# Patient Record
Sex: FEMALE | Race: WHITE | NOT HISPANIC OR LATINO | Employment: OTHER | URBAN - METROPOLITAN AREA
[De-identification: names, ages, dates, MRNs, and addresses within clinical notes are randomized per-mention and may not be internally consistent; named-entity substitution may affect disease eponyms.]

---

## 2017-02-01 ENCOUNTER — HOSPITAL ENCOUNTER (OUTPATIENT)
Dept: RADIOLOGY | Facility: MEDICAL CENTER | Age: 72
End: 2017-02-01
Attending: OBSTETRICS & GYNECOLOGY
Payer: COMMERCIAL

## 2017-02-01 DIAGNOSIS — Z13.9 SCREENING: ICD-10-CM

## 2017-02-01 PROCEDURE — G0202 SCR MAMMO BI INCL CAD: HCPCS

## 2017-02-02 ENCOUNTER — TELEPHONE (OUTPATIENT)
Dept: RHEUMATOLOGY | Facility: PHYSICIAN GROUP | Age: 72
End: 2017-02-02

## 2017-02-03 NOTE — TELEPHONE ENCOUNTER
Called patient queried her compliance.  She is taking her alendronate q Wednesday.  Will send message back [employer].    Discussed with patient plan - recheck [employer] in two years.

## 2017-02-16 DIAGNOSIS — I10 ESSENTIAL HYPERTENSION: ICD-10-CM

## 2017-02-20 RX ORDER — LISINOPRIL 20 MG/1
TABLET ORAL
Qty: 90 TAB | Refills: 3 | Status: SHIPPED | OUTPATIENT
Start: 2017-02-20 | End: 2018-02-14 | Stop reason: SDUPTHER

## 2017-04-19 ENCOUNTER — TELEPHONE (OUTPATIENT)
Dept: RHEUMATOLOGY | Facility: PHYSICIAN GROUP | Age: 72
End: 2017-04-19

## 2017-04-19 DIAGNOSIS — R79.89 ELEVATED SERUM CREATININE: ICD-10-CM

## 2017-04-19 NOTE — TELEPHONE ENCOUNTER
Please call patient.  Labs from December 2016 at [facility] showed kidney function was compromised.  Please have her recheck her labs if this has not been done.  Will refill after labs are done.  If she has had labs done at [facility] since December for her kidneys we can review these instead.

## 2017-04-20 NOTE — TELEPHONE ENCOUNTER
Called pt. And relayed your message, pt. Stated that she would like me to mail out her labs. She has not had any labs done since December. Thank you!

## 2017-04-20 NOTE — TELEPHONE ENCOUNTER
Pt. Called back and stated that she was a bit confused, wondering what labs you were looking at that showed compromised kidney function. She said that she received a letter at the beginning of November that stated her labs were all fine. She said that she didn't think the labs in December showed her kidney functions being compromised either. Thank you! Please advise.

## 2017-04-21 ENCOUNTER — OFFICE VISIT (OUTPATIENT)
Dept: CARDIOLOGY | Facility: MEDICAL CENTER | Age: 72
End: 2017-04-21
Payer: COMMERCIAL

## 2017-04-21 VITALS
SYSTOLIC BLOOD PRESSURE: 132 MMHG | WEIGHT: 151 LBS | OXYGEN SATURATION: 99 % | BODY MASS INDEX: 24.27 KG/M2 | HEART RATE: 69 BPM | HEIGHT: 66 IN | DIASTOLIC BLOOD PRESSURE: 64 MMHG

## 2017-04-21 DIAGNOSIS — I34.1 MVP (MITRAL VALVE PROLAPSE): Chronic | ICD-10-CM

## 2017-04-21 DIAGNOSIS — I34.0 NON-RHEUMATIC MITRAL REGURGITATION: ICD-10-CM

## 2017-04-21 PROCEDURE — 99214 OFFICE O/P EST MOD 30 MIN: CPT | Performed by: INTERNAL MEDICINE

## 2017-04-21 ASSESSMENT — ENCOUNTER SYMPTOMS
WEIGHT LOSS: 0
MYALGIAS: 0
WHEEZING: 0
DIZZINESS: 0
BRUISES/BLEEDS EASILY: 0
NERVOUS/ANXIOUS: 0
NAUSEA: 0
SPUTUM PRODUCTION: 0
COUGH: 0
PALPITATIONS: 0
LOSS OF CONSCIOUSNESS: 0
DEPRESSION: 0
HEARTBURN: 0

## 2017-04-21 NOTE — Clinical Note
[facility]   [address]  [place] [address]  Phone: [phone]  Fax: [phone]              Shaista Bocanegra  1945    Encounter Date: 4/21/2017    Pascale Daniels M.D.          PROGRESS NOTE:  Subjective:   Shaista Bocanegra is a 71 y.o. female who presents today in follow-up in regards to her hypertension and her myxomatous mitral valve disease    Her blood pressures at home are not high, she brings in her log.  In the emergency room a few months ago, she is dehydrated.    Still loves cars, traveling to [place], just got back from [place]  No limitations, extremely active  Compliant with medications        Past Medical History   Diagnosis Date   • Breast cancer (CMS-HCC)    • Benign essential HTN 3/19/2012   • Chest tightness or pressure 3/19/2012   • Hypercholesterolemia 3/19/2012   • MVP (mitral valve prolapse) 3/19/2012   • Renal insufficiency 3/19/2012   • High risk medication use 3/19/2012   • Cancer (CMS-HCC)      Past Surgical History   Procedure Laterality Date   • Cataract phaco with iol  1/26/2009     Performed by SHANNON GANDARA [facility]   • Cataract phaco with iol  3/16/2009     Performed by SHANNON GANDARA at [facility]   • Breast biopsy     • Pr radiation therapy plan simple     • Pr chemotherapy, unspecified procedure     • Lumpectomy       Family History   Problem Relation Age of Onset   • Cancer Mother    • Heart Failure Neg Hx    • Heart Disease Neg Hx      History   Smoking status   • Never Smoker    Smokeless tobacco   • Never Used     No Known Allergies  Outpatient Encounter Prescriptions as of 4/21/2017   Medication Sig Dispense Refill   • lisinopril (PRINIVIL) 20 MG Tab TAKE 1 TABLET DAILY 90 Tab 3   • alendronate (FOSAMAX) 35 MG tablet 1 every 7 days 4 Tab 2   • metoprolol SR (TOPROL XL) 50 MG TABLET SR 24 HR Take 1 Tab by mouth every day. 90 Tab 3   • cyanocobalamin (VITAMIN  "B-12) 100 MCG Tab Take 100 mcg by mouth every day.     • simvastatin (ZOCOR) 20 MG TABS Take 1 Tab by mouth every evening. (Patient taking differently: Take 20 mg by mouth. Qod or 1/2 tablet daily) 90 Tab 2   • triamterene/hctz (MAXZIDE-25/DYAZIDE) 37.5-25 MG CAPS Take 1 Cap by mouth every morning. 90 Cap 2   • potassium chloride CR (K-DUR) 10 MEQ tablet Take 1 Tab by mouth every day. 180 Tab 3   • Multiple Vitamin (MULTIVITAMINS PO) Take  by mouth every day.     • Calcium Carbonate-Vitamin D (CALCIUM + D PO) Take 1,200 mg by mouth every day.       No facility-administered encounter medications on file as of 4/21/2017.     Review of Systems   Constitutional: Negative for weight loss and malaise/fatigue.   HENT: Negative for congestion.    Respiratory: Negative for cough, sputum production and wheezing.    Cardiovascular: Negative for chest pain, palpitations and leg swelling.   Gastrointestinal: Negative for heartburn and nausea.   Musculoskeletal: Negative for myalgias and joint pain.   Neurological: Negative for dizziness and loss of consciousness.   Endo/Heme/Allergies: Does not bruise/bleed easily.   Psychiatric/Behavioral: Negative for depression. The patient is not nervous/anxious.    All other systems reviewed and are negative.       Objective:   /64 mmHg  Pulse 69  Ht 1.677 m (5' 6.02\")  Wt 68.493 kg (151 lb)  BMI 24.35 kg/m2  SpO2 99%    Physical Exam   Constitutional: She is oriented to person, place, and time. She appears well-developed. No distress.   Younger than stated age   HENT:   Mouth/Throat: Mucous membranes are normal.   Eyes: Conjunctivae and EOM are normal.   Neck: No JVD present. No thyroid mass and no thyromegaly present.   Cardiovascular: Normal rate, regular rhythm and intact distal pulses.    Murmur: 2 of 6 systolic blowing murmur.  Pulmonary/Chest: Effort normal and breath sounds normal.   Musculoskeletal: Normal range of motion. She exhibits no edema.   Neurological: She is " alert and oriented to person, place, and time. She has normal strength. She displays no tremor. No cranial nerve deficit. She exhibits normal muscle tone.   Skin: Skin is warm and dry. No rash noted. She is not diaphoretic.   Psychiatric: She has a normal mood and affect. Her behavior is normal.   Vitals reviewed.      Assessment:     1. MVP (mitral valve prolapse)     2. Non-rheumatic mitral regurgitation  COMP METABOLIC PANEL       Medical Decision Making:  Today's Assessment / Status / Plan:       Hypertension, excellent control. Log reviewed, average blood pressures in the 120s. On triple drug regimen, lab tests reviewed, emergency records reviewed. I do not think she has a reduced kidney function, she was concerned about this. We'll repeat now    Mitral valve disease, looked images, talked about anatomy. We talked about indications for surgery which I believe in her would be symptoms on maximal medications. Annual echoes, likely in the fall    She'll recheck her kidney function which I gave her a slip for, RTC 6-12 months      Bethany [place] M.D.  [address] [place] [id number]  VIA Facsimile: [phone]

## 2017-04-21 NOTE — MR AVS SNAPSHOT
"Shaista Bocanegra   2017 12:45 PM   Office Visit   MRN: [id number]    Department:  [facility]   Dept Phone:  [phone]    Description:  Female : 1945   Provider:  Pascale Daniels M.D.           Reason for Visit     Follow-Up           Allergies as of 2017     No Known Allergies      You were diagnosed with     MVP (mitral valve prolapse)   [781986]       Non-rheumatic mitral regurgitation   [238416]         Vital Signs     Blood Pressure Pulse Height Weight Body Mass Index Oxygen Saturation    132/64 mmHg 69 1.677 m (5' 6.02\") 68.493 kg (151 lb) 24.35 kg/m2 99%    Smoking Status                   Never Smoker            Basic Information     Date Of Birth Sex Race Ethnicity Preferred Language    1945 Female White Non- English      Your appointments     May 25, 2017  8:30 AM   Follow Up Visit with Aimee Bruno M.D.   [facility] (--)    [address], [address]  [place] [address] [phone]           You will be receiving a confirmation call a few days before your appointment from our automated call confirmation system.            Dec 05, 2017  8:45 AM   FOLLOW UP with Pascale Daniels M.D.   [facility] (--)    [address]  [place] [address] [phone]              Problem List              ICD-10-CM Priority Class Noted - Resolved    Benign essential HTN (Chronic) I10   3/19/2012 - Present    MVP (mitral valve prolapse) (Chronic) I34.1   3/19/2012 - Present    Mitral regurgitation I34.0   2014 - Present    Osteopenia M85.80   2015 - Present    hyperlipidemia E78.5   3/24/2015 - Present      Health Maintenance        Date Due Completion Dates    IMM DTaP/Tdap/Td Vaccine (1 - Tdap) 1962 ---    PAP SMEAR 1964 ---    COLONOSCOPY 1993 ---    IMM ZOSTER VACCINE 2003 ---    IMM PNEUMOCOCCAL 65+ (ADULT) LOW/MEDIUM RISK SERIES (1 of 2 - PCV13) 2008 ---    MAMMOGRAM " 2/1/2018 2/1/2017, 1/15/2016, 2/4/2015, 2/6/2014, 2/6/2013, 2/8/2012, 2/8/2011, 2/8/2010, 2/8/2010, 2/18/2009, 2/18/2009, 2/4/2008, 2/4/2008, 2/22/2007, 9/20/2006, 2/1/2006, 2/14/2005, 2/12/2004    BONE DENSITY 2/5/2021 2/5/2016, 3/5/2014, 12/15/2006, 10/13/2005, 9/1/2004            Current Immunizations     No immunizations on file.      Below and/or attached are the medications your provider expects you to take. Review all of your home medications and newly ordered medications with your provider and/or pharmacist. Follow medication instructions as directed by your provider and/or pharmacist. Please keep your medication list with you and share with your provider. Update the information when medications are discontinued, doses are changed, or new medications (including over-the-counter products) are added; and carry medication information at all times in the event of emergency situations     Allergies:  No Known Allergies          Medications  Valid as of: April 21, 2017 -  1:24 PM    Generic Name Brand Name Tablet Size Instructions for use    Alendronate Sodium (Tab) FOSAMAX 35 MG 1 every 7 days        Calcium Citrate-Vitamin D   Take 1,200 mg by mouth every day.        Cyanocobalamin (Tab) VITAMIN B-12 100 MCG Take 100 mcg by mouth every day.        Lisinopril (Tab) PRINIVIL 20 MG TAKE 1 TABLET DAILY        Metoprolol Succinate (TABLET SR 24 HR) TOPROL XL 50 MG Take 1 Tab by mouth every day.        Multiple Vitamin   Take  by mouth every day.        Potassium Chloride (Tab CR) KLOR-CON 10 MEQ Take 1 Tab by mouth every day.        Simvastatin (Tab) ZOCOR 20 MG Take 1 Tab by mouth every evening.        Triamterene-HCTZ (Cap) MAXZIDE-25/DYAZIDE 37.5-25 MG Take 1 Cap by mouth every morning.        .                 Medicines prescribed today were sent to:     [facility]  [place] [address] [address]    Phone: [phone] Fax: [phone]    Open 24 Hours?: No    [facility] - [place], [place] - [address] [place] [address]    [address] [place] [address]    Phone: [phone] Fax: [phone]    Open 24 Hours?: No    [id number] - [place], [place] - [address]    [address] [place] [address]    Phone: [phone] Fax: [phone]    Open 24 Hours?: No    [facility] - [place], [place] - [address] [place] [address]    Phone: [phone] Fax: [phone]    Open 24 Hours?: No      Medication refill instructions:       If your prescription bottle indicates you have medication refills left, it is not necessary to call your provider’s office. Please contact your pharmacy and they will refill your medication.    If your prescription bottle indicates you do not have any refills left, you may request refills at any time through one of the following ways: The online [employer] system (except Urgent Care), by calling your provider’s office, or by asking your pharmacy to contact your provider’s office with a refill request. Medication refills are processed only during regular business hours and may not be available until the next business day. Your provider may request additional information or to have a follow-up visit with you prior to refilling your medication.   *Please Note: Medication refills are assigned a new Rx number when refilled electronically. Your pharmacy may indicate that no refills were authorized even though a new prescription for the same medication is available at the pharmacy. Please request the medicine by name with the pharmacy before contacting your provider for a refill.        Your To Do List     Future Labs/Procedures Complete By Expires    COMP METABOLIC PANEL  As directed 4/21/2018         [employer] Access Code: Activation code not generated  Current [employer] Status: Active

## 2017-04-21 NOTE — PROGRESS NOTES
Subjective:   Shaista Bocanegra is a 71 y.o. female who presents today in follow-up in regards to her hypertension and her myxomatous mitral valve disease    Her blood pressures at home are not high, she brings in her log.  In the emergency room a few months ago, she is dehydrated.    Still loves cars, traveling to [place], just got back from [place]  No limitations, extremely active  Compliant with medications        Past Medical History   Diagnosis Date   • Breast cancer (CMS-HCC)    • Benign essential HTN 3/19/2012   • Chest tightness or pressure 3/19/2012   • Hypercholesterolemia 3/19/2012   • MVP (mitral valve prolapse) 3/19/2012   • Renal insufficiency 3/19/2012   • High risk medication use 3/19/2012   • Cancer (CMS-HCC)      Past Surgical History   Procedure Laterality Date   • Cataract phaco with iol  1/26/2009     Performed by SHANNON G[facility]   • Cataract phaco with iol  3/16/2009     Performed by SHANNON GANDARA [facility]   • Breast biopsy     • Pr radiation therapy plan simple     • Pr chemotherapy, unspecified procedure     • Lumpectomy       Family History   Problem Relation Age of Onset   • Cancer Mother    • Heart Failure Neg Hx    • Heart Disease Neg Hx      History   Smoking status   • Never Smoker    Smokeless tobacco   • Never Used     No Known Allergies  Outpatient Encounter Prescriptions as of 4/21/2017   Medication Sig Dispense Refill   • lisinopril (PRINIVIL) 20 MG Tab TAKE 1 TABLET DAILY 90 Tab 3   • alendronate (FOSAMAX) 35 MG tablet 1 every 7 days 4 Tab 2   • metoprolol SR (TOPROL XL) 50 MG TABLET SR 24 HR Take 1 Tab by mouth every day. 90 Tab 3   • cyanocobalamin (VITAMIN B-12) 100 MCG Tab Take 100 mcg by mouth every day.     • simvastatin (ZOCOR) 20 MG TABS Take 1 Tab by mouth every evening. (Patient taking differently: Take 20 mg by mouth. Qod or 1/2 tablet daily) 90 Tab 2   • triamterene/hctz (MAXZIDE-25/DYAZIDE) 37.5-25 MG CAPS  "Take 1 Cap by mouth every morning. 90 Cap 2   • potassium chloride CR (K-DUR) 10 MEQ tablet Take 1 Tab by mouth every day. 180 Tab 3   • Multiple Vitamin (MULTIVITAMINS PO) Take  by mouth every day.     • Calcium Carbonate-Vitamin D (CALCIUM + D PO) Take 1,200 mg by mouth every day.       No facility-administered encounter medications on file as of 4/21/2017.     Review of Systems   Constitutional: Negative for weight loss and malaise/fatigue.   HENT: Negative for congestion.    Respiratory: Negative for cough, sputum production and wheezing.    Cardiovascular: Negative for chest pain, palpitations and leg swelling.   Gastrointestinal: Negative for heartburn and nausea.   Musculoskeletal: Negative for myalgias and joint pain.   Neurological: Negative for dizziness and loss of consciousness.   Endo/Heme/Allergies: Does not bruise/bleed easily.   Psychiatric/Behavioral: Negative for depression. The patient is not nervous/anxious.    All other systems reviewed and are negative.       Objective:   /64 mmHg  Pulse 69  Ht 1.677 m (5' 6.02\")  Wt 68.493 kg (151 lb)  BMI 24.35 kg/m2  SpO2 99%    Physical Exam   Constitutional: She is oriented to person, place, and time. She appears well-developed. No distress.   Younger than stated age   HENT:   Mouth/Throat: Mucous membranes are normal.   Eyes: Conjunctivae and EOM are normal.   Neck: No JVD present. No thyroid mass and no thyromegaly present.   Cardiovascular: Normal rate, regular rhythm and intact distal pulses.    Murmur: 2 of 6 systolic blowing murmur.  Pulmonary/Chest: Effort normal and breath sounds normal.   Musculoskeletal: Normal range of motion. She exhibits no edema.   Neurological: She is alert and oriented to person, place, and time. She has normal strength. She displays no tremor. No cranial nerve deficit. She exhibits normal muscle tone.   Skin: Skin is warm and dry. No rash noted. She is not diaphoretic.   Psychiatric: She has a normal mood and " affect. Her behavior is normal.   Vitals reviewed.      Assessment:     1. MVP (mitral valve prolapse)     2. Non-rheumatic mitral regurgitation  COMP METABOLIC PANEL       Medical Decision Making:  Today's Assessment / Status / Plan:       Hypertension, excellent control. Log reviewed, average blood pressures in the 120s. On triple drug regimen, lab tests reviewed, emergency records reviewed. I do not think she has a reduced kidney function, she was concerned about this. We'll repeat now    Mitral valve disease, looked images, talked about anatomy. We talked about indications for surgery which I believe in her would be symptoms on maximal medications. Annual echoes, likely in the fall    She'll recheck her kidney function which I gave her a slip for, RTC 6-12 months

## 2017-04-25 NOTE — TELEPHONE ENCOUNTER
Please call patient     Her creatinine which is an indicatory of kidney function was normal.  However her filtration rate was low.  I would like to repeat this.

## 2017-04-25 NOTE — TELEPHONE ENCOUNTER
Called pt. And relayed your message. Pt. Stated that she probably will not be able to get the labs done because she is leaving for [place] on Thursday. Advised that i would give her a call back if there was anything else you needed. Thank you!

## 2017-05-25 ENCOUNTER — OFFICE VISIT (OUTPATIENT)
Dept: RHEUMATOLOGY | Facility: PHYSICIAN GROUP | Age: 72
End: 2017-05-25
Payer: COMMERCIAL

## 2017-05-25 VITALS
DIASTOLIC BLOOD PRESSURE: 82 MMHG | SYSTOLIC BLOOD PRESSURE: 148 MMHG | WEIGHT: 154 LBS | TEMPERATURE: 98.7 F | BODY MASS INDEX: 24.84 KG/M2 | HEART RATE: 70 BPM | OXYGEN SATURATION: 97 %

## 2017-05-25 DIAGNOSIS — Z79.899 ENCOUNTER FOR LONG-TERM (CURRENT) USE OF HIGH-RISK MEDICATION: ICD-10-CM

## 2017-05-25 DIAGNOSIS — M85.80 OSTEOPENIA, UNSPECIFIED LOCATION: ICD-10-CM

## 2017-05-25 PROCEDURE — 99214 OFFICE O/P EST MOD 30 MIN: CPT | Performed by: INTERNAL MEDICINE

## 2017-05-25 RX ORDER — VALACYCLOVIR HYDROCHLORIDE 1 G/1
1000 TABLET, FILM COATED ORAL 2 TIMES DAILY
COMMUNITY
End: 2017-08-18

## 2017-05-25 ASSESSMENT — ENCOUNTER SYMPTOMS
FEVER: 0
VOMITING: 0
MYALGIAS: 0
NAUSEA: 0
CHILLS: 0

## 2017-05-25 NOTE — Clinical Note
[facility]   [address], [address]  [place] [address]  Phone: [phone]  Fax: [phone]              Encounter Date: 5/25/2017    Dear Dr. Crane    It was a pleasure seeing your patient, Shaista Bocanegra, on 5/25/2017. The encounter diagnosis was Osteopenia, unspecified location.     Please find attached progress note which includes the history I obtained from Ms. Bocanegra, my physical examination findings, my impression and recommendations.      Once again, it was a pleasure participating in your patient's care.  Please feel free to contact me if you have any questions or if I can be of any further assistance to your patients.      Sincerely,    Aimee Bruno M.D.  Electronically Signed          PROGRESS NOTE:  Subjective:   Date of Consultation:5/25/2017  8:39 AM  Primary care physician:Bethany Crane M.D.        Reason for Consultation:  Ms. Bocanegra  is a pleasant 71 y.o. year old female who presents for follow-up of osteopenia    Osteopenia  She first presented to Dr. Ward with last bone density in March 2012 with T score of -2.3 at the left femoral neck of the hip with no previous fractures.  She was previously on anti-resorptive drug for a few years.  She reports that it was 5 years starting around 1998.  She was last seen in 2/29/2016 for follow-up.  At the time she was taking calcium and vitamin D.  She denies falls.  At the last visit she had started fosamax in August 2016 and doing well    Her last bone density in 1/21/2016 showed   The mean bone mineral density for the lumbar spine is 1.058 g/cm2, which corresponds to a T score of -1.0 and a Z score of 0.7.  The proximal left femur has a mean bone mineral density of 0.795 g/cm2, with a T score of -1.7 and a Z score of -0.1.  When compared with the most recent study dated 3/5/2014, there has been a 3.0% decrease in the bone mineral density of the lumbar spine and a 0.9% increase in the bone mineral density of the left  femur.    She started alendronate about August 2016 however the prescription was written to start 6/8/2016.  She is tolerating the medication well.    Since last visit she has corrected to an follows up with her dentist then noticed that she is in need of an implant. Her dentist has referred her to a specialist for second opinion.  She is concerned being on alendronate that she is at risk of osteonecrosis of the jaw she were to need invasive dental work.      Vitamin D 5/2015 from [facility] at 75.8  Not taking daily vitamin D    Abnormal LFT  She does drink 2-3 alcoholic drinks a day  She had labs done recently at [place]    Renal insufficiency  She states that as long as she hydrates before labs her labs are fine.  She had labs done recently at [place] demonstrated a creatinine of 1.6 which is reasonable.     History of breast cancer treated with radiation therapy    Past Medical History: she has a left wrist fracture as a result of fall while rollerskating.    Family History: hip fracture mom had in her late 80's    Past Medical History   Diagnosis Date   • Breast cancer (CMS-[facility])    • Benign essential HTN 3/19/2012   • Chest tightness or pressure 3/19/2012   • Hypercholesterolemia 3/19/2012   • MVP (mitral valve prolapse) 3/19/2012   • Renal insufficiency 3/19/2012   • High risk medication use 3/19/2012   • Cancer (CMS-HCC)      Past Surgical History   Procedure Laterality Date   • Cataract phaco with iol  1/26/2009     [facility]   • Cataract phaco with iol  3/16/2009     Performed by SHANNON GANDARA at [facility]   • Breast biopsy     • Pr radiation therapy plan simple     • Pr chemotherapy, unspecified procedure     • Lumpectomy       No Known Allergies  Outpatient Encounter Prescriptions as of 5/25/2017   Medication Sig Dispense Refill   • Biotin 5000 MCG Cap Take  by mouth.     • valacyclovir (VALTREX) 1 GM Tab Take 1,000 mg by mouth  2 times a day.     • alendronate (FOSAMAX) 35 MG tablet TAKE 1 TABLET EVERY 7 DAYS 4 Tab 11   • lisinopril (PRINIVIL) 20 MG Tab TAKE 1 TABLET DAILY 90 Tab 3   • metoprolol SR (TOPROL XL) 50 MG TABLET SR 24 HR Take 1 Tab by mouth every day. 90 Tab 3   • cyanocobalamin (VITAMIN B-12) 100 MCG Tab Take 100 mcg by mouth every day.     • simvastatin (ZOCOR) 20 MG TABS Take 1 Tab by mouth every evening. (Patient taking differently: Take 20 mg by mouth. Qod or 1/2 tablet daily) 90 Tab 2   • potassium chloride CR (K-DUR) 10 MEQ tablet Take 1 Tab by mouth every day. 180 Tab 3   • Multiple Vitamin (MULTIVITAMINS PO) Take  by mouth every day.     • Calcium Carbonate-Vitamin D (CALCIUM + D PO) Take 1,200 mg by mouth every day.     • triamterene/hctz (MAXZIDE-25/DYAZIDE) 37.5-25 MG CAPS Take 1 Cap by mouth every morning. 90 Cap 2     No facility-administered encounter medications on file as of 5/25/2017.     Social History     Social History   • Marital Status:      Spouse Name: N/A   • Number of Children: N/A   • Years of Education: N/A     Occupational History   • Not on file.     Social History Main Topics   • Smoking status: Never Smoker    • Smokeless tobacco: Never Used   • Alcohol Use: Yes      Comment: 3 glasses of wine   • Drug Use: No   • Sexual Activity: Not on file     Other Topics Concern   • Not on file     Social History Narrative      History   Smoking status   • Never Smoker    Smokeless tobacco   • Never Used     History   Alcohol Use   • Yes     Comment: 3 glasses of wine     History   Drug Use No      OB History   No data available      No LMP recorded.    G P A L     Family History   Problem Relation Age of Onset   • Cancer Mother    • Heart Failure Neg Hx    • Heart Disease Neg Hx        Review of Systems   Constitutional: Negative for fever and chills.   Gastrointestinal: Negative for nausea and vomiting.   Musculoskeletal: Negative for myalgias.        Objective:   /82 mmHg  Pulse 70   Temp(Src) 37.1 °C (98.7 °F)  Wt 69.854 kg (154 lb)  SpO2 97%  Breastfeeding? No    Physical Exam   Constitutional: She is oriented to person, place, and time. She appears well-developed and well-nourished. No distress.   HENT:   Head: Normocephalic and atraumatic.   Right Ear: External ear normal.   Left Ear: External ear normal.   Eyes: Conjunctivae are normal. Right eye exhibits no discharge. Left eye exhibits no discharge. No scleral icterus.   Cardiovascular: Normal rate, regular rhythm and normal heart sounds.    Pulmonary/Chest: No respiratory distress.   Abdominal: Soft.   No hepatomegaly   Neurological: She is alert and oriented to person, place, and time. No cranial nerve deficit.   Skin: Skin is warm. No rash noted. She is not diaphoretic. No erythema.   Limited skin exam   Psychiatric: She has a normal mood and affect. Her behavior is normal. Judgment and thought content normal.     Labs:    No results found for: QNTTBGOLD  No results found for: HEPBCORTOT, HEPBCORIGM, HEPBSAG  No results found for: HEPCAB  Lab Results   Component Value Date/Time    SODIUM 135 12/05/2016 11:00 AM    POTASSIUM 3.7 12/05/2016 11:00 AM    CHLORIDE 100 12/05/2016 11:00 AM    CO2 26 12/05/2016 11:00 AM    GLUCOSE 143* 12/05/2016 11:00 AM    BUN 19 12/05/2016 11:00 AM    CREATININE 1.17 12/05/2016 11:00 AM    BUN-CREATININE RATIO 30* 08/07/2013 08:47 AM      Lab Results   Component Value Date/Time    WBC 3.9* 12/05/2016 11:00 AM    RBC 4.09* 12/05/2016 11:00 AM    HEMOGLOBIN 13.8 12/05/2016 11:00 AM    HEMATOCRIT 40.5 12/05/2016 11:00 AM    MCV 99.0* 12/05/2016 11:00 AM    MCH 33.7* 12/05/2016 11:00 AM    MCHC 34.1 12/05/2016 11:00 AM    MPV 10.1 12/05/2016 11:00 AM    NEUTROPHILS-POLYS 72.90* 12/05/2016 11:00 AM    LYMPHOCYTES 16.80* 12/05/2016 11:00 AM    MONOCYTES 9.20 12/05/2016 11:00 AM    EOSINOPHILS 0.50 12/05/2016 11:00 AM    BASOPHILS 0.30 12/05/2016 11:00 AM      Lab Results   Component Value Date/Time    CALCIUM  8.9 12/05/2016 11:00 AM       Assessment:     1. Osteopenia, unspecified location  VITAMIN D,25 HYDROXY    COMP METABOLIC PANEL           Medical Decision Making:  Today's Assessment / Status / Plan:     Osteopenia  I would like to continue alendronate however as she may be need invasive dental surgery we will hold this until she sees her second opinion and resolve her surgical plans. I again discussed with her the risks of osteonecrosis of the jaw secondary to use of bisphosphonates.    Started alendronate 8/2016 and now we will hold 5/2017  I did review the risks and benefits of bisphosphonate therapy.  She is not a candidate for Forteo since she has a history of radiation therapy.  I also reviewed with her how to take the medications.    I will also check a vitamin D    Hold bisphosphonate for now.      Cracked tooth  willl see dentist  Reports that she will need a possible impalnt.      Renal insufficiency and abnormal LFT  I will check  Labs for LFT    1. Osteopenia, unspecified location  VITAMIN D,25 HYDROXY    COMP METABOLIC PANEL           Return in about 6 months (around 11/25/2017).    I have spent greater than 50% of this 30 minute visit in counseling/coordination of care with the patient regarding discussion of risks of invasive dental surgery given that she has been on bisphosphonates, plan to hold bisphosphonates for now and the rationale and also a discussion that I want to follow up with her within a month and to keep up with the plan for any surgical intervention regarding her cracked tooth.    She agreed and verbalized her agreement and understanding with the current plan. I answered all questions and concerns she has at this time and advised her to call at any time in there interim with questions or concerns in regards to her care.    Thank you for allowing me to participate in her care, I will continue to follow closely.

## 2017-05-25 NOTE — PROGRESS NOTES
Subjective:   Date of Consultation:5/25/2017  8:39 AM  Primary care physician:Bethany Crane M.D.        Reason for Consultation:  Ms. Bocanegra  is a pleasant 71 y.o. year old female who presents for follow-up of osteopenia    Osteopenia  She first presented to Dr. Ward with last bone density in March 2012 with T score of -2.3 at the left femoral neck of the hip with no previous fractures.  She was previously on anti-resorptive drug for a few years.  She reports that it was 5 years starting around 1998.  She was last seen in 2/29/2016 for follow-up.  At the time she was taking calcium and vitamin D.  She denies falls.  At the last visit she had started fosamax in August 2016 and doing well    Her last bone density in 1/21/2016 showed   The mean bone mineral density for the lumbar spine is 1.058 g/cm2, which corresponds to a T score of -1.0 and a Z score of 0.7.  The proximal left femur has a mean bone mineral density of 0.795 g/cm2, with a T score of -1.7 and a Z score of -0.1.  When compared with the most recent study dated 3/5/2014, there has been a 3.0% decrease in the bone mineral density of the lumbar spine and a 0.9% increase in the bone mineral density of the left femur.    She started alendronate about August 2016 however the prescription was written to start 6/8/2016.  She is tolerating the medication well.    Since last visit she has corrected to an follows up with her dentist then noticed that she is in need of an implant. Her dentist has referred her to a specialist for second opinion.  She is concerned being on alendronate that she is at risk of osteonecrosis of the jaw she were to need invasive dental work.      Vitamin D 5/2015 from [facility] at 75.8  Not taking daily vitamin D    Abnormal LFT  She does drink 2-3 alcoholic drinks a day  She had labs done recently at [place]    Renal insufficiency  She states that as long as she hydrates before labs her labs are fine.  She had labs done recently  at [place] demonstrated a creatinine of 1.6 which is reasonable.     History of breast cancer treated with radiation therapy    Past Medical History: she has a left wrist fracture as a result of fall while rollerskating.    Family History: hip fracture mom had in her late 80's    Past Medical History   Diagnosis Date   • Breast cancer (CMS-HCC)    • Benign essential HTN 3/19/2012   • Chest tightness or pressure 3/19/2012   • Hypercholesterolemia 3/19/2012   • MVP (mitral valve prolapse) 3/19/2012   • Renal insufficiency 3/19/2012   • High risk medication use 3/19/2012   • Cancer (CMS-HCC)      Past Surgical History   Procedure Laterality Date   • Cataract phaco with iol  1/26/2009     Performed by SHANNON GANDARA at [facility]   • Cataract phaco with iol  3/16/2009     Performed by SHANNON GANDARA at [facility]   • Breast biopsy     • Pr radiation therapy plan simple     • Pr chemotherapy, unspecified procedure     • Lumpectomy       No Known Allergies  Outpatient Encounter Prescriptions as of 5/25/2017   Medication Sig Dispense Refill   • Biotin 5000 MCG Cap Take  by mouth.     • valacyclovir (VALTREX) 1 GM Tab Take 1,000 mg by mouth 2 times a day.     • alendronate (FOSAMAX) 35 MG tablet TAKE 1 TABLET EVERY 7 DAYS 4 Tab 11   • lisinopril (PRINIVIL) 20 MG Tab TAKE 1 TABLET DAILY 90 Tab 3   • metoprolol SR (TOPROL XL) 50 MG TABLET SR 24 HR Take 1 Tab by mouth every day. 90 Tab 3   • cyanocobalamin (VITAMIN B-12) 100 MCG Tab Take 100 mcg by mouth every day.     • simvastatin (ZOCOR) 20 MG TABS Take 1 Tab by mouth every evening. (Patient taking differently: Take 20 mg by mouth. Qod or 1/2 tablet daily) 90 Tab 2   • potassium chloride CR (K-DUR) 10 MEQ tablet Take 1 Tab by mouth every day. 180 Tab 3   • Multiple Vitamin (MULTIVITAMINS PO) Take  by mouth every day.     • Calcium Carbonate-Vitamin D (CALCIUM + D PO) Take 1,200 mg by mouth every day.     • triamterene/hctz  (MAXZIDE-25/DYAZIDE) 37.5-25 MG CAPS Take 1 Cap by mouth every morning. 90 Cap 2     No facility-administered encounter medications on file as of 5/25/2017.     Social History     Social History   • Marital Status:      Spouse Name: N/A   • Number of Children: N/A   • Years of Education: N/A     Occupational History   • Not on file.     Social History Main Topics   • Smoking status: Never Smoker    • Smokeless tobacco: Never Used   • Alcohol Use: Yes      Comment: 3 glasses of wine   • Drug Use: No   • Sexual Activity: Not on file     Other Topics Concern   • Not on file     Social History Narrative      History   Smoking status   • Never Smoker    Smokeless tobacco   • Never Used     History   Alcohol Use   • Yes     Comment: 3 glasses of wine     History   Drug Use No      OB History   No data available      No LMP recorded.    G P A L     Family History   Problem Relation Age of Onset   • Cancer Mother    • Heart Failure Neg Hx    • Heart Disease Neg Hx        Review of Systems   Constitutional: Negative for fever and chills.   Gastrointestinal: Negative for nausea and vomiting.   Musculoskeletal: Negative for myalgias.        Objective:   /82 mmHg  Pulse 70  Temp(Src) 37.1 °C (98.7 °F)  Wt 69.854 kg (154 lb)  SpO2 97%  Breastfeeding? No    Physical Exam   Constitutional: She is oriented to person, place, and time. She appears well-developed and well-nourished. No distress.   HENT:   Head: Normocephalic and atraumatic.   Right Ear: External ear normal.   Left Ear: External ear normal.   Eyes: Conjunctivae are normal. Right eye exhibits no discharge. Left eye exhibits no discharge. No scleral icterus.   Cardiovascular: Normal rate, regular rhythm and normal heart sounds.    Pulmonary/Chest: No respiratory distress.   Abdominal: Soft.   No hepatomegaly   Neurological: She is alert and oriented to person, place, and time. No cranial nerve deficit.   Skin: Skin is warm. No rash noted. She is not  diaphoretic. No erythema.   Limited skin exam   Psychiatric: She has a normal mood and affect. Her behavior is normal. Judgment and thought content normal.     Labs:    No results found for: QNTTBGOLD  No results found for: HEPBCORTOT, HEPBCORIGM, HEPBSAG  No results found for: HEPCAB  Lab Results   Component Value Date/Time    SODIUM 135 12/05/2016 11:00 AM    POTASSIUM 3.7 12/05/2016 11:00 AM    CHLORIDE 100 12/05/2016 11:00 AM    CO2 26 12/05/2016 11:00 AM    GLUCOSE 143* 12/05/2016 11:00 AM    BUN 19 12/05/2016 11:00 AM    CREATININE 1.17 12/05/2016 11:00 AM    BUN-CREATININE RATIO 30* 08/07/2013 08:47 AM      Lab Results   Component Value Date/Time    WBC 3.9* 12/05/2016 11:00 AM    RBC 4.09* 12/05/2016 11:00 AM    HEMOGLOBIN 13.8 12/05/2016 11:00 AM    HEMATOCRIT 40.5 12/05/2016 11:00 AM    MCV 99.0* 12/05/2016 11:00 AM    MCH 33.7* 12/05/2016 11:00 AM    MCHC 34.1 12/05/2016 11:00 AM    MPV 10.1 12/05/2016 11:00 AM    NEUTROPHILS-POLYS 72.90* 12/05/2016 11:00 AM    LYMPHOCYTES 16.80* 12/05/2016 11:00 AM    MONOCYTES 9.20 12/05/2016 11:00 AM    EOSINOPHILS 0.50 12/05/2016 11:00 AM    BASOPHILS 0.30 12/05/2016 11:00 AM      Lab Results   Component Value Date/Time    CALCIUM 8.9 12/05/2016 11:00 AM       Assessment:     1. Osteopenia, unspecified location  VITAMIN D,25 HYDROXY    COMP METABOLIC PANEL   2. Encounter for long-term (current) use of high-risk medication             Medical Decision Making:  Today's Assessment / Status / Plan:     Osteopenia  I would like to continue alendronate however as she may be need invasive dental surgery we will hold this until she sees her second opinion and resolve her surgical plans. I again discussed with her the risks of osteonecrosis of the jaw secondary to use of bisphosphonates.    Started alendronate 8/2016 and now we will hold 5/2017  I did review the risks and benefits of bisphosphonate therapy.  She is not a candidate for Forteo since she has a history of  radiation therapy.  I also reviewed with her how to take the medications.    I will also check a vitamin D    Hold bisphosphonate for now.      Cracked tooth  willl see dentist  Reports that she will need a possible impalnt.      Renal insufficiency and abnormal LFT  I will check  Labs for LFT    1. Osteopenia, unspecified location  VITAMIN D,25 HYDROXY    COMP METABOLIC PANEL   2. Encounter for long-term (current) use of high-risk medication             Return in about 6 months (around 11/25/2017).    I have spent greater than 50% of this 30 minute visit in counseling/coordination of care with the patient regarding discussion of risks of invasive dental surgery given that she has been on bisphosphonates, plan to hold bisphosphonates for now and the rationale and also a discussion that I want to follow up with her within a month and to keep up with the plan for any surgical intervention regarding her cracked tooth.    She agreed and verbalized her agreement and understanding with the current plan. I answered all questions and concerns she has at this time and advised her to call at any time in there interim with questions or concerns in regards to her care.    Thank you for allowing me to participate in her care, I will continue to follow closely.

## 2017-05-25 NOTE — MR AVS SNAPSHOT
Shaista Dangduy   2017 8:30 AM   Office Visit   MRN: [id number]    Department:  Rheumatology Med [facility]   Dept Phone:  [phone]    Description:  Female : 1945   Provider:  Aimee Bruno M.D.           Reason for Visit     Follow-Up follow up      Allergies as of 2017     No Known Allergies      You were diagnosed with     Osteopenia, unspecified location   [[id number]]         Vital Signs     Blood Pressure Pulse Temperature Weight Oxygen Saturation Breastfeeding?    148/82 mmHg 70 37.1 °C (98.7 °F) 69.854 kg (154 lb) 97% No    Smoking Status                   Never Smoker            Basic Information     Date Of Birth Sex Race Ethnicity Preferred Language    1945 Female White Non- English      Your appointments     Nov 15, 2017  8:30 AM   Follow Up Visit with Aimee Bruno M.D.   [facility] (--)    [address], [address]  [place] [address] [phone]           You will be receiving a confirmation call a few days before your appointment from our automated call confirmation system.            Dec 05, 2017  8:45 AM   FOLLOW UP with Pascale Daniels M.D.   [facility] (--)    [address], [address]  [place] [address] [phone]              Problem List              ICD-10-CM Priority Class Noted - Resolved    Benign essential HTN (Chronic) I10   3/19/2012 - Present    MVP (mitral valve prolapse) (Chronic) I34.1   3/19/2012 - Present    Mitral regurgitation I34.0   2014 - Present    Osteopenia M85.80   2015 - Present    hyperlipidemia E78.5   3/24/2015 - Present      Health Maintenance        Date Due Completion Dates    IMM DTaP/Tdap/Td Vaccine (1 - Tdap) 1962 ---    PAP SMEAR 1964 ---    COLONOSCOPY 1993 ---    IMM ZOSTER VACCINE 2003 ---    IMM PNEUMOCOCCAL 65+ (ADULT) LOW/MEDIUM RISK SERIES (1 of 2 - PCV13) 2008 ---    MAMMOGRAM 2018, 1/15/2016, 2015, 2014,  2/6/2013, 2/8/2012, 2/8/2011, 2/8/2010, 2/8/2010, 2/18/2009, 2/18/2009, 2/4/2008, 2/4/2008, 2/22/2007, 9/20/2006, 2/1/2006, 2/14/2005, 2/12/2004    BONE DENSITY 2/5/2021 2/5/2016, 3/5/2014, 12/15/2006, 10/13/2005, 9/1/2004            Current Immunizations     No immunizations on file.      Below and/or attached are the medications your provider expects you to take. Review all of your home medications and newly ordered medications with your provider and/or pharmacist. Follow medication instructions as directed by your provider and/or pharmacist. Please keep your medication list with you and share with your provider. Update the information when medications are discontinued, doses are changed, or new medications (including over-the-counter products) are added; and carry medication information at all times in the event of emergency situations     Allergies:  No Known Allergies          Medications  Valid as of: May 25, 2017 -  9:34 AM    Generic Name Brand Name Tablet Size Instructions for use    Alendronate Sodium (Tab) FOSAMAX 35 MG TAKE 1 TABLET EVERY 7 DAYS        Biotin (Cap) Biotin 5000 MCG Take  by mouth.        Calcium Citrate-Vitamin D   Take 1,200 mg by mouth every day.        Cyanocobalamin (Tab) VITAMIN B-12 100 MCG Take 100 mcg by mouth every day.        Lisinopril (Tab) PRINIVIL 20 MG TAKE 1 TABLET DAILY        Metoprolol Succinate (TABLET SR 24 HR) TOPROL XL 50 MG Take 1 Tab by mouth every day.        Multiple Vitamin   Take  by mouth every day.        Potassium Chloride (Tab CR) KLOR-CON 10 MEQ Take 1 Tab by mouth every day.        Simvastatin (Tab) ZOCOR 20 MG Take 1 Tab by mouth every evening.        Triamterene-HCTZ (Cap) MAXZIDE-25/DYAZIDE 37.5-25 MG Take 1 Cap by mouth every morning.        ValACYclovir HCl (Tab) VALTREX 1 GM Take 1,000 mg by mouth 2 times a day.        .                 Medicines prescribed today were sent to:     [facility] [place] [facility] [address]  [address]    Phone: [phone] Fax: [phone]    Open 24 Hours?: No    [facility] - [place], [place] - [address] [place] [address] [address] [facility] [address]    Phone: [phone] Fax: [phone]    Open 24 Hours?: No    [id number] - [place], [place] - [address]    [address] [place] [address]    Phone: [phone] Fax: [phone]    Open 24 Hours?: No    [facility] - [place], [place] - [address] [place] [address]    Phone: [phone] Fax: [phone]    Open 24 Hours?: No      Medication refill instructions:       If your prescription bottle indicates you have medication refills left, it is not necessary to call your provider’s office. Please contact your pharmacy and they will refill your medication.    If your prescription bottle indicates you do not have any refills left, you may request refills at any time through one of the following ways: The online [facility] [employer] system (except Urgent Care), by calling your provider’s office, or by asking your pharmacy to contact your provider’s office with a refill request. Medication refills are processed only during regular business hours and may not be available until the next business day. Your provider may request additional information or to have a follow-up visit with you prior to refilling your medication.   *Please Note: Medication refills are assigned a new Rx number when refilled electronically. Your pharmacy may indicate that no refills were authorized even though a new prescription for the same medication is available at the pharmacy. Please request the medicine by name with the pharmacy before contacting your provider for a refill.        Your To Do List     Future Labs/Procedures Complete By Expires    COMP METABOLIC PANEL  As directed 5/25/2018    VITAMIN D,25 HYDROXY  As directed 5/25/2018         [facility] Access Code: Activation code not generated  Current  MyChart Status: Active

## 2017-08-18 ENCOUNTER — RESOLUTE PROFESSIONAL BILLING HOSPITAL PROF FEE (OUTPATIENT)
Dept: HOSPITALIST | Facility: MEDICAL CENTER | Age: 72
End: 2017-08-18
Payer: COMMERCIAL

## 2017-08-18 ENCOUNTER — APPOINTMENT (OUTPATIENT)
Dept: RADIOLOGY | Facility: MEDICAL CENTER | Age: 72
DRG: 024 | End: 2017-08-18
Payer: COMMERCIAL

## 2017-08-18 ENCOUNTER — APPOINTMENT (OUTPATIENT)
Dept: RADIOLOGY | Facility: MEDICAL CENTER | Age: 72
DRG: 024 | End: 2017-08-18
Attending: EMERGENCY MEDICINE
Payer: COMMERCIAL

## 2017-08-18 ENCOUNTER — HOSPITAL ENCOUNTER (INPATIENT)
Facility: MEDICAL CENTER | Age: 72
LOS: 3 days | DRG: 024 | End: 2017-08-21
Attending: EMERGENCY MEDICINE | Admitting: HOSPITALIST
Payer: COMMERCIAL

## 2017-08-18 ENCOUNTER — APPOINTMENT (OUTPATIENT)
Dept: RADIOLOGY | Facility: MEDICAL CENTER | Age: 72
DRG: 024 | End: 2017-08-18
Attending: RADIOLOGY
Payer: COMMERCIAL

## 2017-08-18 PROBLEM — I63.9 ACUTE CVA (CEREBROVASCULAR ACCIDENT) (HCC): Status: ACTIVE | Noted: 2017-08-18

## 2017-08-18 LAB
ALBUMIN SERPL BCP-MCNC: 4 G/DL (ref 3.2–4.9)
ALBUMIN/GLOB SERPL: 1.5 G/DL
ALP SERPL-CCNC: 44 U/L (ref 30–99)
ALT SERPL-CCNC: 20 U/L (ref 2–50)
ANION GAP SERPL CALC-SCNC: 10 MMOL/L (ref 0–11.9)
APTT PPP: 26.4 SEC (ref 24.7–36)
AST SERPL-CCNC: 27 U/L (ref 12–45)
BASOPHILS # BLD AUTO: 0.5 % (ref 0–1.8)
BASOPHILS # BLD: 0.03 K/UL (ref 0–0.12)
BILIRUB SERPL-MCNC: 0.8 MG/DL (ref 0.1–1.5)
BUN SERPL-MCNC: 15 MG/DL (ref 8–22)
CALCIUM SERPL-MCNC: 9.6 MG/DL (ref 8.5–10.5)
CHLORIDE SERPL-SCNC: 104 MMOL/L (ref 96–112)
CO2 SERPL-SCNC: 27 MMOL/L (ref 20–33)
CREAT SERPL-MCNC: 0.8 MG/DL (ref 0.5–1.4)
EOSINOPHIL # BLD AUTO: 0.02 K/UL (ref 0–0.51)
EOSINOPHIL NFR BLD: 0.3 % (ref 0–6.9)
ERYTHROCYTE [DISTWIDTH] IN BLOOD BY AUTOMATED COUNT: 50.6 FL (ref 35.9–50)
EST. AVERAGE GLUCOSE BLD GHB EST-MCNC: 105 MG/DL
GFR SERPL CREATININE-BSD FRML MDRD: >60 ML/MIN/1.73 M 2
GLOBULIN SER CALC-MCNC: 2.6 G/DL (ref 1.9–3.5)
GLUCOSE SERPL-MCNC: 109 MG/DL (ref 65–99)
HBA1C MFR BLD: 5.3 % (ref 0–5.6)
HCT VFR BLD AUTO: 40.8 % (ref 37–47)
HGB BLD-MCNC: 13.8 G/DL (ref 12–16)
IMM GRANULOCYTES # BLD AUTO: 0.02 K/UL (ref 0–0.11)
IMM GRANULOCYTES NFR BLD AUTO: 0.3 % (ref 0–0.9)
INR PPP: 0.99 (ref 0.87–1.13)
LYMPHOCYTES # BLD AUTO: 0.78 K/UL (ref 1–4.8)
LYMPHOCYTES NFR BLD: 12.7 % (ref 22–41)
MAGNESIUM SERPL-MCNC: 1.8 MG/DL (ref 1.5–2.5)
MCH RBC QN AUTO: 34.7 PG (ref 27–33)
MCHC RBC AUTO-ENTMCNC: 33.8 G/DL (ref 33.6–35)
MCV RBC AUTO: 102.5 FL (ref 81.4–97.8)
MONOCYTES # BLD AUTO: 0.24 K/UL (ref 0–0.85)
MONOCYTES NFR BLD AUTO: 3.9 % (ref 0–13.4)
NEUTROPHILS # BLD AUTO: 5.04 K/UL (ref 2–7.15)
NEUTROPHILS NFR BLD: 82.3 % (ref 44–72)
NRBC # BLD AUTO: 0 K/UL
NRBC BLD AUTO-RTO: 0 /100 WBC
PLATELET # BLD AUTO: 195 K/UL (ref 164–446)
PMV BLD AUTO: 9.7 FL (ref 9–12.9)
POTASSIUM SERPL-SCNC: 3.3 MMOL/L (ref 3.6–5.5)
PROT SERPL-MCNC: 6.6 G/DL (ref 6–8.2)
PROTHROMBIN TIME: 13.4 SEC (ref 12–14.6)
RBC # BLD AUTO: 3.98 M/UL (ref 4.2–5.4)
SODIUM SERPL-SCNC: 141 MMOL/L (ref 135–145)
TROPONIN I SERPL-MCNC: <0.01 NG/ML (ref 0–0.04)
TSH SERPL DL<=0.005 MIU/L-ACNC: 1.16 UIU/ML (ref 0.3–3.7)
WBC # BLD AUTO: 6.1 K/UL (ref 4.8–10.8)

## 2017-08-18 PROCEDURE — 83036 HEMOGLOBIN GLYCOSYLATED A1C: CPT

## 2017-08-18 PROCEDURE — 70450 CT HEAD/BRAIN W/O DYE: CPT

## 2017-08-18 PROCEDURE — 700111 HCHG RX REV CODE 636 W/ 250 OVERRIDE (IP)

## 2017-08-18 PROCEDURE — 700105 HCHG RX REV CODE 258: Performed by: SPECIALIST

## 2017-08-18 PROCEDURE — 61645 PERQ ART M-THROMBECT &/NFS: CPT

## 2017-08-18 PROCEDURE — 93005 ELECTROCARDIOGRAM TRACING: CPT | Performed by: EMERGENCY MEDICINE

## 2017-08-18 PROCEDURE — 37212 THROMBOLYTIC VENOUS THERAPY: CPT

## 2017-08-18 PROCEDURE — 70498 CT ANGIOGRAPHY NECK: CPT

## 2017-08-18 PROCEDURE — 85730 THROMBOPLASTIN TIME PARTIAL: CPT

## 2017-08-18 PROCEDURE — 83735 ASSAY OF MAGNESIUM: CPT

## 2017-08-18 PROCEDURE — 84484 ASSAY OF TROPONIN QUANT: CPT

## 2017-08-18 PROCEDURE — 770022 HCHG ROOM/CARE - ICU (200)

## 2017-08-18 PROCEDURE — 700105 HCHG RX REV CODE 258: Performed by: HOSPITALIST

## 2017-08-18 PROCEDURE — C1760 CLOSURE DEV, VASC: HCPCS

## 2017-08-18 PROCEDURE — 84443 ASSAY THYROID STIM HORMONE: CPT

## 2017-08-18 PROCEDURE — 71010 DX-CHEST-LIMITED (1 VIEW): CPT

## 2017-08-18 PROCEDURE — 70496 CT ANGIOGRAPHY HEAD: CPT

## 2017-08-18 PROCEDURE — 700101 HCHG RX REV CODE 250

## 2017-08-18 PROCEDURE — 3E03317 INTRODUCTION OF OTHER THROMBOLYTIC INTO PERIPHERAL VEIN, PERCUTANEOUS APPROACH: ICD-10-PCS | Performed by: EMERGENCY MEDICINE

## 2017-08-18 PROCEDURE — 85025 COMPLETE CBC W/AUTO DIFF WBC: CPT

## 2017-08-18 PROCEDURE — 700111 HCHG RX REV CODE 636 W/ 250 OVERRIDE (IP): Performed by: HOSPITALIST

## 2017-08-18 PROCEDURE — 700111 HCHG RX REV CODE 636 W/ 250 OVERRIDE (IP): Performed by: RADIOLOGY

## 2017-08-18 PROCEDURE — 93880 EXTRACRANIAL BILAT STUDY: CPT | Mod: 26 | Performed by: SURGERY

## 2017-08-18 PROCEDURE — B3161ZZ FLUOROSCOPY OF RIGHT INTERNAL CAROTID ARTERY USING LOW OSMOLAR CONTRAST: ICD-10-PCS | Performed by: RADIOLOGY

## 2017-08-18 PROCEDURE — 76937 US GUIDE VASCULAR ACCESS: CPT

## 2017-08-18 PROCEDURE — 99223 1ST HOSP IP/OBS HIGH 75: CPT | Performed by: HOSPITALIST

## 2017-08-18 PROCEDURE — 36224 PLACE CATH CAROTD ART: CPT

## 2017-08-18 PROCEDURE — 700111 HCHG RX REV CODE 636 W/ 250 OVERRIDE (IP): Performed by: SPECIALIST

## 2017-08-18 PROCEDURE — 85610 PROTHROMBIN TIME: CPT

## 2017-08-18 PROCEDURE — 93880 EXTRACRANIAL BILAT STUDY: CPT

## 2017-08-18 PROCEDURE — 80053 COMPREHEN METABOLIC PANEL: CPT

## 2017-08-18 PROCEDURE — 99291 CRITICAL CARE FIRST HOUR: CPT

## 2017-08-18 PROCEDURE — 03CG3ZZ EXTIRPATION OF MATTER FROM INTRACRANIAL ARTERY, PERCUTANEOUS APPROACH: ICD-10-PCS | Performed by: RADIOLOGY

## 2017-08-18 PROCEDURE — B3131ZZ FLUOROSCOPY OF RIGHT COMMON CAROTID ARTERY USING LOW OSMOLAR CONTRAST: ICD-10-PCS | Performed by: RADIOLOGY

## 2017-08-18 RX ORDER — ONDANSETRON 4 MG/1
4 TABLET, ORALLY DISINTEGRATING ORAL EVERY 4 HOURS PRN
Status: DISCONTINUED | OUTPATIENT
Start: 2017-08-18 | End: 2017-08-21 | Stop reason: HOSPADM

## 2017-08-18 RX ORDER — LABETALOL HYDROCHLORIDE 5 MG/ML
INJECTION, SOLUTION INTRAVENOUS
Status: COMPLETED
Start: 2017-08-18 | End: 2017-08-18

## 2017-08-18 RX ORDER — ASPIRIN 81 MG/1
324 TABLET, CHEWABLE ORAL DAILY
Status: DISCONTINUED | OUTPATIENT
Start: 2017-08-19 | End: 2017-08-20 | Stop reason: CLARIF

## 2017-08-18 RX ORDER — CALCIUM CARBONATE 500(1250)
1000 TABLET ORAL DAILY
COMMUNITY
End: 2021-06-09

## 2017-08-18 RX ORDER — SODIUM CHLORIDE 9 MG/ML
50 INJECTION, SOLUTION INTRAVENOUS ONCE
Status: COMPLETED | OUTPATIENT
Start: 2017-08-18 | End: 2017-08-18

## 2017-08-18 RX ORDER — VALACYCLOVIR HYDROCHLORIDE 500 MG/1
500 TABLET, FILM COATED ORAL DAILY
COMMUNITY
End: 2021-03-15 | Stop reason: SDUPTHER

## 2017-08-18 RX ORDER — METOPROLOL TARTRATE 1 MG/ML
INJECTION, SOLUTION INTRAVENOUS
Status: COMPLETED
Start: 2017-08-18 | End: 2017-08-18

## 2017-08-18 RX ORDER — POTASSIUM CHLORIDE 7.45 MG/ML
10 INJECTION INTRAVENOUS
Status: COMPLETED | OUTPATIENT
Start: 2017-08-18 | End: 2017-08-18

## 2017-08-18 RX ORDER — AMOXICILLIN 250 MG
2 CAPSULE ORAL 2 TIMES DAILY
Status: DISCONTINUED | OUTPATIENT
Start: 2017-08-18 | End: 2017-08-21 | Stop reason: HOSPADM

## 2017-08-18 RX ORDER — ZOLPIDEM TARTRATE 5 MG/1
5-10 TABLET ORAL NIGHTLY PRN
COMMUNITY
End: 2017-08-18

## 2017-08-18 RX ORDER — HYDRALAZINE HYDROCHLORIDE 20 MG/ML
10 INJECTION INTRAMUSCULAR; INTRAVENOUS
Status: DISCONTINUED | OUTPATIENT
Start: 2017-08-18 | End: 2017-08-21 | Stop reason: HOSPADM

## 2017-08-18 RX ORDER — POLYETHYLENE GLYCOL 3350 17 G/17G
1 POWDER, FOR SOLUTION ORAL
Status: DISCONTINUED | OUTPATIENT
Start: 2017-08-18 | End: 2017-08-21 | Stop reason: HOSPADM

## 2017-08-18 RX ORDER — ONDANSETRON 2 MG/ML
4 INJECTION INTRAMUSCULAR; INTRAVENOUS EVERY 4 HOURS PRN
Status: DISCONTINUED | OUTPATIENT
Start: 2017-08-18 | End: 2017-08-21 | Stop reason: HOSPADM

## 2017-08-18 RX ORDER — ACETAMINOPHEN 650 MG/1
650 SUPPOSITORY RECTAL EVERY 6 HOURS PRN
Status: DISCONTINUED | OUTPATIENT
Start: 2017-08-18 | End: 2017-08-21 | Stop reason: HOSPADM

## 2017-08-18 RX ORDER — ASPIRIN 325 MG
325 TABLET ORAL DAILY
Status: DISCONTINUED | OUTPATIENT
Start: 2017-08-19 | End: 2017-08-20 | Stop reason: CLARIF

## 2017-08-18 RX ORDER — MIDAZOLAM HYDROCHLORIDE 1 MG/ML
INJECTION INTRAMUSCULAR; INTRAVENOUS
Status: COMPLETED
Start: 2017-08-18 | End: 2017-08-18

## 2017-08-18 RX ORDER — ATORVASTATIN CALCIUM 20 MG/1
80 TABLET, FILM COATED ORAL EVERY EVENING
Status: DISCONTINUED | OUTPATIENT
Start: 2017-08-18 | End: 2017-08-18

## 2017-08-18 RX ORDER — ATORVASTATIN CALCIUM 80 MG/1
80 TABLET, FILM COATED ORAL EVERY EVENING
Status: DISCONTINUED | OUTPATIENT
Start: 2017-08-18 | End: 2017-08-21 | Stop reason: HOSPADM

## 2017-08-18 RX ORDER — SIMVASTATIN 20 MG
20 TABLET ORAL NIGHTLY
Status: ON HOLD | COMMUNITY
End: 2017-08-21

## 2017-08-18 RX ORDER — MAGNESIUM SULFATE HEPTAHYDRATE 40 MG/ML
2 INJECTION, SOLUTION INTRAVENOUS ONCE
Status: COMPLETED | OUTPATIENT
Start: 2017-08-18 | End: 2017-08-19

## 2017-08-18 RX ORDER — HALOPERIDOL 5 MG/ML
5 INJECTION INTRAMUSCULAR EVERY 4 HOURS PRN
Status: DISCONTINUED | OUTPATIENT
Start: 2017-08-18 | End: 2017-08-21 | Stop reason: HOSPADM

## 2017-08-18 RX ORDER — MIDAZOLAM HYDROCHLORIDE 1 MG/ML
.5-2 INJECTION INTRAMUSCULAR; INTRAVENOUS PRN
Status: ACTIVE | OUTPATIENT
Start: 2017-08-18 | End: 2017-08-18

## 2017-08-18 RX ORDER — SODIUM CHLORIDE 9 MG/ML
500 INJECTION, SOLUTION INTRAVENOUS
Status: ACTIVE | OUTPATIENT
Start: 2017-08-18 | End: 2017-08-18

## 2017-08-18 RX ORDER — ASPIRIN 300 MG/1
300 SUPPOSITORY RECTAL DAILY
Status: DISCONTINUED | OUTPATIENT
Start: 2017-08-19 | End: 2017-08-20 | Stop reason: CLARIF

## 2017-08-18 RX ORDER — LABETALOL HYDROCHLORIDE 5 MG/ML
10 INJECTION, SOLUTION INTRAVENOUS EVERY 4 HOURS PRN
Status: DISCONTINUED | OUTPATIENT
Start: 2017-08-18 | End: 2017-08-21 | Stop reason: HOSPADM

## 2017-08-18 RX ORDER — ONDANSETRON 2 MG/ML
4 INJECTION INTRAMUSCULAR; INTRAVENOUS PRN
Status: ACTIVE | OUTPATIENT
Start: 2017-08-18 | End: 2017-08-18

## 2017-08-18 RX ORDER — BISACODYL 10 MG
10 SUPPOSITORY, RECTAL RECTAL
Status: DISCONTINUED | OUTPATIENT
Start: 2017-08-18 | End: 2017-08-21 | Stop reason: HOSPADM

## 2017-08-18 RX ORDER — SODIUM CHLORIDE 9 MG/ML
INJECTION, SOLUTION INTRAVENOUS CONTINUOUS
Status: DISCONTINUED | OUTPATIENT
Start: 2017-08-18 | End: 2017-08-19

## 2017-08-18 RX ADMIN — FENTANYL CITRATE 25 MCG: 50 INJECTION, SOLUTION INTRAMUSCULAR; INTRAVENOUS at 12:11

## 2017-08-18 RX ADMIN — LABETALOL HYDROCHLORIDE 10 MG: 5 INJECTION, SOLUTION INTRAVENOUS at 12:12

## 2017-08-18 RX ADMIN — MIDAZOLAM HYDROCHLORIDE 1 MG: 1 INJECTION, SOLUTION INTRAMUSCULAR; INTRAVENOUS at 11:56

## 2017-08-18 RX ADMIN — MIDAZOLAM 1 MG: 1 INJECTION INTRAMUSCULAR; INTRAVENOUS at 11:56

## 2017-08-18 RX ADMIN — SODIUM CHLORIDE: 9 INJECTION, SOLUTION INTRAVENOUS at 13:21

## 2017-08-18 RX ADMIN — SODIUM CHLORIDE: 9 INJECTION, SOLUTION INTRAVENOUS at 13:23

## 2017-08-18 RX ADMIN — MIDAZOLAM 1 MG: 1 INJECTION INTRAMUSCULAR; INTRAVENOUS at 12:23

## 2017-08-18 RX ADMIN — ALTEPLASE 57 MG: KIT at 11:32

## 2017-08-18 RX ADMIN — MAGNESIUM SULFATE IN WATER 2 G: 40 INJECTION, SOLUTION INTRAVENOUS at 22:03

## 2017-08-18 RX ADMIN — MIDAZOLAM HYDROCHLORIDE 1 MG: 1 INJECTION, SOLUTION INTRAMUSCULAR; INTRAVENOUS at 12:11

## 2017-08-18 RX ADMIN — MIDAZOLAM HYDROCHLORIDE 1 MG: 1 INJECTION, SOLUTION INTRAMUSCULAR; INTRAVENOUS at 12:23

## 2017-08-18 RX ADMIN — POTASSIUM CHLORIDE 10 MEQ: 10 INJECTION, SOLUTION INTRAVENOUS at 16:22

## 2017-08-18 RX ADMIN — FENTANYL CITRATE 25 MCG: 50 INJECTION, SOLUTION INTRAMUSCULAR; INTRAVENOUS at 11:56

## 2017-08-18 RX ADMIN — POTASSIUM CHLORIDE 10 MEQ: 10 INJECTION, SOLUTION INTRAVENOUS at 14:23

## 2017-08-18 RX ADMIN — POTASSIUM CHLORIDE 10 MEQ: 10 INJECTION, SOLUTION INTRAVENOUS at 15:25

## 2017-08-18 ASSESSMENT — PAIN SCALES - GENERAL
PAINLEVEL_OUTOF10: 0

## 2017-08-18 ASSESSMENT — COPD QUESTIONNAIRES
HAVE YOU SMOKED AT LEAST 100 CIGARETTES IN YOUR ENTIRE LIFE: NO/DON'T KNOW
DO YOU EVER COUGH UP ANY MUCUS OR PHLEGM?: NO/ONLY WITH OCCASIONAL COLDS OR INFECTIONS

## 2017-08-18 ASSESSMENT — LIFESTYLE VARIABLES: EVER_SMOKED: NEVER

## 2017-08-18 NOTE — PROGRESS NOTES
IR Procedure RN's Note:    Carotid cerebral angiogram with thrombectomy done by Dr. Wilkins; RIGHT femoral artery access site; Trevo system used to extract the thrombosis from the RIGHT M1 occlusion; pt tolerated the procedure well; pt currently hemodynamically stable; RIGHT femoral closure device - [facility] [employer] [facility] Plus 8F REF#[id number] LOT#[id number]; report given to ICU RN; pt transferred to ICU S126.    Time of Events  Time/Date of last known well: 8/18/17 at 1030  ER arrival time: 1100  NIHSS: upon arrival in ER per ERP 10  CT time interpretation: 1120  tPA bolus time:1132  Time to IR suite: 1143  Time to Arterial Puncture: 1156  Revascularization time: 1218  Procedure Stop time: 1226    Neuro checks unable to perform during the procedure as patient was given sedation, and patient was sterile draped for the procedure. NIHSS score post procedure - see ICU RN assessment as sedation medication needed to wear off for assessment to happen.

## 2017-08-18 NOTE — IP AVS SNAPSHOT
" Home Care Instructions                                                                                                                  Name:Shaista Bocanegra  Medical Record Number:[id number]  CSN: [id number]    YOB: 1945   Age: 72 y.o.  Sex: female  HT:1.702 m (5' 7.01\") WT: 68.7 kg (151 lb 7.3 oz)          Admit Date: 8/18/2017     Discharge Date:   Today's Date: 8/21/2017  Attending Doctor:  Shaq Maya M.D.                  Allergies:  Review of patient's allergies indicates no known allergies.            Discharge Instructions       Discharge Instructions    Discharged to home by car with relative. Discharged via walking, hospital escort: Refused.  Special equipment needed: Not Applicable    Be sure to schedule a follow-up appointment with your primary care doctor or any specialists as instructed.     Discharge Plan:   Influenza Vaccine Indication: Indicated: Not available from distributor/    I understand that a diet low in cholesterol, fat, and sodium is recommended for good health. Unless I have been given specific instructions below for another diet, I accept this instruction as my diet prescription.   Other diet: see below    Special Instructions:   Discharge patient Home  Diet low fat, low sugar, heart healthy with 9-13 servings of fruits and vegetables  Activities as tolerated  Follow ups with PCP in 7-10 days, call for appointment  Meds per med rec sheet  No smoking, no alcohol, no caffeine  Wear seat belt in motorized vehicle  Take medications as perscribed  Keep appointments  If symptoms worsen call PCP, 911 or urgent care      Stroke/CVA/TIA/Hemorrhagic Ischemia Discharge Instructions  You have had a stroke. Your risk factors have been identified as follows:  Age - Over 55  It is important that you reduce your risk factors to avoid another stroke in the future. Here are some general guidelines to follow:  · Eat healthy - avoid food high in fat.  · Get regular " exercise.  · Maintain a healthy weight.  · Avoid smoking.  · Avoid alcohol and illegal drug use.  · Take your medications as directed.  For more information regarding risk factors, refer to pages 17-19 in your Stroke Patient Education Guide. Stroke Education Guide was given to patient.    Warning signs of a stroke include (which can also be found on page 3 of your Stroke Patient Education Guide):  · Sudden numbness of weakness of the face, arm or leg (especially on one side of the body).  · Sudden confusion, trouble speaking or understanding.  · Sudden trouble seeing in one or both eyes.  · Sudden trouble walking, dizziness, loss of balance or coordination.  · Sudden severe headache with no known cause.  It is very important to get treatment quickly when a stroke occurs. If you experience any of the above warning signs, call [phone] immediately.     Some patients who have had a stroke will be going home on a blood thinner medication called Warfarin (Coumadin).  This medication requires very close monitoring and follow up.  This follow up can be provided by either your Primary Care Physician or by [facility]'s Outpatient Anticoagulation Service.  The Outpatient Anticoagulation Service is located at the [facility] at [facility] ([facility]).  If you do not know when your follow up appointment is scheduled, call [phone] to verify your appointment time.      · Is patient discharged on Warfarin / Coumadin?   No     · Is patient Post Blood Transfusion?  No    Depression / Suicide Risk    As you are discharged from this [facility], it is important to learn how to keep safe from harming yourself.    Recognize the warning signs:  · Abrupt changes in personality, positive or negative- including increase in energy   · Giving away possessions  · Change in eating patterns- significant weight changes-  positive or negative  · Change in sleeping patterns- unable to  sleep or sleeping all the time   · Unwillingness or inability to communicate  · Depression  · Unusual sadness, discouragement and loneliness  · Talk of wanting to die  · Neglect of personal appearance   · Rebelliousness- reckless behavior  · Withdrawal from people/activities they love  · Confusion- inability to concentrate     If you or a loved one observes any of these behaviors or has concerns about self-harm, here's what you can do:  · Talk about it- your feelings and reasons for harming yourself  · Remove any means that you might use to hurt yourself (examples: pills, rope, extension cords, firearm)  · Get professional help from the community (Mental Health, Substance Abuse, psychological counseling)  · Do not be alone:Call your Safe Contact- someone whom you trust who will be there for you.  · Call your local CRISIS HOTLINE 169-6873 or 841-888-0991  · Call your local Children's Mobile Crisis Response Team [place] (909) 556-7992 or www.Veeip  · Call the toll free National Suicide Prevention Hotlines   · National Suicide Prevention Lifeline 986-515-PDUZ (7080)  · National Hope Line Network 800-SUICIDE (957-7714)        Your appointments     Aug 24, 2017  9:40 AM   Established Patient with Luanne Mejias M.D.   [facility]    [address]  [facility] [address] [phone]           You will be receiving a confirmation call a few days before your appointment from our automated call confirmation system.            Sep 25, 2017 11:00 AM   [facility] Patient with [facility]   [facility] (--)    [address], [address]  [facility] [place] [id number] [phone]           Please bring Photo ID, Insurance Cards, All Medication Bottles and copies of any legal documents (such as Living Will, Power of ) If speaking a language besides English please bring an adult . Please arrive 30 minutes prior for check in and registration. You will  be receiving a confirmation call a few days before your appointment from our automated call confirmation system.            Nov 15, 2017  8:30 AM   Follow Up Visit with Aimee Bruno M.D.   [facility] (--)    [address], [address]  [place] [address] [phone]           You will be receiving a confirmation call a few days before your appointment from our automated call confirmation system.            Dec 05, 2017  8:45 AM   FOLLOW UP with Pascale Daniels M.D.   [facility] (--)    [address], [address]  [place] [address] [phone]                 Discharge Medication Instructions:    Below are the medications your physician expects you to take upon discharge:    Review all your home medications and newly ordered medications with your doctor and/or pharmacist. Follow medication instructions as directed by your doctor and/or pharmacist.    Please keep your medication list with you and share with your physician.               Medication List      START taking these medications        Instructions    Morning Afternoon Evening Bedtime    aspirin 81 MG Chew chewable tablet   Last time this was given:  81 mg on 8/21/2017  8:46 AM   Commonly known as:  ASA        Take 1 Tab by mouth every day.   Dose:  81 mg                        atorvastatin 80 MG tablet   Last time this was given:  80 mg on 8/20/2017  8:25 PM   Commonly known as:  LIPITOR        Take 1 Tab by mouth every evening for 14 days.   Dose:  80 mg                          CONTINUE taking these medications        Instructions    Morning Afternoon Evening Bedtime    calcium carbonate 500 MG Tabs   Commonly known as:  OS-CRISTOFER 500        Take 1,000 mg by mouth every day.   Dose:  1000 mg                        ICAPS AREDS 2 PO        Take 1 Tab by mouth every day.   Dose:  1 Tab                        lisinopril 20 MG Tabs   Last time this was given:  20 mg on 8/21/2017  8:46 AM   Commonly  known as:  PRINIVIL        TAKE 1 TABLET DAILY                        metoprolol SR 50 MG Tb24   Last time this was given:  50 mg on 8/21/2017  8:46 AM   Commonly known as:  TOPROL XL        Take 1 Tab by mouth every day.   Dose:  50 mg                        MULTIVITAMINS PO        Take  by mouth every day.                        potassium chloride ER 10 MEQ tablet   Commonly known as:  KLOR-CON        Take 1 Tab by mouth every day.   Dose:  10 mEq                        triamterene/hctz 37.5-25 MG Caps   Last time this was given:  1 Cap on 8/21/2017  8:46 AM   Commonly known as:  MAXZIDE-25/DYAZIDE        Take 1 Cap by mouth every morning.   Dose:  1 Cap                        valacyclovir 500 MG Tabs   Commonly known as:  VALTREX        Take 500 mg by mouth every day.   Dose:  500 mg                          STOP taking these medications     simvastatin 20 MG Tabs   Commonly known as:  ZOCOR                    Where to Get Your Medications      Information about where to get these medications is not yet available     ! Ask your nurse or doctor about these medications    - aspirin 81 MG Chew chewable tablet  - atorvastatin 80 MG tablet            Instructions           Diet / Nutrition:    Follow any diet instructions given to you by your doctor or the dietician, including how much salt (sodium) you are allowed each day.    If you are overweight, talk to your doctor about a weight reduction plan.    Activity:    Remain physically active following your doctor's instructions about exercise and activity.    Rest often.     Any time you become even a little tired or short of breath, SIT DOWN and rest.    Worsening Symptoms:    Report any of the following signs and symptoms to the doctor's office immediately:    *Pain of jaw, arm, or neck  *Chest pain not relieved by medication                               *Dizziness or loss of consciousness  *Difficulty breathing even when at rest   *More tired than usual                                        *Bleeding drainage or swelling of surgical site  *Swelling of feet, ankles, legs or stomach                 *Fever (>100ºF)  *Pink or blood tinged sputum  *Weight gain (3lbs/day or 5lbs /week)           *Shock from internal defibrillator (if applicable)  *Palpitations or irregular heartbeats                *Cool and/or numb extremities    Stroke Awareness    Common Risk Factors for Stroke include:    Age  Atrial Fibrillation  Carotid Artery Stenosis  Diabetes Mellitus  Excessive alcohol consumption  High blood pressure  Overweight   Physical inactivity  Smoking    Warning signs and symptoms of a stroke include:    *Sudden numbness or weakness of the face, arm or leg (especially on one side of the body).  *Sudden confusion, trouble speaking or understanding.  *Sudden trouble seeing in one or both eyes.  *Sudden trouble walking, dizziness, loss of balance or coordination.Sudden severe headache with no known cause.    It is very important to get treatment quickly when a stroke occurs. If you experience any of the above warning signs, call 911 immediately.                   Disclaimer         Quit Smoking / Tobacco Use:    I understand the use of any tobacco products increases my chance of suffering from future heart disease or stroke and could cause other illnesses which may shorten my life. Quitting the use of tobacco products is the single most important thing I can do to improve my health. For further information on smoking / tobacco cessation call a Toll Free Quit Line at 1-678.523.7574 (*National Cancer [place]) or 1-849.182.1687 (American Lung Association) or you can access the web based program at www.lungusa.org.    [place] Tobacco Users Help Line:  (424) 444-7347       Toll Free: 1-940.329.3737  Quit Tobacco Program [facility] (708)919-9339    Crisis Hotline:    National Crisis Hotline:  9-624-RTIPMKY or 1-331.709.9480    [place] Crisis Hotline:     9-103-612-5888 or 962-143-3030    Discharge Survey:   Thank you for choosing [facility]. We hope we did everything we could to make your hospital stay a pleasant one. You may be receiving a phone survey and we would appreciate your time and participation in answering the questions. Your input is very valuable to us in our efforts to improve our service to our patients and their families.        My signature on this form indicates that:    1. I have reviewed and understand the above information.  2. My questions regarding this information have been answered to my satisfaction.  3. I have formulated a plan with my discharge nurse to obtain my prescribed medications for home.                  Disclaimer         __________________________________                     __________       ________                       Patient Signature                                                 Date                    Time

## 2017-08-18 NOTE — IP AVS SNAPSHOT
GPX Software Access Code: Activation code not generated  Current GPX Software Status: Active    Askablogrhart  A secure, online tool to manage your health information     [employer]’s GPX Software® is a secure, online tool that connects you to your personalized health information from the privacy of your home -- day or night - making it very easy for you to manage your healthcare. Once the activation process is completed, you can even access your medical information using the GPX Software jake, which is available for free in the Apple Jake store or Google Play store.     GPX Software provides the following levels of access (as shown below):   My Chart Features   [facility] Primary Care Doctor [facility]  Specialists [facility]  Urgent  Care Non-[facility]  Primary Care  Doctor   Email your healthcare team securely and privately 24/7 X X X X   Manage appointments: schedule your next appointment; view details of past/upcoming appointments X      Request prescription refills. X      View recent personal medical records, including lab and immunizations X X X X   View health record, including health history, allergies, medications X X X X   Read reports about your outpatient visits, procedures, consult and ER notes X X X X   See your discharge summary, which is a recap of your hospital and/or ER visit that includes your diagnosis, lab results, and care plan. X X       How to register for GPX Software:  1. Go to  [email or web address].  2. Click on the Sign Up Now box, which takes you to the New Member Sign Up page. You will need to provide the following information:  a. Enter your GPX Software Access Code exactly as it appears at the top of this page. (You will not need to use this code after you’ve completed the sign-up process. If you do not sign up before the expiration date, you must request a new code.)   b. Enter your date of birth.   c. Enter your home email address.   d. Click Submit, and follow the next screen’s instructions.  3. Create a GPX Software ID. This will  be your Brightleaf login ID and cannot be changed, so think of one that is secure and easy to remember.  4. Create a Brightleaf password. You can change your password at any time.  5. Enter your Password Reset Question and Answer. This can be used at a later time if you forget your password.   6. Enter your e-mail address. This allows you to receive e-mail notifications when new information is available in Brightleaf.  7. Click Sign Up. You can now view your health information.    For assistance activating your Brightleaf account, call [phone]

## 2017-08-18 NOTE — CONSULTS
"DATE OF SERVICE:  08/18/2017    CHIEF COMPLAINT:  \"Stroke.\"    HISTORY OF PRESENT ILLNESS:  I am asked by Dr. Roverto Deutsch to see this   patient, a 72-year-old woman brought in by paramedics.  At 10:30, she called   the neighbors.  She collapsed on the ground.  She was well prior to that.  She   was initially noted to have a dense left hemiplegia with neglect and a field   cut.  She presented to [facility] Emergency Room.  She has a history of   hypertension, but no prior history of stroke and no recent invasive   procedures.  She had a CT brain scan that revealed no acute change.  There was   a hyperintense right M1 segment consistent with thrombosis.  CTA of the neck   was within normal limits.  There was a thrombosed right M1 segment.  CTA of   the head revealed a right M1 occlusion.    I discussed the situation with the patient's sons and the patient.  She has an   NIH stroke scale of 9.  Risks and complications of intravenous TPA were   discussed with the patient, she is felt to be an alteplase candidate.  She has   had no prior history of stroke.    PAST MEDICAL HISTORY:  1.  History of breast cancer.  2.  Essential hypertension.  3.  Mitral valve prolapse.  4.  Hypercholesterolemia.  5.  Renal insufficiency.  6.  Cataract surgery.    MEDICATIONS:  Prior to admission:  1.  Valtrex.  2.  Fosamax.  3.  Prinivil.  4.  Metoprolol.  5.  Vitamin B12.  6.  Zocor.  7.  K-Dur.  8.  Maxzide.    ALLERGIES:  NKDA.    SOCIAL HISTORY:  She does not smoke.    FAMILY HISTORY:  Noncontributory.    REVIEW OF SYSTEMS:  As above.    PHYSICAL EXAMINATION:  VITAL SIGNS:  Blood pressure is 184/86.  GENERAL:  The patient is a cooperative woman.  She is alert.  She responds   appropriately to commands.  She initially had some left neglect and then that   improved.  HEENT:  Pupils are equal, round, and reactive.  EOMs are full, and she has a   left homonymous hemianopsia.  NEUROLOGIC:  Initially, she was plegic on the left.  At the time of " exam, she   could hold the left arm up, but it fell to the bed.  She had drift in the left   leg.  Right side was intact.  Sensory testing was symmetric per the patient.    Reflexes are increased on the left compared to the right with an equivocal   toe on the left, down on the right.  Cranial nerves are remarkable for left   field cut and upper motor neuron facial weakness.    IMPRESSION AND PLAN:  The patient is a 72-year-old woman whom I am asked to   see by Dr. Roverto Deutsch in [facility].  She developed at 10:30 a   right hemisphere deficit, she has an NIH stroke scale of 9.  She is felt to be   an alteplase candidate.  The risk and potential benefits were discussed with   the patient and her sons.  She elects to take alteplase.  In addition, she has   a right M1 occlusion.  She is felt to be a candidate for clot extraction.    She has been taken to the angiography suite.    Thank you for this consultation.       ____________________________________     G MD WISAM RIVAS / LYNDA    DD:  08/18/2017 12:03:10  DT:  08/18/2017 14:33:48    D#:  [id number]  Job#:  [id number]

## 2017-08-18 NOTE — OR SURGEON
Immediate Post- Operative Note        PostOp Diagnosis: RIGHT MCA occlusion      Procedure(s): RIGHT carotid arteriography with mechanical thrombectomy      Estimated Blood Loss: Less than 5 ml        Complications: None            8/18/2017     12:32 PM     Ihsan Wilkins

## 2017-08-18 NOTE — ED PROVIDER NOTES
ED Provider Note    CHIEF COMPLAINT  No chief complaint on file.      HPI  Shaista Bocanegra is a 72 y.o. female who presents for evaluation of abrupt onset left-sided weakness of the arms legs and face. The patient rarely was at home having a normal morning reported symptoms onset at 10:30 AM. She called a neighbor and they called paramedics. Paramedics immediately brought the patient here for evaluation. She denied any recent headache, no palpitations. She denies any recent major surgery she is not on any blood thinners. She arrived here 30 minutes after onset of symptoms. No reported headache    REVIEW OF SYSTEMS  See HPI for further details. No fevers chills chest pain shortness of breath All other systems are negative.     PAST MEDICAL HISTORY  Past Medical History   Diagnosis Date   • Breast cancer (CMS-HCC)    • Benign essential HTN 3/19/2012   • Chest tightness or pressure 3/19/2012   • Hypercholesterolemia 3/19/2012   • MVP (mitral valve prolapse) 3/19/2012   • Renal insufficiency 3/19/2012   • High risk medication use 3/19/2012   • Cancer (CMS-HCC)        FAMILY HISTORY  Noncontributory    SOCIAL HISTORY  Social History     Social History   • Marital Status:      Spouse Name: N/A   • Number of Children: N/A   • Years of Education: N/A     Social History Main Topics   • Smoking status: Never Smoker    • Smokeless tobacco: Never Used   • Alcohol Use: Yes      Comment: 3 glasses of wine   • Drug Use: No   • Sexual Activity: Not on file     Other Topics Concern   • Not on file     Social History Narrative     nonsmoker no IV drugs    SURGICAL HISTORY  Past Surgical History   Procedure Laterality Date   • Cataract phaco with iol  1/26/2009     Performed by SHANNON GANDARA at SURGERY SAME DAY [facility] ORS   • Cataract phaco with iol  3/16/2009     [facility]   • Breast biopsy     • Pr radiation therapy plan simple     • Pr chemotherapy, unspecified  procedure     • Lumpectomy         CURRENT MEDICATIONS    Current facility-administered medications:   •  [COMPLETED] alteplase (ACTIVASE) BOLUS injection 6 mg, 6 mg, Intravenous, Once, 6 mg at 08/18/17 1130 **FOLLOWED BY** alteplase (ACTIVASE) injection 57 mg, 57 mg, Intravenous, Once, 57 mg at 08/18/17 1132 **FOLLOWED BY** NS infusion 50 mL, 50 mL, Intravenous, Once, BELEM Moran M.D.  •  senna-docusate (PERICOLACE or SENOKOT S) 8.6-50 MG per tablet 2 Tab, 2 Tab, Oral, BID **AND** polyethylene glycol/lytes (MIRALAX) PACKET 1 Packet, 1 Packet, Oral, QDAY PRN **AND** magnesium hydroxide (MILK OF MAGNESIA) suspension 30 mL, 30 mL, Oral, QDAY PRN **AND** bisacodyl (DULCOLAX) suppository 10 mg, 10 mg, Rectal, QDAY PRN, ZULEIMA CraftO.  •  Respiratory Care per Protocol, , Nebulization, Continuous RT, Fer Junior D.O.  •  NS infusion, , Intravenous, Continuous, ZLUEIMA CraftO.  •  atorvastatin (LIPITOR) tablet 80 mg, 80 mg, Oral, Q EVENING, ZULEIMA CraftO.  •  [START ON 8/19/2017] aspirin (ASA) tablet 325 mg, 325 mg, Oral, DAILY **OR** [START ON 8/19/2017] aspirin (ASA) chewable tab 324 mg, 324 mg, Oral, DAILY **OR** [START ON 8/19/2017] aspirin (ASA) suppository 300 mg, 300 mg, Rectal, DAILY, ZULEIMA CraftO.  •  ondansetron (ZOFRAN) syringe/vial injection 4 mg, 4 mg, Intravenous, Q4HRS PRN, ZULEIMA CraftO.  •  ondansetron (ZOFRAN ODT) dispertab 4 mg, 4 mg, Oral, Q4HRS PRN, ZULEIMA CraftO.  •  haloperidol lactate (HALDOL) injection 5 mg, 5 mg, Intravenous, Q4HRS PRN, Fer Junior D.O.  •  potassium chloride in water (KCL) ivpb 10 mEq, 10 mEq, Intravenous, Q1HR (RT), Fer Junior D.O.  •  labetalol (NORMODYNE,TRANDATE) injection 10 mg, 10 mg, Intravenous, Q4HRS PRN **OR** hydrALAZINE (APRESOLINE) injection 10 mg, 10 mg, Intravenous, Q2HRS PRN, BELEM Moran M.D.  •  atorvastatin (LIPITOR) tablet 80 mg, 80 mg, Oral, Q EVENING, BELEM Moran M.D.  •  FENTANYL CITRATE 0.05 MG/ML INJ SOLN, , ,  ,   •  MIDAZOLAM HCL 2 MG/2ML INJ SOLN, , , ,     Current outpatient prescriptions:   •  Biotin 5000 MCG Cap, Take  by mouth., Disp: , Rfl:   •  valacyclovir (VALTREX) 1 GM Tab, Take 1,000 mg by mouth 2 times a day., Disp: , Rfl:   •  alendronate (FOSAMAX) 35 MG tablet, TAKE 1 TABLET EVERY 7 DAYS, Disp: 4 Tab, Rfl: 11  •  lisinopril (PRINIVIL) 20 MG Tab, TAKE 1 TABLET DAILY, Disp: 90 Tab, Rfl: 3  •  metoprolol SR (TOPROL XL) 50 MG TABLET SR 24 HR, Take 1 Tab by mouth every day., Disp: 90 Tab, Rfl: 3  •  cyanocobalamin (VITAMIN B-12) 100 MCG Tab, Take 100 mcg by mouth every day., Disp: , Rfl:   •  simvastatin (ZOCOR) 20 MG TABS, Take 1 Tab by mouth every evening. (Patient taking differently: Take 20 mg by mouth. Qod or 1/2 tablet daily), Disp: 90 Tab, Rfl: 2  •  triamterene/hctz (MAXZIDE-25/DYAZIDE) 37.5-25 MG CAPS, Take 1 Cap by mouth every morning., Disp: 90 Cap, Rfl: 2  •  potassium chloride CR (K-DUR) 10 MEQ tablet, Take 1 Tab by mouth every day., Disp: 180 Tab, Rfl: 3  •  Multiple Vitamin (MULTIVITAMINS PO), Take  by mouth every day., Disp: , Rfl:   •  Calcium Carbonate-Vitamin D (CALCIUM + D PO), Take 1,200 mg by mouth every day., Disp: , Rfl:       ALLERGIES  No Known Allergies    PHYSICAL EXAM  -- -- -- -- -- -- -- 2 Nasal Cannula -- BAS           08/18/17 1134 -- -- 60 61 18 --  168/80 mmHg 94 % -- -- -- -- -- -- KAB     08/18/17 1122 -- -- 72 70  23 -- 123/102 mmHg 96 % -- -- -- -- -- -- KAB     08/18/17 1121 -- -- 70 -- 16 123/102 mmHg -- -- 96 Sitting Right;Upper Arm -- -- None/Wide Awake KAB     08/18/17 1118 -- -- -- 70 17 --              VITAL SIGNS: There were no vitals taken for this visit.      Constitutional: Well developed, Well nourished, No acute distress, Non-toxic appearance.   HENT: Normocephalic, Atraumatic, Bilateral external ears normal, Oropharynx moist, No oral exudates, Nose normal.   Eyes: PERRLA, EOMI, Conjunctiva normal, No discharge.   Neck: Normal range of motion, No tenderness,  Supple, No stridor.   Cardiovascular: Normal heart rate, Normal rhythm, No murmurs, No rubs, No gallops.   Thorax & Lungs: Normal breath sounds, No respiratory distress, No wheezing, No chest tenderness.   Abdomen: Bowel sounds normal, Soft, No tenderness, No masses, No pulsatile masses.   Skin: Warm, Dry, No erythema, No rash.   Back: No tenderness, No CVA tenderness.   Extremities: Intact distal pulses, No edema, No tenderness, No cyanosis, No clubbing.   Musculoskeletal: Good range of motion in all major joints. No tenderness to palpation or major deformities noted.   Neurologic: Alert & oriented x 3, patient has left-sided hemiplegia involving the face arm and leg. There is no neglect on the left side. There is pronator drift on the left arm limb ataxia of the upper and lower Is on the left. She is alert and oriented speech is normal other than some dysarthria related to facial paralysis on the left side. NIH stroke scale score of 9   Psychiatric: Anxious    EKG  EKG Interpretation    Interpreted by me    Rhythm: normal sinus   Rate: normal  Axis: normal  Ectopy: none  Conduction: normal  ST Segments: no acute change  T Waves: no acute change  Q Waves: none    Clinical Impression: no acute changes and normal EKG    RADIOLOGY/PROCEDURES  CT-CTA NECK WITH & W/O-POST PROCESSING   Final Result      1.  CT angiogram of the neck within normal limits.   2.  Thrombosed right M1 segment.   Findings were discussed with Dr. Wilkins on 8/18/2017 11:32 AM.      CT-CTA HEAD WITH & W/O-POST PROCESS   Final Result      1.  There is a right M1 segment occlusion.   2.  There is collateral flow in the peripheral M3 branches seen on the right.   3.  There is mild decreased enhancement of the visualized right internal carotid artery however it is patent.   4.  Question of subtle hypodensity in the right insular cortex region. No abnormal brain parenchymal enhancement is seen.      5.  Findings were discussed with Dr. Wilkins and   Anil, and Dr. Moran and a Dr. Deutsch are aware of these findings as well at the time of this dictation.      CT-HEAD W/O   Final Result      1.  Hyperdense right M1 segment consistent with thrombosis.   2.  Subtle right frontal opercular and right insular hypodensity consistent with ischemic changes.   3.  No mass effect or acute hemorrhage.   Findings were discussed with Dr. Wilkins on 8/18/2017 11:25 AM.      DX-CHEST-LIMITED (1 VIEW)    (Results Pending)   IR-THROMBO MECHANICAL ARTERY,INIT    (Results Pending)   Cartoid Duplex ([facility] Only) - Do not order if CTA - Neck done in ER    (Results Pending)   Echocardiogram Comp W/O cont    (Results Pending)   MR-BRAIN-W/O    (Results Pending)   Echocardiogram Comp W/O cont    (Results Pending)   MR-BRAIN-W/O    (Results Pending)     Results for orders placed or performed during the hospital encounter of 08/18/17   PROTHROMBIN TIME   Result Value Ref Range    PT 13.4 12.0 - 14.6 sec    INR 0.99 0.87 - 1.13   APTT   Result Value Ref Range    APTT 26.4 24.7 - 36.0 sec   CBC WITH DIFFERENTIAL   Result Value Ref Range    WBC 6.1 4.8 - 10.8 K/uL    RBC 3.98 (L) 4.20 - 5.40 M/uL    Hemoglobin 13.8 12.0 - 16.0 g/dL    Hematocrit 40.8 37.0 - 47.0 %    .5 (H) 81.4 - 97.8 fL    MCH 34.7 (H) 27.0 - 33.0 pg    MCHC 33.8 33.6 - 35.0 g/dL    RDW 50.6 (H) 35.9 - 50.0 fL    Platelet Count 195 164 - 446 K/uL    MPV 9.7 9.0 - 12.9 fL    Neutrophils-Polys 82.30 (H) 44.00 - 72.00 %    Lymphocytes 12.70 (L) 22.00 - 41.00 %    Monocytes 3.90 0.00 - 13.40 %    Eosinophils 0.30 0.00 - 6.90 %    Basophils 0.50 0.00 - 1.80 %    Immature Granulocytes 0.30 0.00 - 0.90 %    Nucleated RBC 0.00 /100 WBC    Neutrophils (Absolute) 5.04 2.00 - 7.15 K/uL    Lymphs (Absolute) 0.78 (L) 1.00 - 4.80 K/uL    Monos (Absolute) 0.24 0.00 - 0.85 K/uL    Eos (Absolute) 0.02 0.00 - 0.51 K/uL    Baso (Absolute) 0.03 0.00 - 0.12 K/uL    Immature Granulocytes (abs) 0.02 0.00 - 0.11 K/uL     NRBC (Absolute) 0.00 K/uL   COMP METABOLIC PANEL   Result Value Ref Range    Sodium 141 135 - 145 mmol/L    Potassium 3.3 (L) 3.6 - 5.5 mmol/L    Chloride 104 96 - 112 mmol/L    Co2 27 20 - 33 mmol/L    Anion Gap 10.0 0.0 - 11.9    Glucose 109 (H) 65 - 99 mg/dL    Bun 15 8 - 22 mg/dL    Creatinine 0.80 0.50 - 1.40 mg/dL    Calcium 9.6 8.5 - 10.5 mg/dL    AST(SGOT) 27 12 - 45 U/L    ALT(SGPT) 20 2 - 50 U/L    Alkaline Phosphatase 44 30 - 99 U/L    Total Bilirubin 0.8 0.1 - 1.5 mg/dL    Albumin 4.0 3.2 - 4.9 g/dL    Total Protein 6.6 6.0 - 8.2 g/dL    Globulin 2.6 1.9 - 3.5 g/dL    A-G Ratio 1.5 g/dL   TROPONIN   Result Value Ref Range    Troponin I <0.01 0.00 - 0.04 ng/mL   ESTIMATED GFR   Result Value Ref Range    GFR If African American >60 >60 mL/min/1.73 m 2    GFR If Non African American >60 >60 mL/min/1.73 m 2   EKG (ER)   Result Value Ref Range    Report       [facility] Emergency Dept.    Test Date:  2017  Pt Name:    HORACE GUMZAN                  Department: ER  MRN:        [id number]                      Room:       [facility]  Gender:     F                            Technician: [id number]  :        1945                   Requested By:RON RAMÍREZ  Order #:    [id number]                    Reading MD:    Measurements  Intervals                                Axis  Rate:       63                           P:          82  TN:         216                          QRS:        87  QRSD:       94                           T:          15  QT:         520  QTc:        533    Interpretive Statements  SINUS RHYTHM  BORDERLINE AV CONDUCTION DELAY  BORDERLINE RIGHT AXIS DEVIATION  RSR' IN V1 OR V2, PROBABLY NORMAL VARIANT  PROLONGED QT INTERVAL  Compared to ECG 2016 10:55:33  Sinus bradycardia no longer present  T-wave abnormality no longer present          COURSE & MEDICAL DECISION MAKING  Pertinent Labs & Imaging studies reviewed. (See chart for details)  Patient was immediately  evaluated by both myself as well as neurology with Dr. Moran. She went down to CT scan immediately which demonstrates no evidence of hemorrhage and a right sided M1 occlusion. We determined that due to the fact that she is not any blood thinners no recent major surgeries no bleeding disorder she is essentially normotensive she does not have any contraindications and she was given a bolus and subsequent infusion of alteplase. She then went to interventional radiology to attempt mechanical thrombectomy. The patient is in guarded condition she'll be admitted to the ICU with internal medicine    CRITICAL CARE TIME:    The patient required approximately 35 minutes worth of critical care time. This excludes any procedures. This includes time spent directly at caring for the patient, making critical medical decisions, involving consultants and speaking with the family.    FINAL IMPRESSION  1. Acute right sided M1 occlusion with supravascular accident, receiving thrombolytics         Electronically signed by: Roverto Deutsch, 8/18/2017 11:07 AM

## 2017-08-18 NOTE — IP AVS SNAPSHOT
8/21/2017    Shaista Bocanegra  [address]  [place] [address]    Dear Shaista:    [facility] wants to ensure your discharge home is safe and you or your loved ones have had all of your questions answered regarding your care after you leave the hospital.    Below is a list of resources and contact information should you have any questions regarding your hospital stay, follow-up instructions, or active medical symptoms.    Questions or Concerns Regarding… Contact   Medical Questions Related to Your Discharge  (7 days a week, 8am-5pm) Contact a Nurse Care Coordinator   [phone]   Medical Questions Not Related to Your Discharge  (24 hours a day / 7 days a week)  Contact the Nurse Health Line   [phone]    Medications or Discharge Instructions Refer to your discharge packet   or contact your [facility] Primary Care Provider   [phone]   Follow-up Appointment(s) Schedule your appointment via FORMTEK   or contact Scheduling [phone]   Billing Review your statement via FORMTEK  or contact Billing [phone]   Medical Records Review your records via FORMTEK   or contact Medical Records [phone]     You may receive a telephone call within two days of discharge. This call is to make certain you understand your discharge instructions and have the opportunity to have any questions answered. You can also easily access your medical information, test results and upcoming appointments via the FORMTEK free online health management tool. You can learn more and sign up at [email or web address]. For assistance setting up your FORMTEK account, please call [phone].    Once again, we want to ensure your discharge home is safe and that you have a clear understanding of any next steps in your care. If you have any questions or concerns, please do not hesitate to contact us, we are here for you. Thank you for choosing [facility] for your healthcare needs.    Sincerely,    Your [facility] Healthcare Team

## 2017-08-18 NOTE — IP AVS SNAPSHOT
" <p align=\"LEFT\"><IMG SRC=\"//[email or web address]\" alt=\"Image\" WIDTH=\"50%\" HEIGHT=\"200\" BORDER=\"\"></p>                   Name:Shaista Bocanegra  Medical Record Number:[id number]  CSN: [id number]    YOB: 1945   Age: 72 y.o.  Sex: female  HT:1.702 m (5' 7.01\") WT: 68.7 kg (151 lb 7.3 oz)          Admit Date: 8/18/2017     Discharge Date:   Today's Date: 8/21/2017  Attending Doctor:  Shaq Maya M.D.                  Allergies:  Review of patient's allergies indicates no known allergies.          Your appointments     Aug 24, 2017  9:40 AM   Established Patient with Luanne Mejias M.D.   [facility] ([facility])    [address]  [place] [id number] [phone]           You will be receiving a confirmation call a few days before your appointment from our automated call confirmation system.            Sep 25, 2017 11:00 AM   New Patient with [facility]   [facility] (--)    [address], [address]  [place] [id number] [phone]           Please bring Photo ID, Insurance Cards, All Medication Bottles and copies of any legal documents (such as Living Will, Power of ) If speaking a language besides English please bring an adult . Please arrive 30 minutes prior for check in and registration. You will be receiving a confirmation call a few days before your appointment from our automated call confirmation system.            Nov 15, 2017  8:30 AM   Follow Up Visit with Aimee Bruno M.D.   [facility] (--)    [address] , [address]  [place] [id number] [phone]           You will be receiving a confirmation call a few days before your appointment from our automated call confirmation system.            Dec 05, 2017  8:45 AM   FOLLOW UP with Pascale Daniels M.D.   [facility] (--)    [address]  [place] [address]   [phone]                 Medication " List      Take these Medications        Instructions    aspirin 81 MG Chew chewable tablet   Commonly known as:  ASA    Take 1 Tab by mouth every day.   Dose:  81 mg       atorvastatin 80 MG tablet   Commonly known as:  LIPITOR    Take 1 Tab by mouth every evening for 14 days.   Dose:  80 mg       calcium carbonate 500 MG Tabs   Commonly known as:  OS-CRISTOFER 500    Take 1,000 mg by mouth every day.   Dose:  1000 mg       ICAPS AREDS 2 PO    Take 1 Tab by mouth every day.   Dose:  1 Tab       lisinopril 20 MG Tabs   Commonly known as:  PRINIVIL    TAKE 1 TABLET DAILY       metoprolol SR 50 MG Tb24   Commonly known as:  TOPROL XL    Take 1 Tab by mouth every day.   Dose:  50 mg       MULTIVITAMINS PO    Take  by mouth every day.       potassium chloride ER 10 MEQ tablet   Commonly known as:  KLOR-CON    Take 1 Tab by mouth every day.   Dose:  10 mEq       triamterene/hctz 37.5-25 MG Caps   Commonly known as:  MAXZIDE-25/DYAZIDE    Take 1 Cap by mouth every morning.   Dose:  1 Cap       valacyclovir 500 MG Tabs   Commonly known as:  VALTREX    Take 500 mg by mouth every day.   Dose:  500 mg

## 2017-08-19 ENCOUNTER — APPOINTMENT (OUTPATIENT)
Dept: RADIOLOGY | Facility: MEDICAL CENTER | Age: 72
DRG: 024 | End: 2017-08-19
Attending: SPECIALIST
Payer: COMMERCIAL

## 2017-08-19 LAB
ALBUMIN SERPL BCP-MCNC: 3.3 G/DL (ref 3.2–4.9)
ALBUMIN/GLOB SERPL: 1.5 G/DL
ALP SERPL-CCNC: 31 U/L (ref 30–99)
ALT SERPL-CCNC: 16 U/L (ref 2–50)
ANION GAP SERPL CALC-SCNC: 8 MMOL/L (ref 0–11.9)
AST SERPL-CCNC: 29 U/L (ref 12–45)
BASOPHILS # BLD AUTO: 0.3 % (ref 0–1.8)
BASOPHILS # BLD: 0.02 K/UL (ref 0–0.12)
BILIRUB SERPL-MCNC: 1 MG/DL (ref 0.1–1.5)
BUN SERPL-MCNC: 17 MG/DL (ref 8–22)
CALCIUM SERPL-MCNC: 9 MG/DL (ref 8.5–10.5)
CHLORIDE SERPL-SCNC: 107 MMOL/L (ref 96–112)
CHOLEST SERPL-MCNC: 135 MG/DL (ref 100–199)
CO2 SERPL-SCNC: 23 MMOL/L (ref 20–33)
CREAT SERPL-MCNC: 0.86 MG/DL (ref 0.5–1.4)
EOSINOPHIL # BLD AUTO: 0.02 K/UL (ref 0–0.51)
EOSINOPHIL NFR BLD: 0.3 % (ref 0–6.9)
ERYTHROCYTE [DISTWIDTH] IN BLOOD BY AUTOMATED COUNT: 51.7 FL (ref 35.9–50)
GFR SERPL CREATININE-BSD FRML MDRD: >60 ML/MIN/1.73 M 2
GLOBULIN SER CALC-MCNC: 2.2 G/DL (ref 1.9–3.5)
GLUCOSE SERPL-MCNC: 93 MG/DL (ref 65–99)
HCT VFR BLD AUTO: 35.3 % (ref 37–47)
HDLC SERPL-MCNC: 54 MG/DL
HGB BLD-MCNC: 12.1 G/DL (ref 12–16)
IMM GRANULOCYTES # BLD AUTO: 0.02 K/UL (ref 0–0.11)
IMM GRANULOCYTES NFR BLD AUTO: 0.3 % (ref 0–0.9)
LDLC SERPL CALC-MCNC: 65 MG/DL
LYMPHOCYTES # BLD AUTO: 1.44 K/UL (ref 1–4.8)
LYMPHOCYTES NFR BLD: 21.6 % (ref 22–41)
MCH RBC QN AUTO: 35.4 PG (ref 27–33)
MCHC RBC AUTO-ENTMCNC: 34.3 G/DL (ref 33.6–35)
MCV RBC AUTO: 103.2 FL (ref 81.4–97.8)
MONOCYTES # BLD AUTO: 0.58 K/UL (ref 0–0.85)
MONOCYTES NFR BLD AUTO: 8.7 % (ref 0–13.4)
NEUTROPHILS # BLD AUTO: 4.6 K/UL (ref 2–7.15)
NEUTROPHILS NFR BLD: 68.8 % (ref 44–72)
NRBC # BLD AUTO: 0 K/UL
NRBC BLD AUTO-RTO: 0 /100 WBC
PLATELET # BLD AUTO: 168 K/UL (ref 164–446)
PMV BLD AUTO: 10 FL (ref 9–12.9)
POTASSIUM SERPL-SCNC: 4 MMOL/L (ref 3.6–5.5)
PROT SERPL-MCNC: 5.5 G/DL (ref 6–8.2)
RBC # BLD AUTO: 3.42 M/UL (ref 4.2–5.4)
SODIUM SERPL-SCNC: 138 MMOL/L (ref 135–145)
TRIGL SERPL-MCNC: 80 MG/DL (ref 0–149)
WBC # BLD AUTO: 6.7 K/UL (ref 4.8–10.8)

## 2017-08-19 PROCEDURE — 99233 SBSQ HOSP IP/OBS HIGH 50: CPT | Performed by: HOSPITALIST

## 2017-08-19 PROCEDURE — A9270 NON-COVERED ITEM OR SERVICE: HCPCS | Performed by: SPECIALIST

## 2017-08-19 PROCEDURE — 80061 LIPID PANEL: CPT

## 2017-08-19 PROCEDURE — G8997 SWALLOW GOAL STATUS: HCPCS | Mod: CH

## 2017-08-19 PROCEDURE — 93306 TTE W/DOPPLER COMPLETE: CPT | Mod: 26 | Performed by: INTERNAL MEDICINE

## 2017-08-19 PROCEDURE — 770006 HCHG ROOM/CARE - MED/SURG/GYN SEMI*

## 2017-08-19 PROCEDURE — 80053 COMPREHEN METABOLIC PANEL: CPT

## 2017-08-19 PROCEDURE — 93306 TTE W/DOPPLER COMPLETE: CPT

## 2017-08-19 PROCEDURE — G8996 SWALLOW CURRENT STATUS: HCPCS | Mod: CI

## 2017-08-19 PROCEDURE — 700111 HCHG RX REV CODE 636 W/ 250 OVERRIDE (IP): Performed by: HOSPITALIST

## 2017-08-19 PROCEDURE — 85025 COMPLETE CBC W/AUTO DIFF WBC: CPT

## 2017-08-19 PROCEDURE — A9270 NON-COVERED ITEM OR SERVICE: HCPCS | Performed by: HOSPITALIST

## 2017-08-19 PROCEDURE — 70551 MRI BRAIN STEM W/O DYE: CPT

## 2017-08-19 PROCEDURE — 51798 US URINE CAPACITY MEASURE: CPT

## 2017-08-19 PROCEDURE — 700102 HCHG RX REV CODE 250 W/ 637 OVERRIDE(OP): Performed by: SPECIALIST

## 2017-08-19 PROCEDURE — 92610 EVALUATE SWALLOWING FUNCTION: CPT

## 2017-08-19 PROCEDURE — 700102 HCHG RX REV CODE 250 W/ 637 OVERRIDE(OP): Performed by: HOSPITALIST

## 2017-08-19 RX ORDER — LISINOPRIL 20 MG/1
20 TABLET ORAL
Status: DISCONTINUED | OUTPATIENT
Start: 2017-08-20 | End: 2017-08-21 | Stop reason: HOSPADM

## 2017-08-19 RX ORDER — METOPROLOL SUCCINATE 25 MG/1
50 TABLET, EXTENDED RELEASE ORAL
Status: DISCONTINUED | OUTPATIENT
Start: 2017-08-20 | End: 2017-08-21 | Stop reason: HOSPADM

## 2017-08-19 RX ORDER — TRIAMTERENE AND HYDROCHLOROTHIAZIDE 37.5; 25 MG/1; MG/1
1 CAPSULE ORAL
Status: DISCONTINUED | OUTPATIENT
Start: 2017-08-20 | End: 2017-08-21 | Stop reason: HOSPADM

## 2017-08-19 RX ORDER — LORAZEPAM 2 MG/ML
1 INJECTION INTRAMUSCULAR
Status: DISPENSED | OUTPATIENT
Start: 2017-08-19 | End: 2017-08-19

## 2017-08-19 RX ADMIN — STANDARDIZED SENNA CONCENTRATE AND DOCUSATE SODIUM 2 TABLET: 8.6; 5 TABLET, FILM COATED ORAL at 10:16

## 2017-08-19 RX ADMIN — ASPIRIN 325 MG: 325 TABLET, COATED ORAL at 12:05

## 2017-08-19 RX ADMIN — LORAZEPAM 1 MG: 2 INJECTION INTRAMUSCULAR; INTRAVENOUS at 15:55

## 2017-08-19 RX ADMIN — ATORVASTATIN CALCIUM 80 MG: 80 TABLET, FILM COATED ORAL at 20:46

## 2017-08-19 RX ADMIN — ACETAMINOPHEN 650 MG: 650 SUPPOSITORY RECTAL at 00:07

## 2017-08-19 RX ADMIN — ENOXAPARIN SODIUM 40 MG: 100 INJECTION SUBCUTANEOUS at 12:05

## 2017-08-19 ASSESSMENT — ENCOUNTER SYMPTOMS
BACK PAIN: 0
NAUSEA: 0
HEADACHES: 0
FEVER: 0
DIZZINESS: 0
VOMITING: 0
DIARRHEA: 0
SHORTNESS OF BREATH: 0
LOSS OF CONSCIOUSNESS: 0
ABDOMINAL PAIN: 0
COUGH: 0
CHILLS: 0

## 2017-08-19 ASSESSMENT — PAIN SCALES - GENERAL
PAINLEVEL_OUTOF10: 0
PAINLEVEL_OUTOF10: 3
PAINLEVEL_OUTOF10: 0
PAINLEVEL_OUTOF10: 0
PAINLEVEL_OUTOF10: 3
PAINLEVEL_OUTOF10: 0

## 2017-08-19 ASSESSMENT — LIFESTYLE VARIABLES
ALCOHOL_USE: YES
HAVE YOU EVER FELT YOU SHOULD CUT DOWN ON YOUR DRINKING: NO
TOTAL SCORE: 0
AVERAGE NUMBER OF DAYS PER WEEK YOU HAVE A DRINK CONTAINING ALCOHOL: 10
EVER HAD A DRINK FIRST THING IN THE MORNING TO STEADY YOUR NERVES TO GET RID OF A HANGOVER: NO
CONSUMPTION TOTAL: POSITIVE
TOTAL SCORE: 0
HOW MANY TIMES IN THE PAST YEAR HAVE YOU HAD 5 OR MORE DRINKS IN A DAY: 5
ON A TYPICAL DAY WHEN YOU DRINK ALCOHOL HOW MANY DRINKS DO YOU HAVE: 3
HAVE PEOPLE ANNOYED YOU BY CRITICIZING YOUR DRINKING: NO
TOTAL SCORE: 0
EVER FELT BAD OR GUILTY ABOUT YOUR DRINKING: NO

## 2017-08-19 ASSESSMENT — COGNITIVE AND FUNCTIONAL STATUS - GENERAL
STANDING UP FROM CHAIR USING ARMS: A LITTLE
MOVING FROM LYING ON BACK TO SITTING ON SIDE OF FLAT BED: A LITTLE
PERSONAL GROOMING: A LITTLE
TOILETING: A LITTLE
DRESSING REGULAR UPPER BODY CLOTHING: A LITTLE
MOVING TO AND FROM BED TO CHAIR: A LITTLE
WALKING IN HOSPITAL ROOM: A LITTLE
SUGGESTED CMS G CODE MODIFIER DAILY ACTIVITY: CK
DAILY ACTIVITIY SCORE: 19
TURNING FROM BACK TO SIDE WHILE IN FLAT BAD: A LITTLE
MOBILITY SCORE: 18
CLIMB 3 TO 5 STEPS WITH RAILING: A LITTLE
HELP NEEDED FOR BATHING: A LITTLE
DRESSING REGULAR LOWER BODY CLOTHING: A LITTLE
SUGGESTED CMS G CODE MODIFIER MOBILITY: CK

## 2017-08-19 ASSESSMENT — PATIENT HEALTH QUESTIONNAIRE - PHQ9
2. FEELING DOWN, DEPRESSED, IRRITABLE, OR HOPELESS: NOT AT ALL
1. LITTLE INTEREST OR PLEASURE IN DOING THINGS: NOT AT ALL
SUM OF ALL RESPONSES TO PHQ9 QUESTIONS 1 AND 2: 0
SUM OF ALL RESPONSES TO PHQ QUESTIONS 1-9: 0

## 2017-08-19 NOTE — PROGRESS NOTES
Late entry:    MD Junior bedside assessing patient and updating family members on current plan of care.

## 2017-08-19 NOTE — PROGRESS NOTES
NEUROLOGY INPATIENT PROGRESS NOTE      CHIEF COMPLAINT:  Chief Complaint   Patient presents with   • Possible Stroke       DOS  Pt was seen and examined on 08/19/2017    BRIEF HISTORY: Ms. Bocanegra i a 72 year old female with HTN, Hyperlipidemia, breast CA who presented on 8/19 with a right MCA syndrome and is s-p TPA and an M1 thrombectomy     INTERVAL EVENTS:  Her right sided weakness significantly improved overnight.   She is anxious about saida able to go on a trip to [place] this Monday.      She has been in sinus rhythm overnight.     ROS:  No headaches  No fevers of chills    MEDICATIONS    Current facility-administered medications:   •  [START ON 8/20/2017] lisinopril (PRINIVIL) tablet 20 mg, 20 mg, Oral, Q DAY, Carl Shaver D.O.  •  [START ON 8/20/2017] metoprolol SR (TOPROL XL) tablet 50 mg, 50 mg, Oral, Q DAY, ZULEIMA GreyO.  •  [START ON 8/20/2017] triamterene/hctz (MAXZIDE-25/DYAZIDE) 37.5-25 MG 1 Cap, 1 Cap, Oral, Q DAY, Carl Shaver D.O.  •  enoxaparin (LOVENOX) inj 40 mg, 40 mg, Subcutaneous, DAILY, Carl Shaver D.O.  •  senna-docusate (PERICOLACE or SENOKOT S) 8.6-50 MG per tablet 2 Tab, 2 Tab, Oral, BID, 2 Tab at 08/19/17 1016 **AND** polyethylene glycol/lytes (MIRALAX) PACKET 1 Packet, 1 Packet, Oral, QDAY PRN **AND** magnesium hydroxide (MILK OF MAGNESIA) suspension 30 mL, 30 mL, Oral, QDAY PRN **AND** bisacodyl (DULCOLAX) suppository 10 mg, 10 mg, Rectal, QDAY PRN, Fer Junior D.O.  •  Respiratory Care per Protocol, , Nebulization, Continuous RT, Fer Junior D.O.  •  aspirin (ASA) tablet 325 mg, 325 mg, Oral, DAILY **OR** aspirin (ASA) chewable tab 324 mg, 324 mg, Oral, DAILY **OR** aspirin (ASA) suppository 300 mg, 300 mg, Rectal, DAILY, Fer Junior D.O.  •  ondansetron (ZOFRAN) syringe/vial injection 4 mg, 4 mg, Intravenous, Q4HRS PRN, Fer Junior D.O.  •  ondansetron (ZOFRAN ODT) dispertab 4 mg, 4 mg, Oral, Q4HRS PRN, Fer HAN  POLLY Junior  •  haloperidol lactate (HALDOL) injection 5 mg, 5 mg, Intravenous, Q4HRS PRN, Fer Junior D.O.  •  labetalol (NORMODYNE,TRANDATE) injection 10 mg, 10 mg, Intravenous, Q4HRS PRN, 10 mg at 08/18/17 1212 **OR** hydrALAZINE (APRESOLINE) injection 10 mg, 10 mg, Intravenous, Q2HRS PRN, BELEM Moran M.D.  •  atorvastatin (LIPITOR) tablet 80 mg, 80 mg, Oral, Q EVENING, BELEM Moran M.D., Stopped at 08/18/17 2100  •  acetaminophen (TYLENOL) suppository 650 mg, 650 mg, Rectal, Q6HRS PRN, Mohan Lambert M.D., 650 mg at 08/19/17 0007      PHYSICAL EXAM  Filed Vitals:    08/19/17 0730 08/19/17 0800 08/19/17 0830 08/19/17 0900   BP:       Pulse: 65 56 67 67   Temp:  37.2 °C (98.9 °F)     Resp: 18 14 19 16   Height:       Weight:       SpO2: 94% 94% 95% 97%     PT is alert, lying in bed, NAD  Neuro: pt is alert, answers questions appropriately. There is no aphasia or dysarthria  CN: PERRLA, EOMI, she has a left facial droop, visual fields are full to confrontaton.    Motor: 5/5 throughout in all 4 extremities  Coordination: FNF is intact b/l    Sensation: intact to soft touch in all 4 extremities.  There is no extinction to double simultaneous stimuli.     LABS:    Lab Results   Component Value Date/Time    WBC 6.7 08/19/2017 02:50 AM    RBC 3.42* 08/19/2017 02:50 AM    HEMOGLOBIN 12.1 08/19/2017 02:50 AM    HEMATOCRIT 35.3* 08/19/2017 02:50 AM    .2* 08/19/2017 02:50 AM    MCH 35.4* 08/19/2017 02:50 AM    MCHC 34.3 08/19/2017 02:50 AM    MPV 10.0 08/19/2017 02:50 AM    NEUTROPHILS-POLYS 68.80 08/19/2017 02:50 AM    LYMPHOCYTES 21.60* 08/19/2017 02:50 AM    MONOCYTES 8.70 08/19/2017 02:50 AM    EOSINOPHILS 0.30 08/19/2017 02:50 AM    BASOPHILS 0.30 08/19/2017 02:50 AM        Lab Results   Component Value Date/Time    SODIUM 138 08/19/2017 02:50 AM    POTASSIUM 4.0 08/19/2017 02:50 AM    CHLORIDE 107 08/19/2017 02:50 AM    CO2 23 08/19/2017 02:50 AM    GLUCOSE 93 08/19/2017 02:50 AM    BUN 17 08/19/2017  02:50 AM    CREATININE 0.86 08/19/2017 02:50 AM    BUN-CREATININE RATIO 30* 08/07/2013 08:47 AM        Lab Results   Component Value Date/Time    CHOLESTEROL, 08/19/2017 02:50 AM    LDL 65 08/19/2017 02:50 AM    HDL 54 08/19/2017 02:50 AM    TRIGLYCERIDES 80 08/19/2017 02:50 AM         Lab Results   Component Value Date/Time    ALKALINE PHOSPHATASE 31 08/19/2017 02:50 AM    AST(SGOT) 29 08/19/2017 02:50 AM    ALT(SGPT) 16 08/19/2017 02:50 AM    TOTAL BILIRUBIN 1.0 08/19/2017 02:50 AM          IMAGING STUDIES:      IMPRESSION:Ms. Bocanegra i a 72 year old female with HTN, Hyperlipidemia, breast CA who presented on 8/19 with a right MCA syndrome and is s-p TPA and an M1 thrombectomy     Diagnosis  1) Acute ischemic stroke (etiology is cryptogenic at this point)   2) HTN  3) Hyperlipidemia    RECOMMENDATIONS:  1) Continue Q1 hour neuro checks until 24 hours after iv-TPA, then okay to transfer to floor with Q4 hour neuro checks  2) MRI brain without contrast to be done today to r/o CVA  3) Will order a bubble study to r/o PFO as she had been on a long flight 3 weeks ago.   4) Continue telemetry  5) Start ASA 81 mg daily 24 hours after TPA  6) Continue Atorvastatin 80 mg daily (LDL 67)  7) Start Heparin SQ for DVT prophylaxis 24 hours after iv-TPA  8) PT/OT/ST. Mariela Draper M.D    Neurology

## 2017-08-19 NOTE — ASSESSMENT & PLAN NOTE
SBP goal less than 140 mmHg  DBP goal less than 90 mmHg  PRN and antihypertensives to titrate by hospitalist towards goal  Most recent Blood Pressure : 139/71 mmHg

## 2017-08-19 NOTE — H&P
ADMISSION DATE:  08/18/2017.    PRIMARY CARE PHYSICIAN:  Bethany Crane MD    OUTPATIENT CARDIOLOGIST:  Pascale Daniels MD    CONSULTING PHYSICIAN:  KM Moran MD    CHIEF COMPLAINT:  Found down on the floor after calling family member with   slurred speech.    HISTORY OF PRESENT ILLNESS:  The patient is a 72-year-old female .    She is a high-functioning 72-year-old.  Reportedly, family had talked to her   earlier today at approximately 8 o'clock in the morning via phone and then,   probably close to 9 o'clock, they had also heard from her.  About an hour went   by, finally she had been able to call her brother who just lives in the same   building complex in which she does.  She came over and found her on the floor.    This was approximately at 10:15.  The 911 was immediately contacted.  At   that point in time, the patient was noted to have left-sided flaccidity and   also facial droop on the left and slurred speech.  On arrival here to [facility],   the patient was given alteplase and taken to interventional radiology where   Dr. Nicko Wilkins was able to have clot retrieval.  At the time of this   evaluation, I have seen the patient.  She is recovering from her TPA as well   as from her clot extraction.  She is awake.  She is able to move her left   side.  She has some weakness in her left side compared to the right.  She has   some good fine motor dexterity, although a little bit slow.  Her speech is   clear.  She still has left facial droop.  She is able to talk in short   sentences clearly.  She is able to move her lower extremity as well.    I did meet with the family members and explained current situations after I   had met with the patient.  They have been updated as well.  I had also talked   to Dr. Sury Moran later on, who had stated that when he saw her in the   emergency room, the patient had left-sided flaccidity still, paralysis.    ALLERGIES:  None.    CURRENT MEDICATIONS:  1.   Os-Tray 500 mg, she takes 2 tablets daily.  2.  Lisinopril 20 mg daily.  3.  Metoprolol SR 50 mg daily.  4.  Multivitamin daily.  5.  Potassium chloride 10 mEq daily.  6.  Zocor 20 mg daily.  7.  Triamterene/hydrochlorothiazide 37.5/25 mg daily.  8.  Valtrex 500 mg daily.    PAST MEDICAL HISTORY:  1.  Hypertension.  2.  Dyslipidemia.  3.  History of mitral valve prolapse.  4.  History of breast cancer.  5.  Essential hypertension.    PAST SURGICAL HISTORY:  She has had cataract surgery, breast biopsy,   lumpectomy.    FAMILY HISTORY:  Both parents are .  No known family history of   strokes.    PHYSICAL EXAMINATION:  VITAL SIGNS:  Temperature is 98, heart rate of 58, respirations 14, blood   pressure 131/63, oxygen 96% on room air.  GENERAL:  This is a well-nourished elderly female.  She is awake, although   somewhat lethargic, status post her interventional radiology procedure,   receiving Versed.  HEENT:  NCAT.  Extraocular motors are intact.  Pupils are equal, round, and   reactive to light.  She has moist mucosa.  She has noted left facial droop.  NECK:  Trachea is midline.  I did not appreciate any carotid bruits, no   stridor on auscultation of the neck.  No C-spine tenderness.  LUNGS:  Clear to auscultation bilaterally.  No wheezes, no crackles, no   accessory muscle use.  CHEST:  Rises symmetrically.  CARDIOVASCULAR:  Regular rate and rhythm.  No murmurs, gallops or rubs.    Normal S1, S2.  ABDOMEN:  Soft, nontender, nondistended.  Positive bowel sounds.  EXTREMITIES:  She has no lower extremity edema.  She has 2+ dorsalis pedal   pulses, 2+ radial artery pulses bilaterally.  No cyanosis or clubbing of the   digits.  She has good capillary refill.  No tremors noted.  The left upper   extremity, probably 4/5 muscle strength of the right, 5/5 left; again 4/5   compared to the right.  PSYCHIATRIC:  Normal affect.  NEUROLOGIC:  Noted left facial droop; otherwise, seems appropriate, left-sided    weakness.    LABORATORY DATA:  CBC showed a white blood cell count of 6.1, hemoglobin 13.8,   hematocrit 40.8, MCV of 102.5, MCH of 34.7, platelet count of 195.  Chemistry   panel:  Sodium 141, potassium 3.3, chloride is 104, CO2 of 27, BUN 15,   creatinine 0.8, glucose is 109, albumin is 4.  Troponin less than 0.01.    Hemoglobin A1c of 5.3.  INR is 0.99.  TSH is 1.16.    IMAGING STUDIES:  Chest x-ray on 08/18, she has mild bilateral interstitial   opacities.  Carotid duplex on 08/18, right carotid normal throughout; left   carotid normal throughout.  Bilateral subclavian and vertebral artery   waveforms are anterograde and waveforms are normal in character.  CTA of the   head on 08/18, shows hyperdense M1 segment consistent with thrombosis, subtle   right frontal opercular and right insular hypodensity consistent with ischemic   changes, no mass effect or acute hemorrhage.  On 08/18, CT angiogram of the   neck within normal limits, thrombosed right M1 segment.  CTA of the head shows   M1 segment occlusion, collateral flow in the peripheral M3 branch is seen on   the right.  Mild decreased enhancement visualized, right internal carotid   artery.  Question of subtle hypodensity in the right insular cortex region.    No abnormal brain parenchymal enhancement seen.    EKG showed a ventricular rate of 63.  Normal sinus rhythm.  No ST elevation or   depression.    ASSESSMENT AND PLAN:  1.  Acute cerebrovascular accident.  Dr. Sury Moran, neurologist, has already   been consulted.  Interventional radiology has already been contacted and   extracted M1 segment thrombus or embolism.  The patient will be closely   monitored in the ICU in the next 24 hours status post her alteplase.  The   patient will have aspirin, status post alteplase.  We will continue statin   therapy.  We will have a formal swallow evaluation, need PT, OT.  Neuro checks   q. 1 hour.  We will allow permissive hypertension for now.  Check fasting    lipids.  We will check MRI of the brain.  We will also check echocardiogram.  2.  Hypokalemia.  We will give supplemental potassium, check magnesium level,   replete potassium, check a.m. BMP.  3.  Macrocytosis, monitor.    Please note that 50 minutes involved in charting the patient's care.       ____________________________________     DO SANDI CONNORS / LYNDA    DD:  08/18/2017 18:31:46  DT:  08/18/2017 19:45:16    D#:  [id number]  Job#:  [id number]

## 2017-08-19 NOTE — PROGRESS NOTES
Paged MD Junior    1849 MD Junior returned page and updated on the increasing size of the right forearm hematoma and the left hip/flank hematoma. MD ordered to remark and time these growths. MD Junior also order that if the patient begins to experience a continual growth of her hematomas, an increase of her heart rate, and drop in her blood pressure to get a STAT Hgb and page with results.

## 2017-08-19 NOTE — PROGRESS NOTES
MD Reyes bedside assessing patient and updating her and family on current plan of care. MD ordered for RN to do bedside swallow eval. MD notified of patient passing her bedside swallow and placed a diet order.

## 2017-08-19 NOTE — ASSESSMENT & PLAN NOTE
M1 segment R s/p TPA   Statin/ASA  MRI results pending  Echo normal  Tele unimpressive, without ectopy  Resuming BP meds  She does seem a little forgetful... Is this underlying impairment? I have asked nursing to watch closely.

## 2017-08-19 NOTE — THERAPY
"  Speech Language Therapy Clinical Swallow Evaluation completed.  Functional Status: Bedside swallow evaluation completed this date. Patient alert and awake. She is Ox4, able to follow commands, answer questions appropriately, and verbalize wants/need at conversation level with 100% intelligibility. No evidence of dysarthria or L neglect observed today. She does present with slight L facial droop, but overall normal oral-pharyngeal swallow function. Patient consumed regular/thin liquid trials with good oral phase and no overt signs of aspiration. She was able to feed herself and used safe swallow strategies with verbal cues. Recommend regular/thin liquid diet.  Recommendations - Diet: Diet / Liquid Recommendation: Regular, Thin Liquid                          Strategies: Head of Bed at 90 Degrees                          Medication Administration: Medication Administration : Whole with Liquid Wash  Plan of Care: Will benefit from Speech Therapy 2 times per week  Post-Acute Therapy: Currently anticipate no further skilled therapy needs once patient is discharged from the inpatient setting.    See \"Rehab Therapy-Acute\" Patient Summary Report for complete documentation.   "

## 2017-08-19 NOTE — PROGRESS NOTES
[facility] Hospitalist Progress Note    Date of Service: 2017    Chief Complaint  72 y.o. female admitted 2017 with Hx HTN and dyslipidemia admitted after GLF, slurred speech and L hemiparesis.  Found to have R M1 MCA occlusion and s/p TPA/thrombectomy    Interval Problem Update  Pt feeling much better.  Family reports voice is back to normal.  She reports her strength on the L is much better.  No HA or other complaints.  No critical events overnight    Consultants/Specialty  Neurology    Disposition  Cont in ICU until >24hrs from TPA        Review of Systems   Constitutional: Negative for fever and chills.   Respiratory: Negative for cough and shortness of breath.    Cardiovascular: Negative for chest pain.   Gastrointestinal: Negative for nausea, vomiting, abdominal pain and diarrhea.   Musculoskeletal: Negative for back pain.   Skin: Negative for rash.   Neurological: Negative for dizziness, loss of consciousness and headaches.      Physical Exam  Laboratory/Imaging   Hemodynamics  Temp (24hrs), Av °C (98.6 °F), Min:36.4 °C (97.6 °F), Max:37.4 °C (99.4 °F)   Temperature: 37.2 °C (99 °F)  Pulse  Av.3  Min: 56  Max: 75 Heart Rate (Monitored): 64  Blood Pressure : 155/67 mmHg, NIBP: 119/59 mmHg      Respiratory      Respiration: (!) 11, Pulse Oximetry: 97 %, O2 Daily Delivery Respiratory : Room Air with O2 Available        RUL Breath Sounds: Clear, RML Breath Sounds: Clear, RLL Breath Sounds: Clear, YUNIOR Breath Sounds: Clear, LLL Breath Sounds: Clear    Fluids    Intake/Output Summary (Last 24 hours) at 17 0724  Last data filed at 17 0600   Gross per 24 hour   Intake    950 ml   Output    500 ml   Net    450 ml       Nutrition  Orders Placed This Encounter   Procedures   • Diet NPO     Standing Status: Standing      Number of Occurrences: 1      Standing Expiration Date:      Order Specific Question:  Restrict to:     Answer:  Strict [1]     Physical Exam   Constitutional: She is oriented  to person, place, and time. She appears well-developed and well-nourished. No distress.   HENT:   Head: Normocephalic and atraumatic.   Neck: No JVD present.   Cardiovascular: Normal rate and regular rhythm.    Murmur heard.  Pulmonary/Chest: Effort normal. No stridor. No respiratory distress. She has no wheezes. She has no rales.   Abdominal: Soft. There is no tenderness. There is no rebound and no guarding.   Musculoskeletal: She exhibits no edema.   Groin site looks good.  Distal NV exam intact   Neurological: She is oriented to person, place, and time.   Normal speech  No evidence of expressive or receptive aphasia  CNII-XII grossly intact  4+/5 L upper ext  5/5 R and B legs  Sensation intact   Skin: Skin is warm and dry. No rash noted. She is not diaphoretic.   Psychiatric: She has a normal mood and affect. Thought content normal.   Nursing note and vitals reviewed.      Recent Labs      08/18/17   1030  08/19/17   0250   WBC  6.1  6.7   RBC  3.98*  3.42*   HEMOGLOBIN  13.8  12.1   HEMATOCRIT  40.8  35.3*   MCV  102.5*  103.2*   MCH  34.7*  35.4*   MCHC  33.8  34.3   RDW  50.6*  51.7*   PLATELETCT  195  168   MPV  9.7  10.0     Recent Labs      08/18/17   1030  08/19/17   0250   SODIUM  141  138   POTASSIUM  3.3*  4.0   CHLORIDE  104  107   CO2  27  23   GLUCOSE  109*  93   BUN  15  17   CREATININE  0.80  0.86   CALCIUM  9.6  9.0     Recent Labs      08/18/17   1030   APTT  26.4   INR  0.99         Recent Labs      08/19/17   0250   TRIGLYCERIDE  80   HDL  54   LDL  65          Assessment/Plan     Benign essential HTN (present on admission)  Assessment & Plan  Allow permissive HTN for the moment  Pt at home on:  Triamteren/HCTZ 37.5/25  Metoprolol SR 50  Lisinopril 20    hyperlipidemia (present on admission)  Assessment & Plan  High dose statin    Acute CVA (cerebrovascular accident) (CMS-HCC) (present on admission)  Assessment & Plan  M1 segment R s/p TPA 24hrs 1130 today  High dose statin  ASA  Permissive  HTN  PT/OT evals  SLP if fails bedside swallow  MRI brain  Echo  Cont Tele  Neuro following      Radiology images reviewed, EKG reviewed, Labs reviewed and Medications reviewed          DVT prophylaxis - mechanical: SCDs  Ulcer prophylaxis: Not indicated

## 2017-08-19 NOTE — CARE PLAN
Problem: Acute Care of the Stroke Patient  Goal: Optimal Outcome for the Stroke patient  Post TPA protocol and assessments performed, NIHSS completed and documented, Q1h neuro assessments    Problem: Hemodynamic Status  Goal: Stable Vital Signs and Fluid Balance  Vital signs continuously monitored, assessments performed, I&Os monitored

## 2017-08-19 NOTE — CARE PLAN
Problem: Venous Thromboembolism (VTW)/Deep Vein Thrombosis (DVT) Prevention:  Goal: Patient will participate in Venous Thrombosis (VTE)/Deep Vein Thrombosis (DVT)Prevention Measures  Outcome: PROGRESSING AS EXPECTED  Mechanical interventions in place. Pharmacological interventions contraindicated.          Problem: Acute Care of the Stroke Patient  Goal: Optimal Outcome for the Stroke patient  Outcome: PROGRESSING AS EXPECTED  Following the Alteplase protocol with q1 neuro checks and vital signs until 11:30 AM. NIHSS Qshift. MRI planned for later today.

## 2017-08-19 NOTE — CARE PLAN
Problem: Safety  Goal: Will remain free from injury  Outcome: PROGRESSING AS EXPECTED  Safety and fall precautions in place, bed in lowest position, trend socks on patient.  Educated patient on call light system.  Personal items and call light within reach, bed alarm on.          Problem: Skin Integrity  Goal: Risk for impaired skin integrity will decrease  Outcome: PROGRESSING AS EXPECTED  Skin assessment completed. Hematomas noted on the RUE, LUE, LLE and L hip/flank which are outlined with a sharpie.

## 2017-08-20 PROCEDURE — 700102 HCHG RX REV CODE 250 W/ 637 OVERRIDE(OP): Performed by: HOSPITALIST

## 2017-08-20 PROCEDURE — G8979 MOBILITY GOAL STATUS: HCPCS | Mod: CI

## 2017-08-20 PROCEDURE — G8980 MOBILITY D/C STATUS: HCPCS | Mod: CI

## 2017-08-20 PROCEDURE — A9270 NON-COVERED ITEM OR SERVICE: HCPCS | Performed by: HOSPITALIST

## 2017-08-20 PROCEDURE — 770020 HCHG ROOM/CARE - TELE (206)

## 2017-08-20 PROCEDURE — 99233 SBSQ HOSP IP/OBS HIGH 50: CPT | Performed by: HOSPITALIST

## 2017-08-20 PROCEDURE — G8978 MOBILITY CURRENT STATUS: HCPCS | Mod: CI

## 2017-08-20 PROCEDURE — 700111 HCHG RX REV CODE 636 W/ 250 OVERRIDE (IP): Performed by: HOSPITALIST

## 2017-08-20 PROCEDURE — A9270 NON-COVERED ITEM OR SERVICE: HCPCS | Performed by: SPECIALIST

## 2017-08-20 PROCEDURE — 97161 PT EVAL LOW COMPLEX 20 MIN: CPT

## 2017-08-20 PROCEDURE — 700102 HCHG RX REV CODE 250 W/ 637 OVERRIDE(OP): Performed by: SPECIALIST

## 2017-08-20 RX ORDER — ACETAMINOPHEN 325 MG/1
650 TABLET ORAL EVERY 6 HOURS PRN
Status: DISCONTINUED | OUTPATIENT
Start: 2017-08-20 | End: 2017-08-21 | Stop reason: HOSPADM

## 2017-08-20 RX ORDER — ASPIRIN 81 MG/1
81 TABLET, CHEWABLE ORAL DAILY
Status: DISCONTINUED | OUTPATIENT
Start: 2017-08-20 | End: 2017-08-21 | Stop reason: HOSPADM

## 2017-08-20 RX ADMIN — STANDARDIZED SENNA CONCENTRATE AND DOCUSATE SODIUM 2 TABLET: 8.6; 5 TABLET, FILM COATED ORAL at 20:26

## 2017-08-20 RX ADMIN — METOPROLOL SUCCINATE 50 MG: 25 TABLET, EXTENDED RELEASE ORAL at 07:42

## 2017-08-20 RX ADMIN — ASPIRIN 325 MG: 325 TABLET, COATED ORAL at 07:42

## 2017-08-20 RX ADMIN — TRIAMTERENE AND HYDROCHLOROTHIAZIDE 1 CAPSULE: 25; 37.5 CAPSULE ORAL at 07:42

## 2017-08-20 RX ADMIN — ATORVASTATIN CALCIUM 80 MG: 80 TABLET, FILM COATED ORAL at 20:25

## 2017-08-20 RX ADMIN — LISINOPRIL 20 MG: 20 TABLET ORAL at 07:42

## 2017-08-20 RX ADMIN — STANDARDIZED SENNA CONCENTRATE AND DOCUSATE SODIUM 2 TABLET: 8.6; 5 TABLET, FILM COATED ORAL at 07:42

## 2017-08-20 RX ADMIN — ENOXAPARIN SODIUM 40 MG: 100 INJECTION SUBCUTANEOUS at 07:43

## 2017-08-20 ASSESSMENT — COGNITIVE AND FUNCTIONAL STATUS - GENERAL
TURNING FROM BACK TO SIDE WHILE IN FLAT BAD: A LITTLE
MOBILITY SCORE: 18
CLIMB 3 TO 5 STEPS WITH RAILING: A LITTLE
SUGGESTED CMS G CODE MODIFIER MOBILITY: CI
PERSONAL GROOMING: A LITTLE
HELP NEEDED FOR BATHING: A LITTLE
MOBILITY SCORE: 23
SUGGESTED CMS G CODE MODIFIER MOBILITY: CK
STANDING UP FROM CHAIR USING ARMS: A LITTLE
MOVING FROM LYING ON BACK TO SITTING ON SIDE OF FLAT BED: A LITTLE
DRESSING REGULAR UPPER BODY CLOTHING: A LITTLE
SUGGESTED CMS G CODE MODIFIER DAILY ACTIVITY: CK
WALKING IN HOSPITAL ROOM: A LITTLE
DAILY ACTIVITIY SCORE: 19
MOVING TO AND FROM BED TO CHAIR: A LITTLE
CLIMB 3 TO 5 STEPS WITH RAILING: A LITTLE
TOILETING: A LITTLE
DRESSING REGULAR LOWER BODY CLOTHING: A LITTLE

## 2017-08-20 ASSESSMENT — ENCOUNTER SYMPTOMS
DIZZINESS: 0
DIARRHEA: 0
HEADACHES: 0
VOMITING: 0
FOCAL WEAKNESS: 0
WEAKNESS: 0
CONSTIPATION: 0
ABDOMINAL PAIN: 0
SHORTNESS OF BREATH: 0
PALPITATIONS: 0
FEVER: 0
NAUSEA: 0
MYALGIAS: 0
CHILLS: 0
COUGH: 0

## 2017-08-20 ASSESSMENT — PAIN SCALES - GENERAL: PAINLEVEL_OUTOF10: 3

## 2017-08-20 ASSESSMENT — GAIT ASSESSMENTS
DISTANCE (FEET): 500
GAIT LEVEL OF ASSIST: SUPERVISED

## 2017-08-20 NOTE — PROGRESS NOTES
Pt arrived to floor 2320 from ICU. Pt AAOX4, CASEY. Left sided facial droop noted but tolerating regular diet and thin liquids w/o difficulty. Denies pain, n/t. Generalized bruising present to BUE, and LLE. Ambulates with HH assist. Educated on call light system. Son, Bobby, at bedside. Telemonitor in use.

## 2017-08-20 NOTE — PROGRESS NOTES
Pt transported down to MRI with ACLS RN and transport tech. Patient's VSS during the trip.      1624 patient back in S126 resting comfortably in bed. VSS.

## 2017-08-20 NOTE — CARE PLAN
Problem: Knowledge Deficit  Goal: Knowledge of disease process/condition, treatment plan, diagnostic tests, and medications will improve  Outcome: PROGRESSING AS EXPECTED  Pt and family members updated on POC and prescribed therapies and interventions in place. Encouraged to ask questions    Problem: Acute Care of the Stroke Patient  Goal: Optimal Outcome for the Stroke patient  Outcome: PROGRESSING AS EXPECTED  NIH stroke scale in place for each shift x5 shifts. Pt neurological status assessed.vascular access site assessed; soft and dry.

## 2017-08-20 NOTE — PROGRESS NOTES
Monitor summary: New at 2358 SR 66-71, PA .16, QRS .06, QT .40 with Bigeminy per strip from monitor room.

## 2017-08-20 NOTE — PROGRESS NOTES
2 RN skin check complete. Generalized bruising to extremities. Access site to groin CDI. No open wounds.

## 2017-08-20 NOTE — CARE PLAN
Problem: Communication  Goal: The ability to communicate needs accurately and effectively will improve  Outcome: PROGRESSING AS EXPECTED  Pt oriented to call light system     Problem: Skin Integrity  Goal: Risk for impaired skin integrity will decrease  Outcome: PROGRESSING AS EXPECTED  Pt turns self side to side

## 2017-08-20 NOTE — THERAPY
"73 y/o female adm for GLF , left facial droop and left side weakness, dx: Right MCA occlusion S/P TPA /thrombectomy. Pt presents with  mild left facil droop , intact LLE motor strength , sensation and coordination. Pt able to ambulate on level ground with no LOB, able to attend to sudden change in diretion, starts and stops, attetional demand, high level balance activities, No further acute PT services required at this time.     Physical Therapy Evaluation completed.   Bed Mobility:  Supine to Sit: Supervised  Transfers: Sit to Stand: Supervised  Gait: Level Of Assist: Supervised with No Equipment Needed       Plan of Care: Patient with no further skilled PT needs in the acute care setting at this time  Discharge Recommendations: Equipment: No Equipment Needed. Post-acute therapy Currently anticipate no further skilled therapy needs once patient is discharged from the inpatient setting.    See \"Rehab Therapy-Acute\" Patient Summary Report for complete documentation.     "

## 2017-08-20 NOTE — PROGRESS NOTES
Maddison Green Fall Risk Assessment:     Last Known Fall:    Mobility:    Medications:    Mental Status/LOC/Awareness:    Toileting Needs:    Volume/Electrolyte Status:    Communication/Sensory:    Behavior:    Maddison Green Fall Risk Total:    Fall Risk Level:      Universal Fall Precautions:       Fall Risk Level Interventions:          Patient Specific Interventions:     Medication: review medications with patient and family  Mental Status/LOC/Awareness: reorient patient and reinforce falls education  Toileting: monitor intake and output/use of appropriate interventions  Volume/Electrolyte Status: monitor abnormal lab values  Communication/Sensory: update plan of care on whiteboard  Behavioral: engage patient in daily activities  Mobility: ensure bed is locked and in lowest position

## 2017-08-20 NOTE — PROGRESS NOTES
"[facility] Hospitalist Progress Note    Date of Service: 2017    Chief Complaint  72 y.o. female admitted 2017 with Hx HTN and dyslipidemia admitted after GLF, slurred speech and L hemiparesis.  Found to have R M1 MCA occlusion and s/p TPA/thrombectomy    Interval Problem Update   - feels well today, mentions that she normally can turn over in bed without disturbing the covers but she hasn't been able to do that today. She also asks if she can fly to [place] today, but then a little later mentions she was supposed to fly to [place] tomorrow for sort of bankruptcy proceeding for a client but, \"I can send the  instead.\" This was after she said told me she was a  (she does work for a law firm). Otherwise feels well.     Consultants/Specialty  Neurology    Disposition  Home soon; her stroke symptoms are completely resolved        Review of Systems   Constitutional: Negative for fever and chills.   Respiratory: Negative for cough and shortness of breath.    Cardiovascular: Negative for chest pain and palpitations.   Gastrointestinal: Negative for nausea, vomiting, abdominal pain, diarrhea and constipation.   Genitourinary: Negative for dysuria.   Musculoskeletal: Negative for myalgias.   Skin: Negative for itching.   Neurological: Negative for dizziness, focal weakness, weakness and headaches.        \"A little weak\"   All other systems reviewed and are negative.     Physical Exam  Laboratory/Imaging   Hemodynamics  Temp (24hrs), Av °C (98.6 °F), Min:36.3 °C (97.4 °F), Max:37.4 °C (99.4 °F)   Temperature: 36.9 °C (98.4 °F)  Pulse  Av.9  Min: 56  Max: 76 Heart Rate (Monitored): 66  Blood Pressure : 139/71 mmHg, NIBP: (!) 207/84 mmHg      Respiratory      Respiration: 18, Pulse Oximetry: 95 %        RUL Breath Sounds: Clear, RML Breath Sounds: Clear, RLL Breath Sounds: Clear, YUNIOR Breath Sounds: Clear, LLL Breath Sounds: Clear    Fluids    Intake/Output Summary (Last 24 hours) at 17 " 0749  Last data filed at 08/19/17 2200   Gross per 24 hour   Intake   1560 ml   Output    900 ml   Net    660 ml       Nutrition  Orders Placed This Encounter   Procedures   • DIET ORDER     Standing Status: Standing      Number of Occurrences: 1      Standing Expiration Date:      Order Specific Question:  Diet:     Answer:  Cardiac [6]     Physical Exam   Constitutional: She is oriented to person, place, and time. She appears well-developed and well-nourished. No distress.   Thin, healthy-appearing elderly woman   HENT:   Head: Normocephalic and atraumatic.   Eyes: Conjunctivae are normal. Pupils are equal, round, and reactive to light. No scleral icterus.   Neck: Normal range of motion. Neck supple.   Cardiovascular: Normal rate, regular rhythm and intact distal pulses.    No murmur heard.  Pulmonary/Chest: Effort normal and breath sounds normal. No respiratory distress. She has no rales.   Abdominal: Soft. Bowel sounds are normal. She exhibits no distension.   Musculoskeletal: Normal range of motion. She exhibits no edema.   Neurological: She is alert and oriented to person, place, and time.   Skin: Skin is warm and dry.   Vitals reviewed.      Recent Labs      08/18/17   1030  08/19/17   0250   WBC  6.1  6.7   RBC  3.98*  3.42*   HEMOGLOBIN  13.8  12.1   HEMATOCRIT  40.8  35.3*   MCV  102.5*  103.2*   MCH  34.7*  35.4*   MCHC  33.8  34.3   RDW  50.6*  51.7*   PLATELETCT  195  168   MPV  9.7  10.0     Recent Labs      08/18/17   1030  08/19/17   0250   SODIUM  141  138   POTASSIUM  3.3*  4.0   CHLORIDE  104  107   CO2  27  23   GLUCOSE  109*  93   BUN  15  17   CREATININE  0.80  0.86   CALCIUM  9.6  9.0     Recent Labs      08/18/17   1030   APTT  26.4   INR  0.99         Recent Labs      08/19/17   0250   TRIGLYCERIDE  80   HDL  54   LDL  65          Assessment/Plan     * Acute CVA (cerebrovascular accident) (CMS-HCC) (present on admission)  Assessment & Plan  M1 segment R s/p TPA   Statin/ASA  MRI results  pending  Echo normal  Tele unimpressive, without ectopy  Resuming BP meds  She does seem a little forgetful... Is this underlying impairment? I have asked nursing to watch closely.    Benign essential HTN (present on admission)  Assessment & Plan  SBP goal less than 140 mmHg  DBP goal less than 90 mmHg  PRN and antihypertensives to titrate by hospitalist towards goal  Most recent Blood Pressure : 139/71 mmHg    hyperlipidemia (present on admission)  Assessment & Plan  Statin      Radiology images reviewed, EKG reviewed, Labs reviewed and Medications reviewed          DVT prophylaxis - mechanical: SCDs  Ulcer prophylaxis: Not indicated

## 2017-08-20 NOTE — PROGRESS NOTES
Pt transported by transporter in wheelchair. Report given to Vaibhav CARRERO who will be taking pt in [address] bed 2

## 2017-08-20 NOTE — PROGRESS NOTES
NEUROLOGY INPATIENT PROGRESS NOTE      CHIEF COMPLAINT:  Chief Complaint   Patient presents with   • Possible Stroke       DOS  Pt was seen and examined on 08/19/2017    BRIEF HISTORY: Ms. Daljit hudson a 72 year old female with HTN, Hyperlipidemia, breast CA who presented on 8/19 with a right MCA syndrome and is s-p TPA and an M1 thrombectomy     INTERVAL EVENTS:  She has been cleared by PT and is able to go to the bathroom  She is still anxious about going on her upcoming trip to [place]. However, her daughter was in the room and was able to reason with her and tell her she's not going.      She has been in sinus rhythm overnight.     ROS:  No headaches  No fevers of chills    MEDICATIONS    Current facility-administered medications:   •  acetaminophen (TYLENOL) tablet 650 mg, 650 mg, Oral, Q6HRS PRN, Maldonado Rojas M.D.  •  lisinopril (PRINIVIL) tablet 20 mg, 20 mg, Oral, Q DAY, Carl Shaver D.O., 20 mg at 08/20/17 0742  •  metoprolol SR (TOPROL XL) tablet 50 mg, 50 mg, Oral, Q DAY, Carl Shaver D.O., 50 mg at 08/20/17 0742  •  triamterene/hctz (MAXZIDE-25/DYAZIDE) 37.5-25 MG 1 Cap, 1 Cap, Oral, Q DAY, Carl Shaver D.O., 1 Cap at 08/20/17 0742  •  enoxaparin (LOVENOX) inj 40 mg, 40 mg, Subcutaneous, DAILY, Carl Shaver D.O., 40 mg at 08/20/17 0743  •  senna-docusate (PERICOLACE or SENOKOT S) 8.6-50 MG per tablet 2 Tab, 2 Tab, Oral, BID, 2 Tab at 08/20/17 0742 **AND** polyethylene glycol/lytes (MIRALAX) PACKET 1 Packet, 1 Packet, Oral, QDAY PRN **AND** magnesium hydroxide (MILK OF MAGNESIA) suspension 30 mL, 30 mL, Oral, QDAY PRN **AND** bisacodyl (DULCOLAX) suppository 10 mg, 10 mg, Rectal, QDAY PRN, Fer Junior D.O.  •  Respiratory Care per Protocol, , Nebulization, Continuous RT, Fer Junior D.O.  •  aspirin (ASA) tablet 325 mg, 325 mg, Oral, DAILY, 325 mg at 08/20/17 0742 **OR** aspirin (ASA) chewable tab 324 mg, 324 mg, Oral, DAILY **OR** aspirin  (ASA) suppository 300 mg, 300 mg, Rectal, DAILY, Fer Junior D.O.  •  ondansetron (ZOFRAN) syringe/vial injection 4 mg, 4 mg, Intravenous, Q4HRS PRN, Fer Junior D.O.  •  ondansetron (ZOFRAN ODT) dispertab 4 mg, 4 mg, Oral, Q4HRS PRN, Fer Junior D.O.  •  haloperidol lactate (HALDOL) injection 5 mg, 5 mg, Intravenous, Q4HRS PRN, Fer Junior D.O.  •  labetalol (NORMODYNE,TRANDATE) injection 10 mg, 10 mg, Intravenous, Q4HRS PRN, 10 mg at 08/18/17 1212 **OR** hydrALAZINE (APRESOLINE) injection 10 mg, 10 mg, Intravenous, Q2HRS PRN, BELEM Moran M.D.  •  atorvastatin (LIPITOR) tablet 80 mg, 80 mg, Oral, Q EVENING, BELEM Moran M.D., 80 mg at 08/19/17 2046  •  acetaminophen (TYLENOL) suppository 650 mg, 650 mg, Rectal, Q6HRS PRN, Mohan Lambert M.D., 650 mg at 08/19/17 0007      PHYSICAL EXAM  Filed Vitals:    08/19/17 2330 08/20/17 0500 08/20/17 0737 08/20/17 1200   BP: 155/81 141/72 139/71 119/59   Pulse: 69 76 71 70   Temp: 37.4 °C (99.4 °F) 36.7 °C (98 °F) 36.9 °C (98.4 °F) 37 °C (98.6 °F)   Resp: 18 18 18 18   Height:       Weight:       SpO2: 95% 95%  97%     PT is alert, lying in bed, NAD  Neuro: pt is alert, answers questions appropriately. There is no aphasia or dysarthria  CN: PERRLA, EOMI, she has a left facial droop, visual fields are full to confrontation with no extinction to double simultaneous stimuli  Motor: 5/5 throughout in all 4 extremities  Coordination: FNF is intact b/l    Sensation: intact to soft touch in all 4 extremities.  There is no extinction to double simultaneous stimuli.     LABS:    Lab Results   Component Value Date/Time    WBC 6.7 08/19/2017 02:50 AM    RBC 3.42* 08/19/2017 02:50 AM    HEMOGLOBIN 12.1 08/19/2017 02:50 AM    HEMATOCRIT 35.3* 08/19/2017 02:50 AM    .2* 08/19/2017 02:50 AM    MCH 35.4* 08/19/2017 02:50 AM    MCHC 34.3 08/19/2017 02:50 AM    MPV 10.0 08/19/2017 02:50 AM    NEUTROPHILS-POLYS 68.80 08/19/2017 02:50 AM    LYMPHOCYTES 21.60* 08/19/2017 02:50  AM    MONOCYTES 8.70 08/19/2017 02:50 AM    EOSINOPHILS 0.30 08/19/2017 02:50 AM    BASOPHILS 0.30 08/19/2017 02:50 AM        Lab Results   Component Value Date/Time    SODIUM 138 08/19/2017 02:50 AM    POTASSIUM 4.0 08/19/2017 02:50 AM    CHLORIDE 107 08/19/2017 02:50 AM    CO2 23 08/19/2017 02:50 AM    GLUCOSE 93 08/19/2017 02:50 AM    BUN 17 08/19/2017 02:50 AM    CREATININE 0.86 08/19/2017 02:50 AM    BUN-CREATININE RATIO 30* 08/07/2013 08:47 AM        Lab Results   Component Value Date/Time    CHOLESTEROL, 08/19/2017 02:50 AM    LDL 65 08/19/2017 02:50 AM    HDL 54 08/19/2017 02:50 AM    TRIGLYCERIDES 80 08/19/2017 02:50 AM         Lab Results   Component Value Date/Time    ALKALINE PHOSPHATASE 31 08/19/2017 02:50 AM    AST(SGOT) 29 08/19/2017 02:50 AM    ALT(SGPT) 16 08/19/2017 02:50 AM    TOTAL BILIRUBIN 1.0 08/19/2017 02:50 AM          IMAGING STUDIES:  MRI of the Brain was personally reviewed. There is a moderate size ischemic stroke in the right frontal cortex and basal ganglia without  Hemorrhagic transformation.        TTE:  Mildly dilated left atrium, no PFO shunt with IV saline, EF 60%.         IMPRESSION:Ms. Bocanegra i a 72 year old female with HTN, Hyperlipidemia, breast CA who presented on 8/19 with a right MCA syndrome and is s-p TPA and an M1 thrombectomy     Diagnosis  1) Acute ischemic stroke (etiology is cryptogenic at this point)   2) HTN  3) Hyperlipidemia    RECOMMENDATIONS:  4) Continue telemetry  5) Continue ASA 81 mg daily 24 hours after TPA  6) Continue Atorvastatin 80 mg daily (LDL 67)  7) Continue Enoxparin  or DVT prophylaxis 24 hours after iv-TPA  8) PT/OT/ST. Anticipate discharge home tomorrow pending an OT evaluation.  She should follow up in the stroke bridge clinic for further work-up (she will need long term cardiac monitoring with an event monitor, ziopath, or ILD).      I spoke a long time with Ms. Bocanegra and her daughter about  discussing stroke etiology, work-up and  prognosis.  Her daughter is planning on staying with her for the week to ensure on home safety.       Mariela Draper M.D    Neurology

## 2017-08-20 NOTE — DIETARY
Nutrition Services: Consult for Calorie Count  72 year old female with admit dx of acute CVA  Past Pertinent Med Hx: HTN, dyslipidemia, mitral valve prolapse, breast cancer    Pt states she is managing to eat okay and the food is fine. Pt denies wt loss, pt is disappointed. Pt states she wants to lose 10 lbs. Pt states her UBW is 150 lbs (68.2 kg). No wt loss noted. Pt requested yogurt for a snack. Per chart pt PO % 2 meals and 1 snack documented. Calorie count not indicated. RD will monitor for adequate PO intake.     Ht: 170.2 cm  Wt 8/19: 68.7 kg via bed scale  BMI: 23.72  Diet: Cardiac  Pertinent Labs 8/19: Reviewed  Pertinent Meds: lipitor, loveno, prinivil, toprol xl, pericolace, maxzide-25/dyazide  Fluids: No IV fluids noted in MAR  Skin: wound other arm;knee;hand  GI: Last BM PTA    PLAN/RECOMMEND -  1) Nutrition rep to see patient daily for meal and snack preferences.  2) Encourage PO  3) Weekly weights to monitor fluid and nutrition status  4) Obtain supplement order per RD as needed.    RD following

## 2017-08-21 VITALS
WEIGHT: 151.46 LBS | SYSTOLIC BLOOD PRESSURE: 108 MMHG | OXYGEN SATURATION: 94 % | RESPIRATION RATE: 16 BRPM | HEIGHT: 67 IN | HEART RATE: 68 BPM | DIASTOLIC BLOOD PRESSURE: 73 MMHG | TEMPERATURE: 98.3 F | BODY MASS INDEX: 23.77 KG/M2

## 2017-08-21 PROBLEM — I63.9 ACUTE CVA (CEREBROVASCULAR ACCIDENT) (HCC): Status: RESOLVED | Noted: 2017-08-18 | Resolved: 2017-08-21

## 2017-08-21 PROCEDURE — 700102 HCHG RX REV CODE 250 W/ 637 OVERRIDE(OP): Performed by: PSYCHIATRY & NEUROLOGY

## 2017-08-21 PROCEDURE — 700111 HCHG RX REV CODE 636 W/ 250 OVERRIDE (IP): Performed by: HOSPITALIST

## 2017-08-21 PROCEDURE — A9270 NON-COVERED ITEM OR SERVICE: HCPCS | Performed by: PSYCHIATRY & NEUROLOGY

## 2017-08-21 PROCEDURE — 700102 HCHG RX REV CODE 250 W/ 637 OVERRIDE(OP): Performed by: HOSPITALIST

## 2017-08-21 PROCEDURE — A9270 NON-COVERED ITEM OR SERVICE: HCPCS | Performed by: HOSPITALIST

## 2017-08-21 PROCEDURE — 99239 HOSP IP/OBS DSCHRG MGMT >30: CPT | Performed by: INTERNAL MEDICINE

## 2017-08-21 RX ORDER — ATORVASTATIN CALCIUM 80 MG/1
80 TABLET, FILM COATED ORAL EVERY EVENING
Qty: 14 TAB | Refills: 0 | Status: SHIPPED | OUTPATIENT
Start: 2017-08-21 | End: 2017-09-04

## 2017-08-21 RX ORDER — ASPIRIN 81 MG/1
81 TABLET, CHEWABLE ORAL DAILY
Qty: 14 TAB | Refills: 1 | Status: SHIPPED | OUTPATIENT
Start: 2017-08-21 | End: 2021-05-28

## 2017-08-21 RX ORDER — ASPIRIN 81 MG/1
81 TABLET, CHEWABLE ORAL DAILY
Qty: 100 TAB | Refills: 1 | Status: SHIPPED | OUTPATIENT
Start: 2017-08-21 | End: 2017-08-21

## 2017-08-21 RX ORDER — ATORVASTATIN CALCIUM 80 MG/1
80 TABLET, FILM COATED ORAL EVERY EVENING
Qty: 30 TAB | Refills: 1 | Status: SHIPPED | OUTPATIENT
Start: 2017-08-21 | End: 2017-08-21

## 2017-08-21 RX ADMIN — ASPIRIN 81 MG: 81 TABLET, CHEWABLE ORAL at 08:46

## 2017-08-21 RX ADMIN — TRIAMTERENE AND HYDROCHLOROTHIAZIDE 1 CAPSULE: 25; 37.5 CAPSULE ORAL at 08:46

## 2017-08-21 RX ADMIN — ENOXAPARIN SODIUM 40 MG: 100 INJECTION SUBCUTANEOUS at 08:45

## 2017-08-21 RX ADMIN — LISINOPRIL 20 MG: 20 TABLET ORAL at 08:46

## 2017-08-21 RX ADMIN — METOPROLOL SUCCINATE 50 MG: 25 TABLET, EXTENDED RELEASE ORAL at 08:46

## 2017-08-21 ASSESSMENT — PAIN SCALES - GENERAL: PAINLEVEL_OUTOF10: 0

## 2017-08-21 NOTE — PROGRESS NOTES
Monitor Summary: SR 67-86, PA .20, QRS .08, QT .44 with frequent PVC,rare bigem per strip from monitor room

## 2017-08-21 NOTE — CARE PLAN
Problem: Safety  Goal: Will remain free from injury  Outcome: PROGRESSING AS EXPECTED  Assisted pt to sit on side of bed prior to ambulating to bathroom. Pt denies lightheadedness or dizziness. Walked pt to bathroom and back to bed, call bell in reach. Pt encouraged to use call bell each time she needs to go to bathroom to prevent injuries and falls. Pt gives verbal understanding.

## 2017-08-21 NOTE — PROGRESS NOTES
[facility] Fall Risk Assessment:     Last Known Fall: Within the last year  Mobility: No limitations  Medications: No meds  Mental Status/LOC/Awareness: Awake, alert, and oriented to date, place, and person  Toileting Needs: No needs  Volume/Electrolyte Status: No problems  Communication/Sensory: No deficits  Behavior: Appropriate behavior  [place] Fall Risk Total: 4  Fall Risk Level: NO RISK    Universal Fall Precautions:  bed in low position and locked, call light/belongings in reach, wheelchairs and assistive devices out of sight, siderails up x 2, adequate lighting, educate on level of risk, use non-slip footwear, clutter free and spill free environment, educate to call for assistance    Fall Risk Level Interventions:          Patient Specific Interventions:     Medication: review medications with patient and family  Mental Status/LOC/Awareness: reinforce falls education, utilize bed/chair fall alarm and reinforce the use of call light  Toileting: instruct patient/family on the use of grab bars and instruct patient/family on the need to call for assistance when toileting  Volume/Electrolyte Status: monitor abnormal lab values  Communication/Sensory: ensure proper positioning when transferrng/ambulating  Behavioral: instruct/reinforce fall program rationale  Mobility: dangle prior to standing, utilize bed/chair fall alarm, ensure bed is locked and in lowest position and instruct patient to exit bed on their strongest side

## 2017-08-21 NOTE — DISCHARGE SUMMARY
CHIEF COMPLAINT ON ADMISSION  Chief Complaint   Patient presents with   • Possible Stroke       CODE STATUS  Prior    HPI & HOSPITAL COURSE  This is a 72 y.o. female here with with multiple medical problems and initially presented to the ER with possible strokelike symptoms. Stroke code was initiated. Patient was found on initial imaging have an acute CVA. Neurology was consulted. Imaging showed:  Right M1 occlusion segment along with subtle right frontal opercular and right insular hypodensity consistent with ischemic changes. Patient was given TPA in the ER. Given the acute occlusion of M1 segment on the CTA in the ER interventional radiology was consulted and the patient underwent successful right carotid arteriography with mechanical thrombectomy of the right MCA 1 occlusion. Patient was transferred to the neurological ICU and closely monitored with every one hour now neuro checks. Patient tolerated both of these procedures quite well and her neurological symptoms improved tremendously. She was eventually transferred to the neurological floor. Patient had continuous telemetric monitoring during the 3 days of hospitalization and there was no arrhythmias found such as atrial flutter or atrial fibrillation. Minimal ectopy was noted with occasional PACs never very interspersed. MRI of the brain showed moderate size right MCA territory acute ischemia involving the cortex and basal ganglia. Patient was started on aspirin 24 hours after TPA and full dose statin. His goal therapy and occupational therapy evaluated the patient and deemed her safe to go home. Multiple discussions were had by both me and neurology in regards to patient his prognosis and treatment going forward. She'll be set up with stroke Bridge clinic as well as a primary care appointment in the next week. Had a very long discussion with the patient patient's daughter about how she should take a break from work. Patient seemed a bit hesitant in doing  this. Echocardiogram showed no evidence of a shunt or any other valvular abnormalities. Carotid duplex was completely normal including subclavian vertebral arteries as well. I stressed to the patient the importance of continuing to stay active cholesterol control and good eating habits and stress control. Daughter is very much in agreement with this plan. I also explained to them that the etiology of her stroke at this time is cryptogenic but could be due to an underlying arrhythmia such as atrial flutter or fibrillation and that she might benefit from a long-term Holter monitoring or possible implantable loop recorder.    Of note following labs were determined:  Triglycerides 80, HDL 54, LDL 65, total cholesterol 135, glycohemoglobin 5.3    Therefore, she is discharged in fair and stable condition with close outpatient follow-up.    SPECIFIC OUTPATIENT FOLLOW-UP  -Follow with stroke clinic on September 20 1517 11 AM  -Follow with Dr. Daniels of cardiology at 10:45 AM  -Follow up with Dr. Mejias her primary care physician on August 24 4017 at 9:40 AM    DISCHARGE PROBLEM LIST  Principal Problem (Resolved):    Acute CVA (cerebrovascular accident) (CMS-[facility]) POA: Yes  Active Problems:    Benign essential HTN (Chronic) POA: Yes    hyperlipidemia POA: Yes      FOLLOW UP  Future Appointments  Date Time Provider Department Center   8/23/2017 10:45 AM Pascale Daniels M.D. [facility] None   8/24/2017 9:40 AM Luanne Mejias M.D. [facility] [place]   9/25/2017 11:00 AM [facility] CLIN[facility] None   11/15/2017 8:30 AM [facility] None     No follow-up provider specified.    MEDICATIONS ON DISCHARGE   Shaista Bocanegra   Home Medication Instructions ADALGISA:[id number]    Printed on:08/21/17 1646   Medication Information                      aspirin (ASA) 81 MG Chew Tab chewable tablet  Take 1 Tab by mouth every day.             atorvastatin (LIPITOR) 80 MG tablet  Take 1 Tab by mouth every evening for 14 days.             calcium  carbonate (OS-CRISTOFER 500) 500 MG Tab  Take 1,000 mg by mouth every day.             lisinopril (PRINIVIL) 20 MG Tab  TAKE 1 TABLET DAILY             metoprolol SR (TOPROL XL) 50 MG TABLET SR 24 HR  Take 1 Tab by mouth every day.             Multiple Vitamin (MULTIVITAMINS PO)  Take  by mouth every day.             Multiple Vitamins-Minerals (ICAPS AREDS 2 PO)  Take 1 Tab by mouth every day.             potassium chloride CR (K-DUR) 10 MEQ tablet  Take 1 Tab by mouth every day.             triamterene/hctz (MAXZIDE-25/DYAZIDE) 37.5-25 MG CAPS  Take 1 Cap by mouth every morning.             valacyclovir (VALTREX) 500 MG Tab  Take 500 mg by mouth every day.                 DIET  Low-fat low sugar heart healthy diet    ACTIVITY  As tolerated.  Weight bearing as tolerated      CONSULTATIONS  -Neurology  -Interventional radiology    PROCEDURES  MRI BRAIN-  1.  Moderate sized RIGHT MCA territory acute ischemia involving the cortex and basal ganglia  2.  Flow void is present in the RIGHT MCA compatible with treatment effect  3.  No hemorrhage  4.  Mild white matter changes  5.  Mild atrophy    ECHO-  Agitated saline study was performed, no evidence of right to left   shunt.  Normal left ventricular size, wall thickness, and systolic function.  Left ventricular ejection fraction is visually estimated to be 60%.  Mildly dilated left atrium.  Moderate mitral regurgitation due to an eccentric jet.  Mild tricuspid regurgitation.  Right ventricular systolic pressure is estimated to be 35 mmHg.    CAROTID DUPLEX-  nl carotids, subclavians and vertebrals    CTA Head/Neck-  1.  There is a right M1 segment occlusion.  2.  There is collateral flow in the peripheral M3 branches seen on the right.  3.  There is mild decreased enhancement of the visualized right internal carotid artery however it is patent.  4.  Question of subtle hypodensity in the right insular cortex region. No abnormal brain parenchymal enhancement is seen.    1.  CT  angiogram of the neck within normal limits.  2.  Thrombosed right M1 segment.    CT HEAD-  1.  Hyperdense right M1 segment consistent with thrombosis.  2.  Subtle right frontal opercular and right insular hypodensity consistent with ischemic changes.  3.  No mass effect or acute hemorrhage.      LABORATORY  Lab Results   Component Value Date/Time    SODIUM 138 08/19/2017 02:50 AM    POTASSIUM 4.0 08/19/2017 02:50 AM    CHLORIDE 107 08/19/2017 02:50 AM    CO2 23 08/19/2017 02:50 AM    GLUCOSE 93 08/19/2017 02:50 AM    BUN 17 08/19/2017 02:50 AM    CREATININE 0.86 08/19/2017 02:50 AM        Lab Results   Component Value Date/Time    WBC 6.7 08/19/2017 02:50 AM    HEMOGLOBIN 12.1 08/19/2017 02:50 AM    HEMATOCRIT 35.3* 08/19/2017 02:50 AM    PLATELET COUNT 168 08/19/2017 02:50 AM        Total time of the discharge process exceeds 45 minutes     This dictation was created using voice recognition software. The accuracy of the dictation is limited to the abilities of the software. I expect there may be some errors of grammar and possibly content.

## 2017-08-21 NOTE — DISCHARGE INSTRUCTIONS
Discharge Instructions    Discharged to home by car with relative. Discharged via walking, hospital escort: Refused.  Special equipment needed: Not Applicable    Be sure to schedule a follow-up appointment with your primary care doctor or any specialists as instructed.     Discharge Plan:   Influenza Vaccine Indication: Indicated: Not available from distributor/    I understand that a diet low in cholesterol, fat, and sodium is recommended for good health. Unless I have been given specific instructions below for another diet, I accept this instruction as my diet prescription.   Other diet: see below    Special Instructions:   Discharge patient Home  Diet low fat, low sugar, heart healthy with 9-13 servings of fruits and vegetables  Activities as tolerated  Follow ups with PCP in 7-10 days, call for appointment  Meds per med rec sheet  No smoking, no alcohol, no caffeine  Wear seat belt in motorized vehicle  Take medications as perscribed  Keep appointments  If symptoms worsen call PCP, 911 or urgent care      Stroke/CVA/TIA/Hemorrhagic Ischemia Discharge Instructions  You have had a stroke. Your risk factors have been identified as follows:  Age - Over 55  It is important that you reduce your risk factors to avoid another stroke in the future. Here are some general guidelines to follow:  · Eat healthy - avoid food high in fat.  · Get regular exercise.  · Maintain a healthy weight.  · Avoid smoking.  · Avoid alcohol and illegal drug use.  · Take your medications as directed.  For more information regarding risk factors, refer to pages 17-19 in your Stroke Patient Education Guide. Stroke Education Guide was given to patient.    Warning signs of a stroke include (which can also be found on page 3 of your Stroke Patient Education Guide):  · Sudden numbness of weakness of the face, arm or leg (especially on one side of the body).  · Sudden confusion, trouble speaking or understanding.  · Sudden trouble seeing  in one or both eyes.  · Sudden trouble walking, dizziness, loss of balance or coordination.  · Sudden severe headache with no known cause.  It is very important to get treatment quickly when a stroke occurs. If you experience any of the above warning signs, call [phone] immediately.     Some patients who have had a stroke will be going home on a blood thinner medication called Warfarin (Coumadin).  This medication requires very close monitoring and follow up.  This follow up can be provided by either your Primary Care Physician or by [facility]'s Outpatient Anticoagulation Service.  The Outpatient Anticoagulation Service is located at the [place] [facility] at [facility] ([facility]).  If you do not know when your follow up appointment is scheduled, call [phone] to verify your appointment time.      · Is patient discharged on Warfarin / Coumadin?   No     · Is patient Post Blood Transfusion?  No    Depression / Suicide Risk    As you are discharged from this [facility], it is important to learn how to keep safe from harming yourself.    Recognize the warning signs:  · Abrupt changes in personality, positive or negative- including increase in energy   · Giving away possessions  · Change in eating patterns- significant weight changes-  positive or negative  · Change in sleeping patterns- unable to sleep or sleeping all the time   · Unwillingness or inability to communicate  · Depression  · Unusual sadness, discouragement and loneliness  · Talk of wanting to die  · Neglect of personal appearance   · Rebelliousness- reckless behavior  · Withdrawal from people/activities they love  · Confusion- inability to concentrate     If you or a loved one observes any of these behaviors or has concerns about self-harm, here's what you can do:  · Talk about it- your feelings and reasons for harming yourself  · Remove any means that you might use to hurt yourself (examples:  pills, rope, extension cords, firearm)  · Get professional help from the community (Mental Health, Substance Abuse, psychological counseling)  · Do not be alone:Call your Safe Contact- someone whom you trust who will be there for you.  · Call your local CRISIS HOTLINE 836-2739 or 959-061-8052  · Call your local Children's Mobile Crisis Response Team [place] (444) 335-1233 or www.Metrasens  · Call the toll free National Suicide Prevention Hotlines   · National Suicide Prevention Lifeline 432-985-AUWY (1365)  · National Hope Line Network 800-SUICIDE (532-1949)

## 2017-08-21 NOTE — PROGRESS NOTES
Monitor summary: SR 64-76, IL 0.20, QRS 0.08, QT 0.40, with rare to frequent PVCs and rare bigeminy per strip from monitor room.

## 2017-08-21 NOTE — PROGRESS NOTES
Pt is A&Ox4. VSS. No changes in neuro status. No s/s of bleeding or infection. Ambulates without assist. Tolerating diet. Voiding. INT removed. Discharge instruction provided to patient and daughter. Pt stable and discharged home.

## 2017-08-21 NOTE — PROGRESS NOTES
[place] Fall Risk Assessment:     Last Known Fall: Within the last year  Mobility: No limitations  Medications: No meds  Mental Status/LOC/Awareness: Awake, alert, and oriented to date, place, and person  Toileting Needs: No needs  Volume/Electrolyte Status: No problems  Communication/Sensory: No deficits  Behavior: Appropriate behavior  [place] Fall Risk Total: 4  Fall Risk Level: NO RISK    Universal Fall Precautions:  bed in low position and locked, call light/belongings in reach, wheelchairs and assistive devices out of sight, siderails up x 2, adequate lighting, educate on level of risk, use non-slip footwear, clutter free and spill free environment, educate to call for assistance    Fall Risk Level Interventions:          Patient Specific Interventions:     Medication: review medications with patient and family  Mental Status/LOC/Awareness: reorient patient, reinforce falls education, check on patient hourly, utilize bed/chair fall alarm and reinforce the use of call light  Toileting: monitor intake and output/use of appropriate interventions, instruct patient/family on the need to call for assistance when toileting and do not leave patient unattended in bathroom/refer to toileting scripting  Volume/Electrolyte Status: teach patients to dangle before rising if hypotensive  Communication/Sensory: update plan of care on whiteboard, ensure proper positioning when transferrng/ambulating and ensure patient has glasses/contacts and hearing aids/dentures  Behavioral: encourage patient to voice feelings, engage patient in daily activities and instruct/reinforce fall program rationale  Mobility: provide comfort measures during transport, dangle prior to standing, utilize bed/chair fall alarm, ensure bed is locked and in lowest position and provide appropriate assistive device

## 2017-08-23 ENCOUNTER — OFFICE VISIT (OUTPATIENT)
Dept: CARDIOLOGY | Facility: MEDICAL CENTER | Age: 72
End: 2017-08-23
Payer: COMMERCIAL

## 2017-08-23 VITALS
HEIGHT: 67 IN | BODY MASS INDEX: 23.54 KG/M2 | OXYGEN SATURATION: 96 % | DIASTOLIC BLOOD PRESSURE: 68 MMHG | WEIGHT: 150 LBS | HEART RATE: 60 BPM | SYSTOLIC BLOOD PRESSURE: 118 MMHG

## 2017-08-23 DIAGNOSIS — I34.0 NON-RHEUMATIC MITRAL REGURGITATION: ICD-10-CM

## 2017-08-23 DIAGNOSIS — I34.1 MVP (MITRAL VALVE PROLAPSE): Chronic | ICD-10-CM

## 2017-08-23 DIAGNOSIS — I10 BENIGN ESSENTIAL HTN: Chronic | ICD-10-CM

## 2017-08-23 PROCEDURE — 99214 OFFICE O/P EST MOD 30 MIN: CPT | Performed by: INTERNAL MEDICINE

## 2017-08-23 RX ORDER — LOSARTAN POTASSIUM AND HYDROCHLOROTHIAZIDE 12.5; 5 MG/1; MG/1
1 TABLET ORAL DAILY
COMMUNITY
End: 2017-08-27

## 2017-08-23 ASSESSMENT — ENCOUNTER SYMPTOMS
PALPITATIONS: 0
DIZZINESS: 0
INSOMNIA: 0
COUGH: 0
DEPRESSION: 0
SEIZURES: 0
HEARTBURN: 0
NAUSEA: 0
LOSS OF CONSCIOUSNESS: 0
SHORTNESS OF BREATH: 0
BRUISES/BLEEDS EASILY: 1
MUSCULOSKELETAL NEGATIVE: 1
NERVOUS/ANXIOUS: 0

## 2017-08-23 NOTE — MR AVS SNAPSHOT
"        Shaista Bocanegra   2017 10:45 AM   Office Visit   MRN: [id number]    Department:  [facility]   Dept Phone:  [phone]    Description:  Female : 1945   Provider:  Pascale Daniels M.D.           Reason for Visit     Follow-Up           Allergies as of 2017     No Known Allergies      You were diagnosed with     Benign essential HTN   [171468]       MVP (mitral valve prolapse)   [382140]       Non-rheumatic mitral regurgitation   [009833]         Vital Signs     Blood Pressure Pulse Height Weight Body Mass Index Oxygen Saturation    118/68 mmHg 60 1.702 m (5' 7\") 68.04 kg (150 lb) 23.49 kg/m2 96%    Smoking Status                   Never Smoker            Basic Information     Date Of Birth Sex Race Ethnicity Preferred Language    1945 Female White Non- English      Your appointments     Aug 24, 2017  9:40 AM   Established Patient with Luanne Mejias M.D.   [facility]    [address] [place] [address] [address]  [facility] [id number] [phone]           You will be receiving a confirmation call a few days before your appointment from our automated call confirmation system.            Sep 25, 2017 11:00 AM   [facility] Patient with [facility]   [facility] (--)    [address], [address]  [facility] [id number] [phone]           Please bring Photo ID, Insurance Cards, All Medication Bottles and copies of any legal documents (such as Living Will, Power of ) If speaking a language besides English please bring an adult . Please arrive 30 minutes prior for check in and registration. You will be receiving a confirmation call a few days before your appointment from our automated call confirmation system.            Nov 15, 2017  8:30 AM   Follow Up Visit with Aimee Bruno M.D.   [facility] (--)    [address], [address]  [facility] [id number] [phone]           You will be receiving a confirmation call a " few days before your appointment from our automated call confirmation system.              Problem List              ICD-10-CM Priority Class Noted - Resolved    Benign essential HTN (Chronic) I10   3/19/2012 - Present    MVP (mitral valve prolapse) (Chronic) I34.1   3/19/2012 - Present    Mitral regurgitation I34.0   1/7/2014 - Present    Osteopenia M85.80   1/27/2015 - Present    hyperlipidemia E78.5   3/24/2015 - Present      Health Maintenance        Date Due Completion Dates    IMM DTaP/Tdap/Td Vaccine (1 - Tdap) 2/27/1962 ---    PAP SMEAR 2/27/1964 ---    COLONOSCOPY 2/27/1993 ---    IMM ZOSTER VACCINE 2/27/2003 ---    IMM PNEUMOCOCCAL 65+ (ADULT) LOW/MEDIUM RISK SERIES (1 of 2 - PCV13) 2/27/2008 ---    IMM INFLUENZA (1) 9/1/2017 ---    MAMMOGRAM 2/1/2018 2/1/2017, 1/15/2016, 2/4/2015, 2/6/2014, 2/6/2013, 2/8/2012, 2/8/2011, 2/8/2010, 2/8/2010, 2/18/2009, 2/18/2009, 2/4/2008, 2/4/2008, 2/22/2007, 9/20/2006, 2/1/2006, 2/14/2005, 2/12/2004    BONE DENSITY 2/5/2021 2/5/2016, 3/5/2014, 12/15/2006, 10/13/2005, 9/1/2004            Current Immunizations     No immunizations on file.      Below and/or attached are the medications your provider expects you to take. Review all of your home medications and newly ordered medications with your provider and/or pharmacist. Follow medication instructions as directed by your provider and/or pharmacist. Please keep your medication list with you and share with your provider. Update the information when medications are discontinued, doses are changed, or new medications (including over-the-counter products) are added; and carry medication information at all times in the event of emergency situations     Allergies:  No Known Allergies          Medications  Valid as of: August 23, 2017 - 11:19 AM    Generic Name Brand Name Tablet Size Instructions for use    Aspirin (Chew Tab) ASA 81 MG Take 1 Tab by mouth every day.        Atorvastatin Calcium (Tab) LIPITOR 80 MG Take 1 Tab by  mouth every evening for 14 days.        Calcium (Tab) OS-CRISTOFER 500 500 MG Take 1,000 mg by mouth every day.        Lisinopril (Tab) PRINIVIL 20 MG TAKE 1 TABLET DAILY        Losartan Potassium-HCTZ (Tab) HYZAAR 50-12.5 MG Take 1 Tab by mouth every day.        Metoprolol Succinate (TABLET SR 24 HR) TOPROL XL 50 MG Take 1 Tab by mouth every day.        Multiple Vitamin   Take  by mouth every day.        Multiple Vitamins-Minerals   Take 1 Tab by mouth every day.        Potassium Chloride (Tab CR) KLOR-CON 10 MEQ Take 1 Tab by mouth every day.        Triamterene-HCTZ (Cap) MAXZIDE-25/DYAZIDE 37.5-25 MG Take 1 Cap by mouth every morning.        ValACYclovir HCl (Tab) VALTREX 500 MG Take 500 mg by mouth every day.        .                 Medicines prescribed today were sent to:     [facility] - [place], [place] - [address] [place] [address]    Phone: [phone] Fax: [phone]    Open 24 Hours?: No    [id number] - [place], [place] - [address]    [address] [facility] [place] [address]    Phone: [phone] Fax: [phone]    Open 24 Hours?: No      Medication refill instructions:       If your prescription bottle indicates you have medication refills left, it is not necessary to call your provider’s office. Please contact your pharmacy and they will refill your medication.    If your prescription bottle indicates you do not have any refills left, you may request refills at any time through one of the following ways: The online [employer] system (except Urgent Care), by calling your provider’s office, or by asking your pharmacy to contact your provider’s office with a refill request. Medication refills are processed only during regular business hours and may not be available until the next business day. Your provider may request additional information or to have a follow-up visit with you prior to refilling your medication.   *Please Note: Medication refills are  assigned a new Rx number when refilled electronically. Your pharmacy may indicate that no refills were authorized even though a new prescription for the same medication is available at the pharmacy. Please request the medicine by name with the pharmacy before contacting your provider for a refill.           MyChart Access Code: Activation code not generated  Current [employer] Status: Active

## 2017-08-23 NOTE — PROGRESS NOTES
Subjective:   Shaista Bocanegra is a 72 y.o. female who presents today in follow-up less than a week after an acute stroke.    She is in with her daughter who is visiting from [facility]. Her daughter just finished law school   The patient has more than one page of questions for me  She has not yet seen the stroke clinic, she is upset that it is scheduled a month out  She has an appointment with a new primary care tomorrow    She wants to know when she can exercise  She wants to know if she can be alone  She wants to know why she has to take all of these medications she wants to know what is wrong with her heart and why she had a stroke          Past Medical History   Diagnosis Date   • Breast cancer (CMS-[facility])    • Benign essential HTN 3/19/2012   • Chest tightness or pressure 3/19/2012   • Hypercholesterolemia 3/19/2012   • MVP (mitral valve prolapse) 3/19/2012   • Renal insufficiency 3/19/2012   • High risk medication use 3/19/2012   • Cancer (CMS-[facility])      Past Surgical History   Procedure Laterality Date   • Cataract phaco with iol  1/26/2009     Performed by SHANNON GANDARA at [facility]   • Cataract phaco with iol  3/16/2009     Performed by SHANNON GANDARA at [facility]   • Breast biopsy     • Pr radiation therapy plan simple     • Pr chemotherapy, unspecified procedure     • Lumpectomy       Family History   Problem Relation Age of Onset   • Cancer Mother    • Heart Failure Neg Hx    • Heart Disease Neg Hx      History   Smoking status   • Never Smoker    Smokeless tobacco   • Never Used     No Known Allergies  Outpatient Encounter Prescriptions as of 8/23/2017   Medication Sig Dispense Refill   • losartan-hydrochlorothiazide (HYZAAR) 50-12.5 MG per tablet Take 1 Tab by mouth every day.     • aspirin (ASA) 81 MG Chew Tab chewable tablet Take 1 Tab by mouth every day. 14 Tab 1   • Multiple Vitamins-Minerals (ICAPS AREDS 2 PO) Take 1 Tab by mouth every day.     •  lisinopril (PRINIVIL) 20 MG Tab TAKE 1 TABLET DAILY 90 Tab 3   • metoprolol SR (TOPROL XL) 50 MG TABLET SR 24 HR Take 1 Tab by mouth every day. 90 Tab 3   • triamterene/hctz (MAXZIDE-25/DYAZIDE) 37.5-25 MG CAPS Take 1 Cap by mouth every morning. 90 Cap 2   • potassium chloride CR (K-DUR) 10 MEQ tablet Take 1 Tab by mouth every day. 180 Tab 3   • Multiple Vitamin (MULTIVITAMINS PO) Take  by mouth every day.     • atorvastatin (LIPITOR) 80 MG tablet Take 1 Tab by mouth every evening for 14 days. 14 Tab 0   • valacyclovir (VALTREX) 500 MG Tab Take 500 mg by mouth every day.     • calcium carbonate (OS-CRISTOFER 500) 500 MG Tab Take 1,000 mg by mouth every day.     • [DISCONTINUED] acetaminophen (TYLENOL) tablet 650 mg      • [DISCONTINUED] aspirin (ASA) chewable tab 81 mg      • [DISCONTINUED] lisinopril (PRINIVIL) tablet 20 mg      • [DISCONTINUED] metoprolol SR (TOPROL XL) tablet 50 mg      • [DISCONTINUED] triamterene/hctz (MAXZIDE-25/DYAZIDE) 37.5-25 MG 1 Cap      • [DISCONTINUED] enoxaparin (LOVENOX) inj 40 mg      • [DISCONTINUED] senna-docusate (PERICOLACE or SENOKOT S) 8.6-50 MG per tablet 2 Tab      • [DISCONTINUED] polyethylene glycol/lytes (MIRALAX) PACKET 1 Packet      • [DISCONTINUED] magnesium hydroxide (MILK OF MAGNESIA) suspension 30 mL      • [DISCONTINUED] bisacodyl (DULCOLAX) suppository 10 mg      • [DISCONTINUED] Respiratory Care per Protocol      • [DISCONTINUED] ondansetron (ZOFRAN) syringe/vial injection 4 mg      • [DISCONTINUED] ondansetron (ZOFRAN ODT) dispertab 4 mg      • [DISCONTINUED] haloperidol lactate (HALDOL) injection 5 mg      • [DISCONTINUED] labetalol (NORMODYNE,TRANDATE) injection 10 mg      • [DISCONTINUED] hydrALAZINE (APRESOLINE) injection 10 mg      • [DISCONTINUED] atorvastatin (LIPITOR) tablet 80 mg      • [DISCONTINUED] acetaminophen (TYLENOL) suppository 650 mg        No facility-administered encounter medications on file as of 8/23/2017.     Review of Systems   Respiratory:  "Negative for cough and shortness of breath.    Cardiovascular: Negative for chest pain, palpitations and leg swelling.   Gastrointestinal: Negative for heartburn and nausea.   Musculoskeletal: Negative.    Skin: Negative for rash.   Neurological: Negative for dizziness, seizures and loss of consciousness.   Endo/Heme/Allergies: Bruises/bleeds easily.   Psychiatric/Behavioral: Negative for depression. The patient is not nervous/anxious and does not have insomnia.         Objective:   /68 mmHg  Pulse 60  Ht 1.702 m (5' 7\")  Wt 68.04 kg (150 lb)  BMI 23.49 kg/m2  SpO2 96%    Physical Exam   Constitutional: She is oriented to person, place, and time. She appears well-developed. No distress.   Younger than stated age   HENT:   Mouth/Throat: Mucous membranes are normal.   Eyes: Conjunctivae and EOM are normal.   Neck: No JVD present. No thyroid mass and no thyromegaly present.   Cardiovascular: Normal rate, regular rhythm and intact distal pulses.    Murmur: 2 of 6 systolic blowing murmur.  Pulmonary/Chest: Effort normal and breath sounds normal. She has no wheezes.   Abdominal: Bowel sounds are normal. She exhibits no distension.   Musculoskeletal: Normal range of motion. She exhibits no edema.   Neurological: She is alert and oriented to person, place, and time. She has normal strength. She displays no tremor. No cranial nerve deficit. She exhibits normal muscle tone.   Skin: Skin is warm and dry. No rash noted. She is not diaphoretic.   Psychiatric: She has a normal mood and affect. Her behavior is normal.   Slightly flat affect, no significant droop Or weakness, steady gait   Vitals reviewed.      Assessment:     1. Benign essential HTN     2. MVP (mitral valve prolapse)     3. Non-rheumatic mitral regurgitation         Medical Decision Making:  Today's Assessment / Status / Plan:         I spent more than 35 minutes going over her hospital chart before I saw the patient. I had spoken with Dr. Maya " about this patient. Unfortunately, our office was the only office that it was easy for them to get into.    Acute stroke, this is not cardiogenic. She is on the monitor for more than 96 hours. She had no atrial fibrillation or flutter. I reviewed her echocardiogram which shows stable and mild to moderate degenerative mitral regurgitation. This is not the cause for stroke. She does not need a transesophageal echo. I agree she needs to follow with her primary and monitor her blood pressure and certainly take a statin and aspirin as they suggested in the hospital.    I spent more than 25 minutes discussing heart health, I again think this does not play into her acute stroke. We talked about what a cardiologist does.    I deferred many of these questions about exercise, autonomy and medication choices to her neurologist. I certainly emphasized that she should follow these directions that she received with Dr. Maya from the hospital. I told her office staff of be happy to print out her discharge materials again, her nurses certainly went over them with her at the hospital.    Stressors, we discussed this also at length. Her daughter agrees that she has been traveling a lot and ignoring some of the most important things such as her blood pressure and mental health.    I will see her back in 6-12 months or as needed in the interim if there are concerns

## 2017-08-24 ENCOUNTER — OFFICE VISIT (OUTPATIENT)
Dept: NEUROLOGY | Facility: MEDICAL CENTER | Age: 72
End: 2017-08-24
Payer: COMMERCIAL

## 2017-08-24 ENCOUNTER — OFFICE VISIT (OUTPATIENT)
Dept: MEDICAL GROUP | Facility: CLINIC | Age: 72
End: 2017-08-24
Payer: COMMERCIAL

## 2017-08-24 VITALS
HEIGHT: 67 IN | TEMPERATURE: 97.3 F | DIASTOLIC BLOOD PRESSURE: 64 MMHG | WEIGHT: 150.9 LBS | BODY MASS INDEX: 23.69 KG/M2 | OXYGEN SATURATION: 98 % | SYSTOLIC BLOOD PRESSURE: 96 MMHG | HEART RATE: 58 BPM

## 2017-08-24 VITALS
BODY MASS INDEX: 23.54 KG/M2 | SYSTOLIC BLOOD PRESSURE: 124 MMHG | TEMPERATURE: 97.9 F | RESPIRATION RATE: 14 BRPM | OXYGEN SATURATION: 97 % | HEIGHT: 67 IN | DIASTOLIC BLOOD PRESSURE: 66 MMHG | WEIGHT: 150 LBS | HEART RATE: 60 BPM

## 2017-08-24 DIAGNOSIS — R47.82 FLUENCY DISORDER ASSOCIATED WITH UNDERLYING DISEASE: ICD-10-CM

## 2017-08-24 DIAGNOSIS — I10 BENIGN ESSENTIAL HTN: Chronic | ICD-10-CM

## 2017-08-24 DIAGNOSIS — R00.2 PALPITATIONS: ICD-10-CM

## 2017-08-24 DIAGNOSIS — E78.5 OTHER AND UNSPECIFIED HYPERLIPIDEMIA: ICD-10-CM

## 2017-08-24 DIAGNOSIS — Z09 HOSPITAL DISCHARGE FOLLOW-UP: ICD-10-CM

## 2017-08-24 PROCEDURE — 99204 OFFICE O/P NEW MOD 45 MIN: CPT | Performed by: PHYSICIAN ASSISTANT

## 2017-08-24 PROCEDURE — 99204 OFFICE O/P NEW MOD 45 MIN: CPT | Performed by: FAMILY MEDICINE

## 2017-08-24 RX ORDER — LISINOPRIL 40 MG/1
TABLET ORAL
COMMUNITY
End: 2017-08-27

## 2017-08-24 RX ORDER — ESTRADIOL 0.1 MG/G
CREAM VAGINAL
COMMUNITY
Start: 2017-05-31 | End: 2017-08-27

## 2017-08-24 RX ORDER — TRIAMTERENE AND HYDROCHLOROTHIAZIDE 37.5; 25 MG/1; MG/1
CAPSULE ORAL
COMMUNITY
End: 2017-08-27

## 2017-08-24 RX ORDER — POTASSIUM CHLORIDE 750 MG/1
CAPSULE, EXTENDED RELEASE ORAL
COMMUNITY
End: 2017-08-27

## 2017-08-24 RX ORDER — 1.1% SODIUM FLUORIDE 11 MG/G
GEL DENTAL
Refills: 3 | COMMUNITY
Start: 2017-06-14 | End: 2017-08-27

## 2017-08-24 RX ORDER — HYDROCODONE BITARTRATE AND ACETAMINOPHEN 5; 300 MG/1; MG/1
TABLET ORAL
Refills: 0 | COMMUNITY
Start: 2017-06-01 | End: 2017-08-27

## 2017-08-24 RX ORDER — AMOXICILLIN 500 MG/1
CAPSULE ORAL
Refills: 0 | COMMUNITY
Start: 2017-06-01 | End: 2017-08-27

## 2017-08-24 RX ORDER — ALENDRONATE SODIUM 35 MG/1
TABLET ORAL
COMMUNITY
Start: 2017-07-31 | End: 2017-08-27

## 2017-08-24 ASSESSMENT — PATIENT HEALTH QUESTIONNAIRE - PHQ9: CLINICAL INTERPRETATION OF PHQ2 SCORE: 0

## 2017-08-24 NOTE — PATIENT INSTRUCTIONS
"DX:  Stroke       Personal Risk factors associated with recurrent stroke:    Some risk factors for stroke are \"non-modifiable\" meaning we cannot change them.  Examples of these types of risk factors are:    *   Age - once we turn 55, stroke becomes more common.  It is associated with aging and in fact every decade over 55 our stroke risk doubles.  * Gender - Men have more strokes than women, although this gender difference is only slight  * Ethnicity and/or family history:  if your parents, grandparents, siblings or children have had stroke or if you have ancestors of , , or  descent, you may have inherited some genes pre-disposing your toward stroke    To reduce your risk of recurrent stroke, we want to focus on risk factors we can modify.  Any of the checked items below are your personal risk factors and you should work with your PCP (primary care provider) to get them to the goal (goals are in bold).   These risk factors are:     _NO__Diabetes - goal HA1c is <7.0%: Current:     5.3    _X__Hypertension - goal is <140.   Current: 96/64.  STay hydrated.  Recommend taking bp once daily and taking log to PCP.  Consult with Carli Daniels or Royce if top number is above 140 or below 100 regularly.  Continue current Blood pressure medication - avoid salt    _X__Hyperlipidemia - goal LDL is <70.  Current: 65.  OK to go back to your previous cholesterol medication if Dr. Daniels agrees.  Finish all the atorvastatin you currently have.      _NO__Smoking:     ___Overweight:  Current BMI - 23    Body Mass Index (BMI) is your height compared to your weight.  Goal BMI to reduce risk of recurrent stroke is <25   BMI 25-27 diet/exercise to get to goal BMI.    BMI >27 patient should begin with goal of 10% weight loss  Work on this risk factor aggressively with your PCP             _X__Physical Inactivity: exercise plan at time of stroke - Counseled on initiating 30 minutes of moderate " exercise most days, but at least three times per week. Moderate exercise can be walking, swimming, cycling.  Work up to goal by setting realistic goal and then increasing it weekly or monthly.    _??__Atrial Fibrillation: no History of afib.  patient is high risk for paroxysmal afib based on stroke etiology or age.  Not sure if you have afib - but your age and type of stroke on MRI is suggestive of embolic source for your stroke.  Holter monitor referral made - you will be contacted by cardiology in the next 1-2 weeks with further instructions    _NO__Sleep Apnea: not diagnosed with sleep apnea.     ___Alcohol or Drug Abuse: Current user of alcohol.  Counseled to avoid binge drinking and men can have a maximum of 2 drinks daily and women 1 Drink maximum per day - preferably red wine;  Recreational Drug use:  denies.    _No__Estrogen or Testosterone Use: Denies      Plan:    Ischemic stroke - mechanism unknown.  Continue aspirin indefinitely.    "[employer] is an online resource for cognitive therapy.    Work with PCP on risk factors checked above to reduce risk of recurrent stroke.    Therapies:  Needs speech therapy for slow speech.      Counseled on when to call 911 and if desired additional information on stroke was provided

## 2017-08-24 NOTE — PROGRESS NOTES
"Subjective:     Shaista Bocanegra is a 72 y.o. female who presents for Hospital Follow-up.  Chart and discharge summary reviewed.   Date of discharge 8/21/17.      HPI: Recently hospitalized for acute stroke. MRI showed moderate sized right MCA territory acute ischemia involving the cortex and basal ganglia. TPA was given. Cardiac monitoring showed no arrhythmias. Carotid duplex studies were normal.  The patient was switched from simvastatin to atorvastatin and from triamterene HCTZ to losartan HCTZ    Since returning home, patient reports feeling good.     The patient has questions regarding hospitalization or discharge: Many questions were answered  The patient denies weakness; denies difficulty taking care of self at home. She is an  and she still works but she says \"barely.\"  Patient reports taking medications as instructed.      Allergies:   Review of patient's allergies indicates no known allergies.    Social History:  Social History   Substance Use Topics   • Smoking status: Never Smoker    • Smokeless tobacco: Never Used   • Alcohol Use: Yes      Comment: 3 glasses of wine        ROS:    Constitutional:  Denies fever, chills, night sweats, fatigue or malaise  HENT: Denies head congestion, ear pain or drainage, decreased hearing, sore throat  Eyes: Denies visual changes, eye drainage or redness, eye pain  Neck: Denies pain, swollen glands, decreased range of motion  Lungs: Denies shortness of breath, wheezing, cough  Cardiovascular: Denies chest pain, orthopnea, lower extremity edema, palpitations  Abdominal: Denies abdominal pain, change in bowel or bladder habits, nausea or vomiting  Musculoskeletal: Denies back pain, joint pains, decreased range of motion  Endocrine: Denies unexplained weight changes, heat or cold intolerance, hair loss, polyuria or polydipsia  Neurological: Denies dizziness, headache, confusion, focal weakness or numbness, memory loss  Psychiatric: Denies depression, anxiety, " "insomnia       Objective:     Blood pressure 124/66, pulse 60, temperature 36.6 °C (97.9 °F), resp. rate 14, height 1.702 m (5' 7.01\"), weight 68.04 kg (150 lb), SpO2 97 %, not currently breastfeeding.     Physical Exam:    GEN:  Alert, oriented, in no distress  HEENT:  PERRLA, EOMI  NECK;  Supple without adenopathy or thyromegaly.   LUNGS:  Clear to auscultation without rales, rhonci, or wheezes.  CV:  Heart RRR with 2/6 systolic murmur, no S3 or S4  EXT:  Without cyanosis, clubbing, or edema.  NEURO:  Cranial nerves II through XII intact.  Motor function and sensation intact.  SKIN: No rashes or suspicious lesions.  PSYCH:  Behavior is appropriate.      Assessment and Plan:     1. Hospital follow-up:   Hospitalization and results reviewed with patient. High risk conditions requiring teaching or care coordination were identified and addressed.The patient demonstrate understanding of admission and underlying conditions. The patient understands discharge instructions and when to seek medical attention. Medications reviewed including instructions regarding high risk medications, dosing and side effects.    The patient is able to safely adhere to ADL/IADL, treatment and medication regimen, self-manage of high-risk conditions? Yes   The patient requires physical therapy/home health/DME referral? No   The patient requires referral to care coordination/behavioral health/social work?  No   Patient requires referral for pharmacy consult? No   Advance directive/POLST on file?  No   Counseled on advance directive?  Yes      2. Acute cerebrovascular accident (CVA) within last 8 weeks (CMS-[facility])  -She has made a remarkable recovery with no detectable neurological deficits.    3. Benign essential HTN  -Continue current medication. She notes that Dr. Daniels, her cardiologist, wants her to stay on both lisinopril and losartan.    4. hyperlipidemia  -Continue atorvastatin 80 mg daily        Medication Reconciliation  Medication " list at end of encounter:   Current Outpatient Prescriptions   Medication Sig Dispense Refill   • aspirin (ASA) 81 MG Chew Tab chewable tablet Take 1 Tab by mouth every day. 14 Tab 1   • atorvastatin (LIPITOR) 80 MG tablet Take 1 Tab by mouth every evening for 14 days. 14 Tab 0   • lisinopril (PRINIVIL) 20 MG Tab TAKE 1 TABLET DAILY 90 Tab 3   • potassium chloride CR (K-DUR) 10 MEQ tablet Take 1 Tab by mouth every day. 180 Tab 3   • losartan-hydrochlorothiazide (HYZAAR) 50-12.5 MG per tablet Take 1 Tab by mouth every day.     • valacyclovir (VALTREX) 500 MG Tab Take 500 mg by mouth every day.     • Multiple Vitamins-Minerals (ICAPS AREDS 2 PO) Take 1 Tab by mouth every day.     • calcium carbonate (OS-CRISTOFER 500) 500 MG Tab Take 1,000 mg by mouth every day.     • metoprolol SR (TOPROL XL) 50 MG TABLET SR 24 HR Take 1 Tab by mouth every day. 90 Tab 3   • Multiple Vitamin (MULTIVITAMINS PO) Take  by mouth every day.       No current facility-administered medications for this visit.       Primary care follow-up:  Changes to medications during hospitalization or today? See med list    Recommended followup:  with Bethany Crane M.D. Patient has appointment on September 1, 2017  Future Appointments       Provider Department Center    9/25/2017 11:00 AM [facility]     11/15/2017 8:30 AM Aimee Bruno M.D. [facility]           Patient Instruction  Patient provided with educational material on discharge diagnosis and management of symptoms/red flags. Patient instructed to keep follow-up appointments and to bring written questions and and actual medications to each office visit. Patient instructed to call PCP/specialist with any problems/questions/concerns. Patient verbalizes understanding and has no further questions at this time.    Total of 45 minutes face-to-face time was spent with patient, with greater than 50% of the time spent in counseling and coordination  of care.

## 2017-08-24 NOTE — MR AVS SNAPSHOT
"        Shaista Daljit   2017 2:20 PM   Office Visit   MRN: [id number]    Department:  [facility]   Dept Phone:  [phone]    Description:  Female : 1945   Provider:  Melanie Guerrero PA-C           Reason for Visit     New Patient stroke 2017      Allergies as of 2017     No Known Allergies      You were diagnosed with     Acute cerebrovascular accident (CVA) within last 8 weeks (CMS-HCC)   [7973611]       Fluency disorder associated with underlying disease   [2044769]       Palpitations   [785.1.ICD-9-CM]         Vital Signs     Blood Pressure Pulse Temperature Height Weight Body Mass Index    96/64 mmHg 58 36.3 °C (97.3 °F) 1.702 m (5' 7.01\") 68.448 kg (150 lb 14.4 oz) 23.63 kg/m2    Oxygen Saturation Smoking Status                98% Never Smoker           Basic Information     Date Of Birth Sex Race Ethnicity Preferred Language    1945 Female White Non- English      Your appointments     Nov 15, 2017  8:30 AM   Follow Up Visit with Aimee Bruno M.D.   [facility] (--)    [address] [place] [address], [address]  [facility] [address] [phone]           You will be receiving a confirmation call a few days before your appointment from our automated call confirmation system.              Problem List              ICD-10-CM Priority Class Noted - Resolved    Benign essential HTN (Chronic) I10   3/19/2012 - Present    MVP (mitral valve prolapse) (Chronic) I34.1   3/19/2012 - Present    Mitral regurgitation I34.0   2014 - Present    Osteopenia M85.80   2015 - Present    hyperlipidemia E78.5   3/24/2015 - Present    Acute cerebrovascular accident (CVA) within last 8 weeks (CMS-HCC) I63.9   2017 - Present    Fluency disorder associated with underlying disease R47.82   2017 - Present      Health Maintenance        Date Due Completion Dates    IMM DTaP/Tdap/Td Vaccine (1 - Tdap) 1962 ---    PAP SMEAR 1964 ---    COLONOSCOPY 1993 ---    IMM " ZOSTER VACCINE 2/27/2003 ---    IMM PNEUMOCOCCAL 65+ (ADULT) LOW/MEDIUM RISK SERIES (1 of 2 - PCV13) 2/27/2008 ---    IMM INFLUENZA (1) 9/1/2017 ---    MAMMOGRAM 2/1/2018 2/1/2017, 1/15/2016, 2/4/2015, 2/6/2014, 2/6/2013, 2/8/2012, 2/8/2011, 2/8/2010, 2/8/2010, 2/18/2009, 2/18/2009, 2/4/2008, 2/4/2008, 2/22/2007, 9/20/2006, 2/1/2006, 2/14/2005, 2/12/2004    BONE DENSITY 2/5/2021 2/5/2016, 3/5/2014, 12/15/2006, 10/13/2005, 9/1/2004            Current Immunizations     No immunizations on file.      Below and/or attached are the medications your provider expects you to take. Review all of your home medications and newly ordered medications with your provider and/or pharmacist. Follow medication instructions as directed by your provider and/or pharmacist. Please keep your medication list with you and share with your provider. Update the information when medications are discontinued, doses are changed, or new medications (including over-the-counter products) are added; and carry medication information at all times in the event of emergency situations     Allergies:  No Known Allergies          Medications  Valid as of: August 24, 2017 -  3:22 PM    Generic Name Brand Name Tablet Size Instructions for use    Alendronate Sodium (Tab) FOSAMAX 35 MG         Amoxicillin (Cap) AMOXIL 500 MG         Aspirin (Chew Tab) ASA 81 MG Take 1 Tab by mouth every day.        Atorvastatin Calcium (Tab) LIPITOR 80 MG Take 1 Tab by mouth every evening for 14 days.        Calcium (Tab) OS-CRISTOFER 500 500 MG Take 1,000 mg by mouth every day.        Calcium Citrate-Vitamin D   Take  by mouth.        Estradiol (Cream) ESTRACE VAGINAL 0.1 MG/GM         Hydrocodone-Acetaminophen (Tab) Hydrocodone-Acetaminophen 5-300 MG         Lisinopril (Tab) PRINIVIL 20 MG TAKE 1 TABLET DAILY        Lisinopril (Tab) PRINIVIL, ZESTRIL 40 MG Take  by mouth.        Losartan Potassium-HCTZ (Tab) HYZAAR 50-12.5 MG Take 1 Tab by mouth every day.        Metoprolol  Succinate (TABLET SR 24 HR) TOPROL XL 50 MG Take 1 Tab by mouth every day.        Metoprolol Succinate   Take  by mouth.        Multiple Vitamin   Take  by mouth every day.        Multiple Vitamin   Take  by mouth.        Multiple Vitamins-Minerals   Take 1 Tab by mouth every day.        Potassium Chloride (Tab CR) KLOR-CON 10 MEQ Take 1 Tab by mouth every day.        Potassium Chloride (Cap CR) MICRO-K 10 MEQ Take  by mouth.        Sodium Fluoride (Gel) SF 1.1 %         Triamterene-HCTZ (Cap) MAXZIDE-25/DYAZIDE 37.5-25 MG Take  by mouth.        ValACYclovir HCl (Tab) VALTREX 500 MG Take 500 mg by mouth every day.        Venlafaxine HCl   Take  by mouth.        .                 Medicines prescribed today were sent to:     [facility] - [address] [address]    Phone: [phone] Fax: [phone]    Open 24 Hours?: No    [id number] - [place], [place] - [address] [facility]    [address] [place] [address] [place] [address]    Phone: [phone] Fax: [phone]    Open 24 Hours?: No      Medication refill instructions:       If your prescription bottle indicates you have medication refills left, it is not necessary to call your provider’s office. Please contact your pharmacy and they will refill your medication.    If your prescription bottle indicates you do not have any refills left, you may request refills at any time through one of the following ways: The online [employer] system (except Urgent Care), by calling your provider’s office, or by asking your pharmacy to contact your provider’s office with a refill request. Medication refills are processed only during regular business hours and may not be available until the next business day. Your provider may request additional information or to have a follow-up visit with you prior to refilling your medication.   *Please Note: Medication refills are assigned a new Rx number when refilled  "electronically. Your pharmacy may indicate that no refills were authorized even though a new prescription for the same medication is available at the pharmacy. Please request the medicine by name with the pharmacy before contacting your provider for a refill.        Your To Do List     Future Labs/Procedures Complete By Expires    HOLTER MONITOR / EVENT RECORDER  As directed 8/24/2018      Referral     A referral request has been sent to our patient care coordination department. Please allow 3-5 business days for us to process this request and contact you either by phone or mail. If you do not hear from us by the 5th business day, please call us at [phone].        Instructions    DX:  Stroke       Personal Risk factors associated with recurrent stroke:    Some risk factors for stroke are \"non-modifiable\" meaning we cannot change them.  Examples of these types of risk factors are:    *   Age - once we turn 55, stroke becomes more common.  It is associated with aging and in fact every decade over 55 our stroke risk doubles.  * Gender - Men have more strokes than women, although this gender difference is only slight  * Ethnicity and/or family history:  if your parents, grandparents, siblings or children have had stroke or if you have ancestors of , , or  descent, you may have inherited some genes pre-disposing your toward stroke    To reduce your risk of recurrent stroke, we want to focus on risk factors we can modify.  Any of the checked items below are your personal risk factors and you should work with your PCP (primary care provider) to get them to the goal (goals are in bold).   These risk factors are:     _NO__Diabetes - goal HA1c is <7.0%: Current:     5.3    _X__Hypertension - goal is <140.   Current: 96/64.  STay hydrated.  Recommend taking bp once daily and taking log to PCP.  Consult with Carli Daniels or Royce if top number is above 140 or below 100 " regularly.  Continue current Blood pressure medication - avoid salt    _X__Hyperlipidemia - goal LDL is <70.  Current: 65.  OK to go back to your previous cholesterol medication if Dr. Daniels agrees.  Finish all the atorvastatin you currently have.      _NO__Smoking:     ___Overweight:  Current BMI - 23    Body Mass Index (BMI) is your height compared to your weight.  Goal BMI to reduce risk of recurrent stroke is <25   BMI 25-27 diet/exercise to get to goal BMI.    BMI >27 patient should begin with goal of 10% weight loss  Work on this risk factor aggressively with your PCP             _X__Physical Inactivity: exercise plan at time of stroke - Counseled on initiating 30 minutes of moderate exercise most days, but at least three times per week. Moderate exercise can be walking, swimming, cycling.  Work up to goal by setting realistic goal and then increasing it weekly or monthly.    _??__Atrial Fibrillation: no History of afib.  patient is high risk for paroxysmal afib based on stroke etiology or age.  Not sure if you have afib - but your age and type of stroke on MRI is suggestive of embolic source for your stroke.  Holter monitor referral made - you will be contacted by cardiology in the next 1-2 weeks with further instructions    _NO__Sleep Apnea: not diagnosed with sleep apnea.     ___Alcohol or Drug Abuse: Current user of alcohol.  Counseled to avoid binge drinking and men can have a maximum of 2 drinks daily and women 1 Drink maximum per day - preferably red wine;  Recreational Drug use:  denies.    _No__Estrogen or Testosterone Use: Denies      Plan:    Ischemic stroke - mechanism unknown.  Continue aspirin indefinitely.    [employer] is an online resource for cognitive therapy.    Work with PCP on risk factors checked above to reduce risk of recurrent stroke.    Therapies:  Needs speech therapy for slow speech.      Counseled on when to call 911 and if desired additional information on stroke was  provided              Share Your Brain Access Code: Activation code not generated  Current Share Your Brain Status: Active

## 2017-08-24 NOTE — PROGRESS NOTES
Subjective:      Shaista Bocanegra is a 72 y.o. female who presents with New Patient    Patient had a stroke  and was discharged from the hospital on 8/21/2017.      Hospital course:  This is a 72 y.o. female here with with multiple medical problems and initially presented to the ER with possible strokelike symptoms. Stroke code was initiated. Patient was found on initial imaging have an acute CVA. Neurology was consulted. Imaging showed: Right M1 occlusion segment along with subtle right frontal opercular and right insular hypodensity consistent with ischemic changes. Patient was given TPA in the ER. Given the acute occlusion of M1 segment on the CTA in the ER interventional radiology was consulted and the patient underwent successful right carotid arteriography with mechanical thrombectomy of the right MCA 1 occlusion. Patient was transferred to the neurological ICU and closely monitored with every one hour now neuro checks. Patient tolerated both of these procedures quite well and her neurological symptoms improved tremendously. She was eventually transferred to the neurological floor. Patient had continuous telemetric monitoring during the 3 days of hospitalization and there was no arrhythmias found such as atrial flutter or atrial fibrillation. Minimal ectopy was noted with occasional PACs never very interspersed. MRI of the brain showed moderate size right MCA territory acute ischemia involving the cortex and basal ganglia. Patient was started on aspirin 24 hours after TPA and full dose statin. His goal therapy and occupational therapy evaluated the patient and deemed her safe to go home. Multiple discussions were had by both me and neurology in regards to patient his prognosis and treatment going forward. She'll be [facility] as well as a primary care appointment in the next week. Had a very long discussion with the patient patient's daughter about how she should take a break from work.  Patient seemed a bit hesitant in doing this. Echocardiogram showed no evidence of a shunt or any other valvular abnormalities. Carotid duplex was completely normal including subclavian vertebral arteries as well. I stressed to the patient the importance of continuing to stay active cholesterol control and good eating habits and stress control. Daughter is very much in agreement with this plan. I also explained to them that the etiology of her stroke at this time is cryptogenic but could be due to an underlying arrhythmia such as atrial flutter or fibrillation and that she might benefit from a long-term Holter monitoring or possible implantable loop recorder.    Symptoms have resolved except fatigue, soft-spoken, slow speech and slow movements, some forgetfulness    Patient was seen in the hospital by neurology.  Dr. Moran    Stroke Prevention Medications taking currently: aspirin  (Prior to this event patient was taking following anti-thrombotic: none)    (PCP) Primary Doctor:  Royce    [facility] reviewed    Medications and Allergies reviewed    Social: lives in [place] alone   Works  -     Test Results Reviewed:    MRI:  1.  Moderate sized RIGHT MCA territory acute ischemia involving the cortex and basal ganglia  2.  Flow void is present in the RIGHT MCA compatible with treatment effect  3.  No hemorrhage  4.  Mild white matter changes  5.  Mild atrophy    CTA Head:  1.  There is a right M1 segment occlusion.  2.  There is collateral flow in the peripheral M3 branches seen on the right.  3.  There is mild decreased enhancement of the visualized right internal carotid artery however it is patent.  4.  Question of subtle hypodensity in the right insular cortex region. No abnormal brain parenchymal enhancement is seen.    CTA Neck:  1.  CT angiogram of the neck within normal limits.  2.  Thrombosed right M1 segment    Echo:  CONCLUSIONS  Agitated saline study was performed, no evidence of right to left  "  shunt.  Normal left ventricular size, wall thickness, and systolic function.  Left ventricular ejection fraction is visually estimated to be 60%.  Mildly dilated left atrium.  Moderate mitral regurgitation due to an eccentric jet.  Mild tricuspid regurgitation.  Right ventricular systolic pressure is estimated to be 35 mmHg    TSH: WNL      DX:  Stroke       Personal Risk factors associated with recurrent stroke:    Some risk factors for stroke are \"non-modifiable\" meaning we cannot change them.  Examples of these types of risk factors are:    *   Age - once we turn 55, stroke becomes more common.  It is associated with aging and in fact every decade over 55 our stroke risk doubles.  * Gender - Men have more strokes than women, although this gender difference is only slight  * Ethnicity and/or family history:  if your parents, grandparents, siblings or children have had stroke or if you have ancestors of , , or  descent, you may have inherited some genes pre-disposing your toward stroke    To reduce your risk of recurrent stroke, we want to focus on risk factors we can modify.  Any of the checked items below are your personal risk factors and you should work with your PCP (primary care provider) to get them to the goal (goals are in bold).   These risk factors are:     _NO__Diabetes - goal HA1c is <7.0%: Current:     5.3    _X__Hypertension - goal is <140.   Current: 96/64.  STay hydrated.  Recommend taking bp once daily and taking log to PCP.  Consult with Carli Daniels or Royce if top number is above 140 or below 100 regularly.  Continue current Blood pressure medication - avoid salt    _X__Hyperlipidemia - goal LDL is <70.  Current: 65.  OK to go back to your previous cholesterol medication if Dr. Daniels agrees.  Finish all the atorvastatin you currently have.      _NO__Smoking:     ___Overweight:  Current BMI - 23    Body Mass Index (BMI) is your height compared to " "your weight.  Goal BMI to reduce risk of recurrent stroke is <25   BMI 25-27 diet/exercise to get to goal BMI.    BMI >27 patient should begin with goal of 10% weight loss  Work on this risk factor aggressively with your PCP             _X__Physical Inactivity: exercise plan at time of stroke - Counseled on initiating 30 minutes of moderate exercise most days, but at least three times per week. Moderate exercise can be walking, swimming, cycling.  Work up to goal by setting realistic goal and then increasing it weekly or monthly.    _??__Atrial Fibrillation: no History of afib.  patient is high risk for paroxysmal afib based on stroke etiology or age.  Not sure if you have afib - but your age and type of stroke on MRI is suggestive of embolic source for your stroke.  Holter monitor referral made - you will be contacted by cardiology in the next 1-2 weeks with further instructions    _NO__Sleep Apnea: not diagnosed with sleep apnea.     ___Alcohol or Drug Abuse: Current user of alcohol.  Counseled to avoid binge drinking and men can have a maximum of 2 drinks daily and women 1 Drink maximum per day - preferably red wine;  Recreational Drug use:  denies.    _No__Estrogen or Testosterone Use: Denies                          HPI    ROS       Objective:     Ht 1.702 m (5' 7.01\")  Wt 68.448 kg (150 lb 14.4 oz)  BMI 23.63 kg/m2     Physical Exam   Constitutional: She is oriented to person, place, and time. She appears well-developed and well-nourished.   Eyes: EOM are normal.   Pulmonary/Chest: Effort normal.   Neurological: She is alert and oriented to person, place, and time. A cranial nerve deficit is present. Coordination normal.   Hypophonic  Slow speech     Skin: Skin is warm and dry.   Psychiatric: She has a normal mood and affect. Her behavior is normal.   Admits to being in denial  Seems annoyed by family member's concern   Vitals reviewed.                  Assessment/Plan:     1. Acute cerebrovascular accident " (CVA) within last 8 weeks (CMS-HCC)      Ischemic stroke - mechanism unknown.  Continue aspirin indefinitely.    [employer] is an online resource for cognitive therapy.    Work with PCP on risk factors checked above to reduce risk of recurrent stroke.    Therapies:  Needs speech therapy for slow speech.      Counseled on when to call 911 and if desired additional information on stroke was provided    Total time with this visit:   45  Minutes face-to-face with patient. More than 50% of this visit was spent educating patient on their illness and/or coordinating care, as detailed above

## 2017-08-24 NOTE — MR AVS SNAPSHOT
"        Shaista Daljit   2017 9:40 AM   Office Visit   MRN: [id number]    Department:  [facility]   Dept Phone:  [phone]    Description:  Female : 1945   Provider:  Luanne Mejias M.D.           Reason for Visit     Hospital Follow-up Stroke; feeling better      Allergies as of 2017     No Known Allergies      You were diagnosed with     Hospital discharge follow-up   [272981]       Acute cerebrovascular accident (CVA) within last 8 weeks (CMS-[facility])   [8942114]       Benign essential HTN   [117701]       Other and unspecified hyperlipidemia   [272.4.ICD-9-CM]         Vital Signs     Blood Pressure Pulse Temperature Respirations Height Weight    124/66 mmHg 60 36.6 °C (97.9 °F) 14 1.702 m (5' 7.01\") 68.04 kg (150 lb)    Body Mass Index Oxygen Saturation Breastfeeding? Smoking Status          23.49 kg/m2 97% No Never Smoker         Basic Information     Date Of Birth Sex Race Ethnicity Preferred Language    1945 Female White Non- English      Your appointments     Sep 25, 2017 11:00 AM   [facility] with [facility]   [facility] (--)    [address], [address]  [facility] [address] [phone]           Please bring Photo ID, Insurance Cards, All Medication Bottles and copies of any legal documents (such as Living Will, Power of ) If speaking a language besides English please bring an adult . Please arrive 30 minutes prior for check in and registration. You will be receiving a confirmation call a few days before your appointment from our automated call confirmation system.            Nov 15, 2017  8:30 AM   Follow Up Visit with Aimee Bruno M.D.   [facility] (--)    [address], [address]  [facility] [address] [phone]           You will be receiving a confirmation call a few days before your appointment from our automated call confirmation system.              Problem List              ICD-10-CM Priority " Class Noted - Resolved    Benign essential HTN (Chronic) I10   3/19/2012 - Present    MVP (mitral valve prolapse) (Chronic) I34.1   3/19/2012 - Present    Mitral regurgitation I34.0   1/7/2014 - Present    Osteopenia M85.80   1/27/2015 - Present    hyperlipidemia E78.5   3/24/2015 - Present    Acute cerebrovascular accident (CVA) within last 8 weeks (CMS-[facility]) I63.9   8/24/2017 - Present      Health Maintenance        Date Due Completion Dates    IMM DTaP/Tdap/Td Vaccine (1 - Tdap) 2/27/1962 ---    PAP SMEAR 2/27/1964 ---    COLONOSCOPY 2/27/1993 ---    IMM ZOSTER VACCINE 2/27/2003 ---    IMM PNEUMOCOCCAL 65+ (ADULT) LOW/MEDIUM RISK SERIES (1 of 2 - PCV13) 2/27/2008 ---    IMM INFLUENZA (1) 9/1/2017 ---    MAMMOGRAM 2/1/2018 2/1/2017, 1/15/2016, 2/4/2015, 2/6/2014, 2/6/2013, 2/8/2012, 2/8/2011, 2/8/2010, 2/8/2010, 2/18/2009, 2/18/2009, 2/4/2008, 2/4/2008, 2/22/2007, 9/20/2006, 2/1/2006, 2/14/2005, 2/12/2004    BONE DENSITY 2/5/2021 2/5/2016, 3/5/2014, 12/15/2006, 10/13/2005, 9/1/2004            Current Immunizations     No immunizations on file.      Below and/or attached are the medications your provider expects you to take. Review all of your home medications and newly ordered medications with your provider and/or pharmacist. Follow medication instructions as directed by your provider and/or pharmacist. Please keep your medication list with you and share with your provider. Update the information when medications are discontinued, doses are changed, or new medications (including over-the-counter products) are added; and carry medication information at all times in the event of emergency situations     Allergies:  No Known Allergies          Medications  Valid as of: August 24, 2017 - 10:39 AM    Generic Name Brand Name Tablet Size Instructions for use    Aspirin (Chew Tab) ASA 81 MG Take 1 Tab by mouth every day.        Atorvastatin Calcium (Tab) LIPITOR 80 MG Take 1 Tab by mouth every evening for 14 days.         Calcium (Tab) OS-CRISTOFER 500 500 MG Take 1,000 mg by mouth every day.        Lisinopril (Tab) PRINIVIL 20 MG TAKE 1 TABLET DAILY        Losartan Potassium-HCTZ (Tab) HYZAAR 50-12.5 MG Take 1 Tab by mouth every day.        Metoprolol Succinate (TABLET SR 24 HR) TOPROL XL 50 MG Take 1 Tab by mouth every day.        Multiple Vitamin   Take  by mouth every day.        Multiple Vitamins-Minerals   Take 1 Tab by mouth every day.        Potassium Chloride (Tab CR) KLOR-CON 10 MEQ Take 1 Tab by mouth every day.        ValACYclovir HCl (Tab) VALTREX 500 MG Take 500 mg by mouth every day.        .                 Medicines prescribed today were sent to:     [facility] - [address] [address]    Phone: [phone] Fax: [phone]    Open 24 Hours?: No    [id number] - [place], [place] - [address]    [address] [place] [address]    Phone: [phone] Fax: [phone]    Open 24 Hours?: No      Medication refill instructions:       If your prescription bottle indicates you have medication refills left, it is not necessary to call your provider’s office. Please contact your pharmacy and they will refill your medication.    If your prescription bottle indicates you do not have any refills left, you may request refills at any time through one of the following ways: The online TelePharm system (except Urgent Care), by calling your provider’s office, or by asking your pharmacy to contact your provider’s office with a refill request. Medication refills are processed only during regular business hours and may not be available until the next business day. Your provider may request additional information or to have a follow-up visit with you prior to refilling your medication.   *Please Note: Medication refills are assigned a new Rx number when refilled electronically. Your pharmacy may indicate that no refills were authorized even though a new  prescription for the same medication is available at the pharmacy. Please request the medicine by name with the pharmacy before contacting your provider for a refill.        Instructions    If you need further assistance, or have any questions; concerns or lingering symptoms before seeing your Primary Care Provider or specialist.     Do not hesitate to contact us.     Please contact us at the Post-Hospital Follow Up Program at [phone].   Our offices hours are Monday-Friday 8 am-5 pm.            [employer] Access Code: Activation code not generated  Current [employer] Status: Active

## 2017-08-24 NOTE — PATIENT INSTRUCTIONS
If you need further assistance, or have any questions; concerns or lingering symptoms before seeing your Primary Care Provider or specialist.     Do not hesitate to contact us.     Please contact us at the Post-Hospital Follow Up Program at [phone].   Our offices hours are Monday-Friday 8 am-5 pm.

## 2017-08-27 ENCOUNTER — RESOLUTE PROFESSIONAL BILLING HOSPITAL PROF FEE (OUTPATIENT)
Dept: HOSPITALIST | Facility: MEDICAL CENTER | Age: 72
End: 2017-08-27
Payer: COMMERCIAL

## 2017-08-27 ENCOUNTER — APPOINTMENT (OUTPATIENT)
Dept: RADIOLOGY | Facility: MEDICAL CENTER | Age: 72
End: 2017-08-27
Attending: EMERGENCY MEDICINE
Payer: COMMERCIAL

## 2017-08-27 ENCOUNTER — HOSPITAL ENCOUNTER (OUTPATIENT)
Facility: MEDICAL CENTER | Age: 72
End: 2017-08-28
Attending: EMERGENCY MEDICINE | Admitting: HOSPITALIST
Payer: COMMERCIAL

## 2017-08-27 DIAGNOSIS — I69.391 DYSPHAGIA FOLLOWING CEREBROVASCULAR ACCIDENT: ICD-10-CM

## 2017-08-27 PROBLEM — J96.01 ACUTE RESPIRATORY FAILURE WITH HYPOXIA (HCC): Status: ACTIVE | Noted: 2017-08-27

## 2017-08-27 PROBLEM — I34.0 MITRAL VALVE REGURGITATION: Status: ACTIVE | Noted: 2017-08-27

## 2017-08-27 PROBLEM — R79.89 ELEVATED BRAIN NATRIURETIC PEPTIDE (BNP) LEVEL: Status: ACTIVE | Noted: 2017-08-27

## 2017-08-27 LAB
ALBUMIN SERPL BCP-MCNC: 4.6 G/DL (ref 3.2–4.9)
ALBUMIN/GLOB SERPL: 1.7 G/DL
ALP SERPL-CCNC: 40 U/L (ref 30–99)
ALT SERPL-CCNC: 20 U/L (ref 2–50)
ANION GAP SERPL CALC-SCNC: 7 MMOL/L (ref 0–11.9)
AST SERPL-CCNC: 29 U/L (ref 12–45)
BASOPHILS # BLD AUTO: 0.4 % (ref 0–1.8)
BASOPHILS # BLD: 0.02 K/UL (ref 0–0.12)
BILIRUB SERPL-MCNC: 0.8 MG/DL (ref 0.1–1.5)
BNP SERPL-MCNC: 325 PG/ML (ref 0–100)
BUN SERPL-MCNC: 24 MG/DL (ref 8–22)
CALCIUM SERPL-MCNC: 10.1 MG/DL (ref 8.5–10.5)
CHLORIDE SERPL-SCNC: 104 MMOL/L (ref 96–112)
CO2 SERPL-SCNC: 28 MMOL/L (ref 20–33)
CREAT SERPL-MCNC: 0.95 MG/DL (ref 0.5–1.4)
EOSINOPHIL # BLD AUTO: 0.08 K/UL (ref 0–0.51)
EOSINOPHIL NFR BLD: 1.5 % (ref 0–6.9)
ERYTHROCYTE [DISTWIDTH] IN BLOOD BY AUTOMATED COUNT: 49.5 FL (ref 35.9–50)
GFR SERPL CREATININE-BSD FRML MDRD: 57 ML/MIN/1.73 M 2
GLOBULIN SER CALC-MCNC: 2.7 G/DL (ref 1.9–3.5)
GLUCOSE SERPL-MCNC: 94 MG/DL (ref 65–99)
HCT VFR BLD AUTO: 38.9 % (ref 37–47)
HGB BLD-MCNC: 13 G/DL (ref 12–16)
IMM GRANULOCYTES # BLD AUTO: 0.01 K/UL (ref 0–0.11)
IMM GRANULOCYTES NFR BLD AUTO: 0.2 % (ref 0–0.9)
LYMPHOCYTES # BLD AUTO: 1.33 K/UL (ref 1–4.8)
LYMPHOCYTES NFR BLD: 25.2 % (ref 22–41)
MCH RBC QN AUTO: 34.9 PG (ref 27–33)
MCHC RBC AUTO-ENTMCNC: 33.4 G/DL (ref 33.6–35)
MCV RBC AUTO: 104.6 FL (ref 81.4–97.8)
MONOCYTES # BLD AUTO: 0.4 K/UL (ref 0–0.85)
MONOCYTES NFR BLD AUTO: 7.6 % (ref 0–13.4)
NEUTROPHILS # BLD AUTO: 3.43 K/UL (ref 2–7.15)
NEUTROPHILS NFR BLD: 65.1 % (ref 44–72)
NRBC # BLD AUTO: 0 K/UL
NRBC BLD AUTO-RTO: 0 /100 WBC
PLATELET # BLD AUTO: 279 K/UL (ref 164–446)
PMV BLD AUTO: 9.5 FL (ref 9–12.9)
POTASSIUM SERPL-SCNC: 3.6 MMOL/L (ref 3.6–5.5)
PROT SERPL-MCNC: 7.3 G/DL (ref 6–8.2)
RBC # BLD AUTO: 3.72 M/UL (ref 4.2–5.4)
SODIUM SERPL-SCNC: 139 MMOL/L (ref 135–145)
TROPONIN I SERPL-MCNC: <0.01 NG/ML (ref 0–0.04)
WBC # BLD AUTO: 5.3 K/UL (ref 4.8–10.8)

## 2017-08-27 PROCEDURE — A9270 NON-COVERED ITEM OR SERVICE: HCPCS | Performed by: NURSE PRACTITIONER

## 2017-08-27 PROCEDURE — A9270 NON-COVERED ITEM OR SERVICE: HCPCS | Performed by: HOSPITALIST

## 2017-08-27 PROCEDURE — 99220 PR INITIAL OBSERVATION CARE,LEVL III: CPT | Performed by: HOSPITALIST

## 2017-08-27 PROCEDURE — 99285 EMERGENCY DEPT VISIT HI MDM: CPT

## 2017-08-27 PROCEDURE — G0378 HOSPITAL OBSERVATION PER HR: HCPCS

## 2017-08-27 PROCEDURE — 700111 HCHG RX REV CODE 636 W/ 250 OVERRIDE (IP): Performed by: HOSPITALIST

## 2017-08-27 PROCEDURE — 80053 COMPREHEN METABOLIC PANEL: CPT

## 2017-08-27 PROCEDURE — 83880 ASSAY OF NATRIURETIC PEPTIDE: CPT

## 2017-08-27 PROCEDURE — 94760 N-INVAS EAR/PLS OXIMETRY 1: CPT

## 2017-08-27 PROCEDURE — 304561 HCHG STAT O2

## 2017-08-27 PROCEDURE — 71010 DX-CHEST-LIMITED (1 VIEW): CPT

## 2017-08-27 PROCEDURE — 304562 HCHG STAT O2 MASK/CANNULA

## 2017-08-27 PROCEDURE — 85025 COMPLETE CBC W/AUTO DIFF WBC: CPT

## 2017-08-27 PROCEDURE — 84484 ASSAY OF TROPONIN QUANT: CPT

## 2017-08-27 PROCEDURE — 96374 THER/PROPH/DIAG INJ IV PUSH: CPT

## 2017-08-27 PROCEDURE — 700102 HCHG RX REV CODE 250 W/ 637 OVERRIDE(OP): Performed by: HOSPITALIST

## 2017-08-27 PROCEDURE — 700102 HCHG RX REV CODE 250 W/ 637 OVERRIDE(OP): Performed by: NURSE PRACTITIONER

## 2017-08-27 RX ORDER — VALACYCLOVIR HYDROCHLORIDE 500 MG/1
500 TABLET, FILM COATED ORAL DAILY
Status: DISCONTINUED | OUTPATIENT
Start: 2017-08-27 | End: 2017-08-28 | Stop reason: HOSPADM

## 2017-08-27 RX ORDER — HYDROCHLOROTHIAZIDE 25 MG/1
12.5 TABLET ORAL
Status: DISCONTINUED | OUTPATIENT
Start: 2017-08-28 | End: 2017-08-28 | Stop reason: HOSPADM

## 2017-08-27 RX ORDER — METOPROLOL SUCCINATE 50 MG/1
50 TABLET, EXTENDED RELEASE ORAL DAILY
Status: DISCONTINUED | OUTPATIENT
Start: 2017-08-28 | End: 2017-08-28 | Stop reason: HOSPADM

## 2017-08-27 RX ORDER — POTASSIUM CHLORIDE 750 MG/1
10 TABLET, EXTENDED RELEASE ORAL DAILY
COMMUNITY
End: 2019-10-01

## 2017-08-27 RX ORDER — BISACODYL 10 MG
10 SUPPOSITORY, RECTAL RECTAL
Status: DISCONTINUED | OUTPATIENT
Start: 2017-08-27 | End: 2017-08-28 | Stop reason: HOSPADM

## 2017-08-27 RX ORDER — LOSARTAN POTASSIUM 50 MG/1
50 TABLET ORAL DAILY
Status: DISCONTINUED | OUTPATIENT
Start: 2017-08-28 | End: 2017-08-28

## 2017-08-27 RX ORDER — ONDANSETRON 2 MG/ML
4 INJECTION INTRAMUSCULAR; INTRAVENOUS EVERY 4 HOURS PRN
Status: DISCONTINUED | OUTPATIENT
Start: 2017-08-27 | End: 2017-08-28 | Stop reason: HOSPADM

## 2017-08-27 RX ORDER — ATORVASTATIN CALCIUM 80 MG/1
80 TABLET, FILM COATED ORAL EVERY EVENING
Status: DISCONTINUED | OUTPATIENT
Start: 2017-08-27 | End: 2017-08-28 | Stop reason: HOSPADM

## 2017-08-27 RX ORDER — LOSARTAN POTASSIUM 50 MG/1
50 TABLET ORAL DAILY
Status: ON HOLD | COMMUNITY
End: 2017-08-28

## 2017-08-27 RX ORDER — ASPIRIN 81 MG/1
81 TABLET, CHEWABLE ORAL DAILY
Status: DISCONTINUED | OUTPATIENT
Start: 2017-08-28 | End: 2017-08-28 | Stop reason: HOSPADM

## 2017-08-27 RX ORDER — FUROSEMIDE 10 MG/ML
20 INJECTION INTRAMUSCULAR; INTRAVENOUS
Status: DISCONTINUED | OUTPATIENT
Start: 2017-08-27 | End: 2017-08-28

## 2017-08-27 RX ORDER — ONDANSETRON 4 MG/1
4 TABLET, ORALLY DISINTEGRATING ORAL EVERY 4 HOURS PRN
Status: DISCONTINUED | OUTPATIENT
Start: 2017-08-27 | End: 2017-08-28 | Stop reason: HOSPADM

## 2017-08-27 RX ORDER — LISINOPRIL 20 MG/1
20 TABLET ORAL
Status: DISCONTINUED | OUTPATIENT
Start: 2017-08-28 | End: 2017-08-28

## 2017-08-27 RX ORDER — HEPARIN SODIUM 5000 [USP'U]/ML
5000 INJECTION, SOLUTION INTRAVENOUS; SUBCUTANEOUS EVERY 8 HOURS
Status: DISCONTINUED | OUTPATIENT
Start: 2017-08-27 | End: 2017-08-28 | Stop reason: HOSPADM

## 2017-08-27 RX ORDER — POTASSIUM CHLORIDE 20 MEQ/1
40 TABLET, EXTENDED RELEASE ORAL DAILY
Status: DISCONTINUED | OUTPATIENT
Start: 2017-08-27 | End: 2017-08-28 | Stop reason: HOSPADM

## 2017-08-27 RX ORDER — POTASSIUM CHLORIDE 1.5 G/1.58G
40 POWDER, FOR SOLUTION ORAL DAILY
Status: DISCONTINUED | OUTPATIENT
Start: 2017-08-27 | End: 2017-08-27

## 2017-08-27 RX ORDER — POLYETHYLENE GLYCOL 3350 17 G/17G
1 POWDER, FOR SOLUTION ORAL
Status: DISCONTINUED | OUTPATIENT
Start: 2017-08-27 | End: 2017-08-28 | Stop reason: HOSPADM

## 2017-08-27 RX ORDER — DIPHENHYDRAMINE HCL 25 MG
25 TABLET ORAL NIGHTLY PRN
Status: DISCONTINUED | OUTPATIENT
Start: 2017-08-27 | End: 2017-08-28 | Stop reason: HOSPADM

## 2017-08-27 RX ORDER — AMOXICILLIN 250 MG
2 CAPSULE ORAL 2 TIMES DAILY
Status: DISCONTINUED | OUTPATIENT
Start: 2017-08-27 | End: 2017-08-28 | Stop reason: HOSPADM

## 2017-08-27 RX ORDER — FUROSEMIDE 10 MG/ML
20 INJECTION INTRAMUSCULAR; INTRAVENOUS ONCE
Status: DISCONTINUED | OUTPATIENT
Start: 2017-08-27 | End: 2017-08-28

## 2017-08-27 RX ADMIN — POTASSIUM CHLORIDE 40 MEQ: 1500 TABLET, EXTENDED RELEASE ORAL at 21:54

## 2017-08-27 RX ADMIN — VALACYCLOVIR 500 MG: 500 TABLET, FILM COATED ORAL at 21:55

## 2017-08-27 RX ADMIN — STANDARDIZED SENNA CONCENTRATE AND DOCUSATE SODIUM 2 TABLET: 8.6; 5 TABLET, FILM COATED ORAL at 21:54

## 2017-08-27 RX ADMIN — HEPARIN SODIUM 5000 UNITS: 5000 INJECTION, SOLUTION INTRAVENOUS; SUBCUTANEOUS at 21:54

## 2017-08-27 RX ADMIN — ATORVASTATIN CALCIUM 80 MG: 80 TABLET, FILM COATED ORAL at 21:54

## 2017-08-27 RX ADMIN — FUROSEMIDE 20 MG: 10 INJECTION, SOLUTION INTRAMUSCULAR; INTRAVENOUS at 21:54

## 2017-08-27 RX ADMIN — DIPHENHYDRAMINE HCL 25 MG: 25 TABLET ORAL at 23:27

## 2017-08-27 ASSESSMENT — COGNITIVE AND FUNCTIONAL STATUS - GENERAL
DAILY ACTIVITIY SCORE: 24
SUGGESTED CMS G CODE MODIFIER DAILY ACTIVITY: CH
SUGGESTED CMS G CODE MODIFIER MOBILITY: CH
MOBILITY SCORE: 24

## 2017-08-27 ASSESSMENT — PATIENT HEALTH QUESTIONNAIRE - PHQ9
SUM OF ALL RESPONSES TO PHQ QUESTIONS 1-9: 0
2. FEELING DOWN, DEPRESSED, IRRITABLE, OR HOPELESS: NOT AT ALL
1. LITTLE INTEREST OR PLEASURE IN DOING THINGS: NOT AT ALL
SUM OF ALL RESPONSES TO PHQ9 QUESTIONS 1 AND 2: 0

## 2017-08-27 ASSESSMENT — LIFESTYLE VARIABLES
TOTAL SCORE: 0
HOW MANY TIMES IN THE PAST YEAR HAVE YOU HAD 5 OR MORE DRINKS IN A DAY: 0
TOTAL SCORE: 0
DO YOU DRINK ALCOHOL: NO
EVER FELT BAD OR GUILTY ABOUT YOUR DRINKING: NO
EVER HAD A DRINK FIRST THING IN THE MORNING TO STEADY YOUR NERVES TO GET RID OF A HANGOVER: NO
HAVE YOU EVER FELT YOU SHOULD CUT DOWN ON YOUR DRINKING: NO
HAVE PEOPLE ANNOYED YOU BY CRITICIZING YOUR DRINKING: NO
ON A TYPICAL DAY WHEN YOU DRINK ALCOHOL HOW MANY DRINKS DO YOU HAVE: 1
TOTAL SCORE: 0
CONSUMPTION TOTAL: NEGATIVE
AVERAGE NUMBER OF DAYS PER WEEK YOU HAVE A DRINK CONTAINING ALCOHOL: 7
ALCOHOL_USE: YES
EVER_SMOKED: NEVER

## 2017-08-27 ASSESSMENT — PAIN SCALES - GENERAL
PAINLEVEL_OUTOF10: 0

## 2017-08-27 NOTE — ED NOTES
"Patient vital signs rechecked, patient stated \"my breathing is still labored and is the same\". They continue to wait in the senior lounge, they have been up dated on poc. No needs or questions at this time.  "

## 2017-08-27 NOTE — ED NOTES
Pt to triage .  Chief Complaint   Patient presents with   • Shortness of Breath   • Blood Pressure Problem     low bp    • Sent by MD     told to come by MD for further follow up    • Other     hst of recent clot removal from brain, pt discharged monday

## 2017-08-28 ENCOUNTER — TELEPHONE (OUTPATIENT)
Dept: CARDIOLOGY | Facility: MEDICAL CENTER | Age: 72
End: 2017-08-28

## 2017-08-28 ENCOUNTER — HOME HEALTH ADMISSION (OUTPATIENT)
Dept: HOME HEALTH SERVICES | Facility: HOME HEALTHCARE | Age: 72
End: 2017-08-28
Payer: MEDICARE

## 2017-08-28 VITALS
BODY MASS INDEX: 23.67 KG/M2 | OXYGEN SATURATION: 95 % | WEIGHT: 150.79 LBS | RESPIRATION RATE: 16 BRPM | HEART RATE: 58 BPM | DIASTOLIC BLOOD PRESSURE: 72 MMHG | HEIGHT: 67 IN | TEMPERATURE: 97.8 F | SYSTOLIC BLOOD PRESSURE: 120 MMHG

## 2017-08-28 PROBLEM — I69.391 DYSPHAGIA FOLLOWING CEREBROVASCULAR ACCIDENT: Status: ACTIVE | Noted: 2017-08-28

## 2017-08-28 LAB
ANION GAP SERPL CALC-SCNC: 8 MMOL/L (ref 0–11.9)
BNP SERPL-MCNC: 368 PG/ML (ref 0–100)
BUN SERPL-MCNC: 23 MG/DL (ref 8–22)
CALCIUM SERPL-MCNC: 9.3 MG/DL (ref 8.5–10.5)
CHLORIDE SERPL-SCNC: 104 MMOL/L (ref 96–112)
CO2 SERPL-SCNC: 28 MMOL/L (ref 20–33)
CREAT SERPL-MCNC: 0.88 MG/DL (ref 0.5–1.4)
GFR SERPL CREATININE-BSD FRML MDRD: >60 ML/MIN/1.73 M 2
GLUCOSE SERPL-MCNC: 108 MG/DL (ref 65–99)
POTASSIUM SERPL-SCNC: 3 MMOL/L (ref 3.6–5.5)
SODIUM SERPL-SCNC: 140 MMOL/L (ref 135–145)

## 2017-08-28 PROCEDURE — 83880 ASSAY OF NATRIURETIC PEPTIDE: CPT

## 2017-08-28 PROCEDURE — 700111 HCHG RX REV CODE 636 W/ 250 OVERRIDE (IP): Performed by: HOSPITALIST

## 2017-08-28 PROCEDURE — 87040 BLOOD CULTURE FOR BACTERIA: CPT | Mod: 91

## 2017-08-28 PROCEDURE — G0378 HOSPITAL OBSERVATION PER HR: HCPCS

## 2017-08-28 PROCEDURE — 92610 EVALUATE SWALLOWING FUNCTION: CPT

## 2017-08-28 PROCEDURE — 700102 HCHG RX REV CODE 250 W/ 637 OVERRIDE(OP): Performed by: HOSPITALIST

## 2017-08-28 PROCEDURE — A9270 NON-COVERED ITEM OR SERVICE: HCPCS | Performed by: HOSPITALIST

## 2017-08-28 PROCEDURE — 99217 PR OBSERVATION CARE DISCHARGE: CPT | Performed by: HOSPITALIST

## 2017-08-28 PROCEDURE — G8996 SWALLOW CURRENT STATUS: HCPCS | Mod: CI

## 2017-08-28 PROCEDURE — G8997 SWALLOW GOAL STATUS: HCPCS | Mod: CH

## 2017-08-28 PROCEDURE — 96376 TX/PRO/DX INJ SAME DRUG ADON: CPT

## 2017-08-28 PROCEDURE — 36415 COLL VENOUS BLD VENIPUNCTURE: CPT

## 2017-08-28 PROCEDURE — 80048 BASIC METABOLIC PNL TOTAL CA: CPT

## 2017-08-28 RX ORDER — LISINOPRIL 20 MG/1
20 TABLET ORAL
Status: DISCONTINUED | OUTPATIENT
Start: 2017-08-28 | End: 2017-08-28 | Stop reason: HOSPADM

## 2017-08-28 RX ADMIN — ASPIRIN 81 MG: 81 TABLET, CHEWABLE ORAL at 09:05

## 2017-08-28 RX ADMIN — FUROSEMIDE 20 MG: 10 INJECTION, SOLUTION INTRAMUSCULAR; INTRAVENOUS at 09:05

## 2017-08-28 RX ADMIN — STANDARDIZED SENNA CONCENTRATE AND DOCUSATE SODIUM 2 TABLET: 8.6; 5 TABLET, FILM COATED ORAL at 09:05

## 2017-08-28 RX ADMIN — LISINOPRIL 20 MG: 20 TABLET ORAL at 09:04

## 2017-08-28 RX ADMIN — HEPARIN SODIUM 5000 UNITS: 5000 INJECTION, SOLUTION INTRAVENOUS; SUBCUTANEOUS at 05:55

## 2017-08-28 RX ADMIN — POTASSIUM CHLORIDE 40 MEQ: 1500 TABLET, EXTENDED RELEASE ORAL at 09:04

## 2017-08-28 ASSESSMENT — COGNITIVE AND FUNCTIONAL STATUS - GENERAL
SUGGESTED CMS G CODE MODIFIER DAILY ACTIVITY: CH
MOBILITY SCORE: 24
SUGGESTED CMS G CODE MODIFIER MOBILITY: CH
DAILY ACTIVITIY SCORE: 24

## 2017-08-28 ASSESSMENT — PAIN SCALES - GENERAL: PAINLEVEL_OUTOF10: 0

## 2017-08-28 NOTE — THERAPY
Speech Language Therapy Clinical Swallow Evaluation completed.  Functional Status: Patient currently on regular diet, but per patient, has had recent difficulty with thin liquids since being discharged after her stroke. Patient reports that on Saturday, she was at the movies and she noticed significant amount of coughing when drinking water and still feels like that when drinking it today. Patient drank [place] juice without difficulty. Patient denies dysphagia on any other consistencies. Patient consumed PO trials of single ice chips, NTL via tsp, cup sip, and straw, purees, pudding, soft solids, mixed consistencies, crackers, and thin liquids via cup sip. Patient presented with frequent coughing on PO trials of mixed consistencies and thin liquids via cup sip. No other overt s/sx of aspiration were noted on any other consistencies trialed. Laryngeal elevation palpated as weak. At this time, recommend patient downgrade to Dys2/NTL diet with use of swallow precautions. Per MD, patient may be discharging home soon. Patient was educated extensively on restriction of no thin liquids, whether in a mixed consistency form or as a thin liquid drink. Patient was shown thickener, how to thicken liquids to nectar consistency, where to purchase thickener, diet information, given packet of swallowing exercises targeting laryngeal elevation, pharyngeal constriction, and BoT retraction, frequency of exercises, this SLP's contact information, and recommendation for  SLP services. MD in room at end of evaluation and aware. Patient in agreement, verbalized understanding, and all questions answered. SLP to follow if patient does not d/c. Thank you for the consult.     Recommendations - Diet: Dysphagia II, Nectar Thick Liquid                          Strategies: Monitor during meals and Head of Bed at 90 Degrees                          Medication Administration: Whole with Liquid Wash (NTL wash )  Plan of Care: Will benefit from  "Speech Therapy 3 times per week  Post-Acute Therapy: Discharge to home with outpatient or home health SLP services.    See \"Rehab Therapy-Acute\" Patient Summary Report for complete documentation.   "

## 2017-08-28 NOTE — DISCHARGE PLANNING
Nurse informed SW that pt is ready to d/c now. SW spoke with [facility] regarding HH choice form. Pt selected [facility] and [facility] faxed choice form at 14:11 and left note. Per [facility], she did not receive choice form. SW faxed choice form to [facility].

## 2017-08-28 NOTE — TELEPHONE ENCOUNTER
----- Message from Susan Mathur sent at 8/28/2017  9:23 AM PDT -----  Regarding: pt admitted to [facility] 8/27  Contact: [phone]  Fly    Per our answering service report received this morning:  Pt called on 8/27 left message she wants to be sure LA knows she was admitted to [facility] on 8/27, if questions, pt #[phone].

## 2017-08-28 NOTE — ED NOTES
Called report to Mehrdad RN. Pt is aware of POC. Pt belongings are on bed. Pt is ready for transport.

## 2017-08-28 NOTE — H&P
DATE OF ADMISSION:  08/27/2017    PATIENT'S CARDIOLOGIST:  Dr. Daniels.    CHIEF COMPLAINT:  Shortness of breath.    HISTORY OF PRESENT ILLNESS:  Patient is a 72-year-old female with history of   mitral valve prolapse and regurgitation and hypertension.  She also has   extensive other medical conditions.  She comes into the hospital today   complaining of shortness of breath and palpitations.  Patient was recently   admitted and discharged from the hospital after she presented with stroke-like   symptoms.  She was found to have a right M1 occlusion and underwent TPA and   thrombectomy by radiology and she was discharged home approximately 1 week   ago.  Since that time period, she states she has been doing well up until last   night when she had an episode of some shortness of breath.  She also felt   that her breathing was noisier than what it usually is and therefore she   decided to come to the emergency department for evaluation at the urgency of   her PCP.  She denies any chest pain and denies any lower extremity swelling.    She has no other complaints at this time.    REVIEW OF SYSTEMS:  As per HPI.  All other systems have been reviewed and are   negative.    PAST MEDICAL HISTORY:  1.  Hypertension.  2.  Recent stroke, status post TPA and thrombectomy.  3.  History of breast cancer.  4.  Chronic renal failure.  5.  Mitral valve prolapse with moderate regurgitation.  6.  Hyperlipidemia.  7.  Hypertension.    FAMILY HISTORY:  Significant for cancer in her mother.    SOCIAL HISTORY:  She denies tobacco or drug use.  She drinks 3 glasses of wine   weekly.    PAST SURGICAL HISTORY:  1.  Cataract surgery.  2.  Breast biopsy and lumpectomy.    HOME MEDICATIONS:  1.  Aspirin 81 mg daily.  2.  Lipitor 80 mg daily.  3.  Os-Tray 500.  4.  Vitamin D plus calcium.  5.  Lisinopril 20 mg daily.  6.  Cozaar 50 mg daily.  7.  Toprol-XL 50 mg daily.  8.  Multivitamin daily.  9.  Potassium.  10.  Valtrex.  11.   Multivitamin.    ALLERGIES:  No known drug allergies.    PHYSICAL EXAMINATION:  VITAL SIGNS:  Blood pressure is 136/101, pulse of 60, respirations 16,   temperature 36.8, oxygen saturations 97% on room air.  GENERAL:  Patient is a pleasant, resting comfortably, not in any acute   distress.  HEENT:  Moist mucous membranes.  No oropharyngeal exudates.  Eyes, EOMI,   PERRLA.  NECK:  No lymphadenopathy.  No JVD.  CARDIOVASCULAR:  Regular rate and rhythm.  There is no murmurs.  LUNGS:  Clear to auscultation bilaterally.  Decreased air entry at the bases.  ABDOMEN:  Positive bowel sounds, soft, nontender, nondistended.  EXTREMITIES:  No clubbing or cyanosis.  NEUROLOGIC:  She is awake, alert, oriented to person, place, time, and   situation.    LABORATORY DATA:  WBC is 5.3, hemoglobin 13, hematocrit 38.9, .6,   platelets 279.  Sodium 139, potassium 3.6, BUN 24, creatinine 0.95.  Troponin   less than 0.01.  .  INR is 0.99.    ASSESSMENT AND PLAN:  Patient is a 72-year-old female who presents to the   hospital with shortness of breath.  1.  Shortness of breath and elevated BNP.  She had an echocardiogram done   approximately 1 week ago and it showed moderate mitral regurgitation.  At this   point, she does not appear to be too clinically fluid overloaded; therefore,   we were not going to order another echocardiogram, but I will gently diurese   her with 20 mg of IV Lasix daily, monitor her electrolytes and try to wean her   off oxygen as tolerated.  Should her symptoms do not improve by tomorrow,   then we will consider consulting cardiology.  We will do strict in's and out's   and daily weights and cardiac monitoring.  2.  Acute respiratory failure with hypoxia.  We will wean her off of oxygen as   tolerated and try to diurese.  Chest x-ray shows cardiomegaly with mild   interstitial edema.  She does have decreased air entry at the bases.  3.  Recent stroke.  4.  Hyperlipidemia.  Continue Lipitor 80 mg  daily.  5.  Deep venous thrombosis prophylaxis, heparin 5000 units t.i.d.  6.  Hypertension.       ____________________________________     MD OTONIEL Gonzalez / LYNDA    DD:  08/28/2017 00:54:52  DT:  08/28/2017 01:39:17    D#:  [id number]  Job#:  [id number]

## 2017-08-28 NOTE — DISCHARGE PLANNING
Transitional Care Navigator:    Met with the patient regarding choice for home health.  Pt agreed to [facility].  Choice form completed and faxed; JUNIOR ross.

## 2017-08-28 NOTE — TELEPHONE ENCOUNTER
Sent Dr. Daniels a message regarding patient's hospitalization per patient's request.    ISAIAS CARRERO

## 2017-08-28 NOTE — PROGRESS NOTES
Pt arrived on floor from ED. POC discussed with patient and son at bedside. Pt verbalizes understanding. VSS. Bed locked and low. Treaded socks on. Call light and belongings are within reach.

## 2017-08-28 NOTE — DISCHARGE INSTRUCTIONS
Discharge Instructions    Discharged to home by car with relative. Discharged via wheelchair, hospital escort: Refused.  Special equipment needed: None    Be sure to schedule a follow-up appointment with your primary care doctor or any specialists as instructed.     Discharge Plan:   Influenza Vaccine Indication: Not indicated: Previously immunized this influenza season and > 8 years of age    I understand that a diet low in cholesterol, fat, and sodium is recommended for good health. Unless I have been given specific instructions below for another diet, I accept this instruction as my diet prescription.   Other diet:  Dysphagia, Mechanical soft    Special Instructions: None    · Is patient discharged on Warfarin / Coumadin?   No     · Is patient Post Blood Transfusion?  No    Depression / Suicide Risk    As you are discharged from this [facility] facility, it is important to learn how to keep safe from harming yourself.    Recognize the warning signs:  · Abrupt changes in personality, positive or negative- including increase in energy   · Giving away possessions  · Change in eating patterns- significant weight changes-  positive or negative  · Change in sleeping patterns- unable to sleep or sleeping all the time   · Unwillingness or inability to communicate  · Depression  · Unusual sadness, discouragement and loneliness  · Talk of wanting to die  · Neglect of personal appearance   · Rebelliousness- reckless behavior  · Withdrawal from people/activities they love  · Confusion- inability to concentrate     If you or a loved one observes any of these behaviors or has concerns about self-harm, here's what you can do:  · Talk about it- your feelings and reasons for harming yourself  · Remove any means that you might use to hurt yourself (examples: pills, rope, extension cords, firearm)  · Get professional help from the community (Mental Health, Substance Abuse, psychological counseling)  · Do not be alone:Call your  Safe Contact- someone whom you trust who will be there for you.  · Call your local CRISIS HOTLINE 547-1980 or 978-956-8246  · Call your local Children's Mobile Crisis Response Team [place] (385) 185-0985 or www.[employer]  · Call the toll free National Suicide Prevention Hotlines   · National Suicide Prevention Lifeline 666-336-URSC (0371)  · National [place] Line Network 800-SUICIDE (702-9856)

## 2017-08-28 NOTE — DISCHARGE PLANNING
"Pt's nurse spoke with SW. Per nurse, pt aware we are still awaiting [facility], but pt stated she is leaving \"no matter what\". JUNIOR printed list of Home Health options for pt in case [facility] declines. JUNIOR gave list to pt's nurse to hand to pt.   "

## 2017-08-28 NOTE — ED PROVIDER NOTES
ED Provider Note    CHIEF COMPLAINT  Chief Complaint   Patient presents with   • Shortness of Breath   • Blood Pressure Problem     low bp    • Sent by MD     told to come by MD for further follow up    • Other     hst of recent clot removal from brain, pt discharged monday        HPI  Shaista Bocanegra is a 72 y.o. female with a history of hypertension, mitral valve prolapse, hypercholesterolemia, chronic renal insufficiency, breast cancer, CVA who presents with complaints of shortness of breath since last night. The patient was recently admitted to the hospital after suffering an acute CVA. She was found to have a right M1 occlusion of her MCA, and underwent TPA in the emergency department. She subsequently underwent thrombectomy by interventional radiology. The patient was discharged on Monday 6 days ago, and was doing well until last night. She says she woke up feeling very short of breath and with noisy respirations. She denied any chest pain, back pain, or abdominal pain. She called her PCP Dr. Crane and was told to come the emergency Department. She notes at this time her breathing feels much better. She has not no severe leg pain or swelling. She has had no fever, chills, cough, or congestion. She denies any nausea, vomiting, or diarrhea.    REVIEW OF SYSTEMS  See HPI for further details. All other systems are negative.     PAST MEDICAL HISTORY  Past Medical History:   Diagnosis Date   • Benign essential HTN 3/19/2012   • Chest tightness or pressure 3/19/2012   • Hypercholesterolemia 3/19/2012   • MVP (mitral valve prolapse) 3/19/2012   • Renal insufficiency 3/19/2012   • High risk medication use 3/19/2012   • Breast cancer (CMS-HCC)    • Cancer (CMS-HCC)    • Stroke (CMS-[facility])        FAMILY HISTORY  Family History   Problem Relation Age of Onset   • Cancer Mother    • Heart Failure Neg Hx    • Heart Disease Neg Hx        SOCIAL HISTORY  Social History     Social History   • Marital status:      Spouse  "name: N/A   • Number of children: N/A   • Years of education: N/A     Social History Main Topics   • Smoking status: Never Smoker   • Smokeless tobacco: Never Used   • Alcohol use Yes      Comment: 3 glasses of wine   • Drug use: No   • Sexual activity: Not on file     Other Topics Concern   • Not on file     Social History Narrative   • No narrative on file       SURGICAL HISTORY  Past Surgical History:   Procedure Laterality Date   • CATARACT PHACO WITH IOL  3/16/2009    Performed by SHANNON GANDARA at [facility]   • CATARACT PHACO WITH IOL  1/26/2009    Performed by SHANNON GANDARA at [facility]   • BREAST BIOPSY     • LUMPECTOMY     • PB RADIATION THERAPY PLAN SIMPLE     • ND CHEMOTHERAPY, UNSPECIFIED PROCEDURE         CURRENT MEDICATIONS  Home Medications     Reviewed by Mehrdad Gonzalez R.N. (Registered Nurse) on 08/27/17 at 2056  Med List Status: Complete   Medication Last Dose Status   aspirin (ASA) 81 MG Chew Tab chewable tablet 8/26/2017 Active   atorvastatin (LIPITOR) 80 MG tablet 8/26/2017 Active   calcium carbonate (OS-CRISTFOER 500) 500 MG Tab 8/26/2017 Active   Calcium Citrate-Vitamin D (CALCIUM + D PO) 8/26/2017 Active   lisinopril (PRINIVIL) 20 MG Tab 8/26/2017 Active   losartan (COZAAR) 50 MG Tab 8/26/2017 Active   metoprolol SR (TOPROL XL) 50 MG TABLET SR 24 HR 8/26/2017 Active   Multiple Vitamin (MULTIVITAMINS PO) 8/26/2017 Active   Multiple Vitamins-Minerals (ICAPS AREDS 2 PO) 8/26/2017 Active   potassium chloride SA (K-DUR) 10 MEQ Tab CR 8/26/2017 Active   valacyclovir (VALTREX) 500 MG Tab 8/26/2017 Active                ALLERGIES  No Known Allergies    PHYSICAL EXAM  VITAL SIGNS: /101   Pulse 60   Temp 36.8 °C (98.2 °F)   Resp 16   Ht 1.702 m (5' 7\")   Wt 68.4 kg (150 lb 12.7 oz)   SpO2 97%   BMI 23.62 kg/m²   Constitutional: Awake, alert, in no acute distress, Non-toxic appearance.   HENT: Atraumatic. Bilateral external ears normal, mucous " membranes moist, throat nonerythematous without exudates, nose is normal.  Eyes: PERRL, EOMI, conjunctiva moist, noninjected.  Neck: Nontender, Normal range of motion, No nuchal rigidity, No stridor.   Lymphatic: No lymphadenopathy noted.   Cardiovascular: Regular rate and rhythm, no murmurs, rubs, gallops.  Thorax & Lungs:  Good breath sounds bilaterally, no wheezes, rales, or retractions.  No chest tenderness.  Abdomen: Bowel sounds normal, Soft, nontender, nondistended, no rebound, guarding, masses.  Back: No CVA or spinal tenderness.  Extremities: Intact distal pulses, No edema, No tenderness.   Skin: Warm, Dry, No rashes.   Musculoskeletal: No joint swelling or tenderness.  Neurologic: Alert & oriented x 3, sensory and motor function normal. No focal deficits.   Psychiatric: Affect normal, Judgment normal, Mood normal.     EKG  EKG could not be found      RADIOLOGY/PROCEDURES  DX-CHEST-LIMITED (1 VIEW)   Final Result      Cardiomegaly with mild interstitial edema.            COURSE & MEDICAL DECISION MAKING  Pertinent Labs & Imaging studies reviewed. (See chart for details)  The patient presents with the above complaints. She has had no chest pain. She appears be in a normal sinus rhythm. Chest x-ray shows cardiomegaly with mild interstitial edema.  CBC shows white count of 5300, normal differential, chemistry BUN of 24, troponin less than 0.01, BNP elevated at 325. Review of her old chart shows that she had echocardiogram during her last admission which showed ejection fraction of 60%.  The patient's findings appear to be consistent with some possible mild heart failure. She was given Lasix 20 mg IV. I discussed the case with  of cardiology who felt the patient just required diuretics. He asked to have the hospitalist consult if needed.  Case was scheduled with Dr. Edgar.    FINAL IMPRESSION  1. CHF  2. Dyspnea  3.         Electronically signed by: Carter San, 8/27/2017 11:39 PM

## 2017-08-28 NOTE — ED NOTES
Received report from Destini Silva. Pt care responsibilities assumed. Pt resting in bed, Monitors in place, VSS, will continue to monitor.

## 2017-08-28 NOTE — PROGRESS NOTES
Pt sitting in bed, waiting for the physicians to round.  She is adamant about getting a repeat chest x-ray because she is on Lasix.  She has no chest pain, SOB, or other complaints.  She is weaned from Oxygen and saturations are 95% and above.

## 2017-08-28 NOTE — RESPIRATORY CARE
COPD EDUCATION by COPD CLINICAL EDUCATOR  8/28/2017 at 6:49 AM by Deepa Simon     Patient reviewed by COPD education team. Patient does not qualify for COPD program at this time (no HX/DX)

## 2017-08-28 NOTE — PROGRESS NOTES
Assumed care of patient.  Bedside report completed with night shift RN.  White board updated.  Pt sleeping.  Call light in reach

## 2017-08-28 NOTE — PROGRESS NOTES
2 RN skin check completed with Shabbir CARRERO. Bruising noted on right arm, abdomen, back, and legs. Old sheath site noted on right femoral groin, bruising noted around it. Heels pink and blanching, ears pink and blanching. All other skin intact.

## 2017-08-28 NOTE — CARE PLAN
Problem: Safety  Goal: Will remain free from injury  Outcome: PROGRESSING AS EXPECTED  Pt is A&Ox4. Pt calls appropriately. Treaded socks are on.

## 2017-08-28 NOTE — DISCHARGE PLANNING
Received choice form from [facility] for patient HH services, referral has been sent to [facility]  per patient request.

## 2017-08-29 ENCOUNTER — HOME CARE VISIT (OUTPATIENT)
Dept: HOME HEALTH SERVICES | Facility: HOME HEALTHCARE | Age: 72
End: 2017-08-29
Payer: MEDICARE

## 2017-08-29 ENCOUNTER — HOME CARE VISIT (OUTPATIENT)
Dept: HOME HEALTH SERVICES | Facility: HOME HEALTHCARE | Age: 72
End: 2017-08-29

## 2017-08-29 ENCOUNTER — PATIENT OUTREACH (OUTPATIENT)
Dept: HEALTH INFORMATION MANAGEMENT | Facility: OTHER | Age: 72
End: 2017-08-29

## 2017-08-29 PROCEDURE — 665001 SOC-HOME HEALTH

## 2017-08-29 PROCEDURE — 665998 HH PPS REVENUE CREDIT

## 2017-08-29 PROCEDURE — G0162 HHC RN E&M PLAN SVS, 15 MIN: HCPCS

## 2017-08-29 PROCEDURE — 665999 HH PPS REVENUE DEBIT

## 2017-08-29 NOTE — PROGRESS NOTES
Pt refused heparin subQ injection  She has  currnt discharge orders, is walking around unit and is expected to leave soon.

## 2017-08-29 NOTE — PROGRESS NOTES
Maddison Green Fall Risk Assessment:     Last Known Fall: No falls  Mobility: No limitations  Medications: No meds  Mental Status/LOC/Awareness: Awake, alert, and oriented to date, place, and person  Toileting Needs: No needs  Volume/Electrolyte Status: No problems  Communication/Sensory: No deficits  Behavior: Appropriate behavior  Maddison Green Fall Risk Total: 3  Fall Risk Level: NO RISK    Universal Fall Precautions:  call light/belongings in reach    Fall Risk Level Interventions:     TRIAL (TELE 8, NEURO, MED SHELLI 5) High Fall Risk Interventions  Place yellow fall risk ID band on patient: verified  Provide patient/family education based on risk assessment: verified  Educate patient/family to call staff for assistance when getting out of bed: verified  Place fall precaution signage outside patient door: verified  Place patient in room close to nursing station: verified  Utilize bed/chair fall alarm: verified  Notify charge of high risk for huddle: verified    Patient Specific Interventions:     Medication: review medications with patient and family  Mental Status/LOC/Awareness: reorient patient  Toileting: {Fall Risk Toileting Interventions:912020}  Volume/Electrolyte Status: {Fall Risk Volume/Electrolyte Status Interventions:801894}  Communication/Sensory: {Fall Risk Communication/Sensory Interventions:029987}  Behavioral: {Fall Risk Behavioral Interventions:471777}  Mobility: {Fall Risk Mobility Interventions:686264}

## 2017-08-29 NOTE — PROGRESS NOTES
Speech therapy came and evaluated patient (see their notes)  Diet was changed to dysphagia diet.  Pt fully  Understood that she needed to only drink thickened liquids.  Physician wrote DC orders for home health with Speech therapy.  sent referral to [facility] and was waiting for an approval.  The patient would not rafael to wait for approval from the   Home Health.  She was adamant about leaving and I explained that we could not ensure a safe discharge unless we had the home health services in place.  She acknowledged this and told me that she would take the responsibility for that and follow up to make sure she did have SPEECH therapy home health and that she would follow the Dysphagia diet.  I provided her with a list of other HOME HEALTH agencies if the [facility] was not approved.  All of these instructions were given in front of her son, SHANNON, and he verbalized understanding as well.  IV was removed.  Discharge instructions given and the patient walked out on her own without an escort.

## 2017-08-29 NOTE — DISCHARGE SUMMARY
DATE OF ADMISSION:  08/27/2017    DATE OF DISCHARGE:  08/28/2017    DISCHARGE DISPOSITION:  To home.     DISCHARGE FOLLOWUP:  Closely with the patient's primary cardiologist, Dr. Daniels as well as her primary care physician Praveen Moreno as well as   [facility].    DISCHARGE CONDITION:  Medically stable and improved.    DISCHARGE DIAGNOSES:  1.  Evaluation and treatment for dyspnea with the patient likely suffers from   dysphagia, which was confirmed by speech therapy, and the patient is changed   to a nectar-thick dysphagia diet.  The patient likely has some aspiration   events.  2.  No evidence of congestive heart failure as discussed initially on her   admission.  The patient has a good preserved ejection fraction, is clinically   not in failure.  3.  Transient hypotension, unclear if this was an error on her blood pressure   taking at home versus other.  The patient is currently stabilized and has had   normal blood pressures here in the hospital.  I have told her if she would   have to continue low blood pressures at home, she could hold her lisinopril   and increase her fluid intake.  4.  Recent cerebrovascular accident with patient underwent thrombectomy and   had excellent results, but does have residual dysphagia.  5.  Dyslipidemia.  6.  History of hypertension.  7.  Significant history of alcohol intake.  8.  Mitral valve prolapse with moderate regurgitation.  9.  History of renal insufficiency.  10.  History of breast cancer.  11.  History of dyslipidemia.  12.  History of cataract surgery.    DISCHARGE MEDICATIONS:  1.  Aspirin 81 mg daily with the recommendations from Dr. Mariela Draper,   neurology to stay on 81 mg.  2.  Lipitor 80 mg daily.  3.  Calcium carbonate 1000 mg daily.  4.  Calcium citrate 1 tablet daily.  5.  Lisinopril 20 mg daily.  6.  Toprol-XL 50 mg p.o. daily.  7.  Multivitamin p.o. daily.  8.  Potassium chloride 10 mg p.o. daily.    For presenting symptoms, HPI, and  physical findings, refer to the dictated H   and P.    HOSPITAL COURSE:  The patient was admitted after recent discharge after stroke   where the patient had an M1 occlusion, underwent TPA and thrombectomy with   radiology.  The patient was at home and noticed some increase in breathing   difficulty.  She was referred to as heavy breathing not necessarily being   short of breath.  The patient was brought to the emergency room with these   complaints.  She was evaluated.  She had a normal chest x-ray, she clinically   does not have any difficulties with breathing at this time.  She is on a   regular diet, currently complains that she has difficulty coughing after   liquids and food.  The patient therefore was reevaluated by speech therapy and   is found with some dysphagia of thin liquids.  Therefore, to be in a modified   diet with nectar thick liquid dysphagia diet.  The patient at this time has   had no other difficulties.  She again is currently normotensive and has a   normal oxygen saturation on room air, and at this time is stable for discharge   and close outpatient followup with [facility] as well as her primary care   physician and with her cardiologist as needed.  The patient understands   instructions and will be discharged home with the above-mentioned medication   regimen.    Ongoing Time spent on discharge is 55 minutes.       ____________________________________     MD MARGARITO FAJARDO / LYNDA    DD:  08/28/2017 15:16:25  DT:  08/28/2017 22:26:52    D#:  [id number]  Job#:  [id number]    cc: ALEXUS PACHECO MD, CYNTHIA BRENNER MD, [facility]

## 2017-08-30 ENCOUNTER — HOME CARE VISIT (OUTPATIENT)
Dept: HOME HEALTH SERVICES | Facility: HOME HEALTHCARE | Age: 72
End: 2017-08-30
Payer: MEDICARE

## 2017-08-30 VITALS
BODY MASS INDEX: 23.54 KG/M2 | OXYGEN SATURATION: 90 % | TEMPERATURE: 98.8 F | HEIGHT: 67 IN | DIASTOLIC BLOOD PRESSURE: 66 MMHG | WEIGHT: 150 LBS | SYSTOLIC BLOOD PRESSURE: 118 MMHG | HEART RATE: 60 BPM

## 2017-08-30 LAB — EKG IMPRESSION: NORMAL

## 2017-08-30 PROCEDURE — 665998 HH PPS REVENUE CREDIT

## 2017-08-30 PROCEDURE — G0153 HHCP-SVS OF S/L PATH,EA 15MN: HCPCS

## 2017-08-30 PROCEDURE — 665999 HH PPS REVENUE DEBIT

## 2017-08-30 SDOH — ECONOMIC STABILITY: HOUSING INSECURITY: UNSAFE APPLIANCES: 0

## 2017-08-30 SDOH — ECONOMIC STABILITY: HOUSING INSECURITY: UNSAFE COOKING RANGE AREA: 0

## 2017-08-30 ASSESSMENT — ACTIVITIES OF DAILY LIVING (ADL)
OASIS_M1830: 00
HOME_HEALTH_OASIS: 01
HOME_HEALTH_OASIS: 00

## 2017-08-30 ASSESSMENT — PATIENT HEALTH QUESTIONNAIRE - PHQ9
2. FEELING DOWN, DEPRESSED, IRRITABLE, OR HOPELESS: 00
1. LITTLE INTEREST OR PLEASURE IN DOING THINGS: 00

## 2017-08-31 VITALS
TEMPERATURE: 211.3 F | RESPIRATION RATE: 16 BRPM | SYSTOLIC BLOOD PRESSURE: 120 MMHG | HEART RATE: 57 BPM | DIASTOLIC BLOOD PRESSURE: 82 MMHG

## 2017-08-31 PROCEDURE — 665999 HH PPS REVENUE DEBIT

## 2017-08-31 PROCEDURE — 665998 HH PPS REVENUE CREDIT

## 2017-09-01 PROCEDURE — 665999 HH PPS REVENUE DEBIT

## 2017-09-01 PROCEDURE — 665998 HH PPS REVENUE CREDIT

## 2017-09-01 NOTE — TELEPHONE ENCOUNTER
SULEIMAN Leigh R.N.   Phone Number: [phone]             x --   I just saw here     Ok to make f/u with an APN in 2-3w   Not me    Previous Messages      ----- Message -----   From: Joann Verdin R.N.   Sent: 8/28/2017   9:46 AM   To: Pascale Daniels M.D.   Subject: FW: pt admitted to [facility] 8/27                   Hi Shaista Rankin wanted you to know that she has been hospitalized at [facility] (for shortness of breath/elevated WHL=284).     ISAIAS RN        Sent a message to scheduling regarding hospital follow up appointment.    ISAIAS CARRERO

## 2017-09-02 LAB
BACTERIA BLD CULT: NORMAL
BACTERIA BLD CULT: NORMAL
SIGNIFICANT IND 70042: NORMAL
SIGNIFICANT IND 70042: NORMAL
SITE SITE: NORMAL
SITE SITE: NORMAL
SOURCE SOURCE: NORMAL
SOURCE SOURCE: NORMAL

## 2017-09-02 PROCEDURE — 665998 HH PPS REVENUE CREDIT

## 2017-09-02 PROCEDURE — 665999 HH PPS REVENUE DEBIT

## 2017-09-03 PROCEDURE — 665998 HH PPS REVENUE CREDIT

## 2017-09-03 PROCEDURE — 665999 HH PPS REVENUE DEBIT

## 2017-09-04 PROCEDURE — 665998 HH PPS REVENUE CREDIT

## 2017-09-04 PROCEDURE — 665999 HH PPS REVENUE DEBIT

## 2017-09-05 ENCOUNTER — HOME CARE VISIT (OUTPATIENT)
Dept: HOME HEALTH SERVICES | Facility: HOME HEALTHCARE | Age: 72
End: 2017-09-05
Payer: MEDICARE

## 2017-09-05 PROCEDURE — G0153 HHCP-SVS OF S/L PATH,EA 15MN: HCPCS

## 2017-09-05 PROCEDURE — 665998 HH PPS REVENUE CREDIT

## 2017-09-05 PROCEDURE — 665999 HH PPS REVENUE DEBIT

## 2017-09-06 VITALS
DIASTOLIC BLOOD PRESSURE: 78 MMHG | TEMPERATURE: 98.5 F | RESPIRATION RATE: 16 BRPM | SYSTOLIC BLOOD PRESSURE: 122 MMHG | HEART RATE: 62 BPM

## 2017-09-06 PROCEDURE — 665999 HH PPS REVENUE DEBIT

## 2017-09-06 PROCEDURE — 665998 HH PPS REVENUE CREDIT

## 2017-09-07 ENCOUNTER — HOME CARE VISIT (OUTPATIENT)
Dept: HOME HEALTH SERVICES | Facility: HOME HEALTHCARE | Age: 72
End: 2017-09-07
Payer: MEDICARE

## 2017-09-07 VITALS
DIASTOLIC BLOOD PRESSURE: 79 MMHG | TEMPERATURE: 98 F | HEART RATE: 58 BPM | SYSTOLIC BLOOD PRESSURE: 126 MMHG | OXYGEN SATURATION: 98 % | RESPIRATION RATE: 16 BRPM

## 2017-09-07 VITALS
HEART RATE: 58 BPM | RESPIRATION RATE: 16 BRPM | TEMPERATURE: 98 F | DIASTOLIC BLOOD PRESSURE: 79 MMHG | SYSTOLIC BLOOD PRESSURE: 126 MMHG

## 2017-09-07 PROCEDURE — 665999 HH PPS REVENUE DEBIT

## 2017-09-07 PROCEDURE — 665998 HH PPS REVENUE CREDIT

## 2017-09-07 PROCEDURE — 665997 HH PPS REVENUE ADJ

## 2017-09-07 PROCEDURE — G0153 HHCP-SVS OF S/L PATH,EA 15MN: HCPCS

## 2017-09-07 PROCEDURE — G0151 HHCP-SERV OF PT,EA 15 MIN: HCPCS

## 2017-09-07 ASSESSMENT — GAIT ASSESSMENTS
RIGHT STEP PASS: 1
PATH: 1
BALANCE AND GAIT SCORE: 26
LEFT STEP PASS: 1
LEFT STEP CLEAR: 1
TIME: 1
INITIATION OF GAIT IMMEDIATELY AFTER GO: 1
RIGHT STEP CLEAR: 1
TRUNK: 2
STEP CONTINUITY: 1
SURVEY COMPLETE: TRUE
STEP SYMMETRY: 0
GAIT SCORE: 10

## 2017-09-07 ASSESSMENT — ACTIVITIES OF DAILY LIVING (ADL)
EATING_ASSISTANCE: 0
TRANSPORTATION_ASSISTANCE: 6
DRESSING_LB_ASSISTANCE: 0
SHOPPING_ASSISTANCE: 6
MEAL_PREP_ASSISTANCE: 6
HOUSEKEEPING_ASSISTANCE: 6
ORAL_CARE_ASSISTANCE: 0
DRESSING_UB_ASSISTANCE: 0
BATHING_ASSISTANCE: 0
GROOMING_ASSISTANCE: 0
TELEPHONE_ASSISTANCE: 0
TOILETING_ASSISTANCE: 0
LAUNDRY_ASSISTANCE: 6

## 2017-09-07 ASSESSMENT — BALANCE ASSESSMENTS
NUDGED: 2
BALANCE SCORE: 16
SITTING DOWN: 2
TURNING 360 DEGREES STEPS: 1
EYES CLOSED AT MAXIMUM POSITION NUDGED: 1
TURNING 360 DEGREES STEADINESS: 1
ARISES: 2
IMMEDIATE STANDING BALANCE FIRST 5 SECONDS: 2
ATTEMPTS TO ARISE: 2
SITTING BALANCE: 1
STANDING BALANCE: 2
SURVEY COMPLETE: TRUE

## 2017-09-08 PROCEDURE — 665999 HH PPS REVENUE DEBIT

## 2017-09-08 PROCEDURE — 665998 HH PPS REVENUE CREDIT

## 2017-09-08 ASSESSMENT — ACTIVITIES OF DAILY LIVING (ADL)
OASIS_M1830: 00
HOME_HEALTH_OASIS: 00

## 2017-09-09 PROCEDURE — 665998 HH PPS REVENUE CREDIT

## 2017-09-09 PROCEDURE — 665999 HH PPS REVENUE DEBIT

## 2017-09-10 PROCEDURE — 665998 HH PPS REVENUE CREDIT

## 2017-09-10 PROCEDURE — 665999 HH PPS REVENUE DEBIT

## 2017-09-11 PROCEDURE — 665999 HH PPS REVENUE DEBIT

## 2017-09-11 PROCEDURE — 665998 HH PPS REVENUE CREDIT

## 2017-09-12 PROCEDURE — 665998 HH PPS REVENUE CREDIT

## 2017-09-12 PROCEDURE — 665999 HH PPS REVENUE DEBIT

## 2017-09-13 ENCOUNTER — HOSPITAL ENCOUNTER (OUTPATIENT)
Dept: SPEECH THERAPY | Facility: REHABILITATION | Age: 72
End: 2017-09-13
Attending: PHYSICIAN ASSISTANT
Payer: COMMERCIAL

## 2017-09-13 PROCEDURE — G8996 SWALLOW CURRENT STATUS: HCPCS | Mod: CJ

## 2017-09-13 PROCEDURE — 92610 EVALUATE SWALLOWING FUNCTION: CPT

## 2017-09-13 PROCEDURE — 665998 HH PPS REVENUE CREDIT

## 2017-09-13 PROCEDURE — G8997 SWALLOW GOAL STATUS: HCPCS | Mod: CI

## 2017-09-13 PROCEDURE — 665999 HH PPS REVENUE DEBIT

## 2017-09-14 PROCEDURE — 665998 HH PPS REVENUE CREDIT

## 2017-09-14 PROCEDURE — 665999 HH PPS REVENUE DEBIT

## 2017-09-15 ENCOUNTER — HOSPITAL ENCOUNTER (OUTPATIENT)
Dept: SPEECH THERAPY | Facility: REHABILITATION | Age: 72
End: 2017-09-15
Attending: PHYSICIAN ASSISTANT
Payer: COMMERCIAL

## 2017-09-15 PROCEDURE — 665998 HH PPS REVENUE CREDIT

## 2017-09-15 PROCEDURE — 92526 ORAL FUNCTION THERAPY: CPT

## 2017-09-15 PROCEDURE — 665999 HH PPS REVENUE DEBIT

## 2017-09-16 PROCEDURE — 665998 HH PPS REVENUE CREDIT

## 2017-09-16 PROCEDURE — 665999 HH PPS REVENUE DEBIT

## 2017-09-17 PROCEDURE — 665999 HH PPS REVENUE DEBIT

## 2017-09-17 PROCEDURE — 665998 HH PPS REVENUE CREDIT

## 2017-09-18 ENCOUNTER — HOSPITAL ENCOUNTER (OUTPATIENT)
Dept: SPEECH THERAPY | Facility: REHABILITATION | Age: 72
End: 2017-09-18
Attending: PHYSICIAN ASSISTANT
Payer: COMMERCIAL

## 2017-09-18 PROCEDURE — 92526 ORAL FUNCTION THERAPY: CPT

## 2017-09-18 PROCEDURE — 92507 TX SP LANG VOICE COMM INDIV: CPT

## 2017-09-20 ENCOUNTER — HOSPITAL ENCOUNTER (OUTPATIENT)
Dept: RADIOLOGY | Facility: MEDICAL CENTER | Age: 72
End: 2017-09-20
Attending: PHYSICIAN ASSISTANT
Payer: COMMERCIAL

## 2017-09-20 ENCOUNTER — OFFICE VISIT (OUTPATIENT)
Dept: CARDIOLOGY | Facility: MEDICAL CENTER | Age: 72
End: 2017-09-20
Payer: COMMERCIAL

## 2017-09-20 VITALS
RESPIRATION RATE: 12 BRPM | WEIGHT: 149 LBS | DIASTOLIC BLOOD PRESSURE: 80 MMHG | BODY MASS INDEX: 23.39 KG/M2 | HEIGHT: 67 IN | OXYGEN SATURATION: 97 % | HEART RATE: 66 BPM | SYSTOLIC BLOOD PRESSURE: 130 MMHG

## 2017-09-20 DIAGNOSIS — I63.9 CEREBROVASCULAR ACCIDENT (CVA), UNSPECIFIED MECHANISM (HCC): ICD-10-CM

## 2017-09-20 DIAGNOSIS — R13.12 DYSPHAGIA, OROPHARYNGEAL PHASE: ICD-10-CM

## 2017-09-20 DIAGNOSIS — E78.5 OTHER AND UNSPECIFIED HYPERLIPIDEMIA: ICD-10-CM

## 2017-09-20 DIAGNOSIS — I34.0 NON-RHEUMATIC MITRAL REGURGITATION: ICD-10-CM

## 2017-09-20 DIAGNOSIS — I10 BENIGN ESSENTIAL HTN: Chronic | ICD-10-CM

## 2017-09-20 PROBLEM — R79.89 ELEVATED BRAIN NATRIURETIC PEPTIDE (BNP) LEVEL: Status: RESOLVED | Noted: 2017-08-27 | Resolved: 2017-09-20

## 2017-09-20 PROBLEM — J96.01 ACUTE RESPIRATORY FAILURE WITH HYPOXIA (HCC): Status: RESOLVED | Noted: 2017-08-27 | Resolved: 2017-09-20

## 2017-09-20 PROCEDURE — 92611 MOTION FLUOROSCOPY/SWALLOW: CPT

## 2017-09-20 PROCEDURE — G8997 SWALLOW GOAL STATUS: HCPCS | Mod: CH

## 2017-09-20 PROCEDURE — 74230 X-RAY XM SWLNG FUNCJ C+: CPT

## 2017-09-20 PROCEDURE — G8996 SWALLOW CURRENT STATUS: HCPCS | Mod: CH

## 2017-09-20 PROCEDURE — G8998 SWALLOW D/C STATUS: HCPCS | Mod: CH

## 2017-09-20 PROCEDURE — 99214 OFFICE O/P EST MOD 30 MIN: CPT | Performed by: NURSE PRACTITIONER

## 2017-09-20 RX ORDER — ALENDRONATE SODIUM 35 MG/1
TABLET ORAL
COMMUNITY
Start: 2017-07-31 | End: 2017-09-20

## 2017-09-20 RX ORDER — POTASSIUM CHLORIDE 750 MG/1
TABLET, FILM COATED, EXTENDED RELEASE ORAL
COMMUNITY
Start: 2017-08-15 | End: 2017-09-20

## 2017-09-20 RX ORDER — ATORVASTATIN CALCIUM 80 MG/1
80 TABLET, FILM COATED ORAL NIGHTLY
COMMUNITY
End: 2017-10-05 | Stop reason: SDUPTHER

## 2017-09-20 ASSESSMENT — ENCOUNTER SYMPTOMS
PALPITATIONS: 0
COUGH: 0
ABDOMINAL PAIN: 0
CLAUDICATION: 0
MYALGIAS: 0
DIZZINESS: 0
FEVER: 0
ORTHOPNEA: 0
PND: 0
SHORTNESS OF BREATH: 0

## 2017-09-20 NOTE — PROGRESS NOTES
Subjective:   Shaista Bocanegra is a 72 y.o. female who presents today for hospital follow up post admission for ESPINO and elevated BNP.    She is a patient of Dr. Pascale Daniels in our office. Hx of HTN, HLD, MV prolapse with moderate MR, breast CA, and now acute CVA.    She remains mildly fatigued but no other complaints. She really liked the stroke clinic's follow up apt last week. She was said to have some concern for aspiration with eating due to her CXR results. She is now going to get a formal swallow eval after this apt.    She has had no episodes of chest pain, palpitations, dizziness/lightheadedness, shortness of breath, orthopnea, or peripheral edema.    Past Medical History:   Diagnosis Date   • Benign essential HTN 3/19/2012   • Chest tightness or pressure 3/19/2012   • Hypercholesterolemia 3/19/2012   • MVP (mitral valve prolapse) 3/19/2012   • Renal insufficiency 3/19/2012   • High risk medication use 3/19/2012   • Breast cancer (CMS-HCC)    • Cancer (CMS-HCC)    • Stroke (CMS-HCC)      Past Surgical History:   Procedure Laterality Date   • CATARACT PHACO WITH IOL  3/16/2009    Performed by SHANNON GANDARA at [facility]   • CATARACT PHACO WITH IOL  1/26/2009    Performed by SHANNON GANDARA at [facility]   • BREAST BIOPSY     • LUMPECTOMY     • PB RADIATION THERAPY PLAN SIMPLE     • ND CHEMOTHERAPY, UNSPECIFIED PROCEDURE       Family History   Problem Relation Age of Onset   • Cancer Mother    • Heart Failure Neg Hx    • Heart Disease Neg Hx      History   Smoking Status   • Never Smoker   Smokeless Tobacco   • Never Used     No Known Allergies  Outpatient Encounter Prescriptions as of 9/20/2017   Medication Sig Dispense Refill   • atorvastatin (LIPITOR) 80 MG tablet Take 80 mg by mouth every evening.     • Cholecalciferol (VITAMIN D3 PO) Take 2,000 Units by mouth every day.     • Cyanocobalamin (VITAMIN B-12 PO) Take 25,000 mcg by mouth every day.     • potassium  "chloride SA (K-DUR) 10 MEQ Tab CR Take 10 mEq by mouth every day.     • aspirin (ASA) 81 MG Chew Tab chewable tablet Take 1 Tab by mouth every day. 14 Tab 1   • valacyclovir (VALTREX) 500 MG Tab Take 500 mg by mouth every day.     • Multiple Vitamins-Minerals (ICAPS AREDS 2 PO) Take 1 Tab by mouth every day.     • calcium carbonate (OS-CRISTOFER 500) 500 MG Tab Take 1,000 mg by mouth every day.     • lisinopril (PRINIVIL) 20 MG Tab TAKE 1 TABLET DAILY 90 Tab 3   • metoprolol SR (TOPROL XL) 50 MG TABLET SR 24 HR Take 1 Tab by mouth every day. 90 Tab 3   • Multiple Vitamin (MULTIVITAMINS PO) Take 1 Tab by mouth every day.     • [DISCONTINUED] alendronate (FOSAMAX) 35 MG tablet      • [DISCONTINUED] potassium chloride ER (KLOR-CON) 10 MEQ tablet        No facility-administered encounter medications on file as of 9/20/2017.      Review of Systems   Constitutional: Positive for malaise/fatigue. Negative for fever.   Respiratory: Negative for cough and shortness of breath.    Cardiovascular: Negative for chest pain, palpitations, orthopnea, claudication, leg swelling and PND.   Gastrointestinal: Negative for abdominal pain.   Musculoskeletal: Negative for myalgias.   Neurological: Negative for dizziness.   All other systems reviewed and are negative.       Objective:   /80   Pulse 66   Resp 12   Ht 1.702 m (5' 7\")   Wt 67.6 kg (149 lb)   SpO2 97%   BMI 23.34 kg/m²     Physical Exam   Constitutional: She is oriented to person, place, and time. She appears well-developed and well-nourished. No distress.   HENT:   Head: Normocephalic and atraumatic.   Eyes: EOM are normal.   Neck: Normal range of motion. No JVD present.   Cardiovascular: Normal rate, regular rhythm, normal heart sounds and intact distal pulses.    No murmur heard.  Pulmonary/Chest: Effort normal and breath sounds normal. No respiratory distress.   Abdominal: Soft. Bowel sounds are normal.   Musculoskeletal: Normal range of motion. She exhibits no " edema.   Neurological: She is alert and oriented to person, place, and time.   Skin: Skin is warm and dry.   Psychiatric: She has a normal mood and affect.   Nursing note and vitals reviewed.    Lab Results   Component Value Date/Time    CHOLSTRLTOT 135 08/19/2017 02:50 AM    LDL 65 08/19/2017 02:50 AM    HDL 54 08/19/2017 02:50 AM    TRIGLYCERIDE 80 08/19/2017 02:50 AM       Lab Results   Component Value Date/Time    SODIUM 140 08/28/2017 12:01 AM    POTASSIUM 3.0 (L) 08/28/2017 12:01 AM    CHLORIDE 104 08/28/2017 12:01 AM    CO2 28 08/28/2017 12:01 AM    GLUCOSE 108 (H) 08/28/2017 12:01 AM    BUN 23 (H) 08/28/2017 12:01 AM    CREATININE 0.88 08/28/2017 12:01 AM    BUNCREATRAT 30 (H) 08/07/2013 08:47 AM     Lab Results   Component Value Date/Time    ALKPHOSPHAT 40 08/27/2017 02:36 PM    ASTSGOT 29 08/27/2017 02:36 PM    ALTSGPT 20 08/27/2017 02:36 PM    TBILIRUBIN 0.8 08/27/2017 02:36 PM      Lab Results   Component Value Date/Time    BNPBTYPENAT 368 (H) 08/28/2017 12:01 AM      2017 CTA NECK   1.  CT angiogram of the neck within normal limits.  2.  Thrombosed right M1 segment.    2017 CAROTID DUPLEX CONCLUSIONS   nl carotids, subclavians and vertebral's    2017 MRI BRAIN  1.  Moderate sized RIGHT MCA territory acute ischemia involving the cortex and basal ganglia  2.  Flow void is present in the RIGHT MCA compatible with treatment effect  3.  No hemorrhage  4.  Mild white matter changes  5.  Mild atrophy    2017 ECHO CONCLUSIONS  Agitated saline study was performed, no evidence of right to left   shunt.  Normal left ventricular size, wall thickness, and systolic function.  Left ventricular ejection fraction is visually estimated to be 60%.  Mildly dilated left atrium.  Moderate mitral regurgitation due to an eccentric jet.  Mild tricuspid regurgitation.  Right ventricular systolic pressure is estimated to be 35 mmHg.     Assessment:     1. Acute cerebrovascular accident (CVA) within last 8 weeks (CMS-[facility])     2.  Benign essential HTN     3. hyperlipidemia     4. Non-rheumatic mitral regurgitation       Medical Decision Making:  Today's Assessment / Status / Plan:     1. Acute CVA, followed by stroke clinic. Recent dysphagia with getting repeat swallow study soon. Continue ASA, lipitor, and continue good BP control. Check Holter for afib occurrence, no sense of palpitations.    2. HTN, good control. Some hypotensive readings at home, follow and call for concern. Continue lisinopril and metoprolol. Move metoprolol to evening with fatigue.    3. HLD, good control. Follow with yearly CMP and lipid panel. LDL goal <70 with CVA.    4. Moderate MR, follow with yearly echo and clinically.    FU in clinic in 3 months with LA; call or MyChart for any concerns-call with Holter results    Patient verbalizes understanding and agrees with the plan of care.     Collaborating MD: Dakotah TINOCO

## 2017-09-20 NOTE — LETTER
[facility]   [address] [place] [address]  JESSICA [place] [address]  Phone: [phone]  Fax: [phone]              Shaista Bocanegra  1945    Encounter Date: 9/20/2017    MOLLY Zaman          PROGRESS NOTE:  Subjective:   Shaista Bocanegra is a 72 y.o. female who presents today for hospital follow up post admission for ESPINO and elevated BNP.    She is a patient of Dr. Pascale Daniels in our office. Hx of HTN, HLD, MV prolapse with moderate MR, breast CA, and now acute CVA.    She remains mildly fatigued but no other complaints. She really liked the stroke clinic's follow up apt last week. She was said to have some concern for aspiration with eating due to her CXR results. She is now going to get a formal swallow eval after this apt.    She has had no episodes of chest pain, palpitations, dizziness/lightheadedness, shortness of breath, orthopnea, or peripheral edema.    Past Medical History:   Diagnosis Date   • Benign essential HTN 3/19/2012   • Chest tightness or pressure 3/19/2012   • Hypercholesterolemia 3/19/2012   • MVP (mitral valve prolapse) 3/19/2012   • Renal insufficiency 3/19/2012   • High risk medication use 3/19/2012   • Breast cancer (CMS-HCC)    • Cancer (CMS-HCC)    • Stroke (CMS-HCC)      Past Surgical History:   Procedure Laterality Date   • CATARACT PHACO WITH IOL  3/16/2009    [facility]   • CATARACT PHACO WITH IOL  1/26/2009    Performed by SHANNON GANDARA at [facility]   • BREAST BIOPSY     • LUMPECTOMY     • PB RADIATION THERAPY PLAN SIMPLE     • [place] CHEMOTHERAPY, UNSPECIFIED PROCEDURE       Family History   Problem Relation Age of Onset   • Cancer Mother    • Heart Failure Neg Hx    • Heart Disease Neg Hx      History   Smoking Status   • Never Smoker   Smokeless Tobacco   • Never Used     No Known Allergies  Outpatient Encounter Prescriptions as of 9/20/2017    "  Medication Sig Dispense Refill   • atorvastatin (LIPITOR) 80 MG tablet Take 80 mg by mouth every evening.     • Cholecalciferol (VITAMIN D3 PO) Take 2,000 Units by mouth every day.     • Cyanocobalamin (VITAMIN B-12 PO) Take 25,000 mcg by mouth every day.     • potassium chloride SA (K-DUR) 10 MEQ Tab CR Take 10 mEq by mouth every day.     • aspirin (ASA) 81 MG Chew Tab chewable tablet Take 1 Tab by mouth every day. 14 Tab 1   • valacyclovir (VALTREX) 500 MG Tab Take 500 mg by mouth every day.     • Multiple Vitamins-Minerals (ICAPS AREDS 2 PO) Take 1 Tab by mouth every day.     • calcium carbonate (OS-CRISTOFER 500) 500 MG Tab Take 1,000 mg by mouth every day.     • lisinopril (PRINIVIL) 20 MG Tab TAKE 1 TABLET DAILY 90 Tab 3   • metoprolol SR (TOPROL XL) 50 MG TABLET SR 24 HR Take 1 Tab by mouth every day. 90 Tab 3   • Multiple Vitamin (MULTIVITAMINS PO) Take 1 Tab by mouth every day.     • [DISCONTINUED] alendronate (FOSAMAX) 35 MG tablet      • [DISCONTINUED] potassium chloride ER (KLOR-CON) 10 MEQ tablet        No facility-administered encounter medications on file as of 9/20/2017.      Review of Systems   Constitutional: Positive for malaise/fatigue. Negative for fever.   Respiratory: Negative for cough and shortness of breath.    Cardiovascular: Negative for chest pain, palpitations, orthopnea, claudication, leg swelling and PND.   Gastrointestinal: Negative for abdominal pain.   Musculoskeletal: Negative for myalgias.   Neurological: Negative for dizziness.   All other systems reviewed and are negative.       Objective:   /80   Pulse 66   Resp 12   Ht 1.702 m (5' 7\")   Wt 67.6 kg (149 lb)   SpO2 97%   BMI 23.34 kg/m²      Physical Exam   Constitutional: She is oriented to person, place, and time. She appears well-developed and well-nourished. No distress.   HENT:   Head: Normocephalic and atraumatic.   Eyes: EOM are normal.   Neck: Normal range of motion. No JVD present.   Cardiovascular: Normal " rate, regular rhythm, normal heart sounds and intact distal pulses.    No murmur heard.  Pulmonary/Chest: Effort normal and breath sounds normal. No respiratory distress.   Abdominal: Soft. Bowel sounds are normal.   Musculoskeletal: Normal range of motion. She exhibits no edema.   Neurological: She is alert and oriented to person, place, and time.   Skin: Skin is warm and dry.   Psychiatric: She has a normal mood and affect.   Nursing note and vitals reviewed.    Lab Results   Component Value Date/Time    CHOLSTRLTOT 135 08/19/2017 02:50 AM    LDL 65 08/19/2017 02:50 AM    HDL 54 08/19/2017 02:50 AM    TRIGLYCERIDE 80 08/19/2017 02:50 AM       Lab Results   Component Value Date/Time    SODIUM 140 08/28/2017 12:01 AM    POTASSIUM 3.0 (L) 08/28/2017 12:01 AM    CHLORIDE 104 08/28/2017 12:01 AM    CO2 28 08/28/2017 12:01 AM    GLUCOSE 108 (H) 08/28/2017 12:01 AM    BUN 23 (H) 08/28/2017 12:01 AM    CREATININE 0.88 08/28/2017 12:01 AM    BUNCREATRAT 30 (H) 08/07/2013 08:47 AM     Lab Results   Component Value Date/Time    ALKPHOSPHAT 40 08/27/2017 02:36 PM    ASTSGOT 29 08/27/2017 02:36 PM    ALTSGPT 20 08/27/2017 02:36 PM    TBILIRUBIN 0.8 08/27/2017 02:36 PM      Lab Results   Component Value Date/Time    BNPBTYPENAT 368 (H) 08/28/2017 12:01 AM      2017 CTA NECK   1.  CT angiogram of the neck within normal limits.  2.  Thrombosed right M1 segment.    2017 CAROTID DUPLEX CONCLUSIONS   nl carotids, subclavians and vertebral's    2017 MRI BRAIN  1.  Moderate sized RIGHT MCA territory acute ischemia involving the cortex and basal ganglia  2.  Flow void is present in the RIGHT MCA compatible with treatment effect  3.  No hemorrhage  4.  Mild white matter changes  5.  Mild atrophy    2017 ECHO CONCLUSIONS  Agitated saline study was performed, no evidence of right to left   shunt.  Normal left ventricular size, wall thickness, and systolic function.  Left ventricular ejection fraction is visually estimated to be  60%.  Mildly dilated left atrium.  Moderate mitral regurgitation due to an eccentric jet.  Mild tricuspid regurgitation.  Right ventricular systolic pressure is estimated to be 35 mmHg.     Assessment:     1. Acute cerebrovascular accident (CVA) within last 8 weeks (CMS-[facility])     2. Benign essential HTN     3. hyperlipidemia     4. Non-rheumatic mitral regurgitation       Medical Decision Making:  Today's Assessment / Status / Plan:     1. Acute CVA, followed by stroke clinic. Recent dysphagia with getting repeat swallow study soon. Continue ASA, lipitor, and continue good BP control. Check Holter for afib occurrence, no sense of palpitations.    2. HTN, good control. Some hypotensive readings at home, follow and call for concern. Continue lisinopril and metoprolol. Move metoprolol to evening with fatigue.    3. HLD, good control. Follow with yearly CMP and lipid panel. LDL goal <70 with CVA.    4. Moderate MR, follow with yearly echo and clinically.    FU in clinic in 3 months with LA; call or MyChart for any concerns-call with Holter results    Patient verbalizes understanding and agrees with the plan of care.     Collaborating MD: Dakotah TINOCO        No Recipients

## 2017-09-25 ENCOUNTER — APPOINTMENT (OUTPATIENT)
Dept: SPEECH THERAPY | Facility: REHABILITATION | Age: 72
End: 2017-09-25
Attending: PHYSICIAN ASSISTANT
Payer: COMMERCIAL

## 2017-09-26 ENCOUNTER — NON-PROVIDER VISIT (OUTPATIENT)
Dept: CARDIOLOGY | Facility: MEDICAL CENTER | Age: 72
End: 2017-09-26
Attending: PHYSICIAN ASSISTANT
Payer: COMMERCIAL

## 2017-09-26 DIAGNOSIS — I47.10 SVT (SUPRAVENTRICULAR TACHYCARDIA): ICD-10-CM

## 2017-09-26 DIAGNOSIS — R00.2 PALPITATIONS: ICD-10-CM

## 2017-09-27 ENCOUNTER — HOSPITAL ENCOUNTER (OUTPATIENT)
Dept: SPEECH THERAPY | Facility: REHABILITATION | Age: 72
End: 2017-09-27
Attending: PHYSICIAN ASSISTANT
Payer: COMMERCIAL

## 2017-09-27 ENCOUNTER — DOCUMENTATION (OUTPATIENT)
Dept: CARDIOLOGY | Facility: MEDICAL CENTER | Age: 72
End: 2017-09-27

## 2017-09-27 PROCEDURE — 92507 TX SP LANG VOICE COMM INDIV: CPT

## 2017-10-03 ENCOUNTER — TELEPHONE (OUTPATIENT)
Dept: CARDIOLOGY | Facility: MEDICAL CENTER | Age: 72
End: 2017-10-03

## 2017-10-03 NOTE — TELEPHONE ENCOUNTER
Left patient a voicemail with instructions to call the office for instructions from Dr. Daniels.    ISAIAS CARRERO

## 2017-10-03 NOTE — TELEPHONE ENCOUNTER
Called patient. She states that last night her blood pressure was 168/101 before taking Lisinopril. This morning her blood pressure was 158/95 at about 0730, and 178/97 at about 0830 before taking Metoprolol. Patient states that she hasn't taken her Metoprolol yet because she wanted to ask before taking it. Patient normally takes Metoprolol 50 mg every morning and Lisinopril 20 mg every evening. Patient states that she has been feeling more fatigued for the past couple of days, but has no other symptoms.    Advised patient to take her Metoprolol now.    Patient would like to know if she needs any testing or if her medication needs to be adjusted.    Forwarded to Dr. Pascale Daniels, please advise. Thank you.    ISAIAS CARRERO

## 2017-10-03 NOTE — TELEPHONE ENCOUNTER
Have her log her BPs & HRs  If consistently BP>150 - she will double lisinopril to 20 bid and f/u with her PCP & CVA clinic    For some reason she is intent upon seeing us - has had a recent CVA then rehospitalized for the same    No f/u warranted with us - I believe she has  F/u with the CVA Clinic which she will cetainly need to keep

## 2017-10-03 NOTE — TELEPHONE ENCOUNTER
----- Message from Susan Mathur sent at 10/3/2017  8:26 AM PDT -----  Regarding: b/p 178/97,   Contact: [phone]  LA/flash    Pt calling to report B/P 178/97, pulse 54 at this time.    At 7:30am B/P 158/95. Pt feels tired.  Please call pt at [phone]

## 2017-10-03 NOTE — TELEPHONE ENCOUNTER
wrong info on earlier call   Received: Today     Susan ALEKSANDR Verdin R.N.   Phone Number: [phone]             LA/flash     Pt calling to let you know she gave you the WRONG information earlier when you returned her first call.   Please call Shaista again .      ==================================================================    Called patient, she has actually been taking her Lisinopril and Metoprolol in the evening. However, she is going to start taking the medication in the morning again, she had better control of her blood pressure when she was taking her medication in the morning.    Advised patient that she can call the office in one week with her blood pressure readings.    ISAIAS CARRERO

## 2017-10-05 ENCOUNTER — APPOINTMENT (OUTPATIENT)
Dept: SPEECH THERAPY | Facility: REHABILITATION | Age: 72
End: 2017-10-05
Attending: PHYSICIAN ASSISTANT
Payer: COMMERCIAL

## 2017-10-05 DIAGNOSIS — E78.49 OTHER HYPERLIPIDEMIA: ICD-10-CM

## 2017-10-05 RX ORDER — ATORVASTATIN CALCIUM 80 MG/1
80 TABLET, FILM COATED ORAL EVERY EVENING
Qty: 90 TAB | Refills: 3 | Status: SHIPPED | OUTPATIENT
Start: 2017-10-05 | End: 2018-10-02 | Stop reason: SDUPTHER

## 2017-10-09 ENCOUNTER — HOSPITAL ENCOUNTER (OUTPATIENT)
Dept: SPEECH THERAPY | Facility: REHABILITATION | Age: 72
End: 2017-10-09
Attending: PHYSICIAN ASSISTANT
Payer: COMMERCIAL

## 2017-10-09 PROCEDURE — 92507 TX SP LANG VOICE COMM INDIV: CPT

## 2017-10-11 ENCOUNTER — OFFICE VISIT (OUTPATIENT)
Dept: NEUROLOGY | Facility: MEDICAL CENTER | Age: 72
End: 2017-10-11
Payer: COMMERCIAL

## 2017-10-11 VITALS
WEIGHT: 146.2 LBS | HEART RATE: 72 BPM | TEMPERATURE: 99.3 F | BODY MASS INDEX: 22.95 KG/M2 | OXYGEN SATURATION: 94 % | HEIGHT: 67 IN | DIASTOLIC BLOOD PRESSURE: 62 MMHG | SYSTOLIC BLOOD PRESSURE: 108 MMHG

## 2017-10-11 DIAGNOSIS — F51.02 ADJUSTMENT INSOMNIA: ICD-10-CM

## 2017-10-11 DIAGNOSIS — Z86.73 HX OF COMPLETED STROKE: ICD-10-CM

## 2017-10-11 PROCEDURE — 99214 OFFICE O/P EST MOD 30 MIN: CPT | Performed by: PHYSICIAN ASSISTANT

## 2017-10-11 RX ORDER — DOXEPIN 3 MG/1
1-2 TABLET, FILM COATED ORAL NIGHTLY PRN
Qty: 30 TAB | Refills: 3 | Status: SHIPPED | OUTPATIENT
Start: 2017-10-11 | End: 2018-08-15

## 2017-10-11 NOTE — PROGRESS NOTES
Subjective:      Shaista Bocanegra is a 72 y.o. female who presents with Follow-Up (weakness in left hand xs few weeks )    Established patient who was here in August due to having had a stroke earlier that month.    Review of risk factors at that time included:  HTN,  Hyperlipidemia  Lack of physical activity  ?? Atrial fibrillation - referral for holter monitor was made    Interval History:    Patient has a lengthy list of concerns and questions.    1) holter monitor just removed yesterday and so no report is yet available;  2) left arm pain/soreness. Friends have advised patient she isn't using that hand as much and it doesn't move the same when she walks.  This is likely causing pain, tenderness, altered sensation.  I have referred to [facility] to see if Nydia can be of assistance with massage, exercises and dry needling.  Pt refused medication which can help with this nerve pain.  3) Fatigue - really tired during the day.  Recommend stopping atorvastatin for 2 weeks and monitoring.  If no change, resume statin.  If she notices less fatigue then I will switch her to crestor;  4) continue aspirin daily which she states she is doing  5) driving.  Can she resume driving.  I told her that in the state of [place] you do not lose your license for stroke.  She should drive the first time or two with family and if they have concerns we will refer for formal driving evaluation.  6) insomnia - difficulty sleeping/falling asleep and overall increased anxiety.  We discussed PTSD 2/2 stroke.  I advised against using ambien or even benadryl which is what she is currently using.  I sent rx for silenor to pharmacy for pt to try instead.  I advised that if anxiety continues I will refer to psychiatry.  She does not wish to take any more medication at this time and feels it is bothersome but not intolerable at this time.  7) wildly fluctuating BP on low 150-160 and then 108.  I asked her to monitor her salt intake as it might be playing a  "role (meals eaten out) on days with high BP and help provide an explanation.  She is to follow up with this with her cardiologist or PCP.    Impression:  Pt seems somewhat fearful of whether she will return to her normal pre-stroke state.  I again counseled that healing from stroke requires patience and she will not be able to evaluated \"new normal\" until at least 6 months.  In meantime continue therapies and also referred again to [facility] for cognitive, speech, and occ therapies on line.                HPI    ROS       Objective:     Ht 1.702 m (5' 7.01\")   Wt 66.3 kg (146 lb 3.2 oz)   BMI 22.89 kg/m²      Physical Exam            Assessment/Plan:     History of stroke:   See above.    Total time with this visit:  30   Minutes face-to-face with patient. More than 50% of this visit was spent educating patient on their illness and/or coordinating care, as detailed above      "

## 2017-10-12 ENCOUNTER — APPOINTMENT (OUTPATIENT)
Dept: SPEECH THERAPY | Facility: REHABILITATION | Age: 72
End: 2017-10-12
Attending: PHYSICIAN ASSISTANT
Payer: COMMERCIAL

## 2017-10-17 ENCOUNTER — SPEECH THERAPY (OUTPATIENT)
Dept: SPEECH THERAPY | Facility: REHABILITATION | Age: 72
End: 2017-10-17
Attending: PHYSICIAN ASSISTANT
Payer: COMMERCIAL

## 2017-10-17 DIAGNOSIS — R49.9 VOICE DISORDER: ICD-10-CM

## 2017-10-17 DIAGNOSIS — R41.841 COGNITIVE COMMUNICATION DEFICIT: ICD-10-CM

## 2017-10-17 PROCEDURE — G8997 SWALLOW GOAL STATUS: HCPCS | Mod: CI

## 2017-10-17 PROCEDURE — G8998 SWALLOW D/C STATUS: HCPCS | Mod: CH

## 2017-10-17 PROCEDURE — 92507 TX SP LANG VOICE COMM INDIV: CPT

## 2017-10-17 NOTE — OP THERAPY DAILY TREATMENT
Outpatient Speech Therapy  DAILY TREATMENT     [facility] Speech [address]  [address]  [place] [address]  Phone:  [phone]  Fax:  [phone]    Date: 10/17/2017    Patient: Shaista Bocanegra  YOB: 1945  MRN: [id number]     Time Calculation  Start time: 1525  Stop time: 1600 Time Calculation (min): 35 minutes     Chief Complaint: Poor Memory and Other (low volume)    Visit #: 6    Subjective Evaluation: Pt on time and in good spirits.  Noted friends are asking her to speak up.  Has been doing home program for voice and starting computer brain games.     Objective:   Treatments/Interventions Performed:  Cognitive-Linguistic training, Voice training and Compensatory strategy training  Treatment Intervention tool(s) used:  MOCA v. 7.2  Objective Details:  Administered MOCA v. 7.2 due to recent c/o poor memory/attn.  Pt scored 27/30 which is WFL.  Areas of slight deficit are memory (able to recall 3/5 words after 5 mins) and visual-perceptual skills.  Also focused on vocal adduction exercises.  Pt's voice is hoarse/breathy/low volume.  Difficulty demonstrating valsalva.  Doing well with diaphragmatic breathing.  Able to demo clearer voice quality with push/pull for adduction when given single syllable vowel initial words.       Speech Therapy Assessment:     Cognitive Linguistic Assessment:     Patient attention divided: Minimal    Patient immediate memory: Supervision Required    Patient recent and short term memory: Minimal    Patient prospective and time delay memory: Minimal      Speech Mechanism Assessment:     Patient voice description: Hoarse (breathy, reduced volume)      Speech Therapy Plan :   Planned Therapy Interventions:  Voice training, Cognitive-Linguistic training and Compensatory Strategy Training,   Plan Details:  Cont to see 1-2 x /week.  See progress note for new goals.

## 2017-10-17 NOTE — OP THERAPY PROGRESS SUMMARY
Outpatient Speech Therapy  PROGRESS NOTE      [facility] Speech Therapy [place] [address]  [address]  [place] [address]  Phone:  [phone]  Fax:  [phone]    Date of Visit: 10/17/2017    Patient: Shaista Bocanegra  YOB: 1945  MRN: [id number]     Referring Provider: Melanie Guerrero P.A.-C.  [address] [place] [address]  [place] [address]   Referring Diagnosis Cerebral infarction, unspecified [I63.9];Fluency disorder in conditions classified elsewhere [R47.82]      Visit #: 6    Time Calculation             Speech Therapy Occurrence Codes    Date of Onset of Impairment:  8/18/17   Date speech therapy care plan established or reviewed:  10/17/17   Date speech therapy treatment started:  9/13/17             Chief Complaint: Poor Memory and Other (low volume)    Visit Diagnoses     ICD-10-CM   1. Voice disorder R49.9   2. Cognitive communication deficit R41.841       Subjective:   Reason for Therapy:     Reason For Evaluation:  Voice and Cognition    Surgery Description:  Pt has met swallowing goals and is now on a regular diet.  Her concern is voice quality/volume and decreased short-term memory.      Objective:   Treatments/Interventions Performed:  Voice training, Cognitive-Linguistic training, Patient/Caregiver education and Compensatory strategy training      Speech Therapy Assessment:     Cognitive Linguistic Assessment:     Portions of the Non-standardized cognitive screen and Other are used.    Patient attention divided: Minimal    Patient recent and short term memory: Minimal    Patient prospective and time delay memory: Minimal        Speech Therapy Plan :   Patient progression on Short Term Goals:  Pt has met 2/3 short-term goals.  Dysphagia has resolved. Now focusing on voice volume/clarity, short-term memory and complex attention.     STG   1.  Pt will demo use of vocal adduction techniques to increase voice clarity and volume at word and phrase level to 85% normal.  2.  Pt will recall  and mentally manipulate 4-5 unit info with 90% accuracy  3. Patient will demo divided attention by responding to multiple details within tasks at the same time with 90% accuracy.      Long Term Goals:  1. Pt will rate vocal loudness/quality at 8-9/10 on 1-10 scale during conversational speech with use of strategies.   2.  Pt will successfully use memory strategies to schedule/recall weekly activities with 90% accuracy.        Functional Limitation G-Codes and Severity Modifiers  Current:     Goal:     Discharge:         Referring provider co-signature:  I have reviewed this plan of care and my co-signature certifies the need for services.  Certification Dates:   From 10/17/17    To 11/28/17    Physician Signature: ________________________________ Date: ______________

## 2017-10-18 ENCOUNTER — TELEPHONE (OUTPATIENT)
Dept: CARDIOLOGY | Facility: MEDICAL CENTER | Age: 72
End: 2017-10-18

## 2017-10-18 PROCEDURE — 0298T PR EXT ECG > 48HR TO 21 DAY REVIEW AND INTERPRETATN: CPT | Performed by: INTERNAL MEDICINE

## 2017-10-18 PROCEDURE — 0296T PR EXT ECG > 48HR TO 21 DAY RCRD W/CONECT INTL RCRD: CPT | Performed by: INTERNAL MEDICINE

## 2017-10-18 NOTE — TELEPHONE ENCOUNTER
"Dr. Daniels reviews Zio patch monitor 9/26/17 - 10/10/17. Per Dr. Daniels: No noted sustained AF. Brief (<15 beats) rare SVT. Stay on aspirin 81 mg. Repeat Zio in 3 - 6 mos. Pt was notified.     Wants Dr. Daniels to know BP \"higher than it has been\". Concerned. SBP used to run 120's, now in high 130's w/ occas. 140's, HR 70's.   10/10 note from BRENTON Toscano documents fluctuating -160, then 108. Asked to restrict sodium. Pt. states no longer that high, but 138-140 most of time. Does limit sodium.   Next FV 12/5 w/ Dr. Daniels. To Dr. Daniels to advise   "

## 2017-10-23 ENCOUNTER — TELEPHONE (OUTPATIENT)
Dept: NEUROLOGY | Facility: MEDICAL CENTER | Age: 72
End: 2017-10-23

## 2017-10-23 ENCOUNTER — RX ONLY (OUTPATIENT)
Age: 72
Setting detail: RX ONLY
End: 2017-10-23

## 2017-10-23 RX ORDER — MINOCYCLINE HYDROCHLORIDE 100 MG/1
100 CAPSULE ORAL BID
Qty: 60 | Refills: 1 | Status: ERX | COMMUNITY
Start: 2017-10-23

## 2017-10-24 ENCOUNTER — SPEECH THERAPY (OUTPATIENT)
Dept: SPEECH THERAPY | Facility: REHABILITATION | Age: 72
End: 2017-10-24
Attending: PHYSICIAN ASSISTANT
Payer: COMMERCIAL

## 2017-10-24 DIAGNOSIS — R49.9 VOICE DISORDER: ICD-10-CM

## 2017-10-24 DIAGNOSIS — R41.840 COGNITIVE ATTENTION DEFICIT: ICD-10-CM

## 2017-10-24 PROCEDURE — 92507 TX SP LANG VOICE COMM INDIV: CPT

## 2017-10-24 PROCEDURE — G9172 VOICE GOAL STATUS: HCPCS | Mod: CI

## 2017-10-24 PROCEDURE — G9171 VOICE CURRENT STATUS: HCPCS | Mod: CJ

## 2017-10-24 NOTE — OP THERAPY DAILY TREATMENT
"Outpatient Speech Therapy  DAILY TREATMENT     [facility] Speech Therapy [address]  [address]  [place] [address]  Phone:  [phone]  Fax:  [phone]    Date: 10/24/2017    Patient: Shaista Bocanegra  YOB: 1945  MRN: [id number]     Time Calculation  Start time: 1100  Stop time: 1130 Time Calculation (min): 30 minutes     Chief Complaint: Poor Memory and Hoarse (Low volume/breathy)    Visit #: 7    Subjective EvaluationPt on time and in good spirits.  \"I'm doing better\".     Objective:   Treatments/Interventions Performed:  Voice training, Cognitive-Linguistic training, Home program and Compensatory strategy training  Objective Details:  Less focus on voice today however pt has been working hard on vocal adduction exercises.  80% for single syllable to vowel initial words.  Increased volume noted upon greeting and when pt was visiting in lobby.  Introduced pt to [employer] computer brain games for memory and attention tasks.  Min/mod cues for success with divided attn tasks- cues for use of strategies.  90% for alternating att.        Speech Therapy Assessment:     Cognitive Linguistic Assessment:     Patient attention selective: Supervision Required    Patient attention divided: Minimal    Patient recent and short term memory: Minimal    Patient prospective and time delay memory: Minimal (uses memory strategies well)      Speech Mechanism Assessment:     Patient voice description: Hoarse (breathy/reduced volume)    Patient uses adequate breath support: Yes (variable)  Speech mechanism comments: Improved volume noted, slight decreased hoarseness during conversational speech.  Much improved clarity during structured tasks.       Speech Therapy Plan :   Prognosis: Excellent  Planned Therapy Interventions:  Development of Cognitive Skills and Voice training,   Frequency:  1x week              "

## 2017-10-31 ENCOUNTER — OCCUPATIONAL THERAPY (OUTPATIENT)
Dept: OCCUPATIONAL THERAPY | Facility: REHABILITATION | Age: 72
End: 2017-10-31
Attending: PHYSICIAN ASSISTANT
Payer: COMMERCIAL

## 2017-10-31 DIAGNOSIS — I63.9 IMPENDING CEREBROVASCULAR ACCIDENT (HCC): ICD-10-CM

## 2017-10-31 PROCEDURE — G8984 CARRY CURRENT STATUS: HCPCS | Mod: CJ

## 2017-10-31 PROCEDURE — G8985 CARRY GOAL STATUS: HCPCS | Mod: CI

## 2017-10-31 PROCEDURE — 97166 OT EVAL MOD COMPLEX 45 MIN: CPT

## 2017-10-31 PROCEDURE — 97530 THERAPEUTIC ACTIVITIES: CPT

## 2017-10-31 SDOH — ECONOMIC STABILITY: GENERAL: QUALITY OF LIFE: EXCELLENT

## 2017-10-31 ASSESSMENT — BALANCE ASSESSMENTS
BALANCE - SITTING DYNAMIC: GOOD
BALANCE - STANDING STATIC: FAIR +
BALANCE - STANDING DYNAMIC: FAIR +
BALANCE - SITTING STATIC: GOOD

## 2017-10-31 ASSESSMENT — ENCOUNTER SYMPTOMS
PAIN SCALE AT LOWEST: 0
PAIN SCALE: 0
PAIN SCALE AT HIGHEST: 0

## 2017-10-31 ASSESSMENT — ACTIVITIES OF DAILY LIVING (ADL): POOR_BALANCE: 1

## 2017-10-31 NOTE — OP THERAPY EVALUATION
Outpatient Occupational Therapy  INITIAL NEUROLOGICAL EVALUATION    [facility] Occupational Therapy [address].  [address]  [place] [address]  Phone:  [phone]  Fax:  [phone]    Date of Evaluation: 10/31/2017    Patient: Shaista Bocanegra  YOB: 1945  MRN: [id number]     Referring Provider: Melanie Guerrero P.A.-C.  [address] [place] [address]   Referring Diagnosis Cerebral infarction, unspecified [I63.9]     Time Calculation  Start time: 0800  Stop time: 0900 Time Calculation (min): 60 minutes     Occupational Therapy Occurrence Codes    Date of onset of impairment:  17   Date of occupational therapy care plan established or reviewed:  10/31/17   Date of occupational therapy treatment started:  10/31/17             Chief Complaint: Hand Weakness and Other (dysesthesia)    Visit Diagnoses     ICD-10-CM   1. Impending cerebrovascular accident (CMS-[facility]) I63.9       Subjective:   History of Present Illness:     Date of onset:  2017  Quality of life:  Excellent  Prior level of function:  Independent  Pain:     Current pain ratin    At best pain ratin    At worst pain ratin  Social Support:     Lives in:  Condominium    Lives with:  Alone  Hand dominance:  Right  Treatments:     Current treatment:  Physical therapy (Speech Therapy)  Activities of Daily Living:     Patient reported ADL status: Independent ADLs, Independent IADLs except hired help for heavy housework.  Independent driving.  Was working FT as a  prior to CVA  Patient Goals:     Patient goals for therapy:  Increased strength and return to sport/leisure activities    Other patient goals:  Normalize sensation in hands      Past Medical History:   Diagnosis Date   • Benign essential HTN 3/19/2012   • Breast cancer (CMS-HCC)    • Cancer (CMS-[facility])    • Chest tightness or pressure 3/19/2012   • High risk medication use 3/19/2012   • Hypercholesterolemia 3/19/2012   • MVP (mitral valve  prolapse) 3/19/2012   • Renal insufficiency 3/19/2012   • Stroke (CMS-HCC)      Past Surgical History:   Procedure Laterality Date   • CATARACT PHACO WITH IOL  3/16/2009    Performed by SHANNON GANDARA at [facility]   • CATARACT PHACO WITH IOL  1/26/2009    Performed by SHANNON GANDARA at [facility]   • BREAST BIOPSY     • LUMPECTOMY     • PB RADIATION THERAPY PLAN SIMPLE     • CA CHEMOTHERAPY, UNSPECIFIED PROCEDURE       Social History   Substance Use Topics   • Smoking status: Never Smoker   • Smokeless tobacco: Never Used   • Alcohol use Yes      Comment: 3 glasses of wine     Family and Occupational History     Social History   • Marital status:      Spouse name: N/A   • Number of children: N/A   • Years of education: N/A       Objective:     Active Range of Motion:   Upper extremity (left):     Shoulder flexion: Within functional limits    Shoulder extension: Within functional limits    Shoulder abduction: Within functional limits    Shoulder adduction: Within functional limits    Shoulder external rotation: Within functional limits    Shoulder internal rotation: Within functional limits    Elbow flexion: Within functional limits    Elbow extension: Within functional limits    Forearm pronation: Within functional limits    Forearm supination: Within functional limits    Wrist flexion: Within functional limits    Wrist extension: Within functional limits    Fingers flexion: Within functional limits    Fingers extension: Within functional limits    Fingers abduction: Within functional limits    Fingers adduction: Within functional limits  Upper extremity (right):     Shoulder flexion: Within functional limits    Shoulder extension: Within functional limits    Shoulder abduction: Within functional limits    Shoulder adduction: Within functional limits    Shoulder external rotation: Within functional limits    Shoulder internal rotation: Within functional limits     Elbow flexion: Within functional limits    Elbow extension: Within functional limits    Forearm pronation: Within functional limits    Forearm supination: Within functional limits    Wrist flexion: Within functional limits    Wrist extension: Within functional limits    Fingers flexion: Within functional limits    Fingers extension: Within functional limits    Fingers abduction: Within functional limits    Fingers adduction: Within functional limits    Strength:     Left upper extremity strength within functional limits.      Right upper extremity strength within functional limits.      , Prehension, Pinch:  /Prehension (left):      strength: Within functional limits (43 lbs)    Opposition: Within functional limits  /Prehension (right):      strength: Within functional limits (48 lbs)    Opposition: Within functional limits  Pinch (left):     Pinch (tip to tip): Within functional limits (10 lbs)    Pinch (3 point): Within functional limits (11 lbs)    Pinch (lateral): Within functional limits (14lbs)  Pinch (right):    Pinch (tip to tip): Within functional limits (13 lbs)    Pinch (3 point): Within functional limits (15 lbs)    Pinch (lateral): Within functional limits (15 lbs)    Strength Comments:  Strength WNL except left hand interossei and lumbricals 3+/5    Tone, Sensation and Coordination:   Tone:     Left upper extremity muscle tone: Normal    Right upper extremity muscle tone: Normal    Sensation   Upper extremity (left):     Light touch: Impaired    Sharp/dull: Impaired     Proprioception: Intact   Upper extremity (right):     Light touch: Intact    Sharp/dull: Intact     Proprioception: Intact     Sensation comments:   [place]: diminished light touch left hand   impaired sharp/dull circumferentially left hand  Pt reports dysesthesia      Coordination   Upper extremity (left):     Fine motor: Impaired (impaired dexterity, in-hand manipulation)    Gross motor: Within functional  limits    Rapid alternating movements: Impaired (dec speed)    Finger to finger: Impaired (dec speed with rapid opposition)    Finger touch to nose: Impaired (dec speed, mild dysmetria)  Upper extremity (right):     Fine motor: Within functional limits    Gross motor: Within functional limits    Rapid alternating movements: Within functional limits    Finger to finger: Within functional limits    Finger touch to nose: Within functional limits    Cognition:     Cognition Comments:  Cognition WNL      Vision/Perception:     Vision assistive device(s): reading glasses and glasses    Vision/Perception Comments:   Visual-perceptual skills WFL    Postural Control (Balance)     Sitting (static): Good    Sitting (dynamic): Good    Standing (static): Fair +    Standing (dynamic): Fair +    Activities of Daily Living:     ADL Comments:  Independent ADLs/IADLS, reports difficulty when typing with left hand      Occupational Therapy Exercises and Treatments  Therapeutic Treatments and Modalities:    1. Functional training, 15, Grooved pegboard, in-hand manipulation testing      Assessment, Response and Plan:   Impairments: abnormal coordination, difficulty performing job, fine motor function, impaired balance, impaired physical strength and lacks appropriate home exercise program    Other Impairments:  Abnormal sensation left hand  Assessment details:  Pt is a 72 y.o. Female s/p right CVA on 8/18/17 who presents to outpatient OT functioning below baseline secondary to decreased left intrinsic hand strength, decreased FMC, GMC, and dysesthesia which are causing functional limitations in ability to perform activities which require dexterity including typing for work and recreational activities.  Pt also demonstrates deficits in standing balance affecting community mobility.  Barriers to therapy:  None  Prognosis: good    Goals:   Short Term Goals:   See LTGs    Long Term Goals:   Pt will perform all work-related activities  including typing without difficulty   Pt will increase left intrinsic hand strength to 4-/5 for recreational activities  Pt will demonstrate normal left hand sensorimotor function for daily routine  Pt will be independent HEP for LH strengthening and sensory re-education  Pt will score <= 120 seconds on Grooved Pegboard test with non-dominant hand  Pt will increase her standing balance to Good for community mobility.    Plan:   Therapy options:  Occupational therapy treatment to continue  Planned therapy interventions:  ADL community/work management, coordination, neuromuscular re-education, patient education, therapeutic exercise, sensory integrative techniques, modalities, home exercise program and therapeutic activities  Frequency:  1x week  Duration in visits:  6  Duration in weeks:  6  Discussed with:  Patient      Functional Limitation G-Codes and Severity Modifiers  Current: Carry: Current (): CJ   Goal: Carry: Goal  (): CI       Referring provider co-signature:  I have reviewed this plan of care and my co-signature certifies the need for services.  Certification Dates:   From 10/31/17     To 12/20/17    Physician Signature: ________________________________ Date: ______________

## 2017-11-09 ENCOUNTER — SPEECH THERAPY (OUTPATIENT)
Dept: SPEECH THERAPY | Facility: REHABILITATION | Age: 72
End: 2017-11-09
Attending: PHYSICIAN ASSISTANT
Payer: COMMERCIAL

## 2017-11-09 DIAGNOSIS — R49.9 VOICE DISORDER: ICD-10-CM

## 2017-11-09 DIAGNOSIS — R41.841 COGNITIVE COMMUNICATION DEFICIT: ICD-10-CM

## 2017-11-09 PROCEDURE — 92507 TX SP LANG VOICE COMM INDIV: CPT

## 2017-11-09 NOTE — OP THERAPY DAILY TREATMENT
"Outpatient Speech Therapy  DAILY TREATMENT     [facility] Speech [address].  [address]  [place] [address]  Phone:  [phone]  Fax:  [phone]    Date: 11/09/2017    Patient: Shaista Bocanegra  YOB: 1945  MRN: [id number]     Time Calculation    1500  1550  50 mins     Chief Complaint: Poor Memory and Other (decreased volume/flat voice)    Visit #: 8    Subjective Evaluation  Pt on time.  Very fatigued.  Eyes starting to close during session.  Notes not sleeping at night which is affecting daily function.  Has reported to MD.     Objective:   Treatments/Interventions Performed:  Respiratory Coordination / Support training, Speech/Language treatment, Compensatory strategy training and Cognitive-Linguistic training  Other Treatment Interventions:  Cog/ling- 20 mins voice- 30 mins  Objective Details:  Focused on increased voice prosody.  Pt continues to demo low volume however flat voice/affect is even more prominent.  Completed pausing/word stress structured activities with min A for success.  Pt able to demo 80% \"normal\" sounding prosody when reading children's rhymes.  Mod cues for use of strategies at sentence level reading.  Pt has been playing computer brain games for memory and attn.  Changed some games on [employer] therapy account in regards to reading retention and working memory.  Working memory tasks completed with 70-80% accuracy.       Speech Therapy Assessment:     Cognitive Linguistic Assessment:     Patient immediate memory: Minimal    Patient recent and short term memory: Minimal (min/mod)      Speech Mechanism Assessment:     Patient voice description: Reduced Intensity (flat)    Patient uses adequate breath support: Yes (variable)    Patient breath rate: Normal (to slow)  Speech mechanism comments: Mod flat affect/voice.  Stimulable at sentence level reading to increase use of stress/pausing/inflection.       Speech Therapy Plan :   Prognosis: Good  Planned Therapy " Interventions:  Voice training, Cognitive-Linguistic training and Speech/Language training,   Frequency:  3x month

## 2017-11-10 ASSESSMENT — ENCOUNTER SYMPTOMS
NAUSEA: 0
VOMITING: 0
FEVER: 0
MYALGIAS: 0
CHILLS: 0

## 2017-11-11 NOTE — PROGRESS NOTES
Subjective:   Date of Consultation:11/15/2017  8:30 AM  Primary care physician:Bethany Crane M.D.        Reason for Consultation:  Ms. Bocanegra  is a pleasant 71 y.o. year old female who presents for follow-up of osteopenia    Osteopenia  She first presented to Dr. Ward with last bone density in March 2012 with T score of -2.3 at the left femoral neck of the hip with no previous fractures.  She was previously on anti-resorptive drug for a few years.  She reports that it was 5 years starting around 1998.  She was last seen in 2/29/2016 for follow-up.  At the time she was taking calcium and vitamin D.  She denies falls.  At the last visit she had started fosamax in August 2016 and doing well    Her last bone density in 1/21/2016 showed   The mean bone mineral density for the lumbar spine is 1.058 g/cm2, which corresponds to a T score of -1.0 and a Z score of 0.7.  The proximal left femur has a mean bone mineral density of 0.795 g/cm2, with a T score of -1.7 and a Z score of -0.1.  When compared with the most recent study dated 3/5/2014, there has been a 3.0% decrease in the bone mineral density of the lumbar spine and a 0.9% increase in the bone mineral density of the left femur.    She started alendronate about August 2016 however the prescription was written to start 6/8/2016 and stopped in 2017.  She is tolerating the medication well.    At her last visit she has corrected to an follows up with her dentist then noticed that she is in need of an implant. Her dentist has referred her to a specialist for second opinion.  She is concerned being on alendronate that she is at risk of osteonecrosis of the jaw she were to need invasive dental work.    Since her last visit she suffered a stroke.  She is waiting for 6 months after the stroke (feb or march 2018)  - [facility]  To have her surgery.  She continues to exercise with weight lifting.      Vitamin D 5/2015 from [facility] at 75.8  Not taking daily vitamin  D    Abnormal LFT  She does drink 2-3 alcoholic drinks a day  She had labs done recently at [place]    Renal insufficiency  She states that as long as she hydrates before labs her labs are fine.  She had labs done recently at [facility] demonstrated a creatinine of 1.6 which is reasonable.     History of breast cancer treated with radiation therapy    Past Medical History: she has a left wrist fracture as a result of fall while rollerskating.    Family History: hip fracture mom had in her late 80's    Past Medical History:   Diagnosis Date   • Benign essential HTN 3/19/2012   • Breast cancer (CMS-HCC)    • Cancer (CMS-HCC)    • Chest tightness or pressure 3/19/2012   • High risk medication use 3/19/2012   • Hypercholesterolemia 3/19/2012   • MVP (mitral valve prolapse) 3/19/2012   • Renal insufficiency 3/19/2012   • Stroke (CMS-HCC)      Past Surgical History:   Procedure Laterality Date   • CATARACT PHACO WITH IOL  3/16/2009    Performed by SHANNON GANDARA [facility]   • CATARACT PHACO WITH IOL  1/26/2009    Performed by SHANNON GANDARA [facility]   • BREAST BIOPSY     • LUMPECTOMY     • PB RADIATION THERAPY PLAN SIMPLE     • ND CHEMOTHERAPY, UNSPECIFIED PROCEDURE       No Known Allergies  Outpatient Encounter Prescriptions as of 11/15/2017   Medication Sig Dispense Refill   • atorvastatin (LIPITOR) 80 MG tablet Take 1 Tab by mouth every evening. 90 Tab 3   • Cholecalciferol (VITAMIN D3 PO) Take 2,000 Units by mouth every day.     • Cyanocobalamin (VITAMIN B-12 PO) Take 25,000 mcg by mouth every day.     • potassium chloride SA (K-DUR) 10 MEQ Tab CR Take 10 mEq by mouth every day.     • aspirin (ASA) 81 MG Chew Tab chewable tablet Take 1 Tab by mouth every day. 14 Tab 1   • valacyclovir (VALTREX) 500 MG Tab Take 500 mg by mouth every day.     • Multiple Vitamins-Minerals (ICAPS AREDS 2 PO) Take 1 Tab by mouth every day.     • calcium carbonate (OS-CRISTOFER 500) 500 MG Tab  Take 1,000 mg by mouth every day.     • lisinopril (PRINIVIL) 20 MG Tab TAKE 1 TABLET DAILY 90 Tab 3   • metoprolol SR (TOPROL XL) 50 MG TABLET SR 24 HR Take 1 Tab by mouth every day. 90 Tab 3   • Multiple Vitamin (MULTIVITAMINS PO) Take 1 Tab by mouth every day.     • Doxepin HCl (SILENOR) 3 MG Tab Take 1-2 Tabs by mouth at bedtime as needed. 30 Tab 3     No facility-administered encounter medications on file as of 11/15/2017.      Social History     Social History   • Marital status:      Spouse name: N/A   • Number of children: N/A   • Years of education: N/A     Occupational History   • Not on file.     Social History Main Topics   • Smoking status: Never Smoker   • Smokeless tobacco: Never Used   • Alcohol use Yes      Comment: 3 glasses of wine   • Drug use: No   • Sexual activity: Not on file     Other Topics Concern   • Not on file     Social History Narrative   • No narrative on file      History   Smoking Status   • Never Smoker   Smokeless Tobacco   • Never Used     History   Alcohol Use   • Yes     Comment: 3 glasses of wine     History   Drug Use No      OB History   No data available      No LMP recorded. Patient has had a hysterectomy.    G P A [facility]     Family History   Problem Relation Age of Onset   • Cancer Mother    • Heart Failure Neg Hx    • Heart Disease Neg Hx        Review of Systems   Constitutional: Negative for chills and fever.   Gastrointestinal: Negative for nausea and vomiting.   Musculoskeletal: Negative for myalgias.        Objective:   /80   Pulse 62   Temp 36.3 °C (97.3 °F)   Resp 12   Wt 68.9 kg (152 lb)   SpO2 97%   BMI 23.80 kg/m²     Physical Exam   Constitutional: She is oriented to person, place, and time. She appears well-developed and well-nourished. No distress.   HENT:   Head: Normocephalic and atraumatic.   Right Ear: External ear normal.   Left Ear: External ear normal.   Eyes: Conjunctivae are normal. Right eye exhibits no discharge. Left eye exhibits  no discharge. No scleral icterus.   Pulmonary/Chest: No respiratory distress.   Abdominal: Soft.   No hepatomegaly   Musculoskeletal:   No synovitis in the upper extremities   Neurological: She is alert and oriented to person, place, and time.   Mild left facial droop   Skin: Skin is warm. No rash noted. She is not diaphoretic. No erythema.   Limited skin exam   Psychiatric: She has a normal mood and affect. Her behavior is normal. Judgment and thought content normal.     Labs:    No results found for: QNTTBGOLD  No results found for: HEPBCORTOT, HEPBCORIGM, HEPBSAG  No results found for: HEPCAB  Lab Results   Component Value Date/Time    SODIUM 140 08/28/2017 12:01 AM    POTASSIUM 3.0 (L) 08/28/2017 12:01 AM    CHLORIDE 104 08/28/2017 12:01 AM    CO2 28 08/28/2017 12:01 AM    GLUCOSE 108 (H) 08/28/2017 12:01 AM    BUN 23 (H) 08/28/2017 12:01 AM    CREATININE 0.88 08/28/2017 12:01 AM    BUNCREATRAT 30 (H) 08/07/2013 08:47 AM      Lab Results   Component Value Date/Time    WBC 5.3 08/27/2017 02:36 PM    RBC 3.72 (L) 08/27/2017 02:36 PM    HEMOGLOBIN 13.0 08/27/2017 02:36 PM    HEMATOCRIT 38.9 08/27/2017 02:36 PM    .6 (H) 08/27/2017 02:36 PM    MCH 34.9 (H) 08/27/2017 02:36 PM    MCHC 33.4 (L) 08/27/2017 02:36 PM    MPV 9.5 08/27/2017 02:36 PM    NEUTSPOLYS 65.10 08/27/2017 02:36 PM    LYMPHOCYTES 25.20 08/27/2017 02:36 PM    MONOCYTES 7.60 08/27/2017 02:36 PM    EOSINOPHILS 1.50 08/27/2017 02:36 PM    BASOPHILS 0.40 08/27/2017 02:36 PM      Lab Results   Component Value Date/Time    CALCIUM 9.3 08/28/2017 12:01 AM    ASTSGOT 29 08/27/2017 02:36 PM    ALTSGPT 20 08/27/2017 02:36 PM    ALKPHOSPHAT 40 08/27/2017 02:36 PM    TBILIRUBIN 0.8 08/27/2017 02:36 PM    ALBUMIN 4.6 08/27/2017 02:36 PM    TOTPROTEIN 7.3 08/27/2017 02:36 PM       Assessment:     1. Osteopenia of left lower leg  DS-BONE DENSITY STUDY (DEXA)           Medical Decision Making:  Today's Assessment / Status / Plan:     Osteopenia  I would like  to continue alendronate however as she may be need invasive dental surgery we will hold this until she has had a chance to complete her surgery.  We will also recheck her DEXA after her surgery.  Started alendronate 8/2016 and now we will hold 5/2017      Hold bisphosphonate for now.      Cracked tooth  willl see dentist  Reports that she will need a possible impalnt.  Plan for surgery 2/2018    Renal insufficiency and abnormal LFT  I will check  Labs for LFT    1. Osteopenia of left lower leg  DS-BONE DENSITY STUDY (DEXA)           Return in about 7 months (around 6/1/2018).    I have spent greater than 50% of this 30 minute visit in counseling/coordination of care with the patient regarding discussion of risks of invasive dental surgery given that she has been on bisphosphonates, plan to hold bisphosphonates for now and the rationale and also a discussion that I want to follow up with her within a month and to keep up with the plan for any surgical intervention regarding her cracked tooth.    She agreed and verbalized her agreement and understanding with the current plan. I answered all questions and concerns she has at this time and advised her to call at any time in there interim with questions or concerns in regards to her care.    Thank you for allowing me to participate in her care, I will continue to follow closely.

## 2017-11-15 ENCOUNTER — OFFICE VISIT (OUTPATIENT)
Dept: RHEUMATOLOGY | Facility: PHYSICIAN GROUP | Age: 72
End: 2017-11-15
Payer: COMMERCIAL

## 2017-11-15 VITALS
OXYGEN SATURATION: 97 % | HEART RATE: 62 BPM | TEMPERATURE: 97.3 F | WEIGHT: 152 LBS | RESPIRATION RATE: 12 BRPM | DIASTOLIC BLOOD PRESSURE: 80 MMHG | BODY MASS INDEX: 23.8 KG/M2 | SYSTOLIC BLOOD PRESSURE: 138 MMHG

## 2017-11-15 DIAGNOSIS — M85.862 OSTEOPENIA OF LEFT LOWER LEG: ICD-10-CM

## 2017-11-15 PROCEDURE — 99213 OFFICE O/P EST LOW 20 MIN: CPT | Performed by: INTERNAL MEDICINE

## 2017-11-15 NOTE — LETTER
[facility]   [address], [address]  [place] [address]  Phone: [phone]  Fax: [phone]              Encounter Date: 11/15/2017    Dear Dr. Sims ref. provider found,    It was a pleasure seeing your patient, Shaista Bocanegra, on 11/15/2017. The encounter diagnosis was Osteopenia of left lower leg.     Please find attached progress note which includes the history I obtained from Ms. Bocanegra, my physical examination findings, my impression and recommendations.      Once again, it was a pleasure participating in your patient's care.  Please feel free to contact me if you have any questions or if I can be of any further assistance to your patients.      Sincerely,    Aimee Bruno M.D.  Electronically Signed          PROGRESS NOTE:  Subjective:   Date of Consultation:11/15/2017  8:30 AM  Primary care physician:Bethany Crane M.D.        Reason for Consultation:  Ms. Bocanegra  is a pleasant 71 y.o. year old female who presents for follow-up of osteopenia    Osteopenia  She first presented to Dr. Ward with last bone density in March 2012 with T score of -2.3 at the left femoral neck of the hip with no previous fractures.  She was previously on anti-resorptive drug for a few years.  She reports that it was 5 years starting around 1998.  She was last seen in 2/29/2016 for follow-up.  At the time she was taking calcium and vitamin D.  She denies falls.  At the last visit she had started fosamax in August 2016 and doing well    Her last bone density in 1/21/2016 showed   The mean bone mineral density for the lumbar spine is 1.058 g/cm2, which corresponds to a T score of -1.0 and a Z score of 0.7.  The proximal left femur has a mean bone mineral density of 0.795 g/cm2, with a T score of -1.7 and a Z score of -0.1.  When compared with the most recent study dated 3/5/2014, there has been a 3.0% decrease in the bone mineral density of the lumbar spine and a 0.9% increase in the bone mineral  density of the left femur.    She started alendronate about August 2016 however the prescription was written to start 6/8/2016 and stopped in 2017.  She is tolerating the medication well.    At her last visit she has corrected to an follows up with her dentist then noticed that she is in need of an implant. Her dentist has referred her to a specialist for second opinion.  She is concerned being on alendronate that she is at risk of osteonecrosis of the jaw she were to need invasive dental work.    Since her last visit she suffered a stroke.  She is waiting for 6 months after the stroke (feb or march 2018)  - Fer Stokes  To have her surgery.  She continues to exercise with weight lifting.      Vitamin D 5/2015 from [facility] at 75.8  Not taking daily vitamin D    Abnormal LFT  She does drink 2-3 alcoholic drinks a day  She had labs done recently at [facility]    Renal insufficiency  She states that as long as she hydrates before labs her labs are fine.  She had labs done recently at [facility] demonstrated a creatinine of 1.6 which is reasonable.     History of breast cancer treated with radiation therapy    Past Medical History: she has a left wrist fracture as a result of fall while rollerskating.    Family History: hip fracture mom had in her late 80's    Past Medical History:   Diagnosis Date   • Benign essential HTN 3/19/2012   • Breast cancer (CMS-HCC)    • Cancer (CMS-HCC)    • Chest tightness or pressure 3/19/2012   • High risk medication use 3/19/2012   • Hypercholesterolemia 3/19/2012   • MVP (mitral valve prolapse) 3/19/2012   • Renal insufficiency 3/19/2012   • Stroke (CMS-HCC)      Past Surgical History:   Procedure Laterality Date   • CATARACT PHACO WITH IOL  3/16/2009    Performed by FER GANDARA at SURGERY SAME DAY [facility] ORS   • CATARACT PHACO WITH IOL  1/26/2009    Performed by FER GANDARA at [facility]   • BREAST BIOPSY     • LUMPECTOMY     • PB RADIATION THERAPY PLAN  SIMPLE     • WA CHEMOTHERAPY, UNSPECIFIED PROCEDURE       No Known Allergies  Outpatient Encounter Prescriptions as of 11/15/2017   Medication Sig Dispense Refill   • atorvastatin (LIPITOR) 80 MG tablet Take 1 Tab by mouth every evening. 90 Tab 3   • Cholecalciferol (VITAMIN D3 PO) Take 2,000 Units by mouth every day.     • Cyanocobalamin (VITAMIN B-12 PO) Take 25,000 mcg by mouth every day.     • potassium chloride SA (K-DUR) 10 MEQ Tab CR Take 10 mEq by mouth every day.     • aspirin (ASA) 81 MG Chew Tab chewable tablet Take 1 Tab by mouth every day. 14 Tab 1   • valacyclovir (VALTREX) 500 MG Tab Take 500 mg by mouth every day.     • Multiple Vitamins-Minerals (ICAPS AREDS 2 PO) Take 1 Tab by mouth every day.     • calcium carbonate (OS-CRISTOFER 500) 500 MG Tab Take 1,000 mg by mouth every day.     • lisinopril (PRINIVIL) 20 MG Tab TAKE 1 TABLET DAILY 90 Tab 3   • metoprolol SR (TOPROL XL) 50 MG TABLET SR 24 HR Take 1 Tab by mouth every day. 90 Tab 3   • Multiple Vitamin (MULTIVITAMINS PO) Take 1 Tab by mouth every day.     • Doxepin HCl (SILENOR) 3 MG Tab Take 1-2 Tabs by mouth at bedtime as needed. 30 Tab 3     No facility-administered encounter medications on file as of 11/15/2017.      Social History     Social History   • Marital status:      Spouse name: N/A   • Number of children: N/A   • Years of education: N/A     Occupational History   • Not on file.     Social History Main Topics   • Smoking status: Never Smoker   • Smokeless tobacco: Never Used   • Alcohol use Yes      Comment: 3 glasses of wine   • Drug use: No   • Sexual activity: Not on file     Other Topics Concern   • Not on file     Social History Narrative   • No narrative on file      History   Smoking Status   • Never Smoker   Smokeless Tobacco   • Never Used     History   Alcohol Use   • Yes     Comment: 3 glasses of wine     History   Drug Use No      OB History   No data available      No LMP recorded. Patient has had a  hysterectomy.    [facility]     Family History   Problem Relation Age of Onset   • Cancer Mother    • Heart Failure Neg Hx    • Heart Disease Neg Hx        Review of Systems   Constitutional: Negative for chills and fever.   Gastrointestinal: Negative for nausea and vomiting.   Musculoskeletal: Negative for myalgias.        Objective:   /80   Pulse 62   Temp 36.3 °C (97.3 °F)   Resp 12   Wt 68.9 kg (152 lb)   SpO2 97%   BMI 23.80 kg/m²      Physical Exam   Constitutional: She is oriented to person, place, and time. She appears well-developed and well-nourished. No distress.   HENT:   Head: Normocephalic and atraumatic.   Right Ear: External ear normal.   Left Ear: External ear normal.   Eyes: Conjunctivae are normal. Right eye exhibits no discharge. Left eye exhibits no discharge. No scleral icterus.   Pulmonary/Chest: No respiratory distress.   Abdominal: Soft.   No hepatomegaly   Musculoskeletal:   No synovitis in the upper extremities   Neurological: She is alert and oriented to person, place, and time.   Mild left facial droop   Skin: Skin is warm. No rash noted. She is not diaphoretic. No erythema.   Limited skin exam   Psychiatric: She has a normal mood and affect. Her behavior is normal. Judgment and thought content normal.     Labs:    No results found for: QNTTBGOLD  No results found for: HEPBCORTOT, HEPBCORIGM, HEPBSAG  No results found for: HEPCAB  Lab Results   Component Value Date/Time    SODIUM 140 08/28/2017 12:01 AM    POTASSIUM 3.0 (L) 08/28/2017 12:01 AM    CHLORIDE 104 08/28/2017 12:01 AM    CO2 28 08/28/2017 12:01 AM    GLUCOSE 108 (H) 08/28/2017 12:01 AM    BUN 23 (H) 08/28/2017 12:01 AM    CREATININE 0.88 08/28/2017 12:01 AM    BUNCREATRAT 30 (H) 08/07/2013 08:47 AM      Lab Results   Component Value Date/Time    WBC 5.3 08/27/2017 02:36 PM    RBC 3.72 (L) 08/27/2017 02:36 PM    HEMOGLOBIN 13.0 08/27/2017 02:36 PM    HEMATOCRIT 38.9 08/27/2017 02:36 PM    .6 (H) 08/27/2017 02:36  PM    MCH 34.9 (H) 08/27/2017 02:36 PM    MCHC 33.4 (L) 08/27/2017 02:36 PM    MPV 9.5 08/27/2017 02:36 PM    NEUTSPOLYS 65.10 08/27/2017 02:36 PM    LYMPHOCYTES 25.20 08/27/2017 02:36 PM    MONOCYTES 7.60 08/27/2017 02:36 PM    EOSINOPHILS 1.50 08/27/2017 02:36 PM    BASOPHILS 0.40 08/27/2017 02:36 PM      Lab Results   Component Value Date/Time    CALCIUM 9.3 08/28/2017 12:01 AM    ASTSGOT 29 08/27/2017 02:36 PM    ALTSGPT 20 08/27/2017 02:36 PM    ALKPHOSPHAT 40 08/27/2017 02:36 PM    TBILIRUBIN 0.8 08/27/2017 02:36 PM    ALBUMIN 4.6 08/27/2017 02:36 PM    TOTPROTEIN 7.3 08/27/2017 02:36 PM       Assessment:     1. Osteopenia of left lower leg  DS-BONE DENSITY STUDY (DEXA)           Medical Decision Making:  Today's Assessment / Status / Plan:     Osteopenia  I would like to continue alendronate however as she may be need invasive dental surgery we will hold this until she has had a chance to complete her surgery.  We will also recheck her DEXA after her surgery.  Started alendronate 8/2016 and now we will hold 5/2017      Hold bisphosphonate for now.      Cracked tooth  willl see dentist  Reports that she will need a possible impalnt.  Plan for surgery 2/2018    Renal insufficiency and abnormal LFT  I will check  Labs for LFT    1. Osteopenia of left lower leg  DS-BONE DENSITY STUDY (DEXA)           Return in about 7 months (around 6/1/2018).    I have spent greater than 50% of this 30 minute visit in counseling/coordination of care with the patient regarding discussion of risks of invasive dental surgery given that she has been on bisphosphonates, plan to hold bisphosphonates for now and the rationale and also a discussion that I want to follow up with her within a month and to keep up with the plan for any surgical intervention regarding her cracked tooth.    She agreed and verbalized her agreement and understanding with the current plan. I answered all questions and concerns she has at this time and advised  her to call at any time in there interim with questions or concerns in regards to her care.    Thank you for allowing me to participate in her care, I will continue to follow closely.

## 2017-11-16 ENCOUNTER — APPOINTMENT (OUTPATIENT)
Dept: SPEECH THERAPY | Facility: REHABILITATION | Age: 72
End: 2017-11-16
Attending: PHYSICIAN ASSISTANT
Payer: COMMERCIAL

## 2017-11-21 ENCOUNTER — APPOINTMENT (OUTPATIENT)
Dept: SPEECH THERAPY | Facility: REHABILITATION | Age: 72
End: 2017-11-21
Attending: PHYSICIAN ASSISTANT
Payer: COMMERCIAL

## 2017-11-27 DIAGNOSIS — I10 BENIGN ESSENTIAL HTN: ICD-10-CM

## 2017-11-27 RX ORDER — METOPROLOL SUCCINATE 50 MG/1
50 TABLET, EXTENDED RELEASE ORAL DAILY
Qty: 90 TAB | Refills: 3 | Status: SHIPPED | OUTPATIENT
Start: 2017-11-27 | End: 2018-12-11 | Stop reason: SDUPTHER

## 2017-11-29 ENCOUNTER — OCCUPATIONAL THERAPY (OUTPATIENT)
Dept: OCCUPATIONAL THERAPY | Facility: REHABILITATION | Age: 72
End: 2017-11-29
Attending: PHYSICIAN ASSISTANT
Payer: COMMERCIAL

## 2017-11-29 DIAGNOSIS — I63.9 IMPENDING CEREBROVASCULAR ACCIDENT (HCC): ICD-10-CM

## 2017-11-29 PROCEDURE — 97530 THERAPEUTIC ACTIVITIES: CPT

## 2017-11-29 PROCEDURE — 97112 NEUROMUSCULAR REEDUCATION: CPT

## 2017-11-29 ASSESSMENT — ACTIVITIES OF DAILY LIVING (ADL): POOR_BALANCE: 1

## 2017-11-29 NOTE — OP THERAPY DAILY TREATMENT
"Outpatient Occupational Therapy   NEUROLOGICAL DAILY TREATMENT     [facility] Occupational Therapy [address]  [address]  [place] [address]  Phone:  [phone]  Fax:  [phone]    Date: 11/29/2017    Patient: Shaista Bocanegra  YOB: 1945  MRN: [id number]     Time Calculation  Start time: 0800  Stop time: 0830 Time Calculation (min): 30 minutes     Chief Complaint: Hand Weakness and Hand Numbness    Visit #: 2    Subjective \"I'm doing the mirror box therapy and it's helping\"    Objective:     Strength:     , Prehension, Pinch:  /Prehension (left):      strength: Impaired (38lbs)       Therapeutic Treatments and Modalities:    1. Neuromuscular Re-education (CPT 61212), 15 minutes, see below    2. Therapeutic Activities (CPT 65399), 15 minutes, see below    Therapeutic Treatments and Modalities Summary: Left hand sensory re-education via large vibrator circumferentially, composite fist x 20 reps.  Theraputty exercises:  , pinch, roll log, in-hand manipulation, picking out coins, gross extension flattening out putty, GMC punching holes with plastic tube.  Issued written HEP with good understanding.      Assessment, Response and Plan:   Impairments: fine motor function, hypersensitivity, impaired balance, impaired physical strength and lacks appropriate home exercise program    Assessment details:  Pt making good progress today with left hand strength and sensorimotor function for daily routine.  Written HEP issued with good understanding.  Prognosis: good      Plan:   Therapy options:  Occupational therapy treatment to continue  Planned therapy interventions:  Neuromuscular Re-education (CPT 56900), Therapeutic Exercise (CPT 01631), Therapeutic Activities (CPT 48474), Sensory Integration Techniques (CPT 88902) and Contrast Bath (CPT 94322)  Frequency:  1x week  Duration in weeks:  4  Duration in visits:  4        "

## 2017-12-01 LAB
ALBUMIN SERPL-MCNC: 4.6 G/DL (ref 3.5–4.8)
ALBUMIN/GLOB SERPL: 1.8 {RATIO} (ref 1.2–2.2)
ALP SERPL-CCNC: 87 IU/L (ref 39–117)
ALT SERPL-CCNC: 40 IU/L (ref 0–32)
AST SERPL-CCNC: 39 IU/L (ref 0–40)
BILIRUB SERPL-MCNC: 0.8 MG/DL (ref 0–1.2)
BUN SERPL-MCNC: 21 MG/DL (ref 8–27)
BUN/CREAT SERPL: 23 (ref 12–28)
CALCIUM SERPL-MCNC: 9.3 MG/DL (ref 8.7–10.3)
CHLORIDE SERPL-SCNC: 104 MMOL/L (ref 96–106)
CO2 SERPL-SCNC: 24 MMOL/L (ref 18–29)
CREAT SERPL-MCNC: 0.9 MG/DL (ref 0.57–1)
GFR SERPLBLD CREATININE-BSD FMLA CKD-EPI: 63 ML/MIN/1.73
GFR SERPLBLD CREATININE-BSD FMLA CKD-EPI: 73 ML/MIN/1.73
GLOBULIN SER CALC-MCNC: 2.6 G/DL (ref 1.5–4.5)
GLUCOSE SERPL-MCNC: 89 MG/DL (ref 65–99)
POTASSIUM SERPL-SCNC: 3.6 MMOL/L (ref 3.5–5.2)
PROT SERPL-MCNC: 7.2 G/DL (ref 6–8.5)
SODIUM SERPL-SCNC: 144 MMOL/L (ref 134–144)

## 2017-12-04 ENCOUNTER — OCCUPATIONAL THERAPY (OUTPATIENT)
Dept: OCCUPATIONAL THERAPY | Facility: REHABILITATION | Age: 72
End: 2017-12-04
Attending: PHYSICIAN ASSISTANT
Payer: COMMERCIAL

## 2017-12-04 DIAGNOSIS — I63.9 IMPENDING CEREBROVASCULAR ACCIDENT (HCC): ICD-10-CM

## 2017-12-04 PROCEDURE — 97112 NEUROMUSCULAR REEDUCATION: CPT

## 2017-12-04 PROCEDURE — 97530 THERAPEUTIC ACTIVITIES: CPT

## 2017-12-04 ASSESSMENT — ACTIVITIES OF DAILY LIVING (ADL): POOR_BALANCE: 1

## 2017-12-04 NOTE — OP THERAPY DAILY TREATMENT
"Outpatient Occupational Therapy   NEUROLOGICAL DAILY TREATMENT     [facility] Occupational Therapy [address].  [address]  [place] [address]  Phone:  [phone]  Fax:  [phone]    Date: 12/04/2017    Patient: Shaista Bocanegra  YOB: 1945  MRN: [id number]     Time Calculation  Start time: 1000  Stop time: 1030 Time Calculation (min): 30 minutes     Chief Complaint: Hand Weakness and Hand Numbness    Visit #: 3    Subjective \"I've been sleeping better\"    Objective    Therapeutic Treatments and Modalities:    1. Neuromuscular Re-education (CPT 30060), 15 minutes, see below    2. Therapeutic Activities (CPT 59089), 15 minutes, see below    Therapeutic Treatments and Modalities Summary: Left hand sensory re-education via bucked of dry macaroni.  Composite fist x 20 reps.  \"Ok/shoot\" ex  X 10 reps.  Gross digit extension strengthening with band 10 reps 5 second hold.  Tongue depressor turning/flipping bilateral directions x 30.  Translation coins palm to tip 6-8 coins.  Tall pegboard incorporating [facility] elbow flex/ext full board.   HEP updated with good understanding.      Assessment, Response and Plan:   Impairments: fine motor function, hypersensitivity, impaired balance, impaired physical strength and lacks appropriate home exercise program    Assessment details:  Pt making good progress today with left hand function in response to sensory re-education and fine/gross motor control activities.   HEP updated with good understanding.  Prognosis: good      Plan:   Therapy options:  Occupational therapy treatment to continue  Planned therapy interventions:  Neuromuscular Re-education (CPT 14721), Therapeutic Exercise (CPT 28321), Therapeutic Activities (CPT 89285), Sensory Integration Techniques (CPT 21804) and Contrast Bath (CPT 35638)  Frequency:  1x week  Duration in weeks:  3  Duration in visits:  3  Discussed with:  Patient        "

## 2017-12-05 ENCOUNTER — OFFICE VISIT (OUTPATIENT)
Dept: CARDIOLOGY | Facility: MEDICAL CENTER | Age: 72
End: 2017-12-05
Payer: COMMERCIAL

## 2017-12-05 ENCOUNTER — SPEECH THERAPY (OUTPATIENT)
Dept: SPEECH THERAPY | Facility: REHABILITATION | Age: 72
End: 2017-12-05
Attending: PHYSICIAN ASSISTANT
Payer: COMMERCIAL

## 2017-12-05 VITALS
SYSTOLIC BLOOD PRESSURE: 140 MMHG | BODY MASS INDEX: 23.32 KG/M2 | OXYGEN SATURATION: 92 % | DIASTOLIC BLOOD PRESSURE: 98 MMHG | HEART RATE: 62 BPM | HEIGHT: 67 IN | WEIGHT: 148.6 LBS

## 2017-12-05 DIAGNOSIS — I10 BENIGN ESSENTIAL HTN: Chronic | ICD-10-CM

## 2017-12-05 DIAGNOSIS — R41.841 COGNITIVE COMMUNICATION DEFICIT: ICD-10-CM

## 2017-12-05 DIAGNOSIS — I34.0 NON-RHEUMATIC MITRAL REGURGITATION: ICD-10-CM

## 2017-12-05 DIAGNOSIS — R49.9 VOICE DISORDER: ICD-10-CM

## 2017-12-05 PROCEDURE — 97532 HCHG COGNITIVE SKILL DEV. 15 MIN: CPT

## 2017-12-05 PROCEDURE — 92507 TX SP LANG VOICE COMM INDIV: CPT

## 2017-12-05 PROCEDURE — 99214 OFFICE O/P EST MOD 30 MIN: CPT | Performed by: INTERNAL MEDICINE

## 2017-12-05 RX ORDER — METOPROLOL SUCCINATE 25 MG/1
25 TABLET, EXTENDED RELEASE ORAL DAILY
Qty: 90 TAB | Refills: 4 | Status: SHIPPED | OUTPATIENT
Start: 2017-12-05 | End: 2018-12-11 | Stop reason: SDUPTHER

## 2017-12-05 ASSESSMENT — ENCOUNTER SYMPTOMS
COUGH: 0
NERVOUS/ANXIOUS: 1
DIZZINESS: 0
DEPRESSION: 0
PND: 0
INSOMNIA: 0
SHORTNESS OF BREATH: 0
MUSCULOSKELETAL NEGATIVE: 1
PALPITATIONS: 0
WHEEZING: 0
MEMORY LOSS: 0

## 2017-12-05 NOTE — LETTER
[facility]   [address]  [place] [address]  Phone: [phone]  Fax: [phone]              Shaista Bocanegra  1945    Encounter Date: 12/5/2017    Pascale Daniels M.D.          PROGRESS NOTE:  Subjective:   Shaista Bocanegra is a 72 y.o. female who presents today In follow-up in regards to her hypertension and mitral regurgitation    More organized today  Blood pressure log shows that most of them are in the 140s  Feeling well, not sleeping and also asks me about beet juice    Past Medical History:   Diagnosis Date   • Benign essential HTN 3/19/2012   • Breast cancer (CMS-HCC)    • Cancer (CMS-HCC)    • Chest tightness or pressure 3/19/2012   • High risk medication use 3/19/2012   • Hypercholesterolemia 3/19/2012   • MVP (mitral valve prolapse) 3/19/2012   • Renal insufficiency 3/19/2012   • Stroke (CMS-HCC)      Past Surgical History:   Procedure Laterality Date   • CATARACT PHACO WITH IOL  3/16/2009    Performed by SHANNON GANDARA at SURGERY SAME DAY [facility] ORS   • CATARACT PHACO WITH IOL  1/26/2009    Performed by SHANNON GANDARA at [facility]GERY [facility] DAY [facility]   • BREAST BIOPSY     • LUMPECTOMY     • PB RADIATION THERAPY PLAN SIMPLE     • [place] CHEMOTHERAPY, UNSPECIFIED PROCEDURE       Family History   Problem Relation Age of Onset   • Cancer Mother    • Heart Failure Neg Hx    • Heart Disease Neg Hx      History   Smoking Status   • Never Smoker   Smokeless Tobacco   • Never Used     No Known Allergies  Outpatient Encounter Prescriptions as of 12/5/2017   Medication Sig Dispense Refill   • metoprolol SR (TOPROL XL) 50 MG TABLET SR 24 HR Take 1 Tab by mouth every day. 90 Tab 3   • atorvastatin (LIPITOR) 80 MG tablet Take 1 Tab by mouth every evening. 90 Tab 3   • Cholecalciferol (VITAMIN D3 PO) Take 2,000 Units by mouth every day.     • Cyanocobalamin (VITAMIN B-12 PO) Take 25,000 mcg by mouth every day.     • potassium chloride SA (K-DUR) 10 MEQ  "Tab CR Take 10 mEq by mouth every day.     • aspirin (ASA) 81 MG Chew Tab chewable tablet Take 1 Tab by mouth every day. 14 Tab 1   • valacyclovir (VALTREX) 500 MG Tab Take 500 mg by mouth every day.     • Multiple Vitamins-Minerals (ICAPS AREDS 2 PO) Take 1 Tab by mouth every day.     • calcium carbonate (OS-CRISTOFER 500) 500 MG Tab Take 1,000 mg by mouth every day.     • lisinopril (PRINIVIL) 20 MG Tab TAKE 1 TABLET DAILY 90 Tab 3   • Multiple Vitamin (MULTIVITAMINS PO) Take 1 Tab by mouth every day.     • Doxepin HCl (SILENOR) 3 MG Tab Take 1-2 Tabs by mouth at bedtime as needed. 30 Tab 3     No facility-administered encounter medications on file as of 12/5/2017.      Review of Systems   Constitutional: Positive for malaise/fatigue.   Respiratory: Negative for cough, shortness of breath and wheezing.    Cardiovascular: Negative for chest pain, palpitations and PND.   Musculoskeletal: Negative.    Neurological: Negative for dizziness.   Psychiatric/Behavioral: Negative for depression and memory loss. The patient is nervous/anxious. The patient does not have insomnia.    All other systems reviewed and are negative.       Objective:   /98   Pulse 62   Ht 1.702 m (5' 7\")   Wt 67.4 kg (148 lb 9.6 oz)   SpO2 92%   BMI 23.27 kg/m²      Physical Exam   Constitutional: She is oriented to person, place, and time. She appears well-developed and well-nourished. No distress.   Younger than stated age   HENT:   Head: Normocephalic and atraumatic.   Mouth/Throat: Mucous membranes are normal.   Eyes: EOM are normal. Pupils are equal, round, and reactive to light. No scleral icterus.   Neck: No JVD present. No thyroid mass and no thyromegaly present.   Cardiovascular: Normal rate, regular rhythm and intact distal pulses.    Murmur: 2 of 6 systolic blowing murmur.  Pulmonary/Chest: Effort normal and breath sounds normal. She exhibits no tenderness.   Abdominal: Bowel sounds are normal. She exhibits no distension.   "   Musculoskeletal: Normal range of motion. She exhibits no edema.   Neurological: She is alert and oriented to person, place, and time. She has normal strength. She displays no tremor. No cranial nerve deficit. She exhibits normal muscle tone.   Skin: Skin is warm and dry. No rash noted. She is not diaphoretic.   Psychiatric: She has a normal mood and affect. Her behavior is normal.   Vitals reviewed.      Assessment:     1. Non-rheumatic mitral regurgitation  Echocardiogram Comp w/o Cont   2. Benign essential HTN         Medical Decision Making:  Today's Assessment / Status / Plan:       Hypertension  Stroke this summer  Much more compliant, I will add metoprolol 25 making a total of 75 a day. We will call her in a month  No more follow-up with neuro  Aspirin    Mitral regurgitation  Moderate with high blood pressure  Again low blood pressures daily, we discussed this again  She again wants to talk about surgery but I don't think this questions on the table    Reorientation  She will follow with her primary    I will see her back in 6 months with her annual echo      Bethany Crane M.D.  [address] [place] [address] [place] [address]  VIA Facsimile: [phone]

## 2017-12-06 NOTE — OP THERAPY DAILY TREATMENT
"Outpatient Speech Therapy  DAILY TREATMENT     [facility] Speech Therapy [address].  [address]  [place] [address]  Phone:  [phone]  Fax:  [phone]    Date: 12/05/2017    Patient: Shaista Bocanegra  YOB: 1945  MRN: [id number]     Time Calculation  Start time: 1405  Stop time: 1500 Time Calculation (min): 55 minutes     Chief Complaint: Other (low volume/flat voice)    Visit #: 9    Subjective Evaluation  Pt a running couple mins late.  In good spirits.  \"I need to yell some more\" (in reference to working on voicing).      Objective:   Treatments/Interventions Performed:  Speech/Language treatment, Cognitive-Linguistic training, Compensatory strategy training and Home program  Objective Details:  Pt continues to sound low volume/breathing/flat during conversational speech when not given cues.  After reviewing vocal adduction/breath support strategies pt did very well with increased volume/decreasing breathy speech at sentence level reading and structured conversation.  Able to improve prosody of speech/voice as well.  Pt rating voice 6/10 on 0-10 scale (overall moderate).  She notes it does take a lot of energy to increase volume and give \"life\" to her voice with use of stress and intonation.  Pt able to demo use well but has a very difficult time carrying over.  Attn/memory also addressed.  Pt notes one scheduling error recenlty.  She is using a dayplanner and double checking all entries.  Pt is able to recall and mentally manipulate 4 unit info with 100% accuracy.  5 unit is much more difficult.  Able to recall with 80% and required repetitions 75% of the time to mentally manipulate information.  Pt completed high level visual divided attn task requiring attending to 3 targets in a page length reading then answering comprehension questions.  Pt able to ID targets with 33% accuracy the first reading, answered comprehension questions with good accuracy.  Increased to 43% the second " reading, again 100% for comprehension questions.       Speech Therapy Assessment:     Cognitive Linguistic Assessment:     Patient attention divided: Minimal    Patient immediate memory: Minimal    Patient recent and short term memory: Minimal    Patient ability to organize thoughts: Supervision Required    Patient initiation and self monitoring ability: Moderate      Speech Mechanism Assessment:     Patient voice description: Reduced Intensity (breathy)      Speech Therapy Plan :   Planned Therapy Interventions:  Home Program, Compensatory Strategy Training, Cognitive-Linguistic training and Speech/Language training,   Frequency:  1x week  Duration (in visits):  2    Pt planning on returning back to work in January.  She feels she will be able to handle the work load at that time.

## 2017-12-12 ENCOUNTER — OCCUPATIONAL THERAPY (OUTPATIENT)
Dept: OCCUPATIONAL THERAPY | Facility: REHABILITATION | Age: 72
End: 2017-12-12
Attending: PHYSICIAN ASSISTANT
Payer: COMMERCIAL

## 2017-12-12 DIAGNOSIS — I63.9 IMPENDING CEREBROVASCULAR ACCIDENT (HCC): ICD-10-CM

## 2017-12-12 PROCEDURE — 97530 THERAPEUTIC ACTIVITIES: CPT

## 2017-12-12 PROCEDURE — 97112 NEUROMUSCULAR REEDUCATION: CPT

## 2017-12-12 ASSESSMENT — ACTIVITIES OF DAILY LIVING (ADL): POOR_BALANCE: 1

## 2017-12-13 NOTE — OP THERAPY DAILY TREATMENT
"  Outpatient Occupational Therapy   NEUROLOGICAL DAILY TREATMENT     [facility] Occupational Therapy [address].  [address]  [place] [address]  Phone:  [phone]  Fax:  [phone]    Date: 12/12/2017    Patient: Shaista Bocanegra  YOB: 1945  MRN: [id number]     Time Calculation  Start time: 0430  Stop time: 0500 Time Calculation (min): 30 minutes     Chief Complaint: Hand Weakness and Hand Numbness    Visit #: 4    Subjective \"My left hand sensation is a little better\"    Objective       Therapeutic Treatments and Modalities:    1. Neuromuscular Re-education (CPT 99661), 15 minutes, see below    2. Therapeutic Activities (CPT 56978), 15 minutes, see below    Therapeutic Treatments and Modalities Summary: Left hand sensory re-education via bucket of electrical wire covering x 5 minutes.  Composite fist x 20 reps.   Towel rubs x 3 minutes.  Small plastic tube rolling up/down each digit with thumb x 10.  Sequential isolated digit tapping on table, counting exercise, isolated digit ulnar/radial deviation exercise.  HEP updated with good understanding.      Assessment, Response and Plan:   Impairments: fine motor function, hypersensitivity, impaired balance, impaired physical strength and lacks appropriate home exercise program    Assessment details:  Pt making steady gains today with left hand sensorimotor function in response to graded sensory re-education and fine motor control activities.   HEP updated with good understanding.  Prognosis: good      Plan:   Therapy options:  Occupational therapy treatment to continue  Planned therapy interventions:  Neuromuscular Re-education (CPT 88331), Therapeutic Exercise (CPT 39846), Therapeutic Activities (CPT 71990), Sensory Integration Techniques (CPT 68681) and Contrast Bath (CPT 29182)  Frequency:  1x week  Duration in weeks:  2  Duration in visits:  2  Discussed with:  Patient        "

## 2017-12-19 ENCOUNTER — OCCUPATIONAL THERAPY (OUTPATIENT)
Dept: OCCUPATIONAL THERAPY | Facility: REHABILITATION | Age: 72
End: 2017-12-19
Attending: PHYSICIAN ASSISTANT
Payer: COMMERCIAL

## 2017-12-19 DIAGNOSIS — I63.9 IMPENDING CEREBROVASCULAR ACCIDENT (HCC): ICD-10-CM

## 2017-12-19 PROCEDURE — 97530 THERAPEUTIC ACTIVITIES: CPT

## 2017-12-19 ASSESSMENT — BALANCE ASSESSMENTS
BALANCE - STANDING STATIC: GOOD
BALANCE - STANDING DYNAMIC: GOOD
BALANCE - SITTING DYNAMIC: GOOD
BALANCE - SITTING STATIC: GOOD

## 2017-12-19 NOTE — OP THERAPY DISCHARGE SUMMARY
"  Outpatient Occupational Therapy  NEUROLOGICAL DISCHARGE SUMMARY NOTE    [facility] Occupational Therapy [address]  [address]  [place] [address]  Phone:  [phone]  Fax:  [phone]    Date of Visit: 12/19/2017    Patient: Shaista Bocanegra  YOB: 1945  MRN: [id number]     Referring Provider: Melanie Guerrero P.A.-C.  [address] [place] [id number]   Referring Diagnosis Cerebral infarction, unspecified [I63.9]     Visit #: 5    Time Calculation  Start time: 1100  Stop time: 1130 Time Calculation (min): 30 minutes     Occupational Therapy Occurrence Codes    Date of onset of impairment:  8/18/17   Date of occupational therapy care plan established or reviewed:  10/31/17   Date of occupational therapy treatment started:  10/31/17          Chief Complaint: Hand Numbness and Hand Weakness    Visit Diagnoses     ICD-10-CM   1. Impending cerebrovascular accident (CMS-HCC) I63.9       Subjective \"I forgot about last Friday's appointment, I was out of town\", \"My typing is better than what it was before we started\"    Objective  See daily note from 12/19/17 for details    Exercises/Treatments See daily note from 12/19/17.    Assessment/Response/Plan  Pt has made good progress towards LTGs with steady gains in her left hand sensorimotor function.  Pt is safe to d/c to HEP.  No further OT services recommended at this time.    Functional Limitation G-Codes and Severity Modifiers  OT Functional Assessment Tool Used: Grooved pegboard  OT Functional Assessment Score: 325 seconds  Goal:     Discharge:       "

## 2017-12-19 NOTE — OP THERAPY DAILY TREATMENT
"  Outpatient Occupational Therapy   NEUROLOGICAL DAILY TREATMENT     [facility] Occupational [address].  [address]  [place] [address]  Phone:  [phone]  Fax:  [phone]    Date: 12/19/2017    Patient: Shaista Bocanegra  YOB: 1945  MRN: [id number]     Time Calculation  Start time: 1100  Stop time: 1130 Time Calculation (min): 30 minutes     Chief Complaint: Hand Numbness and Hand Weakness    Visit #: 5    Subjective \"I haven't been able to do my exercises because I haven't had a lot of time\"    Objective:     Strength:     Left upper extremity strength within functional limits.      , Prehension, Pinch:  /Prehension (left):      strength: Impaired (35lbs)  /Prehension (right):      strength: Within functional limits (48lbs)    Strength Comments:  Left hand intrinsic muscle strength 4-/5    Tone, Sensation and Coordination:     Sensation   Upper extremity (left):     Light touch: Intact    Sharp/dull: Intact     Sensation comments:   [place]: normal LH.  Pt reports mild hypoesthesia.    Coordination   Upper extremity (left):     Fine motor: Within functional limits (mild dec speed and accuracy)    Gross motor: Within functional limits (mild dec speed and accuracy)    Finger to finger: Within functional limits    Finger touch to nose: Within functional limits      Coordination comments:   Grooved pegboard: 327 seconds    Postural Control (Balance)     Sitting (static): Good    Sitting (dynamic): Good    Standing (static): Good    Standing (dynamic): Good    OT Functional Assessment Tool Used: Grooved pegboard  OT Functional Assessment Score: 325 seconds    Therapeutic Treatments and Modalities:    1. Therapeutic Activities (CPT 48229), 30 minutes    Therapeutic Treatments and Modalities Summary: Grooved pegboard with LH, reviewed all ther ex for left hand strengthening and dexterity with good understanding.  Re-tested all areas above.      Assessment, " Response and Plan:   Assessment details:  Pt has actively participated in skilled OT program and has made good progress towards the LTGs which were established in her initial plan of care.  Pt is independent ADLs, IADLs, and work activities with mild difficulty during typing.  She has made steady gains with her left hand sensorimotor function in response to graded sensory re-education, strengthening, and fine motor control activities.  Mild deficits persist.  At this point, pt is safe to d/c to HEP.  No further OT services recommended at this time.    Plan:   Therapy options:  Discharged due to accomplished goals and no further treatment needed  Discussed with:  Patient

## 2017-12-28 ENCOUNTER — TELEPHONE (OUTPATIENT)
Dept: NEUROLOGY | Facility: MEDICAL CENTER | Age: 72
End: 2017-12-28

## 2017-12-28 NOTE — TELEPHONE ENCOUNTER
Pt calling, states she is still having a hard time sleeping and the meds prescribed to her are not helping. Informed pt that per Melanie's advise back in oct we had recommended pt get a referral from her PCP to see a sleep specialist. Pt verbalized understanding.

## 2018-01-04 ENCOUNTER — TELEPHONE (OUTPATIENT)
Dept: CARDIOLOGY | Facility: MEDICAL CENTER | Age: 73
End: 2018-01-04

## 2018-01-04 NOTE — TELEPHONE ENCOUNTER
----- Message -----  From: Pascale Daniels M.D.  Sent: 1/4/2018  To: Joann Verdin R.N.  Subject: future task                                      Please call patient, see my note from December 4    =======================================================================    Upon chart review OV notes from Dr Daniels 12/5/17:    Hypertension  Stroke this summer  Much more compliant, I will add metoprolol 25 making a total of 75 a day. We will call her in a month  No more follow-up with neuro  Aspirin    Attempted to call pt, no answer, left vm to call back

## 2018-01-04 NOTE — TELEPHONE ENCOUNTER
JIMBO Hernandez/Mariana     Patient is returning your call from today and can be reached at [phone] or [phone].      ==============================================================    S/w pt, pt reports that she has not received the new meds that Dr Daniels ordered (additional Toprol 25mg daily), informed pt that upon chart review Dr Daniels send it to [facility] 12/5/17, instructed pt to please call [facility] to follow up, pt verbalizes understanding, pt reports BP has been the same since she has not been taking the extra dose recommended, reinforced education regarding compliance of medications, pt understands    FYI to Dr Daniels

## 2018-01-05 NOTE — TELEPHONE ENCOUNTER
Pt called back, pt reports she did received the New Rx for additional Toprol 25mg but she thought it was just a regular refill but she did start taking it last night and she will keep a log of her BP and will report it back to us in two weeks.

## 2018-01-05 NOTE — TELEPHONE ENCOUNTER
Susan Mullins R.N.   Phone Number: [phone]             LA/bronson     Pt returning your call from yesterday, please try pt today at       ===============================================================================    Attempted to call pt, no answer, left vm to call back

## 2018-01-08 ENCOUNTER — APPOINTMENT (OUTPATIENT)
Dept: SPEECH THERAPY | Facility: REHABILITATION | Age: 73
End: 2018-01-08
Attending: PHYSICIAN ASSISTANT
Payer: COMMERCIAL

## 2018-01-19 ENCOUNTER — APPOINTMENT (OUTPATIENT)
Dept: SPEECH THERAPY | Facility: REHABILITATION | Age: 73
End: 2018-01-19
Attending: PHYSICIAN ASSISTANT
Payer: COMMERCIAL

## 2018-01-29 ENCOUNTER — APPOINTMENT (RX ONLY)
Dept: URBAN - METROPOLITAN AREA CLINIC 31 | Facility: CLINIC | Age: 73
Setting detail: DERMATOLOGY
End: 2018-01-29

## 2018-01-29 DIAGNOSIS — L82.1 OTHER SEBORRHEIC KERATOSIS: ICD-10-CM

## 2018-01-29 DIAGNOSIS — L57.0 ACTINIC KERATOSIS: ICD-10-CM

## 2018-01-29 DIAGNOSIS — L30.9 DERMATITIS, UNSPECIFIED: ICD-10-CM

## 2018-01-29 DIAGNOSIS — D22 MELANOCYTIC NEVI: ICD-10-CM

## 2018-01-29 DIAGNOSIS — L81.4 OTHER MELANIN HYPERPIGMENTATION: ICD-10-CM

## 2018-01-29 DIAGNOSIS — L90.5 SCAR CONDITIONS AND FIBROSIS OF SKIN: ICD-10-CM

## 2018-01-29 DIAGNOSIS — L82.0 INFLAMED SEBORRHEIC KERATOSIS: ICD-10-CM

## 2018-01-29 DIAGNOSIS — L21.8 OTHER SEBORRHEIC DERMATITIS: ICD-10-CM

## 2018-01-29 PROBLEM — D22.72 MELANOCYTIC NEVI OF LEFT LOWER LIMB, INCLUDING HIP: Status: ACTIVE | Noted: 2018-01-29

## 2018-01-29 PROBLEM — D22.5 MELANOCYTIC NEVI OF TRUNK: Status: ACTIVE | Noted: 2018-01-29

## 2018-01-29 PROCEDURE — 17110 DESTRUCTION B9 LES UP TO 14: CPT

## 2018-01-29 PROCEDURE — 17000 DESTRUCT PREMALG LESION: CPT | Mod: 59

## 2018-01-29 PROCEDURE — 99213 OFFICE O/P EST LOW 20 MIN: CPT | Mod: 25

## 2018-01-29 PROCEDURE — ? PATIENT SPECIFIC COUNSELING

## 2018-01-29 PROCEDURE — ? LIQUID NITROGEN

## 2018-01-29 PROCEDURE — ? OBSERVATION AND MEASURE

## 2018-01-29 PROCEDURE — 17003 DESTRUCT PREMALG LES 2-14: CPT

## 2018-01-29 PROCEDURE — ? COUNSELING

## 2018-01-29 PROCEDURE — ? DIAGNOSIS COMMENT

## 2018-01-29 ASSESSMENT — LOCATION DETAILED DESCRIPTION DERM
LOCATION DETAILED: LEFT POSTERIOR SHOULDER
LOCATION DETAILED: MID POSTERIOR NECK
LOCATION DETAILED: LEFT BUTTOCK
LOCATION DETAILED: LEFT ANTERIOR EARLOBE
LOCATION DETAILED: RIGHT BUTTOCK
LOCATION DETAILED: RIGHT SUPERIOR UPPER BACK
LOCATION DETAILED: RIGHT LATERAL SUPERIOR CHEST
LOCATION DETAILED: RIGHT RADIAL DORSAL HAND
LOCATION DETAILED: LEFT DORSAL 5TH TOE
LOCATION DETAILED: LEFT SUPERIOR PARIETAL SCALP
LOCATION DETAILED: LEFT CENTRAL FRONTAL SCALP
LOCATION DETAILED: LEFT MEDIAL FRONTAL SCALP
LOCATION DETAILED: LEFT SUPERIOR MEDIAL FOREHEAD
LOCATION DETAILED: RIGHT SUPERIOR MEDIAL UPPER BACK
LOCATION DETAILED: LEFT SUPERIOR UPPER BACK
LOCATION DETAILED: RIGHT INFERIOR LATERAL NECK
LOCATION DETAILED: RIGHT POSTERIOR SHOULDER

## 2018-01-29 ASSESSMENT — LOCATION SIMPLE DESCRIPTION DERM
LOCATION SIMPLE: RIGHT SHOULDER
LOCATION SIMPLE: POSTERIOR NECK
LOCATION SIMPLE: LEFT UPPER BACK
LOCATION SIMPLE: CHEST
LOCATION SIMPLE: LEFT EAR
LOCATION SIMPLE: LEFT FOREHEAD
LOCATION SIMPLE: LEFT SCALP
LOCATION SIMPLE: LEFT 5TH TOE
LOCATION SIMPLE: RIGHT UPPER BACK
LOCATION SIMPLE: LEFT SHOULDER
LOCATION SIMPLE: LEFT BUTTOCK
LOCATION SIMPLE: RIGHT BUTTOCK
LOCATION SIMPLE: SCALP
LOCATION SIMPLE: RIGHT ANTERIOR NECK
LOCATION SIMPLE: RIGHT HAND

## 2018-01-29 ASSESSMENT — LOCATION ZONE DERM
LOCATION ZONE: SCALP
LOCATION ZONE: HAND
LOCATION ZONE: TOE
LOCATION ZONE: FACE
LOCATION ZONE: NECK
LOCATION ZONE: EAR
LOCATION ZONE: ARM
LOCATION ZONE: TRUNK

## 2018-01-29 NOTE — PROCEDURE: LIQUID NITROGEN
Detail Level: Detailed
Render Post-Care Instructions In Note?: no
Include Z78.9 (Other Specified Conditions Influencing Health Status) As An Associated Diagnosis?: Yes
Medical Necessity Information: It is in your best interest to select a reason for this procedure from the list below. All of these items fulfill various CMS LCD requirements except the new and changing color options.
Post-Care Instructions: I reviewed with the patient in detail post-care instructions. Patient is to wear sunprotection, and avoid picking at any of the treated lesions. Pt may apply Vaseline to crusted or scabbing areas.
Medical Necessity Clause: This procedure was medically necessary because the lesions that were treated were:
Consent: The patient's consent was obtained including but not limited to risks of crusting, scabbing, blistering, scarring, darker or lighter pigmentary change, recurrence, incomplete removal and infection.
Duration Of Freeze Thaw-Cycle (Seconds): 0

## 2018-01-29 NOTE — PROCEDURE: PATIENT SPECIFIC COUNSELING
May use Hydrocortisone 2.5% cream twice a day, Rx previously given to patient.
Detail Level: Zone
No evidence of neoplasm noted by hairdresser today.  Suggested patient send a photo if hairdresser can locate it again.

## 2018-02-05 ENCOUNTER — APPOINTMENT (RX ONLY)
Dept: URBAN - METROPOLITAN AREA CLINIC 4 | Facility: CLINIC | Age: 73
Setting detail: DERMATOLOGY
End: 2018-02-05

## 2018-02-05 DIAGNOSIS — D22 MELANOCYTIC NEVI: ICD-10-CM

## 2018-02-05 PROBLEM — D48.5 NEOPLASM OF UNCERTAIN BEHAVIOR OF SKIN: Status: ACTIVE | Noted: 2018-02-05

## 2018-02-05 PROCEDURE — ? BIOPSY BY PUNCH METHOD

## 2018-02-05 PROCEDURE — 11100: CPT | Mod: 79

## 2018-02-05 ASSESSMENT — LOCATION ZONE DERM: LOCATION ZONE: SCALP

## 2018-02-05 ASSESSMENT — LOCATION SIMPLE DESCRIPTION DERM: LOCATION SIMPLE: SCALP

## 2018-02-05 ASSESSMENT — LOCATION DETAILED DESCRIPTION DERM: LOCATION DETAILED: LEFT SUPERIOR PARIETAL SCALP

## 2018-02-05 NOTE — PROCEDURE: BIOPSY BY PUNCH METHOD
Notification Instructions: Patient will be notified of biopsy results. However, patient instructed to call the office if not contacted within 2 weeks.
Bill For Surgical Tray: no
Punch Size In Mm: 4
Suture Removal: 14 days
X Size Of Lesion In Cm (Optional): 0
Biopsy Type: H and E
Hemostasis: None
Dressing: bandage
Wound Care: Petrolatum
Billing Type: Third-Party Bill
Anesthesia Volume In Cc: 1
Anesthesia Type: 1% lidocaine with epinephrine
Render Post-Care Instructions In Note?: yes
Lab: [id number]
Detail Level: Detailed
Home Suture Removal Text: Patient was provided a home suture removal kit and will remove their sutures at home.  If they have any questions or difficulties they will call the office.
Post-Care Instructions: I reviewed with the patient in detail post-care instructions. Patient is to keep the biopsy site dry overnight, and then apply bacitracin twice daily until healed. Patient may apply hydrogen peroxide soaks to remove any crusting.
Lab Facility: 
Consent: Written consent was obtained and risks were reviewed including but not limited to scarring, infection, bleeding, scabbing, incomplete removal, nerve damage and allergy to anesthesia.
Epidermal Sutures: 4-0 Ethilon

## 2018-02-05 NOTE — HPI: SKIN LESION
Is This A New Presentation, Or A Follow-Up?: Skin Lesion
How Severe Is Your Skin Lesion?: moderate
Has Your Skin Lesion Been Treated?: not been treated
Additional History: Patient’s  noticed this lesion about 3 months ago.

## 2018-02-06 ENCOUNTER — HOSPITAL ENCOUNTER (OUTPATIENT)
Dept: RADIOLOGY | Facility: MEDICAL CENTER | Age: 73
End: 2018-02-06
Attending: OBSTETRICS & GYNECOLOGY
Payer: COMMERCIAL

## 2018-02-06 DIAGNOSIS — Z12.39 BREAST SCREENING: ICD-10-CM

## 2018-02-06 PROCEDURE — 77067 SCR MAMMO BI INCL CAD: CPT

## 2018-02-14 DIAGNOSIS — I10 ESSENTIAL HYPERTENSION: ICD-10-CM

## 2018-02-15 ENCOUNTER — TELEPHONE (OUTPATIENT)
Dept: SPEECH THERAPY | Facility: REHABILITATION | Age: 73
End: 2018-02-15

## 2018-02-15 RX ORDER — LISINOPRIL 20 MG/1
TABLET ORAL
Qty: 90 TAB | Refills: 3 | Status: SHIPPED | OUTPATIENT
Start: 2018-02-15 | End: 2018-10-04

## 2018-02-15 NOTE — OP THERAPY DISCHARGE SUMMARY
Outpatient Speech Language Pathology  DISCHARGE SUMMARY NOTE      [facility] Speech Language Pathology [address].  [address]  [place] [address]  Phone:  [phone]  Fax:  [phone]        Patient Name:  Shaista Bocanegra  :  1945  MR#:  [id number]    HICN:       Visits:  9             Diagnosis/ICD-10: CVA I63.9    Referring Provider: Melanie Guerrero PA-C    SOC Date: 17 Onset Date: 17      Your patient is being discharged from Physical therapy with the following comments:  Pt. has failed to schedule or reschedule follow up visits      Comments:  Pt has been seen for dysphagia and voice therapy.  Dysphagia resolved well and pt is on a regular diet w/o difficulty.  Having more difficult time with voice in areas of lowe volume, flat and decreased breath support.  Pt has been able to increase voice prosody during structured tasks.  Pt is practicing increased volume with drills however is not carrying over to conversation.  Also started focusing on attn and memory.  Pt was doing some traveling and did not return to finish therapy.         Limitations/Remaining:  Dysphonia- Per ENT vocal cords moving well.     Recommendations:  Cont with home program.  Resume tx if/when indicated.       Tresa Villarreal MA, CCC-SLP

## 2018-03-05 ENCOUNTER — PATIENT MESSAGE (OUTPATIENT)
Dept: CARDIOLOGY | Facility: MEDICAL CENTER | Age: 73
End: 2018-03-05

## 2018-03-06 NOTE — TELEPHONE ENCOUNTER
JIMBO Buck/Mariana       Patient said Radha left her a [employer] message yesterday, asking her to call the office. She can be reached at [phone].      ===========================================    Called pt, discussed if she is still being seen by Neurologist, pt reports she is being seen by Melanie Guerrero, advise pt that it would probably be best to contact them in regards to this matter as they will be able to answer her questions better as this is their specialty area, pt appreciative and verbalizes understanding

## 2018-03-06 NOTE — TELEPHONE ENCOUNTER
"Pt called to discuss her questions. No answer, left  for call back.  Radha BRANDON RN    CC to ALISE dobson  ----- Message -----  From: Shaista Bocanegra  Sent: 3/5/2018   4:01 PM  To: Jt Silva  Subject: Non-Urgent Medical Question                      Dear Dr. Daniels,  Recently I viewed the marketing department's video about my recovery on the [employer] website.  As part of it, the doctor referred to the \"new\" CT Profusion test which shows which part of the brain lost  neurons during the stroke.  I wonder if that procedure was performed on me and if I can see the results. If not, is there any   test that I can see that sheds light on where the brain was damaged?  And are there follow up tests to see current condition/repair?  Also, no one has told me what caused the stroke so I can work to prevent another.  Thoughts?  I can come in and see you to discuss  if there's anything to talk about.  Thanks.  Hope you are well.    Artemio"

## 2018-03-20 ENCOUNTER — OFFICE VISIT (OUTPATIENT)
Dept: URGENT CARE | Facility: CLINIC | Age: 73
End: 2018-03-20
Payer: COMMERCIAL

## 2018-03-20 VITALS
TEMPERATURE: 98.6 F | WEIGHT: 140 LBS | OXYGEN SATURATION: 97 % | SYSTOLIC BLOOD PRESSURE: 144 MMHG | BODY MASS INDEX: 21.97 KG/M2 | RESPIRATION RATE: 14 BRPM | HEIGHT: 67 IN | DIASTOLIC BLOOD PRESSURE: 84 MMHG | HEART RATE: 60 BPM

## 2018-03-20 DIAGNOSIS — T14.8XXA WOUND INFECTION: ICD-10-CM

## 2018-03-20 DIAGNOSIS — L08.9 WOUND INFECTION: ICD-10-CM

## 2018-03-20 PROCEDURE — 90715 TDAP VACCINE 7 YRS/> IM: CPT | Performed by: FAMILY MEDICINE

## 2018-03-20 PROCEDURE — 99214 OFFICE O/P EST MOD 30 MIN: CPT | Mod: 25 | Performed by: FAMILY MEDICINE

## 2018-03-20 PROCEDURE — 90471 IMMUNIZATION ADMIN: CPT | Performed by: FAMILY MEDICINE

## 2018-03-20 RX ORDER — SULFAMETHOXAZOLE AND TRIMETHOPRIM 800; 160 MG/1; MG/1
1 TABLET ORAL 2 TIMES DAILY
Qty: 20 TAB | Refills: 0 | Status: SHIPPED | OUTPATIENT
Start: 2018-03-20 | End: 2018-03-20 | Stop reason: SDUPTHER

## 2018-03-20 RX ORDER — SULFAMETHOXAZOLE AND TRIMETHOPRIM 800; 160 MG/1; MG/1
1 TABLET ORAL 2 TIMES DAILY
Qty: 20 TAB | Refills: 0 | Status: SHIPPED | OUTPATIENT
Start: 2018-03-20 | End: 2018-03-30

## 2018-03-29 ASSESSMENT — ENCOUNTER SYMPTOMS
NUMBNESS: 0
FEVER: 0
WEAKNESS: 0
CHILLS: 0

## 2018-03-30 NOTE — PROGRESS NOTES
"Subjective:   Shaista Bocanegra is a 73 y.o. female who presents for Finger Injury ( l thumb swollen , painful x 1 week ago . poked by scissors . paper scripts please .)        Hand Injury   This is a new problem. The current episode started in the past 7 days. The problem occurs constantly. The problem has been rapidly worsening. Pertinent negatives include no chills, fever, numbness or weakness.     Review of Systems   Constitutional: Negative for chills and fever.   Neurological: Negative for weakness and numbness.     No Known Allergies   Objective:   /84   Pulse 60   Temp 37 °C (98.6 °F)   Resp 14   Ht 1.702 m (5' 7\")   Wt 63.5 kg (140 lb)   SpO2 97%   BMI 21.93 kg/m²   Physical Exam   Constitutional: She is oriented to person, place, and time. She appears well-developed and well-nourished. No distress.   HENT:   Head: Normocephalic and atraumatic.   Eyes: Conjunctivae and EOM are normal. Pupils are equal, round, and reactive to light.   Cardiovascular: Normal rate and regular rhythm.    No murmur heard.  Pulmonary/Chest: Effort normal and breath sounds normal. No respiratory distress.   Abdominal: Soft. She exhibits no distension. There is no tenderness.   Neurological: She is alert and oriented to person, place, and time. She has normal reflexes. No sensory deficit.   Skin: Skin is warm and dry.        Psychiatric: She has a normal mood and affect.         Assessment/Plan:   Assessment    1. Wound infection  - sulfamethoxazole-trimethoprim (BACTRIM DS) 800-160 MG tablet; Take 1 Tab by mouth 2 times a day for 10 days.  Dispense: 20 Tab; Refill: 0  - Tdap =>8yo IM  Differential diagnosis, natural history, supportive care, and indications for immediate follow-up discussed.        "

## 2018-05-11 ENCOUNTER — HOSPITAL ENCOUNTER (OUTPATIENT)
Dept: RADIOLOGY | Facility: MEDICAL CENTER | Age: 73
End: 2018-05-11
Attending: INTERNAL MEDICINE
Payer: COMMERCIAL

## 2018-05-11 DIAGNOSIS — M85.862 OSTEOPENIA OF LEFT LOWER LEG: ICD-10-CM

## 2018-05-11 PROCEDURE — 77080 DXA BONE DENSITY AXIAL: CPT

## 2018-06-06 ENCOUNTER — OFFICE VISIT (OUTPATIENT)
Dept: RHEUMATOLOGY | Facility: PHYSICIAN GROUP | Age: 73
End: 2018-06-06
Payer: COMMERCIAL

## 2018-06-06 VITALS
OXYGEN SATURATION: 92 % | SYSTOLIC BLOOD PRESSURE: 122 MMHG | RESPIRATION RATE: 16 BRPM | WEIGHT: 138.4 LBS | HEART RATE: 65 BPM | DIASTOLIC BLOOD PRESSURE: 80 MMHG | TEMPERATURE: 98.1 F | BODY MASS INDEX: 21.68 KG/M2

## 2018-06-06 DIAGNOSIS — M85.862 OSTEOPENIA OF LEFT LOWER LEG: ICD-10-CM

## 2018-06-06 DIAGNOSIS — R74.01 ALT (SGPT) LEVEL RAISED: ICD-10-CM

## 2018-06-06 PROCEDURE — 99213 OFFICE O/P EST LOW 20 MIN: CPT | Performed by: INTERNAL MEDICINE

## 2018-06-06 RX ORDER — TRIAMTERENE AND HYDROCHLOROTHIAZIDE 37.5; 25 MG/1; MG/1
1 CAPSULE ORAL EVERY MORNING
COMMUNITY
End: 2018-10-04

## 2018-06-06 ASSESSMENT — ENCOUNTER SYMPTOMS
FEVER: 0
MYALGIAS: 0
INSOMNIA: 1
VOMITING: 0
NAUSEA: 0
CHILLS: 0

## 2018-06-06 NOTE — LETTER
[facility]   [address], [address]  [place] [address]  Phone: [phone]  Fax: [phone]              Encounter Date: 6/6/2018    Dear Dr. Sims ref. provider found,    It was a pleasure seeing your patient, Shaista Bocanegra, on 6/6/2018. Diagnoses of Osteopenia of left lower leg and ALT (SGPT) level raised were pertinent to this visit.     Please find attached progress note which includes the history I obtained from Ms. Bocanegra, my physical examination findings, my impression and recommendations.      Once again, it was a pleasure participating in your patient's care.  Please feel free to contact me if you have any questions or if I can be of any further assistance to your patients.      Sincerely,    Aimee Bruno M.D.  Electronically Signed          PROGRESS NOTE:  Subjective:   Date of Consultation:6/6/2018  9:08 AM  Primary care physician:Bethany Crane M.D.        Reason for Consultation:  Ms. Bocanegra  is a pleasant 71 y.o. year old female who presents for follow-up of osteopenia    Osteopenia  She first presented to Dr. Ward with last bone density in March 2012 with T score of -2.3 at the left femoral neck of the hip with no previous fractures.  She was previously on anti-resorptive drug for a few years.  She reports that it was 5 years starting around 1998.  She was last seen in 2/29/2016 for follow-up.  At the time she was taking calcium and vitamin D.  She denies falls.  At the last visit she had started fosamax in August 2016 and doing well    Her bone density in 1/21/2016 showed   The mean bone mineral density for the lumbar spine is 1.058 g/cm2, which corresponds to a T score of -1.0 and a Z score of 0.7.  The proximal left femur has a mean bone mineral density of 0.795 g/cm2, with a T score of -1.7 and a Z score of -0.1.When compared with the most recent study dated 3/5/2014, there has been a 3.0% decrease in the bone mineral density of the lumbar spine and a 0.9%  increase in the bone mineral density of the left femur.    She started alendronate about August 2016 however the prescription was written to start 6/8/2016 and stopped in 2017.  However she cracked her tooth had to stop alendronate therapy.  She has been off since.    Vitamin D 5/2015 from [facility] at 75.8  Now she is taking daily vitamin D    She remains off therapy     She did repeat her DEXA in 5/2018  The mean bone mineral density for the lumbar spine is 0.975 g/cm2, which corresponds to a T score of -1.7 and a Z score of 0.0.  The proximal left femur has a mean bone mineral density of 0.749 g/cm2, with a T score of -2.1 and a Z score of -0.3.  When compared with the most recent study dated 2/5/2016, there has been an 8.0% decrease in the bone mineral density of the lumbar spine and a 5.8% decrease in the bone mineral density of the left femur.  According to the World Health Organization classification, bone mineral density of this patient is osteopenia with increased risk of fracture. Percentage decrease in bone mineral density is statistically significant.  10-year Probability of Fracture:  Major Osteoporotic     20.1%  Hip     5.6%  Population      [place] ()  Based on left femur neck BMD    FRAX score again suggests she would benefit from treatment of osteoporosis.  We have to wait until her bone graft is healed.    She takes calcium and vitamin D.  SHe is working on weight bearing exercises    broken tooth  Had a bone graft  Will get flipper next week  Implant in 4 months  Thus far healing well.  No complications  No signs of osteonecrosis of jaw    Abnormal LFT  She does drink 2-3 alcoholic drinks a day  She had labs done recently at [facility]  Not repeated since November    Renal insufficiency  She states that as long as she hydrates before labs her labs are fine.  She had labs done recently at [facility] demonstrated a creatinine of 1.6 which is reasonable.   We have not repeated labs since  November    History of breast cancer treated with radiation therapy    History of R MCA stroke 8/2017  Physical therapy helped  Working on her exercises      Insomnia  She has a CPAP  Able to fall asleep   No able to stay asleep      Past Medical History: she has a left wrist fracture as a result of fall while rollerskating.    Family History: hip fracture mom had in her late 80's    Past Medical History:   Diagnosis Date   • Benign essential HTN 3/19/2012   • Breast cancer (HCC)    • Cancer (HCC)    • Chest tightness or pressure 3/19/2012   • High risk medication use 3/19/2012   • Hypercholesterolemia 3/19/2012   • MVP (mitral valve prolapse) 3/19/2012   • Renal insufficiency 3/19/2012   • Stroke (HCC)      Past Surgical History:   Procedure Laterality Date   • CATARACT PHACO WITH IOL  3/16/2009    Performed by SHANNON GANDARA at [facility]   • CATARACT PHACO WITH IOL  1/26/2009    Performed by SHANNON GANDARA at [facility]   • BREAST BIOPSY     • LUMPECTOMY     • PB RADIATION THERAPY PLAN SIMPLE     • CT CHEMOTHERAPY, UNSPECIFIED PROCEDURE       No Known Allergies  Outpatient Encounter Prescriptions as of 6/6/2018   Medication Sig Dispense Refill   • triamterene/hctz (MAXZIDE-25/DYAZIDE) 37.5-25 MG Cap Take 1 Cap by mouth every morning.     • lisinopril (PRINIVIL) 20 MG Tab TAKE 1 TABLET DAILY 90 Tab 3   • metoprolol SR (TOPROL XL) 25 MG TABLET SR 24 HR Take 1 Tab by mouth every day. 90 Tab 4   • metoprolol SR (TOPROL XL) 50 MG TABLET SR 24 HR Take 1 Tab by mouth every day. 90 Tab 3   • atorvastatin (LIPITOR) 80 MG tablet Take 1 Tab by mouth every evening. 90 Tab 3   • Cholecalciferol (VITAMIN D3 PO) Take 2,000 Units by mouth every day.     • Cyanocobalamin (VITAMIN B-12 PO) Take 25,000 mcg by mouth every day.     • potassium chloride SA (K-DUR) 10 MEQ Tab CR Take 10 mEq by mouth every day.     • aspirin (ASA) 81 MG Chew Tab chewable tablet Take 1 Tab by mouth every day. 14  Tab 1   • valacyclovir (VALTREX) 500 MG Tab Take 500 mg by mouth every day.     • Multiple Vitamins-Minerals (ICAPS AREDS 2 PO) Take 1 Tab by mouth every day.     • calcium carbonate (OS-CRISTOFER 500) 500 MG Tab Take 1,000 mg by mouth every day.     • Multiple Vitamin (MULTIVITAMINS PO) Take 1 Tab by mouth every day.     • Doxepin HCl (SILENOR) 3 MG Tab Take 1-2 Tabs by mouth at bedtime as needed. 30 Tab 3     No facility-administered encounter medications on file as of 6/6/2018.      Social History     Social History   • Marital status:      Spouse name: N/A   • Number of children: N/A   • Years of education: N/A     Occupational History   • Not on file.     Social History Main Topics   • Smoking status: Never Smoker   • Smokeless tobacco: Never Used   • Alcohol use Yes      Comment: 3 glasses of wine   • Drug use: No   • Sexual activity: Not on file     Other Topics Concern   • Not on file     Social History Narrative   • No narrative on file      History   Smoking Status   • Never Smoker   Smokeless Tobacco   • Never Used     History   Alcohol Use   • Yes     Comment: 3 glasses of wine     History   Drug Use No      OB History   No data available      No LMP recorded. Patient has had a hysterectomy.    [facility]     Family History   Problem Relation Age of Onset   • Cancer Mother    • Heart Failure Neg Hx    • Heart Disease Neg Hx        Review of Systems   Constitutional: Negative for chills and fever.   Gastrointestinal: Negative for nausea and vomiting.   Musculoskeletal: Negative for joint pain and myalgias.   Psychiatric/Behavioral: The patient has insomnia.         Objective:   /80   Pulse 65   Temp 36.7 °C (98.1 °F)   Resp 16   Wt 62.8 kg (138 lb 6.4 oz)   SpO2 92%   BMI 21.68 kg/m²      Physical Exam   Constitutional: She is oriented to person, place, and time. She appears well-developed and well-nourished. No distress.   HENT:   Head: Normocephalic and atraumatic.   Right Ear: External ear  normal.   Left Ear: External ear normal.   Missing tooth on the lower left jaw.  Gum tissue appears normal   Eyes: Conjunctivae are normal. Right eye exhibits no discharge. Left eye exhibits no discharge. No scleral icterus.   Pulmonary/Chest: No stridor. No respiratory distress.   Musculoskeletal:   No synovitis in the upper extremities   Neurological: She is alert and oriented to person, place, and time.   Can't appreciate the previously noted Mild left facial droop   Skin: Skin is warm. No rash noted. She is not diaphoretic. No erythema.   Limited skin exam   Psychiatric: She has a normal mood and affect. Her behavior is normal. Judgment and thought content normal.     Labs:    No results found for: QNTTBGOLD  No results found for: HEPBCORTOT, HEPBCORIGM, HEPBSAG  No results found for: HEPCAB  Lab Results   Component Value Date/Time    SODIUM 144 11/30/2017 12:56 PM    SODIUM 140 08/28/2017 12:01 AM    POTASSIUM 3.6 11/30/2017 12:56 PM    POTASSIUM 3.0 (L) 08/28/2017 12:01 AM    CHLORIDE 104 11/30/2017 12:56 PM    CHLORIDE 104 08/28/2017 12:01 AM    CO2 24 11/30/2017 12:56 PM    CO2 28 08/28/2017 12:01 AM    GLUCOSE 89 11/30/2017 12:56 PM    GLUCOSE 108 (H) 08/28/2017 12:01 AM    BUN 21 11/30/2017 12:56 PM    BUN 23 (H) 08/28/2017 12:01 AM    CREATININE 0.90 11/30/2017 12:56 PM    CREATININE 0.88 08/28/2017 12:01 AM    BUNCREATRAT 23 11/30/2017 12:56 PM      Lab Results   Component Value Date/Time    WBC 5.3 08/27/2017 02:36 PM    RBC 3.72 (L) 08/27/2017 02:36 PM    HEMOGLOBIN 13.0 08/27/2017 02:36 PM    HEMATOCRIT 38.9 08/27/2017 02:36 PM    .6 (H) 08/27/2017 02:36 PM    MCH 34.9 (H) 08/27/2017 02:36 PM    MCHC 33.4 (L) 08/27/2017 02:36 PM    MPV 9.5 08/27/2017 02:36 PM    NEUTSPOLYS 65.10 08/27/2017 02:36 PM    LYMPHOCYTES 25.20 08/27/2017 02:36 PM    MONOCYTES 7.60 08/27/2017 02:36 PM    EOSINOPHILS 1.50 08/27/2017 02:36 PM    BASOPHILS 0.40 08/27/2017 02:36 PM      Lab Results   Component Value  Date/Time    CALCIUM 9.3 11/30/2017 12:56 PM    CALCIUM 9.3 08/28/2017 12:01 AM    ASTSGOT 39 11/30/2017 12:56 PM    ASTSGOT 29 08/27/2017 02:36 PM    ALTSGPT 40 (H) 11/30/2017 12:56 PM    ALTSGPT 20 08/27/2017 02:36 PM    ALKPHOSPHAT 87 11/30/2017 12:56 PM    ALKPHOSPHAT 40 08/27/2017 02:36 PM    TBILIRUBIN 0.8 11/30/2017 12:56 PM    TBILIRUBIN 0.8 08/27/2017 02:36 PM    ALBUMIN 4.6 11/30/2017 12:56 PM    ALBUMIN 4.6 08/27/2017 02:36 PM    TOTPROTEIN 7.2 11/30/2017 12:56 PM    TOTPROTEIN 7.3 08/27/2017 02:36 PM       Assessment:     1. Osteopenia of left lower leg  VITAMIN D,25 HYDROXY    COMP METABOLIC PANEL   2. ALT (SGPT) level raised             Medical Decision Making:  Today's Assessment / Status / Plan:     Osteopenia  I would like to continue alendronate however as she may be need invasive dental surgery we will hold this until she has had a chance to complete her surgery.  DEXA shows we need treatment.  We have to wait 4 months for th bone graft to heal.  Then we will consider alendronate or prolia.  Provided information for Prolia.  Would prefer to use the drug with the lowest risk of osteonecrosis of the jaw.    Started alendronate 8/2016 and now we will hold 5/2017    Hold bisphosphonate for now.      Cracked tooth  willl see dentist  Reports that she will need a possible impalnt.  Plan for surgery 2/2018    Renal insufficiency and abnormal LFT  I will check  Labs for LFT    1. Osteopenia of left lower leg  VITAMIN D,25 HYDROXY    COMP METABOLIC PANEL   2. ALT (SGPT) level raised             Return in about 5 months (around 11/6/2018).    I have spent greater than 50% of this 30 minute visit in counseling/coordination of care with the patient regarding discussion of risks of invasive dental surgery given that she has been on bisphosphonates, plan to hold bisphosphonates for now and the rationale and also a discussion that I want to follow up with her within a month and to keep up with the plan for any  surgical intervention regarding her cracked tooth.    She agreed and verbalized her agreement and understanding with the current plan. I answered all questions and concerns she has at this time and advised her to call at any time in there interim with questions or concerns in regards to her care.    Thank you for allowing me to participate in her care, I will continue to follow closely.

## 2018-06-14 ENCOUNTER — HOSPITAL ENCOUNTER (OUTPATIENT)
Dept: CARDIOLOGY | Facility: MEDICAL CENTER | Age: 73
End: 2018-06-14
Attending: INTERNAL MEDICINE
Payer: COMMERCIAL

## 2018-06-14 DIAGNOSIS — I34.0 NON-RHEUMATIC MITRAL REGURGITATION: ICD-10-CM

## 2018-06-14 PROCEDURE — 93306 TTE W/DOPPLER COMPLETE: CPT

## 2018-06-14 PROCEDURE — 93306 TTE W/DOPPLER COMPLETE: CPT | Mod: 26 | Performed by: INTERNAL MEDICINE

## 2018-06-15 LAB
LV EJECT FRACT  99904: 60
LV EJECT FRACT MOD 2C 99903: 60.46
LV EJECT FRACT MOD 4C 99902: 55.61
LV EJECT FRACT MOD BP 99901: 58.38

## 2018-06-18 ENCOUNTER — TELEPHONE (OUTPATIENT)
Dept: CARDIOLOGY | Facility: MEDICAL CENTER | Age: 73
End: 2018-06-18

## 2018-06-18 NOTE — TELEPHONE ENCOUNTER
Notes recorded by Pascale Daniels M.D. on 6/18/2018 at 9:00 AM PDT  Heart pressures still normal and BP great  No significant changes in her MR (still mod-severe), will follow at visit this summer  =======================================    Spoke w/pt and advised of echo results as per Dr. Daniels. Pt states she is asymptomatic. She will discuss further at August visit.

## 2018-06-18 NOTE — TELEPHONE ENCOUNTER
----- Message from Ayse Preciado sent at 6/18/2018 11:50 AM PDT -----  Regarding: Patient wants to get advice about taking trip to [place]  [place]    Patient wants to get advice about taking a trip to [place] next week and can be reached at [phone] or [phone]. She's has concerns after seeing her Echo results.

## 2018-06-19 ENCOUNTER — TELEPHONE (OUTPATIENT)
Dept: CARDIOLOGY | Facility: MEDICAL CENTER | Age: 73
End: 2018-06-19

## 2018-06-19 NOTE — TELEPHONE ENCOUNTER
----- Message -----  From: Pascale Daniels M.D.  Sent: 6/18/2018   9:00 AM  To: Joann Barone R.N.    Heart pressures still normal and BP great  No significant changes in her MR (still mod-severe), will follow at visit this summer    =======================================================    Called pt and notified, pt verbalizes understanding

## 2018-06-26 ENCOUNTER — TELEPHONE (OUTPATIENT)
Dept: RHEUMATOLOGY | Facility: PHYSICIAN GROUP | Age: 73
End: 2018-06-26

## 2018-06-26 NOTE — TELEPHONE ENCOUNTER
Please call patient  Thank you for dropping off her labs from [facility] from 11/30/2017    Her ALT was slightly elevated at 40.  She should have (if not already) follow-up with her PCP if this lab elevation persists.    Please also inform her that the labs she provided me does not cover the vitamin D level that I ordred and I would like an updated CMP panel.    ------------------------------------    Labs 11/30/2017    Calcium was 9.3   Glucose was 89 Albumin was 4.6  AST = 39   ALT = 40   (0-32)

## 2018-06-26 NOTE — TELEPHONE ENCOUNTER
Called Pt and her voicemail says she will be out of the country until July 9th.     Called [facility] they took a message their system is down for now they will fax after system reboots

## 2018-08-15 ENCOUNTER — TELEPHONE (OUTPATIENT)
Dept: CARDIOLOGY | Facility: MEDICAL CENTER | Age: 73
End: 2018-08-15

## 2018-08-15 ENCOUNTER — OFFICE VISIT (OUTPATIENT)
Dept: CARDIOLOGY | Facility: MEDICAL CENTER | Age: 73
End: 2018-08-15
Payer: COMMERCIAL

## 2018-08-15 VITALS
DIASTOLIC BLOOD PRESSURE: 72 MMHG | BODY MASS INDEX: 22.44 KG/M2 | OXYGEN SATURATION: 94 % | WEIGHT: 143 LBS | HEART RATE: 70 BPM | HEIGHT: 67 IN | SYSTOLIC BLOOD PRESSURE: 150 MMHG

## 2018-08-15 DIAGNOSIS — I34.0 NON-RHEUMATIC MITRAL REGURGITATION: ICD-10-CM

## 2018-08-15 DIAGNOSIS — I10 HYPERTENSION, UNSPECIFIED TYPE: ICD-10-CM

## 2018-08-15 DIAGNOSIS — I10 BENIGN ESSENTIAL HTN: Chronic | ICD-10-CM

## 2018-08-15 DIAGNOSIS — I63.529 CEREBROVASCULAR ACCIDENT (CVA) DUE TO OCCLUSION OF ANTERIOR CEREBRAL ARTERY, UNSPECIFIED BLOOD VESSEL LATERALITY (HCC): ICD-10-CM

## 2018-08-15 PROBLEM — I63.9 CEREBROVASCULAR ACCIDENT (CVA) DUE TO VASCULAR OCCLUSION (HCC): Status: ACTIVE | Noted: 2018-08-15

## 2018-08-15 PROBLEM — Z86.73 HX OF COMPLETED STROKE: Status: RESOLVED | Noted: 2017-10-11 | Resolved: 2018-08-15

## 2018-08-15 PROCEDURE — 99214 OFFICE O/P EST MOD 30 MIN: CPT | Performed by: INTERNAL MEDICINE

## 2018-08-15 RX ORDER — GLUCOSAMINE/D3/BOSWELLIA SERRA 1500MG-400
1 TABLET ORAL DAILY
COMMUNITY
End: 2020-05-07

## 2018-08-15 ASSESSMENT — ENCOUNTER SYMPTOMS
MUSCULOSKELETAL NEGATIVE: 1
DIZZINESS: 0
PALPITATIONS: 0
INSOMNIA: 0
SHORTNESS OF BREATH: 0
COUGH: 0
BRUISES/BLEEDS EASILY: 0
LOSS OF CONSCIOUSNESS: 0
GASTROINTESTINAL NEGATIVE: 1
SPEECH CHANGE: 1
WEIGHT LOSS: 0
NERVOUS/ANXIOUS: 0

## 2018-08-15 NOTE — LETTER
[facility]   [address], [address]  [place] [address]  Phone: [phone]  Fax: [phone]              Shaista Bocanegra  1945    Encounter Date: 8/15/2018    Pascale Daniels M.D.          PROGRESS NOTE:  Chief Complaint   Patient presents with   • Hyperlipidemia     follow up       Subjective:   Shaista Bocanegra is a 73 y.o. female who presents today in follow-up in regards to her significant mitral valve disease myxomatous and regurgitant in the setting of long-standing hypertension    Has a long list of things again  By herself today    Went to [place] with her friends, blood pressure log shows most readings are in the 130s.  Compliant with her medications    Past Medical History:   Diagnosis Date   • Benign essential HTN 3/19/2012   • Breast cancer (HCC)    • Cancer (HCC)    • Chest tightness or pressure 3/19/2012   • High risk medication use 3/19/2012   • Hypercholesterolemia 3/19/2012   • MVP (mitral valve prolapse) 3/19/2012   • Renal insufficiency 3/19/2012   • Stroke (HCC)      Past Surgical History:   Procedure Laterality Date   • CATARACT PHACO WITH IOL  3/16/2009    [facility]   • CATARACT PHACO WITH IOL  1/26/2009    [facility]   • BREAST BIOPSY     • LUMPECTOMY     • PB RADIATION THERAPY PLAN SIMPLE     • DE CHEMOTHERAPY, UNSPECIFIED PROCEDURE       Family History   Problem Relation Age of Onset   • Cancer Mother    • Heart Failure Neg Hx    • Heart Disease Neg Hx      Social History     Social History   • Marital status:      Spouse name: N/A   • Number of children: N/A   • Years of education: N/A     Occupational History   • Not on file.     Social History Main Topics   • Smoking status: Never Smoker   • Smokeless tobacco: Never Used   • Alcohol use Yes      Comment: 3 glasses of wine   • Drug use: No   • Sexual activity: Not on file          Other Topics Concern   • Not on file     Social History Narrative   • No narrative on file     No Known Allergies  Outpatient Encounter Prescriptions as of 8/15/2018   Medication Sig Dispense Refill   • Biotin 02518 MCG Tab Take  by mouth.     • triamterene/hctz (MAXZIDE-25/DYAZIDE) 37.5-25 MG Cap Take 1 Cap by mouth every morning.     • lisinopril (PRINIVIL) 20 MG Tab TAKE 1 TABLET DAILY 90 Tab 3   • metoprolol SR (TOPROL XL) 25 MG TABLET SR 24 HR Take 1 Tab by mouth every day. 90 Tab 4   • metoprolol SR (TOPROL XL) 50 MG TABLET SR 24 HR Take 1 Tab by mouth every day. 90 Tab 3   • atorvastatin (LIPITOR) 80 MG tablet Take 1 Tab by mouth every evening. 90 Tab 3   • Cholecalciferol (VITAMIN D3 PO) Take 2,000 Units by mouth every day.     • Cyanocobalamin (VITAMIN B-12 PO) Take 25,000 mcg by mouth every day.     • potassium chloride SA (K-DUR) 10 MEQ Tab CR Take 10 mEq by mouth every day.     • aspirin (ASA) 81 MG Chew Tab chewable tablet Take 1 Tab by mouth every day. 14 Tab 1   • valacyclovir (VALTREX) 500 MG Tab Take 500 mg by mouth every day.     • Multiple Vitamins-Minerals (ICAPS AREDS 2 PO) Take 1 Tab by mouth every day.     • calcium carbonate (OS-CRISTOFER 500) 500 MG Tab Take 1,000 mg by mouth every day.     • Multiple Vitamin (MULTIVITAMINS PO) Take 1 Tab by mouth every day.     • [DISCONTINUED] Doxepin HCl (SILENOR) 3 MG Tab Take 1-2 Tabs by mouth at bedtime as needed. 30 Tab 3     No facility-administered encounter medications on file as of 8/15/2018.      Review of Systems   Constitutional: Negative for malaise/fatigue and weight loss.   HENT: Negative for hearing loss and tinnitus.    Respiratory: Negative for cough and shortness of breath.    Cardiovascular: Negative for chest pain, palpitations and leg swelling.   Gastrointestinal: Negative.    Musculoskeletal: Negative.    Neurological: Positive for speech change (after cva). Negative for dizziness and loss of consciousness.   Endo/Heme/Allergies: Does  "not bruise/bleed easily.   Psychiatric/Behavioral: The patient is not nervous/anxious and does not have insomnia.    All other systems reviewed and are negative.       Objective:   /72   Pulse 70   Ht 1.702 m (5' 7\")   Wt 64.9 kg (143 lb)   SpO2 94%   BMI 22.40 kg/m²      Physical Exam   Constitutional: She is oriented to person, place, and time. She appears well-developed and well-nourished.   HENT:   Head: Normocephalic and atraumatic.   Eyes: Pupils are equal, round, and reactive to light. EOM are normal.   Neck: Neck supple. No JVD present.   Cardiovascular: Normal rate and regular rhythm.  Exam reveals no gallop.    Murmur heard.  3 out of 6 harsh blowing systolic murmur across the precordium   Pulmonary/Chest: Breath sounds normal. No respiratory distress. She exhibits no tenderness.   Abdominal: Bowel sounds are normal. She exhibits no distension.   Lymphadenopathy:     She has no cervical adenopathy.   Neurological: She is alert and oriented to person, place, and time. She exhibits normal muscle tone.   Very slight word finding difficulty, no focal movement disorder   Skin: Skin is warm and dry. No rash noted.   Psychiatric: She has a normal mood and affect. Her behavior is normal.       Assessment:     1. Non-rheumatic mitral regurgitation     2. Benign essential HTN     3. Cerebrovascular accident (CVA) due to occlusion of anterior cerebral artery, unspecified blood vessel laterality (HCC)         Medical Decision Making:  Today's Assessment / Status / Plan:       Mitral valve disease  Reviewed her echo from June.  Still moderate to severe.  Likely severe.  We compared to the last 4 echoes all of which are still the same.  Normal heart function, no history of atrial fibrillation even with her recent stroke  Hypertension has always compounded this, we discussed this again for more than 35 minutes  Repeat echo annually    Hypertension  She is always said it is better at home.  With her recent " stroke I have told her it always needs to be less than 120.  I increased her metoprolol.  I would strongly suggest adding an afterload reducer such as an ACE inhibitor or an ARB.  She will think about it  Labs reviewed from last year, we ordered by primary care  I would like to refer her to the hypertension clinic.  We discussed this    Need for medical care  She had wished to see me every 2 months.  This is not acceptable and we discussed this again  She was frustrated she could not get into see neurology, I deferred again to her primary care doctor  In regards to her mitral valve disease which is the only medical issue we are managing with her, I would prefer to see her once annually.  We settled on seeing her twice a year.  We establish limitations and boundaries and we discussed this again    RTC 6 months, no testing      Bethany Crane M.D.  [address] [place] [id number]  VIA Facsimile: [phone]

## 2018-08-15 NOTE — TELEPHONE ENCOUNTER
----- Message -----   From: Pascale Daniels M.D.   Sent: 8/15/2018   9:15 AM   To: Mariana Mullins R.N.     Please refer to hypertension clinic, thank you   ==============================================================    Referral made to hypertension clinic. Pt notified and referral mailed to pt.

## 2018-08-15 NOTE — PROGRESS NOTES
Chief Complaint   Patient presents with   • Hyperlipidemia     follow up       Subjective:   Shaista Bocanegra is a 73 y.o. female who presents today in follow-up in regards to her significant mitral valve disease myxomatous and regurgitant in the setting of long-standing hypertension    Has a long list of things again  By herself today    Went to [place] with her friends, blood pressure log shows most readings are in the 130s.  Compliant with her medications    Past Medical History:   Diagnosis Date   • Benign essential HTN 3/19/2012   • Breast cancer (HCC)    • Cancer (HCC)    • Chest tightness or pressure 3/19/2012   • High risk medication use 3/19/2012   • Hypercholesterolemia 3/19/2012   • MVP (mitral valve prolapse) 3/19/2012   • Renal insufficiency 3/19/2012   • Stroke (HCC)      Past Surgical History:   Procedure Laterality Date   • CATARACT PHACO WITH IOL  3/16/2009    Performed by SHANNON GANDARA at [facility]   • CATARACT PHACO WITH IOL  1/26/2009    Performed by SHANNON GANDARA at [facility]   • BREAST BIOPSY     • LUMPECTOMY     • PB RADIATION THERAPY PLAN SIMPLE     • KS CHEMOTHERAPY, UNSPECIFIED PROCEDURE       Family History   Problem Relation Age of Onset   • Cancer Mother    • Heart Failure Neg Hx    • Heart Disease Neg Hx      Social History     Social History   • Marital status:      Spouse name: N/A   • Number of children: N/A   • Years of education: N/A     Occupational History   • Not on file.     Social History Main Topics   • Smoking status: Never Smoker   • Smokeless tobacco: Never Used   • Alcohol use Yes      Comment: 3 glasses of wine   • Drug use: No   • Sexual activity: Not on file     Other Topics Concern   • Not on file     Social History Narrative   • No narrative on file     No Known Allergies  Outpatient Encounter Prescriptions as of 8/15/2018   Medication Sig Dispense Refill   • Biotin 22828 MCG Tab Take  by mouth.     • triamterene/hctz  "(MAXZIDE-25/DYAZIDE) 37.5-25 MG Cap Take 1 Cap by mouth every morning.     • lisinopril (PRINIVIL) 20 MG Tab TAKE 1 TABLET DAILY 90 Tab 3   • metoprolol SR (TOPROL XL) 25 MG TABLET SR 24 HR Take 1 Tab by mouth every day. 90 Tab 4   • metoprolol SR (TOPROL XL) 50 MG TABLET SR 24 HR Take 1 Tab by mouth every day. 90 Tab 3   • atorvastatin (LIPITOR) 80 MG tablet Take 1 Tab by mouth every evening. 90 Tab 3   • Cholecalciferol (VITAMIN D3 PO) Take 2,000 Units by mouth every day.     • Cyanocobalamin (VITAMIN B-12 PO) Take 25,000 mcg by mouth every day.     • potassium chloride SA (K-DUR) 10 MEQ Tab CR Take 10 mEq by mouth every day.     • aspirin (ASA) 81 MG Chew Tab chewable tablet Take 1 Tab by mouth every day. 14 Tab 1   • valacyclovir (VALTREX) 500 MG Tab Take 500 mg by mouth every day.     • Multiple Vitamins-Minerals (ICAPS AREDS 2 PO) Take 1 Tab by mouth every day.     • calcium carbonate (OS-CRISTOFER 500) 500 MG Tab Take 1,000 mg by mouth every day.     • Multiple Vitamin (MULTIVITAMINS PO) Take 1 Tab by mouth every day.     • [DISCONTINUED] Doxepin HCl (SILENOR) 3 MG Tab Take 1-2 Tabs by mouth at bedtime as needed. 30 Tab 3     No facility-administered encounter medications on file as of 8/15/2018.      Review of Systems   Constitutional: Negative for malaise/fatigue and weight loss.   HENT: Negative for hearing loss and tinnitus.    Respiratory: Negative for cough and shortness of breath.    Cardiovascular: Negative for chest pain, palpitations and leg swelling.   Gastrointestinal: Negative.    Musculoskeletal: Negative.    Neurological: Positive for speech change (after cva). Negative for dizziness and loss of consciousness.   Endo/Heme/Allergies: Does not bruise/bleed easily.   Psychiatric/Behavioral: The patient is not nervous/anxious and does not have insomnia.    All other systems reviewed and are negative.       Objective:   /72   Pulse 70   Ht 1.702 m (5' 7\")   Wt 64.9 kg (143 lb)   SpO2 94%   BMI " 22.40 kg/m²     Physical Exam   Constitutional: She is oriented to person, place, and time. She appears well-developed and well-nourished.   HENT:   Head: Normocephalic and atraumatic.   Eyes: Pupils are equal, round, and reactive to light. EOM are normal.   Neck: Neck supple. No JVD present.   Cardiovascular: Normal rate and regular rhythm.  Exam reveals no gallop.    Murmur heard.  3 out of 6 harsh blowing systolic murmur across the precordium   Pulmonary/Chest: Breath sounds normal. No respiratory distress. She exhibits no tenderness.   Abdominal: Bowel sounds are normal. She exhibits no distension.   Lymphadenopathy:     She has no cervical adenopathy.   Neurological: She is alert and oriented to person, place, and time. She exhibits normal muscle tone.   Very slight word finding difficulty, no focal movement disorder   Skin: Skin is warm and dry. No rash noted.   Psychiatric: She has a normal mood and affect. Her behavior is normal.       Assessment:     1. Non-rheumatic mitral regurgitation     2. Benign essential HTN     3. Cerebrovascular accident (CVA) due to occlusion of anterior cerebral artery, unspecified blood vessel laterality (HCC)         Medical Decision Making:  Today's Assessment / Status / Plan:       Mitral valve disease  Reviewed her echo from June.  Still moderate to severe.  Likely severe.  We compared to the last 4 echoes all of which are still the same.  Normal heart function, no history of atrial fibrillation even with her recent stroke  Hypertension has always compounded this, we discussed this again for more than 35 minutes  Repeat echo annually    Hypertension  She is always said it is better at home.  With her recent stroke I have told her it always needs to be less than 120.  I increased her metoprolol.  I would strongly suggest adding an afterload reducer such as an ACE inhibitor or an ARB.  She will think about it  Labs reviewed from last year, we ordered by primary care  I would  like to refer her to the hypertension clinic.  We discussed this    Need for medical care  She had wished to see me every 2 months.  This is not acceptable and we discussed this again  She was frustrated she could not get into see neurology, I deferred again to her primary care doctor  In regards to her mitral valve disease which is the only medical issue we are managing with her, I would prefer to see her once annually.  We settled on seeing her twice a year.  We establish limitations and boundaries and we discussed this again    RTC 6 months, no testing

## 2018-08-15 NOTE — TELEPHONE ENCOUNTER
----- Message from Zia Boyle R.N. sent at 8/15/2018  9:21 AM PDT -----      ----- Message -----  From: Pascale Daniels M.D.  Sent: 8/15/2018   9:15 AM  To: Mariana Mullins R.N.    Please refer to hypertension clinic, thank you

## 2018-08-20 ENCOUNTER — APPOINTMENT (RX ONLY)
Dept: URBAN - METROPOLITAN AREA CLINIC 4 | Facility: CLINIC | Age: 73
Setting detail: DERMATOLOGY
End: 2018-08-20

## 2018-08-20 DIAGNOSIS — L90.5 SCAR CONDITIONS AND FIBROSIS OF SKIN: ICD-10-CM

## 2018-08-20 DIAGNOSIS — L82.1 OTHER SEBORRHEIC KERATOSIS: ICD-10-CM

## 2018-08-20 DIAGNOSIS — D22 MELANOCYTIC NEVI: ICD-10-CM | Status: STABLE

## 2018-08-20 DIAGNOSIS — L81.4 OTHER MELANIN HYPERPIGMENTATION: ICD-10-CM

## 2018-08-20 DIAGNOSIS — L57.0 ACTINIC KERATOSIS: ICD-10-CM

## 2018-08-20 PROBLEM — D22.5 MELANOCYTIC NEVI OF TRUNK: Status: ACTIVE | Noted: 2018-08-20

## 2018-08-20 PROBLEM — D22.72 MELANOCYTIC NEVI OF LEFT LOWER LIMB, INCLUDING HIP: Status: ACTIVE | Noted: 2018-08-20

## 2018-08-20 PROBLEM — D48.5 NEOPLASM OF UNCERTAIN BEHAVIOR OF SKIN: Status: ACTIVE | Noted: 2018-08-20

## 2018-08-20 PROCEDURE — ? OBSERVATION AND MEASURE

## 2018-08-20 PROCEDURE — 17000 DESTRUCT PREMALG LESION: CPT

## 2018-08-20 PROCEDURE — 99213 OFFICE O/P EST LOW 20 MIN: CPT | Mod: 25

## 2018-08-20 PROCEDURE — ? LIQUID NITROGEN

## 2018-08-20 PROCEDURE — ? DIAGNOSIS COMMENT

## 2018-08-20 PROCEDURE — 17003 DESTRUCT PREMALG LES 2-14: CPT

## 2018-08-20 PROCEDURE — ? COUNSELING

## 2018-08-20 PROCEDURE — 11100: CPT | Mod: 59

## 2018-08-20 PROCEDURE — ? BIOPSY BY SHAVE METHOD

## 2018-08-20 ASSESSMENT — LOCATION ZONE DERM
LOCATION ZONE: TRUNK
LOCATION ZONE: TOE
LOCATION ZONE: ARM
LOCATION ZONE: SCALP
LOCATION ZONE: FACE
LOCATION ZONE: HAND
LOCATION ZONE: NECK

## 2018-08-20 ASSESSMENT — LOCATION SIMPLE DESCRIPTION DERM
LOCATION SIMPLE: POSTERIOR NECK
LOCATION SIMPLE: RIGHT SHOULDER
LOCATION SIMPLE: RIGHT FOREHEAD
LOCATION SIMPLE: LEFT BUTTOCK
LOCATION SIMPLE: RIGHT HAND
LOCATION SIMPLE: LEFT SHOULDER
LOCATION SIMPLE: LEFT SCALP
LOCATION SIMPLE: LEFT FOREHEAD
LOCATION SIMPLE: LEFT 5TH TOE
LOCATION SIMPLE: RIGHT BUTTOCK
LOCATION SIMPLE: LEFT UPPER BACK

## 2018-08-20 ASSESSMENT — LOCATION DETAILED DESCRIPTION DERM
LOCATION DETAILED: LEFT MEDIAL FRONTAL SCALP
LOCATION DETAILED: MID POSTERIOR NECK
LOCATION DETAILED: LEFT BUTTOCK
LOCATION DETAILED: LEFT DORSAL 5TH TOE
LOCATION DETAILED: LEFT SUPERIOR MEDIAL FOREHEAD
LOCATION DETAILED: RIGHT POSTERIOR SHOULDER
LOCATION DETAILED: LEFT POSTERIOR SHOULDER
LOCATION DETAILED: RIGHT RADIAL DORSAL HAND
LOCATION DETAILED: RIGHT SUPERIOR MEDIAL FOREHEAD
LOCATION DETAILED: LEFT SUPERIOR UPPER BACK
LOCATION DETAILED: RIGHT BUTTOCK

## 2018-08-20 NOTE — PROCEDURE: BIOPSY BY SHAVE METHOD
Silver Nitrate Text: The wound bed was treated with silver nitrate after the biopsy was performed.
Biopsy Type: H and E
Biopsy Method: Personna blade
Dressing: bandage
Bill For Surgical Tray: no
Wound Care: Petrolatum
Cryotherapy Text: The wound bed was treated with cryotherapy after the biopsy was performed.
Additional Anesthesia Volume In Cc (Will Not Render If 0): 0
Electrodesiccation And Curettage Text: The wound bed was treated with electrodesiccation and curettage after the biopsy was performed.
Consent: Written consent was obtained and risks were reviewed including but not limited to scarring, infection, bleeding, scabbing, incomplete removal, nerve damage and allergy to anesthesia.
Post-Care Instructions: I reviewed with the patient in detail post-care instructions. Patient is to keep the biopsy site dry overnight, and then apply bacitracin twice daily until healed. Patient may apply hydrogen peroxide soaks to remove any crusting.
Destruction After The Procedure: Yes
Lab: [id number]
Anesthesia Volume In Cc: 1.5
Depth Of Biopsy: dermis
Detail Level: Detailed
Lab Facility: 
Notification Instructions: Patient will be notified of biopsy results. However, patient instructed to call the office if not contacted within 2 weeks.
Anesthesia Type: 1% lidocaine with 1:100,000 epinephrine and a 1:10 solution of 8.4% sodium bicarbonate
Size Of Lesion In Cm: 1
Curettage Text: The wound bed was treated with curettage after the biopsy was performed.
Hemostasis: Aluminum Chloride
Type Of Destruction Used: Cryotherapy
Billing Type: Third-Party Bill
Electrodesiccation Text: The wound bed was treated with electrodesiccation after the biopsy was performed.

## 2018-09-09 ENCOUNTER — OFFICE VISIT (OUTPATIENT)
Dept: URGENT CARE | Facility: CLINIC | Age: 73
End: 2018-09-09
Payer: COMMERCIAL

## 2018-09-09 ENCOUNTER — HOSPITAL ENCOUNTER (OUTPATIENT)
Facility: MEDICAL CENTER | Age: 73
End: 2018-09-09
Attending: NURSE PRACTITIONER
Payer: COMMERCIAL

## 2018-09-09 VITALS
SYSTOLIC BLOOD PRESSURE: 134 MMHG | WEIGHT: 143 LBS | HEART RATE: 55 BPM | TEMPERATURE: 97.6 F | DIASTOLIC BLOOD PRESSURE: 82 MMHG | OXYGEN SATURATION: 95 % | RESPIRATION RATE: 12 BRPM | BODY MASS INDEX: 22.44 KG/M2 | HEIGHT: 67 IN

## 2018-09-09 DIAGNOSIS — S90.829A BLISTER OF FOOT WITH INFECTION, UNSPECIFIED LATERALITY, INITIAL ENCOUNTER: ICD-10-CM

## 2018-09-09 DIAGNOSIS — L08.9 BLISTER OF FOOT WITH INFECTION, UNSPECIFIED LATERALITY, INITIAL ENCOUNTER: ICD-10-CM

## 2018-09-09 LAB
GLUCOSE BLD-MCNC: 98 MG/DL (ref 70–100)
GRAM STN SPEC: NORMAL
SIGNIFICANT IND 70042: NORMAL
SITE SITE: NORMAL
SOURCE SOURCE: NORMAL

## 2018-09-09 PROCEDURE — 87070 CULTURE OTHR SPECIMN AEROBIC: CPT

## 2018-09-09 PROCEDURE — 82962 GLUCOSE BLOOD TEST: CPT | Performed by: NURSE PRACTITIONER

## 2018-09-09 PROCEDURE — 99214 OFFICE O/P EST MOD 30 MIN: CPT | Performed by: NURSE PRACTITIONER

## 2018-09-09 PROCEDURE — 87205 SMEAR GRAM STAIN: CPT

## 2018-09-09 RX ORDER — CEPHALEXIN 500 MG/1
500 CAPSULE ORAL 4 TIMES DAILY
Qty: 28 CAP | Refills: 0 | Status: SHIPPED | OUTPATIENT
Start: 2018-09-09 | End: 2018-09-16

## 2018-09-09 ASSESSMENT — ENCOUNTER SYMPTOMS
RESPIRATORY NEGATIVE: 1
GASTROINTESTINAL NEGATIVE: 1
SENSORY CHANGE: 0
CONSTITUTIONAL NEGATIVE: 1
PSYCHIATRIC NEGATIVE: 1
WEAKNESS: 0
CARDIOVASCULAR NEGATIVE: 1
DIAPHORESIS: 0
WEIGHT LOSS: 0
FEVER: 0
CHILLS: 0

## 2018-09-09 NOTE — PROGRESS NOTES
Subjective:      Shaista Bocanegra is a 73 y.o. female who presents with Blister (swollen both feet x4 days )        HPI    The patient presents to urgent care today with concerns of blisters to bilateral feet. She reports noting blisters to both her left and right feet 4 days ago. She now admits to associated drainage, swelling, and surrounding erythema to the blisters. She denies any known injury or trauma to develop blisters. She does admit to recent travel on an airplane with associated swelling to her feet and frequent ambulation. She denies any recent gardening or water exposure. She denies associated fever, chills, joint pain, or malaise. She has tried draining the blisters with scissors and applying Neosporin for symptom relief. She is concerned, as her mother was diabetic, that she may have diabetes which may have led to the development of these blisters.    Past Medical History:   Diagnosis Date   • Benign essential HTN 3/19/2012   • Breast cancer (HCC)    • Cancer (HCC)    • Chest tightness or pressure 3/19/2012   • High risk medication use 3/19/2012   • Hypercholesterolemia 3/19/2012   • MVP (mitral valve prolapse) 3/19/2012   • Renal insufficiency 3/19/2012   • Stroke (HCC)      Past Surgical History:   Procedure Laterality Date   • CATARACT PHACO WITH IOL  3/16/2009    Performed by SHANNON GANDARA at [facility]   • CATARACT PHACO WITH IOL  1/26/2009    Performed by SHANNON GANDARA at [facility]   • BREAST BIOPSY     • LUMPECTOMY     • PB RADIATION THERAPY PLAN SIMPLE     • SD CHEMOTHERAPY, UNSPECIFIED PROCEDURE       Current Outpatient Prescriptions on File Prior to Visit   Medication Sig Dispense Refill   • Biotin 78553 MCG Tab Take  by mouth.     • triamterene/hctz (MAXZIDE-25/DYAZIDE) 37.5-25 MG Cap Take 1 Cap by mouth every morning.     • lisinopril (PRINIVIL) 20 MG Tab TAKE 1 TABLET DAILY 90 Tab 3   • metoprolol SR (TOPROL XL) 25 MG TABLET SR 24 HR Take 1 Tab by  "mouth every day. 90 Tab 4   • metoprolol SR (TOPROL XL) 50 MG TABLET SR 24 HR Take 1 Tab by mouth every day. 90 Tab 3   • atorvastatin (LIPITOR) 80 MG tablet Take 1 Tab by mouth every evening. 90 Tab 3   • Cholecalciferol (VITAMIN D3 PO) Take 2,000 Units by mouth every day.     • Cyanocobalamin (VITAMIN B-12 PO) Take 25,000 mcg by mouth every day.     • potassium chloride SA (K-DUR) 10 MEQ Tab CR Take 10 mEq by mouth every day.     • aspirin (ASA) 81 MG Chew Tab chewable tablet Take 1 Tab by mouth every day. 14 Tab 1   • valacyclovir (VALTREX) 500 MG Tab Take 500 mg by mouth every day.     • Multiple Vitamins-Minerals (ICAPS AREDS 2 PO) Take 1 Tab by mouth every day.     • calcium carbonate (OS-CRISTOFER 500) 500 MG Tab Take 1,000 mg by mouth every day.     • Multiple Vitamin (MULTIVITAMINS PO) Take 1 Tab by mouth every day.       No current facility-administered medications on file prior to visit.      Patient has no known allergies.    Review of Systems   Constitutional: Negative.  Negative for chills, diaphoresis, fever, malaise/fatigue and weight loss.   HENT: Negative.    Respiratory: Negative.    Cardiovascular: Negative.    Gastrointestinal: Negative.    Genitourinary: Negative.    Musculoskeletal:        Bilateral feet pain   Skin: Negative for itching and rash.        Blisters with associated swelling and erythema to bilateral feet   Neurological: Negative for sensory change and weakness.   Psychiatric/Behavioral: Negative.           Objective:     /82   Pulse (!) 55   Temp 36.4 °C (97.6 °F)   Resp 12   Ht 1.702 m (5' 7\")   Wt 64.9 kg (143 lb)   SpO2 95%   BMI 22.40 kg/m²      Physical Exam   Constitutional: She is oriented to person, place, and time. Vital signs are normal. She appears well-developed and well-nourished. She is active. She does not have a sickly appearance. She does not appear ill. No distress.   HENT:   Head: Normocephalic and atraumatic.   Right Ear: External ear normal.   Left " Ear: External ear normal.   Nose: Nose normal.   Mouth/Throat: Oropharynx is clear and moist.   Eyes: Pupils are equal, round, and reactive to light. Conjunctivae are normal. Right eye exhibits no discharge. Left eye exhibits no discharge. No scleral icterus.   Neck: Normal range of motion. Neck supple. No JVD present.   Cardiovascular: Normal rate, regular rhythm, normal heart sounds and intact distal pulses.    Pulmonary/Chest: Effort normal and breath sounds normal. No stridor. No respiratory distress. She has no wheezes.   Musculoskeletal: Normal range of motion. She exhibits no edema, tenderness or deformity.        Feet:    N/V intact. Cap refill brisk. Feet have FROM.    Lymphadenopathy:     She has no cervical adenopathy.   Neurological: She is alert and oriented to person, place, and time. No cranial nerve deficit or sensory deficit. She exhibits normal muscle tone. Coordination normal.   Skin: Skin is warm. Capillary refill takes less than 2 seconds. Lesion noted. No abrasion, no bruising, no ecchymosis, no laceration and no rash noted. She is not diaphoretic. There is erythema. No pallor.   Psychiatric: She has a normal mood and affect. Her behavior is normal. Judgment and thought content normal.   Vitals reviewed.              Assessment/Plan:     1. Blister of foot with infection, unspecified laterality, initial encounter  POCT Glucose    CULTURE WOUND W/ GRAM STAIN    cephALEXin (KEFLEX) 500 MG Cap         The patient is a very pleasant 73-year-old female who presents to the clinic today with with complaints of blisters, pain, swelling, and erythema to bilateral feet. The blisters are located in areas which are consistent with friction from shoes. I have discussed I suspect this may be the culprit. I have encouraged the patient to refrain from popping the blisters, wound care is discussed. The meantime, a wound culture is obtained from the oozing blister on right heel region. Keflex as directed for  minimal surrounding erythema of the blisters, take as directed. Probiotic use strongly encouraged. POC glucose is 98 in office today, and encouraged the patient to follow up with her PCP with regards to concerns of diabetes but I discussed that this is within the normal limits. The patient has been instructed to return to clinic in 2-3 days for reevaluation or sooner with any worsening or new concerns, she is in agreement. Supportive care, differential diagnoses, and indications for immediate follow-up discussed with patient.   Pathogenesis of diagnosis discussed including typical length and natural progression.   Instructed to return to clinic or nearest emergency department for any change in condition, further concerns, or worsening of symptoms.  Patient states understanding of the plan of care and discharge instructions.  Instructed to make an appointment, for follow up, with her primary care provider.        ELVIA Ricardo.

## 2018-09-11 LAB
BACTERIA WND AEROBE CULT: NORMAL
GRAM STN SPEC: NORMAL
SIGNIFICANT IND 70042: NORMAL
SITE SITE: NORMAL
SOURCE SOURCE: NORMAL

## 2018-09-13 ENCOUNTER — TELEPHONE (OUTPATIENT)
Dept: NEUROLOGY | Facility: MEDICAL CENTER | Age: 73
End: 2018-09-13

## 2018-09-13 NOTE — TELEPHONE ENCOUNTER
Pt called and let me know that her PCP suggested her to make a [facility] appt with Melanie because she had a weird episode in the past 2 weeks. She experienced severe dizziness and a doctor had to lay her down for 30 mins and pt also stated she is having memory issues. I will call and advise.

## 2018-09-16 ENCOUNTER — TELEPHONE (OUTPATIENT)
Dept: URGENT CARE | Facility: PHYSICIAN GROUP | Age: 73
End: 2018-09-16

## 2018-09-16 NOTE — TELEPHONE ENCOUNTER
The patient was called for re-evaluation, wound culture negative, she is feeling much improvement and has been seen since visit by her PCP, encouraged to call back to the clinic or return to clinic with any questions or concerns.

## 2018-09-18 ENCOUNTER — TELEPHONE (OUTPATIENT)
Dept: NEUROLOGY | Facility: MEDICAL CENTER | Age: 73
End: 2018-09-18

## 2018-09-18 NOTE — TELEPHONE ENCOUNTER
Called and advised pt on Melanie's reply to my last telephone encounter, patient answered and had no complaints. She did want to be seen in the clinic for her memory loss and I suggested she make an appt with any of our neurologists that treat memory loss, I transferred pt to  to schedule.

## 2018-10-02 DIAGNOSIS — E78.49 OTHER HYPERLIPIDEMIA: ICD-10-CM

## 2018-10-02 RX ORDER — ATORVASTATIN CALCIUM 80 MG/1
80 TABLET, FILM COATED ORAL EVERY EVENING
Qty: 90 TAB | Refills: 2 | Status: SHIPPED | OUTPATIENT
Start: 2018-10-02 | End: 2019-05-03

## 2018-10-04 ENCOUNTER — OFFICE VISIT (OUTPATIENT)
Dept: VASCULAR LAB | Facility: MEDICAL CENTER | Age: 73
End: 2018-10-04
Attending: INTERNAL MEDICINE
Payer: COMMERCIAL

## 2018-10-04 VITALS
SYSTOLIC BLOOD PRESSURE: 141 MMHG | WEIGHT: 144.2 LBS | HEIGHT: 67 IN | HEART RATE: 57 BPM | BODY MASS INDEX: 22.63 KG/M2 | DIASTOLIC BLOOD PRESSURE: 69 MMHG

## 2018-10-04 DIAGNOSIS — I63.529 CEREBROVASCULAR ACCIDENT (CVA) DUE TO OCCLUSION OF ANTERIOR CEREBRAL ARTERY, UNSPECIFIED BLOOD VESSEL LATERALITY (HCC): ICD-10-CM

## 2018-10-04 DIAGNOSIS — I10 BENIGN ESSENTIAL HTN: Chronic | ICD-10-CM

## 2018-10-04 DIAGNOSIS — I34.0 NON-RHEUMATIC MITRAL REGURGITATION: ICD-10-CM

## 2018-10-04 DIAGNOSIS — E78.5 DYSLIPIDEMIA: ICD-10-CM

## 2018-10-04 PROCEDURE — 99212 OFFICE O/P EST SF 10 MIN: CPT | Performed by: FAMILY MEDICINE

## 2018-10-04 PROCEDURE — 99204 OFFICE O/P NEW MOD 45 MIN: CPT | Performed by: FAMILY MEDICINE

## 2018-10-04 RX ORDER — LISINOPRIL 40 MG/1
40 TABLET ORAL DAILY
Qty: 90 TAB | Refills: 3 | Status: SHIPPED | OUTPATIENT
Start: 2018-10-04 | End: 2018-11-09

## 2018-10-04 RX ORDER — HYDROCHLOROTHIAZIDE 25 MG/1
25 TABLET ORAL DAILY
Qty: 90 TAB | Refills: 3 | Status: SHIPPED | OUTPATIENT
Start: 2018-10-04 | End: 2019-05-03

## 2018-10-04 ASSESSMENT — ENCOUNTER SYMPTOMS
SPEECH CHANGE: 1
SHORTNESS OF BREATH: 0
MYALGIAS: 0
VOMITING: 0
DIARRHEA: 0
SORE THROAT: 0
TREMORS: 0
CHILLS: 0
COUGH: 0
FOCAL WEAKNESS: 0
FEVER: 0
SEIZURES: 0
DEPRESSION: 0
HEADACHES: 0
BLURRED VISION: 0
BRUISES/BLEEDS EASILY: 0
NAUSEA: 0
WHEEZING: 0
INSOMNIA: 0
DIZZINESS: 0
PALPITATIONS: 0
ORTHOPNEA: 0
WEAKNESS: 0
NERVOUS/ANXIOUS: 0
ABDOMINAL PAIN: 0
HEMOPTYSIS: 0
DOUBLE VISION: 0
BLOOD IN STOOL: 0

## 2018-10-04 NOTE — PATIENT INSTRUCTIONS
1) change lisinopril to 40mg   2) continue metoprolol   3) stop triamterene/hctz, start hctz 25mg   4) check labs in about 3 weeks after starting medications   5) follow- up in 6 weeks   6) I will call you about doing repeat cat scan

## 2018-10-04 NOTE — PROGRESS NOTES
INITIAL VASCULAR MED VISIT  Subjective:   Shaista Bocanegra is a 73 y.o. female who presents today 10/4/2018 for   Chief Complaint   Patient presents with   • New Patient     Essential Hypertension   Initially referred by Dr. Daniels (cardiology) for eval and mgmt of HTN in context of MVR and hx of CVA.     HPI:    Current HTN concerns: Denies   HTN sx:  No current blurred or changed vision, chest pain, shortness of breath, headache, nausea, dizziness/vertigo.  Had 1 episode of dizziness at [place] while in hot sun with dehydration.    Reports some face recognition and proper nouns with memory loss.    Home BP los/80s  Adherence to current HTN meds: compliant all of the time tolerating all   Antiplatelet/anticoagulation: Yes, Details: ASA 81mg   Hyperlipidemia: Yes, Details: continue atorvastatin 80mg daily     Pertinent HTN hx:   Age at HTN dx:  At least 20 years   (if female, any hx of pregnancy-related HTN): none reported  Past HTN medications:  As noted takes triam/hctz with travel only   HTN crises:  CVA 2017, o/w negative   ASCVD risk factors:     DM: No   CKD:  No  Lifestyle factors:     High salt: No   EtOH: No  Interfering substances:     NSAIDs: No    Decongestants. No    ASCVD calculator (contraindicated if ASCVD+, [id number])   10yr-risk calc: n/a     Clinical evidence of ASCVD:     1) hx of MI/ACS:  No   2) coronary or other revascularization procedure: No   3) TIA/ischemic CVA: Yes, Details: hx of ischemic CVA 2017, R MCA   4) PAD (including MICAH <0.9): No   5) documented (CAD, Renal athero, AA due to athero, carotid plaque >50% stenosis: No    Major RFs:     1) Age (Men>44, women>54):  yes   2) Fhx early CAD (<55 men, <65 women):  no   3) cigarette smoking:  No   4) high BP >139/89 or on tx: yes   5) Low HDL-C (<40 men, <50 women):  no      Past Medical History:   Diagnosis Date   • Benign essential HTN 3/19/2012   • Breast cancer (HCC)    • Cancer (HCC)    • Chest tightness or pressure  3/19/2012   • High risk medication use 3/19/2012   • Hypercholesterolemia 3/19/2012   • MVP (mitral valve prolapse) 3/19/2012   • Renal insufficiency 3/19/2012   • Stroke (HCC)      Past Surgical History:   Procedure Laterality Date   • CATARACT PHACO WITH IOL  3/16/2009    Performed by SHANNON GANDARA at [facility]   • CATARACT PHACO WITH IOL  1/26/2009    Performed by SHANNON GANDARA at [facility]   • BREAST BIOPSY     • LUMPECTOMY     • PB RADIATION THERAPY PLAN SIMPLE     • HI CHEMOTHERAPY, UNSPECIFIED PROCEDURE       Patient Active Problem List   Diagnosis   • Benign essential HTN   • Mitral regurgitation   • Osteopenia   • Dyslipidemia   • Fluency disorder associated with underlying disease   • Dysphagia following cerebrovascular accident   • Cerebrovascular accident (CVA) due to vascular occlusion (HCC)       Family History   Problem Relation Age of Onset   • Cancer Mother    • Heart Failure Neg Hx    • Heart Disease Neg Hx      History   Smoking Status   • Never Smoker   Smokeless Tobacco   • Never Used     Social History   Substance Use Topics   • Smoking status: Never Smoker   • Smokeless tobacco: Never Used   • Alcohol use Yes      Comment: 3 glasses of wine     Outpatient Encounter Prescriptions as of 10/4/2018   Medication Sig Dispense Refill   • lisinopril (PRINIVIL, ZESTRIL) 40 MG tablet Take 1 Tab by mouth every day for 360 days. 90 Tab 3   • hydroCHLOROthiazide (HYDRODIURIL) 25 MG Tab Take 1 Tab by mouth every day for 360 days. 90 Tab 3   • atorvastatin (LIPITOR) 80 MG tablet Take 1 Tab by mouth every evening. 90 Tab 2   • Biotin 09292 MCG Tab Take  by mouth.     • metoprolol SR (TOPROL XL) 25 MG TABLET SR 24 HR Take 1 Tab by mouth every day. 90 Tab 4   • metoprolol SR (TOPROL XL) 50 MG TABLET SR 24 HR Take 1 Tab by mouth every day. 90 Tab 3   • Cholecalciferol (VITAMIN D3 PO) Take 2,000 Units by mouth every day.     • Cyanocobalamin (VITAMIN B-12 PO) Take  25,000 mcg by mouth every day.     • potassium chloride SA (K-DUR) 10 MEQ Tab CR Take 10 mEq by mouth every day.     • aspirin (ASA) 81 MG Chew Tab chewable tablet Take 1 Tab by mouth every day. 14 Tab 1   • valacyclovir (VALTREX) 500 MG Tab Take 500 mg by mouth every day.     • Multiple Vitamins-Minerals (ICAPS AREDS 2 PO) Take 1 Tab by mouth every day.     • calcium carbonate (OS-CRISTOFER 500) 500 MG Tab Take 1,000 mg by mouth every day.     • Multiple Vitamin (MULTIVITAMINS PO) Take 1 Tab by mouth every day.     • [DISCONTINUED] triamterene/hctz (MAXZIDE-25/DYAZIDE) 37.5-25 MG Cap Take 1 Cap by mouth every morning.     • [DISCONTINUED] lisinopril (PRINIVIL) 20 MG Tab TAKE 1 TABLET DAILY 90 Tab 3     No facility-administered encounter medications on file as of 10/4/2018.      No Known Allergies  DIET AND EXERCISE:  Weight Change: none   Diet: low fat, low sodium  Exercise: moderate regular exercise program     Review of Systems   Constitutional: Negative for chills, fever and malaise/fatigue.   HENT: Negative for nosebleeds, sore throat and tinnitus.    Eyes: Negative for blurred vision and double vision.   Respiratory: Negative for cough, hemoptysis, shortness of breath and wheezing.    Cardiovascular: Negative for chest pain, palpitations, orthopnea and leg swelling.   Gastrointestinal: Negative for abdominal pain, blood in stool, diarrhea, melena, nausea and vomiting.   Genitourinary: Negative for hematuria.   Musculoskeletal: Negative for joint pain and myalgias.   Skin: Negative for itching and rash.   Neurological: Positive for speech change (word finding issues ). Negative for dizziness, tremors, focal weakness, seizures, weakness and headaches.   Endo/Heme/Allergies: Does not bruise/bleed easily.   Psychiatric/Behavioral: Negative for depression. The patient is not nervous/anxious and does not have insomnia.       Objective:     Vitals:    10/04/18 0934 10/04/18 0944   BP: 142/70 141/69   BP Location: Left arm  "Left arm   Patient Position: Sitting Sitting   BP Cuff Size: Small adult Small adult   Pulse:  (!) 57   Weight: 65.7 kg (144 lb 12.8 oz) 65.4 kg (144 lb 3.2 oz)   Height: 1.702 m (5' 7\") 1.702 m (5' 7\")      BP Readings from Last 4 Encounters:   10/04/18 141/69   09/09/18 134/82   08/15/18 150/72   06/06/18 122/80      Body mass index is 22.58 kg/m².   BMI Readings from Last 4 Encounters:   10/04/18 22.58 kg/m²   09/09/18 22.40 kg/m²   08/15/18 22.40 kg/m²   06/06/18 21.68 kg/m²      Physical Exam   Constitutional: She is oriented to person, place, and time. She appears well-developed and well-nourished. No distress.   HENT:   Head: Normocephalic and atraumatic.   Neck: Normal range of motion. Neck supple. No JVD present. No thyromegaly present.   Cardiovascular: Normal rate, regular rhythm and intact distal pulses.  Exam reveals no gallop and no friction rub.    Murmur heard.   Systolic murmur is present with a grade of 3/6   Pulses:       Carotid pulses are 2+ on the right side, and 2+ on the left side.       Radial pulses are 2+ on the right side, and 2+ on the left side.        Dorsalis pedis pulses are 2+ on the right side, and 2+ on the left side.        Posterior tibial pulses are 2+ on the right side, and 2+ on the left side.   Edema     RLE: none     LLE: none   Spider telangectasia:       RLE:  None      LLE: none   Varicosities:         RLE: none      LLE: none   Corona phlebectatica:      RLE:  None       LLE:  None   Cording:         RLE:  None     LLE: None      Pulmonary/Chest: Effort normal and breath sounds normal.   Abdominal: Soft. Bowel sounds are normal. She exhibits no distension and no mass. There is no tenderness. There is no rebound and no guarding.   Musculoskeletal: Normal range of motion. She exhibits no edema or tenderness.   Neurological: She is alert and oriented to person, place, and time. No cranial nerve deficit. She exhibits normal muscle tone. Coordination normal.   Skin: Skin is " warm and dry. She is not diaphoretic.   Psychiatric: She has a normal mood and affect.     Lab Results   Component Value Date    CHOLSTRLTOT 135 08/19/2017    LDL 65 08/19/2017    HDL 54 08/19/2017    TRIGLYCERIDE 80 08/19/2017      Lab Results   Component Value Date    PROTHROMBTM 13.4 08/18/2017    INR 0.99 08/18/2017       Lab Results   Component Value Date    HBA1C 5.3 08/18/2017      Lab Results   Component Value Date    SODIUM 144 11/30/2017    SODIUM 140 08/28/2017    POTASSIUM 3.6 11/30/2017    POTASSIUM 3.0 (L) 08/28/2017    CHLORIDE 104 11/30/2017    CHLORIDE 104 08/28/2017    CO2 24 11/30/2017    CO2 28 08/28/2017    GLUCOSE 89 11/30/2017    GLUCOSE 108 (H) 08/28/2017    BUN 21 11/30/2017    BUN 23 (H) 08/28/2017    CREATININE 0.90 11/30/2017    CREATININE 0.88 08/28/2017    BUNCREATRAT 23 11/30/2017    IFAFRICA 73 11/30/2017    IFAFRICA >60 08/28/2017    IFNOTAFR 63 11/30/2017    IFNOTAFR >60 08/28/2017        Lab Results   Component Value Date    WBC 5.3 08/27/2017    RBC 3.72 (L) 08/27/2017    HEMOGLOBIN 13.0 08/27/2017    HEMATOCRIT 38.9 08/27/2017    .6 (H) 08/27/2017    MCH 34.9 (H) 08/27/2017    MCHC 33.4 (L) 08/27/2017    MPV 9.5 08/27/2017      VASCULAR IMAGING:     CTA head 8/2017  1.  There is a right M1 segment occlusion.  2.  There is collateral flow in the peripheral M3 branches seen on the right.  3.  There is mild decreased enhancement of the visualized right internal carotid artery however it is patent.  4.  Question of subtle hypodensity in the right insular cortex region. No abnormal brain parenchymal enhancement is seen.    Carotid duplex 8/18/17   nl carotids, subclavians and vertebrals    CTA neck 8/18/17  1.  CT angiogram of the neck within normal limits.  2.  Thrombosed right M1 segment.    IR thrombectomy 8/18/17  1. Subtotal occlusion of the RIGHT M1 segment.  2. Successful RIGHT middle cerebral artery mechanical thrombectomy using Trevo stent.  3. Post thrombectomy  arteriogram demonstrates patent lenticulostriate, RIGHT M1 and peripheral segments.    MRI brain 8/20/17  1.  Moderate sized RIGHT MCA territory acute ischemia involving the cortex and basal ganglia  2.  Flow void is present in the RIGHT MCA compatible with treatment effect  3.  No hemorrhage  4.  Mild white matter changes  5.  Mild atrophy    Echo 8/19/17  Agitated saline study was performed, no evidence of right to left   shunt.  Normal left ventricular size, wall thickness, and systolic function.  Left ventricular ejection fraction is visually estimated to be 60%.  Mildly dilated left atrium.  Moderate mitral regurgitation due to an eccentric jet.  Mild tricuspid regurgitation.  Right ventricular systolic pressure is estimated to be 35 mmHg.    Echo 6/15/18  Normal left ventricular systolic function.  Left ventricular ejection fraction is visually estimated to be 60%.  Myxomatous changes of the mitral valve.  Prolapse of the anterior and posterior mitral leaflets.  Severe, eccentric mitral regurgitation.  Right heart pressures are normal.  Compared to the report of the study done 8/19/2017 severe mitral   regurgitation is now present, previously reported as moderate but a MR   ERO was not recorded.     Medical Decision Making:  Today's Assessment / Status / Plan:     1. Benign essential HTN  lisinopril (PRINIVIL, ZESTRIL) 40 MG tablet    hydroCHLOROthiazide (HYDRODIURIL) 25 MG Tab    COMP METABOLIC PANEL    MICROALBUMIN CREAT RATIO URINE RANDOM   2. Non-rheumatic mitral regurgitation     3. Dyslipidemia  COMP METABOLIC PANEL    MICROALBUMIN CREAT RATIO URINE RANDOM    LIPID PANEL   4. Cerebrovascular accident (CVA) due to occlusion of anterior cerebral artery, unspecified blood vessel laterality (HCC)       Patient Type: Secondary Prevention    Etiology of Established CVD if Present:   1) ischemic CVA, Moderate sized RIGHT MCA territory acute ischemia involving the cortex and basal ganglia    Lipid Management:  Qualifies for Statin Therapy Based on 2013 ACC/AHA Guidelines: yes  Calculated 10-Year Risk of ASCVD: N/A  Currently on Statin: Yes  NLA Risk Category:   Very high:  Evidence of ASCVD or T1/T2D with 2 or more RFs or evidence of end-org damage (CKD, ACR>29, retinopathy)  Tx threshold:  non-HDL-C >99, LDL-C >69  Tx goal: non-HDL-C <100,  LDL-C <70  (optional: apoB <80)  At goal:  Yes, per 8/2017 labs   Tx plan:   - continue high-intensity atorvastatin 80mg   - update labs     Blood Pressure Management:Goal: ACC/AHA (2017) goal <130/80  Home BP at goal:  no  Office BP at goal:  No  Recommend aggressive afterload and target home <125/75 consistently.   No indications of primary zev on prior labs   Echo shows no LVH and normal LVEF  Plan:   - continue home BP monitoring, reviewed correct technique:  Yes   - order 24h ABPM:  Consider   - stop triam/hctz 37.5/25 daily and start HCTZ 25mg, monitor lytes   - continue metprolol 75mg ER daily   - increase lisinopril to 40mg daily  - consider addition of amlodipine 5mg as next step   - update labs in 3 weeks     Glycemic Status: Normal  - continue healthy diet     Anti-Platelet/Anti-Coagulant Tx: yes  - continue ASA 81mg daily     Smoking: continued complete avoidance of all tobacco products      Physical Activity: advised to engage in walking >150min/week    Weight Management and Nutrition: Dietary plan was discussed with patient at this visit including plate method, DASH diet     Other:   1) hx of CVA  - continue antiplat and BP mgmt  - defer mgmt to neurology for ongoing care     2) s/p thrombectomy of R MCA  - recommend interval CTA head, will see if completed at [place], if not will orde through [facility]     2) hx of severe MVR  - defer mgmt to cardiology for ongoing care     Instructed to follow-up with PCP for remainder of adult medical needs: yes  We will partner with other providers in the management of established vascular disease and cardiometabolic risk  factors.    Studies to Be Obtained: CTA head if not completed at [facility] (requested)   Labs to Be Obtained:  CMP, lipid, ACR    Follow up in: 6 weeks with me and BP log     Travon Jaramillo M.D.

## 2018-10-18 NOTE — PROCEDURE: OBSERVATION
Size Of Lesion: 2 mm
Detail Level: Detailed
Morphology Per Location (Optional): dark brown macule
Alert and interactive, no focal deficits

## 2018-11-09 ENCOUNTER — OFFICE VISIT (OUTPATIENT)
Dept: VASCULAR LAB | Facility: MEDICAL CENTER | Age: 73
End: 2018-11-09
Attending: FAMILY MEDICINE
Payer: COMMERCIAL

## 2018-11-09 VITALS
BODY MASS INDEX: 22.46 KG/M2 | HEART RATE: 63 BPM | HEIGHT: 67 IN | DIASTOLIC BLOOD PRESSURE: 60 MMHG | SYSTOLIC BLOOD PRESSURE: 123 MMHG | WEIGHT: 143.1 LBS

## 2018-11-09 DIAGNOSIS — I10 BENIGN ESSENTIAL HTN: Chronic | ICD-10-CM

## 2018-11-09 PROCEDURE — 99214 OFFICE O/P EST MOD 30 MIN: CPT | Performed by: FAMILY MEDICINE

## 2018-11-09 PROCEDURE — 99212 OFFICE O/P EST SF 10 MIN: CPT | Performed by: FAMILY MEDICINE

## 2018-11-09 RX ORDER — AMLODIPINE AND OLMESARTAN MEDOXOMIL 10; 40 MG/1; MG/1
TABLET ORAL
Qty: 90 TAB | Refills: 3 | Status: SHIPPED | OUTPATIENT
Start: 2018-11-09 | End: 2018-12-21

## 2018-11-09 ASSESSMENT — ENCOUNTER SYMPTOMS
INSOMNIA: 0
SHORTNESS OF BREATH: 0
COUGH: 0
DEPRESSION: 0
ORTHOPNEA: 0
BLURRED VISION: 0
BRUISES/BLEEDS EASILY: 0
VOMITING: 0
MYALGIAS: 0
DIZZINESS: 0
TREMORS: 0
PALPITATIONS: 0
HEADACHES: 0
ABDOMINAL PAIN: 0
FEVER: 0
WEAKNESS: 0
CHILLS: 0
BLOOD IN STOOL: 0
DOUBLE VISION: 0
NAUSEA: 0
SPEECH CHANGE: 1
SORE THROAT: 0
FOCAL WEAKNESS: 0
DIARRHEA: 0
HEMOPTYSIS: 0
WHEEZING: 0
SEIZURES: 0
NERVOUS/ANXIOUS: 0

## 2018-11-09 NOTE — PATIENT INSTRUCTIONS
1) stop lisinopril  2) start amlodipine/olmesartan 10/40mg daily   3) check labs today, we will call results to you probably Monday or Tuesday   4) weight loss 1-2 lb every 2 weeks     - consume diet that emphasizes intake of vegetables, fruits, and whole grains,  - use low fat diary products, poultry, fish, legumes, nontropical vegetable oils, nuts  - limit intake of seets, sugar-sweetned beverages, and red meats   - reduce saturated and trans fats to <6% of your daily calories   - consume no more than 2,400mg of sodium daily (look at food labels)  - healthy diet plans include    1) DASH diet (dashdiet.org), the USDA food pattern,     2) plate method (https://www.[place] Notesplate.gov/)   3) AHA diet  (http://www.heart.org/en/healthy-living/healthy-eating/eat-smart/nutrition-basics/aha-diet-and-lifestyle-recommendations)    4) Mediterranean diet (http://www.heart.org/en/healthy-living/healthy-eating/eat-smart/nutrition-basics/mediterranean-diet)     - engage in moderate to vigorous physical activity such as brisk walking, swimming, cycling, >150 minutes per week at 30-40 minutes per session, 3 to 5 times weekly

## 2018-11-09 NOTE — PROGRESS NOTES
FOLLOW-UP VASCULAR MED VISIT  Subjective:   Shaista Bocanegra is a 73 y.o. female who presents today 11/9/18 for   Chief Complaint   Patient presents with   • Follow-Up     Benign Essential Hypertension   Initially referred by Dr. Daniels (cardiology) for eval and mgmt of HTN in context of MVR and hx of CVA.     HPI:    Current HTN concerns: Reports ongoing word-finding issues and last name recall, pending EEG with neurology    HTN sx:  No current blurred or changed vision, chest pain, shortness of breath, headache, nausea, dizziness/vertigo.   Reports some face recognition and proper nouns with memory loss.    Home BP log: mid 130s/cgo75-70h  Adherence to current HTN meds: compliant all of the time tolerating all     Antiplatelet/anticoagulation: Yes, Details: ASA 81mg      Hyperlipidemia: Yes, Details: continue atorvastatin 80mg daily , no myalgias forgot to get labs done     Pertinent HTN hx:   Age at HTN dx:  At least 20 years   (if female, any hx of pregnancy-related HTN): none reported  Past HTN medications:  As noted takes triam/hctz with travel only   HTN crises:  CVA 8/2017, o/w negative   ASCVD risk factors:     DM: No   CKD:  No  Lifestyle factors:     High salt: No   EtOH: No  Interfering substances:     NSAIDs: No    Decongestants. No    ASCVD calculator (contraindicated if ASCVD+, [id number])   10yr-risk calc: n/a     Clinical evidence of ASCVD:     1) hx of MI/ACS:  No   2) coronary or other revascularization procedure: No   3) TIA/ischemic CVA: Yes, Details: hx of ischemic CVA 8/2017, R MCA   4) PAD (including MICAH <0.9): No   5) documented (CAD, Renal athero, AA due to athero, carotid plaque >50% stenosis: No    Major RFs:     1) Age (Men>44, women>54):  yes   2) Fhx early CAD (<55 men, <65 women):  no   3) cigarette smoking:  No   4) high BP >139/89 or on tx: yes   5) Low HDL-C (<40 men, <50 women):  no    Social History   Substance Use Topics   • Smoking status: Never Smoker   • Smokeless tobacco: Never  Used   • Alcohol use Yes      Comment: 3 glasses of wine     Outpatient Encounter Prescriptions as of 11/9/2018   Medication Sig Dispense Refill   • Amlodipine-Olmesartan 10-40 MG Tab Take 1 tablet by mouth every day to lower blood pressure 90 Tab 3   • hydroCHLOROthiazide (HYDRODIURIL) 25 MG Tab Take 1 Tab by mouth every day for 360 days. 90 Tab 3   • atorvastatin (LIPITOR) 80 MG tablet Take 1 Tab by mouth every evening. 90 Tab 2   • Biotin 43756 MCG Tab Take  by mouth.     • metoprolol SR (TOPROL XL) 25 MG TABLET SR 24 HR Take 1 Tab by mouth every day. 90 Tab 4   • metoprolol SR (TOPROL XL) 50 MG TABLET SR 24 HR Take 1 Tab by mouth every day. 90 Tab 3   • Cholecalciferol (VITAMIN D3 PO) Take 2,000 Units by mouth every day.     • Cyanocobalamin (VITAMIN B-12 PO) Take 25,000 mcg by mouth every day.     • potassium chloride SA (K-DUR) 10 MEQ Tab CR Take 10 mEq by mouth every day.     • aspirin (ASA) 81 MG Chew Tab chewable tablet Take 1 Tab by mouth every day. 14 Tab 1   • valacyclovir (VALTREX) 500 MG Tab Take 500 mg by mouth every day.     • Multiple Vitamins-Minerals (ICAPS AREDS 2 PO) Take 1 Tab by mouth every day.     • calcium carbonate (OS-CRISTOFER 500) 500 MG Tab Take 1,000 mg by mouth every day.     • Multiple Vitamin (MULTIVITAMINS PO) Take 1 Tab by mouth every day.     • [DISCONTINUED] lisinopril (PRINIVIL, ZESTRIL) 40 MG tablet Take 1 Tab by mouth every day for 360 days. 90 Tab 3     No facility-administered encounter medications on file as of 11/9/2018.      No Known Allergies  DIET AND EXERCISE:  Weight Change: none   Diet: low fat, low sodium, reports some indiscretions over last 2 months   Exercise: moderate regular exercise program     Review of Systems   Constitutional: Negative for chills, fever and malaise/fatigue.   HENT: Negative for nosebleeds, sore throat and tinnitus.    Eyes: Negative for blurred vision and double vision.   Respiratory: Negative for cough, hemoptysis, shortness of breath and  "wheezing.    Cardiovascular: Negative for chest pain, palpitations, orthopnea and leg swelling.   Gastrointestinal: Negative for abdominal pain, blood in stool, diarrhea, melena, nausea and vomiting.   Genitourinary: Negative for hematuria.   Musculoskeletal: Negative for joint pain and myalgias.   Skin: Negative for itching and rash.   Neurological: Positive for speech change (word finding issues ). Negative for dizziness, tremors, focal weakness, seizures, weakness and headaches.   Endo/Heme/Allergies: Does not bruise/bleed easily.   Psychiatric/Behavioral: Negative for depression. The patient is not nervous/anxious and does not have insomnia.       Objective:     Vitals:    11/09/18 0910   BP: 123/60   BP Location: Left arm   Patient Position: Sitting   BP Cuff Size: Small adult   Pulse: 63   Weight: 64.9 kg (143 lb 1.6 oz)   Height: 1.702 m (5' 7\")      BP Readings from Last 4 Encounters:   11/09/18 123/60   10/04/18 141/69   09/09/18 134/82   08/15/18 150/72      Body mass index is 22.41 kg/m².   BMI Readings from Last 4 Encounters:   11/09/18 22.41 kg/m²   10/04/18 22.58 kg/m²   09/09/18 22.40 kg/m²   08/15/18 22.40 kg/m²      Physical Exam   Constitutional: She is oriented to person, place, and time. She appears well-developed and well-nourished. No distress.   HENT:   Head: Normocephalic and atraumatic.   Neck: Normal range of motion. Neck supple. No JVD present. No thyromegaly present.   Cardiovascular: Normal rate, regular rhythm and intact distal pulses.  Exam reveals no gallop and no friction rub.    Murmur heard.   Systolic murmur is present with a grade of 3/6   Pulses:       Carotid pulses are 2+ on the right side, and 2+ on the left side.       Radial pulses are 2+ on the right side, and 2+ on the left side.        Dorsalis pedis pulses are 2+ on the right side, and 2+ on the left side.        Posterior tibial pulses are 2+ on the right side, and 2+ on the left side.   Edema     RLE: none     LLE: " none   Spider telangectasia:       RLE:  None      LLE: none   Varicosities:         RLE: none      LLE: none   Corona phlebectatica:      RLE:  None       LLE:  None   Cording:         RLE:  None     LLE: None      Pulmonary/Chest: Effort normal and breath sounds normal.   Abdominal: Soft. Bowel sounds are normal. She exhibits no distension and no mass. There is no tenderness. There is no rebound and no guarding.   Musculoskeletal: Normal range of motion. She exhibits no edema or tenderness.   Neurological: She is alert and oriented to person, place, and time. No cranial nerve deficit. She exhibits normal muscle tone. Coordination normal.   Skin: Skin is warm and dry. She is not diaphoretic.   Psychiatric: She has a normal mood and affect.     Lab Results   Component Value Date    CHOLSTRLTOT 135 08/19/2017    LDL 65 08/19/2017    HDL 54 08/19/2017    TRIGLYCERIDE 80 08/19/2017      Lab Results   Component Value Date    PROTHROMBTM 13.4 08/18/2017    INR 0.99 08/18/2017       Lab Results   Component Value Date    HBA1C 5.3 08/18/2017      Lab Results   Component Value Date    SODIUM 144 11/30/2017    SODIUM 140 08/28/2017    POTASSIUM 3.6 11/30/2017    POTASSIUM 3.0 (L) 08/28/2017    CHLORIDE 104 11/30/2017    CHLORIDE 104 08/28/2017    CO2 24 11/30/2017    CO2 28 08/28/2017    GLUCOSE 89 11/30/2017    GLUCOSE 108 (H) 08/28/2017    BUN 21 11/30/2017    BUN 23 (H) 08/28/2017    CREATININE 0.90 11/30/2017    CREATININE 0.88 08/28/2017    BUNCREATRAT 23 11/30/2017    IFAFRICA 73 11/30/2017    IFAFRICA >60 08/28/2017    IFNOTAFR 63 11/30/2017    IFNOTAFR >60 08/28/2017        Lab Results   Component Value Date    WBC 5.3 08/27/2017    RBC 3.72 (L) 08/27/2017    HEMOGLOBIN 13.0 08/27/2017    HEMATOCRIT 38.9 08/27/2017    .6 (H) 08/27/2017    MCH 34.9 (H) 08/27/2017    MCHC 33.4 (L) 08/27/2017    MPV 9.5 08/27/2017      VASCULAR IMAGING:     CTA head 8/2017  1.  There is a right M1 segment occlusion.  2.  There is  collateral flow in the peripheral M3 branches seen on the right.  3.  There is mild decreased enhancement of the visualized right internal carotid artery however it is patent.  4.  Question of subtle hypodensity in the right insular cortex region. No abnormal brain parenchymal enhancement is seen.    Carotid duplex 8/18/17   nl carotids, subclavians and vertebrals    CTA neck 8/18/17  1.  CT angiogram of the neck within normal limits.  2.  Thrombosed right M1 segment.    IR thrombectomy 8/18/17  1. Subtotal occlusion of the RIGHT M1 segment.  2. Successful RIGHT middle cerebral artery mechanical thrombectomy using Trevo stent.  3. Post thrombectomy arteriogram demonstrates patent lenticulostriate, RIGHT M1 and peripheral segments.    MRI brain 8/20/17  1.  Moderate sized RIGHT MCA territory acute ischemia involving the cortex and basal ganglia  2.  Flow void is present in the RIGHT MCA compatible with treatment effect  3.  No hemorrhage  4.  Mild white matter changes  5.  Mild atrophy    Echo 8/19/17  Agitated saline study was performed, no evidence of right to left   shunt.  Normal left ventricular size, wall thickness, and systolic function.  Left ventricular ejection fraction is visually estimated to be 60%.  Mildly dilated left atrium.  Moderate mitral regurgitation due to an eccentric jet.  Mild tricuspid regurgitation.  Right ventricular systolic pressure is estimated to be 35 mmHg.    Echo 6/15/18  Normal left ventricular systolic function.  Left ventricular ejection fraction is visually estimated to be 60%.  Myxomatous changes of the mitral valve.  Prolapse of the anterior and posterior mitral leaflets.  Severe, eccentric mitral regurgitation.  Right heart pressures are normal.  Compared to the report of the study done 8/19/2017 severe mitral   regurgitation is now present, previously reported as moderate but a MR   ERO was not recorded.     Medical Decision Making:  Today's Assessment / Status / Plan:      1. Benign essential HTN       Patient Type: Secondary Prevention    Etiology of Established CVD if Present:   1) ischemic CVA, Moderate sized RIGHT MCA territory acute ischemia involving the cortex and basal ganglia    Lipid Management: Qualifies for Statin Therapy Based on 2013 ACC/AHA Guidelines: yes  Calculated 10-Year Risk of ASCVD: N/A  Currently on Statin: Yes  NLA Risk Category:   Very high:  Evidence of ASCVD or T1/T2D with 2 or more RFs or evidence of end-org damage (CKD, ACR>29, retinopathy)  Tx threshold:  non-HDL-C >99, LDL-C >69  Tx goal: non-HDL-C <100,  LDL-C <70  (optional: apoB <80)  At goal:  Yes, per 8/2017 labs   Tx plan:   - continue high-intensity atorvastatin 80mg   - getting labs today or tomorrow, APRN to call results, consider switch to crestor 40mg if not at goal   - consider addition of zetia 10mg if needed     Blood Pressure Management:Goal: ACC/AHA (2017) goal <130/80  Home BP at goal:  no  Office BP at goal:  Yes  ? Masked HTN component   Recommend aggressive afterload and target home <125/75 consistently.   No indications of primary zev on prior labs   Echo shows no LVH and normal LVEF  Plan:   - continue home BP monitoring, reviewed correct technique:  Yes   - order 24h ABPM:  Consider   - stop triam/hctz 37.5/25 daily and start HCTZ 25mg, monitor lytes   - continue metprolol 75mg ER daily   - stop lisinopril to 40mg daily, start amlodipine/olmesartan 10/40mg   - consider addition of low dose spironolactone as next step  - monitor lytes/GFR    Glycemic Status: Normal  - continue healthy diet     Anti-Platelet/Anti-Coagulant Tx: yes  - continue ASA 81mg daily, defer adjustments to neurology      Smoking: continued complete avoidance of all tobacco products      Physical Activity: advised to engage in walking >150min/week    Weight Management and Nutrition: Dietary plan was discussed with patient at this visit including plate method, DASH diet     Other:   1) hx of CVA  - continue  antiplat and BP mgmt  - defer mgmt to neurology for ongoing care   - ED precautions for acute CVA symptoms reviewed     2) s/p thrombectomy of R MCA  - defer repeat imaging to neurosurg or neurology     3) hx of severe MVR  - defer mgmt to cardiology for ongoing care     Instructed to follow-up with PCP for remainder of adult medical needs: yes  We will partner with other providers in the management of established vascular disease and cardiometabolic risk factors.    Studies to Be Obtained:  None   Labs to Be Obtained:  CMP, lipid, ACR now     Follow up in: 6 weeks with me and BP log     Travon Jaramillo M.D.

## 2018-11-10 LAB
ALBUMIN SERPL-MCNC: 4.1 G/DL (ref 3.5–4.8)
ALBUMIN/CREAT UR: 9 MG/G CREAT (ref 0–30)
ALBUMIN/GLOB SERPL: 1.6 {RATIO} (ref 1.2–2.2)
ALP SERPL-CCNC: 73 IU/L (ref 39–117)
ALT SERPL-CCNC: 31 IU/L (ref 0–32)
AST SERPL-CCNC: 32 IU/L (ref 0–40)
BILIRUB SERPL-MCNC: 0.3 MG/DL (ref 0–1.2)
BUN SERPL-MCNC: 30 MG/DL (ref 8–27)
BUN/CREAT SERPL: 28 (ref 12–28)
CALCIUM SERPL-MCNC: 9.4 MG/DL (ref 8.7–10.3)
CHLORIDE SERPL-SCNC: 102 MMOL/L (ref 96–106)
CHOLEST SERPL-MCNC: 152 MG/DL (ref 100–199)
CO2 SERPL-SCNC: 25 MMOL/L (ref 20–29)
CREAT SERPL-MCNC: 1.07 MG/DL (ref 0.57–1)
CREAT UR-MCNC: 89.3 MG/DL
GLOBULIN SER CALC-MCNC: 2.5 G/DL (ref 1.5–4.5)
GLUCOSE SERPL-MCNC: 103 MG/DL (ref 65–99)
HDLC SERPL-MCNC: 69 MG/DL
IF AFRICAN AMERICAN  100797: 59 ML/MIN/1.73
IF NON AFRICAN AMER 100791: 51 ML/MIN/1.73
LABORATORY COMMENT REPORT: NORMAL
LDLC SERPL CALC-MCNC: 63 MG/DL (ref 0–99)
MICROALBUMIN UR-MCNC: 8 UG/ML
POTASSIUM SERPL-SCNC: 3.5 MMOL/L (ref 3.5–5.2)
PROT SERPL-MCNC: 6.6 G/DL (ref 6–8.5)
SODIUM SERPL-SCNC: 144 MMOL/L (ref 134–144)
TRIGL SERPL-MCNC: 98 MG/DL (ref 0–149)
VLDLC SERPL CALC-MCNC: 20 MG/DL (ref 5–40)

## 2018-12-11 DIAGNOSIS — I10 BENIGN ESSENTIAL HTN: ICD-10-CM

## 2018-12-11 RX ORDER — METOPROLOL SUCCINATE 25 MG/1
25 TABLET, EXTENDED RELEASE ORAL DAILY
Qty: 90 TAB | Refills: 3 | Status: SHIPPED | OUTPATIENT
Start: 2018-12-11 | End: 2018-12-21

## 2018-12-11 RX ORDER — METOPROLOL SUCCINATE 50 MG/1
50 TABLET, EXTENDED RELEASE ORAL DAILY
Qty: 90 TAB | Refills: 3 | Status: SHIPPED | OUTPATIENT
Start: 2018-12-11 | End: 2018-12-21

## 2018-12-17 ENCOUNTER — TELEPHONE (OUTPATIENT)
Dept: VASCULAR LAB | Facility: MEDICAL CENTER | Age: 73
End: 2018-12-17

## 2018-12-18 ENCOUNTER — TELEPHONE (OUTPATIENT)
Dept: VASCULAR LAB | Facility: MEDICAL CENTER | Age: 73
End: 2018-12-18

## 2018-12-19 ENCOUNTER — TELEPHONE (OUTPATIENT)
Dept: VASCULAR LAB | Facility: MEDICAL CENTER | Age: 73
End: 2018-12-19

## 2018-12-19 NOTE — TELEPHONE ENCOUNTER
12/17/2018    Shaista called complaining of symptomatic hypotension. 90s/60s. She has already taken all of her antihypertensives today. Instructed to HOLD all morning medications, check BP in AM and call me.    Jono Stokes, KeniaD

## 2018-12-19 NOTE — TELEPHONE ENCOUNTER
12/18/2018    Shaista reported a BP in the 90s/60s again Pulse 65. Instructed to HOLD metoprolol today and take the rest of her antihypertensives as prescribed. Repeat BP prior to any morning meds tomorrow to be called to me.    Jono Stokes, PharmD

## 2018-12-19 NOTE — TELEPHONE ENCOUNTER
12/19/2018    Patient called to report her BP. 110s/70s, repeat 90s/60s. Pulse was in the 70s for both readings. Instructed to again HOLD metoprolol today. Has appointment with Dr. Jaramillo tomorrow.    Jono Stokes, PharmD

## 2018-12-20 ENCOUNTER — APPOINTMENT (OUTPATIENT)
Dept: VASCULAR LAB | Facility: MEDICAL CENTER | Age: 73
End: 2018-12-20
Payer: COMMERCIAL

## 2018-12-21 ENCOUNTER — OFFICE VISIT (OUTPATIENT)
Dept: VASCULAR LAB | Facility: MEDICAL CENTER | Age: 73
End: 2018-12-21
Attending: FAMILY MEDICINE
Payer: COMMERCIAL

## 2018-12-21 VITALS
HEIGHT: 67 IN | WEIGHT: 147.1 LBS | HEART RATE: 82 BPM | SYSTOLIC BLOOD PRESSURE: 101 MMHG | BODY MASS INDEX: 23.09 KG/M2 | DIASTOLIC BLOOD PRESSURE: 47 MMHG

## 2018-12-21 DIAGNOSIS — E78.5 DYSLIPIDEMIA: ICD-10-CM

## 2018-12-21 DIAGNOSIS — I10 BENIGN ESSENTIAL HTN: Chronic | ICD-10-CM

## 2018-12-21 DIAGNOSIS — I95.89 HYPOTENSION, IATROGENIC: ICD-10-CM

## 2018-12-21 DIAGNOSIS — Z86.73 HISTORY OF CVA (CEREBROVASCULAR ACCIDENT): ICD-10-CM

## 2018-12-21 PROCEDURE — 99214 OFFICE O/P EST MOD 30 MIN: CPT | Performed by: FAMILY MEDICINE

## 2018-12-21 PROCEDURE — 99212 OFFICE O/P EST SF 10 MIN: CPT | Performed by: FAMILY MEDICINE

## 2018-12-21 RX ORDER — AMLODIPINE AND OLMESARTAN MEDOXOMIL 5; 40 MG/1; MG/1
1 TABLET ORAL DAILY
Qty: 90 TAB | Refills: 3 | Status: SHIPPED | OUTPATIENT
Start: 2018-12-21 | End: 2019-05-03

## 2018-12-21 RX ORDER — OXYBUTYNIN CHLORIDE 5 MG/1
5 TABLET ORAL 3 TIMES DAILY
COMMUNITY
End: 2019-05-03

## 2018-12-21 ASSESSMENT — ENCOUNTER SYMPTOMS
DIZZINESS: 0
BRUISES/BLEEDS EASILY: 0
SEIZURES: 0
SPEECH CHANGE: 1
SORE THROAT: 0
FOCAL WEAKNESS: 0
PALPITATIONS: 0
HEADACHES: 0
HEMOPTYSIS: 0
TREMORS: 0
FEVER: 0
VOMITING: 0
DEPRESSION: 0
INSOMNIA: 0
ABDOMINAL PAIN: 0
CHILLS: 0
DIARRHEA: 0
WEAKNESS: 0
WHEEZING: 0
NERVOUS/ANXIOUS: 0
ORTHOPNEA: 0
BLOOD IN STOOL: 0
DOUBLE VISION: 0
MYALGIAS: 0
NAUSEA: 0
SHORTNESS OF BREATH: 0
BLURRED VISION: 0
COUGH: 0

## 2018-12-21 NOTE — PROGRESS NOTES
FOLLOW-UP VASCULAR MED VISIT  Subjective:   Shaista Bocanegra is a 73 y.o. female who presents today 12/21/18 for   Chief Complaint   Patient presents with   • Follow-Up     Benign Essential Hypertension   Initially referred by Dr. Daniels (cardiology) for eval and mgmt of HTN in context of MVR and hx of CVA.     HPI:    Current HTN concerns:  Called multiple days with symptomatic low BP 90/60s with HR in 60s.  Had been informed to hold metoprolol until visit with me.  Started Monday night.   HTN sx:  Reports mild No current blurred or changed vision, chest pain, shortness of breath, headache, nausea, dizziness/vertigo.   Reports some face recognition and proper nouns with memory loss.    Home BP log: mid 130s/[id number]  Adherence to current HTN meds: compliant all of the time tolerating all     Antiplatelet/anticoagulation: Yes, Details: ASA 81mg      Hyperlipidemia: Yes, Details: continue atorvastatin 80mg daily , no myalgias     Pertinent HTN hx:   Age at HTN dx:  At least 20 years   (if female, any hx of pregnancy-related HTN): none reported  Past HTN medications:  As noted takes triam/hctz with travel only   HTN crises:  CVA 8/2017, o/w negative   ASCVD risk factors:     DM: No   CKD:  No  Lifestyle factors:     High salt: No   EtOH: No  Interfering substances:     NSAIDs: No    Decongestants. No    ASCVD calculator (contraindicated if ASCVD+, [id number])   10yr-risk calc: n/a     Clinical evidence of ASCVD:     1) hx of MI/ACS:  No   2) coronary or other revascularization procedure: No   3) TIA/ischemic CVA: Yes, Details: hx of ischemic CVA 8/2017, R MCA   4) PAD (including MICAH <0.9): No   5) documented (CAD, Renal athero, AA due to athero, carotid plaque >50% stenosis: No    Major RFs:     1) Age (Men>44, women>54):  yes   2) Fhx early CAD (<55 men, <65 women):  no   3) cigarette smoking:  No   4) high BP >139/89 or on tx: yes   5) Low HDL-C (<40 men, <50 women):  no    Social History   Substance Use Topics   •  Smoking status: Never Smoker   • Smokeless tobacco: Never Used   • Alcohol use Yes      Comment: 3 glasses of wine     Outpatient Encounter Prescriptions as of 12/21/2018   Medication Sig Dispense Refill   • oxybutynin (DITROPAN) 5 MG Tab Take 5 mg by mouth 3 times a day.     • Amlodipine-Olmesartan 5-40 MG Tab Take 1 Tab by mouth every day for 360 days. 90 Tab 3   • hydroCHLOROthiazide (HYDRODIURIL) 25 MG Tab Take 1 Tab by mouth every day for 360 days. 90 Tab 3   • atorvastatin (LIPITOR) 80 MG tablet Take 1 Tab by mouth every evening. 90 Tab 2   • Biotin 72724 MCG Tab Take  by mouth.     • Cholecalciferol (VITAMIN D3 PO) Take 2,000 Units by mouth every day.     • Cyanocobalamin (VITAMIN B-12 PO) Take 25,000 mcg by mouth every day.     • potassium chloride SA (K-DUR) 10 MEQ Tab CR Take 10 mEq by mouth every day.     • aspirin (ASA) 81 MG Chew Tab chewable tablet Take 1 Tab by mouth every day. 14 Tab 1   • valacyclovir (VALTREX) 500 MG Tab Take 500 mg by mouth every day.     • Multiple Vitamins-Minerals (ICAPS AREDS 2 PO) Take 1 Tab by mouth every day.     • calcium carbonate (OS-CRISTOFER 500) 500 MG Tab Take 1,000 mg by mouth every day.     • Multiple Vitamin (MULTIVITAMINS PO) Take 1 Tab by mouth every day.     • [DISCONTINUED] metoprolol SR (TOPROL XL) 50 MG TABLET SR 24 HR Take 1 Tab by mouth every day. (Patient not taking: Reported on 12/21/2018) 90 Tab 3   • [DISCONTINUED] metoprolol SR (TOPROL XL) 25 MG TABLET SR 24 HR Take 1 Tab by mouth every day. (Patient not taking: Reported on 12/21/2018) 90 Tab 3   • [DISCONTINUED] Amlodipine-Olmesartan 10-40 MG Tab Take 1 tablet by mouth every day to lower blood pressure 90 Tab 3     No facility-administered encounter medications on file as of 12/21/2018.      No Known Allergies  DIET AND EXERCISE:  Weight Change: none   Diet: low fat, low sodium, reports some indiscretions over last 2 months   Exercise: moderate regular exercise program     Review of Systems  "  Constitutional: Negative for chills, fever and malaise/fatigue.   HENT: Negative for nosebleeds, sore throat and tinnitus.    Eyes: Negative for blurred vision and double vision.   Respiratory: Negative for cough, hemoptysis, shortness of breath and wheezing.    Cardiovascular: Negative for chest pain, palpitations, orthopnea and leg swelling.   Gastrointestinal: Negative for abdominal pain, blood in stool, diarrhea, melena, nausea and vomiting.   Genitourinary: Negative for hematuria.   Musculoskeletal: Negative for joint pain and myalgias.   Skin: Negative for itching and rash.   Neurological: Positive for speech change (word finding issues ). Negative for dizziness, tremors, focal weakness, seizures, weakness and headaches.   Endo/Heme/Allergies: Does not bruise/bleed easily.   Psychiatric/Behavioral: Negative for depression. The patient is not nervous/anxious and does not have insomnia.       Objective:     Vitals:    12/21/18 0807   BP: 101/47   BP Location: Left arm   Patient Position: Sitting   BP Cuff Size: Small adult   Pulse: 82   Weight: 66.7 kg (147 lb 1.6 oz)   Height: 1.702 m (5' 7\")      BP Readings from Last 4 Encounters:   12/21/18 101/47   11/09/18 123/60   10/04/18 141/69   09/09/18 134/82      Body mass index is 23.04 kg/m².   BMI Readings from Last 4 Encounters:   12/21/18 23.04 kg/m²   11/09/18 22.41 kg/m²   10/04/18 22.58 kg/m²   09/09/18 22.40 kg/m²      Physical Exam   Constitutional: She is oriented to person, place, and time. She appears well-developed and well-nourished. No distress.   HENT:   Head: Normocephalic and atraumatic.   Neck: Normal range of motion. Neck supple. No JVD present. No thyromegaly present.   Cardiovascular: Normal rate, regular rhythm and intact distal pulses.  Exam reveals no gallop and no friction rub.    Murmur heard.   Systolic murmur is present with a grade of 3/6   Pulses:       Carotid pulses are 2+ on the right side, and 2+ on the left side.       Radial " pulses are 2+ on the right side, and 2+ on the left side.        Dorsalis pedis pulses are 2+ on the right side, and 2+ on the left side.        Posterior tibial pulses are 2+ on the right side, and 2+ on the left side.   Edema     RLE: none     LLE: none   Spider telangectasia:       RLE:  None      LLE: none   Varicosities:         RLE: none      LLE: none   Corona phlebectatica:      RLE:  None       LLE:  None   Cording:         RLE:  None     LLE: None      Pulmonary/Chest: Effort normal and breath sounds normal.   Abdominal: Soft. Bowel sounds are normal. She exhibits no distension and no mass. There is no tenderness. There is no rebound and no guarding.   Musculoskeletal: Normal range of motion. She exhibits no edema or tenderness.   Neurological: She is alert and oriented to person, place, and time. No cranial nerve deficit. She exhibits normal muscle tone. Coordination normal.   Skin: Skin is warm and dry. She is not diaphoretic.   Psychiatric: She has a normal mood and affect.     Lab Results   Component Value Date    CHOLSTRLTOT 152 11/09/2018    CHOLSTRLTOT 135 08/19/2017    LDL 63 11/09/2018    LDL 65 08/19/2017    HDL 69 11/09/2018    HDL 54 08/19/2017    TRIGLYCERIDE 98 11/09/2018    TRIGLYCERIDE 80 08/19/2017      Lab Results   Component Value Date    PROTHROMBTM 13.4 08/18/2017    INR 0.99 08/18/2017       Lab Results   Component Value Date    HBA1C 5.3 08/18/2017      Lab Results   Component Value Date    SODIUM 144 11/09/2018    SODIUM 140 08/28/2017    POTASSIUM 3.5 11/09/2018    POTASSIUM 3.0 (L) 08/28/2017    CHLORIDE 102 11/09/2018    CHLORIDE 104 08/28/2017    CO2 25 11/09/2018    CO2 28 08/28/2017    GLUCOSE 103 (H) 11/09/2018    GLUCOSE 108 (H) 08/28/2017    BUN 30 (H) 11/09/2018    BUN 23 (H) 08/28/2017    CREATININE 1.07 (H) 11/09/2018    CREATININE 0.88 08/28/2017    BUNCREATRAT 28 11/09/2018    IFAFRICA 59 (L) 11/09/2018    IFAFRICA >60 08/28/2017    IFNOTAFR 51 (L) 11/09/2018     IFNOTAFR >60 08/28/2017        Lab Results   Component Value Date    WBC 5.3 08/27/2017    RBC 3.72 (L) 08/27/2017    HEMOGLOBIN 13.0 08/27/2017    HEMATOCRIT 38.9 08/27/2017    .6 (H) 08/27/2017    MCH 34.9 (H) 08/27/2017    MCHC 33.4 (L) 08/27/2017    MPV 9.5 08/27/2017      VASCULAR IMAGING:     CTA head 8/2017  1.  There is a right M1 segment occlusion.  2.  There is collateral flow in the peripheral M3 branches seen on the right.  3.  There is mild decreased enhancement of the visualized right internal carotid artery however it is patent.  4.  Question of subtle hypodensity in the right insular cortex region. No abnormal brain parenchymal enhancement is seen.    Carotid duplex 8/18/17   nl carotids, subclavians and vertebrals    CTA neck 8/18/17  1.  CT angiogram of the neck within normal limits.  2.  Thrombosed right M1 segment.    IR thrombectomy 8/18/17  1. Subtotal occlusion of the RIGHT M1 segment.  2. Successful RIGHT middle cerebral artery mechanical thrombectomy using Trevo stent.  3. Post thrombectomy arteriogram demonstrates patent lenticulostriate, RIGHT M1 and peripheral segments.    MRI brain 8/20/17  1.  Moderate sized RIGHT MCA territory acute ischemia involving the cortex and basal ganglia  2.  Flow void is present in the RIGHT MCA compatible with treatment effect  3.  No hemorrhage  4.  Mild white matter changes  5.  Mild atrophy    Echo 8/19/17  Agitated saline study was performed, no evidence of right to left   shunt.  Normal left ventricular size, wall thickness, and systolic function.  Left ventricular ejection fraction is visually estimated to be 60%.  Mildly dilated left atrium.  Moderate mitral regurgitation due to an eccentric jet.  Mild tricuspid regurgitation.  Right ventricular systolic pressure is estimated to be 35 mmHg.    Echo 6/15/18  Normal left ventricular systolic function.  Left ventricular ejection fraction is visually estimated to be 60%.  Myxomatous changes of the  mitral valve.  Prolapse of the anterior and posterior mitral leaflets.  Severe, eccentric mitral regurgitation.  Right heart pressures are normal.  Compared to the report of the study done 8/19/2017 severe mitral   regurgitation is now present, previously reported as moderate but a MR   ERO was not recorded.     Medical Decision Making:  Today's Assessment / Status / Plan:     1. Benign essential HTN     2. Dyslipidemia     3. History of CVA (cerebrovascular accident)     4. Hypotension, iatrogenic       Patient Type: Secondary Prevention    Etiology of Established CVD if Present:   1) ischemic CVA, Moderate sized RIGHT MCA territory acute ischemia involving the cortex and basal ganglia    Lipid Management: Qualifies for Statin Therapy Based on 2013 ACC/AHA Guidelines: yes  Calculated 10-Year Risk of ASCVD: N/A  Currently on Statin: Yes  NLA Risk Category:   Very high:  Evidence of ASCVD   Tx threshold:  non-HDL-C >99, LDL-C >69  Tx goal: non-HDL-C <100,  LDL-C <70  (optional: apoB <80)  At goal:  Yes  Tx plan:   - continue high-intensity atorvastatin 80mg   - consider addition of zetia 10mg if needed     Blood Pressure Management:Goal: ACC/AHA (2017) goal <130/80  Home BP at goal:  Yes, but low BP with symptoms   Office BP at goal:  Yes  ? Masked HTN component   Recommend aggressive afterload and target home <125/75 consistently.   No indications of primary zev on prior labs   Echo shows no LVH and normal LVEF  Stopped triam/hctz, lisinopril in the past.   Plan:   - continue home BP monitoring, reviewed correct technique:  Yes   - contact our office if lows <90/55 consistently for med adjustments   - order 24h ABPM:  Consider   - stop triam/hctz 37.5/25 daily and start HCTZ 25mg  - stop metoprolol   - reduce amlodipine/olmesartan to 5/40mg   - monitor lytes/GFR    Glycemic Status: Normal  - continue healthy diet     Anti-Platelet/Anti-Coagulant Tx: yes  - continue ASA 81mg daily, defer adjustments to neurology       Smoking: continued complete avoidance of all tobacco products      Physical Activity: advised to engage in walking >150min/week    Weight Management and Nutrition: Dietary plan was discussed with patient at this visit including plate method, DASH diet     Other:   1) hx of CVA  - continue antiplat and BP mgmt  - defer mgmt to neurology for ongoing care   - ED precautions for acute CVA symptoms reviewed   - requesting PT referral, will defer to PCP to complete     2) s/p thrombectomy of R MCA  - defer repeat imaging to neurosurg or neurology     3) hx of severe MVR  - defer mgmt to cardiology for ongoing care     Instructed to follow-up with PCP for remainder of adult medical needs: yes  We will partner with other providers in the management of established vascular disease and cardiometabolic risk factors.    Studies to Be Obtained:  None   Labs to Be Obtained:  none    Follow up in: 6 weeks with me and BP log     Travon Jaramillo M.D.

## 2018-12-21 NOTE — PATIENT INSTRUCTIONS
1) stop metoprolol for now   2) reduce amlodipine/olmesartan to 5/40mg   3) continue all other medications   4) no problem with traveling   5) have Dr. Crane send referral for PT

## 2018-12-27 ENCOUNTER — TELEPHONE (OUTPATIENT)
Dept: VASCULAR LAB | Facility: MEDICAL CENTER | Age: 73
End: 2018-12-27

## 2018-12-28 NOTE — TELEPHONE ENCOUNTER
S/w patient today regarding current HTN regimen.  She had concerns with bilateral LE edema.  After speaking with Dr. Jaramillo, we will have patient continue with decreased amlodipine dose for the next week, then contact clinic to discuss status of edema.  She is agreeable to this plan.  Kade Shen, PharmD

## 2019-01-04 ENCOUNTER — TELEPHONE (OUTPATIENT)
Dept: VASCULAR LAB | Facility: MEDICAL CENTER | Age: 74
End: 2019-01-04

## 2019-01-04 NOTE — TELEPHONE ENCOUNTER
LM with pt to check on BP after medication adjustment.  Vasc line left for pt to call back.    IGNACIO Sales  [facility]

## 2019-01-04 NOTE — TELEPHONE ENCOUNTER
Pt returned call:  BP's have been mostly 120's systolic, no sys readings <90.  LE edema has vastly improved.  Instructed to continue current regimen and f/u as planned.      IGNACIO Sales  [facility]

## 2019-02-04 ENCOUNTER — APPOINTMENT (RX ONLY)
Dept: URBAN - METROPOLITAN AREA CLINIC 4 | Facility: CLINIC | Age: 74
Setting detail: DERMATOLOGY
End: 2019-02-04

## 2019-02-04 DIAGNOSIS — L70.8 OTHER ACNE: ICD-10-CM

## 2019-02-04 DIAGNOSIS — D22 MELANOCYTIC NEVI: ICD-10-CM | Status: STABLE

## 2019-02-04 DIAGNOSIS — L90.5 SCAR CONDITIONS AND FIBROSIS OF SKIN: ICD-10-CM

## 2019-02-04 DIAGNOSIS — L21.8 OTHER SEBORRHEIC DERMATITIS: ICD-10-CM

## 2019-02-04 DIAGNOSIS — L81.4 OTHER MELANIN HYPERPIGMENTATION: ICD-10-CM

## 2019-02-04 DIAGNOSIS — L82.1 OTHER SEBORRHEIC KERATOSIS: ICD-10-CM

## 2019-02-04 DIAGNOSIS — L57.0 ACTINIC KERATOSIS: ICD-10-CM

## 2019-02-04 PROBLEM — D22.72 MELANOCYTIC NEVI OF LEFT LOWER LIMB, INCLUDING HIP: Status: ACTIVE | Noted: 2019-02-04

## 2019-02-04 PROBLEM — D22.5 MELANOCYTIC NEVI OF TRUNK: Status: ACTIVE | Noted: 2019-02-04

## 2019-02-04 PROCEDURE — 99214 OFFICE O/P EST MOD 30 MIN: CPT | Mod: 25

## 2019-02-04 PROCEDURE — ? PRESCRIPTION

## 2019-02-04 PROCEDURE — ? DIAGNOSIS COMMENT

## 2019-02-04 PROCEDURE — ? LIQUID NITROGEN

## 2019-02-04 PROCEDURE — 17000 DESTRUCT PREMALG LESION: CPT

## 2019-02-04 PROCEDURE — ? COUNSELING

## 2019-02-04 PROCEDURE — ? OBSERVATION AND MEASURE

## 2019-02-04 RX ORDER — TRIAMCINOLONE ACETONIDE 1 MG/G
CREAM TOPICAL
Qty: 1 | Refills: 1 | Status: ERX

## 2019-02-04 ASSESSMENT — LOCATION SIMPLE DESCRIPTION DERM
LOCATION SIMPLE: CHEST
LOCATION SIMPLE: LEFT UPPER BACK
LOCATION SIMPLE: RIGHT BUTTOCK
LOCATION SIMPLE: LEFT BUTTOCK
LOCATION SIMPLE: LEFT EAR
LOCATION SIMPLE: LEFT SHOULDER
LOCATION SIMPLE: RIGHT SHOULDER
LOCATION SIMPLE: RIGHT HAND
LOCATION SIMPLE: POSTERIOR NECK
LOCATION SIMPLE: LEFT SCALP
LOCATION SIMPLE: LEFT FOREHEAD
LOCATION SIMPLE: LEFT 5TH TOE

## 2019-02-04 ASSESSMENT — LOCATION ZONE DERM
LOCATION ZONE: TOE
LOCATION ZONE: NECK
LOCATION ZONE: EAR
LOCATION ZONE: TRUNK
LOCATION ZONE: SCALP
LOCATION ZONE: ARM
LOCATION ZONE: HAND
LOCATION ZONE: FACE

## 2019-02-04 ASSESSMENT — LOCATION DETAILED DESCRIPTION DERM
LOCATION DETAILED: LEFT SUPERIOR MEDIAL FOREHEAD
LOCATION DETAILED: LEFT MEDIAL FRONTAL SCALP
LOCATION DETAILED: LEFT SUPERIOR HELIX
LOCATION DETAILED: RIGHT RADIAL DORSAL HAND
LOCATION DETAILED: LEFT BUTTOCK
LOCATION DETAILED: LEFT SUPERIOR UPPER BACK
LOCATION DETAILED: LEFT DORSAL 5TH TOE
LOCATION DETAILED: LEFT MEDIAL SUPERIOR CHEST
LOCATION DETAILED: RIGHT POSTERIOR SHOULDER
LOCATION DETAILED: LEFT POSTERIOR SHOULDER
LOCATION DETAILED: MID POSTERIOR NECK
LOCATION DETAILED: RIGHT BUTTOCK

## 2019-02-04 NOTE — PROCEDURE: COUNSELING
Detail Level: Detailed
Detail Level: Generalized
Detail Level: Simple
Patient Specific Counseling (Will Not Stick From Patient To Patient): Use TAC cream once daily for a week then, use 2-3 x a week as needed.

## 2019-02-12 ENCOUNTER — HOSPITAL ENCOUNTER (OUTPATIENT)
Dept: RADIOLOGY | Facility: MEDICAL CENTER | Age: 74
End: 2019-02-12
Attending: OBSTETRICS & GYNECOLOGY
Payer: COMMERCIAL

## 2019-02-12 DIAGNOSIS — Z12.39 SCREENING BREAST EXAMINATION: ICD-10-CM

## 2019-02-12 PROCEDURE — 77063 BREAST TOMOSYNTHESIS BI: CPT

## 2019-02-13 ENCOUNTER — OFFICE VISIT (OUTPATIENT)
Dept: CARDIOLOGY | Facility: MEDICAL CENTER | Age: 74
End: 2019-02-13
Payer: COMMERCIAL

## 2019-02-13 VITALS
DIASTOLIC BLOOD PRESSURE: 60 MMHG | HEART RATE: 86 BPM | HEIGHT: 67 IN | SYSTOLIC BLOOD PRESSURE: 120 MMHG | WEIGHT: 143 LBS | BODY MASS INDEX: 22.44 KG/M2 | OXYGEN SATURATION: 100 %

## 2019-02-13 DIAGNOSIS — Z86.73 HISTORY OF CVA (CEREBROVASCULAR ACCIDENT): ICD-10-CM

## 2019-02-13 DIAGNOSIS — I10 BENIGN ESSENTIAL HTN: Chronic | ICD-10-CM

## 2019-02-13 DIAGNOSIS — I63.529 CEREBROVASCULAR ACCIDENT (CVA) DUE TO OCCLUSION OF ANTERIOR CEREBRAL ARTERY, UNSPECIFIED BLOOD VESSEL LATERALITY (HCC): ICD-10-CM

## 2019-02-13 DIAGNOSIS — E78.5 DYSLIPIDEMIA: ICD-10-CM

## 2019-02-13 DIAGNOSIS — I10 HTN (HYPERTENSION), MALIGNANT: ICD-10-CM

## 2019-02-13 DIAGNOSIS — I34.0 NON-RHEUMATIC MITRAL REGURGITATION: ICD-10-CM

## 2019-02-13 PROCEDURE — 99214 OFFICE O/P EST MOD 30 MIN: CPT | Performed by: INTERNAL MEDICINE

## 2019-02-13 ASSESSMENT — ENCOUNTER SYMPTOMS
FEVER: 0
WHEEZING: 0
SINUS PAIN: 0
EYE DISCHARGE: 0
MYALGIAS: 0
VOMITING: 0
NERVOUS/ANXIOUS: 0
COUGH: 0
ABDOMINAL PAIN: 0
DEPRESSION: 0
HEARTBURN: 0
CHILLS: 0
BRUISES/BLEEDS EASILY: 0
SPEECH CHANGE: 0
NAUSEA: 0
EYE PAIN: 0
BLURRED VISION: 0
PALPITATIONS: 0
LOSS OF CONSCIOUSNESS: 0
HEMOPTYSIS: 0

## 2019-02-13 NOTE — LETTER
[facility]   [address] [place] [address] [address]  [place] [id number]  Phone: [phone]  Fax: [phone]              Shaista Bocanegra  1945    Encounter Date: 2/13/2019    Pascale Daniels M.D.          PROGRESS NOTE:  Chief Complaint   Patient presents with   • HTN (Controlled)     follow up       Subjective:   Shaista Bocanegra is a 73 y.o. female who presents today in follow-up in regards to her significant myxomatous mitral regurgitation in the setting of severe hypertension resulting in a stroke 2017    Compliant on her medications  Really appreciates the pharmacy risk reduction clinic, blood pressures have been great running in the 100-110 range  Better off metoprolol  Seeing a homeo-pathic doctor, neurologist at [place]    Past Medical History:   Diagnosis Date   • Benign essential HTN 3/19/2012   • Breast cancer (HCC)    • Cancer (HCC)    • Chest tightness or pressure 3/19/2012   • High risk medication use 3/19/2012   • Hypercholesterolemia 3/19/2012   • MVP (mitral valve prolapse) 3/19/2012   • Renal insufficiency 3/19/2012   • Stroke (HCC)      Past Surgical History:   Procedure Laterality Date   • CATARACT PHACO WITH IOL  3/16/2009    Performed by SHANNON GANDARA at SURGERY SAME DAY [facility] ORS   • CATARACT PHACO WITH IOL  1/26/2009    [facility] SHANN[facility]ARA [facility]   • BREAST BIOPSY     • LUMPECTOMY     • PB RADIATION THERAPY PLAN SIMPLE     • DE CHEMOTHERAPY, UNSPECIFIED PROCEDURE       Family History   Problem Relation Age of Onset   • Cancer Mother    • Heart Failure Neg Hx    • Heart Disease Neg Hx      Social History     Social History   • Marital status:      Spouse name: N/A   • Number of children: N/A   • Years of education: N/A     Occupational History   • Not on file.     Social History Main Topics   • Smoking status: Never Smoker   • Smokeless tobacco: Never Used   • Alcohol use Yes      Comment: 3 glasses of  wine   • Drug use: No   • Sexual activity: Not on file     Other Topics Concern   • Not on file     Social History Narrative   • No narrative on file     Allergies   Allergen Reactions   • Metoprolol      Outpatient Encounter Prescriptions as of 2/13/2019   Medication Sig Dispense Refill   • oxybutynin (DITROPAN) 5 MG Tab Take 5 mg by mouth 3 times a day.     • Amlodipine-Olmesartan 5-40 MG Tab Take 1 Tab by mouth every day for 360 days. 90 Tab 3   • hydroCHLOROthiazide (HYDRODIURIL) 25 MG Tab Take 1 Tab by mouth every day for 360 days. 90 Tab 3   • atorvastatin (LIPITOR) 80 MG tablet Take 1 Tab by mouth every evening. 90 Tab 2   • Biotin 21109 MCG Tab Take  by mouth.     • Cholecalciferol (VITAMIN D3 PO) Take 2,000 Units by mouth every day.     • Cyanocobalamin (VITAMIN B-12 PO) Take 25,000 mcg by mouth every day.     • potassium chloride SA (K-DUR) 10 MEQ Tab CR Take 10 mEq by mouth every day.     • aspirin (ASA) 81 MG Chew Tab chewable tablet Take 1 Tab by mouth every day. 14 Tab 1   • valacyclovir (VALTREX) 500 MG Tab Take 500 mg by mouth every day.     • Multiple Vitamins-Minerals (ICAPS AREDS 2 PO) Take 1 Tab by mouth every day.     • calcium carbonate (OS-CRISTOFER 500) 500 MG Tab Take 1,000 mg by mouth every day.     • Multiple Vitamin (MULTIVITAMINS PO) Take 1 Tab by mouth every day.       No facility-administered encounter medications on file as of 2/13/2019.      Review of Systems   Constitutional: Negative for chills and fever.   HENT: Positive for congestion. Negative for sinus pain.    Eyes: Negative for blurred vision, pain and discharge.   Respiratory: Negative for cough, hemoptysis and wheezing.    Cardiovascular: Negative for chest pain and palpitations.   Gastrointestinal: Negative for abdominal pain, heartburn, nausea and vomiting.   Musculoskeletal: Negative for joint pain and myalgias.   Skin: Negative for itching and rash.   Neurological: Negative for speech change and loss of consciousness.    "  Endo/Heme/Allergies: Positive for environmental allergies. Does not bruise/bleed easily.   Psychiatric/Behavioral: Negative for depression. The patient is not nervous/anxious.    All other systems reviewed and are negative.       Objective:   /60 (BP Location: Left arm, Patient Position: Sitting)   Pulse 86   Ht 1.702 m (5' 7\")   Wt 64.9 kg (143 lb)   SpO2 100%   BMI 22.40 kg/m²      Physical Exam   Constitutional: She is oriented to person, place, and time. She appears well-developed and well-nourished.   Patient seen and examined again today changes noted   HENT:   Head: Normocephalic and atraumatic.   Eyes: Pupils are equal, round, and reactive to light. EOM are normal. No scleral icterus.   Neck: Neck supple. No JVD present. No thyromegaly present.   Cardiovascular: Normal rate, regular rhythm and intact distal pulses.  Exam reveals no gallop.    Murmur heard.  3 out of 6 harsh blowing systolic murmur across the precordium   Pulmonary/Chest: Effort normal and breath sounds normal.   Abdominal: Bowel sounds are normal. She exhibits no distension.   Musculoskeletal: She exhibits no edema or tenderness.   Wearing compression stockings   Neurological: She is alert and oriented to person, place, and time. She exhibits normal muscle tone.   Very slight word finding difficulty, no focal movement disorder   Skin: Skin is warm and dry. No rash noted.   Psychiatric: She has a normal mood and affect. Her behavior is normal.       Assessment:     1. Non-rheumatic mitral regurgitation  EC-ECHOCARDIOGRAM COMPLETE W/O CONT   2. History of CVA (cerebrovascular accident)  EC-ECHOCARDIOGRAM COMPLETE W/O CONT   3. Dyslipidemia  EC-ECHOCARDIOGRAM COMPLETE W/O CONT   4. Cerebrovascular accident (CVA) due to occlusion of anterior cerebral artery, unspecified blood vessel laterality (HCC)     5. Benign essential HTN     6. HTN (hypertension), malignant         Medical Decision Making:  Today's Assessment / Status / Plan: "     Mitral regurgitation  Looked at images when her blood pressure was above goal it was severe  Repeat this summer  Exam unchanged  Talked about intervention without symptoms at her age, with her stroke in 2017 I been very reluctant.  She voices understanding and would like referral to my partner.  Talked about watching for dyspnea on exertion atrial fibrillation etc.    Hypertension much improved  Stroke  Discussed pathophysiology and prognosis particular in regards to heart disease    Stroke  As above  Follows with neurology  Statin tolerated high-dose    RTC 6 months sooner with concerns      Bethany Crane M.D.  [address] [place] [id number]  VIA Facsimile: [phone]

## 2019-02-13 NOTE — PROGRESS NOTES
Chief Complaint   Patient presents with   • HTN (Controlled)     follow up       Subjective:   Shaista Bocanegra is a 73 y.o. female who presents today in follow-up in regards to her significant myxomatous mitral regurgitation in the setting of severe hypertension resulting in a stroke 2017    Compliant on her medications  Really appreciates the pharmacy risk reduction clinic, blood pressures have been great running in the 100-110 range  Better off metoprolol  Seeing a homeo-pathic doctor, neurologist at [facility]    Past Medical History:   Diagnosis Date   • Benign essential HTN 3/19/2012   • Breast cancer (HCC)    • Cancer (HCC)    • Chest tightness or pressure 3/19/2012   • High risk medication use 3/19/2012   • Hypercholesterolemia 3/19/2012   • MVP (mitral valve prolapse) 3/19/2012   • Renal insufficiency 3/19/2012   • Stroke (HCC)      Past Surgical History:   Procedure Laterality Date   • CATARACT PHACO WITH IOL  3/16/2009    Performed by SHANNON GANDARA at [facility]   • CATARACT PHACO WITH IOL  1/26/2009    Performed by SHANNON GANDARA at [facility]   • BREAST BIOPSY     • LUMPECTOMY     • PB RADIATION THERAPY PLAN SIMPLE     • WA CHEMOTHERAPY, UNSPECIFIED PROCEDURE       Family History   Problem Relation Age of Onset   • Cancer Mother    • Heart Failure Neg Hx    • Heart Disease Neg Hx      Social History     Social History   • Marital status:      Spouse name: N/A   • Number of children: N/A   • Years of education: N/A     Occupational History   • Not on file.     Social History Main Topics   • Smoking status: Never Smoker   • Smokeless tobacco: Never Used   • Alcohol use Yes      Comment: 3 glasses of wine   • Drug use: No   • Sexual activity: Not on file     Other Topics Concern   • Not on file     Social History Narrative   • No narrative on file     Allergies   Allergen Reactions   • Metoprolol      Outpatient Encounter Prescriptions as of 2/13/2019    Medication Sig Dispense Refill   • oxybutynin (DITROPAN) 5 MG Tab Take 5 mg by mouth 3 times a day.     • Amlodipine-Olmesartan 5-40 MG Tab Take 1 Tab by mouth every day for 360 days. 90 Tab 3   • hydroCHLOROthiazide (HYDRODIURIL) 25 MG Tab Take 1 Tab by mouth every day for 360 days. 90 Tab 3   • atorvastatin (LIPITOR) 80 MG tablet Take 1 Tab by mouth every evening. 90 Tab 2   • Biotin 59233 MCG Tab Take  by mouth.     • Cholecalciferol (VITAMIN D3 PO) Take 2,000 Units by mouth every day.     • Cyanocobalamin (VITAMIN B-12 PO) Take 25,000 mcg by mouth every day.     • potassium chloride SA (K-DUR) 10 MEQ Tab CR Take 10 mEq by mouth every day.     • aspirin (ASA) 81 MG Chew Tab chewable tablet Take 1 Tab by mouth every day. 14 Tab 1   • valacyclovir (VALTREX) 500 MG Tab Take 500 mg by mouth every day.     • Multiple Vitamins-Minerals (ICAPS AREDS 2 PO) Take 1 Tab by mouth every day.     • calcium carbonate (OS-CRISTOFER 500) 500 MG Tab Take 1,000 mg by mouth every day.     • Multiple Vitamin (MULTIVITAMINS PO) Take 1 Tab by mouth every day.       No facility-administered encounter medications on file as of 2/13/2019.      Review of Systems   Constitutional: Negative for chills and fever.   HENT: Positive for congestion. Negative for sinus pain.    Eyes: Negative for blurred vision, pain and discharge.   Respiratory: Negative for cough, hemoptysis and wheezing.    Cardiovascular: Negative for chest pain and palpitations.   Gastrointestinal: Negative for abdominal pain, heartburn, nausea and vomiting.   Musculoskeletal: Negative for joint pain and myalgias.   Skin: Negative for itching and rash.   Neurological: Negative for speech change and loss of consciousness.   Endo/Heme/Allergies: Positive for environmental allergies. Does not bruise/bleed easily.   Psychiatric/Behavioral: Negative for depression. The patient is not nervous/anxious.    All other systems reviewed and are negative.       Objective:   /60 (BP  "Location: Left arm, Patient Position: Sitting)   Pulse 86   Ht 1.702 m (5' 7\")   Wt 64.9 kg (143 lb)   SpO2 100%   BMI 22.40 kg/m²     Physical Exam   Constitutional: She is oriented to person, place, and time. She appears well-developed and well-nourished.   Patient seen and examined again today changes noted   HENT:   Head: Normocephalic and atraumatic.   Eyes: Pupils are equal, round, and reactive to light. EOM are normal. No scleral icterus.   Neck: Neck supple. No JVD present. No thyromegaly present.   Cardiovascular: Normal rate, regular rhythm and intact distal pulses.  Exam reveals no gallop.    Murmur heard.  3 out of 6 harsh blowing systolic murmur across the precordium   Pulmonary/Chest: Effort normal and breath sounds normal.   Abdominal: Bowel sounds are normal. She exhibits no distension.   Musculoskeletal: She exhibits no edema or tenderness.   Wearing compression stockings   Neurological: She is alert and oriented to person, place, and time. She exhibits normal muscle tone.   Very slight word finding difficulty, no focal movement disorder   Skin: Skin is warm and dry. No rash noted.   Psychiatric: She has a normal mood and affect. Her behavior is normal.       Assessment:     1. Non-rheumatic mitral regurgitation  EC-ECHOCARDIOGRAM COMPLETE W/O CONT   2. History of CVA (cerebrovascular accident)  EC-ECHOCARDIOGRAM COMPLETE W/O CONT   3. Dyslipidemia  EC-ECHOCARDIOGRAM COMPLETE W/O CONT   4. Cerebrovascular accident (CVA) due to occlusion of anterior cerebral artery, unspecified blood vessel laterality (HCC)     5. Benign essential HTN     6. HTN (hypertension), malignant         Medical Decision Making:  Today's Assessment / Status / Plan:     Mitral regurgitation  Looked at images when her blood pressure was above goal it was severe  Repeat this summer  Exam unchanged  Talked about intervention without symptoms at her age, with her stroke in 2017 I been very reluctant.  She voices understanding " and would like referral to my partner.  Talked about watching for dyspnea on exertion atrial fibrillation etc.    Hypertension much improved  Stroke  Discussed pathophysiology and prognosis particular in regards to heart disease    Stroke  As above  Follows with neurology  Statin tolerated high-dose    RTC 6 months sooner with concerns

## 2019-02-19 ENCOUNTER — TELEPHONE (OUTPATIENT)
Dept: CARDIOLOGY | Facility: MEDICAL CENTER | Age: 74
End: 2019-02-19

## 2019-02-20 NOTE — TELEPHONE ENCOUNTER
Per JI scheduled appt ASAP. Called and spoke w/pt and scheduled appt on 2/26/19 at 1620. Pt agreeable.

## 2019-02-21 ENCOUNTER — OFFICE VISIT (OUTPATIENT)
Dept: RHEUMATOLOGY | Facility: MEDICAL CENTER | Age: 74
End: 2019-02-21
Payer: COMMERCIAL

## 2019-02-21 VITALS
SYSTOLIC BLOOD PRESSURE: 120 MMHG | WEIGHT: 151 LBS | DIASTOLIC BLOOD PRESSURE: 62 MMHG | OXYGEN SATURATION: 99 % | HEART RATE: 80 BPM | TEMPERATURE: 99 F | BODY MASS INDEX: 23.7 KG/M2 | HEIGHT: 67 IN

## 2019-02-21 DIAGNOSIS — M85.89 OSTEOPENIA OF MULTIPLE SITES: Primary | ICD-10-CM

## 2019-02-21 PROCEDURE — 99213 OFFICE O/P EST LOW 20 MIN: CPT | Performed by: INTERNAL MEDICINE

## 2019-02-21 RX ORDER — SOLIFENACIN SUCCINATE 5 MG/1
TABLET, FILM COATED ORAL
COMMUNITY
Start: 2019-02-18 | End: 2019-05-03

## 2019-02-21 NOTE — PROGRESS NOTES
RHEUMATOLOGY CLINIC FOLLOW UP NOTE        Chief complaint:  Follow up osteopenia        HPI:  72 y/o F with osteopenia presents today for follow up,  she is a new patient to me, previously followed by Dr. Bruno, last seen 6/2018.    Patient is doing well. There was discussion of starting alendronate at the last visit however she needed dental work, she is still awaiting a crown.    No fractures. Continues to take calcium and vitamin D.  Also exercises regularly and states prior to the last bone density she had not been.          Rheum Background:  She first presented to Dr. Ward with last bone density in March 2012 with T score of -2.3 at the left femoral neck of the hip with no previous fractures.  She was previously on anti-resorptive drug for a few years.  She reports that it was 5 years starting around 1998.  She was last seen in 2/29/2016 for follow-up.  At the time she was taking calcium and vitamin D.  She denies falls.  At the last visit she had started fosamax in August 2016 and doing well     Her bone density in 1/21/2016 showed   The mean bone mineral density for the lumbar spine is 1.058 g/cm2, which corresponds to a T score of -1.0 and a Z score of 0.7.  The proximal left femur has a mean bone mineral density of 0.795 g/cm2, with a T score of -1.7 and a Z score of -0.1.When compared with the most recent study dated 3/5/2014, there has been a 3.0% decrease in the bone mineral density of the lumbar spine and a 0.9% increase in the bone mineral density of the left femur.     She started alendronate about August 2016 however the prescription was written to start 6/8/2016 and stopped in 2017.  However she cracked her tooth had to stop alendronate therapy.      Vitamin D 5/2015 from [facility] at 75.8  Now she is taking daily vitamin D        She did repeat her DEXA in 5/2018  The mean bone mineral density for the lumbar spine is 0.975 g/cm2, which corresponds to a T score of -1.7 and a Z score of 0.0.  The  proximal left femur has a mean bone mineral density of 0.749 g/cm2, with a T score of -2.1 and a Z score of -0.3.  When compared with the most recent study dated 2/5/2016, there has been an 8.0% decrease in the bone mineral density of the lumbar spine and a 5.8% decrease in the bone mineral density of the left femur.  According to the World Health Organization classification, bone mineral density of this patient is osteopenia with increased risk of fracture. Percentage decrease in bone mineral density is statistically significant.  10-year Probability of Fracture:  Major Osteoporotic     20.1%  Hip     5.6%  Population      [place] ()  Based on left femur neck BMD      History of breast cancer treated with radiation therapy     History of R MCA stroke 8/2017  Physical therapy helped  Working on her exercises         Past Medical History: she has a left wrist fracture as a result of fall while rollerskating.     Family History: hip fracture mom had in her late 80's:    Social hx: never smoker, 3 glasses of wine    ROS:    GEN: denies fevers  Skin: no rashes currently  MS: no back pain, no joint swelling            OUTPATIENT MEDS:    Prior to Admission medications    Medication Sig Start Date End Date Taking? Authorizing Provider   oxybutynin (DITROPAN) 5 MG Tab Take 5 mg by mouth 3 times a day.    Physician Outpatient   Amlodipine-Olmesartan 5-40 MG Tab Take 1 Tab by mouth every day for 360 days. 12/21/18 12/16/19  Travon Jaramillo M.D.   hydroCHLOROthiazide (HYDRODIURIL) 25 MG Tab Take 1 Tab by mouth every day for 360 days. 10/4/18 9/29/19  Travon Jaramillo M.D.   atorvastatin (LIPITOR) 80 MG tablet Take 1 Tab by mouth every evening. 10/2/18   Pascale Daniels M.D.   Biotin 25235 MCG Tab Take  by mouth.    Physician Outpatient   Cholecalciferol (VITAMIN D3 PO) Take 2,000 Units by mouth every day.    Physician Outpatient   Cyanocobalamin (VITAMIN B-12 PO) Take 25,000 mcg by mouth every day.    Physician  "Outpatient   potassium chloride SA (K-DUR) 10 MEQ Tab CR Take 10 mEq by mouth every day.    Physician Outpatient   aspirin (ASA) 81 MG Chew Tab chewable tablet Take 1 Tab by mouth every day. 8/21/17   Shaq Maya M.D.   valacyclovir (VALTREX) 500 MG Tab Take 500 mg by mouth every day.    Physician Outpatient   Multiple Vitamins-Minerals (ICAPS AREDS 2 PO) Take 1 Tab by mouth every day.    Physician Outpatient   calcium carbonate (OS-CRISTOFER 500) 500 MG Tab Take 1,000 mg by mouth every day.    Physician Outpatient   Multiple Vitamin (MULTIVITAMINS PO) Take 1 Tab by mouth every day.    Physician Outpatient           Past Medical History:   Diagnosis Date   • Benign essential HTN 3/19/2012   • Breast cancer (HCC)    • Cancer (HCC)    • Chest tightness or pressure 3/19/2012   • High risk medication use 3/19/2012   • Hypercholesterolemia 3/19/2012   • MVP (mitral valve prolapse) 3/19/2012   • Renal insufficiency 3/19/2012   • Stroke (HCC)             reports that she has never smoked. She has never used smokeless tobacco. She reports that she drinks alcohol. She reports that she does not use drugs.             Physical Exam:     /62 (BP Location: Left arm, Patient Position: Sitting, BP Cuff Size: Adult)   Pulse 80   Temp 37.2 °C (99 °F) (Temporal)   Ht 1.702 m (5' 7\")   Wt 68.5 kg (151 lb)   SpO2 99%   BMI 23.65 kg/m²         GEN: well-appearing, NAD  Head: no focal alopecia, no hair thinning  ENT: no conjunctival icterus or injection  Pulm: normal chest expansion, speaking in full sentences  SKIN: no rashes, skin changes, nail changes, no periungual erythema.               Labs:    Results for orders placed or performed in visit on 10/12/18   COMP METABOLIC PANEL   Result Value Ref Range    Glucose 103 (H) 65 - 99 mg/dL    Bun 30 (H) 8 - 27 mg/dL    Creatinine 1.07 (H) 0.57 - 1.00 mg/dL    GFR If Non  51 (L) >59 mL/min/1.73    GFR If  59 (L) >59 mL/min/1.73    Bun-Creatinine " Ratio 28 12 - 28    Sodium 144 134 - 144 mmol/L    Potassium 3.5 3.5 - 5.2 mmol/L    Chloride 102 96 - 106 mmol/L    Co2 25 20 - 29 mmol/L    Calcium 9.4 8.7 - 10.3 mg/dL    Total Protein 6.6 6.0 - 8.5 g/dL    Albumin 4.1 3.5 - 4.8 g/dL    Globulin 2.5 1.5 - 4.5 g/dL    A-G Ratio 1.6 1.2 - 2.2    Total Bilirubin 0.3 0.0 - 1.2 mg/dL    Alkaline Phosphatase 73 39 - 117 IU/L    AST(SGOT) 32 0 - 40 IU/L    ALT(SGPT) 31 0 - 32 IU/L   LIPID PANEL   Result Value Ref Range    Cholesterol,Tot 152 100 - 199 mg/dL    Triglycerides 98 0 - 149 mg/dL    HDL 69 >39 mg/dL    VLDL Cholesterol Calc 20 5 - 40 mg/dL    LDL 63 0 - 99 mg/dL    Comment: CANCELED    MICROALB/CREAT RATIO RAND. UR   Result Value Ref Range    Creatinine, Random Urine 89.3 Not Estab. mg/dL    Microalbumin, Urine Random 8.0 Not Estab. ug/mL    Albumin / Creatinine Ratio 9.0 0.0 - 30.0 mg/g creat         Impression/Plan:    1.  Osteopenia  DEXA 5/2018 with osteopenia and high frax with significant decrease in BMD compared to prior.  Patient does state this was in the setting of no exercising.    Started alendronate 8/2016 and held 5/2017 (took for 5 years in remote past), now holding due to needed dental work.    I discussed with the patient at length her diagnosis and treatment recommendations.  I would still recommend bisphosphonate first line.  However we do still need dental clearance as she still needs work done and I would like a note from her dentist when she is cleared.    Plan:  In the mean time she will continue calcium and vitamin D, vit D 66 on most recent labs, decrease to 1000 units daily.  Dietary calcium handout given to the patient and recommend obtain 1200mg of calcium total daily with diet and supplement.    Ideally we would like to repeat the DEXA before starting therapy, as it has been almost 1 year and we are still awaiting dental clearance, it may be reasonable to repeat the DEXA prior to initiating to obtain a new baseline, DEXA order  given to patient and she will have done after 5/12/18.    BMP, Vit D, PTH prior to next visit        2.  Renal insufficiency   GFR 51 11/2018      Patient will be in contact with me for any questions or concerns, will otherwise follow up with me as scheduled in 4 months.    I spent 15 minutes face to face with this patient (excluding procedure time), of which >50% was spent on discussion of symptoms, counseling and coordination of care.        Lynette Laurent MD

## 2019-02-21 NOTE — PATIENT INSTRUCTIONS
Foods and drinks with calcium  Food Calcium, milligrams   Milk (skim, 2 percent, or whole, 8 oz [240 mL]) 300   Yogurt (6 oz [168 g]) 250   Orange juice (with calcium, 8 oz [240 mL]) 300   Tofu with calcium (1/2 cup [113 g]) 435   Cheese (1 oz [28 g]) 195 to 335 (hard cheese = higher calcium)   Cottage cheese (1/2 cup [113 g]) 130   Ice cream or frozen yogurt (1/2 cup [113 g]) 100   Soy milk (8 oz [240 mL]) 300   Beans (1/2 cup cooked [113 g]) 60 to 80   Dark, leafy green vegetables (1/2 cup cooked [113 g]) 50 to 135   Almonds (24 whole) 70   Orange (1 medium) 60

## 2019-02-26 ENCOUNTER — OFFICE VISIT (OUTPATIENT)
Dept: CARDIOLOGY | Facility: MEDICAL CENTER | Age: 74
End: 2019-02-26
Payer: COMMERCIAL

## 2019-02-26 VITALS
BODY MASS INDEX: 23.42 KG/M2 | DIASTOLIC BLOOD PRESSURE: 82 MMHG | SYSTOLIC BLOOD PRESSURE: 112 MMHG | OXYGEN SATURATION: 95 % | WEIGHT: 149.2 LBS | HEIGHT: 67 IN | HEART RATE: 66 BPM

## 2019-02-26 DIAGNOSIS — I34.1 MVP (MITRAL VALVE PROLAPSE): Chronic | ICD-10-CM

## 2019-02-26 DIAGNOSIS — I34.0 SEVERE MITRAL REGURGITATION: ICD-10-CM

## 2019-02-26 DIAGNOSIS — G47.33 OSA (OBSTRUCTIVE SLEEP APNEA): ICD-10-CM

## 2019-02-26 DIAGNOSIS — I63.59 CEREBROVASCULAR ACCIDENT (CVA) DUE TO OCCLUSION OF OTHER CEREBRAL ARTERY (HCC): ICD-10-CM

## 2019-02-26 PROCEDURE — 99214 OFFICE O/P EST MOD 30 MIN: CPT | Performed by: INTERNAL MEDICINE

## 2019-02-26 RX ORDER — AMOXICILLIN AND CLAVULANATE POTASSIUM 875; 125 MG/1; MG/1
TABLET, FILM COATED ORAL
Refills: 0 | COMMUNITY
Start: 2018-12-10 | End: 2019-02-26

## 2019-02-26 RX ORDER — ESTRADIOL 0.1 MG/G
CREAM VAGINAL
COMMUNITY
Start: 2018-12-12 | End: 2019-02-26

## 2019-02-26 RX ORDER — DEXAMETHASONE 4 MG/1
TABLET ORAL
Refills: 0 | COMMUNITY
Start: 2018-12-10 | End: 2019-02-26

## 2019-02-26 RX ORDER — HYDROCODONE BITARTRATE AND ACETAMINOPHEN 7.5; 325 MG/1; MG/1
1 TABLET ORAL
Refills: 0 | COMMUNITY
Start: 2018-12-14 | End: 2019-02-26

## 2019-02-26 RX ORDER — TRIAMCINOLONE ACETONIDE 1 MG/G
CREAM TOPICAL
Refills: 1 | COMMUNITY
Start: 2019-02-04 | End: 2019-02-26

## 2019-02-26 RX ORDER — POTASSIUM CHLORIDE 750 MG/1
CAPSULE, EXTENDED RELEASE ORAL
COMMUNITY
Start: 2018-12-05 | End: 2019-02-26

## 2019-02-26 RX ORDER — CHLORHEXIDINE GLUCONATE ORAL RINSE 1.2 MG/ML
SOLUTION DENTAL
Refills: 3 | COMMUNITY
Start: 2018-12-10 | End: 2019-02-26

## 2019-02-26 RX ORDER — POLYETHYLENE GLYCOL-3350, SODIUM CHLORIDE, POTASSIUM CHLORIDE AND SODIUM BICARBONATE 420; 11.2; 5.72; 1.48 G/438.4G; G/438.4G; G/438.4G; G/438.4G
POWDER, FOR SOLUTION ORAL
Refills: 0 | COMMUNITY
Start: 2019-01-16 | End: 2019-02-26

## 2019-02-26 RX ORDER — OXYBUTYNIN CHLORIDE 5 MG/1
TABLET, EXTENDED RELEASE ORAL
COMMUNITY
Start: 2019-01-18 | End: 2019-02-26

## 2019-02-26 ASSESSMENT — ENCOUNTER SYMPTOMS
HEADACHES: 0
COUGH: 0
DOUBLE VISION: 0
EYES NEGATIVE: 1
WEIGHT LOSS: 0
MUSCULOSKELETAL NEGATIVE: 1
NEUROLOGICAL NEGATIVE: 1
RESPIRATORY NEGATIVE: 1
DIZZINESS: 0
CHILLS: 0
SHORTNESS OF BREATH: 0
ABDOMINAL PAIN: 0
NERVOUS/ANXIOUS: 0
FOCAL WEAKNESS: 0
GASTROINTESTINAL NEGATIVE: 1
CARDIOVASCULAR NEGATIVE: 1
VOMITING: 0
BRUISES/BLEEDS EASILY: 0
PSYCHIATRIC NEGATIVE: 1
NAUSEA: 0
PALPITATIONS: 0
MYALGIAS: 0
DEPRESSION: 0
WEAKNESS: 0
CLAUDICATION: 0
CONSTITUTIONAL NEGATIVE: 1
BLURRED VISION: 0
FEVER: 0

## 2019-02-26 NOTE — LETTER
[facility]   [address] [place] [address] [address]  [address] [address]  Phone: [phone]  Fax: [phone]              Shaista Bocanegra  1945    Encounter Date: 2/26/2019    Darin Meehan M.D.          PROGRESS NOTE:  Chief Complaint   Patient presents with   • Hypertension     PP of [place]       Subjective:   Shaista Bocanegra is a 73 y.o. female who presents today     Past Medical History:   Diagnosis Date   • Benign essential HTN 3/19/2012   • Breast cancer (HCC)    • Cancer (HCC)    • Chest tightness or pressure 3/19/2012   • High risk medication use 3/19/2012   • Hypercholesterolemia 3/19/2012   • MVP (mitral valve prolapse) 3/19/2012   • Renal insufficiency 3/19/2012   • Stroke (HCC)      Past Surgical History:   Procedure Laterality Date   • CATARACT PHACO WITH IOL  3/16/2009    Performed by SHANNON GANDARA at [facility]   • CATARACT PHACO WITH IOL  1/26/2009    Performed by SHANNON GANDARA at [facility]   • BREAST BIOPSY     • LUMPECTOMY     • PB RADIATION THERAPY PLAN SIMPLE     • DC CHEMOTHERAPY, UNSPECIFIED PROCEDURE       Family History   Problem Relation Age of Onset   • Cancer Mother    • Heart Failure Neg Hx    • Heart Disease Neg Hx      Social History     Social History   • Marital status:      Spouse name: N/A   • Number of children: N/A   • Years of education: N/A     Occupational History   • Not on file.     Social History Main Topics   • Smoking status: Never Smoker   • Smokeless tobacco: Never Used   • Alcohol use Yes      Comment: 3 glasses of wine   • Drug use: No   • Sexual activity: Not on file     Other Topics Concern   • Not on file     Social History Narrative   • No narrative on file     Allergies   Allergen Reactions   • Metoprolol      Outpatient Encounter Prescriptions as of 2/26/2019   Medication Sig Dispense Refill   • VESICARE 5 MG tablet      • oxybutynin (DITROPAN) 5 MG Tab Take 5 mg by mouth 3 times a  day.     • Amlodipine-Olmesartan 5-40 MG Tab Take 1 Tab by mouth every day for 360 days. 90 Tab 3   • hydroCHLOROthiazide (HYDRODIURIL) 25 MG Tab Take 1 Tab by mouth every day for 360 days. 90 Tab 3   • atorvastatin (LIPITOR) 80 MG tablet Take 1 Tab by mouth every evening. 90 Tab 2   • Biotin 12171 MCG Tab Take  by mouth.     • Cholecalciferol (VITAMIN D3 PO) Take 2,000 Units by mouth every day.     • Cyanocobalamin (VITAMIN B-12 PO) Take 25,000 mcg by mouth every day.     • potassium chloride SA (K-DUR) 10 MEQ Tab CR Take 10 mEq by mouth every day.     • aspirin (ASA) 81 MG Chew Tab chewable tablet Take 1 Tab by mouth every day. 14 Tab 1   • valacyclovir (VALTREX) 500 MG Tab Take 500 mg by mouth every day.     • Multiple Vitamins-Minerals (ICAPS AREDS 2 PO) Take 1 Tab by mouth every day.     • calcium carbonate (OS-CRISTOFER 500) 500 MG Tab Take 1,000 mg by mouth every day.     • Multiple Vitamin (MULTIVITAMINS PO) Take 1 Tab by mouth every day.     • [DISCONTINUED] amoxicillin-clavulanate (AUGMENTIN) 875-125 MG Tab TAKE 1 TABLET BY MOUTH TWICE A DAY UNTIL GONE  0   • [DISCONTINUED] chlorhexidine (PERIDEX) 0.12 % Solution SWISH AND SPIT 1/2 CAPFUL BY MOUTH THREE TIMES DAILY  3   • [DISCONTINUED] dexamethasone (DECADRON) 4 MG Tab TAKE 2 TABLETS BY MOUTH BEFORE 8 AM DAILY UNTIL GONE  0   • [DISCONTINUED] estradiol (ESTRACE) 0.1 MG/GM vaginal cream      • [DISCONTINUED] HYDROcodone-acetaminophen (NORCO) 7.5-325 MG per tablet Take 1 Tab by mouth.  0   • [DISCONTINUED] oxybutynin SR (DITROPAN-XL) 5 MG TABLET SR 24 HR      • [DISCONTINUED] GAVILYTE-N WITH FLAVOR PACK 420 g solution  GRAM AS DIRECTED IN INSTRUCTIONS. 8 OZ OF PREP EVERY 15 MIN.  0   • [DISCONTINUED] potassium chloride (MICRO-K) 10 MEQ capsule      • [DISCONTINUED] triamcinolone acetonide (KENALOG) 0.1 % Cream APPLY TO RASH ONCE A DAY FOR 7 DAYS TO THE EARS THEN USE ONCE 2-3 X A WEEK AS NEEDED.  1   • [DISCONTINUED] triazolam (HALCION) 0.25 MG Tab TAKE 2  "TABLETS BY MOUTH TO BE ADMINISTERED IN OFFICE 1 HOUR PRIOR TO PROCEDURE  0     No facility-administered encounter medications on file as of 2/26/2019.      Review of Systems   Constitutional: Negative.  Negative for chills, fever, malaise/fatigue and weight loss.   HENT: Negative.  Negative for hearing loss.    Eyes: Negative.  Negative for blurred vision and double vision.   Respiratory: Negative.  Negative for cough and shortness of breath.    Cardiovascular: Negative.  Negative for chest pain, palpitations, claudication and leg swelling.   Gastrointestinal: Negative.  Negative for abdominal pain, nausea and vomiting.   Genitourinary: Negative.  Negative for dysuria and urgency.   Musculoskeletal: Negative.  Negative for joint pain and myalgias.   Skin: Negative.  Negative for itching and rash.   Neurological: Negative.  Negative for dizziness, focal weakness, weakness and headaches.   Endo/Heme/Allergies: Negative.  Does not bruise/bleed easily.   Psychiatric/Behavioral: Negative.  Negative for depression. The patient is not nervous/anxious.         Objective:   /82 (BP Location: Left arm, Patient Position: Sitting, BP Cuff Size: Adult)   Pulse 66   Ht 1.702 m (5' 7\")   Wt 67.7 kg (149 lb 3.2 oz)   SpO2 95%   BMI 23.37 kg/m²      Physical Exam   Constitutional: She is oriented to person, place, and time. She appears well-developed and well-nourished.   HENT:   Head: Normocephalic and atraumatic.   Eyes: Pupils are equal, round, and reactive to light. Conjunctivae are normal.   Neck: Normal range of motion. Neck supple.   Cardiovascular: Normal rate and regular rhythm.    Pulmonary/Chest: Effort normal and breath sounds normal.   Abdominal: Soft. Bowel sounds are normal.   Musculoskeletal: Normal range of motion.   Neurological: She is alert and oriented to person, place, and time.   Skin: Skin is warm and dry.   Psychiatric: She has a normal mood and affect.     CARDIAC STUDIES/PROCEDURES:    CAROTID " ULTRASOUND (08/18/17)  Normal carotids, subclavians and vertebrals  (study result reviewed)    CTA OF HEAD (08/18/17)  1.  There is a right M1 segment occlusion.  2.  There is collateral flow in the peripheral M3 branches seen on the right.  3.  There is mild decreased enhancement of the visualized right internal carotid artery however it is patent.  4.  Question of subtle hypodensity in the right insular cortex region. No abnormal brain parenchymal enhancement is seen.  (study result reviewed)    CTA OF NECK (08/18/17)  1.  CT angiogram of the neck within normal limits.  2.  Thrombosed right M1 segment.  (study result reviewed)    ECHOCARDIOGRAM CONCLUSIONS (06/14/18)  Normal left ventricular systolic function.  Left ventricular ejection fraction is visually estimated to be 60%.  Myxomatous changes of the mitral valve.  Prolapse of the anterior and posterior mitral leaflets.  Severe, eccentric mitral regurgitation.  Right heart pressures are normal.  Compared to the report of the study done 8/19/2017 severe mitral   regurgitation is now present, previously reported as moderate but a MR   ERO was not recorded.   (study result reviewed)    EKG performed on (08/18/17) was reviewed: EKG shows sinus rhythm with RSR' of lead V1 and V2.    Laboratory results of (11/09/18) were reviewed. Bun of 30 mg/dl, creatinine levels of 1.07 mg/dl noted.    Assessment:     1. MVP (mitral valve prolapse)     2. Severe mitral regurgitation     3. Cerebrovascular accident (CVA) due to occlusion of other cerebral artery (HCC)         Medical Decision Making:  Today's Assessment / Status / Plan:            No Recipients

## 2019-02-27 NOTE — PROGRESS NOTES
Chief Complaint   Patient presents with   • Hypertension     PP of [place]       Subjective:   Shaista Bocanegra is a 73 y.o. female who presents today for interventional consult/MitraClip evaluation requested by Pascale Rankin for severe symptomatic mitral regurgitation.     Thank you for allowing me to evaluate Mrs. Bocanegra, who as you know is a 73 year old female with mitral valve prolapse with severe mitral regurgitation. She is clinically doing well. She denies fatigue, shortness of breath, dyspnea on exertion, chest pain, dizziness or syncope.    Past Medical History:   Diagnosis Date   • Benign essential HTN 3/19/2012   • Breast cancer (HCC)    • Cancer (HCC)    • Chest tightness or pressure 3/19/2012   • High risk medication use 3/19/2012   • Hypercholesterolemia 3/19/2012   • MVP (mitral valve prolapse) 3/19/2012   • Renal insufficiency 3/19/2012   • Stroke (HCC)      Past Surgical History:   Procedure Laterality Date   • CATARACT PHACO WITH IOL  3/16/2009    Performed by SHANNON GANDARA at [facility]   • CATARACT PHACO WITH IOL  1/26/2009    Performed by SHANNON GANDARA at [facility]   • BREAST BIOPSY     • LUMPECTOMY     • PB RADIATION THERAPY PLAN SIMPLE     • MI CHEMOTHERAPY, UNSPECIFIED PROCEDURE       Family History   Problem Relation Age of Onset   • Cancer Mother    • Heart Failure Neg Hx    • Heart Disease Neg Hx      Social History     Social History   • Marital status:      Spouse name: N/A   • Number of children: N/A   • Years of education: N/A     Occupational History   • Not on file.     Social History Main Topics   • Smoking status: Never Smoker   • Smokeless tobacco: Never Used   • Alcohol use Yes      Comment: 3 glasses of wine   • Drug use: No   • Sexual activity: Not on file     Other Topics Concern   • Not on file     Social History Narrative   • No narrative on file     Allergies   Allergen Reactions   • Metoprolol      Medications  reviewed.    Outpatient Encounter Prescriptions as of 2/26/2019   Medication Sig Dispense Refill   • VESICARE 5 MG tablet      • oxybutynin (DITROPAN) 5 MG Tab Take 5 mg by mouth 3 times a day.     • Amlodipine-Olmesartan 5-40 MG Tab Take 1 Tab by mouth every day for 360 days. 90 Tab 3   • hydroCHLOROthiazide (HYDRODIURIL) 25 MG Tab Take 1 Tab by mouth every day for 360 days. 90 Tab 3   • atorvastatin (LIPITOR) 80 MG tablet Take 1 Tab by mouth every evening. 90 Tab 2   • Biotin 24090 MCG Tab Take  by mouth.     • Cholecalciferol (VITAMIN D3 PO) Take 2,000 Units by mouth every day.     • Cyanocobalamin (VITAMIN B-12 PO) Take 25,000 mcg by mouth every day.     • potassium chloride SA (K-DUR) 10 MEQ Tab CR Take 10 mEq by mouth every day.     • aspirin (ASA) 81 MG Chew Tab chewable tablet Take 1 Tab by mouth every day. 14 Tab 1   • valacyclovir (VALTREX) 500 MG Tab Take 500 mg by mouth every day.     • Multiple Vitamins-Minerals (ICAPS AREDS 2 PO) Take 1 Tab by mouth every day.     • calcium carbonate (OS-CRISTOFER 500) 500 MG Tab Take 1,000 mg by mouth every day.     • Multiple Vitamin (MULTIVITAMINS PO) Take 1 Tab by mouth every day.     • [DISCONTINUED] amoxicillin-clavulanate (AUGMENTIN) 875-125 MG Tab TAKE 1 TABLET BY MOUTH TWICE A DAY UNTIL GONE  0   • [DISCONTINUED] chlorhexidine (PERIDEX) 0.12 % Solution SWISH AND SPIT 1/2 CAPFUL BY MOUTH THREE TIMES DAILY  3   • [DISCONTINUED] dexamethasone (DECADRON) 4 MG Tab TAKE 2 TABLETS BY MOUTH BEFORE 8 AM DAILY UNTIL GONE  0   • [DISCONTINUED] estradiol (ESTRACE) 0.1 MG/GM vaginal cream      • [DISCONTINUED] HYDROcodone-acetaminophen (NORCO) 7.5-325 MG per tablet Take 1 Tab by mouth.  0   • [DISCONTINUED] oxybutynin SR (DITROPAN-XL) 5 MG TABLET SR 24 HR      • [DISCONTINUED] GAVILYTE-N WITH FLAVOR PACK 420 g solution  GRAM AS DIRECTED IN INSTRUCTIONS. 8 OZ OF PREP EVERY 15 MIN.  0   • [DISCONTINUED] potassium chloride (MICRO-K) 10 MEQ capsule      • [DISCONTINUED]  "triamcinolone acetonide (KENALOG) 0.1 % Cream APPLY TO RASH ONCE A DAY FOR 7 DAYS TO THE EARS THEN USE ONCE 2-3 X A WEEK AS NEEDED.  1   • [DISCONTINUED] triazolam (HALCION) 0.25 MG Tab TAKE 2 TABLETS BY MOUTH TO BE ADMINISTERED IN OFFICE 1 HOUR PRIOR TO PROCEDURE  0     No facility-administered encounter medications on file as of 2/26/2019.      Review of Systems   Constitutional: Negative.  Negative for chills, fever, malaise/fatigue and weight loss.   HENT: Negative.  Negative for hearing loss.    Eyes: Negative.  Negative for blurred vision and double vision.   Respiratory: Negative.  Negative for cough and shortness of breath.    Cardiovascular: Negative.  Negative for chest pain, palpitations, claudication and leg swelling.   Gastrointestinal: Negative.  Negative for abdominal pain, nausea and vomiting.   Genitourinary: Negative.  Negative for dysuria and urgency.   Musculoskeletal: Negative.  Negative for joint pain and myalgias.   Skin: Negative.  Negative for itching and rash.   Neurological: Negative.  Negative for dizziness, focal weakness, weakness and headaches.   Endo/Heme/Allergies: Negative.  Does not bruise/bleed easily.   Psychiatric/Behavioral: Negative.  Negative for depression. The patient is not nervous/anxious.         Objective:   /82 (BP Location: Left arm, Patient Position: Sitting, BP Cuff Size: Adult)   Pulse 66   Ht 1.702 m (5' 7\")   Wt 67.7 kg (149 lb 3.2 oz)   SpO2 95%   BMI 23.37 kg/m²     Physical Exam   Constitutional: She is oriented to person, place, and time. She appears well-developed and well-nourished.   HENT:   Head: Normocephalic and atraumatic.   Eyes: Pupils are equal, round, and reactive to light. Conjunctivae are normal.   Neck: Normal range of motion. Neck supple.   Cardiovascular: Normal rate and regular rhythm.    Pulmonary/Chest: Effort normal and breath sounds normal.   Abdominal: Soft. Bowel sounds are normal.   Musculoskeletal: Normal range of motion. "   Neurological: She is alert and oriented to person, place, and time.   Skin: Skin is warm and dry.   Psychiatric: She has a normal mood and affect.     CARDIAC STUDIES/PROCEDURES:    CAROTID ULTRASOUND (08/18/17)  Normal carotids, subclavians and vertebrals  (study result reviewed)    CTA OF HEAD (08/18/17)  1.  There is a right M1 segment occlusion.  2.  There is collateral flow in the peripheral M3 branches seen on the right.  3.  There is mild decreased enhancement of the visualized right internal carotid artery however it is patent.  4.  Question of subtle hypodensity in the right insular cortex region. No abnormal brain parenchymal enhancement is seen.  (study result reviewed)    CTA OF NECK (08/18/17)  1.  CT angiogram of the neck within normal limits.  2.  Thrombosed right M1 segment.  (study result reviewed)    ECHOCARDIOGRAM CONCLUSIONS (06/14/18)  Normal left ventricular systolic function.  Left ventricular ejection fraction is visually estimated to be 60%.  Myxomatous changes of the mitral valve.  Prolapse of the anterior and posterior mitral leaflets.  Severe, eccentric mitral regurgitation.  Right heart pressures are normal.  Compared to the report of the study done 8/19/2017 severe mitral   regurgitation is now present, previously reported as moderate but a MR   ERO was not recorded.   (study result reviewed)    EKG performed on (08/18/17) was reviewed: EKG shows sinus rhythm with RSR' of lead V1 and V2.    Laboratory results of (11/09/18) were reviewed. Bun of 30 mg/dl, creatinine levels of 1.07 mg/dl noted.    Assessment:     1. MVP (mitral valve prolapse)     2. Severe mitral regurgitation     3. Cerebrovascular accident (CVA) due to occlusion of other cerebral artery (HCC)       Medical Decision Making:  Today's Assessment / Status / Plan:     1. Mitral valve prolapse with severe mitral regurgitation: She is clinically doing well and remains asymptomatic. She is not ready to proceed with mitral  valve repair or MitraClip. We will follow her clinically.    2. History of stroke with right carotid arteriography with mechanical thrombectomy (08/18/17): She remains clinically stable without recurrence of stroke symptoms.  3. Additional information: She is family of Mr. Mejias Casey.  More than 45 minutes of time was spent to review all above information, discuss the option of MitraClip including, alternative options, risk and benefits.    We will follow up the patient in 6 months.    Thank you for this consult.     CC Bethany Ambrose

## 2019-03-22 ENCOUNTER — OFFICE VISIT (OUTPATIENT)
Dept: VASCULAR LAB | Facility: MEDICAL CENTER | Age: 74
End: 2019-03-22
Attending: FAMILY MEDICINE
Payer: COMMERCIAL

## 2019-03-22 VITALS
BODY MASS INDEX: 23.54 KG/M2 | HEIGHT: 67 IN | SYSTOLIC BLOOD PRESSURE: 125 MMHG | DIASTOLIC BLOOD PRESSURE: 55 MMHG | HEART RATE: 72 BPM | WEIGHT: 150 LBS

## 2019-03-22 DIAGNOSIS — R03.0 ELEVATED BLOOD PRESSURE READING WITHOUT DIAGNOSIS OF HYPERTENSION: ICD-10-CM

## 2019-03-22 DIAGNOSIS — E78.5 DYSLIPIDEMIA: ICD-10-CM

## 2019-03-22 DIAGNOSIS — I10 HTN (HYPERTENSION), MALIGNANT: ICD-10-CM

## 2019-03-22 DIAGNOSIS — I34.0 NON-RHEUMATIC MITRAL REGURGITATION: ICD-10-CM

## 2019-03-22 DIAGNOSIS — Z86.73 HISTORY OF CVA (CEREBROVASCULAR ACCIDENT): ICD-10-CM

## 2019-03-22 PROCEDURE — 99214 OFFICE O/P EST MOD 30 MIN: CPT | Performed by: FAMILY MEDICINE

## 2019-03-22 PROCEDURE — 99212 OFFICE O/P EST SF 10 MIN: CPT | Performed by: FAMILY MEDICINE

## 2019-03-22 ASSESSMENT — ENCOUNTER SYMPTOMS
CHILLS: 0
HEMOPTYSIS: 0
DIZZINESS: 0
VOMITING: 0
SEIZURES: 0
DEPRESSION: 0
MYALGIAS: 0
DIARRHEA: 0
DOUBLE VISION: 0
PALPITATIONS: 0
HEADACHES: 0
WHEEZING: 0
BLOOD IN STOOL: 0
TREMORS: 0
BLURRED VISION: 0
SHORTNESS OF BREATH: 0
BRUISES/BLEEDS EASILY: 0
ORTHOPNEA: 0
INSOMNIA: 0
COUGH: 0
NERVOUS/ANXIOUS: 0
FEVER: 0
WEAKNESS: 0
SPEECH CHANGE: 1
ABDOMINAL PAIN: 0
SORE THROAT: 0
NAUSEA: 0
FOCAL WEAKNESS: 0

## 2019-03-22 NOTE — PROGRESS NOTES
FOLLOW-UP VASCULAR MED VISIT  Subjective:   Shaista Bocanegra is a 74 y.o. female who presents today 3/22/19 for   Chief Complaint   Patient presents with   • Follow-Up     Hypertension   Initially referred by Dr. Daniels (cardiology) for eval and mgmt of HTN in context of MVR and hx of CVA.     HPI:    Current HTN concerns:  Overall feeling good.  Going to PT regularly and having improved mood.  Recent visit with Dr. Meehan (cardiology) - no major issues noted.   ADRs:  Had mild swelling, improved with reduce amlodipine.   HTN sx:  Reports mild No current blurred or changed vision, chest pain, shortness of breath, headache, nausea, dizziness/vertigo.   Reports some face recognition and proper nouns with memory loss.    Home BP log: mid 120s/80s  Adherence to current HTN meds: compliant all of the time tolerating all     Antiplatelet/anticoagulation: Yes, Details: ASA 81mg      Hyperlipidemia:  Stable, no current concerns  Current treatment: High intensity statin   atorva 80mg   Myalgias? No  Other adverse drug reactions? No  Lipid profile:   LDL (mg/dL)   Date Value   11/09/2018 63   08/19/2017 65   08/07/2013 85     HDL (mg/dL)   Date Value   11/09/2018 69   08/19/2017 54   08/07/2013 57     Pertinent HTN hx:   Age at HTN dx:  At least 20 years   (if female, any hx of pregnancy-related HTN): none reported  Past HTN medications:  As noted takes triam/hctz with travel only   HTN crises:  CVA 8/2017, o/w negative   ASCVD risk factors:     DM: No   CKD:  No  Lifestyle factors:     High salt: No   EtOH: No  Interfering substances:     NSAIDs: No    Decongestants. No    ASCVD calculator (contraindicated if ASCVD+, [id number])   10yr-risk calc: n/a     Clinical evidence of ASCVD:     1) hx of MI/ACS:  No   2) coronary or other revascularization procedure: No   3) TIA/ischemic CVA: Yes, Details: hx of ischemic CVA 8/2017, R MCA   4) PAD (including MICAH <0.9): No   5) documented (CAD, Renal athero, AA due to athero, carotid  plaque >50% stenosis: No    Major RFs:     1) Age (Men>44, women>54):  yes   2) Fhx early CAD (<55 men, <65 women):  no   3) cigarette smoking:  No   4) high BP >139/89 or on tx: yes   5) Low HDL-C (<40 men, <50 women):  no    Social History   Substance Use Topics   • Smoking status: Never Smoker   • Smokeless tobacco: Never Used   • Alcohol use Yes      Comment: 3 glasses of wine     Outpatient Encounter Prescriptions as of 3/22/2019   Medication Sig Dispense Refill   • oxybutynin (DITROPAN) 5 MG Tab Take 5 mg by mouth 3 times a day.     • Amlodipine-Olmesartan 5-40 MG Tab Take 1 Tab by mouth every day for 360 days. 90 Tab 3   • hydroCHLOROthiazide (HYDRODIURIL) 25 MG Tab Take 1 Tab by mouth every day for 360 days. 90 Tab 3   • atorvastatin (LIPITOR) 80 MG tablet Take 1 Tab by mouth every evening. 90 Tab 2   • Biotin 08796 MCG Tab Take  by mouth.     • Cholecalciferol (VITAMIN D3 PO) Take 2,000 Units by mouth every day.     • Cyanocobalamin (VITAMIN B-12 PO) Take 25,000 mcg by mouth every day.     • potassium chloride SA (K-DUR) 10 MEQ Tab CR Take 10 mEq by mouth every day.     • aspirin (ASA) 81 MG Chew Tab chewable tablet Take 1 Tab by mouth every day. 14 Tab 1   • valacyclovir (VALTREX) 500 MG Tab Take 500 mg by mouth every day.     • Multiple Vitamins-Minerals (ICAPS AREDS 2 PO) Take 1 Tab by mouth every day.     • calcium carbonate (OS-CRISTOFER 500) 500 MG Tab Take 1,000 mg by mouth every day.     • Multiple Vitamin (MULTIVITAMINS PO) Take 1 Tab by mouth every day.     • VESICARE 5 MG tablet        No facility-administered encounter medications on file as of 3/22/2019.      Allergies   Allergen Reactions   • Metoprolol      DIET AND EXERCISE:  Weight Change: none   BMI Readings from Last 4 Encounters:   03/22/19 23.49 kg/m²   02/26/19 23.37 kg/m²   02/21/19 23.65 kg/m²   02/13/19 22.40 kg/m²      Diet: low fat, low sodium, reports some indiscretions over last 2 months   Exercise: moderate regular exercise  "program     Review of Systems   Constitutional: Negative for chills, fever and malaise/fatigue.   HENT: Negative for nosebleeds, sore throat and tinnitus.    Eyes: Negative for blurred vision and double vision.   Respiratory: Negative for cough, hemoptysis, shortness of breath and wheezing.    Cardiovascular: Negative for chest pain, palpitations, orthopnea and leg swelling.   Gastrointestinal: Negative for abdominal pain, blood in stool, diarrhea, melena, nausea and vomiting.   Genitourinary: Negative for hematuria.   Musculoskeletal: Negative for joint pain and myalgias.   Skin: Negative for itching and rash.   Neurological: Positive for speech change (word finding issues ). Negative for dizziness, tremors, focal weakness, seizures, weakness and headaches.   Endo/Heme/Allergies: Does not bruise/bleed easily.   Psychiatric/Behavioral: Negative for depression. The patient is not nervous/anxious and does not have insomnia.       Objective:     Vitals:    03/22/19 0905   BP: 125/55   BP Location: Left arm   Patient Position: Sitting   BP Cuff Size: Adult   Pulse: 72   Weight: 68 kg (150 lb)   Height: 1.702 m (5' 7\")      BP Readings from Last 4 Encounters:   03/22/19 125/55   02/26/19 112/82   02/21/19 120/62   02/13/19 120/60      Body mass index is 23.49 kg/m².   BMI Readings from Last 4 Encounters:   03/22/19 23.49 kg/m²   02/26/19 23.37 kg/m²   02/21/19 23.65 kg/m²   02/13/19 22.40 kg/m²      Physical Exam   Constitutional: She is oriented to person, place, and time. She appears well-developed and well-nourished. No distress.   HENT:   Head: Normocephalic and atraumatic.   Neck: Normal range of motion. Neck supple. No JVD present. No thyromegaly present.   Cardiovascular: Normal rate, regular rhythm and intact distal pulses.  Exam reveals no gallop and no friction rub.    Murmur heard.   Systolic murmur is present with a grade of 3/6   Pulses:       Carotid pulses are 2+ on the right side, and 2+ on the left " side.       Radial pulses are 2+ on the right side, and 2+ on the left side.        Dorsalis pedis pulses are 2+ on the right side, and 2+ on the left side.        Posterior tibial pulses are 2+ on the right side, and 2+ on the left side.   Edema     RLE: none     LLE: none   Spider telangectasia:       RLE:  None      LLE: none   Varicosities:         RLE: none      LLE: none   Corona phlebectatica:      RLE:  None       LLE:  None   Cording:         RLE:  None     LLE: None      Pulmonary/Chest: Effort normal and breath sounds normal.   Abdominal: Soft. Bowel sounds are normal. She exhibits no distension and no mass. There is no tenderness. There is no rebound and no guarding.   Musculoskeletal: Normal range of motion. She exhibits no edema or tenderness.   Neurological: She is alert and oriented to person, place, and time. No cranial nerve deficit. She exhibits normal muscle tone. Coordination normal.   Skin: Skin is warm and dry. She is not diaphoretic.   Psychiatric: She has a normal mood and affect.     Lab Results   Component Value Date    CHOLSTRLTOT 152 11/09/2018    CHOLSTRLTOT 135 08/19/2017    LDL 63 11/09/2018    LDL 65 08/19/2017    HDL 69 11/09/2018    HDL 54 08/19/2017    TRIGLYCERIDE 98 11/09/2018    TRIGLYCERIDE 80 08/19/2017      Lab Results   Component Value Date    PROTHROMBTM 13.4 08/18/2017    INR 0.99 08/18/2017       Lab Results   Component Value Date    HBA1C 5.3 08/18/2017      Lab Results   Component Value Date    SODIUM 144 11/09/2018    SODIUM 140 08/28/2017    POTASSIUM 3.5 11/09/2018    POTASSIUM 3.0 (L) 08/28/2017    CHLORIDE 102 11/09/2018    CHLORIDE 104 08/28/2017    CO2 25 11/09/2018    CO2 28 08/28/2017    GLUCOSE 103 (H) 11/09/2018    GLUCOSE 108 (H) 08/28/2017    BUN 30 (H) 11/09/2018    BUN 23 (H) 08/28/2017    CREATININE 1.07 (H) 11/09/2018    CREATININE 0.88 08/28/2017    BUNCREATRAT 28 11/09/2018    IFAFRICA 59 (L) 11/09/2018    IFAFRICA >60 08/28/2017    IFNOTAFR 51 (L)  11/09/2018    IFNOTAFR >60 08/28/2017        Lab Results   Component Value Date    WBC 5.3 08/27/2017    RBC 3.72 (L) 08/27/2017    HEMOGLOBIN 13.0 08/27/2017    HEMATOCRIT 38.9 08/27/2017    .6 (H) 08/27/2017    MCH 34.9 (H) 08/27/2017    MCHC 33.4 (L) 08/27/2017    MPV 9.5 08/27/2017      VASCULAR IMAGING:     CTA head 8/2017  1.  There is a right M1 segment occlusion.  2.  There is collateral flow in the peripheral M3 branches seen on the right.  3.  There is mild decreased enhancement of the visualized right internal carotid artery however it is patent.  4.  Question of subtle hypodensity in the right insular cortex region. No abnormal brain parenchymal enhancement is seen.    Carotid duplex 8/18/17   nl carotids, subclavians and vertebrals    CTA neck 8/18/17  1.  CT angiogram of the neck within normal limits.  2.  Thrombosed right M1 segment.    IR thrombectomy 8/18/17  1. Subtotal occlusion of the RIGHT M1 segment.  2. Successful RIGHT middle cerebral artery mechanical thrombectomy using Trevo stent.  3. Post thrombectomy arteriogram demonstrates patent lenticulostriate, RIGHT M1 and peripheral segments.    MRI brain 8/20/17  1.  Moderate sized RIGHT MCA territory acute ischemia involving the cortex and basal ganglia  2.  Flow void is present in the RIGHT MCA compatible with treatment effect  3.  No hemorrhage  4.  Mild white matter changes  5.  Mild atrophy    Echo 8/19/17  Agitated saline study was performed, no evidence of right to left   shunt.  Normal left ventricular size, wall thickness, and systolic function.  Left ventricular ejection fraction is visually estimated to be 60%.  Mildly dilated left atrium.  Moderate mitral regurgitation due to an eccentric jet.  Mild tricuspid regurgitation.  Right ventricular systolic pressure is estimated to be 35 mmHg.    Echo 6/15/18  Normal left ventricular systolic function.  Left ventricular ejection fraction is visually estimated to be 60%.  Myxomatous  changes of the mitral valve.  Prolapse of the anterior and posterior mitral leaflets.  Severe, eccentric mitral regurgitation.  Right heart pressures are normal.  Compared to the report of the study done 8/19/2017 severe mitral   regurgitation is now present, previously reported as moderate but a MR   ERO was not recorded.     Medical Decision Making:  Today's Assessment / Status / Plan:     1. HTN (hypertension), malignant     2. Dyslipidemia     3. History of CVA (cerebrovascular accident)     4. Non-rheumatic mitral regurgitation     5. Elevated blood pressure reading without diagnosis of hypertension  REFERRAL TO 24-HOUR BLOOD PRESSURE MONITORING     Patient Type: Secondary Prevention    Etiology of Established CVD if Present:   1) ischemic CVA, Moderate sized RIGHT MCA territory acute ischemia involving the cortex and basal ganglia    Lipid Management: Qualifies for Statin Therapy Based on 2013 ACC/AHA Guidelines: yes  Calculated 10-Year Risk of ASCVD: N/A  Currently on Statin: Yes  NLA Risk Category:   Very high:  Evidence of ASCVD   Tx threshold:  non-HDL-C >99, LDL-C >69  Tx goal: non-HDL-C <100,  LDL-C <70  (optional: apoB <80)  At goal:  Yes  Tx plan:   - continue high-intensity atorvastatin 80mg   - consider addition of zetia 10mg if needed     Blood Pressure Management:Goal: ACC/AHA (2017) goal <130/80  Home BP at goal:  Yes, but low BP with symptoms   Office BP at goal:  Yes  ? Masked HTN component   Recommend aggressive afterload and target home <125/75 consistently.   No indications of primary zev on prior labs   Echo shows no LVH and normal LVEF  Stopped triam/hctz, lisinopril in the past.   Stopped metoprolol due to lack of indication.   Plan:   - continue home BP monitoring, reviewed correct technique:  Yes   - contact our office if lows <90/55 consistently for med adjustments   - order 24h ABPM:  Ordered today   - continue HCTZ 25mg  - reduce amlodipine/olmesartan to 5/40mg due to low BP and  edema   - monitor lytes/GFR    Glycemic Status: Normal  - continue healthy diet     Anti-Platelet/Anti-Coagulant Tx: yes  - continue ASA 81mg daily, defer adjustments to neurology      Smoking: continued complete avoidance of all tobacco products      Physical Activity: advised to engage in walking >150min/week    Weight Management and Nutrition: Dietary plan was discussed with patient at this visit including plate method, DASH diet     Other:   1) hx of CVA  - continue antiplat and BP mgmt  - defer mgmt to neurology for ongoing care   - ED precautions for acute CVA symptoms reviewed   - continue PT and f/u on EEG results     2) s/p thrombectomy of R MCA  - defer repeat imaging to neurosurg or neurology     3) hx of severe MVR  - defer mgmt to cardiology for ongoing care - Dr. Meehan    Instructed to follow-up with PCP for remainder of adult medical needs: yes  We will partner with other providers in the management of established vascular disease and cardiometabolic risk factors.    Studies to Be Obtained:  None   Labs to Be Obtained:  CMP, lipid prior to next visit (ordered by PCP)    Follow up in: 6mo with me     Travon Jaramillo M.D.

## 2019-03-22 NOTE — PATIENT INSTRUCTIONS
1) I have ordered a 24 hour BP monitor - my medical assistnat will contact you, call [phone] if you do not get a call     2) continue all meds     3) see you in 6 months

## 2019-04-02 ENCOUNTER — APPOINTMENT (OUTPATIENT)
Dept: VASCULAR LAB | Facility: MEDICAL CENTER | Age: 74
End: 2019-04-02
Attending: INTERNAL MEDICINE
Payer: COMMERCIAL

## 2019-04-09 NOTE — ED NOTES
MD Moran at bedside.   
Pt arrived via gurney with Saba (RN) from CT. Per Saba pt was working out this AM, states she dropped her keys and when she went down to pick them up she was unable to. Symptoms started at approx 1030 AM. Pt has L sided facial asymmetry with slurred speech. Pt also has L sided arm and L leg weakness. Pt is able to move L arm but has L arm drift when placing arm in air. Pt also appears to be ignoring L side. Pt is a/o x4.  
Pt to IR with Saba, on monitoring equipment. Alteplase infusing.   
The Medication Reconciliation process has been PARTIALLY completed by interviewing the patient's family and calling [facility].    Allergies have been reviewed  Antibiotic use in 30 days - none per pharmacy    Home Pharmacy:  [facility]        
yaz

## 2019-04-24 ENCOUNTER — TELEPHONE (OUTPATIENT)
Dept: VASCULAR LAB | Facility: MEDICAL CENTER | Age: 74
End: 2019-04-24

## 2019-04-24 NOTE — TELEPHONE ENCOUNTER
Patient called in stating there her bp has been running a little low between 84/64 - 106/75, pulse has been running in the high 80's low 90's. Spoke with Radha PIERRE, patient to cut hctz in half, if sbp is still <100, stop hctz all together. Patient states understanding and is agreement with the plan.

## 2019-05-03 ENCOUNTER — APPOINTMENT (OUTPATIENT)
Dept: RADIOLOGY | Facility: MEDICAL CENTER | Age: 74
End: 2019-05-03
Attending: HOSPITALIST
Payer: COMMERCIAL

## 2019-05-03 ENCOUNTER — HOSPITAL ENCOUNTER (OUTPATIENT)
Facility: MEDICAL CENTER | Age: 74
End: 2019-05-04
Attending: EMERGENCY MEDICINE | Admitting: HOSPITALIST
Payer: COMMERCIAL

## 2019-05-03 ENCOUNTER — APPOINTMENT (OUTPATIENT)
Dept: CARDIOLOGY | Facility: MEDICAL CENTER | Age: 74
End: 2019-05-03
Attending: HOSPITALIST
Payer: COMMERCIAL

## 2019-05-03 DIAGNOSIS — I95.1 ORTHOSTATIC SYNCOPE: ICD-10-CM

## 2019-05-03 PROBLEM — R55 SYNCOPE: Status: ACTIVE | Noted: 2019-05-03

## 2019-05-03 PROBLEM — G47.30 SLEEP APNEA: Status: ACTIVE | Noted: 2019-02-26

## 2019-05-03 PROBLEM — E87.6 HYPOKALEMIA: Status: ACTIVE | Noted: 2019-05-03

## 2019-05-03 LAB
ALBUMIN SERPL BCP-MCNC: 3.5 G/DL (ref 3.2–4.9)
ALBUMIN/GLOB SERPL: 1.7 G/DL
ALP SERPL-CCNC: 40 U/L (ref 30–99)
ALT SERPL-CCNC: 13 U/L (ref 2–50)
ANION GAP SERPL CALC-SCNC: 10 MMOL/L (ref 0–11.9)
APPEARANCE UR: CLEAR
AST SERPL-CCNC: 20 U/L (ref 12–45)
BACTERIA #/AREA URNS HPF: NEGATIVE /HPF
BASOPHILS # BLD AUTO: 0.5 % (ref 0–1.8)
BASOPHILS # BLD: 0.03 K/UL (ref 0–0.12)
BILIRUB SERPL-MCNC: 0.3 MG/DL (ref 0.1–1.5)
BILIRUB UR QL STRIP.AUTO: NEGATIVE
BUN SERPL-MCNC: 24 MG/DL (ref 8–22)
CALCIUM SERPL-MCNC: 8.7 MG/DL (ref 8.5–10.5)
CHLORIDE SERPL-SCNC: 106 MMOL/L (ref 96–112)
CO2 SERPL-SCNC: 23 MMOL/L (ref 20–33)
COLOR UR: YELLOW
CREAT SERPL-MCNC: 0.85 MG/DL (ref 0.5–1.4)
EKG IMPRESSION: NORMAL
EOSINOPHIL # BLD AUTO: 0.07 K/UL (ref 0–0.51)
EOSINOPHIL NFR BLD: 1.3 % (ref 0–6.9)
EPI CELLS #/AREA URNS HPF: NEGATIVE /HPF
ERYTHROCYTE [DISTWIDTH] IN BLOOD BY AUTOMATED COUNT: 47.8 FL (ref 35.9–50)
GLOBULIN SER CALC-MCNC: 2.1 G/DL (ref 1.9–3.5)
GLUCOSE SERPL-MCNC: 132 MG/DL (ref 65–99)
GLUCOSE UR STRIP.AUTO-MCNC: NEGATIVE MG/DL
HCT VFR BLD AUTO: 34.3 % (ref 37–47)
HGB BLD-MCNC: 11.2 G/DL (ref 12–16)
HYALINE CASTS #/AREA URNS LPF: NORMAL /LPF
IMM GRANULOCYTES # BLD AUTO: 0.02 K/UL (ref 0–0.11)
IMM GRANULOCYTES NFR BLD AUTO: 0.4 % (ref 0–0.9)
KETONES UR STRIP.AUTO-MCNC: NEGATIVE MG/DL
LACTATE BLD-SCNC: 1.6 MMOL/L (ref 0.5–2)
LEUKOCYTE ESTERASE UR QL STRIP.AUTO: ABNORMAL
LYMPHOCYTES # BLD AUTO: 0.7 K/UL (ref 1–4.8)
LYMPHOCYTES NFR BLD: 12.8 % (ref 22–41)
MAGNESIUM SERPL-MCNC: 1.7 MG/DL (ref 1.5–2.5)
MCH RBC QN AUTO: 33.2 PG (ref 27–33)
MCHC RBC AUTO-ENTMCNC: 32.7 G/DL (ref 33.6–35)
MCV RBC AUTO: 101.8 FL (ref 81.4–97.8)
MICRO URNS: ABNORMAL
MONOCYTES # BLD AUTO: 0.32 K/UL (ref 0–0.85)
MONOCYTES NFR BLD AUTO: 5.9 % (ref 0–13.4)
NEUTROPHILS # BLD AUTO: 4.33 K/UL (ref 2–7.15)
NEUTROPHILS NFR BLD: 79.1 % (ref 44–72)
NITRITE UR QL STRIP.AUTO: NEGATIVE
NRBC # BLD AUTO: 0 K/UL
NRBC BLD-RTO: 0 /100 WBC
PH UR STRIP.AUTO: 8 [PH]
PLATELET # BLD AUTO: 201 K/UL (ref 164–446)
PMV BLD AUTO: 9.2 FL (ref 9–12.9)
POTASSIUM SERPL-SCNC: 3.4 MMOL/L (ref 3.6–5.5)
PROT SERPL-MCNC: 5.6 G/DL (ref 6–8.2)
PROT UR QL STRIP: NEGATIVE MG/DL
RBC # BLD AUTO: 3.37 M/UL (ref 4.2–5.4)
RBC # URNS HPF: NORMAL /HPF
RBC UR QL AUTO: NEGATIVE
SODIUM SERPL-SCNC: 139 MMOL/L (ref 135–145)
SP GR UR STRIP.AUTO: 1.01
TROPONIN I SERPL-MCNC: <0.01 NG/ML (ref 0–0.04)
TROPONIN I SERPL-MCNC: <0.01 NG/ML (ref 0–0.04)
UROBILINOGEN UR STRIP.AUTO-MCNC: 0.2 MG/DL
WBC # BLD AUTO: 5.5 K/UL (ref 4.8–10.8)
WBC #/AREA URNS HPF: NORMAL /HPF

## 2019-05-03 PROCEDURE — 70450 CT HEAD/BRAIN W/O DYE: CPT

## 2019-05-03 PROCEDURE — 93306 TTE W/DOPPLER COMPLETE: CPT

## 2019-05-03 PROCEDURE — 93005 ELECTROCARDIOGRAM TRACING: CPT

## 2019-05-03 PROCEDURE — A9270 NON-COVERED ITEM OR SERVICE: HCPCS | Performed by: HOSPITALIST

## 2019-05-03 PROCEDURE — 83605 ASSAY OF LACTIC ACID: CPT

## 2019-05-03 PROCEDURE — G0378 HOSPITAL OBSERVATION PER HR: HCPCS

## 2019-05-03 PROCEDURE — 81001 URINALYSIS AUTO W/SCOPE: CPT

## 2019-05-03 PROCEDURE — 85025 COMPLETE CBC W/AUTO DIFF WBC: CPT

## 2019-05-03 PROCEDURE — 93005 ELECTROCARDIOGRAM TRACING: CPT | Performed by: EMERGENCY MEDICINE

## 2019-05-03 PROCEDURE — 700102 HCHG RX REV CODE 250 W/ 637 OVERRIDE(OP): Performed by: HOSPITALIST

## 2019-05-03 PROCEDURE — 83735 ASSAY OF MAGNESIUM: CPT

## 2019-05-03 PROCEDURE — 99285 EMERGENCY DEPT VISIT HI MDM: CPT

## 2019-05-03 PROCEDURE — 84484 ASSAY OF TROPONIN QUANT: CPT

## 2019-05-03 PROCEDURE — 700105 HCHG RX REV CODE 258: Performed by: HOSPITALIST

## 2019-05-03 PROCEDURE — 99220 PR INITIAL OBSERVATION CARE,LEVL III: CPT | Performed by: HOSPITALIST

## 2019-05-03 PROCEDURE — 80053 COMPREHEN METABOLIC PANEL: CPT

## 2019-05-03 RX ORDER — ONDANSETRON 4 MG/1
4 TABLET, ORALLY DISINTEGRATING ORAL EVERY 4 HOURS PRN
Status: DISCONTINUED | OUTPATIENT
Start: 2019-05-03 | End: 2019-05-04 | Stop reason: HOSPADM

## 2019-05-03 RX ORDER — AMLODIPINE AND OLMESARTAN MEDOXOMIL 5; 40 MG/1; MG/1
1 TABLET ORAL DAILY
Status: ON HOLD | COMMUNITY
End: 2019-05-04

## 2019-05-03 RX ORDER — SODIUM CHLORIDE 9 MG/ML
INJECTION, SOLUTION INTRAVENOUS CONTINUOUS
Status: DISCONTINUED | OUTPATIENT
Start: 2019-05-03 | End: 2019-05-04 | Stop reason: HOSPADM

## 2019-05-03 RX ORDER — VALACYCLOVIR HYDROCHLORIDE 500 MG/1
500 TABLET, FILM COATED ORAL DAILY
Status: DISCONTINUED | OUTPATIENT
Start: 2019-05-04 | End: 2019-05-04 | Stop reason: HOSPADM

## 2019-05-03 RX ORDER — ATORVASTATIN CALCIUM 80 MG/1
80 TABLET, FILM COATED ORAL EVERY EVENING
Status: DISCONTINUED | OUTPATIENT
Start: 2019-05-03 | End: 2019-05-04 | Stop reason: HOSPADM

## 2019-05-03 RX ORDER — HYDROCHLOROTHIAZIDE 12.5 MG/1
12.5 TABLET ORAL DAILY
Status: DISCONTINUED | OUTPATIENT
Start: 2019-05-03 | End: 2019-05-03

## 2019-05-03 RX ORDER — ACETAMINOPHEN 325 MG/1
650 TABLET ORAL EVERY 6 HOURS PRN
Status: DISCONTINUED | OUTPATIENT
Start: 2019-05-03 | End: 2019-05-04 | Stop reason: HOSPADM

## 2019-05-03 RX ORDER — AMOXICILLIN 250 MG
2 CAPSULE ORAL 2 TIMES DAILY
Status: DISCONTINUED | OUTPATIENT
Start: 2019-05-04 | End: 2019-05-04 | Stop reason: HOSPADM

## 2019-05-03 RX ORDER — POTASSIUM CHLORIDE 20 MEQ/1
20 TABLET, EXTENDED RELEASE ORAL 2 TIMES DAILY
Status: DISCONTINUED | OUTPATIENT
Start: 2019-05-03 | End: 2019-05-04 | Stop reason: HOSPADM

## 2019-05-03 RX ORDER — HYDROCHLOROTHIAZIDE 25 MG/1
12.5-25 TABLET ORAL DAILY
Status: ON HOLD | COMMUNITY
End: 2019-05-04

## 2019-05-03 RX ORDER — POTASSIUM CHLORIDE 20 MEQ/1
10 TABLET, EXTENDED RELEASE ORAL DAILY
Status: DISCONTINUED | OUTPATIENT
Start: 2019-05-03 | End: 2019-05-03

## 2019-05-03 RX ORDER — AMLODIPINE AND OLMESARTAN MEDOXOMIL 5; 40 MG/1; MG/1
1 TABLET ORAL DAILY
Status: DISCONTINUED | OUTPATIENT
Start: 2019-05-03 | End: 2019-05-03

## 2019-05-03 RX ORDER — ASPIRIN 81 MG/1
81 TABLET, CHEWABLE ORAL DAILY
Status: DISCONTINUED | OUTPATIENT
Start: 2019-05-04 | End: 2019-05-04 | Stop reason: HOSPADM

## 2019-05-03 RX ORDER — OXYBUTYNIN CHLORIDE 5 MG/1
5 TABLET ORAL 3 TIMES DAILY
Status: DISCONTINUED | OUTPATIENT
Start: 2019-05-03 | End: 2019-05-03

## 2019-05-03 RX ORDER — POTASSIUM CHLORIDE 750 MG/1
CAPSULE, EXTENDED RELEASE ORAL
COMMUNITY
Start: 2019-03-04 | End: 2019-05-03

## 2019-05-03 RX ORDER — OXYBUTYNIN CHLORIDE 5 MG/1
5 TABLET, EXTENDED RELEASE ORAL DAILY
Status: DISCONTINUED | OUTPATIENT
Start: 2019-05-03 | End: 2019-05-04 | Stop reason: HOSPADM

## 2019-05-03 RX ORDER — POLYETHYLENE GLYCOL 3350 17 G/17G
1 POWDER, FOR SOLUTION ORAL
Status: DISCONTINUED | OUTPATIENT
Start: 2019-05-03 | End: 2019-05-04 | Stop reason: HOSPADM

## 2019-05-03 RX ORDER — ONDANSETRON 2 MG/ML
4 INJECTION INTRAMUSCULAR; INTRAVENOUS EVERY 4 HOURS PRN
Status: DISCONTINUED | OUTPATIENT
Start: 2019-05-03 | End: 2019-05-04 | Stop reason: HOSPADM

## 2019-05-03 RX ORDER — TELMISARTAN AND AMLODIPINE 5; 40 MG/1; MG/1
1 TABLET ORAL DAILY
Status: ON HOLD | COMMUNITY
End: 2019-05-03

## 2019-05-03 RX ORDER — BISACODYL 10 MG
10 SUPPOSITORY, RECTAL RECTAL
Status: DISCONTINUED | OUTPATIENT
Start: 2019-05-03 | End: 2019-05-04 | Stop reason: HOSPADM

## 2019-05-03 RX ORDER — ATORVASTATIN CALCIUM 80 MG/1
80 TABLET, FILM COATED ORAL NIGHTLY
COMMUNITY
End: 2019-06-19 | Stop reason: SDUPTHER

## 2019-05-03 RX ADMIN — SODIUM CHLORIDE: 9 INJECTION, SOLUTION INTRAVENOUS at 19:27

## 2019-05-03 RX ADMIN — POTASSIUM CHLORIDE 20 MEQ: 1500 TABLET, EXTENDED RELEASE ORAL at 19:26

## 2019-05-03 RX ADMIN — ATORVASTATIN CALCIUM 80 MG: 80 TABLET, FILM COATED ORAL at 19:26

## 2019-05-03 RX ADMIN — OXYBUTYNIN CHLORIDE 5 MG: 5 TABLET, EXTENDED RELEASE ORAL at 20:48

## 2019-05-03 ASSESSMENT — ENCOUNTER SYMPTOMS
FEVER: 0
CARDIOVASCULAR NEGATIVE: 1
DIAPHORESIS: 0
NAUSEA: 0
HEARTBURN: 0
BRUISES/BLEEDS EASILY: 0
NERVOUS/ANXIOUS: 0
SEIZURES: 0
MUSCULOSKELETAL NEGATIVE: 1
DOUBLE VISION: 0
HEMOPTYSIS: 0
EYES NEGATIVE: 1
DIARRHEA: 0
ABDOMINAL PAIN: 0
CONSTITUTIONAL NEGATIVE: 1
RESPIRATORY NEGATIVE: 1
PALPITATIONS: 0
DIZZINESS: 1
BLURRED VISION: 0
DIZZINESS: 0
HEADACHES: 0
PSYCHIATRIC NEGATIVE: 1
COUGH: 0
CHILLS: 0
CONSTIPATION: 0
WHEEZING: 0
GASTROINTESTINAL NEGATIVE: 1
FOCAL WEAKNESS: 0
VOMITING: 0
BLOOD IN STOOL: 0
LOSS OF CONSCIOUSNESS: 1

## 2019-05-03 ASSESSMENT — PATIENT HEALTH QUESTIONNAIRE - PHQ9
2. FEELING DOWN, DEPRESSED, IRRITABLE, OR HOPELESS: NOT AT ALL
1. LITTLE INTEREST OR PLEASURE IN DOING THINGS: NOT AT ALL
SUM OF ALL RESPONSES TO PHQ9 QUESTIONS 1 AND 2: 0

## 2019-05-03 ASSESSMENT — LIFESTYLE VARIABLES
DO YOU DRINK ALCOHOL: NO
ALCOHOL_USE: NO
EVER_SMOKED: NEVER

## 2019-05-03 ASSESSMENT — PAIN SCALES - WONG BAKER: WONGBAKER_NUMERICALRESPONSE: DOESN'T HURT AT ALL

## 2019-05-03 NOTE — ED PROVIDER NOTES
"ED Provider Note    Scribed for Oumar Reyes M.D. by Rosalie Tinoco. 5/3/2019, 2:09 PM.    Primary care provider: Bethany Crane M.D.  Means of arrival: Ambulance  History obtained from: [facility]  History limited by: None    CHIEF COMPLAINT  Chief Complaint   Patient presents with   • Syncope       HPI  Shaista Bocanegra is a 74 y.o. female who presents to the Emergency Department for evaluation of a syncopal episode onset this afternoon. The patient was working at her computer at the time of onset. The patient gave blood this morning and \"worked out more than normal\". The patient notes she was told her blood pressure was too low to give blood but she \"ran around until she could get it high enough to give blood\". The patient has a history of hypertension but has been taking 1/2 dose of her prescribed hypertension medication because her blood pressure has been running low recently.  The patient affirms additional symptoms of leg cramping and  Mild lightheadedness. She notes she has had problems with low potassium in the past. The patient was given 600 mL of normal saline by EMS en route to the hospital. The patient denies any headache, chest pain, nausea, vomiting, vision changes, diarrhea, blood in her stool, dysuria, fever, chills, nasal congestion, or abdominal pain.    REVIEW OF SYSTEMS  Review of Systems   Constitutional: Negative for fever.   HENT: Negative for congestion.    Eyes: Negative for blurred vision.   Cardiovascular: Negative for chest pain.   Gastrointestinal: Negative for abdominal pain, blood in stool, nausea and vomiting.   Genitourinary: Negative for dysuria.   Skin: Negative for rash.   Neurological: Positive for dizziness.        Cramping in lower extremities. Syncope     All other systems negative.     PAST MEDICAL HISTORY   has a past medical history of Benign essential HTN (3/19/2012); Breast cancer (HCC); Cancer (HCC); Chest tightness or pressure (3/19/2012); High risk medication use " (3/19/2012); Hypercholesterolemia (3/19/2012); MVP (mitral valve prolapse) (3/19/2012); Renal insufficiency (3/19/2012); Stroke (HCC); and Valvular heart disease.    SURGICAL HISTORY   has a past surgical history that includes cataract phaco with iol (1/26/2009); cataract phaco with iol (3/16/2009); breast biopsy; radiation therapy plan simple; chemotherapy, unspecified procedure; and lumpectomy.    SOCIAL HISTORY  Social History   Substance Use Topics   • Smoking status: Never Smoker   • Smokeless tobacco: Never Used   • Alcohol use Yes      Comment: 3 glasses of wine      History   Drug Use No       FAMILY HISTORY  Family History   Problem Relation Age of Onset   • Cancer Mother    • Heart Failure Neg Hx    • Heart Disease Neg Hx        CURRENT MEDICATIONS    Current Outpatient Prescriptions:   •  VESICARE 5 MG tablet, , Disp: , Rfl:   •  oxybutynin (DITROPAN) 5 MG Tab, Take 5 mg by mouth 3 times a day., Disp: , Rfl:   •  Amlodipine-Olmesartan 5-40 MG Tab, Take 1 Tab by mouth every day for 360 days., Disp: 90 Tab, Rfl: 3  •  hydroCHLOROthiazide (HYDRODIURIL) 25 MG Tab, Take 1 Tab by mouth every day for 360 days., Disp: 90 Tab, Rfl: 3  •  atorvastatin (LIPITOR) 80 MG tablet, Take 1 Tab by mouth every evening., Disp: 90 Tab, Rfl: 2  •  Biotin 00783 MCG Tab, Take  by mouth., Disp: , Rfl:   •  Cholecalciferol (VITAMIN D3 PO), Take 2,000 Units by mouth every day., Disp: , Rfl:   •  Cyanocobalamin (VITAMIN B-12 PO), Take 25,000 mcg by mouth every day., Disp: , Rfl:   •  potassium chloride SA (K-DUR) 10 MEQ Tab CR, Take 10 mEq by mouth every day., Disp: , Rfl:   •  aspirin (ASA) 81 MG Chew Tab chewable tablet, Take 1 Tab by mouth every day., Disp: 14 Tab, Rfl: 1  •  valacyclovir (VALTREX) 500 MG Tab, Take 500 mg by mouth every day., Disp: , Rfl:   •  Multiple Vitamins-Minerals (ICAPS AREDS 2 PO), Take 1 Tab by mouth every day., Disp: , Rfl:   •  calcium carbonate (OS-CRISTOFER 500) 500 MG Tab, Take 1,000 mg by mouth every  "day., Disp: , Rfl:   •  Multiple Vitamin (MULTIVITAMINS PO), Take 1 Tab by mouth every day., Disp: , Rfl:     ALLERGIES  No Active Allergies    PHYSICAL EXAM  VITAL SIGNS: BP (!) 85/47   Pulse 79   Resp 16   Ht 1.702 m (5' 7\")   Wt 65.3 kg (144 lb)   SpO2 98%   BMI 22.55 kg/m²     Constitutional:   No acute distress  HENT:  Moist mucous membranes, non traumatic   Eyes:  No conjunctivitis or icterus  Neck:  trachea is midline, no palpable thyroid  Lymphatic:  No cervical lymphadenopathy  Cardiovascular: Regular rate and rhythm, no murmurs  Thorax & Lungs:  Normal breath sounds, no rhonchi, no wheezes  Abdomen:  Soft, Non-tender  Skin:.  no rash  Back: Non-tender, no CVA tenderness  Extremities:   no edema  Vascular:  symmetric radial pulse  Neurologic:  Normal gross motor    LABS  Labs Reviewed   COMP METABOLIC PANEL - Abnormal; Notable for the following:        Result Value    Potassium 3.4 (*)     Glucose 132 (*)     Bun 24 (*)     Total Protein 5.6 (*)     All other components within normal limits   CBC WITH DIFFERENTIAL - Abnormal; Notable for the following:     RBC 3.37 (*)     Hemoglobin 11.2 (*)     Hematocrit 34.3 (*)     .8 (*)     MCH 33.2 (*)     MCHC 32.7 (*)     Neutrophils-Polys 79.10 (*)     Lymphocytes 12.80 (*)     Lymphs (Absolute) 0.70 (*)     All other components within normal limits   TROPONIN   LACTIC ACID   ESTIMATED GFR   URINALYSIS,CULTURE IF INDICATED   MAGNESIUM   TROPONIN   TROPONIN     All labs reviewed by me.    EKG Interpretation  Interpreted by me  Normal Sinus Rhythm. Rate 61  Normal corrective QTc intervals  Normal QRS  Normal Axis  Interpretation: Normal EKG    COURSE & MEDICAL DECISION MAKING  Pertinent Labs & Imaging studies reviewed. (See chart for details)    2:09 PM - Patient seen and examined at bedside.  Ordered Urinalysis culture, lactic acid, CMP, Troponin, CBC, and EKG to evaluate her symptoms. The differential diagnoses include but are not limited to: " hypotensive so rule out sepsis, heart failure, hemorrhage.     3:20 PM - I reevaluated the patient at bedside. Patient reports her lightheadedness has improved. The patient informs me she is on multiple blood pressure medications but is unable to recall the names of the medications.     3:28 PM - Paged Dr Renny Jaramillo (patient's primary car physician) for consult.    3:42 PM - I spoke with Dr Jaramillo who informs me the patient is on amlodipine amnisartin and hydrochlorothiazide. He requests the patient stop taking amlodipine and hydrochlorothiazide. He agrees to see the patient in his office on Monday at 3:00 PM to discuss adjusting her medications.     3:55 PM - I spoke with the patient and updated her on her evaluation results as outlined above. I discussed with the patient that her syncopal episode is likely due to a combination of her giving blood and and being on too much blood pressure medication. I instructed the patient to follow up with Dr Jaramillo on Monday at 3:00 PM as she needs to have her blood pressure medication adjusted. I informed the patient of my plan to admit today given the patients age, hypotension, and syncope. Patient verbalizes she does not feel safe to go home at this time. Patient verbalizes understanding and agreement with my plan for admission and follow up.     4:28 PM - I spoke with Dr Zhu ([facility] hospitalist) who accepts the patient for admission.       DISPOSITION:  Patient will be admitted to Dr Zhu in stable condition.     FOLLOW UP:  Please follow up with primary care physician Dr Renny Jaramillo on 5/6/2019 at 3:00 PM and bring all your prescribed medications with you to the appointment. Stop taking your prescribed metoprolol, amlodipine, and hydrochlorothiazide.    OUTPATIENT MEDICATIONS:  New Prescriptions    No medications on file         FINAL IMPRESSION  1. Orthostatic syncope          Rosalie HALEY (Scribe), am scribing for, and in the presence of, Oumar PIERCE  SULEIMAN Reyes.    Electronically signed by: Rosalie Tinoco (Scribe), 5/3/2019    IOumar M.D. personally performed the services described in this documentation, as scribed by Rosalie Tinoco in my presence, and it is both accurate and complete.    The note accurately reflects work and decisions made by me.  Oumra Reyes  5/3/2019  5:02 PM

## 2019-05-03 NOTE — ED TRIAGE NOTES
Pt biba for syncopal episode, per pt just decreased HCTZ due to blood pressure issues, worked out this am, gave a unit or so of blood at the blood center, per pt they originally told her that she could not give blood due to low bloodpressure, pt did a few things and came back, gave the blood then went to work, was picking up tiles from the floor, then sat down in the seat, pt had a syncopal episode, ems states pt initial b.p 66/52 , pt received 600ns, pt b/p now 104/65.  Pt is alert andoriented

## 2019-05-04 ENCOUNTER — PATIENT OUTREACH (OUTPATIENT)
Dept: HEALTH INFORMATION MANAGEMENT | Facility: OTHER | Age: 74
End: 2019-05-04

## 2019-05-04 VITALS
HEIGHT: 68 IN | HEART RATE: 71 BPM | TEMPERATURE: 99 F | SYSTOLIC BLOOD PRESSURE: 108 MMHG | OXYGEN SATURATION: 96 % | RESPIRATION RATE: 16 BRPM | WEIGHT: 148.81 LBS | DIASTOLIC BLOOD PRESSURE: 59 MMHG | BODY MASS INDEX: 22.55 KG/M2

## 2019-05-04 PROBLEM — R55 SYNCOPE: Status: RESOLVED | Noted: 2019-05-03 | Resolved: 2019-05-04

## 2019-05-04 PROBLEM — I63.9 CEREBROVASCULAR ACCIDENT (CVA) DUE TO VASCULAR OCCLUSION (HCC): Status: RESOLVED | Noted: 2018-08-15 | Resolved: 2019-05-04

## 2019-05-04 PROBLEM — I10 HTN (HYPERTENSION), MALIGNANT: Status: RESOLVED | Noted: 2019-02-13 | Resolved: 2019-05-04

## 2019-05-04 PROBLEM — E87.6 HYPOKALEMIA: Status: RESOLVED | Noted: 2019-05-03 | Resolved: 2019-05-04

## 2019-05-04 LAB
CORTIS SERPL-MCNC: 16.9 UG/DL (ref 0–23)
EKG IMPRESSION: NORMAL
LV EJECT FRACT  99904: 55
LV EJECT FRACT MOD 2C 99903: 66.06
LV EJECT FRACT MOD 4C 99902: 50.09
LV EJECT FRACT MOD BP 99901: 60.29
TROPONIN I SERPL-MCNC: <0.01 NG/ML (ref 0–0.04)

## 2019-05-04 PROCEDURE — 93010 ELECTROCARDIOGRAM REPORT: CPT | Performed by: INTERNAL MEDICINE

## 2019-05-04 PROCEDURE — 82533 TOTAL CORTISOL: CPT

## 2019-05-04 PROCEDURE — 84484 ASSAY OF TROPONIN QUANT: CPT

## 2019-05-04 PROCEDURE — A9270 NON-COVERED ITEM OR SERVICE: HCPCS | Performed by: HOSPITALIST

## 2019-05-04 PROCEDURE — 93306 TTE W/DOPPLER COMPLETE: CPT | Mod: 26 | Performed by: INTERNAL MEDICINE

## 2019-05-04 PROCEDURE — 93005 ELECTROCARDIOGRAM TRACING: CPT | Performed by: HOSPITALIST

## 2019-05-04 PROCEDURE — 700105 HCHG RX REV CODE 258: Performed by: HOSPITALIST

## 2019-05-04 PROCEDURE — 700102 HCHG RX REV CODE 250 W/ 637 OVERRIDE(OP): Performed by: HOSPITALIST

## 2019-05-04 PROCEDURE — G0378 HOSPITAL OBSERVATION PER HR: HCPCS

## 2019-05-04 PROCEDURE — 99217 PR OBSERVATION CARE DISCHARGE: CPT | Performed by: INTERNAL MEDICINE

## 2019-05-04 RX ADMIN — SODIUM CHLORIDE: 9 INJECTION, SOLUTION INTRAVENOUS at 05:13

## 2019-05-04 RX ADMIN — ASPIRIN 81 MG 81 MG: 81 TABLET ORAL at 05:09

## 2019-05-04 RX ADMIN — VALACYCLOVIR HYDROCHLORIDE 500 MG: 500 TABLET, FILM COATED ORAL at 05:09

## 2019-05-04 RX ADMIN — POTASSIUM CHLORIDE 20 MEQ: 1500 TABLET, EXTENDED RELEASE ORAL at 05:09

## 2019-05-04 ASSESSMENT — PATIENT HEALTH QUESTIONNAIRE - PHQ9
SUM OF ALL RESPONSES TO PHQ9 QUESTIONS 1 AND 2: 0
2. FEELING DOWN, DEPRESSED, IRRITABLE, OR HOPELESS: NOT AT ALL
1. LITTLE INTEREST OR PLEASURE IN DOING THINGS: NOT AT ALL

## 2019-05-04 ASSESSMENT — PAIN SCALES - WONG BAKER
WONGBAKER_NUMERICALRESPONSE: DOESN'T HURT AT ALL
WONGBAKER_NUMERICALRESPONSE: DOESN'T HURT AT ALL

## 2019-05-04 NOTE — ED NOTES
Med rec complete per pt at bedside with list (returned)  Allergies updated   No oral ABX within last 30 days   Pt states she was taking 1/2 her HCTZ pill for the last 5 days but originally on 25mg daily

## 2019-05-04 NOTE — ASSESSMENT & PLAN NOTE
Patient today had a very active day and donated blood.  Before the patient donated blood she was already dehydrated and hypotensive.  After donating her blood she became even more hypotensive and passed out twice.  The patient at this point will be admitted to the CDU she will evaluate with an cardiogram, a CT of the head and we will check a.m. cortisol levels.  In the meantime she will be hydrated and if she stays stable with her blood pressures she will be road tested and she can probably discharge home tomorrow morning.

## 2019-05-04 NOTE — PROGRESS NOTES
Reading a book, no distress, sr with 1st degree av block with pvc. Call light within reach. To continue to monitor.

## 2019-05-04 NOTE — PROGRESS NOTES
PT ARRIVED TO THE  UNIT ORIENTED TO ROOM DISCUSSED PLAN OF CARE FAMILY AT BEDSIDE BED IN LOW POSITION  CALL LIGHT WITHIN REACH NURSING HISTORY AND ASSESSMENT DONE CONDITION STABLE

## 2019-05-04 NOTE — DISCHARGE INSTRUCTIONS
Discharge Instructions    Discharged to home by car with friend. Discharged via walking, hospital escort: Yes.  Special equipment needed: Not Applicable    Be sure to schedule a follow-up appointment with your primary care doctor or any specialists as instructed.     Discharge Plan:   Diet Plan: Discussed  Activity Level: Discussed  Confirmed Follow up Appointment: Patient to Call and Schedule Appointment  Confirmed Symptoms Management: Discussed  Medication Reconciliation Updated: Yes  Influenza Vaccine Indication: Not indicated: Previously immunized this influenza season and > 8 years of age    I understand that a diet low in cholesterol, fat, and sodium is recommended for good health. Unless I have been given specific instructions below for another diet, I accept this instruction as my diet prescription.   Other diet: low fat    Special Instructions: None    · Is patient discharged on Warfarin / Coumadin?   No     Depression / Suicide Risk    As you are discharged from this [facility] facility, it is important to learn how to keep safe from harming yourself.    Recognize the warning signs:  · Abrupt changes in personality, positive or negative- including increase in energy   · Giving away possessions  · Change in eating patterns- significant weight changes-  positive or negative  · Change in sleeping patterns- unable to sleep or sleeping all the time   · Unwillingness or inability to communicate  · Depression  · Unusual sadness, discouragement and loneliness  · Talk of wanting to die  · Neglect of personal appearance   · Rebelliousness- reckless behavior  · Withdrawal from people/activities they love  · Confusion- inability to concentrate     If you or a loved one observes any of these behaviors or has concerns about self-harm, here's what you can do:  · Talk about it- your feelings and reasons for harming yourself  · Remove any means that you might use to hurt yourself (examples: pills, rope, extension cords,  firearm)  · Get professional help from the community (Mental Health, Substance Abuse, psychological counseling)  · Do not be alone:Call your Safe Contact- someone whom you trust who will be there for you.  · Call your local CRISIS HOTLINE 951-9509 or 196-578-2580  · Call your local Children's Mobile Crisis Response Team [place] (904) 595-8311 or www.[employer]  · Call the toll free National Suicide Prevention Hotlines   · National Suicide Prevention Lifeline 776-546-ZTHJ (2591)  · National Hope Line Network 800-SUICIDE (919-7583)

## 2019-05-04 NOTE — ASSESSMENT & PLAN NOTE
Continue with blood pressure management optimization for now the patient's blood pressure will be monitored and we have taken her off of her blood pressure management.

## 2019-05-04 NOTE — CARE PLAN
Problem: Knowledge Deficit  Goal: Patient/Significant other demonstrates understanding of disease process, treatment plan, medications and discharge instructions    Intervention: Learning assessment and teaching  oriented to room discussed plan of care family at bedside

## 2019-05-04 NOTE — H&P
Hospital Medicine History & Physical Note    Date of Service  5/3/2019    Primary Care Physician  Bethany Crane M.D.    Consultants  None    Code Status  Full code    Chief Complaint  Syncope    History of Presenting Illness  74 y.o. female who presented 5/3/2019 with syncope x2 today.  Patient states that about 6 this morning she went to the gym and worked out.  Then after that she went to give blood and she donated blood from about 8 30-9 30.  After that she went to the nursery and from the nursery then she picked up some flowers and then she went to her office.  Around noon the patient then passed out.  Paramedics got there she was hypotensive she went to the bathroom and passed out again.  Paramedics then brought her into the hospital she received 2 L of fluid in the ER and she is going to continue on fluid resuscitation we will check an echocardiogram as well as a.m. cortisol level and a CT of the head.  Patient at this point will be stabilized and most likely will be able to go home tomorrow.    Review of Systems  Review of Systems   Constitutional: Negative.  Negative for chills, diaphoresis and fever.   HENT: Negative.    Eyes: Negative.  Negative for double vision.   Respiratory: Negative.  Negative for cough, hemoptysis and wheezing.    Cardiovascular: Negative.  Negative for chest pain, palpitations and leg swelling.   Gastrointestinal: Negative.  Negative for abdominal pain, blood in stool, constipation, diarrhea, heartburn, nausea and vomiting.   Genitourinary: Negative.  Negative for frequency, hematuria and urgency.   Musculoskeletal: Negative.  Negative for joint pain.   Skin: Negative.  Negative for itching and rash.   Neurological: Positive for loss of consciousness. Negative for dizziness, focal weakness, seizures and headaches.   Endo/Heme/Allergies: Negative.  Does not bruise/bleed easily.   Psychiatric/Behavioral: Negative.  Negative for suicidal ideas. The patient is not  nervous/anxious.    All other systems reviewed and are negative.      Past Medical History   has a past medical history of Benign essential HTN (3/19/2012); Breast cancer (HCC); Cancer (HCC); Chest tightness or pressure (3/19/2012); High risk medication use (3/19/2012); Hypercholesterolemia (3/19/2012); MVP (mitral valve prolapse) (3/19/2012); Renal insufficiency (3/19/2012); Stroke (HCC); and Valvular heart disease.    Surgical History   has a past surgical history that includes cataract phaco with iol (1/26/2009); cataract phaco with iol (3/16/2009); breast biopsy; pr radiation therapy plan simple; pr chemotherapy, unspecified procedure; and lumpectomy.     Family History  family history includes Cancer in her mother.     Social History   reports that she has never smoked. She has never used smokeless tobacco. She reports that she drinks alcohol. She reports that she does not use drugs.    Allergies  No Active Allergies    Medications  Prior to Admission Medications   Prescriptions Last Dose Informant Patient Reported? Taking?   Biotin 25859 MCG Tab 5/3/2019 at AM Patient Yes No   Sig: Take 1 Tab by mouth every day.   Cholecalciferol (VITAMIN D3 PO) 5/3/2019 at AM Patient Yes No   Sig: Take 2,000 Units by mouth every day.   Cyanocobalamin (VITAMIN B-12 PO) 5/3/2019 at AM Patient Yes No   Sig: Take 25,000 mcg by mouth every day.   Mirabegron ER (MYRBETRIQ) 25 MG TABLET SR 24 HR 5/3/2019 at AM Patient Yes Yes   Sig: Take 1 Tab by mouth every day.   Multiple Vitamin (MULTIVITAMINS PO) 5/3/2019 at AM Patient Yes No   Sig: Take 1 Tab by mouth every day.   Multiple Vitamins-Minerals (ICAPS AREDS 2 PO) 5/3/2019 at AM Patient Yes No   Sig: Take 2 Tabs by mouth every day.   Telmisartan-Amlodipine 40-5 MG Tab 5/3/2019 at AM Patient Yes Yes   Sig: Take 1 Tab by mouth every day.   aspirin (ASA) 81 MG Chew Tab chewable tablet 5/3/2019 at AM Patient No No   Sig: Take 1 Tab by mouth every day.   atorvastatin (LIPITOR) 80 MG  tablet 5/2/2019 at PM Patient Yes Yes   Sig: Take 80 mg by mouth every evening.   calcium carbonate (OS-CRISTOFER 500) 500 MG Tab 5/3/2019 at AM Patient Yes No   Sig: Take 1,000 mg by mouth every day.   hydroCHLOROthiazide (HYDRODIURIL) 25 MG Tab 5/3/2019 at AM Patient Yes Yes   Sig: Take 12.5-25 mg by mouth every day.   potassium chloride SA (K-DUR) 10 MEQ Tab CR 5/3/2019 at AM Patient Yes No   Sig: Take 10 mEq by mouth every day.   valacyclovir (VALTREX) 500 MG Tab PRN at PRN Patient Yes No   Sig: Take 500 mg by mouth as needed.      Facility-Administered Medications: None       Physical Exam  Temp:  [36.6 °C (97.8 °F)] 36.6 °C (97.8 °F)  Pulse:  [57-79] 72  Resp:  [12-18] 18  BP: ()/(44-80) 133/62  SpO2:  [94 %-100 %] 97 %    Physical Exam   Constitutional: She is oriented to person, place, and time. She appears well-developed and well-nourished.   HENT:   Head: Normocephalic and atraumatic.   Right Ear: External ear normal.   Left Ear: External ear normal.   Nose: Nose normal.   Mouth/Throat: Oropharynx is clear and moist.   Eyes: Pupils are equal, round, and reactive to light. Conjunctivae and EOM are normal.   Neck: Normal range of motion. Neck supple. No JVD present. No thyromegaly present.   Cardiovascular: Normal rate and regular rhythm.    Murmur heard.  Pulmonary/Chest: She has no wheezes. She has no rales. She exhibits no tenderness.   Abdominal: She exhibits no distension and no mass. There is no tenderness. There is no rebound and no guarding.   Musculoskeletal: Normal range of motion. She exhibits no edema or tenderness.   Lymphadenopathy:     She has no cervical adenopathy.   Neurological: She is alert and oriented to person, place, and time. She has normal reflexes. No cranial nerve deficit.   Skin: Skin is warm and dry. No rash noted. No erythema.   Psychiatric: She has a normal mood and affect.   Nursing note and vitals reviewed.      Laboratory:  Recent Labs      05/03/19   1506   WBC  5.5   RBC   3.37*   HEMOGLOBIN  11.2*   HEMATOCRIT  34.3*   MCV  101.8*   MCH  33.2*   MCHC  32.7*   RDW  47.8   PLATELETCT  201   MPV  9.2     Recent Labs      05/03/19   1414   SODIUM  139   POTASSIUM  3.4*   CHLORIDE  106   CO2  23   GLUCOSE  132*   BUN  24*   CREATININE  0.85   CALCIUM  8.7     Recent Labs      05/03/19   1414   ALTSGPT  13   ASTSGOT  20   ALKPHOSPHAT  40   TBILIRUBIN  0.3   GLUCOSE  132*                 Recent Labs      05/03/19   1414   TROPONINI  <0.01       Urinalysis:    No results found     Imaging:  EC-ECHOCARDIOGRAM COMPLETE W/ CONT    (Results Pending)   CT-HEAD W/O    (Results Pending)         Assessment/Plan:  I anticipate this patient is appropriate for observation status at this time.    * Syncope   Assessment & Plan    Patient today had a very active day and donated blood.  Before the patient donated blood she was already dehydrated and hypotensive.  After donating her blood she became even more hypotensive and passed out twice.  The patient at this point will be admitted to the CDU she will evaluate with an cardiogram, a CT of the head and we will check a.m. cortisol levels.  In the meantime she will be hydrated and if she stays stable with her blood pressures she will be road tested and she can probably discharge home tomorrow morning.     HTN (hypertension), malignant- (present on admission)   Assessment & Plan    Continue with blood pressure management optimization for now the patient's blood pressure will be monitored and we have taken her off of her blood pressure management.     Cerebrovascular accident (CVA) due to vascular occlusion (HCC)- (present on admission)   Assessment & Plan    Has a history of stroke continue with Lipitor and aspirin.  Monitor neurological status.     Severe mitral regurgitation- (present on admission)   Assessment & Plan    Mitral regurgitation at this point appears mild but we will get an echocardiogram to evaluate the intensity of the mitral regurgitation.   She has seen Dr. Carey of cardiology who was not recommended mitral valve clipping so far.     Hypokalemia   Assessment & Plan    Potassium supplementation will be ordered.     Sleep apnea- (present on admission)   Assessment & Plan    Continue home BiPAP settings     Dyslipidemia- (present on admission)   Assessment & Plan    Continue with low-fat low-cholesterol diet monitor his fasting lipid panel         VTE prophylaxis: None

## 2019-05-06 ENCOUNTER — OFFICE VISIT (OUTPATIENT)
Dept: VASCULAR LAB | Facility: MEDICAL CENTER | Age: 74
End: 2019-05-06
Attending: FAMILY MEDICINE
Payer: COMMERCIAL

## 2019-05-06 VITALS
BODY MASS INDEX: 23.07 KG/M2 | HEIGHT: 68 IN | WEIGHT: 152.2 LBS | DIASTOLIC BLOOD PRESSURE: 58 MMHG | SYSTOLIC BLOOD PRESSURE: 117 MMHG | HEART RATE: 66 BPM

## 2019-05-06 DIAGNOSIS — E78.5 DYSLIPIDEMIA: ICD-10-CM

## 2019-05-06 DIAGNOSIS — E87.6 HYPOKALEMIA: ICD-10-CM

## 2019-05-06 DIAGNOSIS — R55 SYNCOPE, UNSPECIFIED SYNCOPE TYPE: ICD-10-CM

## 2019-05-06 DIAGNOSIS — I34.0 SEVERE MITRAL REGURGITATION: ICD-10-CM

## 2019-05-06 PROCEDURE — 99214 OFFICE O/P EST MOD 30 MIN: CPT | Performed by: FAMILY MEDICINE

## 2019-05-06 PROCEDURE — 99212 OFFICE O/P EST SF 10 MIN: CPT | Performed by: FAMILY MEDICINE

## 2019-05-06 ASSESSMENT — ENCOUNTER SYMPTOMS
BLOOD IN STOOL: 0
HEADACHES: 0
ORTHOPNEA: 0
FEVER: 0
TREMORS: 0
NERVOUS/ANXIOUS: 0
SORE THROAT: 0
COUGH: 0
PALPITATIONS: 0
SEIZURES: 0
FOCAL WEAKNESS: 0
DOUBLE VISION: 0
DIARRHEA: 0
DEPRESSION: 0
VOMITING: 0
HEMOPTYSIS: 0
BRUISES/BLEEDS EASILY: 0
MYALGIAS: 0
DIZZINESS: 0
SPEECH CHANGE: 1
INSOMNIA: 0
ABDOMINAL PAIN: 0
WHEEZING: 0
CHILLS: 0
WEAKNESS: 0
SHORTNESS OF BREATH: 0
BLURRED VISION: 0
NAUSEA: 0

## 2019-05-06 NOTE — PROGRESS NOTES
FOLLOW-UP VASCULAR MED VISIT  Subjective:   Shaista Bocanegra is a 74 y.o. female who presents today 3/22/19 for   Chief Complaint   Patient presents with   • Follow-Up     Syncope   Initially referred by Dr. Daniels (cardiology) for eval and mgmt of HTN in context of MVR and hx of CVA.     HPI:    Current HTN concerns:  Seen at [facility] ED on 5/3/19 for syncopal episode.  Pt reports has been having low BPs since 4/24/19 and had been taking reduced HCTZ and then advised to stop via phone message by APRN.  Reports barely met BP parameters to give blood.  Reports at work her assistant noted her slump over with eyes open.  Felt partial LOC 10 sec.  Had called 911, DEANGELO brought in to ED.    ADRs:  Had mild swelling, improved with reduce amlodipine.  Syncopal episode occurred after working out hard and then giving blood and then another work-out.  Had little food intake preceding.  Normal CT head and echo.  Labs stable with mild low K 3.4 and hgb 11.2.    HTN sx:  Reports mild No current blurred or changed vision, chest pain, shortness of breath, headache, nausea, dizziness/vertigo.   Reports some face recognition and proper nouns with memory loss.    Home BP log: mid 120s/80s  Adherence to current HTN meds: compliant all of the time tolerating all     Antiplatelet/anticoagulation: Yes, Details: ASA 81mg      Hyperlipidemia:  Stable, no current concerns  Current treatment: High intensity statin   atorva 80mg   Myalgias? No  Other adverse drug reactions? No  Lipid profile:   LDL (mg/dL)   Date Value   11/09/2018 63   08/19/2017 65   08/07/2013 85     HDL (mg/dL)   Date Value   11/09/2018 69   08/19/2017 54   08/07/2013 57     Pertinent HTN hx:   Age at HTN dx:  At least 20 years   (if female, any hx of pregnancy-related HTN): none reported  Past HTN medications:  As noted takes triam/hctz with travel only   HTN crises:  CVA 8/2017, o/w negative   ASCVD risk factors:     DM: No   CKD:  No  Lifestyle factors:     High salt:  No   EtOH: No  Interfering substances:     NSAIDs: No    Decongestants. No    ASCVD calculator (contraindicated if ASCVD+, [id number])   10yr-risk calc: n/a     Clinical evidence of ASCVD:     1) hx of MI/ACS:  No   2) coronary or other revascularization procedure: No   3) TIA/ischemic CVA: Yes, Details: hx of ischemic CVA 8/2017, R MCA   4) PAD (including MICAH <0.9): No   5) documented (CAD, Renal athero, AA due to athero, carotid plaque >50% stenosis: No    Major RFs:     1) Age (Men>44, women>54):  yes   2) Fhx early CAD (<55 men, <65 women):  no   3) cigarette smoking:  No   4) high BP >139/89 or on tx: yes   5) Low HDL-C (<40 men, <50 women):  no    Social History   Substance Use Topics   • Smoking status: Never Smoker   • Smokeless tobacco: Never Used   • Alcohol use Yes      Comment: 3 glasses of wine     Outpatient Encounter Prescriptions as of 5/6/2019   Medication Sig Dispense Refill   • CALCIUM-VITAMIN D PO Take  by mouth.     • atorvastatin (LIPITOR) 80 MG tablet Take 80 mg by mouth every evening.     • Mirabegron ER (MYRBETRIQ) 25 MG TABLET SR 24 HR Take 1 Tab by mouth every day.     • Biotin 08280 MCG Tab Take 1 Tab by mouth every day.     • potassium chloride SA (K-DUR) 10 MEQ Tab CR Take 10 mEq by mouth every day.     • aspirin (ASA) 81 MG Chew Tab chewable tablet Take 1 Tab by mouth every day. 14 Tab 1   • valacyclovir (VALTREX) 500 MG Tab Take 500 mg by mouth every day.     • Multiple Vitamin (MULTIVITAMINS PO) Take 1 Tab by mouth every day.     • Cholecalciferol (VITAMIN D3 PO) Take 2,000 Units by mouth every day.     • Multiple Vitamins-Minerals (ICAPS AREDS 2 PO) Take 2 Tabs by mouth every day.     • calcium carbonate (OS-CRISTOFER 500) 500 MG Tab Take 1,000 mg by mouth every day.       No facility-administered encounter medications on file as of 5/6/2019.      No Known Allergies  DIET AND EXERCISE:  Weight Change: none   BMI Readings from Last 4 Encounters:   05/06/19 23.14 kg/m²   05/03/19 22.63 kg/m²  "  03/22/19 23.49 kg/m²   02/26/19 23.37 kg/m²      Diet: low fat, low sodium, reports some indiscretions over last 2 months   Exercise: moderate regular exercise program     Review of Systems   Constitutional: Negative for chills, fever and malaise/fatigue.   HENT: Negative for nosebleeds, sore throat and tinnitus.    Eyes: Negative for blurred vision and double vision.   Respiratory: Negative for cough, hemoptysis, shortness of breath and wheezing.    Cardiovascular: Negative for chest pain, palpitations, orthopnea and leg swelling.   Gastrointestinal: Negative for abdominal pain, blood in stool, diarrhea, melena, nausea and vomiting.   Genitourinary: Negative for hematuria.   Musculoskeletal: Negative for joint pain and myalgias.   Skin: Negative for itching and rash.   Neurological: Positive for speech change (word finding issues ). Negative for dizziness, tremors, focal weakness, seizures, weakness and headaches.   Endo/Heme/Allergies: Does not bruise/bleed easily.   Psychiatric/Behavioral: Negative for depression. The patient is not nervous/anxious and does not have insomnia.       Objective:     Vitals:    05/06/19 1535 05/06/19 1540 05/06/19 1542   BP: 114/57 (!) 164/117 117/58   BP Location: Left arm Left arm Left arm   Patient Position: Sitting Standing Sitting   BP Cuff Size: Adult Adult Adult   Pulse: 68 (!) 127 66   Weight: 69 kg (152 lb 3.2 oz)     Height: 1.727 m (5' 8\")        BP Readings from Last 4 Encounters:   05/06/19 117/58   05/04/19 108/59   03/22/19 125/55   02/26/19 112/82      Body mass index is 23.14 kg/m².   BMI Readings from Last 4 Encounters:   05/06/19 23.14 kg/m²   05/03/19 22.63 kg/m²   03/22/19 23.49 kg/m²   02/26/19 23.37 kg/m²      Physical Exam   Constitutional: She is oriented to person, place, and time. She appears well-developed and well-nourished. No distress.   HENT:   Head: Normocephalic and atraumatic.   Neck: Normal range of motion. Neck supple. No JVD present. No " thyromegaly present.   Cardiovascular: Normal rate, regular rhythm and intact distal pulses.  Exam reveals no gallop and no friction rub.    Murmur heard.   Systolic murmur is present with a grade of 3/6   Pulses:       Carotid pulses are 2+ on the right side, and 2+ on the left side.       Radial pulses are 2+ on the right side, and 2+ on the left side.        Dorsalis pedis pulses are 2+ on the right side, and 2+ on the left side.        Posterior tibial pulses are 2+ on the right side, and 2+ on the left side.   Edema     RLE: none     LLE: none   Spider telangectasia:       RLE:  None      LLE: none   Varicosities:         RLE: none      LLE: none   Corona phlebectatica:      RLE:  None       LLE:  None   Cording:         RLE:  None     LLE: None      Pulmonary/Chest: Effort normal and breath sounds normal.   Abdominal: Soft. Bowel sounds are normal. She exhibits no distension and no mass. There is no tenderness. There is no rebound and no guarding.   Musculoskeletal: Normal range of motion. She exhibits no edema or tenderness.   Neurological: She is alert and oriented to person, place, and time. No cranial nerve deficit. She exhibits normal muscle tone. Coordination normal.   Skin: Skin is warm and dry. She is not diaphoretic.   Psychiatric: She has a normal mood and affect.     Lab Results   Component Value Date    CHOLSTRLTOT 152 11/09/2018    CHOLSTRLTOT 135 08/19/2017    LDL 63 11/09/2018    LDL 65 08/19/2017    HDL 69 11/09/2018    HDL 54 08/19/2017    TRIGLYCERIDE 98 11/09/2018    TRIGLYCERIDE 80 08/19/2017      Lab Results   Component Value Date    PROTHROMBTM 13.4 08/18/2017    INR 0.99 08/18/2017       Lab Results   Component Value Date    HBA1C 5.3 08/18/2017      Lab Results   Component Value Date    SODIUM 139 05/03/2019    POTASSIUM 3.4 (L) 05/03/2019    CHLORIDE 106 05/03/2019    CO2 23 05/03/2019    GLUCOSE 132 (H) 05/03/2019    BUN 24 (H) 05/03/2019    CREATININE 0.85 05/03/2019    BUNCREATRAT  28 11/09/2018    IFAFRICA >60 05/03/2019    IFNOTAFR >60 05/03/2019        Lab Results   Component Value Date    WBC 5.5 05/03/2019    RBC 3.37 (L) 05/03/2019    HEMOGLOBIN 11.2 (L) 05/03/2019    HEMATOCRIT 34.3 (L) 05/03/2019    .8 (H) 05/03/2019    MCH 33.2 (H) 05/03/2019    MCHC 32.7 (L) 05/03/2019    MPV 9.2 05/03/2019      VASCULAR IMAGING:     CTA head 8/2017  1.  There is a right M1 segment occlusion.  2.  There is collateral flow in the peripheral M3 branches seen on the right.  3.  There is mild decreased enhancement of the visualized right internal carotid artery however it is patent.  4.  Question of subtle hypodensity in the right insular cortex region. No abnormal brain parenchymal enhancement is seen.    Carotid duplex 8/18/17   nl carotids, subclavians and vertebrals    CTA neck 8/18/17  1.  CT angiogram of the neck within normal limits.  2.  Thrombosed right M1 segment.    IR thrombectomy 8/18/17  1. Subtotal occlusion of the RIGHT M1 segment.  2. Successful RIGHT middle cerebral artery mechanical thrombectomy using Trevo stent.  3. Post thrombectomy arteriogram demonstrates patent lenticulostriate, RIGHT M1 and peripheral segments.    MRI brain 8/20/17  1.  Moderate sized RIGHT MCA territory acute ischemia involving the cortex and basal ganglia  2.  Flow void is present in the RIGHT MCA compatible with treatment effect  3.  No hemorrhage  4.  Mild white matter changes  5.  Mild atrophy    Echo 8/19/17  Agitated saline study was performed, no evidence of right to left   shunt.  Normal left ventricular size, wall thickness, and systolic function.  Left ventricular ejection fraction is visually estimated to be 60%.  Mildly dilated left atrium.  Moderate mitral regurgitation due to an eccentric jet.  Mild tricuspid regurgitation.  Right ventricular systolic pressure is estimated to be 35 mmHg.    Echo 6/15/18  Normal left ventricular systolic function.  Left ventricular ejection fraction is  visually estimated to be 60%.  Myxomatous changes of the mitral valve.  Prolapse of the anterior and posterior mitral leaflets.  Severe, eccentric mitral regurgitation.  Right heart pressures are normal.  Compared to the report of the study done 8/19/2017 severe mitral   regurgitation is now present, previously reported as moderate but a MR   ERO was not recorded.     Echo 5/3/19  Prior echocardiogram 6/14/2018.  Normal left ventricular systolic function.   Mild to moderate eccentric mitral regurgitation.  Compared to the images of the study done - there has been slight   improvement in mitral regurgitation.    Medical Decision Making:  Today's Assessment / Status / Plan:     1. Syncope, unspecified syncope type     2. Dyslipidemia     3. Severe mitral regurgitation       Patient Type: Secondary Prevention    Etiology of Established CVD if Present:   1) ischemic CVA, Moderate sized RIGHT MCA territory acute ischemia involving the cortex and basal ganglia    Lipid Management: Qualifies for Statin Therapy Based on 2013 ACC/AHA Guidelines: yes  Calculated 10-Year Risk of ASCVD: N/A  Currently on Statin: Yes  NLA Risk Category:   Very high:  Evidence of ASCVD   Tx threshold:  non-HDL-C >99, LDL-C >69  Tx goal: non-HDL-C <100,  LDL-C <70  (optional: apoB <80)  At goal:  Yes  Tx plan:   - continue high-intensity atorvastatin 80mg   - consider addition of zetia 10mg if needed     Blood Pressure Management:Goal: ACC/AHA (2017) goal <130/80  Home BP at goal:  Yes, prior hypotension on BP meds   Office BP at goal:  Yes, orthostatic changes   ? Masked HTN component in the past.   No indications of primary zev on prior labs   Echo shows no LVH and normal LVEF but severe MVR  ACR; normal   Stopped triam/hctz, lisinopril in the past.   Stopped metoprolol due to lack of indication.   Stopped HCTZ due to low K and hypotension  Stopped amlodipine/olmesartan due to low BP and mild swelling   Plan:   - continue home BP monitoring,  reviewed correct technique:  Yes   - hold hctz and amlodipine/olmesartan for now  - contact our office if lows <90/55 consistently for med adjustments   - order 24h ABPM:  In 3-4 weeks to verify home BPs   - monitor lytes/GFR    Glycemic Status: Normal    Anti-Platelet/Anti-Coagulant Tx: yes  - continue ASA 81mg daily, defer adjustments to neurology      Smoking: continued complete avoidance of all tobacco products      Physical Activity: advised to engage in walking >150min/week    Weight Management and Nutrition: Dietary plan was discussed with patient at this visit including plate method, DASH diet     Other:   1) hx of CVA  - continue antiplat and BP mgmt  - defer mgmt to neurology for ongoing care   - ED precautions for acute CVA symptoms reviewed   - continue PT and f/u on EEG results     2) s/p thrombectomy of R MCA  - defer repeat imaging to neurosurg or neurology     3) hx of severe MVR  - defer mgmt to cardiology for ongoing care - Dr. Meehan    4) syncopal episode.  Appears to be multifactorial due to vasovagal from dehydration and low kcal intake with high energy output in addition to hypotension  - continue to monitor   - push fluids  - holding all BP meds for now and monitoring   - f/u with PCP or our office is any additional episodes occur     5) hypoK+.  Mild only = 3.4. Presumed related to thiazide.    - stopped thiazide  - continued on low-dose KCl 10mEq  - update labs in 3 weeks to verify normalized     Instructed to follow-up with PCP for remainder of adult medical needs: yes  We will partner with other providers in the management of established vascular disease and cardiometabolic risk factors.    Studies to Be Obtained:  None   Labs to Be Obtained:  Mag AMISH    Follow up in: 6mo with me     Travon Jaramillo M.D.

## 2019-05-15 ENCOUNTER — TELEPHONE (OUTPATIENT)
Dept: VASCULAR LAB | Facility: MEDICAL CENTER | Age: 74
End: 2019-05-15

## 2019-05-15 NOTE — TELEPHONE ENCOUNTER
Spoke with pt over phone.  States she has had bilateral LE mild edema after a couple short 1-2 hr flights recently.  Wearing prescription strength compression hose.  Recommended she avoid salt around the time she flies.  Ambulate if possible in plane.  Did not recommend diuretic due to pt's hx of hypotension with use.  Pt states understanding.    Radha PIERRE  [facility]

## 2019-05-20 ENCOUNTER — APPOINTMENT (RX ONLY)
Dept: URBAN - METROPOLITAN AREA CLINIC 4 | Facility: CLINIC | Age: 74
Setting detail: DERMATOLOGY
End: 2019-05-20

## 2019-05-20 PROBLEM — D48.5 NEOPLASM OF UNCERTAIN BEHAVIOR OF SKIN: Status: ACTIVE | Noted: 2019-05-20

## 2019-05-20 PROCEDURE — 11102 TANGNTL BX SKIN SINGLE LES: CPT

## 2019-05-20 PROCEDURE — ? BIOPSY BY SHAVE METHOD

## 2019-05-20 NOTE — PROCEDURE: BIOPSY BY SHAVE METHOD
Lab: [id number]
Bill For Surgical Tray: no
Biopsy Method: Personna blade
Wound Care: Petrolatum
Notification Instructions: Patient will be notified of biopsy results. However, patient instructed to call the office if not contacted within 2 weeks.
Additional Anesthesia Volume In Cc (Will Not Render If 0): 0
Detail Level: Detailed
Anticipated Plan (Based On Presumed Biopsy Results): The wound bed was treated with cryotherapy after the biopsy was performed.
Destruction After The Procedure: Yes
Silver Nitrate Text: The wound bed was treated with silver nitrate after the biopsy was performed.
Post-Care Instructions: I reviewed with the patient in detail post-care instructions. Patient is to keep the biopsy site dry overnight, and then apply bacitracin/petroleum twice daily until healed.
Curettage Text: The wound bed was treated with curettage after the biopsy was performed.
Electrodesiccation And Curettage Text: The wound bed was treated with electrodesiccation and curettage after the biopsy was performed.
Hemostasis: Aluminum Chloride
Billing Type: Third-Party Bill
Biopsy Type: H and E
Dressing: bandage
Electrodesiccation Text: The wound bed was treated with electrodesiccation after the biopsy was performed.
Type Of Destruction Used: Cryotherapy
Anesthesia Volume In Cc: 1
Depth Of Biopsy: dermis
Anesthesia Type: 1% lidocaine with 1:100,000 epinephrine and a 1:10 solution of 8.4% sodium bicarbonate
Size Of Lesion In Cm: 0.7
Lab Facility: 
Consent: Written consent was obtained and risks were reviewed including but not limited to scarring, infection, bleeding, scabbing, incomplete removal, nerve damage and allergy to anesthesia.

## 2019-05-24 ENCOUNTER — TELEPHONE (OUTPATIENT)
Dept: VASCULAR LAB | Facility: MEDICAL CENTER | Age: 74
End: 2019-05-24

## 2019-05-24 NOTE — TELEPHONE ENCOUNTER
Patient called in today to report BP readings that have been fluctuating over the past week. Best it has been was at 117/99, then gets up into the [id number] over high 80s-90s. Would like to know if she needs to change something with her current medication.    Mauricio Lino, Med. Ass't  [facility]

## 2019-05-24 NOTE — TELEPHONE ENCOUNTER
Spoke with pt regarding recent increase in BP ~130-140s systolic.  Will have her start back on low dose HCTZ and call back next wk with BP readings.  She denies any current symptoms.      IGNACIO Sales  [facility]

## 2019-05-25 LAB
25(OH)D3+25(OH)D2 SERPL-MCNC: 77.1 NG/ML (ref 30–100)
BUN SERPL-MCNC: 18 MG/DL (ref 8–27)
BUN/CREAT SERPL: 19 (ref 12–28)
CALCIUM SERPL-MCNC: 9.8 MG/DL (ref 8.7–10.3)
CHLORIDE SERPL-SCNC: 107 MMOL/L (ref 96–106)
CO2 SERPL-SCNC: 23 MMOL/L (ref 20–29)
CREAT SERPL-MCNC: 0.95 MG/DL (ref 0.57–1)
GLUCOSE SERPL-MCNC: 89 MG/DL (ref 65–99)
POTASSIUM SERPL-SCNC: 4.1 MMOL/L (ref 3.5–5.2)
PTH-INTACT SERPL-MCNC: 24 PG/ML (ref 15–65)
SODIUM SERPL-SCNC: 144 MMOL/L (ref 134–144)

## 2019-05-31 ENCOUNTER — HOSPITAL ENCOUNTER (OUTPATIENT)
Dept: RADIOLOGY | Facility: MEDICAL CENTER | Age: 74
End: 2019-05-31
Attending: INTERNAL MEDICINE
Payer: COMMERCIAL

## 2019-05-31 DIAGNOSIS — M85.89 OSTEOPENIA OF MULTIPLE SITES: ICD-10-CM

## 2019-05-31 PROCEDURE — 77080 DXA BONE DENSITY AXIAL: CPT

## 2019-06-19 DIAGNOSIS — E78.5 DYSLIPIDEMIA: Primary | ICD-10-CM

## 2019-06-19 RX ORDER — ATORVASTATIN CALCIUM 80 MG/1
80 TABLET, FILM COATED ORAL EVERY EVENING
Qty: 90 TAB | Refills: 3 | Status: SHIPPED | OUTPATIENT
Start: 2019-06-19 | End: 2020-04-16 | Stop reason: SDUPTHER

## 2019-07-26 ENCOUNTER — TELEPHONE (OUTPATIENT)
Dept: CARDIOLOGY | Facility: MEDICAL CENTER | Age: 74
End: 2019-07-26

## 2019-07-26 NOTE — TELEPHONE ENCOUNTER
Received a call from Luciana from imaging scheduling, she reports pt was just in the hospital and had an Echo done, noted pt was admitted last May and Echo was done during hospitalization, Luciana would like to know if the Echo ordered by Dr Daniels last February could be cancelled, advise Luciana that Echo from May should be good enough, she verbalizes understanding

## 2019-07-29 NOTE — TELEPHONE ENCOUNTER
Georgina Lujan, Med Ass't  Mariana Mullins, RViriN.   Phone Number: [phone]             Okay I will cancel,pt is still on the schedule. Thanks for Your time        Noted

## 2019-07-29 NOTE — TELEPHONE ENCOUNTER
Georgina Lujan, Med Ass't  Mariana Mullins R.N.   Phone Number: [phone]             I am following up on this phone conversation from 7-  My co- worker noted that the echocardiogram scheduled on 8-1-2019 can be cancelled due to patient having one done. I have confirmed with patient and she had echo done 5-2019 in the ER. Is it okay to cancel the one scheduled on 8-1-19  Or will the  Need another one before her appt on 8-14-19 ?   Thanks        Reply message sent to Georgina per above conversation, ok to cancel Echo

## 2019-08-01 ENCOUNTER — APPOINTMENT (OUTPATIENT)
Dept: CARDIOLOGY | Facility: MEDICAL CENTER | Age: 74
End: 2019-08-01
Attending: INTERNAL MEDICINE
Payer: COMMERCIAL

## 2019-08-12 ENCOUNTER — APPOINTMENT (RX ONLY)
Dept: URBAN - METROPOLITAN AREA CLINIC 4 | Facility: CLINIC | Age: 74
Setting detail: DERMATOLOGY
End: 2019-08-12

## 2019-08-12 DIAGNOSIS — L81.4 OTHER MELANIN HYPERPIGMENTATION: ICD-10-CM

## 2019-08-12 DIAGNOSIS — D22 MELANOCYTIC NEVI: ICD-10-CM | Status: STABLE

## 2019-08-12 DIAGNOSIS — L57.0 ACTINIC KERATOSIS: ICD-10-CM

## 2019-08-12 DIAGNOSIS — L82.1 OTHER SEBORRHEIC KERATOSIS: ICD-10-CM

## 2019-08-12 DIAGNOSIS — L90.5 SCAR CONDITIONS AND FIBROSIS OF SKIN: ICD-10-CM

## 2019-08-12 PROBLEM — D22.72 MELANOCYTIC NEVI OF LEFT LOWER LIMB, INCLUDING HIP: Status: ACTIVE | Noted: 2019-08-12

## 2019-08-12 PROCEDURE — 99213 OFFICE O/P EST LOW 20 MIN: CPT | Mod: 25

## 2019-08-12 PROCEDURE — ? OBSERVATION AND MEASURE

## 2019-08-12 PROCEDURE — 17003 DESTRUCT PREMALG LES 2-14: CPT

## 2019-08-12 PROCEDURE — 17000 DESTRUCT PREMALG LESION: CPT

## 2019-08-12 PROCEDURE — ? COUNSELING

## 2019-08-12 PROCEDURE — ? DIAGNOSIS COMMENT

## 2019-08-12 PROCEDURE — ? LIQUID NITROGEN

## 2019-08-12 ASSESSMENT — LOCATION DETAILED DESCRIPTION DERM
LOCATION DETAILED: RIGHT MEDIAL FRONTAL SCALP
LOCATION DETAILED: LEFT SUPERIOR MEDIAL FOREHEAD
LOCATION DETAILED: MID POSTERIOR NECK
LOCATION DETAILED: LEFT POSTERIOR SHOULDER
LOCATION DETAILED: RIGHT BUTTOCK
LOCATION DETAILED: LEFT DORSAL 5TH TOE
LOCATION DETAILED: LEFT CENTRAL FRONTAL SCALP
LOCATION DETAILED: RIGHT RADIAL DORSAL HAND
LOCATION DETAILED: RIGHT POSTERIOR SHOULDER
LOCATION DETAILED: LEFT LATERAL MALAR CHEEK
LOCATION DETAILED: LEFT MEDIAL FRONTAL SCALP
LOCATION DETAILED: RIGHT SUPERIOR FOREHEAD
LOCATION DETAILED: SUPERIOR THORACIC SPINE
LOCATION DETAILED: LEFT SUPERIOR UPPER BACK

## 2019-08-12 ASSESSMENT — LOCATION SIMPLE DESCRIPTION DERM
LOCATION SIMPLE: LEFT SCALP
LOCATION SIMPLE: UPPER BACK
LOCATION SIMPLE: RIGHT HAND
LOCATION SIMPLE: LEFT 5TH TOE
LOCATION SIMPLE: LEFT FOREHEAD
LOCATION SIMPLE: RIGHT BUTTOCK
LOCATION SIMPLE: RIGHT SCALP
LOCATION SIMPLE: LEFT UPPER BACK
LOCATION SIMPLE: POSTERIOR NECK
LOCATION SIMPLE: RIGHT FOREHEAD
LOCATION SIMPLE: LEFT CHEEK
LOCATION SIMPLE: RIGHT SHOULDER
LOCATION SIMPLE: LEFT SHOULDER

## 2019-08-12 ASSESSMENT — LOCATION ZONE DERM
LOCATION ZONE: FACE
LOCATION ZONE: TOE
LOCATION ZONE: SCALP
LOCATION ZONE: ARM
LOCATION ZONE: TRUNK
LOCATION ZONE: NECK
LOCATION ZONE: HAND

## 2019-08-12 NOTE — PROCEDURE: DIAGNOSIS COMMENT
Comment: History of skin cancers
Detail Level: Simple
Comment: See photo 8/12/19
Detail Level: Detailed

## 2019-08-14 ENCOUNTER — OFFICE VISIT (OUTPATIENT)
Dept: CARDIOLOGY | Facility: MEDICAL CENTER | Age: 74
End: 2019-08-14
Payer: COMMERCIAL

## 2019-08-14 VITALS
HEART RATE: 68 BPM | BODY MASS INDEX: 23.54 KG/M2 | DIASTOLIC BLOOD PRESSURE: 78 MMHG | OXYGEN SATURATION: 96 % | WEIGHT: 150 LBS | SYSTOLIC BLOOD PRESSURE: 144 MMHG | HEIGHT: 67 IN

## 2019-08-14 DIAGNOSIS — I34.0 SEVERE MITRAL REGURGITATION: ICD-10-CM

## 2019-08-14 DIAGNOSIS — I34.1 MVP (MITRAL VALVE PROLAPSE): Chronic | ICD-10-CM

## 2019-08-14 DIAGNOSIS — E78.5 DYSLIPIDEMIA: ICD-10-CM

## 2019-08-14 PROCEDURE — 99214 OFFICE O/P EST MOD 30 MIN: CPT | Performed by: INTERNAL MEDICINE

## 2019-08-14 RX ORDER — HYDROCHLOROTHIAZIDE 12.5 MG/1
12.5 TABLET ORAL DAILY
COMMUNITY
End: 2019-12-12

## 2019-08-14 ASSESSMENT — ENCOUNTER SYMPTOMS
PND: 0
FEVER: 0
DEPRESSION: 0
CHILLS: 0
MYALGIAS: 0
BRUISES/BLEEDS EASILY: 0
EYE DISCHARGE: 0
BLURRED VISION: 0
NERVOUS/ANXIOUS: 0
DIZZINESS: 0
ABDOMINAL PAIN: 0
WHEEZING: 0
NAUSEA: 0
SPEECH CHANGE: 0
VOMITING: 0
PALPITATIONS: 0
EYE PAIN: 0
SHORTNESS OF BREATH: 0
COUGH: 0
LOSS OF CONSCIOUSNESS: 0
HEMOPTYSIS: 0

## 2019-08-14 NOTE — PROGRESS NOTES
Chief Complaint   Patient presents with   • HTN (Controlled)     follow up       Subjective:   Shaista Bocanegra is a 74 y.o. female who presents today in follow-up in regards to her severe mitral regurgitation due to mitral valve prolapse as well as stroke due to ischemic disease of the brain and refractory uncontrolled hypertension    In by herself today endorses no complaints says she has been active and healthy her blood pressure log shows all readings in the 120s  Compliant on her medications    Past Medical History:   Diagnosis Date   • Benign essential HTN 3/19/2012   • Breast cancer (HCC)    • Cancer (HCC)    • Chest tightness or pressure 3/19/2012   • High risk medication use 3/19/2012   • Hypercholesterolemia 3/19/2012   • MVP (mitral valve prolapse) 3/19/2012   • Renal insufficiency 3/19/2012   • Stroke (HCC)    • Valvular heart disease      Past Surgical History:   Procedure Laterality Date   • CATARACT PHACO WITH IOL  3/16/2009    Performed by SHANNON GANDARA at [facility]   • CATARACT PHACO WITH IOL  1/26/2009    Performed by SHANNON GANDARA at [facility]   • BREAST BIOPSY     • LUMPECTOMY     • PB RADIATION THERAPY PLAN SIMPLE     • [place] CHEMOTHERAPY, UNSPECIFIED PROCEDURE       Family History   Problem Relation Age of Onset   • Cancer Mother    • Heart Failure Neg Hx    • Heart Disease Neg Hx      Social History     Socioeconomic History   • Marital status:      Spouse name: Not on file   • Number of children: Not on file   • Years of education: Not on file   • Highest education level: Not on file   Occupational History   • Not on file   Social Needs   • Financial resource strain: Not on file   • Food insecurity:     Worry: Not on file     Inability: Not on file   • Transportation needs:     Medical: Not on file     Non-medical: Not on file   Tobacco Use   • Smoking status: Never Smoker   • Smokeless tobacco: Never Used   Substance and Sexual Activity   •  Alcohol use: Yes     Comment: 3 glasses of wine   • Drug use: No   • Sexual activity: Not on file   Lifestyle   • Physical activity:     Days per week: Not on file     Minutes per session: Not on file   • Stress: Not on file   Relationships   • Social connections:     Talks on phone: Not on file     Gets together: Not on file     Attends Nondenominational service: Not on file     Active member of club or organization: Not on file     Attends meetings of clubs or organizations: Not on file     Relationship status: Not on file   • Intimate partner violence:     Fear of current or ex partner: Not on file     Emotionally abused: Not on file     Physically abused: Not on file     Forced sexual activity: Not on file   Other Topics Concern   • Not on file   Social History Narrative   • Not on file     No Known Allergies  Outpatient Encounter Medications as of 8/14/2019   Medication Sig Dispense Refill   • hydroCHLOROthiazide (HYDRODIURIL) 12.5 MG tablet Take 12.5 mg by mouth every day.     • TESTOSTERONE TD Apply  to skin as directed.     • atorvastatin (LIPITOR) 80 MG tablet Take 1 Tab by mouth every evening. 90 Tab 3   • Biotin 38092 MCG Tab Take 1 Tab by mouth every day.     • potassium chloride SA (K-DUR) 10 MEQ Tab CR Take 10 mEq by mouth every day.     • aspirin (ASA) 81 MG Chew Tab chewable tablet Take 1 Tab by mouth every day. 14 Tab 1   • valacyclovir (VALTREX) 500 MG Tab Take 500 mg by mouth every day.     • Multiple Vitamins-Minerals (ICAPS AREDS 2 PO) Take 2 Tabs by mouth every day.     • calcium carbonate (OS-CRISTOFER 500) 500 MG Tab Take 1,000 mg by mouth every day.     • Multiple Vitamin (MULTIVITAMINS PO) Take 1 Tab by mouth every day.     • [DISCONTINUED] CALCIUM-VITAMIN D PO Take  by mouth.     • [DISCONTINUED] Mirabegron ER (MYRBETRIQ) 25 MG TABLET SR 24 HR Take 1 Tab by mouth every day.     • [DISCONTINUED] Cholecalciferol (VITAMIN D3 PO) Take 2,000 Units by mouth every day.       No facility-administered encounter  "medications on file as of 8/14/2019.      Review of Systems   Constitutional: Negative for chills and fever.   HENT: Negative for congestion and hearing loss.    Eyes: Negative for blurred vision, pain and discharge.   Respiratory: Negative for cough, hemoptysis, shortness of breath and wheezing.    Cardiovascular: Negative for chest pain, palpitations, leg swelling and PND.   Gastrointestinal: Negative for abdominal pain, nausea and vomiting.   Genitourinary: Negative for dysuria.   Musculoskeletal: Negative for joint pain and myalgias.   Skin: Negative for itching and rash.   Neurological: Negative for dizziness, speech change and loss of consciousness.   Endo/Heme/Allergies: Does not bruise/bleed easily.   Psychiatric/Behavioral: Negative for depression. The patient is not nervous/anxious.    All other systems reviewed and are negative.       Objective:   /78 (BP Location: Left arm, Patient Position: Sitting)   Pulse 68   Ht 1.689 m (5' 6.5\")   Wt 68 kg (150 lb)   SpO2 96%   BMI 23.85 kg/m²     Physical Exam   Constitutional: She is oriented to person, place, and time. She appears well-developed and well-nourished. No distress.   ... As always patient seen and examined again today changes noted:   HENT:   Head: Normocephalic and atraumatic.   Eyes: Pupils are equal, round, and reactive to light. EOM are normal.   Neck: Neck supple. No JVD present. No thyromegaly present.   Cardiovascular: Normal rate, regular rhythm and intact distal pulses. Exam reveals no gallop.   Murmur heard.  3 out of 6 harsh blowing systolic murmur across the precordium   Pulmonary/Chest: Effort normal and breath sounds normal. She exhibits no tenderness.   Abdominal: Bowel sounds are normal. She exhibits no distension.   Musculoskeletal: Normal range of motion. She exhibits no edema.   Lymphadenopathy:     She has no cervical adenopathy.   Neurological: She is alert and oriented to person, place, and time. No cranial nerve " deficit. She exhibits normal muscle tone.   Skin: Skin is warm and dry. No rash noted.   Psychiatric: She has a normal mood and affect. Her behavior is normal.       Assessment:     1. Severe mitral regurgitation     2. Dyslipidemia     3. MVP (mitral valve prolapse)         Medical Decision Making:  Today's Assessment / Status / Plan:     Mitral regurgitation  Severe, she was seen by our structural heart team late last year, and completely asymptomatic and functional  Reviewed images of her echo from this past May, no changes strong heart function  Follow-up with heart team as well as myself symptom based discussed at length as usual    Stroke  She wonders if her stroke was caused by her mitral regurgitation.  Again I emphasized the fact that she has uncontrolled hypertension, better on her log encouraged to keep up measures reviewed labs and talked about medications    Hyperlipidemia  As above another cause for her stroke, continue high-dose Lipitor follow with PCP    RTC 6 months we will order echo at that point

## 2019-08-27 ENCOUNTER — OFFICE VISIT (OUTPATIENT)
Dept: CARDIOLOGY | Facility: MEDICAL CENTER | Age: 74
End: 2019-08-27
Payer: COMMERCIAL

## 2019-08-27 VITALS
WEIGHT: 144.5 LBS | BODY MASS INDEX: 22.68 KG/M2 | HEIGHT: 67 IN | OXYGEN SATURATION: 93 % | SYSTOLIC BLOOD PRESSURE: 122 MMHG | HEART RATE: 76 BPM | DIASTOLIC BLOOD PRESSURE: 80 MMHG

## 2019-08-27 DIAGNOSIS — I69.391 DYSPHAGIA FOLLOWING CEREBROVASCULAR ACCIDENT: ICD-10-CM

## 2019-08-27 DIAGNOSIS — I34.0 SEVERE MITRAL REGURGITATION: ICD-10-CM

## 2019-08-27 DIAGNOSIS — I34.1 MVP (MITRAL VALVE PROLAPSE): Chronic | ICD-10-CM

## 2019-08-27 PROCEDURE — 99213 OFFICE O/P EST LOW 20 MIN: CPT | Performed by: INTERNAL MEDICINE

## 2019-08-27 ASSESSMENT — ENCOUNTER SYMPTOMS
MUSCULOSKELETAL NEGATIVE: 1
FOCAL WEAKNESS: 0
PALPITATIONS: 0
GASTROINTESTINAL NEGATIVE: 1
VOMITING: 0
HEADACHES: 0
WEAKNESS: 0
DEPRESSION: 0
MYALGIAS: 0
NAUSEA: 0
FEVER: 0
CLAUDICATION: 0
NERVOUS/ANXIOUS: 0
PSYCHIATRIC NEGATIVE: 1
COUGH: 0
BRUISES/BLEEDS EASILY: 0
SHORTNESS OF BREATH: 0
NEUROLOGICAL NEGATIVE: 1
CARDIOVASCULAR NEGATIVE: 1
EYES NEGATIVE: 1
RESPIRATORY NEGATIVE: 1
DIZZINESS: 0
WEIGHT LOSS: 0
CHILLS: 0
BLURRED VISION: 0
ABDOMINAL PAIN: 0
DOUBLE VISION: 0

## 2019-09-24 ENCOUNTER — HOSPITAL ENCOUNTER (EMERGENCY)
Facility: MEDICAL CENTER | Age: 74
End: 2019-09-24
Attending: EMERGENCY MEDICINE
Payer: COMMERCIAL

## 2019-09-24 ENCOUNTER — APPOINTMENT (OUTPATIENT)
Dept: RADIOLOGY | Facility: MEDICAL CENTER | Age: 74
End: 2019-09-24
Attending: EMERGENCY MEDICINE
Payer: COMMERCIAL

## 2019-09-24 VITALS
SYSTOLIC BLOOD PRESSURE: 138 MMHG | HEIGHT: 66 IN | OXYGEN SATURATION: 95 % | DIASTOLIC BLOOD PRESSURE: 84 MMHG | WEIGHT: 146.61 LBS | RESPIRATION RATE: 14 BRPM | BODY MASS INDEX: 23.56 KG/M2 | HEART RATE: 99 BPM | TEMPERATURE: 97 F

## 2019-09-24 DIAGNOSIS — S02.2XXA CLOSED FRACTURE OF NASAL BONE, INITIAL ENCOUNTER: ICD-10-CM

## 2019-09-24 DIAGNOSIS — S09.90XA CLOSED HEAD INJURY, INITIAL ENCOUNTER: ICD-10-CM

## 2019-09-24 DIAGNOSIS — S00.83XA CONTUSION OF FACE, INITIAL ENCOUNTER: ICD-10-CM

## 2019-09-24 DIAGNOSIS — S00.81XA ABRASION OF FOREHEAD, INITIAL ENCOUNTER: ICD-10-CM

## 2019-09-24 PROCEDURE — 70450 CT HEAD/BRAIN W/O DYE: CPT

## 2019-09-24 PROCEDURE — 99284 EMERGENCY DEPT VISIT MOD MDM: CPT

## 2019-09-24 PROCEDURE — 70486 CT MAXILLOFACIAL W/O DYE: CPT

## 2019-09-25 NOTE — ED NOTES
"Pt eating granola bar when this RN walked into room. Explained to Pt NPO until testing completed, Pt states \"the  Should have told me when he was in here, I have not eaten since lunch.\"   "

## 2019-09-25 NOTE — ED PROVIDER NOTES
ED Provider Note    CHIEF COMPLAINT  Chief Complaint   Patient presents with   • T-5000     pt walked into a glass wall today at 1300 hitting right face   • Facial Injury        HPI    Primary care provider: Bethany Crane M.D.   History obtained from: Patient  History limited by: None     Shaista Bocanegra is a 74 y.o. female who presents to the ED with neighbor complaining of facial injury around 1:00 this afternoon when she accidentally ran into a glass wall and hit the left side of her face.  She denies loss of consciousness.  She developed swelling and bruising around her left eye and texted a photo of her injuries to her doctor and was told to come to the ED for imaging.  Patient is reporting diffuse headache and left-sided facial pain.  She denies pain anywhere else or any other injuries except for mild left shoulder pain.  She denies nausea or vomiting.  No weakness or sensory change.  She is not on any blood thinners.  Patient is unsure when she last had a tetanus shot.    REVIEW OF SYSTEMS  Please see HPI for pertinent positives/negatives.  All other systems reviewed and are negative.     PAST MEDICAL HISTORY  Past Medical History:   Diagnosis Date   • Benign essential HTN 3/19/2012   • Chest tightness or pressure 3/19/2012   • Hypercholesterolemia 3/19/2012   • MVP (mitral valve prolapse) 3/19/2012   • Renal insufficiency 3/19/2012   • High risk medication use 3/19/2012   • Breast cancer (HCC)    • Cancer (HCC)    • Stroke (HCC)    • Valvular heart disease         SURGICAL HISTORY  Past Surgical History:   Procedure Laterality Date   • CATARACT PHACO WITH IOL  3/16/2009    Performed by SHANNON GANDARA at [facility]   • CATARACT PHACO WITH IOL  1/26/2009    Performed by SHANNON GANDARA at [facility]   • BREAST BIOPSY     • LUMPECTOMY     • PB RADIATION THERAPY PLAN SIMPLE     • [place] CHEMOTHERAPY, UNSPECIFIED PROCEDURE          SOCIAL HISTORY  Social History  "    Tobacco Use   • Smoking status: Never Smoker   • Smokeless tobacco: Never Used   Substance and Sexual Activity   • Alcohol use: Yes     Comment: 3 glasses of wine   • Drug use: No   • Sexual activity: Not on file        FAMILY HISTORY  Family History   Problem Relation Age of Onset   • Cancer Mother    • Heart Failure Neg Hx    • Heart Disease Neg Hx         CURRENT MEDICATIONS  Home Medications    **Home medications have not yet been reviewed for this encounter**          ALLERGIES  No Known Allergies     PHYSICAL EXAM  VITAL SIGNS: /84   Pulse 99   Temp 36.1 °C (97 °F)   Resp 14   Ht 1.676 m (5' 6\")   Wt 66.5 kg (146 lb 9.7 oz)   SpO2 95%   BMI 23.66 kg/m²  @TROY[[id number]::@     Pulse ox interpretation: 96% I interpret this pulse ox as normal       Constitutional: Well developed, well nourished, alert in no apparent distress, nontoxic appearance   HENT: Left periorbital ecchymosis and mild swelling with tenderness to palpation, normocephalic, bilateral external ears normal, no hemotympanum bilaterally, oropharynx moist and clear, airway patent, nose non TTP with no hematoma/bleeding/drainage, midface stable, no malocclusion, no mastoid swelling/bruising   Eyes: PERRL, conjunctiva without erythema, no discharge, no icterus   Neck: Soft and supple, trachea midline, no stridor, no swelling/bruising, no midline C-spine tenderness, no stepoffs, no LAD, no JVD, good ROM without restrictions or discomfort  Cardiovascular: Regular rate and rhythm, no murmurs/rubs/gallops, strong distal pulses and good perfusion   Thorax & Lungs: No respiratory distress, CTAB with equal BS bilaterally  Abdomen: Soft, nontender, nondistended, no G/R, normal BS  Back: Nontender to palpation  Extremities: No cyanosis, no edema, no gross deformity, good ROM at all joints, mild left shoulder tenderness to palpation without swelling/bruising/crepitus, intact distal pulses with brisk cap refill   Skin: Warm, dry, no pallor/cyanosis, " no rash noted   Lymphatic: No lymphadenopathy noted   Neuro: Alert and oriented to person, place, and time.  GCS 15.  CN II-XII grossly intact.  Normal speech.  Equal strength bilateral UE/LE.  Sensation intact to touch.  No cerebellar signs.  Normal gait.    Psychiatric: Cooperative      DIAGNOSTIC STUDIES / PROCEDURES        LABS  All labs reviewed by me.     Results for orders placed or performed during the hospital encounter of 05/03/19   EC-ECHOCARDIOGRAM COMPLETE W/O CONT   Result Value Ref Range    Eject.Frac. MOD BP 60.29     Eject.Frac. MOD 4C 50.09     Eject.Frac. MOD 2C 66.06     Left Ventrical Ejection Fraction 55    CMP   Result Value Ref Range    Sodium 139 135 - 145 mmol/L    Potassium 3.4 (L) 3.6 - 5.5 mmol/L    Chloride 106 96 - 112 mmol/L    Co2 23 20 - 33 mmol/L    Anion Gap 10.0 0.0 - 11.9    Glucose 132 (H) 65 - 99 mg/dL    Bun 24 (H) 8 - 22 mg/dL    Creatinine 0.85 0.50 - 1.40 mg/dL    Calcium 8.7 8.5 - 10.5 mg/dL    AST(SGOT) 20 12 - 45 U/L    ALT(SGPT) 13 2 - 50 U/L    Alkaline Phosphatase 40 30 - 99 U/L    Total Bilirubin 0.3 0.1 - 1.5 mg/dL    Albumin 3.5 3.2 - 4.9 g/dL    Total Protein 5.6 (L) 6.0 - 8.2 g/dL    Globulin 2.1 1.9 - 3.5 g/dL    A-G Ratio 1.7 g/dL   TROPONIN   Result Value Ref Range    Troponin I <0.01 0.00 - 0.04 ng/mL   URINALYSIS CULTURE, IF INDICATED   Result Value Ref Range    Color Yellow     Character Clear     Specific Gravity 1.013 <1.035    Ph 8.0 5.0 - 8.0    Glucose Negative Negative mg/dL    Ketones Negative Negative mg/dL    Protein Negative Negative mg/dL    Bilirubin Negative Negative    Urobilinogen, Urine 0.2 Negative    Nitrite Negative Negative    Leukocyte Esterase Trace (A) Negative    Occult Blood Negative Negative    Micro Urine Req Microscopic    LACTIC ACID   Result Value Ref Range    Lactic Acid 1.6 0.5 - 2.0 mmol/L   CBC WITH DIFFERENTIAL   Result Value Ref Range    WBC 5.5 4.8 - 10.8 K/uL    RBC 3.37 (L) 4.20 - 5.40 M/uL    Hemoglobin 11.2 (L) 12.0  - 16.0 g/dL    Hematocrit 34.3 (L) 37.0 - 47.0 %    .8 (H) 81.4 - 97.8 fL    MCH 33.2 (H) 27.0 - 33.0 pg    MCHC 32.7 (L) 33.6 - 35.0 g/dL    RDW 47.8 35.9 - 50.0 fL    Platelet Count 201 164 - 446 K/uL    MPV 9.2 9.0 - 12.9 fL    Neutrophils-Polys 79.10 (H) 44.00 - 72.00 %    Lymphocytes 12.80 (L) 22.00 - 41.00 %    Monocytes 5.90 0.00 - 13.40 %    Eosinophils 1.30 0.00 - 6.90 %    Basophils 0.50 0.00 - 1.80 %    Immature Granulocytes 0.40 0.00 - 0.90 %    Nucleated RBC 0.00 /100 WBC    Neutrophils (Absolute) 4.33 2.00 - 7.15 K/uL    Lymphs (Absolute) 0.70 (L) 1.00 - 4.80 K/uL    Monos (Absolute) 0.32 0.00 - 0.85 K/uL    Eos (Absolute) 0.07 0.00 - 0.51 K/uL    Baso (Absolute) 0.03 0.00 - 0.12 K/uL    Immature Granulocytes (abs) 0.02 0.00 - 0.11 K/uL    NRBC (Absolute) 0.00 K/uL   ESTIMATED GFR   Result Value Ref Range    GFR If African American >60 >60 mL/min/1.73 m 2    GFR If Non African American >60 >60 mL/min/1.73 m 2   Troponin   Result Value Ref Range    Troponin I <0.01 0.00 - 0.04 ng/mL   MAGNESIUM   Result Value Ref Range    Magnesium 1.7 1.5 - 2.5 mg/dL   URINE MICROSCOPIC (W/UA)   Result Value Ref Range    WBC 0-2 /hpf    RBC 0-2 /hpf    Bacteria Negative None /hpf    Epithelial Cells Negative /hpf    Hyaline Cast 0-2 /lpf   CORTISOL   Result Value Ref Range    Cortisol 16.9 0.0 - 23.0 ug/dL   TROPONIN   Result Value Ref Range    Troponin I <0.01 0.00 - 0.04 ng/mL   EKG (NOW)   Result Value Ref Range    Report       [facility]    Test Date:  2019  Pt Name:    HORACE GUZMAN                  Department: ER  MRN:        [id number]                      Room:       [facility]  Gender:     Female                       Technician: [id number]  :        1945                   Requested By:ER TRIAGE PROTOCOL  Order #:    [id number]                    Reading MD:    Measurements  Intervals                                Axis  Rate:       61                           P:           85  PA:         256                          QRS:        83  QRSD:       92                           T:          24  QT:         488  QTc:        492    Interpretive Statements  SINUS RHYTHM  FIRST DEGREE AV BLOCK  BORDERLINE RIGHT AXIS DEVIATION  BORDERLINE PROLONGED QT INTERVAL  BASELINE WANDER IN LEAD(S) II,III,aVF  Compared to ECG 2017 11:32:42  First degree AV block now present     EKG   Result Value Ref Range    Report       [facility]    Test Date:  2019  Pt Name:    HORACE GUZMAN                  Department: CPU  MRN:        [id number]                      Room:       T213  Gender:     Female                       Technician: LA  :        1945                   Requested By:PHI MCNEAL  Order #:    [id number]                    Reading MD: John Carey MD    Measurements  Intervals                                Axis  Rate:       66                           P:          84  PA:         228                          QRS:        74  QRSD:       86                           T:          49  QT:         480  QTc:        503    Interpretive Statements  SINUS RHYTHM  FIRST DEGREE AV BLOCK  BORDERLINE PROLONGED QT INTERVAL  Compared to ECG 2019 13:35:44  No significant changes    Electronically Signed On 2019 7:20:30 PDT by John Carey MD          RADIOLOGY  The radiologist's interpretation of all radiological studies have been reviewed by me.     CT-MAXILLOFACIAL W/O PLUS RECONS   Final Result         Acute displaced fractures of the bilateral nasal bones.      Soft tissue swelling and hematomas about the left eyelid and left infraorbital region.      CT-HEAD W/O   Final Result         1. No acute intracranial abnormality. No evidence of acute intracranial hemorrhage.      2. 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     Review of past medical  records shows the patient was last admitted to this hospital May 3, 2019 for syncope and discharged on May 4, 2019.  She has had follow up with the heart [facility].      Differential diagnoses considered include but are not limited to: Contusion, concussion/post-concussion syndrome, Fx, intracranial hemorrhage, abrasion/laceration       History and physical exam as above.  CT head and maxillofacial with above findings.  I discussed the findings with the patient and her neighbor.  This is an alert and pleasant patient in no acute distress and nontoxic in appearance without any focal neurological findings.  She also reports that she had a previous broken nose and she does not feel that she has a broken nose currently.  She will be given outpatient referral to the facial fracture on-call.  Patient also was unsure when she had her last tetanus shot but record review shows that she is up-to-date.  She was advised on using ice to help minimize swelling and bruising.  Discussed with her avoidance of further head trauma and concussion precautions.  Return to ED precautions were given.  Patient also noted to have slightly elevated blood pressure likely situational but will need outpatient follow-up for further management.  Patient verbalized understanding and agreed with plan of care with no further questions or concerns.      The patient is referred to a primary physician for blood pressure management, diabetic screening, and for all other preventative health concerns.       FINAL IMPRESSION  1. Closed fracture of nasal bone, initial encounter Acute   2. Contusion of face, initial encounter    3. Abrasion of forehead, initial encounter Acute   4. Closed head injury, initial encounter Acute          DISPOSITION  Patient will be discharged home in stable condition.       FOLLOW UP  Bethany Crane M.D.  [address] [id number] [phone]    Call in 1 day      Jose Alejandro Manzanares M.D.  [address]  [address]  [place] [address]  [phone]    Call in 1 day      [facility], Emergency Dept  [address]  [place] [address] [phone]    If symptoms worsen         OUTPATIENT MEDICATIONS  Discharge Medication List as of 9/24/2019  8:13 PM             Electronically signed by: Neil Cook, 9/24/2019 7:25 PM      Portions of this record were made with voice recognition software.  Despite my review, spelling/grammar/context errors may still remain.  Interpretation of this chart should be taken in this context.

## 2019-09-25 NOTE — ED TRIAGE NOTES
Chief Complaint   Patient presents with   • T-5000     pt walked into a glass wall today at 1300 hitting right face   • Facial Injury     Denies LOC, midline neck/back pain, fall.  Pt has bruising around left eye.  Not on thinners.  Triage process explained to patient.  Pt back to waiting room.  Pt instructed to inform RN if any changes or questions arise.

## 2019-09-26 ENCOUNTER — OFFICE VISIT (OUTPATIENT)
Dept: VASCULAR LAB | Facility: MEDICAL CENTER | Age: 74
End: 2019-09-26
Attending: INTERNAL MEDICINE
Payer: COMMERCIAL

## 2019-09-26 VITALS
BODY MASS INDEX: 22.13 KG/M2 | SYSTOLIC BLOOD PRESSURE: 129 MMHG | HEART RATE: 72 BPM | HEIGHT: 68 IN | WEIGHT: 146 LBS | DIASTOLIC BLOOD PRESSURE: 75 MMHG

## 2019-09-26 DIAGNOSIS — G47.30 SLEEP APNEA, UNSPECIFIED TYPE: ICD-10-CM

## 2019-09-26 DIAGNOSIS — I10 ESSENTIAL HYPERTENSION: ICD-10-CM

## 2019-09-26 DIAGNOSIS — E78.5 DYSLIPIDEMIA: ICD-10-CM

## 2019-09-26 DIAGNOSIS — I34.0 SEVERE MITRAL REGURGITATION: ICD-10-CM

## 2019-09-26 DIAGNOSIS — Z86.73 HISTORY OF CVA (CEREBROVASCULAR ACCIDENT): ICD-10-CM

## 2019-09-26 DIAGNOSIS — I34.1 MVP (MITRAL VALVE PROLAPSE): Chronic | ICD-10-CM

## 2019-09-26 DIAGNOSIS — E87.6 HYPOKALEMIA: ICD-10-CM

## 2019-09-26 PROCEDURE — 99212 OFFICE O/P EST SF 10 MIN: CPT | Performed by: FAMILY MEDICINE

## 2019-09-26 PROCEDURE — 99214 OFFICE O/P EST MOD 30 MIN: CPT | Performed by: FAMILY MEDICINE

## 2019-09-26 RX ORDER — SPIRONOLACTONE 25 MG/1
25 TABLET ORAL DAILY
Qty: 90 TAB | Refills: 3 | Status: SHIPPED | OUTPATIENT
Start: 2019-09-26 | End: 2020-08-17 | Stop reason: SDUPTHER

## 2019-09-26 ASSESSMENT — ENCOUNTER SYMPTOMS
CHILLS: 0
FOCAL WEAKNESS: 0
DEPRESSION: 0
FEVER: 0
SEIZURES: 0
ORTHOPNEA: 0
HEADACHES: 0
SORE THROAT: 0
MYALGIAS: 0
DIARRHEA: 0
ABDOMINAL PAIN: 0
COUGH: 0
HEMOPTYSIS: 0
DIZZINESS: 0
TREMORS: 0
BRUISES/BLEEDS EASILY: 0
BLURRED VISION: 0
NERVOUS/ANXIOUS: 0
WEAKNESS: 0
BLOOD IN STOOL: 0
VOMITING: 0
DOUBLE VISION: 0
NAUSEA: 0
PALPITATIONS: 0
INSOMNIA: 0
SHORTNESS OF BREATH: 0
SPEECH CHANGE: 1
WHEEZING: 0

## 2019-09-26 NOTE — PROGRESS NOTES
FOLLOW-UP VASCULAR MED VISIT  Subjective:   Shaista Bocanegra is a 74 y.o. female who presents today 3/22/19 for   Chief Complaint   Patient presents with   • Follow-Up     Hypotension   Initially referred by Dr. Daniels (cardiology) for eval and mgmt of HTN in context of MVR and hx of CVA.     HPI:    HTN:  Current HTN concerns:    Recent facial injury to left face after walking into glass door.  Seen at ED on 9/24/19 - normal CT head w/o fx.  Walking fine.  Denied syncope or LOC.  No dizz.  No HA or other focal neuro s/s preceding or post-ceding.   ADRs:  none   HTN sx:  No current blurred or changed vision, chest pain, shortness of breath, headache, nausea, dizziness/vertigo.   Reports some face recognition and proper nouns with memory loss.    Home BP log: upper 120s-low 130s/70-80s  Adherence to current HTN meds: compliant all of the time     Antiplatelet/anticoagulation:   Yes, Details: ASA 81mg      Hyperlipidemia:    Stable, no current concerns  Current treatment: High intensity statin   atorva 80mg   Myalgias? No  Other adverse drug reactions? No  Lipid profile: as noted below     Hx of CVA:  Stable, no new focal neuro s/s.      Pertinent HTN hx:   Age at HTN dx:  At least 20 years   (if female, any hx of pregnancy-related HTN): none reported  Past HTN medications:  As noted takes triam/hctz with travel only   HTN crises:  CVA 8/2017, o/w negative   ASCVD risk factors:     DM: No   CKD:  No  Lifestyle factors:     High salt: No   EtOH: No  Interfering substances:     NSAIDs: No    Decongestants. No    ASCVD calculator (contraindicated if ASCVD+, [id number])   10yr-risk calc: n/a     Clinical evidence of ASCVD:     1) hx of MI/ACS:  No   2) coronary or other revascularization procedure: No   3) TIA/ischemic CVA: Yes, Details: hx of ischemic CVA 8/2017, R MCA   4) PAD (including MICAH <0.9): No   5) documented (CAD, Renal athero, AA due to athero, carotid plaque >50% stenosis: No    Major RFs:     1) Age (Men>44,  women>54):  yes   2) Fhx early CAD (<55 men, <65 women):  no   3) cigarette smoking:  No   4) high BP >139/89 or on tx: yes   5) Low HDL-C (<40 men, <50 women):  no    Social History     Tobacco Use   • Smoking status: Never Smoker   • Smokeless tobacco: Never Used   Substance Use Topics   • Alcohol use: Yes     Comment: 3 glasses of wine   • Drug use: No     Outpatient Encounter Medications as of 9/26/2019   Medication Sig Dispense Refill   • spironolactone (ALDACTONE) 25 MG Tab Take 1 Tab by mouth every day for 360 days. 90 Tab 3   • hydroCHLOROthiazide (HYDRODIURIL) 12.5 MG tablet Take 12.5 mg by mouth every day.     • TESTOSTERONE TD Apply  to skin as directed.     • atorvastatin (LIPITOR) 80 MG tablet Take 1 Tab by mouth every evening. 90 Tab 3   • Biotin 78899 MCG Tab Take 1 Tab by mouth every day.     • potassium chloride SA (K-DUR) 10 MEQ Tab CR Take 10 mEq by mouth every day.     • aspirin (ASA) 81 MG Chew Tab chewable tablet Take 1 Tab by mouth every day. 14 Tab 1   • valacyclovir (VALTREX) 500 MG Tab Take 500 mg by mouth every day.     • Multiple Vitamins-Minerals (ICAPS AREDS 2 PO) Take 2 Tabs by mouth every day.     • calcium carbonate (OS-CRISTOFER 500) 500 MG Tab Take 1,000 mg by mouth every day.     • Multiple Vitamin (MULTIVITAMINS PO) Take 1 Tab by mouth every day.       No facility-administered encounter medications on file as of 9/26/2019.      No Known Allergies  DIET AND EXERCISE:  Weight Change: none   BMI Readings from Last 4 Encounters:   09/26/19 22.20 kg/m²   09/24/19 23.66 kg/m²   08/27/19 22.63 kg/m²   08/14/19 23.85 kg/m²      Diet: low fat, low sodium, reports some indiscretions over last 2 months   Exercise: moderate regular exercise program     Review of Systems   Constitutional: Negative for chills, fever and malaise/fatigue.   HENT: Negative for nosebleeds, sore throat and tinnitus.    Eyes: Negative for blurred vision and double vision.   Respiratory: Negative for cough, hemoptysis,  "shortness of breath and wheezing.    Cardiovascular: Negative for chest pain, palpitations, orthopnea and leg swelling.   Gastrointestinal: Negative for abdominal pain, blood in stool, diarrhea, melena, nausea and vomiting.   Genitourinary: Negative for hematuria.   Musculoskeletal: Negative for joint pain and myalgias.   Skin: Negative for itching and rash.   Neurological: Positive for speech change (word finding issues ). Negative for dizziness, tremors, focal weakness, seizures, weakness and headaches.   Endo/Heme/Allergies: Does not bruise/bleed easily.   Psychiatric/Behavioral: Negative for depression. The patient is not nervous/anxious and does not have insomnia.       Objective:     Vitals:    09/26/19 0903 09/26/19 0913   BP: 134/74 129/75   BP Location: Left arm Left arm   Patient Position: Sitting Sitting   BP Cuff Size: Adult Adult   Pulse: 71 72   Weight: 66.2 kg (146 lb)    Height: 1.727 m (5' 8\")       BP Readings from Last 4 Encounters:   09/26/19 129/75   09/24/19 138/84   08/27/19 122/80   08/14/19 144/78      Body mass index is 22.2 kg/m².   BMI Readings from Last 4 Encounters:   09/26/19 22.20 kg/m²   09/24/19 23.66 kg/m²   08/27/19 22.63 kg/m²   08/14/19 23.85 kg/m²      Physical Exam   Constitutional: She is oriented to person, place, and time. She appears well-developed and well-nourished. No distress.   HENT:   Head: Normocephalic and atraumatic.   Neck: Normal range of motion. Neck supple. No JVD present. No thyromegaly present.   Cardiovascular: Normal rate, regular rhythm and intact distal pulses. Exam reveals no gallop and no friction rub.   Murmur heard.   Systolic murmur is present with a grade of 3/6.  Pulses:       Carotid pulses are 2+ on the right side, and 2+ on the left side.       Radial pulses are 2+ on the right side, and 2+ on the left side.        Dorsalis pedis pulses are 2+ on the right side, and 2+ on the left side.        Posterior tibial pulses are 2+ on the right side, " and 2+ on the left side.   Edema     RLE: none     LLE: none   Spider telangectasia:       RLE:  None      LLE: none   Varicosities:         RLE: none      LLE: none   Corona phlebectatica:      RLE:  None       LLE:  None   Cording:         RLE:  None     LLE: None      Pulmonary/Chest: Effort normal and breath sounds normal.   Abdominal: Soft. Bowel sounds are normal. She exhibits no distension and no mass. There is no tenderness. There is no rebound and no guarding.   Musculoskeletal: Normal range of motion. She exhibits no edema or tenderness.   Neurological: She is alert and oriented to person, place, and time. No cranial nerve deficit. She exhibits normal muscle tone. Coordination normal.   Skin: Skin is warm and dry. She is not diaphoretic.        Psychiatric: She has a normal mood and affect.     Lab Results   Component Value Date    CHOLSTRLTOT 152 11/09/2018    LDL 63 11/09/2018    HDL 69 11/09/2018    TRIGLYCERIDE 98 11/09/2018               Lab Results   Component Value Date    SODIUM 144 05/24/2019    SODIUM 139 05/03/2019    POTASSIUM 4.1 05/24/2019    POTASSIUM 3.4 (L) 05/03/2019    CHLORIDE 107 (H) 05/24/2019    CHLORIDE 106 05/03/2019    CO2 23 05/24/2019    CO2 23 05/03/2019    GLUCOSE 89 05/24/2019    GLUCOSE 132 (H) 05/03/2019    BUN 18 05/24/2019    BUN 24 (H) 05/03/2019    CREATININE 0.95 05/24/2019    CREATININE 0.85 05/03/2019    BUNCREATRAT 19 05/24/2019    IFAFRICA 67 05/24/2019    IFAFRICA >60 05/03/2019    IFNOTAFR 58 (L) 05/24/2019    IFNOTAFR >60 05/03/2019        Lab Results   Component Value Date    WBC 5.5 05/03/2019    RBC 3.37 (L) 05/03/2019    HEMOGLOBIN 11.2 (L) 05/03/2019    HEMATOCRIT 34.3 (L) 05/03/2019    .8 (H) 05/03/2019    MCH 33.2 (H) 05/03/2019    MCHC 32.7 (L) 05/03/2019    MPV 9.2 05/03/2019      [facility] (9/9/19):  LDL 60, , HDL 64, cmp wnl with K=4.1, egfr =51, cbc wnl     VASCULAR IMAGING:     CTA head 8/2017  1.  There is a right M1 segment  occlusion.  2.  There is collateral flow in the peripheral M3 branches seen on the right.  3.  There is mild decreased enhancement of the visualized right internal carotid artery however it is patent.  4.  Question of subtle hypodensity in the right insular cortex region. No abnormal brain parenchymal enhancement is seen.    Carotid duplex 8/18/17   nl carotids, subclavians and vertebrals    CTA neck 8/18/17  1.  CT angiogram of the neck within normal limits.  2.  Thrombosed right M1 segment.    IR thrombectomy 8/18/17  1. Subtotal occlusion of the RIGHT M1 segment.  2. Successful RIGHT middle cerebral artery mechanical thrombectomy using Trevo stent.  3. Post thrombectomy arteriogram demonstrates patent lenticulostriate, RIGHT M1 and peripheral segments.    MRI brain 8/20/17  1.  Moderate sized RIGHT MCA territory acute ischemia involving the cortex and basal ganglia  2.  Flow void is present in the RIGHT MCA compatible with treatment effect  3.  No hemorrhage  4.  Mild white matter changes  5.  Mild atrophy    Echo 8/19/17  Agitated saline study was performed, no evidence of right to left   shunt.  Normal left ventricular size, wall thickness, and systolic function.  Left ventricular ejection fraction is visually estimated to be 60%.  Mildly dilated left atrium.  Moderate mitral regurgitation due to an eccentric jet.  Mild tricuspid regurgitation.  Right ventricular systolic pressure is estimated to be 35 mmHg.    Echo 6/15/18  Normal left ventricular systolic function.  Left ventricular ejection fraction is visually estimated to be 60%.  Myxomatous changes of the mitral valve.  Prolapse of the anterior and posterior mitral leaflets.  Severe, eccentric mitral regurgitation.  Right heart pressures are normal.  Compared to the report of the study done 8/19/2017 severe mitral   regurgitation is now present, previously reported as moderate but a MR   ERO was not recorded.     Echo 5/3/19  Prior echocardiogram  6/14/2018.  Normal left ventricular systolic function.   Mild to moderate eccentric mitral regurgitation.  Compared to the images of the study done - there has been slight   improvement in mitral regurgitation.    Medical Decision Making:  Today's Assessment / Status / Plan:     1. Essential hypertension  Basic Metabolic Panel   2. Dyslipidemia     3. Sleep apnea, unspecified type     4. Severe mitral regurgitation     5. Hypokalemia  Basic Metabolic Panel   6. History of CVA (cerebrovascular accident)     7. MVP (mitral valve prolapse)       Patient Type: Secondary Prevention    Etiology of Established CVD if Present:   1) ischemic CVA, Moderate sized RIGHT MCA territory acute ischemia involving the cortex and basal ganglia    Lipid Management: Qualifies for Statin Therapy Based on 2013 ACC/AHA Guidelines: yes  Calculated 10-Year Risk of ASCVD: N/A  Currently on Statin: Yes  NLA Risk Category:   Very high:  Evidence of ASCVD   Tx threshold:  non-HDL-C >99, LDL-C >69  Tx goal: non-HDL-C <100,  LDL-C <70  (optional: apoB <80)  At goal:  Yes  Tx plan:   - continue high-intensity atorvastatin 80mg   - consider addition of zetia 10mg if needed   - update labs Q6-12mo     Blood Pressure Management:Goal: ACC/AHA (2017) goal <130/80  Home BP at goal:  Yes, prior hypotension on BP meds   Office BP at goal:  Yes, orthostatic changes   ? Masked HTN component in the past.   No indications of primary zev on prior labs   Echo shows no LVH and normal LVEF but severe MVR  ACR; normal   Stopped triam/hctz, lisinopril in the past.   Stopped metoprolol due to lack of indication.   Stopped HCTZ due to low K and hypotension  Stopped amlodipine/olmesartan due to low BP and mild swelling   Plan:   - continue home BP monitoring, reviewed correct technique:  Yes   - stop hctz  - start spironolactone 25mg daily, update lytes 1mo  - contact our office if lows <90/55 consistently for med adjustments   - monitor lytes/GFR    Glycemic Status:  Normal    Anti-Platelet/Anti-Coagulant Tx: yes  - continue ASA 81mg daily, defer adjustments to neurology      Smoking: continued complete avoidance of all tobacco products      Physical Activity: advised to engage in walking >150min/week    Weight Management and Nutrition: Dietary plan was discussed with patient at this visit including plate method, DASH diet     Other:   1) hx of CVA  - continue antiplat and BP mgmt  - defer mgmt to neurology for ongoing care   - ED precautions for acute CVA symptoms reviewed   - continue PT and f/u on EEG results     2) s/p thrombectomy of R MCA  - defer repeat imaging to neurosurg or neurology     3) hx of severe MVR  - defer mgmt to cardiology for ongoing care - Dr. Meehan/martha  - pending echo 2/2020    4) syncopal episode.  5/2019. Resolved, non-recurrent. Appears to be multifactorial due to vasovagal from dehydration and low kcal intake with high energy output in addition to hypotension  - continue to monitor   - push fluids  - holding all BP meds for now and monitoring   - f/u with PCP or our office is any additional episodes occur     5) hypoK+.  Resolved, prior low = 3.4. Presumed related to thiazide.    - continued on low-dose KCl 10mEq  - stopped hctz, start spirono 25mg daily   - monitor closely, if high normal or elevated, then stop Kcl     6) facial/knee ecchymoses - stable, monitor   - use heating pad for knee, f/u with PCP if worsening pain or decreased ROM    Instructed to follow-up with PCP for remainder of adult medical needs: yes  We will partner with other providers in the management of established vascular disease and cardiometabolic risk factors.    Studies to Be Obtained:  Echo 2/2020 per cardio  Labs to Be Obtained:  BMP 1mo    Follow up in:  3mo with me     Travon Jaramillo M.D.

## 2019-09-26 NOTE — PATIENT INSTRUCTIONS
1) stop hctz, start spironolactone 25mg daily   2) check labs in 1 month  3) goal BP <130/80     Physical activity:  - engage in moderate to vigorous physical activity such as brisk walking, swimming, cycling, >150 minutes per week at 30-40 minutes per session, 3 to 5 times weekly or as directed at today's visit     General healthly nutrition advice:  - the USDA food pattern (https://www.cnpp.usda.gov/USDAFoodPatterns)  - plate method (https://www."[employer]myplate.gov/)  - consume diet that emphasizes intake of vegetables, fruits, and whole grains,  - use low fat diary products, poultry, fish, legumes, nontropical vegetable oils, nuts  - limit intake of sweets, sugar-sweetned beverages, and red meats   - reduce saturated and trans fats to <6% of your daily calories   - consume no more than 2,400mg of sodium daily (look at food labels) or if you have high BP then reduce to no more than 1,500mg of sodium daily     BP lowering diet:   - DASH diet (https://www.heart.org/en/health-topics/high-blood-pressure/changes-you-can-make-to-manage-high-blood-pressure/managing-blood-pressure-with-a-heart-healthy-diet)    Cholesterol-reducing diets:   - AHA diet  (http://www.heart.org/en/healthy-living/healthy-eating/eat-smart/nutrition-basics/aha-diet-and-lifestyle-recommendations)   - Mediterranean diet (http://www.heart.org/en/healthy-living/healthy-eating/eat-smart/nutrition-basics/mediterranean-diet)   - lowering triglycerides: (http://my.americanheart.org/idc/groups/ahamah-public/@wc/@sop/@[facility]/documents/downloadable/[facility]_425988.pdf)

## 2019-10-01 ENCOUNTER — TELEPHONE (OUTPATIENT)
Dept: VASCULAR LAB | Facility: MEDICAL CENTER | Age: 74
End: 2019-10-01

## 2019-10-01 NOTE — TELEPHONE ENCOUNTER
Spoke with pt over phone regarding if she should continue Potassium supplements.  Instructed her to stop the KCl as she has started spironolactone and she was on a very low KCl dose.    BMP already scheduled for 1 month.  Keep follow up plan as scheduled.    Radha PIERRE  [facility]

## 2019-10-28 LAB
BUN SERPL-MCNC: 20 MG/DL (ref 8–27)
BUN/CREAT SERPL: 18 (ref 12–28)
CALCIUM SERPL-MCNC: 9.6 MG/DL (ref 8.7–10.3)
CHLORIDE SERPL-SCNC: 105 MMOL/L (ref 96–106)
CO2 SERPL-SCNC: 22 MMOL/L (ref 20–29)
CREAT SERPL-MCNC: 1.13 MG/DL (ref 0.57–1)
GLUCOSE SERPL-MCNC: 92 MG/DL (ref 65–99)
POTASSIUM SERPL-SCNC: 3.9 MMOL/L (ref 3.5–5.2)
SODIUM SERPL-SCNC: 143 MMOL/L (ref 134–144)

## 2019-11-06 ENCOUNTER — APPOINTMENT (OUTPATIENT)
Dept: RHEUMATOLOGY | Facility: MEDICAL CENTER | Age: 74
End: 2019-11-06
Payer: MEDICARE

## 2019-12-04 ENCOUNTER — TELEPHONE (OUTPATIENT)
Dept: RHEUMATOLOGY | Facility: MEDICAL CENTER | Age: 74
End: 2019-12-04

## 2019-12-04 NOTE — TELEPHONE ENCOUNTER
Please inform the patient, she just had a bone density in May 2019, insurance will not cover it nor is it indicated this soon, usually repeat 1-2 years after the last one depending on when treatment is started ( we were waiting for dental clearance to start alendronate).

## 2019-12-04 NOTE — TELEPHONE ENCOUNTER
Patient called and LM, she is requesting a new bone density order. She would like to have it done before her next appt in February.     Routed to Dr. Laurent for review

## 2019-12-06 NOTE — TELEPHONE ENCOUNTER
"I spoke with patient, she reports that she has called her insurance and they will cover a bone density once per year. She has been taking an OTC \"Algaecal\" with calcium for the past 6 months and she is requesting a bone density study to \"see if its working.\" She says that if it isn't working, she will stop and talk to Dr. Laurent about other options. She has not gotten dental clearance yet, as she wants to see if the Algaecal is working.    Routed to Dr. Laurent for review    I will call patient back to advise.  "

## 2019-12-09 NOTE — TELEPHONE ENCOUNTER
In that case her next bone density would be due/covered June 1, 2020.  I will not be at the practice at that time so either her new rheumatologist or her PCP can order it at that time. Her appt could be rescheduled to a later date to follow up after her DEXA is done.

## 2019-12-12 ENCOUNTER — OFFICE VISIT (OUTPATIENT)
Dept: VASCULAR LAB | Facility: MEDICAL CENTER | Age: 74
End: 2019-12-12
Attending: FAMILY MEDICINE
Payer: COMMERCIAL

## 2019-12-12 VITALS
DIASTOLIC BLOOD PRESSURE: 71 MMHG | BODY MASS INDEX: 21.17 KG/M2 | SYSTOLIC BLOOD PRESSURE: 135 MMHG | WEIGHT: 139.7 LBS | HEART RATE: 73 BPM | HEIGHT: 68 IN

## 2019-12-12 DIAGNOSIS — I34.0 NON-RHEUMATIC MITRAL REGURGITATION: ICD-10-CM

## 2019-12-12 DIAGNOSIS — Z86.73 HISTORY OF CVA (CEREBROVASCULAR ACCIDENT): ICD-10-CM

## 2019-12-12 DIAGNOSIS — I34.0 SEVERE MITRAL REGURGITATION: ICD-10-CM

## 2019-12-12 DIAGNOSIS — E78.5 DYSLIPIDEMIA: ICD-10-CM

## 2019-12-12 DIAGNOSIS — I10 ESSENTIAL HYPERTENSION: ICD-10-CM

## 2019-12-12 DIAGNOSIS — E87.6 HYPOKALEMIA: ICD-10-CM

## 2019-12-12 DIAGNOSIS — G47.30 SLEEP APNEA, UNSPECIFIED TYPE: ICD-10-CM

## 2019-12-12 PROCEDURE — 99214 OFFICE O/P EST MOD 30 MIN: CPT | Performed by: FAMILY MEDICINE

## 2019-12-12 PROCEDURE — 99212 OFFICE O/P EST SF 10 MIN: CPT

## 2019-12-12 RX ORDER — MIRABEGRON 25 MG/1
TABLET, FILM COATED, EXTENDED RELEASE ORAL DAILY
COMMUNITY
Start: 2019-11-20 | End: 2021-05-31

## 2019-12-12 ASSESSMENT — ENCOUNTER SYMPTOMS
CHILLS: 0
NERVOUS/ANXIOUS: 0
HEADACHES: 0
DOUBLE VISION: 0
ORTHOPNEA: 0
MYALGIAS: 0
SEIZURES: 0
HEMOPTYSIS: 0
SHORTNESS OF BREATH: 0
COUGH: 0
BLOOD IN STOOL: 0
WHEEZING: 0
BLURRED VISION: 0
DIARRHEA: 0
VOMITING: 0
ABDOMINAL PAIN: 0
INSOMNIA: 0
WEAKNESS: 0
SORE THROAT: 0
PALPITATIONS: 0
SPEECH CHANGE: 1
DEPRESSION: 0
NAUSEA: 0
DIZZINESS: 0
FEVER: 0
TREMORS: 0
FOCAL WEAKNESS: 0
BRUISES/BLEEDS EASILY: 0

## 2019-12-12 NOTE — PROGRESS NOTES
FOLLOW-UP VASCULAR MED VISIT  Subjective:   Shaista Bocanegra is a 74 y.o. female who presents today 12/1219 for   Chief Complaint   Patient presents with   • Follow-Up     HTN, hx of CVA   Initially referred by Dr. Daniels (cardiology) for eval and mgmt of HTN in context of MVR and hx of CVA.     HPI:    HTN:  Current HTN concerns:   No concerns except for occasional elevated BPs.  Tolerating spironolactone, off Kcl    ADRs:  none   HTN sx:  No current blurred or changed vision, chest pain, shortness of breath, headache, nausea, dizziness/vertigo.   Reports some face recognition and proper nouns with memory loss.    Home BP log: avg 120s/70s, intermittent 140-150s/90s  Adherence to current HTN meds: compliant all of the time     Antiplatelet/anticoagulation:   Yes, Details: ASA 81mg      Hyperlipidemia:    Stable, no current concerns  Current treatment: High intensity statin   atorva 80mg   Myalgias? No  Other adverse drug reactions? No  Lipid profile: as noted below     Hx of CVA:  Stable, no new focal neuro s/s.  Seen by neurology recently.     LALY:  Using shayla advancement device in lieu of CPAP - feeling well. Pending with Dr. Weems.     Pertinent HTN hx:   Age at HTN dx:  At least 20 years   (if female, any hx of pregnancy-related HTN): none reported  Past HTN medications:  As noted takes triam/hctz with travel only   HTN crises:  CVA 8/2017, o/w negative   ASCVD risk factors:     DM: No   CKD:  No  Lifestyle factors:     High salt: No   EtOH: No  Interfering substances:     NSAIDs: No    Decongestants. No    ASCVD calculator (contraindicated if ASCVD+, [id number])   10yr-risk calc: n/a     Clinical evidence of ASCVD:     1) hx of MI/ACS:  No   2) coronary or other revascularization procedure: No   3) TIA/ischemic CVA: Yes, Details: hx of ischemic CVA 8/2017, R MCA   4) PAD (including MICAH <0.9): No   5) documented (CAD, Renal athero, AA due to athero, carotid plaque >50% stenosis: No    Major RFs:     1) Age (Men>44,  women>54):  yes   2) Fhx early CAD (<55 men, <65 women):  no   3) cigarette smoking:  No   4) high BP >139/89 or on tx: yes   5) Low HDL-C (<40 men, <50 women):  no    Social History     Tobacco Use   • Smoking status: Never Smoker   • Smokeless tobacco: Never Used   Substance Use Topics   • Alcohol use: Yes     Comment: 3 glasses of wine   • Drug use: No     Outpatient Encounter Medications as of 12/12/2019   Medication Sig Dispense Refill   • spironolactone (ALDACTONE) 25 MG Tab Take 1 Tab by mouth every day for 360 days. 90 Tab 3   • TESTOSTERONE TD Apply  to skin as directed.     • atorvastatin (LIPITOR) 80 MG tablet Take 1 Tab by mouth every evening. 90 Tab 3   • Biotin 28275 MCG Tab Take 1 Tab by mouth every day.     • aspirin (ASA) 81 MG Chew Tab chewable tablet Take 1 Tab by mouth every day. 14 Tab 1   • valacyclovir (VALTREX) 500 MG Tab Take 500 mg by mouth every day.     • Multiple Vitamins-Minerals (ICAPS AREDS 2 PO) Take 2 Tabs by mouth every day.     • calcium carbonate (OS-CRISTOFER 500) 500 MG Tab Take 1,000 mg by mouth every day.     • Multiple Vitamin (MULTIVITAMINS PO) Take 1 Tab by mouth every day.     • MYRBETRIQ 25 MG TABLET SR 24 HR      • [DISCONTINUED] hydroCHLOROthiazide (HYDRODIURIL) 12.5 MG tablet Take 12.5 mg by mouth every day.       No facility-administered encounter medications on file as of 12/12/2019.      No Known Allergies  DIET AND EXERCISE:  Weight Change: none   BMI Readings from Last 4 Encounters:   12/12/19 21.42 kg/m²   09/26/19 22.20 kg/m²   09/24/19 23.66 kg/m²   08/27/19 22.63 kg/m²      Diet: low fat, low sodium, reports some indiscretions over last 2 months   Exercise: moderate regular exercise program     Review of Systems   Constitutional: Negative for chills, fever and malaise/fatigue.   HENT: Negative for nosebleeds, sore throat and tinnitus.    Eyes: Negative for blurred vision and double vision.   Respiratory: Negative for cough, hemoptysis, shortness of breath and  "wheezing.    Cardiovascular: Negative for chest pain, palpitations, orthopnea and leg swelling.   Gastrointestinal: Negative for abdominal pain, blood in stool, diarrhea, melena, nausea and vomiting.   Genitourinary: Negative for hematuria.   Musculoskeletal: Negative for joint pain and myalgias.   Skin: Negative for itching and rash.   Neurological: Positive for speech change (word finding issues ). Negative for dizziness, tremors, focal weakness, seizures, weakness and headaches.   Endo/Heme/Allergies: Does not bruise/bleed easily.   Psychiatric/Behavioral: Negative for depression. The patient is not nervous/anxious and does not have insomnia.       Objective:     Vitals:    12/12/19 0843 12/12/19 0848   BP: 138/77 135/71   BP Location: Left arm Left arm   Patient Position: Sitting Sitting   BP Cuff Size: Small adult Small adult   Pulse: 78 73   Weight: 63.4 kg (139 lb 11.2 oz)    Height: 1.72 m (5' 7.72\")       BP Readings from Last 4 Encounters:   12/12/19 135/71   09/26/19 129/75   09/24/19 138/84   08/27/19 122/80      Body mass index is 21.42 kg/m².   BMI Readings from Last 4 Encounters:   12/12/19 21.42 kg/m²   09/26/19 22.20 kg/m²   09/24/19 23.66 kg/m²   08/27/19 22.63 kg/m²      Physical Exam   Constitutional: She is oriented to person, place, and time. She appears well-developed and well-nourished. No distress.   HENT:   Head: Normocephalic and atraumatic.   Neck: Normal range of motion. Neck supple.   Cardiovascular: Normal rate, regular rhythm, normal heart sounds and intact distal pulses. Exam reveals no gallop and no friction rub.   No murmur heard.  Pulses:       Carotid pulses are 2+ on the right side and 2+ on the left side.       Radial pulses are 2+ on the right side and 2+ on the left side.        Dorsalis pedis pulses are 2+ on the right side and 2+ on the left side.        Posterior tibial pulses are 2+ on the right side and 2+ on the left side.   Edema     RLE: none     LLE: none   Spider " telangectasia:       RLE:  None      LLE: none   Varicosities:         RLE: none      LLE: none   Corona phlebectatica:      RLE:  None       LLE:  None   Cording:         RLE:  None     LLE: None      Pulmonary/Chest: Effort normal and breath sounds normal.   Musculoskeletal: Normal range of motion.         General: No tenderness or edema.   Neurological: She is alert and oriented to person, place, and time.   Skin: Skin is warm and dry. She is not diaphoretic.   Psychiatric: She has a normal mood and affect.     Lab Results   Component Value Date    CHOLSTRLTOT 152 11/09/2018    LDL 63 11/09/2018    HDL 69 11/09/2018    TRIGLYCERIDE 98 11/09/2018               Lab Results   Component Value Date    SODIUM 143 10/28/2019    SODIUM 139 05/03/2019    POTASSIUM 3.9 10/28/2019    POTASSIUM 3.4 (L) 05/03/2019    CHLORIDE 105 10/28/2019    CHLORIDE 106 05/03/2019    CO2 22 10/28/2019    CO2 23 05/03/2019    GLUCOSE 92 10/28/2019    GLUCOSE 132 (H) 05/03/2019    BUN 20 10/28/2019    BUN 24 (H) 05/03/2019    CREATININE 1.13 (H) 10/28/2019    CREATININE 0.85 05/03/2019    BUNCREATRAT 18 10/28/2019    IFAFRICA 55 (L) 10/28/2019    IFAFRICA >60 05/03/2019    IFNOTAFR 47 (L) 10/28/2019    IFNOTAFR >60 05/03/2019        Lab Results   Component Value Date    WBC 5.5 05/03/2019    RBC 3.37 (L) 05/03/2019    HEMOGLOBIN 11.2 (L) 05/03/2019    HEMATOCRIT 34.3 (L) 05/03/2019    .8 (H) 05/03/2019    MCH 33.2 (H) 05/03/2019    MCHC 32.7 (L) 05/03/2019    MPV 9.2 05/03/2019      [facility] (9/9/19):  LDL 60, , HDL 64, cmp wnl with K=4.1, egfr =51, cbc wnl     VASCULAR IMAGING:     CTA head 8/2017  1.  There is a right M1 segment occlusion.  2.  There is collateral flow in the peripheral M3 branches seen on the right.  3.  There is mild decreased enhancement of the visualized right internal carotid artery however it is patent.  4.  Question of subtle hypodensity in the right insular cortex region. No abnormal brain parenchymal  enhancement is seen.    Carotid duplex 8/18/17   nl carotids, subclavians and vertebrals    CTA neck 8/18/17  1.  CT angiogram of the neck within normal limits.  2.  Thrombosed right M1 segment.    IR thrombectomy 8/18/17  1. Subtotal occlusion of the RIGHT M1 segment.  2. Successful RIGHT middle cerebral artery mechanical thrombectomy using Trevo stent.  3. Post thrombectomy arteriogram demonstrates patent lenticulostriate, RIGHT M1 and peripheral segments.    MRI brain 8/20/17  1.  Moderate sized RIGHT MCA territory acute ischemia involving the cortex and basal ganglia  2.  Flow void is present in the RIGHT MCA compatible with treatment effect  3.  No hemorrhage  4.  Mild white matter changes  5.  Mild atrophy    Echo 8/19/17  Agitated saline study was performed, no evidence of right to left   shunt.  Normal left ventricular size, wall thickness, and systolic function.  Left ventricular ejection fraction is visually estimated to be 60%.  Mildly dilated left atrium.  Moderate mitral regurgitation due to an eccentric jet.  Mild tricuspid regurgitation.  Right ventricular systolic pressure is estimated to be 35 mmHg.    Echo 6/15/18  Normal left ventricular systolic function.  Left ventricular ejection fraction is visually estimated to be 60%.  Myxomatous changes of the mitral valve.  Prolapse of the anterior and posterior mitral leaflets.  Severe, eccentric mitral regurgitation.  Right heart pressures are normal.  Compared to the report of the study done 8/19/2017 severe mitral   regurgitation is now present, previously reported as moderate but a MR   ERO was not recorded.     Echo 5/3/19  Prior echocardiogram 6/14/2018.  Normal left ventricular systolic function.   Mild to moderate eccentric mitral regurgitation.  Compared to the images of the study done - there has been slight   improvement in mitral regurgitation.    Medical Decision Making:  Today's Assessment / Status / Plan:     1. Essential hypertension   Basic Metabolic Panel    Comp Metabolic Panel    Lipid Profile   2. Dyslipidemia  Comp Metabolic Panel    Lipid Profile   3. Sleep apnea, unspecified type     4. Severe mitral regurgitation     5. Hypokalemia  Basic Metabolic Panel   6. History of CVA (cerebrovascular accident)     7. Non-rheumatic mitral regurgitation       Patient Type: Secondary Prevention    Etiology of Established CVD if Present:   1) ischemic CVA, Moderate sized RIGHT MCA territory acute ischemia involving the cortex and basal ganglia    Lipid Management: Qualifies for Statin Therapy Based on 2013 ACC/AHA Guidelines: yes  Calculated 10-Year Risk of ASCVD: N/A  Currently on Statin: Yes  NLA Risk Category:   Very high:  Evidence of ASCVD   Tx threshold:  non-HDL-C >99, LDL-C >69  Tx goal: non-HDL-C <100,  LDL-C <70  (optional: apoB <80)  At goal:  Yes  Tx plan:   - continue high-intensity atorvastatin 80mg   - consider addition of zetia 10mg if needed   - update labs Q6-12mo     Blood Pressure Management:Goal: ACC/AHA (2017) goal <130/80  Home BP at goal:  Yes, occ high   Office BP at goal:  No, mild high SBP  ? Masked HTN component in the past.   No indications of primary zev on prior labs   Echo shows no LVH and normal LVEF but severe MVR  ACR; normal   Stopped triam/hctz, lisinopril in the past.   Stopped metoprolol due to lack of indication.   Stopped HCTZ due to low K and hypotension  Stopped amlodipine/olmesartan due to low BP and mild swelling   Plan:   - continue home BP monitoring, reviewed correct technique:  Yes   - contact our office if lows <90/55 consistently for med adjustments   - monitor lytes/GFR  Meds:  - continue spironolactone 25mg daily, update lytes, increase to 50mg if >140/>90 consistently    Glycemic Status: Normal    Anti-Platelet/Anti-Coagulant Tx: yes  - continue ASA 81mg daily, defer adjustments to neurology      Smoking: continued complete avoidance of all tobacco products     Physical Activity: continue healthy  activity to improve CV fitness, see care instructions for additional details     Weight Management and Nutrition: Dietary plan was discussed with patient at this visit including DASH, low sodium and/or as outlined in care instructions     Other:   1) hx of CVA  - continue antiplat and BP mgmt  - defer mgmt to neurology for ongoing care   - ED precautions for acute CVA symptoms reviewed     2) s/p thrombectomy of R MCA  - defer repeat imaging to neurosurg or neurology     3) hx of severe MVR  - defer mgmt to cardiology for ongoing care - Dr. Meehan/martha  - pending echo 2/2020    4) syncopal episode.  5/2019. Resolved, non-recurrent. Appears to be multifactorial due to vasovagal from dehydration and low kcal intake with high energy output in addition to hypotension  - continue to monitor   - push fluids  - holding all BP meds for now and monitoring   - f/u with PCP or our office is any additional episodes occur     5) hypoK+.  Resolved, prior low = 3.4. Presumed related to thiazide, now stable.    - off Kcl, continued spironolactone   - monitor lytes consistently     6) LALY - using mandibular advancement, off CPAP  - continue mandibular advancement  - defer mgmt to sleep MD - pending appt     Instructed to follow-up with PCP for remainder of adult medical needs: yes  We will partner with other providers in the management of established vascular disease and cardiometabolic risk factors.    Studies to Be Obtained:  Echo 2/2020 per cardio  Labs to Be Obtained: BMP now, then CMP, lipid in 3 mo    Follow up in:  5-6mo with me    Travon Jaramillo M.D.

## 2019-12-12 NOTE — PATIENT INSTRUCTIONS
1) goal <130/<80 on average   2) if you are consistently > 140 or >90 for 3-5 days in a row, then increase spironolactone to 2 tablets daily (50mg) - and call our office   3) check labs now and then 3 months     Blood pressure and BP-lowering diet:  If you are being treated for blood pressure today: please be advised that stabilizing BP may require multiple med changes and close monitoring over the next several months and initially there may be additional imaging and labs and the possibility of adverse drug reactions. Variability of your blood pressure and heart rate may occur until we have things stabilized.  Please feel free to contact our office as needed for questions or concerns.      SODIUM RESTRICTIONS: - consume no more than 2,300mg of sodium daily (look at food labels) ,or if you have high BP then reduce to no more than 1,500mg of sodium daily   For more information visit: https://www.[employer]/contents/low-sodium-diet-the-basics/print?svstfOir=4371&source=see_link     DASH diet   (https://www.heart.org/en/health-topics/high-blood-pressure/changes-you-can-make-to-manage-high-blood-pressure/managing-blood-pressure-with-a-heart-healthy-diet)    - if you notice BP > 140/>90 for more than 3 days, then contact our office for further instructions    Cholesterol-reducing diets:   - AHA diet  (http://www.heart.org/en/healthy-living/healthy-eating/eat-smart/nutrition-basics/aha-diet-and-lifestyle-recommendations)   - Mediterranean diet (http://www.heart.org/en/healthy-living/healthy-eating/eat-smart/nutrition-basics/mediterranean-diet)   - lowering triglycerides: (http://my.americanheart.org/idc/groups/ahamah-public/@wcm/@sop/@[facility]/documents/downloadable/m_425988.pdf)     Physical activity: Unless directed otherwise at today's visit or you are physically incapable due to your current health or medical conditions, it is advised per the American Heart Association to engage in moderate to vigorous physical activity  such as brisk walking, swimming, cycling, >150 minutes per week at 30-40 minutes per session, 3 to 5 times weekly.      General healthly nutrition advice:  - the USDA food pattern (https://www.cnpp.usda.gov/USDAFoodPatterns)  - plate method (https://www.choosemyplate.gov/)  - consume diet that emphasizes intake of vegetables, fruits, and whole grains,  - use low fat diary products, poultry, fish, legumes, nontropical vegetable oils, nuts  - limit intake of sweets, sugar-sweetned beverages, and red meats   - reduce saturated and trans fats to <6% of your daily calories

## 2019-12-18 ENCOUNTER — TELEPHONE (OUTPATIENT)
Dept: VASCULAR LAB | Facility: MEDICAL CENTER | Age: 74
End: 2019-12-18

## 2019-12-18 LAB
BUN SERPL-MCNC: 20 MG/DL (ref 8–27)
BUN/CREAT SERPL: 25 (ref 12–28)
CALCIUM SERPL-MCNC: 9.9 MG/DL (ref 8.7–10.3)
CHLORIDE SERPL-SCNC: 100 MMOL/L (ref 96–106)
CO2 SERPL-SCNC: 24 MMOL/L (ref 20–29)
CREAT SERPL-MCNC: 0.79 MG/DL (ref 0.57–1)
GLUCOSE SERPL-MCNC: 93 MG/DL (ref 65–99)
POTASSIUM SERPL-SCNC: 4 MMOL/L (ref 3.5–5.2)
SODIUM SERPL-SCNC: 142 MMOL/L (ref 134–144)

## 2019-12-18 NOTE — TELEPHONE ENCOUNTER
"Patient called back and left message. She says that she will have \"local screening\" bone density done before she comes in to see Dr. Laurent for follow up. She does not need an order for this test.  "

## 2019-12-18 NOTE — TELEPHONE ENCOUNTER
Spoke with pt over the phone.  Let her know her lab work has improved and to continue her current BP medications.  Follow up as planned.    Radha PIERRE  [facility]

## 2019-12-18 NOTE — TELEPHONE ENCOUNTER
I spoke with patient and asked her to clarify when her insurance would cover her next bone density study, within a 12 month period or within the 12 month calendar year. I explained that we would like to avoid insurance denying payment. Patient stated that she would contact her insurance company and call me back with the information.

## 2020-01-27 ENCOUNTER — TELEPHONE (OUTPATIENT)
Dept: RHEUMATOLOGY | Facility: MEDICAL CENTER | Age: 75
End: 2020-01-27

## 2020-01-27 NOTE — TELEPHONE ENCOUNTER
Spoke with patient regarding appointment scheduled on 2/20/20 with Dr Laurent. Appointment cancelled since provider will no longer be in our office. I explained to patient that at this point I can only place her on a recall list for when ever I am able to bring her in with Dr Laurent's replacement. Patient wanted a Dexa scan ordered. Dexa scan on file for 5/31/2019 patient isn't due until 5/31/2020. I advised patient of this and also advised to follow up with PCP

## 2020-01-30 NOTE — PROGRESS NOTES
PHYSICIAN NEXT STEPS:  Call the Patient    CHIEF COMPLAINT:  Chief Complaint/Protocol Used: Eye - Swelling  Onset: Swelling to both eyes      ASSESSMENT:  ? Onset: Swelling to both eyes  ? Normal True  ? Normal, no trouble breathing True  ? Onset: 1/17/20  ? Location: Both eyes  ? Severity: Moderate  ? Itching: Yes. Mild  ? Cause: Due to taking Hydroxyzine HCL   ? Other Symptoms: Constipation, burning when urination  ? Pregnancy: n/a  -------------------------------------------------------    DISPOSITION:  Disposition Recommendation: Home Care  Questions that led to disposition:  ? Eyelid swelling from suspected mild irritant  Patient Directed To: Unspecified  Patient Intended Action: Unspecified    CALL NOTES:  01/30/2020 at 8:07 AM by Monica Babb  ? Declined 24 hour disposition. Requesting to send a message to the doctor.    DISPOSITION OVERRIDE/PROVIDER CONSULT:  Disposition Override: See Physician within 24 Hours  Override Source: Nurse clinical judgment  Consulted with PCP: No  Consulted with On-Call Physician: No    CALLER CONTACT INFO:  Name: Connor Galindo (Self)  Phone 1: [phone] (Mobile)  Phone 2: [phone] (Home Phone)  Phone 3: [phone] (Work Phone)      ENCOUNTER STARTED:  01/30/20 07:55:09 AM  ENCOUNTER ASSIGNED TO/CLOSED BY:  Monica Babb @ 01/30/20 08:08:28 AM      -------------------------------------------------------    CARE ADVICE given per Eye - Swelling guideline.  LOCAL COLD: Apply a cool, wet washcloth to the area for 20 minutes.; CALL BACK IF:    * Swelling persists over 3 days    * Swelling becomes red or painful to the touch    * You become worse.      UNDERSTANDS CARE ADVICE: Yes    AGREES WITH CARE ADVICE: Yes    WILL FOLLOW CARE ADVICE: Yes    -------------------------------------------------------   Paged MD Junior    16:37 MD Junior returned page and updated on the growth of the hematoma on the right forearm. Orders to keeley the new growth with a time and to add a nonstick pressure dressing. ABD pad and coband used for the pressure dressing.

## 2020-02-03 ENCOUNTER — HOSPITAL ENCOUNTER (OUTPATIENT)
Dept: CARDIOLOGY | Facility: MEDICAL CENTER | Age: 75
End: 2020-02-03
Attending: INTERNAL MEDICINE
Payer: COMMERCIAL

## 2020-02-03 ENCOUNTER — TELEPHONE (OUTPATIENT)
Dept: CARDIOLOGY | Facility: MEDICAL CENTER | Age: 75
End: 2020-02-03

## 2020-02-03 DIAGNOSIS — I34.1 MVP (MITRAL VALVE PROLAPSE): Chronic | ICD-10-CM

## 2020-02-03 DIAGNOSIS — I34.0 SEVERE MITRAL REGURGITATION: ICD-10-CM

## 2020-02-03 LAB
LV EJECT FRACT  99904: 65
LV EJECT FRACT MOD 2C 99903: 76.61
LV EJECT FRACT MOD 4C 99902: 71.48
LV EJECT FRACT MOD BP 99901: 72.01

## 2020-02-03 PROCEDURE — 93306 TTE W/DOPPLER COMPLETE: CPT

## 2020-02-03 PROCEDURE — 93306 TTE W/DOPPLER COMPLETE: CPT | Mod: 26 | Performed by: INTERNAL MEDICINE

## 2020-02-03 NOTE — TELEPHONE ENCOUNTER
Darin Meehan M.D.  [facility]             Please call patient with echocardiogram results showing unchanged mitral valve prolapse and severe mitral regurgitation.     Thanks.  LILI      S/w pt she was worried that her previous echo stated moderate mitral regurgitation and now LILI is saying severe. Discussed with pt that MD is aware but that as of right now he would not like to make any changes to POC at this time and to follow up with him at her regular time

## 2020-02-10 ENCOUNTER — APPOINTMENT (RX ONLY)
Dept: URBAN - METROPOLITAN AREA CLINIC 4 | Facility: CLINIC | Age: 75
Setting detail: DERMATOLOGY
End: 2020-02-10

## 2020-02-10 DIAGNOSIS — L57.0 ACTINIC KERATOSIS: ICD-10-CM

## 2020-02-10 DIAGNOSIS — D22 MELANOCYTIC NEVI: ICD-10-CM

## 2020-02-10 DIAGNOSIS — L90.5 SCAR CONDITIONS AND FIBROSIS OF SKIN: ICD-10-CM

## 2020-02-10 DIAGNOSIS — L82.1 OTHER SEBORRHEIC KERATOSIS: ICD-10-CM

## 2020-02-10 DIAGNOSIS — L81.4 OTHER MELANIN HYPERPIGMENTATION: ICD-10-CM

## 2020-02-10 PROBLEM — D22.72 MELANOCYTIC NEVI OF LEFT LOWER LIMB, INCLUDING HIP: Status: ACTIVE | Noted: 2020-02-10

## 2020-02-10 PROBLEM — D22.5 MELANOCYTIC NEVI OF TRUNK: Status: ACTIVE | Noted: 2020-02-10

## 2020-02-10 PROCEDURE — ? DIAGNOSIS COMMENT

## 2020-02-10 PROCEDURE — 99213 OFFICE O/P EST LOW 20 MIN: CPT

## 2020-02-10 PROCEDURE — ? COUNSELING

## 2020-02-10 PROCEDURE — ? OBSERVATION AND MEASURE

## 2020-02-10 ASSESSMENT — LOCATION DETAILED DESCRIPTION DERM
LOCATION DETAILED: LEFT CENTRAL FRONTAL SCALP
LOCATION DETAILED: MID POSTERIOR NECK
LOCATION DETAILED: LEFT BUTTOCK
LOCATION DETAILED: RIGHT VENTRAL PROXIMAL FOREARM
LOCATION DETAILED: RIGHT SUPERIOR MEDIAL FOREHEAD
LOCATION DETAILED: RIGHT INFERIOR VERMILION LIP
LOCATION DETAILED: LEFT DORSAL 5TH TOE
LOCATION DETAILED: LEFT SUPERIOR UPPER BACK
LOCATION DETAILED: RIGHT POSTERIOR SHOULDER
LOCATION DETAILED: RIGHT BUTTOCK
LOCATION DETAILED: LEFT POSTERIOR SHOULDER
LOCATION DETAILED: RIGHT RADIAL DORSAL HAND
LOCATION DETAILED: LEFT SUPERIOR MEDIAL FOREHEAD

## 2020-02-10 ASSESSMENT — LOCATION ZONE DERM
LOCATION ZONE: FACE
LOCATION ZONE: SCALP
LOCATION ZONE: NECK
LOCATION ZONE: LIP
LOCATION ZONE: ARM
LOCATION ZONE: TRUNK
LOCATION ZONE: TOE
LOCATION ZONE: HAND

## 2020-02-10 ASSESSMENT — LOCATION SIMPLE DESCRIPTION DERM
LOCATION SIMPLE: LEFT UPPER BACK
LOCATION SIMPLE: LEFT SHOULDER
LOCATION SIMPLE: RIGHT FOREARM
LOCATION SIMPLE: RIGHT SHOULDER
LOCATION SIMPLE: LEFT SCALP
LOCATION SIMPLE: RIGHT BUTTOCK
LOCATION SIMPLE: POSTERIOR NECK
LOCATION SIMPLE: RIGHT HAND
LOCATION SIMPLE: LEFT FOREHEAD
LOCATION SIMPLE: LEFT 5TH TOE
LOCATION SIMPLE: RIGHT FOREHEAD
LOCATION SIMPLE: RIGHT LIP
LOCATION SIMPLE: LEFT BUTTOCK

## 2020-02-10 NOTE — PROCEDURE: DIAGNOSIS COMMENT
Comment: See photo 8/12/19
Detail Level: Detailed
Comment: History of skin cancers
Detail Level: Simple

## 2020-02-10 NOTE — PROCEDURE: MIPS QUALITY
Quality 130: Documentation Of Current Medications In The Medical Record: Current Medications Documented
Quality 226: Preventive Care And Screening: Tobacco Use: Screening And Cessation Intervention: Patient screened for tobacco use and is an ex/non-smoker
Detail Level: Detailed
Quality 111:Pneumonia Vaccination Status For Older Adults: Pneumococcal Vaccination Previously Received
Quality 431: Preventive Care And Screening: Unhealthy Alcohol Use - Screening: Patient screened for unhealthy alcohol use using a single question and scores less than 2 times per year
Quality 110: Preventive Care And Screening: Influenza Immunization: Influenza Immunization Administered during Influenza season

## 2020-02-21 ENCOUNTER — HOSPITAL ENCOUNTER (OUTPATIENT)
Dept: RADIOLOGY | Facility: MEDICAL CENTER | Age: 75
End: 2020-02-21
Attending: OBSTETRICS & GYNECOLOGY
Payer: COMMERCIAL

## 2020-02-21 DIAGNOSIS — Z12.31 VISIT FOR SCREENING MAMMOGRAM: ICD-10-CM

## 2020-02-21 PROCEDURE — 77067 SCR MAMMO BI INCL CAD: CPT

## 2020-03-02 ENCOUNTER — APPOINTMENT (RX ONLY)
Dept: URBAN - METROPOLITAN AREA CLINIC 4 | Facility: CLINIC | Age: 75
Setting detail: DERMATOLOGY
End: 2020-03-02

## 2020-03-02 DIAGNOSIS — L57.0 ACTINIC KERATOSIS: ICD-10-CM

## 2020-03-02 PROCEDURE — ? LIQUID NITROGEN

## 2020-03-02 PROCEDURE — 17003 DESTRUCT PREMALG LES 2-14: CPT

## 2020-03-02 PROCEDURE — ? COUNSELING

## 2020-03-02 PROCEDURE — 17000 DESTRUCT PREMALG LESION: CPT

## 2020-03-02 ASSESSMENT — LOCATION DETAILED DESCRIPTION DERM
LOCATION DETAILED: RIGHT SUPERIOR MEDIAL FOREHEAD
LOCATION DETAILED: LEFT MEDIAL FRONTAL SCALP
LOCATION DETAILED: RIGHT INFERIOR VERMILION LIP

## 2020-03-02 ASSESSMENT — LOCATION ZONE DERM
LOCATION ZONE: LIP
LOCATION ZONE: SCALP
LOCATION ZONE: FACE

## 2020-03-02 ASSESSMENT — LOCATION SIMPLE DESCRIPTION DERM
LOCATION SIMPLE: RIGHT FOREHEAD
LOCATION SIMPLE: RIGHT LIP
LOCATION SIMPLE: LEFT SCALP

## 2020-03-02 NOTE — HPI: SKIN LESIONS
Is This A New Presentation, Or A Follow-Up?: Skin Lesions
How Severe Is Your Skin Lesion?: mild
Have Your Skin Lesions Been Treated?: not been treated
Additional History: The patient is here for liquid nitrogen treatment as she did not want Ln2 done at her last visit due to a trip.

## 2020-03-24 LAB
ALBUMIN SERPL-MCNC: 4.5 G/DL (ref 3.7–4.7)
ALBUMIN/GLOB SERPL: 1.8 {RATIO} (ref 1.2–2.2)
ALP SERPL-CCNC: 70 IU/L (ref 39–117)
ALT SERPL-CCNC: 23 IU/L (ref 0–32)
AST SERPL-CCNC: 30 IU/L (ref 0–40)
BILIRUB SERPL-MCNC: 0.6 MG/DL (ref 0–1.2)
BUN SERPL-MCNC: 19 MG/DL (ref 8–27)
BUN/CREAT SERPL: 19 (ref 12–28)
CALCIUM SERPL-MCNC: 9.7 MG/DL (ref 8.7–10.3)
CHLORIDE SERPL-SCNC: 103 MMOL/L (ref 96–106)
CHOLEST SERPL-MCNC: 144 MG/DL (ref 100–199)
CO2 SERPL-SCNC: 24 MMOL/L (ref 20–29)
CREAT SERPL-MCNC: 1.01 MG/DL (ref 0.57–1)
GLOBULIN SER CALC-MCNC: 2.5 G/DL (ref 1.5–4.5)
GLUCOSE SERPL-MCNC: 92 MG/DL (ref 65–99)
HDLC SERPL-MCNC: 73 MG/DL
LABORATORY COMMENT REPORT: NORMAL
LDLC SERPL CALC-MCNC: 58 MG/DL (ref 0–99)
POTASSIUM SERPL-SCNC: 4.3 MMOL/L (ref 3.5–5.2)
PROT SERPL-MCNC: 7 G/DL (ref 6–8.5)
SODIUM SERPL-SCNC: 143 MMOL/L (ref 134–144)
TRIGL SERPL-MCNC: 67 MG/DL (ref 0–149)
VLDLC SERPL CALC-MCNC: 13 MG/DL (ref 5–40)

## 2020-03-27 ENCOUNTER — SLEEP CENTER VISIT (OUTPATIENT)
Dept: SLEEP MEDICINE | Facility: MEDICAL CENTER | Age: 75
End: 2020-03-27
Payer: COMMERCIAL

## 2020-03-27 VITALS
DIASTOLIC BLOOD PRESSURE: 68 MMHG | RESPIRATION RATE: 16 BRPM | WEIGHT: 140.9 LBS | OXYGEN SATURATION: 98 % | HEIGHT: 67 IN | BODY MASS INDEX: 22.11 KG/M2 | HEART RATE: 62 BPM | SYSTOLIC BLOOD PRESSURE: 122 MMHG

## 2020-03-27 DIAGNOSIS — G47.33 OSA (OBSTRUCTIVE SLEEP APNEA): ICD-10-CM

## 2020-03-27 PROCEDURE — 99203 OFFICE O/P NEW LOW 30 MIN: CPT | Performed by: INTERNAL MEDICINE

## 2020-03-27 RX ORDER — POTASSIUM CHLORIDE 750 MG/1
10 CAPSULE, EXTENDED RELEASE ORAL 2 TIMES DAILY
COMMUNITY
End: 2020-08-25 | Stop reason: SDUPTHER

## 2020-03-27 RX ORDER — ACETAMINOPHEN 160 MG
2000 TABLET,DISINTEGRATING ORAL
COMMUNITY
End: 2022-01-26

## 2020-03-27 SDOH — HEALTH STABILITY: MENTAL HEALTH: HOW OFTEN DO YOU HAVE A DRINK CONTAINING ALCOHOL?: 2-3 TIMES A WEEK

## 2020-03-27 SDOH — HEALTH STABILITY: MENTAL HEALTH: HOW MANY STANDARD DRINKS CONTAINING ALCOHOL DO YOU HAVE ON A TYPICAL DAY?: 1 OR 2

## 2020-03-27 ASSESSMENT — FIBROSIS 4 INDEX: FIB4 SCORE: 2.4

## 2020-03-27 NOTE — PROGRESS NOTES
Chief Complaint   Patient presents with   • New Patient     sleep consultation, uses dental device       HPI: This patient is a 75 y.o. female who is referred by Dr. Crane, PCP, for sleep apnea management.  She is a former patient of Dr. Vaughn at [facility] she was diagnosed with mild LALY per polysomnography, AHI 11/h and prescribed CPAP, which she disliked and has switched to an oral appliance.  She is continuing to have her oral mandibular advancement device advanced by Dr. Chan DDS.  In terms of sleep habits, she gets to bed by 9:30 PM, falling asleep within 20 minutes.  She has on average 1 nighttime awakening to use the bathroom.  She was on a recent cruise with a girlfriend and discovered she still snored.  She denies restless leg symptoms.  She gets up by 4:30 AM, feeling relatively rested.  Calhoun sleepiness score is 7.  Denies tobacco, alcohol, caffeine or recreational drug use.  Her BMI is 22.      Past Medical History:   Diagnosis Date   • Atrial fibrillation (HCC)    • Benign essential HTN 3/19/2012   • Breast cancer (HCC)    • Cancer (HCC)    • Cardiac arrhythmia    • Chest tightness or pressure 3/19/2012   • Chickenpox    • Coronary heart disease    • [place] measles    • High risk medication use 3/19/2012   • Hypercholesterolemia 3/19/2012   • Mumps    • MVP (mitral valve prolapse) 3/19/2012   • Osteoporosis    • Renal insufficiency 3/19/2012   • Stroke (HCC)    • Tonsillitis    • Valvular heart disease    • Venereal disease        Social History     Socioeconomic History   • Marital status:      Spouse name: Not on file   • Number of children: Not on file   • Years of education: Not on file   • Highest education level: Not on file   Occupational History   • Not on file   Social Needs   • Financial resource strain: Not on file   • Food insecurity     Worry: Not on file     Inability: Not on file   • Transportation needs     Medical: Not on file     Non-medical: Not on  file   Tobacco Use   • Smoking status: Never Smoker   • Smokeless tobacco: Never Used   Substance and Sexual Activity   • Alcohol use: Yes     Frequency: 2-3 times a week     Drinks per session: 1 or 2     Comment: 3 glasses of wine   • Drug use: No   • Sexual activity: Not on file   Lifestyle   • Physical activity     Days per week: Not on file     Minutes per session: Not on file   • Stress: Not on file   Relationships   • Social connections     Talks on phone: Not on file     Gets together: Not on file     Attends [employer] service: Not on file     Active member of club or organization: Not on file     Attends meetings of clubs or organizations: Not on file     Relationship status: Not on file   • Intimate partner violence     Fear of current or ex partner: Not on file     Emotionally abused: Not on file     Physically abused: Not on file     Forced sexual activity: Not on file   Other Topics Concern   • Not on file   Social History Narrative   • Not on file       Family History   Problem Relation Age of Onset   • Cancer Mother    • Heart Failure Neg Hx    • Heart Disease Neg Hx        Current Outpatient Medications on File Prior to Visit   Medication Sig Dispense Refill   • Cholecalciferol (VITAMIN D3) 2000 UNIT Cap Take  by mouth.     • potassium chloride (MICRO-K) 10 MEQ capsule Take 10 mEq by mouth 2 times a day.     • MYRBETRIQ 25 MG TABLET SR 24 HR      • spironolactone (ALDACTONE) 25 MG Tab Take 1 Tab by mouth every day for 360 days. 90 Tab 3   • TESTOSTERONE TD Apply  to skin as directed.     • atorvastatin (LIPITOR) 80 MG tablet Take 1 Tab by mouth every evening. 90 Tab 3   • Biotin 81086 MCG Tab Take 1 Tab by mouth every day.     • aspirin (ASA) 81 MG Chew Tab chewable tablet Take 1 Tab by mouth every day. 14 Tab 1   • valacyclovir (VALTREX) 500 MG Tab Take 500 mg by mouth every day.     • Multiple Vitamins-Minerals (ICAPS AREDS 2 PO) Take 2 Tabs by mouth every day.     • calcium carbonate (OS-CRISTOFER 500)  "500 MG Tab Take 1,000 mg by mouth every day.     • Multiple Vitamin (MULTIVITAMINS PO) Take 1 Tab by mouth every day.       No current facility-administered medications on file prior to visit.        Allergies: Patient has no known allergies.    ROS:   Constitutional: Denies fevers, chills, night sweats, +fatigue, denies weight loss  Eyes: Denies vision loss, pain, drainage, double vision  Ears, Nose, Throat: Denies earache, +difficulty hearing, denies tinnitus, nasal congestion, hoarseness  Cardiovascular: Denies chest pain, tightness, palpitations, orthopnea or edema  Respiratory: Denies shortness of breath, cough, wheezing, hemoptysis  Sleep: Denies daytime sleepiness, snoring, apneas, insomnia, morning headaches  GI: Denies heartburn, dysphagia, nausea, abdominal pain, diarrhea,+constipation  : +frequent urination, denies hematuria, discharge or painful urination  Musculoskeletal: Denies back pain, painful joints, sore muscles  Neurological: Denies weakness or headaches  Skin: No rashes    /68 (BP Location: Left arm, Patient Position: Sitting, BP Cuff Size: Adult)   Pulse 62   Resp 16   Ht 1.702 m (5' 7\")   Wt 63.9 kg (140 lb 14.4 oz)   SpO2 98%     Physical Exam:  Appearance: Well-nourished, well-developed, in no acute distress  HEENT: Normocephalic, atraumatic, white sclera, pupils equal  Neck: No masses  Respiratory: no intercostal retractions or accessory muscle use  Lungs auscultation: Clear to auscultation bilaterally  Cardiovascular: Regular rate rhythm. No murmurs, rubs or gallops.  No LE edema  Abdomen: soft, nondistended  Gait: Normal  Digits: No clubbing, cyanosis  Motor: No focal deficits  Orientation: Oriented to time, person and place    Diagnosis:  1. LALY (obstructive sleep apnea)         Plan:  The patient has mild LALY per polysomnography, currently using an oral appliance, which continues to be advanced.    Sleep study records requested from [facility].  Once she is at " maximal advancement we will repeat home polysomnography to reassess efficacy of appliance therapy.  Return in about 6 months (around 9/27/2020).

## 2020-04-16 DIAGNOSIS — E78.5 DYSLIPIDEMIA: ICD-10-CM

## 2020-04-17 RX ORDER — ATORVASTATIN CALCIUM 80 MG/1
80 TABLET, FILM COATED ORAL EVERY EVENING
Qty: 90 TAB | Refills: 1 | Status: SHIPPED | OUTPATIENT
Start: 2020-04-17 | End: 2020-12-17

## 2020-05-07 ENCOUNTER — HOSPITAL ENCOUNTER (INPATIENT)
Facility: MEDICAL CENTER | Age: 75
LOS: 3 days | DRG: 287 | End: 2020-05-10
Attending: EMERGENCY MEDICINE | Admitting: HOSPITALIST
Payer: COMMERCIAL

## 2020-05-07 ENCOUNTER — APPOINTMENT (OUTPATIENT)
Dept: RADIOLOGY | Facility: MEDICAL CENTER | Age: 75
DRG: 287 | End: 2020-05-07
Attending: EMERGENCY MEDICINE
Payer: COMMERCIAL

## 2020-05-07 DIAGNOSIS — R42 DIZZINESS: ICD-10-CM

## 2020-05-07 DIAGNOSIS — E86.0 DEHYDRATION: ICD-10-CM

## 2020-05-07 PROBLEM — I50.9 HEART FAILURE DUE TO VALVULAR DISEASE (HCC): Status: ACTIVE | Noted: 2020-05-07

## 2020-05-07 PROBLEM — R55 NEAR SYNCOPE: Status: ACTIVE | Noted: 2020-05-07

## 2020-05-07 PROBLEM — I38 HEART FAILURE DUE TO VALVULAR DISEASE (HCC): Status: ACTIVE | Noted: 2020-05-07

## 2020-05-07 LAB
ALBUMIN SERPL BCP-MCNC: 3.6 G/DL (ref 3.2–4.9)
ALBUMIN/GLOB SERPL: 1.6 G/DL
ALP SERPL-CCNC: 42 U/L (ref 30–99)
ALT SERPL-CCNC: 16 U/L (ref 2–50)
ANION GAP SERPL CALC-SCNC: 10 MMOL/L (ref 7–16)
AST SERPL-CCNC: 18 U/L (ref 12–45)
BASOPHILS # BLD AUTO: 0.3 % (ref 0–1.8)
BASOPHILS # BLD: 0.01 K/UL (ref 0–0.12)
BILIRUB SERPL-MCNC: 0.3 MG/DL (ref 0.1–1.5)
BUN SERPL-MCNC: 21 MG/DL (ref 8–22)
CALCIUM SERPL-MCNC: 8.1 MG/DL (ref 8.5–10.5)
CHLORIDE SERPL-SCNC: 102 MMOL/L (ref 96–112)
CO2 SERPL-SCNC: 23 MMOL/L (ref 20–33)
CREAT SERPL-MCNC: 0.83 MG/DL (ref 0.5–1.4)
EKG IMPRESSION: NORMAL
EOSINOPHIL # BLD AUTO: 0.03 K/UL (ref 0–0.51)
EOSINOPHIL NFR BLD: 0.8 % (ref 0–6.9)
ERYTHROCYTE [DISTWIDTH] IN BLOOD BY AUTOMATED COUNT: 52 FL (ref 35.9–50)
GLOBULIN SER CALC-MCNC: 2.2 G/DL (ref 1.9–3.5)
GLUCOSE SERPL-MCNC: 121 MG/DL (ref 65–99)
HCT VFR BLD AUTO: 35.5 % (ref 37–47)
HGB BLD-MCNC: 11.8 G/DL (ref 12–16)
IMM GRANULOCYTES # BLD AUTO: 0.01 K/UL (ref 0–0.11)
IMM GRANULOCYTES NFR BLD AUTO: 0.3 % (ref 0–0.9)
LYMPHOCYTES # BLD AUTO: 1.1 K/UL (ref 1–4.8)
LYMPHOCYTES NFR BLD: 30.3 % (ref 22–41)
MCH RBC QN AUTO: 34.5 PG (ref 27–33)
MCHC RBC AUTO-ENTMCNC: 33.2 G/DL (ref 33.6–35)
MCV RBC AUTO: 103.8 FL (ref 81.4–97.8)
MONOCYTES # BLD AUTO: 0.32 K/UL (ref 0–0.85)
MONOCYTES NFR BLD AUTO: 8.8 % (ref 0–13.4)
NEUTROPHILS # BLD AUTO: 2.16 K/UL (ref 2–7.15)
NEUTROPHILS NFR BLD: 59.5 % (ref 44–72)
NRBC # BLD AUTO: 0 K/UL
NRBC BLD-RTO: 0 /100 WBC
NT-PROBNP SERPL IA-MCNC: 736 PG/ML (ref 0–125)
PLATELET # BLD AUTO: 160 K/UL (ref 164–446)
PMV BLD AUTO: 9.8 FL (ref 9–12.9)
POTASSIUM SERPL-SCNC: 4.1 MMOL/L (ref 3.6–5.5)
PROT SERPL-MCNC: 5.8 G/DL (ref 6–8.2)
RBC # BLD AUTO: 3.42 M/UL (ref 4.2–5.4)
SODIUM SERPL-SCNC: 135 MMOL/L (ref 135–145)
TROPONIN T SERPL-MCNC: 6 NG/L (ref 6–19)
WBC # BLD AUTO: 3.6 K/UL (ref 4.8–10.8)

## 2020-05-07 PROCEDURE — 84484 ASSAY OF TROPONIN QUANT: CPT

## 2020-05-07 PROCEDURE — 770020 HCHG ROOM/CARE - TELE (206)

## 2020-05-07 PROCEDURE — A9270 NON-COVERED ITEM OR SERVICE: HCPCS | Performed by: HOSPITALIST

## 2020-05-07 PROCEDURE — 700105 HCHG RX REV CODE 258: Performed by: EMERGENCY MEDICINE

## 2020-05-07 PROCEDURE — 700102 HCHG RX REV CODE 250 W/ 637 OVERRIDE(OP): Performed by: HOSPITALIST

## 2020-05-07 PROCEDURE — 85025 COMPLETE CBC W/AUTO DIFF WBC: CPT

## 2020-05-07 PROCEDURE — 80053 COMPREHEN METABOLIC PANEL: CPT

## 2020-05-07 PROCEDURE — 99285 EMERGENCY DEPT VISIT HI MDM: CPT

## 2020-05-07 PROCEDURE — 83880 ASSAY OF NATRIURETIC PEPTIDE: CPT

## 2020-05-07 PROCEDURE — 99223 1ST HOSP IP/OBS HIGH 75: CPT | Performed by: HOSPITALIST

## 2020-05-07 PROCEDURE — 70450 CT HEAD/BRAIN W/O DYE: CPT

## 2020-05-07 PROCEDURE — 93005 ELECTROCARDIOGRAM TRACING: CPT | Performed by: EMERGENCY MEDICINE

## 2020-05-07 RX ORDER — ACETAMINOPHEN 325 MG/1
650 TABLET ORAL EVERY 6 HOURS PRN
Status: DISCONTINUED | OUTPATIENT
Start: 2020-05-07 | End: 2020-05-10 | Stop reason: HOSPADM

## 2020-05-07 RX ORDER — ONDANSETRON 4 MG/1
4 TABLET, ORALLY DISINTEGRATING ORAL EVERY 4 HOURS PRN
Status: DISCONTINUED | OUTPATIENT
Start: 2020-05-07 | End: 2020-05-10 | Stop reason: HOSPADM

## 2020-05-07 RX ORDER — ATORVASTATIN CALCIUM 80 MG/1
80 TABLET, FILM COATED ORAL EVERY EVENING
Status: DISCONTINUED | OUTPATIENT
Start: 2020-05-07 | End: 2020-05-10 | Stop reason: HOSPADM

## 2020-05-07 RX ORDER — POLYETHYLENE GLYCOL 3350 17 G/17G
1 POWDER, FOR SOLUTION ORAL
Status: DISCONTINUED | OUTPATIENT
Start: 2020-05-07 | End: 2020-05-10 | Stop reason: HOSPADM

## 2020-05-07 RX ORDER — SPIRONOLACTONE 25 MG/1
25 TABLET ORAL DAILY
Status: DISCONTINUED | OUTPATIENT
Start: 2020-05-07 | End: 2020-05-10 | Stop reason: HOSPADM

## 2020-05-07 RX ORDER — ASPIRIN 81 MG/1
81 TABLET, CHEWABLE ORAL DAILY
Status: DISCONTINUED | OUTPATIENT
Start: 2020-05-07 | End: 2020-05-10 | Stop reason: HOSPADM

## 2020-05-07 RX ORDER — AMOXICILLIN 250 MG
2 CAPSULE ORAL 2 TIMES DAILY
Status: DISCONTINUED | OUTPATIENT
Start: 2020-05-07 | End: 2020-05-10 | Stop reason: HOSPADM

## 2020-05-07 RX ORDER — ONDANSETRON 2 MG/ML
4 INJECTION INTRAMUSCULAR; INTRAVENOUS EVERY 4 HOURS PRN
Status: DISCONTINUED | OUTPATIENT
Start: 2020-05-07 | End: 2020-05-10 | Stop reason: HOSPADM

## 2020-05-07 RX ORDER — SODIUM CHLORIDE 9 MG/ML
1000 INJECTION, SOLUTION INTRAVENOUS ONCE
Status: COMPLETED | OUTPATIENT
Start: 2020-05-07 | End: 2020-05-07

## 2020-05-07 RX ORDER — BISACODYL 10 MG
10 SUPPOSITORY, RECTAL RECTAL
Status: DISCONTINUED | OUTPATIENT
Start: 2020-05-07 | End: 2020-05-10 | Stop reason: HOSPADM

## 2020-05-07 RX ADMIN — SODIUM CHLORIDE 1000 ML: 9 INJECTION, SOLUTION INTRAVENOUS at 19:30

## 2020-05-07 RX ADMIN — SENNOSIDES AND DOCUSATE SODIUM 2 TABLET: 8.6; 5 TABLET ORAL at 21:34

## 2020-05-07 RX ADMIN — ATORVASTATIN CALCIUM 80 MG: 80 TABLET, FILM COATED ORAL at 21:31

## 2020-05-07 RX ADMIN — SPIRONOLACTONE 25 MG: 25 TABLET ORAL at 21:33

## 2020-05-07 RX ADMIN — ASPIRIN 81 MG 81 MG: 81 TABLET ORAL at 21:32

## 2020-05-07 ASSESSMENT — LIFESTYLE VARIABLES
TOTAL SCORE: 0
EVER_SMOKED: NEVER
DO YOU DRINK ALCOHOL: NO
DOES PATIENT WANT TO STOP DRINKING: NO
TOTAL SCORE: 0
EVER FELT BAD OR GUILTY ABOUT YOUR DRINKING: NO
CONSUMPTION TOTAL: INCOMPLETE
ALCOHOL_USE: YES
HAVE YOU EVER FELT YOU SHOULD CUT DOWN ON YOUR DRINKING: NO
HAVE PEOPLE ANNOYED YOU BY CRITICIZING YOUR DRINKING: NO
TOTAL SCORE: 0
EVER HAD A DRINK FIRST THING IN THE MORNING TO STEADY YOUR NERVES TO GET RID OF A HANGOVER: NO
TOTAL SCORE: 0
EVER HAD A DRINK FIRST THING IN THE MORNING TO STEADY YOUR NERVES TO GET RID OF A HANGOVER: NO
TOTAL SCORE: 0
CONSUMPTION TOTAL: INCOMPLETE
HAVE YOU EVER FELT YOU SHOULD CUT DOWN ON YOUR DRINKING: NO
HAVE PEOPLE ANNOYED YOU BY CRITICIZING YOUR DRINKING: NO
EVER FELT BAD OR GUILTY ABOUT YOUR DRINKING: NO
TOTAL SCORE: 0
DOES PATIENT WANT TO STOP DRINKING: NO

## 2020-05-07 ASSESSMENT — FIBROSIS 4 INDEX
FIB4 SCORE: 2.4
FIB4 SCORE: 2.17

## 2020-05-07 ASSESSMENT — COGNITIVE AND FUNCTIONAL STATUS - GENERAL
SUGGESTED CMS G CODE MODIFIER MOBILITY: CH
SUGGESTED CMS G CODE MODIFIER DAILY ACTIVITY: CH
DAILY ACTIVITIY SCORE: 24
MOBILITY SCORE: 24

## 2020-05-07 ASSESSMENT — PATIENT HEALTH QUESTIONNAIRE - PHQ9
SUM OF ALL RESPONSES TO PHQ9 QUESTIONS 1 AND 2: 0
1. LITTLE INTEREST OR PLEASURE IN DOING THINGS: NOT AT ALL
2. FEELING DOWN, DEPRESSED, IRRITABLE, OR HOPELESS: NOT AT ALL

## 2020-05-08 ENCOUNTER — PATIENT OUTREACH (OUTPATIENT)
Dept: HEALTH INFORMATION MANAGEMENT | Facility: OTHER | Age: 75
End: 2020-05-08

## 2020-05-08 ENCOUNTER — APPOINTMENT (OUTPATIENT)
Dept: CARDIOLOGY | Facility: MEDICAL CENTER | Age: 75
DRG: 287 | End: 2020-05-08
Attending: NURSE PRACTITIONER
Payer: COMMERCIAL

## 2020-05-08 ENCOUNTER — APPOINTMENT (OUTPATIENT)
Dept: RADIOLOGY | Facility: MEDICAL CENTER | Age: 75
DRG: 287 | End: 2020-05-08
Attending: HOSPITALIST
Payer: COMMERCIAL

## 2020-05-08 ENCOUNTER — PATIENT MESSAGE (OUTPATIENT)
Dept: HEALTH INFORMATION MANAGEMENT | Facility: OTHER | Age: 75
End: 2020-05-08

## 2020-05-08 LAB
ANION GAP SERPL CALC-SCNC: 9 MMOL/L (ref 7–16)
BASOPHILS # BLD AUTO: 0.4 % (ref 0–1.8)
BASOPHILS # BLD: 0.02 K/UL (ref 0–0.12)
BUN SERPL-MCNC: 19 MG/DL (ref 8–22)
CALCIUM SERPL-MCNC: 8.3 MG/DL (ref 8.5–10.5)
CHLORIDE SERPL-SCNC: 103 MMOL/L (ref 96–112)
CO2 SERPL-SCNC: 22 MMOL/L (ref 20–33)
COVID ORDER STATUS COVID19: NORMAL
CREAT SERPL-MCNC: 0.8 MG/DL (ref 0.5–1.4)
EOSINOPHIL # BLD AUTO: 0.03 K/UL (ref 0–0.51)
EOSINOPHIL NFR BLD: 0.6 % (ref 0–6.9)
ERYTHROCYTE [DISTWIDTH] IN BLOOD BY AUTOMATED COUNT: 53.3 FL (ref 35.9–50)
GLUCOSE SERPL-MCNC: 124 MG/DL (ref 65–99)
HCT VFR BLD AUTO: 36.4 % (ref 37–47)
HGB BLD-MCNC: 11.9 G/DL (ref 12–16)
IMM GRANULOCYTES # BLD AUTO: 0.01 K/UL (ref 0–0.11)
IMM GRANULOCYTES NFR BLD AUTO: 0.2 % (ref 0–0.9)
INR PPP: 1.01 (ref 0.87–1.13)
LYMPHOCYTES # BLD AUTO: 1.02 K/UL (ref 1–4.8)
LYMPHOCYTES NFR BLD: 20.8 % (ref 22–41)
MCH RBC QN AUTO: 34 PG (ref 27–33)
MCHC RBC AUTO-ENTMCNC: 32.7 G/DL (ref 33.6–35)
MCV RBC AUTO: 104 FL (ref 81.4–97.8)
MONOCYTES # BLD AUTO: 0.4 K/UL (ref 0–0.85)
MONOCYTES NFR BLD AUTO: 8.1 % (ref 0–13.4)
NEUTROPHILS # BLD AUTO: 3.43 K/UL (ref 2–7.15)
NEUTROPHILS NFR BLD: 69.9 % (ref 44–72)
NRBC # BLD AUTO: 0 K/UL
NRBC BLD-RTO: 0 /100 WBC
PLATELET # BLD AUTO: 170 K/UL (ref 164–446)
PMV BLD AUTO: 9.9 FL (ref 9–12.9)
POTASSIUM SERPL-SCNC: 4.2 MMOL/L (ref 3.6–5.5)
PROTHROMBIN TIME: 13.5 SEC (ref 12–14.6)
RBC # BLD AUTO: 3.5 M/UL (ref 4.2–5.4)
SARS-COV-2 RNA RESP QL NAA+PROBE: NOTDETECTED
SODIUM SERPL-SCNC: 134 MMOL/L (ref 135–145)
SPECIMEN SOURCE: NORMAL
WBC # BLD AUTO: 4.9 K/UL (ref 4.8–10.8)

## 2020-05-08 PROCEDURE — 99233 SBSQ HOSP IP/OBS HIGH 50: CPT | Mod: 25 | Performed by: INTERNAL MEDICINE

## 2020-05-08 PROCEDURE — 94760 N-INVAS EAR/PLS OXIMETRY 1: CPT

## 2020-05-08 PROCEDURE — 700117 HCHG RX CONTRAST REV CODE 255: Performed by: INTERNAL MEDICINE

## 2020-05-08 PROCEDURE — 99153 MOD SED SAME PHYS/QHP EA: CPT

## 2020-05-08 PROCEDURE — 93460 R&L HRT ART/VENTRICLE ANGIO: CPT | Mod: 26 | Performed by: INTERNAL MEDICINE

## 2020-05-08 PROCEDURE — 99233 SBSQ HOSP IP/OBS HIGH 50: CPT | Performed by: INTERNAL MEDICINE

## 2020-05-08 PROCEDURE — 36415 COLL VENOUS BLD VENIPUNCTURE: CPT

## 2020-05-08 PROCEDURE — 700101 HCHG RX REV CODE 250

## 2020-05-08 PROCEDURE — 80048 BASIC METABOLIC PNL TOTAL CA: CPT

## 2020-05-08 PROCEDURE — 99152 MOD SED SAME PHYS/QHP 5/>YRS: CPT | Performed by: INTERNAL MEDICINE

## 2020-05-08 PROCEDURE — B2111ZZ FLUOROSCOPY OF MULTIPLE CORONARY ARTERIES USING LOW OSMOLAR CONTRAST: ICD-10-PCS | Performed by: INTERNAL MEDICINE

## 2020-05-08 PROCEDURE — 71045 X-RAY EXAM CHEST 1 VIEW: CPT

## 2020-05-08 PROCEDURE — 85025 COMPLETE CBC W/AUTO DIFF WBC: CPT

## 2020-05-08 PROCEDURE — U0004 COV-19 TEST NON-CDC HGH THRU: HCPCS

## 2020-05-08 PROCEDURE — A9270 NON-COVERED ITEM OR SERVICE: HCPCS | Performed by: HOSPITALIST

## 2020-05-08 PROCEDURE — G2023 SPECIMEN COLLECT COVID-19: HCPCS | Performed by: NURSE PRACTITIONER

## 2020-05-08 PROCEDURE — 85610 PROTHROMBIN TIME: CPT

## 2020-05-08 PROCEDURE — 700111 HCHG RX REV CODE 636 W/ 250 OVERRIDE (IP)

## 2020-05-08 PROCEDURE — B2151ZZ FLUOROSCOPY OF LEFT HEART USING LOW OSMOLAR CONTRAST: ICD-10-PCS | Performed by: INTERNAL MEDICINE

## 2020-05-08 PROCEDURE — 770020 HCHG ROOM/CARE - TELE (206)

## 2020-05-08 PROCEDURE — 700102 HCHG RX REV CODE 250 W/ 637 OVERRIDE(OP): Performed by: HOSPITALIST

## 2020-05-08 PROCEDURE — 4A023N8 MEASUREMENT OF CARDIAC SAMPLING AND PRESSURE, BILATERAL, PERCUTANEOUS APPROACH: ICD-10-PCS | Performed by: INTERNAL MEDICINE

## 2020-05-08 PROCEDURE — 700111 HCHG RX REV CODE 636 W/ 250 OVERRIDE (IP): Performed by: INTERNAL MEDICINE

## 2020-05-08 RX ORDER — VERAPAMIL HYDROCHLORIDE 2.5 MG/ML
INJECTION, SOLUTION INTRAVENOUS
Status: COMPLETED
Start: 2020-05-08 | End: 2020-05-08

## 2020-05-08 RX ORDER — MIDAZOLAM HYDROCHLORIDE 1 MG/ML
INJECTION INTRAMUSCULAR; INTRAVENOUS
Status: COMPLETED
Start: 2020-05-08 | End: 2020-05-08

## 2020-05-08 RX ORDER — HEPARIN SODIUM 200 [USP'U]/100ML
INJECTION, SOLUTION INTRAVENOUS
Status: COMPLETED
Start: 2020-05-08 | End: 2020-05-08

## 2020-05-08 RX ORDER — LIDOCAINE HYDROCHLORIDE 20 MG/ML
INJECTION, SOLUTION INFILTRATION; PERINEURAL
Status: COMPLETED
Start: 2020-05-08 | End: 2020-05-08

## 2020-05-08 RX ORDER — CARBOXYMETHYLCELLULOSE SODIUM 5 MG/ML
1 SOLUTION/ DROPS OPHTHALMIC
Status: DISCONTINUED | OUTPATIENT
Start: 2020-05-08 | End: 2020-05-10 | Stop reason: HOSPADM

## 2020-05-08 RX ORDER — HEPARIN SODIUM,PORCINE 1000/ML
VIAL (ML) INJECTION
Status: COMPLETED
Start: 2020-05-08 | End: 2020-05-08

## 2020-05-08 RX ADMIN — HEPARIN SODIUM: 1000 INJECTION, SOLUTION INTRAVENOUS; SUBCUTANEOUS at 12:39

## 2020-05-08 RX ADMIN — SPIRONOLACTONE 25 MG: 25 TABLET ORAL at 17:17

## 2020-05-08 RX ADMIN — ENOXAPARIN SODIUM 40 MG: 100 INJECTION SUBCUTANEOUS at 14:25

## 2020-05-08 RX ADMIN — ASPIRIN 81 MG 81 MG: 81 TABLET ORAL at 17:17

## 2020-05-08 RX ADMIN — IOHEXOL 47 ML: 350 INJECTION, SOLUTION INTRAVENOUS at 13:23

## 2020-05-08 RX ADMIN — ATORVASTATIN CALCIUM 80 MG: 80 TABLET, FILM COATED ORAL at 17:17

## 2020-05-08 RX ADMIN — VERAPAMIL HYDROCHLORIDE 2.5 MG: 2.5 INJECTION, SOLUTION INTRAVENOUS at 12:39

## 2020-05-08 RX ADMIN — HEPARIN SODIUM 2000 UNITS: 200 INJECTION, SOLUTION INTRAVENOUS at 12:40

## 2020-05-08 RX ADMIN — FENTANYL CITRATE 25 MCG: 0.05 INJECTION, SOLUTION INTRAMUSCULAR; INTRAVENOUS at 13:24

## 2020-05-08 RX ADMIN — NITROGLYCERIN 10 ML: 20 INJECTION INTRAVENOUS at 12:40

## 2020-05-08 RX ADMIN — LIDOCAINE HYDROCHLORIDE: 20 INJECTION, SOLUTION INFILTRATION; PERINEURAL at 12:39

## 2020-05-08 RX ADMIN — MIDAZOLAM HYDROCHLORIDE 1.5 MG: 1 INJECTION, SOLUTION INTRAMUSCULAR; INTRAVENOUS at 12:30

## 2020-05-08 ASSESSMENT — ENCOUNTER SYMPTOMS
DEPRESSION: 0
WEAKNESS: 0
GASTROINTESTINAL NEGATIVE: 1
CHILLS: 0
ABDOMINAL PAIN: 0
EYES NEGATIVE: 1
WHEEZING: 0
VOMITING: 0
ORTHOPNEA: 0
MUSCULOSKELETAL NEGATIVE: 1
DIZZINESS: 1
HEMOPTYSIS: 0
BACK PAIN: 0
SHORTNESS OF BREATH: 0
FLANK PAIN: 0
FEVER: 0
LOSS OF CONSCIOUSNESS: 1
PALPITATIONS: 0
PND: 0
BLURRED VISION: 1
MYALGIAS: 0
NAUSEA: 0
WEIGHT LOSS: 0
HEADACHES: 0
BRUISES/BLEEDS EASILY: 0
RESPIRATORY NEGATIVE: 1
CARDIOVASCULAR NEGATIVE: 1
NERVOUS/ANXIOUS: 0
CLAUDICATION: 0
CONSTITUTIONAL NEGATIVE: 1
SPUTUM PRODUCTION: 0
COUGH: 0
SHORTNESS OF BREATH: 1
PSYCHIATRIC NEGATIVE: 1

## 2020-05-08 ASSESSMENT — LIFESTYLE VARIABLES
HAVE PEOPLE ANNOYED YOU BY CRITICIZING YOUR DRINKING: NO
TOTAL SCORE: 0
EVER FELT BAD OR GUILTY ABOUT YOUR DRINKING: NO
HAVE YOU EVER FELT YOU SHOULD CUT DOWN ON YOUR DRINKING: NO
TOTAL SCORE: 0
HOW MANY TIMES IN THE PAST YEAR HAVE YOU HAD 5 OR MORE DRINKS IN A DAY: 0
TOTAL SCORE: 0
ON A TYPICAL DAY WHEN YOU DRINK ALCOHOL HOW MANY DRINKS DO YOU HAVE: 1
CONSUMPTION TOTAL: NEGATIVE
EVER HAD A DRINK FIRST THING IN THE MORNING TO STEADY YOUR NERVES TO GET RID OF A HANGOVER: NO
ALCOHOL_USE: YES
DOES PATIENT WANT TO STOP DRINKING: NO
AVERAGE NUMBER OF DAYS PER WEEK YOU HAVE A DRINK CONTAINING ALCOHOL: 7

## 2020-05-08 ASSESSMENT — FIBROSIS 4 INDEX: FIB4 SCORE: 2.04

## 2020-05-08 NOTE — PROGRESS NOTES
Hospital Medicine Daily Progress Note    Date of Service  5/8/2020    Chief Complaint  75 y.o. female admitted 5/7/2020 with near syncope and fatigue in the setting of progressive MR/MVP.    Hospital Course    see below      Interval Problem Update  MR/MVP-consulted cards, going for angiogram today and then possible mitraclip on Monday. Denies any new cardiac symptoms today. TELE showed NSR with HR in the 60's and rare ectopy.    Dizziness-mild today, does describe some mild blurring of the eyes.     Consultants/Specialty  Cards    Code Status  fcfc    Disposition  TELE    Review of Systems  Review of Systems   Constitutional: Negative for chills and fever.   Eyes: Positive for blurred vision.   Respiratory: Negative for cough and shortness of breath.    Cardiovascular: Negative for chest pain and palpitations.   Gastrointestinal: Negative for abdominal pain, nausea and vomiting.   Neurological: Positive for dizziness (mild).   All other systems reviewed and are negative.       Physical Exam  Temp:  [35.9 °C (96.6 °F)-36.9 °C (98.5 °F)] 35.9 °C (96.6 °F)  Pulse:  [55-75] 58  Resp:  [12-18] 16  BP: (112-162)/(55-77) 136/69  SpO2:  [90 %-97 %] 94 %    Physical Exam  Vitals signs and nursing note reviewed.   Constitutional:       General: She is not in acute distress.     Appearance: She is well-developed.   HENT:      Head: Normocephalic and atraumatic.      Mouth/Throat:      Pharynx: No oropharyngeal exudate.   Eyes:      General: No scleral icterus.     Pupils: Pupils are equal, round, and reactive to light.   Neck:      Musculoskeletal: Normal range of motion and neck supple.      Thyroid: No thyromegaly.   Cardiovascular:      Rate and Rhythm: Normal rate and regular rhythm.      Heart sounds: Murmur present.   Pulmonary:      Effort: Pulmonary effort is normal. No respiratory distress.      Breath sounds: Normal breath sounds. No wheezing.   Abdominal:      General: Bowel sounds are normal. There is no  distension.      Palpations: Abdomen is soft.      Tenderness: There is no abdominal tenderness.   Musculoskeletal: Normal range of motion.         General: No tenderness.      Right lower leg: No edema.      Left lower leg: No edema.      Comments: Warm peripherally B/L   Skin:     General: Skin is warm and dry.      Findings: No rash.   Neurological:      Mental Status: She is alert and oriented to person, place, and time.      Cranial Nerves: No cranial nerve deficit.         Fluids    Intake/Output Summary (Last 24 hours) at 5/8/2020 1301  Last data filed at 5/7/2020 2037  Gross per 24 hour   Intake 1000 ml   Output --   Net 1000 ml       Laboratory  Recent Labs     05/07/20  1831 05/08/20  0211   WBC 3.6* 4.9   RBC 3.42* 3.50*   HEMOGLOBIN 11.8* 11.9*   HEMATOCRIT 35.5* 36.4*   .8* 104.0*   MCH 34.5* 34.0*   MCHC 33.2* 32.7*   RDW 52.0* 53.3*   PLATELETCT 160* 170   MPV 9.8 9.9     Recent Labs     05/07/20  1831 05/08/20  0211   SODIUM 135 134*   POTASSIUM 4.1 4.2   CHLORIDE 102 103   CO2 23 22   GLUCOSE 121* 124*   BUN 21 19   CREATININE 0.83 0.80   CALCIUM 8.1* 8.3*     Recent Labs     05/08/20  0933   INR 1.01               Imaging  DX-CHEST-PORTABLE (1 VIEW)   Final Result      Minimal bilateral interstitial prominence, chronic versus minimal edema. No focal consolidation or pleural effusions.      CT-HEAD W/O   Final Result      1.  No evidence of acute intracranial process.      2.  Chronic ischemic change.      3.  Atrophy.      CL-LEFT HEART CATHETERIZATION WITH POSSIBLE INTERVENTION    (Results Pending)   CL-LEFT HEART CATHETERIZATION WITH POSSIBLE INTERVENTION    (Results Pending)        Assessment/Plan  * Heart failure due to valvular disease (HCC)- (present on admission)  Assessment & Plan  Patient has signs of heart failure with dizziness, near syncope, rales, JVD.  BNP mildly elevated.  Continue spironolactone and atorvastatin.  -no need for diuretics at this time, volume status appears on  the dry side    Severe mitral regurgitation- (present on admission)  Assessment & Plan  Concerning in setting of heart failure and near syncope.  Cardiology following  -angiogram today  -possible mitral clip on Monday  -monitor on tele  Continue spironolactone and atorvastatin.    Near syncope- (present on admission)  Assessment & Plan  Concerning in setting of severe mitral regurgitation.  Monitor on telemetry.-no issues overnight  Cardiology consult.  Patient just had an echocardiogram Feb 2020 will not repeat at this time.    Dyslipidemia- (present on admission)  Assessment & Plan  Continue atorvastatin.       VTE prophylaxis: lovenox

## 2020-05-08 NOTE — CONSULTS
Cardiology Initial Consultation    Date of Service  5/8/2020    Referring Physician  Shaq Maya M.D.    Reason for Consultation  Dizziness.    History of Presenting Illness  Shaista Bocanegra is a 75 y.o. female with a past medical history of mitral valve prolapse with severe  mitral regurgitation who presented 5/7/2020 with severe dizziness. She denies fatigue, shortness of breath, dyspnea on exertion, chest pain, dizziness or syncope as well as fever, chill, shortness of breath or productive cough.    Review of Systems  Review of Systems   Constitutional: Negative.  Negative for chills and fever.   HENT: Negative.  Negative for hearing loss.    Eyes: Negative.    Respiratory: Negative.  Negative for cough and shortness of breath.    Cardiovascular: Negative.  Negative for chest pain, palpitations and leg swelling.   Gastrointestinal: Negative.  Negative for abdominal pain, nausea and vomiting.   Genitourinary: Negative.  Negative for dysuria and urgency.   Musculoskeletal: Negative.  Negative for myalgias.   Skin: Negative.  Negative for rash.   Neurological: Positive for dizziness. Negative for weakness and headaches.   Hematological: Does not bruise/bleed easily.   Psychiatric/Behavioral: Negative.  The patient is not nervous/anxious.        Past Medical History   has a past medical history of Atrial fibrillation (HCC), Benign essential HTN (3/19/2012), Breast cancer (HCC), Cancer (HCC), Cardiac arrhythmia, Chest tightness or pressure (3/19/2012), Chickenpox, Coronary heart disease, La[place] measles, High risk medication use (3/19/2012), Hypercholesterolemia (3/19/2012), Mumps, MVP (mitral valve prolapse) (3/19/2012), Osteoporosis, Renal insufficiency (3/19/2012), Stroke (HCC), Tonsillitis, Valvular heart disease, and Venereal disease.    Surgical History   has a past surgical history that includes cataract phaco with iol (1/26/2009); cataract phaco with iol (3/16/2009); breast biopsy; pr radiation therapy  plan simple; pr chemotherapy, unspecified procedure; lumpectomy; pr remv 2nd cataract,corn-scler sectn; hysterectomy laparoscopy; sinuscope; tonsillectomy; and primary c section.    Family History  family history includes Cancer in her mother.    Social History   reports that she has never smoked. She has never used smokeless tobacco. She reports current alcohol use. She reports that she does not use drugs.    Medications  Prior to Admission Medications   Prescriptions Last Dose Informant Patient Reported? Taking?   Cholecalciferol (VITAMIN D3) 2000 UNIT Cap 5/6/2020 at PM Patient Yes No   Sig: Take 2,000 Units by mouth every day.   MYRBETRIQ 25 MG TABLET SR 24 HR 5/6/2020 at PM Patient Yes No   Multiple Vitamin (MULTIVITAMINS PO) 5/6/2020 at PM Patient Yes No   Sig: Take 1 Tab by mouth every day.   TESTOSTERONE TD UNK at PM Patient Yes No   Sig: Apply  to skin as directed.   aspirin (ASA) 81 MG Chew Tab chewable tablet 5/6/2020 at PM Patient No No   Sig: Take 1 Tab by mouth every day.   atorvastatin (LIPITOR) 80 MG tablet 5/6/2020 at PM Patient No No   Sig: Take 1 Tab by mouth every evening.   calcium carbonate (OS-CRISTOFER 500) 500 MG Tab 5/6/2020 at PM Patient Yes No   Sig: Take 1,000 mg by mouth every day.   potassium chloride (MICRO-K) 10 MEQ capsule 5/6/2020 at PM Patient Yes No   Sig: Take 10 mEq by mouth 2 times a day.   spironolactone (ALDACTONE) 25 MG Tab 5/6/2020 at PM Patient No No   Sig: Take 1 Tab by mouth every day for 360 days.   valacyclovir (VALTREX) 500 MG Tab 5/6/2020 at PM Patient Yes No   Sig: Take 500 mg by mouth every day.      Facility-Administered Medications: None   (Medications reviewed.)    Allergies  No Known Allergies    Vital signs in last 24 hours  Temp:  [35.9 °C (96.6 °F)-36.9 °C (98.5 °F)] 35.9 °C (96.6 °F)  Pulse:  [55-75] 55  Resp:  [12-18] 16  BP: (112-162)/(55-77) 136/69  SpO2:  [90 %-97 %] 92 %    Physical Exam  Physical Exam    Lab Review  Lab Results   Component Value Date/Time     WBC 4.9 05/08/2020 02:11 AM    RBC 3.50 (L) 05/08/2020 02:11 AM    HEMOGLOBIN 11.9 (L) 05/08/2020 02:11 AM    HEMATOCRIT 36.4 (L) 05/08/2020 02:11 AM    .0 (H) 05/08/2020 02:11 AM    MCH 34.0 (H) 05/08/2020 02:11 AM    MCHC 32.7 (L) 05/08/2020 02:11 AM    MPV 9.9 05/08/2020 02:11 AM      Lab Results   Component Value Date/Time    SODIUM 134 (L) 05/08/2020 02:11 AM    POTASSIUM 4.2 05/08/2020 02:11 AM    CHLORIDE 103 05/08/2020 02:11 AM    CO2 22 05/08/2020 02:11 AM    GLUCOSE 124 (H) 05/08/2020 02:11 AM    BUN 19 05/08/2020 02:11 AM    CREATININE 0.80 05/08/2020 02:11 AM    BUNCREATRAT 19 03/24/2020 04:17 AM      Lab Results   Component Value Date/Time    ASTSGOT 18 05/07/2020 06:31 PM    ALTSGPT 16 05/07/2020 06:31 PM     Lab Results   Component Value Date/Time    CHOLSTRLTOT 144 03/24/2020 04:17 AM    CHOLSTRLTOT 135 08/19/2017 02:50 AM    LDL 58 03/24/2020 04:17 AM    LDL 65 08/19/2017 02:50 AM    HDL 73 03/24/2020 04:17 AM    HDL 54 08/19/2017 02:50 AM    TRIGLYCERIDE 67 03/24/2020 04:17 AM    TRIGLYCERIDE 80 08/19/2017 02:50 AM    TROPONINT 6 05/07/2020 06:31 PM       Recent Labs     05/07/20  1831   NTPROBNP 736*   (Above labs reviewed.)     Cardiac Imaging and Procedures Review  CARDIAC STUDIES/PROCEDURES:     CAROTID ULTRASOUND (08/18/17)  Normal carotids, subclavians and vertebrals.     CT OF HEAD (05/03/19)  NO ACUTE ABNORMALITIES ARE NOTED ON CT SCAN OF THE HEAD.  Right-sided encephalomalacia is noted.    CTA OF HEAD (08/18/17)  1.  There is a right M1 segment occlusion.  2.  There is collateral flow in the peripheral M3 branches seen on the right.  3.  There is mild decreased enhancement of the visualized right internal carotid artery however it is patent.  4.  Question of subtle hypodensity in the right insular cortex region. No abnormal brain parenchymal enhancement is seen.     CTA OF NECK (08/18/17)  1.  CT angiogram of the neck within normal limits.  2.  Thrombosed right M1  segment.    ECHOCARDIOGRAM CONCLUSIONS (02/03/20)  Prior echo done on 05/03/2019. Compared to the images of the prior   study done -  there has been no significant change.   Normal left ventricular systolic function.  Left ventricular ejection fraction is visually estimated to be 65%.  Prolapse of the mitral leaflets was present.  Severe mitral regurgitation.  Mild tricuspid regurgitation.  Estimated right ventricular systolic pressure is 35 mmHg.  (study result reviewed)     ECHOCARDIOGRAM CONCLUSIONS (05/03/19)  Prior echocardiogram 6/14/2018.  Normal left ventricular systolic function.   Mild to moderate eccentric mitral regurgitation.  Compared to the images of the study done - there has been slight improvement in mitral regurgitation.     ECHOCARDIOGRAM CONCLUSIONS (06/14/18)  Normal left ventricular systolic function.  Left ventricular ejection fraction is visually estimated to be 60%.  Myxomatous changes of the mitral valve.  Prolapse of the anterior and posterior mitral leaflets.  Severe, eccentric mitral regurgitation.  Right heart pressures are normal.  Compared to the report of the study done 8/19/2017 severe mitral regurgitation is now present, previously reported as moderate but a MR   ERO was not recorded.      EKG performed on (05/08/20) was reviewed: EKG personally interpreted shows sinus rhythm with premature ventricular contractions.    Assessment/Plan    1. Mitral valve prolapse with severe mitral regurgitation: Her mitral regurgitation has reached severe status on her recent echocardiogram and she is symptomatic. We will schedule for cardiac catheterization today and  transesophageal echocardiogram for 05/11/20 after she is ruled out of COVID and refer her to Dr. Schaefer. She understands the risks and benefits and agrees with plan.  2. History of cerebrovascular accident with right carotid arteriography with mechanical thrombectomy (08/18/17): She remains clinically stable without recurrence of  stroke symptoms.  3. Additional information: She is family of Mr. Mejias Casey and prior patient of Pascale Rankin    Thank you for allowing me to participate in the care of this patient.    I will continue to follow this patient    Please contact me with any questions.    Darin Meehan M.D.   Cardiologist, [facility]  [phone]

## 2020-05-08 NOTE — CARE PLAN
Assumed day shift care  A+O x 4  Denies pain  Mild lightheadedness/dizziness with ambulation to bathroom  Vss, monitored on telemetry  Encouraged po fluid intake  C/o blurriness in both eyes - new this admission  Pulse ox >93% on room air  No needs at this time, [facility]    GOAL: orthostatic blood pressures, cardiology consult    Fall precautions/hourly rounding maintained, call light within reach and functioning, all items within reach.  Patient encouraged to call for assistance, poc reviewed with patient, ?'s/concerns answered.  Bed alarm active.    Problem: Safety  Goal: Will remain free from injury  Outcome: PROGRESSING AS EXPECTED     Problem: Respiratory:  Goal: Respiratory status will improve  Outcome: PROGRESSING AS EXPECTED

## 2020-05-08 NOTE — HEART FAILURE PROGRAM
"Cardiovascular Nurse Navigator () Advanced Heart Failure Program HF New Diagnosis Consult Note:     Patient presented to ED via EMS on the evening of Thursday, 5/7/20. Per triage note, she had experienced 2-3 days of near syncope and dizziness while ambulating. She reported intermittent fasting for \"health reasons\".    Patient is a never smoker, no diabetes history of current diagnosis, ECG shows sinus rhythm with a QRSD of 89. Patient has known MR and has been under surveillance with the [facility], Dr. Meehan.    Dr. Cristy Perez's H&P notes that patient is observably SOB at rest, and that she JVD and rales. These coupled with her subjective symptoms of syncope and dizziness has caused her to diagnose heart failure due to valvular disease.    I am treating this as a new diagnosis: patient has had known valvular issues but it would appear that this is the first time she has become symptomatic.    Patient has a history of DLD, HTN, and CVA. She had uncontrolled HTN for some time per her general cardiologist's notes.     · HFpEF (65%) valvular  · NYHA: IV  · Stage: not being addressed by providers  · Etiology: valvular  · Diuresis: none  · Diabetic or newly diagnosed DM?: no  · Atrial fibrillation?: no  · Current smoker? no  · PHQ-2 score: 0    [facility] Plan Notes: none    Therapy Notes: none    Demographics:    · Residence: [place]  · Insurance: [place] and Medicare    GD Secondary Prevention Interventions:      Daily Weights: ordered by me    I's and O's: ordered by me    HFrEF Specific GDMT: n/a    HFrEF Specific Device Therapy Screening Tool not indicated for new diagnosis nor for HFpEF    Source: [employer]- [facility] Prevention Program Screening Tool  2013 ACC/ AHA Heart Failure Guidelines  Rev date: 12/2014    Advanced Care Planning: no AD on file. Full code status at the time of this note's filing.    The ACC recommends engaging palliative care as part of optimization of HF treatment to solicit goals of " "care and focus on quality of life throughout the clinical course of HF.    Once all diagnostics are in, please consider an order for palliative to discuss Advanced Care Planning.      Speaking with patients frankly about their end-of-life wishes is one of the most important things a palliative care team can do. This is especially important in the context of heart failure, since it’s such an unpredictable disease.    Follow up appointment:   • If discharged from acute care to home (exception hospice discharge), pt must have an appointment scheduled within 7 days of discharge (Cardiology, PCP, or [facility] Clinic).    • If discharged to Transitional Care Facility (LTAC, SNF, IRH), appointment should be made about a month out for after TCF.     Bedside Nursing Education:  Please provide HF booklet and repeated, ongoing education while administering medications, weighing patient, discussing management of symptoms, diet and need to follow up and act on changes. Please target education to the precipitant of the exacerbation.    Bedside Nursing Discharge:  When completing the after visit summary (discharge instructions) please select \"Cardiac Diagnosis, and Heart Failure\" in the special instructions section to populate the heart failure specific discharge instructions.     Referrals/Orders Placed:    Hospital Schedulers for HF f/u?  yes  Social Work   no  Registered Dietician  yes  [facility] CP Program for patients with Medicaid, [facility], or [facility] coverage?  no  Outpatient Care Coordination for patients with Medicaid?  no    Many thanks, Vanessa, Cardiovascular Nurse Navigator, RN, [facility] [phone], & [employer] M-F (excluding holidays).          "

## 2020-05-08 NOTE — PROGRESS NOTES
2 RN Skin Check    2 RN skin check complete.   Devices in place: Nasal Cannula.  Skin assessed under devices: yes.  Confirmed pressure ulcers found on: None.  New potential pressure ulcers noted on None. Wound consult placed No.  The following interventions in place: gray foam pads on nasal cannula    Right foot, medial side of second toe has callous.  Heels dry and cracked.  Skin friable.  Red, blanchable behind ears.

## 2020-05-08 NOTE — PROGRESS NOTES
Received patient from ED with bedside report. A&Ox 4. Placed on telemetry monitor and monitor tech notified. Pt in Sinus Rhythm with HR in the 60s. [place]ulation. Pt reported feeling slightly unsteady with a headache. Oriented patient to room and explained pt personalized plan of care. Educated patient on safety and use of call light. Bed in low position and bed exit alarm set on. Call light in reach.

## 2020-05-08 NOTE — ED NOTES
Assumed care of patient. Pt assessed, AAO x 4 . Patient's concerns addressed.  Fall precautions in place.  Pt repositioned and comfortable.  Call light within reach. Will continue to monitor.    Patient to CT via gurney.

## 2020-05-08 NOTE — RESPIRATORY CARE
Oxygen Rounds      Patient found on  Room Air    RN took patient off O2 overnight with no incidents. Patient has O2 available if needed.    O2 L/m:  0  Oxygen device:  Room Air  Spo2: 94%        Respiratory device skin site inspection completed.

## 2020-05-08 NOTE — PROGRESS NOTES
12 hour chart check    Monitor Summary    .24/.08/.44  Rhythm: Sinus Rhythm  Rate: 61 - 67  Ectopy: Rare to Occasional PVCs

## 2020-05-08 NOTE — ED TRIAGE NOTES
"Patient here from home via EMS for 2-3 days of near syncope and dizziness while ambulating. Admits to intermittent fasting for \"heatlhy reasons.\"  in route. Denies cough, denies fever, denies SOB, Denies SI or HI. NKDA. Doesn't smoke, drink or rec drugs  "

## 2020-05-08 NOTE — ED NOTES
Patient returned from CT, placed on monitor, very mild dizziness while laying flat. No needs at this time, blood pressure is elevated - ERP aware; otherwise no needs at this time.

## 2020-05-08 NOTE — ASSESSMENT & PLAN NOTE
Concerning in setting of severe mitral regurgitation.  Monitor on telemetry.-no issues overnight again  -ambulating, likely more vasovagal and dehydration at home in the setting of intermittent fasting (intentional) and poor oral hydration  Cardiology consult.  Patient just had an echocardiogram Feb 2020 will not repeat at this time.

## 2020-05-08 NOTE — ED PROVIDER NOTES
ED Provider Note    CHIEF COMPLAINT  Chief Complaint   Patient presents with   • Dizziness       HPI  Sahista Bocanegra is a 75 y.o. female who presents with a chief complaint of dizziness.  She describes a feeling lightheaded.  Is not worse when she is moving or moving her head.  Is not better when she is laying down.  There is no associated numbness or weakness in any arm or leg.  Duration has been for 2 episodes yesterday lasted about an hour today last about 45 minutes prior to arrival.  There was no associated weakness or numbness in any extremity no expressive speech issues.    Patient is here.  I was able to examine her during history and physical with her daughter on the phone.  Patient has a history of stroke in the past as per family she has history of hypertension she states that she has been intermittently fasting the last few days.  She states that because she is been fasting she is also not been drinking water because she felt dizzy she drank a lot of water yesterday for about an hour and she states that that did not significantly improve and she did get better today she been drinking effectively not better.    She had history of stroke in the past also as per chart patient history of syncope in which she would try to donate blood her blood pressure was too low so she ran around all day to get her blood pressure up gave blood and then had a syncopal episode.    REVIEW OF SYSTEMS  General: No fever or chills.  Eyes: No eye discharge. No eye pain.  Ear nose throat: No sore throat or  trouble swallowing.  Pulmonary: No shortness of breath or cough.  Cardiovascular: No chest pain or chest pressure.  GI: No abdominal pain nausea or vomiting.  : No dysuria or hematuria  Dermatologic: No rashes. No abrasions.  Neurologic: See above  Psychiatric: Anxiety.    All other systems are negative      PAST MEDICAL HISTORY  Past Medical History:   Diagnosis Date   • Benign essential HTN 3/19/2012   • Chest tightness or  pressure 3/19/2012   • Hypercholesterolemia 3/19/2012   • MVP (mitral valve prolapse) 3/19/2012   • Renal insufficiency 3/19/2012   • High risk medication use 3/19/2012   • Atrial fibrillation (HCC)    • Breast cancer (HCC)    • Cancer (HCC)    • Cardiac arrhythmia    • Chickenpox    • Coronary heart disease    • [place] measles    • Mumps    • Osteoporosis    • Stroke (HCC)    • Tonsillitis    • Valvular heart disease    • Venereal disease        FAMILY HISTORY  Family History   Problem Relation Age of Onset   • Cancer Mother    • Heart Failure Neg Hx    • Heart Disease Neg Hx        SOCIAL HISTORY  Social History     Socioeconomic History   • Marital status:      Spouse name: Not on file   • Number of children: Not on file   • Years of education: Not on file   • Highest education level: Not on file   Occupational History   • Not on file   Social Needs   • Financial resource strain: Not on file   • Food insecurity     Worry: Not on file     Inability: Not on file   • Transportation needs     Medical: Not on file     Non-medical: Not on file   Tobacco Use   • Smoking status: Never Smoker   • Smokeless tobacco: Never Used   Substance and Sexual Activity   • Alcohol use: Yes     Frequency: 2-3 times a week     Drinks per session: 1 or 2     Comment: 3 glasses of wine   • Drug use: No   • Sexual activity: Not on file   Lifestyle   • Physical activity     Days per week: Not on file     Minutes per session: Not on file   • Stress: Not on file   Relationships   • Social connections     Talks on phone: Not on file     Gets together: Not on file     Attends Hinduism service: Not on file     Active member of club or organization: Not on file     Attends meetings of clubs or organizations: Not on file     Relationship status: Not on file   • Intimate partner violence     Fear of current or ex partner: Not on file     Emotionally abused: Not on file     Physically abused: Not on file     Forced sexual activity: Not on  "file   Other Topics Concern   • Not on file   Social History Narrative   • Not on file       SURGICAL HISTORY  Past Surgical History:   Procedure Laterality Date   • CATARACT PHACO WITH IOL  3/16/2009    Performed by SHANNON GANDARA at [facility]   • CATARACT PHACO WITH IOL  1/26/2009    Performed by SHANNON GANDARA at [facility]   • BREAST BIOPSY     • HYSTERECTOMY LAPAROSCOPY     • LUMPECTOMY     • PB RADIATION THERAPY PLAN SIMPLE     • PB REMV 2ND CATARACT,CORN-SCLER SECTN     • TX CHEMOTHERAPY, UNSPECIFIED PROCEDURE     • PRIMARY C SECTION     • SINUSCOPE     • TONSILLECTOMY         CURRENT MEDICATIONS  No current facility-administered medications on file prior to encounter.      Current Outpatient Medications on File Prior to Encounter   Medication Sig Dispense Refill   • atorvastatin (LIPITOR) 80 MG tablet Take 1 Tab by mouth every evening. 90 Tab 1   • Cholecalciferol (VITAMIN D3) 2000 UNIT Cap Take  by mouth.     • potassium chloride (MICRO-K) 10 MEQ capsule Take 10 mEq by mouth 2 times a day.     • MYRBETRIQ 25 MG TABLET SR 24 HR      • spironolactone (ALDACTONE) 25 MG Tab Take 1 Tab by mouth every day for 360 days. 90 Tab 3   • TESTOSTERONE TD Apply  to skin as directed.     • Biotin 52472 MCG Tab Take 1 Tab by mouth every day.     • aspirin (ASA) 81 MG Chew Tab chewable tablet Take 1 Tab by mouth every day. 14 Tab 1   • valacyclovir (VALTREX) 500 MG Tab Take 500 mg by mouth every day.     • Multiple Vitamins-Minerals (ICAPS AREDS 2 PO) Take 2 Tabs by mouth every day.     • calcium carbonate (OS-CRISTOFER 500) 500 MG Tab Take 1,000 mg by mouth every day.     • Multiple Vitamin (MULTIVITAMINS PO) Take 1 Tab by mouth every day.         ALLERGIES  No Known Allergies    PHYSICAL EXAM  VITAL SIGNS: /61   Pulse (!) 56   Temp 36.8 °C (98.2 °F) (Temporal)   Resp 12   Ht 1.676 m (5' 6\")   Wt 64 kg (141 lb)   SpO2 95%   BMI 22.76 kg/m²       Constitutional: Well " developed, Well nourished, No acute distress, Non-toxic appearance.   HENT: Normocephalic atraumatic.  Pupils equal round react light no mastoid tenderness no masses noted oropharynx is dry  Eyes: Pupils equal round anicteric sclera possible right nystagmus noted on [place]-Hallpike to the right  Neck: Normal range of motion, No tenderness, Supple, No stridor.   Cardiovascular: Normal heart rate, Normal rhythm, No murmurs, No rubs, No gallops.  Upon standing the patient's heart rate went from 55-70 she was a symptomatic  Thorax & Lungs: Normal breath sounds, No respiratory distress, No wheezing, No chest tenderness.   Abdomen: Bowel sounds normal, Soft, No tenderness, No masses, No pulsatile masses.   Skin: Warm, Dry, No erythema, No rash.   Back: No tenderness, No CVA tenderness.   Extremities: Intact distal pulses, No edema, No tenderness, No cyanosis, No clubbing.   Neurologic: Cranial nerves II to XII gross intact good finger-nose negative pronator drift.  Patient had a symptomatic [place]-Hallpike test on both the right and the left but no nystagmus was noted except for that on the right and it did not extinguish very rapidly  Psychiatric: Affect normal, Judgment normal, Mood normal.         RADIOLOGY/PROCEDURES  Results for orders placed or performed during the hospital encounter of 05/07/20   CBC WITH DIFFERENTIAL   Result Value Ref Range    WBC 3.6 (L) 4.8 - 10.8 K/uL    RBC 3.42 (L) 4.20 - 5.40 M/uL    Hemoglobin 11.8 (L) 12.0 - 16.0 g/dL    Hematocrit 35.5 (L) 37.0 - 47.0 %    .8 (H) 81.4 - 97.8 fL    MCH 34.5 (H) 27.0 - 33.0 pg    MCHC 33.2 (L) 33.6 - 35.0 g/dL    RDW 52.0 (H) 35.9 - 50.0 fL    Platelet Count 160 (L) 164 - 446 K/uL    MPV 9.8 9.0 - 12.9 fL    Neutrophils-Polys 59.50 44.00 - 72.00 %    Lymphocytes 30.30 22.00 - 41.00 %    Monocytes 8.80 0.00 - 13.40 %    Eosinophils 0.80 0.00 - 6.90 %    Basophils 0.30 0.00 - 1.80 %    Immature Granulocytes 0.30 0.00 - 0.90 %    Nucleated RBC 0.00 /100 WBC     Neutrophils (Absolute) 2.16 2.00 - 7.15 K/uL    Lymphs (Absolute) 1.10 1.00 - 4.80 K/uL    Monos (Absolute) 0.32 0.00 - 0.85 K/uL    Eos (Absolute) 0.03 0.00 - 0.51 K/uL    Baso (Absolute) 0.01 0.00 - 0.12 K/uL    Immature Granulocytes (abs) 0.01 0.00 - 0.11 K/uL    NRBC (Absolute) 0.00 K/uL   Comp Metabolic Panel   Result Value Ref Range    Sodium 135 135 - 145 mmol/L    Potassium 4.1 3.6 - 5.5 mmol/L    Chloride 102 96 - 112 mmol/L    Co2 23 20 - 33 mmol/L    Anion Gap 10.0 7.0 - 16.0    Glucose 121 (H) 65 - 99 mg/dL    Bun 21 8 - 22 mg/dL    Creatinine 0.83 0.50 - 1.40 mg/dL    Calcium 8.1 (L) 8.5 - 10.5 mg/dL    AST(SGOT) 18 12 - 45 U/L    ALT(SGPT) 16 2 - 50 U/L    Alkaline Phosphatase 42 30 - 99 U/L    Total Bilirubin 0.3 0.1 - 1.5 mg/dL    Albumin 3.6 3.2 - 4.9 g/dL    Total Protein 5.8 (L) 6.0 - 8.2 g/dL    Globulin 2.2 1.9 - 3.5 g/dL    A-G Ratio 1.6 g/dL   TROPONIN   Result Value Ref Range    Troponin T 6 6 - 19 ng/L   ESTIMATED GFR   Result Value Ref Range    GFR If African American >60 >60 mL/min/1.73 m 2    GFR If Non African American >60 >60 mL/min/1.73 m 2   EKG (NOW)   Result Value Ref Range    Report       [facility] Emergency Dept.    Test Date:  2020  Pt Name:    HORACE GUZMAN                  Department: ER  MRN:        [id number]                      Room:       RD 11  Gender:     Female                       Technician: [id number]  :        [id number]                   Requested By:ER TRIAGE PROTOCOL  Order #:    [id number]                    Reading MD: Royal BECERRA MD    Measurements  Intervals                                Axis  Rate:       63                           P:          79  LA:         236                          QRS:        76  QRSD:       82                           T:          2  QT:         474  QTc:        486    Interpretive Statements  Normal sinus rhythm.  Normal LA.  Normal QRS.  Normal axis.  PVC /APC noted.  No  ST segment elevations  or depressions no change from EKG except for the PVCs  dated 5/4/2019 abnormal no acute ischemic findings  Electronically Signed On 5-7-2020 18:32:38 PDT by Royal BECERRA MD        CT-HEAD W/O   Final Result      1.  No evidence of acute intracranial process.      2.  Chronic ischemic change.      3.  Atrophy.            COURSE & MEDICAL DECISION MAKING  Pertinent Labs & Imaging studies reviewed. (See chart for details)  Very pleasant elderly female who is been fasting and not drinking water with orthostatic hypotension by pulse symptoms.  She looks clinically dry oropharynx is dry.  The patient may be simply dehydrated but with the dizziness and slight neurological findings patient had the hints test done which was positive and no finding of cerebellar finding noted.    She is 77 days old a CT scan of the head will be done just to make sure there is no mass or tumor at this point this patient most likely is diet lightheaded and dizzy because of symptomatic to be IV fluids and I think oral hydration will help that she tried oral hydration in the past.  This point I spoken all this with the daughter if she gets IV fluids feels much better able to ambulate and significant improvement skin is negative will discharge if not we will consider admission also look for electrode abnormality signs of infection or anemia.    Continues to feel dizzy when checked on at 8 PM at this point is elderly female with nonspecific complaints may be simply dehydration she received about 750 mL's of normal saline she is not significantly improved we will admit her for observation I made a call to the hospitalist and informed the patient.  She is aware    FINAL IMPRESSION  1.  Dizziness  2.  Dehydration  3.      Electronically signed by: Royal Becerra M.D., 5/7/2020 6:58 PM

## 2020-05-08 NOTE — PROCEDURES
Cardiac Catheterization report    5/8/2020  1:30 PM    Referring MD:     Primary Care Provider: Bethany Crane M.D.    Indication for procedure: Severe valvular heart disease    Procedure:  · Coronary arteriograms  · Left heart catheterization, Left ventriculogram and Right heart catheterization     Impression:  Angiographically normal appearing coronary arteries.  Mildly elevated LVEDP, filling pressures.  2+ mitral regurgitation.  Normal LV function.      EBL: <10 CC     Specimens: None    Procedure:  The risks and benefits of cardiac catheterization and possible intervention were explained to the patient including death, heart attack, stroke, and emergency surgery.  The patient verbalized understanding and wished to proceed.  The patient was brought to the cardiac catheterization laboratory in the fasting state and prepped and draped in the usual sterile fashion.  The right wrist was locally anesthetized with lidocaine and the right radial artery was cannulated with 5/6-[place] equipment and standard radial cocktail was given. Right brachial vein access was obtained with 5/6 slender sheath. Right heart catheterization was performed using balloon tipped 6 F swan catheter in usual fashion, results mentioned below. Coronary angiography was performed using JR 4 and JL 3.5  diagnostic catheters in the usual fashion, results mentioned below.  Pig tail catheter was used to cross the aortic valve to perform  left heart catheterization and left ventriculogram.      Right heart cath pressures in mmHg:  Right atrium :13  Right ventricle 33/5, EDP 15  Pulmonary artery 42/21, 29  pulmonary capillary wedge 23  Cardiac output 4.1 L/min by thermodilution method    1.  Left main coronary artery:  Normal.  2.  Left anterior descending artery:  Normal.   3.  Left circumflex coronary artery:  Normal.   4.  Right coronary artery:  Normal.  This is a right dominant system.  5.  Left ventricular end diastolic pressure:   25 mmHg.  No signficant gradient across the aortic valve.  6.  Left ventriculogram:  Ejection fraction of 65%, 2+ mitral regurgitation, normal sized thoracic aorta.    Once all the views were obtained, all wires and catheters were removed from the patient without difficulty.  A Vasc-Band was placed over the right radial artery and the radial artery sheath was removed without difficulty.  Brachial vein sheath was removed and manual pressure was obtained.    Complications:  There was no evidence of hematoma or other complications.  The patient was transferred to the recovery area in stable condition.         Recommendations:  Guideline directed medical therapy   Cardiovascular Risk factor modification    Sedation time:  I supervised moderate sedation over a trained independent nursing staff, Sedation start time 12:38, end time 13:23      HUMZA Hair  [facility]

## 2020-05-08 NOTE — ED NOTES
Pt rounded on, resting in bed, on the monitor, respirations even and unlabored, repositioning self as needed, updated on POC.

## 2020-05-08 NOTE — ASSESSMENT & PLAN NOTE
Concerning in setting of heart failure and near syncope.  Cardiology following  -angiogram showed only moderate MR, does not require surgical intervention at this time  -ambulating in the hallways  -possible mitral clip has now been deferred  -monitor on tele  Continue spironolactone and atorvastatin.

## 2020-05-08 NOTE — H&P
"Hospital Medicine History & Physical Note    Date of Service  5/7/2020    Primary Care Physician  Bethany Crane M.D.    Code Status  Full Code    Chief Complaint  Chief Complaint   Patient presents with   • Dizziness       History of Presenting Illness  75 y.o. female who presented 5/7/2020 with dizziness and near syncope. Patient states she has been \"intermittently fasting\" for her health and has not been eating or drinking as much. Today she became very dizzy. When questioned she is short of breath as well. Denies orthopnea. Patient has severe mitral regurgitation and has been under evaluation by the structural heart disease clinic and Dr. Meehan.    I discussed the presenting symptoms, physical examination, lab and radiological study results with the emergency department physician.      Review of Systems  Review of Systems   Constitutional: Negative.  Negative for chills, fever, malaise/fatigue and weight loss.   HENT: Negative.    Respiratory: Positive for shortness of breath. Negative for cough, hemoptysis, sputum production and wheezing.    Cardiovascular: Negative.  Negative for chest pain, palpitations, orthopnea, claudication, leg swelling and PND.   Gastrointestinal: Negative.  Negative for abdominal pain, nausea and vomiting.   Genitourinary: Negative.  Negative for dysuria and flank pain.   Musculoskeletal: Negative.  Negative for back pain and myalgias.   Neurological: Positive for dizziness and loss of consciousness. Negative for weakness.   Endo/Heme/Allergies: Negative.  Does not bruise/bleed easily.   Psychiatric/Behavioral: Negative.  Negative for depression. The patient is not nervous/anxious.    All other systems reviewed and are negative.      Past Medical History   has a past medical history of Atrial fibrillation (HCC), Benign essential HTN (3/19/2012), Breast cancer (HCC), Cancer (HCC), Cardiac arrhythmia, Chest tightness or pressure (3/19/2012), Chickenpox, Coronary heart disease, " [place] measles, High risk medication use (3/19/2012), Hypercholesterolemia (3/19/2012), Mumps, MVP (mitral valve prolapse) (3/19/2012), Osteoporosis, Renal insufficiency (3/19/2012), Stroke (HCC), Tonsillitis, Valvular heart disease, and Venereal disease.    Surgical History   has a past surgical history that includes cataract phaco with iol (1/26/2009); cataract phaco with iol (3/16/2009); breast biopsy; pr radiation therapy plan simple; pr chemotherapy, unspecified procedure; lumpectomy; pr remv 2nd cataract,corn-scler sectn; hysterectomy laparoscopy; sinuscope; tonsillectomy; and primary c section.     Family History  family history includes Cancer in her mother.     Social History   reports that she has never smoked. She has never used smokeless tobacco. She reports current alcohol use. She reports that she does not use drugs.    Allergies  No Known Allergies    Medications  Prior to Admission Medications   Prescriptions Last Dose Informant Patient Reported? Taking?   Cholecalciferol (VITAMIN D3) 2000 UNIT Cap 5/6/2020 at PM Patient Yes No   Sig: Take 2,000 Units by mouth every day.   MYRBETRIQ 25 MG TABLET SR 24 HR 5/6/2020 at PM Patient Yes No   Multiple Vitamin (MULTIVITAMINS PO) 5/6/2020 at PM Patient Yes No   Sig: Take 1 Tab by mouth every day.   TESTOSTERONE TD UNK at PM Patient Yes No   Sig: Apply  to skin as directed.   aspirin (ASA) 81 MG Chew Tab chewable tablet 5/6/2020 at PM Patient No No   Sig: Take 1 Tab by mouth every day.   atorvastatin (LIPITOR) 80 MG tablet 5/6/2020 at PM Patient No No   Sig: Take 1 Tab by mouth every evening.   calcium carbonate (OS-CRISTOFER 500) 500 MG Tab 5/6/2020 at PM Patient Yes No   Sig: Take 1,000 mg by mouth every day.   potassium chloride (MICRO-K) 10 MEQ capsule 5/6/2020 at PM Patient Yes No   Sig: Take 10 mEq by mouth 2 times a day.   spironolactone (ALDACTONE) 25 MG Tab 5/6/2020 at PM Patient No No   Sig: Take 1 Tab by mouth every day for 360 days.   valacyclovir  (VALTREX) 500 MG Tab 5/6/2020 at PM Patient Yes No   Sig: Take 500 mg by mouth every day.      Facility-Administered Medications: None       Physical Exam  Temp:  [36.8 °C (98.2 °F)] 36.8 °C (98.2 °F)  Pulse:  [56-66] 66  Resp:  [12-18] 14  BP: (131-162)/(61-77) 162/77  SpO2:  [94 %-95 %] 94 %    Physical Exam  Vitals signs and nursing note reviewed.   Constitutional:       General: She is not in acute distress.     Appearance: She is well-developed. She is not ill-appearing or diaphoretic.   HENT:      Right Ear: External ear normal.      Left Ear: External ear normal.      Nose: Nose normal.      Mouth/Throat:      Pharynx: No oropharyngeal exudate.   Eyes:      General: No scleral icterus.        Right eye: No discharge.         Left eye: No discharge.      Conjunctiva/sclera: Conjunctivae normal.   Neck:      Vascular: JVD present.      Trachea: No tracheal deviation.   Cardiovascular:      Rate and Rhythm: Normal rate and regular rhythm.      Chest Wall: PMI is not displaced. No thrill.      Heart sounds: Murmur (LLSB radiating to the apex) present. No friction rub. No gallop.    Pulmonary:      Effort: Pulmonary effort is normal. No respiratory distress.      Breath sounds: No stridor. Rales (both bases) present. No wheezing.   Chest:      Chest wall: No tenderness.   Abdominal:      General: Bowel sounds are normal. There is no distension.      Palpations: Abdomen is soft.      Tenderness: There is no abdominal tenderness.   Musculoskeletal:         General: No tenderness.      Right lower leg: No edema.      Left lower leg: No edema.   Skin:     General: Skin is warm and dry.      Coloration: Skin is not pale.   Neurological:      Mental Status: She is alert and oriented to person, place, and time.      Cranial Nerves: No cranial nerve deficit.      Motor: No abnormal muscle tone.   Psychiatric:         Mood and Affect: Mood normal.         Behavior: Behavior normal.         Laboratory:  Recent Labs      05/07/20  1831   WBC 3.6*   RBC 3.42*   HEMOGLOBIN 11.8*   HEMATOCRIT 35.5*   .8*   MCH 34.5*   MCHC 33.2*   RDW 52.0*   PLATELETCT 160*   MPV 9.8     Recent Labs     05/07/20  1831   SODIUM 135   POTASSIUM 4.1   CHLORIDE 102   CO2 23   GLUCOSE 121*   BUN 21   CREATININE 0.83   CALCIUM 8.1*     Recent Labs     05/07/20  1831   ALTSGPT 16   ASTSGOT 18   ALKPHOSPHAT 42   TBILIRUBIN 0.3   GLUCOSE 121*         No results for input(s): NTPROBNP in the last 72 hours.      Recent Labs     05/07/20  1831   TROPONINT 6       Imaging:  CT-HEAD W/O   Final Result      1.  No evidence of acute intracranial process.      2.  Chronic ischemic change.      3.  Atrophy.            Assessment/Plan:      * Heart failure due to valvular disease (HCC)- (present on admission)  Assessment & Plan  Patient has signs of heart failure with dizziness, near syncope, rales, JVD.  BNP mildly elevated.  Continue spironolactone and atorvastatin.  Check chest xray  Cardiology consult in am, patient has been followed by Dr. Meehan, structural heart disease clinic.    Severe mitral regurgitation- (present on admission)  Assessment & Plan  Concerning in setting of heart failure and near syncope.  Cardiology consult in am  Continue spironolactone and atorvastatin.    Near syncope- (present on admission)  Assessment & Plan  Concerning in setting of severe mitral regurgitation.  Monitor on telemetry.  Cardiology consult.  Patient just had an echocardiogram Feb 2020 will not repeat at this time.    Dyslipidemia- (present on admission)  Assessment & Plan  Continue atorvastatin.

## 2020-05-08 NOTE — ASSESSMENT & PLAN NOTE
Patient has signs of heart failure with dizziness, near syncope, rales, JVD.  BNP mildly elevated.  Continue spironolactone and atorvastatin.  -no need for diuretics at this time, volume status appears on the dry side

## 2020-05-09 ENCOUNTER — APPOINTMENT (OUTPATIENT)
Dept: CARDIOLOGY | Facility: MEDICAL CENTER | Age: 75
DRG: 287 | End: 2020-05-09
Attending: NURSE PRACTITIONER
Payer: COMMERCIAL

## 2020-05-09 LAB
ANION GAP SERPL CALC-SCNC: 9 MMOL/L (ref 7–16)
BASOPHILS # BLD AUTO: 0.2 % (ref 0–1.8)
BASOPHILS # BLD: 0.01 K/UL (ref 0–0.12)
BUN SERPL-MCNC: 14 MG/DL (ref 8–22)
CALCIUM SERPL-MCNC: 8.9 MG/DL (ref 8.5–10.5)
CHLORIDE SERPL-SCNC: 109 MMOL/L (ref 96–112)
CO2 SERPL-SCNC: 22 MMOL/L (ref 20–33)
CREAT SERPL-MCNC: 0.78 MG/DL (ref 0.5–1.4)
EOSINOPHIL # BLD AUTO: 0.07 K/UL (ref 0–0.51)
EOSINOPHIL NFR BLD: 1.6 % (ref 0–6.9)
ERYTHROCYTE [DISTWIDTH] IN BLOOD BY AUTOMATED COUNT: 53.7 FL (ref 35.9–50)
GLUCOSE SERPL-MCNC: 94 MG/DL (ref 65–99)
HCT VFR BLD AUTO: 37.5 % (ref 37–47)
HGB BLD-MCNC: 12.5 G/DL (ref 12–16)
IMM GRANULOCYTES # BLD AUTO: 0 K/UL (ref 0–0.11)
IMM GRANULOCYTES NFR BLD AUTO: 0 % (ref 0–0.9)
LYMPHOCYTES # BLD AUTO: 1.34 K/UL (ref 1–4.8)
LYMPHOCYTES NFR BLD: 31 % (ref 22–41)
MCH RBC QN AUTO: 34.6 PG (ref 27–33)
MCHC RBC AUTO-ENTMCNC: 33.3 G/DL (ref 33.6–35)
MCV RBC AUTO: 103.9 FL (ref 81.4–97.8)
MONOCYTES # BLD AUTO: 0.31 K/UL (ref 0–0.85)
MONOCYTES NFR BLD AUTO: 7.2 % (ref 0–13.4)
NEUTROPHILS # BLD AUTO: 2.59 K/UL (ref 2–7.15)
NEUTROPHILS NFR BLD: 60 % (ref 44–72)
NRBC # BLD AUTO: 0 K/UL
NRBC BLD-RTO: 0 /100 WBC
PLATELET # BLD AUTO: 161 K/UL (ref 164–446)
PMV BLD AUTO: 9.6 FL (ref 9–12.9)
POTASSIUM SERPL-SCNC: 3.8 MMOL/L (ref 3.6–5.5)
RBC # BLD AUTO: 3.61 M/UL (ref 4.2–5.4)
SODIUM SERPL-SCNC: 140 MMOL/L (ref 135–145)
WBC # BLD AUTO: 4.3 K/UL (ref 4.8–10.8)

## 2020-05-09 PROCEDURE — 36415 COLL VENOUS BLD VENIPUNCTURE: CPT

## 2020-05-09 PROCEDURE — A9270 NON-COVERED ITEM OR SERVICE: HCPCS | Performed by: HOSPITALIST

## 2020-05-09 PROCEDURE — 700111 HCHG RX REV CODE 636 W/ 250 OVERRIDE (IP): Performed by: INTERNAL MEDICINE

## 2020-05-09 PROCEDURE — 85025 COMPLETE CBC W/AUTO DIFF WBC: CPT

## 2020-05-09 PROCEDURE — 80048 BASIC METABOLIC PNL TOTAL CA: CPT

## 2020-05-09 PROCEDURE — 99232 SBSQ HOSP IP/OBS MODERATE 35: CPT | Performed by: INTERNAL MEDICINE

## 2020-05-09 PROCEDURE — 93005 ELECTROCARDIOGRAM TRACING: CPT | Performed by: INTERNAL MEDICINE

## 2020-05-09 PROCEDURE — 700102 HCHG RX REV CODE 250 W/ 637 OVERRIDE(OP): Performed by: HOSPITALIST

## 2020-05-09 PROCEDURE — 97161 PT EVAL LOW COMPLEX 20 MIN: CPT

## 2020-05-09 PROCEDURE — 770020 HCHG ROOM/CARE - TELE (206)

## 2020-05-09 RX ADMIN — SENNOSIDES AND DOCUSATE SODIUM 2 TABLET: 8.6; 5 TABLET ORAL at 18:00

## 2020-05-09 RX ADMIN — SPIRONOLACTONE 25 MG: 25 TABLET ORAL at 18:18

## 2020-05-09 RX ADMIN — ATORVASTATIN CALCIUM 80 MG: 80 TABLET, FILM COATED ORAL at 18:17

## 2020-05-09 RX ADMIN — ASPIRIN 81 MG 81 MG: 81 TABLET ORAL at 18:17

## 2020-05-09 RX ADMIN — ENOXAPARIN SODIUM 40 MG: 100 INJECTION SUBCUTANEOUS at 05:06

## 2020-05-09 ASSESSMENT — ENCOUNTER SYMPTOMS
NAUSEA: 0
SHORTNESS OF BREATH: 0
PALPITATIONS: 0
BLURRED VISION: 0
VOMITING: 0
DIZZINESS: 1
ABDOMINAL PAIN: 0
FEVER: 0
CHILLS: 0
COUGH: 0

## 2020-05-09 ASSESSMENT — COGNITIVE AND FUNCTIONAL STATUS - GENERAL
SUGGESTED CMS G CODE MODIFIER MOBILITY: CH
MOBILITY SCORE: 24

## 2020-05-09 ASSESSMENT — GAIT ASSESSMENTS
GAIT LEVEL OF ASSIST: SUPERVISED
DISTANCE (FEET): 400

## 2020-05-09 ASSESSMENT — FIBROSIS 4 INDEX: FIB4 SCORE: 2.15

## 2020-05-09 NOTE — CARE PLAN
Problem: Communication  Goal: The ability to communicate needs accurately and effectively will improve  5/8/2020 2131 by Rona Booker R.N.  Outcome: PROGRESSING AS EXPECTED  5/8/2020 2130 by Rona Booker R.N.  Outcome: PROGRESSING AS EXPECTED   Pt whiteboard updated on plan of care  Pt encouraged to call for assistance  Pt encouraged to voice any concerns or questions regarding care plan  Pt updated on plan of care as it develops and changes      Problem: Safety  Goal: Will remain free from injury  5/8/2020 2131 by Rona Booker R.N.  Outcome: PROGRESSING AS EXPECTED  5/8/2020 2130 by Rona Booker R.N.  Outcome: PROGRESSING AS EXPECTED   Treaded socks in use  Call light within reach and patient calls appropriately  Medication education provided prior to administration  Medications administered as ordered  Bed alarm on and bed locked in lowest position

## 2020-05-09 NOTE — PROGRESS NOTES
Returned from cath lab, TR band in place, +radial pulse/+csm, no bleeding noted, no s&s of active bleeding, placed on frequent vital signs. Denies pain

## 2020-05-09 NOTE — CARE PLAN
Problem: Communication  Goal: The ability to communicate needs accurately and effectively will improve  Outcome: PROGRESSING AS EXPECTED   Pt whiteboard updated on plan of care  Pt encouraged to call for assistance  Pt encouraged to voice any concerns or questions regarding care plan  Pt updated on plan of care as it develops and changes      Problem: Safety  Goal: Will remain free from injury  Outcome: PROGRESSING AS EXPECTED   Treaded socks in use  Call light within reach and patient calls appropriately  Medication education provided prior to administration  Medications administered as ordered  Bed alarm on and bed locked in lowest position

## 2020-05-09 NOTE — CARE PLAN
Fall precautions in place. Bed in lowest position. Non-skid socks in place. Personal possessions within reach. Mobility sign on door. Call light within reach. Pt educated regarding fall prevention and states understanding.      Pt educated regarding plan of care and medications. All questions answered.

## 2020-05-09 NOTE — PROGRESS NOTES
Received report from night shift RN. Assumed care of pt. Pt reports no complaints at this time. Updated pt on plan of care. Pt resting comfortably in bed. Educated on use of call light. Hourly rounding and continuous monitoring in place.

## 2020-05-09 NOTE — PROGRESS NOTES
Received report from day shift RNJacqueline. Assumed care of pt. Pt reports no complaints at this time. Updated pt on plan of care. Pt resting comfortably in bed. VSS. Educated on use of call light. Hourly rounding and continuous monitoring in place.

## 2020-05-09 NOTE — THERAPY
"Physical Therapy Evaluation completed.   Bed Mobility:  Supine to Sit: Supervised  Transfers: Sit to Stand: Supervised  Gait: Level Of Assist: Supervised with No Equipment Needed       Plan of Care: Patient with no further skilled PT needs in the acute care setting at this time  Discharge Recommendations: Equipment: No Equipment Needed. Post-acute therapy Discharge to home with outpatient  for additional skilled therapy services.    See \"Rehab Therapy-Acute\" Patient Summary Report for complete documentation.     75 year old female who presents with dizziness and syncope, JVD present (vascular).  Left Cath performed 5/8 with EF at 65%,  PMHx includes A-fib, HTN, breast cancer, CAD, MVP, Osteoporosis, renal insufficiency, CVA, Vascular heart disease and Venereal disease with significant surgical history.  Patient demonstrates good functional mobility, and receptive to significant cardiovascular education.  Patient demonstrates understanding of talk test, however concerned that she will not be allowed to continue to get passive physical therapy treatment for spasticity post stroke as she believes it is helping and would prefer to continue that treatment rather than receive cardiovascular PT.  If patient open, recommend outpatient physical therapy if patient receptive.  Otherwise, discharge home.     Ty Berven PT  "

## 2020-05-10 VITALS
HEART RATE: 59 BPM | SYSTOLIC BLOOD PRESSURE: 158 MMHG | OXYGEN SATURATION: 95 % | HEIGHT: 66 IN | RESPIRATION RATE: 17 BRPM | BODY MASS INDEX: 24.02 KG/M2 | TEMPERATURE: 96.7 F | WEIGHT: 149.47 LBS | DIASTOLIC BLOOD PRESSURE: 85 MMHG

## 2020-05-10 PROBLEM — I50.9 HEART FAILURE DUE TO VALVULAR DISEASE (HCC): Status: RESOLVED | Noted: 2020-05-07 | Resolved: 2020-05-10

## 2020-05-10 PROBLEM — R55 NEAR SYNCOPE: Status: RESOLVED | Noted: 2020-05-07 | Resolved: 2020-05-10

## 2020-05-10 PROBLEM — I38 HEART FAILURE DUE TO VALVULAR DISEASE (HCC): Status: RESOLVED | Noted: 2020-05-07 | Resolved: 2020-05-10

## 2020-05-10 LAB — EKG IMPRESSION: NORMAL

## 2020-05-10 PROCEDURE — 700111 HCHG RX REV CODE 636 W/ 250 OVERRIDE (IP): Performed by: INTERNAL MEDICINE

## 2020-05-10 PROCEDURE — 93010 ELECTROCARDIOGRAM REPORT: CPT | Performed by: INTERNAL MEDICINE

## 2020-05-10 PROCEDURE — 99239 HOSP IP/OBS DSCHRG MGMT >30: CPT | Performed by: INTERNAL MEDICINE

## 2020-05-10 RX ADMIN — ENOXAPARIN SODIUM 40 MG: 100 INJECTION SUBCUTANEOUS at 05:34

## 2020-05-10 NOTE — PROGRESS NOTES
12 hr CC    Monitor Summary:    SB-SR 58-69 (Down to 47 NS)  1st degree AV Block, O PVC   0.24/.08/.42

## 2020-05-10 NOTE — PROGRESS NOTES
Assumed care of patient, bedside report received from ALISE Rea. Updated on POC, call light within reach and fall precautions in place. Bed locked and in lowest position. Patient instructed to call for assistance before getting out of bed. All questions answered, no other needs at this time.

## 2020-05-10 NOTE — PROGRESS NOTES
[facility] Medicine Daily Progress Note    Date of Service  5/9/2020    Chief Complaint  75 y.o. female admitted 5/7/2020 with near syncope and fatigue in the setting of progressive MR/MVP.    Hospital Course    see below      Interval Problem Update  MR/MVP-intermittent mild sinus bradycardia overnight but no other telemetric issues. CATH with only moderate MR, not severe enough to require surgical intrevention.     Dizziness-mild today, but overall better. Ambulating the hallways. SHe is quite nervous about going home without finding the exact etiology.     Consultants/Specialty  Cards    Code Status  fcfc    Disposition  TELE    Review of Systems  Review of Systems   Constitutional: Negative for chills and fever.   Eyes: Negative for blurred vision.   Respiratory: Negative for cough and shortness of breath.    Cardiovascular: Negative for chest pain and palpitations.   Gastrointestinal: Negative for abdominal pain, nausea and vomiting.   Neurological: Positive for dizziness (nearly gone, intermittent).   All other systems reviewed and are negative.       Physical Exam  Temp:  [36.4 °C (97.6 °F)-37.1 °C (98.7 °F)] 37 °C (98.6 °F)  Pulse:  [53-66] 60  Resp:  [16-17] 16  BP: (146-158)/(80-87) 158/82  SpO2:  [92 %-97 %] 93 %    Physical Exam  Vitals signs and nursing note reviewed.   Constitutional:       General: She is not in acute distress.     Appearance: She is well-developed.   HENT:      Head: Normocephalic and atraumatic.      Mouth/Throat:      Pharynx: No oropharyngeal exudate.   Eyes:      General: No scleral icterus.     Pupils: Pupils are equal, round, and reactive to light.   Neck:      Musculoskeletal: Normal range of motion and neck supple.      Thyroid: No thyromegaly.   Cardiovascular:      Rate and Rhythm: Normal rate and regular rhythm.      Heart sounds: Murmur present.   Pulmonary:      Effort: Pulmonary effort is normal. No respiratory distress.      Breath sounds: Normal breath sounds. No  wheezing.   Abdominal:      General: Bowel sounds are normal. There is no distension.      Palpations: Abdomen is soft.      Tenderness: There is no abdominal tenderness.   Musculoskeletal: Normal range of motion.         General: No tenderness.      Right lower leg: No edema.      Left lower leg: No edema.      Comments: Warm peripherally B/L   Skin:     General: Skin is warm and dry.      Findings: No rash.   Neurological:      Mental Status: She is alert and oriented to person, place, and time.      Cranial Nerves: No cranial nerve deficit.   Psychiatric:      Comments: Mild nervousness exhibited         Fluids  No intake or output data in the 24 hours ending 05/09/20 2131    Laboratory  Recent Labs     05/07/20  1831 05/08/20  0211 05/09/20  0158   WBC 3.6* 4.9 4.3*   RBC 3.42* 3.50* 3.61*   HEMOGLOBIN 11.8* 11.9* 12.5   HEMATOCRIT 35.5* 36.4* 37.5   .8* 104.0* 103.9*   MCH 34.5* 34.0* 34.6*   MCHC 33.2* 32.7* 33.3*   RDW 52.0* 53.3* 53.7*   PLATELETCT 160* 170 161*   MPV 9.8 9.9 9.6     Recent Labs     05/07/20  1831 05/08/20  0211 05/09/20  0158   SODIUM 135 134* 140   POTASSIUM 4.1 4.2 3.8   CHLORIDE 102 103 109   CO2 23 22 22   GLUCOSE 121* 124* 94   BUN 21 19 14   CREATININE 0.83 0.80 0.78   CALCIUM 8.1* 8.3* 8.9     Recent Labs     05/08/20  0933   INR 1.01               Imaging  DX-CHEST-PORTABLE (1 VIEW)   Final Result      Minimal bilateral interstitial prominence, chronic versus minimal edema. No focal consolidation or pleural effusions.      CT-HEAD W/O   Final Result      1.  No evidence of acute intracranial process.      2.  Chronic ischemic change.      3.  Atrophy.      CL-LEFT HEART CATHETERIZATION WITH POSSIBLE INTERVENTION    (Results Pending)        Assessment/Plan  * Heart failure due to valvular disease (HCC)- (present on admission)  Assessment & Plan  Patient has signs of heart failure with dizziness, near syncope, rales, JVD.  BNP mildly elevated.  Continue spironolactone and  atorvastatin.  -no need for diuretics at this time, volume status appears on the dry side    Severe mitral regurgitation- (present on admission)  Assessment & Plan  Concerning in setting of heart failure and near syncope.  Cardiology following  -angiogram showed only moderate MR, does not require surgical intervention at this time  -ambulating in the hallways  -possible mitral clip has now been deferred  -monitor on tele  Continue spironolactone and atorvastatin.    Near syncope- (present on admission)  Assessment & Plan  Concerning in setting of severe mitral regurgitation.  Monitor on telemetry.-no issues overnight again  -ambulating, likely more vasovagal and dehydration at home in the setting of intermittent fasting (intentional) and poor oral hydration  Cardiology consult.  Patient just had an echocardiogram Feb 2020 will not repeat at this time.    Dyslipidemia- (present on admission)  Assessment & Plan  Continue atorvastatin.       VTE prophylaxis: lovenox

## 2020-05-10 NOTE — DISCHARGE SUMMARY
Discharge Summary    CHIEF COMPLAINT ON ADMISSION  Chief Complaint   Patient presents with   • Dizziness       Reason for Admission  EMS     Admission Date  5/7/2020    CODE STATUS  Full Code    HPI & HOSPITAL COURSE  This is a 75 y.o. female here with above medical issues. Patient was found to have dizziness and pre-syncopal symptoms in the setting of known structural cardiovascular issues and moderate to severe MR. She was admitted to the telemetry floor and placed on gentle IV fluid hydration. Cardiology was consulted. She underwent angiogram:       1.  Left main coronary artery:  Normal.  2.  Left anterior descending artery:  Normal.   3.  Left circumflex coronary artery:  Normal.   4.  Right coronary artery:  Normal.  This is a right dominant system.  5.  Left ventricular end diastolic pressure:  25 mmHg.  No signficant gradient across the aortic valve.  6.  Left ventriculogram:  Ejection fraction of 65%, 2+ mitral regurgitation, normal sized thoracic aorta.    Impression:  Angiographically normal appearing coronary arteries.  Mildly elevated LVEDP, filling pressures.  2+ mitral regurgitation.  Normal LV function.     Her degree of MR was not as severe as initially thought therefore she does not require a mitralclip at this time. Her dizziness is likely dehydration related and she informed to stay well hydrated at home and keep a daily blood pressure log. No other changes to her medications were made at this time.        see below     Therefore, she is discharged in fair and stable condition to home with close outpatient follow-up.    The patient met 2-midnight criteria for an inpatient stay at the time of discharge.    Discharge Date  5/10/2020    FOLLOW UP ITEMS POST DISCHARGE  F.U with Cards in 1 month  F/U with her PCP in 1-2 weeks    DISCHARGE DIAGNOSES  Principal Problem (Resolved):    Heart failure due to valvular disease (HCC) POA: Yes  Active Problems:    Severe mitral regurgitation POA: Yes     Dyslipidemia POA: Yes  Resolved Problems:    Near syncope POA: Yes      FOLLOW UP  Future Appointments   Date Time Provider Department Center   5/13/2020  3:15 PM MOLLY Zaman [facility] None   5/21/2020  9:00 AM Travon Jaramillo M.D. [facility] None   5/27/2020  8:45 AM John Carey M.D. [facility] None   8/25/2020  1:20 PM Ayde Denise M.D. [facility] None   10/21/2020 12:40 PM Varsha Weems M.D. [facility] None     Bethany Crane M.D.  [address] [facility] [address]  [phone]    Schedule an appointment as soon as possible for a visit        MEDICATIONS ON DISCHARGE     Medication List      CONTINUE taking these medications      Instructions   aspirin 81 MG Chew chewable tablet  Commonly known as:  ASA   Take 1 Tab by mouth every day.  Dose:  81 mg     atorvastatin 80 MG tablet  Commonly known as:  Lipitor   Take 1 Tab by mouth every evening.  Dose:  80 mg     calcium carbonate 500 MG Tabs  Commonly known as:  OS-CRISTOFER 500   Take 1,000 mg by mouth every day.  Dose:  1,000 mg     MULTIVITAMINS PO   Take 1 Tab by mouth every day.  Dose:  1 Tab     Myrbetriq 25 MG Tb24  Generic drug:  Mirabegron ER      potassium chloride 10 MEQ capsule  Commonly known as:  MICRO-K   Take 10 mEq by mouth 2 times a day.  Dose:  10 mEq     spironolactone 25 MG Tabs  Commonly known as:  ALDACTONE   Take 1 Tab by mouth every day for 360 days.  Dose:  25 mg     TESTOSTERONE TD   Apply  to skin as directed.     valACYclovir 500 MG Tabs  Commonly known as:  VALTREX   Take 500 mg by mouth every day.  Dose:  500 mg     Vitamin D3 2000 UNIT Caps   Take 2,000 Units by mouth every day.  Dose:  2,000 Units            Allergies  No Known Allergies    DIET  Orders Placed This Encounter   Procedures   • Diet Order Cardiac     Standing Status:   Standing     Number of Occurrences:   1     Order Specific Question:   Diet:     Answer:   Cardiac [6]       ACTIVITY  As tolerated.  Weight bearing as  tolerated    CONSULTATIONS  Cards    PROCEDURES  As noted above    DX-CHEST-PORTABLE (1 VIEW)   Final Result      Minimal bilateral interstitial prominence, chronic versus minimal edema. No focal consolidation or pleural effusions.      CT-HEAD W/O   Final Result      1.  No evidence of acute intracranial process.      2.  Chronic ischemic change.      3.  Atrophy.      CL-LEFT HEART CATHETERIZATION WITH POSSIBLE INTERVENTION    (Results Pending)         LABORATORY  Lab Results   Component Value Date    SODIUM 140 05/09/2020    POTASSIUM 3.8 05/09/2020    CHLORIDE 109 05/09/2020    CO2 22 05/09/2020    GLUCOSE 94 05/09/2020    BUN 14 05/09/2020    CREATININE 0.78 05/09/2020        Lab Results   Component Value Date    WBC 4.3 (L) 05/09/2020    HEMOGLOBIN 12.5 05/09/2020    HEMATOCRIT 37.5 05/09/2020    PLATELETCT 161 (L) 05/09/2020        Total time of the discharge process exceeds 34 minutes.

## 2020-05-10 NOTE — DISCHARGE INSTRUCTIONS
Discharge Instructions    Discharged to home by car with relative. Discharged via wheelchair, hospital escort: Yes.  Special equipment needed: Not Applicable    Be sure to schedule a follow-up appointment with your primary care doctor or any specialists as instructed.     Discharge Plan:   Diet Plan: Discussed  Activity Level: Discussed  Confirmed Follow up Appointment: Appointment Scheduled  Confirmed Symptoms Management: Discussed  Medication Reconciliation Updated: Yes    I understand that a diet low in cholesterol, fat, and sodium is recommended for good health. Unless I have been given specific instructions below for another diet, I accept this instruction as my diet prescription.   Other diet: Cardiac    Special Instructions:   HF Patient Discharge Instructions  · Monitor your weight daily, and maintain a weight chart, to track your weight changes.   · Activity as tolerated, unless your Doctor has ordered otherwise. Other activity order: n/a.  · Follow a low fat, low cholesterol, low salt diet unless instructed otherwise by your Doctor. Read the labels on the back of food products and track your intake of fat, cholesterol and salt.   · Fluid Restriction No. If a Fluid Restriction has been ordered by your Doctor, measure fluids with a measuring cup to ensure that you are not exceeding the restriction.   · No smoking.  · Oxygen No. If your Doctor has ordered that you wear Oxygen at home, it is important to wear it as ordered.  · Did you receive an explanation from staff on the importance of taking each of your medications and why it is necessary to keep taking them unless your doctor says to stop? Yes  · Were all of your questions answered about how to manage your heart failure and what to do if you have increased signs and symptoms after you go home? Yes  · Do you feel like your heart failure care team involved you in the care treatment plan and allowed you to make decisions regarding your care while in the  hospital and addressed any discharge needs you might have? Yes    See the educational handout provided at discharge for more information on monitoring your daily weight, activity and diet. This also explains more about Heart Failure, symptoms of a flare-up and some of the tests that you have undergone.     Warning Signs of a Flare-Up include:  · Swelling in the ankles or lower legs.  · Shortness of breath, while at rest, or while doing normal activities.   · Shortness of breath at night when in bed, or coughing in bed.   · Requiring more pillows to sleep at night, or needing to sit up at night to sleep.  · Feeling weak, dizzy or fatigued.     When to call your Doctor:  · Call [facility] seven days a week from 8:00 a.m. to 8:00 p.m. for medical questions [phone].  · Call your Primary Care Physician or Cardiologist if:   1. You experience any pain radiating to your jaw or neck.  2. You have any difficulty breathing.  3. You experience weight gain of 3 lbs in a day or 5 lbs in a week.   4. You feel any palpitations or irregular heartbeats.  5. You become dizzy or lose consciousness.   If you have had an angiogram or had a pacemaker or AICD placed, and experience:  1. Bleeding, drainage or swelling at the surgical / puncture site.  2. Fever greater than 100.0 F  3. Shock from internal defibrillator.  4. Cool and / or numb extremities.      · Is patient discharged on Warfarin / Coumadin?   No     Depression / Suicide Risk    As you are discharged from this [facility], it is important to learn how to keep safe from harming yourself.    Recognize the warning signs:  · Abrupt changes in personality, positive or negative- including increase in energy   · Giving away possessions  · Change in eating patterns- significant weight changes-  positive or negative  · Change in sleeping patterns- unable to sleep or sleeping all the time   · Unwillingness or inability to  communicate  · Depression  · Unusual sadness, discouragement and loneliness  · Talk of wanting to die  · Neglect of personal appearance   · Rebelliousness- reckless behavior  · Withdrawal from people/activities they love  · Confusion- inability to concentrate     If you or a loved one observes any of these behaviors or has concerns about self-harm, here's what you can do:  · Talk about it- your feelings and reasons for harming yourself  · Remove any means that you might use to hurt yourself (examples: pills, rope, extension cords, firearm)  · Get professional help from the community (Mental Health, Substance Abuse, psychological counseling)  · Do not be alone:Call your Safe Contact- someone whom you trust who will be there for you.  · Call your local CRISIS HOTLINE 847-1188 or 272-104-8277  · Call your local Children's Mobile Crisis Response Team [place] (408) 704-8426 or www.Medius  · Call the toll free National Suicide Prevention Hotlines   · National Suicide Prevention Lifeline 117-814-XFMI (3387)  · National Hope Line Network 800-SUICIDE (872-5366)                          Angiogram, Care After  These instructions give you information about caring for yourself after your procedure. Your doctor may also give you more specific instructions. Call your doctor if you have any problems or questions after your procedure.   HOME CARE  · Take medicines only as told by your doctor.  · Follow your doctor's instructions about:  ¨ Care of the area where the tube was inserted.  ¨ Bandage (dressing) changes and removal.  · You may shower 24-48 hours after the procedure or as told by your doctor.  · Do not take baths, swim, or use a hot tub until your doctor approves.  · Every day, check the area where the tube was inserted. Watch for:  ¨ Redness, swelling, or pain.  ¨ Fluid, blood, or pus.  · Do not apply powder or lotion to the site.  · Do not lift anything that is heavier than 10 lb (4.5 kg) for 5 days or as  told by your doctor.  · Ask your doctor when you can:  ¨ Return to work or school.  ¨ Do physical activities or play sports.  ¨ Have sex.  · Do not drive or operate heavy machinery for 24 hours or as told by your doctor.  · Have someone with you for the first 24 hours after the procedure.  · Keep all follow-up visits as told by your doctor. This is important.  GET HELP IF:  · You have a fever.    · You have chills.    · You have more bleeding from the area where the tube was inserted. Hold pressure on the area.  · You have redness, swelling, or pain in the area where the tube was inserted.  · You have fluid or pus coming from the area.  GET HELP RIGHT AWAY IF:   · You have a lot of pain in the area where the tube was inserted.  · The area where the tube was inserted is bleeding, and the bleeding does not stop after 30 minutes of holding steady pressure on the area.  · The area near or just beyond the insertion site becomes pale, cool, tingly, or numb.     This information is not intended to replace advice given to you by your health care provider. Make sure you discuss any questions you have with your health care provider.     Document Released: 03/16/2010 Document Revised: 01/08/2016 Document Reviewed: 07/06/2015  [employer] Interactive Patient Education ©2016 [employer]

## 2020-05-10 NOTE — PROGRESS NOTES
Received report from day shift RNArelis. Assumed care of pt. Pt reports c/o frustration about discharge planning. Otherwise no complaints at this time. Updated pt on plan of care thusfar. Pt resting comfortably in bed. VSS. Educated on use of call light. Hourly rounding and continuous monitoring in place.

## 2020-05-20 NOTE — DOCUMENTATION QUERY
Formerly [facility], [facility]                                                                       Query Response Note      PATIENT:               HORACE GUZMAN  ACCT #:                  [id number]  MRN:                     [id number]  :                      1945  ADMIT DATE:       2020 6:23 PM  DISCH DATE:        5/10/2020 12:38 PM  RESPONDING  PROVIDER #:        [id number]           QUERY TEXT:    Congestive Heart Failure is documented in the Medical Record. Please document the type and acuity (includes probable or suspected)    NOTE:  If an appropriate response is not listed below, please respond with a new note.      The patient's Clinical Indicators include:  Heart Failure due to Valvular disease (Mitral) mentioned all through medical record.  Please specify type of heart failure.  Options provided:   -- Right heart Failure due to left heart failure   -- Biventricular heart failure   -- Left heart failure   -- Right heart failure   -- Acute Systolic heart failure   -- Chronic Systolic heart failure   -- Acute on Chronic Systolic heart failure   -- Acute Diastolic heart failure   -- Chronic Diastolic heart failure   -- Acute on Chronic Diastolic heart failure   -- Acute Systolic and Diastolic heart failure   -- Chronic Systolic and Diastolic heart failure   -- Acute on Chronic Systolic and diastolic heart failure   -- Unable to determine      Query created by: Sherry Lanier on 2020 5:15 AM    RESPONSE TEXT:    Left heart failure          Electronically signed by:  LEANN STAPLETON MD 2020 10:38 AM

## 2020-05-21 ENCOUNTER — OFFICE VISIT (OUTPATIENT)
Dept: VASCULAR LAB | Facility: MEDICAL CENTER | Age: 75
End: 2020-05-21
Attending: FAMILY MEDICINE
Payer: COMMERCIAL

## 2020-05-21 ENCOUNTER — OFFICE VISIT (OUTPATIENT)
Dept: CARDIOLOGY | Facility: MEDICAL CENTER | Age: 75
End: 2020-05-21
Payer: COMMERCIAL

## 2020-05-21 VITALS
HEART RATE: 66 BPM | BODY MASS INDEX: 21.19 KG/M2 | RESPIRATION RATE: 19 BRPM | OXYGEN SATURATION: 99 % | DIASTOLIC BLOOD PRESSURE: 68 MMHG | HEIGHT: 67 IN | WEIGHT: 135 LBS | TEMPERATURE: 97.7 F | SYSTOLIC BLOOD PRESSURE: 106 MMHG

## 2020-05-21 DIAGNOSIS — G47.30 SLEEP APNEA, UNSPECIFIED TYPE: ICD-10-CM

## 2020-05-21 DIAGNOSIS — Z86.73 HISTORY OF CVA (CEREBROVASCULAR ACCIDENT): ICD-10-CM

## 2020-05-21 DIAGNOSIS — I10 ESSENTIAL HYPERTENSION: ICD-10-CM

## 2020-05-21 DIAGNOSIS — I34.0 SEVERE MITRAL REGURGITATION: ICD-10-CM

## 2020-05-21 DIAGNOSIS — Z79.02 LONG TERM (CURRENT) USE OF ANTITHROMBOTICS/ANTIPLATELETS: ICD-10-CM

## 2020-05-21 DIAGNOSIS — E78.5 DYSLIPIDEMIA: ICD-10-CM

## 2020-05-21 PROCEDURE — 99214 OFFICE O/P EST MOD 30 MIN: CPT | Performed by: NURSE PRACTITIONER

## 2020-05-21 PROCEDURE — 99214 OFFICE O/P EST MOD 30 MIN: CPT | Mod: 95,CR | Performed by: FAMILY MEDICINE

## 2020-05-21 ASSESSMENT — ENCOUNTER SYMPTOMS
BLURRED VISION: 0
FEVER: 0
DEPRESSION: 0
CHILLS: 0
SHORTNESS OF BREATH: 0
NERVOUS/ANXIOUS: 0
ABDOMINAL PAIN: 0
CLAUDICATION: 0
SHORTNESS OF BREATH: 0
MYALGIAS: 0
DOUBLE VISION: 0
DIARRHEA: 0
PALPITATIONS: 0
DIZZINESS: 0
BLOOD IN STOOL: 0
TINGLING: 1
ORTHOPNEA: 0
BRUISES/BLEEDS EASILY: 0
NAUSEA: 0
ORTHOPNEA: 0
FOCAL WEAKNESS: 0
PND: 0
COUGH: 0
COUGH: 0
HEMOPTYSIS: 0
SPEECH CHANGE: 1
DIZZINESS: 0
MYALGIAS: 0
PALPITATIONS: 0
WEAKNESS: 0
INSOMNIA: 0
ABDOMINAL PAIN: 0
FEVER: 0
SORE THROAT: 0
VOMITING: 0
TREMORS: 0
HEADACHES: 0
SEIZURES: 0
WHEEZING: 0

## 2020-05-21 ASSESSMENT — FIBROSIS 4 INDEX: FIB4 SCORE: 2.15

## 2020-05-21 NOTE — PROGRESS NOTES
Subjective:   Shaista Bocanegra is a 72 y.o. female who presents today for hospital follow up for lightheadedness presumed to be dehydration.    She is a prior patient of Dr. Pascale Daniels in our office and wants to switch to Dr. Denise.     Hx of HTN, HLD, moderate-severe MR, breast CA (remission), and prior CVA with some tingling sensation as deficit.    She continues to stay hydrated well, saw her PCP today. She has no symptoms to report. She feels good. Doing pilates and staying active daily.    Past Medical History:   Diagnosis Date   • Atrial fibrillation (HCC)    • Benign essential HTN 3/19/2012   • Breast cancer (HCC)    • Cancer (HCC)    • Cardiac arrhythmia    • Chest tightness or pressure 3/19/2012   • Chickenpox    • Coronary heart disease    • [place] measles    • High risk medication use 3/19/2012   • Hypercholesterolemia 3/19/2012   • Mumps    • MVP (mitral valve prolapse) 3/19/2012   • Osteoporosis    • Renal insufficiency 3/19/2012   • Stroke (HCC)    • Tonsillitis    • Valvular heart disease    • Venereal disease      Past Surgical History:   Procedure Laterality Date   • CATARACT PHACO WITH IOL  3/16/2009    Performed by SHANNON GANDARA at [facility]   • CATARACT PHACO WITH IOL  1/26/2009    Performed by SHANNON GANDARA at [facility]   • BREAST BIOPSY     • HYSTERECTOMY LAPAROSCOPY     • LUMPECTOMY     • PB RADIATION THERAPY PLAN SIMPLE     • PB REMV 2ND CATARACT,CORN-SCLER SECTN     • NJ CHEMOTHERAPY, UNSPECIFIED PROCEDURE     • PRIMARY C SECTION     • SINUSCOPE     • TONSILLECTOMY       Family History   Problem Relation Age of Onset   • Cancer Mother    • Heart Failure Neg Hx    • Heart Disease Neg Hx      Social History     Tobacco Use   Smoking Status Never Smoker   Smokeless Tobacco Never Used     No Known Allergies  Outpatient Encounter Medications as of 5/21/2020   Medication Sig Dispense Refill   • atorvastatin (LIPITOR) 80 MG tablet Take 1 Tab by  "mouth every evening. 90 Tab 1   • Cholecalciferol (VITAMIN D3) 2000 UNIT Cap Take 2,000 Units by mouth every day.     • potassium chloride (MICRO-K) 10 MEQ capsule Take 10 mEq by mouth 2 times a day.     • MYRBETRIQ 25 MG TABLET SR 24 HR      • spironolactone (ALDACTONE) 25 MG Tab Take 1 Tab by mouth every day for 360 days. 90 Tab 3   • TESTOSTERONE TD Apply  to skin as directed.     • aspirin (ASA) 81 MG Chew Tab chewable tablet Take 1 Tab by mouth every day. 14 Tab 1   • valacyclovir (VALTREX) 500 MG Tab Take 500 mg by mouth every day.     • calcium carbonate (OS-CRISTOFER 500) 500 MG Tab Take 1,000 mg by mouth every day.     • Multiple Vitamin (MULTIVITAMINS PO) Take 1 Tab by mouth every day.       No facility-administered encounter medications on file as of 5/21/2020.      Review of Systems   Constitutional: Negative for fever and malaise/fatigue.   Respiratory: Negative for cough and shortness of breath.    Cardiovascular: Negative for chest pain, palpitations, orthopnea, claudication, leg swelling and PND.   Gastrointestinal: Negative for abdominal pain.   Musculoskeletal: Negative for myalgias.   Neurological: Positive for tingling. Negative for dizziness.   All other systems reviewed and are negative.       Objective:   /68 (BP Location: Left arm, Patient Position: Sitting, BP Cuff Size: Adult)   Pulse 66   Temp 36.5 °C (97.7 °F) (Oral)   Resp 19   Ht 1.689 m (5' 6.5\")   Wt 61.2 kg (135 lb)   SpO2 99%   BMI 21.46 kg/m²     Physical Exam   Constitutional: She is oriented to person, place, and time. She appears well-developed and well-nourished. No distress.   HENT:   Head: Normocephalic and atraumatic.   Eyes: EOM are normal.   Neck: Normal range of motion. No JVD present.   Cardiovascular: Normal rate, regular rhythm and intact distal pulses.   Murmur heard.   Systolic murmur is present with a grade of 2/6.  Pulmonary/Chest: Effort normal and breath sounds normal. No respiratory distress.   Abdominal: " Soft. Bowel sounds are normal.   Musculoskeletal: Normal range of motion.         General: No edema.   Neurological: She is alert and oriented to person, place, and time.   Skin: Skin is warm and dry.   Psychiatric: She has a normal mood and affect.   Nursing note and vitals reviewed.    Lab Results   Component Value Date/Time    CHOLSTRLTOT 144 03/24/2020 04:17 AM    CHOLSTRLTOT 135 08/19/2017 02:50 AM    LDL 58 03/24/2020 04:17 AM    LDL 65 08/19/2017 02:50 AM    HDL 73 03/24/2020 04:17 AM    HDL 54 08/19/2017 02:50 AM    TRIGLYCERIDE 67 03/24/2020 04:17 AM    TRIGLYCERIDE 80 08/19/2017 02:50 AM       Lab Results   Component Value Date/Time    SODIUM 140 05/09/2020 01:58 AM    POTASSIUM 3.8 05/09/2020 01:58 AM    CHLORIDE 109 05/09/2020 01:58 AM    CO2 22 05/09/2020 01:58 AM    GLUCOSE 94 05/09/2020 01:58 AM    BUN 14 05/09/2020 01:58 AM    CREATININE 0.78 05/09/2020 01:58 AM    BUNCREATRAT 19 03/24/2020 04:17 AM     Lab Results   Component Value Date/Time    ALKPHOSPHAT 42 05/07/2020 06:31 PM    ASTSGOT 18 05/07/2020 06:31 PM    ALTSGPT 16 05/07/2020 06:31 PM    TBILIRUBIN 0.3 05/07/2020 06:31 PM      Lab Results   Component Value Date/Time    BNPBTYPENAT 368 (H) 08/28/2017 12:01 AM      2017 CTA NECK   1.  CT angiogram of the neck within normal limits.  2.  Thrombosed right M1 segment.    2017 CAROTID DUPLEX CONCLUSIONS   nl carotids, subclavians and vertebral's    2017 MRI BRAIN  1.  Moderate sized RIGHT MCA territory acute ischemia involving the cortex and basal ganglia  2.  Flow void is present in the RIGHT MCA compatible with treatment effect  3.  No hemorrhage  4.  Mild white matter changes  5.  Mild atrophy    2017 ECHO CONCLUSIONS  Agitated saline study was performed, no evidence of right to left   shunt.  Normal left ventricular size, wall thickness, and systolic function.  Left ventricular ejection fraction is visually estimated to be 60%.  Mildly dilated left atrium.  Moderate mitral regurgitation due  to an eccentric jet.  Mild tricuspid regurgitation.  Right ventricular systolic pressure is estimated to be 35 mmHg.     Assessment:     1. Severe mitral regurgitation  CBC WITHOUT DIFFERENTIAL   2. Sleep apnea, unspecified type  CBC WITHOUT DIFFERENTIAL   3. Long term (current) use of antithrombotics/antiplatelets     4. History of CVA (cerebrovascular accident)     5. Dyslipidemia  CBC WITHOUT DIFFERENTIAL     Medical Decision Making:  Today's Assessment / Status / Plan:     1 Prior CVA  -mild deficits  -cont asa, statin    2. HTN  -good control, cont aldactone for now  -check bmp  -managed by PCP    3. HLD  -good control on statin  -LDL goal <70 with CVA  -check yearly with PCP    4. Moderate-severe MR  -asymptomatic  -cath showed moderate 2+ MR  -follow with yearly echo and clinically  -consider valve consult in future if warranted    Check CBC with labs with some abnormalities noted in hospital. PCP to follow.    FU in clinic in 3-6 months with LS to establish care, consider repeat echo in 6 months with valve consult if warranted in future    Patient verbalizes understanding and agrees with the plan of care.     Collaborating MD: Dakotah TINOCO    Physical Exam   Constitutional: She is oriented to person, place, and time. She appears well-developed and well-nourished. No distress.   HENT:   Head: Normocephalic and atraumatic.   Eyes: EOM are normal.   Neck: Normal range of motion. No JVD present.   Cardiovascular: Normal rate, regular rhythm and intact distal pulses.   Murmur heard.   Systolic murmur is present with a grade of 2/6.  Pulmonary/Chest: Effort normal and breath sounds normal. No respiratory distress.   Abdominal: Soft. Bowel sounds are normal.   Musculoskeletal: Normal range of motion.         General: No edema.   Neurological: She is alert and oriented to person, place, and time.   Skin: Skin is warm and dry.   Psychiatric: She has a normal mood and affect.   Nursing note and vitals  reviewed.

## 2020-05-21 NOTE — PROGRESS NOTES
FOLLOW-UP VASCULAR MED VISIT  Subjective:   Shaista Bocanegra is a 75 y.o. female who presents today 5/21/20 for   Chief Complaint   Patient presents with   • Follow-Up     HTN   Initially referred by Dr. Daniels (cardiology) for eval and mgmt of HTN in context of MVR and hx of CVA.     HPI:  Visit completed via Facetime due to restrictions of COVID-19 pandemic.  All issues as below were discussed and addressed by no physical exam was performed unless allowed by visual confirmation on Facetime.  If it was felt that patient should be evaluated in the clinic, there were directed there.  Patient verbally consented to Facetime tele-video visit.     HTN:  Current HTN concerns:   Admitted for 5d to [facility] after syncopal episode and dizz.  Noted to have mild low Na and Ca, reported to have low hydration status.  Had LH cath showing normal coronaries and MVR severe but not critical for mitraclip surgery.   Currently feeling well, increased her hydration.  No recurrent events.  Has seen PCP in interim and pending with cardiology today.  Wondering if return to triam/hctz would be appropriate.  Concerned about low temps 95.0 and BP variability.  Taking Kcl.  No other med changes   ADRs:  none   HTN sx:  No current blurred or changed vision, chest pain, shortness of breath, headache, nausea, dizziness/vertigo.   Reports some face recognition and proper nouns with memory loss.    Home BP log:  Normally good at <130/80, some high 150/70s, 149/105,    Adherence to current HTN meds: compliant all of the time     Antiplatelet/anticoagulation:   Yes, Details: ASA 81mg      Hyperlipidemia:    Stable, no current concerns  Current treatment: High intensity statin   atorva 80mg   Myalgias? No  Other adverse drug reactions? No  Lipid profile: as noted below     Hx of CVA:  Stable, no new focal neuro s/s.    No med changes     LALY:  Using shayla advancement device in lieu of CPAP - feeling well. Pending with Dr. Weems.     Pertinent HTN hx:   Age  at HTN dx:  At least 20 years   (if female, any hx of pregnancy-related HTN): none reported  Past HTN medications:  As noted takes triam/hctz with travel only   HTN crises:  CVA 8/2017, o/w negative   Lifestyle factors:   High salt: No    EtOH: No  Interfering substances:  None      Clinical evidence of ASCVD:     1) hx of MI/ACS:  No   2) coronary or other revascularization procedure: No   3) TIA/ischemic CVA: Yes, Details: hx of ischemic CVA 8/2017, R MCA   4) PAD (including MICAH <0.9): No   5) documented (CAD, Renal athero, AA due to athero, carotid plaque >50% stenosis: No    Major RFs:     1) Age (Men>44, women>54):  yes   2) Fhx early CAD (<55 men, <65 women):  no   3) cigarette smoking:  No   4) high BP >139/89 or on tx: yes   5) Low HDL-C (<40 men, <50 women):  no    Social History     Tobacco Use   • Smoking status: Never Smoker   • Smokeless tobacco: Never Used   Substance Use Topics   • Alcohol use: Yes     Frequency: 2-3 times a week     Drinks per session: 1 or 2     Comment: 3 glasses of wine   • Drug use: No     Outpatient Encounter Medications as of 5/21/2020   Medication Sig Dispense Refill   • atorvastatin (LIPITOR) 80 MG tablet Take 1 Tab by mouth every evening. 90 Tab 1   • Cholecalciferol (VITAMIN D3) 2000 UNIT Cap Take 2,000 Units by mouth every day.     • potassium chloride (MICRO-K) 10 MEQ capsule Take 10 mEq by mouth 2 times a day.     • MYRBETRIQ 25 MG TABLET SR 24 HR      • spironolactone (ALDACTONE) 25 MG Tab Take 1 Tab by mouth every day for 360 days. 90 Tab 3   • TESTOSTERONE TD Apply  to skin as directed.     • aspirin (ASA) 81 MG Chew Tab chewable tablet Take 1 Tab by mouth every day. 14 Tab 1   • valacyclovir (VALTREX) 500 MG Tab Take 500 mg by mouth every day.     • calcium carbonate (OS-CRISTOFER 500) 500 MG Tab Take 1,000 mg by mouth every day.     • Multiple Vitamin (MULTIVITAMINS PO) Take 1 Tab by mouth every day.       No facility-administered encounter medications on file as of  5/21/2020.      No Known Allergies  DIET AND EXERCISE:  Weight Change: none   BMI Readings from Last 4 Encounters:   05/09/20 24.13 kg/m²   03/27/20 22.07 kg/m²   12/12/19 21.42 kg/m²   09/26/19 22.20 kg/m²      Diet: low fat, low sodium, reports some indiscretions over last 2 months   Exercise: moderate regular exercise program     Review of Systems   Constitutional: Negative for chills, fever and malaise/fatigue.   HENT: Negative for nosebleeds, sore throat and tinnitus.    Eyes: Negative for blurred vision and double vision.   Respiratory: Negative for cough, hemoptysis, shortness of breath and wheezing.    Cardiovascular: Negative for chest pain, palpitations, orthopnea and leg swelling.   Gastrointestinal: Negative for abdominal pain, blood in stool, diarrhea, melena, nausea and vomiting.   Genitourinary: Negative for hematuria.   Musculoskeletal: Negative for joint pain and myalgias.   Skin: Negative for itching and rash.   Neurological: Positive for speech change (word finding issues ). Negative for dizziness, tremors, focal weakness, seizures, weakness and headaches.   Endo/Heme/Allergies: Does not bruise/bleed easily.   Psychiatric/Behavioral: Negative for depression. The patient is not nervous/anxious and does not have insomnia.       Objective:     There were no vitals filed for this visit.   BP Readings from Last 4 Encounters:   05/10/20 158/85   03/27/20 122/68   12/12/19 135/71   09/26/19 129/75      There is no height or weight on file to calculate BMI.   BMI Readings from Last 4 Encounters:   05/09/20 24.13 kg/m²   03/27/20 22.07 kg/m²   12/12/19 21.42 kg/m²   09/26/19 22.20 kg/m²      Physical Exam   Constitutional: She is oriented to person, place, and time. She appears well-developed and well-nourished. No distress.   HENT:   Head: Normocephalic.   Eyes: Conjunctivae and EOM are normal.   Pulmonary/Chest: Effort normal. No respiratory distress.   Neurological: She is alert and oriented to person,  place, and time. No cranial nerve deficit.   Skin: No rash noted. She is not diaphoretic. No pallor.   Psychiatric: She has a normal mood and affect. Her behavior is normal.     Lab Results   Component Value Date    CHOLSTRLTOT 144 03/24/2020    LDL 58 03/24/2020    HDL 73 03/24/2020    TRIGLYCERIDE 67 03/24/2020      Lab Results   Component Value Date    PROTHROMBTM 13.5 05/08/2020    INR 1.01 05/08/2020           Lab Results   Component Value Date    SODIUM 140 05/09/2020    POTASSIUM 3.8 05/09/2020    CHLORIDE 109 05/09/2020    CO2 22 05/09/2020    GLUCOSE 94 05/09/2020    BUN 14 05/09/2020    CREATININE 0.78 05/09/2020    BUNCREATRAT 19 03/24/2020    IFAFRICA >60 05/09/2020    IFNOTAFR >60 05/09/2020        Lab Results   Component Value Date    WBC 4.3 (L) 05/09/2020    RBC 3.61 (L) 05/09/2020    HEMOGLOBIN 12.5 05/09/2020    HEMATOCRIT 37.5 05/09/2020    .9 (H) 05/09/2020    MCH 34.6 (H) 05/09/2020    MCHC 33.3 (L) 05/09/2020    MPV 9.6 05/09/2020      [facility] (9/9/19):  LDL 60, , HDL 64, cmp wnl with K=4.1, egfr =51, cbc wnl      Ref. Range 5/7/2020 18:31   NT-proBNP Latest Ref Range: 0 - 125 pg/mL 736 (H)     VASCULAR IMAGING:     CTA head 8/2017  1.  There is a right M1 segment occlusion.  2.  There is collateral flow in the peripheral M3 branches seen on the right.  3.  There is mild decreased enhancement of the visualized right internal carotid artery however it is patent.  4.  Question of subtle hypodensity in the right insular cortex region. No abnormal brain parenchymal enhancement is seen.    Carotid duplex 8/18/17   nl carotids, subclavians and vertebrals    CTA neck 8/18/17  1.  CT angiogram of the neck within normal limits.  2.  Thrombosed right M1 segment.    IR thrombectomy 8/18/17  1. Subtotal occlusion of the RIGHT M1 segment.  2. Successful RIGHT middle cerebral artery mechanical thrombectomy using Trevo stent.  3. Post thrombectomy arteriogram demonstrates patent lenticulostriate,  RIGHT M1 and peripheral segments.    MRI brain 8/20/17  1.  Moderate sized RIGHT MCA territory acute ischemia involving the cortex and basal ganglia  2.  Flow void is present in the RIGHT MCA compatible with treatment effect  3.  No hemorrhage  4.  Mild white matter changes  5.  Mild atrophy    Echo 8/19/17  Agitated saline study was performed, no evidence of right to left   shunt.  Normal left ventricular size, wall thickness, and systolic function.  Left ventricular ejection fraction is visually estimated to be 60%.  Mildly dilated left atrium.  Moderate mitral regurgitation due to an eccentric jet.  Mild tricuspid regurgitation.  Right ventricular systolic pressure is estimated to be 35 mmHg.    Echo 6/15/18  Normal left ventricular systolic function.  Left ventricular ejection fraction is visually estimated to be 60%.  Myxomatous changes of the mitral valve.  Prolapse of the anterior and posterior mitral leaflets.  Severe, eccentric mitral regurgitation.  Right heart pressures are normal.  Compared to the report of the study done 8/19/2017 severe mitral   regurgitation is now present, previously reported as moderate but a MR   ERO was not recorded.     Echo 5/3/19  Prior echocardiogram 6/14/2018.  Normal left ventricular systolic function.   Mild to moderate eccentric mitral regurgitation.  Compared to the images of the study done - there has been slight   improvement in mitral regurgitation.    Echo 2/2020  Prior echo done on 05/03/2019. Compared to the images of the prior   study done -  there has been no significant change.   Normal left ventricular systolic function.  Left ventricular ejection fraction is visually estimated to be 65%.  Prolapse of the mitral leaflets was present.  Severe mitral regurgitation.  Mild tricuspid regurgitation.  Estimated right ventricular systolic pressure is 35 mmHg.     cardiac cath 5/8/20  1.  Left main coronary artery:  Normal.  2.  Left anterior descending  artery:  Normal.   3.  Left circumflex coronary artery:  Normal.   4.  Right coronary artery:  Normal.  This is a right dominant system.  5.  Left ventricular end diastolic pressure:  25 mmHg.  No signficant gradient across the aortic valve.  6.  Left ventriculogram:  Ejection fraction of 65%, 2+ mitral regurgitation, normal sized thoracic aorta.   Impression:  Angiographically normal appearing coronary arteries.  Mildly elevated LVEDP, filling pressures.  2+ mitral regurgitation.  Normal LV function.    Medical Decision Making:  Today's Assessment / Status / Plan:     1. Essential hypertension     2. Dyslipidemia     3. Sleep apnea, unspecified type     4. Severe mitral regurgitation     5. History of CVA (cerebrovascular accident)     6. Long term (current) use of antithrombotics/antiplatelets       Patient Type: Secondary Prevention    Etiology of Established CVD if Present:   1) ischemic CVA, Moderate sized RIGHT MCA territory acute ischemia involving the cortex and basal ganglia, 8/2017    Lipid Management: Qualifies for Statin Therapy Based on 2013 ACC/AHA Guidelines: yes  Calculated 10-Year Risk of ASCVD: N/A  Currently on Statin: Yes   NLA Risk Category: Very high:  Evidence of ASCVD   Tx goal: non-HDL-C <100,  LDL-C <70  (optional: apoB <80)  At goal:  Yes  Tx plan:   - continue high-intensity atorvastatin 80mg   - consider addition of zetia 10mg if needed   - update labs Q6-12mo     Blood Pressure Management:Goal: ACC/AHA (2017) goal <130/80, due to age and BP variability and hx of syncope, may need to use reasonable goal of <140/80.  Home BP at goal:  Yes, occ high   Office BP at goal:  ??  ? Masked HTN component in the past.   No indications of primary zev on prior labs    Echo shows no LVH and normal LVEF but severe MVR   ACR; normal   Stopped triam/hctz, lisinopril in the past.   Stopped metoprolol due to lack of indication.   Stopped HCTZ due to low K and hypotension  Stopped amlodipine/olmesartan  due to low BP and mild swelling    Due to BP variability and hx of 2 episodes of syncope, will need to monitor lytes and BP over time.  May suggest some autonomic dysfunction.   Minute to minute variability is generally sympathetically-mediated and may improve with use of peripheral alpha vs mixed alpha/beta BB.  I am hesitant to start these currently due to severe MR, so will have cardiology weigh in to help with decision-making.  Currently Na stable, but at lower end of K+ despite spirono and Kcl replacement.   Would consider increased spirono to 50mg as next step with close lyte monitoring.   To f/u with cardiology today   Plan:   - continue home BP monitoring, reviewed correct technique:  Yes   - contact our office if lows <90/55 consistently for med adjustments   - monitor lytes/GFR  Meds:  - continue spironolactone 25mg daily, may increase to 50mg if >140/>90 consistently  - f/u with cardiology today, unclear if mixed action BB or peripheral alpha could be helpful at this time due to potential for worsening dizz     Glycemic Status: Normal    Anti-Platelet/Anti-Coagulant Tx: yes  - continue ASA 81mg daily     Smoking: continued complete avoidance of all tobacco products     Physical Activity: continue healthy activity to improve CV fitness, see care instructions for additional details     Weight Management and Nutrition: Dietary plan was discussed with patient at this visit including DASH, low sodium and/or as outlined in care instructions     Other:   1) hx of CVA, stable, no new deficits.  s/p thrombectomy of R MCA  - continue antiplat and BP mgmt  - defer mgmt to neurology for ongoing care   - ED precautions for acute CVA symptoms reviewed     3) hx of severe MVR  Echo stable, cath 5/2020 completed, no surgery recommended at this time   - defer mgmt to cardiology for ongoing care, pending with multiple appts this month   - echo surveillance as per cardiology     4) syncopal episode.  5/2019. Recurrent event  5/2020 - normal LH cath.    ? Associated to dehydration due to mild low Na and Ca, resolved with improved hydration.   - continue to monitor   - push fluids  - holding all BP meds for now and monitoring   - f/u with PCP or our office is any additional episodes occur     5) hypoK+.  Resolved.   prior low = 3.4. Presumed related to thiazide, now stable.    - continue kcl, continued spironolactone   - monitor lytes consistently     6) LALY - using mandibular advancement, off CPAP  - continue mandibular advancement  - defer mgmt to sleep MD - pending appt     7) elevated BNP.  Reports no hypervolemic symptoms, unclear if false elevation or true hypervolemia  - defer eval to cardiology today for assessment of volume status and med mgmt     Instructed to follow-up with PCP for remainder of adult medical needs: yes  We will partner with other providers in the management of established vascular disease and cardiometabolic risk factors.    Studies to Be Obtained:  None   Labs to Be Obtained: BMP in 3 weeks, any labs per cardiology    Follow up in: 3mo, sooner jamaica Jaramillo M.D.

## 2020-06-12 LAB
BASOPHILS # BLD AUTO: 0 X10E3/UL (ref 0–0.2)
BASOPHILS NFR BLD AUTO: 1 %
BUN SERPL-MCNC: 12 MG/DL (ref 8–27)
BUN/CREAT SERPL: 12 (ref 12–28)
CALCIUM SERPL-MCNC: 9.7 MG/DL (ref 8.7–10.3)
CHLORIDE SERPL-SCNC: 102 MMOL/L (ref 96–106)
CO2 SERPL-SCNC: 23 MMOL/L (ref 20–29)
CREAT SERPL-MCNC: 1.01 MG/DL (ref 0.57–1)
EOSINOPHIL # BLD AUTO: 0.1 X10E3/UL (ref 0–0.4)
EOSINOPHIL NFR BLD AUTO: 3 %
ERYTHROCYTE [DISTWIDTH] IN BLOOD BY AUTOMATED COUNT: 13.6 % (ref 11.7–15.4)
GLUCOSE SERPL-MCNC: 92 MG/DL (ref 65–99)
HCT VFR BLD AUTO: 43.2 % (ref 34–46.6)
HGB BLD-MCNC: 14.7 G/DL (ref 11.1–15.9)
IMM GRANULOCYTES # BLD AUTO: 0 X10E3/UL (ref 0–0.1)
IMM GRANULOCYTES NFR BLD AUTO: 0 %
IMMATURE CELLS  115398: ABNORMAL
LYMPHOCYTES # BLD AUTO: 1.1 X10E3/UL (ref 0.7–3.1)
LYMPHOCYTES NFR BLD AUTO: 28 %
MCH RBC QN AUTO: 34.4 PG (ref 26.6–33)
MCHC RBC AUTO-ENTMCNC: 34 G/DL (ref 31.5–35.7)
MCV RBC AUTO: 101 FL (ref 79–97)
MONOCYTES # BLD AUTO: 0.3 X10E3/UL (ref 0.1–0.9)
MONOCYTES NFR BLD AUTO: 7 %
MORPHOLOGY BLD-IMP: ABNORMAL
NEUTROPHILS # BLD AUTO: 2.4 X10E3/UL (ref 1.4–7)
NEUTROPHILS NFR BLD AUTO: 61 %
NRBC BLD AUTO-RTO: ABNORMAL %
PLATELET # BLD AUTO: 197 X10E3/UL (ref 150–450)
POTASSIUM SERPL-SCNC: 4.2 MMOL/L (ref 3.5–5.2)
RBC # BLD AUTO: 4.27 X10E6/UL (ref 3.77–5.28)
SODIUM SERPL-SCNC: 140 MMOL/L (ref 134–144)
WBC # BLD AUTO: 3.8 X10E3/UL (ref 3.4–10.8)

## 2020-07-02 ENCOUNTER — HOSPITAL ENCOUNTER (OUTPATIENT)
Facility: MEDICAL CENTER | Age: 75
End: 2020-07-02
Attending: ORTHOPAEDIC SURGERY | Admitting: ORTHOPAEDIC SURGERY
Payer: COMMERCIAL

## 2020-07-08 ENCOUNTER — OFFICE VISIT (OUTPATIENT)
Dept: ADMISSIONS | Facility: MEDICAL CENTER | Age: 75
End: 2020-07-08
Attending: ORTHOPAEDIC SURGERY
Payer: COMMERCIAL

## 2020-07-08 VITALS — HEIGHT: 66 IN | WEIGHT: 137.13 LBS | BODY MASS INDEX: 22.04 KG/M2

## 2020-07-08 DIAGNOSIS — Z01.812 PRE-OPERATIVE LABORATORY EXAMINATION: ICD-10-CM

## 2020-07-08 DIAGNOSIS — Z01.812 PRE-PROCEDURAL LABORATORY EXAMINATION: ICD-10-CM

## 2020-07-08 LAB
ANION GAP SERPL CALC-SCNC: 13 MMOL/L (ref 7–16)
BUN SERPL-MCNC: 15 MG/DL (ref 8–22)
CALCIUM SERPL-MCNC: 9.3 MG/DL (ref 8.5–10.5)
CHLORIDE SERPL-SCNC: 97 MMOL/L (ref 96–112)
CO2 SERPL-SCNC: 25 MMOL/L (ref 20–33)
COVID ORDER STATUS COVID19: NORMAL
CREAT SERPL-MCNC: 0.85 MG/DL (ref 0.5–1.4)
GLUCOSE SERPL-MCNC: 99 MG/DL (ref 65–99)
POTASSIUM SERPL-SCNC: 4.5 MMOL/L (ref 3.6–5.5)
SODIUM SERPL-SCNC: 135 MMOL/L (ref 135–145)

## 2020-07-08 PROCEDURE — 36415 COLL VENOUS BLD VENIPUNCTURE: CPT

## 2020-07-08 PROCEDURE — U0003 INFECTIOUS AGENT DETECTION BY NUCLEIC ACID (DNA OR RNA); SEVERE ACUTE RESPIRATORY SYNDROME CORONAVIRUS 2 (SARS-COV-2) (CORONAVIRUS DISEASE [COVID-19]), AMPLIFIED PROBE TECHNIQUE, MAKING USE OF HIGH THROUGHPUT TECHNOLOGIES AS DESCRIBED BY CMS-2020-01-R: HCPCS

## 2020-07-08 PROCEDURE — 80048 BASIC METABOLIC PNL TOTAL CA: CPT

## 2020-07-08 ASSESSMENT — FIBROSIS 4 INDEX: FIB4 SCORE: 1.76

## 2020-07-09 LAB
SARS-COV-2 RNA RESP QL NAA+PROBE: NOTDETECTED
SPECIMEN SOURCE: NORMAL

## 2020-08-17 DIAGNOSIS — I10 ESSENTIAL HYPERTENSION: ICD-10-CM

## 2020-08-17 RX ORDER — SPIRONOLACTONE 25 MG/1
25 TABLET ORAL DAILY
Qty: 90 TAB | Refills: 3 | Status: SHIPPED | OUTPATIENT
Start: 2020-08-17 | End: 2020-08-25

## 2020-08-24 NOTE — PROGRESS NOTES
NIHSS    1a. Level of Consciousness  0 Alert. Keenly Responsive  1 Not Alert but arousable by minor stimulation  2 Not Alert. Requires repeated stimulation  3 Responds only w reflex motor/autonomic effects or totally unresponsive  Score:0  1b. Level of Consciousness: ask month and age  0 Answers both questions correctly  1 Answers one question correctly  2 Answers neither question correctly  Score:0  1c. Level of Consciousness: ask to open and close eyes/make a fist with non-paretic hand  0 Performs both tasks correctly  1 Performs one task correctly  2 Performs neither task correctly  Score:0  2. Best gaze: Horizontal eye movement   0 Normal   1 Partial gaze palsy. Forced deviation or total palsy not present   2 Forced deviation. Not overcome by oculo-cephalic maneuver   Score:1  3. Visual Fields   0 No loss   1 Partial hemianopia   2 Complete hemianopia   3 Bilateral hemianopia. Blindness   Score:2  4. Facial Palsy   0 Normal symmetrical movements   1 Minor paralysis   2 Partial paralysis   3 Complete paralysis   Score:2  5. Motor Arm Right and Left   0 No drift   1 Drift but does not hit bed   2 Some effort against gravity. Hits bed   3 No effort against gravity   4 No movement   UN Amputation/joint fusion/explain   Score R:0   Score L:2  6. Motor Leg: Right and Left   0 No drift   1 Drift but does not hit bed   2 Some effort against gravity. Hits bed   3 No effort against gravity   4 No movement   UN Amputation/joint fusion/explain   Score R :0   Score L :1  7. Limb Ataxia. Finger to nose/ shin-heel   0 Absent   1  Present in one limb   2 Present in two limbs   UN Amputation/joint fusion explain   Score:1  8. Sensory. Pinprick   0 Normal. No sensory loss   1 Mild to moderate sensory loss   2 Severe or total sensory loss   Score:0  9. Best language. Show the kitchen/cookie picture   0 No aphasia   1 Mild to moderate aphasia   2 Severe aphasia   3 Mute. Global aphasia   Score:0  10. Dysarthria.  Show the reading  Reminder call placed for appointment tomorrow. Patient confirmed appointment. list   0 Normal   1 Mild to moderate dysarthria   2 Severe dysarthria   3 Intubated or other physical barrier   Score:0  11. Extinction and Inattention   0 No abnormality   1 Extinction to simultaneous stimulation   2 Profound Jermaine-inattention or extinction   Score:0    TOTAL: 9    Presumed Mechanism by TOAST:  x  ___Large Artery    ___Small Vessel (Lacunar)    ___Cardioembolic    ___ Other (Sickle Cell Disease, Vasculitis, Hypercoagulable State, etc)    ___Unknown

## 2020-08-25 ENCOUNTER — TELEMEDICINE (OUTPATIENT)
Dept: CARDIOLOGY | Facility: MEDICAL CENTER | Age: 75
End: 2020-08-25
Payer: COMMERCIAL

## 2020-08-25 VITALS
HEIGHT: 67 IN | OXYGEN SATURATION: 94 % | SYSTOLIC BLOOD PRESSURE: 125 MMHG | DIASTOLIC BLOOD PRESSURE: 55 MMHG | BODY MASS INDEX: 19.93 KG/M2 | WEIGHT: 127 LBS | TEMPERATURE: 97.6 F | HEART RATE: 80 BPM

## 2020-08-25 DIAGNOSIS — E78.5 DYSLIPIDEMIA: ICD-10-CM

## 2020-08-25 DIAGNOSIS — Z79.02 LONG TERM (CURRENT) USE OF ANTITHROMBOTICS/ANTIPLATELETS: ICD-10-CM

## 2020-08-25 DIAGNOSIS — I10 BENIGN ESSENTIAL HTN: ICD-10-CM

## 2020-08-25 DIAGNOSIS — G47.33 OSA (OBSTRUCTIVE SLEEP APNEA): ICD-10-CM

## 2020-08-25 DIAGNOSIS — Z86.73 HISTORY OF CVA (CEREBROVASCULAR ACCIDENT): ICD-10-CM

## 2020-08-25 DIAGNOSIS — I34.0 SEVERE MITRAL REGURGITATION: ICD-10-CM

## 2020-08-25 DIAGNOSIS — I34.1 MVP (MITRAL VALVE PROLAPSE): ICD-10-CM

## 2020-08-25 PROCEDURE — 99214 OFFICE O/P EST MOD 30 MIN: CPT | Mod: 95,CR | Performed by: INTERNAL MEDICINE

## 2020-08-25 RX ORDER — POTASSIUM CHLORIDE 750 MG/1
20 CAPSULE, EXTENDED RELEASE ORAL 2 TIMES DAILY
Qty: 180 CAP | Refills: 3 | Status: ON HOLD | OUTPATIENT
Start: 2020-08-25 | End: 2021-06-02

## 2020-08-25 RX ORDER — HYDROCHLOROTHIAZIDE 25 MG/1
25 TABLET ORAL DAILY
Qty: 100 TAB | Refills: 3 | Status: SHIPPED | OUTPATIENT
Start: 2020-08-25 | End: 2020-09-11 | Stop reason: SDUPTHER

## 2020-08-25 ASSESSMENT — FIBROSIS 4 INDEX: FIB4 SCORE: 1.76

## 2020-08-25 NOTE — PATIENT INSTRUCTIONS
-REVEAL LINQ loop recorder to monitor for atrial fibrillation as a cause of stroke given mitral valve disease and given the location of stroke. This would  as we would change your aspirin to full blood thinner.    -Echocardiogram around November to monitor the mitral valve    -Stop spironolactone, start HCTZ 25 mg    -Increase potassium to 20 meq daily, I will send a new prescription

## 2020-08-25 NOTE — LETTER
[facility]   [address], [address]  [place] [address]  Phone: [phone]  Fax: [phone]              Shaista Bocanegra  1945    Encounter Date: 8/25/2020    Ayde Denise M.D.          PROGRESS NOTE:  Subjective:   Chief Complaint:   Chief Complaint   Patient presents with   • Mitral Valve Prolapse     This office visit is taking place via video conference in the setting of the COVID-19 pandemic.    Shaista Bocanegra is a 75 y.o. female who returns for moderate to severe mitral regurgitation, hypertension, hyperlipidemia, prior CVA, HTN.    Admitted to the hospital 5/8/2020 with dehydration on diuretics.  Has left and right heart catheterization that admission move mitral clip for severe mitral regurgitation.    Surgery on foot, HR up a bit, 92-96. Has been in pain.    Has HTN, /85, usually 125.  On ernie, was on HCTZ.  Takes potassium.    Has LALY, was on CPAP, now using device only.    Prior CVA, some light tingling sensation remains.    Patient received mechanical thrombectomy 8/18/2017, felt to be due to right carotid disease.    Has hyperlipidemia, LDL 58.    She is not limited by chest pain, pressure or tightness with activity.   No significant dyspnea on exertion, orthopnea or lower extremity swelling.   No significant palpitations, lightheadedness, or presyncope/syncope.   No symptoms of leg claudication.   No new stroke/TIA like symptoms.  Does note brain fog.  Remote syncope in the setting of low K.    No family history of premature coronary artery disease.  No prior smoking history.  No history of diabetes.  No history of autoimmune disease such as lupus or rheumatoid arthritis.  No chronic kidney disease.  No ETOH overuse.   No caffeine overuse.  No recreation substance use.    Has lost some weight.    Lives in [place].  She is a .    DATA REVIEWED by me:  ECG 5-  Sinus, 65, PVC, first-degree AV delay    Zio 2017 2  weeks.  Sinus, brief atrial run.    Echo 2/3/2020  Prior echo done on 05/03/2019. Compared to the images of the prior   study done -  there has been no significant change.   Normal left ventricular systolic function.  Left ventricular ejection fraction is visually estimated to be 65%.  Prolapse of the mitral leaflets was present.  Severe mitral regurgitation.  Mild tricuspid regurgitation.  Estimated right ventricular systolic pressure is 35 mmHg.    Echo 2017  Agitated saline study was performed, no evidence of right to left   shunt.  Normal left ventricular size, wall thickness, and systolic function.  Left ventricular ejection fraction is visually estimated to be 60%.  Mildly dilated left atrium.  Moderate mitral regurgitation due to an eccentric jet.  Mild tricuspid regurgitation.    Left and right heart catheterization 5/8/2020  Right ventricle 33/5, EDP 15  Pulmonary artery 42/21, 29  pulmonary capillary wedge 23  Cardiac output 4.1 L/min by thermodilution method     1.  Left main coronary artery:  Normal.  2.  Left anterior descending artery:  Normal.   3.  Left circumflex coronary artery:  Normal.   4.  Right coronary artery:  Normal.  This is a right dominant system.  5.  Left ventricular end diastolic pressure:  25 mmHg.  No signficant gradient across the aortic valve.  6.  Left ventriculogram:  Ejection fraction of 65%, 2+ mitral regurgitation, normal sized thoracic aorta.       2017 CTA NECK   1.  CT angiogram of the neck within normal limits.  2.  Thrombosed right M1 segment.     2017 CAROTID DUPLEX CONCLUSIONS   nl carotids, subclavians and vertebral's     2017 MRI BRAIN  1.  Moderate sized RIGHT MCA territory acute ischemia involving the cortex and basal ganglia  2.  Flow void is present in the RIGHT MCA compatible with treatment effect  3.  No hemorrhage  4.  Mild white matter changes  5.  Mild atrophy    Most recent labs:       Lab Results   Component Value Date/Time    HEMOGLOBIN 14.7 06/12/2020  05:12 AM    HEMOGLOBIN 12.5 05/09/2020 01:58 AM    HEMATOCRIT 43.2 06/12/2020 05:12 AM    HEMATOCRIT 37.5 05/09/2020 01:58 AM     (H) 06/12/2020 05:12 AM    .9 (H) 05/09/2020 01:58 AM    INR 1.01 05/08/2020 09:33 AM      Lab Results   Component Value Date/Time    SODIUM 135 07/08/2020 01:52 PM    POTASSIUM 4.5 07/08/2020 01:52 PM    CHLORIDE 97 07/08/2020 01:52 PM    CO2 25 07/08/2020 01:52 PM    GLUCOSE 99 07/08/2020 01:52 PM    BUN 15 07/08/2020 01:52 PM    CREATININE 0.85 07/08/2020 01:52 PM      Lab Results   Component Value Date/Time    ASTSGOT 18 05/07/2020 06:31 PM    ALTSGPT 16 05/07/2020 06:31 PM    ALBUMIN 3.6 05/07/2020 06:31 PM      Lab Results   Component Value Date/Time    CHOLSTRLTOT 144 03/24/2020 04:17 AM    CHOLSTRLTOT 135 08/19/2017 02:50 AM    LDL 58 03/24/2020 04:17 AM    LDL 65 08/19/2017 02:50 AM    HDL 73 03/24/2020 04:17 AM    HDL 54 08/19/2017 02:50 AM    TRIGLYCERIDE 67 03/24/2020 04:17 AM    TRIGLYCERIDE 80 08/19/2017 02:50 AM           Past Medical History:   Diagnosis Date   • Arthritis    • Atrial fibrillation (HCC)    • Benign essential HTN 3/19/2012   • Breast cancer (HCC)    • Cancer (HCC)    • Cardiac arrhythmia    • Chest tightness or pressure 3/19/2012   • Chickenpox    • Coronary heart disease    • [place] measles    • Gynecological disorder    • High risk medication use 3/19/2012   • Hypercholesterolemia 3/19/2012   • Mumps    • MVP (mitral valve prolapse) 3/19/2012   • Osteoporosis    • Renal insufficiency 3/19/2012   • Stroke (HCC) 2017   • Tonsillitis    • Valvular heart disease    • Venereal disease      Past Surgical History:   Procedure Laterality Date   • CATARACT PHACO WITH IOL  3/16/2009    Performed by SHANNON GANDARA at SURGERY SAME DAY [facility] ORS   • CATARACT PHACO WITH IOL  1/26/2009    Performed by SHANNON GANDARA at SURGERY SAME DAY [facility] ORS   • BREAST BIOPSY     • HYSTERECTOMY LAPAROSCOPY     • LUMPECTOMY     • PB RADIATION THERAPY PLAN  SIMPLE     • PB REMV 2ND CATARACT,CORN-SCLER SECTN     • TN CHEMOTHERAPY, UNSPECIFIED PROCEDURE     • PRIMARY C SECTION     • SINUSCOPE     • TONSILLECTOMY       Family History   Problem Relation Age of Onset   • Cancer Mother    • Heart Failure Neg Hx    • Heart Disease Neg Hx      Social History     Socioeconomic History   • Marital status:      Spouse name: Not on file   • Number of children: Not on file   • Years of education: Not on file   • Highest education level: Not on file   Occupational History   • Not on file   Social Needs   • Financial resource strain: Not on file   • Food insecurity     Worry: Not on file     Inability: Not on file   • Transportation needs     Medical: Not on file     Non-medical: Not on file   Tobacco Use   • Smoking status: Never Smoker   • Smokeless tobacco: Never Used   Substance and Sexual Activity   • Alcohol use: Yes     Frequency: 2-3 times a week     Drinks per session: 1 or 2     Comment: 3 glasses of wine   • Drug use: No   • Sexual activity: Not on file   Lifestyle   • Physical activity     Days per week: Not on file     Minutes per session: Not on file   • Stress: Not on file   Relationships   • Social connections     Talks on phone: Not on file     Gets together: Not on file     Attends Druze service: Not on file     Active member of club or organization: Not on file     Attends meetings of clubs or organizations: Not on file     Relationship status: Not on file   • Intimate partner violence     Fear of current or ex partner: Not on file     Emotionally abused: Not on file     Physically abused: Not on file     Forced sexual activity: Not on file   Other Topics Concern   • Not on file   Social History Narrative   • Not on file     No Known Allergies    Current Outpatient Medications   Medication Sig Dispense Refill   • hydroCHLOROthiazide (HYDRODIURIL) 25 MG Tab Take 1 Tab by mouth every day. 100 Tab 3   • potassium chloride (MICRO-K) 10 MEQ capsule Take 2  "Caps by mouth 2 times a day. 180 Cap 3   • atorvastatin (LIPITOR) 80 MG tablet Take 1 Tab by mouth every evening. 90 Tab 1   • Cholecalciferol (VITAMIN D3) 2000 UNIT Cap Take 2,000 Units by mouth every day.     • MYRBETRIQ 25 MG TABLET SR 24 HR      • TESTOSTERONE TD Apply  to skin as directed as needed.     • aspirin (ASA) 81 MG Chew Tab chewable tablet Take 1 Tab by mouth every day. 14 Tab 1   • valacyclovir (VALTREX) 500 MG Tab Take 500 mg by mouth every day.     • calcium carbonate (OS-CRISTOFER 500) 500 MG Tab Take 1,000 mg by mouth every day.     • Multiple Vitamin (MULTIVITAMINS PO) Take 1 Tab by mouth every day.       No current facility-administered medications for this visit.        ROS  All others systems reviewed and negative.     Objective:     /55   Pulse 80   Temp 36.4 °C (97.6 °F)   Ht 1.689 m (5' 6.5\")   Wt 57.6 kg (127 lb)   SpO2 94%  Body mass index is 20.19 kg/m².    This encounter was conducted via Zoom.  Verbal consent was obtained. Patient's identity was verified.    Vitals obtained by patient:    Physical Exam:  General: No acute distress. Well nourished.   HEENT: EOM grossly intact, no scleral icterus, no pharyngeal erythema.   Neck:  No JVD noted at 90 degrees, trachea midline  CVS: Pulse as reported by patient, no visible LE edema.  Resp: Unlabored respiratory effort, no cough or audible wheeze  MSK/Ext: No clubbing or cyanosis visible appreciated.  Skin: No rashes in visible areas.  Neurological: CN III-XII grossly intact. No focal deficits.   Psych: A&O x 3, appropriate affect, good judgement, well groomed      Assessment:     1. Severe mitral regurgitation  EC-ECHOCARDIOGRAM COMPLETE W/O CONT   2. History of CVA (cerebrovascular accident)     3. Dyslipidemia     4. MVP (mitral valve prolapse)     5. Long term (current) use of antithrombotics/antiplatelets     6. LALY (obstructive sleep apnea)     7. Benign essential HTN         Medical Decision Making:  Today's Assessment / Status / " Plan:     -Consideration should be given for mitral clip surgery.  Has already met Dr. Darin Meehan and had L/RHC.  -BP ok  -HR up a bit during pain but during pain  -CVA  -Doing ok overall  -Hypokalemia, tried ernie but she likes HCTZ better and takes K anyway.   -Echo Nov  -No need to see Vascular clinic for HTN also, I can manage it.  -RTC 3 months      Written instructions given today:    -REVEAL LINQ loop recorder to monitor for atrial fibrillation as a cause of stroke given mitral valve disease and given the location of stroke. This would  as we would change your aspirin to full blood thinner.    -Echocardiogram around November to monitor the mitral valve    -Stop spironolactone, start HCTZ 25 mg    -Increase potassium to 20 meq daily, I will send a new prescription    Return in about 3 months (around 11/25/2020).    It is my pleasure to participate in the care of Ms. Bocanegra.  Please do not hesitate to contact me with questions or concerns.    Ayde Denise MD, [facility]  Cardiologist [facility]    Please note that this dictation was created using voice recognition software. I have made every reasonable attempt to correct obvious errors, but it is possible there are errors of grammar and possibly content that I did not discover before finalizing the note.      Bethany Crane M.D.  [address] [place] [id number]  Via Fax: [phone]

## 2020-08-25 NOTE — PROGRESS NOTES
"Subjective:   Chief Complaint:   Chief Complaint   Patient presents with   • Mitral Valve Prolapse     This office visit is taking place via video conference in the setting of the COVID-19 pandemic.    \"Artemio\" Shaista Bocanegra is a 75 y.o. female who returns for moderate to severe mitral regurgitation, hypertension, hyperlipidemia, prior CVA, HTN.    Admitted to the hospital 5/8/2020 with dehydration on diuretics.  Has left and right heart catheterization that admission move mitral clip for severe mitral regurgitation.    Surgery on foot, HR up a bit, 92-96. Has been in pain.    Has HTN, /85, usually 125.  On ernie, was on HCTZ.  Takes potassium.    Has LALY, was on CPAP, now using device only.    Prior CVA, some light tingling sensation remains.    Patient received mechanical thrombectomy 8/18/2017, felt to be due to right carotid disease.    Has hyperlipidemia, LDL 58.    She is not limited by chest pain, pressure or tightness with activity.   No significant dyspnea on exertion, orthopnea or lower extremity swelling.   No significant palpitations, lightheadedness, or presyncope/syncope.   No symptoms of leg claudication.   No new stroke/TIA like symptoms.  Does note brain fog.  Remote syncope in the setting of low K.    No family history of premature coronary artery disease.  No prior smoking history.  No history of diabetes.  No history of autoimmune disease such as lupus or rheumatoid arthritis.  No chronic kidney disease.  No ETOH overuse.   No caffeine overuse.  No recreation substance use.    Has lost some weight.    Lives in [place].  She is a .    DATA REVIEWED by me:  ECG 5-  Sinus, 65, PVC, first-degree AV delay    Zio 2017 2 weeks.  Sinus, brief atrial run.    Echo 2/3/2020  Prior echo done on 05/03/2019. Compared to the images of the prior   study done -  there has been no significant change.   Normal left ventricular systolic function.  Left ventricular ejection fraction is visually " estimated to be 65%.  Prolapse of the mitral leaflets was present.  Severe mitral regurgitation.  Mild tricuspid regurgitation.  Estimated right ventricular systolic pressure is 35 mmHg.    Echo 2017  Agitated saline study was performed, no evidence of right to left   shunt.  Normal left ventricular size, wall thickness, and systolic function.  Left ventricular ejection fraction is visually estimated to be 60%.  Mildly dilated left atrium.  Moderate mitral regurgitation due to an eccentric jet.  Mild tricuspid regurgitation.    Left and right heart catheterization 5/8/2020  Right ventricle 33/5, EDP 15  Pulmonary artery 42/21, 29  pulmonary capillary wedge 23  Cardiac output 4.1 L/min by thermodilution method     1.  Left main coronary artery:  Normal.  2.  Left anterior descending artery:  Normal.   3.  Left circumflex coronary artery:  Normal.   4.  Right coronary artery:  Normal.  This is a right dominant system.  5.  Left ventricular end diastolic pressure:  25 mmHg.  No signficant gradient across the aortic valve.  6.  Left ventriculogram:  Ejection fraction of 65%, 2+ mitral regurgitation, normal sized thoracic aorta.       2017 CTA NECK   1.  CT angiogram of the neck within normal limits.  2.  Thrombosed right M1 segment.     2017 CAROTID DUPLEX CONCLUSIONS   nl carotids, subclavians and vertebral's     2017 MRI BRAIN  1.  Moderate sized RIGHT MCA territory acute ischemia involving the cortex and basal ganglia  2.  Flow void is present in the RIGHT MCA compatible with treatment effect  3.  No hemorrhage  4.  Mild white matter changes  5.  Mild atrophy    Most recent labs:       Lab Results   Component Value Date/Time    HEMOGLOBIN 14.7 06/12/2020 05:12 AM    HEMOGLOBIN 12.5 05/09/2020 01:58 AM    HEMATOCRIT 43.2 06/12/2020 05:12 AM    HEMATOCRIT 37.5 05/09/2020 01:58 AM     (H) 06/12/2020 05:12 AM    .9 (H) 05/09/2020 01:58 AM    INR 1.01 05/08/2020 09:33 AM      Lab Results   Component Value  Date/Time    SODIUM 135 07/08/2020 01:52 PM    POTASSIUM 4.5 07/08/2020 01:52 PM    CHLORIDE 97 07/08/2020 01:52 PM    CO2 25 07/08/2020 01:52 PM    GLUCOSE 99 07/08/2020 01:52 PM    BUN 15 07/08/2020 01:52 PM    CREATININE 0.85 07/08/2020 01:52 PM      Lab Results   Component Value Date/Time    ASTSGOT 18 05/07/2020 06:31 PM    ALTSGPT 16 05/07/2020 06:31 PM    ALBUMIN 3.6 05/07/2020 06:31 PM      Lab Results   Component Value Date/Time    CHOLSTRLTOT 144 03/24/2020 04:17 AM    CHOLSTRLTOT 135 08/19/2017 02:50 AM    LDL 58 03/24/2020 04:17 AM    LDL 65 08/19/2017 02:50 AM    HDL 73 03/24/2020 04:17 AM    HDL 54 08/19/2017 02:50 AM    TRIGLYCERIDE 67 03/24/2020 04:17 AM    TRIGLYCERIDE 80 08/19/2017 02:50 AM           Past Medical History:   Diagnosis Date   • Arthritis    • Atrial fibrillation (HCC)    • Benign essential HTN 3/19/2012   • Breast cancer (HCC)    • Cancer (HCC)    • Cardiac arrhythmia    • Chest tightness or pressure 3/19/2012   • Chickenpox    • Coronary heart disease    • Yi measles    • Gynecological disorder    • High risk medication use 3/19/2012   • Hypercholesterolemia 3/19/2012   • Mumps    • MVP (mitral valve prolapse) 3/19/2012   • Osteoporosis    • Renal insufficiency 3/19/2012   • Stroke (HCC) 2017   • Tonsillitis    • Valvular heart disease    • Venereal disease      Past Surgical History:   Procedure Laterality Date   • CATARACT PHACO WITH IOL  3/16/2009    Performed by SHANNON GANDARA at SURGERY SAME DAY [facility] ORS   • CATARACT PHACO WITH IOL  1/26/2009    Performed by SHANNON GANDARA at SURGERY SAME DAY [facility] ORS   • BREAST BIOPSY     • HYSTERECTOMY LAPAROSCOPY     • LUMPECTOMY     • PB RADIATION THERAPY PLAN SIMPLE     • PB REMV 2ND CATARACT,CORN-SCLER SECTN     • DE CHEMOTHERAPY, UNSPECIFIED PROCEDURE     • PRIMARY C SECTION     • SINUSCOPE     • TONSILLECTOMY       Family History   Problem Relation Age of Onset   • Cancer Mother    • Heart Failure Neg Hx    • Heart  Disease Neg Hx      Social History     Socioeconomic History   • Marital status:      Spouse name: Not on file   • Number of children: Not on file   • Years of education: Not on file   • Highest education level: Not on file   Occupational History   • Not on file   Social Needs   • Financial resource strain: Not on file   • Food insecurity     Worry: Not on file     Inability: Not on file   • Transportation needs     Medical: Not on file     Non-medical: Not on file   Tobacco Use   • Smoking status: Never Smoker   • Smokeless tobacco: Never Used   Substance and Sexual Activity   • Alcohol use: Yes     Frequency: 2-3 times a week     Drinks per session: 1 or 2     Comment: 3 glasses of wine   • Drug use: No   • Sexual activity: Not on file   Lifestyle   • Physical activity     Days per week: Not on file     Minutes per session: Not on file   • Stress: Not on file   Relationships   • Social connections     Talks on phone: Not on file     Gets together: Not on file     Attends Sabianism service: Not on file     Active member of club or organization: Not on file     Attends meetings of clubs or organizations: Not on file     Relationship status: Not on file   • Intimate partner violence     Fear of current or ex partner: Not on file     Emotionally abused: Not on file     Physically abused: Not on file     Forced sexual activity: Not on file   Other Topics Concern   • Not on file   Social History Narrative   • Not on file     No Known Allergies    Current Outpatient Medications   Medication Sig Dispense Refill   • hydroCHLOROthiazide (HYDRODIURIL) 25 MG Tab Take 1 Tab by mouth every day. 100 Tab 3   • potassium chloride (MICRO-K) 10 MEQ capsule Take 2 Caps by mouth 2 times a day. 180 Cap 3   • atorvastatin (LIPITOR) 80 MG tablet Take 1 Tab by mouth every evening. 90 Tab 1   • Cholecalciferol (VITAMIN D3) 2000 UNIT Cap Take 2,000 Units by mouth every day.     • MYRBETRIQ 25 MG TABLET SR 24 HR      • TESTOSTERONE  "TD Apply  to skin as directed as needed.     • aspirin (ASA) 81 MG Chew Tab chewable tablet Take 1 Tab by mouth every day. 14 Tab 1   • valacyclovir (VALTREX) 500 MG Tab Take 500 mg by mouth every day.     • calcium carbonate (OS-CRISTOFER 500) 500 MG Tab Take 1,000 mg by mouth every day.     • Multiple Vitamin (MULTIVITAMINS PO) Take 1 Tab by mouth every day.       No current facility-administered medications for this visit.        ROS  All others systems reviewed and negative.     Objective:     /55   Pulse 80   Temp 36.4 °C (97.6 °F)   Ht 1.689 m (5' 6.5\")   Wt 57.6 kg (127 lb)   SpO2 94%  Body mass index is 20.19 kg/m².    This encounter was conducted via Zoom.  Verbal consent was obtained. Patient's identity was verified.    Vitals obtained by patient:    Physical Exam:  General: No acute distress. Well nourished.   HEENT: EOM grossly intact, no scleral icterus, no pharyngeal erythema.   Neck:  No JVD noted at 90 degrees, trachea midline  CVS: Pulse as reported by patient, no visible LE edema.  Resp: Unlabored respiratory effort, no cough or audible wheeze  MSK/Ext: No clubbing or cyanosis visible appreciated.  Skin: No rashes in visible areas.  Neurological: CN III-XII grossly intact. No focal deficits.   Psych: A&O x 3, appropriate affect, good judgement, well groomed      Assessment:     1. Severe mitral regurgitation  EC-ECHOCARDIOGRAM COMPLETE W/O CONT   2. History of CVA (cerebrovascular accident)     3. Dyslipidemia     4. MVP (mitral valve prolapse)     5. Long term (current) use of antithrombotics/antiplatelets     6. LALY (obstructive sleep apnea)     7. Benign essential HTN         Medical Decision Making:  Today's Assessment / Status / Plan:     -Consideration should be given for mitral clip surgery.  Has already met Dr. Darin Meehan and had L/RHC.  -BP ok  -HR up a bit during pain but during pain  -CVA  -Doing ok overall  -Hypokalemia, tried ernie but she likes HCTZ better and takes K anyway. "   -Echo Nov  -No need to see Vascular clinic for HTN also, I can manage it.  -RTC 3 months      Written instructions given today:    -REVEAL LINQ loop recorder to monitor for atrial fibrillation as a cause of stroke given mitral valve disease and given the location of stroke. This would  as we would change your aspirin to full blood thinner.    -Echocardiogram around November to monitor the mitral valve    -Stop spironolactone, start HCTZ 25 mg    -Increase potassium to 20 meq daily, I will send a new prescription    Return in about 3 months (around 11/25/2020).    It is my pleasure to participate in the care of Ms. Bocanegra.  Please do not hesitate to contact me with questions or concerns.    Ayde Denise MD, [facility]  Cardiologist [facility]    Please note that this dictation was created using voice recognition software. I have made every reasonable attempt to correct obvious errors, but it is possible there are errors of grammar and possibly content that I did not discover before finalizing the note.

## 2020-09-11 RX ORDER — HYDROCHLOROTHIAZIDE 25 MG/1
25 TABLET ORAL DAILY
Qty: 100 TAB | Refills: 3 | Status: SHIPPED | OUTPATIENT
Start: 2020-09-11 | End: 2021-01-08

## 2020-09-11 NOTE — TELEPHONE ENCOUNTER
FW: med refill  Received: Yesterday  Previous Messages    ----- Message -----   From: Osmin Moise Ass't   Sent: 9/10/2020   1:49 PM PDT   To: Jt Silva   Subject: med refill                                       Hello,       Patient will like a 90 day supply for medication hydroCHLOROthiazide (HYDRODIURIL) 25 MG Tab.         Thank you!         -----------------------------------------------------    Refill for 100 tab and 3 refills of hctz already sent 8/25 to [facility].     Contacted pt and notified her of this. She contacted [facility] pharmacy while on the phone. Apparently [facility] did not receive this prescription. Refill was sent again.  Call pharmacy, receipt confirmed

## 2020-09-14 ENCOUNTER — APPOINTMENT (RX ONLY)
Dept: URBAN - METROPOLITAN AREA CLINIC 4 | Facility: CLINIC | Age: 75
Setting detail: DERMATOLOGY
End: 2020-09-14

## 2020-09-14 DIAGNOSIS — L81.4 OTHER MELANIN HYPERPIGMENTATION: ICD-10-CM

## 2020-09-14 DIAGNOSIS — D22 MELANOCYTIC NEVI: ICD-10-CM | Status: STABLE

## 2020-09-14 DIAGNOSIS — L90.5 SCAR CONDITIONS AND FIBROSIS OF SKIN: ICD-10-CM

## 2020-09-14 DIAGNOSIS — L82.1 OTHER SEBORRHEIC KERATOSIS: ICD-10-CM

## 2020-09-14 PROBLEM — D22.72 MELANOCYTIC NEVI OF LEFT LOWER LIMB, INCLUDING HIP: Status: ACTIVE | Noted: 2020-09-14

## 2020-09-14 PROBLEM — D22.5 MELANOCYTIC NEVI OF TRUNK: Status: ACTIVE | Noted: 2020-09-14

## 2020-09-14 PROCEDURE — ? DIAGNOSIS COMMENT

## 2020-09-14 PROCEDURE — ? COUNSELING

## 2020-09-14 PROCEDURE — 99213 OFFICE O/P EST LOW 20 MIN: CPT

## 2020-09-14 PROCEDURE — ? OBSERVATION AND MEASURE

## 2020-09-14 ASSESSMENT — LOCATION SIMPLE DESCRIPTION DERM
LOCATION SIMPLE: RIGHT SHOULDER
LOCATION SIMPLE: LEFT BUTTOCK
LOCATION SIMPLE: LEFT UPPER BACK
LOCATION SIMPLE: LEFT SHOULDER
LOCATION SIMPLE: LEFT FOREHEAD
LOCATION SIMPLE: RIGHT HAND
LOCATION SIMPLE: RIGHT BUTTOCK
LOCATION SIMPLE: LEFT 5TH TOE
LOCATION SIMPLE: POSTERIOR NECK

## 2020-09-14 ASSESSMENT — LOCATION ZONE DERM
LOCATION ZONE: FACE
LOCATION ZONE: NECK
LOCATION ZONE: ARM
LOCATION ZONE: HAND
LOCATION ZONE: TOE
LOCATION ZONE: TRUNK

## 2020-09-14 ASSESSMENT — LOCATION DETAILED DESCRIPTION DERM
LOCATION DETAILED: LEFT DORSAL 5TH TOE
LOCATION DETAILED: LEFT POSTERIOR SHOULDER
LOCATION DETAILED: RIGHT RADIAL DORSAL HAND
LOCATION DETAILED: MID POSTERIOR NECK
LOCATION DETAILED: LEFT SUPERIOR MEDIAL FOREHEAD
LOCATION DETAILED: LEFT BUTTOCK
LOCATION DETAILED: RIGHT BUTTOCK
LOCATION DETAILED: RIGHT POSTERIOR SHOULDER
LOCATION DETAILED: LEFT SUPERIOR UPPER BACK

## 2020-09-24 RX ORDER — TRIAMCINOLONE ACETONIDE 1 MG/G
CREAM TOPICAL
Qty: 1 | Refills: 1 | Status: ERX | COMMUNITY
Start: 2020-09-24

## 2020-09-24 RX ADMIN — TRIAMCINOLONE ACETONIDE THIN LAYER: 1 CREAM TOPICAL at 00:00

## 2020-10-21 ENCOUNTER — SLEEP CENTER VISIT (OUTPATIENT)
Dept: SLEEP MEDICINE | Facility: MEDICAL CENTER | Age: 75
End: 2020-10-21
Payer: COMMERCIAL

## 2020-10-21 VITALS
RESPIRATION RATE: 16 BRPM | HEIGHT: 67 IN | HEART RATE: 78 BPM | BODY MASS INDEX: 19.97 KG/M2 | DIASTOLIC BLOOD PRESSURE: 80 MMHG | SYSTOLIC BLOOD PRESSURE: 112 MMHG | OXYGEN SATURATION: 99 % | WEIGHT: 127.2 LBS

## 2020-10-21 DIAGNOSIS — G47.00 INSOMNIA, UNSPECIFIED TYPE: ICD-10-CM

## 2020-10-21 DIAGNOSIS — G47.33 OSA (OBSTRUCTIVE SLEEP APNEA): ICD-10-CM

## 2020-10-21 PROCEDURE — 99213 OFFICE O/P EST LOW 20 MIN: CPT | Performed by: INTERNAL MEDICINE

## 2020-10-21 ASSESSMENT — FIBROSIS 4 INDEX: FIB4 SCORE: 1.76

## 2020-10-21 NOTE — PROGRESS NOTES
Chief Complaint   Patient presents with   • Apnea     Last Seen 03/27/20 , OAT       HPI: Tushar is a 75 y.o. female who returns for obstructive sleep apnea.  She has mild LALY with AHI: 11/h, and was prescribed CPAP therapy which she finds cumbersome to use.  She switched to a mandibular advancement device however has suffered from jaw pain precluding its use.  She struggles with sleep maintenance insomnia.  She is frustrated that she cannot attain 5 hours of consistent sleep.  She maintains good sleep hygiene.  She monitors her oximetry by sleep apps which were reviewed and show adequate oxygen saturations.     Past Medical History:   Diagnosis Date   • Arthritis    • Atrial fibrillation (HCC)    • Benign essential HTN 3/19/2012   • Breast cancer (HCC)    • Cancer (HCC)    • Cardiac arrhythmia    • Chest tightness or pressure 3/19/2012   • Chickenpox    • Coronary heart disease    • Citizen of [place] measles    • Gynecological disorder    • High risk medication use 3/19/2012   • Hypercholesterolemia 3/19/2012   • Mumps    • MVP (mitral valve prolapse) 3/19/2012   • Osteoporosis    • Renal insufficiency 3/19/2012   • Stroke (HCC) 2017   • Tonsillitis    • Valvular heart disease    • Venereal disease        Social History     Socioeconomic History   • Marital status:      Spouse name: Not on file   • Number of children: Not on file   • Years of education: Not on file   • Highest education level: Not on file   Occupational History   • Not on file   Social Needs   • Financial resource strain: Not on file   • Food insecurity     Worry: Not on file     Inability: Not on file   • Transportation needs     Medical: Not on file     Non-medical: Not on file   Tobacco Use   • Smoking status: Never Smoker   • Smokeless tobacco: Never Used   Substance and Sexual Activity   • Alcohol use: Yes     Frequency: 2-3 times a week     Drinks per session: 1 or 2     Comment: 3 glasses of wine   • Drug use: No   • Sexual activity: Not on file    Lifestyle   • Physical activity     Days per week: Not on file     Minutes per session: Not on file   • Stress: Not on file   Relationships   • Social connections     Talks on phone: Not on file     Gets together: Not on file     Attends Protestant service: Not on file     Active member of club or organization: Not on file     Attends meetings of clubs or organizations: Not on file     Relationship status: Not on file   • Intimate partner violence     Fear of current or ex partner: Not on file     Emotionally abused: Not on file     Physically abused: Not on file     Forced sexual activity: Not on file   Other Topics Concern   • Not on file   Social History Narrative   • Not on file       Family History   Problem Relation Age of Onset   • Cancer Mother    • Heart Failure Neg Hx    • Heart Disease Neg Hx        Current Outpatient Medications on File Prior to Visit   Medication Sig Dispense Refill   • hydroCHLOROthiazide (HYDRODIURIL) 25 MG Tab Take 1 Tab by mouth every day. 100 Tab 3   • potassium chloride (MICRO-K) 10 MEQ capsule Take 2 Caps by mouth 2 times a day. 180 Cap 3   • atorvastatin (LIPITOR) 80 MG tablet Take 1 Tab by mouth every evening. 90 Tab 1   • Cholecalciferol (VITAMIN D3) 2000 UNIT Cap Take 2,000 Units by mouth every day.     • MYRBETRIQ 25 MG TABLET SR 24 HR      • TESTOSTERONE TD Apply  to skin as directed as needed.     • aspirin (ASA) 81 MG Chew Tab chewable tablet Take 1 Tab by mouth every day. 14 Tab 1   • valacyclovir (VALTREX) 500 MG Tab Take 500 mg by mouth every day.     • calcium carbonate (OS-CRISTOFER 500) 500 MG Tab Take 1,000 mg by mouth every day.     • Multiple Vitamin (MULTIVITAMINS PO) Take 1 Tab by mouth every day.       No current facility-administered medications on file prior to visit.        Allergies: Patient has no known allergies.    ROS:   Constitutional: Denies fevers, chills, night sweats, fatigue or weight loss  Eyes: Denies vision loss, pain, drainage, double  "vision  Ears, Nose, Throat: Denies earache, difficulty hearing, tinnitus, nasal congestion, hoarseness  Cardiovascular: Denies chest pain, tightness, palpitations, orthopnea or edema  Respiratory: Denies shortness of breath, cough, wheezing, hemoptysis  Sleep: Denies daytime sleepiness, snoring, apneas, +insomnia, denies morning headaches  GI: Denies heartburn, dysphagia, nausea, abdominal pain, diarrhea or constipation  : Denies frequent urination, hematuria, discharge or painful urination  Musculoskeletal: Denies back pain, painful joints, sore muscles  Neurological: Denies weakness or headaches  Skin: No rashes    /80 (BP Location: Left arm, Patient Position: Sitting, BP Cuff Size: Adult)   Pulse 78   Resp 16   Ht 1.689 m (5' 6.5\")   Wt 57.7 kg (127 lb 3.2 oz)   SpO2 99%     Physical Exam:  Appearance: Well-nourished, well-developed, in no acute distress  HEENT: Normocephalic, atraumatic, white sclera  Neck: No masses  Respiratory: no intercostal retractions or accessory muscle use  Lungs auscultation: non audible wheezing  Cardiovascular: No LE edema  Abdomen: Nondistended  Gait: Normal  Digits: No clubbing, cyanosis  Motor: No focal deficits  Orientation: Oriented to time, person and place    Diagnosis:  1. LALY (obstructive sleep apnea)     2. Insomnia, unspecified type  REFERRAL TO PSYCHOLOGY       Plan:  The patient has mild LALY, AHI: 11/h and sleep maintenance insomnia.  Her oxygen saturations are adequate.  She dislikes CPAP therapy and is intolerant of a mandibular advancement device.  We discussed Inspire therapy as an alternative treatment which is not yet available locally, however should be available within the next 6 months.  In the interim would encourage CPAP use.  I feel she would also benefit from cognitive behavioral therapy to address her chronic insomnia.  Referral made to Dr. Aimee Pierre for CBT.  No follow-ups on file.      "

## 2020-11-12 ENCOUNTER — HOSPITAL ENCOUNTER (OUTPATIENT)
Dept: CARDIOLOGY | Facility: MEDICAL CENTER | Age: 75
End: 2020-11-12
Attending: INTERNAL MEDICINE
Payer: COMMERCIAL

## 2020-11-12 DIAGNOSIS — I34.0 SEVERE MITRAL REGURGITATION: ICD-10-CM

## 2020-11-12 LAB
LV EJECT FRACT  99904: 60
LV EJECT FRACT MOD 2C 99903: 62.71
LV EJECT FRACT MOD 4C 99902: 55.6
LV EJECT FRACT MOD BP 99901: 59.94

## 2020-11-12 PROCEDURE — 93306 TTE W/DOPPLER COMPLETE: CPT

## 2020-11-12 PROCEDURE — 93306 TTE W/DOPPLER COMPLETE: CPT | Mod: 26 | Performed by: INTERNAL MEDICINE

## 2020-12-02 ENCOUNTER — APPOINTMENT (RX ONLY)
Dept: URBAN - METROPOLITAN AREA CLINIC 4 | Facility: CLINIC | Age: 75
Setting detail: DERMATOLOGY
End: 2020-12-02

## 2020-12-02 DIAGNOSIS — L30.9 DERMATITIS, UNSPECIFIED: ICD-10-CM

## 2020-12-02 DIAGNOSIS — L82.0 INFLAMED SEBORRHEIC KERATOSIS: ICD-10-CM

## 2020-12-02 PROCEDURE — 99212 OFFICE O/P EST SF 10 MIN: CPT | Mod: 25

## 2020-12-02 PROCEDURE — ? LIQUID NITROGEN

## 2020-12-02 PROCEDURE — ? COUNSELING

## 2020-12-02 PROCEDURE — 17110 DESTRUCTION B9 LES UP TO 14: CPT

## 2020-12-02 ASSESSMENT — LOCATION SIMPLE DESCRIPTION DERM
LOCATION SIMPLE: RIGHT SCALP
LOCATION SIMPLE: ABDOMEN
LOCATION SIMPLE: LEFT BREAST
LOCATION SIMPLE: RIGHT BREAST

## 2020-12-02 ASSESSMENT — LOCATION DETAILED DESCRIPTION DERM
LOCATION DETAILED: RIGHT RIB CAGE
LOCATION DETAILED: RIGHT INFRAMAMMARY CREASE (INNER QUADRANT)
LOCATION DETAILED: LEFT INFRAMAMMARY CREASE (OUTER QUADRANT)
LOCATION DETAILED: LEFT MEDIAL BREAST 7-8:00 REGION
LOCATION DETAILED: RIGHT MEDIAL FRONTAL SCALP
LOCATION DETAILED: LEFT INFRAMAMMARY CREASE (INNER QUADRANT)
LOCATION DETAILED: SUBXIPHOID

## 2020-12-02 ASSESSMENT — LOCATION ZONE DERM
LOCATION ZONE: TRUNK
LOCATION ZONE: SCALP

## 2020-12-02 NOTE — PROCEDURE: COUNSELING
Patient Specific Counseling (Will Not Stick From Patient To Patient): Pt may use TAC as needed.  Ill-defined patch that is faint today.  F/u if flares
Detail Level: Detailed

## 2020-12-02 NOTE — PROCEDURE: LIQUID NITROGEN
Medical Necessity Information: It is in your best interest to select a reason for this procedure from the list below. All of these items fulfill various CMS LCD requirements except the new and changing color options.
Render Post-Care Instructions In Note?: no
Medical Necessity Clause: This procedure was medically necessary because the lesions that were treated were:
Post-Care Instructions: I reviewed with the patient in detail post-care instructions. Patient is to wear sunprotection, and avoid picking at any of the treated lesions. Pt may apply Vaseline to crusted or scabbing areas.
Include Z78.9 (Other Specified Conditions Influencing Health Status) As An Associated Diagnosis?: Yes
Consent: The patient's consent was obtained including but not limited to risks of crusting, scabbing, blistering, scarring, darker or lighter pigmentary change, recurrence, incomplete removal and infection.
Detail Level: Detailed

## 2020-12-09 ENCOUNTER — HOSPITAL ENCOUNTER (OUTPATIENT)
Facility: MEDICAL CENTER | Age: 75
End: 2020-12-09
Attending: UROLOGY
Payer: COMMERCIAL

## 2020-12-09 PROCEDURE — 87086 URINE CULTURE/COLONY COUNT: CPT

## 2020-12-12 LAB
BACTERIA UR CULT: NORMAL
SIGNIFICANT IND 70042: NORMAL
SITE SITE: NORMAL
SOURCE SOURCE: NORMAL

## 2020-12-14 ENCOUNTER — TELEPHONE (OUTPATIENT)
Dept: CARDIOLOGY | Facility: MEDICAL CENTER | Age: 75
End: 2020-12-14

## 2020-12-15 NOTE — TELEPHONE ENCOUNTER
Left voice message regarding need for TM[facility] follow up in [facility]. Provided direct contact information and encouraged return call to arrange for follow up visit. Awaiting call.

## 2020-12-16 DIAGNOSIS — E78.5 DYSLIPIDEMIA: ICD-10-CM

## 2020-12-17 RX ORDER — ATORVASTATIN CALCIUM 80 MG/1
TABLET, FILM COATED ORAL
Qty: 60 TAB | Refills: 0 | Status: SHIPPED | OUTPATIENT
Start: 2020-12-17 | End: 2021-02-10

## 2020-12-28 ENCOUNTER — NURSE TRIAGE (OUTPATIENT)
Dept: HEALTH INFORMATION MANAGEMENT | Facility: OTHER | Age: 75
End: 2020-12-28

## 2020-12-28 NOTE — TELEPHONE ENCOUNTER
"Pt reports increased BP x 1 month. Pt \"religiously\" takes BP medications. No other symptoms reported. Has FV in June 2021 with Ayde Denise, but would like to be seen in office sooner.     12/26: / 91 with medications  12/28: /90    Info about covid vaccine also provided. Pt would like to receive when available.     Reason for Disposition  • Information only question and nurse able to answer  • Systolic BP >= 130 OR Diastolic >= 80, and is taking BP medications    Additional Information  • Negative: Nursing judgment  • Negative: Nursing judgment  • Negative: Nursing judgment  • Negative: Nursing judgment  • Negative: Sounds like a life-threatening emergency to the triager  • Negative: Pregnant > 20 weeks or postpartum (< 6 weeks after delivery) and new hand or face swelling  • Negative: Pregnant > 20 weeks and BP > 140/90  • Negative: Systolic BP >= 160 OR Diastolic >= 100, and any cardiac or neurologic symptoms (e.g., chest pain, difficulty breathing, unsteady gait, blurred vision)  • Negative: Patient sounds very sick or weak to the triager  • Negative: Systolic BP >= 180 OR Diastolic >= 110  • Negative: Patient wants to be seen  • Negative: BP Systolic BP >= 140 OR Diastolic >= 90 and postpartum (from 0 to 6 weeks after delivery)  • Negative: Systolic BP >= 180 OR Diastolic >= 110, and missed most recent dose of blood pressure medication  • Negative: Ran out of BP medications  • Negative: Taking BP medications and feels is having side effects (e.g., impotence, cough, dizziness)  • Negative: Systolic BP >= 160 OR Diastolic >= 100  • Negative: Systolic BP >= 130 OR Diastolic >= 80, and pregnant    Answer Assessment - Initial Assessment Questions  1. BLOOD PRESSURE: \"What is the blood pressure?\" \"Did you take at least two measurements 5 minutes apart?\"      148/90 on 12/28  2. ONSET: \"When did you take your blood pressure?\"      Took this morning  3. HOW: \"How did you obtain the blood pressure?\" (e.g., " "visiting nurse, automatic home BP monitor)      Home BP monitor  4. HISTORY: \"Do you have a history of high blood pressure?\"      YES  5. MEDICATIONS: \"Are you taking any medications for blood pressure?\" \"Have you missed any doses recently?\"      Yes, have not missed medications  6. OTHER SYMPTOMS: \"Do you have any symptoms?\" (e.g., headache, chest pain, blurred vision, difficulty breathing, weakness)      DENIES  7. PREGNANCY: \"Is there any chance you are pregnant?\" \"When was your last menstrual period?\"      N/A    Protocols used: INFORMATION ONLY CALL - NO TRIAGE-A-[place], HIGH BLOOD PRESSURE-A-OH      "

## 2020-12-29 ENCOUNTER — TELEPHONE (OUTPATIENT)
Dept: CARDIOLOGY | Facility: MEDICAL CENTER | Age: 75
End: 2020-12-29

## 2020-12-29 ENCOUNTER — NURSE TRIAGE (OUTPATIENT)
Dept: HEALTH INFORMATION MANAGEMENT | Facility: OTHER | Age: 75
End: 2020-12-29

## 2020-12-29 RX ORDER — AMLODIPINE BESYLATE 2.5 MG/1
2.5 TABLET ORAL DAILY
Qty: 30 TAB | Refills: 2 | Status: SHIPPED | OUTPATIENT
Start: 2020-12-29 | End: 2021-01-20

## 2020-12-29 NOTE — TELEPHONE ENCOUNTER
If she is willing, offer her amlodipine 2.5 mg once daily.  Tell her to watch for mild lower extremity edema which is cosmetic only and mild constipation.  It is typically well-tolerated medication which does not require any blood work.  If she is agreeable, please send a prescription.

## 2020-12-29 NOTE — TELEPHONE ENCOUNTER
"Patient called to schedule appointment for increase BP, per patient \"an RN called me and said Dr. 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    Patient is asking for RX for amlodipine 2.5mg per LS? No documentation to support.   To LS  "

## 2020-12-29 NOTE — TELEPHONE ENCOUNTER
CAT    PH: [phone]   PT NM: Shaista Bocanegra   : 1945   RE: Calling for new script on  Amlodipine 2.5 mg to be called into  [facility] at [phone]     --------------------------------------  Message History  Account: [id number]  Taken:  Tue 29-Dec-2020  1:10p   Serial#: [id number]

## 2020-12-29 NOTE — TELEPHONE ENCOUNTER
Triage RN called back pt to discuss amlodipine. Pt agreeable to new medication, would like it called into [facility] pharmacy.   This RN unable to place prescription. Call placed to Ayde Denise's office to confirm pts new prescription.Unable to get through to office.    Reason for Disposition  • Caller requesting a NON-URGENT new prescription or refill and triager unable to refill per department policy    Additional Information  • Negative: Drug overdose and triager unable to answer question  • Negative: Caller requesting information unrelated to medicine  • Negative: Caller requesting a prescription for Strep throat and has a positive culture result  • Negative: Rash while taking a medication or within 3 days of stopping it  • Negative: Immunization reaction suspected  • Negative: Asthma and having symptoms of asthma (cough, wheezing, etc.)  • Negative: Breastfeeding questions about mother's medicines and diet  • Negative: MORE THAN A DOUBLE DOSE of a prescription or over-the-counter (OTC) drug  • Negative: DOUBLE DOSE (an extra dose or lesser amount) of over-the-counter (OTC) drug and any symptoms (e.g., dizziness, nausea, pain, sleepiness)  • Negative: DOUBLE DOSE (an extra dose or lesser amount) of prescription drug and any symptoms (e.g., dizziness, nausea, pain, sleepiness)  • Negative: Took another person's prescription drug  • Negative: DOUBLE DOSE (an extra dose or lesser amount) of prescription drug and NO symptoms (Exception: a double dose of antibiotics)  • Negative: Diabetes drug error or overdose (e.g., took wrong type of insulin or took extra dose)  • Negative: Caller has medication question about med not prescribed by PCP and triager unable to answer question (e.g., compatibility with other med, storage)  • Negative: Request for URGENT new prescription or refill of 'essential' medication (i.e., likelihood of harm to patient if not taken) and triager unable to fill per department policy  • Negative:  Prescription not at pharmacy and was prescribed today by PCP  • Negative: Pharmacy calling with prescription questions and triager unable to answer question  • Negative: Caller has URGENT medication question about med that PCP prescribed and triager unable to answer question    Protocols used: MEDICATION QUESTION CALL-A-[place]

## 2020-12-30 NOTE — TELEPHONE ENCOUNTER
Ayde Denise M.D.  You 28 minutes ago (4:22 PM)     Vanessa Recio sent me a message and I wanted the patient to start amlodipine 2.5 mg.  And her know why she was not able to write the prescription.  Please send the prescription for amlodipine 2.5 mg once daily.     My reply is attached to Vanessa's note from yesterday.  I find epic very challenging to navigate.     Thank you,   LS      Rx sent, patient aware

## 2020-12-30 NOTE — TELEPHONE ENCOUNTER
LS    NM: Shaista Bocanegra   PH: [phone]   PT NM: Shaista Bocanegra   : 1945   REG DR: Dr Denise   RE: Has some Amlodipine,  will  last her until next week.   DISP HIST: 2020 04:32P PALAK, pt  dsp  2020 04:32P DLR checked msg    --------------------------------------  Message History  Account: [id number]  Taken:  Tue 29-Dec-2020  4:32p PALAK  Serial#: [id number]    Thank you,  Amie JONES

## 2021-01-04 NOTE — PROGRESS NOTES
"Subjective:   Chief Complaint:   Chief Complaint   Patient presents with   • Hypertension       \"Artemio\" Shaista Bocanegra is a 75 y.o. female who returns for moderate to severe mitral regurgitation, hypertension, hyperlipidemia, prior CVA, HTN.    Admitted to the hospital 5/8/2020 with dehydration on diuretics.  Has left and right heart catheterization that admission move mitral clip for severe mitral regurgitation.    Surgery on foot, HR up a bit, 92-96. Has been in pain.    Has HTN, /85, usually 125.  Started on amlodipine.  BP still elevated at times, 130-150, mostly 120s.    Has LALY, was on CPAP, now using device only.  Using meditation and leep specialist to help with habits.    Prior CVA, some light tingling sensation remains.    Patient received mechanical thrombectomy 8/18/2017, felt to be due to right carotid disease.    Has hyperlipidemia, LDL 58.  No CAD on [facility] 2020.    Has a ring that monitors HR at night, we looked at data, no spikes.    Using a rower now.  She is not limited by chest pain, pressure or tightness with activity.   No significant dyspnea on exertion but only if she moves slowly.  No orthopnea or lower extremity swelling.   No significant palpitations, lightheadedness, or presyncope/syncope.   No symptoms of leg claudication.   No new stroke/TIA like symptoms.  Does note brain fog.  Remote syncope in the setting of low K.    No family history of premature coronary artery disease.  No prior smoking history.  No history of diabetes.  No history of autoimmune disease such as lupus or rheumatoid arthritis.  No chronic kidney disease.  No ETOH overuse.   No caffeine overuse.  No recreation substance use.    Has lost some weight.    Lives in [place].  She is a .    DATA REVIEWED by me:  ECG 5-  Sinus, 65, PVC, first-degree AV delay    Zio 2017 2 weeks.  Sinus, brief atrial run.    Echo 11-  Left ventricular ejection fraction is visually estimated to be 60%.  Severely dilated " left atrium.  Myxomatous changes of the mitral valve.  Bileaflet prolapse of the mitral valve is present.  Severe mitral regurgitation with pulmonary vein systolic flow reversal,   RV 73 mL, ERO 0.4  cm2.  Estimated right ventricular systolic pressure  is 30 mmHg.  Compared to the images of the prior study done 2/3/2020, no significant    change.    Echo 2/3/2020  Prior echo done on 05/03/2019. Compared to the images of the prior   study done -  there has been no significant change.   Normal left ventricular systolic function.  Left ventricular ejection fraction is visually estimated to be 65%.  Prolapse of the mitral leaflets was present.  Severe mitral regurgitation.  Mild tricuspid regurgitation.  Estimated right ventricular systolic pressure is 35 mmHg.    Echo 2017  Agitated saline study was performed, no evidence of right to left   shunt.  Normal left ventricular size, wall thickness, and systolic function.  Left ventricular ejection fraction is visually estimated to be 60%.  Mildly dilated left atrium.  Moderate mitral regurgitation due to an eccentric jet.  Mild tricuspid regurgitation.    Left and right heart catheterization 5/8/2020  Right ventricle 33/5, EDP 15  Pulmonary artery 42/21, 29  pulmonary capillary wedge 23  Cardiac output 4.1 L/min by thermodilution method     1.  Left main coronary artery:  Normal.  2.  Left anterior descending artery:  Normal.   3.  Left circumflex coronary artery:  Normal.   4.  Right coronary artery:  Normal.  This is a right dominant system.  5.  Left ventricular end diastolic pressure:  25 mmHg.  No signficant gradient across the aortic valve.  6.  Left ventriculogram:  Ejection fraction of 65%, 2+ mitral regurgitation, normal sized thoracic aorta.     2017 CTA NECK   1.  CT angiogram of the neck within normal limits.  2.  Thrombosed right M1 segment.     2017 CAROTID DUPLEX CONCLUSIONS   nl carotids, subclavians and vertebral's     2017 MRI BRAIN  1.  Moderate sized  RIGHT MCA territory acute ischemia involving the cortex and basal ganglia  2.  Flow void is present in the RIGHT MCA compatible with treatment effect  3.  No hemorrhage  4.  Mild white matter changes  5.  Mild atrophy    Most recent labs:       Lab Results   Component Value Date/Time    HEMOGLOBIN 14.7 06/12/2020 05:12 AM    HEMOGLOBIN 12.5 05/09/2020 01:58 AM    HEMATOCRIT 43.2 06/12/2020 05:12 AM    HEMATOCRIT 37.5 05/09/2020 01:58 AM     (H) 06/12/2020 05:12 AM    .9 (H) 05/09/2020 01:58 AM    INR 1.01 05/08/2020 09:33 AM      Lab Results   Component Value Date/Time    SODIUM 135 07/08/2020 01:52 PM    POTASSIUM 4.5 07/08/2020 01:52 PM    CHLORIDE 97 07/08/2020 01:52 PM    CO2 25 07/08/2020 01:52 PM    GLUCOSE 99 07/08/2020 01:52 PM    BUN 15 07/08/2020 01:52 PM    CREATININE 0.85 07/08/2020 01:52 PM      Lab Results   Component Value Date/Time    ASTSGOT 18 05/07/2020 06:31 PM    ALTSGPT 16 05/07/2020 06:31 PM    ALBUMIN 3.6 05/07/2020 06:31 PM      Lab Results   Component Value Date/Time    CHOLSTRLTOT 144 03/24/2020 04:17 AM    CHOLSTRLTOT 135 08/19/2017 02:50 AM    LDL 58 03/24/2020 04:17 AM    LDL 65 08/19/2017 02:50 AM    HDL 73 03/24/2020 04:17 AM    HDL 54 08/19/2017 02:50 AM    TRIGLYCERIDE 67 03/24/2020 04:17 AM    TRIGLYCERIDE 80 08/19/2017 02:50 AM           Past Medical History:   Diagnosis Date   • Arthritis    • Atrial fibrillation (HCC)    • Benign essential HTN 3/19/2012   • Breast cancer (HCC)    • Cancer (HCC)    • Cardiac arrhythmia    • Chest tightness or pressure 3/19/2012   • Chickenpox    • Coronary heart disease    • Citizen of [place] measles    • Gynecological disorder    • High risk medication use 3/19/2012   • Hypercholesterolemia 3/19/2012   • Mumps    • MVP (mitral valve prolapse) 3/19/2012   • Osteoporosis    • Renal insufficiency 3/19/2012   • Stroke (HCC) 2017   • Tonsillitis    • Valvular heart disease    • Venereal disease      Past Surgical History:   Procedure Laterality  Date   • CATARACT PHACO WITH IOL  3/16/2009    Performed by SHANNON GANDARA at [facility]   • CATARACT PHACO WITH IOL  1/26/2009    Performed by SHANNON GANDARA at [facility]   • BREAST BIOPSY     • HYSTERECTOMY LAPAROSCOPY     • LUMPECTOMY     • PB RADIATION THERAPY PLAN SIMPLE     • PB REMV 2ND CATARACT,CORN-SCLER SECTN     • NC CHEMOTHERAPY, UNSPECIFIED PROCEDURE     • PRIMARY C SECTION     • SINUSCOPE     • TONSILLECTOMY       Family History   Problem Relation Age of Onset   • Cancer Mother    • Heart Failure Neg Hx    • Heart Disease Neg Hx      Social History     Socioeconomic History   • Marital status:      Spouse name: Not on file   • Number of children: Not on file   • Years of education: Not on file   • Highest education level: Not on file   Occupational History   • Not on file   Social Needs   • Financial resource strain: Not on file   • Food insecurity     Worry: Not on file     Inability: Not on file   • Transportation needs     Medical: Not on file     Non-medical: Not on file   Tobacco Use   • Smoking status: Never Smoker   • Smokeless tobacco: Never Used   Substance and Sexual Activity   • Alcohol use: Yes     Frequency: 2-3 times a week     Drinks per session: 1 or 2     Comment: 3 glasses of wine   • Drug use: No   • Sexual activity: Not on file   Lifestyle   • Physical activity     Days per week: Not on file     Minutes per session: Not on file   • Stress: Not on file   Relationships   • Social connections     Talks on phone: Not on file     Gets together: Not on file     Attends Confucianist service: Not on file     Active member of club or organization: Not on file     Attends meetings of clubs or organizations: Not on file     Relationship status: Not on file   • Intimate partner violence     Fear of current or ex partner: Not on file     Emotionally abused: Not on file     Physically abused: Not on file     Forced sexual activity: Not on file  "  Other Topics Concern   • Not on file   Social History Narrative   • Not on file     No Known Allergies    Current Outpatient Medications   Medication Sig Dispense Refill   • Cyanocobalamin (B-12 PO) Take 25,000 mcg by mouth every day.     • Multiple Vitamins-Minerals (ICAPS AREDS 2 PO) Take  by mouth.     • Biotin 30827 MCG Tab Take  by mouth every day.     • Nutritional Supplements (DHEA PO) Take  by mouth.     • spironolactone (ALDACTONE) 25 MG Tab Take 25 mg by mouth every day.     • amLODIPine (NORVASC) 2.5 MG Tab Take 1 Tab by mouth every day. 30 Tab 2   • atorvastatin (LIPITOR) 80 MG tablet TAKE 1 TABLET BY MOUTH EVERY DAY IN THE EVENING 60 Tab 0   • potassium chloride (MICRO-K) 10 MEQ capsule Take 2 Caps by mouth 2 times a day. (Patient taking differently: Take 10 mEq by mouth every day.) 180 Cap 3   • Cholecalciferol (VITAMIN D3) 2000 UNIT Cap Take 2,000 Units by mouth every day.     • MYRBETRIQ 25 MG TABLET SR 24 HR every day.     • TESTOSTERONE TD Apply  to skin as directed as needed.     • aspirin (ASA) 81 MG Chew Tab chewable tablet Take 1 Tab by mouth every day. 14 Tab 1   • valacyclovir (VALTREX) 500 MG Tab Take 500 mg by mouth every day.     • calcium carbonate (OS-CRISTOFER 500) 500 MG Tab Take 1,000 mg by mouth every day.     • Multiple Vitamin (MULTIVITAMINS PO) Take 1 Tab by mouth every day.       No current facility-administered medications for this visit.        ROS    All others systems reviewed and negative.     Objective:     /84 (BP Location: Right arm, Patient Position: Sitting, BP Cuff Size: Adult)   Pulse 66   Resp 12   Ht 1.689 m (5' 6.5\")   Wt 59.9 kg (132 lb)   SpO2 97%  Body mass index is 20.99 kg/m².    General: No acute distress. Well nourished.  HEENT: EOM grossly intact, no scleral icterus, no pharyngeal erythema.   Neck:  No JVD at 90, no bruits, trachea midline  CVS: RRR, come ectopy. Normal S1, S2. 3/6 holosyt murmur at the apex No LE edema.  2+ radial pulses, 2+ PT " pulses  Resp: CTAB. No wheezing or crackles/rhonchi. Normal respiratory effort.  Abdomen: Soft, NT, no ana rosa hepatomegaly.  MSK/Ext: No clubbing or cyanosis.  Skin: Warm and dry, no rashes.  Neurological: CN III-XII grossly intact. No focal deficits.   Psych: A&O x 3, appropriate affect, good judgement\      Assessment:     1. Severe mitral regurgitation  REFERRAL TO CARDIOTHORACIC SURGERY    EC-ABHINAV W/O CONT   2. History of CVA (cerebrovascular accident)     3. MVP (mitral valve prolapse)  EC-ABHINAV W/O CONT   4. LALY (obstructive sleep apnea)     5. HTN (hypertension), malignant     6. Dysphagia following cerebrovascular accident     7. Dyslipidemia         Medical Decision Making:  Today's Assessment / Status / Plan:     -I really think we need a plan for her severe MR, EF supposed to be 65%, has dropped to 60% which is not terrible but not what it should be  -I think she has really slowed down her activity due to this, walks slowly, trying a rower now.  -BP may be too high, she might need to increase amlodipine, see below  -HR up a bit during pain but during pain  -CVA 3 years ago, at risk for afib with severe MR, we discussed loop again, it is reasonable.  -Needs ABHINAV, see below  -Refer to Dr. Schaefer and back to the Valve team  -Cont ASA  -No CAD on LHC  -Using a ring to monitor HR at night, no spikes  -RTC 3 months with me      Written instructions given today:    -You will need a ABHINAV, transesophogeal echocardiogram, this is the only way to take the appropriate valve pictures to determine that you are in fact a candidate the MitraClip procedure. We have an anesthesiologist put you to sleep and wake you up so we can pass the echo probe down your esophagus and keep you calm enough to take good pictures.    -We will arrange these pictures at [facility], you need a ride home because of sedation. [facility] will call you.    -I am making the referral to Dr. Kaden Schaefer, he is the cardiothoracic surgeon you will need to see  to approve the Clip.    -REVEAL LINQ loop recorder to monitor for atrial fibrillation as a cause of stroke given mitral valve disease and given the location of stroke. This would  as we would change your aspirin to full blood thinner.    -Your BP is supposed to be closer to under 130/80, you can try increasing your amlodipine from 2.5 mg to 5 mg, can take 2 of the 2.5 mg at the same time. If you decide to stay on the higher dose, let us know, we will send a new prescription.     -For stroke symptoms, think FAST=Face, Arm, Speech, Time, 911 emergency.      Return in about 3 months (around 4/8/2021).    It is my pleasure to participate in the care of Ms. Bocanegra.  Please do not hesitate to contact me with questions or concerns.    Ayde Denise MD, [facility]  Cardiologist [facility]    Please note that this dictation was created using voice recognition software. I have made every reasonable attempt to correct obvious errors, but it is possible there are errors of grammar and possibly content that I did not discover before finalizing the note.

## 2021-01-08 ENCOUNTER — TELEPHONE (OUTPATIENT)
Dept: CARDIOLOGY | Facility: MEDICAL CENTER | Age: 76
End: 2021-01-08

## 2021-01-08 ENCOUNTER — OFFICE VISIT (OUTPATIENT)
Dept: CARDIOLOGY | Facility: MEDICAL CENTER | Age: 76
End: 2021-01-08
Payer: COMMERCIAL

## 2021-01-08 VITALS
SYSTOLIC BLOOD PRESSURE: 126 MMHG | HEART RATE: 66 BPM | DIASTOLIC BLOOD PRESSURE: 84 MMHG | HEIGHT: 67 IN | RESPIRATION RATE: 12 BRPM | OXYGEN SATURATION: 97 % | BODY MASS INDEX: 20.72 KG/M2 | WEIGHT: 132 LBS

## 2021-01-08 DIAGNOSIS — I10 HTN (HYPERTENSION), MALIGNANT: ICD-10-CM

## 2021-01-08 DIAGNOSIS — I34.1 MVP (MITRAL VALVE PROLAPSE): ICD-10-CM

## 2021-01-08 DIAGNOSIS — I34.0 SEVERE MITRAL REGURGITATION: ICD-10-CM

## 2021-01-08 DIAGNOSIS — G47.33 OSA (OBSTRUCTIVE SLEEP APNEA): ICD-10-CM

## 2021-01-08 DIAGNOSIS — Z86.73 HISTORY OF CVA (CEREBROVASCULAR ACCIDENT): ICD-10-CM

## 2021-01-08 DIAGNOSIS — I69.391 DYSPHAGIA FOLLOWING CEREBROVASCULAR ACCIDENT: ICD-10-CM

## 2021-01-08 DIAGNOSIS — E78.5 DYSLIPIDEMIA: ICD-10-CM

## 2021-01-08 PROCEDURE — 99214 OFFICE O/P EST MOD 30 MIN: CPT | Performed by: INTERNAL MEDICINE

## 2021-01-08 RX ORDER — SPIRONOLACTONE 25 MG/1
25 TABLET ORAL DAILY
COMMUNITY
End: 2021-02-26

## 2021-01-08 RX ORDER — GLUCOSAMINE/D3/BOSWELLIA SERRA 1500MG-400
TABLET ORAL DAILY
COMMUNITY
End: 2021-06-28

## 2021-01-08 ASSESSMENT — FIBROSIS 4 INDEX: FIB4 SCORE: 1.76

## 2021-01-08 NOTE — PATIENT INSTRUCTIONS
-You will need a ABHINAV, transesophogeal echocardiogram, this is the only way to take the appropriate valve pictures to determine that you are in fact a candidate the MitraClip procedure. We have an anesthesiologist put you to sleep and wake you up so we can pass the echo probe down your esophagus and keep you calm enough to take good pictures.    -We will arrange these pictures at [facility], you need a ride home because of sedation. [facility] will call you.    -I am making the referral to Dr. Kaden Schaefer, he is the cardiothoracic surgeon you will need to see to approve the Clip.    -REVEAL LINQ loop recorder to monitor for atrial fibrillation as a cause of stroke given mitral valve disease and given the location of stroke. This would  as we would change your aspirin to full blood thinner.    -Your BP is supposed to be closer to under 130/80, you can try increasing your amlodipine from 2.5 mg to 5 mg, can take 2 of the 2.5 mg at the same time. If you decide to stay on the higher dose, let us know, we will send a new prescription.     -For stroke symptoms, think FAST=Face, Arm, Speech, Time, 911 emergency.

## 2021-01-08 NOTE — TELEPHONE ENCOUNTER
Patient is scheduled on 1-26-21 for a ABHINAV w/anesthesia with Dr. Denise. No meds to stop and patient to check in at 9:00 for an 11:00 procedure. H&P was done on 1-8-21 by Dr. Denise. Pre admit to call patient.

## 2021-01-08 NOTE — LETTER
"     [facility]   [address], [address]  [place] [address]  Phone: [phone]  Fax: [phone]              Shaista Bocanegra  1945    Encounter Date: 1/8/2021    Ayde Denise M.D.          PROGRESS NOTE:  Subjective:   Chief Complaint:   Chief Complaint   Patient presents with   • Hypertension       \"Artemio\" Shaista Bocanegra is a 75 y.o. female who returns for moderate to severe mitral regurgitation, hypertension, hyperlipidemia, prior CVA, HTN.    Admitted to the hospital 5/8/2020 with dehydration on diuretics.  Has left and right heart catheterization that admission move mitral clip for severe mitral regurgitation.    Surgery on foot, HR up a bit, 92-96. Has been in pain.    Has HTN, /85, usually 125.  Started on amlodipine.  BP still elevated at times, 130-150, mostly 120s.    Has LALY, was on CPAP, now using device only.  Using meditation and leep specialist to help with habits.    Prior CVA, some light tingling sensation remains.    Patient received mechanical thrombectomy 8/18/2017, felt to be due to right carotid disease.    Has hyperlipidemia, LDL 58.  No CAD on [facility] 2020.    Has a ring that monitors HR at night, we looked at data, no spikes.    Using a rower now.  She is not limited by chest pain, pressure or tightness with activity.   No significant dyspnea on exertion but only if she moves slowly.  No orthopnea or lower extremity swelling.   No significant palpitations, lightheadedness, or presyncope/syncope.   No symptoms of leg claudication.   No new stroke/TIA like symptoms.  Does note brain fog.  Remote syncope in the setting of low K.    No family history of premature coronary artery disease.  No prior smoking history.  No history of diabetes.  No history of autoimmune disease such as lupus or rheumatoid arthritis.  No chronic kidney disease.  No ETOH overuse.   No caffeine overuse.  No recreation substance use.    Has lost some " weight.    Lives in [place].  She is a .    DATA REVIEWED by me:  ECG 5-  Sinus, 65, PVC, first-degree AV delay    Zio 2017 2 weeks.  Sinus, brief atrial run.    Echo 11-  Left ventricular ejection fraction is visually estimated to be 60%.  Severely dilated left atrium.  Myxomatous changes of the mitral valve.  Bileaflet prolapse of the mitral valve is present.  Severe mitral regurgitation with pulmonary vein systolic flow reversal,   RV 73 mL, ERO 0.4  cm2.  Estimated right ventricular systolic pressure  is 30 mmHg.  Compared to the images of the prior study done 2/3/2020, no significant    change.    Echo 2/3/2020  Prior echo done on 05/03/2019. Compared to the images of the prior   study done -  there has been no significant change.   Normal left ventricular systolic function.  Left ventricular ejection fraction is visually estimated to be 65%.  Prolapse of the mitral leaflets was present.  Severe mitral regurgitation.  Mild tricuspid regurgitation.  Estimated right ventricular systolic pressure is 35 mmHg.    Echo 2017  Agitated saline study was performed, no evidence of right to left   shunt.  Normal left ventricular size, wall thickness, and systolic function.  Left ventricular ejection fraction is visually estimated to be 60%.  Mildly dilated left atrium.  Moderate mitral regurgitation due to an eccentric jet.  Mild tricuspid regurgitation.    Left and right heart catheterization 5/8/2020  Right ventricle 33/5, EDP 15  Pulmonary artery 42/21, 29  pulmonary capillary wedge 23  Cardiac output 4.1 L/min by thermodilution method     1.  Left main coronary artery:  Normal.  2.  Left anterior descending artery:  Normal.   3.  Left circumflex coronary artery:  Normal.   4.  Right coronary artery:  Normal.  This is a right dominant system.  5.  Left ventricular end diastolic pressure:  25 mmHg.  No signficant gradient across the aortic valve.  6.  Left ventriculogram:  Ejection fraction of 65%, 2+  mitral regurgitation, normal sized thoracic aorta.     2017 CTA NECK   1.  CT angiogram of the neck within normal limits.  2.  Thrombosed right M1 segment.     2017 CAROTID DUPLEX CONCLUSIONS   nl carotids, subclavians and vertebral's     2017 MRI BRAIN  1.  Moderate sized RIGHT MCA territory acute ischemia involving the cortex and basal ganglia  2.  Flow void is present in the RIGHT MCA compatible with treatment effect  3.  No hemorrhage  4.  Mild white matter changes  5.  Mild atrophy    Most recent labs:       Lab Results   Component Value Date/Time    HEMOGLOBIN 14.7 06/12/2020 05:12 AM    HEMOGLOBIN 12.5 05/09/2020 01:58 AM    HEMATOCRIT 43.2 06/12/2020 05:12 AM    HEMATOCRIT 37.5 05/09/2020 01:58 AM     (H) 06/12/2020 05:12 AM    .9 (H) 05/09/2020 01:58 AM    INR 1.01 05/08/2020 09:33 AM      Lab Results   Component Value Date/Time    SODIUM 135 07/08/2020 01:52 PM    POTASSIUM 4.5 07/08/2020 01:52 PM    CHLORIDE 97 07/08/2020 01:52 PM    CO2 25 07/08/2020 01:52 PM    GLUCOSE 99 07/08/2020 01:52 PM    BUN 15 07/08/2020 01:52 PM    CREATININE 0.85 07/08/2020 01:52 PM      Lab Results   Component Value Date/Time    ASTSGOT 18 05/07/2020 06:31 PM    ALTSGPT 16 05/07/2020 06:31 PM    ALBUMIN 3.6 05/07/2020 06:31 PM      Lab Results   Component Value Date/Time    CHOLSTRLTOT 144 03/24/2020 04:17 AM    CHOLSTRLTOT 135 08/19/2017 02:50 AM    LDL 58 03/24/2020 04:17 AM    LDL 65 08/19/2017 02:50 AM    HDL 73 03/24/2020 04:17 AM    HDL 54 08/19/2017 02:50 AM    TRIGLYCERIDE 67 03/24/2020 04:17 AM    TRIGLYCERIDE 80 08/19/2017 02:50 AM           Past Medical History:   Diagnosis Date   • Arthritis    • Atrial fibrillation (HCC)    • Benign essential HTN 3/19/2012   • Breast cancer (HCC)    • Cancer (HCC)    • Cardiac arrhythmia    • Chest tightness or pressure 3/19/2012   • Chickenpox    • Coronary heart disease    • Upper sorbian measles    • Gynecological disorder    • High risk medication use 3/19/2012   •  Hypercholesterolemia 3/19/2012   • Mumps    • MVP (mitral valve prolapse) 3/19/2012   • Osteoporosis    • Renal insufficiency 3/19/2012   • Stroke (HCC) 2017   • Tonsillitis    • Valvular heart disease    • Venereal disease      Past Surgical History:   Procedure Laterality Date   • CATARACT PHACO WITH IOL  3/16/2009    Performed by SHANNON GANDARA at [facility]   • CATARACT PHACO WITH IOL  1/26/2009    Performed by SHANNON GANDARA at [facility]   • BREAST BIOPSY     • HYSTERECTOMY LAPAROSCOPY     • LUMPECTOMY     • PB RADIATION THERAPY PLAN SIMPLE     • PB REMV 2ND CATARACT,CORN-SCLER SECTN     • MS CHEMOTHERAPY, UNSPECIFIED PROCEDURE     • PRIMARY C SECTION     • SINUSCOPE     • TONSILLECTOMY       Family History   Problem Relation Age of Onset   • Cancer Mother    • Heart Failure Neg Hx    • Heart Disease Neg Hx      Social History     Socioeconomic History   • Marital status:      Spouse name: Not on file   • Number of children: Not on file   • Years of education: Not on file   • Highest education level: Not on file   Occupational History   • Not on file   Social Needs   • Financial resource strain: Not on file   • Food insecurity     Worry: Not on file     Inability: Not on file   • Transportation needs     Medical: Not on file     Non-medical: Not on file   Tobacco Use   • Smoking status: Never Smoker   • Smokeless tobacco: Never Used   Substance and Sexual Activity   • Alcohol use: Yes     Frequency: 2-3 times a week     Drinks per session: 1 or 2     Comment: 3 glasses of wine   • Drug use: No   • Sexual activity: Not on file   Lifestyle   • Physical activity     Days per week: Not on file     Minutes per session: Not on file   • Stress: Not on file   Relationships   • Social connections     Talks on phone: Not on file     Gets together: Not on file     Attends Restorationism service: Not on file     Active member of club or organization: Not on file     Attends  "meetings of clubs or organizations: Not on file     Relationship status: Not on file   • Intimate partner violence     Fear of current or ex partner: Not on file     Emotionally abused: Not on file     Physically abused: Not on file     Forced sexual activity: Not on file   Other Topics Concern   • Not on file   Social History Narrative   • Not on file     No Known Allergies    Current Outpatient Medications   Medication Sig Dispense Refill   • Cyanocobalamin (B-12 PO) Take 25,000 mcg by mouth every day.     • Multiple Vitamins-Minerals (ICAPS AREDS 2 PO) Take  by mouth.     • Biotin 57256 MCG Tab Take  by mouth every day.     • Nutritional Supplements (DHEA PO) Take  by mouth.     • spironolactone (ALDACTONE) 25 MG Tab Take 25 mg by mouth every day.     • amLODIPine (NORVASC) 2.5 MG Tab Take 1 Tab by mouth every day. 30 Tab 2   • atorvastatin (LIPITOR) 80 MG tablet TAKE 1 TABLET BY MOUTH EVERY DAY IN THE EVENING 60 Tab 0   • potassium chloride (MICRO-K) 10 MEQ capsule Take 2 Caps by mouth 2 times a day. (Patient taking differently: Take 10 mEq by mouth every day.) 180 Cap 3   • Cholecalciferol (VITAMIN D3) 2000 UNIT Cap Take 2,000 Units by mouth every day.     • MYRBETRIQ 25 MG TABLET SR 24 HR every day.     • TESTOSTERONE TD Apply  to skin as directed as needed.     • aspirin (ASA) 81 MG Chew Tab chewable tablet Take 1 Tab by mouth every day. 14 Tab 1   • valacyclovir (VALTREX) 500 MG Tab Take 500 mg by mouth every day.     • calcium carbonate (OS-CRISTOFER 500) 500 MG Tab Take 1,000 mg by mouth every day.     • Multiple Vitamin (MULTIVITAMINS PO) Take 1 Tab by mouth every day.       No current facility-administered medications for this visit.        ROS    All others systems reviewed and negative.     Objective:     /84 (BP Location: Right arm, Patient Position: Sitting, BP Cuff Size: Adult)   Pulse 66   Resp 12   Ht 1.689 m (5' 6.5\")   Wt 59.9 kg (132 lb)   SpO2 97%  Body mass index is 20.99 " kg/m².    General: No acute distress. Well nourished.  HEENT: EOM grossly intact, no scleral icterus, no pharyngeal erythema.   Neck:  No JVD at 90, no bruits, trachea midline  CVS: RRR, come ectopy. Normal S1, S2. 3/6 holosyt murmur at the apex No LE edema.  2+ radial pulses, 2+ PT pulses  Resp: CTAB. No wheezing or crackles/rhonchi. Normal respiratory effort.  Abdomen: Soft, NT, no ana rosa hepatomegaly.  MSK/Ext: No clubbing or cyanosis.  Skin: Warm and dry, no rashes.  Neurological: CN III-XII grossly intact. No focal deficits.   Psych: A&O x 3, appropriate affect, good judgement\      Assessment:     1. Severe mitral regurgitation  REFERRAL TO CARDIOTHORACIC SURGERY    EC-ABHINAV W/O CONT   2. History of CVA (cerebrovascular accident)     3. MVP (mitral valve prolapse)  EC-ABHINAV W/O CONT   4. LALY (obstructive sleep apnea)     5. HTN (hypertension), malignant     6. Dysphagia following cerebrovascular accident     7. Dyslipidemia         Medical Decision Making:  Today's Assessment / Status / Plan:     -I really think we need a plan for her severe MR, EF supposed to be 65%, has dropped to 60% which is not terrible but not what it should be  -I think she has really slowed down her activity due to this, walks slowly, trying a rower now.  -BP may be too high, she might need to increase amlodipine, see below  -HR up a bit during pain but during pain  -CVA 3 years ago, at risk for afib with severe MR, we discussed loop again, it is reasonable.  -Needs ABHINAV, see below  -Refer to Dr. Schaefer and back to the Valve team  -Cont ASA  -No CAD on LHC  -Using a ring to monitor HR at night, no spikes  -RTC 3 months with me      Written instructions given today:    -You will need a ABHINAV, transesophogeal echocardiogram, this is the only way to take the appropriate valve pictures to determine that you are in fact a candidate the MitraClip procedure. We have an anesthesiologist put you to sleep and wake you up so we can pass the echo probe  down your esophagus and keep you calm enough to take good pictures.    -We will arrange these pictures at [facility], you need a ride home because of sedation. [place] will call you.    -I am making the referral to Dr. Kaden Schaefer, he is the cardiothoracic surgeon you will need to see to approve the Clip.    -REVEAL LINQ loop recorder to monitor for atrial fibrillation as a cause of stroke given mitral valve disease and given the location of stroke. This would  as we would change your aspirin to full blood thinner.    -Your BP is supposed to be closer to under 130/80, you can try increasing your amlodipine from 2.5 mg to 5 mg, can take 2 of the 2.5 mg at the same time. If you decide to stay on the higher dose, let us know, we will send a new prescription.     -For stroke symptoms, think FAST=Face, Arm, Speech, Time, 911 emergency.      Return in about 3 months (around 4/8/2021).    It is my pleasure to participate in the care of Ms. Bocanegra.  Please do not hesitate to contact me with questions or concerns.    Ayde Denise MD, [facility]  Cardiologist [facility]    Please note that this dictation was created using voice recognition software. I have made every reasonable attempt to correct obvious errors, but it is possible there are errors of grammar and possibly content that I did not discover before finalizing the note.        Bethany Crane M.D.  [address]  [place] [address]  Via Fax: [phone]     Darin Meehan M.D.  [address]  [address]  [place] [address]  Via In Basket     Kaden Schaefer M.D.  [address]  [address]  [place] [address]  Via In Basket

## 2021-01-08 NOTE — TELEPHONE ENCOUNTER
----- Message from Ayde Denise M.D. sent at 1/8/2021 10:48 AM PST -----  Regarding: ABHINAV  Please schedule ABHINAV for MitraClip with me, week of Jan 25th, but not Wednesday per pt. Thank you!  LS

## 2021-01-12 DIAGNOSIS — Z23 NEED FOR VACCINATION: ICD-10-CM

## 2021-01-13 NOTE — PROGRESS NOTES
REFERRING PHYSICIAN: Ayde Denise MD.    CONSULTING PHYSICIAN: Kaden Schaefer MD, FACS.    CHIEF COMPLAINT: Fatigue.    HISTORY OF PRESENT ILLNESS: The patient is a 75 y.o. 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  Today, she states she has had increasing fatigue over the last few months.  She also complains of muscle aches but has just received her first Covid vaccine.  She remains active with [employer] 2 times a week and uses her rowing machine. She denies shortness of breath, chest pain, lower extremity edema, dizziness, syncope, orthopnea, or PND.      PAST MEDICAL HISTORY:   Active Ambulatory Problems     Diagnosis Date Noted   • Osteopenia 01/27/2015   • Dyslipidemia 03/24/2015   • Fluency disorder associated with underlying disease 08/24/2017   • Severe mitral regurgitation 08/27/2017   • Dysphagia following cerebrovascular accident 08/28/2017   • History of CVA (cerebrovascular accident) 10/11/2017   • Sleep apnea 02/26/2019   • Long term (current) use of antithrombotics/antiplatelets 05/21/2020     Resolved Ambulatory Problems     Diagnosis Date Noted   • Benign essential HTN 03/19/2012   • MVP (mitral valve prolapse) 03/19/2012   • Mitral regurgitation 01/07/2014   • Acute CVA (cerebrovascular accident) (HCC) 08/18/2017   • Acute cerebrovascular accident (CVA) within last 8 weeks 08/24/2017   • Elevated brain natriuretic peptide (BNP) level 08/27/2017   • Acute respiratory failure with hypoxia (HCC) 08/27/2017   • Cerebrovascular accident (CVA) due to vascular occlusion (HCC) 08/15/2018   • HTN (hypertension), malignant 02/13/2019   • Syncope 05/03/2019   • Hypokalemia 05/03/2019   • Near syncope 05/07/2020   • Heart failure due to valvular disease (HCC) 05/07/2020      Past Medical History:   Diagnosis Date   • Arthritis    • Atrial fibrillation (HCC)    • Breast cancer (HCC)    • Cancer (HCC)    • Cardiac arrhythmia    • Chest tightness or pressure 3/19/2012   • Chickenpox    • Coronary heart disease    • [place] measles    • Gynecological disorder    • High risk medication use 3/19/2012   • Hypercholesterolemia 3/19/2012   • Mumps    • Osteoporosis    • Renal insufficiency 3/19/2012   • Stroke (HCC) 2017   • Tonsillitis    • Valvular heart disease    • Venereal disease        PAST SURGICAL HISTORY:   Past Surgical History:   Procedure Laterality Date   • CATARACT PHACO WITH IOL  3/16/2009    Performed by SHANNON GANDARA at [facility]   • CATARACT PHACO WITH IOL  1/26/2009    Performed by SHANNON GANDARA at [facility]   • BREAST BIOPSY     • HYSTERECTOMY LAPAROSCOPY     • LUMPECTOMY     • PB RADIATION THERAPY PLAN SIMPLE     • PB REMV 2ND CATARACT,CORN-SCLER SECTN     • AR CHEMOTHERAPY, UNSPECIFIED PROCEDURE     • PRIMARY C SECTION     • SINUSCOPE     • TONSILLECTOMY          ALLERGIES: No Known Allergies     CURRENT MEDICATIONS:   Current Outpatient Medications:   •  Cyanocobalamin (B-12 PO), Take 25,000 mcg by mouth every day., Disp: , Rfl:   •  Multiple Vitamins-Minerals (ICAPS AREDS 2 PO), Take  by mouth., Disp: , Rfl:   •  Biotin 54169 MCG Tab, Take  by mouth every day., Disp: , Rfl:   •  Nutritional Supplements (DHEA PO), Take  by mouth., Disp: , Rfl:   •  spironolactone (ALDACTONE) 25 MG Tab, Take 25 mg by mouth every day., Disp: , Rfl:   •  amLODIPine (NORVASC) 2.5 MG Tab, Take 1 Tab by mouth every day., Disp: 30 Tab, Rfl: 2  •  atorvastatin (LIPITOR) 80 MG tablet, TAKE 1 TABLET BY MOUTH EVERY DAY IN THE EVENING, Disp: 60 Tab, Rfl: 0  •  potassium chloride (MICRO-K) 10 MEQ capsule, Take 2 Caps by mouth 2 times a day. (Patient taking differently: Take 10 mEq by mouth every day.), Disp: 180 Cap, Rfl: 3  •  Cholecalciferol (VITAMIN  D3) 2000 UNIT Cap, Take 2,000 Units by mouth every day., Disp: , Rfl:   •  MYRBETRIQ 25 MG TABLET SR 24 HR, every day., Disp: , Rfl:   •  TESTOSTERONE TD, Apply  to skin as directed as needed., Disp: , Rfl:   •  aspirin (ASA) 81 MG Chew Tab chewable tablet, Take 1 Tab by mouth every day., Disp: 14 Tab, Rfl: 1  •  valacyclovir (VALTREX) 500 MG Tab, Take 500 mg by mouth every day., Disp: , Rfl:   •  calcium carbonate (OS-CRISTOFER 500) 500 MG Tab, Take 1,000 mg by mouth every day., Disp: , Rfl:   •  Multiple Vitamin (MULTIVITAMINS PO), Take 1 Tab by mouth every day., Disp: , Rfl:     FAMILY HISTORY:   Family History   Problem Relation Age of Onset   • Cancer Mother    • Heart Failure Neg Hx    • Heart Disease Neg Hx         SOCIAL HISTORY:   Social History     Socioeconomic History   • Marital status:      Spouse name: Not on file   • Number of children: Not on file   • Years of education: Not on file   • Highest education level: Not on file   Occupational History   • Not on file   Social Needs   • Financial resource strain: Not on file   • Food insecurity     Worry: Not on file     Inability: Not on file   • Transportation needs     Medical: Not on file     Non-medical: Not on file   Tobacco Use   • Smoking status: Never Smoker   • Smokeless tobacco: Never Used   Substance and Sexual Activity   • Alcohol use: Yes     Frequency: 2-3 times a week     Drinks per session: 1 or 2     Comment: 3 glasses of wine   • Drug use: No   • Sexual activity: Not on file   Lifestyle   • Physical activity     Days per week: Not on file     Minutes per session: Not on file   • Stress: Not on file   Relationships   • Social connections     Talks on phone: Not on file     Gets together: Not on file     Attends Spiritism service: Not on file     Active member of club or organization: Not on file     Attends meetings of clubs or organizations: Not on file     Relationship status: Not on file   • Intimate partner violence     Fear of  "current or ex partner: Not on file     Emotionally abused: Not on file     Physically abused: Not on file     Forced sexual activity: Not on file   Other Topics Concern   • Not on file   Social History Narrative   • Not on file       REVIEW OF SYSTEMS:  Review of Systems   Constitutional: Positive for malaise/fatigue.   HENT: Negative.    Eyes: Positive for blurred vision.   Respiratory: Negative.    Cardiovascular: Negative.    Gastrointestinal: Negative.    Genitourinary: Negative.    Musculoskeletal: Negative.    Skin: Negative.    Neurological: Negative.    Endo/Heme/Allergies: Negative.    Psychiatric/Behavioral: Positive for memory loss.        After concussion 9/21       PHYSICAL EXAM:  /90 (BP Location: Left arm, Patient Position: Sitting, BP Cuff Size: Adult)   Pulse 80   Temp 36.7 °C (98.1 °F) (Temporal)   Ht 1.702 m (5' 7\")   Wt 63.5 kg (140 lb)   SpO2 98%   BMI 21.93 kg/m².    Physical Exam   Constitutional: She is oriented to person, place, and time and well-developed, well-nourished, and in no distress. No distress.   HENT:   Head: Normocephalic and atraumatic.   Eyes: Pupils are equal, round, and reactive to light. EOM are normal.   Neck: Normal range of motion. Neck supple.   Cardiovascular: Normal rate and regular rhythm. Exam reveals no gallop.   Murmur heard.   Systolic murmur is present with a grade of 3/6.  Pulmonary/Chest: Effort normal and breath sounds normal. No respiratory distress. She has no wheezes. She has no rales.   Abdominal: Soft. Bowel sounds are normal. She exhibits no distension. There is no abdominal tenderness.   Musculoskeletal: Normal range of motion.         General: No edema.   Neurological: She is alert and oriented to person, place, and time.   Skin: Skin is warm and dry.   Psychiatric: Mood, memory, affect and judgment normal.         LABS REVIEWED:  Lab Results   Component Value Date/Time    SODIUM 135 07/08/2020 01:52 PM    POTASSIUM 4.5 07/08/2020 01:52 PM "    CHLORIDE 97 07/08/2020 01:52 PM    CO2 25 07/08/2020 01:52 PM    GLUCOSE 99 07/08/2020 01:52 PM    BUN 15 07/08/2020 01:52 PM    CREATININE 0.85 07/08/2020 01:52 PM    BUNCREATRAT 12 06/12/2020 05:12 AM      Lab Results   Component Value Date/Time    PROTHROMBTM 13.5 05/08/2020 09:33 AM    INR 1.01 05/08/2020 09:33 AM      Lab Results   Component Value Date/Time    WBC 3.8 06/12/2020 05:12 AM    WBC 4.3 (L) 05/09/2020 01:58 AM    RBC 4.27 06/12/2020 05:12 AM    RBC 3.61 (L) 05/09/2020 01:58 AM    HEMOGLOBIN 14.7 06/12/2020 05:12 AM    HEMOGLOBIN 12.5 05/09/2020 01:58 AM    HEMATOCRIT 43.2 06/12/2020 05:12 AM    HEMATOCRIT 37.5 05/09/2020 01:58 AM     (H) 06/12/2020 05:12 AM    .9 (H) 05/09/2020 01:58 AM    MCH 34.4 (H) 06/12/2020 05:12 AM    MCH 34.6 (H) 05/09/2020 01:58 AM    MCHC 34.0 06/12/2020 05:12 AM    MCHC 33.3 (L) 05/09/2020 01:58 AM    MPV 9.6 05/09/2020 01:58 AM    NEUTSPOLYS 61 06/12/2020 05:12 AM    NEUTSPOLYS 60.00 05/09/2020 01:58 AM    LYMPHOCYTES 28 06/12/2020 05:12 AM    LYMPHOCYTES 31.00 05/09/2020 01:58 AM    MONOCYTES 7 06/12/2020 05:12 AM    MONOCYTES 7.20 05/09/2020 01:58 AM    EOSINOPHILS 3 06/12/2020 05:12 AM    EOSINOPHILS 1.60 05/09/2020 01:58 AM    BASOPHILS 1 06/12/2020 05:12 AM    BASOPHILS 0.20 05/09/2020 01:58 AM        IMAGING REVIEWED AND INTERPRETED:    ECHOCARDIOGRAM [facility] 11/27/2020:   Left ventricular ejection fraction is visually estimated to be 60%.  Severely dilated left atrium.  Myxomatous changes of the mitral valve.  Bileaflet prolapse of the mitral valve is present.  Severe mitral regurgitation with pulmonary vein systolic flow reversal  Estimated right ventricular systolic pressure  is 30 mmHg.    TRANSESOPHAGEAL ECHOCARDIOGRAM [facility] 1/26/2020:  LV EF 55%.  Biatrial enlargement.  There is severe eccentric mitral regurgitation with multiple jets,   predominantly from flail leaflet of P2.  Echolucent space near P1 of unclear etiology, unusual for cleft  leaflet.  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    ANGIOGRAM [facility] 5/8/2020:   Right ventricle 33/5, EDP 15  Pulmonary artery 42/21, 29  pulmonary capillary wedge 23  Cardiac output 4.1 L/min by thermodilution method  1.  Left main coronary artery:  Normal.  2.  Left anterior descending artery:  Normal.   3.  Left circumflex coronary artery:  Normal.   4.  Right coronary artery:  Normal.  This is a right dominant system.  5.  Left ventricular end diastolic pressure:  25 mmHg.  No signficant gradient across the aortic valve.  6.  Left ventriculogram:  Ejection fraction of 65%, 2+ mitral regurgitation, normal sized thoracic aorta.    MRI BRAIN 2017:  2017 MRI BRAIN  1.  Moderate sized RIGHT MCA territory acute ischemia involving the cortex and basal ganglia  2.  Flow void is present in the RIGHT MCA compatible with treatment effect  3.  No hemorrhage  4.  Mild white matter changes  5.  Mild atrophy    IMPRESSION:  Severe symptomatic mitral regurgitation, posterior mitral valve leaflet prolapse, history of CVA, history of obstructive sleep apnea.      PLAN:  I recommend that she undergo mitral valve repair or replacement and intraoperative transesophageal echocardiography.    The procedure, its risks, benefits, potential complications and alternative treatments were discussed with the patient in detail.  The patient is interested in having a robotic mitral valve repair.  She stated that she will be seeking an opinion from surgeons at the [facility].  She stated that if she decides to have a standard operation she will let us know.  The STS mortality risk score is 2.2% and the morbidity and mortality risk score is 12.8% mitral valve repair. The STS mortality risk score is 4.6% and the morbidity and mortality risk score is 19% mitral valve replacement. The scores were discussed with patient.    Findings and recommendations have been discussed with the patient’s cardiologist, Ayde Denise MD.  Thank you for  this very challenging consultation and participation in the patient’s care.  I will keep you apprised of all future developments.      Sincerely,      Kaden Schaefer MD, FACS.

## 2021-01-20 ENCOUNTER — TELEPHONE (OUTPATIENT)
Dept: CARDIOLOGY | Facility: MEDICAL CENTER | Age: 76
End: 2021-01-20

## 2021-01-20 RX ORDER — AMLODIPINE BESYLATE 5 MG/1
5 TABLET ORAL DAILY
Qty: 90 TAB | Refills: 3 | COMMUNITY
Start: 2021-01-20 | End: 2021-04-22

## 2021-01-22 ENCOUNTER — PRE-ADMISSION TESTING (OUTPATIENT)
Dept: ADMISSIONS | Facility: MEDICAL CENTER | Age: 76
End: 2021-01-22
Attending: INTERNAL MEDICINE
Payer: COMMERCIAL

## 2021-01-22 DIAGNOSIS — Z01.812 PRE-OPERATIVE LABORATORY EXAMINATION: ICD-10-CM

## 2021-01-22 LAB
COVID ORDER STATUS COVID19: NORMAL
SARS-COV-2 RNA RESP QL NAA+PROBE: NOTDETECTED
SPECIMEN SOURCE: NORMAL

## 2021-01-22 PROCEDURE — C9803 HOPD COVID-19 SPEC COLLECT: HCPCS

## 2021-01-22 PROCEDURE — U0005 INFEC AGEN DETEC AMPLI PROBE: HCPCS

## 2021-01-22 PROCEDURE — U0003 INFECTIOUS AGENT DETECTION BY NUCLEIC ACID (DNA OR RNA); SEVERE ACUTE RESPIRATORY SYNDROME CORONAVIRUS 2 (SARS-COV-2) (CORONAVIRUS DISEASE [COVID-19]), AMPLIFIED PROBE TECHNIQUE, MAKING USE OF HIGH THROUGHPUT TECHNOLOGIES AS DESCRIBED BY CMS-2020-01-R: HCPCS

## 2021-01-22 ASSESSMENT — FIBROSIS 4 INDEX: FIB4 SCORE: 1.76

## 2021-01-22 NOTE — OR NURSING
Patient lives alone but stated could get someone to help if needed.  Patient having COVID Vaccine 1/23/21 instructed her to call Dr Denise for any reaction or symptoms

## 2021-01-25 NOTE — OR NURSING
COVID-19 Pre-Surgery Screenin. Do you have an undiagnosed respiratory illness or symptoms such as coughing, sneezing or sore throat? Any loss of sense of taste or smell? NO     • Onset of Sx: NO    • Acute vs. chronic respiratory illness: NO     2. Do you have an unexplained fever greater than 100.4 degrees Fahrenheit or 38 degrees Celsius? NO  ?  3. Have you had direct exposure to a patient who tested positive for Covid-19? NO    4. Patient informed of current visitation and mask policies by this RN.

## 2021-01-26 ENCOUNTER — APPOINTMENT (OUTPATIENT)
Dept: CARDIOLOGY | Facility: MEDICAL CENTER | Age: 76
End: 2021-01-26
Attending: INTERNAL MEDICINE
Payer: COMMERCIAL

## 2021-01-26 ENCOUNTER — HOSPITAL ENCOUNTER (OUTPATIENT)
Facility: MEDICAL CENTER | Age: 76
End: 2021-01-26
Attending: INTERNAL MEDICINE | Admitting: INTERNAL MEDICINE
Payer: COMMERCIAL

## 2021-01-26 ENCOUNTER — ANESTHESIA (OUTPATIENT)
Dept: CARDIOLOGY | Facility: MEDICAL CENTER | Age: 76
End: 2021-01-26
Payer: COMMERCIAL

## 2021-01-26 ENCOUNTER — ANESTHESIA EVENT (OUTPATIENT)
Dept: CARDIOLOGY | Facility: MEDICAL CENTER | Age: 76
End: 2021-01-26
Payer: COMMERCIAL

## 2021-01-26 VITALS
RESPIRATION RATE: 18 BRPM | TEMPERATURE: 97.5 F | WEIGHT: 134.48 LBS | SYSTOLIC BLOOD PRESSURE: 151 MMHG | HEIGHT: 67 IN | BODY MASS INDEX: 21.11 KG/M2 | HEART RATE: 75 BPM | OXYGEN SATURATION: 97 % | DIASTOLIC BLOOD PRESSURE: 78 MMHG

## 2021-01-26 DIAGNOSIS — I34.0 SEVERE MITRAL REGURGITATION: ICD-10-CM

## 2021-01-26 DIAGNOSIS — I34.1 MVP (MITRAL VALVE PROLAPSE): ICD-10-CM

## 2021-01-26 LAB — LV EJECT FRACT  99904: 55

## 2021-01-26 PROCEDURE — 700101 HCHG RX REV CODE 250: Performed by: ANESTHESIOLOGY

## 2021-01-26 PROCEDURE — 160002 HCHG RECOVERY MINUTES (STAT)

## 2021-01-26 PROCEDURE — 93325 DOPPLER ECHO COLOR FLOW MAPG: CPT | Mod: 26 | Performed by: INTERNAL MEDICINE

## 2021-01-26 PROCEDURE — 700101 HCHG RX REV CODE 250: Performed by: INTERNAL MEDICINE

## 2021-01-26 PROCEDURE — 93312 ECHO TRANSESOPHAGEAL: CPT | Mod: 26 | Performed by: INTERNAL MEDICINE

## 2021-01-26 PROCEDURE — A9270 NON-COVERED ITEM OR SERVICE: HCPCS | Performed by: INTERNAL MEDICINE

## 2021-01-26 PROCEDURE — 93325 DOPPLER ECHO COLOR FLOW MAPG: CPT

## 2021-01-26 PROCEDURE — 700105 HCHG RX REV CODE 258: Performed by: INTERNAL MEDICINE

## 2021-01-26 PROCEDURE — 700111 HCHG RX REV CODE 636 W/ 250 OVERRIDE (IP): Performed by: ANESTHESIOLOGY

## 2021-01-26 RX ORDER — SODIUM CHLORIDE, SODIUM LACTATE, POTASSIUM CHLORIDE, CALCIUM CHLORIDE 600; 310; 30; 20 MG/100ML; MG/100ML; MG/100ML; MG/100ML
INJECTION, SOLUTION INTRAVENOUS CONTINUOUS
Status: DISCONTINUED | OUTPATIENT
Start: 2021-01-26 | End: 2021-01-26

## 2021-01-26 RX ORDER — SODIUM CHLORIDE, SODIUM LACTATE, POTASSIUM CHLORIDE, CALCIUM CHLORIDE 600; 310; 30; 20 MG/100ML; MG/100ML; MG/100ML; MG/100ML
INJECTION, SOLUTION INTRAVENOUS CONTINUOUS
Status: DISCONTINUED | OUTPATIENT
Start: 2021-01-26 | End: 2021-01-26 | Stop reason: HOSPADM

## 2021-01-26 RX ORDER — ONDANSETRON 2 MG/ML
4 INJECTION INTRAMUSCULAR; INTRAVENOUS
Status: DISCONTINUED | OUTPATIENT
Start: 2021-01-26 | End: 2021-01-26 | Stop reason: HOSPADM

## 2021-01-26 RX ORDER — DIPHENHYDRAMINE HYDROCHLORIDE 50 MG/ML
12.5 INJECTION INTRAMUSCULAR; INTRAVENOUS
Status: DISCONTINUED | OUTPATIENT
Start: 2021-01-26 | End: 2021-01-26 | Stop reason: HOSPADM

## 2021-01-26 RX ORDER — LIDOCAINE HYDROCHLORIDE 20 MG/ML
INJECTION, SOLUTION EPIDURAL; INFILTRATION; INTRACAUDAL; PERINEURAL PRN
Status: DISCONTINUED | OUTPATIENT
Start: 2021-01-26 | End: 2021-01-26 | Stop reason: SURG

## 2021-01-26 RX ADMIN — LIDOCAINE HYDROCHLORIDE 20 MG: 20 INJECTION, SOLUTION EPIDURAL; INFILTRATION; INTRACAUDAL at 11:44

## 2021-01-26 RX ADMIN — PROPOFOL 30 MG: 10 INJECTION, EMULSION INTRAVENOUS at 11:47

## 2021-01-26 RX ADMIN — PROPOFOL 30 MG: 10 INJECTION, EMULSION INTRAVENOUS at 11:53

## 2021-01-26 RX ADMIN — POVIDONE IODINE 15 ML: 100 SOLUTION TOPICAL at 09:49

## 2021-01-26 RX ADMIN — PROPOFOL 30 MG: 10 INJECTION, EMULSION INTRAVENOUS at 11:59

## 2021-01-26 RX ADMIN — PROPOFOL 30 MG: 10 INJECTION, EMULSION INTRAVENOUS at 12:05

## 2021-01-26 RX ADMIN — PROPOFOL 30 MG: 10 INJECTION, EMULSION INTRAVENOUS at 12:09

## 2021-01-26 RX ADMIN — PROPOFOL 30 MG: 10 INJECTION, EMULSION INTRAVENOUS at 11:44

## 2021-01-26 RX ADMIN — LIDOCAINE HYDROCHLORIDE 20 MG: 20 INJECTION, SOLUTION EPIDURAL; INFILTRATION; INTRACAUDAL at 11:59

## 2021-01-26 RX ADMIN — SODIUM CHLORIDE, POTASSIUM CHLORIDE, SODIUM LACTATE AND CALCIUM CHLORIDE: 600; 310; 30; 20 INJECTION, SOLUTION INTRAVENOUS at 09:49

## 2021-01-26 ASSESSMENT — PAIN SCALES - GENERAL: PAIN_LEVEL: 0

## 2021-01-26 ASSESSMENT — FIBROSIS 4 INDEX: FIB4 SCORE: 1.76

## 2021-01-26 NOTE — ANESTHESIA PREPROCEDURE EVALUATION
74yo female with mitral regurgitation for ABHINAV    Relevant Problems   ANESTHESIA   (+) Sleep apnea      NEURO   (+) History of CVA (cerebrovascular accident)      CARDIAC   (+) Severe mitral regurgitation       Physical Exam    Airway   Mallampati: II  TM distance: >3 FB  Neck ROM: full       Cardiovascular - normal exam  Rhythm: regular  Rate: normal  (-) murmur     Dental - normal exam           Pulmonary - normal exam  Breath sounds clear to auscultation     Abdominal    Neurological - normal exam                 Anesthesia Plan    ASA 3   ASA physical status 3 criteria: CVA or TIA - history (> 3 months)    Plan - MAC             Induction: intravenous    Postoperative Plan: Postoperative administration of opioids is intended.    Pertinent diagnostic labs and testing reviewed    Informed Consent:    Anesthetic plan and risks discussed with patient.    Use of blood products discussed with: patient whom consented to blood products.

## 2021-01-26 NOTE — DISCHARGE INSTRUCTIONS
ACTIVITY: Rest and take it easy for the first 24 hours.  A responsible adult is recommended to remain with you during that time.  It is normal to feel sleepy.  We encourage you to not do anything that requires balance, judgment or coordination.    MILD FLU-LIKE SYMPTOMS ARE NORMAL. YOU MAY EXPERIENCE GENERALIZED MUSCLE ACHES, THROAT IRRITATION, HEADACHE AND/OR SOME NAUSEA.    FOR 24 HOURS DO NOT:  Drive, operate machinery or run household appliances.  Drink beer or alcoholic beverages.   Make important decisions or sign legal documents.    SPECIAL INSTRUCTIONS: N/A    DIET: To avoid nausea, slowly advance diet as tolerated, avoiding spicy or greasy foods for the first day.  Add more substantial food to your diet according to your physician's instructions.  Babies can be fed formula or breast milk as soon as they are hungry.  INCREASE FLUIDS AND FIBER TO AVOID CONSTIPATION.    SURGICAL DRESSING/BATHING: N/A    FOLLOW-UP APPOINTMENT:  A follow-up appointment should be arranged with your Cardiologist; call to schedule.    You should CALL YOUR PHYSICIAN if you develop:  Fever greater than 101 degrees F.  Pain not relieved by medication, or persistent nausea or vomiting.  Excessive bleeding (blood soaking through dressing) or unexpected drainage from the wound.  Extreme redness or swelling around the incision site, drainage of pus or foul smelling drainage.  Inability to urinate or empty your bladder within 8 hours.  Problems with breathing or chest pain.    You should call 911 if you develop problems with breathing or chest pain.  If you are unable to contact your doctor or surgical center, you should go to the nearest emergency room or urgent care center.      If any questions arise, call your doctor.  If your doctor is not available, please feel free to call the Surgical Center at [phone]. The Contact Center is open Monday through Friday 7AM to 5PM and may speak to a nurse at [phone], or toll free at  [phone].     A registered nurse may call you a few days after your surgery to see how you are doing after your procedure.    MEDICATIONS: Resume taking daily medication.  Take prescribed pain medication with food.  If no medication is prescribed, you may take non-aspirin pain medication if needed.  PAIN MEDICATION CAN BE VERY CONSTIPATING.  Take a stool softener or laxative such as senokot, pericolace, or milk of magnesia if needed.    If your physician has prescribed pain medication that includes Acetaminophen (Tylenol), do not take additional Acetaminophen (Tylenol) while taking the prescribed medication.    Transesophageal Echocardiogram  Transesophageal echocardiogram (ABHINAV) is a test that uses sound waves to take pictures of your heart. ABHINAV is done by passing a flexible tube down the esophagus. The esophagus is the tube that carries food from the throat to the stomach. The pictures give detailed images of your heart. This can help your doctor see if there are problems with your heart.  What happens before the procedure?  Staying hydrated  Follow instructions from your doctor about hydration, which may include:  · Up to 3 hours before the procedure - you may continue to drink clear liquids, such as:  ? Water.  ? Clear fruit juice.  ? Black coffee.  ? Plain tea.    Eating and drinking  Follow instructions from your doctor about eating and drinking, which may include:  · 8 hours before the procedure - stop eating heavy meals or foods such as meat, fried foods, or fatty foods.  · 6 hours before the procedure - stop eating light meals or foods, such as toast or cereal.  · 6 hours before the procedure - stop drinking milk or drinks that contain milk.  · 3 hours before the procedure - stop drinking clear liquids.  General instructions  · You will need to take out any dentures or retainers.  · Plan to have someone take you home from the hospital or clinic.  · If you will be going home right after the procedure,  plan to have someone with you for 24 hours.  · Ask your doctor about:  ? Changing or stopping your normal medicines. This is important if you take diabetes medicines or blood thinners.  ? Taking over-the-counter medicines, vitamins, herbs, and supplements.  ? Taking medicines such as aspirin and ibuprofen. These medicines can thin your blood. Do not take these medicines unless your doctor tells you to take them.  What happens during the procedure?  · To lower your risk of infection, your doctors will wash or clean their hands.  · An IV will be put into one of your veins.  · You will be given a medicine to help you relax (sedative).  · A medicine may be sprayed or gargled. This numbs the back of your throat.  · Your blood pressure, heart rate, and breathing will be watched.  · You may be asked to lay on your left side.  · A bite block will be placed in your mouth. This keeps you from biting the tube.  · The tip of the ABHINAV probe will be placed into the back of your mouth.  · You will be asked to swallow.  · Your doctor will take pictures of your heart.  · The probe and bite block will be taken out.  The procedure may vary among doctors and hospitals.  What happens after the procedure?    · Your blood pressure, heart rate, breathing rate, and blood oxygen level will be watched until the medicines you were given have worn off.  · When you first wake up, your throat may feel sore and numb. This will get better over time. You will not be allowed to eat or drink until the numbness has gone away.  · Do not drive for 24 hours if you were given a medicine to help you relax.  Summary  · ABHINAV is a test that uses sound waves to take pictures of your heart.  · You will be given a medicine to help you relax.  · Do not drive for 24 hours if you were given a medicine to help you relax.  This information is not intended to replace advice given to you by your health care provider. Make sure you discuss any questions you have with your  health care provider.  Document Released: 10/15/2010 Document Revised: 09/06/2019 Document Reviewed: 03/21/2018  Elsevier Patient Education © 2020 [employer]    Depression / Suicide Risk    As you are discharged from this [facility] facility, it is important to learn how to keep safe from harming yourself.    Recognize the warning signs:  · Abrupt changes in personality, positive or negative- including increase in energy   · Giving away possessions  · Change in eating patterns- significant weight changes-  positive or negative  · Change in sleeping patterns- unable to sleep or sleeping all the time   · Unwillingness or inability to communicate  · Depression  · Unusual sadness, discouragement and loneliness  · Talk of wanting to die  · Neglect of personal appearance   · Rebelliousness- reckless behavior  · Withdrawal from people/activities they love  · Confusion- inability to concentrate     If you or a loved one observes any of these behaviors or has concerns about self-harm, here's what you can do:  · Talk about it- your feelings and reasons for harming yourself  · Remove any means that you might use to hurt yourself (examples: pills, rope, extension cords, firearm)  · Get professional help from the community (Mental Health, Substance Abuse, psychological counseling)  · Do not be alone:Call your Safe Contact- someone whom you trust who will be there for you.  · Call your local CRISIS HOTLINE 526-5521 or 597-452-5492  · Call your local Children's Mobile Crisis Response Team [place] (989) 998-1082 or www.[employer]  · Call the toll free National Suicide Prevention Hotlines   · National Suicide Prevention Lifeline 487-011-HCCE (7260)  · National Hope Line Network 800-SUICIDE (534-2413)

## 2021-01-26 NOTE — OR NURSING
Patient received from cath lab at 1233, bedside report received from anesthesia/OR RN, 2 patient identifiers confirmed . Patient connected to monitors, assessed for general head to toe and pain/nausea. Ordered reviewed and checked. Friend Jai updated via telephone on patient status per patient request.  At this time patient meets criteria for D/C. VSS, awake and appropriate, pain minimal or at a tolerable level per patient. Tolerating PO without issues.    Discharge instructions reviewed with patient and Friend at . Both verbalize understanding and Jai signed chart copy. All questions and concerns addressed prior to departure. PIV removed without complication. Patient escorted to car by [facility] staff.

## 2021-01-26 NOTE — ANESTHESIA POSTPROCEDURE EVALUATION
Patient: Shaista Bocanegra    Procedure Summary     Date: 01/26/21 Room / Location: [facility] IMAGING - ECHOCARDIOLOGY [facility]    Anesthesia Start: 1133 Anesthesia Stop: 1234    Procedure: EC-ABHINAV W/O CONT Diagnosis:       Severe mitral regurgitation      MVP (mitral valve prolapse)      Nonrheumatic mitral (valve) insufficiency    Scheduled Providers: Ayde Denise M.D.; Cristina Reyes M.D. Responsible Provider: Cristina Reyes M.D.    Anesthesia Type: MAC ASA Status: 3          Final Anesthesia Type: MAC  Last vitals  BP   Blood Pressure : 129/69    Temp   36.4 °C (97.5 °F)    Pulse   Pulse: 68   Resp   16    SpO2   99 %      Anesthesia Post Evaluation    Patient location during evaluation: PACU  Patient participation: complete - patient participated  Level of consciousness: awake and alert  Pain score: 0    Airway patency: patent  Anesthetic complications: no  Cardiovascular status: hemodynamically stable  Respiratory status: acceptable  Hydration status: euvolemic    PONV: none           Nurse Pain Score: 0 (NPRS)

## 2021-01-26 NOTE — ANESTHESIA TIME REPORT
Anesthesia Start and Stop Event Times     Date Time Event    1/26/2021 1125 Ready for Procedure     1133 Anesthesia Start     1234 Anesthesia Stop        Responsible Staff  01/26/21    Name Role Begin End    Cristina Reyes M.D. Anesth 1133 1234        Preop Diagnosis (Free Text):  Pre-op Diagnosis             Preop Diagnosis (Codes):    Post op Diagnosis  Mitral valve insufficiency      Premium Reason  Non-Premium    Comments:

## 2021-01-27 ENCOUNTER — OFFICE VISIT (OUTPATIENT)
Dept: CARDIOTHORACIC SURGERY | Facility: MEDICAL CENTER | Age: 76
End: 2021-01-27
Payer: COMMERCIAL

## 2021-01-27 VITALS
HEIGHT: 67 IN | OXYGEN SATURATION: 98 % | SYSTOLIC BLOOD PRESSURE: 142 MMHG | DIASTOLIC BLOOD PRESSURE: 90 MMHG | TEMPERATURE: 98.1 F | BODY MASS INDEX: 21.97 KG/M2 | WEIGHT: 140 LBS | HEART RATE: 80 BPM

## 2021-01-27 DIAGNOSIS — I34.0 SEVERE MITRAL REGURGITATION: ICD-10-CM

## 2021-01-27 PROCEDURE — 99205 OFFICE O/P NEW HI 60 MIN: CPT | Performed by: THORACIC SURGERY (CARDIOTHORACIC VASCULAR SURGERY)

## 2021-01-27 ASSESSMENT — FIBROSIS 4 INDEX: FIB4 SCORE: 1.76

## 2021-01-27 ASSESSMENT — ENCOUNTER SYMPTOMS
RESPIRATORY NEGATIVE: 1
GASTROINTESTINAL NEGATIVE: 1
BLURRED VISION: 1
NEUROLOGICAL NEGATIVE: 1
CARDIOVASCULAR NEGATIVE: 1
MUSCULOSKELETAL NEGATIVE: 1
MEMORY LOSS: 1

## 2021-02-09 DIAGNOSIS — E78.5 DYSLIPIDEMIA: ICD-10-CM

## 2021-02-10 ENCOUNTER — HOSPITAL ENCOUNTER (OUTPATIENT)
Dept: RADIOLOGY | Facility: MEDICAL CENTER | Age: 76
End: 2021-02-10
Attending: INTERNAL MEDICINE
Payer: COMMERCIAL

## 2021-02-10 DIAGNOSIS — Z12.31 VISIT FOR SCREENING MAMMOGRAM: ICD-10-CM

## 2021-02-10 PROCEDURE — 77063 BREAST TOMOSYNTHESIS BI: CPT

## 2021-02-10 RX ORDER — ATORVASTATIN CALCIUM 80 MG/1
TABLET, FILM COATED ORAL
Qty: 60 TABLET | Refills: 0 | Status: SHIPPED | OUTPATIENT
Start: 2021-02-10 | End: 2021-04-14

## 2021-02-26 ENCOUNTER — OFFICE VISIT (OUTPATIENT)
Dept: CARDIOLOGY | Facility: MEDICAL CENTER | Age: 76
End: 2021-02-26
Payer: COMMERCIAL

## 2021-02-26 VITALS
HEART RATE: 78 BPM | RESPIRATION RATE: 16 BRPM | SYSTOLIC BLOOD PRESSURE: 148 MMHG | WEIGHT: 134.7 LBS | OXYGEN SATURATION: 94 % | DIASTOLIC BLOOD PRESSURE: 80 MMHG | HEIGHT: 67 IN | BODY MASS INDEX: 21.14 KG/M2

## 2021-02-26 DIAGNOSIS — I34.0 SEVERE MITRAL REGURGITATION: ICD-10-CM

## 2021-02-26 DIAGNOSIS — E78.5 DYSLIPIDEMIA: ICD-10-CM

## 2021-02-26 DIAGNOSIS — I34.1 MVP (MITRAL VALVE PROLAPSE): ICD-10-CM

## 2021-02-26 DIAGNOSIS — Z86.73 HISTORY OF CVA (CEREBROVASCULAR ACCIDENT): ICD-10-CM

## 2021-02-26 PROCEDURE — 99214 OFFICE O/P EST MOD 30 MIN: CPT | Performed by: INTERNAL MEDICINE

## 2021-02-26 ASSESSMENT — ENCOUNTER SYMPTOMS
BLURRED VISION: 0
PSYCHIATRIC NEGATIVE: 1
FOCAL WEAKNESS: 0
NAUSEA: 0
NERVOUS/ANXIOUS: 0
MYALGIAS: 0
NEUROLOGICAL NEGATIVE: 1
CONSTITUTIONAL NEGATIVE: 1
FEVER: 0
CLAUDICATION: 0
DOUBLE VISION: 0
WEAKNESS: 0
CARDIOVASCULAR NEGATIVE: 1
ABDOMINAL PAIN: 0
VOMITING: 0
GASTROINTESTINAL NEGATIVE: 1
PALPITATIONS: 0
WEIGHT LOSS: 0
HEADACHES: 0
EYES NEGATIVE: 1
RESPIRATORY NEGATIVE: 1
CHILLS: 0
COUGH: 0
DIZZINESS: 0
SHORTNESS OF BREATH: 0
BRUISES/BLEEDS EASILY: 0
MUSCULOSKELETAL NEGATIVE: 1
DEPRESSION: 0

## 2021-02-26 ASSESSMENT — FIBROSIS 4 INDEX: FIB4 SCORE: 1.76

## 2021-02-26 NOTE — PROGRESS NOTES
Chief Complaint   Patient presents with   • Mitral Valve Prolapse       Subjective:   Shaista Bocanegra is a 75 y.o. female who presents today for follow up of mitral regurgitation.    Since the patient's last visit on 05/08/20, she has been doing well clinically. She denies chest pain, shortness of breath, palpitations, nausea/vomiting or diaphoresis. She consulted with Dr. Schaefer and was recommended mitral valve repair or replacement. She is very reluctant and is looking into robotics repair.     Past Medical History:   Diagnosis Date   • Arthritis    • Atrial fibrillation (HCC)    • Benign essential HTN 3/19/2012   • Breast cancer (HCC)    • Cancer (HCC) 1998    breast    • Cardiac arrhythmia    • Chest tightness or pressure 3/19/2012   • Chickenpox    • Coronary heart disease    • Stateless measles    • Gynecological disorder    • High cholesterol    • High risk medication use 3/19/2012   • Hypercholesterolemia 3/19/2012   • Mumps    • MVP (mitral valve prolapse) 3/19/2012   • Osteoporosis    • Sleep apnea     no CPAP   • Snoring    • Stroke (HCC) 2017    residual minor weakness on left side   • Tonsillitis    • Urinary bladder disorder     OAB   • Urinary incontinence    • Valvular heart disease    • Venereal disease      Past Surgical History:   Procedure Laterality Date   • CATARACT PHACO WITH IOL  3/16/2009    Performed by SHANNON GANDARA at SURGERY SAME DAY [facility] ORS   • CATARACT PHACO WITH IOL  1/26/2009    Performed by SHANNON GANDARA at SURGERY SAME DAY [facility] ORS   • BREAST BIOPSY     • HYSTERECTOMY LAPAROSCOPY     • LUMPECTOMY     • PB RADIATION THERAPY PLAN SIMPLE     • PB REMV 2ND CATARACT,CORN-SCLER SECTN     • TX CHEMOTHERAPY, UNSPECIFIED PROCEDURE     • PRIMARY C SECTION     • SINUSCOPE     • TONSILLECTOMY       Family History   Problem Relation Age of Onset   • Cancer Mother    • Heart Failure Neg Hx    • Heart Disease Neg Hx      Social History     Socioeconomic History   • Marital status:       Spouse name: Not on file   • Number of children: 2   • Years of education: Not on file   • Highest education level: Not on file   Occupational History   • Not on file   Tobacco Use   • Smoking status: Never Smoker   • Smokeless tobacco: Never Used   Substance and Sexual Activity   • Alcohol use: Yes     Comment: 1 per day   • Drug use: No   • Sexual activity: Not on file   Other Topics Concern   • Not on file   Social History Narrative   • Not on file     Social Determinants of Health     Financial Resource Strain:    • Difficulty of Paying Living Expenses:    Food Insecurity:    • Worried About Running Out of Food in the Last Year:    • Ran Out of Food in the Last Year:    Transportation Needs:    • Lack of Transportation (Medical):    • Lack of Transportation (Non-Medical):    Physical Activity:    • Days of Exercise per Week:    • Minutes of Exercise per Session:    Stress:    • Feeling of Stress :    Social Connections:    • Frequency of Communication with Friends and Family:    • Frequency of Social Gatherings with Friends and Family:    • Attends [employer] Services:    • Active Member of Clubs or Organizations:    • Attends Club or Organization Meetings:    • Marital Status:    Intimate Partner Violence:    • Fear of Current or Ex-Partner:    • Emotionally Abused:    • Physically Abused:    • Sexually Abused:      No Known Allergies     (Medications reviewed.)  Outpatient Encounter Medications as of 2/26/2021   Medication Sig Dispense Refill   • atorvastatin (LIPITOR) 80 MG tablet TAKE 1 TABLET BY MOUTH EVERY DAY IN THE EVENING 60 tablet 0   • amLODIPine (NORVASC) 5 MG Tab Take 1 Tab by mouth every day. 90 Tab 3   • Cyanocobalamin (B-12 PO) Take 25,000 mcg by mouth every day.     • Multiple Vitamins-Minerals (ICAPS AREDS 2 PO) Take  by mouth.     • Biotin 11586 MCG Tab Take  by mouth every day.     • potassium chloride (MICRO-K) 10 MEQ capsule Take 2 Caps by mouth 2 times a day. (Patient taking  "differently: Take 10 mEq by mouth every day.) 180 Cap 3   • Cholecalciferol (VITAMIN D3) 2000 UNIT Cap Take 2,000 Units by mouth every day.     • MYRBETRIQ 25 MG TABLET SR 24 HR every day.     • TESTOSTERONE TD Apply  to skin as directed as needed.     • aspirin (ASA) 81 MG Chew Tab chewable tablet Take 1 Tab by mouth every day. 14 Tab 1   • valacyclovir (VALTREX) 500 MG Tab Take 500 mg by mouth every day.     • calcium carbonate (OS-CRISTOFER 500) 500 MG Tab Take 1,000 mg by mouth every day.     • Multiple Vitamin (MULTIVITAMINS PO) Take 1 Tab by mouth every day.     • [DISCONTINUED] spironolactone (ALDACTONE) 25 MG Tab Take 25 mg by mouth every day.       No facility-administered encounter medications on file as of 2/26/2021.     Review of Systems   Constitutional: Negative.  Negative for chills, fever, malaise/fatigue and weight loss.   HENT: Negative.  Negative for hearing loss.    Eyes: Negative.  Negative for blurred vision and double vision.   Respiratory: Negative.  Negative for cough and shortness of breath.    Cardiovascular: Negative.  Negative for chest pain, palpitations, claudication and leg swelling.   Gastrointestinal: Negative.  Negative for abdominal pain, nausea and vomiting.   Genitourinary: Negative.  Negative for dysuria and urgency.   Musculoskeletal: Negative.  Negative for joint pain and myalgias.   Skin: Negative.  Negative for itching and rash.   Neurological: Negative.  Negative for dizziness, focal weakness, weakness and headaches.   Endo/Heme/Allergies: Negative.  Does not bruise/bleed easily.   Psychiatric/Behavioral: Negative.  Negative for depression. The patient is not nervous/anxious.         Objective:   /80 (BP Location: Left arm, Patient Position: Sitting, BP Cuff Size: Adult)   Pulse 78   Resp 16   Ht 1.702 m (5' 7\")   Wt 61.1 kg (134 lb 11.2 oz)   SpO2 94%   BMI 21.10 kg/m²     Physical Exam   Constitutional: She is oriented to person, place, and time. She appears " well-developed and well-nourished.   HENT:   Head: Normocephalic and atraumatic.   Eyes: EOM are normal.   Neck: No JVD present.   Cardiovascular: Normal rate, regular rhythm and normal heart sounds.   Pulmonary/Chest: Effort normal and breath sounds normal.   Abdominal: Soft. Bowel sounds are normal.   No hepatosplenomegaly.   Musculoskeletal:         General: Normal range of motion.   Lymphadenopathy:     She has no cervical adenopathy.   Neurological: She is alert and oriented to person, place, and time.   Skin: Skin is warm and dry.   Psychiatric: She has a normal mood and affect.     CARDIAC STUDIES/PROCEDURES:    CARDIAC CATHETERIZATION CONCLUSIONS by Juventino Farah (05/08/20)  Angiographically normal appearing coronary arteries.  Mildly elevated LVEDP, filling pressures.  2+ mitral regurgitation.  Normal LV function.  (study result reviewed)     CAROTID ULTRASOUND (08/18/17)  Normal carotids, subclavians and vertebrals.     CT OF HEAD (05/03/19)  NO ACUTE ABNORMALITIES ARE NOTED ON CT SCAN OF THE HEAD.  Right-sided encephalomalacia is noted.     CTA OF HEAD (08/18/17)  1.  There is a right M1 segment occlusion.  2.  There is collateral flow in the peripheral M3 branches seen on the right.  3.  There is mild decreased enhancement of the visualized right internal carotid artery however it is patent.  4.  Question of subtle hypodensity in the right insular cortex region. No abnormal brain parenchymal enhancement is seen.     CTA OF NECK (08/18/17)  1.  CT angiogram of the neck within normal limits.  2.  Thrombosed right M1 segment.     ECHOCARDIOGRAM CONCLUSIONS (02/03/20)  Prior echo done on 05/03/2019. Compared to the images of the prior   study done -  there has been no significant change.   Normal left ventricular systolic function.  Left ventricular ejection fraction is visually estimated to be 65%.  Prolapse of the mitral leaflets was present.  Severe mitral regurgitation.  Mild tricuspid  regurgitation.  Estimated right ventricular systolic pressure is 35 mmHg.    ECHOCARDIOGRAM CONCLUSIONS (05/03/19)  Prior echocardiogram 6/14/2018.  Normal left ventricular systolic function.   Mild to moderate eccentric mitral regurgitation.  Compared to the images of the study done - there has been slight improvement in mitral regurgitation.     ECHOCARDIOGRAM CONCLUSIONS (06/14/18)  Normal left ventricular systolic function.  Left ventricular ejection fraction is visually estimated to be 60%.  Myxomatous changes of the mitral valve.  Prolapse of the anterior and posterior mitral leaflets.  Severe, eccentric mitral regurgitation.  Right heart pressures are normal.  Compared to the report of the study done 8/19/2017 severe mitral regurgitation is now present, previously reported as moderate but a MR   ERO was not recorded.      EKG performed on (05/09/20) was reviewed: EKG personally interpreted shows sinus rhythm with premature ventricular contractions.  EKG performed on (05/08/20) EKG shows sinus rhythm with premature ventricular contractions.    Laboratory results of (03/24/20) were reviewed. Cholesterol profile of 144/67/73/58 mg/dL noted.    TRANSESOPHAGEAL ECHOCARDIOGRAM CONCLUSIONS by Ayde Lopes (01/26/21)  LV EF 55%.  Biatrial enlargement.  There is severe eccentric mitral regurgitation with multiple jets, predominantly from flail leaflet of P2.  Echolucent space near P1 of unclear etiology, unusual for cleft leaflet.  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  (study result reviewed)    Assessment:     1. Severe mitral regurgitation     2. Dyslipidemia     3. History of CVA (cerebrovascular accident)       Medical Decision Making:  Today's Assessment / Status / Plan:     1. Mitral regurgitation: Her mitral regurgitation has reached severe status on her recent echocardiogram and she is symptomatic, NYHA class II. She is considering mitral valve repair/mitral valve replacement with Dr. Schaefer  Kaden. She will make a decision on open verses pursuit of robotics.   2. Hyperlipidemia: She is doing well on statin therapy without myalgia symptoms.  3. History of cerebrovascular accident with right carotid arteriography with mechanical thrombectomy (08/18/17): She remains clinically stable without recurrence of stroke symptoms.  4. Additional information: She is family of Casey Hampton and prior patient of Pascale Rankin.    We will follow up in two months.    CC Bethany Ambrose

## 2021-03-03 ENCOUNTER — APPOINTMENT (RX ONLY)
Dept: URBAN - METROPOLITAN AREA CLINIC 4 | Facility: CLINIC | Age: 76
Setting detail: DERMATOLOGY
End: 2021-03-03

## 2021-03-03 PROCEDURE — 17003 DESTRUCT PREMALG LES 2-14: CPT | Mod: 59

## 2021-03-03 PROCEDURE — ? OBSERVATION AND MEASURE

## 2021-03-03 PROCEDURE — ? LIQUID NITROGEN

## 2021-03-03 PROCEDURE — 99213 OFFICE O/P EST LOW 20 MIN: CPT | Mod: 25

## 2021-03-03 PROCEDURE — 17000 DESTRUCT PREMALG LESION: CPT | Mod: 59

## 2021-03-03 PROCEDURE — ? COUNSELING

## 2021-03-03 PROCEDURE — 17110 DESTRUCTION B9 LES UP TO 14: CPT

## 2021-03-03 PROCEDURE — ? DIAGNOSIS COMMENT

## 2021-03-04 DIAGNOSIS — L82.0 INFLAMED SEBORRHEIC KERATOSIS: ICD-10-CM

## 2021-03-04 DIAGNOSIS — L82.1 OTHER SEBORRHEIC KERATOSIS: ICD-10-CM

## 2021-03-04 DIAGNOSIS — L57.0 ACTINIC KERATOSIS: ICD-10-CM

## 2021-03-04 DIAGNOSIS — L81.4 OTHER MELANIN HYPERPIGMENTATION: ICD-10-CM

## 2021-03-04 DIAGNOSIS — D22 MELANOCYTIC NEVI: ICD-10-CM | Status: STABLE

## 2021-03-04 DIAGNOSIS — L90.5 SCAR CONDITIONS AND FIBROSIS OF SKIN: ICD-10-CM

## 2021-03-04 PROBLEM — D22.5 MELANOCYTIC NEVI OF TRUNK: Status: ACTIVE | Noted: 2021-03-04

## 2021-03-04 PROBLEM — D22.72 MELANOCYTIC NEVI OF LEFT LOWER LIMB, INCLUDING HIP: Status: ACTIVE | Noted: 2021-03-04

## 2021-03-04 ASSESSMENT — LOCATION DETAILED DESCRIPTION DERM
LOCATION DETAILED: RIGHT BUTTOCK
LOCATION DETAILED: MID POSTERIOR NECK
LOCATION DETAILED: RIGHT POSTERIOR SHOULDER
LOCATION DETAILED: LEFT INFRAMAMMARY CREASE (INNER QUADRANT)
LOCATION DETAILED: RIGHT SUPERIOR MEDIAL UPPER BACK
LOCATION DETAILED: LEFT DORSAL 5TH TOE
LOCATION DETAILED: RIGHT INFERIOR VERMILION LIP
LOCATION DETAILED: LEFT BUTTOCK
LOCATION DETAILED: RIGHT DISTAL POSTERIOR UPPER ARM
LOCATION DETAILED: SUPERIOR THORACIC SPINE
LOCATION DETAILED: LEFT SUPERIOR UPPER BACK
LOCATION DETAILED: RIGHT RADIAL DORSAL HAND
LOCATION DETAILED: LEFT SUPERIOR MEDIAL FOREHEAD
LOCATION DETAILED: RIGHT SUPERIOR FOREHEAD
LOCATION DETAILED: LEFT RIB CAGE
LOCATION DETAILED: LEFT POSTERIOR SHOULDER
LOCATION DETAILED: UPPER STERNUM

## 2021-03-04 ASSESSMENT — LOCATION ZONE DERM
LOCATION ZONE: HAND
LOCATION ZONE: ARM
LOCATION ZONE: LIP
LOCATION ZONE: TRUNK
LOCATION ZONE: TOE
LOCATION ZONE: FACE
LOCATION ZONE: NECK

## 2021-03-04 ASSESSMENT — LOCATION SIMPLE DESCRIPTION DERM
LOCATION SIMPLE: CHEST
LOCATION SIMPLE: RIGHT BUTTOCK
LOCATION SIMPLE: LEFT SHOULDER
LOCATION SIMPLE: LEFT FOREHEAD
LOCATION SIMPLE: RIGHT LIP
LOCATION SIMPLE: RIGHT HAND
LOCATION SIMPLE: ABDOMEN
LOCATION SIMPLE: UPPER BACK
LOCATION SIMPLE: RIGHT POSTERIOR UPPER ARM
LOCATION SIMPLE: LEFT 5TH TOE
LOCATION SIMPLE: RIGHT FOREHEAD
LOCATION SIMPLE: LEFT BREAST
LOCATION SIMPLE: POSTERIOR NECK
LOCATION SIMPLE: RIGHT SHOULDER
LOCATION SIMPLE: LEFT BUTTOCK
LOCATION SIMPLE: LEFT UPPER BACK
LOCATION SIMPLE: RIGHT UPPER BACK

## 2021-03-04 NOTE — PROCEDURE: LIQUID NITROGEN
Render Note In Bullet Format When Appropriate: No
Detail Level: Detailed
Post-Care Instructions: I reviewed with the patient in detail post-care instructions. Patient is to wear sunprotection, and avoid picking at any of the treated lesions. Pt may apply Vaseline to crusted or scabbing areas.
Consent: The patient's consent was obtained including but not limited to risks of crusting, scabbing, blistering, scarring, darker or lighter pigmentary change, recurrence, incomplete removal and infection.
Duration Of Freeze Thaw-Cycle (Seconds): 0
Medical Necessity Clause: This procedure was medically necessary because the lesions that were treated were:
Include Z78.9 (Other Specified Conditions Influencing Health Status) As An Associated Diagnosis?: Yes
Medical Necessity Information: It is in your best interest to select a reason for this procedure from the list below. All of these items fulfill various CMS LCD requirements except the new and changing color options.

## 2021-03-05 ENCOUNTER — HOSPITAL ENCOUNTER (OUTPATIENT)
Facility: MEDICAL CENTER | Age: 76
End: 2021-03-05
Attending: OBSTETRICS & GYNECOLOGY
Payer: COMMERCIAL

## 2021-03-05 ENCOUNTER — GYNECOLOGY VISIT (OUTPATIENT)
Dept: OBGYN | Facility: CLINIC | Age: 76
End: 2021-03-05
Payer: COMMERCIAL

## 2021-03-05 VITALS — SYSTOLIC BLOOD PRESSURE: 144 MMHG | WEIGHT: 131 LBS | DIASTOLIC BLOOD PRESSURE: 78 MMHG | BODY MASS INDEX: 20.52 KG/M2

## 2021-03-05 DIAGNOSIS — Z90.711 HISTORY OF PARTIAL HYSTERECTOMY: ICD-10-CM

## 2021-03-05 DIAGNOSIS — Z79.890 LONG-TERM CURRENT USE OF TESTOSTERONE REPLACEMENT THERAPY: ICD-10-CM

## 2021-03-05 DIAGNOSIS — N32.81 OVERACTIVE BLADDER: ICD-10-CM

## 2021-03-05 DIAGNOSIS — Z90.79: ICD-10-CM

## 2021-03-05 DIAGNOSIS — Z01.411 ENCNTR FOR GYN EXAM (GENERAL) (ROUTINE) W ABNORMAL FINDINGS: ICD-10-CM

## 2021-03-05 DIAGNOSIS — Z12.4 CERVICAL CANCER SCREENING: ICD-10-CM

## 2021-03-05 DIAGNOSIS — Z90.722: ICD-10-CM

## 2021-03-05 PROCEDURE — 88175 CYTOPATH C/V AUTO FLUID REDO: CPT

## 2021-03-05 PROCEDURE — 99387 INIT PM E/M NEW PAT 65+ YRS: CPT | Performed by: OBSTETRICS & GYNECOLOGY

## 2021-03-05 PROCEDURE — 87624 HPV HI-RISK TYP POOLED RSLT: CPT

## 2021-03-05 ASSESSMENT — FIBROSIS 4 INDEX: FIB4 SCORE: 1.78

## 2021-03-06 DIAGNOSIS — Z90.711 HISTORY OF PARTIAL HYSTERECTOMY: ICD-10-CM

## 2021-03-06 DIAGNOSIS — Z12.4 CERVICAL CANCER SCREENING: ICD-10-CM

## 2021-03-06 NOTE — PROGRESS NOTES
Subjective:      Shaista Bocanegra is a 76 y.o. female who presents for Gynecologic Exam (New patient)            HPI patient is a 76-year-old  3 para 2-0-1-2 who presents today as a new patient for gynecologic exam.  Patient is requesting Pap smear and also would like to discuss refill of testosterone medication.    Patient states she had history of supracervical hysterectomy with BSO and that she is still sexually active and would like to have Pap smear done.  Patient is aware of screening recommendations for Pap smear but still desires one today.  She reports no pelvic pain or abnormal discharge.    Patient reports overactive bladder and states she tried medication which did not help.  She states she had Botox recently and is scheduled for another Botox injection in a month and if this does not help, she will have the implantable device for bladder control and she follows up with urology for treatment.    Patient states she does self breast exam and gets routine mammograms.  She reports history of lumpectomy for breast cancer 15+ years ago and had lumpectomy along with chemotherapy and radiation and all her follow-ups have been normal.      Patient states she is using transdermal testosterone therapy for the past 2 years and moved to the area and would like refill of this medication.  I discussed that we typically do not prescribe testosterone but I am willing to give her a one-time refill since she has been on this medication.  I discussed potential risk including cardiac risk from testosterone therapy but patient states she does not have any cardiac history although several cardiac issues are listed in her problem list which patient does not agree with.    She reports no menopausal symptoms and states she was using testosterone for decreased libido and would like to continue testosterone therapy.    ROS all organ systems are reviewed and were negative except for complaints in HPI       Objective:     BP  144/78 (BP Location: Right arm, Patient Position: Sitting)   Wt 59.4 kg (131 lb)   BMI 20.52 kg/m²      Physical Exam  Vitals and nursing note reviewed. Exam conducted with a chaperone present.   Constitutional:       General: She is not in acute distress.     Appearance: Normal appearance. She is not toxic-appearing.   HENT:      Head: Normocephalic and atraumatic.      Nose: Nose normal. No congestion or rhinorrhea.   Eyes:      Conjunctiva/sclera: Conjunctivae normal.   Cardiovascular:      Rate and Rhythm: Normal rate and regular rhythm.      Pulses: Normal pulses.      Heart sounds: Normal heart sounds. No murmur. No gallop.    Pulmonary:      Effort: Pulmonary effort is normal. No respiratory distress.      Breath sounds: Normal breath sounds. No wheezing.   Chest:      Breasts:         Right: No swelling, bleeding, inverted nipple, mass, nipple discharge, skin change or tenderness.         Left: No swelling, bleeding, inverted nipple, mass, nipple discharge, skin change or tenderness.   Abdominal:      General: Abdomen is flat. Bowel sounds are normal. There is no distension.      Palpations: Abdomen is soft.      Tenderness: There is no abdominal tenderness. There is no guarding.      Hernia: No hernia is present.   Genitourinary:     General: Normal vulva.      Labia:         Right: No rash, tenderness, lesion or injury.         Left: No rash, tenderness, lesion or injury.       Urethra: No prolapse.      Vagina: No vaginal discharge, tenderness or lesions.      Cervix: No cervical motion tenderness, friability or erythema.      Adnexa:         Right: No tenderness or fullness.          Left: No tenderness or fullness.        Comments: Atrophic appearing external genitalia  On speculum exam, vaginal mucosa appears atrophic with no bleeding lesions or discharge.  Small atrophic appearing cervix noted.  Pap smear obtained.  No masses or tenderness on bimanual exam.  Musculoskeletal:         General: Normal  range of motion.      Cervical back: Normal range of motion and neck supple. No rigidity.      Right lower leg: No edema.      Left lower leg: No edema.   Lymphadenopathy:      Cervical: No cervical adenopathy.      Upper Body:      Right upper body: No supraclavicular or axillary adenopathy.      Left upper body: No supraclavicular or axillary adenopathy.      Lower Body: No right inguinal adenopathy.   Skin:     General: Skin is warm and dry.      Findings: No lesion.   Neurological:      General: No focal deficit present.      Mental Status: She is alert and oriented to person, place, and time.      Gait: Gait normal.   Psychiatric:         Mood and Affect: Mood normal.         Behavior: Behavior normal.         Thought Content: Thought content normal.         Judgment: Judgment normal.                 Assessment/Plan:        1. Encntr for gyn exam (general) (routine) w abnormal findings  76-year-old here for annual gynecologic exam.  Exam is within normal limits for age    2. History of partial hysterectomy-patient states she does not remember the reason for hysterectomy but it was due to a benign condition  Cervix is still present and patient requested Pap smear since she is still sexually active.  Pap smear obtained.  Screening recommendations reviewed  - THINPREP PAP WITH HPV; Future    3. Hx of bilateral salpingo-oophorectomy  Reports BSO for benign condition    4. Overactive bladder  Patient will continue follow-up with urology    5. Cervical cancer screening  Pap smear obtained today  - THINPREP PAP WITH HPV; Future    6. Long-term current use of testosterone replacement therapy  I discussed testosterone therapy and potential risks and discussed that I can give her 1 refill and patient agrees.  Patient agrees for testosterone therapy and agrees with usage.  Patient states she is a  and understands our discussion today.  I discussed that she will need to see a testosterone prescriber for continuation  of therapy.  Patient states she spoke to [facility] in [facility] who inform her that they do compounding medication.  After patient left today, I called [facility] and was told that they do not do any compounding medication therapy.  I called and spoke to patient and she asked me to send prescription to [facility].  Prescription will be sent next week after we call the [facility] pharmacy to verify dosages.    7.  Precautions and plan of care were reviewed

## 2021-03-09 ENCOUNTER — TELEPHONE (OUTPATIENT)
Dept: OBGYN | Facility: CLINIC | Age: 76
End: 2021-03-09

## 2021-03-09 DIAGNOSIS — Z79.890 LONG-TERM CURRENT USE OF TESTOSTERONE REPLACEMENT THERAPY: ICD-10-CM

## 2021-03-09 LAB
CYTOLOGY REG CYTOL: ABNORMAL
HPV HR 12 DNA CVX QL NAA+PROBE: POSITIVE
HPV16 DNA SPEC QL NAA+PROBE: NEGATIVE
HPV18 DNA SPEC QL NAA+PROBE: NEGATIVE
SPECIMEN SOURCE: ABNORMAL

## 2021-03-09 RX ORDER — TESTOSTERONE MICRONIZED 100 %
POWDER (GRAM) MISCELLANEOUS
Qty: 40 G | Refills: 0 | Status: SHIPPED | OUTPATIENT
Start: 2021-03-09 | End: 2021-05-31

## 2021-03-09 NOTE — TELEPHONE ENCOUNTER
Called pt to notify her that her prescription was sent to [facility] pharmacy phone #  Provided to pt, and notified pt that for further refills she will need to find a provider in the community that prescribes Testosterone, per consult with Dr. Rick Reynoso. Pt verbalized understanding.

## 2021-03-09 NOTE — PROGRESS NOTES
[facility] for 3-month supply with no refills.  Patient will need to see testosterone provider in community for future prescriptions.

## 2021-03-12 NOTE — PROGRESS NOTES
I called and spoke to patient regarding her Pap smear results which was normal except for positive high risk HPV other subtypes.  Patient states she may have had HPV about 3 to 5 years ago.  Based on current recommendation, she will need Pap smear in 1 year and this was discussed and patient agrees.

## 2021-03-15 RX ORDER — AMLODIPINE BESYLATE 10 MG/1
10 TABLET ORAL DAILY
Status: ON HOLD | COMMUNITY
Start: 2021-03-04 | End: 2021-06-02

## 2021-03-15 RX ORDER — VALACYCLOVIR HYDROCHLORIDE 500 MG/1
500 TABLET, FILM COATED ORAL DAILY
Qty: 30 TABLET | Refills: 3 | Status: SHIPPED | OUTPATIENT
Start: 2021-03-15 | End: 2021-07-02

## 2021-03-15 RX ORDER — VALACYCLOVIR HYDROCHLORIDE 500 MG/1
500 TABLET, FILM COATED ORAL DAILY
Qty: 30 TABLET | Refills: 2 | Status: CANCELLED | OUTPATIENT
Start: 2021-03-15

## 2021-03-15 RX ORDER — HYDROCHLOROTHIAZIDE 12.5 MG/1
12.5 TABLET ORAL DAILY
Status: ON HOLD | COMMUNITY
Start: 2021-03-04 | End: 2021-06-02

## 2021-03-15 NOTE — PROGRESS NOTES
Patient requested refill of Valtrex which was prescribed by her previous physician.  Refill sent to pharmacy

## 2021-03-17 ENCOUNTER — TELEPHONE (OUTPATIENT)
Dept: CARDIOLOGY | Facility: MEDICAL CENTER | Age: 76
End: 2021-03-17

## 2021-03-17 NOTE — TELEPHONE ENCOUNTER
LILI    Pt called stating the referral LILI sent to [facility] was not received and is asking for it to be sent again.   Fax# [phone]    Thank you

## 2021-03-17 NOTE — TELEPHONE ENCOUNTER
"It appears that referral was sent to \"[facility]\" which is probably not the correct location as the patient needs a valve procedure. S/w Nataly CARRERO as she placed the referral order to [facility]. Nataly will reach out to the referral department.    To Nataly CARRERO   "

## 2021-03-18 ENCOUNTER — TELEPHONE (OUTPATIENT)
Dept: CARDIOLOGY | Facility: MEDICAL CENTER | Age: 76
End: 2021-03-18

## 2021-03-18 NOTE — TELEPHONE ENCOUNTER
Spoke with [facility] Specialist regarding need for referral to [facility] for [facility]bitic [facility]TS consult. Referral specialist assures this RN he will contact patient and assure referral to [facility] is facilitated. This RN states appreciation for assistance with referral.

## 2021-03-19 ENCOUNTER — APPOINTMENT (OUTPATIENT)
Dept: RADIOLOGY | Facility: MEDICAL CENTER | Age: 76
End: 2021-03-19
Attending: EMERGENCY MEDICINE
Payer: COMMERCIAL

## 2021-03-19 ENCOUNTER — HOSPITAL ENCOUNTER (EMERGENCY)
Facility: MEDICAL CENTER | Age: 76
End: 2021-03-19
Attending: EMERGENCY MEDICINE
Payer: COMMERCIAL

## 2021-03-19 ENCOUNTER — TELEPHONE (OUTPATIENT)
Dept: CARDIOLOGY | Facility: MEDICAL CENTER | Age: 76
End: 2021-03-19

## 2021-03-19 VITALS
WEIGHT: 131 LBS | OXYGEN SATURATION: 95 % | HEART RATE: 74 BPM | TEMPERATURE: 97.8 F | HEIGHT: 66 IN | BODY MASS INDEX: 21.05 KG/M2 | DIASTOLIC BLOOD PRESSURE: 68 MMHG | RESPIRATION RATE: 17 BRPM | SYSTOLIC BLOOD PRESSURE: 125 MMHG

## 2021-03-19 DIAGNOSIS — R55 SYNCOPE, UNSPECIFIED SYNCOPE TYPE: ICD-10-CM

## 2021-03-19 LAB
ALBUMIN SERPL BCP-MCNC: 4.3 G/DL (ref 3.2–4.9)
ALBUMIN/GLOB SERPL: 1.8 G/DL
ALP SERPL-CCNC: 69 U/L (ref 30–99)
ALT SERPL-CCNC: 28 U/L (ref 2–50)
ANION GAP SERPL CALC-SCNC: 10 MMOL/L (ref 7–16)
AST SERPL-CCNC: 24 U/L (ref 12–45)
BASOPHILS # BLD AUTO: 0.5 % (ref 0–1.8)
BASOPHILS # BLD: 0.02 K/UL (ref 0–0.12)
BILIRUB SERPL-MCNC: 0.3 MG/DL (ref 0.1–1.5)
BUN SERPL-MCNC: 21 MG/DL (ref 8–22)
CALCIUM SERPL-MCNC: 9.5 MG/DL (ref 8.5–10.5)
CHLORIDE SERPL-SCNC: 105 MMOL/L (ref 96–112)
CO2 SERPL-SCNC: 27 MMOL/L (ref 20–33)
CREAT SERPL-MCNC: 0.75 MG/DL (ref 0.5–1.4)
EKG IMPRESSION: NORMAL
EOSINOPHIL # BLD AUTO: 0.06 K/UL (ref 0–0.51)
EOSINOPHIL NFR BLD: 1.6 % (ref 0–6.9)
ERYTHROCYTE [DISTWIDTH] IN BLOOD BY AUTOMATED COUNT: 49.1 FL (ref 35.9–50)
GLOBULIN SER CALC-MCNC: 2.4 G/DL (ref 1.9–3.5)
GLUCOSE SERPL-MCNC: 89 MG/DL (ref 65–99)
HCT VFR BLD AUTO: 37.3 % (ref 37–47)
HGB BLD-MCNC: 12.6 G/DL (ref 12–16)
IMM GRANULOCYTES # BLD AUTO: 0.01 K/UL (ref 0–0.11)
IMM GRANULOCYTES NFR BLD AUTO: 0.3 % (ref 0–0.9)
LYMPHOCYTES # BLD AUTO: 1.16 K/UL (ref 1–4.8)
LYMPHOCYTES NFR BLD: 31.4 % (ref 22–41)
MAGNESIUM SERPL-MCNC: 2 MG/DL (ref 1.5–2.5)
MCH RBC QN AUTO: 34.2 PG (ref 27–33)
MCHC RBC AUTO-ENTMCNC: 33.8 G/DL (ref 33.6–35)
MCV RBC AUTO: 101.4 FL (ref 81.4–97.8)
MONOCYTES # BLD AUTO: 0.42 K/UL (ref 0–0.85)
MONOCYTES NFR BLD AUTO: 11.4 % (ref 0–13.4)
NEUTROPHILS # BLD AUTO: 2.02 K/UL (ref 2–7.15)
NEUTROPHILS NFR BLD: 54.8 % (ref 44–72)
NRBC # BLD AUTO: 0 K/UL
NRBC BLD-RTO: 0 /100 WBC
PLATELET # BLD AUTO: 164 K/UL (ref 164–446)
PMV BLD AUTO: 9.4 FL (ref 9–12.9)
POTASSIUM SERPL-SCNC: 4 MMOL/L (ref 3.6–5.5)
PROT SERPL-MCNC: 6.7 G/DL (ref 6–8.2)
RBC # BLD AUTO: 3.68 M/UL (ref 4.2–5.4)
SODIUM SERPL-SCNC: 142 MMOL/L (ref 135–145)
TROPONIN T SERPL-MCNC: 8 NG/L (ref 6–19)
WBC # BLD AUTO: 3.7 K/UL (ref 4.8–10.8)

## 2021-03-19 PROCEDURE — 84484 ASSAY OF TROPONIN QUANT: CPT

## 2021-03-19 PROCEDURE — 83735 ASSAY OF MAGNESIUM: CPT

## 2021-03-19 PROCEDURE — 99283 EMERGENCY DEPT VISIT LOW MDM: CPT

## 2021-03-19 PROCEDURE — 71045 X-RAY EXAM CHEST 1 VIEW: CPT

## 2021-03-19 PROCEDURE — 93005 ELECTROCARDIOGRAM TRACING: CPT | Performed by: EMERGENCY MEDICINE

## 2021-03-19 PROCEDURE — 80053 COMPREHEN METABOLIC PANEL: CPT

## 2021-03-19 PROCEDURE — 85025 COMPLETE CBC W/AUTO DIFF WBC: CPT

## 2021-03-19 PROCEDURE — 93005 ELECTROCARDIOGRAM TRACING: CPT

## 2021-03-19 ASSESSMENT — FIBROSIS 4 INDEX: FIB4 SCORE: 1.78

## 2021-03-19 ASSESSMENT — LIFESTYLE VARIABLES: DO YOU DRINK ALCOHOL: NO

## 2021-03-19 NOTE — ED PROVIDER NOTES
ED Provider Note    CHIEF COMPLAINT  Chief Complaint   Patient presents with   • Syncope   • Dizziness        [facility]    Primary care provider: Bethany Crane M.D.   History obtained from: Patient  History limited by: None     Shaista Bocanegra is a 76 y.o. female who presents to the ED by EMS for possible syncopal episode shortly prior to arrival.  Patient states that she was getting a haircut by her friend when she got up to use the restroom and apparently passed out and sat back down on a chair according to her friend.  Patient states that her friend stated that she was out only momentarily and probably not completely out.  Patient reports feeling lightheaded just prior to her syncopal episode.  She denies any pain/palpitation/shortness of breath or difficulty breathing/nausea/vomiting.  She denies recent illness/fever/congestion/cough.  She denies any visual change/weakness or sensory change.  She reports that she has had 2 prior similar episodes due to dehydration and low potassium.  She states that she has been diligent about good potassium intake.  She believes that she is dehydrated because she only drank 2 bottles of water today in the office when she usually drinks at least 6 bottles.  She also has had some loose stools recently after being given some medications from her acupuncturist.  She does suffer from overactive bladder with chronic urinary dysfunction.  She has not noticed any rash.    REVIEW OF SYSTEMS  Please see HPI for pertinent positives/negatives.  All other systems reviewed and are negative.     PAST MEDICAL HISTORY  Past Medical History:   Diagnosis Date   • Stroke (HCC) 2017    residual minor weakness on left side   • Benign essential HTN 3/19/2012   • Chest tightness or pressure 3/19/2012   • Hypercholesterolemia 3/19/2012   • MVP (mitral valve prolapse) 3/19/2012   • High risk medication use 3/19/2012   • Cancer (HCC) 1998    breast    • Arthritis    • Atrial fibrillation (HCC)    •  "Breast cancer (HCC)    • Cardiac arrhythmia    • Chickenpox    • Coronary heart disease    • Belgian measles    • Gynecological disorder    • High cholesterol    • Mumps    • Osteoporosis    • Sleep apnea     no CPAP   • Snoring    • Substance abuse (HCC)    • Tonsillitis    • Urinary bladder disorder     OAB   • Urinary incontinence    • Valvular heart disease    • Venereal disease         SURGICAL HISTORY  Past Surgical History:   Procedure Laterality Date   • CATARACT PHACO WITH IOL  3/16/2009    Performed by SHANNON GANDARA at [facility]   • CATARACT PHACO WITH IOL  1/26/2009    Performed by SHANNON GANDARA at [facility]   • BREAST BIOPSY     • HYSTERECTOMY LAPAROSCOPY     • LUMPECTOMY     • PB RADIATION THERAPY PLAN SIMPLE     • PB REMV 2ND CATARACT,CORN-SCLER SECTN     • HI CHEMOTHERAPY, UNSPECIFIED PROCEDURE     • PRIMARY C SECTION     • SINUSCOPE     • TONSILLECTOMY          SOCIAL HISTORY  Social History     Tobacco Use   • Smoking status: Never Smoker   • Smokeless tobacco: Never Used   Substance and Sexual Activity   • Alcohol use: Yes     Comment: 1 per day   • Drug use: No   • Sexual activity: Yes     Partners: Male     Birth control/protection: Female Sterilization        FAMILY HISTORY  Family History   Problem Relation Age of Onset   • Cancer Mother         breast   • No Known Problems Brother    • Heart Failure Neg Hx    • Heart Disease Neg Hx         CURRENT MEDICATIONS  Home Medications    **Home medications have not yet been reviewed for this encounter**          ALLERGIES  No Known Allergies     PHYSICAL EXAM  VITAL SIGNS: /68   Pulse 74   Temp 36.6 °C (97.8 °F) (Temporal)   Resp 17   Ht 1.676 m (5' 6\")   Wt 59.4 kg (131 lb)   SpO2 95%   BMI 21.14 kg/m²  @TROY[[id number]::@     Pulse ox interpretation: 98% I interpret this pulse ox as normal     Cardiac monitor interpretation: Sinus rhythm with heart rate in the 60s as interpreted by me.  The " patient presented with syncope and cardiac monitor was ordered to monitor for dysrhythmia.    Constitutional: Well developed, well nourished, alert in no apparent distress, nontoxic appearance    HENT: No external signs of trauma, normocephalic, oropharynx moist and clear, nose normal    Eyes: PERRL, EOMI, vision and visual fields are grossly intact bilaterally, conjunctiva without erythema, no discharge, no icterus    Neck: Soft and supple, trachea midline, no stridor, no tenderness, no LAD, no JVD, good ROM    Cardiovascular: Regular rate and rhythm with occasional ectopic beats, no murmurs/rubs/gallops, strong distal pulses and good perfusion    Thorax & Lungs: No respiratory distress, CTAB   Abdomen: Soft, nontender, nondistended, no guarding, no rebound, normal BS    Back: No CVAT    Extremities: No cyanosis, no edema, no gross deformity, good ROM, no tenderness, intact distal pulses with brisk cap refill    Skin: Warm, dry, no pallor/cyanosis, no rash noted    Lymphatic: No lymphadenopathy noted    Neuro: Alert and oriented to person, place, and time.  GCS 15.  CN II-XII grossly intact.  Normal speech.  Equal strength bilateral UE/LE.  Sensation intact to touch.  No cerebellar signs.  Psychiatric: Cooperative, normal mood and affect, normal judgement, appropriate for clinical situation        DIAGNOSTIC STUDIES / PROCEDURES    EKG  12 Lead EKG obtained at 1614 and interpreted by me:   Rate: 63   Rhythm: Sinus rhythm   Ectopy: PVC  Intervals: First-degree block, QTc 488  Axis: Normal   QRS: Normal   ST segments: Normal  T Waves: Normal    Clinical Impression: Sinus rhythm with PVC, first-degree block and borderline QT prolongation without acute ischemic changes or other dysrhythmia  Compared to May 9, 2020 with similar appearance      LABS  All labs reviewed by me.     Results for orders placed or performed during the hospital encounter of 03/19/21   CBC WITH DIFFERENTIAL   Result Value Ref Range    WBC 3.7 (L)  4.8 - 10.8 K/uL    RBC 3.68 (L) 4.20 - 5.40 M/uL    Hemoglobin 12.6 12.0 - 16.0 g/dL    Hematocrit 37.3 37.0 - 47.0 %    .4 (H) 81.4 - 97.8 fL    MCH 34.2 (H) 27.0 - 33.0 pg    MCHC 33.8 33.6 - 35.0 g/dL    RDW 49.1 35.9 - 50.0 fL    Platelet Count 164 164 - 446 K/uL    MPV 9.4 9.0 - 12.9 fL    Neutrophils-Polys 54.80 44.00 - 72.00 %    Lymphocytes 31.40 22.00 - 41.00 %    Monocytes 11.40 0.00 - 13.40 %    Eosinophils 1.60 0.00 - 6.90 %    Basophils 0.50 0.00 - 1.80 %    Immature Granulocytes 0.30 0.00 - 0.90 %    Nucleated RBC 0.00 /100 WBC    Neutrophils (Absolute) 2.02 2.00 - 7.15 K/uL    Lymphs (Absolute) 1.16 1.00 - 4.80 K/uL    Monos (Absolute) 0.42 0.00 - 0.85 K/uL    Eos (Absolute) 0.06 0.00 - 0.51 K/uL    Baso (Absolute) 0.02 0.00 - 0.12 K/uL    Immature Granulocytes (abs) 0.01 0.00 - 0.11 K/uL    NRBC (Absolute) 0.00 K/uL   COMP METABOLIC PANEL   Result Value Ref Range    Sodium 142 135 - 145 mmol/L    Potassium 4.0 3.6 - 5.5 mmol/L    Chloride 105 96 - 112 mmol/L    Co2 27 20 - 33 mmol/L    Anion Gap 10.0 7.0 - 16.0    Glucose 89 65 - 99 mg/dL    Bun 21 8 - 22 mg/dL    Creatinine 0.75 0.50 - 1.40 mg/dL    Calcium 9.5 8.5 - 10.5 mg/dL    AST(SGOT) 24 12 - 45 U/L    ALT(SGPT) 28 2 - 50 U/L    Alkaline Phosphatase 69 30 - 99 U/L    Total Bilirubin 0.3 0.1 - 1.5 mg/dL    Albumin 4.3 3.2 - 4.9 g/dL    Total Protein 6.7 6.0 - 8.2 g/dL    Globulin 2.4 1.9 - 3.5 g/dL    A-G Ratio 1.8 g/dL   TROPONIN   Result Value Ref Range    Troponin T 8 6 - 19 ng/L   MAGNESIUM   Result Value Ref Range    Magnesium 2.0 1.5 - 2.5 mg/dL   ESTIMATED GFR   Result Value Ref Range    GFR If African American >60 >60 mL/min/1.73 m 2    GFR If Non African American >60 >60 mL/min/1.73 m 2   EKG   Result Value Ref Range    Report       [facility] Emergency Dept.    Test Date:  2021-03-19  Pt Name:    HORACE GUZMAN                  Department: ER  MRN:        [id number]                      Room:        10  Gender:      Female                       Technician: [id number]  :        [id number]                   Requested By:ER TRIAGE PROTOCOL  Order #:    [id number]                    Reading MD:    Measurements  Intervals                                Axis  Rate:       63                           P:          26  MI:         248                          QRS:        63  QRSD:       88                           T:          -42  QT:         476  QTc:        488    Interpretive Statements  SINUS RHYTHM  VENTRICULAR PREMATURE COMPLEX  FIRST DEGREE AV BLOCK  NONSPECIFIC T ABNORMALITIES, INFERIOR LEADS  BORDERLINE PROLONGED QT INTERVAL  Compared to ECG 2020 16:29:10  T-wave abnormality now present  Sinus arrhythmia no longer present          RADIOLOGY  The radiologist's interpretation of all radiological studies have been reviewed by me.     DX-CHEST-PORTABLE (1 VIEW)   Final Result      Ill-defined 7 mm focal opacity in the right upper lobe for which further evaluation with CT chest is recommended.      Cardiomegaly.                COURSE & MEDICAL DECISION MAKING  Nursing notes, VS, PMSFHx reviewed in chart.     Review of past medical records shows the patient has been seen at the heart [place].  She was last admitted to this hospital on 2021 for ABHINAV.  Angiogram on May 8, 2020 with the following findings:    1.  Left main coronary artery:  Normal.  2.  Left anterior descending artery:  Normal.   3.  Left circumflex coronary artery:  Normal.   4.  Right coronary artery:  Normal.  This is a right dominant system.  5.  Left ventricular end diastolic pressure:  25 mmHg.  No signficant gradient across the aortic valve.  6.  Left ventriculogram:  Ejection fraction of 65%, 2+ mitral regurgitation, normal sized thoracic aorta.      Differential diagnoses considered include but are not limited to: Syncope, near syncope, hypoglycemia, Sz, vasovagal episode, dysrhythmia, dehydration, electrolyte abnormality       History and  physical exam as above.  EKG without significant change compared to prior.  Labs are fairly unremarkable except for mild neutropenia.  Chest x-ray with findings as above.  Patient was monitored in the ED and remained clinically stable.  No worrisome dysrhythmia noted.  Exam is benign.  No focal neurological findings or concerning features to suggest acute CVA.  I discussed the findings with the patient.  Patient reports feeling much better.  This is a very pleasant and alert well-appearing patient in no acute distress and nontoxic in appearance.  She is declining admission.  She reports 2 prior similar episodes with work-up in in the hospital.  Record review shows that she did have angiogram last year without significant abnormality as above.  I discussed with her importance of outpatient follow-up for further evaluation as well as the findings on her chest x-ray and need for outpatient CT scan.  She was encouraged to return to the ED if she has any new/worsening symptoms or concerns.  Patient verbalized understanding and agreed with plan of care with no further questions or concerns.      The patient is referred to a primary physician for blood pressure management, diabetic screening, and for all other preventative health concerns.       FINAL IMPRESSION  1. Syncope, unspecified syncope type Acute          DISPOSITION  Patient will be discharged home in stable condition.       FOLLOW UP  Bethany Crane M.D.  [address] [place] [id number] [phone]    Call in 3 days      [facility], Emergency Dept  [address] [place] [id number] [phone]    If symptoms worsen         OUTPATIENT MEDICATIONS  Discharge Medication List as of 3/19/2021  5:48 PM             Electronically signed by: Neil Cook D.O., 3/19/2021 4:40 PM      Portions of this record were made with voice recognition software.  Despite my review, spelling/grammar/context errors may still remain.  Interpretation  of this chart should be taken in this context.

## 2021-03-19 NOTE — TELEPHONE ENCOUNTER
Received call from [facility]ist with update that patient is scheduled for robotics CTS consult via Telehealth 3/22/21.     This RN verbalized appreciation from our heart team in coordinating the consult.

## 2021-03-19 NOTE — ED TRIAGE NOTES
Pt  had been sitting for a few hours and got up to go to the bathroom and had a syncope episode. States fell into a chair. Denies chest pain. States has happened before because she was dehydrated or because her K+ was low.

## 2021-03-20 NOTE — ED NOTES
Pt ambulates independently to to wheelchair. Pt pushed out to waiting room to wait for ride. Reviewed AVS with pt and follow up information

## 2021-03-24 ENCOUNTER — HOSPITAL ENCOUNTER (OUTPATIENT)
Dept: LAB | Facility: MEDICAL CENTER | Age: 76
End: 2021-03-24
Attending: INTERNAL MEDICINE
Payer: COMMERCIAL

## 2021-03-24 LAB
ALBUMIN SERPL BCP-MCNC: 4.1 G/DL (ref 3.2–4.9)
ALBUMIN/GLOB SERPL: 1.6 G/DL
ALP SERPL-CCNC: 68 U/L (ref 30–99)
ALT SERPL-CCNC: 27 U/L (ref 2–50)
ANION GAP SERPL CALC-SCNC: 8 MMOL/L (ref 7–16)
AST SERPL-CCNC: 22 U/L (ref 12–45)
BASOPHILS # BLD AUTO: 0.6 % (ref 0–1.8)
BASOPHILS # BLD: 0.02 K/UL (ref 0–0.12)
BILIRUB SERPL-MCNC: 0.4 MG/DL (ref 0.1–1.5)
BUN SERPL-MCNC: 14 MG/DL (ref 8–22)
CALCIUM SERPL-MCNC: 9.4 MG/DL (ref 8.5–10.5)
CHLORIDE SERPL-SCNC: 104 MMOL/L (ref 96–112)
CHOLEST SERPL-MCNC: 120 MG/DL (ref 100–199)
CO2 SERPL-SCNC: 29 MMOL/L (ref 20–33)
CREAT SERPL-MCNC: 0.76 MG/DL (ref 0.5–1.4)
EOSINOPHIL # BLD AUTO: 0.08 K/UL (ref 0–0.51)
EOSINOPHIL NFR BLD: 2.4 % (ref 0–6.9)
ERYTHROCYTE [DISTWIDTH] IN BLOOD BY AUTOMATED COUNT: 49.2 FL (ref 35.9–50)
GLOBULIN SER CALC-MCNC: 2.5 G/DL (ref 1.9–3.5)
GLUCOSE SERPL-MCNC: 90 MG/DL (ref 65–99)
HCT VFR BLD AUTO: 40.9 % (ref 37–47)
HDLC SERPL-MCNC: 55 MG/DL
HGB BLD-MCNC: 13.6 G/DL (ref 12–16)
IMM GRANULOCYTES # BLD AUTO: 0 K/UL (ref 0–0.11)
IMM GRANULOCYTES NFR BLD AUTO: 0 % (ref 0–0.9)
LDLC SERPL CALC-MCNC: 56 MG/DL
LYMPHOCYTES # BLD AUTO: 1.18 K/UL (ref 1–4.8)
LYMPHOCYTES NFR BLD: 34.7 % (ref 22–41)
MCH RBC QN AUTO: 34.1 PG (ref 27–33)
MCHC RBC AUTO-ENTMCNC: 33.3 G/DL (ref 33.6–35)
MCV RBC AUTO: 102.5 FL (ref 81.4–97.8)
MONOCYTES # BLD AUTO: 0.32 K/UL (ref 0–0.85)
MONOCYTES NFR BLD AUTO: 9.4 % (ref 0–13.4)
NEUTROPHILS # BLD AUTO: 1.8 K/UL (ref 2–7.15)
NEUTROPHILS NFR BLD: 52.9 % (ref 44–72)
NRBC # BLD AUTO: 0 K/UL
NRBC BLD-RTO: 0 /100 WBC
PLATELET # BLD AUTO: 191 K/UL (ref 164–446)
PMV BLD AUTO: 9.9 FL (ref 9–12.9)
POTASSIUM SERPL-SCNC: 4 MMOL/L (ref 3.6–5.5)
PROT SERPL-MCNC: 6.6 G/DL (ref 6–8.2)
RBC # BLD AUTO: 3.99 M/UL (ref 4.2–5.4)
SODIUM SERPL-SCNC: 141 MMOL/L (ref 135–145)
TRIGL SERPL-MCNC: 47 MG/DL (ref 0–149)
WBC # BLD AUTO: 3.4 K/UL (ref 4.8–10.8)

## 2021-03-24 PROCEDURE — 36415 COLL VENOUS BLD VENIPUNCTURE: CPT

## 2021-03-24 PROCEDURE — 85025 COMPLETE CBC W/AUTO DIFF WBC: CPT

## 2021-03-24 PROCEDURE — 80061 LIPID PANEL: CPT

## 2021-03-24 PROCEDURE — 80053 COMPREHEN METABOLIC PANEL: CPT

## 2021-03-25 ENCOUNTER — HOSPITAL ENCOUNTER (OUTPATIENT)
Dept: LAB | Facility: MEDICAL CENTER | Age: 76
End: 2021-03-25
Attending: INTERNAL MEDICINE
Payer: COMMERCIAL

## 2021-03-25 LAB
ALBUMIN SERPL BCP-MCNC: 4.4 G/DL (ref 3.2–4.9)
ALBUMIN/GLOB SERPL: 1.6 G/DL
ALP SERPL-CCNC: 71 U/L (ref 30–99)
ALT SERPL-CCNC: 25 U/L (ref 2–50)
ANION GAP SERPL CALC-SCNC: 9 MMOL/L (ref 7–16)
AST SERPL-CCNC: 24 U/L (ref 12–45)
BILIRUB SERPL-MCNC: 0.4 MG/DL (ref 0.1–1.5)
BUN SERPL-MCNC: 17 MG/DL (ref 8–22)
CALCIUM SERPL-MCNC: 9.7 MG/DL (ref 8.5–10.5)
CHLORIDE SERPL-SCNC: 103 MMOL/L (ref 96–112)
CO2 SERPL-SCNC: 29 MMOL/L (ref 20–33)
CREAT SERPL-MCNC: 0.76 MG/DL (ref 0.5–1.4)
GLOBULIN SER CALC-MCNC: 2.7 G/DL (ref 1.9–3.5)
GLUCOSE SERPL-MCNC: 101 MG/DL (ref 65–99)
POTASSIUM SERPL-SCNC: 4 MMOL/L (ref 3.6–5.5)
PROT SERPL-MCNC: 7.1 G/DL (ref 6–8.2)
SODIUM SERPL-SCNC: 141 MMOL/L (ref 135–145)

## 2021-03-25 PROCEDURE — 36415 COLL VENOUS BLD VENIPUNCTURE: CPT

## 2021-03-25 PROCEDURE — 80053 COMPREHEN METABOLIC PANEL: CPT

## 2021-04-07 ENCOUNTER — TELEPHONE (OUTPATIENT)
Dept: OBGYN | Facility: CLINIC | Age: 76
End: 2021-04-07

## 2021-04-07 NOTE — TELEPHONE ENCOUNTER
04/07/21 @ 0941 PT called stating that her pharmacy said that they will send a fax to complete a PA for her Prescription refill and pt just wanted to relay the message over to use. I explained that I will keep an eye out for the faxes. PT understands and agrees.

## 2021-04-09 ENCOUNTER — APPOINTMENT (OUTPATIENT)
Dept: SLEEP MEDICINE | Facility: MEDICAL CENTER | Age: 76
End: 2021-04-09
Payer: MEDICARE

## 2021-04-22 ENCOUNTER — OFFICE VISIT (OUTPATIENT)
Dept: CARDIOLOGY | Facility: MEDICAL CENTER | Age: 76
End: 2021-04-22
Payer: COMMERCIAL

## 2021-04-22 VITALS
RESPIRATION RATE: 12 BRPM | WEIGHT: 131 LBS | HEIGHT: 67 IN | HEART RATE: 70 BPM | OXYGEN SATURATION: 99 % | BODY MASS INDEX: 20.56 KG/M2 | DIASTOLIC BLOOD PRESSURE: 70 MMHG | SYSTOLIC BLOOD PRESSURE: 112 MMHG

## 2021-04-22 DIAGNOSIS — I34.1 MVP (MITRAL VALVE PROLAPSE): ICD-10-CM

## 2021-04-22 DIAGNOSIS — Z86.73 HISTORY OF CVA (CEREBROVASCULAR ACCIDENT): ICD-10-CM

## 2021-04-22 DIAGNOSIS — Z79.02 LONG TERM (CURRENT) USE OF ANTITHROMBOTICS/ANTIPLATELETS: ICD-10-CM

## 2021-04-22 DIAGNOSIS — I10 BENIGN ESSENTIAL HTN: ICD-10-CM

## 2021-04-22 DIAGNOSIS — E78.5 DYSLIPIDEMIA: ICD-10-CM

## 2021-04-22 DIAGNOSIS — I10 HTN (HYPERTENSION), MALIGNANT: ICD-10-CM

## 2021-04-22 DIAGNOSIS — G47.33 OSA (OBSTRUCTIVE SLEEP APNEA): ICD-10-CM

## 2021-04-22 DIAGNOSIS — I34.0 SEVERE MITRAL REGURGITATION: ICD-10-CM

## 2021-04-22 DIAGNOSIS — I69.391 DYSPHAGIA FOLLOWING CEREBROVASCULAR ACCIDENT: ICD-10-CM

## 2021-04-22 PROCEDURE — 99214 OFFICE O/P EST MOD 30 MIN: CPT | Performed by: INTERNAL MEDICINE

## 2021-04-22 RX ORDER — SODIUM FLUORIDE 5 MG/G
GEL, DENTIFRICE DENTAL DAILY
COMMUNITY
End: 2021-06-09

## 2021-04-22 RX ORDER — ESTRADIOL 0.1 MG/G
CREAM VAGINAL
COMMUNITY
End: 2021-04-22

## 2021-04-22 ASSESSMENT — FIBROSIS 4 INDEX: FIB4 SCORE: 1.96

## 2021-04-22 NOTE — PROGRESS NOTES
"Subjective:   Chief Complaint:   Chief Complaint   Patient presents with   • Dyslipidemia   • Dysphagia     F/V Dx: Dysphagia following cerebrovascular accident       \"Artemio\" Shaista Bocanegra is a 76 y.o. female who returns for moderate to severe mitral regurgitation, hypertension, hyperlipidemia, prior CVA, HTN.    Admitted to the hospital 5/8/2020 with dehydration on diuretics.  Has left and right heart catheterization that admission move mitral clip for severe mitral regurgitation.  She is considering repair vs replacement.  Has met our MItraClip Team, our surgeon and also providers at [place], offered  REPAIR MR trial with MitraClip and RESTORE Trial, JESUS.  No discussion of robotic, probably at [place].    Surgery on foot, HR up a bit, 92-96. Has been in pain, getting better with accupunture.     Has HTN, /85, usually 125.  Started on amlodipine.  BP better, under 130, sometimes under 100, but no sxs.    Has LALY, was on CPAP, now using device only.  Using meditation and leep specialist to help with habits.    Prior CVA, some light tingling sensation remains.    Patient received mechanical thrombectomy 8/18/2017, felt to be due to right carotid disease.    Has hyperlipidemia, LDL 58.  No CAD on [facility] 2020.    Has a ring that monitors HR at night, we looked at data, no spikes.    Does pilates and has .  She is not limited by chest pain, pressure or tightness with activity.   No significant dyspnea on exertion but only if she moves slowly.  No orthopnea or lower extremity swelling.   No significant palpitations, lightheadedness, or presyncope/syncope.   No symptoms of leg claudication.   No new stroke/TIA like symptoms.  Does note brain fog.  Remote syncope in the setting of low K.    No family history of premature coronary artery disease.  No prior smoking history.  No history of diabetes.  No history of autoimmune disease such as lupus or rheumatoid arthritis.  No chronic kidney disease.  No " ETOH overuse.   No caffeine overuse.  No recreation substance use.    Has lost some weight.    Lives in [place].  She is a .    DATA REVIEWED by me:  ECG 5-  Sinus, 65, PVC, first-degree AV delay    Zio 2017 2 weeks.  Sinus, brief atrial run.    ABHINAV 1-26-21  LV EF  55%.  Biatrial enlargement.  There is severe eccentric mitral regurgitation with multiple jets,   predominantly from flail leaflet of P2.  Echolucent space near P1 of unclear etiology, unusual for cleft   leaflet.  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    Echo 11-  Left ventricular ejection fraction is visually estimated to be 60%.  Severely dilated left atrium.  Myxomatous changes of the mitral valve.  Bileaflet prolapse of the mitral valve is present.  Severe mitral regurgitation with pulmonary vein systolic flow reversal,   RV 73 mL, ERO 0.4  cm2.  Estimated right ventricular systolic pressure  is 30 mmHg.  Compared to the images of the prior study done 2/3/2020, no significant    change.    Echo 2/3/2020  Prior echo done on 05/03/2019. Compared to the images of the prior   study done -  there has been no significant change.   Normal left ventricular systolic function.  Left ventricular ejection fraction is visually estimated to be 65%.  Prolapse of the mitral leaflets was present.  Severe mitral regurgitation.  Mild tricuspid regurgitation.  Estimated right ventricular systolic pressure is 35 mmHg.    Echo 2017  Agitated saline study was performed, no evidence of right to left   shunt.  Normal left ventricular size, wall thickness, and systolic function.  Left ventricular ejection fraction is visually estimated to be 60%.  Mildly dilated left atrium.  Moderate mitral regurgitation due to an eccentric jet.  Mild tricuspid regurgitation.    Left and right heart catheterization 5/8/2020  Right ventricle 33/5, EDP 15  Pulmonary artery 42/21, 29  pulmonary capillary wedge 23  Cardiac output 4.1 L/min by thermodilution method     1.   Left main coronary artery:  Normal.  2.  Left anterior descending artery:  Normal.   3.  Left circumflex coronary artery:  Normal.   4.  Right coronary artery:  Normal.  This is a right dominant system.  5.  Left ventricular end diastolic pressure:  25 mmHg.  No signficant gradient across the aortic valve.  6.  Left ventriculogram:  Ejection fraction of 65%, 2+ mitral regurgitation, normal sized thoracic aorta.     2017 CTA NECK   1.  CT angiogram of the neck within normal limits.  2.  Thrombosed right M1 segment.     2017 CAROTID DUPLEX CONCLUSIONS   nl carotids, subclavians and vertebral's     2017 MRI BRAIN  1.  Moderate sized RIGHT MCA territory acute ischemia involving the cortex and basal ganglia  2.  Flow void is present in the RIGHT MCA compatible with treatment effect  3.  No hemorrhage  4.  Mild white matter changes  5.  Mild atrophy    Most recent labs:       Lab Results   Component Value Date/Time    HEMOGLOBIN 13.6 03/24/2021 07:05 AM    HEMATOCRIT 40.9 03/24/2021 07:05 AM    .5 (H) 03/24/2021 07:05 AM    INR 1.01 05/08/2020 09:33 AM      Lab Results   Component Value Date/Time    SODIUM 141 03/25/2021 01:50 PM    POTASSIUM 4.0 03/25/2021 01:50 PM    CHLORIDE 103 03/25/2021 01:50 PM    CO2 29 03/25/2021 01:50 PM    GLUCOSE 101 (H) 03/25/2021 01:50 PM    BUN 17 03/25/2021 01:50 PM    CREATININE 0.76 03/25/2021 01:50 PM      Lab Results   Component Value Date/Time    ASTSGOT 24 03/25/2021 01:50 PM    ALTSGPT 25 03/25/2021 01:50 PM    ALBUMIN 4.4 03/25/2021 01:50 PM      Lab Results   Component Value Date/Time    CHOLSTRLTOT 120 03/24/2021 07:05 AM    LDL 56 03/24/2021 07:05 AM    HDL 55 03/24/2021 07:05 AM    TRIGLYCERIDE 47 03/24/2021 07:05 AM           Past Medical History:   Diagnosis Date   • Arthritis    • Atrial fibrillation (HCC)    • Benign essential HTN 3/19/2012   • Breast cancer (HCC)    • Cancer (HCC) 1998    breast    • Cardiac arrhythmia    • Chest tightness or pressure 3/19/2012   •  Chickenpox    • Coronary heart disease    • Portuguese measles    • Gynecological disorder    • High cholesterol    • High risk medication use 3/19/2012   • Hypercholesterolemia 3/19/2012   • Mumps    • MVP (mitral valve prolapse) 3/19/2012   • Osteoporosis    • Sleep apnea     no CPAP   • Snoring    • Stroke (HCC) 2017    residual minor weakness on left side   • Substance abuse (HCC)    • Tonsillitis    • Urinary bladder disorder     OAB   • Urinary incontinence    • Valvular heart disease    • Venereal disease      Past Surgical History:   Procedure Laterality Date   • CATARACT PHACO WITH IOL  3/16/2009    Performed by SHANNON GANDARA at [facility]   • CATARACT PHACO WITH IOL  1/26/2009    Performed by SHANNON GANDARA at [facility]   • BREAST BIOPSY     • HYSTERECTOMY LAPAROSCOPY     • LUMPECTOMY     • PB RADIATION THERAPY PLAN SIMPLE     • PB REMV 2ND CATARACT,CORN-SCLER SECTN     • DC CHEMOTHERAPY, UNSPECIFIED PROCEDURE     • PRIMARY C SECTION     • SINUSCOPE     • TONSILLECTOMY       Family History   Problem Relation Age of Onset   • Cancer Mother         breast   • No Known Problems Brother    • Heart Failure Neg Hx    • Heart Disease Neg Hx      Social History     Socioeconomic History   • Marital status:      Spouse name: Not on file   • Number of children: 2   • Years of education: Not on file   • Highest education level: Not on file   Occupational History   • Not on file   Tobacco Use   • Smoking status: Never Smoker   • Smokeless tobacco: Never Used   Substance and Sexual Activity   • Alcohol use: Not Currently     Comment: not since March 1st 2021   • Drug use: No   • Sexual activity: Yes     Partners: Male     Birth control/protection: Female Sterilization   Other Topics Concern   • Not on file   Social History Narrative   • Not on file     Social Determinants of Health     Financial Resource Strain:    • Difficulty of Paying Living Expenses:    Food  Insecurity:    • Worried About Running Out of Food in the Last Year:    • Ran Out of Food in the Last Year:    Transportation Needs:    • Lack of Transportation (Medical):    • Lack of Transportation (Non-Medical):    Physical Activity:    • Days of Exercise per Week:    • Minutes of Exercise per Session:    Stress:    • Feeling of Stress :    Social Connections:    • Frequency of Communication with Friends and Family:    • Frequency of Social Gatherings with Friends and Family:    • Attends Anabaptist Services:    • Active Member of Clubs or Organizations:    • Attends Club or Organization Meetings:    • Marital Status:    Intimate Partner Violence:    • Fear of Current or Ex-Partner:    • Emotionally Abused:    • Physically Abused:    • Sexually Abused:      No Known Allergies    Current Outpatient Medications   Medication Sig Dispense Refill   • SODIUM FLUORIDE, DENTAL GEL, (PREVIDENT) 1.1 % Gel Apply  to teeth every day. Prevident     • atorvastatin (LIPITOR) 80 MG tablet TAKE 1 TABLET BY MOUTH EVERY DAY IN THE EVENING 90 tablet 3   • amLODIPine (NORVASC) 10 MG Tab 10 mg every day.     • hydroCHLOROthiazide (HYDRODIURIL) 12.5 MG tablet 12.5 mg.     • valACYclovir (VALTREX) 500 MG Tab Take 1 tablet by mouth every day. Indications: Herpes Simplex Affecting the Lip 30 tablet 3   • Testosterone Powder Compounded testosterone 1 mg/gram.Direction: Apply 1 g topical 3 times per week.Quantity 40 g/91-day supply.No refills 40 g 0   • Cyanocobalamin (B-12 PO) Take 25,000 mcg by mouth every day.     • Multiple Vitamins-Minerals (ICAPS AREDS 2 PO) Take  by mouth.     • Biotin 77228 MCG Tab Take  by mouth every day.     • potassium chloride (MICRO-K) 10 MEQ capsule Take 2 Caps by mouth 2 times a day. (Patient taking differently: Take 10 mEq by mouth every day.) 180 Cap 3   • Cholecalciferol (VITAMIN D3) 2000 UNIT Cap Take 2,000 Units by mouth every day.     • MYRBETRIQ 25 MG TABLET SR 24 HR every day.     • TESTOSTERONE TD  "Apply  to skin as directed as needed.     • aspirin (ASA) 81 MG Chew Tab chewable tablet Take 1 Tab by mouth every day. 14 Tab 1   • calcium carbonate (OS-CRISTOFER 500) 500 MG Tab Take 1,000 mg by mouth every day.     • Multiple Vitamin (MULTIVITAMINS PO) Take 1 Tab by mouth every day.       No current facility-administered medications for this visit.       ROS    All others systems reviewed and negative.     Objective:     /70 (BP Location: Left arm, Patient Position: Sitting, BP Cuff Size: Adult)   Pulse 70   Resp 12   Ht 1.702 m (5' 7\")   Wt 59.4 kg (131 lb)   SpO2 99%  Body mass index is 20.52 kg/m².    General: No acute distress. Well nourished.  HEENT: EOM grossly intact, no scleral icterus, no pharyngeal erythema.   Neck:  No JVD at 90, no bruits, trachea midline  CVS: RRR, come ectopy. Normal S1, S2. 3/6 holosyt murmur at the apex No LE edema.  2+ radial pulses, 2+ PT pulses  Resp: CTAB. No wheezing or crackles/rhonchi. Normal respiratory effort.  Abdomen: Soft, NT, no ana rosa hepatomegaly.  MSK/Ext: No clubbing or cyanosis.  Skin: Warm and dry, no rashes.  Neurological: CN III-XII grossly intact. No focal deficits.   Psych: A&O x 3, appropriate affect, good judgement    Physical exam performed today and unchanged, except what is noted, compared to 1-8-21      Assessment:     1. Severe mitral regurgitation     2. MVP (mitral valve prolapse)     3. History of CVA (cerebrovascular accident)     4. LALY (obstructive sleep apnea)     5. Benign essential HTN     6. Long term (current) use of antithrombotics/antiplatelets     7. Dysphagia following cerebrovascular accident     8. HTN (hypertension), malignant     9. Dyslipidemia         Medical Decision Making:  Today's Assessment / Status / Plan:     -I really think we need a plan for her severe MR, EF supposed to be 65%, has dropped to 60% which is not terrible but not what it should be and therefore we are moving forward with plans to fix the MR.  -Has met " our MItraClip Team, our surgeon and also providers at [place], offered  REPAIR MR trial with MitraClip and RESTORE Trial, JESUS.  -BP better  -HR up a bit during pain but during pain  -CVA 3 years ago, at risk for afib with severe MR, we discussed loop again, it is reasonable.  -Cont ASA  -No CAD on [facility]  -Using a ring to monitor HR at night, no spikes  -RTC 3 months with me      Written instructions given today:      -For either trial, find out whether or not you could still have an open repair replacement later if you are involved in the clinical trial and do not get the best outcome which is to say you do not have successful control of the regurgitation or have resultant problems related to the transcatheter repair.     -Dr. Meehan may be able to coordinate some of your care with the REPAIR MR trial but I do not know for certain.    -[facility] in [place] may be the closest place that does robotic but [facility] also does robotic mitral valve repair.    -[facility]: [facility] [place] Resource & Information Nurse at [phone] or toll-free at [phone].    -REVEAL LINQ loop recorder to monitor for atrial fibrillation as a cause of stroke given mitral valve disease and given the location of stroke. This would  as we would change your aspirin to full blood thinner.           Return in about 3 months (around 7/22/2021).    It is my pleasure to participate in the care of Ms. Bocanegra.  Please do not hesitate to contact me with questions or concerns.    Ayde Denise MD, [facility]  Cardiologist [facility]    Please note that this dictation was created using voice recognition software. I have made every reasonable attempt to correct obvious errors, but it is possible there are errors of grammar and possibly content that I did not discover before finalizing the note.

## 2021-04-22 NOTE — LETTER
"     [facility]   [address], [address]  [place] [address]  Phone: [phone]  Fax: [phone]              Shaista Bocanegra  1945    Encounter Date: 4/22/2021    Ayde Denise M.D.          PROGRESS NOTE:  Subjective:   Chief Complaint:   Chief Complaint   Patient presents with   • Dyslipidemia   • Dysphagia     F/V Dx: Dysphagia following cerebrovascular accident       \"Artemio\" Shaista Bocanegra is a 76 y.o. female who returns for moderate to severe mitral regurgitation, hypertension, hyperlipidemia, prior CVA, HTN.    Admitted to the hospital 5/8/2020 with dehydration on diuretics.  Has left and right heart catheterization that admission move mitral clip for severe mitral regurgitation.  She is considering repair vs replacement.  Has met our MItraClip Team, our surgeon and also providers at [place], offered  REPAIR MR trial with MitraClip and RESTORE Trial, JESUS.  No discussion of robotic, probably at [place].    Surgery on foot, HR up a bit, 92-96. Has been in pain, getting better with accupunture.     Has HTN, /85, usually 125.  Started on amlodipine.  BP better, under 130, sometimes under 100, but no sxs.    Has LALY, was on CPAP, now using device only.  Using meditation and leep specialist to help with habits.    Prior CVA, some light tingling sensation remains.    Patient received mechanical thrombectomy 8/18/2017, felt to be due to right carotid disease.    Has hyperlipidemia, LDL 58.  No CAD on [facility] 2020.    Has a ring that monitors HR at night, we looked at data, no spikes.    Does pilates and has .  She is not limited by chest pain, pressure or tightness with activity.   No significant dyspnea on exertion but only if she moves slowly.  No orthopnea or lower extremity swelling.   No significant palpitations, lightheadedness, or presyncope/syncope.   No symptoms of leg claudication.   No new stroke/TIA like symptoms.  Does note " brain fog.  Remote syncope in the setting of low K.    No family history of premature coronary artery disease.  No prior smoking history.  No history of diabetes.  No history of autoimmune disease such as lupus or rheumatoid arthritis.  No chronic kidney disease.  No ETOH overuse.   No caffeine overuse.  No recreation substance use.    Has lost some weight.    Lives in [place].  She is a .    DATA REVIEWED by me:  ECG 5-  Sinus, 65, PVC, first-degree AV delay    Zio 2017 2 weeks.  Sinus, brief atrial run.    ABHINAV 1-26-21  LV EF  55%.  Biatrial enlargement.  There is severe eccentric mitral regurgitation with multiple jets,   predominantly from flail leaflet of P2.  Echolucent space near P1 of unclear etiology, unusual for cleft   leaflet.  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    Echo 11-  Left ventricular ejection fraction is visually estimated to be 60%.  Severely dilated left atrium.  Myxomatous changes of the mitral valve.  Bileaflet prolapse of the mitral valve is present.  Severe mitral regurgitation with pulmonary vein systolic flow reversal,   RV 73 mL, ERO 0.4  cm2.  Estimated right ventricular systolic pressure  is 30 mmHg.  Compared to the images of the prior study done 2/3/2020, no significant    change.    Echo 2/3/2020  Prior echo done on 05/03/2019. Compared to the images of the prior   study done -  there has been no significant change.   Normal left ventricular systolic function.  Left ventricular ejection fraction is visually estimated to be 65%.  Prolapse of the mitral leaflets was present.  Severe mitral regurgitation.  Mild tricuspid regurgitation.  Estimated right ventricular systolic pressure is 35 mmHg.    Echo 2017  Agitated saline study was performed, no evidence of right to left   shunt.  Normal left ventricular size, wall thickness, and systolic function.  Left ventricular ejection fraction is visually estimated to be 60%.  Mildly dilated left atrium.  Moderate  mitral regurgitation due to an eccentric jet.  Mild tricuspid regurgitation.    Left and right heart catheterization 5/8/2020  Right ventricle 33/5, EDP 15  Pulmonary artery 42/21, 29  pulmonary capillary wedge 23  Cardiac output 4.1 L/min by thermodilution method     1.  Left main coronary artery:  Normal.  2.  Left anterior descending artery:  Normal.   3.  Left circumflex coronary artery:  Normal.   4.  Right coronary artery:  Normal.  This is a right dominant system.  5.  Left ventricular end diastolic pressure:  25 mmHg.  No signficant gradient across the aortic valve.  6.  Left ventriculogram:  Ejection fraction of 65%, 2+ mitral regurgitation, normal sized thoracic aorta.     2017 CTA NECK   1.  CT angiogram of the neck within normal limits.  2.  Thrombosed right M1 segment.     2017 CAROTID DUPLEX CONCLUSIONS   nl carotids, subclavians and vertebral's     2017 MRI BRAIN  1.  Moderate sized RIGHT MCA territory acute ischemia involving the cortex and basal ganglia  2.  Flow void is present in the RIGHT MCA compatible with treatment effect  3.  No hemorrhage  4.  Mild white matter changes  5.  Mild atrophy    Most recent labs:       Lab Results   Component Value Date/Time    HEMOGLOBIN 13.6 03/24/2021 07:05 AM    HEMATOCRIT 40.9 03/24/2021 07:05 AM    .5 (H) 03/24/2021 07:05 AM    INR 1.01 05/08/2020 09:33 AM      Lab Results   Component Value Date/Time    SODIUM 141 03/25/2021 01:50 PM    POTASSIUM 4.0 03/25/2021 01:50 PM    CHLORIDE 103 03/25/2021 01:50 PM    CO2 29 03/25/2021 01:50 PM    GLUCOSE 101 (H) 03/25/2021 01:50 PM    BUN 17 03/25/2021 01:50 PM    CREATININE 0.76 03/25/2021 01:50 PM      Lab Results   Component Value Date/Time    ASTSGOT 24 03/25/2021 01:50 PM    ALTSGPT 25 03/25/2021 01:50 PM    ALBUMIN 4.4 03/25/2021 01:50 PM      Lab Results   Component Value Date/Time    CHOLSTRLTOT 120 03/24/2021 07:05 AM    LDL 56 03/24/2021 07:05 AM    HDL 55 03/24/2021 07:05 AM    TRIGLYCERIDE 47  03/24/2021 07:05 AM           Past Medical History:   Diagnosis Date   • Arthritis    • Atrial fibrillation (HCC)    • Benign essential HTN 3/19/2012   • Breast cancer (HCC)    • Cancer (HCC) 1998    breast    • Cardiac arrhythmia    • Chest tightness or pressure 3/19/2012   • Chickenpox    • Coronary heart disease    • Nepali measles    • Gynecological disorder    • High cholesterol    • High risk medication use 3/19/2012   • Hypercholesterolemia 3/19/2012   • Mumps    • MVP (mitral valve prolapse) 3/19/2012   • Osteoporosis    • Sleep apnea     no CPAP   • Snoring    • Stroke (HCC) 2017    residual minor weakness on left side   • Substance abuse (HCC)    • Tonsillitis    • Urinary bladder disorder     OAB   • Urinary incontinence    • Valvular heart disease    • Venereal disease      Past Surgical History:   Procedure Laterality Date   • CATARACT PHACO WITH IOL  3/16/2009    Performed by SHANNON GANDARA at [facility]   • CATARACT PHACO WITH IOL  1/26/2009    Performed by SHANNON GANDARA at [facility]   • BREAST BIOPSY     • HYSTERECTOMY LAPAROSCOPY     • LUMPECTOMY     • PB RADIATION THERAPY PLAN SIMPLE     • PB REMV 2ND CATARACT,CORN-SCLER SECTN     • [place] CHEMOTHERAPY, UNSPECIFIED PROCEDURE     • PRIMARY C SECTION     • SINUSCOPE     • TONSILLECTOMY       Family History   Problem Relation Age of Onset   • Cancer Mother         breast   • No Known Problems Brother    • Heart Failure Neg Hx    • Heart Disease Neg Hx      Social History     Socioeconomic History   • Marital status:      Spouse name: Not on file   • Number of children: 2   • Years of education: Not on file   • Highest education level: Not on file   Occupational History   • Not on file   Tobacco Use   • Smoking status: Never Smoker   • Smokeless tobacco: Never Used   Substance and Sexual Activity   • Alcohol use: Not Currently     Comment: not since March 1st 2021   • Drug use: No   • Sexual activity: Yes      Partners: Male     Birth control/protection: Female Sterilization   Other Topics Concern   • Not on file   Social History Narrative   • Not on file     Social Determinants of Health     Financial Resource Strain:    • Difficulty of Paying Living Expenses:    Food Insecurity:    • Worried About Running Out of Food in the Last Year:    • Ran Out of Food in the Last Year:    Transportation Needs:    • Lack of Transportation (Medical):    • Lack of Transportation (Non-Medical):    Physical Activity:    • Days of Exercise per Week:    • Minutes of Exercise per Session:    Stress:    • Feeling of Stress :    Social Connections:    • Frequency of Communication with Friends and Family:    • Frequency of Social Gatherings with Friends and Family:    • Attends Baptism Services:    • Active Member of Clubs or Organizations:    • Attends Club or Organization Meetings:    • Marital Status:    Intimate Partner Violence:    • Fear of Current or Ex-Partner:    • Emotionally Abused:    • Physically Abused:    • Sexually Abused:      No Known Allergies    Current Outpatient Medications   Medication Sig Dispense Refill   • SODIUM FLUORIDE, DENTAL GEL, (PREVIDENT) 1.1 % Gel Apply  to teeth every day. Prevident     • atorvastatin (LIPITOR) 80 MG tablet TAKE 1 TABLET BY MOUTH EVERY DAY IN THE EVENING 90 tablet 3   • amLODIPine (NORVASC) 10 MG Tab 10 mg every day.     • hydroCHLOROthiazide (HYDRODIURIL) 12.5 MG tablet 12.5 mg.     • valACYclovir (VALTREX) 500 MG Tab Take 1 tablet by mouth every day. Indications: Herpes Simplex Affecting the Lip 30 tablet 3   • Testosterone Powder Compounded testosterone 1 mg/gram.Direction: Apply 1 g topical 3 times per week.Quantity 40 g/91-day supply.No refills 40 g 0   • Cyanocobalamin (B-12 PO) Take 25,000 mcg by mouth every day.     • Multiple Vitamins-Minerals (ICAPS AREDS 2 PO) Take  by mouth.     • Biotin 80333 MCG Tab Take  by mouth every day.     • potassium chloride (MICRO-K) 10 MEQ  "capsule Take 2 Caps by mouth 2 times a day. (Patient taking differently: Take 10 mEq by mouth every day.) 180 Cap 3   • Cholecalciferol (VITAMIN D3) 2000 UNIT Cap Take 2,000 Units by mouth every day.     • MYRBETRIQ 25 MG TABLET SR 24 HR every day.     • TESTOSTERONE TD Apply  to skin as directed as needed.     • aspirin (ASA) 81 MG Chew Tab chewable tablet Take 1 Tab by mouth every day. 14 Tab 1   • calcium carbonate (OS-CRISTOFER 500) 500 MG Tab Take 1,000 mg by mouth every day.     • Multiple Vitamin (MULTIVITAMINS PO) Take 1 Tab by mouth every day.       No current facility-administered medications for this visit.       ROS    All others systems reviewed and negative.     Objective:     /70 (BP Location: Left arm, Patient Position: Sitting, BP Cuff Size: Adult)   Pulse 70   Resp 12   Ht 1.702 m (5' 7\")   Wt 59.4 kg (131 lb)   SpO2 99%  Body mass index is 20.52 kg/m².    General: No acute distress. Well nourished.  HEENT: EOM grossly intact, no scleral icterus, no pharyngeal erythema.   Neck:  No JVD at 90, no bruits, trachea midline  CVS: RRR, come ectopy. Normal S1, S2. 3/6 holosyt murmur at the apex No LE edema.  2+ radial pulses, 2+ PT pulses  Resp: CTAB. No wheezing or crackles/rhonchi. Normal respiratory effort.  Abdomen: Soft, NT, no ana rosa hepatomegaly.  MSK/Ext: No clubbing or cyanosis.  Skin: Warm and dry, no rashes.  Neurological: CN III-XII grossly intact. No focal deficits.   Psych: A&O x 3, appropriate affect, good judgement    Physical exam performed today and unchanged, except what is noted, compared to 1-8-21      Assessment:     1. Severe mitral regurgitation     2. MVP (mitral valve prolapse)     3. History of CVA (cerebrovascular accident)     4. LALY (obstructive sleep apnea)     5. Benign essential HTN     6. Long term (current) use of antithrombotics/antiplatelets     7. Dysphagia following cerebrovascular accident     8. HTN (hypertension), malignant     9. Dyslipidemia         Medical " Decision Making:  Today's Assessment / Status / Plan:     -I really think we need a plan for her severe MR, EF supposed to be 65%, has dropped to 60% which is not terrible but not what it should be and therefore we are moving forward with plans to fix the MR.  -Has met our MItraClip Team, our surgeon and also providers at [place], offered  REPAIR MR trial with MitraClip and RESTORE Trial, JESUS.  -BP better  -HR up a bit during pain but during pain  -CVA 3 years ago, at risk for afib with severe MR, we discussed loop again, it is reasonable.  -Cont ASA  -No CAD on LHC  -Using a ring to monitor HR at night, no spikes  -RTC 3 months with me      Written instructions given today:      -For either trial, find out whether or not you could still have an open repair replacement later if you are involved in the clinical trial and do not get the best outcome which is to say you do not have successful control of the regurgitation or have resultant problems related to the transcatheter repair.     -Dr. Meehan may be able to coordinate some of your care with the REPAIR MR trial but I do not know for certain.    -[facility] in [place] may be the closest place that does robotic but [facility] also does robotic mitral valve repair.    -[facility]: [facility] Heart, Vascular & Thoracic [place] Resource & Information Nurse at [phone] or toll-free at [phone].    -REVEAL LINQ loop recorder to monitor for atrial fibrillation as a cause of stroke given mitral valve disease and given the location of stroke. This would  as we would change your aspirin to full blood thinner.           Return in about 3 months (around 7/22/2021).    It is my pleasure to participate in the care of Ms. Bocanegra.  Please do not hesitate to contact me with questions or concerns.    Ayde Denise MD, [facility]  Cardiologist [facility]    Please note that this dictation was created  using voice recognition software. I have made every reasonable attempt to correct obvious errors, but it is possible there are errors of grammar and possibly content that I did not discover before finalizing the note.        Bethany Crane M.D.  [address]  [place] [address]  Via Fax: [phone]     Darin Meehan M.D.  [address]  [address]  [place] [address]  Via In Basket

## 2021-04-22 NOTE — PATIENT INSTRUCTIONS
-For either trial, find out whether or not you could still have an open repair replacement later if you are involved in the clinical trial and do not get the best outcome which is to say you do not have successful control of the regurgitation or have resultant problems related to the transcatheter repair.     -Dr. Meehan may be able to coordinate some of your care with the REPAIR MR trial but I do not know for certain.    -[facility] in [place] may be the closest place that does robotic but [facility] also does robotic mitral valve repair.    -[facility]: [facility] Heart, Vascular & Thoracic [place] Resource & Information Nurse at [phone] or toll-free at [phone].    -REVEAL LINQ loop recorder to monitor for atrial fibrillation as a cause of stroke given mitral valve disease and given the location of stroke. This would  as we would change your aspirin to full blood thinner.

## 2021-04-23 ENCOUNTER — TELEPHONE (OUTPATIENT)
Dept: OBGYN | Facility: CLINIC | Age: 76
End: 2021-04-23

## 2021-04-23 NOTE — TELEPHONE ENCOUNTER
Pt called Asking for an update on her RX. I called pt to inform her that Dr Sparks looked through her chart and since she does have heart issues, she is not a good candidate for the testosterone she was requesting. Pt understands and agrees. Pt asked what she could do about vaginal dryness. I consulted with Dr Sparks and he explained she should try phfresh, and that this is over the counter. I explained if this doesn't help then she should make an appt with us to f/u. Pt understands and agrees.

## 2021-04-28 ENCOUNTER — OFFICE VISIT (OUTPATIENT)
Dept: CARDIOLOGY | Facility: MEDICAL CENTER | Age: 76
End: 2021-04-28
Payer: COMMERCIAL

## 2021-04-28 VITALS
HEIGHT: 67 IN | WEIGHT: 131 LBS | RESPIRATION RATE: 14 BRPM | SYSTOLIC BLOOD PRESSURE: 90 MMHG | OXYGEN SATURATION: 92 % | DIASTOLIC BLOOD PRESSURE: 78 MMHG | BODY MASS INDEX: 20.56 KG/M2 | HEART RATE: 64 BPM

## 2021-04-28 DIAGNOSIS — I34.0 SEVERE MITRAL REGURGITATION: ICD-10-CM

## 2021-04-28 DIAGNOSIS — E78.5 DYSLIPIDEMIA: ICD-10-CM

## 2021-04-28 PROCEDURE — 99214 OFFICE O/P EST MOD 30 MIN: CPT | Performed by: INTERNAL MEDICINE

## 2021-04-28 RX ORDER — ESTRADIOL 0.1 MG/G
CREAM VAGINAL DAILY
COMMUNITY
End: 2021-06-09

## 2021-04-28 ASSESSMENT — ENCOUNTER SYMPTOMS
DOUBLE VISION: 0
MYALGIAS: 0
WEIGHT LOSS: 0
GASTROINTESTINAL NEGATIVE: 1
VOMITING: 0
CLAUDICATION: 0
PALPITATIONS: 0
FOCAL WEAKNESS: 0
CHILLS: 0
NEUROLOGICAL NEGATIVE: 1
SHORTNESS OF BREATH: 0
PSYCHIATRIC NEGATIVE: 1
DIZZINESS: 0
BLURRED VISION: 0
CONSTITUTIONAL NEGATIVE: 1
EYES NEGATIVE: 1
BRUISES/BLEEDS EASILY: 0
HEADACHES: 0
DEPRESSION: 0
COUGH: 0
WEAKNESS: 0
CARDIOVASCULAR NEGATIVE: 1
RESPIRATORY NEGATIVE: 1
MUSCULOSKELETAL NEGATIVE: 1
FEVER: 0
ABDOMINAL PAIN: 0
NAUSEA: 0
NERVOUS/ANXIOUS: 0

## 2021-04-28 ASSESSMENT — FIBROSIS 4 INDEX: FIB4 SCORE: 1.96

## 2021-04-28 NOTE — PROGRESS NOTES
Chief Complaint   Patient presents with   • Dyslipidemia     F/V    • Mitral Valve Prolapse     F/V       Subjective:   Shaista Bocanegra is a 76 y.o. female who presents today for follow up of mitral regurgitation.    Since the patient's last visit on 02/06/21, she has been doing well clinically. She denies fatigue, shortness of breath, dyspnea on exertion, chest pain, dizziness or syncope. She was evaluated at [facility] and they are considering open verses minimally invasive mitral valve repair verses Sotomayor Harpoon bating heart mitral valve repair.    Past Medical History:   Diagnosis Date   • Arthritis    • Atrial fibrillation (HCC)    • Benign essential HTN 3/19/2012   • Breast cancer (HCC)    • Cancer (HCC) 1998    breast    • Cardiac arrhythmia    • Chest tightness or pressure 3/19/2012   • Chickenpox    • Coronary heart disease    • [place] measles    • Gynecological disorder    • High cholesterol    • High risk medication use 3/19/2012   • Hypercholesterolemia 3/19/2012   • Mumps    • MVP (mitral valve prolapse) 3/19/2012   • Osteoporosis    • Sleep apnea     no CPAP   • Snoring    • Stroke (HCC) 2017    residual minor weakness on left side   • Substance abuse (HCC)    • Tonsillitis    • Urinary bladder disorder     OAB   • Urinary incontinence    • Valvular heart disease    • Venereal disease      Past Surgical History:   Procedure Laterality Date   • CATARACT PHACO WITH IOL  3/16/2009    Performed by SHANNON GANDARA at SURGERY SAME DAY [facility] ORS   • CATARACT PHACO WITH IOL  1/26/2009    Performed by SHANNON GANDARA at SURGERY SAME DAY [facility] ORS   • BREAST BIOPSY     • HYSTERECTOMY LAPAROSCOPY     • LUMPECTOMY     • PB RADIATION THERAPY PLAN SIMPLE     • PB REMV 2ND CATARACT,CORN-SCLER SECTN     • UT CHEMOTHERAPY, UNSPECIFIED PROCEDURE     • PRIMARY C SECTION     • SINUSCOPE     • TONSILLECTOMY       Family History   Problem Relation Age of Onset   • Cancer Mother         breast   • No  Known Problems Brother    • Heart Failure Neg Hx    • Heart Disease Neg Hx      Social History     Socioeconomic History   • Marital status:      Spouse name: Not on file   • Number of children: 2   • Years of education: Not on file   • Highest education level: Not on file   Occupational History   • Not on file   Tobacco Use   • Smoking status: Never Smoker   • Smokeless tobacco: Never Used   Substance and Sexual Activity   • Alcohol use: Not Currently     Comment: not since March 1st 2021   • Drug use: No   • Sexual activity: Yes     Partners: Male     Birth control/protection: Female Sterilization   Other Topics Concern   • Not on file   Social History Narrative   • Not on file     Social Determinants of Health     Financial Resource Strain:    • Difficulty of Paying Living Expenses:    Food Insecurity:    • Worried About Running Out of Food in the Last Year:    • Ran Out of Food in the Last Year:    Transportation Needs:    • Lack of Transportation (Medical):    • Lack of Transportation (Non-Medical):    Physical Activity:    • Days of Exercise per Week:    • Minutes of Exercise per Session:    Stress:    • Feeling of Stress :    Social Connections:    • Frequency of Communication with Friends and Family:    • Frequency of Social Gatherings with Friends and Family:    • Attends Denominational Services:    • Active Member of Clubs or Organizations:    • Attends Club or Organization Meetings:    • Marital Status:    Intimate Partner Violence:    • Fear of Current or Ex-Partner:    • Emotionally Abused:    • Physically Abused:    • Sexually Abused:      No Known Allergies     (Medications reviewed.)  Outpatient Encounter Medications as of 4/28/2021   Medication Sig Dispense Refill   • estradiol (ESTRACE VAGINAL) 0.1 MG/GM vaginal cream Insert  into the vagina every day.     • SODIUM FLUORIDE, DENTAL GEL, (PREVIDENT) 1.1 % Gel Apply  to teeth every day. Prevident     • atorvastatin (LIPITOR) 80 MG tablet TAKE 1  TABLET BY MOUTH EVERY DAY IN THE EVENING 90 tablet 3   • amLODIPine (NORVASC) 10 MG Tab 10 mg every day.     • hydroCHLOROthiazide (HYDRODIURIL) 12.5 MG tablet 12.5 mg.     • valACYclovir (VALTREX) 500 MG Tab Take 1 tablet by mouth every day. Indications: Herpes Simplex Affecting the Lip 30 tablet 3   • Testosterone Powder Compounded testosterone 1 mg/gram.Direction: Apply 1 g topical 3 times per week.Quantity 40 g/91-day supply.No refills 40 g 0   • Cyanocobalamin (B-12 PO) Take 25,000 mcg by mouth every day.     • Multiple Vitamins-Minerals (ICAPS AREDS 2 PO) Take  by mouth.     • Biotin 28242 MCG Tab Take  by mouth every day.     • potassium chloride (MICRO-K) 10 MEQ capsule Take 2 Caps by mouth 2 times a day. (Patient taking differently: Take 10 mEq by mouth every day.) 180 Cap 3   • Cholecalciferol (VITAMIN D3) 2000 UNIT Cap Take 2,000 Units by mouth every day.     • MYRBETRIQ 25 MG TABLET SR 24 HR every day.     • TESTOSTERONE TD Apply  to skin as directed as needed.     • aspirin (ASA) 81 MG Chew Tab chewable tablet Take 1 Tab by mouth every day. 14 Tab 1   • calcium carbonate (OS-TRAY 500) 500 MG Tab Take 1,000 mg by mouth every day. Indications: supplement-Allge Tray     • Multiple Vitamin (MULTIVITAMINS PO) Take 1 Tab by mouth every day.       No facility-administered encounter medications on file as of 4/28/2021.     Review of Systems   Constitutional: Negative.  Negative for chills, fever, malaise/fatigue and weight loss.   HENT: Negative.  Negative for hearing loss.    Eyes: Negative.  Negative for blurred vision and double vision.   Respiratory: Negative.  Negative for cough and shortness of breath.    Cardiovascular: Negative.  Negative for chest pain, palpitations, claudication and leg swelling.   Gastrointestinal: Negative.  Negative for abdominal pain, nausea and vomiting.   Genitourinary: Negative.  Negative for dysuria and urgency.   Musculoskeletal: Negative.  Negative for joint pain and myalgias.  "  Skin: Negative.  Negative for itching and rash.   Neurological: Negative.  Negative for dizziness, focal weakness, weakness and headaches.   Endo/Heme/Allergies: Negative.  Does not bruise/bleed easily.   Psychiatric/Behavioral: Negative.  Negative for depression. The patient is not nervous/anxious.         Objective:   BP (!) 90/78 (BP Location: Left arm, Patient Position: Sitting, BP Cuff Size: Adult)   Pulse 64   Resp 14   Ht 1.702 m (5' 7\")   Wt 59.4 kg (131 lb)   SpO2 92%   BMI 20.52 kg/m²     Physical Exam   Constitutional: She is oriented to person, place, and time. She appears well-developed and well-nourished.   HENT:   Head: Normocephalic and atraumatic.   Eyes: EOM are normal.   Neck: No JVD present.   Cardiovascular: Normal rate and regular rhythm.   Murmur heard.  Pulmonary/Chest: Effort normal and breath sounds normal.   Abdominal: Soft. Bowel sounds are normal.   No hepatosplenomegaly.   Musculoskeletal:         General: Normal range of motion.   Lymphadenopathy:     She has no cervical adenopathy.   Neurological: She is alert and oriented to person, place, and time.   Skin: Skin is warm and dry.   Psychiatric: She has a normal mood and affect.     CARDIAC STUDIES/PROCEDURES:     CARDIAC CATHETERIZATION CONCLUSIONS by Juventino Farah (05/08/20)  Angiographically normal appearing coronary arteries.  Mildly elevated LVEDP, filling pressures.  2+ mitral regurgitation.  Normal LV function.    CAROTID ULTRASOUND (08/18/17)  Normal carotids, subclavians and vertebrals.     CT OF HEAD (05/03/19)  NO ACUTE ABNORMALITIES ARE NOTED ON CT SCAN OF THE HEAD.  Right-sided encephalomalacia is noted.     CTA OF HEAD (08/18/17)  1.  There is a right M1 segment occlusion.  2.  There is collateral flow in the peripheral M3 branches seen on the right.  3.  There is mild decreased enhancement of the visualized right internal carotid artery however it is patent.  4.  Question of subtle hypodensity in the " right insular cortex region. No abnormal brain parenchymal enhancement is seen.     CTA OF NECK (08/18/17)  1.  CT angiogram of the neck within normal limits.  2.  Thrombosed right M1 segment.     ECHOCARDIOGRAM CONCLUSIONS (02/03/20)  Prior echo done on 05/03/2019. Compared to the images of the prior   study done -  there has been no significant change.   Normal left ventricular systolic function.  Left ventricular ejection fraction is visually estimated to be 65%.  Prolapse of the mitral leaflets was present.  Severe mitral regurgitation.  Mild tricuspid regurgitation.  Estimated right ventricular systolic pressure is 35 mmHg.     ECHOCARDIOGRAM CONCLUSIONS (05/03/19)  Prior echocardiogram 6/14/2018.  Normal left ventricular systolic function.   Mild to moderate eccentric mitral regurgitation.  Compared to the images of the study done - there has been slight improvement in mitral regurgitation.     ECHOCARDIOGRAM CONCLUSIONS (06/14/18)  Normal left ventricular systolic function.  Left ventricular ejection fraction is visually estimated to be 60%.  Myxomatous changes of the mitral valve.  Prolapse of the anterior and posterior mitral leaflets.  Severe, eccentric mitral regurgitation.  Right heart pressures are normal.  Compared to the report of the study done 8/19/2017 severe mitral regurgitation is now present, previously reported as moderate but a MR   ERO was not recorded.      EKG performed on (05/09/20) EKG shows sinus rhythm with premature ventricular contractions.  EKG performed on (05/08/20) EKG shows sinus rhythm with premature ventricular contractions.     Laboratory results of (03/24/20) Cholesterol profile of 144/67/73/58 mg/dL noted.     TRANSESOPHAGEAL ECHOCARDIOGRAM CONCLUSIONS by Ayde Lopes (01/26/21)  LV EF 55%.  Biatrial enlargement.  There is severe eccentric mitral regurgitation with multiple jets, predominantly from flail leaflet of P2.  Echolucent space near P1 of unclear etiology,  unusual for cleft leaflet.  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    Assessment:     1. Severe mitral regurgitation     2. Dyslipidemia         Medical Decision Making:  Today's Assessment / Status / Plan:     1. Mitral regurgitation: Her mitral regurgitation has reached severe status on her recent echocardiogram. She was evaluated by both Kaden Ernst and [facility]. She is still considering her options. She understands the risks and benefits and agrees with plan. Of note, her STS score 2.2% per [facility] Ernst.  2. Hyperlipidemia: She is doing well on statin therapy without myalgia symptoms.  3. History of cerebrovascular accident with right carotid arteriography with mechanical thrombectomy (08/18/17): She remains clinically stable without recurrence of stroke symptoms.  4. Additional information: She is family of Casey Cosme.     We will follow up in two months.     CC Bethany Ambrose and Ayde Lopes

## 2021-05-19 ENCOUNTER — TELEPHONE (OUTPATIENT)
Dept: CARDIOLOGY | Facility: MEDICAL CENTER | Age: 76
End: 2021-05-19

## 2021-05-19 DIAGNOSIS — I48.91 ATRIAL FIBRILLATION, UNSPECIFIED TYPE (HCC): ICD-10-CM

## 2021-05-19 NOTE — TELEPHONE ENCOUNTER
LILI    Pt called and states that she saw Dr Dukes yesterday 5/19 and did an EKG there. Pt was asked to call us to discuss the ABHINAV results from 1/26. Pt also states that she wants to participate in a [place] Study and the results of the test could alter it..   Pt wants discuss Blood Thinner medication and see if that is something she should be taking. Please call pt back at [phone].    Thank you.

## 2021-05-26 ENCOUNTER — OFFICE VISIT (OUTPATIENT)
Dept: SLEEP MEDICINE | Facility: MEDICAL CENTER | Age: 76
End: 2021-05-26
Payer: COMMERCIAL

## 2021-05-26 VITALS
RESPIRATION RATE: 18 BRPM | WEIGHT: 132 LBS | OXYGEN SATURATION: 96 % | SYSTOLIC BLOOD PRESSURE: 98 MMHG | HEIGHT: 67 IN | HEART RATE: 102 BPM | DIASTOLIC BLOOD PRESSURE: 58 MMHG | BODY MASS INDEX: 20.72 KG/M2

## 2021-05-26 DIAGNOSIS — F51.04 CHRONIC INSOMNIA: ICD-10-CM

## 2021-05-26 DIAGNOSIS — G47.33 OSA (OBSTRUCTIVE SLEEP APNEA): ICD-10-CM

## 2021-05-26 PROCEDURE — 99213 OFFICE O/P EST LOW 20 MIN: CPT | Performed by: INTERNAL MEDICINE

## 2021-05-26 RX ORDER — ZOLPIDEM TARTRATE 5 MG/1
5 TABLET ORAL NIGHTLY PRN
Qty: 30 TABLET | Refills: 0 | Status: SHIPPED | OUTPATIENT
Start: 2021-05-26 | End: 2021-05-31

## 2021-05-26 ASSESSMENT — FIBROSIS 4 INDEX: FIB4 SCORE: 1.96

## 2021-05-26 NOTE — PROGRESS NOTES
Chief Complaint   Patient presents with   • Follow-Up     6m FV, last seen 10/21/20 by Dr Weems for LALY,Insomnia, unspecified type     HPI: Tushar is a 76 y.o. female who returns for obstructive sleep apnea.  She has mild LALY with AHI: 11/h, and was prescribed CPAP therapy which she finds cumbersome to use.  She switched to a mandibular advancement device however suffers from jaw pain precluding its use.  She struggles with chronic sleep maintenance insomnia.  She maintains good sleep hygiene. She established with a sleep psychologist and started meditation, with subjective benefit.  She takes meticulous notes re her sleep habits and CPAP data. Review of records show normal AHI on treatment. Compliance chip is unavailable.    Past Medical History:   Diagnosis Date   • Arthritis    • Atrial fibrillation (HCC)    • Benign essential HTN 3/19/2012   • Breast cancer (HCC)    • Cancer (HCC) 1998    breast    • Cardiac arrhythmia    • Chest tightness or pressure 3/19/2012   • Chickenpox    • Coronary heart disease    • Japanese measles    • Gynecological disorder    • High cholesterol    • High risk medication use 3/19/2012   • Hypercholesterolemia 3/19/2012   • Mumps    • MVP (mitral valve prolapse) 3/19/2012   • Osteoporosis    • Sleep apnea     no CPAP   • Snoring    • Stroke (HCC) 2017    residual minor weakness on left side   • Substance abuse (HCC)    • Tonsillitis    • Urinary bladder disorder     OAB   • Urinary incontinence    • Valvular heart disease    • Venereal disease        Social History     Socioeconomic History   • Marital status:      Spouse name: Not on file   • Number of children: 2   • Years of education: Not on file   • Highest education level: Not on file   Occupational History   • Not on file   Tobacco Use   • Smoking status: Never Smoker   • Smokeless tobacco: Never Used   Vaping Use   • Vaping Use: Never used   Substance and Sexual Activity   • Alcohol use: Not Currently     Comment: not since  March 1st 2021   • Drug use: No   • Sexual activity: Yes     Partners: Male     Birth control/protection: Female Sterilization   Other Topics Concern   • Not on file   Social History Narrative   • Not on file     Social Determinants of Health     Financial Resource Strain:    • Difficulty of Paying Living Expenses:    Food Insecurity:    • Worried About Running Out of Food in the Last Year:    • Ran Out of Food in the Last Year:    Transportation Needs:    • Lack of Transportation (Medical):    • Lack of Transportation (Non-Medical):    Physical Activity:    • Days of Exercise per Week:    • Minutes of Exercise per Session:    Stress:    • Feeling of Stress :    Social Connections:    • Frequency of Communication with Friends and Family:    • Frequency of Social Gatherings with Friends and Family:    • Attends Gnosticist Services:    • Active Member of Clubs or Organizations:    • Attends Club or Organization Meetings:    • Marital Status:    Intimate Partner Violence:    • Fear of Current or Ex-Partner:    • Emotionally Abused:    • Physically Abused:    • Sexually Abused:        Family History   Problem Relation Age of Onset   • Cancer Mother         breast   • No Known Problems Brother    • Heart Failure Neg Hx    • Heart Disease Neg Hx        Current Outpatient Medications on File Prior to Visit   Medication Sig Dispense Refill   • estradiol (ESTRACE VAGINAL) 0.1 MG/GM vaginal cream Insert  into the vagina every day.     • SODIUM FLUORIDE, DENTAL GEL, (PREVIDENT) 1.1 % Gel Apply  to teeth every day. Prevident     • atorvastatin (LIPITOR) 80 MG tablet TAKE 1 TABLET BY MOUTH EVERY DAY IN THE EVENING 90 tablet 3   • amLODIPine (NORVASC) 10 MG Tab 10 mg every day.     • hydroCHLOROthiazide (HYDRODIURIL) 12.5 MG tablet 12.5 mg.     • valACYclovir (VALTREX) 500 MG Tab Take 1 tablet by mouth every day. Indications: Herpes Simplex Affecting the Lip 30 tablet 3   • Cyanocobalamin (B-12 PO) Take 25,000 mcg by mouth every  "day.     • Multiple Vitamins-Minerals (ICAPS AREDS 2 PO) Take  by mouth.     • Biotin 30255 MCG Tab Take  by mouth every day.     • potassium chloride (MICRO-K) 10 MEQ capsule Take 2 Caps by mouth 2 times a day. (Patient taking differently: Take 10 mEq by mouth every day.) 180 Cap 3   • Cholecalciferol (VITAMIN D3) 2000 UNIT Cap Take 2,000 Units by mouth every day.     • MYRBETRIQ 25 MG TABLET SR 24 HR every day.     • aspirin (ASA) 81 MG Chew Tab chewable tablet Take 1 Tab by mouth every day. 14 Tab 1   • calcium carbonate (OS-TRAY 500) 500 MG Tab Take 1,000 mg by mouth every day. Indications: supplement-Allge Tray     • Multiple Vitamin (MULTIVITAMINS PO) Take 1 Tab by mouth every day.     • Testosterone Powder Compounded testosterone 1 mg/gram.Direction: Apply 1 g topical 3 times per week.Quantity 40 g/91-day supply.No refills (Patient not taking: Reported on 5/26/2021) 40 g 0   • TESTOSTERONE TD Apply  to skin as directed as needed. (Patient not taking: Reported on 5/26/2021)       No current facility-administered medications on file prior to visit.       Allergies: Patient has no known allergies.    ROS:   Constitutional: Denies fevers, chills, night sweats, fatigue or weight loss  Eyes: Denies vision loss, pain, drainage, double vision  Ears, Nose, Throat: Denies earache, difficulty hearing, tinnitus, nasal congestion, hoarseness  Cardiovascular: Denies chest pain, tightness, palpitations, orthopnea or edema  Respiratory: Denies shortness of breath, cough, wheezing, hemoptysis  Sleep:AS in HPI  GI: Denies heartburn, dysphagia, nausea, abdominal pain, diarrhea or constipation  : Denies frequent urination, hematuria, discharge or painful urination  Musculoskeletal: Denies back pain, painful joints, sore muscles  Neurological: Denies weakness or headaches  Skin: No rashes    BP (!) 98/58   Pulse (!) 102   Resp 18   Ht 1.689 m (5' 6.5\")   Wt 59.9 kg (132 lb)   SpO2 96%     Physical Exam:  Appearance: " Well-nourished, well-developed, in no acute distress  HEENT: Normocephalic, atraumatic, white sclera, PERRLA, masked  Neck: No adenopathy or masses  Respiratory: no intercostal retractions or accessory muscle use  Lungs auscultation: Clear to auscultation bilaterally  Cardiovascular: Regular rate rhythm. No murmurs, rubs or gallops.  No LE edema  Abdomen: soft, nondistended  Gait: Normal  Digits: No clubbing, cyanosis  Motor: No focal deficits  Orientation: Oriented to time, person and place    Diagnosis:  1. LALY (obstructive sleep apnea)  zolpidem (AMBIEN) 5 MG Tab   2. Chronic insomnia         Plan:  Shaista shows excellent compliance with CPAP therapy although finds treatment somewhat cumbersome, particularly during travels.  Unfortunately she cannot tolerate an oral appliance.  She maintains meticulous records and has excellent sleep hygiene.  She has successfully been using cognitive behavioral therapy for chronic insomnia, with subjective benefit.  Recommend continuing CPAP use as well as CBT.  She is concerned regarding worsening insomnia during travels and would like Ambien on hand only for travel purposes.  Prescription submitted for Ambien 5 mg nightly #30 tabs, 0 refills.  Ambien use recommended sparingly for travels.  Return in about 6 months (around 11/26/2021).

## 2021-05-28 ENCOUNTER — TELEPHONE (OUTPATIENT)
Dept: CARDIOLOGY | Facility: MEDICAL CENTER | Age: 76
End: 2021-05-28

## 2021-05-28 ENCOUNTER — PATIENT MESSAGE (OUTPATIENT)
Dept: CARDIOLOGY | Facility: MEDICAL CENTER | Age: 76
End: 2021-05-28

## 2021-05-28 NOTE — TELEPHONE ENCOUNTER
Received records, sent to scanning.     Reviewed by Opal PIERRE. EKG showed AF and recommendations to start Eliquis, but patient refused at visit wanting primary cardiologist's recommendation.    IGNACIO recommends stopping ASA, start Eliquis 5mg BID, schedule FU with APRN next week to discuss AF, order 14 day Sunil GOODE for AF.  ------------------------------------------------------------  Spoke with patient and informed of APRN recommendations. She is accepting of plan. Scheduled FU with APRN next Wednesday.    Prescription sent to [facility] pharmacy, order entered for SUNIL

## 2021-05-28 NOTE — TELEPHONE ENCOUNTER
Patient called again regarding message below. Please call her back she is very concerned.     Thank you,    Diana BRANNON

## 2021-05-28 NOTE — TELEPHONE ENCOUNTER
Message  Received: Today  MOLLY Zaman R.N.  Caller: Unspecified (1 week ago)  I reviewed her chart. She should be able to read her CELESTINO online. II don't know what her questions is exactly on blood thinner medication, celestino results, etc. I think we need to call her. She did get referral to [place] and they deemed her a probably surgical v. Research study patient for potential morales clip. She is still contemplating what to do. You can see the telephone call in care everywhere. If she wants to discuss her risks further, she can make an appointment with Dr. Meehan or Dr. Schaefer to review.     Sada   -----------------------------------------------------------------  Spoke to patient. She said when she saw Dr. Dukes, the EKG revealed AF, and he told her she should be on anticoagulation. Advised patient we would need to request records to verify this, and she said she will send the copy of the EKG via [employer].    Stat records request to [facility] [phone]

## 2021-05-28 NOTE — PATIENT COMMUNICATION
Discussed in telephone encounter. Patient will be on anticoagulation due to AF. Abbot clip is the only approved clip for MVR currently so that's what we use.

## 2021-05-31 ENCOUNTER — APPOINTMENT (OUTPATIENT)
Dept: RADIOLOGY | Facility: MEDICAL CENTER | Age: 76
DRG: 065 | End: 2021-05-31
Attending: NURSE PRACTITIONER
Payer: COMMERCIAL

## 2021-05-31 ENCOUNTER — HOSPITAL ENCOUNTER (INPATIENT)
Facility: MEDICAL CENTER | Age: 76
LOS: 2 days | DRG: 065 | End: 2021-06-02
Attending: EMERGENCY MEDICINE | Admitting: HOSPITALIST
Payer: COMMERCIAL

## 2021-05-31 ENCOUNTER — APPOINTMENT (OUTPATIENT)
Dept: RADIOLOGY | Facility: MEDICAL CENTER | Age: 76
DRG: 065 | End: 2021-05-31
Attending: EMERGENCY MEDICINE
Payer: COMMERCIAL

## 2021-05-31 ENCOUNTER — APPOINTMENT (OUTPATIENT)
Dept: RADIOLOGY | Facility: MEDICAL CENTER | Age: 76
DRG: 065 | End: 2021-05-31
Attending: STUDENT IN AN ORGANIZED HEALTH CARE EDUCATION/TRAINING PROGRAM
Payer: COMMERCIAL

## 2021-05-31 DIAGNOSIS — R41.82 ALTERED MENTAL STATUS, UNSPECIFIED ALTERED MENTAL STATUS TYPE: ICD-10-CM

## 2021-05-31 DIAGNOSIS — I63.19 CEREBROVASCULAR ACCIDENT (CVA) DUE TO EMBOLISM OF OTHER PRECEREBRAL ARTERY (HCC): ICD-10-CM

## 2021-05-31 PROBLEM — I48.91 ATRIAL FIBRILLATION (HCC): Status: ACTIVE | Noted: 2021-05-31

## 2021-05-31 PROBLEM — B00.9 HERPES: Status: ACTIVE | Noted: 2021-05-31

## 2021-05-31 LAB
ALBUMIN SERPL BCP-MCNC: 3.7 G/DL (ref 3.2–4.9)
ALBUMIN/GLOB SERPL: 1.4 G/DL
ALP SERPL-CCNC: 62 U/L (ref 30–99)
ALT SERPL-CCNC: 16 U/L (ref 2–50)
ANION GAP SERPL CALC-SCNC: 11 MMOL/L (ref 7–16)
AST SERPL-CCNC: 22 U/L (ref 12–45)
BASOPHILS # BLD AUTO: 0.2 % (ref 0–1.8)
BASOPHILS # BLD: 0.01 K/UL (ref 0–0.12)
BILIRUB SERPL-MCNC: 0.3 MG/DL (ref 0.1–1.5)
BUN SERPL-MCNC: 14 MG/DL (ref 8–22)
CALCIUM SERPL-MCNC: 9 MG/DL (ref 8.5–10.5)
CHLORIDE SERPL-SCNC: 105 MMOL/L (ref 96–112)
CO2 SERPL-SCNC: 24 MMOL/L (ref 20–33)
CREAT SERPL-MCNC: 0.86 MG/DL (ref 0.5–1.4)
EOSINOPHIL # BLD AUTO: 0.03 K/UL (ref 0–0.51)
EOSINOPHIL NFR BLD: 0.5 % (ref 0–6.9)
ERYTHROCYTE [DISTWIDTH] IN BLOOD BY AUTOMATED COUNT: 47.3 FL (ref 35.9–50)
EST. AVERAGE GLUCOSE BLD GHB EST-MCNC: 117 MG/DL
FLUAV RNA SPEC QL NAA+PROBE: NEGATIVE
FLUBV RNA SPEC QL NAA+PROBE: NEGATIVE
FOLATE SERPL-MCNC: 34.8 NG/ML
GLOBULIN SER CALC-MCNC: 2.7 G/DL (ref 1.9–3.5)
GLUCOSE SERPL-MCNC: 95 MG/DL (ref 65–99)
HBA1C MFR BLD: 5.7 % (ref 4–5.6)
HCT VFR BLD AUTO: 40 % (ref 37–47)
HGB BLD-MCNC: 13.1 G/DL (ref 12–16)
IMM GRANULOCYTES # BLD AUTO: 0.01 K/UL (ref 0–0.11)
IMM GRANULOCYTES NFR BLD AUTO: 0.2 % (ref 0–0.9)
INR PPP: 1.17 (ref 0.87–1.13)
LYMPHOCYTES # BLD AUTO: 0.76 K/UL (ref 1–4.8)
LYMPHOCYTES NFR BLD: 13.2 % (ref 22–41)
MCH RBC QN AUTO: 33.8 PG (ref 27–33)
MCHC RBC AUTO-ENTMCNC: 32.8 G/DL (ref 33.6–35)
MCV RBC AUTO: 103.1 FL (ref 81.4–97.8)
MONOCYTES # BLD AUTO: 0.39 K/UL (ref 0–0.85)
MONOCYTES NFR BLD AUTO: 6.8 % (ref 0–13.4)
NEUTROPHILS # BLD AUTO: 4.54 K/UL (ref 2–7.15)
NEUTROPHILS NFR BLD: 79.1 % (ref 44–72)
NRBC # BLD AUTO: 0 K/UL
NRBC BLD-RTO: 0 /100 WBC
PLATELET # BLD AUTO: 210 K/UL (ref 164–446)
PMV BLD AUTO: 9.8 FL (ref 9–12.9)
POTASSIUM SERPL-SCNC: 3.9 MMOL/L (ref 3.6–5.5)
PROT SERPL-MCNC: 6.4 G/DL (ref 6–8.2)
PROTHROMBIN TIME: 15.3 SEC (ref 12–14.6)
RBC # BLD AUTO: 3.88 M/UL (ref 4.2–5.4)
RSV RNA SPEC QL NAA+PROBE: NEGATIVE
SARS-COV-2 RNA RESP QL NAA+PROBE: NOTDETECTED
SODIUM SERPL-SCNC: 140 MMOL/L (ref 135–145)
SPECIMEN SOURCE: NORMAL
TROPONIN T SERPL-MCNC: 12 NG/L (ref 6–19)
TSH SERPL DL<=0.005 MIU/L-ACNC: 1.51 UIU/ML (ref 0.38–5.33)
VIT B12 SERPL-MCNC: 1826 PG/ML (ref 211–911)
WBC # BLD AUTO: 5.7 K/UL (ref 4.8–10.8)

## 2021-05-31 PROCEDURE — 770020 HCHG ROOM/CARE - TELE (206)

## 2021-05-31 PROCEDURE — 0241U HCHG SARS-COV-2 COVID-19 NFCT DS RESP RNA 4 TRGT MIC: CPT

## 2021-05-31 PROCEDURE — 70496 CT ANGIOGRAPHY HEAD: CPT

## 2021-05-31 PROCEDURE — 36415 COLL VENOUS BLD VENIPUNCTURE: CPT

## 2021-05-31 PROCEDURE — 99223 1ST HOSP IP/OBS HIGH 75: CPT | Mod: GC | Performed by: HOSPITALIST

## 2021-05-31 PROCEDURE — 99223 1ST HOSP IP/OBS HIGH 75: CPT | Performed by: PSYCHIATRY & NEUROLOGY

## 2021-05-31 PROCEDURE — 70553 MRI BRAIN STEM W/O & W/DYE: CPT

## 2021-05-31 PROCEDURE — A9270 NON-COVERED ITEM OR SERVICE: HCPCS | Performed by: STUDENT IN AN ORGANIZED HEALTH CARE EDUCATION/TRAINING PROGRAM

## 2021-05-31 PROCEDURE — 82607 VITAMIN B-12: CPT

## 2021-05-31 PROCEDURE — 85610 PROTHROMBIN TIME: CPT

## 2021-05-31 PROCEDURE — 70450 CT HEAD/BRAIN W/O DYE: CPT | Mod: MG

## 2021-05-31 PROCEDURE — 84443 ASSAY THYROID STIM HORMONE: CPT

## 2021-05-31 PROCEDURE — 83036 HEMOGLOBIN GLYCOSYLATED A1C: CPT

## 2021-05-31 PROCEDURE — C9803 HOPD COVID-19 SPEC COLLECT: HCPCS | Performed by: EMERGENCY MEDICINE

## 2021-05-31 PROCEDURE — 84484 ASSAY OF TROPONIN QUANT: CPT

## 2021-05-31 PROCEDURE — 82746 ASSAY OF FOLIC ACID SERUM: CPT

## 2021-05-31 PROCEDURE — 70498 CT ANGIOGRAPHY NECK: CPT

## 2021-05-31 PROCEDURE — 700117 HCHG RX CONTRAST REV CODE 255: Performed by: NURSE PRACTITIONER

## 2021-05-31 PROCEDURE — 700102 HCHG RX REV CODE 250 W/ 637 OVERRIDE(OP): Performed by: STUDENT IN AN ORGANIZED HEALTH CARE EDUCATION/TRAINING PROGRAM

## 2021-05-31 PROCEDURE — 700117 HCHG RX CONTRAST REV CODE 255: Performed by: HOSPITALIST

## 2021-05-31 PROCEDURE — 80053 COMPREHEN METABOLIC PANEL: CPT

## 2021-05-31 PROCEDURE — A9576 INJ PROHANCE MULTIPACK: HCPCS | Performed by: HOSPITALIST

## 2021-05-31 PROCEDURE — 99285 EMERGENCY DEPT VISIT HI MDM: CPT

## 2021-05-31 PROCEDURE — 85025 COMPLETE CBC W/AUTO DIFF WBC: CPT

## 2021-05-31 PROCEDURE — 0042T CT-CEREBRAL PERFUSION ANALYSIS: CPT

## 2021-05-31 RX ORDER — CHOLECALCIFEROL (VITAMIN D3) 125 MCG
5 CAPSULE ORAL NIGHTLY
Status: DISCONTINUED | OUTPATIENT
Start: 2021-05-31 | End: 2021-06-02 | Stop reason: HOSPADM

## 2021-05-31 RX ORDER — VALACYCLOVIR HYDROCHLORIDE 500 MG/1
500 TABLET, FILM COATED ORAL DAILY
Status: DISCONTINUED | OUTPATIENT
Start: 2021-05-31 | End: 2021-06-02 | Stop reason: HOSPADM

## 2021-05-31 RX ORDER — VITAMIN B COMPLEX
1000 TABLET ORAL DAILY
Status: DISCONTINUED | OUTPATIENT
Start: 2021-05-31 | End: 2021-06-02 | Stop reason: HOSPADM

## 2021-05-31 RX ORDER — MULTIVITAMIN
1 CAPSULE ORAL DAILY
Status: DISCONTINUED | OUTPATIENT
Start: 2021-05-31 | End: 2021-05-31

## 2021-05-31 RX ORDER — HEPARIN SODIUM 5000 [USP'U]/ML
5000 INJECTION, SOLUTION INTRAVENOUS; SUBCUTANEOUS EVERY 8 HOURS
Status: DISCONTINUED | OUTPATIENT
Start: 2021-05-31 | End: 2021-05-31

## 2021-05-31 RX ORDER — AMOXICILLIN 250 MG
2 CAPSULE ORAL 2 TIMES DAILY
Status: DISCONTINUED | OUTPATIENT
Start: 2021-05-31 | End: 2021-06-02 | Stop reason: HOSPADM

## 2021-05-31 RX ORDER — ASPIRIN 325 MG
325 TABLET ORAL ONCE
Status: COMPLETED | OUTPATIENT
Start: 2021-05-31 | End: 2021-05-31

## 2021-05-31 RX ORDER — BISACODYL 10 MG
10 SUPPOSITORY, RECTAL RECTAL
Status: DISCONTINUED | OUTPATIENT
Start: 2021-05-31 | End: 2021-06-02 | Stop reason: HOSPADM

## 2021-05-31 RX ORDER — CHOLECALCIFEROL (VITAMIN D3) 125 MCG
1000 CAPSULE ORAL DAILY
Status: DISCONTINUED | OUTPATIENT
Start: 2021-06-01 | End: 2021-06-02 | Stop reason: HOSPADM

## 2021-05-31 RX ORDER — POLYETHYLENE GLYCOL 3350 17 G/17G
1 POWDER, FOR SOLUTION ORAL
Status: DISCONTINUED | OUTPATIENT
Start: 2021-05-31 | End: 2021-06-02 | Stop reason: HOSPADM

## 2021-05-31 RX ORDER — VALACYCLOVIR HYDROCHLORIDE 500 MG/1
500 TABLET, FILM COATED ORAL DAILY
Status: DISCONTINUED | OUTPATIENT
Start: 2021-05-31 | End: 2021-05-31

## 2021-05-31 RX ORDER — CHOLECALCIFEROL (VITAMIN D3) 25 MCG
25000 TABLET,CHEWABLE ORAL DAILY
Status: DISCONTINUED | OUTPATIENT
Start: 2021-05-31 | End: 2021-05-31

## 2021-05-31 RX ORDER — ACETAMINOPHEN 325 MG/1
650 TABLET ORAL EVERY 6 HOURS PRN
Status: DISCONTINUED | OUTPATIENT
Start: 2021-05-31 | End: 2021-06-02 | Stop reason: HOSPADM

## 2021-05-31 RX ORDER — ATORVASTATIN CALCIUM 80 MG/1
80 TABLET, FILM COATED ORAL
Status: DISCONTINUED | OUTPATIENT
Start: 2021-05-31 | End: 2021-06-02 | Stop reason: HOSPADM

## 2021-05-31 RX ADMIN — Medication 1000 UNITS: at 16:32

## 2021-05-31 RX ADMIN — IOHEXOL 50 ML: 350 INJECTION, SOLUTION INTRAVENOUS at 18:15

## 2021-05-31 RX ADMIN — METOPROLOL TARTRATE 25 MG: 25 TABLET, FILM COATED ORAL at 16:33

## 2021-05-31 RX ADMIN — Medication 5 MG: at 22:30

## 2021-05-31 RX ADMIN — ASPIRIN 325 MG ORAL TABLET 325 MG: 325 PILL ORAL at 16:32

## 2021-05-31 RX ADMIN — ATORVASTATIN CALCIUM 80 MG: 80 TABLET, FILM COATED ORAL at 16:32

## 2021-05-31 RX ADMIN — GADOTERIDOL 10 ML: 279.3 INJECTION, SOLUTION INTRAVENOUS at 17:08

## 2021-05-31 RX ADMIN — VALACYCLOVIR HYDROCHLORIDE 500 MG: 500 TABLET, FILM COATED ORAL at 18:26

## 2021-05-31 RX ADMIN — IOHEXOL 100 ML: 350 INJECTION, SOLUTION INTRAVENOUS at 18:15

## 2021-05-31 ASSESSMENT — ENCOUNTER SYMPTOMS
VOMITING: 0
NERVOUS/ANXIOUS: 1
BLOOD IN STOOL: 0
FEVER: 0
PALPITATIONS: 0
TREMORS: 0
ABDOMINAL PAIN: 0
SPUTUM PRODUCTION: 0
MYALGIAS: 0
HEADACHES: 1
CONSTIPATION: 0
SHORTNESS OF BREATH: 0
HEMOPTYSIS: 0
WEAKNESS: 0
NAUSEA: 0
HALLUCINATIONS: 0
SENSORY CHANGE: 0
CHILLS: 0
SORE THROAT: 0
NECK PAIN: 0
DOUBLE VISION: 0
COUGH: 0
DIZZINESS: 0
FOCAL WEAKNESS: 0
TINGLING: 0
CLAUDICATION: 0
DIARRHEA: 0
DEPRESSION: 0
BLURRED VISION: 0
SPEECH CHANGE: 1

## 2021-05-31 ASSESSMENT — CHA2DS2 SCORE
HYPERTENSION: NO
PRIOR STROKE OR TIA OR THROMBOEMBOLISM: YES
VASCULAR DISEASE: NO
SEX: FEMALE
DIABETES: NO
CHA2DS2 VASC SCORE: 5
AGE 65 TO 74: NO
AGE 75 OR GREATER: YES
CHF OR LEFT VENTRICULAR DYSFUNCTION: NO

## 2021-05-31 ASSESSMENT — COGNITIVE AND FUNCTIONAL STATUS - GENERAL
MOVING FROM LYING ON BACK TO SITTING ON SIDE OF FLAT BED: A LITTLE
MOBILITY SCORE: 20
PERSONAL GROOMING: A LITTLE
STANDING UP FROM CHAIR USING ARMS: A LITTLE
DAILY ACTIVITIY SCORE: 21
SUGGESTED CMS G CODE MODIFIER DAILY ACTIVITY: CJ
SUGGESTED CMS G CODE MODIFIER MOBILITY: CJ
WALKING IN HOSPITAL ROOM: A LITTLE
DRESSING REGULAR UPPER BODY CLOTHING: A LITTLE
TOILETING: A LITTLE
CLIMB 3 TO 5 STEPS WITH RAILING: A LITTLE

## 2021-05-31 ASSESSMENT — LIFESTYLE VARIABLES
HAVE YOU EVER FELT YOU SHOULD CUT DOWN ON YOUR DRINKING: NO
EVER HAD A DRINK FIRST THING IN THE MORNING TO STEADY YOUR NERVES TO GET RID OF A HANGOVER: NO
ALCOHOL_USE: NO
TOTAL SCORE: 0
AVERAGE NUMBER OF DAYS PER WEEK YOU HAVE A DRINK CONTAINING ALCOHOL: 0
EVER FELT BAD OR GUILTY ABOUT YOUR DRINKING: NO
HOW MANY TIMES IN THE PAST YEAR HAVE YOU HAD 5 OR MORE DRINKS IN A DAY: 0
DOES PATIENT WANT TO STOP DRINKING: NO
TOTAL SCORE: 0
CONSUMPTION TOTAL: NEGATIVE
TOTAL SCORE: 0
ON A TYPICAL DAY WHEN YOU DRINK ALCOHOL HOW MANY DRINKS DO YOU HAVE: 0
HAVE PEOPLE ANNOYED YOU BY CRITICIZING YOUR DRINKING: NO

## 2021-05-31 ASSESSMENT — FIBROSIS 4 INDEX
FIB4 SCORE: 2.04
FIB4 SCORE: 1.96

## 2021-05-31 ASSESSMENT — PAIN DESCRIPTION - PAIN TYPE: TYPE: ACUTE PAIN

## 2021-05-31 NOTE — H&P
"History & Physical Note    Date of Admission: 5/31/2021  Admission Status: Inpatient  [facility] Team: [facility] White Team  Attending: LILI Marley M.D.   Senior Resident: Dr. Escobar  Intern: Dr. Gotti  Contact Number: [phone]    Chief Complaint: \"I couldn't read and talk normally.\"     History of Present Illness (HPI):   Shaista is a 76 y.o. female with hx of CVA (2018), atrial fibrillation (eliquis), HLD, HTN, MVP, apnea, CAD; who presented 5/31/2021 with sudden onset confusion and difficulty with word comprehension/finding of 24-48hrs.  Ms Bocanegra reports specific difficulty with reading comprehension and word finding starting last night, estimated around 7-8pm. She did not notice any other specific symptoms and went to bed. This morning, she felt worsened confusion and anxiety, continued to have difficulty with reading and words. She attempted to call EMS herself but had difficulties using the phone. She then sought help from neighbours who brought her to the ED. Denies weakness or loss of mobility.  Son, who is in room, collaborates this chain of events, however, states that her difficulties may have actually had onset evening of 5/29 (2 days ago).     Review of Systems:   Review of Systems   Constitutional: Negative for chills, fever and malaise/fatigue.   HENT: Negative for congestion, nosebleeds and sore throat.    Eyes: Negative for blurred vision and double vision.   Respiratory: Negative for cough, hemoptysis, sputum production and shortness of breath.    Cardiovascular: Negative for chest pain, palpitations, claudication and leg swelling.   Gastrointestinal: Negative for abdominal pain, blood in stool, constipation, diarrhea, melena, nausea and vomiting.   Genitourinary: Negative for dysuria, frequency, hematuria and urgency.   Musculoskeletal: Negative for myalgias and neck pain.   Neurological: Positive for speech change and headaches. Negative for dizziness, tingling, tremors, sensory change, " focal weakness and weakness.   Psychiatric/Behavioral: Negative for depression, hallucinations and suicidal ideas. The patient is nervous/anxious.          Past Medical History:   Past Medical History was reviewed with patient.   has a past medical history of Arthritis, Atrial fibrillation (HCC), Benign essential HTN (3/19/2012), Breast cancer (HCC), Cancer (HCC) (1998), Cardiac arrhythmia, Chest tightness or pressure (3/19/2012), Chickenpox, Coronary heart disease, [place] measles, Gynecological disorder, High cholesterol, High risk medication use (3/19/2012), Hypercholesterolemia (3/19/2012), Mumps, MVP (mitral valve prolapse) (3/19/2012), Osteoporosis, Sleep apnea, Snoring, Stroke ([facility]) (2017), Substance abuse ([facility]), Tonsillitis, Urinary bladder disorder, Urinary incontinence, Valvular heart disease, and Venereal disease. She also has no past medical history of Addisons disease ([facility]), Adrenal disorder ([facility]), Allergy, Anemia, Anxiety, Asthma, Blood transfusion without reported diagnosis, Cataract, CHF (congestive heart failure) ([facility]), Clotting disorder ([facility]), COPD (chronic obstructive pulmonary disease) ([facility]), Cushings syndrome ([facility]), Depression, Diabetes ([facility]), Diabetic neuropathy (HCC), GERD (gastroesophageal reflux disease), Glaucoma, Goiter, Head ache, Heart attack (HCC), HIV (human immunodeficiency virus infection) ([facility]), IBD (inflammatory bowel disease), Kidney disease, Meningitis, Migraine, Muscle disorder, Parathyroid disorder (HCC), Pituitary disease (HCC), Pulmonary emphysema ([facility]), Seizure ([facility]), Sickle cell disease ([facility]), Thyroid disease, Tuberculosis, or Urinary tract infection.    Past Surgical History: Past Surgical History was reviewed with patient.   has a past surgical history that includes cataract phaco with iol (1/26/2009); cataract phaco with iol (3/16/2009); breast biopsy; pr radiation therapy plan simple; pr chemotherapy, unspecified procedure; lumpectomy; pr remv 2nd cataract,corn-scler  sectn; hysterectomy laparoscopy; sinuscope; tonsillectomy; and primary c section.    Medications: Medications have been reviewed with patient.  Prior to Admission Medications   Prescriptions Last Dose Informant Patient Reported? Taking?   Biotin 77506 MCG Tab 5/30/2021 at am Patient Yes No   Sig: Take  by mouth every day.   Cholecalciferol (VITAMIN D3) 2000 UNIT Cap 5/30/2021 at am Patient Yes No   Sig: Take 2,000 Units by mouth every day.   Cyanocobalamin (B-12 PO) 5/30/2021 at am Patient Yes No   Sig: Take 25,000 mcg by mouth every day.   Multiple Vitamin (MULTIVITAMINS PO) 5/30/2021 at am Patient Yes No   Sig: Take 1 Tab by mouth every day.   Multiple Vitamins-Minerals (ICAPS AREDS 2 PO) 5/30/2021 at am Patient Yes No   Sig: Take 1 tablet by mouth every morning.   SODIUM FLUORIDE, DENTAL GEL, (PREVIDENT) 1.1 % Gel 5/30/2021 at am Patient Yes No   Sig: Apply  to teeth every day. Prevident   amLODIPine (NORVASC) 10 MG Tab 5/30/2021 at am Patient Yes No   Sig: Take 10 mg by mouth every day.   apixaban (ELIQUIS) 5mg Tab 5/30/2021 at pm Patient No No   Sig: Take 1 tablet by mouth 2 times a day.   atorvastatin (LIPITOR) 80 MG tablet 5/30/2021 at pm Patient No No   Sig: TAKE 1 TABLET BY MOUTH EVERY DAY IN THE EVENING   calcium carbonate (OS-TRAY 500) 500 MG Tab 5/30/2021 at am Patient Yes No   Sig: Take 1,000 mg by mouth every day. Indications: supplement-Allge Tray   estradiol (ESTRACE VAGINAL) 0.1 MG/GM vaginal cream last week at [facility] Patient Yes No   Sig: Insert  into the vagina every day.   hydroCHLOROthiazide (HYDRODIURIL) 12.5 MG tablet 5/30/2021 at am Patient Yes No   Sig: Take 12.5 mg by mouth every day.   potassium chloride (MICRO-K) 10 MEQ capsule 5/30/2021 at am Patient No No   Sig: Take 2 Caps by mouth 2 times a day.   Patient taking differently: Take 10 mEq by mouth every day.   valACYclovir (VALTREX) 500 MG Tab 5/30/2021 at am Patient No No   Sig: Take 1 tablet by mouth every day. Indications: Herpes Simplex  Affecting the Lip      Facility-Administered Medications: None        Allergies: Allergies have been reviewed with patient.  No Known Allergies    Family History:   family history includes Cancer in her mother; No Known Problems in her brother.     Social History:   Tobacco: none   Alcohol: none   Recreational drugs (illegal and prescription):  denies   Employment: retired  Activity Level: household activities, walking   Living situation:  Home, self, son lives in [place] area  Recent travel:  none  Primary Care Provider: reviewed Bethany Crane M.D.  Other (stressors, spirituality, exposures):  None reported  Physical Exam:   Vitals:  Temp:  [36.6 °C (97.8 °F)] 36.6 °C (97.8 °F)  Pulse:  [70-87] 70  Resp:  [15-19] 18  BP: (124-147)/(67-80) 147/71  SpO2:  [90 %-97 %] 95 %    Physical Exam  Vitals and nursing note reviewed.   Constitutional:       General: She is not in acute distress.     Appearance: She is not ill-appearing or diaphoretic.   HENT:      Head: Normocephalic and atraumatic.      Mouth/Throat:      Mouth: Mucous membranes are moist.      Pharynx: Oropharynx is clear. No oropharyngeal exudate or posterior oropharyngeal erythema.   Eyes:      Extraocular Movements: Extraocular movements intact.      Pupils: Pupils are equal, round, and reactive to light.   Cardiovascular:      Comments: Irregularly irregular rhythm, no m/g/r, no peripheral edema, dorsalis pedis/radial pulses 2+ bilaterally and symmetric  Pulmonary:      Effort: Pulmonary effort is normal.      Breath sounds: Normal breath sounds. No wheezing, rhonchi or rales.   Abdominal:      General: There is no distension.      Palpations: Abdomen is soft.      Tenderness: There is no abdominal tenderness. There is no guarding or rebound.   Musculoskeletal:         General: Normal range of motion.      Cervical back: Normal range of motion.   Skin:     General: Skin is warm and dry.   Neurological:      Mental Status: She is alert and oriented to  person, place, and time.      GCS: GCS eye subscore is 4. GCS verbal subscore is 5. GCS motor subscore is 6.      Cranial Nerves: Cranial nerves are intact. No cranial nerve deficit or facial asymmetry.      Sensory: Sensation is intact. No sensory deficit.      Motor: Motor function is intact. No weakness, atrophy or pronator drift.      Coordination: Coordination normal. Finger-Nose-Finger Test and Heel to Shin Test normal.      Deep Tendon Reflexes:      Reflex Scores:       Patellar reflexes are 2+ on the right side and 2+ on the left side.     Comments: Observed reading book, stated words and words on page did NOT match. Speaking comprehension intact, with mild word finding difficulty.          Labs:   Lab Results   Component Value Date/Time    SODIUM 140 05/31/2021 11:14 AM    POTASSIUM 3.9 05/31/2021 11:14 AM    CHLORIDE 105 05/31/2021 11:14 AM    CO2 24 05/31/2021 11:14 AM    GLUCOSE 95 05/31/2021 11:14 AM    BUN 14 05/31/2021 11:14 AM    CREATININE 0.86 05/31/2021 11:14 AM    BUNCREATRAT 12 06/12/2020 05:12 AM      Recent Labs     05/31/21  1114   WBC 5.7   RBC 3.88*   HEMOGLOBIN 13.1   HEMATOCRIT 40.0   .1*   MCH 33.8*   RDW 47.3   PLATELETCT 210   MPV 9.8   NEUTSPOLYS 79.10*   LYMPHOCYTES 13.20*   MONOCYTES 6.80   EOSINOPHILS 0.50   BASOPHILS 0.20         EKG: ordered, pending    Imaging:   CTA head/neck: pending  CT perfusion: pending    CT head:  1.  Moderate acute/subacute left temporal lobe infarct.  2.  Chronic ischemic changes.  3.  No acute intracranial hemorrhage.    Previous Data Review: reviewed    Problem Representation:   Shaista is a 76 y.o. female with hx of CVA (2018), atrial fibrillation (eliquis), HLD, HTN, MVP, apnea, CAD; who presented 5/31/2021 with sudden onset confusion and difficulty with word comprehension/finding of 24-48hrs.    * CVA (cerebral vascular accident) (HCC)  Assessment & Plan  Presents with acute reading comprehension and word finding difficulty. NIHSS of 2  (aphasia, mild smile asymmetry). No muscle/reflex/coordination deficits. Does have previous hx of CVA with minimal residual deficits. Onset symptoms estimated 18-48hrs ago, outside tPA window. Passed bedside swallow.  -ASA 325mg PO NOW  -ASA 81mg PO qD  -continue eliquis 5mg PO BID  -metoprolol 25mg PO BID started (atrial fib, chronic)  -continue atorvastatin 80mg PO qD  -Hold antihypertensives (permissive HTN) HCTZ, amlodipine  -ECHO pending  -CTA head/neck, head perfusion ordered by neurology  -Neurology consulted, following  -PT/OT, SLP requested  -regular diet per successful bedside swallow      Severe mitral regurgitation- (present on admission)  Assessment & Plan  Longstanding hx of mitral valve prolapse. Was set to see Dr. Borrero 6/1, will be hospitalised for acute CVA. Last ECHO 1/21 showed severe mitral regurgitation, EF 55%.  -ECHO repeat  -Cardiology IP consult requested  -ASA 325mg PO ONCE  -ASA 81mg PO qD  -Eliquis 5mg PO BID    Atrial fibrillation (HCC)  Assessment & Plan  Chronic atrial fibrillation, rate controlled without home medication, on eliquis as of 5/28.  -continue eliquis 5mg PO BID  -metoprolol 25mg PO BID (chronic atrial fibrillation goal is 60bpm)  -Cardiology consulted for MVP    Herpes  Assessment & Plan  Oral herpes, reports recent outbreak and starting valtrex on 5/28.  -continue valtrex    Hypertension  Assessment & Plan  Hx of HTN, last took medications 5/30. Normotensive in ED  -hold HCTZ 12.5mg, amlodipine 10mg per permissive HTN

## 2021-05-31 NOTE — PROGRESS NOTES
Pt transported from ED to T828-1. A&Ox4 and denies any pain at this time. Pt connected to the tele and monitor room called to verify tele status. Afib 73. Pt oriented to the room, restroom, call light and TV controls. Pt educated on how to call for assistance for issues or needs, pt verbally acknowledges understanding. Home medications reviewed. All questions and needs met at this time.

## 2021-05-31 NOTE — ASSESSMENT & PLAN NOTE
Presents with acute reading comprehension and word finding difficulty. NIHSS of 2 (aphasia, mild smile asymmetry). No muscle/reflex/coordination deficits. Does have previous hx of CVA with minimal residual deficits. Onset symptoms estimated 18-48hrs ago, outside tPA window. Passed bedside swallow. CTA head/neck without stenosis/obstruction.  -ASA 81mg PO qD, will stop at discharge (anticipated tomorrow AM)  -hold eliquis 5mg PO BID restart at discharge  -metoprolol 25mg PO BID started (atrial fib, chronic)  -continue atorvastatin 80mg PO qD  -Hold antihypertensives (permissive HTN) HCTZ, amlodipine  -Neurology consulted, following  -ECHO read pending

## 2021-05-31 NOTE — ED PROVIDER NOTES
ED Provider Note    CHIEF COMPLAINT  Chief Complaint   Patient presents with   • ALOC       HPI  Shaista Bocanegra is a 76 y.o. female is here for evaluation of worsening confusion.  The pt was allegedly trying to call someone for help, but ended up not knowing how to use the phone, and not knowing how to come up with the words to find help.  She ended up walking over to her neighbors home, and he helped her get here. She has no fever/chills. No vomiting.  She has no cp, no sob, no abdominal pain.  She has no headache. She denies trauma.  It is unclear how far along her dementia or aloc is, but it is reported that it has been ongoing for a year.       ROS  See HPI for further details, o/w negative.     PAST MEDICAL HISTORY   has a past medical history of Arthritis, Atrial fibrillation ([facility]), Benign essential HTN (3/19/2012), Breast cancer ([facility]), Cancer ([facility]) (1998), Cardiac arrhythmia, Chest tightness or pressure (3/19/2012), Chickenpox, Coronary heart disease, [place] measles, Gynecological disorder, High cholesterol, High risk medication use (3/19/2012), Hypercholesterolemia (3/19/2012), Mumps, MVP (mitral valve prolapse) (3/19/2012), Osteoporosis, Sleep apnea, Snoring, Stroke ([facility]) (2017), Substance abuse ([facility]), Tonsillitis, Urinary bladder disorder, Urinary incontinence, Valvular heart disease, and Venereal disease.    SOCIAL HISTORY  Social History     Tobacco Use   • Smoking status: Never Smoker   • Smokeless tobacco: Never Used   Vaping Use   • Vaping Use: Never used   Substance and Sexual Activity   • Alcohol use: Not Currently     Comment: not since March 1st 2021   • Drug use: No   • Sexual activity: Yes     Partners: Male     Birth control/protection: Female Sterilization       Family History  No bleeding disorders    SURGICAL HISTORY   has a past surgical history that includes cataract phaco with iol (1/26/2009); cataract phaco with iol (3/16/2009); breast biopsy; radiation therapy plan simple;  chemotherapy, unspecified procedure; lumpectomy; remv 2nd cataract,corn-scler sectn; hysterectomy laparoscopy; sinuscope; tonsillectomy; and primary c section.    CURRENT MEDICATIONS  Home Medications    **Home medications have not yet been reviewed for this encounter**         ALLERGIES  No Known Allergies    REVIEW OF SYSTEMS  See HPI for further details. Review of systems as above, otherwise all other systems are negative.     PHYSICAL EXAM  Constitutional: Well developed, well nourished. No acute distress.  HEENT: Normocephalic, atraumatic. Posterior pharynx clear and moist.  Eyes:  EOMI. Normal sclera.  Neck: Supple, Full range of motion, nontender.  Chest/Pulmonary: clear to ausculation. Symmetrical expansion.   Cardio: Regular rate and rhythm with no murmur.   Abdomen: Soft, nontender. No peritoneal signs. No guarding. No palpable masses.  Musculoskeletal: No deformity, no edema, neurovascular intact.   Neuro: Clear speech, appropriate, cooperative, cranial nerves II-XII grossly intact. A and o x 2.   Psych: Normal mood and affect    Results for orders placed or performed during the hospital encounter of 05/31/21   CBC WITH DIFFERENTIAL   Result Value Ref Range    WBC 5.7 4.8 - 10.8 K/uL    RBC 3.88 (L) 4.20 - 5.40 M/uL    Hemoglobin 13.1 12.0 - 16.0 g/dL    Hematocrit 40.0 37.0 - 47.0 %    .1 (H) 81.4 - 97.8 fL    MCH 33.8 (H) 27.0 - 33.0 pg    MCHC 32.8 (L) 33.6 - 35.0 g/dL    RDW 47.3 35.9 - 50.0 fL    Platelet Count 210 164 - 446 K/uL    MPV 9.8 9.0 - 12.9 fL    Neutrophils-Polys 79.10 (H) 44.00 - 72.00 %    Lymphocytes 13.20 (L) 22.00 - 41.00 %    Monocytes 6.80 0.00 - 13.40 %    Eosinophils 0.50 0.00 - 6.90 %    Basophils 0.20 0.00 - 1.80 %    Immature Granulocytes 0.20 0.00 - 0.90 %    Nucleated RBC 0.00 /100 WBC    Neutrophils (Absolute) 4.54 2.00 - 7.15 K/uL    Lymphs (Absolute) 0.76 (L) 1.00 - 4.80 K/uL    Monos (Absolute) 0.39 0.00 - 0.85 K/uL    Eos (Absolute) 0.03 0.00 - 0.51 K/uL    Baso  (Absolute) 0.01 0.00 - 0.12 K/uL    Immature Granulocytes (abs) 0.01 0.00 - 0.11 K/uL    NRBC (Absolute) 0.00 K/uL   COMP METABOLIC PANEL   Result Value Ref Range    Sodium 140 135 - 145 mmol/L    Potassium 3.9 3.6 - 5.5 mmol/L    Chloride 105 96 - 112 mmol/L    Co2 24 20 - 33 mmol/L    Anion Gap 11.0 7.0 - 16.0    Glucose 95 65 - 99 mg/dL    Bun 14 8 - 22 mg/dL    Creatinine 0.86 0.50 - 1.40 mg/dL    Calcium 9.0 8.5 - 10.5 mg/dL    AST(SGOT) 22 12 - 45 U/L    ALT(SGPT) 16 2 - 50 U/L    Alkaline Phosphatase 62 30 - 99 U/L    Total Bilirubin 0.3 0.1 - 1.5 mg/dL    Albumin 3.7 3.2 - 4.9 g/dL    Total Protein 6.4 6.0 - 8.2 g/dL    Globulin 2.7 1.9 - 3.5 g/dL    A-G Ratio 1.4 g/dL   PROTHROMBIN TIME   Result Value Ref Range    PT 15.3 (H) 12.0 - 14.6 sec    INR 1.17 (H) 0.87 - 1.13   TROPONIN   Result Value Ref Range    Troponin T 12 6 - 19 ng/L   COV-2, FLU A/B, AND RSV BY PCR (2-4 HOURS CEPHEID): Collect NP swab in VTM    Specimen: Respirate   Result Value Ref Range    Influenza virus A RNA Negative Negative    Influenza virus B, PCR Negative Negative    RSV, PCR Negative Negative    SARS-CoV-2 by PCR NotDetected     SARS-CoV-2 Source NP Swab    ESTIMATED GFR   Result Value Ref Range    GFR If African American >60 >60 mL/min/1.73 m 2    GFR If Non African American >60 >60 mL/min/1.73 m 2     CT-HEAD W/O   Final Result      1.  Moderate acute/subacute left temporal lobe infarct.   2.  Chronic ischemic changes.   3.  No acute intracranial hemorrhage.        1:33 PM  Dr. Pollard will see as consult.  He would like MRI with and with out contrast.      [facility] will admit.     CRITICAL CARE  The very real possibility of a deterioration of this patient's condition required the highest level of my preparedness for sudden, emergent intervention.  I provided critical care services, which included medication orders, frequent reevaluations of the patient's condition and response to treatment, ordering and reviewing test results, and  discussing the case with various consultants.  The critical care time associated with the care of the patient was 60 minutes. Review chart for interventions. This time is exclusive of any other billable procedures.         PROCEDURES     MEDICAL RECORD  I have reviewed patient's medical record and pertinent results are listed.    COURSE & MEDICAL DECISION MAKING  I have reviewed any medical record information, laboratory studies and radiographic results as noted above.    The pt will need to be admitted to [facility].  I will neurology as well    FINAL IMPRESSION  CVA  Critical care time 60 minutes.       Electronically signed by: Lonnie Webb D.O., 5/31/2021 11:32 AM

## 2021-05-31 NOTE — ASSESSMENT & PLAN NOTE
Hx of HTN, last took medications 5/30. Normotensive in ED  -hold HCTZ 12.5mg, amlodipine 10mg per permissive HTN (resume on discharge)

## 2021-05-31 NOTE — ASSESSMENT & PLAN NOTE
Chronic atrial fibrillation, rate controlled without home medication, on eliquis as of 5/28.  -eliquis 5mg PO BID resume on discharge  -metoprolol 25mg PO BID (chronic atrial fibrillation goal is 60bpm)

## 2021-05-31 NOTE — ED TRIAGE NOTES
Pt BIB [facility] with c/c of a decline in mentation over the past 3 weeks. EMS reporting pt walked to her neighbors today reporting she couldn't use the phone and didn't remember how to turn off her stove. Neighbor reported to [facility] she has been worsening over the past 1 year but much worse over the past 3 weeks. Pt was recently diagnosed with afib and placed on eliquis. Pt reporting she believes it is eliquis causing her symptoms. Pt a&ox3.

## 2021-05-31 NOTE — ASSESSMENT & PLAN NOTE
Longstanding hx of mitral valve prolapse. Last ECHO 1/21 showed severe mitral regurgitation, EF 55%. ECHO repeated, awaiting official read. Cardiology saw IP, planning to coordinate possible procedures for MVP repair.  -ASA 81mg PO qD (stop on discharge)  -Eliquis 5mg PO BID (resume on discharge)  -cardiology follow up

## 2021-05-31 NOTE — CONSULTS
"Chief Complaint   Patient presents with   • ALOC       Problem List Items Addressed This Visit     None      Visit Diagnoses     Altered mental status, unspecified altered mental status type            Neurology Stroke Consultation     History of present illness:  This is a 76-year old female with PMhx significant for hypertension, stroke (2017; Right hemispheric with mild residual Left hemiparesis), dyslipidemia, breast cancer (remotely), ?dementia, valvular heart disease and atrial fibrillation (started on Eliquis on 5/28/21) who presented to [facility] on 5/31/21 for a chief complaint of word finding difficulty and confusion. The patient states that she was in her usual state of health until yesterday (5/30/21) evening, around 2100; she reports that she finished watching TV, and got in bed to read a book. She then noted that she was unable to read/comprehend the words on the page. No other deficits noted at that time, including no focal/unilateral weakness, numbness, paresthesia, or visual deficit. She went to sleep; when she awoke this morning at around 0700, she again noted that she was unable to read; she states that she was also having difficulty using the telephone, thus she walked to her neighbors house and asked for help. Her neighbors reportedly then called EMS. On arrival here, SBP 130s; BG 95. Stat CT head revealed hypodensity involving Left temporal/occipital lobe concerning for acute/subacute ischemic stroke (also note chronic appearing stroke/encephalopmelacia involving Right temporal lobe consistent with known history of stroke); no obvious hemorrhage. CTA head/neck and CT perfusion study are currently ordered Stat and are pending.   Currently, patient is sitting up in stretcher; awake and alert. States that she feels well, \"stiff,\" denies headache or dizziness. She also continues to deny focal weakness, numbness, paresthesia, problem with vision, or problem swallowing. Further review of " patient's medical record reveals that patient has had short term memory difficulty since her stroke in 2017, with worsening over the past year. Also of note, patient reportedly started taking Eliquis 5 mg PO BID on Friday, 5/28/21 at the instruction of ?Dr. Dukes; she apparently delayed intiation of this for unclear reasons, but did start this on Friday and was reportedly compliant Friday, Saturday and Sunday.     Neurology has been consulted by Dr. Fabricio Marley to further evaluate the findings noted above.     Past medical history:   Past Medical History:   Diagnosis Date   • Arthritis    • Atrial fibrillation (HCC)    • Benign essential HTN 3/19/2012   • Breast cancer (HCC)    • Cancer (HCC) 1998    breast    • Cardiac arrhythmia    • Chest tightness or pressure 3/19/2012   • Chickenpox    • Coronary heart disease    • [place] measles    • Gynecological disorder    • High cholesterol    • High risk medication use 3/19/2012   • Hypercholesterolemia 3/19/2012   • Mumps    • MVP (mitral valve prolapse) 3/19/2012   • Osteoporosis    • Sleep apnea     no CPAP   • Snoring    • Stroke (HCC) 2017    residual minor weakness on left side   • Substance abuse (HCC)    • Tonsillitis    • Urinary bladder disorder     OAB   • Urinary incontinence    • Valvular heart disease    • Venereal disease        Past surgical history:   Past Surgical History:   Procedure Laterality Date   • CATARACT PHACO WITH IOL  3/16/2009    Performed by SHANNON GANDARA at SURGERY SAME DAY [facility] ORS   • CATARACT PHACO WITH IOL  1/26/2009    Performed by SHANNON GANDARA at SURGERY SAME DAY [facility] ORS   • BREAST BIOPSY     • HYSTERECTOMY LAPAROSCOPY     • LUMPECTOMY     • PB RADIATION THERAPY PLAN SIMPLE     • PB REMV 2ND CATARACT,CORN-SCLER SECTN     • TN CHEMOTHERAPY, UNSPECIFIED PROCEDURE     • PRIMARY C SECTION     • SINUSCOPE     • TONSILLECTOMY         Family history:   Family History   Problem Relation Age of Onset   • Cancer Mother          breast   • No Known Problems Brother    • Heart Failure Neg Hx    • Heart Disease Neg Hx        Social history:   Social History     Socioeconomic History   • Marital status:      Spouse name: Not on file   • Number of children: 2   • Years of education: Not on file   • Highest education level: Not on file   Occupational History   • Not on file   Tobacco Use   • Smoking status: Never Smoker   • Smokeless tobacco: Never Used   Vaping Use   • Vaping Use: Never used   Substance and Sexual Activity   • Alcohol use: Not Currently     Comment: not since March 1st 2021   • Drug use: No   • Sexual activity: Yes     Partners: Male     Birth control/protection: Female Sterilization   Other Topics Concern   • Not on file   Social History Narrative   • Not on file     Social Determinants of Health     Financial Resource Strain:    • Difficulty of Paying Living Expenses:    Food Insecurity:    • Worried About Running Out of Food in the Last Year:    • Ran Out of Food in the Last Year:    Transportation Needs:    • Lack of Transportation (Medical):    • Lack of Transportation (Non-Medical):    Physical Activity:    • Days of Exercise per Week:    • Minutes of Exercise per Session:    Stress:    • Feeling of Stress :    Social Connections:    • Frequency of Communication with Friends and Family:    • Frequency of Social Gatherings with Friends and Family:    • Attends Yazidism Services:    • Active Member of Clubs or Organizations:    • Attends Club or Organization Meetings:    • Marital Status:    Intimate Partner Violence:    • Fear of Current or Ex-Partner:    • Emotionally Abused:    • Physically Abused:    • Sexually Abused:        Current medications:   Current Facility-Administered Medications   Medication Dose   • aspirin (ASA) tablet 325 mg  325 mg   • senna-docusate (PERICOLACE or SENOKOT S) 8.6-50 MG per tablet 2 tablet  2 tablet    And   • polyethylene glycol/lytes (MIRALAX) PACKET 1 Packet  1 Packet    And    • magnesium hydroxide (MILK OF MAGNESIA) suspension 30 mL  30 mL    And   • bisacodyl (DULCOLAX) suppository 10 mg  10 mg   • acetaminophen (Tylenol) tablet 650 mg  650 mg   • atorvastatin (LIPITOR) tablet 80 mg  80 mg   • vitamin D (cholecalciferol) tablet 1,000 Units  1,000 Units   • [START ON 6/1/2021] cyanocobalamin (VITAMIN B-12) tablet 1,000 mcg  1,000 mcg   • [START ON 6/1/2021] multivitamin (THERAGRAN) tablet 1 tablet  1 tablet   • valACYclovir (VALTREX) caplet 500 mg  500 mg   • apixaban (ELIQUIS) tablet 5 mg  5 mg   • metoprolol tartrate (LOPRESSOR) tablet 25 mg  25 mg       Medication Allergy:  No Known Allergies    Review of systems:   Constitutional: denies fever, night sweats, weight loss.   Eyes: denies acute vision change, eye pain or secretion.   Ears, Nose, Mouth, Throat: denies nasal secretion, nasal bleeding, difficulty swallowing, hearing loss, tinnitus, vertigo, ear pain, acute dental problems, oral ulcers or lesions.   Endocrine: denies recent weight changes, heat or cold intolerance, polyuria, polydypsia, polyphagia,abnormal hair growth.  Cardiovascular: denies new onset of chest pain, palpitations, syncope, or dyspnea of exertion.  Pulmonary: denies shortness of breath, new onset of cough, hemoptysis, wheezing, chest pain or flu-like symptoms.   GI: denies nausea, vomiting, diarrhea, GI bleeding, change in appetite, abdominal pain, and change in bowel habits.  : denies dysuria, urinary incontinence, hematuria.  Heme/oncology: denies history of easy bruising or bleeding. No history of cancer, DVTor PE.  Allergy/immunology: denies hives/urticaria, or itching.   Dermatologic: denies new rash, or new skin lesions.  Musculoskeletal:denies joint swelling or pain, muscle pain, neck and back pain.   Neurologic: As noted in detail above.  Psychiatric: denies symptoms of depression, anxiety, hallucinations, mood swings or changes, suicidal or homicidal thoughts.     Physical examination:   Vitals:  "   05/31/21 1200 05/31/21 1455 05/31/21 1542 05/31/21 1545   BP: 137/80 147/71 133/83    Pulse: 75 70 63    Resp: 19 18 18    Temp:   36.9 °C (98.5 °F)    TempSrc:   Temporal    SpO2: 90% 95% 95%    Weight:    61.2 kg (134 lb 14.7 oz)   Height:         General: Patient in no acute distress, pleasant and cooperative.  HEENT: Normocephalic, no signs of acute trauma.   Neck: supple, no meningeal signs or carotid bruits. There is normal range of motion. No tenderness on exam.   Chest: clear to auscultation. No cough.   CV: RRR, no murmurs.   Skin: no signs of acute rashes or trauma.   Musculoskeletal: joints exhibit full range of motion, without any pain to palpation. There are no signs of joint or muscle swelling. There is no tenderness to deep palpation of muscles.   Psychiatric: No hallucinatory behavior. Denies symptoms of depression or suicidal ideation. Mood and affect appear normal on exam.     NEUROLOGICAL EXAM:   Mental status, orientation: Awake, alert and fully oriented (with choices; she initially states \"February\" however self corrects to May/June).    Speech and language: Follows simple commands with ease; speech is clear, non dysarthric. Speech is very mildly aphasic, with intermittent word finding difficulty, however with preserved naming of objects.    Cranial nerve exam: Pupils are 3-4 mm bilaterally and equally reactive to light. Visual fields are intact by confrontation. There is no nystagmus on primary or secondary gaze. Intact full EOM in all directions of gaze. Face appears symmetric. Sensation in the face is intact to light touch. Uvula is midline. Palate elevates symmetrically. Tongue is midline and without any signs of tongue biting or fasciculations.Shoulder shrug is intact bilaterally.   Motor exam: Strength is 5/5 in all extremities. Tone is normal. No abnormal movements were seen on exam.   Sensory exam reveals normal sense of light touch and pinprick in all extremities.   Deep tendon " reflexes:  Plantar responses are flexor. There is no clonus.   Coordination: shows a normal finger-nose-finger. Normal rapidly alternating movements.   Gait: Not assessed at this time as patient is a fall risk.       NIH Stroke Scale    1a Level of Consciousness   1b Orientation Questions   1c Response to Commands   2 Gaze   3 Visual Fields   4 Facial Movement   5 Motor Function (arm)   a Left   b Right   6 Motor Function (leg)   a Left   b Right   7 Limb Ataxia   8 Sensory   9 Language  1  10 Articulation   11 Extinction/Inattention     Score: 1      ANCILLARY DATA REVIEWED:     Lab Data Review:  Recent Results (from the past 24 hour(s))   CBC WITH DIFFERENTIAL    Collection Time: 05/31/21 11:14 AM   Result Value Ref Range    WBC 5.7 4.8 - 10.8 K/uL    RBC 3.88 (L) 4.20 - 5.40 M/uL    Hemoglobin 13.1 12.0 - 16.0 g/dL    Hematocrit 40.0 37.0 - 47.0 %    .1 (H) 81.4 - 97.8 fL    MCH 33.8 (H) 27.0 - 33.0 pg    MCHC 32.8 (L) 33.6 - 35.0 g/dL    RDW 47.3 35.9 - 50.0 fL    Platelet Count 210 164 - 446 K/uL    MPV 9.8 9.0 - 12.9 fL    Neutrophils-Polys 79.10 (H) 44.00 - 72.00 %    Lymphocytes 13.20 (L) 22.00 - 41.00 %    Monocytes 6.80 0.00 - 13.40 %    Eosinophils 0.50 0.00 - 6.90 %    Basophils 0.20 0.00 - 1.80 %    Immature Granulocytes 0.20 0.00 - 0.90 %    Nucleated RBC 0.00 /100 WBC    Neutrophils (Absolute) 4.54 2.00 - 7.15 K/uL    Lymphs (Absolute) 0.76 (L) 1.00 - 4.80 K/uL    Monos (Absolute) 0.39 0.00 - 0.85 K/uL    Eos (Absolute) 0.03 0.00 - 0.51 K/uL    Baso (Absolute) 0.01 0.00 - 0.12 K/uL    Immature Granulocytes (abs) 0.01 0.00 - 0.11 K/uL    NRBC (Absolute) 0.00 K/uL   COMP METABOLIC PANEL    Collection Time: 05/31/21 11:14 AM   Result Value Ref Range    Sodium 140 135 - 145 mmol/L    Potassium 3.9 3.6 - 5.5 mmol/L    Chloride 105 96 - 112 mmol/L    Co2 24 20 - 33 mmol/L    Anion Gap 11.0 7.0 - 16.0    Glucose 95 65 - 99 mg/dL    Bun 14 8 - 22 mg/dL    Creatinine 0.86 0.50 - 1.40 mg/dL    Calcium  9.0 8.5 - 10.5 mg/dL    AST(SGOT) 22 12 - 45 U/L    ALT(SGPT) 16 2 - 50 U/L    Alkaline Phosphatase 62 30 - 99 U/L    Total Bilirubin 0.3 0.1 - 1.5 mg/dL    Albumin 3.7 3.2 - 4.9 g/dL    Total Protein 6.4 6.0 - 8.2 g/dL    Globulin 2.7 1.9 - 3.5 g/dL    A-G Ratio 1.4 g/dL   PROTHROMBIN TIME    Collection Time: 05/31/21 11:14 AM   Result Value Ref Range    PT 15.3 (H) 12.0 - 14.6 sec    INR 1.17 (H) 0.87 - 1.13   TROPONIN    Collection Time: 05/31/21 11:14 AM   Result Value Ref Range    Troponin T 12 6 - 19 ng/L   ESTIMATED GFR    Collection Time: 05/31/21 11:14 AM   Result Value Ref Range    GFR If African American >60 >60 mL/min/1.73 m 2    GFR If Non African American >60 >60 mL/min/1.73 m 2   COV-2, FLU A/B, AND RSV BY PCR (2-4 HOURS [facility]): Collect NP swab in VTM    Collection Time: 05/31/21 11:47 AM    Specimen: Respirate   Result Value Ref Range    Influenza virus A RNA Negative Negative    Influenza virus B, PCR Negative Negative    RSV, PCR Negative Negative    SARS-CoV-2 by PCR NotDetected     SARS-CoV-2 Source NP Swab        Labs reviewed by me.         Imaging reviewed by me:     CT-HEAD W/O   Final Result      1.  Moderate acute/subacute left temporal lobe infarct.   2.  Chronic ischemic changes.   3.  No acute intracranial hemorrhage.      MR-BRAIN-WITH & W/O    (Results Pending)   CT-CTA HEAD WITH & W/O-POST PROCESS    (Results Pending)   CT-CTA NECK WITH & W/O-POST PROCESSING    (Results Pending)   CT-CEREBRAL PERFUSION ANALYSIS    (Results Pending)   MR-BRAIN-W/O    (Results Pending)   EC-ECHOCARDIOGRAM COMPLETE W/O CONT    (Results Pending)         Presumed mechanism by TOAST:  __Large Artery Atherosclerosis  __Small Vessel (Lacunar)  _X_Cardioembolic  __Other (Sickle Cell, Vasculitis, Hypercoagulable)  __Unknown    Modified Rydal Scale (MRS): 0 = No symptoms      ASSESSMENT AND PLAN:  76-year old female with PMhx significant for hypertension, stroke (2017; Right hemispheric with mild residual  Left hemiparesis), dyslipidemia, breast cancer (remotely), ?dementia, valvular heart disease and atrial fibrillation (started on Eliquis on 5/28/21) who presented to [facility] on 5/31/21 for a chief complaint of word finding difficulty and confusion; onset last night 5/30/21 at 2100; patient thus not a candidate for IV tPA at time of presentation this morning as she was outside of tPA window (presentation time greater than 4.5 hours from time of symptom onset). Stat CT head on arrival here revealed hypodensity concerning for acute/early subacute ischemic infarction involving Left temporal lobe; no obvious hemorrhage. Given location and nature of evolving infarction, suspect cardioembolic phenomena (despite compliance with Eliquis-- note, steady state of apixaban is achieved at approximately three days). NIHSS is currently 1.     Recommendations/Plan:     -q4h and PRN neuro assessment. VS per nursing/unit protocol. Permissive HTN ok until 6/2/21, not to exceed SBP > 220, DBP > 105. Then, BP goal < 140/90. Antihypertensives per primary team.   -Obtain MRI Brain wo contrast.   -Telemetry; currently Afib, rate controlled (60s-80s). Obtain TTE with bubble study.   -ASA 81 mg PO q day and Atorvastatin 80 mg PO q HS. Check lipid panel.   -Following MRI Brain, will determine when is appropriate to restart Eliquis.   -Recommend aggressive BG management per primary team. Avoid IVF with Dextrose. BG goal 140-180. Check hemoglobin A1c.   -PT/OT/SLP eval and treat.   -Counseled patient at length regarding life style and risk factor modification for secondary stroke prevention.   -All other medical management per primary team.   -DVT PPX: SCDs.      The plan of care above has been discussed with Dr. Pollard.     Gaviota Garcia, A.P.R.N.  [facility]

## 2021-05-31 NOTE — ED NOTES
"Received report from ALISE Fisher. Rounded on pt and performed neuro assessment. Pt reports that she is \"forgetful\" and can't remember the names of her doctors. Son at bedside. Discussed POC. Side rails up. Will continue to monitor.   "

## 2021-05-31 NOTE — ED NOTES
Pharmacy Medication Reconciliation    Medication reconciliation has been completed by interviewing patient & patient taylor with medication list and consent to do so with family/visitors in the room.   Allergies were reviewed  No ORAL antibiotics within the past 14 days  Pt home pharmacy:[facility] [address]    Pt reports she recently started taking Eliquis about 3 days ago.

## 2021-06-01 ENCOUNTER — APPOINTMENT (OUTPATIENT)
Dept: CARDIOLOGY | Facility: MEDICAL CENTER | Age: 76
DRG: 065 | End: 2021-06-01
Attending: STUDENT IN AN ORGANIZED HEALTH CARE EDUCATION/TRAINING PROGRAM
Payer: COMMERCIAL

## 2021-06-01 DIAGNOSIS — I63.89 LEFT TEMPORAL LOBE INFARCTION (HCC): ICD-10-CM

## 2021-06-01 LAB
ALBUMIN SERPL BCP-MCNC: 3.6 G/DL (ref 3.2–4.9)
ALBUMIN/GLOB SERPL: 1.4 G/DL
ALP SERPL-CCNC: 65 U/L (ref 30–99)
ALT SERPL-CCNC: 21 U/L (ref 2–50)
ANION GAP SERPL CALC-SCNC: 10 MMOL/L (ref 7–16)
AST SERPL-CCNC: 23 U/L (ref 12–45)
BASOPHILS # BLD AUTO: 0.4 % (ref 0–1.8)
BASOPHILS # BLD: 0.02 K/UL (ref 0–0.12)
BILIRUB SERPL-MCNC: 0.2 MG/DL (ref 0.1–1.5)
BUN SERPL-MCNC: 13 MG/DL (ref 8–22)
CALCIUM SERPL-MCNC: 8.7 MG/DL (ref 8.5–10.5)
CHLORIDE SERPL-SCNC: 105 MMOL/L (ref 96–112)
CO2 SERPL-SCNC: 25 MMOL/L (ref 20–33)
CREAT SERPL-MCNC: 0.81 MG/DL (ref 0.5–1.4)
EOSINOPHIL # BLD AUTO: 0.09 K/UL (ref 0–0.51)
EOSINOPHIL NFR BLD: 1.8 % (ref 0–6.9)
ERYTHROCYTE [DISTWIDTH] IN BLOOD BY AUTOMATED COUNT: 47.1 FL (ref 35.9–50)
GLOBULIN SER CALC-MCNC: 2.6 G/DL (ref 1.9–3.5)
GLUCOSE SERPL-MCNC: 97 MG/DL (ref 65–99)
HCT VFR BLD AUTO: 38.4 % (ref 37–47)
HGB BLD-MCNC: 13.1 G/DL (ref 12–16)
IMM GRANULOCYTES # BLD AUTO: 0.01 K/UL (ref 0–0.11)
IMM GRANULOCYTES NFR BLD AUTO: 0.2 % (ref 0–0.9)
LV EJECT FRACT  99904: 65
LV EJECT FRACT MOD 2C 99903: 77.15
LV EJECT FRACT MOD 4C 99902: 71.42
LV EJECT FRACT MOD BP 99901: 71.48
LYMPHOCYTES # BLD AUTO: 1.34 K/UL (ref 1–4.8)
LYMPHOCYTES NFR BLD: 26.4 % (ref 22–41)
MCH RBC QN AUTO: 34.2 PG (ref 27–33)
MCHC RBC AUTO-ENTMCNC: 34.1 G/DL (ref 33.6–35)
MCV RBC AUTO: 100.3 FL (ref 81.4–97.8)
MONOCYTES # BLD AUTO: 0.46 K/UL (ref 0–0.85)
MONOCYTES NFR BLD AUTO: 9.1 % (ref 0–13.4)
NEUTROPHILS # BLD AUTO: 3.15 K/UL (ref 2–7.15)
NEUTROPHILS NFR BLD: 62.1 % (ref 44–72)
NRBC # BLD AUTO: 0 K/UL
NRBC BLD-RTO: 0 /100 WBC
PLATELET # BLD AUTO: 201 K/UL (ref 164–446)
PMV BLD AUTO: 9.7 FL (ref 9–12.9)
POTASSIUM SERPL-SCNC: 3.5 MMOL/L (ref 3.6–5.5)
PROT SERPL-MCNC: 6.2 G/DL (ref 6–8.2)
RBC # BLD AUTO: 3.83 M/UL (ref 4.2–5.4)
SODIUM SERPL-SCNC: 140 MMOL/L (ref 135–145)
WBC # BLD AUTO: 5.1 K/UL (ref 4.8–10.8)

## 2021-06-01 PROCEDURE — 80053 COMPREHEN METABOLIC PANEL: CPT

## 2021-06-01 PROCEDURE — 93306 TTE W/DOPPLER COMPLETE: CPT

## 2021-06-01 PROCEDURE — A9270 NON-COVERED ITEM OR SERVICE: HCPCS | Performed by: STUDENT IN AN ORGANIZED HEALTH CARE EDUCATION/TRAINING PROGRAM

## 2021-06-01 PROCEDURE — 85025 COMPLETE CBC W/AUTO DIFF WBC: CPT

## 2021-06-01 PROCEDURE — 99223 1ST HOSP IP/OBS HIGH 75: CPT | Performed by: INTERNAL MEDICINE

## 2021-06-01 PROCEDURE — 97161 PT EVAL LOW COMPLEX 20 MIN: CPT

## 2021-06-01 PROCEDURE — 770020 HCHG ROOM/CARE - TELE (206)

## 2021-06-01 PROCEDURE — 93306 TTE W/DOPPLER COMPLETE: CPT | Mod: 26 | Performed by: INTERNAL MEDICINE

## 2021-06-01 PROCEDURE — 36415 COLL VENOUS BLD VENIPUNCTURE: CPT

## 2021-06-01 PROCEDURE — 99233 SBSQ HOSP IP/OBS HIGH 50: CPT | Mod: GC | Performed by: HOSPITALIST

## 2021-06-01 PROCEDURE — 700102 HCHG RX REV CODE 250 W/ 637 OVERRIDE(OP): Performed by: STUDENT IN AN ORGANIZED HEALTH CARE EDUCATION/TRAINING PROGRAM

## 2021-06-01 PROCEDURE — 97166 OT EVAL MOD COMPLEX 45 MIN: CPT

## 2021-06-01 PROCEDURE — 99232 SBSQ HOSP IP/OBS MODERATE 35: CPT | Performed by: NURSE PRACTITIONER

## 2021-06-01 RX ORDER — POTASSIUM CHLORIDE 20 MEQ/1
40 TABLET, EXTENDED RELEASE ORAL ONCE
Status: COMPLETED | OUTPATIENT
Start: 2021-06-01 | End: 2021-06-01

## 2021-06-01 RX ADMIN — CYANOCOBALAMIN TAB 500 MCG 1000 MCG: 500 TAB at 05:38

## 2021-06-01 RX ADMIN — VALACYCLOVIR HYDROCHLORIDE 500 MG: 500 TABLET, FILM COATED ORAL at 05:38

## 2021-06-01 RX ADMIN — METOPROLOL TARTRATE 25 MG: 25 TABLET, FILM COATED ORAL at 05:38

## 2021-06-01 RX ADMIN — Medication 1000 UNITS: at 05:38

## 2021-06-01 RX ADMIN — Medication 5 MG: at 21:31

## 2021-06-01 RX ADMIN — DOCUSATE SODIUM 50 MG AND SENNOSIDES 8.6 MG 2 TABLET: 8.6; 5 TABLET, FILM COATED ORAL at 17:14

## 2021-06-01 RX ADMIN — ATORVASTATIN CALCIUM 80 MG: 80 TABLET, FILM COATED ORAL at 17:14

## 2021-06-01 RX ADMIN — ASPIRIN 81 MG: 81 TABLET, COATED ORAL at 05:38

## 2021-06-01 RX ADMIN — POTASSIUM CHLORIDE 40 MEQ: 1500 TABLET, EXTENDED RELEASE ORAL at 07:42

## 2021-06-01 RX ADMIN — DOCUSATE SODIUM 50 MG AND SENNOSIDES 8.6 MG 2 TABLET: 8.6; 5 TABLET, FILM COATED ORAL at 05:38

## 2021-06-01 RX ADMIN — METOPROLOL TARTRATE 25 MG: 25 TABLET, FILM COATED ORAL at 17:14

## 2021-06-01 RX ADMIN — THERA TABS 1 TABLET: TAB at 05:38

## 2021-06-01 ASSESSMENT — COGNITIVE AND FUNCTIONAL STATUS - GENERAL
TOILETING: A LITTLE
SUGGESTED CMS G CODE MODIFIER DAILY ACTIVITY: CJ
SUGGESTED CMS G CODE MODIFIER MOBILITY: CH
MOBILITY SCORE: 24
HELP NEEDED FOR BATHING: A LITTLE
DAILY ACTIVITIY SCORE: 22

## 2021-06-01 ASSESSMENT — ENCOUNTER SYMPTOMS
PALPITATIONS: 0
CONFUSION: 1
CONSTIPATION: 0
NAUSEA: 0
WEAKNESS: 0
NERVOUS/ANXIOUS: 1
ORTHOPNEA: 0
VOMITING: 0
SPUTUM PRODUCTION: 0
COUGH: 0
DEPRESSION: 0
BLURRED VISION: 0
DIZZINESS: 0
HEADACHES: 0
SHORTNESS OF BREATH: 0
DOUBLE VISION: 0
ABDOMINAL PAIN: 0
DIARRHEA: 0

## 2021-06-01 ASSESSMENT — GAIT ASSESSMENTS
DISTANCE (FEET): 200
GAIT LEVEL OF ASSIST: SUPERVISED

## 2021-06-01 ASSESSMENT — FIBROSIS 4 INDEX: FIB4 SCORE: 1.95

## 2021-06-01 ASSESSMENT — ACTIVITIES OF DAILY LIVING (ADL): TOILETING: INDEPENDENT

## 2021-06-01 ASSESSMENT — PAIN DESCRIPTION - PAIN TYPE: TYPE: ACUTE PAIN

## 2021-06-01 NOTE — PROGRESS NOTES
Assumed care of pt @ 0715, bedside report received from ALISE Trveizo.  Pt A&OX 3 with disorientation to day and denies any pain. Bed locked and lowered with call light within reach and questions answered during present time.

## 2021-06-01 NOTE — THERAPY
Occupational Therapy   Initial Evaluation     Patient Name: Shaista Bocanegra  Age:  76 y.o., Sex:  female  Medical Record #: [id number]  Today's Date: 6/1/2021          Assessment  Patient is 76 y.o. female with a diagnosis of confusion, word finding difficulties.  Additional factors influencing patient status / progress: good family support available. Optimal level of safe function observed. Limitations appear to be more of a cognitive nature. DC needs only.      Plan    Recommend Occupational Therapy for Evaluation only for the following treatments:  NA.    DC Equipment Recommendations: (P) None  Discharge Recommendations: (P) Anticipate that the patient will have no further occupational therapy needs after discharge from the hospital     Subjective    Pleasant and cooperative, all questions answered     Objective       06/01/21 1111   Total Time Spent   Total Time Spent (Mins)    Charge Group   OT Evaluation OT Evaluation Mod   Initial Contact Note    Initial Contact Note Order Received and Verified, Evaluation Only - Patient Does Not Require Further Acute Occupational Therapy at this Time.  However, May Benefit from Post Acute Therapy for Higher Level Functional Deficits.   Prior Living Situation   Prior Services None   Housing / Facility 1 Story Apartment / Condo  (lives on the 7th floor)   Steps Into Home   (elevator)   Steps In Home 0   Elevator Yes   Bathroom Set up Bathtub / Shower Combination   Equipment Owned None   Lives with - Patient's Self Care Capacity Alone and Able to Care For Self   Comments son present, involved and can assist as needed   Prior Level of ADL Function   Self Feeding Independent   Grooming / Hygiene Independent   Bathing Independent   Dressing Independent   Toileting Independent   Prior Level of IADL Function   Medication Management Independent   Laundry Independent   Kitchen Mobility Independent   Finances Independent   Home Management Independent   Shopping Independent   Prior Level  Of Mobility Independent Without Device in Home;Independent Without Device in Community   Driving / Transportation Driving Independent   Occupation (Pre-Hospital Vocational) Employed Full Time  ()   Vitals   O2 Delivery Device None - Room Air   Pain 0 - 10 Group   Therapist Pain Assessment During Activity;Post Activity Pain Same as Prior to Activity;Nurse Notified;0   Cognition    Level of Consciousness Alert   Comments mildly anxious throughout. mild word finding difficulty noted   Passive ROM Upper Body   Passive ROM Upper Body WDL   Active ROM Upper Body   Active ROM Upper Body  WDL   Dominant Hand Right   Strength Upper Body   Upper Body Strength  WDL   Sensation Upper Body   Upper Extremity Sensation  WDL   Upper Body Muscle Tone   Upper Body Muscle Tone  WDL   Coordination Upper Body   Coordination WDL   Balance Assessment   Sitting Balance (Static) Good   Sitting Balance (Dynamic) Good   Standing Balance (Static) Fair +   Standing Balance (Dynamic) Fair +   Weight Shift Sitting Good   Weight Shift Standing Fair   Bed Mobility    Supine to Sit Supervised   Sit to Supine Supervised   Scooting Supervised   Rolling Supervised   ADL Assessment   Eating Supervision   Grooming Supervision;Standing   Bathing   (NT)   Upper Body Dressing Supervision   Lower Body Dressing Supervision   Toileting Supervision   How much help from another person does the patient currently need...   Putting on and taking off regular lower body clothing? 4   Bathing (including washing, rinsing, and drying)? 3   Toileting, which includes using a toilet, bedpan, or urinal? 3   Putting on and taking off regular upper body clothing? 4   Taking care of personal grooming such as brushing teeth? 4   Eating meals? 4   6 Clicks Daily Activity Score 22   Functional Mobility   Sit to Stand Supervised   Bed, Chair, Wheelchair Transfer Supervised   Toilet Transfers Supervised   Transfer Method Stand Pivot   Visual Perception   Comments reports she  "\"cant read\". able to manage phone password, but difficulty scanning and location individual icons on hoe screens. noted limied visual atention during search/ scan   Activity Tolerance   Sitting in Chair ad jony   Sitting Edge of Bed ad jony   Standing ad jony   Education Group   Role of Occupational Therapist Patient Response Patient;Family;Acceptance;Explanation;Demonstration;Verbal Demonstration;Action Demonstration   Anticipated Discharge Equipment and Recommendations   DC Equipment Recommendations None   Discharge Recommendations Anticipate that the patient will have no further occupational therapy needs after discharge from the hospital   Interdisciplinary Plan of Care Collaboration   IDT Collaboration with  Nursing;Family / Caregiver;Physical Therapist   Patient Position at End of Therapy In Bed;Seated;Bed Alarm On;Call Light within Reach;Tray Table within Reach;Phone within Reach;Family / Friend in Room   Collaboration Comments report given   Session Information   Date / Session Number  6/1,1/1   Priority 0                 "

## 2021-06-01 NOTE — PROGRESS NOTES
"Daily Progress Note:     Date of Service: 6/1/2021  Primary Team: [facility] IM White Team   Attending: Jt Lunsford M.D.   Senior Resident: Dr. Escobar  Intern: Dr. Gotti  Contact:  [phone]    Chief Complaint:   \"Am I going to be able to see the cardiologist today?\"    Subjective:   Admitted 5/31 for CVA. MRI showed significant L temporal CVA, CTA head/neck/perfusion normal.  Today: continues to have difficulty with memory, reading/writing, and mild . Denies headache, weakness, CP/SOB/palpitations. Concerns of being able to see cardiologist for her mitral prolapse.    Consultants/Specialty:  Cardiology  Neurology    Review of Systems:    Review of Systems   Eyes: Negative for blurred vision and double vision.   Respiratory: Negative for cough, sputum production and shortness of breath.    Cardiovascular: Negative for chest pain, palpitations, orthopnea and leg swelling.   Gastrointestinal: Negative for abdominal pain, constipation, diarrhea, nausea and vomiting.   Genitourinary: Negative for dysuria, frequency and urgency.   Neurological: Negative for dizziness, weakness and headaches.   Psychiatric/Behavioral: Negative for depression and suicidal ideas. The patient is nervous/anxious.        Objective Data:   Physical Exam:   Vitals:   Temp:  [36 °C (96.8 °F)-36.9 °C (98.5 °F)] 36.3 °C (97.3 °F)  Pulse:  [56-70] 56  Resp:  [16-18] 16  BP: (102-147)/(58-86) 113/67  SpO2:  [95 %-97 %] 96 %     Physical Exam  Vitals and nursing note reviewed.   Constitutional:       General: She is not in acute distress.     Appearance: She is not ill-appearing or diaphoretic.   HENT:      Head: Normocephalic and atraumatic.      Mouth/Throat:      Mouth: Mucous membranes are moist.      Pharynx: Oropharynx is clear.   Eyes:      Extraocular Movements: Extraocular movements intact.      Pupils: Pupils are equal, round, and reactive to light.   Cardiovascular:      Comments: Irregularly irregular with 3/6 systolic " murmur  Pulmonary:      Effort: Pulmonary effort is normal.      Breath sounds: Normal breath sounds. No wheezing, rhonchi or rales.   Abdominal:      General: There is no distension.      Palpations: Abdomen is soft.      Tenderness: There is no abdominal tenderness. There is no guarding or rebound.   Musculoskeletal:      Cervical back: Normal range of motion.   Skin:     General: Skin is warm and dry.   Neurological:      Mental Status: She is alert and oriented to person, place, and time.      GCS: GCS eye subscore is 4. GCS verbal subscore is 5. GCS motor subscore is 6.      Cranial Nerves: Cranial nerves are intact. No cranial nerve deficit.      Sensory: Sensation is intact.      Motor: Motor function is intact. No weakness, tremor or pronator drift.      Coordination: Romberg sign negative. Finger-Nose-Finger Test normal.      Comments: Ambulated independently with steady gait. Difficulty with word finding, reading/writing comprehension           Labs:   Lab Results   Component Value Date/Time    SODIUM 140 06/01/2021 01:03 AM    POTASSIUM 3.5 (L) 06/01/2021 01:03 AM    CHLORIDE 105 06/01/2021 01:03 AM    CO2 25 06/01/2021 01:03 AM    GLUCOSE 97 06/01/2021 01:03 AM    BUN 13 06/01/2021 01:03 AM    CREATININE 0.81 06/01/2021 01:03 AM    BUNCREATRAT 12 06/12/2020 05:12 AM        Recent Labs     05/31/21  1114 06/01/21  0103   WBC 5.7 5.1   RBC 3.88* 3.83*   HEMOGLOBIN 13.1 13.1   HEMATOCRIT 40.0 38.4   .1* 100.3*   MCH 33.8* 34.2*   RDW 47.3 47.1   PLATELETCT 210 201   MPV 9.8 9.7   NEUTSPOLYS 79.10* 62.10   LYMPHOCYTES 13.20* 26.40   MONOCYTES 6.80 9.10   EOSINOPHILS 0.50 1.80   BASOPHILS 0.20 0.40       Imaging:   MRI:  1.  Small area of acute infarct in the left temporal lobe.  2.  Chronic infarct in the right insular cortex and frontal lobe.  3.  Mild chronic microvascular ischemic disease.  4.  Mild cerebral volume loss.    Shaista Bocanegra is a 77yo F with hx of Atrial fibrillation (CHADSVASC 6,  eliquis, rate controlled no medications), CVA (R insula), HTN, HLD, MVP, apnea, breast CA; admitted 5/31 for acute CVA with word finding/reading comprehension deficits.    * CVA (cerebral vascular accident) (HCC)  Assessment & Plan  Presents with acute reading comprehension and word finding difficulty. NIHSS of 2 (aphasia, mild smile asymmetry). No muscle/reflex/coordination deficits. Does have previous hx of CVA with minimal residual deficits. Onset symptoms estimated 18-48hrs ago, outside tPA window. Passed bedside swallow. CTA head/neck without stenosis/obstruction.  -ASA 81mg PO qD, will stop at discharge (anticipated tomorrow AM)  -hold eliquis 5mg PO BID restart at discharge  -metoprolol 25mg PO BID started (atrial fib, chronic)  -continue atorvastatin 80mg PO qD  -Hold antihypertensives (permissive HTN) HCTZ, amlodipine  -Neurology consulted, following  -ECHO read pending      Severe mitral regurgitation- (present on admission)  Assessment & Plan  Longstanding hx of mitral valve prolapse. Last ECHO 1/21 showed severe mitral regurgitation, EF 55%. ECHO repeated, awaiting official read. Cardiology saw IP, planning to coordinate possible procedures for MVP repair.  -ASA 81mg PO qD (stop on discharge)  -Eliquis 5mg PO BID (resume on discharge)  -cardiology follow up      Atrial fibrillation (HCC)  Assessment & Plan  Chronic atrial fibrillation, rate controlled without home medication, on eliquis as of 5/28.  -eliquis 5mg PO BID resume on discharge  -metoprolol 25mg PO BID (chronic atrial fibrillation goal is 60bpm)    Herpes  Assessment & Plan  Oral herpes, reports recent outbreak and starting valtrex on 5/28.  -continue valtrex    Hypertension  Assessment & Plan  Hx of HTN, last took medications 5/30. Normotensive in ED  -hold HCTZ 12.5mg, amlodipine 10mg per permissive HTN (resume on discharge)

## 2021-06-01 NOTE — CARE PLAN
Problem: Knowledge Deficit - Standard  Goal: Patient and family/care givers will demonstrate understanding of plan of care, disease process/condition, diagnostic tests and medications  Outcome: Progressing  Note: Coordinating with pt and pt family with plan of care. Echo still needed. Speech and OT to evaluate.      Problem: Pain - Standard  Goal: Alleviation of pain or a reduction in pain to the patient’s comfort goal  Outcome: Progressing  Note: Patient in no pain.    The patient is Watcher - Medium risk of patient condition declining or worsening         Progress made toward(s) clinical / shift goals:  MRI, CT done. Waiting Echo and OT/ Speech therapy.

## 2021-06-01 NOTE — CONSULTS
Cardiology Initial Consultation    Date of Service  6/1/2021    Referring Physician  Jt Lunsford M.D.    Reason for Consultation  Severe mitral regurgitation, status post stroke.    History of Presenting Illness  Shaista Bocanegra is a 76 y.o. female with a past medical history of mitral regurgitation, remote stroke who presented 5/31/2021 with confusion. She was diagnosed with left temporal lobe infarct. Of note, her mitral regurgitation has been severe status. She was recommended by Dr. Schaefer for surgical mitral valve repair with STS score of 2.2%. She then consulted with [facility] for minimally invasive mitral valve repair verses Sotomayor Harpoon bating heart mitral valve repair. Most recently she went to [facility] and consulted with Osman Gutierrez who conformed Kaden Ernst recommendation. She was, however, diagnosed with atrial fibrillation and was placed on NOAC by Ayde Lopes but she still suffered the above stroke. She is clinically improving.    Review of Systems  Review of Systems   Unable to perform ROS: Mental status change   Psychiatric/Behavioral: Positive for confusion.       Past Medical History   has a past medical history of Arthritis, Atrial fibrillation ([facility]), Benign essential HTN (3/19/2012), Breast cancer ([facility]), Cancer ([facility]) (1998), Cardiac arrhythmia, Chest tightness or pressure (3/19/2012), Chickenpox, Coronary heart disease, Kiswahili measles, Gynecological disorder, High cholesterol, High risk medication use (3/19/2012), Hypercholesterolemia (3/19/2012), Mumps, MVP (mitral valve prolapse) (3/19/2012), Osteoporosis, Sleep apnea, Snoring, Stroke ([facility]) (2017), Substance abuse ([facility]), Tonsillitis, Urinary bladder disorder, Urinary incontinence, Valvular heart disease, and Venereal disease. She also has no past medical history of Addisons disease ([facility]), Adrenal disorder ([facility]), Allergy, Anemia, Anxiety, Asthma, Blood transfusion without reported diagnosis,  Cataract, CHF (congestive heart failure) (HCC), Clotting disorder (HCC), COPD (chronic obstructive pulmonary disease) (HCC), Cushings syndrome (HCC), Depression, Diabetes (HCC), Diabetic neuropathy (HCC), GERD (gastroesophageal reflux disease), Glaucoma, Goiter, Head ache, Heart attack (HCC), HIV (human immunodeficiency virus infection) (HCC), IBD (inflammatory bowel disease), Kidney disease, Meningitis, Migraine, Muscle disorder, Parathyroid disorder (HCC), Pituitary disease (HCC), Pulmonary emphysema (HCC), Seizure (HCC), Sickle cell disease (HCC), Thyroid disease, Tuberculosis, or Urinary tract infection.    Surgical History   has a past surgical history that includes cataract phaco with iol (1/26/2009); cataract phaco with iol (3/16/2009); breast biopsy; pr radiation therapy plan simple; pr chemotherapy, unspecified procedure; lumpectomy; pr remv 2nd cataract,corn-scler sectn; hysterectomy laparoscopy; sinuscope; tonsillectomy; and primary c section.    Family History  family history includes Cancer in her mother; No Known Problems in her brother.    Social History   reports that she has never smoked. She has never used smokeless tobacco. She reports previous alcohol use. She reports that she does not use drugs.    Medications  Prior to Admission Medications   Prescriptions Last Dose Informant Patient Reported? Taking?   Biotin 86784 MCG Tab 5/30/2021 at am Patient Yes No   Sig: Take  by mouth every day.   Cholecalciferol (VITAMIN D3) 2000 UNIT Cap 5/30/2021 at am Patient Yes No   Sig: Take 2,000 Units by mouth every day.   Cyanocobalamin (B-12 PO) 5/30/2021 at am Patient Yes No   Sig: Take 25,000 mcg by mouth every day.   Multiple Vitamin (MULTIVITAMINS PO) 5/30/2021 at am Patient Yes No   Sig: Take 1 Tab by mouth every day.   Multiple Vitamins-Minerals (ICAPS AREDS 2 PO) 5/30/2021 at am Patient Yes No   Sig: Take 1 tablet by mouth every morning.   SODIUM FLUORIDE, DENTAL GEL, (PREVIDENT) 1.1 % Gel 5/30/2021 at  am Patient Yes No   Sig: Apply  to teeth every day. Prevident   amLODIPine (NORVASC) 10 MG Tab 5/30/2021 at am Patient Yes No   Sig: Take 10 mg by mouth every day.   apixaban (ELIQUIS) 5mg Tab 5/30/2021 at pm Patient No No   Sig: Take 1 tablet by mouth 2 times a day.   atorvastatin (LIPITOR) 80 MG tablet 5/30/2021 at pm Patient No No   Sig: TAKE 1 TABLET BY MOUTH EVERY DAY IN THE EVENING   calcium carbonate (OS-TRAY 500) 500 MG Tab 5/30/2021 at am Patient Yes No   Sig: Take 1,000 mg by mouth every day. Indications: supplement-Allge Tray   estradiol (ESTRACE VAGINAL) 0.1 MG/GM vaginal cream last week at [facility] Patient Yes No   Sig: Insert  into the vagina every day.   hydroCHLOROthiazide (HYDRODIURIL) 12.5 MG tablet 5/30/2021 at am Patient Yes No   Sig: Take 12.5 mg by mouth every day.   potassium chloride (MICRO-K) 10 MEQ capsule 5/30/2021 at am Patient No No   Sig: Take 2 Caps by mouth 2 times a day.   Patient taking differently: Take 10 mEq by mouth every day.   valACYclovir (VALTREX) 500 MG Tab 5/30/2021 at am Patient No No   Sig: Take 1 tablet by mouth every day. Indications: Herpes Simplex Affecting the Lip      Facility-Administered Medications: None   (Above labs reviewed.)     Allergies  No Known Allergies    Vital signs in last 24 hours  Temp:  [36 °C (96.8 °F)-36.9 °C (98.5 °F)] 36.3 °C (97.3 °F)  Pulse:  [58-87] 58  Resp:  [15-19] 16  BP: (102-147)/(58-86) 129/77  SpO2:  [90 %-97 %] 97 %    Physical Exam  Physical Exam  Constitutional:       Appearance: She is well-developed.   HENT:      Head: Normocephalic and atraumatic.   Eyes:      Conjunctiva/sclera: Conjunctivae normal.      Pupils: Pupils are equal, round, and reactive to light.   Cardiovascular:      Rate and Rhythm: Normal rate and regular rhythm.   Pulmonary:      Effort: Pulmonary effort is normal.      Breath sounds: Normal breath sounds.   Abdominal:      General: Bowel sounds are normal.      Palpations: Abdomen is soft.   Musculoskeletal:          General: Normal range of motion.      Cervical back: Normal range of motion and neck supple.   Skin:     General: Skin is warm and dry.   Neurological:      Mental Status: She is alert.         Lab Review  Lab Results   Component Value Date/Time    WBC 5.1 06/01/2021 01:03 AM    RBC 3.83 (L) 06/01/2021 01:03 AM    HEMOGLOBIN 13.1 06/01/2021 01:03 AM    HEMATOCRIT 38.4 06/01/2021 01:03 AM    .3 (H) 06/01/2021 01:03 AM    MCH 34.2 (H) 06/01/2021 01:03 AM    MCHC 34.1 06/01/2021 01:03 AM    MPV 9.7 06/01/2021 01:03 AM      Lab Results   Component Value Date/Time    SODIUM 140 06/01/2021 01:03 AM    POTASSIUM 3.5 (L) 06/01/2021 01:03 AM    CHLORIDE 105 06/01/2021 01:03 AM    CO2 25 06/01/2021 01:03 AM    GLUCOSE 97 06/01/2021 01:03 AM    BUN 13 06/01/2021 01:03 AM    CREATININE 0.81 06/01/2021 01:03 AM    BUNCREATRAT 12 06/12/2020 05:12 AM      Lab Results   Component Value Date/Time    ASTSGOT 23 06/01/2021 01:03 AM    ALTSGPT 21 06/01/2021 01:03 AM     Lab Results   Component Value Date/Time    CHOLSTRLTOT 120 03/24/2021 07:05 AM    LDL 56 03/24/2021 07:05 AM    HDL 55 03/24/2021 07:05 AM    TRIGLYCERIDE 47 03/24/2021 07:05 AM    TROPONINT 12 05/31/2021 11:14 AM       No results for input(s): NTPROBNP in the last 72 hours.  (Above labs reviewed.)     Cardiac Imaging and Procedures Review    CARDIAC STUDIES/PROCEDURES:     CARDIAC CATHETERIZATION CONCLUSIONS by Juventino Farah (05/08/20)  Angiographically normal appearing coronary arteries.  Mildly elevated LVEDP, filling pressures.  2+ mitral regurgitation.  Normal LV function.     CAROTID ULTRASOUND (08/18/17)  Normal carotids, subclavians and vertebrals.    CT OF HEAD (05/31/21)  1.  Moderate acute/subacute left temporal lobe infarct.  2.  Chronic ischemic changes.  3.  No acute intracranial hemorrhage.  (study result reviewed)    CTA OF HEAD (05/31/21)  No acute large vessel occlusion or hemodynamically significant stenosis.  Acute/subacute  appearing left temporal lobe infarct.  (study result reviewed)    CTA OF NECK (05/31/21)  1.  No stenosis or dissection is seen within the carotid or vertebral arteries bilaterally.  2.  Tree-in-bud nodularity in the right upper lobe is likely infectious/inflammatory.  3.  Mucosal thickening right maxillary sinus.  4.  Nasal bone deformities bilaterally are likely chronic.  (study result reviewed)     CT OF HEAD (05/03/19)  NO ACUTE ABNORMALITIES ARE NOTED ON CT SCAN OF THE HEAD.  Right-sided encephalomalacia is noted.     CTA OF HEAD (08/18/17)  1.  There is a right M1 segment occlusion.  2.  There is collateral flow in the peripheral M3 branches seen on the right.  3.  There is mild decreased enhancement of the visualized right internal carotid artery however it is patent.  4.  Question of subtle hypodensity in the right insular cortex region. No abnormal brain parenchymal enhancement is seen.     CTA OF NECK (08/18/17)  1.  CT angiogram of the neck within normal limits.  2.  Thrombosed right M1 segment.     ECHOCARDIOGRAM CONCLUSIONS (02/03/20)  Prior echo done on 05/03/2019. Compared to the images of the prior   study done -  there has been no significant change.   Normal left ventricular systolic function.  Left ventricular ejection fraction is visually estimated to be 65%.  Prolapse of the mitral leaflets was present.  Severe mitral regurgitation.  Mild tricuspid regurgitation.  Estimated right ventricular systolic pressure is 35 mmHg.     ECHOCARDIOGRAM CONCLUSIONS (05/03/19)  Prior echocardiogram 6/14/2018.  Normal left ventricular systolic function.   Mild to moderate eccentric mitral regurgitation.  Compared to the images of the study done - there has been slight improvement in mitral regurgitation.     ECHOCARDIOGRAM CONCLUSIONS (06/14/18)  Normal left ventricular systolic function.  Left ventricular ejection fraction is visually estimated to be 60%.  Myxomatous changes of the mitral valve.  Prolapse of  the anterior and posterior mitral leaflets.  Severe, eccentric mitral regurgitation.  Right heart pressures are normal.  Compared to the report of the study done 8/19/2017 severe mitral regurgitation is now present, previously reported as moderate but a MR   ERO was not recorded.      EKG performed on (03/19/21) was reviewed: EKG personally interpreted shows sinus rhythm with premature ventricular contractions.  EKG performed on (05/09/20) EKG shows sinus rhythm with premature ventricular contractions.  EKG performed on (05/08/20) EKG shows sinus rhythm with premature ventricular contractions.     TRANSESOPHAGEAL ECHOCARDIOGRAM CONCLUSIONS by Ayde Lopes (01/26/21)  LV EF 55%.  Biatrial enlargement.  There is severe eccentric mitral regurgitation with multiple jets, predominantly from flail leaflet of P2.  Echolucent space near P1 of unclear etiology, unusual for cleft leaflet.  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    Assessment/Plan    1. Mitral regurgitation: Her mitral regurgitation has been severe status. She was recommended by Dr. Schaefer for surgical mitral valve repair with STS score of 2.2%. She then consulted with [facility] for minimally invasive mitral valve repair verses Sotomayor Harpoon bating heart mitral valve repair. Most recently she went to [facility] and consulted with Osman Gutierrez who conformed Kaden Ernst recommendation. She was, however, diagnosed with atrial fibrillation and was placed on NOAC by Ayde Lopes but she still suffered a stroke. We will rediscuss this case with Kaden Ernst. For mitral valve repair vs MitraClip   2. Atrial fibrillation: She will be started on anticoagulation therapy tomorrow per neurology.  3. Status post left temporal lobe infarct (05/31/21) with history of cerebrovascular accident with right carotid arteriography with mechanical thrombectomy (08/18/17): She remains clinically stable with memory loss  and mild confusion.  4. Additional information: She is family of Casey Hampton.       CC Bethany Ambrose and Ayde Lopes    Thank you for allowing me to participate in the care of this patient.    I will continue to follow this patient    Please contact me with any questions.    Darin Meehan M.D.   Cardiologist, [facility]  [phone]

## 2021-06-01 NOTE — THERAPY
SLP Contact Note    Per EMR review and discussion w/ RN, clinical swallow evaluation is not needed as pt passed the dysphagia screen and is tolerating a regular diet. However, MD does want patient to have a speech-language evaluation due to word finding difficulty. SLP will complete an aphasia evaluation as able.

## 2021-06-01 NOTE — CARE PLAN
The patient is Stable - Low risk of patient condition declining or worsening         Progress made toward(s) clinical / shift goals: pain, education    Patient is not progressing towards the following goals: n/a      Problem: Pain - Standard  Goal: Alleviation of pain or a reduction in pain to the patient’s comfort goal  Outcome: Progressing  Flowsheets  Taken 5/31/2021 2305  Non Verbal Scale:   Calm   Sleeping   Unlabored Breathing  Taken 5/31/2021 2104  Pain Rating Scale (NPRS): 2  Note: Assess and monitor for pain. Provide pharmacological and non-pharmacological interventions as appropriate. Re-evaluate and continue to monitor.        Problem: Knowledge Deficit - Standard  Goal: Patient and family/care givers will demonstrate understanding of plan of care, disease process/condition, diagnostic tests and medications  Outcome: Progressing  Note: Discuss and review POC with patient/family. Re-educate as needed.

## 2021-06-01 NOTE — PROGRESS NOTES
"Chief Complaint   Patient presents with   • ALOC       Problem List Items Addressed This Visit     None      Visit Diagnoses     Altered mental status, unspecified altered mental status type            Neurology Stroke Progress Note    History of present illness:  This is a 76-year old female with PMhx significant for hypertension, stroke (2017; Right hemispheric with mild residual Left hemiparesis), dyslipidemia, breast cancer (remotely), ?dementia, valvular heart disease and atrial fibrillation (started on Eliquis on 5/28/21) who presented to [facility] on 5/31/21 for a chief complaint of word finding difficulty and confusion. The patient states that she was in her usual state of health until yesterday (5/30/21) evening, around 2100; she reports that she finished watching TV, and got in bed to read a book. She then noted that she was unable to read/comprehend the words on the page. No other deficits noted at that time, including no focal/unilateral weakness, numbness, paresthesia, or visual deficit. She went to sleep; when she awoke this morning at around 0700, she again noted that she was unable to read; she states that she was also having difficulty using the telephone, thus she walked to her neighbors house and asked for help. Her neighbors reportedly then called EMS. On arrival here, SBP 130s; BG 95. Stat CT head revealed hypodensity involving Left temporal/occipital lobe concerning for acute/subacute ischemic stroke (also note chronic appearing stroke/encephalopmelacia involving Right temporal lobe consistent with known history of stroke); no obvious hemorrhage. CTA head/neck and CT perfusion study are currently ordered Stat and are pending.   Currently, patient is sitting up in stretcher; awake and alert. States that she feels well, \"stiff,\" denies headache or dizziness. She also continues to deny focal weakness, numbness, paresthesia, problem with vision, or problem swallowing. Further review of " patient's medical record reveals that patient has had short term memory difficulty since her stroke in 2017, with worsening over the past year. Also of note, patient reportedly started taking Eliquis 5 mg PO BID on Friday, 5/28/21 at the instruction of ?Dr. Dukes; she apparently delayed intiation of this for unclear reasons, but did start this on Friday and was reportedly compliant Friday, Saturday and Sunday.     Neurology has been consulted by Dr. Fabricio Marley to further evaluate the findings noted above.     Interval, 6/1/21:  Patient sitting up in bed; awake and alert. Feels well, eating breakfast. Still with very mild word finding difficulty/expressive aphasia; minimal receptive aphasia; improved today. No focal/unilateral weakness, numbness or paresthesia.     MRI Brain has revealed acute ischemic infarction involving Left temporal lobe; no obvious hemorrhagic conversion.     No changes to HPI as was previously documented.     Past medical history:   Past Medical History:   Diagnosis Date   • Arthritis    • Atrial fibrillation (HCC)    • Benign essential HTN 3/19/2012   • Breast cancer (HCC)    • Cancer (HCC) 1998    breast    • Cardiac arrhythmia    • Chest tightness or pressure 3/19/2012   • Chickenpox    • Coronary heart disease    • Lebanese measles    • Gynecological disorder    • High cholesterol    • High risk medication use 3/19/2012   • Hypercholesterolemia 3/19/2012   • Mumps    • MVP (mitral valve prolapse) 3/19/2012   • Osteoporosis    • Sleep apnea     no CPAP   • Snoring    • Stroke (HCC) 2017    residual minor weakness on left side   • Substance abuse (HCC)    • Tonsillitis    • Urinary bladder disorder     OAB   • Urinary incontinence    • Valvular heart disease    • Venereal disease        Past surgical history:   Past Surgical History:   Procedure Laterality Date   • CATARACT PHACO WITH IOL  3/16/2009    Performed by SHANNON GANDARA at SURGERY SAME DAY [facility]   • CATARACT PHACO WITH  IOL  1/26/2009    Performed by SHANNON GANDARA at SURGERY SAME DAY [facility]   • BREAST BIOPSY     • HYSTERECTOMY LAPAROSCOPY     • LUMPECTOMY     • PB RADIATION THERAPY PLAN SIMPLE     • PB REMV 2ND CATARACT,CORN-SCLER SECTN     • WI CHEMOTHERAPY, UNSPECIFIED PROCEDURE     • PRIMARY C SECTION     • SINUSCOPE     • TONSILLECTOMY         Family history:   Family History   Problem Relation Age of Onset   • Cancer Mother         breast   • No Known Problems Brother    • Heart Failure Neg Hx    • Heart Disease Neg Hx        Social history:   Social History     Socioeconomic History   • Marital status:      Spouse name: Not on file   • Number of children: 2   • Years of education: Not on file   • Highest education level: Not on file   Occupational History   • Not on file   Tobacco Use   • Smoking status: Never Smoker   • Smokeless tobacco: Never Used   Vaping Use   • Vaping Use: Never used   Substance and Sexual Activity   • Alcohol use: Not Currently     Comment: not since March 1st 2021   • Drug use: No   • Sexual activity: Yes     Partners: Male     Birth control/protection: Female Sterilization   Other Topics Concern   • Not on file   Social History Narrative   • Not on file     Social Determinants of Health     Financial Resource Strain:    • Difficulty of Paying Living Expenses:    Food Insecurity:    • Worried About Running Out of Food in the Last Year:    • Ran Out of Food in the Last Year:    Transportation Needs:    • Lack of Transportation (Medical):    • Lack of Transportation (Non-Medical):    Physical Activity:    • Days of Exercise per Week:    • Minutes of Exercise per Session:    Stress:    • Feeling of Stress :    Social Connections:    • Frequency of Communication with Friends and Family:    • Frequency of Social Gatherings with Friends and Family:    • Attends [employer]ars[employer] Services:    • Active Member of Clubs or Organizations:    • Attends Club or Organization Meetings:    • Marital Status:     Intimate Partner Violence:    • Fear of Current or Ex-Partner:    • Emotionally Abused:    • Physically Abused:    • Sexually Abused:        Current medications:   Current Facility-Administered Medications   Medication Dose   • senna-docusate (PERICOLACE or SENOKOT S) 8.6-50 MG per tablet 2 tablet  2 tablet    And   • polyethylene glycol/lytes (MIRALAX) PACKET 1 Packet  1 Packet    And   • magnesium hydroxide (MILK OF MAGNESIA) suspension 30 mL  30 mL    And   • bisacodyl (DULCOLAX) suppository 10 mg  10 mg   • acetaminophen (Tylenol) tablet 650 mg  650 mg   • atorvastatin (LIPITOR) tablet 80 mg  80 mg   • vitamin D (cholecalciferol) tablet 1,000 Units  1,000 Units   • cyanocobalamin (VITAMIN B-12) tablet 1,000 mcg  1,000 mcg   • multivitamin (THERAGRAN) tablet 1 tablet  1 tablet   • valACYclovir (VALTREX) caplet 500 mg  500 mg   • [Held by provider] apixaban (ELIQUIS) tablet 5 mg  5 mg   • metoprolol tartrate (LOPRESSOR) tablet 25 mg  25 mg   • aspirin EC (ECOTRIN) tablet 81 mg  81 mg   • melatonin tablet 5 mg  5 mg       Medication Allergy:  No Known Allergies    Review of systems:   Constitutional: denies fever, night sweats, weight loss.   Eyes: denies acute vision change, eye pain or secretion.   Ears, Nose, Mouth, Throat: denies nasal secretion, nasal bleeding, difficulty swallowing, hearing loss, tinnitus, vertigo, ear pain, acute dental problems, oral ulcers or lesions.   Endocrine: denies recent weight changes, heat or cold intolerance, polyuria, polydypsia, polyphagia,abnormal hair growth.  Cardiovascular: denies new onset of chest pain, palpitations, syncope, or dyspnea of exertion.  Pulmonary: denies shortness of breath, new onset of cough, hemoptysis, wheezing, chest pain or flu-like symptoms.   GI: denies nausea, vomiting, diarrhea, GI bleeding, change in appetite, abdominal pain, and change in bowel habits.  : denies dysuria, urinary incontinence, hematuria.  Heme/oncology: denies history of easy  "bruising or bleeding. No history of cancer, DVTor PE.  Allergy/immunology: denies hives/urticaria, or itching.   Dermatologic: denies new rash, or new skin lesions.  Musculoskeletal:denies joint swelling or pain, muscle pain, neck and back pain.   Neurologic: As noted in detail above.  Psychiatric: denies symptoms of depression, anxiety, hallucinations, mood swings or changes, suicidal or homicidal thoughts.     Physical examination:   Vitals:    05/31/21 2008 06/01/21 0007 06/01/21 0425 06/01/21 0835   BP: 102/58 106/63 132/86 129/77   Pulse: 67 64 69 (!) 58   Resp: 17 16 16 16   Temp: 36.2 °C (97.2 °F) 36.4 °C (97.6 °F) 36 °C (96.8 °F) 36.3 °C (97.3 °F)   TempSrc: Temporal Temporal Temporal Temporal   SpO2: 95% 95% 95% 97%   Weight:       Height:         General: Patient in no acute distress, pleasant and cooperative.  HEENT: Normocephalic, no signs of acute trauma.   Neck: supple, no meningeal signs or carotid bruits. There is normal range of motion. No tenderness on exam.   Chest: clear to auscultation. No cough.   CV: RRR, no murmurs.   Skin: no signs of acute rashes or trauma.   Musculoskeletal: joints exhibit full range of motion, without any pain to palpation. There are no signs of joint or muscle swelling. There is no tenderness to deep palpation of muscles.   Psychiatric: No hallucinatory behavior. Denies symptoms of depression or suicidal ideation. Mood and affect appear normal on exam.     NEUROLOGICAL EXAM:   Mental status, orientation: Awake, alert and fully oriented (with choices; she initially states \"February\" however self corrects to May/June).    Speech and language: Follows simple commands with ease; speech is clear, non dysarthric. Speech is very mildly aphasic, with intermittent word finding difficulty, however with preserved naming of objects.    Cranial nerve exam: Pupils are 3-4 mm bilaterally and equally reactive to light. Visual fields are intact by confrontation. There is no nystagmus on " primary or secondary gaze. Intact full EOM in all directions of gaze. Face appears symmetric. Sensation in the face is intact to light touch. Uvula is midline. Palate elevates symmetrically. Tongue is midline and without any signs of tongue biting or fasciculations.Shoulder shrug is intact bilaterally.   Motor exam: Strength is 5/5 in all extremities. Tone is normal. No abnormal movements were seen on exam.   Sensory exam reveals normal sense of light touch and pinprick in all extremities.   Deep tendon reflexes:  Plantar responses are flexor. There is no clonus.   Coordination: shows a normal finger-nose-finger. Normal rapidly alternating movements.   Gait: Not assessed at this time as patient is a fall risk.     No significant changes to exam as was documented on 5/31/21.     NIH Stroke Scale    1a Level of Consciousness   1b Orientation Questions   1c Response to Commands   2 Gaze   3 Visual Fields   4 Facial Movement   5 Motor Function (arm)   a Left   b Right   6 Motor Function (leg)   a Left   b Right   7 Limb Ataxia   8 Sensory   9 Language  1  10 Articulation   11 Extinction/Inattention     Score: 1      ANCILLARY DATA REVIEWED:     Lab Data Review:  Recent Results (from the past 24 hour(s))   CBC WITH DIFFERENTIAL    Collection Time: 05/31/21 11:14 AM   Result Value Ref Range    WBC 5.7 4.8 - 10.8 K/uL    RBC 3.88 (L) 4.20 - 5.40 M/uL    Hemoglobin 13.1 12.0 - 16.0 g/dL    Hematocrit 40.0 37.0 - 47.0 %    .1 (H) 81.4 - 97.8 fL    MCH 33.8 (H) 27.0 - 33.0 pg    MCHC 32.8 (L) 33.6 - 35.0 g/dL    RDW 47.3 35.9 - 50.0 fL    Platelet Count 210 164 - 446 K/uL    MPV 9.8 9.0 - 12.9 fL    Neutrophils-Polys 79.10 (H) 44.00 - 72.00 %    Lymphocytes 13.20 (L) 22.00 - 41.00 %    Monocytes 6.80 0.00 - 13.40 %    Eosinophils 0.50 0.00 - 6.90 %    Basophils 0.20 0.00 - 1.80 %    Immature Granulocytes 0.20 0.00 - 0.90 %    Nucleated RBC 0.00 /100 WBC    Neutrophils (Absolute) 4.54 2.00 - 7.15 K/uL    Lymphs  (Absolute) 0.76 (L) 1.00 - 4.80 K/uL    Monos (Absolute) 0.39 0.00 - 0.85 K/uL    Eos (Absolute) 0.03 0.00 - 0.51 K/uL    Baso (Absolute) 0.01 0.00 - 0.12 K/uL    Immature Granulocytes (abs) 0.01 0.00 - 0.11 K/uL    NRBC (Absolute) 0.00 K/uL   COMP METABOLIC PANEL    Collection Time: 05/31/21 11:14 AM   Result Value Ref Range    Sodium 140 135 - 145 mmol/L    Potassium 3.9 3.6 - 5.5 mmol/L    Chloride 105 96 - 112 mmol/L    Co2 24 20 - 33 mmol/L    Anion Gap 11.0 7.0 - 16.0    Glucose 95 65 - 99 mg/dL    Bun 14 8 - 22 mg/dL    Creatinine 0.86 0.50 - 1.40 mg/dL    Calcium 9.0 8.5 - 10.5 mg/dL    AST(SGOT) 22 12 - 45 U/L    ALT(SGPT) 16 2 - 50 U/L    Alkaline Phosphatase 62 30 - 99 U/L    Total Bilirubin 0.3 0.1 - 1.5 mg/dL    Albumin 3.7 3.2 - 4.9 g/dL    Total Protein 6.4 6.0 - 8.2 g/dL    Globulin 2.7 1.9 - 3.5 g/dL    A-G Ratio 1.4 g/dL   PROTHROMBIN TIME    Collection Time: 05/31/21 11:14 AM   Result Value Ref Range    PT 15.3 (H) 12.0 - 14.6 sec    INR 1.17 (H) 0.87 - 1.13   TROPONIN    Collection Time: 05/31/21 11:14 AM   Result Value Ref Range    Troponin T 12 6 - 19 ng/L   ESTIMATED GFR    Collection Time: 05/31/21 11:14 AM   Result Value Ref Range    GFR If African American >60 >60 mL/min/1.73 m 2    GFR If Non African American >60 >60 mL/min/1.73 m 2   VITAMIN B12    Collection Time: 05/31/21 11:14 AM   Result Value Ref Range    Vitamin B12 -True Cobalamin 1826 (H) 211 - 911 pg/mL   FOLATE    Collection Time: 05/31/21 11:14 AM   Result Value Ref Range    Folate -Folic Acid 34.8 >4.0 ng/mL   TSH WITH REFLEX TO FT4    Collection Time: 05/31/21 11:14 AM   Result Value Ref Range    TSH 1.510 0.380 - 5.330 uIU/mL   HEMOGLOBIN A1C    Collection Time: 05/31/21 11:14 AM   Result Value Ref Range    Glycohemoglobin 5.7 (H) 4.0 - 5.6 %    Est Avg Glucose 117 mg/dL   COV-2, FLU A/B, AND RSV BY PCR (2-4 HOURS CEPHEID): Collect NP swab in VTM    Collection Time: 05/31/21 11:47 AM    Specimen: Respirate   Result Value Ref  Range    Influenza virus A RNA Negative Negative    Influenza virus B, PCR Negative Negative    RSV, PCR Negative Negative    SARS-CoV-2 by PCR NotDetected     SARS-CoV-2 Source NP Swab    CBC with Differential    Collection Time: 06/01/21  1:03 AM   Result Value Ref Range    WBC 5.1 4.8 - 10.8 K/uL    RBC 3.83 (L) 4.20 - 5.40 M/uL    Hemoglobin 13.1 12.0 - 16.0 g/dL    Hematocrit 38.4 37.0 - 47.0 %    .3 (H) 81.4 - 97.8 fL    MCH 34.2 (H) 27.0 - 33.0 pg    MCHC 34.1 33.6 - 35.0 g/dL    RDW 47.1 35.9 - 50.0 fL    Platelet Count 201 164 - 446 K/uL    MPV 9.7 9.0 - 12.9 fL    Neutrophils-Polys 62.10 44.00 - 72.00 %    Lymphocytes 26.40 22.00 - 41.00 %    Monocytes 9.10 0.00 - 13.40 %    Eosinophils 1.80 0.00 - 6.90 %    Basophils 0.40 0.00 - 1.80 %    Immature Granulocytes 0.20 0.00 - 0.90 %    Nucleated RBC 0.00 /100 WBC    Neutrophils (Absolute) 3.15 2.00 - 7.15 K/uL    Lymphs (Absolute) 1.34 1.00 - 4.80 K/uL    Monos (Absolute) 0.46 0.00 - 0.85 K/uL    Eos (Absolute) 0.09 0.00 - 0.51 K/uL    Baso (Absolute) 0.02 0.00 - 0.12 K/uL    Immature Granulocytes (abs) 0.01 0.00 - 0.11 K/uL    NRBC (Absolute) 0.00 K/uL   Comp Metabolic Panel (CMP)    Collection Time: 06/01/21  1:03 AM   Result Value Ref Range    Sodium 140 135 - 145 mmol/L    Potassium 3.5 (L) 3.6 - 5.5 mmol/L    Chloride 105 96 - 112 mmol/L    Co2 25 20 - 33 mmol/L    Anion Gap 10.0 7.0 - 16.0    Glucose 97 65 - 99 mg/dL    Bun 13 8 - 22 mg/dL    Creatinine 0.81 0.50 - 1.40 mg/dL    Calcium 8.7 8.5 - 10.5 mg/dL    AST(SGOT) 23 12 - 45 U/L    ALT(SGPT) 21 2 - 50 U/L    Alkaline Phosphatase 65 30 - 99 U/L    Total Bilirubin 0.2 0.1 - 1.5 mg/dL    Albumin 3.6 3.2 - 4.9 g/dL    Total Protein 6.2 6.0 - 8.2 g/dL    Globulin 2.6 1.9 - 3.5 g/dL    A-G Ratio 1.4 g/dL   ESTIMATED GFR    Collection Time: 06/01/21  1:03 AM   Result Value Ref Range    GFR If African American >60 >60 mL/min/1.73 m 2    GFR If Non African American >60 >60 mL/min/1.73 m 2       Labs  reviewed by me.         Imaging reviewed by me:     CT-CTA HEAD WITH & W/O-POST PROCESS   Final Result      No acute large vessel occlusion or hemodynamically significant stenosis.      Acute/subacute appearing left temporal lobe infarct.      CT-CTA NECK WITH & W/O-POST PROCESSING   Final Result      1.  No stenosis or dissection is seen within the carotid or vertebral arteries bilaterally.   2.  Tree-in-bud nodularity in the right upper lobe is likely infectious/inflammatory.   3.  Mucosal thickening right maxillary sinus.   4.  Nasal bone deformities bilaterally are likely chronic.      CT-CEREBRAL PERFUSION ANALYSIS   Final Result      1.  Cerebral blood flow less than 30% likely representing completed infarct = 0 mL.      2.  T Max more than 6 seconds likely representing combination of completed infarct and ischemia = 0 mL.      3.  Mismatched volume likely representing ischemic brain/penumbra = None      4.  Please note that the cerebral perfusion was performed on the limited brain tissue around the basal ganglia region. Infarct/ischemia outside the CT perfusion sections can be missed in this study.      MR-BRAIN-WITH & W/O   Final Result      1.  Small area of acute infarct in the left temporal lobe.   2.  Chronic infarct in the right insular cortex and frontal lobe.   3.  Mild chronic microvascular ischemic disease.   4.  Mild cerebral volume loss.      CT-HEAD W/O   Final Result      1.  Moderate acute/subacute left temporal lobe infarct.   2.  Chronic ischemic changes.   3.  No acute intracranial hemorrhage.      EC-ECHOCARDIOGRAM COMPLETE W/ CONT    (Results Pending)         Presumed mechanism by TOAST:  __Large Artery Atherosclerosis  __Small Vessel (Lacunar)  _X_Cardioembolic  __Other (Sickle Cell, Vasculitis, Hypercoagulable)  __Unknown    Modified [place] Scale (MRS): 0 = No symptoms      ASSESSMENT AND PLAN:  76-year old female with PMhx significant for hypertension, stroke (2017; Right hemispheric  with mild residual Left hemiparesis), dyslipidemia, breast cancer (remotely), ?dementia, valvular heart disease and atrial fibrillation (started on Eliquis on 5/28/21) who presented to [facility]21 for a chief complaint of word finding difficulty and confusion; onset last night 5/30/21 at 2100; patient thus not a candidate for IV tPA at time of presentation this morning as she was outside of tPA window (presentation time greater than 4.5 hours from time of symptom onset). Stat CT head on arrival here revealed hypodensity concerning for acute/early subacute ischemic infarction involving Left temporal lobe; no obvious hemorrhage. CTA head/neck with no LVO; MRI Brain has confirmed evolving acute ischemic stroke involving Left temporal lobe; most likely cardioembolic phenomena (**as was previously noted, despite compliance with Eliquis; note, steady state of Eliquis/apixaban is achieved at approximately three days). NIHSS remains at 1.     Recommendations/Plan:     -q4h and PRN neuro assessment. VS per nursing/unit protocol. Permissive HTN ok until 6/2/21, not to exceed SBP > 220, DBP > 105. Then, BP goal < 140/90. Antihypertensives per primary team.   -Telemetry; currently Afib, rate controlled (60s-80s). TTE complete; read pending.   -For now, continue ASA 81 mg PO q day; give size/nature of infarct, plan to restart Eliquis tomorrow, 6/2/21 (3 days from ictus). May stop ASA once Eliquis is restarted.   -Atorvastatin 80 mg PO q HS. Note LDL is 56, at goal (< 70).   -Recommend aggressive BG management per primary team. Avoid IVF with Dextrose. BG goal 140-180. Hemoglobin A1c is 5.7.   -PT/OT/SLP eval and treat. Physiatry consult.   -Counseled patient at length regarding life style and risk factor modification for secondary stroke prevention, including importance of medication compliance.   -Plan for outpatient neurology stroke bridge clinic follow up 2-4 weeks; referral placed.    -All other medical  management per primary team.   -DVT PPX: SCDs.      The plan of care above has been discussed with Dr. Pollard. Neurology will follow peripherally; please call with questions.     ELVIA Ayoub.  [facility]

## 2021-06-01 NOTE — PROGRESS NOTES
2 RN Skin Check    2 RN skin check complete with ALISE Rose.   Devices in place: n/a.  Skin assessed under devices: N\A.  Confirmed pressure ulcers found on: n/a.  New potential pressure ulcers noted on n/a. Wound consult placed N/A.  The following interventions in place Pillows and Lotion.    Ears are clean, dry, and intact.   Elbows are clean, dry, and intact.   Sacrum is clean, dry, and intact.   Heels are clean, dry, and intact with some calloses.

## 2021-06-01 NOTE — THERAPY
Physical Therapy   Initial Evaluation     Patient Name: Shaista Bocanegra  Age:  76 y.o., Sex:  female  Medical Record #: [id number]  Today's Date: 6/1/2021     Precautions: Fall Risk    Assessment  Patient is 76 y.o. female admitted 5/31 for CVA. MRI showed significant L temporal CVA.  Additional factors influencing patient status / progress : today, pt is at baseline function, supervised for OOB, transfers and for ambulation using no AD. Pt showed no strength or balance issues. Pt did show word-finding issues and some confusion. No further inpt PT needs.       Plan    Recommend Physical Therapy for Evaluation only  DC Equipment Recommendations: None  Discharge Recommendations: Anticipate that the patient will have no further physical therapy needs after discharge from the hospital  Patient will not be actively followed for physical therapy services at this time, however may be seen if requested by physician for 1 more visit within 30 days to address any discharge or equipment needs          Objective       06/01/21 1044   Prior Living Situation   Prior Services Housekeeping / Homemaker Services  (housecleaning every other week.)   Housing / Facility 1 Story Apartment / Condo  (on 7th floor)   Steps Into Home 0   Steps In Home 0   Elevator Yes   Equipment Owned None   Lives with - Patient's Self Care Capacity Alone and Able to Care For Self   Comments Son, Fer at bedside, can assist.    Prior Level of Functional Mobility   Bed Mobility Independent   Transfer Status Independent   Ambulation Independent   Distance Ambulation (Feet)   (community)   Assistive Devices Used None   Stairs Independent   Comments drives herself to get groceries   Cognition    Cognition / Consciousness X   Speech/ Communication Word Finding Impairment   Level of Consciousness Alert   Gait Analysis   Gait Level Of Assist Supervised   Assistive Device None   Distance (Feet) 200   Bed Mobility    Supine to Sit Supervised   Sit to Supine Supervised    Scooting Supervised   Rolling Supervised   Functional Mobility   Sit to Stand Supervised   Bed, Chair, Wheelchair Transfer Supervised   Anticipated Discharge Equipment and Recommendations   DC Equipment Recommendations None   Discharge Recommendations Anticipate that the patient will have no further physical therapy needs after discharge from the hospital

## 2021-06-02 ENCOUNTER — PATIENT OUTREACH (OUTPATIENT)
Dept: HEALTH INFORMATION MANAGEMENT | Facility: OTHER | Age: 76
End: 2021-06-02

## 2021-06-02 VITALS
DIASTOLIC BLOOD PRESSURE: 63 MMHG | WEIGHT: 138.45 LBS | SYSTOLIC BLOOD PRESSURE: 108 MMHG | HEIGHT: 66 IN | RESPIRATION RATE: 16 BRPM | HEART RATE: 50 BPM | BODY MASS INDEX: 22.25 KG/M2 | OXYGEN SATURATION: 96 % | TEMPERATURE: 97.7 F

## 2021-06-02 PROCEDURE — 700102 HCHG RX REV CODE 250 W/ 637 OVERRIDE(OP): Performed by: STUDENT IN AN ORGANIZED HEALTH CARE EDUCATION/TRAINING PROGRAM

## 2021-06-02 PROCEDURE — 94760 N-INVAS EAR/PLS OXIMETRY 1: CPT

## 2021-06-02 PROCEDURE — 96105 ASSESSMENT OF APHASIA: CPT

## 2021-06-02 PROCEDURE — A9270 NON-COVERED ITEM OR SERVICE: HCPCS | Performed by: STUDENT IN AN ORGANIZED HEALTH CARE EDUCATION/TRAINING PROGRAM

## 2021-06-02 PROCEDURE — 99238 HOSP IP/OBS DSCHRG MGMT 30/<: CPT | Mod: GC | Performed by: HOSPITALIST

## 2021-06-02 PROCEDURE — 99232 SBSQ HOSP IP/OBS MODERATE 35: CPT | Performed by: INTERNAL MEDICINE

## 2021-06-02 PROCEDURE — 94660 CPAP INITIATION&MGMT: CPT

## 2021-06-02 RX ORDER — METOPROLOL SUCCINATE 50 MG/1
25 TABLET, EXTENDED RELEASE ORAL DAILY
Qty: 30 TABLET | Refills: 0 | Status: SHIPPED | OUTPATIENT
Start: 2021-06-02 | End: 2021-06-09 | Stop reason: SDUPTHER

## 2021-06-02 RX ADMIN — Medication 1000 UNITS: at 05:17

## 2021-06-02 RX ADMIN — METOPROLOL TARTRATE 25 MG: 25 TABLET, FILM COATED ORAL at 05:17

## 2021-06-02 RX ADMIN — DOCUSATE SODIUM 50 MG AND SENNOSIDES 8.6 MG 2 TABLET: 8.6; 5 TABLET, FILM COATED ORAL at 05:18

## 2021-06-02 RX ADMIN — ASPIRIN 81 MG: 81 TABLET, COATED ORAL at 05:17

## 2021-06-02 RX ADMIN — CYANOCOBALAMIN TAB 500 MCG 1000 MCG: 500 TAB at 05:16

## 2021-06-02 RX ADMIN — THERA TABS 1 TABLET: TAB at 05:17

## 2021-06-02 RX ADMIN — VALACYCLOVIR HYDROCHLORIDE 500 MG: 500 TABLET, FILM COATED ORAL at 05:17

## 2021-06-02 ASSESSMENT — ENCOUNTER SYMPTOMS
NAUSEA: 0
VOMITING: 0
SHORTNESS OF BREATH: 0
DIZZINESS: 0
LIGHT-HEADEDNESS: 0
WHEEZING: 0
CHILLS: 0
COUGH: 0
FEVER: 0
CHEST TIGHTNESS: 0
PALPITATIONS: 0

## 2021-06-02 NOTE — DISCHARGE PLANNING
[facility] Transitional Care Coordination    Referral from:   Dr. Gotti  Insurance Provider on Facesheet:  [place]/Medicare Part A  Potential Rehab Diagnosis:  Stroke/Cardiac    Chart review indicates patient has on going medical management, limited therapy needs to possibly meet inpatient rehab facility criteria with the goal of returning to community.    D/C support:  To be determined     Physiatry referral will not be forwarded for consult.  MRI brain - Small area of acute infarct in the left temporal lobe.  Afib/MV regurgitation.  Chart notes reflect pt to pursue MV repair trial at [place].  Current documentation does not demonstrate ongoing acute therapy need in 2 of 3 therapy disciplines meeting CMS criteria for IRF level care.  If there are interval changes, please reach out to Rehab [facility] [phone].  Please reach out if PMR consult requested for medical management.      Last Covid test:  5/31/2021 not detected     Thank you for the referral.

## 2021-06-02 NOTE — CARE PLAN
Problem: Knowledge Deficit - Standard  Goal: Patient and family/care givers will demonstrate understanding of plan of care, disease process/condition, diagnostic tests and medications  Outcome: Progressing  Note: Pt and pt family updated on plan of care     Problem: Pain - Standard  Goal: Alleviation of pain or a reduction in pain to the patient’s comfort goal  Outcome: Met  Flowsheets (Taken 6/2/2021 1050)  Pain Rating Scale (NPRS): 0  Note: Complete    The patient is Stable - Low risk of patient condition declining or worsening

## 2021-06-02 NOTE — PROGRESS NOTES
Cardiology Follow Up Progress Note    Date of Service  6/2/2021    Attending Physician  Jt Lunsford M.D.    Chief Complaint   Severe mitral regurgitation, left temporal lobe infarct    HPI  Shaista Bocanegra is a 76 y.o. female admitted 5/31/2021 with mitral regurgitation, remote stroke who presented 5/31/2021 with confusion. She was diagnosed with left temporal lobe infarct. Of note, her mitral regurgitation has been severe status. She was recommended by Dr. Schaefer for surgical mitral valve repair with STS score of 2.2%. She then consulted with [facility] for minimally invasive mitral valve repair verses Sotomayor Harpoon bating heart mitral valve repair. Most recently she went to [facility] and consulted with Osman Gutierrez who conformed Kaden Ernst recommendation. She was, however, diagnosed with atrial fibrillation and was placed on NOAC by Ayde Lopes but she still suffered the above stroke. She is clinically improving.    Interim Events  6/2/2021: A. fib 54-70s  In spite of recent ischemic stroke, patient is still low risk for open heart mitral valve surgery.  This was discussed with Dr. Romanas once again      Review of Systems  Review of Systems   Constitutional: Negative for chills and fever.   Respiratory: Negative for cough, chest tightness, shortness of breath and wheezing.    Cardiovascular: Negative for chest pain, palpitations and leg swelling.   Gastrointestinal: Negative for nausea and vomiting.   Neurological: Negative for dizziness and light-headedness.   All other systems reviewed and are negative.      Vital signs in last 24 hours  Temp:  [36 °C (96.8 °F)-36.8 °C (98.2 °F)] 36.2 °C (97.2 °F)  Pulse:  [56-69] 59  Resp:  [16-17] 16  BP: (106-136)/(66-75) 106/70  SpO2:  [94 %-97 %] 95 %    Physical Exam  Physical Exam  Vitals and nursing note reviewed.   Constitutional:       Appearance: Normal appearance.   HENT:      Head: Normocephalic and atraumatic.       Mouth/Throat:      Mouth: Mucous membranes are moist.   Eyes:      Extraocular Movements: Extraocular movements intact.   Neck:      Comments: No JVD  Cardiovascular:      Rate and Rhythm: Normal rate and regular rhythm.      Pulses: Normal pulses.      Heart sounds: Normal heart sounds. No murmur heard.     Pulmonary:      Effort: Pulmonary effort is normal.      Breath sounds: Normal breath sounds.   Abdominal:      Palpations: Abdomen is soft.   Musculoskeletal:      Cervical back: Normal range of motion and neck supple.   Skin:     General: Skin is warm and dry.   Neurological:      General: No focal deficit present.      Mental Status: She is alert and oriented to person, place, and time.   Psychiatric:         Mood and Affect: Mood normal.         Behavior: Behavior normal.         Lab Review  Lab Results   Component Value Date/Time    WBC 5.1 06/01/2021 01:03 AM    RBC 3.83 (L) 06/01/2021 01:03 AM    HEMOGLOBIN 13.1 06/01/2021 01:03 AM    HEMATOCRIT 38.4 06/01/2021 01:03 AM    .3 (H) 06/01/2021 01:03 AM    MCH 34.2 (H) 06/01/2021 01:03 AM    MCHC 34.1 06/01/2021 01:03 AM    MPV 9.7 06/01/2021 01:03 AM      Lab Results   Component Value Date/Time    SODIUM 140 06/01/2021 01:03 AM    POTASSIUM 3.5 (L) 06/01/2021 01:03 AM    CHLORIDE 105 06/01/2021 01:03 AM    CO2 25 06/01/2021 01:03 AM    GLUCOSE 97 06/01/2021 01:03 AM    BUN 13 06/01/2021 01:03 AM    CREATININE 0.81 06/01/2021 01:03 AM    BUNCREATRAT 12 06/12/2020 05:12 AM      Lab Results   Component Value Date/Time    ASTSGOT 23 06/01/2021 01:03 AM    ALTSGPT 21 06/01/2021 01:03 AM     Lab Results   Component Value Date/Time    CHOLSTRLTOT 120 03/24/2021 07:05 AM    LDL 56 03/24/2021 07:05 AM    HDL 55 03/24/2021 07:05 AM    TRIGLYCERIDE 47 03/24/2021 07:05 AM    TROPONINT 12 05/31/2021 11:14 AM       No results for input(s): NTPROBNP in the last 72 hours.    Cardiac Imaging and Procedures Review  CARDIAC CATHETERIZATION CONCLUSIONS by   Juventino Mak (05/08/20)  Angiographically normal appearing coronary arteries.  Mildly elevated LVEDP, filling pressures.  2+ mitral regurgitation.  Normal LV function.     CAROTID ULTRASOUND (08/18/17)  Normal carotids, subclavians and vertebrals.     CT OF HEAD (05/31/21)  1.  Moderate acute/subacute left temporal lobe infarct.  2.  Chronic ischemic changes.  3.  No acute intracranial hemorrhage.  (study result reviewed)     CTA OF HEAD (05/31/21)  No acute large vessel occlusion or hemodynamically significant stenosis.  Acute/subacute appearing left temporal lobe infarct.  (study result reviewed)     CTA OF NECK (05/31/21)  1.  No stenosis or dissection is seen within the carotid or vertebral arteries bilaterally.  2.  Tree-in-bud nodularity in the right upper lobe is likely infectious/inflammatory.  3.  Mucosal thickening right maxillary sinus.  4.  Nasal bone deformities bilaterally are likely chronic.  (study result reviewed)     CT OF HEAD (05/03/19)  NO ACUTE ABNORMALITIES ARE NOTED ON CT SCAN OF THE HEAD.  Right-sided encephalomalacia is noted.     CTA OF HEAD (08/18/17)  1.  There is a right M1 segment occlusion.  2.  There is collateral flow in the peripheral M3 branches seen on the right.  3.  There is mild decreased enhancement of the visualized right internal carotid artery however it is patent.  4.  Question of subtle hypodensity in the right insular cortex region. No abnormal brain parenchymal enhancement is seen.     CTA OF NECK (08/18/17)  1.  CT angiogram of the neck within normal limits.  2.  Thrombosed right M1 segment.     ECHOCARDIOGRAM CONCLUSIONS (02/03/20)  Prior echo done on 05/03/2019. Compared to the images of the prior   study done -  there has been no significant change.   Normal left ventricular systolic function.  Left ventricular ejection fraction is visually estimated to be 65%.  Prolapse of the mitral leaflets was present.  Severe mitral regurgitation.  Mild tricuspid  regurgitation.  Estimated right ventricular systolic pressure is 35 mmHg.     ECHOCARDIOGRAM CONCLUSIONS (05/03/19)  Prior echocardiogram 6/14/2018.  Normal left ventricular systolic function.   Mild to moderate eccentric mitral regurgitation.  Compared to the images of the study done - there has been slight improvement in mitral regurgitation.     ECHOCARDIOGRAM CONCLUSIONS (06/14/18)  Normal left ventricular systolic function.  Left ventricular ejection fraction is visually estimated to be 60%.  Myxomatous changes of the mitral valve.  Prolapse of the anterior and posterior mitral leaflets.  Severe, eccentric mitral regurgitation.  Right heart pressures are normal.  Compared to the report of the study done 8/19/2017 severe mitral regurgitation is now present, previously reported as moderate but a MR   ERO was not recorded.      EKG performed on (03/19/21) was reviewed: EKG personally interpreted shows sinus rhythm with premature ventricular contractions.  EKG performed on (05/09/20) EKG shows sinus rhythm with premature ventricular contractions.  EKG performed on (05/08/20) EKG shows sinus rhythm with premature ventricular contractions.     TRANSESOPHAGEAL ECHOCARDIOGRAM CONCLUSIONS by Ayde Lopes (01/26/21)  LV EF 55%.  Biatrial enlargement.  There is severe eccentric mitral regurgitation with multiple jets, predominantly from flail leaflet of P2.  Echolucent space near P1 of unclear etiology, unusual for cleft leaflet.  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      Assessment/Plan  1. Mitral regurgitation: Her mitral regurgitation has been severe status. She was recommended by Dr. Schaefer for surgical mitral valve repair with STS score of 2.2%. She then consulted with [facility] for minimally invasive mitral valve repair verses Sotomayor Harpoon bating heart mitral valve repair. Most recently she went to [facility] and consulted with Osman Gutierrez who conformed Dr. Schaefer,  Kaden recommendation. She was, however, diagnosed with atrial fibrillation and was placed on NOAC by Ayde Lopes but she still suffered a stroke. We will rediscuss this case with Kaden Ernst. For mitral valve repair vs MitraClip   2. Atrial fibrillation: She will be started on anticoagulation therapy tomorrow per neurology.  3. Status post left temporal lobe infarct (05/31/21) with history of cerebrovascular accident with right carotid arteriography with mechanical thrombectomy (08/18/17): She remains clinically stable with          Thank you for allowing me to participate in the care of this patient.  Cardiology will sign off on this patient    Please contact me with any questions.        IGNACIO Hall  [facility]  [phone]    Future Appointments   Date Time Provider Department Center   6/24/2021 10:15 AM Ayde Denise M.D. [facility] None   7/12/2021  8:10 AM Nissa Lambert M.D. [facility] None   7/22/2021 11:45 AM Ayde Denise M.D. [facility] None         Please note that this dictation was created using voice recognition software. I have worked with consultants from the vendor as well as technical experts from [facility] to optimize the interface. I have made every reasonable attempt to correct obvious errors, but I expect that there are errors of grammar and possibly content I did not discover before finalizing the note.

## 2021-06-02 NOTE — PROGRESS NOTES
The patient with severe MR/Afib presented with CVA. She has consulted with Dr. Romanas who recommends surgical repair/Maze procedure. She has received opinion from [facility] as well. At this time she would like to pursue the Repair MR trial at [facility]. She states she has already contacted them. I let patient know she is welcome to see Dr. Schaefer as outpatient in the future if she changes her mind.

## 2021-06-02 NOTE — DISCHARGE INSTRUCTIONS
Discharge Instructions    Discharged to home by car with relative. Discharged via wheelchair, hospital escort: Yes.  Special equipment needed: Not Applicable    Be sure to schedule a follow-up appointment with your primary care doctor or any specialists as instructed.     Discharge Plan:   Diet Plan: Discussed  Activity Level: Discussed  Confirmed Follow up Appointment: Patient to Call and Schedule Appointment  Confirmed Symptoms Management: Discussed  Medication Reconciliation Updated: Yes    I understand that a diet low in cholesterol, fat, and sodium is recommended for good health. Unless I have been given specific instructions below for another diet, I accept this instruction as my diet prescription.     Special Instructions: None    · Is patient discharged on Warfarin / Coumadin?   No     Depression / Suicide Risk    As you are discharged from this [facility] facility, it is important to learn how to keep safe from harming yourself.    Recognize the warning signs:  · Abrupt changes in personality, positive or negative- including increase in energy   · Giving away possessions  · Change in eating patterns- significant weight changes-  positive or negative  · Change in sleeping patterns- unable to sleep or sleeping all the time   · Unwillingness or inability to communicate  · Depression  · Unusual sadness, discouragement and loneliness  · Talk of wanting to die  · Neglect of personal appearance   · Rebelliousness- reckless behavior  · Withdrawal from people/activities they love  · Confusion- inability to concentrate     If you or a loved one observes any of these behaviors or has concerns about self-harm, here's what you can do:  · Talk about it- your feelings and reasons for harming yourself  · Remove any means that you might use to hurt yourself (examples: pills, rope, extension cords, firearm)  · Get professional help from the community (Mental Health, Substance Abuse, psychological counseling)  · Do not be  alone:Call your Safe Contact- someone whom you trust who will be there for you.  · Call your local CRISIS HOTLINE 739-7891 or 084-923-5360  · Call your local Children's Mobile Crisis Response Team [place] (966) 144-6569 or www.Svpply  · Call the toll free National Suicide Prevention Hotlines   · National Suicide Prevention Lifeline 372-224-SZJT (1379)  · National Hope Line Network 800-SUICIDE (714-4776)      Stroke Prevention  Some medical conditions and behaviors are associated with a higher chance of having a stroke. You can help prevent a stroke by making nutrition, lifestyle, and other changes, including managing any medical conditions you may have.  What nutrition changes can be made?    · Eat healthy foods. You can do this by:  ? Choosing foods high in fiber, such as fresh fruits and vegetables and whole grains.  ? Eating at least 5 or more servings of fruits and vegetables a day. Try to fill half of your plate at each meal with fruits and vegetables.  ? Choosing lean protein foods, such as lean cuts of meat, poultry without skin, fish, tofu, beans, and nuts.  ? Eating low-fat dairy products.  ? Avoiding foods that are high in salt (sodium). This can help lower blood pressure.  ? Avoiding foods that have saturated fat, trans fat, and cholesterol. This can help prevent high cholesterol.  ? Avoiding processed and premade foods.  · Follow your health care provider's specific guidelines for losing weight, controlling high blood pressure (hypertension), lowering high cholesterol, and managing diabetes. These may include:  ? Reducing your daily calorie intake.  ? Limiting your daily sodium intake to 1,500 milligrams (mg).  ? Using only healthy fats for cooking, such as olive oil, canola oil, or sunflower oil.  ? Counting your daily carbohydrate intake.  What lifestyle changes can be made?  · Maintain a healthy weight. Talk to your health care provider about your ideal weight.  · Get at least 30  minutes of moderate physical activity at least 5 days a week. Moderate activity includes brisk walking, biking, and swimming.  · Do not use any products that contain nicotine or tobacco, such as cigarettes and e-cigarettes. If you need help quitting, ask your health care provider. It may also be helpful to avoid exposure to secondhand smoke.  · Limit alcohol intake to no more than 1 drink a day for nonpregnant women and 2 drinks a day for men. One drink equals 12 oz of beer, 5 oz of wine, or 1½ oz of hard liquor.  · Stop any illegal drug use.  · Avoid taking birth control pills. Talk to your health care provider about the risks of taking birth control pills if:  ? You are over 35 years old.  ? You smoke.  ? You get migraines.  ? You have ever had a blood clot.  What other changes can be made?  · Manage your cholesterol levels.  ? Eating a healthy diet is important for preventing high cholesterol. If cholesterol cannot be managed through diet alone, you may also need to take medicines.  ? Take any prescribed medicines to control your cholesterol as told by your health care provider.  · Manage your diabetes.  ? Eating a healthy diet and exercising regularly are important parts of managing your blood sugar. If your blood sugar cannot be managed through diet and exercise, you may need to take medicines.  ? Take any prescribed medicines to control your diabetes as told by your health care provider.  · Control your hypertension.  ? To reduce your risk of stroke, try to keep your blood pressure below 130/80.  ? Eating a healthy diet and exercising regularly are an important part of controlling your blood pressure. If your blood pressure cannot be managed through diet and exercise, you may need to take medicines.  ? Take any prescribed medicines to control hypertension as told by your health care provider.  ? Ask your health care provider if you should monitor your blood pressure at home.  ? Have your blood pressure checked  every year, even if your blood pressure is normal. Blood pressure increases with age and some medical conditions.  · Get evaluated for sleep disorders (sleep apnea). Talk to your health care provider about getting a sleep evaluation if you snore a lot or have excessive sleepiness.  · Take over-the-counter and prescription medicines only as told by your health care provider. Aspirin or blood thinners (antiplatelets or anticoagulants) may be recommended to reduce your risk of forming blood clots that can lead to stroke.  · Make sure that any other medical conditions you have, such as atrial fibrillation or atherosclerosis, are managed.  What are the warning signs of a stroke?  The warning signs of a stroke can be easily remembered as BEFAST.  · B is for balance. Signs include:  ? Dizziness.  ? Loss of balance or coordination.  ? Sudden trouble walking.  · E is for eyes. Signs include:  ? A sudden change in vision.  ? Trouble seeing.  · F is for face. Signs include:  ? Sudden weakness or numbness of the face.  ? The face or eyelid drooping to one side.  · A is for arms. Signs include:  ? Sudden weakness or numbness of the arm, usually on one side of the body.  · S is for speech. Signs include:  ? Trouble speaking (aphasia).  ? Trouble understanding.  · T is for time.  ? These symptoms may represent a serious problem that is an emergency. Do not wait to see if the symptoms will go away. Get medical help right away. Call your local emergency services (911 in the [place] Do not drive yourself to the hospital.  · Other signs of stroke may include:  ? A sudden, severe headache with no known cause.  ? Nausea or vomiting.  ? Seizure.  Where to find more information  For more information, visit:  · American Stroke Association: www.strokeassociation.org  · National Stroke Association: www.stroke.org  Summary  · You can prevent a stroke by eating healthy, exercising, not smoking, limiting alcohol intake, and managing any medical  conditions you may have.  · Do not use any products that contain nicotine or tobacco, such as cigarettes and e-cigarettes. If you need help quitting, ask your health care provider. It may also be helpful to avoid exposure to secondhand smoke.  · Remember BEFAST for warning signs of stroke. Get help right away if you or a loved one has any of these signs.  This information is not intended to replace advice given to you by your health care provider. Make sure you discuss any questions you have with your health care provider.  Document Released: 01/25/2006 Document Revised: 11/30/2018 Document Reviewed: 01/23/2018  [employer] Patient Education © 2020 [employer]      Ischemic Stroke    An ischemic stroke is the sudden death of brain tissue. Blood carries oxygen to all areas of the body. This type of stroke happens when your blood does not flow to your brain like normal. Your brain cannot get the oxygen it needs. This is an emergency. It must be treated right away.  Symptoms of a stroke usually happen all of a sudden. You may notice them when you wake up. They can include:  · Weakness or loss of feeling in your face, arm, or leg. This often happens on one side of the body.  · Trouble walking.  · Trouble moving your arms or legs.  · Loss of balance or coordination.  · Feeling confused.  · Trouble talking or understanding what people are saying.  · Slurred speech.  · Trouble seeing.  · Seeing two of one object (double vision).  · Feeling dizzy.  · Feeling sick to your stomach (nauseous) and throwing up (vomiting).  · A very bad headache for no reason.  Get help as soon as any of these problems start. This is important. Some treatments work better if they are given right away. These include:  · Aspirin.  · Medicines to control blood pressure.  · A shot (injection) of medicine to break up the blood clot.  · Treatments given in the blood vessel (artery) to take out the clot or break it up.  Other treatments may  include:  · Oxygen.  · Fluids given through an IV tube.  · Medicines to thin out your blood.  · Procedures to help your blood flow better.  What increases the risk?  Certain things may make you more likely to have a stroke. Some of these are things that you can change, such as:  · Being very overweight (obesity).  · Smoking.  · Taking birth control pills.  · Not being active.  · Drinking too much alcohol.  · Using drugs.  Other risk factors include:  · High blood pressure.  · High cholesterol.  · Diabetes.  · Heart disease.  · Being , , , or .  · Being over age 60.  · Family history of stroke.  · Having had blood clots, stroke, or warning stroke (transient ischemic attack, TIA) in the past.  · Sickle cell disease.  · Being a woman with a history of high blood pressure in pregnancy (preeclampsia).  · Migraine headache.  · Sleep apnea.  · Having an irregular heartbeat (atrial fibrillation).  · Long-term (chronic) diseases that cause soreness and swelling (inflammation).  · Disorders that affect how your blood clots.  Follow these instructions at home:  Medicines  · Take over-the-counter and prescription medicines only as told by your doctor.  · If you were told to take aspirin or another medicine to thin your blood, take it exactly as told by your doctor.  ? Taking too much of the medicine can cause bleeding.  ? If you do not take enough, it may not work as well.  · Know the side effects of your medicines. If you are taking a blood thinner, make sure you:  ? Hold pressure over any cuts for longer than usual.  ? Tell your dentist and other doctors that you take this medicine.  ? Avoid activities that may cause damage or injury to your body.  Eating and drinking  · Follow instructions from your doctor about what you cannot eat or drink.  · Eat healthy foods.  · If you have trouble with swallowing, do these things to avoid choking:  ? Take small bites when  "eating.  ? Eat foods that are soft or pureed.  Safety  · Follow instructions from your health care team about physical activity.  · Use a walker or cane as told by your doctor.  · Keep your home safe so you do not fall. This may include:  ? Having experts look at your home to make sure it is safe.  ? Putting grab bars in the bedroom and bathroom.  ? Using raised toilets.  ? Putting a seat in the shower.  General instructions  · Do not use any tobacco products.  ? Examples of these are cigarettes, chewing tobacco, and e-cigarettes.  ? If you need help quitting, ask your doctor.  · Limit how much alcohol you drink. This means no more than 1 drink a day for nonpregnant women and 2 drinks a day for men. One drink equals 12 oz of beer, 5 oz of wine, or 1½ oz of hard liquor.  · If you need help to stop using drugs or alcohol, ask your doctor to refer you to a program or specialist.  · Stay active. Exercise as told by your doctor.  · Keep all follow-up visits as told by your doctor. This is important.  Get help right away if:    · You have any signs of a stroke. \"BE FAST\" is an easy way to remember the main warning signs:  ? B - Balance. Signs are dizziness, sudden trouble walking, or loss of balance.  ? E - Eyes. Signs are trouble seeing or a change in how you see.  ? F - Face. Signs are sudden weakness or loss of feeling of the face, or the face or eyelid drooping on one side.  ? A - Arms. Signs are weakness or loss of feeling in an arm. This happens suddenly and usually on one side of the body.  ? S - Speech. Signs are sudden trouble speaking, slurred speech, or trouble understanding what people say.  ? T - Time. Time to call emergency services. Write down what time symptoms started.  · You have other signs of a stroke, such as:  ? A sudden, very bad headache with no known cause.  ? Feeling sick to your stomach (nausea).  ? Throwing up (vomiting).  ? Jerky movements you cannot control (seizure).  These symptoms may be " an emergency. Do not wait to see if the symptoms will go away. Get medical help right away. Call your local emergency services (911 in the [place] Do not drive yourself to the hospital.  Summary  · An ischemic stroke is the sudden death of brain tissue.  · Symptoms of a stroke usually happen all of a sudden. You may notice them when you wake up.  · Get help if you have any warning signs of a stroke. This is important. Some treatments work better if they are given right away.  This information is not intended to replace advice given to you by your health care provider. Make sure you discuss any questions you have with your health care provider.  Document Released: 12/06/2012 Document Revised: 05/29/2019 Document Reviewed: 03/15/2017  [employer] Patient Education © 2020 [employer]

## 2021-06-02 NOTE — CARE PLAN
Problem: Communication  Goal: The ability to communicate needs accurately and effectively will improve  Outcome: Progressing     Problem: Self Care  Goal: Patient will have the ability to perform ADLs independently or with assistance (bathe, groom, dress, toilet and feed)  Outcome: Progressing   The patient is Watcher - Medium risk of patient condition declining or worsening         Progress made toward(s) clinical / shift goals:  Patient will continue to participate in neuro exams and perform ADL independently.    Patient is not progressing towards the following goals:

## 2021-06-02 NOTE — PROGRESS NOTES
Bedside report received from day shift RN. Pt AO x 4, slow to respond and noticeable difficulty finding words when responding to commands. Communication board updated, call bell within reach and bed locked and in lowest position.POC discussed with pt; all questions answered at this time.

## 2021-06-02 NOTE — THERAPY
Speech Language Pathology   Initial Assessment     Patient Name: Shaista Bocanegra  AGE:  76 y.o., SEX:  female  Medical Record #: [id number]  Today's Date: 2021     Precautions  Precautions: Fall Risk  Comments: reading deficit    Assessment    Pt is a 76 yr old admitted due to difficulty reading/talking, confusion.  Diagnoses include CVA, severe mitral regurgitation, a fib, herpes, HTN.  PMH: arthritis, a fib, essential HTN, breast cancer, mitral valve prolapse, osteoporosis, sleep apnea, CVA , substance abouse, VD, urinary incontinence., post cataract surgery , lumpectomy w/ rad/chemo., primary .  RMI; small area of acute infarct in L temporal lobe, chronic infarct in R insular cortex and frontal lobe.    Pt seen for an aphasia evaluation, contacted by RN that discharge was pending. Pt presents with a Bedside Aphasia Score of 83, and a Bedside Language Score of 79, with aphasia type classified as anomic, with mild word-finding and severe reading deficits noted.  Mild errors in spelling, as pt spontaneously takes notes to facilitate STM, and as she reports being a working .  Fxnl verbal problem-solving and abstraction, however pt endorses difficulty soliciting help as stroke evolved, endorses device for soliciting help was not working.  Pt is goal-oriented with return to independent living and working are expressed goals.  Currently reading deficit, minimal deficits in writing and word-finding are creating roadblocks to fully safe and independent discharge.  Great candidate for SLP for follow-up post acute care discharge.      Plan    Recommend Speech Therapy 5 times per week until therapy goals are met for the following treatments:  Expression Training, Reading Training and Patient / Family / Caregiver Education.  Supervision initially recommended upon discharge.    Discharge Recommendations: Recommend home health for continued speech therapy services     Objective       [id number]    Verbal Expression   Naming Minimal (4)   Perseverations Minimal (4)   Word Finding Deficits Minimal (4)   Skilled Intervention Verbal Cueing;Compensatory Strategies   Reading Comprehension   Reading Comprehension (WDL) X   Reading Words Severe (2)   Functional Reading Materials Severe (2)   Comments Cannot read at the word level   Written Expression   Written Expression (WDL) X   Formulates: Sentence Minimal (4)   Comments spelling error; /m/ for /w/ without recognition of error and consistent error across trials.   Cognitive-Linguistic   Comments Pt able to problem-solve simple hypotheticals, needs further assess for more complex problem-solving.  In the moment, pt had diffficulty problem-solving during acute cva.  Is utilizing writing to facilitate recall for important information.  Is able to recall prior oupt SLP.  Expect return to independent living in the near future.  Query return to work with reading deficit.   Aphasia Compensatory Strategies   Compensatory Strategies Used To Communicate Circumlocution;Repetition / Imitation;Phonemic Cues;Phase Completion   Outcome Measures   Outcome Measures Utilized WAB-R;WAB-Bedside   WAB - Bedside (Western Aphasia Battery)   Bedside Asphasia Score 88.33   Bedside Language Score 78.75   Patient / Family Goals   Patient / Family Goal #1 to return to her apartment   Short Term Goals   Short Term Goal # 1 Pt will read at the single word level with moderate-max cues, working 1:1 with SLP.   Short Term Goal # 2 Pt will utilize word-retrieval strategies to name objects with 100% accuracy, with min-mod cues  (benefits from phonemic, category cues and circumlocution)   Interdisciplinary Plan of Care Collaboration   Collaboration Comments Contacted with discharge pending; pt requires supervision initially with HH SLP   Session Information   Date / Session Number 1, 6/2/21 (1/5-6/8)  (Rdg impaired)   Priority 2  (5x.L CVA, . Rdg impaired, minimal word retrieval; tx)

## 2021-06-02 NOTE — PROGRESS NOTES
MD updated and aware of Patient's heart rate sustaining in the 40s. BP taken 120/67. Patient asymptomatic. MD changed dose of metoprolol.

## 2021-06-02 NOTE — PROGRESS NOTES
Assumed care of pt @ 0715, bedside report received from Juliano Ramirez.  Pt A&OX4 and denies any pain. Bed locked and lowered with call light within reach and questions answered during present time.

## 2021-06-02 NOTE — PROGRESS NOTES
Pt transported of the unit with RN. Pt went of the unit with wheelchair. Pt verbally acknowledges all discharge instructions, medications, and medications regimen. Pt gathered all personal belongings, Tele box and IV have been removed. Pt home medications reviewed. All questions and needs have been met at this time.

## 2021-06-02 NOTE — DISCHARGE PLANNING
Anticipated Discharge Disposition: home    Action: Patient discussed in IDT rounds. She has been cleared by PT/OT and MD to place referral to outpatient speech. They are pending cardiology recommendations for inpatient vs outpatient follow up.    Barriers to Discharge: medical clearance    Plan: Case coordination to continue to follow for discharge planning support

## 2021-06-02 NOTE — DISCHARGE SUMMARY
Discharge Summary    Date of Admission: 5/31/2021  Date of Discharge: No discharge date for patient encounter.  Discharging Attending: Jt Lunsford M.D.   Discharging Senior Resident: Dr. Escobar  Discharging Intern: Dr. Quirino Gotti MD    CHIEF COMPLAINT ON ADMISSION  Chief Complaint   Patient presents with   • ALOC       Reason for Admission  Left temporal cerebral vascular accident  Atrial fibrillation  Mitral valve prolapse    Admission Date  5/31/2021    CODE STATUS  Full Code    HPI & HOSPITAL COURSE    Shaista Bocanegra is a 77yo F with hx of Atrial fibrillation (CHADSVASC 6, eliquis, rate controlled no medications), CVA (R insula), HTN, HLD, MVP, apnea, breast CA; admitted 5/31 for acute CVA with word finding/reading comprehension deficits.    Presented 5/31 after 1-2days of difficulty with reading comprehension, word finding difficulty. She was admitted and evaluated for acute cerebral vascular accident. At time of admission, NIHSS was 2 (aphasia, mild smile asymmetry). Underwent MRI which showed L temporal CVA, CTA and brain perfusion scans without stenosis or occlusion. Improved overnight with improved reading/writing and word finding. Due to concerns of atrial fibrillation and MVP as potentially linked causes, cardiology consulted. Per patient, cardiology, cardiothoracic surgery discussion, Ms. Bocanegra prefers to follow up with [facility] for involvement in a study of open surgical repair and mitral clip to direct open repair of mitral valve. She was therefore deemed to be in stable condition on day of discharge and returned home with family.    Left temporal cerebral vascular accident:  Admitted for word finding difficulty/reading comprehension. Was found to have L temporal infarct by MRI, not a tPA candidate due to timeline (presented 24-36hrs after symptom onset). CTA of head/neck, perfusion without stenosis or occlusion. Improved spontaneously somewhat on reading comprehension, word finding ability,  writing.  -resumed eliquis 5mg PO BID  -continue atorvastatin 80mg PO qD  -stopped amlodipine, HCTZ per normotensive throughout admission without medications  -Neurology follow up  -[facility] for speech therapy    Atrial fibrillation:  Recently diagnosed atrial fibrillation, initially rate controlled without medications. Started on apixaban prior to CVA. Sees multiple cardiologists, and has good follow up.  -resumed eliquis 5mg PO BID  -Metoprolol succinate 25mg PO qD (rate goal 60)  -Cardiology follow up  -PCP follow up for symptomatic bradycardia    Mitral valve prolapse:  Known history of valve prolapse with progressive regurgitation. Cardiology consulted as per possible connection to CVA on admission. Shefali of cardiology, Ms. Altonduy, and cardiothoracic surgery discussed repair options, plan is for referral to [place] for possible inclusion in study of mitral valve clip vs. Open repair.  -Cardiology follow up    Hypertension:  Hx of HTN, normotensive without home medications.  -stopped amlodipine, HCTZ  -PCP follow up for reinitiation if appropriate      Therefore, she is discharged in fair and stable condition to home with close outpatient follow-up.    The patient met 2-midnight criteria for an inpatient stay at the time of discharge.    PHYSICAL EXAM ON DISCHARGE  Temp:  [36 °C (96.8 °F)-36.8 °C (98.2 °F)] 36.5 °C (97.7 °F)  Pulse:  [50-69] 50  Resp:  [16-17] 16  BP: (106-136)/(63-75) 108/63  SpO2:  [94 %-97 %] 96 %      GEN: well appearing, no acute distress  CV: RRR, no m/g/r  Pulm: CTAB, no rales, wheeze, crackles  Abd: soft, nontender, nondistended  Extremities: shoulder/elbow flexion/extension 5/5 bilaterally, knee flexion/extension 5/5 bilaterally, no peripheral edema  Neuro: CN II-XII intact, no focal deficits, Aox4  Psych: Denies any SI/HI morning of discharge    Discharge Date  06/02/21      FOLLOW UP ITEMS POST DISCHARGE  Follow up cardiology for mitral valve prolapse repair  Neurology  for post-stroke follow up  PCP for blood pressure medication regimen  [facility] speech therapy    DISCHARGE DIAGNOSES  Principal Problem:    CVA (cerebral vascular accident) (HCC) POA: Unknown  Active Problems:    Severe mitral regurgitation POA: Yes    Atrial fibrillation (HCC) POA: Unknown    Hypertension POA: Unknown    Herpes POA: Unknown  Resolved Problems:    * No resolved hospital problems. *      FOLLOW UP  Future Appointments   Date Time Provider Department Center   6/24/2021 10:15 AM Ayde Denise M.D. [facility] None   7/12/2021  8:10 AM Nissa Lambert M.D. [facility] None   7/22/2021 11:45 AM Ayde Denise M.D. [facility] None     Bethany Crane M.D.  [address] [facility] [address]  [phone]    Call  For CVA and hospital follow up, MVP coordination with cardiology team. Thank You.      MEDICATIONS ON DISCHARGE     Medication List      START taking these medications      Instructions   metoprolol SR 50 MG Tb24  Commonly known as: TOPROL XL   Take 0.5 Tablets by mouth every day.  Dose: 25 mg        CONTINUE taking these medications      Instructions   apixaban 5mg Tabs  Commonly known as: ELIQUIS   Take 1 tablet by mouth 2 times a day.  Dose: 5 mg     atorvastatin 80 MG tablet  Commonly known as: LIPITOR   TAKE 1 TABLET BY MOUTH EVERY DAY IN THE EVENING     B-12 PO   Take 25,000 mcg by mouth every day.  Dose: 25,000 mcg     Biotin 27759 MCG Tabs   Take  by mouth every day.     calcium carbonate 500 MG Tabs  Commonly known as: OS-CRISTOFER 500   Take 1,000 mg by mouth every day. Indications: supplement-Allge Cristofer  Dose: 1,000 mg     ESTRACE VAGINAL 0.1 MG/GM vaginal cream  Generic drug: estradiol   Insert  into the vagina every day.     ICAPS AREDS 2 PO   Take 1 tablet by mouth every morning.  Dose: 1 tablet     MULTIVITAMINS PO   Take 1 Tab by mouth every day.  Dose: 1 tablet     PreviDent 1.1 % Gel  Generic drug: SODIUM FLUORIDE (DENTAL GEL)   Apply  to teeth every day. Prevident      valACYclovir 500 MG Tabs  Commonly known as: VALTREX   Take 1 tablet by mouth every day. Indications: Herpes Simplex Affecting the Lip  Dose: 500 mg     Vitamin D3 2000 UNIT Caps   Take 2,000 Units by mouth every day.  Dose: 2,000 Units        STOP taking these medications    amLODIPine 10 MG Tabs  Commonly known as: NORVASC     hydroCHLOROthiazide 12.5 MG tablet  Commonly known as: HYDRODIURIL     potassium chloride 10 MEQ capsule  Commonly known as: MICRO-K            Allergies  No Known Allergies    DIET  Orders Placed This Encounter   Procedures   • Diet Order Diet: Regular     Standing Status:   Standing     Number of Occurrences:   1     Order Specific Question:   Diet:     Answer:   Regular [1]       ACTIVITY  As tolerated.  Weight bearing as tolerated    CONSULTATIONS  Dr. Meehan with Cardiology consulted.  Treatment options were discussed and plan of care agreed upon. and Dr. Rai with Neurology Service consulted.  Treatment options were discussed and plan of care agreed upon.    PROCEDURES  none

## 2021-06-09 ENCOUNTER — OFFICE VISIT (OUTPATIENT)
Dept: CARDIOLOGY | Facility: MEDICAL CENTER | Age: 76
End: 2021-06-09
Payer: COMMERCIAL

## 2021-06-09 VITALS
DIASTOLIC BLOOD PRESSURE: 58 MMHG | OXYGEN SATURATION: 98 % | BODY MASS INDEX: 21.35 KG/M2 | WEIGHT: 136 LBS | SYSTOLIC BLOOD PRESSURE: 106 MMHG | RESPIRATION RATE: 12 BRPM | HEIGHT: 67 IN | HEART RATE: 64 BPM

## 2021-06-09 DIAGNOSIS — I10 ESSENTIAL HYPERTENSION: ICD-10-CM

## 2021-06-09 DIAGNOSIS — Z86.73 HISTORY OF CVA (CEREBROVASCULAR ACCIDENT): ICD-10-CM

## 2021-06-09 DIAGNOSIS — I34.0 SEVERE MITRAL REGURGITATION: ICD-10-CM

## 2021-06-09 DIAGNOSIS — G47.30 SLEEP APNEA, UNSPECIFIED TYPE: ICD-10-CM

## 2021-06-09 DIAGNOSIS — Z79.01 CHRONIC ANTICOAGULATION: ICD-10-CM

## 2021-06-09 DIAGNOSIS — I48.0 PAROXYSMAL ATRIAL FIBRILLATION (HCC): ICD-10-CM

## 2021-06-09 DIAGNOSIS — E78.5 DYSLIPIDEMIA: ICD-10-CM

## 2021-06-09 PROBLEM — Z79.02 LONG TERM (CURRENT) USE OF ANTITHROMBOTICS/ANTIPLATELETS: Status: RESOLVED | Noted: 2020-05-21 | Resolved: 2021-06-09

## 2021-06-09 PROBLEM — I63.9 CVA (CEREBRAL VASCULAR ACCIDENT) (HCC): Status: RESOLVED | Noted: 2018-08-15 | Resolved: 2021-06-09

## 2021-06-09 LAB — EKG IMPRESSION: NORMAL

## 2021-06-09 PROCEDURE — 93000 ELECTROCARDIOGRAM COMPLETE: CPT | Performed by: INTERNAL MEDICINE

## 2021-06-09 PROCEDURE — 99214 OFFICE O/P EST MOD 30 MIN: CPT | Performed by: NURSE PRACTITIONER

## 2021-06-09 RX ORDER — AMLODIPINE BESYLATE 10 MG/1
10 TABLET ORAL DAILY
COMMUNITY
End: 2021-06-09

## 2021-06-09 RX ORDER — MIRABEGRON 25 MG/1
TABLET, FILM COATED, EXTENDED RELEASE ORAL
COMMUNITY
End: 2021-06-28

## 2021-06-09 RX ORDER — METOPROLOL SUCCINATE 25 MG/1
25 TABLET, EXTENDED RELEASE ORAL EVERY EVENING
Qty: 90 TABLET | Refills: 3 | Status: ON HOLD | OUTPATIENT
Start: 2021-06-09 | End: 2021-10-21

## 2021-06-09 ASSESSMENT — ENCOUNTER SYMPTOMS
MYALGIAS: 0
DIZZINESS: 0
COUGH: 0
MEMORY LOSS: 1
PND: 0
PALPITATIONS: 0
CLAUDICATION: 0
ORTHOPNEA: 0
TINGLING: 0
SHORTNESS OF BREATH: 0
FEVER: 0
SPEECH CHANGE: 1
ABDOMINAL PAIN: 0

## 2021-06-09 ASSESSMENT — FIBROSIS 4 INDEX: FIB4 SCORE: 1.95

## 2021-06-09 NOTE — PROGRESS NOTES
Subjective:   Shaista Bocanegra is a 72 y.o. female who presents today for hospital follow up for acute CVA with new onset atrial fibrillation.    She is a patient of Dr. Denise and Dr. Meehan in our office.    Hx of HTN, HLD, severe MR (pending REPAIR MR trial with Christine Clip at [place]), breast CA (remission), LALY on CPAP, hx of CVA in '17 and now acute CVA in '21, and also new onset atrial fibrillation now on chronic anticoagulation.    She presents today with her son. She unfortunately suffered a stroke again with the finding of new onset atrial fibrillation. She was in the process of working up her mitral valve disease with our office, [facility] structural heart program, and [facility] structural heart program for multiple opinions. She was found to have afib on an EKG done at [facility] office, she called our office and unfortunately by the time we got the EKG from [facility], the start of anticoagulation was too late. She refused to start anticoagulation even though [facility] cardiologist recommended this in her consultation appointment.    She presented to the ER with word finding impairment and memory loss. This persists but it is slowly improving and is hoping to be working with Speech therapy soon.    She has no weakness or mobility deficits.    She has no palpitations or awareness of her afib.    She has no chest pain, shortness of breath, or lower extremity edema.     Although, she states she gets acupuncture regularly for edema in her legs.    Past Medical History:   Diagnosis Date   • Arthritis    • Atrial fibrillation (HCC)    • Benign essential HTN 3/19/2012   • Breast cancer (HCC)    • Cancer (HCC) 1998    breast    • Cardiac arrhythmia    • Chest tightness or pressure 3/19/2012   • Chickenpox    • Coronary heart disease    • Gabonese measles    • Gynecological disorder    • High cholesterol    • High risk medication use 3/19/2012   • Hypercholesterolemia 3/19/2012   • Mumps    • MVP (mitral  valve prolapse) 3/19/2012   • Osteoporosis    • Sleep apnea     no CPAP   • Snoring    • Stroke ([facility]) 2017    residual minor weakness on left side   • Substance abuse ([facility])    • Tonsillitis    • Urinary bladder disorder     OAB   • Urinary incontinence    • Valvular heart disease    • Venereal disease      Past Surgical History:   Procedure Laterality Date   • CATARACT PHACO WITH IOL  3/16/2009    [facility]   • CATARACT PHACO WITH IOL  1/26/2009    [facility]   • BREAST BIOPSY     • HYSTERECTOMY LAPAROSCOPY     • LUMPECTOMY     • PB RADIATION THERAPY PLAN SIMPLE     • PB REMV 2ND CATARACT,CORN-SCLER SECTN     • AL CHEMOTHERAPY, UNSPECIFIED PROCEDURE     • PRIMARY C SECTION     • SINUSCOPE     • TONSILLECTOMY       Family History   Problem Relation Age of Onset   • Cancer Mother         breast   • No Known Problems Brother    • Heart Failure Neg Hx    • Heart Disease Neg Hx      Social History     Tobacco Use   Smoking Status Never Smoker   Smokeless Tobacco Never Used     No Known Allergies  Outpatient Encounter Medications as of 6/9/2021   Medication Sig Dispense Refill   • Mirabegron ER (MYRBETRIQ) 25 MG TABLET SR 24 HR Take  by mouth.     • metoprolol SR (TOPROL XL) 25 MG TABLET SR 24 HR Take 1 tablet by mouth every evening. 90 tablet 3   • apixaban (ELIQUIS) 5mg Tab Take 1 tablet by mouth 2 times a day. 180 tablet 3   • atorvastatin (LIPITOR) 80 MG tablet TAKE 1 TABLET BY MOUTH EVERY DAY IN THE EVENING 90 tablet 3   • valACYclovir (VALTREX) 500 MG Tab Take 1 tablet by mouth every day. Indications: Herpes Simplex Affecting the Lip 30 tablet 3   • Cyanocobalamin (B-12 PO) Take 25,000 mcg by mouth every day.     • Multiple Vitamins-Minerals (ICAPS AREDS 2 PO) Take 1 tablet by mouth every morning.     • Biotin 54176 MCG Tab Take  by mouth every day.     • Cholecalciferol (VITAMIN D3) 2000 UNIT Cap Take 2,000  "Units by mouth every day.     • Multiple Vitamin (MULTIVITAMINS PO) Take 1 Tab by mouth every day.     • [DISCONTINUED] amLODIPine (NORVASC) 10 MG Tab Take 10 mg by mouth every day.     • [DISCONTINUED] HYDROCHLOROTHIAZIDE PO Take 12.5 mg by mouth.     • [DISCONTINUED] POTASSIUM CHLORIDE ER PO Take  by mouth.     • [DISCONTINUED] metoprolol SR (TOPROL XL) 50 MG TABLET SR 24 HR Take 0.5 Tablets by mouth every day. 30 tablet 0   • [DISCONTINUED] estradiol (ESTRACE VAGINAL) 0.1 MG/GM vaginal cream Insert  into the vagina every day.     • [DISCONTINUED] SODIUM FLUORIDE, DENTAL GEL, (PREVIDENT) 1.1 % Gel Apply  to teeth every day. Prevident     • [DISCONTINUED] calcium carbonate (OS-TRAY 500) 500 MG Tab Take 1,000 mg by mouth every day. Indications: supplement-Allge Tray (Patient not taking: Reported on 6/9/2021)       No facility-administered encounter medications on file as of 6/9/2021.     Review of Systems   Constitutional: Negative for fever and malaise/fatigue.   Respiratory: Negative for cough and shortness of breath.    Cardiovascular: Negative for chest pain, palpitations, orthopnea, claudication, leg swelling and PND.   Gastrointestinal: Negative for abdominal pain.   Musculoskeletal: Negative for myalgias.   Neurological: Positive for speech change. Negative for dizziness and tingling.        Word finding impairment   Psychiatric/Behavioral: Positive for memory loss.   All other systems reviewed and are negative.       Objective:   /58 (BP Location: Left arm, Patient Position: Sitting, BP Cuff Size: Adult)   Pulse 64   Resp 12   Ht 1.702 m (5' 7\")   Wt 61.7 kg (136 lb)   SpO2 98%   BMI 21.30 kg/m²     Physical Exam  Vitals and nursing note reviewed.   Constitutional:       General: She is not in acute distress.     Appearance: Normal appearance. She is well-developed and normal weight.   HENT:      Head: Normocephalic and atraumatic.   Neck:      Vascular: No JVD.   Cardiovascular:      Rate and " Rhythm: Normal rate and regular rhythm.      Pulses: Normal pulses.      Heart sounds: Murmur heard.   Systolic murmur is present with a grade of 2/6.     Pulmonary:      Effort: Pulmonary effort is normal. No respiratory distress.      Breath sounds: Normal breath sounds.   Abdominal:      General: Bowel sounds are normal.      Palpations: Abdomen is soft.   Musculoskeletal:         General: Normal range of motion.      Cervical back: Normal range of motion.   Skin:     General: Skin is warm and dry.      Capillary Refill: Capillary refill takes less than 2 seconds.   Neurological:      General: No focal deficit present.      Mental Status: She is alert and oriented to person, place, and time.      Comments: Word finding impairment   Psychiatric:         Mood and Affect: Mood normal.         Behavior: Behavior normal.         Thought Content: Thought content normal.         Judgment: Judgment normal.       Lab Results   Component Value Date/Time    CHOLSTRLTOT 120 03/24/2021 07:05 AM    LDL 56 03/24/2021 07:05 AM    HDL 55 03/24/2021 07:05 AM    TRIGLYCERIDE 47 03/24/2021 07:05 AM       Lab Results   Component Value Date/Time    SODIUM 140 06/01/2021 01:03 AM    POTASSIUM 3.5 (L) 06/01/2021 01:03 AM    CHLORIDE 105 06/01/2021 01:03 AM    CO2 25 06/01/2021 01:03 AM    GLUCOSE 97 06/01/2021 01:03 AM    BUN 13 06/01/2021 01:03 AM    CREATININE 0.81 06/01/2021 01:03 AM    BUNCREATRAT 12 06/12/2020 05:12 AM     Lab Results   Component Value Date/Time    ALKPHOSPHAT 65 06/01/2021 01:03 AM    ASTSGOT 23 06/01/2021 01:03 AM    ALTSGPT 21 06/01/2021 01:03 AM    TBILIRUBIN 0.2 06/01/2021 01:03 AM      Lab Results   Component Value Date/Time    BNPBTYPENAT 368 (H) 08/28/2017 12:01 AM      2017 CTA NECK   1.  CT angiogram of the neck within normal limits.  2.  Thrombosed right M1 segment.    2017 CAROTID DUPLEX CONCLUSIONS   nl carotids, subclavians and vertebral's    2017 MRI BRAIN  1.  Moderate sized RIGHT MCA territory  acute ischemia involving the cortex and basal ganglia  2.  Flow void is present in the RIGHT MCA compatible with treatment effect  3.  No hemorrhage  4.  Mild white matter changes  5.  Mild atrophy    2017 ECHO CONCLUSIONS  Agitated saline study was performed, no evidence of right to left   shunt.  Normal left ventricular size, wall thickness, and systolic function.  Left ventricular ejection fraction is visually estimated to be 60%.  Mildly dilated left atrium.  Moderate mitral regurgitation due to an eccentric jet.  Mild tricuspid regurgitation.  Right ventricular systolic pressure is estimated to be 35 mmHg.     Assessment:     1. Paroxysmal atrial fibrillation (HCC)  EKG    EKG    Basic Metabolic Panel   2. Essential hypertension     3. History of CVA (cerebrovascular accident)  Comp Metabolic Panel    Lipid Profile     Medical Decision Making:  Today's Assessment / Status / Plan:     1 Prior CVA and now with new onset CVA with finding of afib  -mild deficits with word finding impairment and memory loss  -cont asa, statin  -neurology apt in a few weeks  -speech therapy per PCP or neurology pending, son will follow up on this referral    2. HTN  -good control, low normal in hospital  -all antihypertensive's stopped per hospitalist team  -follow daily at home  -check bmp in 1 week for hypokalemia hx    3. HLD  -good control on statin  -LDL goal <70 with CVA  -repeat lipid and CMP in 2 months    4. Severe MR  -slightly fatigued  -pending REPAIR MR trial with Christine Clip at [facility] [place], has appointment in a few weeks (must wait 6 weeks post CVA now)  -following with our structural heart clinic at [facility] for coordination  -cont to follow patient after procedure    5. New onset afib  -persistent   -recommend DCCV at [facility] when being treated for Christine Clip if able  -cont eliquis 5 mg BID, watch dosing when patient turns 78 in 2 years, weight is borderline at 61 kg  -cont metoprolol XL 25 mg, move to PM  -asymptomatic  and rate controlled   -if not cardioverted at [place], consider cardioversion when patient returns from procedure or EP consultation  -treating LALY with CPAP  -TSH normal    Patient is to follow up with Dr. Meehan and Dr. Denise in 2 months with labs in 1 week and 2 months after [place] procedure.

## 2021-06-22 ENCOUNTER — HOSPITAL ENCOUNTER (OUTPATIENT)
Dept: LAB | Facility: MEDICAL CENTER | Age: 76
End: 2021-06-22
Attending: NURSE PRACTITIONER
Payer: COMMERCIAL

## 2021-06-22 DIAGNOSIS — I48.0 PAROXYSMAL ATRIAL FIBRILLATION (HCC): ICD-10-CM

## 2021-06-22 LAB
ANION GAP SERPL CALC-SCNC: 8 MMOL/L (ref 7–16)
BUN SERPL-MCNC: 18 MG/DL (ref 8–22)
CALCIUM SERPL-MCNC: 9.1 MG/DL (ref 8.5–10.5)
CHLORIDE SERPL-SCNC: 107 MMOL/L (ref 96–112)
CO2 SERPL-SCNC: 29 MMOL/L (ref 20–33)
CREAT SERPL-MCNC: 0.82 MG/DL (ref 0.5–1.4)
GLUCOSE SERPL-MCNC: 81 MG/DL (ref 65–99)
POTASSIUM SERPL-SCNC: 3.8 MMOL/L (ref 3.6–5.5)
SODIUM SERPL-SCNC: 144 MMOL/L (ref 135–145)

## 2021-06-22 PROCEDURE — 80048 BASIC METABOLIC PNL TOTAL CA: CPT

## 2021-06-22 PROCEDURE — 36415 COLL VENOUS BLD VENIPUNCTURE: CPT

## 2021-06-24 ENCOUNTER — OFFICE VISIT (OUTPATIENT)
Dept: CARDIOLOGY | Facility: MEDICAL CENTER | Age: 76
End: 2021-06-24
Payer: COMMERCIAL

## 2021-06-24 VITALS
HEART RATE: 67 BPM | SYSTOLIC BLOOD PRESSURE: 140 MMHG | OXYGEN SATURATION: 96 % | WEIGHT: 140 LBS | DIASTOLIC BLOOD PRESSURE: 70 MMHG | HEIGHT: 67 IN | BODY MASS INDEX: 21.97 KG/M2

## 2021-06-24 DIAGNOSIS — I34.0 SEVERE MITRAL REGURGITATION: ICD-10-CM

## 2021-06-24 DIAGNOSIS — R41.3 MEMORY LOSS: ICD-10-CM

## 2021-06-24 DIAGNOSIS — I34.1 MVP (MITRAL VALVE PROLAPSE): ICD-10-CM

## 2021-06-24 DIAGNOSIS — I10 BENIGN ESSENTIAL HTN: ICD-10-CM

## 2021-06-24 DIAGNOSIS — I48.19 PERSISTENT ATRIAL FIBRILLATION (HCC): ICD-10-CM

## 2021-06-24 DIAGNOSIS — Z79.01 CHRONIC ANTICOAGULATION: ICD-10-CM

## 2021-06-24 DIAGNOSIS — I10 ESSENTIAL HYPERTENSION: ICD-10-CM

## 2021-06-24 DIAGNOSIS — G47.33 OSA (OBSTRUCTIVE SLEEP APNEA): ICD-10-CM

## 2021-06-24 DIAGNOSIS — E78.5 DYSLIPIDEMIA: ICD-10-CM

## 2021-06-24 DIAGNOSIS — Z86.73 HISTORY OF CVA (CEREBROVASCULAR ACCIDENT): ICD-10-CM

## 2021-06-24 PROCEDURE — 99214 OFFICE O/P EST MOD 30 MIN: CPT | Performed by: INTERNAL MEDICINE

## 2021-06-24 RX ORDER — SULFAMETHOXAZOLE AND TRIMETHOPRIM 800; 160 MG/1; MG/1
TABLET ORAL
COMMUNITY
Start: 2021-04-23 | End: 2021-06-24

## 2021-06-24 RX ORDER — POTASSIUM CHLORIDE 600 MG/1
8 TABLET, FILM COATED, EXTENDED RELEASE ORAL DAILY
Qty: 90 TABLET | Refills: 3 | Status: ON HOLD | OUTPATIENT
Start: 2021-06-24 | End: 2021-10-21

## 2021-06-24 ASSESSMENT — FIBROSIS 4 INDEX: FIB4 SCORE: 1.95

## 2021-06-24 NOTE — PATIENT INSTRUCTIONS
-Heart monitor called Zio patch for 7 days.  You can take a shower but do not submerge underwater.  Send it back in the mail.    -I sent a prescription for potassium 8 mEq once daily, this will be a safe dose and should keep you in the normal range.    -See CHESTER Martínez 3 months, me in 6 months

## 2021-06-24 NOTE — PROGRESS NOTES
"Subjective:   Chief Complaint:   Chief Complaint   Patient presents with   • Atrial Fibrillation     F/V DX:Severe mitral regurgitation       \"Artemio\" Shaista Bocanegra is a 76 y.o. female who returns for moderate to severe mitral regurgitation, hypertension, hyperlipidemia, prior CVAx2, HTN and now afib.    Admitted to the hospital 5/8/2020 with dehydration on diuretics.  Has left and right heart catheterization that admission move mitral clip for severe mitral regurgitation.  She is considering repair vs replacement.  Has met our MItraClip Team, our surgeon and also providers at [facility], offered  REPAIR MR trial with MitraClip and RESTORE Trial, JESUS.  No discussion of robotic, probably at [place].    Surgery on foot, HR up a bit, 92-96. Has been in pain, getting better with accupunture.     Has HTN, /85, usually 125.  Started on amlodipine.  BP better, under 130, sometimes under 100, but no sxs.    Has LALY, was on CPAP, now using device only.  Using meditation and leep specialist to help with habits.    Prior CVA, some light tingling sensation remains.    Patient received mechanical thrombectomy 8/18/2017, felt to be due to right carotid disease.    Then had another CVA, found to have afib, now on apixaban.  [id number] is 5 now.  Does note brain fog and word finding sometimes now.    Has hyperlipidemia, LDL 58.  No CAD on [facility] 2020.    Has a ring that monitors HR at night, we looked at data, no spikes.    She is not limited by chest pain, pressure or tightness with activity.   No significant dyspnea on exertion but only if she moves slowly.  No orthopnea or lower extremity swelling.     No significant palpitations, lightheadedness, or presyncope/syncope.   Rarely lightheaded.    No symptoms of leg claudication.     Remote syncope in the setting of low K.  Her K was stopped in hospital, she is low normal.    No family history of premature coronary artery disease.  No prior smoking history.  No history of " diabetes.  No history of autoimmune disease such as lupus or rheumatoid arthritis.  No chronic kidney disease.  No ETOH overuse.   No caffeine overuse.  No recreation substance use.    Has lost some weight.    Lives in [place].  She is a .  Here with Casey, father of her children.  Casey's girlfriend is Janae who sees me.  Casey sees our group, has ICD.    DATA REVIEWED by me:  ECG 9-6-2021  Afib, 53,     ECG 5-  Sinus, 65, PVC, first-degree AV delay    Zio 2017 2 weeks.  Sinus, brief atrial run.    ABHINAV 1-26-21  LV EF  55%.  Biatrial enlargement.  There is severe eccentric mitral regurgitation with multiple jets,   predominantly from flail leaflet of P2.  Echolucent space near P1 of unclear etiology, unusual for cleft   leaflet.  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    Echo 6-1-2021  Left ventricular ejection fraction is visually estimated to be 65%.  Diastolic function is difficult to assess with atrial fibrillation.  Severely dilated left atrium.  Negative bubble study including Valsalva.  Myxomatous changes of the mitral valve leaflets with prolapse of the   anterior and posterior leaflets.  Moderate to severe eccentric mitral regurgitation, probably severe.  Pulmonary veins not well seen on the study.  Estimated right ventricular systolic pressure is 31 mmHg + estimated   RAP.     Compared to the images of the study done 11- there has been no   significant change, prior echo had pulmonary vein flow reversal   consistent with severe mitral regurgitation.    Echo 11-  Left ventricular ejection fraction is visually estimated to be 60%.  Severely dilated left atrium.  Myxomatous changes of the mitral valve.  Bileaflet prolapse of the mitral valve is present.  Severe mitral regurgitation with pulmonary vein systolic flow reversal,   RV 73 mL, ERO 0.4  cm2.  Estimated right ventricular systolic pressure  is 30 mmHg.  Compared to the images of the prior study done 2/3/2020, no significant     change.    Echo 2/3/2020  Prior echo done on 05/03/2019. Compared to the images of the prior   study done -  there has been no significant change.   Normal left ventricular systolic function.  Left ventricular ejection fraction is visually estimated to be 65%.  Prolapse of the mitral leaflets was present.  Severe mitral regurgitation.  Mild tricuspid regurgitation.  Estimated right ventricular systolic pressure is 35 mmHg.    Echo 2017  Agitated saline study was performed, no evidence of right to left   shunt.  Normal left ventricular size, wall thickness, and systolic function.  Left ventricular ejection fraction is visually estimated to be 60%.  Mildly dilated left atrium.  Moderate mitral regurgitation due to an eccentric jet.  Mild tricuspid regurgitation.    Left and right heart catheterization 5/8/2020  Right ventricle 33/5, EDP 15  Pulmonary artery 42/21, 29  pulmonary capillary wedge 23  Cardiac output 4.1 L/min by thermodilution method     1.  Left main coronary artery:  Normal.  2.  Left anterior descending artery:  Normal.   3.  Left circumflex coronary artery:  Normal.   4.  Right coronary artery:  Normal.  This is a right dominant system.  5.  Left ventricular end diastolic pressure:  25 mmHg.  No signficant gradient across the aortic valve.  6.  Left ventriculogram:  Ejection fraction of 65%, 2+ mitral regurgitation, normal sized thoracic aorta.     2017 CTA NECK   1.  CT angiogram of the neck within normal limits.  2.  Thrombosed right M1 segment.     2017 CAROTID DUPLEX CONCLUSIONS   nl carotids, subclavians and vertebral's     2017 MRI BRAIN  1.  Moderate sized RIGHT MCA territory acute ischemia involving the cortex and basal ganglia  2.  Flow void is present in the RIGHT MCA compatible with treatment effect  3.  No hemorrhage  4.  Mild white matter changes  5.  Mild atrophy    Most recent labs:       Lab Results   Component Value Date/Time    HEMOGLOBIN 13.1 06/01/2021 01:03 AM    HEMATOCRIT 38.4  06/01/2021 01:03 AM    .3 (H) 06/01/2021 01:03 AM    INR 1.17 (H) 05/31/2021 11:14 AM      Lab Results   Component Value Date/Time    SODIUM 144 06/22/2021 03:22 PM    POTASSIUM 3.8 06/22/2021 03:22 PM    CHLORIDE 107 06/22/2021 03:22 PM    CO2 29 06/22/2021 03:22 PM    GLUCOSE 81 06/22/2021 03:22 PM    BUN 18 06/22/2021 03:22 PM    CREATININE 0.82 06/22/2021 03:22 PM      Lab Results   Component Value Date/Time    ASTSGOT 23 06/01/2021 01:03 AM    ALTSGPT 21 06/01/2021 01:03 AM    ALBUMIN 3.6 06/01/2021 01:03 AM      Lab Results   Component Value Date/Time    CHOLSTRLTOT 120 03/24/2021 07:05 AM    LDL 56 03/24/2021 07:05 AM    HDL 55 03/24/2021 07:05 AM    TRIGLYCERIDE 47 03/24/2021 07:05 AM           Past Medical History:   Diagnosis Date   • Arthritis    • Atrial fibrillation (HCC)    • Benign essential HTN 3/19/2012   • Breast cancer (HCC)    • Cancer (HCC) 1998    breast    • Cardiac arrhythmia    • Chest tightness or pressure 3/19/2012   • Chickenpox    • Coronary heart disease    • [place] measles    • Gynecological disorder    • High cholesterol    • High risk medication use 3/19/2012   • Hypercholesterolemia 3/19/2012   • Mumps    • MVP (mitral valve prolapse) 3/19/2012   • Osteoporosis    • Sleep apnea     no CPAP   • Snoring    • Stroke (HCC) 2017    residual minor weakness on left side   • Substance abuse (HCC)    • Tonsillitis    • Urinary bladder disorder     OAB   • Urinary incontinence    • Valvular heart disease    • Venereal disease      Past Surgical History:   Procedure Laterality Date   • CATARACT PHACO WITH IOL  3/16/2009    Performed by SHANNON GANDARA at SURGERY [facility] DAY [facility] ORS   • CATARACT PHACO WITH IOL  1/26/2009    Performed by SHANNON GANDARA at [facility] ORS   • BREAST BIOPSY     • HYSTERECTOMY LAPAROSCOPY     • LUMPECTOMY     • PB RADIATION THERAPY PLAN SIMPLE     • PB REMV 2ND CATARACT,CORN-SCLER SECTN     • IN CHEMOTHERAPY, UNSPECIFIED PROCEDURE     •  PRIMARY C SECTION     • SINUSCOPE     • TONSILLECTOMY       Family History   Problem Relation Age of Onset   • Cancer Mother         breast   • No Known Problems Brother    • Heart Failure Neg Hx    • Heart Disease Neg Hx      Social History     Socioeconomic History   • Marital status:      Spouse name: Not on file   • Number of children: 2   • Years of education: Not on file   • Highest education level: Not on file   Occupational History   • Not on file   Tobacco Use   • Smoking status: Never Smoker   • Smokeless tobacco: Never Used   Vaping Use   • Vaping Use: Never used   Substance and Sexual Activity   • Alcohol use: Not Currently     Comment: twice  a week   • Drug use: No   • Sexual activity: Yes     Partners: Male     Birth control/protection: Female Sterilization   Other Topics Concern   • Not on file   Social History Narrative   • Not on file     Social Determinants of Health     Financial Resource Strain:    • Difficulty of Paying Living Expenses:    Food Insecurity:    • Worried About Running Out of Food in the Last Year:    • Ran Out of Food in the Last Year:    Transportation Needs:    • Lack of Transportation (Medical):    • Lack of Transportation (Non-Medical):    Physical Activity:    • Days of Exercise per Week:    • Minutes of Exercise per Session:    Stress:    • Feeling of Stress :    Social Connections:    • Frequency of Communication with Friends and Family:    • Frequency of Social Gatherings with Friends and Family:    • Attends [employer]oration[employer] Services:    • Active Member of Clubs or Organizations:    • Attends Club or Organization Meetings:    • Marital Status:    Intimate Partner Violence:    • Fear of Current or Ex-Partner:    • Emotionally Abused:    • Physically Abused:    • Sexually Abused:      No Known Allergies    Current Outpatient Medications   Medication Sig Dispense Refill   • potassium chloride (KLOR-CON) 8 MEQ tablet Take 1 tablet by mouth every day. 90 tablet 3   •  "Mirabegron ER (MYRBETRIQ) 25 MG TABLET SR 24 HR Take  by mouth.     • metoprolol SR (TOPROL XL) 25 MG TABLET SR 24 HR Take 1 tablet by mouth every evening. 90 tablet 3   • apixaban (ELIQUIS) 5mg Tab Take 1 tablet by mouth 2 times a day. 180 tablet 3   • atorvastatin (LIPITOR) 80 MG tablet TAKE 1 TABLET BY MOUTH EVERY DAY IN THE EVENING 90 tablet 3   • valACYclovir (VALTREX) 500 MG Tab Take 1 tablet by mouth every day. Indications: Herpes Simplex Affecting the Lip 30 tablet 3   • Cyanocobalamin (B-12 PO) Take 25,000 mcg by mouth every day.     • Multiple Vitamins-Minerals (ICAPS AREDS 2 PO) Take 1 tablet by mouth every morning.     • Biotin 39491 MCG Tab Take  by mouth every day.     • Cholecalciferol (VITAMIN D3) 2000 UNIT Cap Take 2,000 Units by mouth every day.     • Multiple Vitamin (MULTIVITAMINS PO) Take 1 Tab by mouth every day.       No current facility-administered medications for this visit.       ROS    All others systems reviewed and negative.     Objective:     /70 (BP Location: Left arm, Patient Position: Sitting, BP Cuff Size: Adult)   Pulse 67   Ht 1.702 m (5' 7\")   Wt 63.5 kg (140 lb)   SpO2 96%  Body mass index is 21.93 kg/m².    General: No acute distress. Well nourished.  HEENT: EOM grossly intact, no scleral icterus, no pharyngeal erythema.   Neck:  No JVD at 90, no bruits, trachea midline  CVS: Irreg irreg. Normal S1, S2. 3/6 holosyt murmur at the apex No LE edema.  2+ radial pulses, 2+ PT pulses  Resp: CTAB. No wheezing or crackles/rhonchi. Normal respiratory effort.  Abdomen: Soft, NT, no ana rosa hepatomegaly.  MSK/Ext: No clubbing or cyanosis.  Skin: Warm and dry, no rashes.  Neurological: CN III-XII grossly intact. No focal deficits.   Psych: A&O x 3, appropriate affect, adequate judgement, forgetful at times    Physical exam performed today and unchanged, except what is noted, compared to 4-22-21      Assessment:     1. Persistent atrial fibrillation (HCC)  Cardiac Event Monitor "   2. Essential hypertension     3. Severe mitral regurgitation     4. History of CVA (cerebrovascular accident)     5. Chronic anticoagulation     6. Dyslipidemia     7. MVP (mitral valve prolapse)     8. LALY (obstructive sleep apnea)     9. Benign essential HTN     10. Memory loss         Medical Decision Making:  Today's Assessment / Status / Plan:     -She is too healthy for mitral clip here, it would need to be open heart surgery here. She saw her father in law go through open heart surgery around 1990, and he did not do well.  -At some point she needs valve done, looking at [facility] for a trial  -Has met our MItraClip Team, our surgeon and also providers at [facility], offered  REPAIR MR trial with MitraClip and RESTORE Trial, JESUS.  -If she cannot seal the deal with [facility] in 6 months I will push her toward SAVR here.  -BP better  -Now persistent afib, rate control strategy, zio to ensure Hrs are controlled  -[id number] is now 5, on apixaban  -No ASA  -No CAD on C  -OK for low dose K, keep K near 4.5  -RTC 3 months with david LEON in 6 months    Written instructions given today:    -Heart monitor called Zio patch for 7 days.  You can take a shower but do not submerge underwater.  Send it back in the mail.    -I sent a prescription for potassium 8 mEq once daily, this will be a safe dose and should keep you in the normal range.    -See CHESTER Martínez 3 months, me in 6 months      Return in about 3 months (around 9/24/2021).    It is my pleasure to participate in the care of Ms. Bocanegra.  Please do not hesitate to contact me with questions or concerns.    Ayde Denise MD, [facility]  Cardiologist [facility]    Please note that this dictation was created using voice recognition software. I have made every reasonable attempt to correct obvious errors, but it is possible there are errors of grammar and possibly content that I did not discover before finalizing the note.

## 2021-06-24 NOTE — LETTER
"     [facility]   [address], [address]  [place] [address]  Phone: [phone]  Fax: [phone]              Shaista Bocanegra  1945    Encounter Date: 6/24/2021    Ayde Denise M.D.          PROGRESS NOTE:  Subjective:   Chief Complaint:   Chief Complaint   Patient presents with   • Atrial Fibrillation     F/V DX:Severe mitral regurgitation       \"Artemio\" Shaista Bocanegra is a 76 y.o. female who returns for moderate to severe mitral regurgitation, hypertension, hyperlipidemia, prior CVAx2, HTN and now afib.    Admitted to the hospital 5/8/2020 with dehydration on diuretics.  Has left and right heart catheterization that admission move mitral clip for severe mitral regurgitation.  She is considering repair vs replacement.  Has met our MItraClip Team, our surgeon and also providers at [facility], offered  REPAIR MR trial with MitraClip and RESTORE Trial, JESUS.  No discussion of robotic, probably at [facility].    Surgery on foot, HR up a bit, 92-96. Has been in pain, getting better with accupunture.     Has HTN, /85, usually 125.  Started on amlodipine.  BP better, under 130, sometimes under 100, but no sxs.    Has LALY, was on CPAP, now using device only.  Using meditation and leep specialist to help with habits.    Prior CVA, some light tingling sensation remains.    Patient received mechanical thrombectomy 8/18/2017, felt to be due to right carotid disease.    Then had another CVA, found to have afib, now on apixaban.  [id number] is 5 now.  Does note brain fog and word finding sometimes now.    Has hyperlipidemia, LDL 58.  No CAD on [facility] 2020.    Has a ring that monitors HR at night, we looked at data, no spikes.    She is not limited by chest pain, pressure or tightness with activity.   No significant dyspnea on exertion but only if she moves slowly.  No orthopnea or lower extremity swelling.     No significant palpitations, lightheadedness, or " presyncope/syncope.   Rarely lightheaded.    No symptoms of leg claudication.     Remote syncope in the setting of low K.  Her K was stopped in hospital, she is low normal.    No family history of premature coronary artery disease.  No prior smoking history.  No history of diabetes.  No history of autoimmune disease such as lupus or rheumatoid arthritis.  No chronic kidney disease.  No ETOH overuse.   No caffeine overuse.  No recreation substance use.    Has lost some weight.    Lives in [place].  She is a .  Here with Casey, father of her children.  Casey's girlfriend is Janae who sees me.  Casey sees our group, has ICD    DATA REVIEWED by me:  ECG 9-6-2021  Afib, 53,     ECG 5-  Sinus, 65, PVC, first-degree AV delay    Zio 2017 2 weeks.  Sinus, brief atrial run.    ABHINAV 1-26-21  LV EF  55%.  Biatrial enlargement.  There is severe eccentric mitral regurgitation with multiple jets,   predominantly from flail leaflet of P2.  Echolucent space near P1 of unclear etiology, unusual for cleft   leaflet.  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    Echo 6-1-2021  Left ventricular ejection fraction is visually estimated to be 65%.  Diastolic function is difficult to assess with atrial fibrillation.  Severely dilated left atrium.  Negative bubble study including Valsalva.  Myxomatous changes of the mitral valve leaflets with prolapse of the   anterior and posterior leaflets.  Moderate to severe eccentric mitral regurgitation, probably severe.  Pulmonary veins not well seen on the study.  Estimated right ventricular systolic pressure is 31 mmHg + estimated   RAP.     Compared to the images of the study done 11- there has been no   significant change, prior echo had pulmonary vein flow reversal   consistent with severe mitral regurgitation.    Echo 11-  Left ventricular ejection fraction is visually estimated to be 60%.  Severely dilated left atrium.  Myxomatous changes of the mitral valve.  Bileaflet  prolapse of the mitral valve is present.  Severe mitral regurgitation with pulmonary vein systolic flow reversal,   RV 73 mL, ERO 0.4  cm2.  Estimated right ventricular systolic pressure  is 30 mmHg.  Compared to the images of the prior study done 2/3/2020, no significant    change.    Echo 2/3/2020  Prior echo done on 05/03/2019. Compared to the images of the prior   study done -  there has been no significant change.   Normal left ventricular systolic function.  Left ventricular ejection fraction is visually estimated to be 65%.  Prolapse of the mitral leaflets was present.  Severe mitral regurgitation.  Mild tricuspid regurgitation.  Estimated right ventricular systolic pressure is 35 mmHg.    Echo 2017  Agitated saline study was performed, no evidence of right to left   shunt.  Normal left ventricular size, wall thickness, and systolic function.  Left ventricular ejection fraction is visually estimated to be 60%.  Mildly dilated left atrium.  Moderate mitral regurgitation due to an eccentric jet.  Mild tricuspid regurgitation.    Left and right heart catheterization 5/8/2020  Right ventricle 33/5, EDP 15  Pulmonary artery 42/21, 29  pulmonary capillary wedge 23  Cardiac output 4.1 L/min by thermodilution method     1.  Left main coronary artery:  Normal.  2.  Left anterior descending artery:  Normal.   3.  Left circumflex coronary artery:  Normal.   4.  Right coronary artery:  Normal.  This is a right dominant system.  5.  Left ventricular end diastolic pressure:  25 mmHg.  No signficant gradient across the aortic valve.  6.  Left ventriculogram:  Ejection fraction of 65%, 2+ mitral regurgitation, normal sized thoracic aorta.     2017 CTA NECK   1.  CT angiogram of the neck within normal limits.  2.  Thrombosed right M1 segment.     2017 CAROTID DUPLEX CONCLUSIONS   nl carotids, subclavians and vertebral's     2017 MRI BRAIN  1.  Moderate sized RIGHT MCA territory acute ischemia involving the cortex and basal  ganglia  2.  Flow void is present in the RIGHT MCA compatible with treatment effect  3.  No hemorrhage  4.  Mild white matter changes  5.  Mild atrophy    Most recent labs:       Lab Results   Component Value Date/Time    HEMOGLOBIN 13.1 06/01/2021 01:03 AM    HEMATOCRIT 38.4 06/01/2021 01:03 AM    .3 (H) 06/01/2021 01:03 AM    INR 1.17 (H) 05/31/2021 11:14 AM      Lab Results   Component Value Date/Time    SODIUM 144 06/22/2021 03:22 PM    POTASSIUM 3.8 06/22/2021 03:22 PM    CHLORIDE 107 06/22/2021 03:22 PM    CO2 29 06/22/2021 03:22 PM    GLUCOSE 81 06/22/2021 03:22 PM    BUN 18 06/22/2021 03:22 PM    CREATININE 0.82 06/22/2021 03:22 PM      Lab Results   Component Value Date/Time    ASTSGOT 23 06/01/2021 01:03 AM    ALTSGPT 21 06/01/2021 01:03 AM    ALBUMIN 3.6 06/01/2021 01:03 AM      Lab Results   Component Value Date/Time    CHOLSTRLTOT 120 03/24/2021 07:05 AM    LDL 56 03/24/2021 07:05 AM    HDL 55 03/24/2021 07:05 AM    TRIGLYCERIDE 47 03/24/2021 07:05 AM           Past Medical History:   Diagnosis Date   • Arthritis    • Atrial fibrillation (HCC)    • Benign essential HTN 3/19/2012   • Breast cancer (HCC)    • Cancer (HCC) 1998    breast    • Cardiac arrhythmia    • Chest tightness or pressure 3/19/2012   • Chickenpox    • Coronary heart disease    • Pashto measles    • Gynecological disorder    • High cholesterol    • High risk medication use 3/19/2012   • Hypercholesterolemia 3/19/2012   • Mumps    • MVP (mitral valve prolapse) 3/19/2012   • Osteoporosis    • Sleep apnea     no CPAP   • Snoring    • Stroke (HCC) 2017    residual minor weakness on left side   • Substance abuse (HCC)    • Tonsillitis    • Urinary bladder disorder     OAB   • Urinary incontinence    • Valvular heart disease    • Venereal disease      Past Surgical History:   Procedure Laterality Date   • CATARACT PHACO WITH IOL  3/16/2009    Performed by SHANNON GANDARA at SURGERY SAME DAY [facility]   • CATARACT PHACO WITH IOL   1/26/2009    Performed by SHANNON GANDARA at SURGERY SAME DAY [facility] ORS   • BREAST BIOPSY     • HYSTERECTOMY LAPAROSCOPY     • LUMPECTOMY     • PB RADIATION THERAPY PLAN SIMPLE     • PB REMV 2ND CATARACT,CORN-SCLER SECTN     • NH CHEMOTHERAPY, UNSPECIFIED PROCEDURE     • PRIMARY C SECTION     • SINUSCOPE     • TONSILLECTOMY       Family History   Problem Relation Age of Onset   • Cancer Mother         breast   • No Known Problems Brother    • Heart Failure Neg Hx    • Heart Disease Neg Hx      Social History     Socioeconomic History   • Marital status:      Spouse name: Not on file   • Number of children: 2   • Years of education: Not on file   • Highest education level: Not on file   Occupational History   • Not on file   Tobacco Use   • Smoking status: Never Smoker   • Smokeless tobacco: Never Used   Vaping Use   • Vaping Use: Never used   Substance and Sexual Activity   • Alcohol use: Not Currently     Comment: twice  a week   • Drug use: No   • Sexual activity: Yes     Partners: Male     Birth control/protection: Female Sterilization   Other Topics Concern   • Not on file   Social History Narrative   • Not on file     Social Determinants of Health     Financial Resource Strain:    • Difficulty of Paying Living Expenses:    Food Insecurity:    • Worried About Running Out of Food in the Last Year:    • Ran Out of Food in the Last Year:    Transportation Needs:    • Lack of Transportation (Medical):    • Lack of Transportation (Non-Medical):    Physical Activity:    • Days of Exercise per Week:    • Minutes of Exercise per Session:    Stress:    • Feeling of Stress :    Social Connections:    • Frequency of Communication with Friends and Family:    • Frequency of Social Gatherings with Friends and Family:    • Attends Yazdanism Services:    • Active Member of Clubs or Organizations:    • Attends Club or Organization Meetings:    • Marital Status:    Intimate Partner Violence:    • Fear of Current or  "Ex-Partner:    • Emotionally Abused:    • Physically Abused:    • Sexually Abused:      No Known Allergies    Current Outpatient Medications   Medication Sig Dispense Refill   • potassium chloride (KLOR-CON) 8 MEQ tablet Take 1 tablet by mouth every day. 90 tablet 3   • Mirabegron ER (MYRBETRIQ) 25 MG TABLET SR 24 HR Take  by mouth.     • metoprolol SR (TOPROL XL) 25 MG TABLET SR 24 HR Take 1 tablet by mouth every evening. 90 tablet 3   • apixaban (ELIQUIS) 5mg Tab Take 1 tablet by mouth 2 times a day. 180 tablet 3   • atorvastatin (LIPITOR) 80 MG tablet TAKE 1 TABLET BY MOUTH EVERY DAY IN THE EVENING 90 tablet 3   • valACYclovir (VALTREX) 500 MG Tab Take 1 tablet by mouth every day. Indications: Herpes Simplex Affecting the Lip 30 tablet 3   • Cyanocobalamin (B-12 PO) Take 25,000 mcg by mouth every day.     • Multiple Vitamins-Minerals (ICAPS AREDS 2 PO) Take 1 tablet by mouth every morning.     • Biotin 08038 MCG Tab Take  by mouth every day.     • Cholecalciferol (VITAMIN D3) 2000 UNIT Cap Take 2,000 Units by mouth every day.     • Multiple Vitamin (MULTIVITAMINS PO) Take 1 Tab by mouth every day.       No current facility-administered medications for this visit.       ROS    All others systems reviewed and negative.     Objective:     /70 (BP Location: Left arm, Patient Position: Sitting, BP Cuff Size: Adult)   Pulse 67   Ht 1.702 m (5' 7\")   Wt 63.5 kg (140 lb)   SpO2 96%  Body mass index is 21.93 kg/m².    General: No acute distress. Well nourished.  HEENT: EOM grossly intact, no scleral icterus, no pharyngeal erythema.   Neck:  No JVD at 90, no bruits, trachea midline  CVS: Irreg irreg. Normal S1, S2. 3/6 holosyt murmur at the apex No LE edema.  2+ radial pulses, 2+ PT pulses  Resp: CTAB. No wheezing or crackles/rhonchi. Normal respiratory effort.  Abdomen: Soft, NT, no ana rosa hepatomegaly.  MSK/Ext: No clubbing or cyanosis.  Skin: Warm and dry, no rashes.  Neurological: CN III-XII grossly intact. No " focal deficits.   Psych: A&O x 3, appropriate affect, adequate judgement, forgetful at times    Physical exam performed today and unchanged, except what is noted, compared to 4-22-21      Assessment:     1. Persistent atrial fibrillation (HCC)  Cardiac Event Monitor   2. Essential hypertension     3. Severe mitral regurgitation     4. History of CVA (cerebrovascular accident)     5. Chronic anticoagulation     6. Dyslipidemia     7. MVP (mitral valve prolapse)     8. LALY (obstructive sleep apnea)     9. Benign essential HTN     10. Memory loss         Medical Decision Making:  Today's Assessment / Status / Plan:     -She is too healthy for mitral clip here, it would need to be open heart surgery here. She saw her father in law go through open heart surgery around 1990, and he did not do well.  -At some point she needs valve done, looking at [facility] for a trial  -Has met our MItraClip Team, our surgeon and also providers at [facility], offered  REPAIR MR trial with MitraClip and RESTORE Trial, JESUS.  -BP better  -Now persistent afib, rate control strategy, zio to ensure Hrs are controlled  -[id number] is now 5, on apixaban  -No ASA  -No CAD on LHC  -OK for low dose K, keep K near 4.5  -RTC 3 months with DB, me in 6 months    Written instructions given today:    -Heart monitor called Zio patch for 7 days.  You can take a shower but do not submerge underwater.  Send it back in the mail.    -I sent a prescription for potassium 8 mEq once daily, this will be a safe dose and should keep you in the normal range.    -See CHESTER Martínez 3 months, me in 6 months      Return in about 3 months (around 9/24/2021).    It is my pleasure to participate in the care of Ms. Bocanegra.  Please do not hesitate to contact me with questions or concerns.    Ayde Denise MD, [facility]  Cardiologist [facility]    Please note that this dictation was created using voice recognition software. I have made every  reasonable attempt to correct obvious errors, but it is possible there are errors of grammar and possibly content that I did not discover before finalizing the note.        Bethany Crane M.D.  [address] [facility] [address]  Via Fax: [phone]

## 2021-06-28 ENCOUNTER — OFFICE VISIT (OUTPATIENT)
Dept: NEUROLOGY | Facility: MEDICAL CENTER | Age: 76
End: 2021-06-28
Attending: NURSE PRACTITIONER
Payer: COMMERCIAL

## 2021-06-28 VITALS
WEIGHT: 140 LBS | SYSTOLIC BLOOD PRESSURE: 140 MMHG | TEMPERATURE: 98.2 F | DIASTOLIC BLOOD PRESSURE: 88 MMHG | HEART RATE: 91 BPM | BODY MASS INDEX: 21.97 KG/M2 | HEIGHT: 67 IN | RESPIRATION RATE: 12 BRPM | OXYGEN SATURATION: 97 %

## 2021-06-28 DIAGNOSIS — G47.30 SLEEP APNEA, UNSPECIFIED TYPE: ICD-10-CM

## 2021-06-28 DIAGNOSIS — I48.19 PERSISTENT ATRIAL FIBRILLATION (HCC): ICD-10-CM

## 2021-06-28 DIAGNOSIS — I10 ESSENTIAL HYPERTENSION: ICD-10-CM

## 2021-06-28 DIAGNOSIS — I34.0 SEVERE MITRAL REGURGITATION: ICD-10-CM

## 2021-06-28 DIAGNOSIS — I69.30 LATE EFFECT OF STROKE: ICD-10-CM

## 2021-06-28 PROCEDURE — 99215 OFFICE O/P EST HI 40 MIN: CPT | Performed by: NURSE PRACTITIONER

## 2021-06-28 PROCEDURE — 99212 OFFICE O/P EST SF 10 MIN: CPT | Performed by: NURSE PRACTITIONER

## 2021-06-28 ASSESSMENT — ENCOUNTER SYMPTOMS
TINGLING: 0
DEPRESSION: 0
BRUISES/BLEEDS EASILY: 1
NERVOUS/ANXIOUS: 0
EYE PAIN: 0
BLURRED VISION: 1
HEADACHES: 0
NAUSEA: 0
COUGH: 0
DIZZINESS: 1
FOCAL WEAKNESS: 1
SPEECH CHANGE: 1
PALPITATIONS: 0
SENSORY CHANGE: 0
HEARTBURN: 0
NECK PAIN: 0
BACK PAIN: 0
VOMITING: 0
FEVER: 0

## 2021-06-28 ASSESSMENT — FIBROSIS 4 INDEX: FIB4 SCORE: 1.95

## 2021-06-28 ASSESSMENT — PATIENT HEALTH QUESTIONNAIRE - PHQ9: CLINICAL INTERPRETATION OF PHQ2 SCORE: 0

## 2021-06-28 NOTE — LETTER
2021        Shaista Bocanegra   1945      Ms. Bocanegra is cleared for evaluation at [facility] for her mitral regurgitation, she has appointments on 2021 and 2021 and is cleared for both.    She recently had a stroke which was likely due to atrial fibrillation.      If you have any questions or concerns, please don't hesitate to call.        Sincerely,        ELVIA Hernandez.

## 2021-06-28 NOTE — PROGRESS NOTES
Subjective:        HPI  Shaista Bocanegra is a 76 y.o.  Right handed female who presents to [facility] for evaluation of left temporal lobe infarct.    She presented to [facility] on 5/31/2021 with complaints of 1-2 days of difficulty with reading comprehension, word finding difficulty.  On admission NIHSS 2 (aphasia, mild facial asymmetry)     PMH includes LALY, atrial fibrillation (started Eliquis 3 days prior to admission) mitral valve prolapse, HLD, HTN, breast cancer, stroke 2017 with L sided weakness.  Social History:  Never smoking, denies drug use, no current alcohol use.       She is here today with her son Fer, she states she feels well but is struggling with reading and pronouns.  She is going to call today to make an appointment to start speech therapy.  Blood pressure at home has been 130s-150s/90s.      Review of Systems   Constitutional: Negative for fever.   HENT: Positive for hearing loss.    Eyes: Positive for blurred vision. Negative for pain.   Respiratory: Negative for cough.    Cardiovascular: Negative for chest pain and palpitations.   Gastrointestinal: Negative for heartburn, nausea and vomiting.   Genitourinary: Negative.    Musculoskeletal: Negative for back pain and neck pain.   Skin: Negative for rash.   Neurological: Positive for dizziness, speech change and focal weakness. Negative for tingling, sensory change and headaches.        Left sided weakness since stroke in 2017   Endo/Heme/Allergies: Bruises/bleeds easily.   Psychiatric/Behavioral: Negative for depression. The patient is not nervous/anxious.        Current Outpatient Medications on File Prior to Visit   Medication Sig Dispense Refill   • potassium chloride (KLOR-CON) 8 MEQ tablet Take 1 tablet by mouth every day. 90 tablet 3   • metoprolol SR (TOPROL XL) 25 MG TABLET SR 24 HR Take 1 tablet by mouth every evening. 90 tablet 3   • apixaban (ELIQUIS) 5mg Tab Take 1 tablet by mouth 2 times a day.  180 tablet 3   • atorvastatin (LIPITOR) 80 MG tablet TAKE 1 TABLET BY MOUTH EVERY DAY IN THE EVENING 90 tablet 3   • Cyanocobalamin (B-12 PO) Take 25,000 mcg by mouth every day.     • Multiple Vitamins-Minerals (ICAPS AREDS 2 PO) Take 1 tablet by mouth every morning.     • Cholecalciferol (VITAMIN D3) 2000 UNIT Cap Take 2,000 Units by mouth every day.     • Multiple Vitamin (MULTIVITAMINS PO) Take 1 Tab by mouth every day.     • valACYclovir (VALTREX) 500 MG Tab Take 1 tablet by mouth every day. Indications: Herpes Simplex Affecting the Lip 30 tablet 3     No current facility-administered medications on file prior to visit.     Past Medical History:   Diagnosis Date   • Arthritis    • Atrial fibrillation (HCC)    • Benign essential HTN 3/19/2012   • Breast cancer (HCC)    • Cancer (HCC) 1998    breast    • Cardiac arrhythmia    • Chest tightness or pressure 3/19/2012   • Chickenpox    • Coronary heart disease    • Angolan measles    • Gynecological disorder    • High cholesterol    • High risk medication use 3/19/2012   • Hypercholesterolemia 3/19/2012   • Mumps    • MVP (mitral valve prolapse) 3/19/2012   • Osteoporosis    • Sleep apnea     no CPAP   • Snoring    • Stroke (HCC) 2017    residual minor weakness on left side   • Substance abuse (HCC)    • Tonsillitis    • Urinary bladder disorder     OAB   • Urinary incontinence    • Valvular heart disease    • Venereal disease         Objective:   I personally reviewed imaging below and agree with the findings  MRI brain 6/1/2021  1.  Small area of acute infarct in the left temporal lobe.  2.  Chronic infarct in the right insular cortex and frontal lobe.  3.  Mild chronic microvascular ischemic disease.  4.  Mild cerebral volume loss.    CTA head  No acute large vessel occlusion or hemodynamically significant stenosis.     Acute/subacute appearing left temporal lobe infarct.  CTA neck  1No stenosis or dissection is seen within the carotid or vertebral arteries  "bilaterally.  2.  Tree-in-bud nodularity in the right upper lobe is likely infectious/inflammatory.  3.  Mucosal thickening right maxillary sinus.  4.  Nasal bone deformities bilaterally are likely chronic.    TTE:  LVEF 65%, in atrial fibrillation, severely dilated LA, MARK 54. Severe mitral regurgitation noted.          Stroke Labs:  Creat 0.82, LDL 56,  A1C 5.7  /88 (BP Location: Left arm, Patient Position: Sitting, BP Cuff Size: Adult)   Pulse 91   Temp 36.8 °C (98.2 °F) (Temporal)   Resp 12   Ht 1.689 m (5' 6.5\")   Wt 63.5 kg (140 lb)   SpO2 97%   BMI 22.26 kg/m²      PHYSICAL ASSESSMENT  Constitutional:  Alert, no apparent distress,  Psych:   mood and affect WNL  Muskuloskeletal:  Moves all extremities equally, strength 5/5 bilaterally, no drift  NEUROLOGICAL ASSESSMENT  Oriented X 4, speech fluent, naming and memory intact  CN II: Visual fields are full to confrontation. Fundoscopic exam is normal with sharp discs and no vascular changes. Pupils are 4  mm and briskly reactive to light.   CN III, IV, VI  EOMs intact, no ptosis  CN V: Facial sensation is intact to pinprick in all 3 divisions bilaterally. Corneal responses are intact.  CN VII: Face is symmetric with normal eye closure and smile.  CN VII: Hearing is normal to rubbing fingers  CN IX, X: Palate elevates symmetrically. Phonation is normal.  CN XI: Head turning and shoulder shrug are intact  CN XII: Tongue is midline with normal movements and no atrophy.                           Sensation to PP equal bilaterally                 No limb ataxia with finger to nose and heel to shin                 Ambulates with steady gait.                 Rhomberg negative                Biceps,brachioradialis, tricep, patellar and ankle reflex all 2+     Cardiovascular:    S1S2, no abnormal rhythm auscultated, no peripheral edema  Neck:                     No carotid bruits noted   Pulmonary:            Respirations easy, lungs clear to auscultation all " fields.     Skin:                     No obvious rashes.    Iniital NIHSS 2      Current NIHSS    1a. LOC: 0  1b. LOC Questions: 0  1c. LOC Commands: 0  2. Best Gaze:0  3. Visual Fields: 0  4. Facial Paresis: 0  5a. Motor arm left: 0  5b. Motor arm right:0   6a. Motor leg left: 0  6b. Motor leg right: 0  7. Sensory: 0  8. Best Language:0   9. Limb Ataxia: 0  10. Dysarthria: 0  11. Extinction/Inattention: 0    Total Score Current 0        Cu            Current mRS  1  She does report speech deficits, they were not apparent on exam today.          Assessment/Plan:     1. Late effect of stroke  L MCA infarct likely secondary to atrial fibrillation, she had just started Eliquis 5mg.  With residual speech deficits.   Presumed Mechanism by Toast:  __  Large Artery Atherosclerosis  __  Small Vessel (lacunar)  _XX_   Cardioembolic  __   Other (Sickle cell, vasculitis, hypercoagulable)  __   Unknown  __   ESUS      Stroke risk factors include atrial fibrillation, HTN, HLD.      LDL goal < 70, current 56 (on Atorvastatin 80mg  Continue Atorvastatin 80mg discussed medication side effects, will need follow up with primary care evaluate liver function and intervals and refill  Exercise at least 30 minutes daily, avoid red meat, fried foods, butter, cheese.   Eat 5-6 servings of vegetables and fruits daily, choose lean white meat without skin (chicken, turkey, white fish)--baked, broiled or grilled.    Speech therapy    2. Persistent atrial fibrillation (HCC)  Continue Eliquis 5mg BID per cardiology, see # 5. , discussed signs of bleeding.     3. Essential hypertension  Blood pressure goal less than 130/80, currently above goal     She is not sure of her medications, she had been on Amlodipine at one time per her medication records, she may or may not be taking this.  I recommend that she start amlodipine 5mg Qday, if she is not on it.  She has an appointment with Dr. Crane tomorrow, she will ask her about this.     4. Sleep  apnea, unspecified type      Complaint with CPAP.    5. Severe Mitral regurgitation     Per son, cardiology did not think that the mitral regurgitation was the cause of the Afib.  If it is the cause of atrial fibrillation, she would likely be better protected with warfarin as NOACs have not been found to effective for valvular atrial fibrillation.        She is cleared for appointment on 7/22/2021 & 7/23/2021 @ [facility] for evaluation.   If any new signs of stroke:  sudden weakness, numbness, speech difficulty (slurring or difficulty finding words), imbalance, incoordination, worse headache of life or vision loss occur, call 911.      Take all medications as prescribed unless instructed by your provider.      I spent a total of 60 minutes caring for patient,  my time includes counseling, review of systems, HPI and assessment, review of images, labs and testing as above.  I reviewed the hospital records, PMH, social and family history.   I have counseled patient on stroke prevention strategies, stroke symptoms and mimics.  Diet and exercise modifications.  We discussed medication side effects and instructions.       I have provided patient a written personalized stroke prevention plan, it is filed under the media tab under ‘[facility]”.    Follow up PRN

## 2021-07-02 RX ORDER — VALACYCLOVIR HYDROCHLORIDE 500 MG/1
500 TABLET, FILM COATED ORAL DAILY
Qty: 90 TABLET | Refills: 1 | Status: ON HOLD | OUTPATIENT
Start: 2021-07-02 | End: 2021-10-21

## 2021-07-06 ENCOUNTER — NON-PROVIDER VISIT (OUTPATIENT)
Dept: CARDIOLOGY | Facility: MEDICAL CENTER | Age: 76
End: 2021-07-06
Payer: COMMERCIAL

## 2021-07-06 ENCOUNTER — TELEPHONE (OUTPATIENT)
Dept: CARDIOLOGY | Facility: MEDICAL CENTER | Age: 76
End: 2021-07-06

## 2021-07-06 DIAGNOSIS — I48.19 PERSISTENT ATRIAL FIBRILLATION (HCC): ICD-10-CM

## 2021-07-06 DIAGNOSIS — I49.3 PVC (PREMATURE VENTRICULAR CONTRACTION): ICD-10-CM

## 2021-07-06 NOTE — TELEPHONE ENCOUNTER
Patient enrolled in the 7 day [employer] MCT Heart monitoring program per Ayde Denise MD.  >In office hookup with [facility] transmitted.  >Pending EOS.

## 2021-07-07 ENCOUNTER — SPEECH THERAPY (OUTPATIENT)
Dept: SPEECH THERAPY | Facility: REHABILITATION | Age: 76
End: 2021-07-07
Attending: INTERNAL MEDICINE
Payer: COMMERCIAL

## 2021-07-07 DIAGNOSIS — I63.511 CEREBRAL INFARCTION DUE TO UNSPECIFIED OCCLUSION OR STENOSIS OF RIGHT MIDDLE CEREBRAL ARTERY (HCC): ICD-10-CM

## 2021-07-07 DIAGNOSIS — I69.320 COMBINED RECEPTIVE AND EXPRESSIVE APHASIA AS LATE EFFECT OF CEREBROVASCULAR ACCIDENT (CVA): ICD-10-CM

## 2021-07-07 DIAGNOSIS — R41.841 COGNITIVE COMMUNICATION DEFICIT: ICD-10-CM

## 2021-07-07 PROCEDURE — 92523 SPEECH SOUND LANG COMPREHEN: CPT

## 2021-07-07 ASSESSMENT — SOCIAL DETERMINANTS OF HEALTH (SDOH): SOCIAL_SUPPORT_SYSTEM: OTHER

## 2021-07-07 ASSESSMENT — ENCOUNTER SYMPTOMS: NO PATIENT REPORTED PAIN: 1

## 2021-07-07 NOTE — OP THERAPY EVALUATION
Outpatient Speech Therapy  INITIAL EVALUATION    [address]  [address]  [place] [address]  Phone:  [phone]  Fax:  [phone]    Date of Evaluation: 07/07/2021    Patient: Shaista Bocanegra  YOB: 1945  MRN: [id number]     Referring Provider: Bethany Crane M.D.  [address] [facility] [place] [address]   Referring Diagnosis Cerebral infarction due to unspecified occlusion or stenosis of right middle cerebral artery [I63.511]     Time Calculation    Start time: 1015  Stop time: 1100 Time Calculation (min): 45 minutes           Chief Complaint: Aphasia (it sometimes takes me 2 or 3 minutes to think of the name or proper noun) and Other (Voice (reduced loudness))    Visit Diagnoses     ICD-10-CM   1. Cerebral infarction due to unspecified occlusion or stenosis of right middle cerebral artery (HCC)  I63.511   2. Combined receptive and expressive aphasia as late effect of cerebrovascular accident (CVA)  I69.320   3. Cognitive communication deficit  R41.841     Subjective:   Reason for Therapy:     Reason For Evaluation:  Stroke Rehabilitaion, CVA, Aphasia and Speech/Language    Onset Date:  5/30/2021    Onset Description:  Patient presents to outpatient speech therapy in the setting of increased complaints regarding cognitive communication function in areas of word finding, reading comprehension and written expression and changes to speech production, described as reduced vocal loudness following CVA. Patient reports history is significant for CVA, 4 years prior, 9/2017.     MRI 5/31/2021 revealed  IMPRESSION:     1.  Small area of acute infarct in the left temporal lobe.  2.  Chronic infarct in the right insular cortex and frontal lobe.  3.  Mild chronic microvascular ischemic disease.  4.  Mild cerebral volume loss  Social Support:     Accompanied By:  Other (father of her children, Casey, present and supportive)    Patient Mental Status:  Alert and  "Responsive  Progress Factors:     Progression:  Getting Better  Pain:     no pain reported    Therapy History:     Current Diet:  Regular Diet, Normal Consistency and Thin Liquids    Hearing:  Hearing Aids (Bilateral)    Vision:  Eye Glasses (reading)    Handedness:  Right-handed  Additional Subjective Comments:      Patient endorses that following stroke, she is having increased difficulty in \"thinking and getting the words out.\" Patient with additional concerns regarding loudness of speech production. Patient reports she \"watches a lot of Netflix these days\" do to difficulties she is experiencing in reading at this time. Patient resides independently in her apartment. Patient uses a local meal service to assist with meal preparation.     Patient is a , described as \"one of the top 10 bankruptcy  in [place].\" Patient verbalized frustration regarding difficulties she is currently experiencing in speech production, expressive language skills, memory and problem solving.     Past Medical History:   Diagnosis Date   • Arthritis    • Atrial fibrillation (HCC)    • Benign essential HTN 3/19/2012   • Breast cancer (HCC)    • Cancer (HCC) 1998    breast    • Cardiac arrhythmia    • Chest tightness or pressure 3/19/2012   • Chickenpox    • Coronary heart disease    • Mongolian measles    • Gynecological disorder    • High cholesterol    • High risk medication use 3/19/2012   • Hypercholesterolemia 3/19/2012   • Mumps    • MVP (mitral valve prolapse) 3/19/2012   • Osteoporosis    • Sleep apnea     no CPAP   • Snoring    • Stroke (HCC) 2017    residual minor weakness on left side   • Substance abuse (HCC)    • Tonsillitis    • Urinary bladder disorder     OAB   • Urinary incontinence    • Valvular heart disease    • Venereal disease      Past Surgical History:   Procedure Laterality Date   • CATARACT PHACO WITH IOL  3/16/2009    Performed by SHANNON GANDARA at SURGERY SAME DAY [facility] ORS   • CATARACT PHACO WITH " IOL  1/26/2009    Performed by SHANNON GANDARA at SURGERY SAME DAY [facility] ORS   • BREAST BIOPSY     • HYSTERECTOMY LAPAROSCOPY     • LUMPECTOMY     • PB RADIATION THERAPY PLAN SIMPLE     • PB REMV 2ND CATARACT,CORN-SCLER SECTN     • ID CHEMOTHERAPY, UNSPECIFIED PROCEDURE     • PRIMARY C SECTION     • SINUSCOPE     • TONSILLECTOMY       Objective:   Treatments/Interventions Performed:  Patient/Caregiver education  Other Treatment Interventions:  Assessment of speech-language, cognitive communication function through administration of Scales of Cognitive and Communicative Ability for Neurorehabilitation (SCCAN)  Objective Details:  SCCAN total score performance revealed a total score of 77, which is consistent with a mild cognitive communication impairment.     Speech Therapy Assessment:     Expressive Language Assessment:     Ability to exhibit appropriate naming: Minimal (generative naming).    Explains function of object WFL.    Repeats speech accurately WFL.    Patient's ability to verbalize wants and needs is WFL.    Patient's ability to sustain dialogues within a given topic is WFL.    Patient's ability to formulate complex/abstract language is Minimal.    Paraphasic is Present.    Perseveration is Present.    Expressive language comments: SCCAN: Oral Expression (OE) score: 17, representing a percentage score of 89.5. Patient with mild deficits in expressive language skills characterized by naming 9 animals and 9 words beginning with /f/ in 30 seconds. Naming to concrete animals revealed a perseverative naming present 11%.     Independent speech production tasks revealed the presence of paraphasias both semantic and syntactic, impacting accuracy in verbal expression. Patient pragmatic use of language was within functional limits.     Written Language Assessment:       Patient ability to write full name accurately WFL.    Ability to write single words (spontaneously) WFL.     Ability to generate sentences  "Minimal.    Language comments: SCCAN: Writing (WR) score 6, representing a percentage score of 85.7. Patient was accurate in written expression to single words accurate both in letter formation and spelling. Increased complexity to sentence level responses revealed increased errors, with presence of paraphasias present in written expression in addition to increased spelling errors. Examples included \"cowpow\" > cowboy, \"check\" > chest, \"grenn\" > green, \"cleaning\" > leaving.     Receptive Language Assessment:     Patient ability to identify body parts: WFL    Patient ability to identify objects: WFL    Patient ability to discriminate right and left: [facility]    Personal information yes/no questions: WFL    Simple yes/no questions: WFL    Complex yes/no questions: WFL    Patient follows 2 step simple commands:WFL    Patient follows 3 step simple commands: WFL    Patient follows 2 step complex commands: WFL    Patient understands simple conversation: WFL    Patient understands complex conversation: WFL    Receptive language comments: SCCAN: Speech Comprehension (SP) score 13, representing a percentage score of 100.     Reading Comprehension Assessment:     Patient ability to comprehend single words: WFL    Patient ability to comprehend short phrases: WFL    Patient ability to comprehend simple notes: Severe (related to reading complex information, including medication labels)    Reading comprehension comments: [facility] ([facility] 9, representing a percentage score of 75. Patient with improved accuracy in reading comprehension when a direct cues was provided or auditory prompts were included in reading comprehension. Patient was unable to complete reading comprehension to level necessary to read safety related information (related to medications) 0/2.     Cognitive Linguistic Assessment:     Patient attention sustained: Minimal    Patient attention selective: Minimal    Patient attention divided: Minimal    " Patient orientation to day: [facility]    Patient orientation to month: [facility]    Patient orientation to time: [facility]    Patient orientation to self: [facility]    Patient orientation to place: WF    Patient immediate memory: WF (patient independent in recall of 3/3 medications, 7/7 details regarding complex message)    Patient recent and short term memory: Supervision Required (recall of 4/4 medications within 2 minutes)    Patient remote and long term memory: WFL    Patient procedural memory: WFL    Patient prospective and time delay memory: Moderate (2/2 faces recalled independent; 4/6 details from previously stated message; 1/3 medications provided at beginning of assessment)    Patient biographical information memory: WFL    Patient complex problem solving ability: Moderate    Patient spontaneous clock drawing ability: Minimal (numbers present in clockwise order, 2 hands of different lengths, incorrect time)    Cognitive-Linguistic comments: SCCAN: Attention score: 12, representing a percentage score of 75, mild deficits. Orientation score 11, representing a percentage score of 91.6%. Memory score 13, representing a percentage score of 68.4. Problem solving score 17, representing a percentage score 73.9.     Patient deficits in attention likely contributing factor to deficits experienced in memory recall at this time. Patient with noted strength in visual memory as compared to auditory memory. Deficits in reading comprehension were noted to impact overall accuracy in complex problem solving. Patient basic mathematics appeared intact; although solving complex problems, such as percentages or determining change proved difficult.       Speech Mechanism Assessment:     Patient voice description: Reduced Intensity    Patient's oral movements are voluntary and coordination: WFL    Patient speaks fluently: WFL    Patient exhibits single word intelligibility: WFL    Patient exhibits sentence level intelligibility: WFL    Patient  "exhibits conversational intelligibility: WFL    Patient dysarthria: Minimal    Patient uses adequate breath support: No  Speech mechanism comments: Patient was noted to present with reduced intensity of voicing overall, intelligibility rating deemed 100% to novel topics of conversation in a 1:1 evaluation setting. Patient with suggested reduced coordination of breath-speech, resulting in low volume speech production throughout evaluation.     Speech Therapy Plan :   Prognosis & Recommendations  Impression Summary:  Shaista \"Artemio\" Daljit, a 76 year old female, participated in an outpatient speech therapy evaluation of cognitive communication, speech language and speech production at the kind request of primary care provider, Dr. Crane.     Results of today's evaluation revealed patient presented with a mild cognitive communication deficit with deficits noted in areas of oral expression, memory, reading comprehension, written expression, attention and problem solving. Given deficits, patient is likely to experience deficits in planning, organization, memory and problem solving necessary to promote patient safety and independence in return to previous level of independence and function. As a result, participation in direct, outpatient speech therapy services is recommended at this time.   Prognosis:  Good  Prognosis Details:  Patient described herself as \"very motivated\" and demonstrated willingness to participate in and complete all structured evaluation tasks during today's session. It is expected that patient will progress in speech production, expressive language skills and cognitive communication function consistent with previous level of function with participation in direct, outpatient speech therapy services. Without participation in direct, speech therapy patient will likely continue to experience frustration and deficits in speech production limiting communication and cognitive communication deficits impacting " "patient safety and independence.   Goals  Short Term Goals:  1. Patient will implement circumlocution or semantic feature analysis to demonstrate accuracy and independence in communication when experiencing a word finding difficulty 3/4 obligatory contexts independent within 4 weeks.   2. Patient will improve reading comprehension, demonstrating understanding of phrases, progressing to complex sentences and paragraphs as measured by answering yes/ no, progressing to multiple choice and open ended questions 88% accuracy; minimal verbal prompts/ instruction/ cues or independent implementation of external cognitive strategies within 8 weeks.  3. Patient will improve written expression, demonstrating accuracy in written expression of single words, progressing to phrase and simple sentence level formulation to direct picture cues 88% accuracy; minimal verbal, visual prompts/ instruction/ cues as necessary within 8 weeks.   4. Patient will improve attention to structured tasks completing selective and divided attention tasks with 92% accuracy; independent within 4 weeks.  5. Patient will improve working memory completing generative naming tasks to concrete, progressing to abstract categories, naming a minimum of 12 items in one-minute intervals, 3/4 trials within 8 weeks.  6. Patient will improve short and time delay recall of newly learned information, implementing external and/ or internal memory strategies as necessary recalling 3, progressing to 4 units of newly learned information with 85% or greater accuracy; minimal verbal cues within 8 weeks.  7. Patient will improve safety and independence completion language related activities of daily living with 90% or greater accuracy; minimal cues-independent within 8 weeks.    8. Patient will improve voice production through demonstrating \"talk\"ing level loudness from phrase, sentence, and conversational level productions with 7/10 = 70% accuracy, given minimal-moderate " "verbal prompts/ instruction/ cues.  9. Patient will be 80% intelligible for 5 minutes in 4 of 5 opportunities to improve functional communication and reduce frustration given min verbal prompts/ instruction/ cues.      Short Term Goal Duration (Weeks):  6-8 weeks  Long Term Goals:  1. Patient will make ideas known across settings and to various conversational partners with 90% effectiveness demonstrating 2 or less episodes of word finding and vocal loudness appropriate to conversational environment and listener given a 10 minute, novel conversation.  2. Patient will develop functional, cognitive-linguistic based skills and utilize compensatory strategies to communicate novel thoughts and ideas effectively to different conversational partners, maintain safety, and participate socially in a functional living and work environment to 88% accuracy, independent.    Long Term Goal Duration (Weeks):  2-4 months  Patient Stated Goal:  \"To speak and understand better\"  Therapy Recommendations  Recommendation:  Individual Speech Therapy and Other, Consideration for patient to participate in formal cognitive evaluation through Neuropsychology  Planned Therapy Interventions:  Patient/Caregiver Education, Home Program, Compensatory Strategy Training, Respiratory coordination/support training, Dysphagia treatment, Voice training, Speech/Language training and Cognitive-Linguistic training,   Plan Details:  16 units ([id number])  Frequency:  2x week  Duration (in visits):  16  Duration (in weeks):  8      Functional Assessment Used  Scales of Cognitive and Communicative Ability for Neurorehabilitation (SCCAN)    Referring provider co-signature:  I have reviewed this plan of care and my co-signature certifies the need for services.    Certification Period: 07/07/2021 to  09/15/21    Physician Signature: ________________________________ Date: ______________          "

## 2021-07-12 ENCOUNTER — OFFICE VISIT (OUTPATIENT)
Dept: SLEEP MEDICINE | Facility: MEDICAL CENTER | Age: 76
End: 2021-07-12
Payer: COMMERCIAL

## 2021-07-12 VITALS
OXYGEN SATURATION: 96 % | TEMPERATURE: 98.1 F | SYSTOLIC BLOOD PRESSURE: 156 MMHG | HEART RATE: 59 BPM | DIASTOLIC BLOOD PRESSURE: 76 MMHG | BODY MASS INDEX: 22.58 KG/M2 | RESPIRATION RATE: 16 BRPM | WEIGHT: 142 LBS

## 2021-07-12 DIAGNOSIS — I34.0 MITRAL VALVE INSUFFICIENCY, UNSPECIFIED ETIOLOGY: ICD-10-CM

## 2021-07-12 DIAGNOSIS — I63.9 CEREBROVASCULAR ACCIDENT (CVA), UNSPECIFIED MECHANISM (HCC): ICD-10-CM

## 2021-07-12 DIAGNOSIS — R93.89 ABNORMAL CT OF THE CHEST: ICD-10-CM

## 2021-07-12 PROCEDURE — 99214 OFFICE O/P EST MOD 30 MIN: CPT | Performed by: INTERNAL MEDICINE

## 2021-07-12 ASSESSMENT — ENCOUNTER SYMPTOMS
DEPRESSION: 0
NAUSEA: 0
SINUS PAIN: 0
CONSTIPATION: 0
PND: 0
ORTHOPNEA: 0
CLAUDICATION: 0
EYE PAIN: 0
HEMOPTYSIS: 0
STRIDOR: 0
DIAPHORESIS: 0
DOUBLE VISION: 0
TREMORS: 0
DIARRHEA: 0
FEVER: 0
NECK PAIN: 0
EYE REDNESS: 0
SPUTUM PRODUCTION: 0
VOMITING: 0
WHEEZING: 0
FOCAL WEAKNESS: 0
HEADACHES: 0
COUGH: 0
ABDOMINAL PAIN: 0
SPEECH CHANGE: 0
DIZZINESS: 0
FALLS: 0
BACK PAIN: 0
BLURRED VISION: 0
PALPITATIONS: 0
WEAKNESS: 0
EYE DISCHARGE: 0
SHORTNESS OF BREATH: 0
WEIGHT LOSS: 0
HEARTBURN: 0
PHOTOPHOBIA: 0
MYALGIAS: 0
CHILLS: 0
SORE THROAT: 0

## 2021-07-12 ASSESSMENT — FIBROSIS 4 INDEX: FIB4 SCORE: 1.95

## 2021-07-12 NOTE — PROGRESS NOTES
Chief Complaint   Patient presents with   • Establish Care     referral 4/1/21 KM Crane MD DX Pulmonary nodule    • Results     ER ALDC 5/31/21-6/2/21 CXR 3/19/21, echo 5/31/21    • Apnea     last seen 5/26/21 Dr. Weems        HPI: This patient is a 76 y.o. female presenting for evaluation of abnormal CT neck.  Patient's past medical history is significant for mitral valve regurgitation, severe, diastolic heart failure, history of cerebrovascular accident earlier this year as well as in 2017.  She has a remote history of early stage right-sided breast cancer status post lumpectomy, radiation and chemotherapy in 1998.  She is a lifelong non-smoker.  She currently works as a  part-time.  Family history is notable for mother who suffered from breast cancer.  The patient herself prior to her stroke is normally quite active.  She does Pilates and strength training on a regular basis.  She denies any chronic pulmonary symptoms such as cough or shortness of breath.  She does report a 35 pound intentional weight loss during Covid but recently gained back roughly half of that after living with her son following her stroke.  She denies symptoms of coughing or choking with eating but is scheduled to see speech later this month.  She had a CT of her head and neck on May 31 which demonstrated some tree-in-bud inflammation in the right upper lobe.  I reviewed CT pulmonary embolism study back in 2013 and another CT of her neck in 2017.  The patient was volume overloaded on both previous CTs with interstitial thickening but no clear inflammatory changes.  Patient denies fevers or chills.  No night sweats.  No pulmonary function testing from the past.    Past Medical History:   Diagnosis Date   • Arthritis    • Atrial fibrillation (HCC)    • Benign essential HTN 3/19/2012   • Breast cancer (HCC)    • Cancer (HCC) 1998    breast    • Cardiac arrhythmia    • Chest tightness or pressure 3/19/2012   • Chickenpox    • Coronary  heart disease    • Urdu measles    • Gynecological disorder    • High cholesterol    • High risk medication use 3/19/2012   • Hypercholesterolemia 3/19/2012   • Mumps    • MVP (mitral valve prolapse) 3/19/2012   • Osteoporosis    • Sleep apnea     no CPAP   • Snoring    • Stroke ([facility]) 2017    residual minor weakness on left side   • Substance abuse ([facility])    • Tonsillitis    • Urinary bladder disorder     OAB   • Urinary incontinence    • Valvular heart disease    • Venereal disease        Social History     Socioeconomic History   • Marital status:      Spouse name: Not on file   • Number of children: 2   • Years of education: Not on file   • Highest education level: Not on file   Occupational History   • Not on file   Tobacco Use   • Smoking status: Never Smoker   • Smokeless tobacco: Never Used   Vaping Use   • Vaping Use: Never used   Substance and Sexual Activity   • Alcohol use: Not Currently     Comment: twice  a week   • Drug use: No   • Sexual activity: Yes     Partners: Male     Birth control/protection: Female Sterilization   Other Topics Concern   • Not on file   Social History Narrative   • Not on file     Social Determinants of Health     Financial Resource Strain:    • Difficulty of Paying Living Expenses:    Food Insecurity:    • Worried About Running Out of Food in the Last Year:    • Ran Out of Food in the Last Year:    Transportation Needs:    • Lack of Transportation (Medical):    • Lack of Transportation (Non-Medical):    Physical Activity:    • Days of Exercise per Week:    • Minutes of Exercise per Session:    Stress:    • Feeling of Stress :    Social Connections:    • Frequency of Communication with Friends and Family:    • Frequency of Social Gatherings with Friends and Family:    • Attends [employer] Services:    • Active Member of Clubs or Organizations:    • Attends Club or Organization Meetings:    • Marital Status:    Intimate Partner Violence:    • Fear of Current or  Ex-Partner:    • Emotionally Abused:    • Physically Abused:    • Sexually Abused:        Family History   Problem Relation Age of Onset   • Cancer Mother         breast   • No Known Problems Brother    • Heart Failure Neg Hx    • Heart Disease Neg Hx        Current Outpatient Medications on File Prior to Visit   Medication Sig Dispense Refill   • valACYclovir (VALTREX) 500 MG Tab TAKE 1 TABLET BY MOUTH EVERY DAY. INDICATIONS: HERPES SIMPLEX AFFECTING THE LIP 90 tablet 1   • apixaban (ELIQUIS) 5mg Tab Take 1 tablet by mouth 2 times a day. 180 tablet 3   • atorvastatin (LIPITOR) 80 MG tablet TAKE 1 TABLET BY MOUTH EVERY DAY IN THE EVENING 90 tablet 3   • potassium chloride (KLOR-CON) 8 MEQ tablet Take 1 tablet by mouth every day. 90 tablet 3   • metoprolol SR (TOPROL XL) 25 MG TABLET SR 24 HR Take 1 tablet by mouth every evening. 90 tablet 3   • Cyanocobalamin (B-12 PO) Take 25,000 mcg by mouth every day.     • Multiple Vitamins-Minerals (ICAPS AREDS 2 PO) Take 1 tablet by mouth every morning.     • Cholecalciferol (VITAMIN D3) 2000 UNIT Cap Take 2,000 Units by mouth every day.     • Multiple Vitamin (MULTIVITAMINS PO) Take 1 Tab by mouth every day.       No current facility-administered medications on file prior to visit.       Allergies: Patient has no known allergies.    ROS:   Review of Systems   Constitutional: Negative for chills, diaphoresis, fever, malaise/fatigue and weight loss.   HENT: Negative for congestion, ear discharge, ear pain, hearing loss, nosebleeds, sinus pain, sore throat and tinnitus.    Eyes: Negative for blurred vision, double vision, photophobia, pain, discharge and redness.   Respiratory: Negative for cough, hemoptysis, sputum production, shortness of breath, wheezing and stridor.    Cardiovascular: Negative for chest pain, palpitations, orthopnea, claudication, leg swelling and PND.   Gastrointestinal: Negative for abdominal pain, constipation, diarrhea, heartburn, nausea and vomiting.    Genitourinary: Negative for dysuria and urgency.   Musculoskeletal: Negative for back pain, falls, joint pain, myalgias and neck pain.   Skin: Negative for itching and rash.   Neurological: Negative for dizziness, tremors, speech change, focal weakness, weakness and headaches.   Endo/Heme/Allergies: Negative for environmental allergies.   Psychiatric/Behavioral: Negative for depression.       /76 (BP Location: Right arm, Patient Position: Sitting, BP Cuff Size: Adult)   Pulse (!) 59   Temp 36.7 °C (98.1 °F) (Temporal)   Resp 16   Wt 64.4 kg (142 lb)   SpO2 96%     Physical Exam:  Physical Exam  Vitals reviewed.   Constitutional:       General: She is not in acute distress.     Appearance: Normal appearance. She is well-developed and normal weight.   HENT:      Head: Normocephalic and atraumatic.      Right Ear: External ear normal.      Left Ear: External ear normal.      Nose: Nose normal. No congestion.      Mouth/Throat:      Mouth: Mucous membranes are moist.      Pharynx: Oropharynx is clear. No oropharyngeal exudate.   Eyes:      General: No scleral icterus.     Extraocular Movements: Extraocular movements intact.      Conjunctiva/sclera: Conjunctivae normal.      Pupils: Pupils are equal, round, and reactive to light.   Neck:      Vascular: No JVD.      Trachea: No tracheal deviation.   Cardiovascular:      Rate and Rhythm: Normal rate and regular rhythm.      Heart sounds: Normal heart sounds. No murmur heard.   No friction rub. No gallop.    Pulmonary:      Effort: Pulmonary effort is normal. No accessory muscle usage or respiratory distress.      Breath sounds: Normal breath sounds. No wheezing or rales.   Abdominal:      General: There is no distension.      Palpations: Abdomen is soft.      Tenderness: There is no abdominal tenderness.   Musculoskeletal:         General: No tenderness or deformity. Normal range of motion.      Cervical back: Normal range of motion and neck supple.      Right  lower leg: No edema.      Left lower leg: No edema.   Lymphadenopathy:      Cervical: No cervical adenopathy.   Skin:     General: Skin is warm and dry.      Findings: No rash.      Nails: There is no clubbing.   Neurological:      Mental Status: She is alert and oriented to person, place, and time.      Cranial Nerves: No cranial nerve deficit.      Gait: Gait normal.   Psychiatric:         Mood and Affect: Mood normal.         Behavior: Behavior normal.         PFTs as reviewed by me personally: None    Imaging as reviewed by me personally: As per HPI    Assessment:  1. Abnormal CT of the chest  PULMONARY FUNCTION TESTS -Test requested: Complete Pulmonary Function Test    CT-CHEST (THORAX) W/O   2. Mitral valve insufficiency, unspecified etiology     3. Cerebrovascular accident (CVA), unspecified mechanism (HCC)         Plan:  1.  Patient with some inflammatory changes in her right upper lobe.  I suspect there is some chronic scarring related to her breast cancer diagnosis and radiation to the outside of the chest.  It is difficult to compare to prior CTs that she was volume overloaded in 2013 in 2017.  She has a normal exam with normal room air oxygenation and no symptoms at present.  For now I have ordered an updated CT to occur next month or 3 months from her CT neck for better assessment.  I also order baseline PFTs given she is planning to likely undergo some kind of mitral valve repair procedure.  She will follow-up with me in 4 months but I will contact her if testing or imaging is abnormal before that.  2.  Followed by cardiology.  Planning to go have evaluation at [address] for possible clipping.  3.  Patient was ruled out for shunt by cardiology.  This does put her at risk for aspiration.  She is on Eliquis.  She sees speech later this month.  Return in about 4 months (around 11/12/2021) for ct chest, pfts.

## 2021-07-13 ENCOUNTER — APPOINTMENT (OUTPATIENT)
Dept: SPEECH THERAPY | Facility: REHABILITATION | Age: 76
End: 2021-07-13
Attending: INTERNAL MEDICINE
Payer: COMMERCIAL

## 2021-07-14 PROCEDURE — 93268 ECG RECORD/REVIEW: CPT | Performed by: INTERNAL MEDICINE

## 2021-07-15 ENCOUNTER — APPOINTMENT (OUTPATIENT)
Dept: SPEECH THERAPY | Facility: REHABILITATION | Age: 76
End: 2021-07-15
Attending: INTERNAL MEDICINE
Payer: COMMERCIAL

## 2021-07-19 ENCOUNTER — SPEECH THERAPY (OUTPATIENT)
Dept: SPEECH THERAPY | Facility: REHABILITATION | Age: 76
End: 2021-07-19
Attending: INTERNAL MEDICINE
Payer: COMMERCIAL

## 2021-07-19 DIAGNOSIS — I63.511 CEREBRAL INFARCTION DUE TO UNSPECIFIED OCCLUSION OR STENOSIS OF RIGHT MIDDLE CEREBRAL ARTERY (HCC): ICD-10-CM

## 2021-07-19 DIAGNOSIS — R41.841 COGNITIVE COMMUNICATION DEFICIT: ICD-10-CM

## 2021-07-19 DIAGNOSIS — I69.320 COMBINED RECEPTIVE AND EXPRESSIVE APHASIA AS LATE EFFECT OF CEREBROVASCULAR ACCIDENT (CVA): ICD-10-CM

## 2021-07-19 PROCEDURE — 92507 TX SP LANG VOICE COMM INDIV: CPT

## 2021-07-19 NOTE — OP THERAPY DAILY TREATMENT
"  Outpatient Speech Therapy  DAILY TREATMENT     [facility] [address]  [address]  [place] [address]  Phone:  [phone]  Fax:  [phone]    Date: 07/19/2021    Patient: Shaista Bocanegra  YOB: 1945  MRN: [id number]     Time Calculation    Start time: 1300  Stop time: 1345 Time Calculation (min): 45 minutes         Chief Complaint: Aphasia (\"loss of words and the ability to communicate\")    Visit #: 2    Subjective:   Social Support:     Patient Mental Status:  Alert and Responsive (noted tiredness)  Additional Subjective Comments:      Patient returns to outpatient speech therapy reporting she continues to be frustrated regarding word finding difficulties she continues to experience at this time. Patient endorses she \"hasn't been sleeping well\" endorsing she has been seen by a sleep doctor.      Patient reports compliance with recommendations from initial evaluation, endorsing completing at least an hour a day spent on reading and written expression tasks at this time. Patient endorses reading of magazines. Patient endorses that changes to vision impact types of activities she can participate in with regards to reading and writing tasks.       Objective:   Treatments/Interventions Performed:  Patient/Caregiver education, Compensatory strategy training, Home program and Cognitive-Linguistic training  Other Treatment Interventions:  Education and practice completed in Semantic Feature Analysis  Treatment Intervention tool(s) used:  SLP provided structured therapy tasks addressing word finding and attention  Objective Details:  Semantic Feature Analysis completed with minimal verbal only cues following direct instruction. Patient completed with 17/18 = 94.4% accuracy.    Attention targeted through completion of trail making task: sustained completed to field of 26 with 100% accuracy. Alternating attention targeted and completed with 24/26 = 92.3% accuracy.        " "    Speech Therapy Assessment:     Expressive Language Assessment:     Expressive language comments: Minimal episodes of paraphasias demonstrated during novel speech production \"toothpaste\" > suitcase. Patient demonstrated good response to initiation of semantic feature analysis, demonstrating single episode of word finding difficulty during completion of structured picture description task.     Reading Comprehension Assessment:     Reading comprehension comments: During trail making task, patient stated she was initially unable to find X, noting that she identified the X as a different letter (I thought it was a G). Consistent difficulty in identification of letter J also noted.       Cognitive Linguistic Assessment:     Patient attention sustained: WFL    Patient attention selective: Supervision Required        Speech Therapy Plan :   Prognosis & Recommendations  Impression Summary:  Patient returned for her first visit of direct, outpatient speech therapy addressing cognitive communication deficits in the setting of s/p CVA.     Education provided on importance of daily structured, bedtime routine given patient reports of increased difficulty in sleep at night.     Use of semantic feature analysis proved beneficial in increasing accuracy of verbal expression for naming specific items. Patient was encouraged to create a word journal to provide further use of SFA in the home setting.    During structured attention task, patient with benefit from verbal cues to increase orientation to both far right and left sides of the page. Patient with noted concern regarding letter recognition given structured trail making task, endorsing she was unable to see letter, later identifying that she thought it was a different letter.     Patient with noted fatigue during today's session with patient endorsing increased difficulty in falling asleep or staying asleep. Patient encouraged to reach out to physician for further " discussion/ assessment.    Continued direct intervention recommended to address cognitive communication deficits in areas of attention, memory and problem solving to improve patient independence and safety in completion of language related tasks of daily living.   Therapy Recommendations  Recommendation:  Individual Speech Therapy,  Planned Therapy Interventions:  Home Program, Patient/Caregiver Education, Compensatory Strategy Training and Cognitive-Linguistic training,   Plan Details:  Continue with structured tasks addressing word finding through SFA, circumlocution. Education in internal memory strategies. Begin to address more complex problem solving with education in external cognitive strategies.

## 2021-07-20 NOTE — PROGRESS NOTES
"Subjective:   Chief Complaint:   Chief Complaint   Patient presents with   • Atrial Fibrillation       \"Artemio\" Shaista Bocanegra is a 76 y.o. female who returns for moderate to severe mitral regurgitation, hypertension, hyperlipidemia, prior CVAx2, HTN and now afib.    Admitted to the hospital 5/8/2020 with dehydration on diuretics.  Has left and right heart catheterization that admission move mitral clip for severe mitral regurgitation.  She is considering repair vs replacement.  Has met our MItraClip Team, our surgeon and also providers at [place], offered  REPAIR MR trial with MitraClip and RESTORE Trial, JESUS.  No discussion of robotic, probably at [place].  She is going to [place] soon to be enrolled in their trial, not sure if surgery or clip.    Surgery on foot, HR up a bit, 92-96. Has been in pain, getting better with accupunture.     Has HTN, /85, usually 125.  Started on amlodipine.  BP better, under 130, sometimes under 100, but no sxs.    Has LALY, was on CPAP, now using device only.  Using meditation and leep specialist to help with habits.    Prior CVA, some light tingling sensation remains.    Patient received mechanical thrombectomy 8/18/2017, felt to be due to right carotid disease.    Then had another CVA, found to have afib, now on apixaban.  [id number] is 5 now.  Does note brain fog and word finding sometimes now.  Monitor for Afib, Ave 69, range , occasional PVCs, 2% burden.    Has hyperlipidemia, LDL 58.  No CAD on [facility] 2020.    Has a ring that monitors HR at night, we looked at data, no spikes.    She is not limited by chest pain, pressure or tightness with activity.   No significant dyspnea on exertion but only if she moves slowly.  No orthopnea or lower extremity swelling.     No significant palpitations, lightheadedness, or presyncope/syncope.   Rarely lightheaded.    No symptoms of leg claudication.     Remote syncope in the setting of low K.  Her K was stopped in hospital, she is " low normal.    No family history of premature coronary artery disease.  No prior smoking history.  No history of diabetes.  No history of autoimmune disease such as lupus or rheumatoid arthritis.  No chronic kidney disease.  No ETOH overuse.   No caffeine overuse.  No recreation substance use.    Has lost some weight.    Lives in [place].  She is a .  Close to [place], father of her children.  Casey's girlfriend is Janae who sees me.  Casey sees our group, has ICD.  Here with her son Fer today.    She is fully vaccinated.    DATA REVIEWED by me:  ECG 9-6-2021  Afib, 53,     ECG 5-  Sinus, 65, PVC, first-degree AV delay    BioTel 6 day Summary: 7-14-21    1. Atrial fibrillation with appropriate heart rate range. Average 69, range 35 to 120 bpm.   2. No significant pauses (up to 2.2 seconds).   3. Occasional PVCs, 2% burden.   4. 1 patient trigger, no symptoms reported.     Conclusion: Persistent atrial fibrillation with appropriate heart rate, no pauses, occasional PVCs.     Zio 2017 2 weeks.  Sinus, brief atrial run.    ABHINAV 1-26-21  LV EF  55%.  Biatrial enlargement.  There is severe eccentric mitral regurgitation with multiple jets,   predominantly from flail leaflet of P2.  Echolucent space near P1 of unclear etiology, unusual for cleft   leaflet.  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    Echo 6-1-2021  Left ventricular ejection fraction is visually estimated to be 65%.  Diastolic function is difficult to assess with atrial fibrillation.  Severely dilated left atrium.  Negative bubble study including Valsalva.  Myxomatous changes of the mitral valve leaflets with prolapse of the   anterior and posterior leaflets.  Moderate to severe eccentric mitral regurgitation, probably severe.  Pulmonary veins not well seen on the study.  Estimated right ventricular systolic pressure is 31 mmHg + estimated   RAP.     Compared to the images of the study done 11- there has been no   significant change, prior  echo had pulmonary vein flow reversal   consistent with severe mitral regurgitation.    Echo 11-  Left ventricular ejection fraction is visually estimated to be 60%.  Severely dilated left atrium.  Myxomatous changes of the mitral valve.  Bileaflet prolapse of the mitral valve is present.  Severe mitral regurgitation with pulmonary vein systolic flow reversal,   RV 73 mL, ERO 0.4  cm2.  Estimated right ventricular systolic pressure  is 30 mmHg.  Compared to the images of the prior study done 2/3/2020, no significant    change.    Echo 2/3/2020  Prior echo done on 05/03/2019. Compared to the images of the prior   study done -  there has been no significant change.   Normal left ventricular systolic function.  Left ventricular ejection fraction is visually estimated to be 65%.  Prolapse of the mitral leaflets was present.  Severe mitral regurgitation.  Mild tricuspid regurgitation.  Estimated right ventricular systolic pressure is 35 mmHg.    Echo 2017  Agitated saline study was performed, no evidence of right to left   shunt.  Normal left ventricular size, wall thickness, and systolic function.  Left ventricular ejection fraction is visually estimated to be 60%.  Mildly dilated left atrium.  Moderate mitral regurgitation due to an eccentric jet.  Mild tricuspid regurgitation.    Left and right heart catheterization 5/8/2020  Right ventricle 33/5, EDP 15  Pulmonary artery 42/21, 29  pulmonary capillary wedge 23  Cardiac output 4.1 L/min by thermodilution method     1.  Left main coronary artery:  Normal.  2.  Left anterior descending artery:  Normal.   3.  Left circumflex coronary artery:  Normal.   4.  Right coronary artery:  Normal.  This is a right dominant system.  5.  Left ventricular end diastolic pressure:  25 mmHg.  No signficant gradient across the aortic valve.  6.  Left ventriculogram:  Ejection fraction of 65%, 2+ mitral regurgitation, normal sized thoracic aorta.     2017 CTA NECK   1.  CT  angiogram of the neck within normal limits.  2.  Thrombosed right M1 segment.     2017 CAROTID DUPLEX CONCLUSIONS   nl carotids, subclavians and vertebral's     2017 MRI BRAIN  1.  Moderate sized RIGHT MCA territory acute ischemia involving the cortex and basal ganglia  2.  Flow void is present in the RIGHT MCA compatible with treatment effect  3.  No hemorrhage  4.  Mild white matter changes  5.  Mild atrophy    Most recent labs:       Lab Results   Component Value Date/Time    HEMOGLOBIN 13.1 06/01/2021 01:03 AM    HEMATOCRIT 38.4 06/01/2021 01:03 AM    .3 (H) 06/01/2021 01:03 AM    INR 1.17 (H) 05/31/2021 11:14 AM      Lab Results   Component Value Date/Time    SODIUM 144 06/22/2021 03:22 PM    POTASSIUM 3.8 06/22/2021 03:22 PM    CHLORIDE 107 06/22/2021 03:22 PM    CO2 29 06/22/2021 03:22 PM    GLUCOSE 81 06/22/2021 03:22 PM    BUN 18 06/22/2021 03:22 PM    CREATININE 0.82 06/22/2021 03:22 PM      Lab Results   Component Value Date/Time    ASTSGOT 23 06/01/2021 01:03 AM    ALTSGPT 21 06/01/2021 01:03 AM    ALBUMIN 3.6 06/01/2021 01:03 AM      Lab Results   Component Value Date/Time    CHOLSTRLTOT 120 03/24/2021 07:05 AM    LDL 56 03/24/2021 07:05 AM    HDL 55 03/24/2021 07:05 AM    TRIGLYCERIDE 47 03/24/2021 07:05 AM           Past Medical History:   Diagnosis Date   • Arthritis    • Atrial fibrillation (HCC)    • Benign essential HTN 3/19/2012   • Breast cancer (HCC)    • Cancer (HCC) 1998    breast    • Cardiac arrhythmia    • Chest tightness or pressure 3/19/2012   • Chickenpox    • Coronary heart disease    • [place] measles    • Gynecological disorder    • High cholesterol    • High risk medication use 3/19/2012   • Hypercholesterolemia 3/19/2012   • Mumps    • MVP (mitral valve prolapse) 3/19/2012   • Osteoporosis    • Sleep apnea     no CPAP   • Snoring    • Stroke (HCC) 2017    residual minor weakness on left side   • Substance abuse (HCC)    • Tonsillitis    • Urinary bladder disorder     OAB   •  Urinary incontinence    • Valvular heart disease    • Venereal disease      Past Surgical History:   Procedure Laterality Date   • CATARACT PHACO WITH IOL  3/16/2009    [facility]   • CATARACT PHACO WITH IOL  1/26/2009    [facility]   • BREAST BIOPSY     • HYSTERECTOMY LAPAROSCOPY     • LUMPECTOMY     • PB RADIATION THERAPY PLAN SIMPLE     • PB REMV 2ND CATARACT,CORN-SCLER SECTN     • DE CHEMOTHERAPY, UNSPECIFIED PROCEDURE     • PRIMARY C SECTION     • SINUSCOPE     • TONSILLECTOMY       Family History   Problem Relation Age of Onset   • Cancer Mother         breast   • No Known Problems Brother    • Heart Failure Neg Hx    • Heart Disease Neg Hx      Social History     Socioeconomic History   • Marital status:      Spouse name: Not on file   • Number of children: 2   • Years of education: Not on file   • Highest education level: Not on file   Occupational History   • Not on file   Tobacco Use   • Smoking status: Never Smoker   • Smokeless tobacco: Never Used   Vaping Use   • Vaping Use: Never used   Substance and Sexual Activity   • Alcohol use: Not Currently     Comment: twice  a week   • Drug use: No   • Sexual activity: Yes     Partners: Male     Birth control/protection: Female Sterilization   Other Topics Concern   • Not on file   Social History Narrative   • Not on file     Social Determinants of Health     Financial Resource Strain:    • Difficulty of Paying Living Expenses:    Food Insecurity:    • Worried About Running Out of Food in the Last Year:    • Ran Out of Food in the Last Year:    Transportation Needs:    • Lack of Transportation (Medical):    • Lack of Transportation (Non-Medical):    Physical Activity:    • Days of Exercise per Week:    • Minutes of Exercise per Session:    Stress:    • Feeling of Stress :    Social Connections:    • Frequency of Communication with Friends and Family:   "  • Frequency of Social Gatherings with Friends and Family:    • Attends [employer]oration[employer] Services:    • Active Member of Clubs or Organizations:    • Attends Club or Organization Meetings:    • Marital Status:    Intimate Partner Violence:    • Fear of Current or Ex-Partner:    • Emotionally Abused:    • Physically Abused:    • Sexually Abused:      No Known Allergies    Current Outpatient Medications   Medication Sig Dispense Refill   • valACYclovir (VALTREX) 500 MG Tab TAKE 1 TABLET BY MOUTH EVERY DAY. INDICATIONS: HERPES SIMPLEX AFFECTING THE LIP 90 tablet 1   • potassium chloride (KLOR-CON) 8 MEQ tablet Take 1 tablet by mouth every day. 90 tablet 3   • metoprolol SR (TOPROL XL) 25 MG TABLET SR 24 HR Take 1 tablet by mouth every evening. 90 tablet 3   • apixaban (ELIQUIS) 5mg Tab Take 1 tablet by mouth 2 times a day. 180 tablet 3   • atorvastatin (LIPITOR) 80 MG tablet TAKE 1 TABLET BY MOUTH EVERY DAY IN THE EVENING 90 tablet 3   • Cyanocobalamin (B-12 PO) Take 25,000 mcg by mouth every day.     • Multiple Vitamins-Minerals (ICAPS AREDS 2 PO) Take 1 tablet by mouth every morning.     • Cholecalciferol (VITAMIN D3) 2000 UNIT Cap Take 2,000 Units by mouth every day.     • Multiple Vitamin (MULTIVITAMINS PO) Take 1 Tab by mouth every day.       No current facility-administered medications for this visit.       ROS    All others systems reviewed and negative.     Objective:     /80 (BP Location: Left arm, Patient Position: Sitting, BP Cuff Size: Adult)   Pulse (!) 58   Ht 1.689 m (5' 6.5\")   Wt 63.5 kg (140 lb)   SpO2 97%  Body mass index is 22.26 kg/m².    General: No acute distress. Well nourished.  HEENT: EOM grossly intact, no scleral icterus, no pharyngeal erythema.   Neck:  No JVD at 90, no bruits, trachea midline  CVS: Irreg irreg. Normal S1, S2. 3/6 holosyt murmur at the apex No LE edema.  2+ radial pulses, 2+ PT pulses  Resp: CTAB. No wheezing or crackles/rhonchi. Normal respiratory effort.  Abdomen: " Soft, NT, no ana rosa hepatomegaly.  MSK/Ext: No clubbing or cyanosis.  Skin: Warm and dry, no rashes.  Neurological: CN III-XII grossly intact. No focal deficits.   Psych: A&O x 3, appropriate affect, adequate judgement, forgetful at times    Physical exam performed today and unchanged, except what is noted, compared to 6-24-21      Assessment:     1. Persistent atrial fibrillation (HCC)     2. Severe mitral regurgitation     3. History of CVA (cerebrovascular accident)     4. Essential hypertension     5. Chronic anticoagulation     6. Dyslipidemia     7. Benign essential HTN     8. LALY (obstructive sleep apnea)     9. MVP (mitral valve prolapse)     10. Memory loss     11. Paroxysmal atrial fibrillation (HCC)         Medical Decision Making:  Today's Assessment / Status / Plan:     -She is too healthy for mitral clip here, it would need to be open heart surgery here. She saw her father in law go through open heart surgery around 1990, and he did not do well. She is going to [place] today, appointment is for tomorrow morning.  -BP better  -Had CVA from afib  -Now persistent afib, rate control strategy, zio with controlled HR  -[id number] is now 5, on apixaban  -PVCs at 2%, no concerns  -No ASA due to apixaban  -No CAD on LHC  -OK for low dose K, keep K near 4.5, prior was 3.8  -Covid test pending for her visit to [place], no sxs of covid  -RTC 3 months with DB, me in 6 months      Return in about 3 months (around 10/22/2021).    It is my pleasure to participate in the care of Ms. Bocanegra.  Please do not hesitate to contact me with questions or concerns.    Ayde Denise MD, [facility]  Cardiologist [facility]    Please note that this dictation was created using voice recognition software. I have made every reasonable attempt to correct obvious errors, but it is possible there are errors of grammar and possibly content that I did not discover before finalizing the note.

## 2021-07-21 ENCOUNTER — HOSPITAL ENCOUNTER (OUTPATIENT)
Dept: LAB | Facility: MEDICAL CENTER | Age: 76
End: 2021-07-21
Attending: OBSTETRICS & GYNECOLOGY
Payer: OTHER MISCELLANEOUS

## 2021-07-21 LAB — COVID ORDER STATUS COVID19: NORMAL

## 2021-07-21 PROCEDURE — U0003 INFECTIOUS AGENT DETECTION BY NUCLEIC ACID (DNA OR RNA); SEVERE ACUTE RESPIRATORY SYNDROME CORONAVIRUS 2 (SARS-COV-2) (CORONAVIRUS DISEASE [COVID-19]), AMPLIFIED PROBE TECHNIQUE, MAKING USE OF HIGH THROUGHPUT TECHNOLOGIES AS DESCRIBED BY CMS-2020-01-R: HCPCS

## 2021-07-21 PROCEDURE — U0005 INFEC AGEN DETEC AMPLI PROBE: HCPCS

## 2021-07-21 PROCEDURE — C9803 HOPD COVID-19 SPEC COLLECT: HCPCS

## 2021-07-22 ENCOUNTER — OFFICE VISIT (OUTPATIENT)
Dept: CARDIOLOGY | Facility: MEDICAL CENTER | Age: 76
End: 2021-07-22
Payer: COMMERCIAL

## 2021-07-22 VITALS
HEIGHT: 67 IN | WEIGHT: 140 LBS | BODY MASS INDEX: 21.97 KG/M2 | DIASTOLIC BLOOD PRESSURE: 80 MMHG | HEART RATE: 58 BPM | SYSTOLIC BLOOD PRESSURE: 138 MMHG | OXYGEN SATURATION: 97 %

## 2021-07-22 DIAGNOSIS — I48.0 PAROXYSMAL ATRIAL FIBRILLATION (HCC): ICD-10-CM

## 2021-07-22 DIAGNOSIS — I48.19 PERSISTENT ATRIAL FIBRILLATION (HCC): ICD-10-CM

## 2021-07-22 DIAGNOSIS — E78.5 DYSLIPIDEMIA: ICD-10-CM

## 2021-07-22 DIAGNOSIS — I34.0 SEVERE MITRAL REGURGITATION: ICD-10-CM

## 2021-07-22 DIAGNOSIS — I10 ESSENTIAL HYPERTENSION: ICD-10-CM

## 2021-07-22 DIAGNOSIS — G47.33 OSA (OBSTRUCTIVE SLEEP APNEA): ICD-10-CM

## 2021-07-22 DIAGNOSIS — Z79.01 CHRONIC ANTICOAGULATION: ICD-10-CM

## 2021-07-22 DIAGNOSIS — I10 BENIGN ESSENTIAL HTN: ICD-10-CM

## 2021-07-22 DIAGNOSIS — I34.1 MVP (MITRAL VALVE PROLAPSE): ICD-10-CM

## 2021-07-22 DIAGNOSIS — Z86.73 HISTORY OF CVA (CEREBROVASCULAR ACCIDENT): ICD-10-CM

## 2021-07-22 DIAGNOSIS — R41.3 MEMORY LOSS: ICD-10-CM

## 2021-07-22 LAB
SARS-COV-2 RNA RESP QL NAA+PROBE: NOTDETECTED
SPECIMEN SOURCE: NORMAL

## 2021-07-22 PROCEDURE — 99214 OFFICE O/P EST MOD 30 MIN: CPT | Performed by: INTERNAL MEDICINE

## 2021-07-22 ASSESSMENT — FIBROSIS 4 INDEX: FIB4 SCORE: 1.95

## 2021-07-22 NOTE — LETTER
"     [facility]   [address]  [place] [address]  Phone: [phone]  Fax: [phone]              Shaista Bocanegra  1945    Encounter Date: 7/22/2021    Ayde Denise M.D.          PROGRESS NOTE:  Subjective:   Chief Complaint:   Chief Complaint   Patient presents with   • Atrial Fibrillation       \"Artemio\" Shaista Bocanegra is a 76 y.o. female who returns for moderate to severe mitral regurgitation, hypertension, hyperlipidemia, prior CVAx2, HTN and now afib.    Admitted to the hospital 5/8/2020 with dehydration on diuretics.  Has left and right heart catheterization that admission move mitral clip for severe mitral regurgitation.  She is considering repair vs replacement.  Has met our MItraClip Team, our surgeon and also providers at [facility], offered  REPAIR MR trial with MitraClip and RESTORE Trial, EJSUS.  No discussion of robotic, probably at [place].  She is going to [facility] soon to be enrolled in their trial, not sure if surgery or clip.    Surgery on foot, HR up a bit, 92-96. Has been in pain, getting better with accupunture.     Has HTN, /85, usually 125.  Started on amlodipine.  BP better, under 130, sometimes under 100, but no sxs.    Has LALY, was on CPAP, now using device only.  Using meditation and leep specialist to help with habits.    Prior CVA, some light tingling sensation remains.    Patient received mechanical thrombectomy 8/18/2017, felt to be due to right carotid disease.    Then had another CVA, found to have afib, now on apixaban.  [id number] is 5 now.  Does note brain fog and word finding sometimes now.  Monitor for Afib, Ave 69, range , occasional PVCs, 2% burden.    Has hyperlipidemia, LDL 58.  No CAD on [facility] 2020.    Has a ring that monitors HR at night, we looked at data, no spikes.    She is not limited by chest pain, pressure or tightness with activity.   No significant dyspnea on exertion but only if she " moves slowly.  No orthopnea or lower extremity swelling.     No significant palpitations, lightheadedness, or presyncope/syncope.   Rarely lightheaded.    No symptoms of leg claudication.     Remote syncope in the setting of low K.  Her K was stopped in hospital, she is low normal.    No family history of premature coronary artery disease.  No prior smoking history.  No history of diabetes.  No history of autoimmune disease such as lupus or rheumatoid arthritis.  No chronic kidney disease.  No ETOH overuse.   No caffeine overuse.  No recreation substance use.    Has lost some weight.    Lives in [place].  She is a .  Close to [place], father of her children.  Casey's girlfriend is Janae who sees me.  Casey sees our group, has ICD.  Here with her son Fer today.    She is fully vaccinated.    DATA REVIEWED by me:  ECG 9-6-2021  Afib, 53,     ECG 5-  Sinus, 65, PVC, first-degree AV delay    BioTel 6 day Summary: 7-14-21    1. Atrial fibrillation with appropriate heart rate range. Average 69, range 35 to 120 bpm.   2. No significant pauses (up to 2.2 seconds).   3. Occasional PVCs, 2% burden.   4. 1 patient trigger, no symptoms reported.     Conclusion: Persistent atrial fibrillation with appropriate heart rate, no pauses, occasional PVCs.     Zio 2017 2 weeks.  Sinus, brief atrial run.    ABHINAV 1-26-21  LV EF  55%.  Biatrial enlargement.  There is severe eccentric mitral regurgitation with multiple jets,   predominantly from flail leaflet of P2.  Echolucent space near P1 of unclear etiology, unusual for cleft   leaflet.  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    Echo 6-1-2021  Left ventricular ejection fraction is visually estimated to be 65%.  Diastolic function is difficult to assess with atrial fibrillation.  Severely dilated left atrium.  Negative bubble study including Valsalva.  Myxomatous changes of the mitral valve leaflets with prolapse of the   anterior and posterior leaflets.  Moderate to severe  eccentric mitral regurgitation, probably severe.  Pulmonary veins not well seen on the study.  Estimated right ventricular systolic pressure is 31 mmHg + estimated   RAP.     Compared to the images of the study done 11- there has been no   significant change, prior echo had pulmonary vein flow reversal   consistent with severe mitral regurgitation.    Echo 11-  Left ventricular ejection fraction is visually estimated to be 60%.  Severely dilated left atrium.  Myxomatous changes of the mitral valve.  Bileaflet prolapse of the mitral valve is present.  Severe mitral regurgitation with pulmonary vein systolic flow reversal,   RV 73 mL, ERO 0.4  cm2.  Estimated right ventricular systolic pressure  is 30 mmHg.  Compared to the images of the prior study done 2/3/2020, no significant    change.    Echo 2/3/2020  Prior echo done on 05/03/2019. Compared to the images of the prior   study done -  there has been no significant change.   Normal left ventricular systolic function.  Left ventricular ejection fraction is visually estimated to be 65%.  Prolapse of the mitral leaflets was present.  Severe mitral regurgitation.  Mild tricuspid regurgitation.  Estimated right ventricular systolic pressure is 35 mmHg.    Echo 2017  Agitated saline study was performed, no evidence of right to left   shunt.  Normal left ventricular size, wall thickness, and systolic function.  Left ventricular ejection fraction is visually estimated to be 60%.  Mildly dilated left atrium.  Moderate mitral regurgitation due to an eccentric jet.  Mild tricuspid regurgitation.    Left and right heart catheterization 5/8/2020  Right ventricle 33/5, EDP 15  Pulmonary artery 42/21, 29  pulmonary capillary wedge 23  Cardiac output 4.1 L/min by thermodilution method     1.  Left main coronary artery:  Normal.  2.  Left anterior descending artery:  Normal.   3.  Left circumflex coronary artery:  Normal.   4.  Right coronary artery:  Normal.  This is  a right dominant system.  5.  Left ventricular end diastolic pressure:  25 mmHg.  No signficant gradient across the aortic valve.  6.  Left ventriculogram:  Ejection fraction of 65%, 2+ mitral regurgitation, normal sized thoracic aorta.     2017 CTA NECK   1.  CT angiogram of the neck within normal limits.  2.  Thrombosed right M1 segment.     2017 CAROTID DUPLEX CONCLUSIONS   nl carotids, subclavians and vertebral's     2017 MRI BRAIN  1.  Moderate sized RIGHT MCA territory acute ischemia involving the cortex and basal ganglia  2.  Flow void is present in the RIGHT MCA compatible with treatment effect  3.  No hemorrhage  4.  Mild white matter changes  5.  Mild atrophy    Most recent labs:       Lab Results   Component Value Date/Time    HEMOGLOBIN 13.1 06/01/2021 01:03 AM    HEMATOCRIT 38.4 06/01/2021 01:03 AM    .3 (H) 06/01/2021 01:03 AM    INR 1.17 (H) 05/31/2021 11:14 AM      Lab Results   Component Value Date/Time    SODIUM 144 06/22/2021 03:22 PM    POTASSIUM 3.8 06/22/2021 03:22 PM    CHLORIDE 107 06/22/2021 03:22 PM    CO2 29 06/22/2021 03:22 PM    GLUCOSE 81 06/22/2021 03:22 PM    BUN 18 06/22/2021 03:22 PM    CREATININE 0.82 06/22/2021 03:22 PM      Lab Results   Component Value Date/Time    ASTSGOT 23 06/01/2021 01:03 AM    ALTSGPT 21 06/01/2021 01:03 AM    ALBUMIN 3.6 06/01/2021 01:03 AM      Lab Results   Component Value Date/Time    CHOLSTRLTOT 120 03/24/2021 07:05 AM    LDL 56 03/24/2021 07:05 AM    HDL 55 03/24/2021 07:05 AM    TRIGLYCERIDE 47 03/24/2021 07:05 AM           Past Medical History:   Diagnosis Date   • Arthritis    • Atrial fibrillation (HCC)    • Benign essential HTN 3/19/2012   • Breast cancer (HCC)    • Cancer (HCC) 1998    breast    • Cardiac arrhythmia    • Chest tightness or pressure 3/19/2012   • Chickenpox    • Coronary heart disease    • Croat[place] measles    • Gynecological disorder    • High cholesterol    • High risk medication use 3/19/2012   • Hypercholesterolemia  3/19/2012   • Mumps    • MVP (mitral valve prolapse) 3/19/2012   • Osteoporosis    • Sleep apnea     no CPAP   • Snoring    • Stroke (HCC) 2017    residual minor weakness on left side   • Substance abuse (HCC)    • Tonsillitis    • Urinary bladder disorder     OAB   • Urinary incontinence    • Valvular heart disease    • Venereal disease      Past Surgical History:   Procedure Laterality Date   • CATARACT PHACO WITH IOL  3/16/2009    Performed by SHANNON GANDARA at [facility]   • CATARACT PHACO WITH IOL  1/26/2009    Performed by SHANNON GANDARA at [facility]   • BREAST BIOPSY     • HYSTERECTOMY LAPAROSCOPY     • LUMPECTOMY     • PB RADIATION THERAPY PLAN SIMPLE     • PB REMV 2ND CATARACT,CORN-SCLER SECTN     • IL CHEMOTHERAPY, UNSPECIFIED PROCEDURE     • PRIMARY C SECTION     • SINUSCOPE     • TONSILLECTOMY       Family History   Problem Relation Age of Onset   • Cancer Mother         breast   • No Known Problems Brother    • Heart Failure Neg Hx    • Heart Disease Neg Hx      Social History     Socioeconomic History   • Marital status:      Spouse name: Not on file   • Number of children: 2   • Years of education: Not on file   • Highest education level: Not on file   Occupational History   • Not on file   Tobacco Use   • Smoking status: Never Smoker   • Smokeless tobacco: Never Used   Vaping Use   • Vaping Use: Never used   Substance and Sexual Activity   • Alcohol use: Not Currently     Comment: twice  a week   • Drug use: No   • Sexual activity: Yes     Partners: Male     Birth control/protection: Female Sterilization   Other Topics Concern   • Not on file   Social History Narrative   • Not on file     Social Determinants of Health     Financial Resource Strain:    • Difficulty of Paying Living Expenses:    Food Insecurity:    • Worried About Running Out of Food in the Last Year:    • Ran Out of Food in the Last Year:    Transportation Needs:    • Lack of  "Transportation (Medical):    • Lack of Transportation (Non-Medical):    Physical Activity:    • Days of Exercise per Week:    • Minutes of Exercise per Session:    Stress:    • Feeling of Stress :    Social Connections:    • Frequency of Communication with Friends and Family:    • Frequency of Social Gatherings with Friends and Family:    • Attends Sikh Services:    • Active Member of Clubs or Organizations:    • Attends Club or Organization Meetings:    • Marital Status:    Intimate Partner Violence:    • Fear of Current or Ex-Partner:    • Emotionally Abused:    • Physically Abused:    • Sexually Abused:      No Known Allergies    Current Outpatient Medications   Medication Sig Dispense Refill   • valACYclovir (VALTREX) 500 MG Tab TAKE 1 TABLET BY MOUTH EVERY DAY. INDICATIONS: HERPES SIMPLEX AFFECTING THE LIP 90 tablet 1   • potassium chloride (KLOR-CON) 8 MEQ tablet Take 1 tablet by mouth every day. 90 tablet 3   • metoprolol SR (TOPROL XL) 25 MG TABLET SR 24 HR Take 1 tablet by mouth every evening. 90 tablet 3   • apixaban (ELIQUIS) 5mg Tab Take 1 tablet by mouth 2 times a day. 180 tablet 3   • atorvastatin (LIPITOR) 80 MG tablet TAKE 1 TABLET BY MOUTH EVERY DAY IN THE EVENING 90 tablet 3   • Cyanocobalamin (B-12 PO) Take 25,000 mcg by mouth every day.     • Multiple Vitamins-Minerals (ICAPS AREDS 2 PO) Take 1 tablet by mouth every morning.     • Cholecalciferol (VITAMIN D3) 2000 UNIT Cap Take 2,000 Units by mouth every day.     • Multiple Vitamin (MULTIVITAMINS PO) Take 1 Tab by mouth every day.       No current facility-administered medications for this visit.       ROS    All others systems reviewed and negative.     Objective:     /80 (BP Location: Left arm, Patient Position: Sitting, BP Cuff Size: Adult)   Pulse (!) 58   Ht 1.689 m (5' 6.5\")   Wt 63.5 kg (140 lb)   SpO2 97%  Body mass index is 22.26 kg/m².    General: No acute distress. Well nourished.  HEENT: EOM grossly intact, no scleral " icterus, no pharyngeal erythema.   Neck:  No JVD at 90, no bruits, trachea midline  CVS: Irreg irreg. Normal S1, S2. 3/6 holosyt murmur at the apex No LE edema.  2+ radial pulses, 2+ PT pulses  Resp: CTAB. No wheezing or crackles/rhonchi. Normal respiratory effort.  Abdomen: Soft, NT, no ana rosa hepatomegaly.  MSK/Ext: No clubbing or cyanosis.  Skin: Warm and dry, no rashes.  Neurological: CN III-XII grossly intact. No focal deficits.   Psych: A&O x 3, appropriate affect, adequate judgement, forgetful at times    Physical exam performed today and unchanged, except what is noted, compared to 6-24-21      Assessment:     1. Persistent atrial fibrillation (HCC)     2. Severe mitral regurgitation     3. History of CVA (cerebrovascular accident)     4. Essential hypertension     5. Chronic anticoagulation     6. Dyslipidemia     7. Benign essential HTN     8. LALY (obstructive sleep apnea)     9. MVP (mitral valve prolapse)     10. Memory loss     11. Paroxysmal atrial fibrillation (HCC)         Medical Decision Making:  Today's Assessment / Status / Plan:     -She is too healthy for mitral clip here, it would need to be open heart surgery here. She saw her father in law go through open heart surgery around 1990, and he did not do well. She is going to [place] today, appointment is for tomorrow morning.  -BP better  -Had CVA from afib  -Now persistent afib, rate control strategy, zio with controlled HR  -[id number] is now 5, on apixaban  -PVCs at 2%, no concerns  -No ASA due to apixaban  -No CAD on LHC  -OK for low dose K, keep K near 4.5, prior was 3.8  -Covid test pending for her visit to [place], no sxs of covid  -RTC 3 months with DB, me in 6 months      Return in about 3 months (around 10/22/2021).    It is my pleasure to participate in the care of Ms. Bocanegra.  Please do not hesitate to contact me with questions or concerns.    Ayde Denise MD, [facility]  Cardiologist [facility]    Please note that this dictation was created using voice recognition software. I have made every reasonable attempt to correct obvious errors, but it is possible there are errors of grammar and possibly content that I did not discover before finalizing the note.        Bethany Crane M.D.  [address] [facility] [address]  Via Fax: [phone]

## 2021-07-26 ENCOUNTER — SPEECH THERAPY (OUTPATIENT)
Dept: SPEECH THERAPY | Facility: REHABILITATION | Age: 76
End: 2021-07-26
Attending: INTERNAL MEDICINE
Payer: COMMERCIAL

## 2021-07-26 DIAGNOSIS — R41.841 COGNITIVE COMMUNICATION DEFICIT: ICD-10-CM

## 2021-07-26 DIAGNOSIS — I69.320 COMBINED RECEPTIVE AND EXPRESSIVE APHASIA AS LATE EFFECT OF CEREBROVASCULAR ACCIDENT (CVA): ICD-10-CM

## 2021-07-26 PROCEDURE — 92507 TX SP LANG VOICE COMM INDIV: CPT

## 2021-07-26 ASSESSMENT — SOCIAL DETERMINANTS OF HEALTH (SDOH): SOCIAL_SUPPORT_SYSTEM: CHILDREN

## 2021-07-26 NOTE — OP THERAPY DAILY TREATMENT
"  Outpatient Speech Therapy  DAILY TREATMENT     [address]  [address]  [place] [address]  Phone:  [phone]  Fax:  [phone]    Date: 07/26/2021    Patient: Shaista Bocanegra  YOB: 1945  MRN: [id number]     Time Calculation    Start time: 1430  Stop time: 1515 Time Calculation (min): 45 minutes         Chief Complaint: Other (Voice \"my voice is so low, no one can hear me\")    Visit #: 3    Subjective:   Social Support:     Accompanied By:  Children (son, Fer, present and supportive)    Patient Mental Status:  Alert and Responsive  Additional Subjective Comments:      Patient returns to outpatient speech therapy with increased complaints regarding voice \"no one can hear me.\" Patient endorses she is working on home program, reading aloud, completing a daily journal. Patient also endorses she completes activities that were recommended, but finds alternating naming tasks to be \"challenging.\"     Patient noted to be nodding off throughout session, reporting she continues to clean out her office and have variable.       Objective:   Treatments/Interventions Performed:  Patient/Caregiver education, Compensatory strategy training, Home program, Cognitive-Linguistic training and Speech/Language treatment  Other Treatment Interventions:  Activities targeting coordination of breath-speech through diaphragmatic breathing. Education in internal memory strategies: repetition, association, visualization  Treatment Intervention tool(s) used:  SLP introduced 1-5 voice scale to increase awareness regarding vocal loudness  Objective Details:  Voice targeted through completion of diaphragmatic breathing. Diaphragmatic breathing practiced to 5/5: /h/, voiceless consonants /s/ and /sh/ maintained to 4-5 seconds. Sustained phonation /ah/ 8 seconds, fair voicing. Counting 1-3, 1-5, 1-10    Memory targeted to functional, short term memory recall, drawing instructions on a map. " Patient completed with 11/15 = 73.3% accuracy, moderate verbal cues in the form of verbal repetitions.     Memory recall to 3 units to provide education on internal memory strategies. Patient was accurate in recall of 2/3 words to independent level. Second trial to implementation of association in combination with visualization improved accuracy in recall to 3/3, 100% accuracy.              Speech Therapy Assessment:     Cognitive Linguistic Assessment:     Patient recent and short term memory: Moderate    Cognitive-Linguistic comments: Patient responsive to education provided on internal memory strategies to assist in accuracy and independence of memory recall. Patient was encouraged in her use of association, as independently identified by patient, but noted improvement demonstrated when patient used both association specific to left side of working memory in combination with visualization, incorporating right side of working memory. Further education and practice in strategies recommended.       Speech Mechanism Assessment:     Patient voice description: Reduced Intensity    Patient uses adequate breath support: No  Speech mechanism comments: Patient with noted benefit from education provided on coordination of breath-speech, resulting in patient demonstrating improved independent attempts at breath before speech during novel speech production tasks.       Speech Therapy Plan :   Prognosis & Recommendations  Impression Summary:  Patient seen for ongoing, direct outpatient speech therapy addressing cognitive communication deficits in the setting of s/p CVA. Fatigue was noted throughout session, with patient requiring frequent verbal prompts to maintain alertness this day.     Patient demonstrated good response to initiation of exercise addressing adequate breath support for speech production, in combination with structured exercise addressing cognitive function in area of memory recall. Patient was noted to  demonstrated improved awareness of loudness levels given direct visual support through use of vocal loudness chart. Likewise, patient with improved accuracy in memory recall to 3 items level when internal memory strategies were used.    Given improvement following education and practice in areas of loudness of speech production and memory demonstrated during today's session, continued, direct outpatient speech therapy recommended to address deficits in speech production and cognitive communication deficits following CVA.      Therapy Recommendations  Recommendation:  Individual Speech Therapy,  Planned Therapy Interventions:  Home Program, Compensatory Strategy Training, Patient/Caregiver Education, Cognitive-Linguistic training, Speech/Language training and Respiratory coordination/support training,   Plan Details:  Practice in diaphragmatic breathing. Further education and practice in internal memory strategies. Begin to incorporate problem solving of increasing complexity.

## 2021-07-27 ENCOUNTER — NON-PROVIDER VISIT (OUTPATIENT)
Dept: SLEEP MEDICINE | Facility: MEDICAL CENTER | Age: 76
End: 2021-07-27
Attending: INTERNAL MEDICINE
Payer: COMMERCIAL

## 2021-07-27 VITALS — HEIGHT: 66 IN | WEIGHT: 140 LBS | BODY MASS INDEX: 22.5 KG/M2

## 2021-07-27 DIAGNOSIS — R93.89 ABNORMAL CT OF THE CHEST: ICD-10-CM

## 2021-07-27 PROCEDURE — 94729 DIFFUSING CAPACITY: CPT | Performed by: INTERNAL MEDICINE

## 2021-07-27 PROCEDURE — 94726 PLETHYSMOGRAPHY LUNG VOLUMES: CPT | Performed by: INTERNAL MEDICINE

## 2021-07-27 PROCEDURE — 94010 BREATHING CAPACITY TEST: CPT | Performed by: INTERNAL MEDICINE

## 2021-07-27 ASSESSMENT — PULMONARY FUNCTION TESTS
FEV1/FVC_PERCENT_PREDICTED: 76
FEV1: 1.36
FEV1_PERCENT_PREDICTED: 64
FEV1/FVC: 69
FVC_LLN: 2.33
FVC_PERCENT_PREDICTED: 71
FEV1_PREDICTED: 2.12
FEV1/FVC_PERCENT_PREDICTED: 89
FVC: 1.99
FEV1/FVC: 68
FVC_PREDICTED: 2.79
FEV1_LLN: 1.77
FEV1/FVC_PERCENT_LLN: 64
FEV1/FVC_PERCENT_PREDICTED: 90
FEV1/FVC_PREDICTED: 77

## 2021-07-27 ASSESSMENT — FIBROSIS 4 INDEX: FIB4 SCORE: 1.95

## 2021-07-27 NOTE — PROCEDURES
Technician: FERNANDO Thorpe    Technician Comment:  Good patient effort & cooperation.  The results of this test meet the ATS/ERS standards for acceptability & reproducibility.  Test was performed on the [employer] Body Plethysmograph-Elite DX system.  Predicted values were GLI-2012 for spirometry, GLI-2017 for DLCO, ITS for Lung Volumes.  The DLCO was uncorrected for Hgb.      Interpretation:  Moderately severe obstructive ventilatory defect, FEV1 1.36 L or 64% with FEV1/FVC: 69%.  Air trapping per lung volumes.  Increased diffusion capacity, which can be normal however can be seen in conditions such as asthma.  Clinical correlation required.

## 2021-08-11 ENCOUNTER — SPEECH THERAPY (OUTPATIENT)
Dept: SPEECH THERAPY | Facility: REHABILITATION | Age: 76
End: 2021-08-11
Attending: INTERNAL MEDICINE
Payer: COMMERCIAL

## 2021-08-11 DIAGNOSIS — I69.320 COMBINED RECEPTIVE AND EXPRESSIVE APHASIA AS LATE EFFECT OF CEREBROVASCULAR ACCIDENT (CVA): ICD-10-CM

## 2021-08-11 DIAGNOSIS — R41.841 COGNITIVE COMMUNICATION DEFICIT: ICD-10-CM

## 2021-08-11 PROCEDURE — 92507 TX SP LANG VOICE COMM INDIV: CPT

## 2021-08-11 NOTE — OP THERAPY DAILY TREATMENT
"  Outpatient Speech Therapy  DAILY TREATMENT     [facility] Speech [address]  [address]  [place] [address]  Phone:  [phone]  Fax:  [phone]    Date: 08/11/2021    Patient: Shaista Bocanegra  YOB: 1945  MRN: [id number]     Time Calculation    Start time: 0845  Stop time: 0930 Time Calculation (min): 45 minutes         Chief Complaint: Aphasia (\"reading is still really hard for me\")    Visit #: 4    Subjective:   Social Support:     Patient Mental Status:  Alert and Responsive  Progress Factors:     Progression:  Getting Better  Additional Subjective Comments:      Patient endorses she is \"doing better\" endorsing improvements in thinking skills and vocal loudness. Patient endorses \"every day I work on reading and speaking\" continuing \"I still struggle with the alphabet and words\" specific to difficulty in completion of alternating attention tasks as previously recommended. With regards to memory, patient reports that memory \"is there, it is just slow\" providing example of remembering where she misplaced her ID while traveling.     Patient endorses she is followed by [facility] for issues involving her heart.       Objective:   Treatments/Interventions Performed:  Patient/Caregiver education, Compensatory strategy training, Speech/Language treatment and Home program  Other Treatment Interventions:  Education in external and internal memory strategies  Treatment Intervention tool(s) used:  SLP provided structured therapy tasks addressing attention, both auditory and visual, memory recall. Strategies to address concern in area of reading comprehension for multi-level paragraph information also completed.   Objective Details:  Auditory attention: single item 18/19 = 94.7% accuracy. 2 items 23/28 = 82.1% accuracy.    Memory recall to 3 words: without cues patient independent in recall of 3/3 words using visualization. Memory recall progressed to 4 unrelated words incorporating " use of internal strategies, association and visualization. Using memory strategy, 4 unrelated words recalled with 2/4 = 50% accuracy. Perseveration of response noted.               Speech Therapy Assessment:     Reading Comprehension Assessment:     Reading comprehension comments: Strategies to address reading comprehension in the setting of attention and memory deficits targeted with patient independent in use of external memory strategy, write it down, to accurately note key concepts of complex paragraph to increase accuracy in recall to an independent level.     Cognitive Linguistic Assessment:     Patient prospective and time delay memory: Moderate (to 4 unrelated words given structured time delay)    Cognitive-Linguistic comments: Patient independent in identifying use of visualization as primary immediate/ short term memory strategy. Given unrelated memory recall task to 4 items, patient with need for verbal cues to recall 4th item 2/2 trials, suggesting that working memory is limited to 3 items.         Speech Therapy Plan :   Prognosis & Recommendations  Impression Summary:  Patient seen for ongoing, direct outpatient speech therapy addressing cognitive communication deficits in the setting of s/p CVA. Patient with noted improvement in fatigue during today's session.     Patient was provided with education and practice in internal and external memory strategies with strategies to improve accuracy in reading comprehension also educated. Patient with strength in determining key details of information read with strategies to address reading comprehension suggested based on patient noted strength.   Therapy Recommendations  Recommendation:  Individual Speech Therapy,  Planned Therapy Interventions:  Compensatory Strategy Training, Home Program, Patient/Caregiver Education, Speech/Language training and Cognitive-Linguistic training,   Plan Details:  Follow up regarding use of external, internal strategies with  regards to accuracy in completion of daily living tasks. Continue to incorporate use of strategies into memory recall, reading comprehension and cognitive tasks of increasing complexity.

## 2021-08-12 ENCOUNTER — HOSPITAL ENCOUNTER (OUTPATIENT)
Dept: RADIOLOGY | Facility: MEDICAL CENTER | Age: 76
End: 2021-08-12
Attending: INTERNAL MEDICINE
Payer: COMMERCIAL

## 2021-08-12 DIAGNOSIS — R93.89 ABNORMAL CT OF THE CHEST: ICD-10-CM

## 2021-08-12 PROCEDURE — 71250 CT THORAX DX C-: CPT

## 2021-08-18 ENCOUNTER — SPEECH THERAPY (OUTPATIENT)
Dept: SPEECH THERAPY | Facility: REHABILITATION | Age: 76
End: 2021-08-18
Attending: INTERNAL MEDICINE
Payer: COMMERCIAL

## 2021-08-18 ENCOUNTER — HOSPITAL ENCOUNTER (OUTPATIENT)
Dept: LAB | Facility: MEDICAL CENTER | Age: 76
End: 2021-08-18
Attending: NURSE PRACTITIONER
Payer: COMMERCIAL

## 2021-08-18 DIAGNOSIS — Z86.73 HISTORY OF CVA (CEREBROVASCULAR ACCIDENT): ICD-10-CM

## 2021-08-18 DIAGNOSIS — R41.841 COGNITIVE COMMUNICATION DEFICIT: ICD-10-CM

## 2021-08-18 DIAGNOSIS — I69.320 COMBINED RECEPTIVE AND EXPRESSIVE APHASIA AS LATE EFFECT OF CEREBROVASCULAR ACCIDENT (CVA): ICD-10-CM

## 2021-08-18 LAB
ALBUMIN SERPL BCP-MCNC: 4.3 G/DL (ref 3.2–4.9)
ALBUMIN/GLOB SERPL: 1.7 G/DL
ALP SERPL-CCNC: 88 U/L (ref 30–99)
ALT SERPL-CCNC: 38 U/L (ref 2–50)
ANION GAP SERPL CALC-SCNC: 10 MMOL/L (ref 7–16)
AST SERPL-CCNC: 27 U/L (ref 12–45)
BILIRUB SERPL-MCNC: 0.7 MG/DL (ref 0.1–1.5)
BUN SERPL-MCNC: 12 MG/DL (ref 8–22)
CALCIUM SERPL-MCNC: 9.3 MG/DL (ref 8.5–10.5)
CHLORIDE SERPL-SCNC: 104 MMOL/L (ref 96–112)
CHOLEST SERPL-MCNC: 122 MG/DL (ref 100–199)
CO2 SERPL-SCNC: 29 MMOL/L (ref 20–33)
CREAT SERPL-MCNC: 0.79 MG/DL (ref 0.5–1.4)
FASTING STATUS PATIENT QL REPORTED: NORMAL
GLOBULIN SER CALC-MCNC: 2.6 G/DL (ref 1.9–3.5)
GLUCOSE SERPL-MCNC: 88 MG/DL (ref 65–99)
HDLC SERPL-MCNC: 57 MG/DL
LDLC SERPL CALC-MCNC: 52 MG/DL
POTASSIUM SERPL-SCNC: 3.8 MMOL/L (ref 3.6–5.5)
PROT SERPL-MCNC: 6.9 G/DL (ref 6–8.2)
SODIUM SERPL-SCNC: 143 MMOL/L (ref 135–145)
TRIGL SERPL-MCNC: 63 MG/DL (ref 0–149)

## 2021-08-18 PROCEDURE — 80061 LIPID PANEL: CPT | Mod: GA

## 2021-08-18 PROCEDURE — 80053 COMPREHEN METABOLIC PANEL: CPT

## 2021-08-18 PROCEDURE — 36415 COLL VENOUS BLD VENIPUNCTURE: CPT | Mod: GA

## 2021-08-18 PROCEDURE — 92507 TX SP LANG VOICE COMM INDIV: CPT

## 2021-08-18 NOTE — OP THERAPY DAILY TREATMENT
"  Outpatient Speech Therapy  DAILY TREATMENT     [facility] Speech [address]  [address]  [place] [address]  Phone:  [phone]  Fax:  [phone]    Date: 08/18/2021    Patient: Shaista Bocanegra  YOB: 1945  MRN: [id number]     Time Calculation    Start time: 0850  Stop time: 0930 Time Calculation (min): 40 minutes         Chief Complaint: Other (cognition \"I'm really not doing well with money\")    Visit #: 5    Subjective:   Social Support:     Patient Mental Status:  Alert and Responsive  Progress Factors:     Progression:  Getting Better  Additional Subjective Comments:      Patient endorses \"adding things up like money in my mind is difficult.\" Patient endorses daily completion of home program, including reading and writing daily, patient endorses she \"spends about an hour daily\" completing home program as recommended by ST.     Patient endorses she is challenging attention in the home setting, providing examples of leaving on the radio to work on complex, divided attention skills.       Objective:   Treatments/Interventions Performed:  Patient/Caregiver education, Compensatory strategy training, Cognitive-Linguistic training and Home program  Other Treatment Interventions:  Education in use of external memory strategies to improve accuracy in completion of complex, problem solving tasks  Treatment Intervention tool(s) used:  SLP targeted independent language related daily tasks related to solving daily math problems related to money  Objective Details:  Solving sentence level word problems involving money completed with 4/5 (80%) accuracy given moderate verbal prompts/ instruction/ cues.     Mental manipulation: 2 digit addition 5/5 - 100% accuracy, independent. 3 digit addition 3/5 (60%) with self-corrections to verbal prompts 2/2 opportunities. 2 digit addition with subtraction of single digit proved difficult 2/5 (40%) with need for cueing and multiple trials. "     Complex, divided attention through trail making task 23/26 = 88.5% accuracy, independent.          Speech Therapy Assessment:     Cognitive Linguistic Assessment:     Cognitive-Linguistic comments: Given problem solving tasks involving money, patient with noted difficulty in accuracy recall of numerical operations (times tables). Patient provided with ideas on how to address working memory, problem solving to mathematic operations as part of home program.         Speech Therapy Plan :   Prognosis & Recommendations  Impression Summary:  Patient with inquiries regarding community support specific to [facility] through [employer]. Patient was provided with additional information and encouraged to contact regarding participation in at this time.     Patient with noted difficulty in completion of mathematical operations as verbalized by patient during today's session. Ways to address working memory through numerical operations tasks educated and practiced during today's session. Patient with noted difficulty in completion of multiplication problems to single digit.   Prognosis:  Good  Prognosis Details:  Patient has demonstrated good progress with direct, outpatient speech therapy with compliance with home program and patient motivation positive contributing factors to patient progress at this time. Despite progress, patient continues to experience deficits in expressive Aphasia, cognitive tasks which impact accuracy and independence in completion of language related and other cognitive tasks. Given this, continued participation in direct, outpatient speech therapy is recommended.     Therapy Recommendations  Recommendation:  Individual Speech Therapy,  Planned Therapy Interventions:  Home Program, Compensatory Strategy Training, Patient/Caregiver Education and Cognitive-Linguistic training,   Plan Details:  Patient was seen for 5/5 approved outpatient speech therapy visits. Review of POC indicates continued, direct  outpatient speech therapy is recommended at this time. A progress note will be written.

## 2021-08-18 NOTE — OP THERAPY PROGRESS SUMMARY
"  Outpatient Speech Therapy  PROGRESS SUMMARY NOTE      [address]  [address]  [place] [address]  Phone:  [phone]  Fax:  [phone]    Date of Visit: 08/18/2021    Patient: Shaista Bocanegra  YOB: 1945  MRN: [id number]     Referring Provider: Bethany Crane M.D.  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 Cognitive communication deficit  R41.841       Subjective:   Reason for Therapy:     Reason For Evaluation:  Aphasia, Cognition, CVA and Stroke Rehabilitaion    Onset Date:  5/30/2021    Onset Description:  Patient presented to outpatient speech therapy in the setting of increased complaints regarding cognitive communication function in areas of word finding, reading comprehension and written expression and changes to speech production, described as reduced vocal loudness following CVA. Patient reports history is significant for CVA, 4 years prior, 9/2017.       Objective:   Treatments/Interventions Performed:  Patient/Caregiver education, Compensatory strategy training, Speech/Language treatment, Home program and Cognitive-Linguistic training  Other Treatment Interventions:  Education and practice in use of Semantic Feature Analysis to address word finding difficulties in the setting of Expressive Aphasia. SLP introduced 1-5 voice scale to increase awareness regarding vocal loudness  Treatment Intervention tool(s) used:  SLP provided structured therapy " "tasks addressing attention, both auditory and visual, memory recall. Strategies to address concern in area of reading comprehension for multi-level paragraph information also completed.       Speech Therapy Assessment:     Cognitive Linguistic Assessment:     Patient prospective and time delay memory: Moderate (Given unrelated memory recall task to 4 items, patient with need for verbal cues to recall 4th item 2/2 trials, suggesting that working memory is limited to 3 items)    Cognitive-Linguistic comments: Patient was provided with education and practice in internal and external memory strategies with strategies to improve accuracy in reading comprehension also educated. Patient with strength in determining key details of information read with strategies to address reading comprehension suggested based on patient noted strength.       Speech Mechanism Assessment:     Patient uses adequate breath support: No (improving)  Speech mechanism comments: Patient with noted benefit from education provided on coordination of breath-speech, resulting in patient demonstrating improved independent attempts at breath before speech during novel speech production tasks.       Speech Therapy Plan :   Prognosis & Recommendations  Impression Summary:  Shaista \"Artemio\" Daljit, a 76 year old female, participated in 5 sessions of direct, outpatient speech therapy addressing mild cognitive communication, speech language deficits following CVA.     Patient has demonstrated progress with direct, outpatient speech therapy services, making steady progress towards current goals and objectives as indicated. Despite progress, patient continues to demonstrate mild cognitive communication deficits with deficits continued in areas of oral expression, memory, reading comprehension, written expression, attention and problem solving. Given deficits, patient notes difficulties in planning, organization, memory and problem solving necessary to promote " patient safety and independence in return to previous level of independence and function. As a result, continued participation in direct, outpatient speech therapy services is recommended at this time. Patient plan of care has been reviewed, with current goals and objectives consistent with patient areas of continued need with regards to speech-language, cognitive communication function.   Prognosis:  Good  Prognosis Details:   It is expected that patient will continue to progress in speech production, expressive language skills and cognitive communication function consistent with previous level of function with participation in direct, outpatient speech therapy services. Without participation in direct, speech therapy patient will likely continue to experience frustration and deficits in speech production limiting communication and cognitive communication deficits impacting patient safety and independence.   Goals  Short Term Goals:   1. Patient will implement circumlocution or semantic feature analysis to demonstrate accuracy and independence in communication when experiencing a word finding difficulty 3/4 obligatory contexts independent within 4 weeks.   2. Patient will improve reading comprehension, demonstrating understanding of phrases, progressing to complex sentences and paragraphs as measured by answering yes/ no, progressing to multiple choice and open ended questions 88% accuracy; minimal verbal prompts/ instruction/ cues or independent implementation of external cognitive strategies within 8 weeks.  3. Patient will improve written expression, demonstrating accuracy in written expression of single words, progressing to phrase and simple sentence level formulation to direct picture cues 88% accuracy; minimal verbal, visual prompts/ instruction/ cues as necessary within 8 weeks.   4. Patient will improve attention to structured tasks completing selective and divided attention tasks with 92% accuracy;  "independent within 4 weeks.  5. Patient will improve working memory completing generative naming tasks to concrete, progressing to abstract categories, naming a minimum of 12 items in one-minute intervals, 3/4 trials within 8 weeks.  6. Patient will improve short and time delay recall of newly learned information, implementing external and/ or internal memory strategies as necessary recalling 3, progressing to 4 units of newly learned information with 85% or greater accuracy; minimal verbal cues within 8 weeks.  7. Patient will improve safety and independence completion language related activities of daily living with 90% or greater accuracy; minimal cues-independent within 8 weeks.    8. Patient will improve voice production through demonstrating \"talk\"ing level loudness from phrase, sentence, and conversational level productions with 7/10 = 70% accuracy, given minimal-moderate verbal prompts/ instruction/ cues.  9. Patient will be 80% intelligible for 5 minutes in 4 of 5 opportunities to improve functional communication and reduce frustration given min verbal prompts/ instruction/ cues.       Short Term Goal Duration (Weeks):  6-8 weeks  Long Term Goals:  1. Patient will make ideas known across settings and to various conversational partners with 90% effectiveness demonstrating 2 or less episodes of word finding and vocal loudness appropriate to conversational environment and listener given a 10 minute, novel conversation.  2. Patient will develop functional, cognitive-linguistic based skills and utilize compensatory strategies to communicate novel thoughts and ideas effectively to different conversational partners, maintain safety, and participate socially in a functional living and work environment to 88% accuracy, independent.  Long Term Goal Duration (Weeks):  2-4 months  Therapy Recommendations  Recommendation:  Individual Speech Therapy,  Planned Therapy Interventions:  Home Program, Compensatory Strategy " Training, Patient/Caregiver Education, Cognitive-Linguistic training, Speech/Language training and Respiratory coordination/support training,   Plan Details:  8 units 53949  Frequency:  1x week  Duration (in visits):  8  Duration (in weeks):  8      Functional Assessment Used  Data collection and skilled observation during direct, outpatient speech therapy sessions    Referring provider co-signature:  I have reviewed this plan of care and my co-signature certifies the need for services.    Certification Period: 08/18/2021 to 10/14/21    Physician Signature: ________________________________ Date: ______________

## 2021-08-20 ENCOUNTER — TELEPHONE (OUTPATIENT)
Dept: CARDIOLOGY | Facility: MEDICAL CENTER | Age: 76
End: 2021-08-20

## 2021-08-20 NOTE — TELEPHONE ENCOUNTER
Returned call. Pt states she had an appointment w/ Tee this morning to discuss her cardiac conditions and possible surgery. She then went to eat with her  when her episode occurred. She states she experienced sharp chest pain all across her chest around 10:30 am, she has never felt this before. She states it felt like heartburn. She reports she did eat hamburger and fries which she does not do often and considers it her indulgence meal.     The chest pain lasted about 5-10 mins and then resolved on its own. Pt is not currently having active chest pain and states she feels better now. Her HR today is 70 and /91, however, she has not taken her Metoprolol yet. I advised pt to monitor herself very closely and go to the ER if the chest pain occurs again, especially if it is more severe, does not resolve on its own within a couple minutes, or is accompanied by more s/s. Advised her to call us for any questions or concerns and to take her metoprolol.

## 2021-08-20 NOTE — TELEPHONE ENCOUNTER
To clarify, if she goes with one of the trials she does not need Dr. Nogueira. If she does not want to be in a trial then we can request that she see Dr. Nogueira.  If she wants another opinion from him, I am sure he would be happy to see her to discuss open surgery.    Tx

## 2021-08-20 NOTE — TELEPHONE ENCOUNTER
LS    Patient called to advise they got pain in their heart, similar to heart burn, checked BP 40, it is above normal now. They are wanting to know if they should go in to the ER? Or what should they do [phone].    Thank you,  Amie LY

## 2021-08-20 NOTE — TELEPHONE ENCOUNTER
LS    Pt called stating she was referred to Dr. Monster Nogueira at [place] research program. Pt states Dr Nogueira is not in the research program and wants to know if she should go to the [place] [facility] or go and see Dr Nogueira. Please call Pt back at [phone].    Thank you

## 2021-08-20 NOTE — TELEPHONE ENCOUNTER
Called pt and informed her. Pt states she is waiting to hear back from the trial b/c they told her she is not a candidte for a clip and will have to do open heart surgery, so she is not sure if she will still be able to be a part of the trial. Pt states she will see Dr. Nogueira either way.

## 2021-08-23 ENCOUNTER — TELEPHONE (OUTPATIENT)
Dept: SLEEP MEDICINE | Facility: MEDICAL CENTER | Age: 76
End: 2021-08-23

## 2021-08-23 NOTE — TELEPHONE ENCOUNTER
Patient called. She wants to discuss not using her CPAP any longer. She stats she sleeps as with with or without th machine. She is also having heart surgery within a month at [facility] and will not be using it at that time. Please boris her at [phone].

## 2021-08-24 ENCOUNTER — TELEPHONE (OUTPATIENT)
Dept: CARDIOLOGY | Facility: MEDICAL CENTER | Age: 76
End: 2021-08-24

## 2021-08-24 NOTE — TELEPHONE ENCOUNTER
LS    Pt called stating she is having 2 teeth pulled on 9/2/21 and Dr. Fer Stokes  would like Pt to stop eliquis 3 days before. Please call Pt back at [phone].    Thank you

## 2021-08-25 NOTE — TELEPHONE ENCOUNTER
She must stay on apixaban for all dental work. If her dentist doesn't want to do it on apixaban, then she needs a new dentist.  Thank you.

## 2021-08-25 NOTE — TELEPHONE ENCOUNTER
Patient was seeing Dr Weems for LALY. Last seen 5/26/21 (Sees Dr Lambert for pulmonary not sleep) Message routed to a sleep provider for review and advise.

## 2021-08-25 NOTE — TELEPHONE ENCOUNTER
Called pt and informed that she should continue Eliquis prior to dental extractions. Encouraged her to have the dentist fax us a clearance request if they need this in writing.

## 2021-08-26 ENCOUNTER — TELEPHONE (OUTPATIENT)
Dept: SPEECH THERAPY | Facility: REHABILITATION | Age: 76
End: 2021-08-26

## 2021-08-26 NOTE — OP THERAPY DISCHARGE SUMMARY
Outpatient Speech Language Pathology  DISCHARGE SUMMARY NOTE      [facility] Speech Language Pathology [address]  [address]  [place] [address]  Phone:  [phone]  Fax:  [phone]        Patient Name:  Shaista Bocanegra  :  1945  MR#:  [id number]    HICN:       Visits:         Cancel/No-Show:     Diagnosis/ICD-10:     Referring Provider:     SOC Date:    Onset Date:       Your patient is being discharged from Physical therapy with the following comments:  Goals Partially Met      Comments:Patient was contacted via telephone encounter 2021 to discuss discharge from hospital based outpatient speech therapy services for insurance reasons. Patient was provided with resources in the community for further outpatient speech therapy and encouraged to seek community support through [employer].         Limitations/Remaining:Please refer to progress note 2021.         Recommendations:D/C outpatient speech therapy. Patient encouraged with community resources provided in continuation of outpatient speech therapy services.         Shruthi Pool, SLP

## 2021-08-26 NOTE — TELEPHONE ENCOUNTER
Pap and is a risk factor for cardiovascular and neurovascular events.  Due to her history of atrial fibrillation and stroke I strongly recommended that she should continue using either CPAP or a dental appliance to treat her sleep apnea.  Discontinuing therapy would be AGAINST MEDICAL ADVICE.

## 2021-08-26 NOTE — TELEPHONE ENCOUNTER
Instruction from Dr Can were relayed in detail. Letter mail to patient also. She had already tried the dental appliance prior to C-pap for 6 months and it hurt her mouth that she has to discontinue using. She will continue with the C-pap and has appointment with cardiology Dr Meehan for 9/1/21.

## 2021-08-27 NOTE — TELEPHONE ENCOUNTER
Please get the phone number for the dentist or give them my cell phone number.  This will be direct provider to provider.  The dentist should be willing to give up his or her cell phone number.    Lovenox is a completely ridiculous option as it is no different from the Eliquis.    Thank you

## 2021-08-27 NOTE — TELEPHONE ENCOUNTER
Pt called regarding her upcoming dental procedure. Pt states she is looking for a compromise regarding her eliquis. Pt said her Dr at [facility] suggests lovenox as a possibility. Please call Pt back at [phone].    Thank you

## 2021-09-01 ENCOUNTER — OFFICE VISIT (OUTPATIENT)
Dept: CARDIOLOGY | Facility: MEDICAL CENTER | Age: 76
End: 2021-09-01
Payer: COMMERCIAL

## 2021-09-01 ENCOUNTER — HOSPITAL ENCOUNTER (OUTPATIENT)
Dept: RADIOLOGY | Facility: MEDICAL CENTER | Age: 76
End: 2021-09-01
Attending: PHYSICIAN ASSISTANT
Payer: COMMERCIAL

## 2021-09-01 ENCOUNTER — TELEPHONE (OUTPATIENT)
Dept: CARDIOLOGY | Facility: MEDICAL CENTER | Age: 76
End: 2021-09-01

## 2021-09-01 VITALS
BODY MASS INDEX: 22.13 KG/M2 | DIASTOLIC BLOOD PRESSURE: 80 MMHG | HEART RATE: 108 BPM | OXYGEN SATURATION: 97 % | RESPIRATION RATE: 12 BRPM | WEIGHT: 141 LBS | SYSTOLIC BLOOD PRESSURE: 128 MMHG | HEIGHT: 67 IN

## 2021-09-01 DIAGNOSIS — Z86.73 HISTORY OF CVA (CEREBROVASCULAR ACCIDENT): ICD-10-CM

## 2021-09-01 DIAGNOSIS — I34.0 SEVERE MITRAL REGURGITATION: ICD-10-CM

## 2021-09-01 DIAGNOSIS — Z79.01 CHRONIC ANTICOAGULATION: ICD-10-CM

## 2021-09-01 DIAGNOSIS — E78.5 DYSLIPIDEMIA: ICD-10-CM

## 2021-09-01 DIAGNOSIS — I48.20 CHRONIC ATRIAL FIBRILLATION (HCC): ICD-10-CM

## 2021-09-01 PROCEDURE — 71046 X-RAY EXAM CHEST 2 VIEWS: CPT

## 2021-09-01 PROCEDURE — 99214 OFFICE O/P EST MOD 30 MIN: CPT | Performed by: INTERNAL MEDICINE

## 2021-09-01 ASSESSMENT — ENCOUNTER SYMPTOMS
NAUSEA: 0
ABDOMINAL PAIN: 0
CARDIOVASCULAR NEGATIVE: 1
RESPIRATORY NEGATIVE: 1
PALPITATIONS: 0
NEUROLOGICAL NEGATIVE: 1
WEIGHT LOSS: 0
FEVER: 0
EYES NEGATIVE: 1
CLAUDICATION: 0
MYALGIAS: 0
GASTROINTESTINAL NEGATIVE: 1
VOMITING: 0
WEAKNESS: 0
BRUISES/BLEEDS EASILY: 0
NERVOUS/ANXIOUS: 0
BLURRED VISION: 0
DIZZINESS: 0
PSYCHIATRIC NEGATIVE: 1
FOCAL WEAKNESS: 0
DEPRESSION: 0
CHILLS: 0
SHORTNESS OF BREATH: 0
COUGH: 0
CONSTITUTIONAL NEGATIVE: 1
MUSCULOSKELETAL NEGATIVE: 1
DOUBLE VISION: 0
HEADACHES: 0

## 2021-09-01 ASSESSMENT — FIBROSIS 4 INDEX: FIB4 SCORE: 1.7

## 2021-09-01 NOTE — LETTER
PROCEDURE/SURGERY CLEARANCE FORM      Encounter Date: 9/1/2021    Patient: Shaista Bocanegra  YOB: 1945    CARDIOLOGIST:  Darin Meehan MD       The above patient may proceed with the following procedure/surgery as a moderate risk from cardiac standpoint: Bladder Botox                                                          Darin Meehan  Electronically signed

## 2021-09-01 NOTE — TELEPHONE ENCOUNTER
Called [facility] to see what procedure patient is having. Patient is having bladder Botox. Letter generated and faxed to [phone]

## 2021-09-01 NOTE — TELEPHONE ENCOUNTER
----- Message from Darin Meehan M.D. sent at 9/1/2021  1:50 PM PDT -----  Please send clearance to Dr. Lopes at [facility]. The may proceed with procedure from cardiac standpoint with moderate risk. Fax [phone], Direct line [phone].    Thanks.  TAYLER

## 2021-09-01 NOTE — PROGRESS NOTES
Chief Complaint   Patient presents with   • Mitral Stenosis/Insufficiency     F/V Dx: Severe mitral regurgitation       Subjective     Shaista Bocanegra is a 76 y.o. female who presents today for follow up of mitral regurgitation.    Since the patient's last visit on 07/22/21 with Ayde Lopes, she has been doing well clinically. She denies fatigue, shortness of breath, dyspnea on exertion, chest pain, dizziness or syncope. She is scheduled for surgery with Dr. Nogueira at [facility] on 09/19/21.    Past Medical History:   Diagnosis Date   • Arthritis    • Atrial fibrillation (HCC)    • Benign essential HTN 3/19/2012   • Breast cancer (HCC)    • Cancer (HCC) 1998    breast    • Cardiac arrhythmia    • Chest tightness or pressure 3/19/2012   • Chickenpox    • Coronary heart disease    • [place] measles    • Gynecological disorder    • High cholesterol    • High risk medication use 3/19/2012   • Hypercholesterolemia 3/19/2012   • Mumps    • MVP (mitral valve prolapse) 3/19/2012   • Osteoporosis    • Sleep apnea     no CPAP   • Snoring    • Stroke (HCC) 2017    residual minor weakness on left side   • Substance abuse (HCC)    • Tonsillitis    • Urinary bladder disorder     OAB   • Urinary incontinence    • Valvular heart disease    • Venereal disease      Past Surgical History:   Procedure Laterality Date   • CATARACT PHACO WITH IOL  3/16/2009    Performed by SHANNON GANDARA at SURGERY SAME DAY [facility]S   • CATARACT PHACO WITH IOL  1/26/2009    Performed by SHANNON GANDARA at SURGERY SAME DAY [facility] ORS   • BREAST BIOPSY     • HYSTERECTOMY LAPAROSCOPY     • LUMPECTOMY     • PB RADIATION THERAPY PLAN SIMPLE     • PB REMV 2ND CATARACT,CORN-SCLER SECTN     • VA CHEMOTHERAPY, UNSPECIFIED PROCEDURE     • PRIMARY C SECTION     • SINUSCOPE     • TONSILLECTOMY       Family History   Problem Relation Age of Onset   • Cancer Mother         breast   • No Known Problems Brother    • Heart Failure Neg Hx    • Heart  Disease Neg Hx      Social History     Socioeconomic History   • Marital status:      Spouse name: Not on file   • Number of children: 2   • Years of education: Not on file   • Highest education level: Not on file   Occupational History   • Not on file   Tobacco Use   • Smoking status: Never Smoker   • Smokeless tobacco: Never Used   Vaping Use   • Vaping Use: Never used   Substance and Sexual Activity   • Alcohol use: Not Currently     Comment: twice  a week   • Drug use: No   • Sexual activity: Yes     Partners: Male     Birth control/protection: Female Sterilization   Other Topics Concern   • Not on file   Social History Narrative   • Not on file     Social Determinants of Health     Financial Resource Strain:    • Difficulty of Paying Living Expenses:    Food Insecurity:    • Worried About Running Out of Food in the Last Year:    • Ran Out of Food in the Last Year:    Transportation Needs:    • Lack of Transportation (Medical):    • Lack of Transportation (Non-Medical):    Physical Activity:    • Days of Exercise per Week:    • Minutes of Exercise per Session:    Stress:    • Feeling of Stress :    Social Connections:    • Frequency of Communication with Friends and Family:    • Frequency of Social Gatherings with Friends and Family:    • Attends [employer] Services:    • Active Member of Clubs or Organizations:    • Attends Club or Organization Meetings:    • Marital Status:    Intimate Partner Violence:    • Fear of Current or Ex-Partner:    • Emotionally Abused:    • Physically Abused:    • Sexually Abused:      No Known Allergies     (Medications reviewed.)  Outpatient Encounter Medications as of 9/1/2021   Medication Sig Dispense Refill   • valACYclovir (VALTREX) 500 MG Tab TAKE 1 TABLET BY MOUTH EVERY DAY. INDICATIONS: HERPES SIMPLEX AFFECTING THE LIP 90 tablet 1   • potassium chloride (KLOR-CON) 8 MEQ tablet Take 1 tablet by mouth every day. 90 tablet 3   • metoprolol SR (TOPROL XL) 25 MG TABLET SR  "24 HR Take 1 tablet by mouth every evening. 90 tablet 3   • apixaban (ELIQUIS) 5mg Tab Take 1 tablet by mouth 2 times a day. 180 tablet 3   • atorvastatin (LIPITOR) 80 MG tablet TAKE 1 TABLET BY MOUTH EVERY DAY IN THE EVENING 90 tablet 3   • Cyanocobalamin (B-12 PO) Take 25,000 mcg by mouth every day.     • Multiple Vitamins-Minerals (ICAPS AREDS 2 PO) Take 1 tablet by mouth every morning.     • Cholecalciferol (VITAMIN D3) 2000 UNIT Cap Take 2,000 Units by mouth every day.     • Multiple Vitamin (MULTIVITAMINS PO) Take 1 Tab by mouth every day.       No facility-administered encounter medications on file as of 9/1/2021.     Review of Systems   Constitutional: Negative.  Negative for chills, fever, malaise/fatigue and weight loss.   HENT: Negative.  Negative for hearing loss.    Eyes: Negative.  Negative for blurred vision and double vision.   Respiratory: Negative.  Negative for cough and shortness of breath.    Cardiovascular: Negative.  Negative for chest pain, palpitations, claudication and leg swelling.   Gastrointestinal: Negative.  Negative for abdominal pain, nausea and vomiting.   Genitourinary: Positive for urgency. Negative for dysuria.   Musculoskeletal: Negative.  Negative for joint pain and myalgias.   Skin: Negative.  Negative for itching and rash.   Neurological: Negative.  Negative for dizziness, focal weakness, weakness and headaches.   Endo/Heme/Allergies: Negative.  Does not bruise/bleed easily.   Psychiatric/Behavioral: Negative.  Negative for depression. The patient is not nervous/anxious.               Objective     /80 (BP Location: Left arm, Patient Position: Sitting, BP Cuff Size: Adult)   Pulse (!) 108   Resp 12   Ht 1.689 m (5' 6.5\")   Wt 64 kg (141 lb)   SpO2 97%   BMI 22.42 kg/m²     Physical Exam   Constitutional: She is oriented to person, place, and time. She appears well-developed.   HENT:   Head: Normocephalic and atraumatic.   Neck: No JVD present.   Cardiovascular: " Normal rate. An irregular rhythm present.   Murmur heard.  Pulmonary/Chest: Effort normal and breath sounds normal.   Abdominal: Soft. Bowel sounds are normal.   No hepatosplenomegaly.   Musculoskeletal:         General: Normal range of motion.   Lymphadenopathy:     She has no cervical adenopathy.   Neurological: She is alert and oriented to person, place, and time.   Skin: Skin is warm and dry.          CARDIAC STUDIES/PROCEDURES:    BIOTEL CONCLUSIONS (07/14/21)  BioTel 6 day Summary:   1. Atrial fibrillation with appropriate heart rate range. Average 69, range 35 to 120 bpm.   2. No significant pauses (up to 2.2 seconds).   3. Occasional PVCs, 2% burden.   4. 1 patient trigger, no symptoms reported.   Conclusion: Persistent atrial fibrillation with appropriate heart rate, no pauses, occasional PVCs.     CARDIAC CATHETERIZATION CONCLUSIONS by Juventino Farah (05/08/20)  Angiographically normal appearing coronary arteries.  Mildly elevated LVEDP, filling pressures.  2+ mitral regurgitation.  Normal LV function.     CAROTID ULTRASOUND (08/18/17)  Normal carotids, subclavians and vertebrals.     CT OF HEAD (05/31/21)  1.  Moderate acute/subacute left temporal lobe infarct.  2.  Chronic ischemic changes.  3.  No acute intracranial hemorrhage.     CTA OF HEAD (05/31/21)  No acute large vessel occlusion or hemodynamically significant stenosis.  Acute/subacute appearing left temporal lobe infarct.    CTA OF NECK (05/31/21)  1.  No stenosis or dissection is seen within the carotid or vertebral arteries bilaterally.  2.  Tree-in-bud nodularity in the right upper lobe is likely infectious/inflammatory.  3.  Mucosal thickening right maxillary sinus.  4.  Nasal bone deformities bilaterally are likely chronic.    CT OF HEAD (05/03/19)  NO ACUTE ABNORMALITIES ARE NOTED ON CT SCAN OF THE HEAD.  Right-sided encephalomalacia is noted.     CTA OF HEAD (08/18/17)  1.  There is a right M1 segment occlusion.  2.  There is  collateral flow in the peripheral M3 branches seen on the right.  3.  There is mild decreased enhancement of the visualized right internal carotid artery however it is patent.  4.  Question of subtle hypodensity in the right insular cortex region. No abnormal brain parenchymal enhancement is seen.     CTA OF NECK (08/18/17)  1.  CT angiogram of the neck within normal limits.  2.  Thrombosed right M1 segment.    ECHOCARDIOGRAM CONCLUSIONS (06/01/21)  Left ventricular ejection fraction is visually estimated to be 65%.  Diastolic function is difficult to assess with atrial fibrillation.  Severely dilated left atrium.  Negative bubble study including Valsalva.  Myxomatous changes of the mitral valve leaflets with prolapse of the anterior and posterior leaflets.  Moderate to severe eccentric mitral regurgitation, probably severe.  Pulmonary veins not well seen on the study.  Estimated right ventricular systolic pressure is 31 mmHg + estimated RAP.  Compared to the images of the study done 11- there has been no significant change, prior echo had pulmonary vein flow reversal consistent with severe mitral regurgitation.  (study result reviewed)     ECHOCARDIOGRAM CONCLUSIONS (02/03/20)  Prior echo done on 05/03/2019. Compared to the images of the prior   study done -  there has been no significant change.   Normal left ventricular systolic function.  Left ventricular ejection fraction is visually estimated to be 65%.  Prolapse of the mitral leaflets was present.  Severe mitral regurgitation.  Mild tricuspid regurgitation.  Estimated right ventricular systolic pressure is 35 mmHg.     ECHOCARDIOGRAM CONCLUSIONS (05/03/19)  Prior echocardiogram 6/14/2018.  Normal left ventricular systolic function.   Mild to moderate eccentric mitral regurgitation.  Compared to the images of the study done - there has been slight improvement in mitral regurgitation.     ECHOCARDIOGRAM CONCLUSIONS (06/14/18)  Normal left ventricular  systolic function.  Left ventricular ejection fraction is visually estimated to be 60%.  Myxomatous changes of the mitral valve.  Prolapse of the anterior and posterior mitral leaflets.  Severe, eccentric mitral regurgitation.  Right heart pressures are normal.  Compared to the report of the study done 8/19/2017 severe mitral regurgitation is now present, previously reported as moderate but a MR   ERO was not recorded.      EKG performed on (06/09/21) was reviewed: EKG personally interpreted shows atrial fibrillation.  EKG performed on (03/19/21) EKG shows sinus rhythm with premature ventricular contractions.  EKG performed on (05/09/20) EKG shows sinus rhythm with premature ventricular contractions.  EKG performed on (05/08/20) EKG shows sinus rhythm with premature ventricular contractions.     TRANSESOPHAGEAL ECHOCARDIOGRAM CONCLUSIONS by Ayde Lopes (01/26/21)  LV EF 55%.  Biatrial enlargement.  There is severe eccentric mitral regurgitation with multiple jets, predominantly from flail leaflet of P2.  Echolucent space near P1 of unclear etiology, unusual for cleft leaflet.  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    Assessment & Plan     1. Severe mitral regurgitation     2. Chronic atrial fibrillation (HCC)     3. Chronic anticoagulation     4. Dyslipidemia     5. History of CVA (cerebrovascular accident)         Medical Decision Making: Today's Assessment/Status/Plan:        1. Mitral regurgitation: Her mitral regurgitation has reached severe status. She was recommeded for surgical mitral valve replacement with STS score of 2.2% per Kaden Ernst, and same second opinion by Osman Gutierrez at [facility].  2. Atrial fibrillation on anticoagulation therapy (Eliquis): She is doing well on anticoagulation and the ventricular rate is well controlled. They   3. Status post left temporal lobe infarct (05/31/21) with history of cerebrovascular accident with right carotid arteriography  with mechanical thrombectomy (08/18/17): She remains clinically stable with memory loss and mild confusion.  4. Additional information: She is family of MrCasey Cosme.     We will follow up in 3 months.     CC Bethany Ambrose and Ayde Lopes

## 2021-09-04 ENCOUNTER — APPOINTMENT (OUTPATIENT)
Dept: RADIOLOGY | Facility: MEDICAL CENTER | Age: 76
End: 2021-09-04
Attending: EMERGENCY MEDICINE
Payer: COMMERCIAL

## 2021-09-04 ENCOUNTER — HOSPITAL ENCOUNTER (EMERGENCY)
Facility: MEDICAL CENTER | Age: 76
End: 2021-09-04
Attending: EMERGENCY MEDICINE
Payer: COMMERCIAL

## 2021-09-04 VITALS
HEART RATE: 66 BPM | BODY MASS INDEX: 22.5 KG/M2 | SYSTOLIC BLOOD PRESSURE: 169 MMHG | OXYGEN SATURATION: 93 % | HEIGHT: 66 IN | WEIGHT: 140 LBS | RESPIRATION RATE: 17 BRPM | TEMPERATURE: 97.5 F | DIASTOLIC BLOOD PRESSURE: 85 MMHG

## 2021-09-04 DIAGNOSIS — H53.8 BLURRY VISION, LEFT EYE: ICD-10-CM

## 2021-09-04 LAB
ABO GROUP BLD: NORMAL
ALBUMIN SERPL BCP-MCNC: 3.8 G/DL (ref 3.2–4.9)
ALBUMIN/GLOB SERPL: 1.7 G/DL
ALP SERPL-CCNC: 66 U/L (ref 30–99)
ALT SERPL-CCNC: 45 U/L (ref 2–50)
ANION GAP SERPL CALC-SCNC: 10 MMOL/L (ref 7–16)
APTT PPP: 37.5 SEC (ref 24.7–36)
AST SERPL-CCNC: 47 U/L (ref 12–45)
BASOPHILS # BLD AUTO: 0.6 % (ref 0–1.8)
BASOPHILS # BLD: 0.02 K/UL (ref 0–0.12)
BILIRUB SERPL-MCNC: 0.5 MG/DL (ref 0.1–1.5)
BLD GP AB SCN SERPL QL: NORMAL
BUN SERPL-MCNC: 15 MG/DL (ref 8–22)
CALCIUM SERPL-MCNC: 8.8 MG/DL (ref 8.5–10.5)
CHLORIDE SERPL-SCNC: 110 MMOL/L (ref 96–112)
CO2 SERPL-SCNC: 24 MMOL/L (ref 20–33)
CREAT SERPL-MCNC: 0.69 MG/DL (ref 0.5–1.4)
EKG IMPRESSION: NORMAL
EOSINOPHIL # BLD AUTO: 0.06 K/UL (ref 0–0.51)
EOSINOPHIL NFR BLD: 1.9 % (ref 0–6.9)
ERYTHROCYTE [DISTWIDTH] IN BLOOD BY AUTOMATED COUNT: 51.8 FL (ref 35.9–50)
GLOBULIN SER CALC-MCNC: 2.3 G/DL (ref 1.9–3.5)
GLUCOSE SERPL-MCNC: 94 MG/DL (ref 65–99)
HCT VFR BLD AUTO: 38.5 % (ref 37–47)
HGB BLD-MCNC: 12.5 G/DL (ref 12–16)
IMM GRANULOCYTES # BLD AUTO: 0.01 K/UL (ref 0–0.11)
IMM GRANULOCYTES NFR BLD AUTO: 0.3 % (ref 0–0.9)
INR PPP: 1.18 (ref 0.87–1.13)
LYMPHOCYTES # BLD AUTO: 0.95 K/UL (ref 1–4.8)
LYMPHOCYTES NFR BLD: 30.8 % (ref 22–41)
MCH RBC QN AUTO: 32.8 PG (ref 27–33)
MCHC RBC AUTO-ENTMCNC: 32.5 G/DL (ref 33.6–35)
MCV RBC AUTO: 101 FL (ref 81.4–97.8)
MONOCYTES # BLD AUTO: 0.3 K/UL (ref 0–0.85)
MONOCYTES NFR BLD AUTO: 9.7 % (ref 0–13.4)
NEUTROPHILS # BLD AUTO: 1.74 K/UL (ref 2–7.15)
NEUTROPHILS NFR BLD: 56.7 % (ref 44–72)
NRBC # BLD AUTO: 0 K/UL
NRBC BLD-RTO: 0 /100 WBC
PLATELET # BLD AUTO: 168 K/UL (ref 164–446)
PMV BLD AUTO: 9.9 FL (ref 9–12.9)
POTASSIUM SERPL-SCNC: 3.8 MMOL/L (ref 3.6–5.5)
PROT SERPL-MCNC: 6.1 G/DL (ref 6–8.2)
PROTHROMBIN TIME: 14.6 SEC (ref 12–14.6)
RBC # BLD AUTO: 3.81 M/UL (ref 4.2–5.4)
RH BLD: NORMAL
SODIUM SERPL-SCNC: 144 MMOL/L (ref 135–145)
TROPONIN T SERPL-MCNC: 20 NG/L (ref 6–19)
WBC # BLD AUTO: 3.1 K/UL (ref 4.8–10.8)

## 2021-09-04 PROCEDURE — 70498 CT ANGIOGRAPHY NECK: CPT | Mod: MG

## 2021-09-04 PROCEDURE — 86901 BLOOD TYPING SEROLOGIC RH(D): CPT

## 2021-09-04 PROCEDURE — 86900 BLOOD TYPING SEROLOGIC ABO: CPT

## 2021-09-04 PROCEDURE — 70496 CT ANGIOGRAPHY HEAD: CPT | Mod: MG

## 2021-09-04 PROCEDURE — 0042T CT-CEREBRAL PERFUSION ANALYSIS: CPT

## 2021-09-04 PROCEDURE — 86850 RBC ANTIBODY SCREEN: CPT

## 2021-09-04 PROCEDURE — 700117 HCHG RX CONTRAST REV CODE 255: Performed by: EMERGENCY MEDICINE

## 2021-09-04 PROCEDURE — 93005 ELECTROCARDIOGRAM TRACING: CPT | Performed by: EMERGENCY MEDICINE

## 2021-09-04 PROCEDURE — 71045 X-RAY EXAM CHEST 1 VIEW: CPT

## 2021-09-04 PROCEDURE — 70450 CT HEAD/BRAIN W/O DYE: CPT | Mod: MG

## 2021-09-04 PROCEDURE — 85730 THROMBOPLASTIN TIME PARTIAL: CPT

## 2021-09-04 PROCEDURE — 99285 EMERGENCY DEPT VISIT HI MDM: CPT

## 2021-09-04 PROCEDURE — 84484 ASSAY OF TROPONIN QUANT: CPT

## 2021-09-04 PROCEDURE — 85025 COMPLETE CBC W/AUTO DIFF WBC: CPT

## 2021-09-04 PROCEDURE — 85610 PROTHROMBIN TIME: CPT

## 2021-09-04 PROCEDURE — 80053 COMPREHEN METABOLIC PANEL: CPT

## 2021-09-04 RX ADMIN — IOHEXOL 40 ML: 350 INJECTION, SOLUTION INTRAVENOUS at 09:01

## 2021-09-04 RX ADMIN — IOHEXOL 80 ML: 350 INJECTION, SOLUTION INTRAVENOUS at 09:02

## 2021-09-04 ASSESSMENT — FIBROSIS 4 INDEX: FIB4 SCORE: 1.7

## 2021-09-04 NOTE — ED TRIAGE NOTES
"Chief Complaint   Patient presents with   • Blurred Vision     pt woke up at 0600 w/ blurry vision in her L eye. pt has no other deficits, neg NIH and neg for HA. pt aox4, GCS 15. pt states she had a stroke in May       Pt BIB EMS from home for above. Pt states in may, she had BV in both eyes and could not read her book. Pt states the same thing is happening this morning, just in her L eye. Pt has hx of afib and HTN    BP (!) 181/115   Pulse 77   Resp 18   Ht 1.676 m (5' 6\")   Wt 63.5 kg (140 lb)   SpO2 95%   BMI 22.60 kg/m²     "

## 2021-09-04 NOTE — ED PROVIDER NOTES
ED Provider Note    Scribed for Kiera Kyle M.D. by Pamela Lunsford. 9/4/2021  7:23 AM    Primary care provider: Capri Simon M.D.  Means of arrival: EMS  History obtained from: patient  History limited by: none    CHIEF COMPLAINT  Chief Complaint   Patient presents with    Blurred Vision     pt woke up at 0600 w/ blurry vision in her L eye. pt has no other deficits, neg NIH and neg for HA. pt aox4, GCS 15. pt states she had a stroke in May       HPI  Shaista Bocanegra is a 76 y.o. female with a history of atrial fibrillation and hypertension who presents to the Emergency Department via EMS for blurred vision in the left eye onset this morning. The episode lasted about an hour and she was unable to read. She states her vision has improved since arriving in the ED. She reports history of retinal detachment years ago that was not repaired. Patient is concerned because she had a stroke in May and blurred vision was one of her symptoms. She also reports that she had a tooth pulled last week. She was switched from Eliquis to Lovenox temporarily so that she could have her dental work done but is back on Eliquis currently. She denies any extremities weakness/numbness, fever, chills, leg swelling, coughing, shortness of breath, abdominal pain, or diarrhea. She additionally reports that she is scheduled for surgery for mitral valve prolapse on two weeks.     REVIEW OF SYSTEMS  EYES: blurred vision   PULMONARY: no cough or shortness of breath   GI: no diarrhea or abdominal pain   Neuro: no weakness or numbness  Musculoskeletal: no leg swelling  Endocrine: no fevers or chills    See history of present illness. All other systems are negative. C.    PAST MEDICAL HISTORY   has a past medical history of Arthritis, Atrial fibrillation (HCC), Benign essential HTN (3/19/2012), Breast cancer (HCC), Cancer (HCC) (1998), Cardiac arrhythmia, Chest tightness or pressure (3/19/2012), Chickenpox, Coronary heart disease, [place] measles,  "Gynecological disorder, High cholesterol, High risk medication use (3/19/2012), Hypercholesterolemia (3/19/2012), Mumps, MVP (mitral valve prolapse) (3/19/2012), Osteoporosis, Sleep apnea, Snoring, Stroke (HCC) (2017), Substance abuse (HCC), Tonsillitis, Urinary bladder disorder, Urinary incontinence, Valvular heart disease, and Venereal disease.    SURGICAL HISTORY   has a past surgical history that includes cataract phaco with iol (1/26/2009); cataract phaco with iol (3/16/2009); breast biopsy; radiation therapy plan simple; chemotherapy, unspecified procedure; lumpectomy; remv 2nd cataract,corn-scler sectn; hysterectomy laparoscopy; sinuscope; tonsillectomy; and primary c section.    SOCIAL HISTORY  Social History     Tobacco Use    Smoking status: Never Smoker    Smokeless tobacco: Never Used   Vaping Use    Vaping Use: Never used   Substance Use Topics    Alcohol use: Not Currently     Comment: twice  a week    Drug use: No      Social History     Substance and Sexual Activity   Drug Use No       FAMILY HISTORY  Family History   Problem Relation Age of Onset    Cancer Mother         breast    No Known Problems Brother     Heart Failure Neg Hx     Heart Disease Neg Hx        CURRENT MEDICATIONS  Home Medications       Reviewed by Natalia Ny R.N. (Registered Nurse) on 09/04/21 at 0717  Med List Status: <None>     Medication Last Dose Status   apixaban (ELIQUIS) 5mg Tab  Active   atorvastatin (LIPITOR) 80 MG tablet  Active   Cholecalciferol (VITAMIN D3) 2000 UNIT Cap  Active   Cyanocobalamin (B-12 PO)  Active   metoprolol SR (TOPROL XL) 25 MG TABLET SR 24 HR  Active   Multiple Vitamin (MULTIVITAMINS PO)  Active   Multiple Vitamins-Minerals (ICAPS AREDS 2 PO)  Active   potassium chloride (KLOR-CON) 8 MEQ tablet  Active   valACYclovir (VALTREX) 500 MG Tab  Active                    ALLERGIES  No Known Allergies    PHYSICAL EXAM  VITAL SIGNS: BP (!) 181/115   Pulse 77   Resp 18   Ht 1.676 m (5' 6\")   Wt " 63.5 kg (140 lb)   SpO2 95%   BMI 22.60 kg/m²     Constitutional: Well developed, Well nourished, No acute distress, Non-toxic appearance.   HEENT: Normocephalic, Atraumatic,  external ears normal, pharynx pink,  Mucous  Membranes moist, No rhinorrhea or mucosal edema  Eyes: PERRL, EOMI, Conjunctiva normal, No discharge.   Neck: Normal range of motion, No tenderness, Supple, No stridor.   Lymphatic: No lymphadenopathy    Cardiovascular: Irregularly irregular Rate and Rhythm, 2/6 systolic murmur at apex, no rubs or gallops.   Thorax & Lungs: Lungs clear to auscultation bilaterally, No respiratory distress, No wheezes, rhales or rhonchi, No chest wall tenderness.   Abdomen: Bowel sounds normal, Soft, non tender, non distended,  No pulsatile masses., no rebound guarding or peritoneal signs.   Skin: Warm, Dry, No erythema, No rash,   Back:  No CVA tenderness,  No spinal tenderness, bony crepitance, step offs, or instability.   Neurologic: Alert & oriented clear speech no focal deficits. NIH 0.   Extremities: Equal, intact distal pulses, No cyanosis, clubbing or edema,  No tenderness.   Musculoskeletal: Good range of motion in all major joints. No tenderness to palpation or major deformities noted.     DIAGNOSTIC STUDIES / PROCEDURES    LABS  Results for orders placed or performed during the hospital encounter of 09/04/21   COMP METABOLIC PANEL   Result Value Ref Range    Sodium 144 135 - 145 mmol/L    Potassium 3.8 3.6 - 5.5 mmol/L    Chloride 110 96 - 112 mmol/L    Co2 24 20 - 33 mmol/L    Anion Gap 10.0 7.0 - 16.0    Glucose 94 65 - 99 mg/dL    Bun 15 8 - 22 mg/dL    Creatinine 0.69 0.50 - 1.40 mg/dL    Calcium 8.8 8.5 - 10.5 mg/dL    AST(SGOT) 47 (H) 12 - 45 U/L    ALT(SGPT) 45 2 - 50 U/L    Alkaline Phosphatase 66 30 - 99 U/L    Total Bilirubin 0.5 0.1 - 1.5 mg/dL    Albumin 3.8 3.2 - 4.9 g/dL    Total Protein 6.1 6.0 - 8.2 g/dL    Globulin 2.3 1.9 - 3.5 g/dL    A-G Ratio 1.7 g/dL   PROTHROMBIN TIME   Result Value  Ref Range    PT 14.6 12.0 - 14.6 sec    INR 1.18 (H) 0.87 - 1.13   APTT   Result Value Ref Range    APTT 37.5 (H) 24.7 - 36.0 sec   COD (ADULT)   Result Value Ref Range    ABO Grouping Only A     Rh Grouping Only POS     Antibody Screen-Cod NEG    TROPONIN   Result Value Ref Range    Troponin T 20 (H) 6 - 19 ng/L   ESTIMATED GFR   Result Value Ref Range    GFR If African American >60 >60 mL/min/1.73 m 2    GFR If Non African American >60 >60 mL/min/1.73 m 2   EKG (NOW)   Result Value Ref Range    Report       [facility] Emergency Dept.    Test Date:  2021  Pt Name:    HORACE GUZMAN                  Department: ER  MRN:        [id number]                      Room:        04  Gender:     Female                       Technician: [id number]  :        [id number]                   Requested By:CARLOS PARRY  Order #:    [id number]                    Reading MD: CARLOS PARRY MD    Measurements  Intervals                                Axis  Rate:       69                           P:  CO:                                      QRS:        82  QRSD:       101                          T:          37  QT:         484  QTc:        519    Interpretive Statements  Atrial fibrillation  Borderline right axis deviation  Probable left ventricular hypertrophy  Prolonged QT interval  Baseline wander in lead(s) V1,V2  Compared to ECG 2021 09:53:02  Prolonged QT interval now present  Electronically Signed On 2021 10:08:10 PDT by CARLOS PARRY MD        All labs reviewed by me.    EKG  12 lead EKG; Interpreted by emergency department physician    RADIOLOGY  CT-CTA NECK WITH & W/O-POST PROCESSING   Final Result      1. No evidence of flow-limiting stenosis in the cervical carotid or cervical vertebral arteries.      CT-CTA HEAD WITH & W/O-POST PROCESS   Final Result         1. No hemodynamically significant narrowing of the major intracranial vessels.      CT-CEREBRAL PERFUSION ANALYSIS   Final Result      1.   Cerebral blood flow less than 30% likely representing completed infarct = 0 mL.      2.  T Max more than 6 seconds likely representing combination of completed infarct and ischemia = 0 mL.      3.  Mismatched volume likely representing ischemic brain/penumbra = None      4.  Please note that the cerebral perfusion was performed on the limited brain tissue around the basal ganglia region. Infarct/ischemia outside the CT perfusion sections can be missed in this study.      CT-HEAD W/O   Final Result         1. Ill-defined hypodensity in the left parietal lobe could relate to recent infarct. Further evaluation with MRI recommended.         2. No acute intracranial hemorrhage.               DX-CHEST-PORTABLE (1 VIEW)   Final Result         1. No acute cardiopulmonary abnormalities are identified.        The radiologist's interpretation of all radiological studies have been reviewed by me.    COURSE & MEDICAL DECISION MAKING  Nursing notes, VS, PMSFHx reviewed in chart.    7:23 AM Patient seen and examined at bedside. Ordered DX-chest, CT-head, CT-cerebral perfusion, CT-CTA head, CT-CTA neck, CBC with diff, CMP, prothrombin, APTT, COD, and troponin to evaluate her symptoms. The differential diagnoses include but are not limited to: TIA vs CVA vs intracranial hemorrhage vs occular pathology.     9:36 AM - Paged neurology.    9:46 AM I discussed the patient's case and the above findings with Dr. Fulton (Neuro) who recommends discharge.    10:05 AM - Patient reevaluated at bedside and updated on negative results of the studies. Discussed plan for discharge, including follow up with opthalmology. She should continue to take her medications as prescribed and follow up with her PCP. Patient verbalizes understanding and agreement to this plan of care.      The patient will return for new or worsening symptoms and is stable at the time of discharge.    The patient is referred to a primary physician for blood pressure management,  diabetic screening, and for all other preventative health concerns.    DISPOSITION:  Patient will be discharged home in stable condition.    FOLLOW UP:  Brian Estrella M.D.  [address]  [place]  [place] [address]  [phone]    Call in 2 days  for recheck    Capri Simon M.D.  [address]  [place] [id number] [phone]    Call   for recheck      FINAL IMPRESSION  1. Blurry vision, left eye          Pamela HALEY (David), am scribing for, and in the presence of, Kiera Kyle M.D..    Electronically signed by: Pamela Lunsford (David), 9/4/2021    Kiera HALEY M.D. personally performed the services described in this documentation, as scribed by Pamela Lunsford in my presence, and it is both accurate and complete.    The note accurately reflects work and decisions made by me.  Kiera Kyle M.D.  9/4/2021  12:33 PM

## 2021-09-24 ENCOUNTER — TELEPHONE (OUTPATIENT)
Dept: CARDIOLOGY | Facility: MEDICAL CENTER | Age: 76
End: 2021-09-24

## 2021-09-24 NOTE — TELEPHONE ENCOUNTER
Pt underwent surgery 9-16-21 with Dr. Monster Nogueira.    1.Minimally invasive complex mitral valve repair with A2 Ellston-Miguel Angel neochordal reconstruction, A3 Ellston-Miguel Angel neochordal reconstruction, P2 posterior ventricular anchoring remodeling suture and non-resectional leaflet remodeling and Ellston-Miguel Angel NeoChord x2, P3 Ellston-Miguel Angel NeoChord x1, and #34 sewing ring annuloplasty, CPT code 63007.  2.Atrial fibrillation maze procedure and left atrial appendage oversew, CPT code 58395.    Possible post op seizure.  Still currently in the hospital.    Echo Wilson 9-23-21  Interpretation Summary    A complete two-dimensional transthoracic echocardiogram was performed (2D, M-mode, Doppler and color flow Doppler). Patient is in Atrial Fibrillation/Flutter during the exam.    1. S/P MV repair with mean MV gradient of 3 mmHg (HR 81 bpm). Trace MR. Mild TR. RVSP estimated 24mmHg (RAP=3mmHg).    2. Normal LV/RV size with systolic function lower limits of normal.    3. Compared with the prior echo of 7/23/2021, the patient is s/p MV repair.      ABHINAV 7-23-21 Wilson  1. 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 There is a partial flail on the lateral aspect of P2. Moderately severe MR. There are 2 separate jets of MR. One jet originates from the   lateral aspect of P2. There is an additional jet from the medial sub commissure between A2/P2 and A3/P3. Mean MV gradient of 2 mmHg. There is systolic blunting in the pulmonary veins.   2. Normal LV/RV systolic structure and function. No other significant valvular abnormalities. Minimal pericardial effusion around RA. No LA or KYUNG thrombus visualized. Trace TR.   3. There are no prior TEEs available for comparison.

## 2021-09-28 ENCOUNTER — HOSPITAL ENCOUNTER (INPATIENT)
Facility: REHABILITATION | Age: 76
LOS: 24 days | DRG: 949 | End: 2021-10-22
Attending: PHYSICAL MEDICINE & REHABILITATION | Admitting: PHYSICAL MEDICINE & REHABILITATION
Payer: COMMERCIAL

## 2021-09-28 DIAGNOSIS — R33.9 URINARY RETENTION: ICD-10-CM

## 2021-09-28 DIAGNOSIS — M54.9 UPPER BACK PAIN ON RIGHT SIDE: ICD-10-CM

## 2021-09-28 DIAGNOSIS — E78.49 OTHER HYPERLIPIDEMIA: ICD-10-CM

## 2021-09-28 DIAGNOSIS — Z79.01 CHRONIC ANTICOAGULATION: ICD-10-CM

## 2021-09-28 DIAGNOSIS — G47.00 INSOMNIA, UNSPECIFIED TYPE: ICD-10-CM

## 2021-09-28 DIAGNOSIS — E78.5 DYSLIPIDEMIA: ICD-10-CM

## 2021-09-28 DIAGNOSIS — Z98.890 S/P MITRAL VALVE REPAIR: ICD-10-CM

## 2021-09-28 DIAGNOSIS — G40.909 SEIZURE DISORDER (HCC): ICD-10-CM

## 2021-09-28 DIAGNOSIS — I48.91 ATRIAL FIBRILLATION, UNSPECIFIED TYPE (HCC): ICD-10-CM

## 2021-09-28 PROBLEM — D64.9 ANEMIA: Status: ACTIVE | Noted: 2021-09-28

## 2021-09-28 PROBLEM — Z78.9 IMPAIRED MOBILITY AND ADLS: Status: ACTIVE | Noted: 2021-09-28

## 2021-09-28 PROBLEM — Z74.09 IMPAIRED MOBILITY AND ADLS: Status: ACTIVE | Noted: 2021-09-28

## 2021-09-28 PROCEDURE — A9270 NON-COVERED ITEM OR SERVICE: HCPCS | Performed by: PHYSICAL MEDICINE & REHABILITATION

## 2021-09-28 PROCEDURE — U0005 INFEC AGEN DETEC AMPLI PROBE: HCPCS

## 2021-09-28 PROCEDURE — 99223 1ST HOSP IP/OBS HIGH 75: CPT | Performed by: PHYSICAL MEDICINE & REHABILITATION

## 2021-09-28 PROCEDURE — U0003 INFECTIOUS AGENT DETECTION BY NUCLEIC ACID (DNA OR RNA); SEVERE ACUTE RESPIRATORY SYNDROME CORONAVIRUS 2 (SARS-COV-2) (CORONAVIRUS DISEASE [COVID-19]), AMPLIFIED PROBE TECHNIQUE, MAKING USE OF HIGH THROUGHPUT TECHNOLOGIES AS DESCRIBED BY CMS-2020-01-R: HCPCS

## 2021-09-28 PROCEDURE — 94760 N-INVAS EAR/PLS OXIMETRY 1: CPT

## 2021-09-28 PROCEDURE — 700102 HCHG RX REV CODE 250 W/ 637 OVERRIDE(OP): Performed by: PHYSICAL MEDICINE & REHABILITATION

## 2021-09-28 PROCEDURE — 770010 HCHG ROOM/CARE - REHAB SEMI PRIVAT*

## 2021-09-28 RX ORDER — ENEMA 19; 7 G/133ML; G/133ML
1 ENEMA RECTAL
Status: DISCONTINUED | OUTPATIENT
Start: 2021-09-28 | End: 2021-10-22 | Stop reason: HOSPADM

## 2021-09-28 RX ORDER — BISACODYL 10 MG
10 SUPPOSITORY, RECTAL RECTAL
Status: DISCONTINUED | OUTPATIENT
Start: 2021-09-28 | End: 2021-09-29

## 2021-09-28 RX ORDER — LEVETIRACETAM 500 MG/1
1000 TABLET ORAL EVERY 12 HOURS
Status: DISCONTINUED | OUTPATIENT
Start: 2021-09-28 | End: 2021-10-22 | Stop reason: HOSPADM

## 2021-09-28 RX ORDER — HYDROXYZINE HYDROCHLORIDE 25 MG/1
50 TABLET, FILM COATED ORAL EVERY 6 HOURS PRN
Status: DISCONTINUED | OUTPATIENT
Start: 2021-09-28 | End: 2021-10-12

## 2021-09-28 RX ORDER — OXYCODONE HYDROCHLORIDE 5 MG/1
2.5 TABLET ORAL EVERY 4 HOURS PRN
Status: DISCONTINUED | OUTPATIENT
Start: 2021-09-28 | End: 2021-10-01

## 2021-09-28 RX ORDER — AMOXICILLIN 250 MG
2 CAPSULE ORAL 2 TIMES DAILY
Status: DISCONTINUED | OUTPATIENT
Start: 2021-09-28 | End: 2021-09-29

## 2021-09-28 RX ORDER — QUETIAPINE FUMARATE 25 MG/1
25 TABLET, FILM COATED ORAL 3 TIMES DAILY PRN
Status: DISCONTINUED | OUTPATIENT
Start: 2021-09-28 | End: 2021-10-12

## 2021-09-28 RX ORDER — ASCORBIC ACID 500 MG
500 TABLET ORAL 2 TIMES DAILY
Status: DISCONTINUED | OUTPATIENT
Start: 2021-09-28 | End: 2021-10-22 | Stop reason: HOSPADM

## 2021-09-28 RX ORDER — OXYCODONE HYDROCHLORIDE 5 MG/1
5 TABLET ORAL EVERY 4 HOURS PRN
Status: DISCONTINUED | OUTPATIENT
Start: 2021-09-28 | End: 2021-10-01

## 2021-09-28 RX ORDER — ONDANSETRON 4 MG/1
4 TABLET, ORALLY DISINTEGRATING ORAL 4 TIMES DAILY PRN
Status: DISCONTINUED | OUTPATIENT
Start: 2021-09-28 | End: 2021-10-22 | Stop reason: HOSPADM

## 2021-09-28 RX ORDER — ONDANSETRON 2 MG/ML
4 INJECTION INTRAMUSCULAR; INTRAVENOUS 4 TIMES DAILY PRN
Status: DISCONTINUED | OUTPATIENT
Start: 2021-09-28 | End: 2021-10-22 | Stop reason: HOSPADM

## 2021-09-28 RX ORDER — SPIRONOLACTONE 25 MG/1
25 TABLET ORAL
Status: DISCONTINUED | OUTPATIENT
Start: 2021-09-29 | End: 2021-10-22 | Stop reason: HOSPADM

## 2021-09-28 RX ORDER — TRAZODONE HYDROCHLORIDE 50 MG/1
50 TABLET ORAL
Status: DISCONTINUED | OUTPATIENT
Start: 2021-09-28 | End: 2021-10-13

## 2021-09-28 RX ORDER — LACTULOSE 20 G/30ML
30 SOLUTION ORAL
Status: DISCONTINUED | OUTPATIENT
Start: 2021-09-28 | End: 2021-10-22 | Stop reason: HOSPADM

## 2021-09-28 RX ORDER — FERROUS SULFATE 325(65) MG
325 TABLET ORAL
Status: DISCONTINUED | OUTPATIENT
Start: 2021-09-29 | End: 2021-10-22 | Stop reason: HOSPADM

## 2021-09-28 RX ORDER — LANOLIN ALCOHOL/MO/W.PET/CERES
3 CREAM (GRAM) TOPICAL NIGHTLY PRN
Status: DISCONTINUED | OUTPATIENT
Start: 2021-09-28 | End: 2021-10-06

## 2021-09-28 RX ORDER — ECHINACEA PURPUREA EXTRACT 125 MG
2 TABLET ORAL PRN
Status: DISCONTINUED | OUTPATIENT
Start: 2021-09-28 | End: 2021-10-22 | Stop reason: HOSPADM

## 2021-09-28 RX ORDER — OMEPRAZOLE 20 MG/1
20 CAPSULE, DELAYED RELEASE ORAL
Status: DISCONTINUED | OUTPATIENT
Start: 2021-09-29 | End: 2021-10-22 | Stop reason: HOSPADM

## 2021-09-28 RX ORDER — VALACYCLOVIR HYDROCHLORIDE 500 MG/1
500 TABLET, FILM COATED ORAL DAILY
Status: DISCONTINUED | OUTPATIENT
Start: 2021-09-29 | End: 2021-09-30

## 2021-09-28 RX ORDER — ALUMINA, MAGNESIA, AND SIMETHICONE 2400; 2400; 240 MG/30ML; MG/30ML; MG/30ML
20 SUSPENSION ORAL
Status: DISCONTINUED | OUTPATIENT
Start: 2021-09-28 | End: 2021-10-22 | Stop reason: HOSPADM

## 2021-09-28 RX ORDER — POLYVINYL ALCOHOL 14 MG/ML
1 SOLUTION/ DROPS OPHTHALMIC PRN
Status: DISCONTINUED | OUTPATIENT
Start: 2021-09-28 | End: 2021-10-22 | Stop reason: HOSPADM

## 2021-09-28 RX ORDER — ACETAMINOPHEN 325 MG/1
650 TABLET ORAL EVERY 4 HOURS PRN
Status: DISCONTINUED | OUTPATIENT
Start: 2021-09-28 | End: 2021-10-22 | Stop reason: HOSPADM

## 2021-09-28 RX ORDER — POLYETHYLENE GLYCOL 3350 17 G/17G
1 POWDER, FOR SOLUTION ORAL
Status: DISCONTINUED | OUTPATIENT
Start: 2021-09-28 | End: 2021-09-29

## 2021-09-28 RX ORDER — TAMSULOSIN HYDROCHLORIDE 0.4 MG/1
0.4 CAPSULE ORAL
Status: DISCONTINUED | OUTPATIENT
Start: 2021-09-28 | End: 2021-10-05

## 2021-09-28 RX ORDER — SIMETHICONE 80 MG
80 TABLET,CHEWABLE ORAL 3 TIMES DAILY PRN
Status: DISCONTINUED | OUTPATIENT
Start: 2021-09-28 | End: 2021-10-22 | Stop reason: HOSPADM

## 2021-09-28 RX ORDER — ATORVASTATIN CALCIUM 40 MG/1
80 TABLET, FILM COATED ORAL EVERY EVENING
Status: DISCONTINUED | OUTPATIENT
Start: 2021-09-28 | End: 2021-10-22 | Stop reason: HOSPADM

## 2021-09-28 RX ADMIN — LEVETIRACETAM 1000 MG: 500 TABLET, FILM COATED ORAL at 20:50

## 2021-09-28 RX ADMIN — METOPROLOL TARTRATE 25 MG: 25 TABLET, FILM COATED ORAL at 18:30

## 2021-09-28 RX ADMIN — OXYCODONE 5 MG: 5 TABLET ORAL at 18:35

## 2021-09-28 RX ADMIN — TAMSULOSIN HYDROCHLORIDE 0.4 MG: 0.4 CAPSULE ORAL at 18:30

## 2021-09-28 RX ADMIN — ATORVASTATIN CALCIUM 80 MG: 40 TABLET, FILM COATED ORAL at 20:50

## 2021-09-28 RX ADMIN — APIXABAN 5 MG: 5 TABLET, FILM COATED ORAL at 20:50

## 2021-09-28 RX ADMIN — OXYCODONE HYDROCHLORIDE AND ACETAMINOPHEN 500 MG: 500 TABLET ORAL at 20:50

## 2021-09-28 RX ADMIN — SENNOSIDES AND DOCUSATE SODIUM 2 TABLET: 8.6; 5 TABLET ORAL at 20:50

## 2021-09-28 ASSESSMENT — LIFESTYLE VARIABLES
HAVE PEOPLE ANNOYED YOU BY CRITICIZING YOUR DRINKING: NO
TOTAL SCORE: 0
ALCOHOL_USE: YES
HAVE YOU EVER FELT YOU SHOULD CUT DOWN ON YOUR DRINKING: NO
EVER FELT BAD OR GUILTY ABOUT YOUR DRINKING: NO
HOW MANY TIMES IN THE PAST YEAR HAVE YOU HAD 5 OR MORE DRINKS IN A DAY: 0
ON A TYPICAL DAY WHEN YOU DRINK ALCOHOL HOW MANY DRINKS DO YOU HAVE: 1
TOTAL SCORE: 0
CONSUMPTION TOTAL: NEGATIVE
AVERAGE NUMBER OF DAYS PER WEEK YOU HAVE A DRINK CONTAINING ALCOHOL: 3
EVER HAD A DRINK FIRST THING IN THE MORNING TO STEADY YOUR NERVES TO GET RID OF A HANGOVER: NO
TOTAL SCORE: 0

## 2021-09-28 ASSESSMENT — CHA2DS2 SCORE
PRIOR STROKE OR TIA OR THROMBOEMBOLISM: YES
VASCULAR DISEASE: NO
CHF OR LEFT VENTRICULAR DYSFUNCTION: NO
CHA2DS2 VASC SCORE: 6
SEX: FEMALE
DIABETES: NO
AGE 65 TO 74: NO
HYPERTENSION: YES
AGE 75 OR GREATER: YES

## 2021-09-28 ASSESSMENT — PATIENT HEALTH QUESTIONNAIRE - PHQ9
1. LITTLE INTEREST OR PLEASURE IN DOING THINGS: NOT AT ALL
2. FEELING DOWN, DEPRESSED, IRRITABLE, OR HOPELESS: NOT AT ALL
SUM OF ALL RESPONSES TO PHQ9 QUESTIONS 1 AND 2: 0

## 2021-09-28 ASSESSMENT — PAIN DESCRIPTION - PAIN TYPE
TYPE: ACUTE PAIN

## 2021-09-28 ASSESSMENT — FIBROSIS 4 INDEX: FIB4 SCORE: 3.25

## 2021-09-28 NOTE — H&P
REHABILITATION HISTORY AND PHYSICAL/POST ADMISSION EVALUATION    9/28/2021  4:13 PM  Shaista Bocanegra  [id number]/02  Admission  9/28/2021  3:19 PM  [facility] Code/Reason for admission: 0009 - Cardiac   Etiologic diagnosis/problem: S/P mitral valve repair  Chief Complaint: sternal pain    HPI:  The patient is a 76 y.o. female with a past medical history of Severe mitral regurgitation secondary to prolapse, atrial fibrillation, stroke in 2017 (right frontal lobe) and 5/2021 (left temporal lobe) with residual left hemiparesis, hyperlipidemia, breast cancer treated with right lumpectomy radiation chemotherapy, sleep apnea; now admitted for acute inpatient rehabilitation with severe functional debility after a mitral valve repair.      On admission the patient and medical record report she underwent elective minimally invasive complex mitral valve repair with ring annuloplasty and Maze procedure with left atrial appendage oversew on 9/16 at [facility] with Dr. Monster Nogueira. Post-op complications included generalized tonic clonic seizure 2 hours after her repair. She was seen by neurology as her left arm weakness was also worse than her baseline. She had EEG which was negative for epileptic discharges, was started on Keppra. Etiology of her left arm symptoms was thought due to recrudescence and not a new stroke. Patient developed urinary retention requiring straight caths, and a Medina was placed prior to her transfer here. She initially had an NG tube for nutrition and is now on a soft and bite sized diet.     Patient current reports sternal and mid back pain, new since surgery, and would like a pain pill. She reports her left arm is not quit back to baseline. She denies any changes in her vision, paresthesias, or weakness anywhere else. She has not moved her bowels in 3 days, and reports at home she does not go daily. She reports she was doing outpatient speech therapy for mild aphasia/word finding difficulties since her stroke in May,  and had to stop because her insurance stopped paying. She reports her cognition is not back to her baseline since her surgery as well.     Patient was evaluated by Rehab Medicine physician and Physical Therapy, Occupational Therapy and Speech Therapy and determined to be appropriate for acute inpatient rehab and was transferred to [facility] on 9/28/2021  3:19 PM.    With this acute therapeutic intervention, this patient hopes to improve her functional status, and return to independent living with the supportive care of family.    REVIEW OF SYSTEMS:     A complete review of systems was performed and was negative in detail with the exception of items mentioned elsewhere in this document.    PMH:  Past Medical History:   Diagnosis Date   • Arthritis    • Atrial fibrillation (HCC)    • Benign essential HTN 3/19/2012   • Breast cancer (HCC)    • Cancer (HCC) 1998    breast    • Cardiac arrhythmia    • Chest tightness or pressure 3/19/2012   • Chickenpox    • Coronary heart disease    • Chinese measles    • Gynecological disorder    • High cholesterol    • High risk medication use 3/19/2012   • Hypercholesterolemia 3/19/2012   • Mumps    • MVP (mitral valve prolapse) 3/19/2012   • Osteoporosis    • Sleep apnea     no CPAP   • Snoring    • Stroke (HCC) 2017    residual minor weakness on left side   • Substance abuse (HCC)    • Tonsillitis    • Urinary bladder disorder     OAB   • Urinary incontinence    • Valvular heart disease    • Venereal disease        PSH:  Past Surgical History:   Procedure Laterality Date   • CATARACT PHACO WITH IOL  3/16/2009    Performed by SHANNON GANDARA at SURGERY SAME DAY [facility] ORS   • CATARACT PHACO WITH IOL  1/26/2009    [facility]   • BREAST BIOPSY     • HYSTERECTOMY LAPAROSCOPY     • LUMPECTOMY     • PB RADIATION THERAPY PLAN SIMPLE     • PB REMV 2ND CATARACT,CORN-SCLER SECTN     • FL CHEMOTHERAPY, UNSPECIFIED  PROCEDURE     • PRIMARY C SECTION     • SINUSCOPE     • TONSILLECTOMY         Family History   Problem Relation Age of Onset   • Cancer Mother         breast   • No Known Problems Brother    • Heart Failure Neg Hx    • Heart Disease Neg Hx         MEDICATIONS:  Current Facility-Administered Medications   Medication Dose   • hydrOXYzine HCl (ATARAX) tablet 50 mg  50 mg   • QUEtiapine (Seroquel) tablet 25 mg  25 mg   • simethicone (MYLICON) chewable tab 80 mg  80 mg   • melatonin tablet 3 mg  3 mg   • Respiratory Therapy Consult     • Pharmacy Consult Request ...Pain Management Review 1 Each  1 Each   • acetaminophen (Tylenol) tablet 650 mg  650 mg   • senna-docusate (PERICOLACE or SENOKOT S) 8.6-50 MG per tablet 2 Tablet  2 Tablet    And   • polyethylene glycol/lytes (MIRALAX) PACKET 1 Packet  1 Packet    And   • magnesium hydroxide (MILK OF MAGNESIA) suspension 30 mL  30 mL    And   • bisacodyl (DULCOLAX) suppository 10 mg  10 mg   • lactulose 20 GM/30ML solution 30 mL  30 mL   • docusate sodium (ENEMEEZ) enema 283 mg  283 mg   • sodium phosphate (Fleet) enema 133 mL  1 Each   • [START ON 9/29/2021] omeprazole (PRILOSEC) capsule 20 mg  20 mg   • artificial tears ophthalmic solution 1 Drop  1 Drop   • benzocaine-menthol (CEPACOL) lozenge 1 Lozenge  1 Lozenge   • mag hydrox-al hydrox-simeth (MAALOX PLUS ES or MYLANTA DS) suspension 20 mL  20 mL   • ondansetron (ZOFRAN ODT) dispertab 4 mg  4 mg    Or   • ondansetron (ZOFRAN) syringe/vial injection 4 mg  4 mg   • traZODone (DESYREL) tablet 50 mg  50 mg   • sodium chloride (OCEAN) 0.65 % nasal spray 2 Spray  2 Spray   • [START ON 9/29/2021] aspirin EC (ECOTRIN) tablet 81 mg  81 mg   • ascorbic acid (Vitamin C) tablet 500 mg  500 mg   • [START ON 9/29/2021] ferrous sulfate tablet 325 mg  325 mg   • levETIRAcetam (KEPPRA) tablet 1,000 mg  1,000 mg   • metoprolol tartrate (LOPRESSOR) tablet 25 mg  25 mg   • [START ON 9/29/2021] spironolactone (ALDACTONE) tablet 25 mg  25 mg  "  • tamsulosin (FLOMAX) capsule 0.4 mg  0.4 mg   • atorvastatin (LIPITOR) tablet 80 mg  80 mg   • apixaban (ELIQUIS) tablet 5 mg  5 mg   • [START ON 9/29/2021] valACYclovir (VALTREX) caplet 500 mg  500 mg       ALLERGIES:  Patient has no known allergies.    PSYCHOSOCIAL HISTORY:  Pre-mobidly, the patient lived in a single level [facility] with elevator in [place] with self. She has 2 sons that live close by.     Patient is .  She has 3 adult children that live locally.  She is a semiretired bankruptcy .    Never smoked, no drug use, 1 glass of wine per week.      LEVEL OF FUNCTION PRIOR TO DISABILTY:  Independent    LEVEL OF FUNCTION PRIOR TO ADMISSION to [facility]:  PT:  Min assist for ambulation 90 ft with FWW  Min assist for transfers    OT:  Mod assist for ADLs    SLP:  Soft/bite sized due to dysphagia    CURRENT LEVEL OF FUNCTION:   Same as level of function prior to admission to [facility]    PHYSICAL EXAM:     VITAL SIGNS:   height is 1.676 m (5' 6\") and weight is 63 kg (139 lb). Her temporal temperature is 36.5 °C (97.7 °F). Her blood pressure is 115/75 and her pulse is 84. Her respiration is 18 and oxygen saturation is 95%.     GENERAL: No apparent distress  HEENT: Normocephalic/atraumatic, moist mucous membranes  CARDIAC: Regular rate and rhythm, normal S1, S2, no murmurs, no peripheral edema, right sided chest wall incision with surrounding edema, ecchymosis, no drainage  LUNGS: Clear to auscultation, normal respiratory effort, on room air   ABDOMINAL: bowel sounds present, soft, nontender and nondistended    EXTREMITIES: no edema or 2+ bilateral DP/PT pulses  MSK: No joint swelling    NEURO:    Mental status:  A&Ox4 (person, place, date, situation)  Speech: fluent, mild expressive aphasia with word finding impairment, no dysarthria    CRANIAL NERVES:  Grossly intact    Motor:  Shoulder flexors:  Right -  5/5, Left -  4+/5  Elbow flexors:  Right -  5/5, Left " -  4+/5  Elbow extensors:  Right -  5/5, Left -  4+/5  Weaker  on left  Hip flexors:  Right -  5/5, Left -  5/5  Knee ext:  Right -  5/5, Left -  5/5  Dorsiflexors:  Right -  5/5, Left -  5/5  EHL:  Right -  5/5, Left - 5/5  Plantar flexors:  Right -  5/5, Left -  5/5   Negative Pronator drift bilaterally    Sensory:   intact to light touch through out      RADIOLOGY:      Results for orders placed during the hospital encounter of 05/31/21    MR-BRAIN-WITH & W/O    Impression  1.  Small area of acute infarct in the left temporal lobe.  2.  Chronic infarct in the right insular cortex and frontal lobe.  3.  Mild chronic microvascular ischemic disease.  4.  Mild cerebral volume loss.      Results for orders placed during the hospital encounter of 08/18/17    MR-BRAIN-W/O    Impression  1.  Moderate sized RIGHT MCA territory acute ischemia involving the cortex and basal ganglia  2.  Flow void is present in the RIGHT MCA compatible with treatment effect  3.  No hemorrhage  4.  Mild white matter changes  5.  Mild atrophy                                                   Results for orders placed during the hospital encounter of 09/04/21    CT-CTA NECK WITH & W/O-POST PROCESSING    Impression  1. No evidence of flow-limiting stenosis in the cervical carotid or cervical vertebral arteries.       Results for orders placed during the hospital encounter of 08/12/21    CT-CHEST (THORAX) W/O    Impression  1.  Areas of tree-in-bud pattern airspace disease in the RIGHT upper and LEFT lower lobes, likely chronic infectious or inflammatory pneumonia  2.  Small RIGHT pleural effusion    Fleischner Society pulmonary nodule recommendations:  Not Applicable               LABS:  Recent Labs     09/27/21  1628 09/27/21  2240 09/28/21  0750   GLUCOSE 139 [id number]       PRIMARY REHAB DIAGNOSIS:    This patient is a 76 y.o. female admitted for acute inpatient rehabilitation with impaired cardiac endurance S/P mitral valve  repair.    IMPAIRMENTS:   Cognitive  ADLs/IADLs  Mobility  Speech  Swallow    SECONDARY DIAGNOSIS/MEDICAL CO-MORBIDITIES AFFECTING FUNCTION:    History of stroke  Seizure disorder  Hypertension  Atrial fibrillation  Hyperlipidemia  Anemia  Urinary retention  History of Herpes Simplex    RELEVANT CHANGES SINCE PREADMISSION EVALUATION:    Status unchanged    The patient's rehabilitation potential is Very Good  The patient's medical prognosis is good    PLAN:   Discussion and Recommendations, discussed with the patient and/or family:   1. The patient requires an acute inpatient rehabilitation program with a coordinated program of care at an intensity and frequency not available at a lower level of care. This recommendation is substantiated by the patient's medical physicians who recommend that the patient's intervention and assessment of medical issues needs to be done at an acute level of care for patient's safety and maximum outcome.     2. A coordinated program of care will be supplied by an interdisciplinary team of physical therapy, occupational therapy, rehab physician, rehab nursing, and, if needed, speech therapy and rehab psychology. Rehab team presents a patient-specific rehabilitation and education program concentrating on prevention of future problems related to accessibility, mobility, skin, bowel, bladder, sexuality, and psychosocial and medical/surgical problems.     3. Need for Rehabilitation Physician: The rehab physician will be evaluating the patient on a multi-weekly basis to help coordinate the program of care. The rehab physician communicates between medical physicians, therapists, and nurses to maximize the patient's potential outcome. Specific areas in which the rehab physician will be providing daily assessment include the following:   A. Assessing the patient's heart rate and blood pressure response (vitals monitoring) to activity and making adjustments in medications or conservative measures  as needed.   B. The rehab physician will be assessing the frequency at which the program can be increased to allow the patient to reach optimal functional outcome.   C. The rehab physician will also provide assessments in daily skin care, especially in light of patient's impairments in mobility.   D. The rehab physician will provide special expertise in understanding how to work with functional impairment and recommend appropriate interventions, compensatory techniques, and education that will facilitate the patient's outcome.     4. Rehab R.N.   The rehab RN will be working with patient to carry over in room mobility and activities of daily living when the patient is not in 3 hours of skilled therapy. Rehab nursing will be working in conjunction with rehab physician to address all the medical issues above and continue to assess laboratory work and discuss abnormalities with the treating physicians, assess vitals, and response to activity, and discuss and report abnormalities with the rehab physician. Rehab RN will also continue daily skin care, supervise bladder/bowel program, instruct in medication administration, and ensure patient safety.     5. Therapies to treat at intensity and frequency of (may change after completion of evaluation by all therapeutic disciplines):       PT:  Physical therapy to address mobility, transfer, gait training and evaluation for adaptive equipment needs 1hour/day at least 5 days/week for the duration of the ELOS (see below)       OT:  Occupational therapy to address ADLs, self-care, home management training, functional mobility/transfers and assistive device evaluation, and community re-integration 1hour/day at least 5 days/week for the duration of the ELOS (see below).        ST/Dysphagia:  Speech therapy to address speech, language, and cognitive deficits as well as swallowing difficulties with retraining/dysphagia management and community re-integration with comprehension,  expression, cognitive training 1hour/day at least 5 days/week for the duration of the ELOS (see below).     6. Medical management / Rehabilitation Issues/Adverse Potential affecting function as part of rehabilitation plan.    S/p Mitral Valve repair  Dr. Nogueira 9/16  Outpatient follow up with cardiology and Dr. Nogueira    Aspirin  Spironolactone     History of strokes  Cardio-embolic  Residual left hemiparesis  Aphasia  Dysphagia  Start full rehab program    Eliquis  Statin    Outpatient follow up with Dr. Sosa    Seizure disorder  Tonic/clonic post-op  Keppra  Outpatient referral to neurology    Hypertension  Metoprolol  Spironolactone  Consult hospitalist    Atrial fibrillation  Metoprolol  Consult hospitalist    Hyperlipidemia  Statin    Anemia  Ferrous sulfate  Vit C    Urinary retention  Start Flomax  Continue Medina for now    GI prophylaxis  Omeprazole    History of herpes simplex  Valacyclovir    I performed a complete drug regimen review and did not identify any potential clinically significant medication issues.    The patient's CODE STATUS was confirmed as FULL CODE on admission, with the patient and/or family at bedside.    REHABILITATION ISSUES/ADVERSE POTENTIAL:  1.  Cardiac/s/p MVR: Patient demonstrates functional deficits in strength, balance, coordination, and ADL's. Patient is admitted to [facility] for comprehensive rehabilitation therapy as described below.   Rehabilitation nursing monitors bowel and bladder control, educates on medication administration, co-morbidities and monitors patient safety.    2.  DVT prophylaxis:  Patient is on Eliquis for anticoagulation upon transfer. Encourage OOB. Monitor daily for signs and symptoms of DVT including but not limited to swelling and pain to prevent the development of DVT that may interfere with therapies.    3.  Pain: As needed tylenol or oxycodone for sternal pain.     4.  Nutrition/Dysphagia: Dietician monitors nutrient intake,  recommend supplements prn and provide nutrition education to pt/family to promote optimal nutrition for wound healing/recovery.     5.  Bladder/bowel:  Start bowel and bladder program, to prevent constipation, urinary retention (which may lead to UTI), and urinary incontinence (which will impact upon pt's functional independence).   - TV Q3h while awake with post void bladder scans, I&O cath for PVRs >400  - up to commode after meal     6.  Skin/dermal ulcer prophylaxis: Monitor for new skin conditions with q.2 h. turns as required to prevent the development of skin breakdown.     7.  Cognition/Behavior:  Psychologist Dr. Lara provides adjustment counseling to illness and psychosocial barriers that may be potential barriers to rehabilitation.     8. Respiratory therapy: RT performs O2 management prn, breathing retraining, pulmonary hygiene and bronchospasm management prn to optimize participation in therapies.    Pt was seen today for 75 min, and entire time spent in face-to-face contact was >50% in counseling and coordination of care as detailed in A/P above.        GOALS/EXPECTED LEVEL OF FUNCTION BASED ON CURRENT MEDICAL AND FUNCTIONAL STATUS (may change based on patient's medical status and rate of impairment recovery):  Transfers:   Modified Independent  Mobility/Gait:   Modified Independent  ADL's:   Supervision  Cognition:  Least Verbal Cues  Swallowing:  Regular with thins    DISPOSITION: Discharge to pre-morbid independent living setting with the supportive care of patient's family.      ELOS: 14 days    Precious Rawls M.D.  Physical Medicine and Rehabilitation

## 2021-09-29 ENCOUNTER — APPOINTMENT (OUTPATIENT)
Dept: RADIOLOGY | Facility: REHABILITATION | Age: 76
DRG: 949 | End: 2021-09-29
Attending: PHYSICAL MEDICINE & REHABILITATION
Payer: COMMERCIAL

## 2021-09-29 PROBLEM — M54.9 UPPER BACK PAIN ON RIGHT SIDE: Status: ACTIVE | Noted: 2021-09-29

## 2021-09-29 PROBLEM — E88.09 HYPOALBUMINEMIA: Status: ACTIVE | Noted: 2021-09-29

## 2021-09-29 PROBLEM — K59.09 OTHER CONSTIPATION: Status: ACTIVE | Noted: 2021-09-29

## 2021-09-29 PROBLEM — C50.919 BREAST CANCER (HCC): Status: ACTIVE | Noted: 2021-09-29

## 2021-09-29 LAB
25(OH)D3 SERPL-MCNC: 49 NG/ML (ref 30–100)
ALBUMIN SERPL BCP-MCNC: 3.1 G/DL (ref 3.2–4.9)
ALBUMIN/GLOB SERPL: 1.5 G/DL
ALP SERPL-CCNC: 72 U/L (ref 30–99)
ALT SERPL-CCNC: 45 U/L (ref 2–50)
ANION GAP SERPL CALC-SCNC: 8 MMOL/L (ref 7–16)
AST SERPL-CCNC: 28 U/L (ref 12–45)
BASOPHILS # BLD AUTO: 0.4 % (ref 0–1.8)
BASOPHILS # BLD: 0.03 K/UL (ref 0–0.12)
BILIRUB SERPL-MCNC: 0.2 MG/DL (ref 0.1–1.5)
BUN SERPL-MCNC: 17 MG/DL (ref 8–22)
CALCIUM SERPL-MCNC: 8.4 MG/DL (ref 8.5–10.5)
CHLORIDE SERPL-SCNC: 105 MMOL/L (ref 96–112)
CO2 SERPL-SCNC: 25 MMOL/L (ref 20–33)
CREAT SERPL-MCNC: 0.73 MG/DL (ref 0.5–1.4)
EOSINOPHIL # BLD AUTO: 0.14 K/UL (ref 0–0.51)
EOSINOPHIL NFR BLD: 1.7 % (ref 0–6.9)
ERYTHROCYTE [DISTWIDTH] IN BLOOD BY AUTOMATED COUNT: 56.6 FL (ref 35.9–50)
GLOBULIN SER CALC-MCNC: 2.1 G/DL (ref 1.9–3.5)
GLUCOSE SERPL-MCNC: 85 MG/DL (ref 65–99)
HCT VFR BLD AUTO: 27.4 % (ref 37–47)
HGB BLD-MCNC: 8.8 G/DL (ref 12–16)
IMM GRANULOCYTES # BLD AUTO: 0.04 K/UL (ref 0–0.11)
IMM GRANULOCYTES NFR BLD AUTO: 0.5 % (ref 0–0.9)
LYMPHOCYTES # BLD AUTO: 0.94 K/UL (ref 1–4.8)
LYMPHOCYTES NFR BLD: 11.2 % (ref 22–41)
MAGNESIUM SERPL-MCNC: 1.6 MG/DL (ref 1.5–2.5)
MCH RBC QN AUTO: 33.1 PG (ref 27–33)
MCHC RBC AUTO-ENTMCNC: 32.1 G/DL (ref 33.6–35)
MCV RBC AUTO: 103 FL (ref 81.4–97.8)
MONOCYTES # BLD AUTO: 0.68 K/UL (ref 0–0.85)
MONOCYTES NFR BLD AUTO: 8.1 % (ref 0–13.4)
NEUTROPHILS # BLD AUTO: 6.6 K/UL (ref 2–7.15)
NEUTROPHILS NFR BLD: 78.1 % (ref 44–72)
NRBC # BLD AUTO: 0 K/UL
NRBC BLD-RTO: 0 /100 WBC
NT-PROBNP SERPL IA-MCNC: 2806 PG/ML (ref 0–125)
PLATELET # BLD AUTO: 365 K/UL (ref 164–446)
PMV BLD AUTO: 9.5 FL (ref 9–12.9)
POTASSIUM SERPL-SCNC: 4.3 MMOL/L (ref 3.6–5.5)
PROT SERPL-MCNC: 5.2 G/DL (ref 6–8.2)
RBC # BLD AUTO: 2.66 M/UL (ref 4.2–5.4)
SARS-COV-2 RNA RESP QL NAA+PROBE: NOTDETECTED
SODIUM SERPL-SCNC: 138 MMOL/L (ref 135–145)
SPECIMEN SOURCE: NORMAL
WBC # BLD AUTO: 8.4 K/UL (ref 4.8–10.8)

## 2021-09-29 PROCEDURE — 700101 HCHG RX REV CODE 250: Performed by: PHYSICAL MEDICINE & REHABILITATION

## 2021-09-29 PROCEDURE — 83735 ASSAY OF MAGNESIUM: CPT

## 2021-09-29 PROCEDURE — 97162 PT EVAL MOD COMPLEX 30 MIN: CPT

## 2021-09-29 PROCEDURE — 82306 VITAMIN D 25 HYDROXY: CPT

## 2021-09-29 PROCEDURE — 82270 OCCULT BLOOD FECES: CPT

## 2021-09-29 PROCEDURE — 700102 HCHG RX REV CODE 250 W/ 637 OVERRIDE(OP): Performed by: PHYSICAL MEDICINE & REHABILITATION

## 2021-09-29 PROCEDURE — 80053 COMPREHEN METABOLIC PANEL: CPT

## 2021-09-29 PROCEDURE — 97166 OT EVAL MOD COMPLEX 45 MIN: CPT

## 2021-09-29 PROCEDURE — A9270 NON-COVERED ITEM OR SERVICE: HCPCS | Performed by: HOSPITALIST

## 2021-09-29 PROCEDURE — 83880 ASSAY OF NATRIURETIC PEPTIDE: CPT

## 2021-09-29 PROCEDURE — 99223 1ST HOSP IP/OBS HIGH 75: CPT | Performed by: HOSPITALIST

## 2021-09-29 PROCEDURE — 85025 COMPLETE CBC W/AUTO DIFF WBC: CPT

## 2021-09-29 PROCEDURE — 99233 SBSQ HOSP IP/OBS HIGH 50: CPT | Performed by: PHYSICAL MEDICINE & REHABILITATION

## 2021-09-29 PROCEDURE — 74018 RADEX ABDOMEN 1 VIEW: CPT

## 2021-09-29 PROCEDURE — 92610 EVALUATE SWALLOWING FUNCTION: CPT

## 2021-09-29 PROCEDURE — 36415 COLL VENOUS BLD VENIPUNCTURE: CPT

## 2021-09-29 PROCEDURE — 700102 HCHG RX REV CODE 250 W/ 637 OVERRIDE(OP): Performed by: HOSPITALIST

## 2021-09-29 PROCEDURE — 94760 N-INVAS EAR/PLS OXIMETRY 1: CPT

## 2021-09-29 PROCEDURE — 92523 SPEECH SOUND LANG COMPREHEN: CPT

## 2021-09-29 PROCEDURE — 97535 SELF CARE MNGMENT TRAINING: CPT

## 2021-09-29 PROCEDURE — A9270 NON-COVERED ITEM OR SERVICE: HCPCS | Performed by: PHYSICAL MEDICINE & REHABILITATION

## 2021-09-29 PROCEDURE — 770010 HCHG ROOM/CARE - REHAB SEMI PRIVAT*

## 2021-09-29 RX ORDER — AMOXICILLIN 250 MG
2 CAPSULE ORAL 2 TIMES DAILY
Status: DISCONTINUED | OUTPATIENT
Start: 2021-09-29 | End: 2021-10-18

## 2021-09-29 RX ORDER — BISACODYL 10 MG
10 SUPPOSITORY, RECTAL RECTAL DAILY
Status: DISCONTINUED | OUTPATIENT
Start: 2021-09-29 | End: 2021-10-18

## 2021-09-29 RX ORDER — LIDOCAINE 50 MG/G
1 PATCH TOPICAL EVERY 24 HOURS
Status: DISCONTINUED | OUTPATIENT
Start: 2021-09-29 | End: 2021-10-22 | Stop reason: HOSPADM

## 2021-09-29 RX ORDER — POLYETHYLENE GLYCOL 3350 17 G/17G
1 POWDER, FOR SOLUTION ORAL DAILY
Status: DISCONTINUED | OUTPATIENT
Start: 2021-09-29 | End: 2021-10-18

## 2021-09-29 RX ADMIN — ACETAMINOPHEN 650 MG: 325 TABLET ORAL at 03:52

## 2021-09-29 RX ADMIN — FERROUS SULFATE TAB 325 MG (65 MG ELEMENTAL FE) 325 MG: 325 (65 FE) TAB at 08:13

## 2021-09-29 RX ADMIN — SPIRONOLACTONE 25 MG: 25 TABLET, FILM COATED ORAL at 05:57

## 2021-09-29 RX ADMIN — OXYCODONE 5 MG: 5 TABLET ORAL at 05:57

## 2021-09-29 RX ADMIN — METOPROLOL TARTRATE 12.5 MG: 25 TABLET, FILM COATED ORAL at 17:34

## 2021-09-29 RX ADMIN — POLYETHYLENE GLYCOL 3350 1 PACKET: 17 POWDER, FOR SOLUTION ORAL at 17:34

## 2021-09-29 RX ADMIN — OXYCODONE 5 MG: 5 TABLET ORAL at 20:53

## 2021-09-29 RX ADMIN — OXYCODONE 5 MG: 5 TABLET ORAL at 01:29

## 2021-09-29 RX ADMIN — METOPROLOL TARTRATE 25 MG: 25 TABLET, FILM COATED ORAL at 05:57

## 2021-09-29 RX ADMIN — ATORVASTATIN CALCIUM 80 MG: 40 TABLET, FILM COATED ORAL at 19:32

## 2021-09-29 RX ADMIN — APIXABAN 5 MG: 5 TABLET, FILM COATED ORAL at 19:33

## 2021-09-29 RX ADMIN — ASPIRIN 81 MG: 81 TABLET, COATED ORAL at 08:13

## 2021-09-29 RX ADMIN — LIDOCAINE 1 PATCH: 50 PATCH TOPICAL at 20:53

## 2021-09-29 RX ADMIN — BISACODYL 10 MG: 10 SUPPOSITORY RECTAL at 19:33

## 2021-09-29 RX ADMIN — LEVETIRACETAM 1000 MG: 500 TABLET, FILM COATED ORAL at 19:32

## 2021-09-29 RX ADMIN — APIXABAN 5 MG: 5 TABLET, FILM COATED ORAL at 08:13

## 2021-09-29 RX ADMIN — ACETAMINOPHEN 650 MG: 325 TABLET ORAL at 08:13

## 2021-09-29 RX ADMIN — OMEPRAZOLE 20 MG: 20 CAPSULE, DELAYED RELEASE ORAL at 08:12

## 2021-09-29 RX ADMIN — LEVETIRACETAM 1000 MG: 500 TABLET, FILM COATED ORAL at 08:12

## 2021-09-29 RX ADMIN — TAMSULOSIN HYDROCHLORIDE 0.4 MG: 0.4 CAPSULE ORAL at 17:34

## 2021-09-29 RX ADMIN — SENNOSIDES AND DOCUSATE SODIUM 2 TABLET: 8.6; 5 TABLET ORAL at 08:13

## 2021-09-29 RX ADMIN — VALACYCLOVIR HYDROCHLORIDE 500 MG: 500 TABLET, FILM COATED ORAL at 08:13

## 2021-09-29 RX ADMIN — MELATONIN TAB 3 MG 3 MG: 3 TAB at 22:50

## 2021-09-29 RX ADMIN — OXYCODONE HYDROCHLORIDE AND ACETAMINOPHEN 500 MG: 500 TABLET ORAL at 08:13

## 2021-09-29 RX ADMIN — HYDROXYZINE HYDROCHLORIDE 50 MG: 25 TABLET, FILM COATED ORAL at 03:52

## 2021-09-29 RX ADMIN — OXYCODONE HYDROCHLORIDE AND ACETAMINOPHEN 500 MG: 500 TABLET ORAL at 19:33

## 2021-09-29 ASSESSMENT — ACTIVITIES OF DAILY LIVING (ADL)
TUB_SHOWER_TRANSFER_DESCRIPTION: SHOWER BENCH;SET-UP OF EQUIPMENT;SUPERVISION FOR SAFETY
BED_CHAIR_WHEELCHAIR_TRANSFER_DESCRIPTION: SUPERVISION FOR SAFETY;SET-UP OF EQUIPMENT;VERBAL CUEING
TOILETING: INDEPENDENT
TOILET_TRANSFER_DESCRIPTION: SET-UP OF EQUIPMENT;SUPERVISION FOR SAFETY
BED_CHAIR_WHEELCHAIR_TRANSFER_DESCRIPTION: INCREASED TIME;SET-UP OF EQUIPMENT;VERBAL CUEING

## 2021-09-29 ASSESSMENT — PAIN DESCRIPTION - PAIN TYPE
TYPE: ACUTE PAIN

## 2021-09-29 ASSESSMENT — BRIEF INTERVIEW FOR MENTAL STATUS (BIMS)
WHAT DAY OF THE WEEK IS IT: CORRECT
WHAT YEAR IS IT: CORRECT
ASKED TO RECALL BED: NO, COULD NOT RECALL
INITIAL REPETITION OF BED BLUE SOCK - FIRST ATTEMPT: 3
WHAT MONTH IS IT: ACCURATE WITHIN 5 DAYS
ASKED TO RECALL SOCK: YES, AFTER CUEING (SOMETHING TO WEAR")"
ASKED TO RECALL BLUE: YES, NO CUE REQUIRED
BIMS SUMMARY SCORE: 12

## 2021-09-29 ASSESSMENT — GAIT ASSESSMENTS
DEVIATION: BRADYKINETIC;DECREASED BASE OF SUPPORT;SHUFFLED GAIT
ASSISTIVE DEVICE: NONE;FRONT WHEEL WALKER
GAIT LEVEL OF ASSIST: MINIMAL ASSIST

## 2021-09-29 NOTE — CONSULTS
HOSPITAL MEDICINE CONSULTATION    Requesting Physician:  Dr. 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  She was admitted to [facility] on 9/16/21 for elective cardiac surgery.  The patient underwent minimally invasive complex mitral valve repair and Maze procedure on 9/16/21 with Dr. Nogueira.  Post operative complications include seizures that were attributed to her history of stroke; she was placed on Keppra.  Due to her ongoing functional debility, the patient was transferred to [facility] on 9/28/21.  Hospital Medicine consultation is requested to assist in the management of this patient's AFib and HTN.  She is also noted to have anemia.    Review of Systems:  Review of Systems   Constitutional: Negative for chills and fever.   HENT: Negative.    Eyes: Negative.    Respiratory: Negative for cough and shortness of breath.    Cardiovascular: Negative for chest pain and palpitations.   Gastrointestinal: Negative for abdominal pain, nausea and vomiting.   Genitourinary: Negative.    Musculoskeletal:        Wound pain    Skin: Negative for itching and rash.   Neurological: Positive for seizures.   Endo/Heme/Allergies: Negative for polydipsia. Does not bruise/bleed easily.   All other systems reviewed and are negative.      Allergies:  No Known Allergies    Medications:    Current Facility-Administered Medications:   •  metoprolol tartrate (LOPRESSOR) tablet 12.5 mg, 12.5 mg, Oral, TWICE DAILY, Ni Cameron M.D.  •  hydrOXYzine HCl (ATARAX) tablet 50 mg, 50 mg, Oral, Q6HRS PRN, Precious Rawls M.D., 50 mg at 09/29/21 0352  •   QUEtiapine (Seroquel) tablet 25 mg, 25 mg, Oral, TID PRN, Precious Rawls M.D.  •  simethicone (MYLICON) chewable tab 80 mg, 80 mg, Oral, TID PRN, Precious Rawls M.D.  •  melatonin tablet 3 mg, 3 mg, Oral, HS PRN, Precious Rawls M.D.  •  Respiratory Therapy Consult, , Nebulization, Continuous RT, Precious Rawls M.D.  •  Pharmacy Consult Request ...Pain Management Review 1 Each, 1 Each, Other, PHARMACY TO DOSE, Precious Rawls M.D.  •  acetaminophen (Tylenol) tablet 650 mg, 650 mg, Oral, Q4HRS PRN, Precious Rawls M.D., 650 mg at 09/29/21 0813  •  senna-docusate (PERICOLACE or SENOKOT S) 8.6-50 MG per tablet 2 Tablet, 2 Tablet, Oral, BID, 2 Tablet at 09/29/21 0813 **AND** polyethylene glycol/lytes (MIRALAX) PACKET 1 Packet, 1 Packet, Oral, QDAY PRN **AND** magnesium hydroxide (MILK OF MAGNESIA) suspension 30 mL, 30 mL, Oral, QDAY PRN **AND** bisacodyl (DULCOLAX) suppository 10 mg, 10 mg, Rectal, QDAY PRN, Precious Rawls M.D.  •  lactulose 20 GM/30ML solution 30 mL, 30 mL, Oral, QDAY PRN, Precious Rawls M.D.  •  docusate sodium (ENEMEEZ) enema 283 mg, 283 mg, Rectal, QDAY PRN, Precious Rawls M.D.  •  sodium phosphate (Fleet) enema 133 mL, 1 Each, Rectal, QDAY PRN, Precious Rawls M.D.  •  omeprazole (PRILOSEC) capsule 20 mg, 20 mg, Oral, QAM AC, Precious Rawls M.D., 20 mg at 09/29/21 0812  •  artificial tears ophthalmic solution 1 Drop, 1 Drop, Both Eyes, PRN, Precious Rawls M.D.  •  benzocaine-menthol (CEPACOL) lozenge 1 Lozenge, 1 Lozenge, Mouth/Throat, Q2HRS PRN, Precious Rawls M.D.  •  mag hydrox-al hydrox-simeth (MAALOX PLUS ES or MYLANTA DS) suspension 20 mL, 20 mL, Oral, Q2HRS PRN, Precious Rawls M.D.  •  ondansetron (ZOFRAN ODT) dispertab 4 mg, 4 mg, Oral, 4X/DAY PRN **OR** ondansetron (ZOFRAN) syringe/vial injection 4 mg, 4 mg, Intramuscular, 4X/DAY PRN, Precious Rawls M.D.  •  traZODone (DESYREL) tablet 50 mg, 50 mg, Oral, QHS PRN, Precious Rawls,  M.D.  •  sodium chloride (OCEAN) 0.65 % nasal spray 2 Spray, 2 Spray, Nasal, PRN, Precious Rawls M.D.  •  aspirin EC (ECOTRIN) tablet 81 mg, 81 mg, Oral, DAILY, Precious Rawls M.D., 81 mg at 09/29/21 0813  •  ascorbic acid (Vitamin C) tablet 500 mg, 500 mg, Oral, BID, Precious Rawls M.D., 500 mg at 09/29/21 0813  •  ferrous sulfate tablet 325 mg, 325 mg, Oral, QDAY with Breakfast, Precious Rawls M.D., 325 mg at 09/29/21 0813  •  levETIRAcetam (KEPPRA) tablet 1,000 mg, 1,000 mg, Oral, Q12HRS, Precious Rawls M.D., 1,000 mg at 09/29/21 0812  •  spironolactone (ALDACTONE) tablet 25 mg, 25 mg, Oral, Q DAY, Precious Rawls M.D., 25 mg at 09/29/21 0557  •  tamsulosin (FLOMAX) capsule 0.4 mg, 0.4 mg, Oral, AFTER DINNER, Precious Rawls M.D., 0.4 mg at 09/28/21 1830  •  atorvastatin (LIPITOR) tablet 80 mg, 80 mg, Oral, Q EVENING, Precious Rawls M.D., 80 mg at 09/28/21 2050  •  apixaban (ELIQUIS) tablet 5 mg, 5 mg, Oral, BID, Precious Rawls M.D., 5 mg at 09/29/21 0813  •  valACYclovir (VALTREX) caplet 500 mg, 500 mg, Oral, DAILY, Precious Rawls M.D., 500 mg at 09/29/21 0813  •  oxyCODONE immediate-release (ROXICODONE) tablet 5 mg, 5 mg, Oral, Q4HRS PRN, Precious Rawls M.D., 5 mg at 09/29/21 0557  •  oxyCODONE immediate-release (ROXICODONE) tablet 2.5 mg, 2.5 mg, Oral, Q4HRS PRN, Precious Rawls M.D.    Past Medical/Surgical History:  Past Medical History:   Diagnosis Date   • Anemia 9/28/2021   • Arthritis    • Atrial fibrillation (HCC)    • Benign essential HTN 3/19/2012   • Breast cancer (HCC)    • Cancer (HCC) 1998    breast    • Cardiac arrhythmia    • Chest tightness or pressure 3/19/2012   • Chickenpox    • Coronary heart disease    • [place] measles    • Gynecological disorder    • High cholesterol    • High risk medication use 3/19/2012   • Hypercholesterolemia 3/19/2012   • Mumps    • MVP (mitral valve prolapse) 3/19/2012   • Osteoporosis    • Seizure disorder (HCC)  9/28/2021   • Sleep apnea     no CPAP   • Snoring    • Stroke ([facility]) 2017    residual minor weakness on left side   • Substance abuse ([facility])    • Tonsillitis    • Urinary bladder disorder     OAB   • Urinary incontinence    • Valvular heart disease    • Venereal disease      Past Surgical History:   Procedure Laterality Date   • CATARACT PHACO WITH IOL  3/16/2009    Performed by SHANNON GANDARA at [facility]   • CATARACT PHACO WITH IOL  1/26/2009    Performed by SHANNON GANDARA at [facility]   • BREAST BIOPSY     • HYSTERECTOMY LAPAROSCOPY     • LUMPECTOMY     • PB RADIATION THERAPY PLAN SIMPLE     • PB REMV 2ND CATARACT,CORN-SCLER SECTN     • OK CHEMOTHERAPY, UNSPECIFIED PROCEDURE     • PRIMARY C SECTION     • SINUSCOPE     • TONSILLECTOMY         Social History:  Social History     Socioeconomic History   • Marital status:      Spouse name: Not on file   • Number of children: 2   • Years of education: Not on file   • Highest education level: Not on file   Occupational History   • Not on file   Tobacco Use   • Smoking status: Never Smoker   • Smokeless tobacco: Never Used   Vaping Use   • Vaping Use: Never used   Substance and Sexual Activity   • Alcohol use: Not Currently     Comment: twice  a week   • Drug use: No   • Sexual activity: Yes     Partners: Male     Birth control/protection: Female Sterilization   Other Topics Concern   • Not on file   Social History Narrative   • Not on file     Social Determinants of Health     Financial Resource Strain:    • Difficulty of Paying Living Expenses:    Food Insecurity:    • Worried About Running Out of Food in the Last Year:    • Ran Out of Food in the Last Year:    Transportation Needs:    • Lack of Transportation (Medical):    • Lack of Transportation (Non-Medical):    Physical Activity:    • Days of Exercise per Week:    • Minutes of Exercise per Session:    Stress:    • Feeling of Stress :    Social Connections:    •  Frequency of Communication with Friends and Family:    • Frequency of Social Gatherings with Friends and Family:    • Attends [employer] Services:    • Active Member of Clubs or Organizations:    • Attends Club or Organization Meetings:    • Marital Status:    Intimate Partner Violence:    • Fear of Current or Ex-Partner:    • Emotionally Abused:    • Physically Abused:    • Sexually Abused:        Family History:  Family History   Problem Relation Age of Onset   • Cancer Mother         breast   • No Known Problems Brother    • Heart Failure Neg Hx    • Heart Disease Neg Hx        Physical Examination:   Vitals:    09/28/21 1919 09/28/21 1920 09/29/21 0700 09/29/21 0705   BP:  101/54 (!) 98/52    Pulse: 98 72 77 80   Resp: 16 18 18 16   Temp:  36.8 °C (98.2 °F) 36.5 °C (97.7 °F)    TempSrc:  Temporal Oral    SpO2: 97%  93% 90%   Weight:       Height:           Physical Exam  Vitals reviewed.   Constitutional:       General: She is not in acute distress.     Appearance: Normal appearance. She is not ill-appearing.   HENT:      Head: Normocephalic and atraumatic.      Right Ear: External ear normal.      Left Ear: External ear normal.      Nose: Nose normal.      Mouth/Throat:      Pharynx: Oropharynx is clear.   Eyes:      General:         Right eye: No discharge.         Left eye: No discharge.      Extraocular Movements: Extraocular movements intact.      Conjunctiva/sclera: Conjunctivae normal.   Cardiovascular:      Comments: irreg irreg, right chest incisions C/D/I  Pulmonary:      Effort: Pulmonary effort is normal. No respiratory distress.      Breath sounds: Normal breath sounds. No wheezing.   Abdominal:      General: Bowel sounds are normal. There is no distension.      Palpations: Abdomen is soft.      Tenderness: There is no abdominal tenderness. There is no guarding or rebound.   Musculoskeletal:      Cervical back: Normal range of motion and neck supple.      Right lower leg: No edema.      Left lower  leg: No edema.   Skin:     General: Skin is warm and dry.   Neurological:      Mental Status: She is alert and oriented to person, place, and time.         Laboratory Data:  Recent Labs     09/29/21  0539   WBC 8.4   RBC 2.66*   HEMOGLOBIN 8.8*   HEMATOCRIT 27.4*   .0*   MCH 33.1*   MCHC 32.1*   RDW 56.6*   PLATELETCT 365   MPV 9.5     Recent Labs     09/27/21  2240 09/28/21  0750 09/29/21  0539   SODIUM  --   --  138   POTASSIUM  --   --  4.3   CHLORIDE  --   --  105   CO2  --   --  25   GLUCOSE 106 104 85   BUN  --   --  17   CREATININE  --   --  0.73   CALCIUM  --   --  8.4*           Impressions/Recommendations:  Seizure disorder (HCC)  On Keppra    S/P mitral valve repair  MVP w/ severe MVR  Now S/P minimally invasive MVR at [place] on 9/16/21 by Dr. Nogueira  Request F/U Echo  Wound care and pain control per Physiatry    Hypertension  On Metoprolol and Aldactone  Will decrease Metoprolol for low blood pressures  Monitor for orthostatic hypotension on Flomax    History of CVA (cerebrovascular accident)  H/O CVA x 2 w/ residual left hemiparesis  On Eliquis, ASA, and Lipitor    Atrial fibrillation (HCC)  Now S/P Maze procedure at [place] on 9/16/21 by Dr. Nogueira  Request F/U Echo  Rate controlled on Metoprolol  Anticoagulated on Eliquis    Herpes  On Valtrex    Anemia  Suspect large component of krzysztof-op blood loss  Has macrocytic indices  Check Vit B12 and TSH  Folate testing no longer performed at this facility  On Fe and Vit C supplements, will check Fe Panel as well  Request FOB x 3  Follow H/H    Breast cancer (HCC)  H/O lumpectomy and chemoXRT    Full Code    Thank you for the opportunity to assist in this patient's care.  We will continue to follow along with you.

## 2021-09-29 NOTE — THERAPY
"Speech Language Pathology   Initial Assessment     Patient Name: Shaista Bocanegra  AGE:  76 y.o., SEX:  female  Medical Record #: [id number]  Today's Date: 9/29/2021     Subjective    Patient was willing to participate in cognitive linguistic and clinical swallow evaluations. Patient reports residual deficits from stroke in May of this year. Was seeing speech therapy for cognitive and language deficits through August, but was discontinued d/t insurance purposes. Patient reports living alone, but states that she struggles to cook and clean and no longer drives. Plans to hire caregivers after d/c. Self manages medications and finances, but states this has also been difficult since stroke.      Objective       09/29/21 0802   Prior Living Situation   Prior Services Home-Independent   Housing / Facility 1 Story Apartment / Condo   Lives with - Patient's Self Care Capacity Alone and Able to Care For Self   Prior Level Of Function   Communication Impaired   Swallow Within Functional Limits   Dentition Intact   Dentures None   Hearing Within Functional Limits for Evaluation   Hearing Aid None   Vision Wears Corrective Lenses   Patient's Primary Language English   Occupation (Pre-Hospital Vocational) Retired Due To Disability  (still does occasional work as )   Cognitive Pattern Assessment   Cognitive Pattern Assessment Used BIMS   Brief Interview for Mental Status (BIMS)   Repetition of Three Words (First Attempt) 3   Temporal Orientation: Year Correct   Temporal Orientation: Month Accurate within 5 days   Temporal Orientation: Day Correct   Recall: \"Sock\" Yes, after cueing (\"something to wear\")   Recall: \"Blue\" Yes, no cue required   Recall: \"Bed\" No, could not recall   BIMS Summary Score 12   Tracheostomy   Tracheostomy No   Speech Mechanisms / Voice Production   Speech Mechanisms / Voice Production (WDL) WDL   Labial Function   Labial Function (WDL) WDL   Lingual Function   Lingual Function (WDL) WDL   Jaw Function "   Jaw Function (WDL) WDL   Velar Function   Velar Function (WDL) WDL   Laryngeal Function   Laryngeal Function (WDL) WDL   Swallowing   Pureed (4) Within Functional Limits   Thin Liquid Within Functional Limits   Single Swallow Thin / Nectar (Cup) Within Functional Limits   Serial Swallows Thin / [place] (Straw) Within Functional Limits   Masticated Foods Within Functional Limits   Dysphagia Strategies / Recommendations   Diet / Liquid Recommendation Regular (7);Thin (0)   Medication Administration  Whole with Liquid Wash   Therapy Interventions No Swallowing Intervention Required   Barriers To Oral Feeding   Barriers To Oral Feeding Impaired Cognition / Attention   Cervical Ausculatation Not Tested   Pre-Feeding Oral Stimulation Trial Intact   Eating   Assistance Needed Set-up / clean-up   Physical Assistance Level No physical assistance   CARE Score - Eating 5   Eating Discharge Goal   Discharge Goal 6   Functional Level of Assist   Eating Supervision   Eating Description Set-up of equipment or meal/tube feeding   Comprehension Minimal Assist   Comprehension Description Glasses;Verbal cues   Expression Minimal Assist   Expression Description Verbal cueing   Social Interaction Supervision   Social Interaction Description Increased time;Schedule;Verbal cues   Problem Solving Maximal Assist   Problem Solving Description Therapy schedule;Supervision;Seat belt;Increased time;Bed/chair alarm;Verbal cueing   Memory Maximal Assist   Memory Description Verbal cueing;Therapy schedule;Increased time;Bed/chair alarm   Outcome Measures   Outcome Measures Utilized SCCAN   SCCAN (Scales of Cognitive and Communicative Ability for Neurorehabilitation)   Oral Expression - Raw Score 16   Oral Expression - Scale Performance Score 84   Orientation - Raw Score 12   Orientation - Scale Performance Score 100   Memory - Raw Score 9   Memory - Scale Performance Score 47   Speech Comprehension - Raw Score 11   Speech Comprehension - Scale  "Performance Score 85   Reading Comprehension - Raw Score 6   Reading Comprehension - Scale Performance Score 50   Writing - Raw Score 6   Writing - Scale Performance Score 86   Attention - Raw Score 8   Attention - Scale Performance Score 50   Problem Solving - Raw Score 14   Problem Solving - Scale Performance Score 61   SCCAN Total Raw Score 64   SCCAN Degree of Severity Moderate Impairment   Problem List   Problem List Cognitive-Linguistic Deficits   Current Discharge Plan   Current Discharge Plan Return to Prior Living Situation   Benefit   Therapy Benefit Patient would benefit from Inpatient Rehab Speech-Language Pathology to address above identified deficits.   Speech Language Pathologist Assigned   Assigned SLP / Extension CL/MP 60 NO SPLIT   SLP Total Time Spent   SLP Individual Total Time Spent (Mins) 90   Evaluation Charges   SLP Speech Language Evaluation Speech Sound Language Comprehension   SLP Oral Pharyngeal Evaluation Oral Pharyngeal Evaluation       Assessment    Patient is 76 y.o. female with a diagnosis of debility s/p mitral valve repair.  Additional factors influencing patient status/progress (ie: cognitive factors, co-morbidities, social support, etc): PMH of stroke x2 with residual deficits, lives alone.    Patient participated in cognitive linguistic evaluation. SCCAN was completed with a final raw score of 64 indicating moderate cognitive deficits. Patient demonstrated difficulties in the yrn of memory, attention, reading comprehension, and problem solving. Patient also demonstrated difficulties with word finding at conversational level and during generative naming tasks. Was not able to complete basic financial math questions which patient reports as \"very frustrating\" since worked as . Patient would benefit from ST to address cognitive linguistic deficits.     Clinical swallow evaluation was completed. Patient was assessed with current diet textures as well as trials of " dry solids. No overt s/sx of aspiration were noted with any texture assessed. Medications were presented with liquid wash. Patient required multiple swallows for larger pills, but was able to tolerate when given one at a time. Recommend regular diet with thin liquids. ST is not indicated for dysphagia at this time.     Plan  Recommend Speech Therapy 30-60 minutes per day 5-6 days per week for 1-2 weeks for the following treatments:  SLP Speech Language Treatment, SLP Self Care / ADL Training  and SLP Cognitive Skill Development.    Passport items to be completed:  Express basic needs, understand food/liquid recommendations, consistently follow swallow precautions, manage finances, manage medications, arrive to therapy appointments on time, complete daily memory log entries, solve problems related to safety situations, review education related to hospitalization, complete caregiver training     Goals:  Long term and short term goals have been discussed with patient and they are in agreement.    Speech Therapy Problems (Active)     Problem: Expression STGs     Dates: Start: 09/29/21       Goal: STG-Within one week, patient will demonstrate use of SFA during conversation in order to improve word finding.      Dates: Start: 09/29/21             Problem: Memory STGs     Dates: Start: 09/29/21       Goal: STG-Within one week, patient will demonstrate use of memory strategies in order to recall daily events     Dates: Start: 09/29/21             Problem: Problem Solving STGs     Dates: Start: 09/29/21       Goal: STG-Within one week, patient will complete medication management tasks with 80% accuracy, min verbal cues.      Dates: Start: 09/29/21          Goal: STG-Within one week, patient will complete basic financial management tasks with 70% accuracy, min verbal cues     Dates: Start: 09/29/21             Problem: Speech/Swallowing LTGs     Dates: Start: 09/29/21       Goal: LTG-By discharge, patient will solve complex  problems related to IADL's in order to d/c homw with mod I     Dates: Start: 09/29/21

## 2021-09-29 NOTE — PROGRESS NOTES
Admitted to room 114W and oriented to the rehab program. COVID19 swab sent to lab. 2 RN skin check with Ana completed. See new photos and assessment. Alarms on the bed for safety.

## 2021-09-29 NOTE — FLOWSHEET NOTE
09/28/21 1919   Events/Summary/Plan   Events/Summary/Plan RT assessment   Vital Signs   Pulse 98   Respiration 16   Pulse Oximetry 97 %   $ Pulse Oximetry (Spot Check) Yes   Respiratory Assessment   Respiratory Pattern Within Normal Limits   Level of Consciousness Alert   Chest Exam   Work Of Breathing / Effort Within Normal Limits   Breath Sounds   RUL Breath Sounds Clear   RML Breath Sounds Clear   RLL Breath Sounds Clear   YUNIOR Breath Sounds Clear   LLL Breath Sounds Clear   Oxygen   O2 Delivery Device None - Room Air

## 2021-09-29 NOTE — PROGRESS NOTES
Received bedside shift report from Carolyn BERNSTEIN RN regarding patient and assumed care. Patient awake, calm and stable, currently positioned in bed for comfort and safety; call light within reach. Denies pain or discomfort at this time. Will continue to monitor.

## 2021-09-29 NOTE — CARE PLAN
Problem: Knowledge Deficit - Standard  Goal: Patient and family/care givers will demonstrate understanding of plan of care, disease process/condition, diagnostic tests and medications  Outcome: Progressing  Oriented to the rehab program and call lights. She verballized understanding of the alarms on the bed for safety.

## 2021-09-29 NOTE — ASSESSMENT & PLAN NOTE
HR ok  S/P Maze procedure at [place] (9/16)  Echo EF 60% with normally functioning MVR  On Metoprolol  On Eliquis  Monitor

## 2021-09-29 NOTE — THERAPY
"Occupational Therapy   Initial Evaluation     Patient Name: Shaista Bocanegra  Age:  76 y.o., Sex:  female  Medical Record #: [id number]  Today's Date: 9/29/2021     Subjective    Patient asleep in bed and difficult to awaken.  Patient repeatedly stated, \"I'll get up in ten minutes.\" Eventually was agreeable.     Objective       09/29/21 0701   Prior Living Situation   Prior Services Home-Independent   Housing / Facility 1 Story Apartment / Condo  (first floor condo)   Steps Into Home 0   Steps In Home 0   Bathroom Set up Walk In Shower;Grab Bars   Equipment Owned Grab Bar(s) In Tub / Shower   Lives with - Patient's Self Care Capacity Alone and Able to Care For Self   Prior Level of ADL Function   Self Feeding Independent   Grooming / Hygiene Independent   Bathing Independent   Dressing Independent   Toileting Independent   Prior Level of IADL Function   Medication Management Independent   Laundry Independent   Kitchen Mobility Independent   Finances Independent   Home Management Independent   Shopping Independent   Prior Level Of Mobility Independent Without Device in Community   Driving / Transportation Driving Independent   Occupation (Pre-Hospital Vocational) Retired Due To Age  (bankruptcy )   Leisure Interests Unable To Determine At This Time   Prior Functioning: Everyday Activities   Self Care Independent   Indoor Mobility (Ambulation) Independent   Stairs Independent   Functional Cognition Independent   Prior Device Use None of the given options   Vitals   O2 Delivery Device None - Room Air   Pain 0 - 10 Group   Location Back  (right sided traps)   Location Orientation Right;Upper   Therapist Pain Assessment Prior to Activity;During Activity;Post Activity;Nurse Notified;8   Non Verbal Descriptors   Non Verbal Scale  Calm   Vision Screen   Vision Not tested   Passive ROM Upper Body   Passive ROM Upper Body WDL   Active ROM Upper Body   Active ROM Upper Body  WDL   Strength Upper Body   Upper Body Strength  X "   Gross Strength Generalized Weakness, Equal Bilaterally.    Sensation Upper Body   Upper Extremity Sensation  Not Tested   Upper Body Muscle Tone   Upper Body Muscle Tone  Not Tested   Balance Assessment   Sitting Balance (Static) Normal   Sitting Balance (Dynamic) Good   Standing Balance (Static) Fair -   Standing Balance (Dynamic) Poor   Weight Shift Sitting Fair   Weight Shift Standing Fair   Bed Mobility    Supine to Sit Minimal Assist   Sit to Stand Minimal Assist   Scooting Contact Guard Assist   Rolling Supervised   Coordination Upper Body   Coordination WDL   Oral Hygiene   Assistance Needed Set-up / clean-up   CARE Score - Oral Hygiene 5   Oral Hygiene Discharge Goal   Discharge Goal 6   Shower/Bathe Self   Assistance Needed Physical assistance   Physical Assistance Level 25% or less   CARE Score - Shower/Bathe Self 3   Shower/Bathe Self Discharge Goal   Discharge Goal 6   Upper Body Dressing   Assistance Needed Set-up / clean-up   CARE Score - Upper Body Dressing 5   Upper Body Dressing Discharge Goal   Discharge Goal 6   Lower Body Dressing   Assistance Needed Physical assistance   Physical Assistance Level Total assistance   CARE Score - Lower Body Dressing 1   Lower Body Dressing Discharge Goal   Discharge Goal 6   Putting On/Taking Off Footwear   Assistance Needed Physical assistance   Physical Assistance Level 26%-50%   CARE Score - Putting On/Taking Off Footwear 3   Putting On/Taking Off Footwear Discharge Goal   Discharge Goal 6   Toileting Hygiene   Reason if not Attempted Refused to perform   CARE Score - Toileting Hygiene 7   Toileting Hygiene Discharge Goal   Discharge Goal 6   Toilet Transfer   Assistance Needed Incidental touching   Physical Assistance Level 25% or less   CARE Score - Toilet Transfer 3   Toilet Transfer Discharge Goal   Discharge Goal 6   Hearing, Speech, and Vision   Expression of Ideas and Wants Some difficulty   Understanding Verbal and Non-Verbal Content Understands    Functional Level of Assist   Grooming Stand by Assist   Grooming Description Set-up of equipment;Supervision for safety;Seated in wheelchair at sink   Bathing Minimal Assist   Bathing Description Tub bench;Set-up of equipment;Supervision for safety   Upper Body Dressing Stand by Assist   Upper Body Dressing Description Set-up of equipment;Supervision for safety   Lower Body Dressing Maximal Assist   Lower Body Dressing Description Set-up of equipment;Supervision for safety;Verbal cueing  (assist w/ 5/7 tasks)   Toileting   (n/t)   Bed, Chair, Wheelchair Transfer Minimal Assist   Bed Chair Wheelchair Transfer Description Supervision for safety;Set-up of equipment;Verbal cueing  (min handhold assist SPT)   Toilet Transfers Contact Guard Assist   Toilet Transfer Description Set-up of equipment;Supervision for safety   Tub / Shower Transfers Contact Guard Assist   Tub Shower Transfer Description Shower bench;Set-up of equipment;Supervision for safety   Problem List   Problem List Decreased Active Daily Living Skills;Decreased Homemaking Skills;Decreased Upper Extremity Strength Right;Decreased Upper Extremity Strength Left;Decreased Functional Mobility;Impaired Cognitive Function;Impaired Postural Control / Balance   Precautions   Precautions Fall Risk;Other (See Comments);Cardiac Precautions (See Comments)   Comments seizure prec   Current Discharge Plan   Current Discharge Plan Return to Prior Living Situation   Benefit    Therapy Benefit Patient Would Benefit from Inpatient Rehab Occupational Therapy to Maximize [place] with ADLs, IADLs and Functional Mobility.   Interdisciplinary Plan of Care Collaboration   IDT Collaboration with  Physical Therapist   Patient Position at End of Therapy Seated;Call Light within Reach;Tray Table within Reach;Phone within Reach;Self Releasing Lap Belt Applied;Chair Alarm On   Collaboration Comments CLOF   Equipment Needs   Assistive Device / DME Parallel Bars;Front-Wheel  Walker;Wheelchair;Shower Chair;Grab Bars In Shower / Tub;Grab Bars By Toilet   Adaptive Equipment None   Strengths & Barriers   Strengths Able to follow instructions;Alert and oriented;Adequate strength;Effective communication skills;Independent prior level of function;Motivated for self care and independence;Pleasant and cooperative;Supportive family;Willingly participates in therapeutic activities   Barriers Decreased endurance;Fatigue;Generalized weakness;Impaired activity tolerance;Impaired balance;Pain  (lack of sleep night before initial evaluation)   OT Total Time Spent   OT Individual Total Time Spent (Mins) 60   OT Charge Group   Charges Yes   OT Self Care / ADL 1   OT Evaluation OT Evaluation Mod       Assessment  Patient is 76 y.o. female with a diagnosis of mitral valve repair with post-op tonic-clonic seizure.  Additional factors influencing patient status / progress (ie: cognitive factors, co-morbidities, social support, etc): A-Fib, CVA's in 2017 and 05/21.      Plan  Recommend Occupational Therapy  minutes per day 5-7 days per week for 2 weeks for the following treatments:  OT Self Care/ADL, OT Cognitive Skill Dev, OT Manual Ther Technique, OT Neuro Re-Ed/Balance, OT Therapeutic Activity, OT Evaluation and OT Therapeutic Exercise.    Passport items to be completed:  Passport items to be completed:  Perform bathroom transfers, complete dressing, complete feeding, get ready for the day, prepare a simple meal, participate in household tasks, adapt home for safety needs, demonstrate home exercise program, complete caregiver training     Goals:  Long term and short term goals have been discussed with patient and they are in agreement.    Occupational Therapy Goals (Active)     Problem: Dressing     Dates: Start: 09/29/21       Goal: STG-Within one week, patient will dress LB with mod A using AE/AD/techniques     Dates: Start: 09/29/21             Problem: Functional Transfers     Dates: Start:  09/29/21       Goal: STG-Within one week, patient will transfer to toilet with supervision using grab bar and/or FWW     Dates: Start: 09/29/21             Problem: OT Long Term Goals     Dates: Start: 09/29/21       Goal: LTG-By discharge, patient will complete basic self care tasks with mod I     Dates: Start: 09/29/21          Goal: LTG-By discharge, patient will perform bathroom transfers with mod I     Dates: Start: 09/29/21          Goal: LTG-By discharge, patient will complete basic home management with mod I     Dates: Start: 09/29/21             Problem: Toileting     Dates: Start: 09/29/21       Goal: STG-Within one week, patient will complete toileting tasks with SBA using AE/AD     Dates: Start: 09/29/21

## 2021-09-29 NOTE — PROGRESS NOTES
"Rehab Progress Note     Date of Service: 9/29/2021  Chief Complaint: follow up MVR repair    Interval Events (Subjective)    Patient seen and examined today in her room.  She reports right sided upper back pain that made it hard to sleep last night.  She has not moved her bowels since the 25th.  She denies any abdominal pain.  She has Medina in place due to retention.  Dr. Cameron consulted for medical issues.    ROS: No changes to  Bladder or mood.       Objective:  VITAL SIGNS: /56   Pulse 75   Temp 36.6 °C (97.8 °F) (Temporal)   Resp 20   Ht 1.676 m (5' 6\")   Wt 63 kg (139 lb)   SpO2 92%   BMI 22.44 kg/m²   Gen: alert, no apparent distress  CV: incision on right upper chest wall with surrounded edema  Msk: tenderness to palpation over right upper trapezius and paraspinal muscles with spasms noted    Recent Results (from the past 72 hour(s))   POC GLUCOSE    Collection Time: 09/26/21  5:28 PM   Result Value Ref Range    Glucose 149 70 - 200 mg/dL    Specimen Type arterial/capillary     NOTE       Device ID [id number]- Performed site [id number]   POC GLUCOSE    Collection Time: 09/26/21  8:33 PM   Result Value Ref Range    Glucose 141 70 - 200 mg/dL    Specimen Type arterial/capillary     NOTE       Device ID [id number]- Performed site [id number]   POC GLUCOSE    Collection Time: 09/27/21 12:28 PM   Result Value Ref Range    Glucose 143 70 - 200 mg/dL    Specimen Type arterial/capillary     NOTE       Device ID [id number]- Performed site [id number]   POC GLUCOSE    Collection Time: 09/27/21  4:28 PM   Result Value Ref Range    Glucose 139 70 - 200 mg/dL    Specimen Type arterial/capillary     NOTE       Device ID [id number]- Performed site [id number]   POC GLUCOSE    Collection Time: 09/27/21 10:40 PM   Result Value Ref Range    Glucose 106 70 - 200 mg/dL    Specimen Type arterial/capillary     NOTE       Device ID [id number] " Performed site [id number]   POC GLUCOSE    Collection Time: 09/28/21  7:50 AM   Result Value Ref Range    Glucose 104 70 - 200 mg/dL    Specimen Type arterial/capillary     NOTE       Device ID [id number]- Performed site [id number]   SARS-CoV-2 PCR (24 hour In-House): Collect NP swab in VTM    Collection Time: 09/28/21  4:35 PM    Specimen: Nasopharyngeal; Respirate   Result Value Ref Range    SARS-CoV-2 Source NP Swab    CBC with Differential    Collection Time: 09/29/21  5:39 AM   Result Value Ref Range    WBC 8.4 4.8 - 10.8 K/uL    RBC 2.66 (L) 4.20 - 5.40 M/uL    Hemoglobin 8.8 (L) 12.0 - 16.0 g/dL    Hematocrit 27.4 (L) 37.0 - 47.0 %    .0 (H) 81.4 - 97.8 fL    MCH 33.1 (H) 27.0 - 33.0 pg    MCHC 32.1 (L) 33.6 - 35.0 g/dL    RDW 56.6 (H) 35.9 - 50.0 fL    Platelet Count 365 164 - 446 K/uL    MPV 9.5 9.0 - 12.9 fL    Neutrophils-Polys 78.10 (H) 44.00 - 72.00 %    Lymphocytes 11.20 (L) 22.00 - 41.00 %    Monocytes 8.10 0.00 - 13.40 %    Eosinophils 1.70 0.00 - 6.90 %    Basophils 0.40 0.00 - 1.80 %    Immature Granulocytes 0.50 0.00 - 0.90 %    Nucleated RBC 0.00 /100 WBC    Neutrophils (Absolute) 6.60 2.00 - 7.15 K/uL    Lymphs (Absolute) 0.94 (L) 1.00 - 4.80 K/uL    Monos (Absolute) 0.68 0.00 - 0.85 K/uL    Eos (Absolute) 0.14 0.00 - 0.51 K/uL    Baso (Absolute) 0.03 0.00 - 0.12 K/uL    Immature Granulocytes (abs) 0.04 0.00 - 0.11 K/uL    NRBC (Absolute) 0.00 K/uL   Comp Metabolic Panel (CMP)    Collection Time: 09/29/21  5:39 AM   Result Value Ref Range    Sodium 138 135 - 145 mmol/L    Potassium 4.3 3.6 - 5.5 mmol/L    Chloride 105 96 - 112 mmol/L    Co2 25 20 - 33 mmol/L    Anion Gap 8.0 7.0 - 16.0    Glucose 85 65 - 99 mg/dL    Bun 17 8 - 22 mg/dL    Creatinine 0.73 0.50 - 1.40 mg/dL    Calcium 8.4 (L) 8.5 - 10.5 mg/dL    AST(SGOT) 28 12 - 45 U/L    ALT(SGPT) 45 2 - 50 U/L    Alkaline Phosphatase 72 30 - 99 U/L    Total Bilirubin 0.2 0.1 - 1.5 mg/dL    Albumin 3.1  (L) 3.2 - 4.9 g/dL    Total Protein 5.2 (L) 6.0 - 8.2 g/dL    Globulin 2.1 1.9 - 3.5 g/dL    A-G Ratio 1.5 g/dL   Magnesium    Collection Time: 09/29/21  5:39 AM   Result Value Ref Range    Magnesium 1.6 1.5 - 2.5 mg/dL   Vitamin D, 25-hydroxy (blood)    Collection Time: 09/29/21  5:39 AM   Result Value Ref Range    25-Hydroxy   Vitamin D 25 49 30 - 100 ng/mL   proBrain Natriuretic Peptide, NT    Collection Time: 09/29/21  5:39 AM   Result Value Ref Range    NT-proBNP 2806 (H) 0 - 125 pg/mL   ESTIMATED GFR    Collection Time: 09/29/21  5:39 AM   Result Value Ref Range    GFR If African American >60 >60 mL/min/1.73 m 2    GFR If Non African American >60 >60 mL/min/1.73 m 2       Current Facility-Administered Medications   Medication Frequency   • metoprolol tartrate (LOPRESSOR) tablet 12.5 mg TWICE DAILY   • lidocaine (LIDODERM) 5 % 1 Patch Q24HR   • hydrOXYzine HCl (ATARAX) tablet 50 mg Q6HRS PRN   • QUEtiapine (Seroquel) tablet 25 mg TID PRN   • simethicone (MYLICON) chewable tab 80 mg TID PRN   • melatonin tablet 3 mg HS PRN   • Respiratory Therapy Consult Continuous RT   • Pharmacy Consult Request ...Pain Management Review 1 Each PHARMACY TO DOSE   • acetaminophen (Tylenol) tablet 650 mg Q4HRS PRN   • senna-docusate (PERICOLACE or SENOKOT S) 8.6-50 MG per tablet 2 Tablet BID    And   • polyethylene glycol/lytes (MIRALAX) PACKET 1 Packet QDAY PRN    And   • magnesium hydroxide (MILK OF MAGNESIA) suspension 30 mL QDAY PRN    And   • bisacodyl (DULCOLAX) suppository 10 mg QDAY PRN   • lactulose 20 GM/30ML solution 30 mL QDAY PRN   • docusate sodium (ENEMEEZ) enema 283 mg QDAY PRN   • sodium phosphate (Fleet) enema 133 mL QDAY PRN   • omeprazole (PRILOSEC) capsule 20 mg QAM AC   • artificial tears ophthalmic solution 1 Drop PRN   • benzocaine-menthol (CEPACOL) lozenge 1 Lozenge Q2HRS PRN   • mag hydrox-al hydrox-simeth (MAALOX PLUS ES or MYLANTA DS) suspension 20 mL Q2HRS PRN   • ondansetron (ZOFRAN ODT) dispertab 4  mg 4X/DAY PRN    Or   • ondansetron (ZOFRAN) syringe/vial injection 4 mg 4X/DAY PRN   • traZODone (DESYREL) tablet 50 mg QHS PRN   • sodium chloride (OCEAN) 0.65 % nasal spray 2 Spray PRN   • aspirin EC (ECOTRIN) tablet 81 mg DAILY   • ascorbic acid (Vitamin C) tablet 500 mg BID   • ferrous sulfate tablet 325 mg QDAY with Breakfast   • levETIRAcetam (KEPPRA) tablet 1,000 mg Q12HRS   • spironolactone (ALDACTONE) tablet 25 mg Q DAY   • tamsulosin (FLOMAX) capsule 0.4 mg AFTER DINNER   • atorvastatin (LIPITOR) tablet 80 mg Q EVENING   • apixaban (ELIQUIS) tablet 5 mg BID   • valACYclovir (VALTREX) caplet 500 mg DAILY   • oxyCODONE immediate-release (ROXICODONE) tablet 5 mg Q4HRS PRN   • oxyCODONE immediate-release (ROXICODONE) tablet 2.5 mg Q4HRS PRN       Orders Placed This Encounter   Procedures   • Diet Order Diet: Regular (chopped meats)     Standing Status:   Standing     Number of Occurrences:   1     Order Specific Question:   Diet:     Answer:   Regular [1]     Comments:   chopped meats       Assessment:  Active Hospital Problems    Diagnosis    • *S/P mitral valve repair    • Hypoalbuminemia    • Other constipation    • Breast cancer (HCC)    • Upper back pain on right side    • Impaired mobility and ADLs    • Seizure disorder (HCC)    • Other hyperlipidemia    • Urinary retention    • Anemia    • Atrial fibrillation (HCC)    • Herpes    • Sleep apnea    • Hypertension    • History of CVA (cerebrovascular accident)      This patient is a 76 y.o. female admitted for acute inpatient rehabilitation with impaired cardiac endurance S/P mitral valve repair.    Medical Decision Making and Plan:    S/p Mitral Valve repair  Dr. Nogueira 9/16  Continue full rehab program  PT/OT/SLP, 1 hr each discipline, 5 days per week    Outpatient follow up with cardiology and Dr. Nogueira     Aspirin  Spironolactone      History of strokes  Cardio-embolic  Residual left hemiparesis  Aphasia  Dysphagia  Continue full rehab program  PT/OT/SLP,  1 hr each discipline, 5 days per week       Eliquis  Statin     Outpatient follow up with Dr. Sosa    Right upper back pain  Lidoderm patch  PRN tylenol, last use 9/29  PRN oxycodone, last use 9/29     Seizure disorder  Tonic/clonic post-op  Keppra  Outpatient referral to neurology     Hypertension  Metoprolol  Spironolactone  Appreciate hospitalist assistance    Orthostatic hypotension  Appreciate hospitalist assistance  May need to decrease dosing of BP meds     Atrial fibrillation  Metoprolol  Appreciate hospitalist assistance     Hyperlipidemia  Statin     Anemia  Ferrous sulfate  Vit C     GI prophylaxis  Omeprazole     History of herpes simplex  Valacyclovir    Bowel program  Constipation  Increase bowel medications  Last BM 9/25    Bladder program  Urinary retention  Started Flomax 9/28  Continue Medina for now  Will do voiding trial early next week     DVT prophylaxis  Eliquis    Total time:  35 minutes.  I spent greater than 50% of the time for patient care, counseling, and coordination on this date, including patient face-to face time, unit/floor time with review of records/pertinent lab data and studies, as well as discussing diagnostic evaluation/work up, planned therapeutic interventions, and future disposition of care, as per the interval events/subjective and the assessment and plan as noted above.      Precious Rawls M.D.   Physical Medicine and Rehabilitation

## 2021-09-29 NOTE — CARE PLAN
"  Problem: Knowledge Deficit - Standard  Goal: Patient and family/care givers will demonstrate understanding of plan of care, disease process/condition, diagnostic tests and medications  Outcome: Progressing  Note: Pt agrees with plan of care regarding medications and safety.  Will continue to monitor patient.     Problem: Bladder / Voiding  Goal: Patient will establish and maintain regular urinary output  Outcome: Progressing  Note: Pt with indwelling catheter due to retention.  Monitored, keeping it free from kinks.     Problem: Fall Risk - Rehab  Goal: Patient will remain free from falls  Outcome: Progressing  Note: [place] Fall risk Assessment Score: 12      Moderate fall risk Interventions  - Bed and strip alarm   - Yellow sign by the door   - Yellow wrist band \"Fall risk\"  - Room near to the nurse station  - Do not leave patient unattended in the bathroom  - Fall risk education provided           The patient is Watcher - Medium risk of patient condition declining or worsening    Shift Goals  Clinical Goals: Safety  Patient Goals: Sleep well    Progress made toward(s) clinical / shift goals:  progressing        "

## 2021-09-29 NOTE — ASSESSMENT & PLAN NOTE
MVP with severe MVR  Now S/P MVR at [place] on 9/16  F/U Echo EF 60% with normally functioning MVR  CXR: shows atelectasis  U/S chest: shows SQ hematoma

## 2021-09-29 NOTE — THERAPY
Physical Therapy   Initial Evaluation     Patient Name: Shaista Bocanegra  Age:  76 y.o., Sex:  female  Medical Record #: [id number]  Today's Date: 9/29/2021     Subjective    Pt in bed sleeping, agreeable to PT upon waking, however difficult to maintain alertness.      Objective       09/29/21 1001   Prior Living Situation   Prior Services None   Housing / Facility 1 Story Apartment / Condo   Steps Into Home 0   Steps In Home 0   Elevator Yes   Equipment Owned None   Lives with - Patient's Self Care Capacity Alone and Able to Care For Self   Prior Level of Functional Mobility   Bed Mobility Independent   Transfer Status Independent   Ambulation Independent   Assistive Devices Used None   Stairs Independent   Prior Functioning: Everyday Activities   Self Care Independent   Indoor Mobility (Ambulation) Independent   Stairs Independent   Functional Cognition Independent   Prior Device Use None of the given options   Vitals   Patient BP Position Standing 1 minute   Blood Pressure  (!) 85/41  (97/63 sitting)   Passive ROM Lower Body   Passive ROM Lower Body WDL   Active ROM Lower Body    Active ROM Lower Body  WDL   Strength Lower Body   Lower Body Strength  X   Rt Hip Flexion Strength 3+ (F+)   Lt Hip Abduction Strength 3+ (F+)   Lt Hip Adduction Strength 4- (G-)   Lt Knee Extension Strength 4- (G-)   Lt Ankle Dorsiflexion Strength 3- (F-)   Sensation Lower Body   Lower Extremity Sensation   WDL   Comments Intact to light touch bilaterally   Balance Assessment   Sitting Balance (Static) Good   Sitting Balance (Dynamic) Fair +   Standing Balance (Static) Fair   Standing Balance (Dynamic) Fair -   Weight Shift Sitting Fair   Weight Shift Standing Fair   Bed Mobility    Supine to Sit Minimal Assist   Sit to Supine Minimal Assist   Sit to Stand Contact Guard Assist   Scooting Stand by Assist   Rolling Supervised   Roll Left and Right   Assistance Needed Supervision;Adaptive equipment   CARE Score - Roll Left and Right 4   Roll  Left and Right Discharge Goal   Discharge Goal 6   Sit to Lying   Assistance Needed Physical assistance   Physical Assistance Level 25% or less   CARE Score - Sit to Lying 3   Sit to Lying Discharge Goal   Discharge Goal 6   Lying to Sitting on Side of Bed   Assistance Needed Physical assistance   Physical Assistance Level 25% or less   CARE Score - Lying to Sitting on Side of Bed 3   Lying to Sitting on Side of Bed Discharge Goal   Discharge Goal 6   Sit to Stand   Assistance Needed Incidental touching   CARE Score - Sit to Stand 4   Sit to Stand Discharge Goal   Discharge Goal 6   Chair/Bed-to-Chair Transfer   Assistance Needed Incidental touching;Adaptive equipment   CARE Score - Chair/Bed-to-Chair Transfer 4   Chair/Bed-to-Chair Transfer Discharge Goal   Discharge Goal 6   Car Transfer   Reason if not Attempted Environmental limitations   CARE Score - Car Transfer 10   Car Transfer Discharge Goal   Discharge Goal 4   Walk 10 Feet   Assistance Needed Physical assistance   Physical Assistance Level 25% or less   CARE Score - Walk 10 Feet 3   Walk 10 Feet Discharge Goal   Discharge Goal 6   Walk 50 Feet with Two Turns   Assistance Needed Physical assistance   Physical Assistance Level 25% or less   CARE Score - Walk 50 Feet with Two Turns 3   Walk 50 Feet with Two Turns Discharge Goal   Discharge Goal 6   Walk 150 Feet   Assistance Needed Physical assistance   Physical Assistance Level 25% or less   CARE Score - Walk 150 Feet 3   Walk 150 Feet Discharge Goal   Discharge Goal 6   Walking 10 Feet on Uneven Surfaces   Reason if not Attempted Environmental limitations   CARE Score - Walking 10 Feet on Uneven Surfaces 10   Walking 10 Feet on Uneven Surfaces Discharge Goal   Discharge Goal 4   1 Step (Curb)   Reason if not Attempted Safety concerns   CARE Score - 1 Step (Curb) 88   1 Step (Curb) Discharge Goal   Discharge Goal 4   4 Steps   Reason if not Attempted Safety concerns   CARE Score - 4 Steps 88   4 Steps  Discharge Goal   Discharge Goal 4   12 Steps   Reason if not Attempted Safety concerns   CARE Score - 12 Steps 88   12 Steps Discharge Goal   Discharge Goal 4   Picking Up Object   Reason if not Attempted Safety concerns   CARE Score - Picking Up Object 88   Picking Up Object Discharge Goal   Discharge Goal 6   Wheel 50 Feet with Two Turns   Assistance Needed Supervision   CARE Score - Wheel 50 Feet with Two Turns 4   Type of Wheelchair/Scooter Manual   Wheel 50 Feet with Two Turns Discharge Goal   Discharge Goal 9   Wheel 150 Feet   Reason if not Attempted Safety concerns   CARE Score - Wheel 150 Feet 88   Type of Wheelchair/Scooter Manual   Wheel 150 Feet Discharge Goal   Discharge Goal 9   Gait Functional Level of Assist    Gait Level Of Assist Minimal Assist   Assistive Device None;Front Wheel Walker   Distance (Feet)   (100 ft no AD/ HHA, 180 ft FWW)   Deviation Bradykinetic;Decreased Base Of Support;Shuffled Gait   Wheelchair Functional Level of Assist   Wheelchair Assist Stand by Assist   Distance Wheelchair (Feet or Distance) 100   Wheelchair Description Extra time;Limited by fatigue;Supervision for safety;Verbal cueing   Stairs Functional Level of Assist   Level of Assist with Stairs Unable to Participate   Transfer Functional Level of Assist   Bed, Chair, Wheelchair Transfer Contact Guard Assist   Bed Chair Wheelchair Transfer Description Increased time;Set-up of equipment;Verbal cueing   Precautions   Precautions Fall Risk   Comments salas, cardiac    Current Discharge Plan   Current Discharge Plan Return to Prior Living Situation   Interdisciplinary Plan of Care Collaboration   IDT Collaboration with  Occupational Therapist;Speech Therapist   Patient Position at End of Therapy In Bed;Call Light within Reach;Tray Table within Reach   Collaboration Comments re: CLOF and POC    Benefit   Therapy Benefit Patient Would Benefit from Inpatient Rehabilitation Physical Therapy to Maximize Functional [place]  with ADLs, IADLs and Mobility.   Strengths & Barriers   Strengths Adequate strength;Independent prior level of function;Willingly participates in therapeutic activities   Barriers Impaired activity tolerance;Hypotension;Fatigue;Impaired balance   Therapy Missed   Missed Therapy (Minutes) 15   Reason For Missed Therapy Medical - Other (Please Comment)  (fatigue )   PT Total Time Spent   PT Individual Total Time Spent (Mins) 45   PT Charge Group   PT Evaluation PT Evaluation Mod     Attempted supine there-ex in bed with 1x15 ankle pumps and heel slides, pt with need for frequent redirection to exercises secondary to fatigue/ falling asleep; exercises discontinued.     Assessment  Patient is 76 y.o. female with a diagnosis of s/p mitral valve repair.  Additional factors influencing patient status / progress (ie: cognitive factors, co-morbidities, social support, etc): seizure post-op, history of 2 CVAs with residual L hemiparesis, independent PLOF.      Plan  Recommend Physical Therapy 30-60 minutes per day 5-7 days per week for 2 weeks for the following treatments:  PT Group Therapy, PT E Stim Attended, PT Gait Training, PT Therapeutic Exercises, PT TENS Application, PT Neuro Re-Ed/Balance, PT Aquatic Therapy and PT Therapeutic Activity.    Passport items to be completed:  Get in/out of bed safely, in/out of a vehicle, safely use mobility device, walk or wheel around home/community, navigate up and down stairs, show how to get up/down from the ground, ensure home is accessible, demonstrate HEP, complete caregiver training    Goals:  Long term and short term goals have been discussed with patient and they are in agreement.    Physical Therapy Problems (Active)     Problem: Balance     Dates: Start: 09/29/21       Goal: STG-Within one week, patient will complete standardized balance assessment with score indicating low to moderate fall risk      Dates: Start: 09/29/21             Problem: Mobility     Dates: Start:  09/29/21       Goal: STG-Within one week, patient will ambulate 150 ft with FWW and SBA      Dates: Start: 09/29/21          Goal: STG-Within one week, patient will ambulate up/down a curb FWW vs // bars min A      Dates: Start: 09/29/21             Problem: Mobility Transfers     Dates: Start: 09/29/21       Goal: STG-Within one week, patient will transfer bed to chair SBA with AD as needed      Dates: Start: 09/29/21             Problem: PT-Long Term Goals     Dates: Start: 09/29/21       Goal: LTG-By discharge, patient will ambulate 300 ft with LRAD, mod I      Dates: Start: 09/29/21          Goal: LTG-By discharge, patient will transfer one surface to another mod I with LRAD     Dates: Start: 09/29/21          Goal: LTG-By discharge, patient will ambulate up/down 12 stairs with 2HRs and spv      Dates: Start: 09/29/21          Goal: LTG-By discharge, patient will transfer in/out of a car spv with LRAD      Dates: Start: 09/29/21

## 2021-09-29 NOTE — ASSESSMENT & PLAN NOTE
H&H stable  Likely 2nd to krzysztof-op blood loss  B12 ok  Fe: 23, sats 10%  On Fe supplements  FOB x 2: negative

## 2021-09-29 NOTE — FLOWSHEET NOTE
09/28/21 1913   Patient History   Pulmonary Diagnosis LALY   Procedures Relevant to Respiratory Status None   Home O2 No   Nocturnal CPAP Yes  (Not using CPAP, on recall)   Home Treatments/Frequency No   Protocol Pathways   Protocol Pathways Hyperinflation Protocol   Hyperinflation Protocol   Hyperinflation Protocol Indications Chest Trauma (Blunt, Penetrative, Crushing, or Surgical)   Hyperinflation Protocol Goals/Outcome Greater Than 60% of Predicted I.S. Volume x 24 hrs

## 2021-09-30 ENCOUNTER — APPOINTMENT (OUTPATIENT)
Dept: CARDIOLOGY | Facility: REHABILITATION | Age: 76
DRG: 949 | End: 2021-09-30
Attending: HOSPITALIST
Payer: COMMERCIAL

## 2021-09-30 LAB
ANION GAP SERPL CALC-SCNC: 9 MMOL/L (ref 7–16)
BUN SERPL-MCNC: 17 MG/DL (ref 8–22)
CALCIUM SERPL-MCNC: 8.1 MG/DL (ref 8.5–10.5)
CHLORIDE SERPL-SCNC: 108 MMOL/L (ref 96–112)
CO2 SERPL-SCNC: 23 MMOL/L (ref 20–33)
CREAT SERPL-MCNC: 0.76 MG/DL (ref 0.5–1.4)
ERYTHROCYTE [DISTWIDTH] IN BLOOD BY AUTOMATED COUNT: 57.4 FL (ref 35.9–50)
GLUCOSE SERPL-MCNC: 84 MG/DL (ref 65–99)
HCT VFR BLD AUTO: 28.3 % (ref 37–47)
HGB BLD-MCNC: 9 G/DL (ref 12–16)
IRON SATN MFR SERPL: 10 % (ref 15–55)
IRON SERPL-MCNC: 23 UG/DL (ref 40–170)
LV EJECT FRACT  99904: 60
LV EJECT FRACT MOD 2C 99903: 61.74
LV EJECT FRACT MOD 4C 99902: 57.84
LV EJECT FRACT MOD BP 99901: 61.9
MAGNESIUM SERPL-MCNC: 1.8 MG/DL (ref 1.5–2.5)
MCH RBC QN AUTO: 33 PG (ref 27–33)
MCHC RBC AUTO-ENTMCNC: 31.8 G/DL (ref 33.6–35)
MCV RBC AUTO: 103.7 FL (ref 81.4–97.8)
NT-PROBNP SERPL IA-MCNC: 2364 PG/ML (ref 0–125)
PHOSPHATE SERPL-MCNC: 3.9 MG/DL (ref 2.5–4.5)
PLATELET # BLD AUTO: 394 K/UL (ref 164–446)
PMV BLD AUTO: 9.4 FL (ref 9–12.9)
POTASSIUM SERPL-SCNC: 4.2 MMOL/L (ref 3.6–5.5)
RBC # BLD AUTO: 2.73 M/UL (ref 4.2–5.4)
SODIUM SERPL-SCNC: 140 MMOL/L (ref 135–145)
TIBC SERPL-MCNC: 232 UG/DL (ref 250–450)
TSH SERPL DL<=0.005 MIU/L-ACNC: 1.78 UIU/ML (ref 0.38–5.33)
UIBC SERPL-MCNC: 209 UG/DL (ref 110–370)
VIT B12 SERPL-MCNC: 1135 PG/ML (ref 211–911)
WBC # BLD AUTO: 5.7 K/UL (ref 4.8–10.8)

## 2021-09-30 PROCEDURE — 83550 IRON BINDING TEST: CPT

## 2021-09-30 PROCEDURE — A9270 NON-COVERED ITEM OR SERVICE: HCPCS | Performed by: HOSPITALIST

## 2021-09-30 PROCEDURE — 83540 ASSAY OF IRON: CPT

## 2021-09-30 PROCEDURE — 84100 ASSAY OF PHOSPHORUS: CPT

## 2021-09-30 PROCEDURE — 99233 SBSQ HOSP IP/OBS HIGH 50: CPT | Performed by: HOSPITALIST

## 2021-09-30 PROCEDURE — 770010 HCHG ROOM/CARE - REHAB SEMI PRIVAT*

## 2021-09-30 PROCEDURE — 36415 COLL VENOUS BLD VENIPUNCTURE: CPT

## 2021-09-30 PROCEDURE — 85027 COMPLETE CBC AUTOMATED: CPT

## 2021-09-30 PROCEDURE — 93306 TTE W/DOPPLER COMPLETE: CPT | Mod: 26 | Performed by: INTERNAL MEDICINE

## 2021-09-30 PROCEDURE — 97110 THERAPEUTIC EXERCISES: CPT

## 2021-09-30 PROCEDURE — 97129 THER IVNTJ 1ST 15 MIN: CPT

## 2021-09-30 PROCEDURE — 84443 ASSAY THYROID STIM HORMONE: CPT

## 2021-09-30 PROCEDURE — 97110 THERAPEUTIC EXERCISES: CPT | Mod: CO

## 2021-09-30 PROCEDURE — A9270 NON-COVERED ITEM OR SERVICE: HCPCS | Performed by: PHYSICAL MEDICINE & REHABILITATION

## 2021-09-30 PROCEDURE — 700101 HCHG RX REV CODE 250: Performed by: PHYSICAL MEDICINE & REHABILITATION

## 2021-09-30 PROCEDURE — 83880 ASSAY OF NATRIURETIC PEPTIDE: CPT

## 2021-09-30 PROCEDURE — 700102 HCHG RX REV CODE 250 W/ 637 OVERRIDE(OP): Performed by: HOSPITALIST

## 2021-09-30 PROCEDURE — 82607 VITAMIN B-12: CPT

## 2021-09-30 PROCEDURE — 97130 THER IVNTJ EA ADDL 15 MIN: CPT

## 2021-09-30 PROCEDURE — 97116 GAIT TRAINING THERAPY: CPT

## 2021-09-30 PROCEDURE — 700102 HCHG RX REV CODE 250 W/ 637 OVERRIDE(OP): Performed by: PHYSICAL MEDICINE & REHABILITATION

## 2021-09-30 PROCEDURE — 97530 THERAPEUTIC ACTIVITIES: CPT

## 2021-09-30 PROCEDURE — 99231 SBSQ HOSP IP/OBS SF/LOW 25: CPT | Performed by: PHYSICAL MEDICINE & REHABILITATION

## 2021-09-30 PROCEDURE — 80048 BASIC METABOLIC PNL TOTAL CA: CPT

## 2021-09-30 PROCEDURE — 94760 N-INVAS EAR/PLS OXIMETRY 1: CPT

## 2021-09-30 PROCEDURE — 93306 TTE W/DOPPLER COMPLETE: CPT

## 2021-09-30 PROCEDURE — 83735 ASSAY OF MAGNESIUM: CPT

## 2021-09-30 RX ADMIN — OMEPRAZOLE 20 MG: 20 CAPSULE, DELAYED RELEASE ORAL at 08:11

## 2021-09-30 RX ADMIN — METOPROLOL TARTRATE 12.5 MG: 25 TABLET, FILM COATED ORAL at 05:27

## 2021-09-30 RX ADMIN — OXYCODONE 5 MG: 5 TABLET ORAL at 03:13

## 2021-09-30 RX ADMIN — LIDOCAINE 1 PATCH: 50 PATCH TOPICAL at 19:57

## 2021-09-30 RX ADMIN — FERROUS SULFATE TAB 325 MG (65 MG ELEMENTAL FE) 325 MG: 325 (65 FE) TAB at 08:11

## 2021-09-30 RX ADMIN — APIXABAN 5 MG: 5 TABLET, FILM COATED ORAL at 08:11

## 2021-09-30 RX ADMIN — OXYCODONE HYDROCHLORIDE AND ACETAMINOPHEN 500 MG: 500 TABLET ORAL at 19:56

## 2021-09-30 RX ADMIN — APIXABAN 5 MG: 5 TABLET, FILM COATED ORAL at 19:56

## 2021-09-30 RX ADMIN — POLYETHYLENE GLYCOL 3350 1 PACKET: 17 POWDER, FOR SOLUTION ORAL at 08:11

## 2021-09-30 RX ADMIN — VALACYCLOVIR HYDROCHLORIDE 500 MG: 500 TABLET, FILM COATED ORAL at 08:11

## 2021-09-30 RX ADMIN — SENNOSIDES AND DOCUSATE SODIUM 2 TABLET: 8.6; 5 TABLET ORAL at 08:11

## 2021-09-30 RX ADMIN — ASPIRIN 81 MG: 81 TABLET, COATED ORAL at 08:11

## 2021-09-30 RX ADMIN — LEVETIRACETAM 1000 MG: 500 TABLET, FILM COATED ORAL at 08:11

## 2021-09-30 RX ADMIN — ATORVASTATIN CALCIUM 80 MG: 40 TABLET, FILM COATED ORAL at 19:56

## 2021-09-30 RX ADMIN — MELATONIN TAB 3 MG 3 MG: 3 TAB at 19:56

## 2021-09-30 RX ADMIN — LEVETIRACETAM 1000 MG: 500 TABLET, FILM COATED ORAL at 19:56

## 2021-09-30 RX ADMIN — SENNOSIDES AND DOCUSATE SODIUM 2 TABLET: 8.6; 5 TABLET ORAL at 19:56

## 2021-09-30 RX ADMIN — METOPROLOL TARTRATE 12.5 MG: 25 TABLET, FILM COATED ORAL at 16:46

## 2021-09-30 RX ADMIN — SPIRONOLACTONE 25 MG: 25 TABLET, FILM COATED ORAL at 05:27

## 2021-09-30 RX ADMIN — OXYCODONE 5 MG: 5 TABLET ORAL at 19:56

## 2021-09-30 RX ADMIN — OXYCODONE HYDROCHLORIDE AND ACETAMINOPHEN 500 MG: 500 TABLET ORAL at 08:11

## 2021-09-30 RX ADMIN — TAMSULOSIN HYDROCHLORIDE 0.4 MG: 0.4 CAPSULE ORAL at 16:46

## 2021-09-30 ASSESSMENT — PAIN DESCRIPTION - PAIN TYPE
TYPE: ACUTE PAIN

## 2021-09-30 ASSESSMENT — ACTIVITIES OF DAILY LIVING (ADL): BED_CHAIR_WHEELCHAIR_TRANSFER_DESCRIPTION: INCREASED TIME;SET-UP OF EQUIPMENT;SUPERVISION FOR SAFETY;VERBAL CUEING

## 2021-09-30 ASSESSMENT — ENCOUNTER SYMPTOMS
BRUISES/BLEEDS EASILY: 0
POLYDIPSIA: 0
PALPITATIONS: 0
CHILLS: 0
NAUSEA: 0
COUGH: 0
VOMITING: 0
ABDOMINAL PAIN: 0
EYES NEGATIVE: 1
FEVER: 0
SHORTNESS OF BREATH: 0

## 2021-09-30 ASSESSMENT — GAIT ASSESSMENTS
DEVIATION: BRADYKINETIC;SHUFFLED GAIT;DECREASED BASE OF SUPPORT
ASSISTIVE DEVICE: FRONT WHEEL WALKER
GAIT LEVEL OF ASSIST: CONTACT GUARD ASSIST
DISTANCE (FEET): 250

## 2021-09-30 NOTE — THERAPY
"Occupational Therapy  Daily Treatment     Patient Name: Shaista Bocanegra  Age:  76 y.o., Sex:  female  Medical Record #: [id number]  Today's Date: 9/30/2021     Precautions  Precautions: (P) Fall Risk  Comments: (P) Seizure, salas, cardiac          Subjective    \" Oh good.\" when explained this session would focus on UB  Strength/endurance building        Objective       09/30/21 1101   Precautions   Precautions Fall Risk   Comments Seizure, salas, cardiac    Functional Level of Assist   Bed, Chair, Wheelchair Transfer Contact Guard Assist  (bed to  w/c )   Sitting Upper Body Exercises   Chest Press 2 sets of 10;Bilateral   Shoulder Press 2 sets of 10;Bilateral   Internal Shoulder Rotation 2 sets of 10;Right ;Left   External Shoulder Rotation 2 sets of 10;Right ;Left   Bicep Curls 2 sets of 10;Right ;Left   Pronation / Supination 2 sets of 10;Right ;Left   Other Exercise 2 sets of 10;Bilateral  arm circles forward and back  1 set of 10  right/ left  knee to shoulder cross overs    Comments ther ex performed with 2lb weight    Interdisciplinary Plan of Care Collaboration   Patient Position at End of Therapy Seated;Chair Alarm On;Self Releasing Lap Belt Applied;Call Light within Reach;Tray Table within Reach   OT Total Time Spent   OT Individual Total Time Spent (Mins) 30   OT Charge Group   OT Therapeutic Exercise  2       Assessment     Occasional cues for technique  For UE ther ex.    reported minimal fatigue at end of session     Strengths: Able to follow instructions, Alert and oriented, Adequate strength, Effective communication skills, Independent prior level of function, Motivated for self care and independence, Pleasant and cooperative, Supportive family, Willingly participates in therapeutic activities  Barriers: Decreased endurance, Fatigue, Generalized weakness, Impaired activity tolerance, Impaired balance, Pain (lack of sleep night before initial evaluation)    Plan     ADL IADL , related mobility , " strength/endurance building    Standing tolerance and balance activity    energy conservation  Education       Passport items to be completed:  Passport items to be completed:  Perform bathroom transfers, complete dressing, complete feeding, get ready for the day, prepare a simple meal, participate in household tasks, adapt home for safety needs, demonstrate home exercise program, complete caregiver training     Occupational Therapy Goals (Active)     Problem: Dressing     Dates: Start: 09/29/21       Goal: STG-Within one week, patient will dress LB with mod A using AE/AD/techniques     Dates: Start: 09/29/21             Problem: Functional Transfers     Dates: Start: 09/29/21       Goal: STG-Within one week, patient will transfer to toilet with supervision using grab bar and/or FWW     Dates: Start: 09/29/21             Problem: OT Long Term Goals     Dates: Start: 09/29/21       Goal: LTG-By discharge, patient will complete basic self care tasks with mod I     Dates: Start: 09/29/21          Goal: LTG-By discharge, patient will perform bathroom transfers with mod I     Dates: Start: 09/29/21          Goal: LTG-By discharge, patient will complete basic home management with mod I     Dates: Start: 09/29/21             Problem: Toileting     Dates: Start: 09/29/21       Goal: STG-Within one week, patient will complete toileting tasks with SBA using AE/AD     Dates: Start: 09/29/21

## 2021-09-30 NOTE — FLOWSHEET NOTE
09/30/21 1427   Events/Summary/Plan   Events/Summary/Plan 02 spot check, IS.  Pt is open to using 2 lpm at night in lieu of her CPAP which is on recall.   Vital Signs   Pulse 78   Respiration 18   Pulse Oximetry 97 %   $ Pulse Oximetry (Spot Check) Yes   Respiratory Assessment   Level of Consciousness Alert   Chest Exam   Work Of Breathing / Effort Shallow   Oxygen   O2 (LPM)   (will be using 2 lpm NOCS for LALY)   O2 Delivery Device None - Room Air

## 2021-09-30 NOTE — THERAPY
"Physical Therapy   Daily Treatment     Patient Name: Shaista Bocanegra  Age:  76 y.o., Sex:  female  Medical Record #: [id number]  Today's Date: 9/30/2021     Precautions  Precautions: Fall Risk  Comments: Seizure, salas, cardiac     Subjective    Pt in bed resting, agreeable to PT.     Objective       09/30/21 1301   Precautions   Precautions Fall Risk   Comments Seizure, salas, cardiac    Gait Functional Level of Assist    Gait Level Of Assist Contact Guard Assist   Assistive Device Front Wheel Walker   Distance (Feet) 250   # of Times Distance was Traveled 1.5   Deviation Bradykinetic;Shuffled Gait;Decreased Base Of Support   Transfer Functional Level of Assist   Bed, Chair, Wheelchair Transfer Minimal Assist   Bed Chair Wheelchair Transfer Description Increased time;Set-up of equipment;Supervision for safety;Verbal cueing   Sitting Lower Body Exercises   Ankle Pumps 1 set of 15   Hip Abduction 1 set of 15   Hip Adduction 1 set of 15   Long Arc Quad 1 set of 15   Marching 1 set of 15   Hamstring Curl 1 set of 15   Nustep Resistance Level 4  (10 min, 426 steps)   Comments Above seated ther-ex with 2 lb ankle weights bilaterally and green tband for resistance    Bed Mobility    Supine to Sit Minimal Assist   Sit to Supine Stand by Assist   Sit to Stand Contact Guard Assist   Interdisciplinary Plan of Care Collaboration   Patient Position at End of Therapy In Bed;Call Light within Reach;Tray Table within Reach   PT Total Time Spent   PT Individual Total Time Spent (Mins) 60   PT Charge Group   PT Gait Training 1   PT Therapeutic Exercise 2   PT Therapeutic Activities 1       Assessment    Pt participatory in session despite reports of being \"bored with working out\". No LOB during gait training.     Strengths: Adequate strength, Independent prior level of function, Willingly participates in therapeutic activities  Barriers: Impaired activity tolerance, Hypotension, Fatigue, Impaired balance    Plan    General strength and " endurance, gait training with FWW vs no AD including outdoors, standing tolerance and balance.         Physical Therapy Problems (Active)     Problem: Balance     Dates: Start: 09/29/21       Goal: STG-Within one week, patient will complete standardized balance assessment with score indicating low to moderate fall risk      Dates: Start: 09/29/21             Problem: Mobility     Dates: Start: 09/29/21       Goal: STG-Within one week, patient will ambulate 150 ft with FWW and SBA      Dates: Start: 09/29/21          Goal: STG-Within one week, patient will ambulate up/down a curb FWW vs // bars min A      Dates: Start: 09/29/21             Problem: Mobility Transfers     Dates: Start: 09/29/21       Goal: STG-Within one week, patient will transfer bed to chair SBA with AD as needed      Dates: Start: 09/29/21             Problem: PT-Long Term Goals     Dates: Start: 09/29/21       Goal: LTG-By discharge, patient will ambulate 300 ft with LRAD, mod I      Dates: Start: 09/29/21          Goal: LTG-By discharge, patient will transfer one surface to another mod I with LRAD     Dates: Start: 09/29/21          Goal: LTG-By discharge, patient will ambulate up/down 12 stairs with 2HRs and spv      Dates: Start: 09/29/21          Goal: LTG-By discharge, patient will transfer in/out of a car spv with LRAD      Dates: Start: 09/29/21

## 2021-09-30 NOTE — PROGRESS NOTES
Hospital Medicine Daily Progress Note      Chief Complaint  S/P MVR  Hypertension    Interval Problem Update  No new complaints.  Continues to have expected post-op pain.    Review of Systems  Review of Systems   Constitutional: Negative for chills and fever.   HENT: Negative.    Eyes: Negative.    Respiratory: Negative for cough and shortness of breath.    Cardiovascular: Negative for chest pain and palpitations.   Gastrointestinal: Negative for abdominal pain, nausea and vomiting.   Musculoskeletal:        Wound pain    Skin: Negative for itching and rash.   Endo/Heme/Allergies: Negative for polydipsia. Does not bruise/bleed easily.        Physical Exam  Temp:  [36.6 °C (97.8 °F)-36.7 °C (98.1 °F)] 36.7 °C (98.1 °F)  Pulse:  [75-86] 80  Resp:  [18-20] 18  BP: (101-117)/(56-76) 112/76  SpO2:  [92 %-93 %] 93 %    Physical Exam  Vitals reviewed.   Constitutional:       General: She is not in acute distress.     Appearance: Normal appearance. She is not ill-appearing.   HENT:      Head: Normocephalic and atraumatic.      Right Ear: External ear normal.      Left Ear: External ear normal.      Nose: Nose normal.      Mouth/Throat:      Pharynx: Oropharynx is clear.   Eyes:      General:         Right eye: No discharge.         Left eye: No discharge.      Extraocular Movements: Extraocular movements intact.      Conjunctiva/sclera: Conjunctivae normal.   Cardiovascular:      Comments: irreg irreg, right chest incisions C/D/I  Pulmonary:      Effort: Pulmonary effort is normal. No respiratory distress.      Breath sounds: Normal breath sounds. No wheezing.   Abdominal:      General: Bowel sounds are normal. There is no distension.      Palpations: Abdomen is soft.      Tenderness: There is no abdominal tenderness. There is no guarding or rebound.   Musculoskeletal:      Cervical back: Normal range of motion and neck supple.      Right lower leg: No edema.      Left lower leg: No edema.   Skin:     General: Skin is warm  and dry.   Neurological:      Mental Status: She is alert and oriented to person, place, and time.         Fluids    Intake/Output Summary (Last 24 hours) at 9/30/2021 1206  Last data filed at 9/30/2021 0800  Gross per 24 hour   Intake 860 ml   Output 1900 ml   Net -1040 ml       Laboratory  Recent Labs     09/29/21  0539 09/30/21  0541   WBC 8.4 5.7   RBC 2.66* 2.73*   HEMOGLOBIN 8.8* 9.0*   HEMATOCRIT 27.4* 28.3*   .0* 103.7*   MCH 33.1* 33.0   MCHC 32.1* 31.8*   RDW 56.6* 57.4*   PLATELETCT 365 394   MPV 9.5 9.4     Recent Labs     09/28/21  0750 09/29/21  0539 09/30/21  0541   SODIUM  --  138 140   POTASSIUM  --  4.3 4.2   CHLORIDE  --  105 108   CO2  --  25 23   GLUCOSE 104 85 84   BUN  --  17 17   CREATININE  --  0.73 0.76   CALCIUM  --  8.4* 8.1*                 Assessment/Plan  * S/P mitral valve repair- (present on admission)  Assessment & Plan  MVP w/ severe MVR  Now S/P minimally invasive MVR at [facility] on 9/16/21 by Dr. Nogueira  F/U Shivani pending  Wound care and pain control per Physiatry    Breast cancer (HCC)- (present on admission)  Assessment & Plan  H/O lumpectomy and chemoXRT    Anemia- (present on admission)  Assessment & Plan  Suspect large component of krzysztof-op blood loss  Has macrocytic indices  Vit B12 and TSH both normal  Folate testing no longer performed at this facility  Fe 23, continue Fe and Vit C supplements  FOB x 3 pending  Follow H/H    Seizure disorder (HCC)- (present on admission)  Assessment & Plan  On Keppra    Herpes- (present on admission)  Assessment & Plan  On Valtrex    Atrial fibrillation (HCC)- (present on admission)  Assessment & Plan  Now S/P Maze procedure at [facility] on 9/16/21 by Dr. Jero VALLADARES/U Shivani pending  Rate controlled on Metoprolol  Anticoagulated on Eliquis    Hypertension- (present on admission)  Assessment & Plan  On Metoprolol and Aldactone  Low blood pressures improved w/ decreased Metoprolol  Monitor for orthostatic hypotension on Flomax    History of CVA  (cerebrovascular accident)- (present on admission)  Assessment & Plan  H/O CVA x 2 w/ residual left hemiparesis  On Eliquis, ASA, and Lipitor     Full Code

## 2021-09-30 NOTE — CARE PLAN
Problem: Pain - Standard  Goal: Alleviation of pain or a reduction in pain to the patient’s comfort goal  Note: Medicated pt with Oxycodone for back pain. Lidocaine patch applied at right upper back. Positioned pt comfortably in bed. Pillows provided for comfort. Advised to inform staff if pain worsens.      Problem: Bowel Elimination  Goal: Patient will participate in bowel management program  Note: Pt with very large soft stools after suppository given. Refused scheduled Senna. Educated about the importance of regular and routine bowel movements.

## 2021-09-30 NOTE — PROGRESS NOTES
"Rehab Progress Note     Date of Service: 9/30/2021  Chief Complaint: follow up MVR repair    Interval Events (Subjective)    Patient seen and examined today in her room.  She reports she slept much better last night with less pain in her back.  She reports she is very tired but was able to participate with therapy today.  She reports she has not been taking the valacyclovir regularly but just takes as needed for cold sores.  She did finally move her bowels yesterday.  She has no new concerns or complaints.    ROS: No changes to bladder or mood        Objective:  VITAL SIGNS: /76   Pulse 80   Temp 36.7 °C (98.1 °F) (Oral)   Resp 18   Ht 1.676 m (5' 6\")   Wt 63 kg (139 lb)   SpO2 93%   BMI 22.44 kg/m²   Gen: alert, no apparent distress  CV: regular rate and rhythm, no murmurs, no peripheral edema, incision on right upper chest wall with surrounding edema  Resp: clear to ascultation bilaterally, normal respiratory effort  GI: soft, non-tender abdomen, bowel sounds present    Recent Results (from the past 72 hour(s))   POC GLUCOSE    Collection Time: 09/27/21 12:28 PM   Result Value Ref Range    Glucose 143 70 - 200 mg/dL    Specimen Type arterial/capillary     NOTE       Device ID [id number]- Performed site [id number]   POC GLUCOSE    Collection Time: 09/27/21  4:28 PM   Result Value Ref Range    Glucose 139 70 - 200 mg/dL    Specimen Type arterial/capillary     NOTE       Device ID [id number]- Performed site [id number]   POC GLUCOSE    Collection Time: 09/27/21 10:40 PM   Result Value Ref Range    Glucose 106 70 - 200 mg/dL    Specimen Type arterial/capillary     NOTE       Device ID [id number]- Performed site [id number]   POC GLUCOSE    Collection Time: 09/28/21  7:50 AM   Result Value Ref Range    Glucose 104 70 - 200 mg/dL    Specimen Type arterial/capillary     NOTE       Device ID [id number]- Performed site [id number] "   SARS-CoV-2 PCR (24 hour In-House): Collect NP swab in [facility]    Collection Time: 09/28/21  4:35 PM    Specimen: Nasopharyngeal; Respirate   Result Value Ref Range    SARS-CoV-2 Source NP Swab     SARS-CoV-2 by PCR NotDetected    CBC with Differential    Collection Time: 09/29/21  5:39 AM   Result Value Ref Range    WBC 8.4 4.8 - 10.8 K/uL    RBC 2.66 (L) 4.20 - 5.40 M/uL    Hemoglobin 8.8 (L) 12.0 - 16.0 g/dL    Hematocrit 27.4 (L) 37.0 - 47.0 %    .0 (H) 81.4 - 97.8 fL    MCH 33.1 (H) 27.0 - 33.0 pg    MCHC 32.1 (L) 33.6 - 35.0 g/dL    RDW 56.6 (H) 35.9 - 50.0 fL    Platelet Count 365 164 - 446 K/uL    MPV 9.5 9.0 - 12.9 fL    Neutrophils-Polys 78.10 (H) 44.00 - 72.00 %    Lymphocytes 11.20 (L) 22.00 - 41.00 %    Monocytes 8.10 0.00 - 13.40 %    Eosinophils 1.70 0.00 - 6.90 %    Basophils 0.40 0.00 - 1.80 %    Immature Granulocytes 0.50 0.00 - 0.90 %    Nucleated RBC 0.00 /100 WBC    Neutrophils (Absolute) 6.60 2.00 - 7.15 K/uL    Lymphs (Absolute) 0.94 (L) 1.00 - 4.80 K/uL    Monos (Absolute) 0.68 0.00 - 0.85 K/uL    Eos (Absolute) 0.14 0.00 - 0.51 K/uL    Baso (Absolute) 0.03 0.00 - 0.12 K/uL    Immature Granulocytes (abs) 0.04 0.00 - 0.11 K/uL    NRBC (Absolute) 0.00 K/uL   Comp Metabolic Panel (CMP)    Collection Time: 09/29/21  5:39 AM   Result Value Ref Range    Sodium 138 135 - 145 mmol/L    Potassium 4.3 3.6 - 5.5 mmol/L    Chloride 105 96 - 112 mmol/L    Co2 25 20 - 33 mmol/L    Anion Gap 8.0 7.0 - 16.0    Glucose 85 65 - 99 mg/dL    Bun 17 8 - 22 mg/dL    Creatinine 0.73 0.50 - 1.40 mg/dL    Calcium 8.4 (L) 8.5 - 10.5 mg/dL    AST(SGOT) 28 12 - 45 U/L    ALT(SGPT) 45 2 - 50 U/L    Alkaline Phosphatase 72 30 - 99 U/L    Total Bilirubin 0.2 0.1 - 1.5 mg/dL    Albumin 3.1 (L) 3.2 - 4.9 g/dL    Total Protein 5.2 (L) 6.0 - 8.2 g/dL    Globulin 2.1 1.9 - 3.5 g/dL    A-G Ratio 1.5 g/dL   Magnesium    Collection Time: 09/29/21  5:39 AM   Result Value Ref Range    Magnesium 1.6 1.5 - 2.5 mg/dL   Vitamin D,  25-hydroxy (blood)    Collection Time: 09/29/21  5:39 AM   Result Value Ref Range    25-Hydroxy   Vitamin D 25 49 30 - 100 ng/mL   proBrain Natriuretic Peptide, NT    Collection Time: 09/29/21  5:39 AM   Result Value Ref Range    NT-proBNP 2806 (H) 0 - 125 pg/mL   ESTIMATED GFR    Collection Time: 09/29/21  5:39 AM   Result Value Ref Range    GFR If African American >60 >60 mL/min/1.73 m 2    GFR If Non African American >60 >60 mL/min/1.73 m 2   VITAMIN B12    Collection Time: 09/30/21  5:41 AM   Result Value Ref Range    Vitamin B12 -True Cobalamin 1135 (H) 211 - 911 pg/mL   TSH WITH REFLEX TO FT4    Collection Time: 09/30/21  5:41 AM   Result Value Ref Range    TSH 1.780 0.380 - 5.330 uIU/mL   IRON/TOTAL IRON BIND    Collection Time: 09/30/21  5:41 AM   Result Value Ref Range    Iron 23 (L) 40 - 170 ug/dL    Total Iron Binding 232 (L) 250 - 450 ug/dL    Unsat Iron Binding 209 110 - 370 ug/dL    % Saturation 10 (L) 15 - 55 %   MAGNESIUM    Collection Time: 09/30/21  5:41 AM   Result Value Ref Range    Magnesium 1.8 1.5 - 2.5 mg/dL   PHOSPHORUS    Collection Time: 09/30/21  5:41 AM   Result Value Ref Range    Phosphorus 3.9 2.5 - 4.5 mg/dL   CBC WITHOUT DIFFERENTIAL    Collection Time: 09/30/21  5:41 AM   Result Value Ref Range    WBC 5.7 4.8 - 10.8 K/uL    RBC 2.73 (L) 4.20 - 5.40 M/uL    Hemoglobin 9.0 (L) 12.0 - 16.0 g/dL    Hematocrit 28.3 (L) 37.0 - 47.0 %    .7 (H) 81.4 - 97.8 fL    MCH 33.0 27.0 - 33.0 pg    MCHC 31.8 (L) 33.6 - 35.0 g/dL    RDW 57.4 (H) 35.9 - 50.0 fL    Platelet Count 394 164 - 446 K/uL    MPV 9.4 9.0 - 12.9 fL   Basic Metabolic Panel    Collection Time: 09/30/21  5:41 AM   Result Value Ref Range    Sodium 140 135 - 145 mmol/L    Potassium 4.2 3.6 - 5.5 mmol/L    Chloride 108 96 - 112 mmol/L    Co2 23 20 - 33 mmol/L    Glucose 84 65 - 99 mg/dL    Bun 17 8 - 22 mg/dL    Creatinine 0.76 0.50 - 1.40 mg/dL    Calcium 8.1 (L) 8.5 - 10.5 mg/dL    Anion Gap 9.0 7.0 - 16.0   proBrain  Natriuretic Peptide, NT    Collection Time: 09/30/21  5:41 AM   Result Value Ref Range    NT-proBNP 2364 (H) 0 - 125 pg/mL   ESTIMATED GFR    Collection Time: 09/30/21  5:41 AM   Result Value Ref Range    GFR If African American >60 >60 mL/min/1.73 m 2    GFR If Non African American >60 >60 mL/min/1.73 m 2       Current Facility-Administered Medications   Medication Frequency   • metoprolol tartrate (LOPRESSOR) tablet 12.5 mg TWICE DAILY   • lidocaine (LIDODERM) 5 % 1 Patch Q24HR   • polyethylene glycol/lytes (MIRALAX) PACKET 1 Packet DAILY    And   • senna-docusate (PERICOLACE or SENOKOT S) 8.6-50 MG per tablet 2 Tablet BID    And   • magnesium hydroxide (MILK OF MAGNESIA) suspension 30 mL QDAY PRN    And   • bisacodyl (DULCOLAX) suppository 10 mg DAILY   • hydrOXYzine HCl (ATARAX) tablet 50 mg Q6HRS PRN   • QUEtiapine (Seroquel) tablet 25 mg TID PRN   • simethicone (MYLICON) chewable tab 80 mg TID PRN   • melatonin tablet 3 mg HS PRN   • Respiratory Therapy Consult Continuous RT   • Pharmacy Consult Request ...Pain Management Review 1 Each PHARMACY TO DOSE   • acetaminophen (Tylenol) tablet 650 mg Q4HRS PRN   • lactulose 20 GM/30ML solution 30 mL QDAY PRN   • docusate sodium (ENEMEEZ) enema 283 mg QDAY PRN   • sodium phosphate (Fleet) enema 133 mL QDAY PRN   • omeprazole (PRILOSEC) capsule 20 mg QAM AC   • artificial tears ophthalmic solution 1 Drop PRN   • benzocaine-menthol (CEPACOL) lozenge 1 Lozenge Q2HRS PRN   • mag hydrox-al hydrox-simeth (MAALOX PLUS ES or MYLANTA DS) suspension 20 mL Q2HRS PRN   • ondansetron (ZOFRAN ODT) dispertab 4 mg 4X/DAY PRN    Or   • ondansetron (ZOFRAN) syringe/vial injection 4 mg 4X/DAY PRN   • traZODone (DESYREL) tablet 50 mg QHS PRN   • sodium chloride (OCEAN) 0.65 % nasal spray 2 Spray PRN   • aspirin EC (ECOTRIN) tablet 81 mg DAILY   • ascorbic acid (Vitamin C) tablet 500 mg BID   • ferrous sulfate tablet 325 mg QDAY with Breakfast   • levETIRAcetam (KEPPRA) tablet 1,000 mg  Q12HRS   • spironolactone (ALDACTONE) tablet 25 mg Q DAY   • tamsulosin (FLOMAX) capsule 0.4 mg AFTER DINNER   • atorvastatin (LIPITOR) tablet 80 mg Q EVENING   • apixaban (ELIQUIS) tablet 5 mg BID   • oxyCODONE immediate-release (ROXICODONE) tablet 5 mg Q4HRS PRN   • oxyCODONE immediate-release (ROXICODONE) tablet 2.5 mg Q4HRS PRN       Orders Placed This Encounter   Procedures   • Diet Order Diet: Regular (chopped meats)     Standing Status:   Standing     Number of Occurrences:   1     Order Specific Question:   Diet:     Answer:   Regular [1]     Comments:   chopped meats       Assessment:  Active Hospital Problems    Diagnosis    • *S/P mitral valve repair    • Hypoalbuminemia    • Other constipation    • Breast cancer (HCC)    • Upper back pain on right side    • Impaired mobility and ADLs    • Seizure disorder (HCC)    • Other hyperlipidemia    • Urinary retention    • Anemia    • Atrial fibrillation (HCC)    • Herpes    • Sleep apnea    • Hypertension    • History of CVA (cerebrovascular accident)      This patient is a 76 y.o. female admitted for acute inpatient rehabilitation with impaired cardiac endurance S/P mitral valve repair.    Medical Decision Making and Plan:    S/p Mitral Valve repair  Dr. Nogueira 9/16  Continue full rehab program  PT/OT/SLP, 1 hr each discipline, 5 days per week    Outpatient follow up with cardiology and Dr. Nogueira     Aspirin  Spironolactone      History of strokes  Cardio-embolic  Residual left hemiparesis  Aphasia  Dysphagia, resolved  Continue full rehab program  PT/OT/SLP, 1 hr each discipline, 5 days per week       Eliquis  Statin     Outpatient follow up with Dr. Sosa    Right upper back pain  Lidoderm patch  PRN tylenol, last use 9/29  PRN oxycodone, last use 9/29     Seizure disorder  Tonic/clonic post-op  Keppra  Outpatient referral to neurology     Hypertension  Metoprolol  Spironolactone  Appreciate hospitalist assistance    Orthostatic hypotension  Appreciate hospitalist  assistance  May need to decrease dosing of BP meds     Atrial fibrillation  Metoprolol  Appreciate hospitalist assistance     Hyperlipidemia  Statin     Anemia  Ferrous sulfate  Vit C     GI prophylaxis  Omeprazole     History of herpes simplex  Valacyclovir discontinued    Bowel program  Constipation  Increase bowel medications  Last BM 9/29    Bladder program  Urinary retention  Started Flomax 9/28  Continue Medina for now  Will do voiding trial early next week     DVT prophylaxis  Eliquis    Total time:  15 minutes.  I spent greater than 50% of the time for patient care, counseling, and coordination on this date, including patient face-to face time, unit/floor time with review of records/pertinent lab data and studies, as well as discussing diagnostic evaluation/work up, planned therapeutic interventions, and future disposition of care, as per the interval events/subjective and the assessment and plan as noted above.    I have performed a physical exam, reviewed and updated ROS, as well as the assessment and plan today 9/30/2021. In review of note from 9/29/2021 there are no new changes except as documented above.            Precious Rawls M.D.   Physical Medicine and Rehabilitation

## 2021-09-30 NOTE — CARE PLAN
Problem: Psychosocial  Goal: Patient's level of anxiety will decrease  Outcome: Progressing  Note: Pt has been very calm and trusts that we will respond to her needs.      Problem: Infection  Goal: Patient will remain free from infection  Outcome: Progressing  Note: Surgical site is free of S/S of infection.

## 2021-09-30 NOTE — DISCHARGE PLANNING
CASE MANAGEMENT INITIAL ASSESSMENT    Admit Date:  9/28/2021     I spoke with patient to discuss role of case management / discharge planning / team conference.     Patient is a  76 y.o. female transferred from .    Diagnosis: S/P mitral valve repair [Z98.890]    PLOF: Independent at home, lives alone.      PCP: Capri Simon MD    ADM MD: Precious Rawls    Co-morbidities:   Patient Active Problem List    Diagnosis Date Noted   • Hypoalbuminemia 09/29/2021   • Other constipation 09/29/2021   • Breast cancer (HCC) 09/29/2021   • Upper back pain on right side 09/29/2021   • S/P mitral valve repair 09/28/2021   • Impaired mobility and ADLs 09/28/2021   • Seizure disorder (HCC) 09/28/2021   • Other hyperlipidemia 09/28/2021   • Urinary retention 09/28/2021   • Anemia 09/28/2021   • Late effect of stroke 06/28/2021   • Chronic anticoagulation 06/09/2021   • Atrial fibrillation (HCC) 05/31/2021   • Herpes 05/31/2021   • Sleep apnea 02/26/2019   • Hypertension 02/13/2019   • History of CVA (cerebrovascular accident) 10/11/2017   • Dysphagia following cerebrovascular accident 08/28/2017   • Severe mitral regurgitation 08/27/2017   • Fluency disorder associated with underlying disease 08/24/2017   • Dyslipidemia 03/24/2015   • Osteopenia 01/27/2015     Prior Living Situation:  Housing / Facility: 1 Story Apartment / Condo  Lives with - Patient's Self Care Capacity: Alone and Able to Care For Self    Prior Level of Function:  Medication Management: Independent  Finances: Independent  Home Management: Independent  Shopping: Independent  Prior Level Of Mobility: Independent Without Device in Community  Driving / Transportation: Driving Independent    Support Systems:  Primary : Bridget  Advance Directives: Yes  Power of  (Name & Phone): Lucie and Bobby Mejias.  Fer:[phone] and Bobby: [phone]    Previous Services Utilized:   Equipment Owned: None  Prior Services:  None    Other Information:  Occupation (Pre-Hospital Vocational): Retired Due To Disability (still does occasional work as )     Primary Payor Source: Other (Comments) ([employer])  Secondary Payor Source:  (Medicare)  Primary Care Practitioner : Capri Simon MD  Other MDs: Dr. Meehan [facility].      Additional Case Management Questions:  Have you ever received case management services for yourself or a family member? Yes    Do you feel you have and an understanding of what services  provide? Yes    Do you have any additional questions regarding case management? No         CASE MANAGEMENT PLAN OF CARE   Individualized Goals:   Patient / Family Goal:  1. Gain Strength   2. Get back home independently   3. Have all recommended DME at DC    Barriers:   1. Lives alone  2. Multiple Co-Morbidities    Plan:  1. Continue to follow patient through hospitalization and provide discharge planning in collaboration with patient, family, physicians and ancillary services.     2. Utilize community resources to ensure a safe discharge.

## 2021-09-30 NOTE — THERAPY
Speech Language Pathology  Daily Treatment     Patient Name: Shaista Bocanegra  Age:  76 y.o., Sex:  female  Medical Record #: [id number]  Today's Date: 9/30/2021     Precautions  Precautions: Fall Risk  Comments: Seizure, salas, cardiac     Subjective    Patient was aware of therapy schedule and willing to participate in ST.      Objective       09/30/21 0732   Cognition   Functional Sequencing for ADL / IADLs Supervision (5)   Written Arithmetic Severe (2)   Functional Math / Financial Management Severe (2)   SLP Total Time Spent   SLP Individual Total Time Spent (Mins) 90   Treatment Charges   SLP Cognitive Skill Development First 15 Minutes 1   SLP Cognitive Skill Development Additional 15 Minutes 1       Assessment    Patient initially completed single digit addition task with 20% accuracy. When provided with strategies, accuracy improved to 50%, however, improved to 75% when equations were read out loud to patient.   Patient was able to ID coins with 60% accuracy (errors with quarters vs nickels) and add coin amounts with 70% accuracy.     Plan    Continue basic functional math, reading comprehension, and attention tasks.       Speech Therapy Problems (Active)     Problem: Expression STGs     Dates: Start: 09/29/21       Goal: STG-Within one week, patient will demonstrate use of SFA during conversation in order to improve word finding.      Dates: Start: 09/29/21             Problem: Memory STGs     Dates: Start: 09/29/21       Goal: STG-Within one week, patient will demonstrate use of memory strategies in order to recall daily events     Dates: Start: 09/29/21             Problem: Problem Solving STGs     Dates: Start: 09/29/21       Goal: STG-Within one week, patient will complete medication management tasks with 80% accuracy, min verbal cues.      Dates: Start: 09/29/21          Goal: STG-Within one week, patient will complete basic financial management tasks with 70% accuracy, min verbal cues     Dates: Start:  09/29/21             Problem: Speech/Swallowing LTGs     Dates: Start: 09/29/21       Goal: LTG-By discharge, patient will solve complex problems related to IADL's in order to d/c homw with mod I     Dates: Start: 09/29/21

## 2021-10-01 ENCOUNTER — APPOINTMENT (OUTPATIENT)
Dept: RADIOLOGY | Facility: MEDICAL CENTER | Age: 76
DRG: 949 | End: 2021-10-01
Attending: HOSPITALIST
Payer: COMMERCIAL

## 2021-10-01 PROCEDURE — A9270 NON-COVERED ITEM OR SERVICE: HCPCS | Performed by: PHYSICAL MEDICINE & REHABILITATION

## 2021-10-01 PROCEDURE — 97530 THERAPEUTIC ACTIVITIES: CPT

## 2021-10-01 PROCEDURE — 94760 N-INVAS EAR/PLS OXIMETRY 1: CPT

## 2021-10-01 PROCEDURE — 700101 HCHG RX REV CODE 250: Performed by: PHYSICAL MEDICINE & REHABILITATION

## 2021-10-01 PROCEDURE — 700102 HCHG RX REV CODE 250 W/ 637 OVERRIDE(OP): Performed by: PHYSICAL MEDICINE & REHABILITATION

## 2021-10-01 PROCEDURE — 97129 THER IVNTJ 1ST 15 MIN: CPT | Performed by: SPEECH-LANGUAGE PATHOLOGIST

## 2021-10-01 PROCEDURE — 99233 SBSQ HOSP IP/OBS HIGH 50: CPT | Performed by: HOSPITALIST

## 2021-10-01 PROCEDURE — A9270 NON-COVERED ITEM OR SERVICE: HCPCS | Performed by: HOSPITALIST

## 2021-10-01 PROCEDURE — 99232 SBSQ HOSP IP/OBS MODERATE 35: CPT | Performed by: PHYSICAL MEDICINE & REHABILITATION

## 2021-10-01 PROCEDURE — 97535 SELF CARE MNGMENT TRAINING: CPT

## 2021-10-01 PROCEDURE — 770010 HCHG ROOM/CARE - REHAB SEMI PRIVAT*

## 2021-10-01 PROCEDURE — 76604 US EXAM CHEST: CPT

## 2021-10-01 PROCEDURE — 97130 THER IVNTJ EA ADDL 15 MIN: CPT | Performed by: SPEECH-LANGUAGE PATHOLOGIST

## 2021-10-01 PROCEDURE — 700102 HCHG RX REV CODE 250 W/ 637 OVERRIDE(OP): Performed by: HOSPITALIST

## 2021-10-01 RX ORDER — LIDOCAINE 50 MG/G
1 PATCH TOPICAL EVERY 24 HOURS
Status: DISCONTINUED | OUTPATIENT
Start: 2021-10-02 | End: 2021-10-22 | Stop reason: HOSPADM

## 2021-10-01 RX ORDER — HYDROCODONE BITARTRATE AND ACETAMINOPHEN 5; 325 MG/1; MG/1
1-2 TABLET ORAL EVERY 6 HOURS PRN
Status: DISCONTINUED | OUTPATIENT
Start: 2021-10-01 | End: 2021-10-11

## 2021-10-01 RX ORDER — GABAPENTIN 100 MG/1
100 CAPSULE ORAL 3 TIMES DAILY
Status: DISCONTINUED | OUTPATIENT
Start: 2021-10-01 | End: 2021-10-06

## 2021-10-01 RX ADMIN — APIXABAN 5 MG: 5 TABLET, FILM COATED ORAL at 20:02

## 2021-10-01 RX ADMIN — GABAPENTIN 100 MG: 100 CAPSULE ORAL at 20:02

## 2021-10-01 RX ADMIN — SENNOSIDES AND DOCUSATE SODIUM 2 TABLET: 8.6; 5 TABLET ORAL at 08:29

## 2021-10-01 RX ADMIN — FERROUS SULFATE TAB 325 MG (65 MG ELEMENTAL FE) 325 MG: 325 (65 FE) TAB at 08:28

## 2021-10-01 RX ADMIN — ATORVASTATIN CALCIUM 80 MG: 40 TABLET, FILM COATED ORAL at 20:02

## 2021-10-01 RX ADMIN — OXYCODONE HYDROCHLORIDE AND ACETAMINOPHEN 500 MG: 500 TABLET ORAL at 20:02

## 2021-10-01 RX ADMIN — LEVETIRACETAM 1000 MG: 500 TABLET, FILM COATED ORAL at 20:02

## 2021-10-01 RX ADMIN — APIXABAN 5 MG: 5 TABLET, FILM COATED ORAL at 08:28

## 2021-10-01 RX ADMIN — OXYCODONE HYDROCHLORIDE AND ACETAMINOPHEN 500 MG: 500 TABLET ORAL at 08:28

## 2021-10-01 RX ADMIN — MELATONIN TAB 3 MG 3 MG: 3 TAB at 20:02

## 2021-10-01 RX ADMIN — OMEPRAZOLE 20 MG: 20 CAPSULE, DELAYED RELEASE ORAL at 08:28

## 2021-10-01 RX ADMIN — HYDROCODONE BITARTRATE AND ACETAMINOPHEN 1 TABLET: 5; 325 TABLET ORAL at 14:18

## 2021-10-01 RX ADMIN — SENNOSIDES AND DOCUSATE SODIUM 2 TABLET: 8.6; 5 TABLET ORAL at 20:02

## 2021-10-01 RX ADMIN — ASPIRIN 81 MG: 81 TABLET, COATED ORAL at 08:28

## 2021-10-01 RX ADMIN — SPIRONOLACTONE 25 MG: 25 TABLET, FILM COATED ORAL at 04:41

## 2021-10-01 RX ADMIN — METOPROLOL TARTRATE 12.5 MG: 25 TABLET, FILM COATED ORAL at 04:41

## 2021-10-01 RX ADMIN — LIDOCAINE 1 PATCH: 50 PATCH TOPICAL at 20:07

## 2021-10-01 RX ADMIN — LEVETIRACETAM 1000 MG: 500 TABLET, FILM COATED ORAL at 08:28

## 2021-10-01 RX ADMIN — METOPROLOL TARTRATE 12.5 MG: 25 TABLET, FILM COATED ORAL at 17:13

## 2021-10-01 RX ADMIN — TAMSULOSIN HYDROCHLORIDE 0.4 MG: 0.4 CAPSULE ORAL at 17:13

## 2021-10-01 RX ADMIN — POLYETHYLENE GLYCOL 3350 1 PACKET: 17 POWDER, FOR SOLUTION ORAL at 08:29

## 2021-10-01 RX ADMIN — OXYCODONE 5 MG: 5 TABLET ORAL at 04:41

## 2021-10-01 RX ADMIN — OXYCODONE 5 MG: 5 TABLET ORAL at 08:56

## 2021-10-01 ASSESSMENT — ENCOUNTER SYMPTOMS
VOMITING: 0
ABDOMINAL PAIN: 0
EYES NEGATIVE: 1
COUGH: 0
CHILLS: 0
FEVER: 0
BRUISES/BLEEDS EASILY: 0
SHORTNESS OF BREATH: 0
NAUSEA: 0
PALPITATIONS: 0
POLYDIPSIA: 0

## 2021-10-01 ASSESSMENT — PAIN DESCRIPTION - PAIN TYPE
TYPE: ACUTE PAIN

## 2021-10-01 ASSESSMENT — ACTIVITIES OF DAILY LIVING (ADL): BED_CHAIR_WHEELCHAIR_TRANSFER_DESCRIPTION: SET-UP OF EQUIPMENT;SUPERVISION FOR SAFETY;VERBAL CUEING

## 2021-10-01 ASSESSMENT — PAIN SCALES - WONG BAKER: WONGBAKER_NUMERICALRESPONSE: HURTS EVEN MORE

## 2021-10-01 NOTE — PROGRESS NOTES
"Rehab Progress Note     Date of Service: 10/1/2021  Chief Complaint: follow up MVR repair    Interval Events (Subjective)    Patient seen and examined today in her room. She is having significant increase in right sided chest wall pain today. Oxycodone is ineffective in managing the pain. Discussed the case with the hospitalist and ultrasound has been ordered. Patient reports she did not sleep very well last night due to this pain. She has no other complaints today.    Patient continued to have anterior chest wall pain this afternoon, unable to do therapies. Discussed with Dr. Cameron, likely nerve versus MSK irritation. Adding topical lidoderm patch and low dose gabapentin.     ROS: No changes to bowel, bladder, pain, mood, or sleep.           Objective:  VITAL SIGNS: /83   Pulse 90   Temp 37 °C (98.6 °F) (Oral)   Resp 18   Ht 1.676 m (5' 6\")   Wt 63 kg (139 lb)   SpO2 96%   BMI 22.44 kg/m²   Gen: alert, no apparent distress  CV: regular rate and rhythm, no murmurs, no peripheral edema, edema and ecchymosis around right chest wall incision  Resp: clear to ascultation bilaterally, normal respiratory effort  GI: soft, non-tender abdomen, bowel sounds present    Recent Results (from the past 72 hour(s))   SARS-CoV-2 PCR (24 hour In-House): Collect NP swab in [facility]    Collection Time: 09/28/21  4:35 PM    Specimen: Nasopharyngeal; Respirate   Result Value Ref Range    SARS-CoV-2 Source NP Swab     SARS-CoV-2 by PCR NotDetected    CBC with Differential    Collection Time: 09/29/21  5:39 AM   Result Value Ref Range    WBC 8.4 4.8 - 10.8 K/uL    RBC 2.66 (L) 4.20 - 5.40 M/uL    Hemoglobin 8.8 (L) 12.0 - 16.0 g/dL    Hematocrit 27.4 (L) 37.0 - 47.0 %    .0 (H) 81.4 - 97.8 fL    MCH 33.1 (H) 27.0 - 33.0 pg    MCHC 32.1 (L) 33.6 - 35.0 g/dL    RDW 56.6 (H) 35.9 - 50.0 fL    Platelet Count 365 164 - 446 K/uL    MPV 9.5 9.0 - 12.9 fL    Neutrophils-Polys 78.10 (H) 44.00 - 72.00 %    Lymphocytes 11.20 (L) 22.00 - " 41.00 %    Monocytes 8.10 0.00 - 13.40 %    Eosinophils 1.70 0.00 - 6.90 %    Basophils 0.40 0.00 - 1.80 %    Immature Granulocytes 0.50 0.00 - 0.90 %    Nucleated RBC 0.00 /100 WBC    Neutrophils (Absolute) 6.60 2.00 - 7.15 K/uL    Lymphs (Absolute) 0.94 (L) 1.00 - 4.80 K/uL    Monos (Absolute) 0.68 0.00 - 0.85 K/uL    Eos (Absolute) 0.14 0.00 - 0.51 K/uL    Baso (Absolute) 0.03 0.00 - 0.12 K/uL    Immature Granulocytes (abs) 0.04 0.00 - 0.11 K/uL    NRBC (Absolute) 0.00 K/uL   Comp Metabolic Panel (CMP)    Collection Time: 09/29/21  5:39 AM   Result Value Ref Range    Sodium 138 135 - 145 mmol/L    Potassium 4.3 3.6 - 5.5 mmol/L    Chloride 105 96 - 112 mmol/L    Co2 25 20 - 33 mmol/L    Anion Gap 8.0 7.0 - 16.0    Glucose 85 65 - 99 mg/dL    Bun 17 8 - 22 mg/dL    Creatinine 0.73 0.50 - 1.40 mg/dL    Calcium 8.4 (L) 8.5 - 10.5 mg/dL    AST(SGOT) 28 12 - 45 U/L    ALT(SGPT) 45 2 - 50 U/L    Alkaline Phosphatase 72 30 - 99 U/L    Total Bilirubin 0.2 0.1 - 1.5 mg/dL    Albumin 3.1 (L) 3.2 - 4.9 g/dL    Total Protein 5.2 (L) 6.0 - 8.2 g/dL    Globulin 2.1 1.9 - 3.5 g/dL    A-G Ratio 1.5 g/dL   Magnesium    Collection Time: 09/29/21  5:39 AM   Result Value Ref Range    Magnesium 1.6 1.5 - 2.5 mg/dL   Vitamin D, 25-hydroxy (blood)    Collection Time: 09/29/21  5:39 AM   Result Value Ref Range    25-Hydroxy   Vitamin D 25 49 30 - 100 ng/mL   proBrain Natriuretic Peptide, NT    Collection Time: 09/29/21  5:39 AM   Result Value Ref Range    NT-proBNP 2806 (H) 0 - 125 pg/mL   ESTIMATED GFR    Collection Time: 09/29/21  5:39 AM   Result Value Ref Range    GFR If African American >60 >60 mL/min/1.73 m 2    GFR If Non African American >60 >60 mL/min/1.73 m 2   OCCULT BLOOD X3 (STOOL)    Collection Time: 09/29/21  5:20 PM   Result Value Ref Range    Occult Blood 1 Negative Negative   VITAMIN B12    Collection Time: 09/30/21  5:41 AM   Result Value Ref Range    Vitamin B12 -True Cobalamin 1135 (H) 211 - 911 pg/mL   TSH WITH  REFLEX TO FT4    Collection Time: 09/30/21  5:41 AM   Result Value Ref Range    TSH 1.780 0.380 - 5.330 uIU/mL   IRON/TOTAL IRON BIND    Collection Time: 09/30/21  5:41 AM   Result Value Ref Range    Iron 23 (L) 40 - 170 ug/dL    Total Iron Binding 232 (L) 250 - 450 ug/dL    Unsat Iron Binding 209 110 - 370 ug/dL    % Saturation 10 (L) 15 - 55 %   MAGNESIUM    Collection Time: 09/30/21  5:41 AM   Result Value Ref Range    Magnesium 1.8 1.5 - 2.5 mg/dL   PHOSPHORUS    Collection Time: 09/30/21  5:41 AM   Result Value Ref Range    Phosphorus 3.9 2.5 - 4.5 mg/dL   CBC WITHOUT DIFFERENTIAL    Collection Time: 09/30/21  5:41 AM   Result Value Ref Range    WBC 5.7 4.8 - 10.8 K/uL    RBC 2.73 (L) 4.20 - 5.40 M/uL    Hemoglobin 9.0 (L) 12.0 - 16.0 g/dL    Hematocrit 28.3 (L) 37.0 - 47.0 %    .7 (H) 81.4 - 97.8 fL    MCH 33.0 27.0 - 33.0 pg    MCHC 31.8 (L) 33.6 - 35.0 g/dL    RDW 57.4 (H) 35.9 - 50.0 fL    Platelet Count 394 164 - 446 K/uL    MPV 9.4 9.0 - 12.9 fL   Basic Metabolic Panel    Collection Time: 09/30/21  5:41 AM   Result Value Ref Range    Sodium 140 135 - 145 mmol/L    Potassium 4.2 3.6 - 5.5 mmol/L    Chloride 108 96 - 112 mmol/L    Co2 23 20 - 33 mmol/L    Glucose 84 65 - 99 mg/dL    Bun 17 8 - 22 mg/dL    Creatinine 0.76 0.50 - 1.40 mg/dL    Calcium 8.1 (L) 8.5 - 10.5 mg/dL    Anion Gap 9.0 7.0 - 16.0   proBrain Natriuretic Peptide, NT    Collection Time: 09/30/21  5:41 AM   Result Value Ref Range    NT-proBNP 2364 (H) 0 - 125 pg/mL   ESTIMATED GFR    Collection Time: 09/30/21  5:41 AM   Result Value Ref Range    GFR If African American >60 >60 mL/min/1.73 m 2    GFR If Non African American >60 >60 mL/min/1.73 m 2   EC-ECHOCARDIOGRAM COMPLETE W/O CONT    Collection Time: 09/30/21 10:49 AM   Result Value Ref Range    Eject.Frac. MOD BP 61.9     Eject.Frac. MOD 4C 57.84     Eject.Frac. MOD 2C 61.74     Left Ventrical Ejection Fraction 60        Current Facility-Administered Medications   Medication  Frequency   • HYDROcodone-acetaminophen (NORCO) 5-325 MG per tablet 1-2 Tablet Q6HRS PRN   • metoprolol tartrate (LOPRESSOR) tablet 12.5 mg TWICE DAILY   • lidocaine (LIDODERM) 5 % 1 Patch Q24HR   • polyethylene glycol/lytes (MIRALAX) PACKET 1 Packet DAILY    And   • senna-docusate (PERICOLACE or SENOKOT S) 8.6-50 MG per tablet 2 Tablet BID    And   • magnesium hydroxide (MILK OF MAGNESIA) suspension 30 mL QDAY PRN    And   • bisacodyl (DULCOLAX) suppository 10 mg DAILY   • hydrOXYzine HCl (ATARAX) tablet 50 mg Q6HRS PRN   • QUEtiapine (Seroquel) tablet 25 mg TID PRN   • simethicone (MYLICON) chewable tab 80 mg TID PRN   • melatonin tablet 3 mg HS PRN   • Respiratory Therapy Consult Continuous RT   • Pharmacy Consult Request ...Pain Management Review 1 Each PHARMACY TO DOSE   • acetaminophen (Tylenol) tablet 650 mg Q4HRS PRN   • lactulose 20 GM/30ML solution 30 mL QDAY PRN   • docusate sodium (ENEMEEZ) enema 283 mg QDAY PRN   • sodium phosphate (Fleet) enema 133 mL QDAY PRN   • omeprazole (PRILOSEC) capsule 20 mg QAM AC   • artificial tears ophthalmic solution 1 Drop PRN   • benzocaine-menthol (CEPACOL) lozenge 1 Lozenge Q2HRS PRN   • mag hydrox-al hydrox-simeth (MAALOX PLUS ES or MYLANTA DS) suspension 20 mL Q2HRS PRN   • ondansetron (ZOFRAN ODT) dispertab 4 mg 4X/DAY PRN    Or   • ondansetron (ZOFRAN) syringe/vial injection 4 mg 4X/DAY PRN   • traZODone (DESYREL) tablet 50 mg QHS PRN   • sodium chloride (OCEAN) 0.65 % nasal spray 2 Spray PRN   • aspirin EC (ECOTRIN) tablet 81 mg DAILY   • ascorbic acid (Vitamin C) tablet 500 mg BID   • ferrous sulfate tablet 325 mg QDAY with Breakfast   • levETIRAcetam (KEPPRA) tablet 1,000 mg Q12HRS   • spironolactone (ALDACTONE) tablet 25 mg Q DAY   • tamsulosin (FLOMAX) capsule 0.4 mg AFTER DINNER   • atorvastatin (LIPITOR) tablet 80 mg Q EVENING   • apixaban (ELIQUIS) tablet 5 mg BID       Orders Placed This Encounter   Procedures   • Diet Order Diet: Regular (chopped meats)      Standing Status:   Standing     Number of Occurrences:   1     Order Specific Question:   Diet:     Answer:   Regular [1]     Comments:   chopped meats       Assessment:  Active Hospital Problems    Diagnosis    • *S/P mitral valve repair    • Hypoalbuminemia    • Other constipation    • Breast cancer (HCC)    • Upper back pain on right side    • Impaired mobility and ADLs    • Seizure disorder (HCC)    • Other hyperlipidemia    • Urinary retention    • Anemia    • Atrial fibrillation (HCC)    • Herpes    • Sleep apnea    • Hypertension    • History of CVA (cerebrovascular accident)      This patient is a 76 y.o. female admitted for acute inpatient rehabilitation with impaired cardiac endurance S/P mitral valve repair.    Therapy notes reviewed.    We will have her first weekly conference on Monday to discuss a discharge date.    Medical Decision Making and Plan:    S/p Mitral Valve repair  Dr. Nogueira 9/16  Continue full rehab program  PT/OT/SLP, 1 hr each discipline, 5 days per week    Outpatient follow up with cardiology and Dr. Nogueira     Aspirin  Spironolactone      Chest discomfort, right-sided  Worse than baseline  Check ultrasound    History of strokes  Cardio-embolic  Residual left hemiparesis  Aphasia  Dysphagia, resolved  Cognitive impairment  Continue full rehab program  PT/OT/SLP, 1 hr each discipline, 5 days per week     Eliquis  Statin     Outpatient follow up with Dr. Sosa    Right upper back pain  Right anterior chest wall pain  Lidoderm patch  PRN tylenol, last use 9/29  PRN oxycodone, not effective for right-sided chest wall pain  Switched to Norco, last use 10-1  Start low dose gabapentin 100 mg TID     Seizure disorder  Tonic/clonic post-op  Keppra  Outpatient referral to neurology     Hypertension  Metoprolol  Spironolactone  Appreciate hospitalist assistance    Orthostatic hypotension, improved  Appreciate hospitalist assistance  May need to decrease dosing of BP meds     Atrial  fibrillation  Metoprolol  Appreciate hospitalist assistance     Hyperlipidemia  Statin     Anemia  Ferrous sulfate  Vit C     GI prophylaxis  Omeprazole     History of herpes simplex  Valacyclovir discontinued    Sleep apnea  2 L oxygen at night    Bowel program  Constipation  Increase bowel medications  Last BM 9/30    Bladder program  Urinary retention  Started Flomax 9/28  Continue Medina for now  Will do voiding trial early next week     DVT prophylaxis  Eliquis    Total time:  26 minutes.  I spent greater than 50% of the time for patient care, counseling, and coordination on this date, including patient face-to face time, unit/floor time with review of records/pertinent lab data and studies, as well as discussing diagnostic evaluation/work up, planned therapeutic interventions, and future disposition of care, as per the interval events/subjective and the assessment and plan as noted above.    I have performed a physical exam, reviewed and updated ROS, as well as the assessment and plan today 10/1/2021. In review of note from 9/30/2021 there are no new changes except as documented above.              Precious Rawls M.D.   Physical Medicine and Rehabilitation

## 2021-10-01 NOTE — PROGRESS NOTES
Hospital Medicine Daily Progress Note      Chief Complaint  S/P MVR  Hypertension    Interval Problem Update  Pt c/o worsening right chest wall post op pain today and doesn't want to do therapies.    Review of Systems  Review of Systems   Constitutional: Negative for chills and fever.   HENT: Negative.    Eyes: Negative.    Respiratory: Negative for cough and shortness of breath.    Cardiovascular: Negative for chest pain and palpitations.   Gastrointestinal: Negative for abdominal pain, nausea and vomiting.   Musculoskeletal:        Wound pain    Skin: Negative for itching and rash.   Endo/Heme/Allergies: Negative for polydipsia. Does not bruise/bleed easily.        Physical Exam  Temp:  [36.6 °C (97.8 °F)-37 °C (98.6 °F)] 37 °C (98.6 °F)  Pulse:  [] 99  Resp:  [16-18] 16  BP: (107-144)/(60-87) 107/60  SpO2:  [96 %] 96 %    Physical Exam  Vitals reviewed.   Constitutional:       General: She is not in acute distress.     Appearance: Normal appearance. She is not ill-appearing.   HENT:      Head: Normocephalic and atraumatic.      Right Ear: External ear normal.      Left Ear: External ear normal.      Nose: Nose normal.      Mouth/Throat:      Pharynx: Oropharynx is clear.   Eyes:      General:         Right eye: No discharge.         Left eye: No discharge.      Extraocular Movements: Extraocular movements intact.      Conjunctiva/sclera: Conjunctivae normal.   Cardiovascular:      Comments: irreg irreg, right chest wall incision C/D/I w/ krzysztof-incisional fullness but no induration  Pulmonary:      Effort: Pulmonary effort is normal. No respiratory distress.      Breath sounds: Normal breath sounds. No wheezing.   Abdominal:      General: Bowel sounds are normal. There is no distension.      Palpations: Abdomen is soft.      Tenderness: There is no abdominal tenderness. There is no guarding or rebound.   Musculoskeletal:      Cervical back: Normal range of motion and neck supple.      Right lower leg: No  edema.      Left lower leg: No edema.   Skin:     General: Skin is warm and dry.   Neurological:      Mental Status: She is alert and oriented to person, place, and time.         Fluids    Intake/Output Summary (Last 24 hours) at 10/1/2021 1701  Last data filed at 10/1/2021 1652  Gross per 24 hour   Intake 360 ml   Output 2100 ml   Net -1740 ml       Laboratory  Recent Labs     09/29/21  0539 09/30/21  0541   WBC 8.4 5.7   RBC 2.66* 2.73*   HEMOGLOBIN 8.8* 9.0*   HEMATOCRIT 27.4* 28.3*   .0* 103.7*   MCH 33.1* 33.0   MCHC 32.1* 31.8*   RDW 56.6* 57.4*   PLATELETCT 365 394   MPV 9.5 9.4     Recent Labs     09/29/21  0539 09/30/21  0541   SODIUM 138 140   POTASSIUM 4.3 4.2   CHLORIDE 105 108   CO2 25 23   GLUCOSE 85 84   BUN 17 17   CREATININE 0.73 0.76   CALCIUM 8.4* 8.1*                 Assessment/Plan  * S/P mitral valve repair- (present on admission)  Assessment & Plan  MVP w/ severe MVR  Now S/P minimally invasive MVR at [facility] on 9/16/21 by Dr. Nogueira  F/U Echo EF 60% w/ normally functioning MVR  Wound care and pain control per Physiatry  Request CXR and Soft Tissue U/S for krzysztof-incisional chest wall fullness    Breast cancer (HCC)- (present on admission)  Assessment & Plan  H/O lumpectomy and chemoXRT    Anemia- (present on admission)  Assessment & Plan  Suspect large component of krzysztof-op blood loss  Has macrocytic indices  Vit B12 and TSH both normal  Folate testing no longer performed at this facility  Fe 23, continue Fe and Vit C supplements  FOB x 1 negative thus far  Follow H/H  Check F/U labs in am     Seizure disorder (HCC)- (present on admission)  Assessment & Plan  On Keppra    Herpes- (present on admission)  Assessment & Plan  Completed Valtrex    Atrial fibrillation (HCC)- (present on admission)  Assessment & Plan  Now S/P Maze procedure at [facility] on 9/16/21 by Dr. Nogueira  F/U Echo EF 60% w/ normally functioning MVR  Rate controlled on Metoprolol  Anticoagulated on Eliquis    Hypertension-  (present on admission)  Assessment & Plan  On Metoprolol and Aldactone  Low blood pressures improved w/ decreased Metoprolol  Monitor for orthostatic hypotension on Flomax    History of CVA (cerebrovascular accident)- (present on admission)  Assessment & Plan  H/O CVA x 2 w/ residual left hemiparesis  On Eliquis, ASA, and Lipitor     Full Code    Discussed w/ pt, RN, and Dr. Rawls

## 2021-10-01 NOTE — THERAPY
Missed Therapy     Patient Name: Shaista Bocanegra  Age:  76 y.o., Sex:  female  Medical Record #: [id number]  Today's Date: 10/1/2021    Discussed missed therapy with therapy  and Dr Rawls.        10/01/21 1431   Precautions   Precautions Fall Risk   Comments seizure, salas, cardiac   Interdisciplinary Plan of Care Collaboration   IDT Collaboration with  Physician   Patient Position at End of Therapy In Bed;Call Light within Reach;Tray Table within Reach   Collaboration Comments Dr Rawls regarding medical secondary to severe pain    Therapy Missed   Missed Therapy (Minutes) 60   Reason For Missed Therapy Medical - Patient on Hold from Therapy  (severe pain )

## 2021-10-01 NOTE — CARE PLAN
The patient is Watcher - Medium risk of patient condition declining or worsening    Shift Goals  Clinical Goals: pain management  Patient Goals: Sleep well      Problem: Pain - Standard  Goal: Alleviation of pain or a reduction in pain to the patient’s comfort goal  Outcome: Not Met patient has pain at the incision site right chest, that she states is not controlled at intervals with prn oxycodone. Dr Rawls aware and changed prn pain management to norco, will continue to monitor.     Problem: Skin Integrity  Goal: Patient's skin integrity will be maintained or improve  Outcome: Not Met picture taken of right chest incision and LDAs opened for lateral chest incision site, right chest/breast area and right abdomen.

## 2021-10-01 NOTE — PROGRESS NOTES
Patient complains of right side chest incision site pain that radiates to right neck. Patient states pain has been going on for a few days. Upon assessment, slight swelling to right breast area at incision site, no drainage noted. Dr Cameron was made aware, will continue to monitor.

## 2021-10-01 NOTE — THERAPY
Speech Language Pathology  Daily Treatment     Patient Name: Shaista Bocanegra  Age:  76 y.o., Sex:  female  Medical Record #: [id number]  Today's Date: 10/1/2021     Precautions  Precautions: Fall Risk  Comments: seizure, salas, cardiac    Subjective    Patient was seen for 2 sessions (0730 and 1000). Patient with c/o pain in right shoulder but was cooperative with tx. During the second session, she requested to end early due to worsening pain. SLP discussed with physician for a tx hold.      Objective       10/01/21 0731   Precautions   Precautions Fall Risk   Comments seizure, salas, cardiac   Cognition   Moderate Attention Supervision (5)   Written Arithmetic Severe (2)   Functional Math / Financial Management Moderate (3)   Sleep/Wake Cycle   Sleep & Rest Awake   Interdisciplinary Plan of Care Collaboration   IDT Collaboration with  Physician;Nursing   Patient Position at End of Therapy In Bed;Call Light within Reach;Tray Table within Reach;Phone within Reach   Collaboration Comments pain, tx hold   Therapy Missed   Missed Therapy (Minutes) 15   Reason For Missed Therapy Medical - Other (Please Comment)  (pain)   SLP Total Time Spent   SLP Individual Total Time Spent (Mins) 75   Treatment Charges   SLP Cognitive Skill Development First 15 Minutes 1   SLP Cognitive Skill Development Additional 15 Minutes 4       Assessment    SLP provided patient with tasks to target attention and simple math. Patient located a target letter in a field of 60 letters with approximately 75% accuracy, improving to 100% accuracy with minimal cues. Patient independently completed single digit substraction with 0% accuracy. However, when prompted to read the numbers out loud prior to completing the problem, her accuracy improved to 80%. SLP also provided patient with pictured amounts of money, requiring her to identify the coins and determine the total. She completed with 83% accuracy.          Plan    Cont tx to target below listed  goals.    Passport items to be completed:  Express basic needs, understand food/liquid recommendations, consistently follow swallow precautions, manage finances, manage medications, arrive to therapy appointments on time, complete daily memory log entries, solve problems related to safety situations, review education related to hospitalization, complete caregiver training     Speech Therapy Problems (Active)     Problem: Expression STGs     Dates: Start: 09/29/21       Goal: STG-Within one week, patient will demonstrate use of SFA during conversation in order to improve word finding.      Dates: Start: 09/29/21             Problem: Memory STGs     Dates: Start: 09/29/21       Goal: STG-Within one week, patient will demonstrate use of memory strategies in order to recall daily events     Dates: Start: 09/29/21             Problem: Problem Solving STGs     Dates: Start: 09/29/21       Goal: STG-Within one week, patient will complete medication management tasks with 80% accuracy, min verbal cues.      Dates: Start: 09/29/21          Goal: STG-Within one week, patient will complete basic financial management tasks with 70% accuracy, min verbal cues     Dates: Start: 09/29/21             Problem: Speech/Swallowing LTGs     Dates: Start: 09/29/21       Goal: LTG-By discharge, patient will solve complex problems related to IADL's in order to d/c homw with mod I     Dates: Start: 09/29/21

## 2021-10-01 NOTE — CARE PLAN
"  Problem: Pain - Standard  Goal: Alleviation of pain or a reduction in pain to the patient’s comfort goal  Note: Medicated pt with Oxycodone with good relief. PRN Melatonin given per pt's request. Repositioned pt comfortably in bed     Problem: Fall Risk - Rehab  Goal: Patient will remain free from falls  Note: Maddison Green Fall risk Assessment Score: 12    Moderate fall risk Interventions  - Bed and strip alarm   - Yellow sign by the door   - Yellow wrist band \"Fall risk\"  - Do not leave patient unattended in the bathroom  - Fall risk education provided         "

## 2021-10-01 NOTE — PROGRESS NOTES
Patient care assumed. Report received from [facility] KEATON Cardoza. Patient is alert and calm, resting in bed. Call light and bedside table within reach. Will continue to monitor.

## 2021-10-01 NOTE — THERAPY
"Occupational Therapy  Daily Treatment     Patient Name: Shaista Bocanegra  Age:  76 y.o., Sex:  female  Medical Record #: [id number]  Today's Date: 10/1/2021     Precautions  Precautions: (P) Fall Risk  Comments: (P) seizure, salas, cardiac         Subjective    \"I can't bend down there to get that.  My back hurts too much.\"     Objective       10/01/21 0931   Precautions   Precautions Fall Risk   Comments seizure, salas, cardiac   Pain 0 - 10 Group   Location Back;Neck  (upper trap)   Location Orientation Right;Lower   Functional Level of Assist   Grooming Supervision;Seated   Bed, Chair, Wheelchair Transfer Minimal Assist   Bed Chair Wheelchair Transfer Description Set-up of equipment;Supervision for safety;Verbal cueing  (min hand hold assist for SPT bed to w/c)   IADL Treatments   IADL Treatments Kitchen mobility education   Kitchen Mobility Education Patient educated on safe kitchen mobility techniques using FWW and counter for support.  Patient then mobilized throughout kitchen while attempting to retrieve items from upper/lower cabinets, counter and various appliances.   Bed Mobility    Supine to Sit Minimal Assist   Scooting Stand by Assist   Interdisciplinary Plan of Care Collaboration   IDT Collaboration with  Nursing  (nursing and nursing student secured salas tube to stat lock)   Patient Position at End of Therapy Seated;Chair Alarm On;Self Releasing Lap Belt Applied;Call Light within Reach;Tray Table within Reach;Phone within Reach  (awaiting SLP)   Collaboration Comments see above comment   OT Total Time Spent   OT Individual Total Time Spent (Mins) 30   OT Charge Group   OT Self Care / ADL 1   OT Therapy Activity 1       Assessment    Patient limited this session by right sided low back pain and right neck/upper trap pain.  This prevented her from attempting to reach into lower cabinet and  to retrieve items.  Patient also required moderate verbal cues for safety and problem solving during " kitchen mobility activity.  Strengths: Able to follow instructions, Alert and oriented, Adequate strength, Effective communication skills, Independent prior level of function, Motivated for self care and independence, Pleasant and cooperative, Supportive family, Willingly participates in therapeutic activities  Barriers: Decreased endurance, Fatigue, Generalized weakness, Impaired activity tolerance, Impaired balance, Pain (lack of sleep night before initial evaluation)    Plan    ADLs, IADLs, transfers, functional mobility, standing tolerance/balance, strength/endurance building    Occupational Therapy Goals (Active)     Problem: Dressing     Dates: Start: 09/29/21       Goal: STG-Within one week, patient will dress LB with mod A using AE/AD/techniques     Dates: Start: 09/29/21             Problem: Functional Transfers     Dates: Start: 09/29/21       Goal: STG-Within one week, patient will transfer to toilet with supervision using grab bar and/or FWW     Dates: Start: 09/29/21             Problem: OT Long Term Goals     Dates: Start: 09/29/21       Goal: LTG-By discharge, patient will complete basic self care tasks with mod I     Dates: Start: 09/29/21          Goal: LTG-By discharge, patient will perform bathroom transfers with mod I     Dates: Start: 09/29/21          Goal: LTG-By discharge, patient will complete basic home management with mod I     Dates: Start: 09/29/21             Problem: Toileting     Dates: Start: 09/29/21       Goal: STG-Within one week, patient will complete toileting tasks with SBA using AE/AD     Dates: Start: 09/29/21

## 2021-10-02 ENCOUNTER — APPOINTMENT (OUTPATIENT)
Dept: RADIOLOGY | Facility: REHABILITATION | Age: 76
DRG: 949 | End: 2021-10-02
Attending: HOSPITALIST
Payer: COMMERCIAL

## 2021-10-02 LAB
ANION GAP SERPL CALC-SCNC: 10 MMOL/L (ref 7–16)
BUN SERPL-MCNC: 15 MG/DL (ref 8–22)
CALCIUM SERPL-MCNC: 8.1 MG/DL (ref 8.5–10.5)
CHLORIDE SERPL-SCNC: 106 MMOL/L (ref 96–112)
CO2 SERPL-SCNC: 22 MMOL/L (ref 20–33)
CREAT SERPL-MCNC: 0.55 MG/DL (ref 0.5–1.4)
ERYTHROCYTE [DISTWIDTH] IN BLOOD BY AUTOMATED COUNT: 55.7 FL (ref 35.9–50)
GLUCOSE SERPL-MCNC: 98 MG/DL (ref 65–99)
HCT VFR BLD AUTO: 28.8 % (ref 37–47)
HGB BLD-MCNC: 9.1 G/DL (ref 12–16)
MCH RBC QN AUTO: 32.3 PG (ref 27–33)
MCHC RBC AUTO-ENTMCNC: 31.6 G/DL (ref 33.6–35)
MCV RBC AUTO: 102.1 FL (ref 81.4–97.8)
PLATELET # BLD AUTO: 398 K/UL (ref 164–446)
PMV BLD AUTO: 9.2 FL (ref 9–12.9)
POTASSIUM SERPL-SCNC: 4.1 MMOL/L (ref 3.6–5.5)
RBC # BLD AUTO: 2.82 M/UL (ref 4.2–5.4)
SODIUM SERPL-SCNC: 138 MMOL/L (ref 135–145)
WBC # BLD AUTO: 5.9 K/UL (ref 4.8–10.8)

## 2021-10-02 PROCEDURE — 80048 BASIC METABOLIC PNL TOTAL CA: CPT

## 2021-10-02 PROCEDURE — 36415 COLL VENOUS BLD VENIPUNCTURE: CPT

## 2021-10-02 PROCEDURE — 700102 HCHG RX REV CODE 250 W/ 637 OVERRIDE(OP): Performed by: HOSPITALIST

## 2021-10-02 PROCEDURE — 97110 THERAPEUTIC EXERCISES: CPT

## 2021-10-02 PROCEDURE — A9270 NON-COVERED ITEM OR SERVICE: HCPCS | Performed by: HOSPITALIST

## 2021-10-02 PROCEDURE — A9270 NON-COVERED ITEM OR SERVICE: HCPCS | Performed by: PHYSICAL MEDICINE & REHABILITATION

## 2021-10-02 PROCEDURE — 97116 GAIT TRAINING THERAPY: CPT

## 2021-10-02 PROCEDURE — 97129 THER IVNTJ 1ST 15 MIN: CPT

## 2021-10-02 PROCEDURE — 770010 HCHG ROOM/CARE - REHAB SEMI PRIVAT*

## 2021-10-02 PROCEDURE — 85027 COMPLETE CBC AUTOMATED: CPT

## 2021-10-02 PROCEDURE — 97112 NEUROMUSCULAR REEDUCATION: CPT

## 2021-10-02 PROCEDURE — 700101 HCHG RX REV CODE 250: Performed by: PHYSICAL MEDICINE & REHABILITATION

## 2021-10-02 PROCEDURE — 99232 SBSQ HOSP IP/OBS MODERATE 35: CPT | Performed by: HOSPITALIST

## 2021-10-02 PROCEDURE — 71045 X-RAY EXAM CHEST 1 VIEW: CPT

## 2021-10-02 PROCEDURE — 97535 SELF CARE MNGMENT TRAINING: CPT

## 2021-10-02 PROCEDURE — 94760 N-INVAS EAR/PLS OXIMETRY 1: CPT

## 2021-10-02 PROCEDURE — 700102 HCHG RX REV CODE 250 W/ 637 OVERRIDE(OP): Performed by: PHYSICAL MEDICINE & REHABILITATION

## 2021-10-02 PROCEDURE — 97130 THER IVNTJ EA ADDL 15 MIN: CPT

## 2021-10-02 RX ADMIN — OXYCODONE HYDROCHLORIDE AND ACETAMINOPHEN 500 MG: 500 TABLET ORAL at 21:27

## 2021-10-02 RX ADMIN — LIDOCAINE 1 PATCH: 50 PATCH TOPICAL at 08:22

## 2021-10-02 RX ADMIN — GABAPENTIN 100 MG: 100 CAPSULE ORAL at 16:01

## 2021-10-02 RX ADMIN — SENNOSIDES AND DOCUSATE SODIUM 2 TABLET: 8.6; 5 TABLET ORAL at 21:27

## 2021-10-02 RX ADMIN — TRAZODONE HYDROCHLORIDE 50 MG: 50 TABLET ORAL at 21:27

## 2021-10-02 RX ADMIN — FERROUS SULFATE TAB 325 MG (65 MG ELEMENTAL FE) 325 MG: 325 (65 FE) TAB at 08:23

## 2021-10-02 RX ADMIN — LEVETIRACETAM 1000 MG: 500 TABLET, FILM COATED ORAL at 21:28

## 2021-10-02 RX ADMIN — OMEPRAZOLE 20 MG: 20 CAPSULE, DELAYED RELEASE ORAL at 08:23

## 2021-10-02 RX ADMIN — SPIRONOLACTONE 25 MG: 25 TABLET, FILM COATED ORAL at 05:04

## 2021-10-02 RX ADMIN — SENNOSIDES AND DOCUSATE SODIUM 2 TABLET: 8.6; 5 TABLET ORAL at 08:24

## 2021-10-02 RX ADMIN — POLYETHYLENE GLYCOL 3350 1 PACKET: 17 POWDER, FOR SOLUTION ORAL at 08:22

## 2021-10-02 RX ADMIN — APIXABAN 5 MG: 5 TABLET, FILM COATED ORAL at 08:23

## 2021-10-02 RX ADMIN — OXYCODONE HYDROCHLORIDE AND ACETAMINOPHEN 500 MG: 500 TABLET ORAL at 08:23

## 2021-10-02 RX ADMIN — ACETAMINOPHEN 650 MG: 325 TABLET ORAL at 21:27

## 2021-10-02 RX ADMIN — HYDROCODONE BITARTRATE AND ACETAMINOPHEN 1 TABLET: 5; 325 TABLET ORAL at 05:04

## 2021-10-02 RX ADMIN — APIXABAN 5 MG: 5 TABLET, FILM COATED ORAL at 21:28

## 2021-10-02 RX ADMIN — ATORVASTATIN CALCIUM 80 MG: 40 TABLET, FILM COATED ORAL at 21:28

## 2021-10-02 RX ADMIN — GABAPENTIN 100 MG: 100 CAPSULE ORAL at 08:23

## 2021-10-02 RX ADMIN — LEVETIRACETAM 1000 MG: 500 TABLET, FILM COATED ORAL at 08:23

## 2021-10-02 RX ADMIN — TAMSULOSIN HYDROCHLORIDE 0.4 MG: 0.4 CAPSULE ORAL at 17:35

## 2021-10-02 RX ADMIN — METOPROLOL TARTRATE 12.5 MG: 25 TABLET, FILM COATED ORAL at 05:04

## 2021-10-02 RX ADMIN — ASPIRIN 81 MG: 81 TABLET, COATED ORAL at 08:23

## 2021-10-02 RX ADMIN — GABAPENTIN 100 MG: 100 CAPSULE ORAL at 21:29

## 2021-10-02 RX ADMIN — METOPROLOL TARTRATE 12.5 MG: 25 TABLET, FILM COATED ORAL at 17:35

## 2021-10-02 RX ADMIN — HYDROCODONE BITARTRATE AND ACETAMINOPHEN 1 TABLET: 5; 325 TABLET ORAL at 17:40

## 2021-10-02 ASSESSMENT — ENCOUNTER SYMPTOMS
VOMITING: 0
ABDOMINAL PAIN: 0
SHORTNESS OF BREATH: 0
COUGH: 0
CHILLS: 0
EYES NEGATIVE: 1
FEVER: 0
PALPITATIONS: 0
BRUISES/BLEEDS EASILY: 0
NAUSEA: 0
POLYDIPSIA: 0

## 2021-10-02 ASSESSMENT — GAIT ASSESSMENTS
GAIT LEVEL OF ASSIST: MINIMAL ASSIST
DISTANCE (FEET): 150
DEVIATION: BRADYKINETIC;SHUFFLED GAIT;DECREASED BASE OF SUPPORT

## 2021-10-02 ASSESSMENT — PAIN DESCRIPTION - PAIN TYPE: TYPE: ACUTE PAIN

## 2021-10-02 ASSESSMENT — ACTIVITIES OF DAILY LIVING (ADL)
BED_CHAIR_WHEELCHAIR_TRANSFER_DESCRIPTION: SET-UP OF EQUIPMENT;SUPERVISION FOR SAFETY
BED_CHAIR_WHEELCHAIR_TRANSFER_DESCRIPTION: SET-UP OF EQUIPMENT;INCREASED TIME

## 2021-10-02 NOTE — FLOWSHEET NOTE
10/02/21 1331   Events/Summary/Plan   Events/Summary/Plan Pt assessed for c/o of SOB,  BS clear, SATS 94% on RA., respirations 16.  Appears calm with no notablle distress.  Call light within reach.

## 2021-10-02 NOTE — CARE PLAN
Problem: Pain - Standard  Goal: Alleviation of pain or a reduction in pain to the patient’s comfort goal  Note: Pt denies any chest pain this shift. Lidocaine patch applied to left upper back per pt's request. Advised to inform staff if pain occurs. Ice pack provided for back pain.      Problem: Bowel Elimination  Goal: Patient will participate in bowel management program  Note: Pt refused scheduled suppository. Senna given per MAR. Educated pt the importance of having regular and routine bowel movements. Charge RN notified

## 2021-10-02 NOTE — THERAPY
"Physical Therapy   Daily Treatment     Patient Name: Shaista Bocanegra  Age:  76 y.o., Sex:  female  Medical Record #: [id number]  Today's Date: 10/2/2021     Precautions  Precautions: Fall Risk  Comments: seizure, salas, cardiac    Subjective    \"If we go outside, can you just push me around in the wheelchair\". Pt agreeable to PT with encouragement, reported fatigue.     Objective       10/02/21 1501   Pain   Intervention Heat Applied   Pain 0 - 10 Group   Location Neck   Location Orientation Posterior   Pain Rating Scale (NPRS) 3   Cognition    Level of Consciousness Alert   Sleep/Wake Cycle   Sleep & Rest Resting;Awake   Gait Functional Level of Assist    Gait Level Of Assist Minimal Assist   Assistive Device None   Distance (Feet) 150  (with no FWW, 150ft with FWW and SBA outdoors)   # of Times Distance was Traveled 2   Deviation Bradykinetic;Shuffled Gait;Decreased Base Of Support   Transfer Functional Level of Assist   Bed, Chair, Wheelchair Transfer Contact Guard Assist   Bed Chair Wheelchair Transfer Description Set-up of equipment;Increased time  (stand pivot WC<>bed)   Sitting Lower Body Exercises   Ankle Pumps 2 sets of 15   Hip Adduction 2 sets of 15   Long Arc Quad 2 sets of 15   Marching 2 sets of 15   Other Exercises Glute squeezes 2 sets of 15 with 2-3 sec hold   Comments Seated exercises with moist heat applied to neck for discomfort.   Bed Mobility    Supine to Sit Minimal Assist   Sit to Supine Stand by Assist   Sit to Stand Stand by Assist   Interdisciplinary Plan of Care Collaboration   IDT Collaboration with  Certified Nursing Assistant   Patient Position at End of Therapy In Bed;Call Light within Reach;Tray Table within Reach;Phone within Reach   Collaboration Comments Pt requesting prune juice, notified CNA.   PT Total Time Spent   PT Individual Total Time Spent (Mins) 60   PT Charge Group   PT Gait Training 2   PT Therapeutic Exercise 2     Provided cues during gait for L foot clearance. During " gait without FWW, pt had occasional leaning to the L requiring min A to correct.    Assessment    Pt was able to participate in gait training outdoors with and without the FWW. Pt has increased trunk sway and occasional losses of balance to the left without the assistive device.   Strengths: Adequate strength, Independent prior level of function, Willingly participates in therapeutic activities  Barriers: Impaired activity tolerance, Hypotension, Fatigue, Impaired balance    Plan    General strength and endurance, gait training with FWW vs no AD including outdoors, standing tolerance and balance.     Physical Therapy Problems (Active)     Problem: Balance     Dates: Start: 09/29/21       Goal: STG-Within one week, patient will complete standardized balance assessment with score indicating low to moderate fall risk      Dates: Start: 09/29/21             Problem: Mobility     Dates: Start: 09/29/21       Goal: STG-Within one week, patient will ambulate 150 ft with FWW and SBA      Dates: Start: 09/29/21          Goal: STG-Within one week, patient will ambulate up/down a curb FWW vs // bars min A      Dates: Start: 09/29/21             Problem: Mobility Transfers     Dates: Start: 09/29/21       Goal: STG-Within one week, patient will transfer bed to chair SBA with AD as needed      Dates: Start: 09/29/21             Problem: PT-Long Term Goals     Dates: Start: 09/29/21       Goal: LTG-By discharge, patient will ambulate 300 ft with LRAD, mod I      Dates: Start: 09/29/21          Goal: LTG-By discharge, patient will transfer one surface to another mod I with LRAD     Dates: Start: 09/29/21          Goal: LTG-By discharge, patient will ambulate up/down 12 stairs with 2HRs and spv      Dates: Start: 09/29/21          Goal: LTG-By discharge, patient will transfer in/out of a car spv with LRAD      Dates: Start: 09/29/21

## 2021-10-02 NOTE — FLOWSHEET NOTE
10/02/21 0849   Events/Summary/Plan   Events/Summary/Plan 02 spot check, IS   Vital Signs   Pulse 68   Respiration 18   Pulse Oximetry 98 %   $ Pulse Oximetry (Spot Check) Yes   Respiratory Assessment   Level of Consciousness Alert   Chest Exam   Work Of Breathing / Effort Shallow   Oxygen   O2 Delivery Device Room air w/o2 available

## 2021-10-02 NOTE — PROGRESS NOTES
Hospital Medicine Daily Progress Note      Chief Complaint  S/P MVR  Hypertension    Interval Problem Update  Pt reports right chest wall pain better today.  Lab and imaging results reviewed and discussed w/ pt.    Review of Systems  Review of Systems   Constitutional: Negative for chills and fever.   HENT: Negative.    Eyes: Negative.    Respiratory: Negative for cough and shortness of breath.    Cardiovascular: Negative for chest pain and palpitations.   Gastrointestinal: Negative for abdominal pain, nausea and vomiting.   Musculoskeletal:        Wound pain    Skin: Negative for itching and rash.   Endo/Heme/Allergies: Negative for polydipsia. Does not bruise/bleed easily.        Physical Exam  Temp:  [36.8 °C (98.2 °F)] 36.8 °C (98.2 °F)  Pulse:  [68-86] 76  Resp:  [17-18] 17  BP: (107-116)/(67-78) 112/72  SpO2:  [94 %-99 %] 94 %    Physical Exam  Vitals reviewed.   Constitutional:       General: She is not in acute distress.     Appearance: Normal appearance. She is not ill-appearing.   HENT:      Head: Normocephalic and atraumatic.      Right Ear: External ear normal.      Left Ear: External ear normal.      Nose: Nose normal.      Mouth/Throat:      Pharynx: Oropharynx is clear.   Eyes:      General:         Right eye: No discharge.         Left eye: No discharge.      Extraocular Movements: Extraocular movements intact.      Conjunctiva/sclera: Conjunctivae normal.   Cardiovascular:      Comments: irreg irreg, right chest wall incision C/D/I w/ krzysztof-incisional fullness but no induration  Pulmonary:      Effort: Pulmonary effort is normal. No respiratory distress.      Breath sounds: Normal breath sounds. No wheezing.   Abdominal:      General: Bowel sounds are normal. There is no distension.      Palpations: Abdomen is soft.      Tenderness: There is no abdominal tenderness. There is no guarding or rebound.   Musculoskeletal:      Cervical back: Normal range of motion and neck supple.      Right lower leg: No  edema.      Left lower leg: No edema.   Skin:     General: Skin is warm and dry.   Neurological:      Mental Status: She is alert and oriented to person, place, and time.         Fluids    Intake/Output Summary (Last 24 hours) at 10/2/2021 1417  Last data filed at 10/2/2021 0849  Gross per 24 hour   Intake 340 ml   Output 1400 ml   Net -1060 ml       Laboratory  Recent Labs     09/30/21  0541 10/02/21  0609   WBC 5.7 5.9   RBC 2.73* 2.82*   HEMOGLOBIN 9.0* 9.1*   HEMATOCRIT 28.3* 28.8*   .7* 102.1*   MCH 33.0 32.3   MCHC 31.8* 31.6*   RDW 57.4* 55.7*   PLATELETCT 394 398   MPV 9.4 9.2     Recent Labs     09/30/21  0541 10/02/21  0609   SODIUM 140 138   POTASSIUM 4.2 4.1   CHLORIDE 108 106   CO2 23 22   GLUCOSE 84 98   BUN 17 15   CREATININE 0.76 0.55   CALCIUM 8.1* 8.1*                 Assessment/Plan  * S/P mitral valve repair- (present on admission)  Assessment & Plan  MVP w/ severe MVR  Now S/P minimally invasive MVR at [facility] on 9/16/21 by Dr. Nogueira  F/U Echo EF 60% w/ normally functioning MVR  CXR +atx, start IS  U/S Chest +SQ hematoma  Wound care and pain control per Physiatry    Breast cancer (HCC)- (present on admission)  Assessment & Plan  H/O lumpectomy and chemoXRT    Anemia- (present on admission)  Assessment & Plan  Suspect large component of krzysztof-op blood loss  Has macrocytic indices  Vit B12 and TSH both normal  Folate testing no longer performed at this facility  Fe 23, continue Fe and Vit C supplements  FOB x 1 negative thus far  Follow H/H    Seizure disorder (HCC)- (present on admission)  Assessment & Plan  On Keppra    Herpes- (present on admission)  Assessment & Plan  Completed Valtrex    Atrial fibrillation (HCC)- (present on admission)  Assessment & Plan  S/P Maze procedure at [facility] on 9/16/21 by Dr. 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

## 2021-10-02 NOTE — THERAPY
Occupational Therapy  Daily Treatment     Patient Name: Shaista Bocanegra  Age:  76 y.o., Sex:  female  Medical Record #: [id number]  Today's Date: 10/2/2021     Precautions  Precautions: (P) Fall Risk  Comments: (P) seizure, salas, cardiac         Subjective    Patient lying in bed awake and alert.  Agreeable to OT.  Would like to don dress and complete grooming prior to leaving room.     Objective       10/02/21 1031   Precautions   Precautions Fall Risk   Comments seizure, salas, cardiac   Pain   Intervention Heat Applied   Pain 0 - 10 Group   Location Neck   Location Orientation Posterior   Therapist Pain Assessment During Activity   Non Verbal Descriptors   Non Verbal Scale  Calm   Functional Level of Assist   Eating Independent   Grooming Minimal Assist;Seated  (min to brush back of hair)   Upper Body Dressing Minimal Assist   Upper Body Dressing Description Set-up of equipment;Supervision for safety  (min A for donning dress over head)   Bed, Chair, Wheelchair Transfer Contact Guard Assist   Bed Chair Wheelchair Transfer Description Set-up of equipment;Supervision for safety  (CGA SPT without AD bed to w/c)   Sitting Upper Body Exercises   Sitting Upper Body Exercises Yes   Upper Extremity Bike Minutes / Rest Breaks (See Comments)  (10 minutes on water bike level 1 with no rest breaks)   Balance   Standing Balance (Dynamic) Poor +   Comments dynamic standing balance challenges on airex foam pad with no UE support reaching for objects off to left and right, then tossing at a target.     Interdisciplinary Plan of Care Collaboration   Patient Position at End of Therapy Seated;Self Releasing Lap Belt Applied;Call Light within Reach;Tray Table within Reach;Phone within Reach   OT Total Time Spent   OT Individual Total Time Spent (Mins) 60   OT Charge Group   OT Self Care / ADL 2   OT Neuromuscular Re-education / Balance 1   OT Therapeutic Exercise  1       Assessment    Patient with several small losses of balance  anterior and posterior during balance activity on airex foam pad with no UE support.  Required CGA to min A to correct at times, though patient able to self correct by grabbing parallel bar.  Appeared more alert and less confused with less pain complaints today.  Strengths: Able to follow instructions, Alert and oriented, Adequate strength, Effective communication skills, Independent prior level of function, Motivated for self care and independence, Pleasant and cooperative, Supportive family, Willingly participates in therapeutic activities  Barriers: Decreased endurance, Fatigue, Generalized weakness, Impaired activity tolerance, Impaired balance, Pain (lack of sleep night before initial evaluation)    Plan    ADLs, IADLs, transfers, functional mobility, standing tolerance/balance, strength/endurance building    Occupational Therapy Goals (Active)     Problem: Dressing     Dates: Start: 09/29/21       Goal: STG-Within one week, patient will dress LB with mod A using AE/AD/techniques     Dates: Start: 09/29/21             Problem: Functional Transfers     Dates: Start: 09/29/21       Goal: STG-Within one week, patient will transfer to toilet with supervision using grab bar and/or FWW     Dates: Start: 09/29/21             Problem: OT Long Term Goals     Dates: Start: 09/29/21       Goal: LTG-By discharge, patient will complete basic self care tasks with mod I     Dates: Start: 09/29/21          Goal: LTG-By discharge, patient will perform bathroom transfers with mod I     Dates: Start: 09/29/21          Goal: LTG-By discharge, patient will complete basic home management with mod I     Dates: Start: 09/29/21             Problem: Toileting     Dates: Start: 09/29/21       Goal: STG-Within one week, patient will complete toileting tasks with SBA using AE/AD     Dates: Start: 09/29/21

## 2021-10-02 NOTE — THERAPY
"Speech Language Pathology  Daily Treatment     Patient Name: Shaista Bocanegra  Age:  76 y.o., Sex:  female  Medical Record #: [id number]  Today's Date: 10/2/2021     Precautions  Precautions: Fall Risk  Comments: seizure, salas, cardiac    Subjective    Pt seen in her room for tx. She requested to remain in bed d/t pain, however did report that pain has significantly improved since yesterday.      Objective       10/02/21 0932   Precautions   Precautions Fall Risk   Comments seizure   Interdisciplinary Plan of Care Collaboration   Patient Position at End of Therapy In Bed;Call Light within Reach;Tray Table within Reach;Phone within Reach   Speech Language Pathologist Assigned   Assigned SLP / Extension CL/MP 60 NO SPLIT   SLP Total Time Spent   SLP Individual Total Time Spent (Mins) 60   Treatment Charges   SLP Cognitive Skill Development First 15 Minutes 1   SLP Cognitive Skill Development Additional 15 Minutes 3       Assessment    Pt independently recalled tasks she completed in previous session and her performance on each. She reports she is concerned about her \"math tables\".  Pt completed simple visual cancellation task independently with 100% acc. Pt completed simple word search x2 with min cues needed to locate target that was presented diagonally. Pt answered verbally provided single digit subtraction problems independently with 83% acc., increased to 97% when cued to \"try again\"; single digit addition independently with 93% acc., increased to 98% when cued to \"try again\". Pt required max support to increase to 100% for subtraction and addition. Pt provided total denomination for pictured money independently with 83% acc. With min cues needed to increase to 100%. Of note, pt did recall this SLP's name over course of session independently.       Plan    Continue simple math and attention    Passport items to be completed:  manage finances, manage medications, arrive to therapy appointments on time, complete daily " memory log entries, solve problems related to safety situations, review education related to hospitalization, complete caregiver training     Speech Therapy Problems (Active)     Problem: Expression STGs     Dates: Start: 09/29/21       Goal: STG-Within one week, patient will demonstrate use of SFA during conversation in order to improve word finding.      Dates: Start: 09/29/21             Problem: Memory STGs     Dates: Start: 09/29/21       Goal: STG-Within one week, patient will demonstrate use of memory strategies in order to recall daily events     Dates: Start: 09/29/21             Problem: Problem Solving STGs     Dates: Start: 09/29/21       Goal: STG-Within one week, patient will complete medication management tasks with 80% accuracy, min verbal cues.      Dates: Start: 09/29/21          Goal: STG-Within one week, patient will complete basic financial management tasks with 70% accuracy, min verbal cues     Dates: Start: 09/29/21             Problem: Speech/Swallowing LTGs     Dates: Start: 09/29/21       Goal: LTG-By discharge, patient will solve complex problems related to IADL's in order to d/c homw with mod I     Dates: Start: 09/29/21

## 2021-10-03 LAB
HEMOCCULT SP1 STL QL: NEGATIVE
HEMOCCULT SP2 STL QL: NEGATIVE

## 2021-10-03 PROCEDURE — 700101 HCHG RX REV CODE 250: Performed by: PHYSICAL MEDICINE & REHABILITATION

## 2021-10-03 PROCEDURE — A9270 NON-COVERED ITEM OR SERVICE: HCPCS | Performed by: HOSPITALIST

## 2021-10-03 PROCEDURE — 94760 N-INVAS EAR/PLS OXIMETRY 1: CPT

## 2021-10-03 PROCEDURE — 700102 HCHG RX REV CODE 250 W/ 637 OVERRIDE(OP): Performed by: HOSPITALIST

## 2021-10-03 PROCEDURE — 700102 HCHG RX REV CODE 250 W/ 637 OVERRIDE(OP): Performed by: PHYSICAL MEDICINE & REHABILITATION

## 2021-10-03 PROCEDURE — A9270 NON-COVERED ITEM OR SERVICE: HCPCS | Performed by: PHYSICAL MEDICINE & REHABILITATION

## 2021-10-03 PROCEDURE — 99232 SBSQ HOSP IP/OBS MODERATE 35: CPT | Performed by: HOSPITALIST

## 2021-10-03 PROCEDURE — 770010 HCHG ROOM/CARE - REHAB SEMI PRIVAT*

## 2021-10-03 RX ADMIN — METOPROLOL TARTRATE 12.5 MG: 25 TABLET, FILM COATED ORAL at 06:34

## 2021-10-03 RX ADMIN — LEVETIRACETAM 1000 MG: 500 TABLET, FILM COATED ORAL at 08:25

## 2021-10-03 RX ADMIN — POLYETHYLENE GLYCOL 3350 1 PACKET: 17 POWDER, FOR SOLUTION ORAL at 08:30

## 2021-10-03 RX ADMIN — TRAZODONE HYDROCHLORIDE 50 MG: 50 TABLET ORAL at 20:26

## 2021-10-03 RX ADMIN — GABAPENTIN 100 MG: 100 CAPSULE ORAL at 15:21

## 2021-10-03 RX ADMIN — ASPIRIN 81 MG: 81 TABLET, COATED ORAL at 08:29

## 2021-10-03 RX ADMIN — METOPROLOL TARTRATE 12.5 MG: 25 TABLET, FILM COATED ORAL at 17:37

## 2021-10-03 RX ADMIN — GABAPENTIN 100 MG: 100 CAPSULE ORAL at 08:25

## 2021-10-03 RX ADMIN — OXYCODONE HYDROCHLORIDE AND ACETAMINOPHEN 500 MG: 500 TABLET ORAL at 08:26

## 2021-10-03 RX ADMIN — SENNOSIDES AND DOCUSATE SODIUM 2 TABLET: 8.6; 5 TABLET ORAL at 08:25

## 2021-10-03 RX ADMIN — TAMSULOSIN HYDROCHLORIDE 0.4 MG: 0.4 CAPSULE ORAL at 17:37

## 2021-10-03 RX ADMIN — ATORVASTATIN CALCIUM 80 MG: 40 TABLET, FILM COATED ORAL at 20:26

## 2021-10-03 RX ADMIN — HYDROCODONE BITARTRATE AND ACETAMINOPHEN 1 TABLET: 5; 325 TABLET ORAL at 00:29

## 2021-10-03 RX ADMIN — LIDOCAINE 1 PATCH: 50 PATCH TOPICAL at 08:27

## 2021-10-03 RX ADMIN — OMEPRAZOLE 20 MG: 20 CAPSULE, DELAYED RELEASE ORAL at 08:25

## 2021-10-03 RX ADMIN — HYDROCODONE BITARTRATE AND ACETAMINOPHEN 1 TABLET: 5; 325 TABLET ORAL at 12:35

## 2021-10-03 RX ADMIN — HYDROCODONE BITARTRATE AND ACETAMINOPHEN 1 TABLET: 5; 325 TABLET ORAL at 18:19

## 2021-10-03 RX ADMIN — OXYCODONE HYDROCHLORIDE AND ACETAMINOPHEN 500 MG: 500 TABLET ORAL at 20:26

## 2021-10-03 RX ADMIN — LEVETIRACETAM 1000 MG: 500 TABLET, FILM COATED ORAL at 20:26

## 2021-10-03 RX ADMIN — GABAPENTIN 100 MG: 100 CAPSULE ORAL at 20:26

## 2021-10-03 RX ADMIN — HYDROCODONE BITARTRATE AND ACETAMINOPHEN 1 TABLET: 5; 325 TABLET ORAL at 06:42

## 2021-10-03 RX ADMIN — APIXABAN 5 MG: 5 TABLET, FILM COATED ORAL at 20:26

## 2021-10-03 RX ADMIN — SPIRONOLACTONE 25 MG: 25 TABLET, FILM COATED ORAL at 06:34

## 2021-10-03 RX ADMIN — SENNOSIDES AND DOCUSATE SODIUM 2 TABLET: 8.6; 5 TABLET ORAL at 20:26

## 2021-10-03 RX ADMIN — FERROUS SULFATE TAB 325 MG (65 MG ELEMENTAL FE) 325 MG: 325 (65 FE) TAB at 08:26

## 2021-10-03 RX ADMIN — APIXABAN 5 MG: 5 TABLET, FILM COATED ORAL at 08:26

## 2021-10-03 ASSESSMENT — ENCOUNTER SYMPTOMS
ABDOMINAL PAIN: 0
FEVER: 0
VOMITING: 0
EYES NEGATIVE: 1
POLYDIPSIA: 0
SHORTNESS OF BREATH: 0
COUGH: 0
NAUSEA: 0
BRUISES/BLEEDS EASILY: 0
PALPITATIONS: 0
CHILLS: 0

## 2021-10-03 ASSESSMENT — PAIN DESCRIPTION - PAIN TYPE
TYPE: ACUTE PAIN
TYPE: ACUTE PAIN

## 2021-10-03 ASSESSMENT — FIBROSIS 4 INDEX: FIB4 SCORE: 0.82

## 2021-10-03 NOTE — PROGRESS NOTES
Hospital Medicine Daily Progress Note      Chief Complaint  S/P MVR  Hypertension    Interval Problem Update  Right chest wall pain continues to improve.  Pt wants to decrease pain and bowel meds--defer to Physiatry.    Review of Systems  Review of Systems   Constitutional: Negative for chills and fever.   HENT: Negative.    Eyes: Negative.    Respiratory: Negative for cough and shortness of breath.    Cardiovascular: Negative for chest pain and palpitations.   Gastrointestinal: Negative for abdominal pain, nausea and vomiting.   Musculoskeletal:        Wound pain    Skin: Negative for itching and rash.   Endo/Heme/Allergies: Negative for polydipsia. Does not bruise/bleed easily.        Physical Exam  Temp:  [36.7 °C (98 °F)-36.8 °C (98.2 °F)] 36.7 °C (98 °F)  Pulse:  [64-95] 80  Resp:  [16-18] 16  BP: (108-139)/(64-78) 139/78  SpO2:  [93 %-97 %] 97 %    Physical Exam  Vitals reviewed.   Constitutional:       General: She is not in acute distress.     Appearance: Normal appearance. She is not ill-appearing.   HENT:      Head: Normocephalic and atraumatic.      Right Ear: External ear normal.      Left Ear: External ear normal.      Nose: Nose normal.      Mouth/Throat:      Pharynx: Oropharynx is clear.   Eyes:      General:         Right eye: No discharge.         Left eye: No discharge.      Extraocular Movements: Extraocular movements intact.      Conjunctiva/sclera: Conjunctivae normal.   Cardiovascular:      Comments: irreg irreg, right chest wall incision C/D/I w/ decreasing krzysztof-incisional fullness but no induration  Pulmonary:      Effort: Pulmonary effort is normal. No respiratory distress.      Breath sounds: Normal breath sounds. No wheezing.   Abdominal:      General: Bowel sounds are normal. There is no distension.      Palpations: Abdomen is soft.      Tenderness: There is no abdominal tenderness. There is no guarding or rebound.   Musculoskeletal:      Cervical back: Normal range of motion and neck  supple.      Right lower leg: No edema.      Left lower leg: No edema.   Skin:     General: Skin is warm and dry.   Neurological:      Mental Status: She is alert and oriented to person, place, and time.         Fluids    Intake/Output Summary (Last 24 hours) at 10/3/2021 1234  Last data filed at 10/2/2021 1800  Gross per 24 hour   Intake --   Output 600 ml   Net -600 ml       Laboratory  Recent Labs     10/02/21  0609   WBC 5.9   RBC 2.82*   HEMOGLOBIN 9.1*   HEMATOCRIT 28.8*   .1*   MCH 32.3   MCHC 31.6*   RDW 55.7*   PLATELETCT 398   MPV 9.2     Recent Labs     10/02/21  0609   SODIUM 138   POTASSIUM 4.1   CHLORIDE 106   CO2 22   GLUCOSE 98   BUN 15   CREATININE 0.55   CALCIUM 8.1*                 Assessment/Plan  * S/P mitral valve repair- (present on admission)  Assessment & Plan  MVP w/ severe MVR  Now S/P minimally invasive MVR at [facility] on 9/16/21 by Dr. Nogueira  F/U Echo EF 60% w/ normally functioning MVR  CXR +atx, continue IS  U/S Chest +SQ hematoma  Wound care and pain control per Physiatry    Breast cancer (HCC)- (present on admission)  Assessment & Plan  H/O lumpectomy and chemoXRT    Anemia- (present on admission)  Assessment & Plan  Suspect large component of krzysztof-op blood loss  Has macrocytic indices  Vit B12 and TSH both normal  Folate testing no longer performed at this facility  Fe 23, continue Fe and Vit C supplements  FOB x 2 negative thus far  Follow H/H    Seizure disorder (HCC)- (present on admission)  Assessment & Plan  On Keppra    Herpes- (present on admission)  Assessment & Plan  Completed Valtrex    Atrial fibrillation (HCC)- (present on admission)  Assessment & Plan  S/P Maze procedure at [facility] on 9/16/21 by Dr. 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

## 2021-10-03 NOTE — CARE PLAN
The patient is Stable - Low risk of patient condition declining or worsening    Shift Goals  Clinical Goals: pain management  Patient Goals: Sleep well      Problem: Pain - Standard  Goal: Alleviation of pain or a reduction in pain to the patient’s comfort goal  Outcome: Progressing: Pt informed about her PRN Norco not being available until midnight tonight as its every 6 hours. Pt was then given Tylenol, heat packs, an ice pack, and repositioned for comfort until her next Norco becomes available.      Problem: Skin Integrity  Goal: Patient's skin integrity will be maintained or improve  Outcome: Progressing: Pt able to reposition self in bed, no redness of bony prominences noted. Pictures of her incision sites were taken, all were clean and dry.

## 2021-10-04 PROCEDURE — 97535 SELF CARE MNGMENT TRAINING: CPT

## 2021-10-04 PROCEDURE — 97530 THERAPEUTIC ACTIVITIES: CPT

## 2021-10-04 PROCEDURE — 700101 HCHG RX REV CODE 250: Performed by: PHYSICAL MEDICINE & REHABILITATION

## 2021-10-04 PROCEDURE — A9270 NON-COVERED ITEM OR SERVICE: HCPCS | Performed by: HOSPITALIST

## 2021-10-04 PROCEDURE — 700102 HCHG RX REV CODE 250 W/ 637 OVERRIDE(OP): Performed by: HOSPITALIST

## 2021-10-04 PROCEDURE — A9270 NON-COVERED ITEM OR SERVICE: HCPCS | Performed by: PHYSICAL MEDICINE & REHABILITATION

## 2021-10-04 PROCEDURE — 97116 GAIT TRAINING THERAPY: CPT

## 2021-10-04 PROCEDURE — 97130 THER IVNTJ EA ADDL 15 MIN: CPT

## 2021-10-04 PROCEDURE — 94760 N-INVAS EAR/PLS OXIMETRY 1: CPT

## 2021-10-04 PROCEDURE — 770010 HCHG ROOM/CARE - REHAB SEMI PRIVAT*

## 2021-10-04 PROCEDURE — 99233 SBSQ HOSP IP/OBS HIGH 50: CPT | Performed by: PHYSICAL MEDICINE & REHABILITATION

## 2021-10-04 PROCEDURE — 97110 THERAPEUTIC EXERCISES: CPT

## 2021-10-04 PROCEDURE — 94660 CPAP INITIATION&MGMT: CPT

## 2021-10-04 PROCEDURE — 700102 HCHG RX REV CODE 250 W/ 637 OVERRIDE(OP): Performed by: PHYSICAL MEDICINE & REHABILITATION

## 2021-10-04 PROCEDURE — 97129 THER IVNTJ 1ST 15 MIN: CPT

## 2021-10-04 PROCEDURE — 99232 SBSQ HOSP IP/OBS MODERATE 35: CPT | Performed by: HOSPITALIST

## 2021-10-04 RX ADMIN — GABAPENTIN 100 MG: 100 CAPSULE ORAL at 20:56

## 2021-10-04 RX ADMIN — HYDROCODONE BITARTRATE AND ACETAMINOPHEN 1 TABLET: 5; 325 TABLET ORAL at 08:45

## 2021-10-04 RX ADMIN — SENNOSIDES AND DOCUSATE SODIUM 2 TABLET: 8.6; 5 TABLET ORAL at 08:47

## 2021-10-04 RX ADMIN — ATORVASTATIN CALCIUM 80 MG: 40 TABLET, FILM COATED ORAL at 20:56

## 2021-10-04 RX ADMIN — POLYETHYLENE GLYCOL 3350 1 PACKET: 17 POWDER, FOR SOLUTION ORAL at 08:34

## 2021-10-04 RX ADMIN — APIXABAN 5 MG: 5 TABLET, FILM COATED ORAL at 08:38

## 2021-10-04 RX ADMIN — ASPIRIN 81 MG: 81 TABLET, COATED ORAL at 08:39

## 2021-10-04 RX ADMIN — APIXABAN 5 MG: 5 TABLET, FILM COATED ORAL at 20:56

## 2021-10-04 RX ADMIN — ACETAMINOPHEN 650 MG: 325 TABLET ORAL at 05:29

## 2021-10-04 RX ADMIN — LEVETIRACETAM 1000 MG: 500 TABLET, FILM COATED ORAL at 20:55

## 2021-10-04 RX ADMIN — TAMSULOSIN HYDROCHLORIDE 0.4 MG: 0.4 CAPSULE ORAL at 18:16

## 2021-10-04 RX ADMIN — GABAPENTIN 100 MG: 100 CAPSULE ORAL at 08:39

## 2021-10-04 RX ADMIN — OMEPRAZOLE 20 MG: 20 CAPSULE, DELAYED RELEASE ORAL at 08:47

## 2021-10-04 RX ADMIN — METOPROLOL TARTRATE 12.5 MG: 25 TABLET, FILM COATED ORAL at 18:17

## 2021-10-04 RX ADMIN — OXYCODONE HYDROCHLORIDE AND ACETAMINOPHEN 500 MG: 500 TABLET ORAL at 09:00

## 2021-10-04 RX ADMIN — FERROUS SULFATE TAB 325 MG (65 MG ELEMENTAL FE) 325 MG: 325 (65 FE) TAB at 08:47

## 2021-10-04 RX ADMIN — SENNOSIDES AND DOCUSATE SODIUM 2 TABLET: 8.6; 5 TABLET ORAL at 20:55

## 2021-10-04 RX ADMIN — LIDOCAINE 1 PATCH: 50 PATCH TOPICAL at 08:35

## 2021-10-04 RX ADMIN — METOPROLOL TARTRATE 12.5 MG: 25 TABLET, FILM COATED ORAL at 05:28

## 2021-10-04 RX ADMIN — OXYCODONE HYDROCHLORIDE AND ACETAMINOPHEN 500 MG: 500 TABLET ORAL at 20:56

## 2021-10-04 RX ADMIN — SPIRONOLACTONE 25 MG: 25 TABLET, FILM COATED ORAL at 05:29

## 2021-10-04 RX ADMIN — GABAPENTIN 100 MG: 100 CAPSULE ORAL at 15:18

## 2021-10-04 RX ADMIN — HYDROCODONE BITARTRATE AND ACETAMINOPHEN 2 TABLET: 5; 325 TABLET ORAL at 21:40

## 2021-10-04 RX ADMIN — LEVETIRACETAM 1000 MG: 500 TABLET, FILM COATED ORAL at 08:38

## 2021-10-04 ASSESSMENT — ACTIVITIES OF DAILY LIVING (ADL)
BED_CHAIR_WHEELCHAIR_TRANSFER_DESCRIPTION: ADAPTIVE EQUIPMENT;SET-UP OF EQUIPMENT;SUPERVISION FOR SAFETY;VERBAL CUEING
TOILET_TRANSFER_DESCRIPTION: GRAB BAR;SET-UP OF EQUIPMENT;SUPERVISION FOR SAFETY
TOILETING_LEVEL_OF_ASSIST_DESCRIPTION: GRAB BAR;SET-UP OF EQUIPMENT;SUPERVISION FOR SAFETY;VERBAL CUEING
BED_CHAIR_WHEELCHAIR_TRANSFER_DESCRIPTION: INCREASED TIME;SET-UP OF EQUIPMENT;SUPERVISION FOR SAFETY

## 2021-10-04 ASSESSMENT — ENCOUNTER SYMPTOMS
SHORTNESS OF BREATH: 0
EYES NEGATIVE: 1
PALPITATIONS: 0
COUGH: 0
FEVER: 0
BRUISES/BLEEDS EASILY: 0
VOMITING: 0
NAUSEA: 0
POLYDIPSIA: 0
CHILLS: 0
ABDOMINAL PAIN: 0

## 2021-10-04 ASSESSMENT — GAIT ASSESSMENTS
ASSISTIVE DEVICE: FRONT WHEEL WALKER
DEVIATION: BRADYKINETIC;DECREASED BASE OF SUPPORT;SHUFFLED GAIT
GAIT LEVEL OF ASSIST: STAND BY ASSIST

## 2021-10-04 ASSESSMENT — PAIN DESCRIPTION - PAIN TYPE
TYPE: ACUTE PAIN
TYPE: ACUTE PAIN

## 2021-10-04 ASSESSMENT — PAIN SCALES - WONG BAKER: WONGBAKER_NUMERICALRESPONSE: HURTS JUST A LITTLE BIT

## 2021-10-04 NOTE — FLOWSHEET NOTE
10/03/21 1719   Events/Summary/Plan   Events/Summary/Plan SpO2 check, IS   Vital Signs   Pulse 74   Respiration 16   Pulse Oximetry 95 %   $ Pulse Oximetry (Spot Check) Yes   Oxygen   O2 Delivery Device Room air w/o2 available

## 2021-10-04 NOTE — THERAPY
"Physical Therapy   Daily Treatment     Patient Name: Shaista Bocanegra  Age:  76 y.o., Sex:  female  Medical Record #: [id number]  Today's Date: 10/4/2021     Precautions  Precautions: Fall Risk  Comments: seizure, salas, cardiac    Subjective    Pt in room, reports she is very rushed after breakfast coming late, however agreeable to PT.      Objective       10/04/21 0831   Precautions   Precautions Fall Risk   Comments seizure, salas, cardiac   Gait Functional Level of Assist    Gait Level Of Assist Stand by Assist   Assistive Device Front Wheel Walker   Distance (Feet)   (250 ft and 150 ft )   Deviation Bradykinetic;Decreased Base Of Support;Shuffled Gait   Stairs Functional Level of Assist   Level of Assist with Stairs Contact Guard Assist   # of Stairs Climbed 2  (4\" curb step 2x )   Stairs Description Extra time;Walker;Requires incidental assist   Transfer Functional Level of Assist   Bed, Chair, Wheelchair Transfer Stand by Assist   Bed Chair Wheelchair Transfer Description Increased time;Set-up of equipment;Supervision for safety   Sitting Lower Body Exercises   Nustep Resistance Level 4  (10 min, 421 steps)   Bed Mobility    Supine to Sit Stand by Assist   Sit to Supine Stand by Assist   Sit to Stand Stand by Assist   Scooting Stand by Assist   Interdisciplinary Plan of Care Collaboration   IDT Collaboration with  Nursing   Patient Position at End of Therapy Seated;Tray Table within Reach;Call Light within Reach   Collaboration Comments RN administered medications at beginning of session    PT Total Time Spent   PT Individual Total Time Spent (Mins) 60   PT Charge Group   PT Gait Training 1   PT Therapeutic Exercise 1   PT Therapeutic Activities 2       Assessment    Pt participatory in session. Reports she is anxious to go home and is very \"goal oriented\" to do what she needs to do to be able to go home.     Strengths: Adequate strength, Independent prior level of function, Willingly participates in therapeutic " activities  Barriers: Impaired activity tolerance, Hypotension, Fatigue, Impaired balance    Plan    General strength and endurance, gait training with FWW vs no AD including outdoors, standing tolerance and balance.       Physical Therapy Problems (Active)     Problem: Balance     Dates: Start: 09/29/21       Goal: STG-Within one week, patient will complete standardized balance assessment with score indicating low to moderate fall risk      Dates: Start: 09/29/21             Problem: Mobility     Dates: Start: 09/29/21       Goal: STG-Within one week, patient will ambulate 150 ft with FWW and SBA      Dates: Start: 09/29/21          Goal: STG-Within one week, patient will ambulate up/down a curb FWW vs // bars min A      Dates: Start: 09/29/21             Problem: Mobility Transfers     Dates: Start: 09/29/21       Goal: STG-Within one week, patient will transfer bed to chair SBA with AD as needed      Dates: Start: 09/29/21             Problem: PT-Long Term Goals     Dates: Start: 09/29/21       Goal: LTG-By discharge, patient will ambulate 300 ft with LRAD, mod I      Dates: Start: 09/29/21          Goal: LTG-By discharge, patient will transfer one surface to another mod I with LRAD     Dates: Start: 09/29/21          Goal: LTG-By discharge, patient will ambulate up/down 12 stairs with 2HRs and spv      Dates: Start: 09/29/21          Goal: LTG-By discharge, patient will transfer in/out of a car spv with LRAD      Dates: Start: 09/29/21

## 2021-10-04 NOTE — THERAPY
Occupational Therapy  Daily Treatment     Patient Name: Shaista Bocanegra  Age:  76 y.o., Sex:  female  Medical Record #: [id number]  Today's Date: 10/4/2021     Precautions  Precautions: (P) Fall Risk  Comments: (P) seizure, salas, cardiac         Subjective    Patient lying in bed and asking how long she needs to have the salas catheter.  Also inquiring how long she needs to be here.  Agreeable to a shower.     Objective       10/04/21 1001   Precautions   Precautions Fall Risk   Comments seizure, salas, cardiac   Functional Level of Assist   Eating Independent   Grooming Supervision;Seated   Bathing Supervision   Bathing Description Set-up of equipment;Supervision for safety;Verbal cueing;Initial preparation for task;Hand held shower;Tub bench  (setup and supervised w/ cues and increased time)   Upper Body Dressing Supervision   Upper Body Dressing Description Set-up of equipment;Supervision for safety   Lower Body Dressing Minimal Assist   Lower Body Dressing Description Increased time;Set-up of equipment;Supervision for safety;Verbal cueing;Initial preparation for task  (assist w/ donning socks (assist w/ 2/13 tasks), verbal cues)   Toileting Stand by Assist   Toileting Description Grab bar;Set-up of equipment;Supervision for safety;Verbal cueing   Bed, Chair, Wheelchair Transfer Stand by Assist   Bed Chair Wheelchair Transfer Description Adaptive equipment;Set-up of equipment;Supervision for safety;Verbal cueing  (setup/sba with FWW)   Toilet Transfers Stand by Assist   Toilet Transfer Description Grab bar;Set-up of equipment;Supervision for safety   Tub / Shower Transfers Stand by Assist   Tub Shower Transfer Description Set-up of equipment;Supervision for safety;Verbal cueing;Shower bench;Grab bar   Bed Mobility    Supine to Sit Stand by Assist   Scooting Stand by Assist   Interdisciplinary Plan of Care Collaboration   IDT Collaboration with  Certified Nursing Assistant   Patient Position at End of Therapy  Seated;Call Light within Reach;Tray Table within Reach;Phone within Reach;Self Releasing Lap Belt Applied   Collaboration Comments CNA changed out salas bag due to not draining from tube properly   OT Total Time Spent   OT Individual Total Time Spent (Mins) 60   OT Charge Group   OT Self Care / ADL 3   OT Therapy Activity 1     Explained the passport to independence with patient and discussed the likelihood she will need some assistance or supervision at home.    Assessment    Patient completed bed mobility , bed transfer, adl routine with setup and SBA with the exception of assistance needed to don socks.    Strengths: Able to follow instructions, Alert and oriented, Adequate strength, Effective communication skills, Independent prior level of function, Motivated for self care and independence, Pleasant and cooperative, Supportive family, Willingly participates in therapeutic activities  Barriers: Decreased endurance, Fatigue, Generalized weakness, Impaired activity tolerance, Impaired balance, Pain (lack of sleep night before initial evaluation)    Plan    Educate on adaptive techniques to don socks (sock aide if needed), IADLs, standing balance, overall strength/endurance    Occupational Therapy Goals (Active)     Problem: Dressing     Dates: Start: 09/29/21       Goal: STG-Within one week, patient will dress LB with mod A using AE/AD/techniques     Dates: Start: 09/29/21             Problem: Functional Transfers     Dates: Start: 09/29/21       Goal: STG-Within one week, patient will transfer to toilet with supervision using grab bar and/or FWW     Dates: Start: 09/29/21             Problem: OT Long Term Goals     Dates: Start: 09/29/21       Goal: LTG-By discharge, patient will complete basic self care tasks with mod I     Dates: Start: 09/29/21          Goal: LTG-By discharge, patient will perform bathroom transfers with mod I     Dates: Start: 09/29/21          Goal: LTG-By discharge, patient will complete  basic home management with mod I     Dates: Start: 09/29/21             Problem: Toileting     Dates: Start: 09/29/21       Goal: STG-Within one week, patient will complete toileting tasks with SBA using AE/AD     Dates: Start: 09/29/21

## 2021-10-04 NOTE — FLOWSHEET NOTE
10/04/21 1404   Incentive Spirometry Treatment   Incentive Spirometer Volume 500 mL  (Vol may be higher if technique were better.)

## 2021-10-04 NOTE — CARE PLAN
The patient is Watcher - Medium risk of patient condition declining or worsening    Shift Goals  Clinical Goals: pain managment  Patient Goals: Sleep well    Problem: Bladder / Voiding  Goal: Patient will establish and maintain regular urinary output  Outcome: Not Met salas removed per order without incident.      Problem: Pain - Standard  Goal: Alleviation of pain or a reduction in pain to the patient’s comfort goal  Outcome: Progressing Patient able to verbalize pain level and verbalize an acceptable level of pain.

## 2021-10-04 NOTE — CARE PLAN
Problem: Functional Transfers  Goal: STG-Within one week, patient will transfer to toilet with supervision using grab bar and/or FWW  Outcome: Not Met  Note: SBA     Problem: Dressing  Goal: STG-Within one week, patient will dress LB with mod A using AE/AD/techniques  Outcome: Met  Note: Min A with cues and increased time     Problem: Toileting  Goal: STG-Within one week, patient will complete toileting tasks with SBA using AE/AD  Outcome: Met  Note: SBA

## 2021-10-04 NOTE — PROGRESS NOTES
Hospital Medicine Daily Progress Note      Chief Complaint  S/P MVR  Hypertension    Interval Problem Update  Doing well.  Denies new complaints.    Review of Systems  Review of Systems   Constitutional: Negative for chills and fever.   HENT: Negative.    Eyes: Negative.    Respiratory: Negative for cough and shortness of breath.    Cardiovascular: Negative for chest pain and palpitations.   Gastrointestinal: Negative for abdominal pain, nausea and vomiting.   Musculoskeletal:        Wound pain    Skin: Negative for itching and rash.   Endo/Heme/Allergies: Negative for polydipsia. Does not bruise/bleed easily.        Physical Exam  Temp:  [36.6 °C (97.8 °F)-37.2 °C (98.9 °F)] 36.6 °C (97.8 °F)  Pulse:  [70-78] 78  Resp:  [16-18] 18  BP: (100-120)/(58-79) 112/74  SpO2:  [95 %-99 %] 95 %    Physical Exam  Vitals reviewed.   Constitutional:       General: She is not in acute distress.     Appearance: Normal appearance. She is not ill-appearing.   HENT:      Head: Normocephalic and atraumatic.      Right Ear: External ear normal.      Left Ear: External ear normal.      Nose: Nose normal.      Mouth/Throat:      Pharynx: Oropharynx is clear.   Eyes:      General:         Right eye: No discharge.         Left eye: No discharge.      Extraocular Movements: Extraocular movements intact.      Conjunctiva/sclera: Conjunctivae normal.   Cardiovascular:      Comments: irreg irreg, right chest wall incision C/D/I w/ decreasing krzysztof-incisional fullness and no induration  Pulmonary:      Effort: Pulmonary effort is normal. No respiratory distress.      Breath sounds: Normal breath sounds. No wheezing.   Abdominal:      General: Bowel sounds are normal. There is no distension.      Palpations: Abdomen is soft.      Tenderness: There is no abdominal tenderness. There is no guarding or rebound.   Musculoskeletal:      Cervical back: Normal range of motion and neck supple.      Right lower leg: No edema.      Left lower leg: No  edema.   Skin:     General: Skin is warm and dry.   Neurological:      Mental Status: She is alert and oriented to person, place, and time.         Fluids    Intake/Output Summary (Last 24 hours) at 10/4/2021 1336  Last data filed at 10/4/2021 1249  Gross per 24 hour   Intake 840 ml   Output 1150 ml   Net -310 ml       Laboratory  Recent Labs     10/02/21  0609   WBC 5.9   RBC 2.82*   HEMOGLOBIN 9.1*   HEMATOCRIT 28.8*   .1*   MCH 32.3   MCHC 31.6*   RDW 55.7*   PLATELETCT 398   MPV 9.2     Recent Labs     10/02/21  0609   SODIUM 138   POTASSIUM 4.1   CHLORIDE 106   CO2 22   GLUCOSE 98   BUN 15   CREATININE 0.55   CALCIUM 8.1*                 Assessment/Plan  * S/P mitral valve repair- (present on admission)  Assessment & Plan  MVP w/ severe MVR  Now S/P minimally invasive MVR at [place] on 9/16/21 by Dr. Nogueira  F/U Echo EF 60% w/ normally functioning MVR  CXR +atx, continue IS  U/S Chest +SQ hematoma  Wound care and pain control per Physiatry    Breast cancer (HCC)- (present on admission)  Assessment & Plan  H/O lumpectomy and chemoXRT    Anemia- (present on admission)  Assessment & Plan  Suspect large component of krzysztof-op blood loss  Has macrocytic indices  Vit B12 and TSH both normal  Folate testing no longer performed at this facility  Fe 23, continue Fe and Vit C supplements  FOB x 2 negative thus far  Follow H/H    Seizure disorder (HCC)- (present on admission)  Assessment & Plan  On Keppra    Herpes- (present on admission)  Assessment & Plan  Completed Valtrex    Atrial fibrillation (HCC)- (present on admission)  Assessment & Plan  S/P Maze procedure at [place] on 9/16/21 by Dr. 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  Chart reviewed.  Assessment and Plan as above.  No changes today from yesterday's medical decision making.  Reviewed w/ Dr. Rawls.

## 2021-10-04 NOTE — PROGRESS NOTES
"Rehab Progress Note     Date of Service: 10/4/2021  Chief Complaint: follow up MVR repair    Interval Events (Subjective)    Patient seen and examined today in her room.  She reports improved pain in her right chest wall, but continues to have pain in her right upper back.  She wants to know when we will remove the [place].  When I replied today, she remarked \"Then I will have to get up to go to the bathroom.\"  She has no other complaints today.    ROS: No changes to bowel, bladder, pain, mood, or sleep.             Objective:  VITAL SIGNS: /74   Pulse 78   Temp 36.6 °C (97.8 °F) (Oral)   Resp 18   Ht 1.676 m (5' 6\")   Wt 60 kg (132 lb 3.2 oz)   SpO2 95%   BMI 21.34 kg/m²   Gen: alert, no apparent distress  CV: regular rate and rhythm, no murmurs, no peripheral edema, right upper chest wall with incision, decreased surrounding edema and bruising  Resp: clear to ascultation bilaterally, normal respiratory effort  GI: soft, non-tender abdomen, bowel sounds present      Recent Results (from the past 72 hour(s))   CBC WITHOUT DIFFERENTIAL    Collection Time: 10/02/21  6:09 AM   Result Value Ref Range    WBC 5.9 4.8 - 10.8 K/uL    RBC 2.82 (L) 4.20 - 5.40 M/uL    Hemoglobin 9.1 (L) 12.0 - 16.0 g/dL    Hematocrit 28.8 (L) 37.0 - 47.0 %    .1 (H) 81.4 - 97.8 fL    MCH 32.3 27.0 - 33.0 pg    MCHC 31.6 (L) 33.6 - 35.0 g/dL    RDW 55.7 (H) 35.9 - 50.0 fL    Platelet Count 398 164 - 446 K/uL    MPV 9.2 9.0 - 12.9 fL   Basic Metabolic Panel    Collection Time: 10/02/21  6:09 AM   Result Value Ref Range    Sodium 138 135 - 145 mmol/L    Potassium 4.1 3.6 - 5.5 mmol/L    Chloride 106 96 - 112 mmol/L    Co2 22 20 - 33 mmol/L    Glucose 98 65 - 99 mg/dL    Bun 15 8 - 22 mg/dL    Creatinine 0.55 0.50 - 1.40 mg/dL    Calcium 8.1 (L) 8.5 - 10.5 mg/dL    Anion Gap 10.0 7.0 - 16.0   ESTIMATED GFR    Collection Time: 10/02/21  6:09 AM   Result Value Ref Range    GFR If African American >60 >60 mL/min/1.73 m 2    GFR If " Non African American >60 >60 mL/min/1.73 m 2       Current Facility-Administered Medications   Medication Frequency   • HYDROcodone-acetaminophen (NORCO) 5-325 MG per tablet 1-2 Tablet Q6HRS PRN   • lidocaine (LIDODERM) 5 % 1 Patch Q24HR   • gabapentin (NEURONTIN) capsule 100 mg TID   • metoprolol tartrate (LOPRESSOR) tablet 12.5 mg TWICE DAILY   • lidocaine (LIDODERM) 5 % 1 Patch Q24HR   • polyethylene glycol/lytes (MIRALAX) PACKET 1 Packet DAILY    And   • senna-docusate (PERICOLACE or SENOKOT S) 8.6-50 MG per tablet 2 Tablet BID    And   • magnesium hydroxide (MILK OF MAGNESIA) suspension 30 mL QDAY PRN    And   • bisacodyl (DULCOLAX) suppository 10 mg DAILY   • hydrOXYzine HCl (ATARAX) tablet 50 mg Q6HRS PRN   • QUEtiapine (Seroquel) tablet 25 mg TID PRN   • simethicone (MYLICON) chewable tab 80 mg TID PRN   • melatonin tablet 3 mg HS PRN   • Respiratory Therapy Consult Continuous RT   • Pharmacy Consult Request ...Pain Management Review 1 Each PHARMACY TO DOSE   • acetaminophen (Tylenol) tablet 650 mg Q4HRS PRN   • lactulose 20 GM/30ML solution 30 mL QDAY PRN   • docusate sodium (ENEMEEZ) enema 283 mg QDAY PRN   • sodium phosphate (Fleet) enema 133 mL QDAY PRN   • omeprazole (PRILOSEC) capsule 20 mg QAM AC   • artificial tears ophthalmic solution 1 Drop PRN   • benzocaine-menthol (CEPACOL) lozenge 1 Lozenge Q2HRS PRN   • mag hydrox-al hydrox-simeth (MAALOX PLUS ES or MYLANTA DS) suspension 20 mL Q2HRS PRN   • ondansetron (ZOFRAN ODT) dispertab 4 mg 4X/DAY PRN    Or   • ondansetron (ZOFRAN) syringe/vial injection 4 mg 4X/DAY PRN   • traZODone (DESYREL) tablet 50 mg QHS PRN   • sodium chloride (OCEAN) 0.65 % nasal spray 2 Spray PRN   • aspirin EC (ECOTRIN) tablet 81 mg DAILY   • ascorbic acid (Vitamin C) tablet 500 mg BID   • ferrous sulfate tablet 325 mg QDAY with Breakfast   • levETIRAcetam (KEPPRA) tablet 1,000 mg Q12HRS   • spironolactone (ALDACTONE) tablet 25 mg Q DAY   • tamsulosin (FLOMAX) capsule 0.4 mg  AFTER DINNER   • atorvastatin (LIPITOR) tablet 80 mg Q EVENING   • apixaban (ELIQUIS) tablet 5 mg BID       Orders Placed This Encounter   Procedures   • Diet Order Diet: Regular (chopped meats)     Standing Status:   Standing     Number of Occurrences:   1     Order Specific Question:   Diet:     Answer:   Regular [1]     Comments:   chopped meats       Assessment:  Active Hospital Problems    Diagnosis    • *S/P mitral valve repair    • Hypoalbuminemia    • Other constipation    • Breast cancer (HCC)    • Upper back pain on right side    • Impaired mobility and ADLs    • Seizure disorder (HCC)    • Other hyperlipidemia    • Urinary retention    • Anemia    • Atrial fibrillation (HCC)    • Herpes    • Sleep apnea    • Hypertension    • History of CVA (cerebrovascular accident)      This patient is a 76 y.o. female admitted for acute inpatient rehabilitation with impaired cardiac endurance S/P mitral valve repair.    I led and attended the weekly conference today, and agree with the IDT conference documentation and plan of care as noted below.    Date of conference: 10/4/2021    Goals and barriers: See IDT note.    Biggest barriers: low endurance, generalized weakness, poor initiation, pain management, cognitive impairment    CM/social support: has family, but friend will support    Anticipated DC date: 10/14    Home health: PT/OT/SLP/RN    Equip: shower chair, FWW    Follow up: PCP, cardiology, neurology, Dr. Sosa      Medical Decision Making and Plan:    S/p Mitral Valve repair  Dr. Nogueira 9/16  Continue full rehab program  PT/OT/SLP, 1 hr each discipline, 5 days per week    Outpatient follow up with cardiology and Dr. Nogueira     Aspirin  Spironolactone      History of strokes  Cardio-embolic  Residual left hemiparesis  Aphasia  Dysphagia, resolved  Cognitive impairment  Continue full rehab program  PT/OT/SLP, 1 hr each discipline, 5 days per week     Eliquis  Statin     Outpatient follow up with Dr. Sosa, referral  made    Right upper back pain, improved  Right anterior chest wall pain, improved  Lidoderm patch  Checked ultrasound - hematoma  PRN tylenol, last use 10/4  PRN oxycodone, not effective for right-sided chest wall pain  Switched to Norco, last use 10/4  Started low dose gabapentin 100 mg TID     Seizure disorder  Tonic/clonic post-op  Keppra  Outpatient referral to neurology     Hypertension  Metoprolol  Spironolactone  Appreciate hospitalist assistance    Orthostatic hypotension, improved  Appreciate hospitalist assistance  May need to decrease dosing of BP meds     Atrial fibrillation  Metoprolol  Appreciate hospitalist assistance     Hyperlipidemia  Statin     Anemia  Ferrous sulfate  Vit C     GI prophylaxis  Omeprazole     History of herpes simplex  Valacyclovir discontinued, patient was not taking regularly    Sleep apnea  2 L oxygen at night  To bring in home CPAP    Bowel program  Constipation  Increase bowel medications  Last BM 10/2    Bladder program  Urinary retention  Started Flomax 9/28  Voiding trial today     DVT prophylaxis  Eliquis    Total time:  40 minutes.  I spent greater than 50% of the time for patient care, counseling, and coordination on this date, including patient face-to face time, unit/floor time with review of records/pertinent lab data and studies, as well as discussing diagnostic evaluation/work up, planned therapeutic interventions, and future disposition of care, as per the interval events/subjective and the assessment and plan as noted above.      Precious Rawls M.D.   Physical Medicine and Rehabilitation

## 2021-10-04 NOTE — FLOWSHEET NOTE
10/04/21 1401   Events/Summary/Plan   Events/Summary/Plan SpO2 check, IS   Vital Signs   Pulse 76   Respiration 16   Pulse Oximetry 96 %   $ Pulse Oximetry (Spot Check) Yes   Oxygen   O2 Delivery Device None - Room Air

## 2021-10-04 NOTE — CARE PLAN
Problem: Expression STGs  Goal: STG-Within one week, patient will demonstrate use of SFA during conversation in order to improve word finding.   Outcome: Not Met  Note: To be addressed.     Problem: Problem Solving STGs  Goal: STG-Within one week, patient will complete medication management tasks with 80% accuracy, min verbal cues.   Outcome: Not Met  Note: This not yet ready to address this goal.  Goal: STG-Within one week, patient will complete basic financial management tasks with 70% accuracy, min verbal cues  Outcome: Met  Note: Met for single digit calculations with written and verbal support @ 80%.  Will revise goal.     Problem: Memory STGs  Goal: STG-Within one week, patient will demonstrate use of memory strategies in order to recall daily events  Outcome: Not Met  Note: Mod to max A needed to recall daily events.

## 2021-10-04 NOTE — PROGRESS NOTES
Patient care assumed. Report received from [facility] KEATON Hudson. Patient is alert and calm, resting in bed. Call light and bedside table within reach. Will continue to monitor.

## 2021-10-04 NOTE — CARE PLAN
"The patient is Stable - Low risk of patient condition declining or worsening    Shift Goals  Clinical Goals: sleep  Patient Goals: Sleep well      Problem: Knowledge Deficit - Standard  Goal: Patient and family/care givers will demonstrate understanding of plan of care, disease process/condition, diagnostic tests and medications  Outcome: Progressing: Pt refused her scheduled suppository. Pt didn't want to take any bowel meds at all because she was \"scared of pooping all over myself again like yesterday.\" Pt was educated about the importance of regular bowel movements. LBM was 10/2. Pt agreed to take sennas.      Problem: Pain - Standard  Goal: Alleviation of pain or a reduction in pain to the patient’s comfort goal  Outcome: Progressing: Pt gets PRN Norco every 6 hours, she requested that I wake her up in the middle of the night when her next Norco was due. Pt educated about the importance of sleep and healing. Pt stated she understood.          "

## 2021-10-05 PROCEDURE — 97116 GAIT TRAINING THERAPY: CPT

## 2021-10-05 PROCEDURE — 700101 HCHG RX REV CODE 250: Performed by: PHYSICAL MEDICINE & REHABILITATION

## 2021-10-05 PROCEDURE — 94760 N-INVAS EAR/PLS OXIMETRY 1: CPT

## 2021-10-05 PROCEDURE — 99232 SBSQ HOSP IP/OBS MODERATE 35: CPT | Performed by: HOSPITALIST

## 2021-10-05 PROCEDURE — 97112 NEUROMUSCULAR REEDUCATION: CPT

## 2021-10-05 PROCEDURE — 700102 HCHG RX REV CODE 250 W/ 637 OVERRIDE(OP): Performed by: HOSPITALIST

## 2021-10-05 PROCEDURE — 97130 THER IVNTJ EA ADDL 15 MIN: CPT

## 2021-10-05 PROCEDURE — 770010 HCHG ROOM/CARE - REHAB SEMI PRIVAT*

## 2021-10-05 PROCEDURE — 97530 THERAPEUTIC ACTIVITIES: CPT

## 2021-10-05 PROCEDURE — A9270 NON-COVERED ITEM OR SERVICE: HCPCS | Performed by: PHYSICAL MEDICINE & REHABILITATION

## 2021-10-05 PROCEDURE — 700102 HCHG RX REV CODE 250 W/ 637 OVERRIDE(OP): Performed by: PHYSICAL MEDICINE & REHABILITATION

## 2021-10-05 PROCEDURE — 97110 THERAPEUTIC EXERCISES: CPT

## 2021-10-05 PROCEDURE — 99232 SBSQ HOSP IP/OBS MODERATE 35: CPT | Performed by: PHYSICAL MEDICINE & REHABILITATION

## 2021-10-05 PROCEDURE — 97129 THER IVNTJ 1ST 15 MIN: CPT

## 2021-10-05 PROCEDURE — 94660 CPAP INITIATION&MGMT: CPT

## 2021-10-05 PROCEDURE — A9270 NON-COVERED ITEM OR SERVICE: HCPCS | Performed by: HOSPITALIST

## 2021-10-05 RX ORDER — TAMSULOSIN HYDROCHLORIDE 0.4 MG/1
0.8 CAPSULE ORAL
Status: DISCONTINUED | OUTPATIENT
Start: 2021-10-05 | End: 2021-10-22 | Stop reason: HOSPADM

## 2021-10-05 RX ADMIN — METOPROLOL TARTRATE 12.5 MG: 25 TABLET, FILM COATED ORAL at 17:17

## 2021-10-05 RX ADMIN — GABAPENTIN 100 MG: 100 CAPSULE ORAL at 08:37

## 2021-10-05 RX ADMIN — GABAPENTIN 100 MG: 100 CAPSULE ORAL at 14:18

## 2021-10-05 RX ADMIN — FERROUS SULFATE TAB 325 MG (65 MG ELEMENTAL FE) 325 MG: 325 (65 FE) TAB at 08:36

## 2021-10-05 RX ADMIN — SENNOSIDES AND DOCUSATE SODIUM 2 TABLET: 8.6; 5 TABLET ORAL at 08:36

## 2021-10-05 RX ADMIN — ASPIRIN 81 MG: 81 TABLET, COATED ORAL at 08:36

## 2021-10-05 RX ADMIN — OXYCODONE HYDROCHLORIDE AND ACETAMINOPHEN 500 MG: 500 TABLET ORAL at 08:36

## 2021-10-05 RX ADMIN — LIDOCAINE 1 PATCH: 50 PATCH TOPICAL at 08:38

## 2021-10-05 RX ADMIN — APIXABAN 5 MG: 5 TABLET, FILM COATED ORAL at 08:37

## 2021-10-05 RX ADMIN — APIXABAN 5 MG: 5 TABLET, FILM COATED ORAL at 20:15

## 2021-10-05 RX ADMIN — METOPROLOL TARTRATE 12.5 MG: 25 TABLET, FILM COATED ORAL at 05:19

## 2021-10-05 RX ADMIN — GABAPENTIN 100 MG: 100 CAPSULE ORAL at 20:15

## 2021-10-05 RX ADMIN — LEVETIRACETAM 1000 MG: 500 TABLET, FILM COATED ORAL at 08:36

## 2021-10-05 RX ADMIN — HYDROCODONE BITARTRATE AND ACETAMINOPHEN 1 TABLET: 5; 325 TABLET ORAL at 14:19

## 2021-10-05 RX ADMIN — TRAZODONE HYDROCHLORIDE 50 MG: 50 TABLET ORAL at 20:17

## 2021-10-05 RX ADMIN — OMEPRAZOLE 20 MG: 20 CAPSULE, DELAYED RELEASE ORAL at 06:39

## 2021-10-05 RX ADMIN — HYDROCODONE BITARTRATE AND ACETAMINOPHEN 1 TABLET: 5; 325 TABLET ORAL at 20:17

## 2021-10-05 RX ADMIN — OXYCODONE HYDROCHLORIDE AND ACETAMINOPHEN 500 MG: 500 TABLET ORAL at 20:15

## 2021-10-05 RX ADMIN — HYDROCODONE BITARTRATE AND ACETAMINOPHEN 1 TABLET: 5; 325 TABLET ORAL at 07:28

## 2021-10-05 RX ADMIN — SPIRONOLACTONE 25 MG: 25 TABLET, FILM COATED ORAL at 05:19

## 2021-10-05 RX ADMIN — POLYETHYLENE GLYCOL 3350 1 PACKET: 17 POWDER, FOR SOLUTION ORAL at 08:36

## 2021-10-05 RX ADMIN — LEVETIRACETAM 1000 MG: 500 TABLET, FILM COATED ORAL at 20:15

## 2021-10-05 RX ADMIN — ATORVASTATIN CALCIUM 80 MG: 40 TABLET, FILM COATED ORAL at 20:15

## 2021-10-05 RX ADMIN — TAMSULOSIN HYDROCHLORIDE 0.8 MG: 0.4 CAPSULE ORAL at 17:17

## 2021-10-05 ASSESSMENT — BALANCE ASSESSMENTS
STANDING TO SITTING: 4
SITTING UNSUPPORTED: 4
TRANSFERS: 4
STANDING UNSUPPORTED ONE FOOT IN FRONT: 2
STANDING UNSUPPORTED: 4
STANDING UNSUPPORTED WITH FEET TOGETHER: 4
SITTING TO STANDING: 4
STANDING UNSUPPORTED WITH EYES CLOSED: 4

## 2021-10-05 ASSESSMENT — PAIN SCALES - WONG BAKER: WONGBAKER_NUMERICALRESPONSE: HURTS JUST A LITTLE BIT

## 2021-10-05 ASSESSMENT — ENCOUNTER SYMPTOMS
DIARRHEA: 0
POLYDIPSIA: 0
ABDOMINAL PAIN: 0
PALPITATIONS: 0
COUGH: 0
EYES NEGATIVE: 1
VOMITING: 0
NAUSEA: 0
SEIZURES: 1
NERVOUS/ANXIOUS: 0
CHILLS: 0
SHORTNESS OF BREATH: 0
BRUISES/BLEEDS EASILY: 0
FEVER: 0

## 2021-10-05 ASSESSMENT — GAIT ASSESSMENTS
GAIT LEVEL OF ASSIST: CONTACT GUARD ASSIST
DISTANCE (FEET): 250
DEVIATION: BRADYKINETIC;DECREASED HEEL STRIKE;DECREASED BASE OF SUPPORT
ASSISTIVE DEVICE: FRONT WHEEL WALKER;NONE

## 2021-10-05 ASSESSMENT — ACTIVITIES OF DAILY LIVING (ADL)
BED_CHAIR_WHEELCHAIR_TRANSFER_DESCRIPTION: INCREASED TIME;SET-UP OF EQUIPMENT;SUPERVISION FOR SAFETY
BED_CHAIR_WHEELCHAIR_TRANSFER_DESCRIPTION: SET-UP OF EQUIPMENT;SQUAT PIVOT TRANSFER TO WHEELCHAIR;SUPERVISION FOR SAFETY

## 2021-10-05 ASSESSMENT — PAIN DESCRIPTION - PAIN TYPE: TYPE: ACUTE PAIN;SURGICAL PAIN

## 2021-10-05 NOTE — FLOWSHEET NOTE
10/05/21 0810   Events/Summary/Plan   Events/Summary/Plan O2 check   Vital Signs   Pulse 67   Respiration 17   Pulse Oximetry 95 %   $ Pulse Oximetry (Spot Check) Yes   Respiratory Assessment   Respiratory Pattern Within Normal Limits   Level of Consciousness Alert   Chest Exam   Work Of Breathing / Effort Within Normal Limits   Oxygen   O2 (LPM) 0   O2 Delivery Device None - Room Air   Non-Invasive Ventilation [facility]   Nocturnal CPAP or BIPAP CPAP - Home Unit   $ System Evaluation Yes   Patient off CPAP on RA

## 2021-10-05 NOTE — PROGRESS NOTES
"Rehab Progress Note     Date of Service: 10/5/2021  Chief Complaint: follow up MVR repair    Interval Events (Subjective)    Patient seen and examined today in her room.  She required catheterization overnight after her [place] was removed yesterday.  Discussed the patient's discharge date with her.  Reviewed case management's notes.  Family unable to provide 24/7 at discharge.  Patient reports improved pain in her right chest wall and her posterior right upper back.  She has no new complaints.    ROS: No changes to bowel, mood, or sleep.               Objective:  VITAL SIGNS: BP (!) 88/59   Pulse 70   Temp 36.3 °C (97.4 °F) (Oral)   Resp 17   Ht 1.676 m (5' 6\")   Wt 60 kg (132 lb 3.2 oz)   SpO2 98%   BMI 21.34 kg/m²   Gen: alert, no apparent distress  CV: regular rate and rhythm, no murmurs, no peripheral edema, healing incision in the right upper chest wall  Resp: clear to ascultation bilaterally, normal respiratory effort  GI: soft, non-tender abdomen, bowel sounds present      No results found for this or any previous visit (from the past 72 hour(s)).    Current Facility-Administered Medications   Medication Frequency   • tamsulosin (FLOMAX) capsule 0.8 mg AFTER DINNER   • HYDROcodone-acetaminophen (NORCO) 5-325 MG per tablet 1-2 Tablet Q6HRS PRN   • lidocaine (LIDODERM) 5 % 1 Patch Q24HR   • gabapentin (NEURONTIN) capsule 100 mg TID   • metoprolol tartrate (LOPRESSOR) tablet 12.5 mg TWICE DAILY   • lidocaine (LIDODERM) 5 % 1 Patch Q24HR   • polyethylene glycol/lytes (MIRALAX) PACKET 1 Packet DAILY    And   • senna-docusate (PERICOLACE or SENOKOT S) 8.6-50 MG per tablet 2 Tablet BID    And   • magnesium hydroxide (MILK OF MAGNESIA) suspension 30 mL QDAY PRN    And   • bisacodyl (DULCOLAX) suppository 10 mg DAILY   • hydrOXYzine HCl (ATARAX) tablet 50 mg Q6HRS PRN   • QUEtiapine (Seroquel) tablet 25 mg TID PRN   • simethicone (MYLICON) chewable tab 80 mg TID PRN   • melatonin tablet 3 mg HS PRN   • " Respiratory Therapy Consult Continuous RT   • Pharmacy Consult Request ...Pain Management Review 1 Each PHARMACY TO DOSE   • acetaminophen (Tylenol) tablet 650 mg Q4HRS PRN   • lactulose 20 GM/30ML solution 30 mL QDAY PRN   • docusate sodium (ENEMEEZ) enema 283 mg QDAY PRN   • sodium phosphate (Fleet) enema 133 mL QDAY PRN   • omeprazole (PRILOSEC) capsule 20 mg QAM AC   • artificial tears ophthalmic solution 1 Drop PRN   • benzocaine-menthol (CEPACOL) lozenge 1 Lozenge Q2HRS PRN   • mag hydrox-al hydrox-simeth (MAALOX PLUS ES or MYLANTA DS) suspension 20 mL Q2HRS PRN   • ondansetron (ZOFRAN ODT) dispertab 4 mg 4X/DAY PRN    Or   • ondansetron (ZOFRAN) syringe/vial injection 4 mg 4X/DAY PRN   • traZODone (DESYREL) tablet 50 mg QHS PRN   • sodium chloride (OCEAN) 0.65 % nasal spray 2 Spray PRN   • aspirin EC (ECOTRIN) tablet 81 mg DAILY   • ascorbic acid (Vitamin C) tablet 500 mg BID   • ferrous sulfate tablet 325 mg QDAY with Breakfast   • levETIRAcetam (KEPPRA) tablet 1,000 mg Q12HRS   • spironolactone (ALDACTONE) tablet 25 mg Q DAY   • atorvastatin (LIPITOR) tablet 80 mg Q EVENING   • apixaban (ELIQUIS) tablet 5 mg BID       Orders Placed This Encounter   Procedures   • Diet Order Diet: Regular (chopped meats)     Standing Status:   Standing     Number of Occurrences:   1     Order Specific Question:   Diet:     Answer:   Regular [1]     Comments:   chopped meats       Assessment:  Active Hospital Problems    Diagnosis    • *S/P mitral valve repair    • Hypoalbuminemia    • Other constipation    • Breast cancer (HCC)    • Upper back pain on right side    • Impaired mobility and ADLs    • Seizure disorder (HCC)    • Other hyperlipidemia    • Urinary retention    • Anemia    • Atrial fibrillation (HCC)    • Herpes    • Sleep apnea    • Hypertension    • History of CVA (cerebrovascular accident)      This patient is a 76 y.o. female admitted for acute inpatient rehabilitation with impaired cardiac endurance S/P  mitral valve repair.    I led and attended the weekly conference, and agree with the IDT conference documentation and plan of care as noted below.    Date of conference: 10/4/2021    Goals and barriers: See IDT note.    Biggest barriers: low endurance, generalized weakness, poor initiation, pain management, cognitive impairment    CM/social support: has family, but friend will support    Anticipated DC date: 10/14, patient does not have 24/7 support, may need further rehab at skilled facility    Home health: PT/OT/SLP/RN    Equip: shower chair, FWW    Follow up: PCP, cardiology, neurology, Dr. Sosa      Medical Decision Making and Plan:    S/p Mitral Valve repair  Dr. Nogueira 9/16  Continue full rehab program  PT/OT/SLP, 1 hr each discipline, 5 days per week    Outpatient follow up with cardiology and Dr. Nogueira as needed    Aspirin  Spironolactone      History of strokes  Cardio-embolic  Residual left hemiparesis  Aphasia  Dysphagia, resolved  Cognitive impairment  Continue full rehab program  PT/OT/SLP, 1 hr each discipline, 5 days per week     Eliquis  Statin     Outpatient follow up with Dr. Sosa, referral made    Right upper back pain, improved  Right anterior chest wall pain, improved  Lidoderm patch  Checked ultrasound - hematoma  PRN tylenol, last use 10/4  PRN oxycodone, not effective for right-sided chest wall pain  Switched to Norco, last use 10/5  Started low dose gabapentin 100 mg TID     Seizure disorder  Tonic/clonic post-op  Keppra  Outpatient referral to neurology     Hypertension  Metoprolol  Spironolactone  Appreciate hospitalist assistance    Orthostatic hypotension, improved  Appreciate hospitalist assistance  May need to decrease dosing of BP meds     Atrial fibrillation  Metoprolol  Appreciate hospitalist assistance     Hyperlipidemia  Statin     Anemia  Ferrous sulfate  Vit C     GI prophylaxis  Omeprazole     History of herpes simplex  Valacyclovir discontinued, patient was not taking  regularly    Sleep apnea  2 L oxygen at night  To bring in home CPAP    Bowel program  Constipation  Increase bowel medications  Last BM 10/4    Bladder program  Urinary retention  Started Flomax 9/28, increased dose today to 0.8 mg  Voiding trial yesterday, requiring catheterizations intermittently     DVT prophylaxis  Eliquis    Total time:  26 minutes.  I spent greater than 50% of the time for patient care, counseling, and coordination on this date, including patient face-to face time, unit/floor time with review of records/pertinent lab data and studies, as well as discussing diagnostic evaluation/work up, planned therapeutic interventions, and future disposition of care, as per the interval events/subjective and the assessment and plan as noted above.    I have performed a physical exam, reviewed and updated ROS, as well as the assessment and plan today 10/5/2021. In review of note from 10/4/2021 there are no new changes except as documented above.              Precious Rawls M.D.   Physical Medicine and Rehabilitation

## 2021-10-05 NOTE — CARE PLAN
The patient is Watcher - Medium risk of patient condition declining or worsening    Shift Goals  Clinical Goals: Pain Management, Safety      Problem: Pain - Standard  Goal: Alleviation of pain or a reduction in pain to the patient’s comfort goal  Note: Patient had 4/10 back pain, patient received 2 doses of Norco throughout the shift. Hot packs were provided and patient resting in bed.     Problem: Bladder / Voiding  Goal: Patient will establish and maintain regular urinary output  Note: Patient voiding this shift, last PVR was 105mL. Straight cath was not needed this shift  Will continue to monitor.

## 2021-10-05 NOTE — FLOWSHEET NOTE
10/04/21 1642   Events/Summary/Plan   Events/Summary/Plan S/U [facility] CPAP at 7 cmH2O   Non-Invasive Ventilation [facility]   Nocturnal CPAP or BIPAP CPAP - [facility] Unit   $ System Evaluation Yes   Settings (If Known) 7 cmH2O

## 2021-10-05 NOTE — CARE PLAN
Problem: Fall Risk - Rehab  Goal: Patient will remain free from falls  Note: Patient remains free from falls this shift. Patient was educated on using the call light to prevent falls. Patients bed is in the lowest position. The patients belongings are placed in near proximity to the patient.       Problem: Bowel Elimination  Goal: Patient will participate in bowel management program  Note: Patient reused the suppository. Patient educated on need for suppository since last BM was 10/2/21. Patient was given scheduled stool softeners.    The patient is Stable - Low risk of patient condition declining or worsening

## 2021-10-05 NOTE — THERAPY
Speech Language Pathology  Daily Treatment     Patient Name: Shaista Bocanegra  Age:  76 y.o., Sex:  female  Medical Record #: [id number]  Today's Date: 10/5/2021     Precautions  Precautions: Fall Risk  Comments: seizure, salas, cardiac    Subjective    Pt seen during two sessions (0900 and 1000); pt pleasant and cooperative during ST     Objective       10/05/21 0901   Interdisciplinary Plan of Care Collaboration   Patient Position at End of Therapy In Bed;Bed Alarm On;Call Light within Reach;Tray Table within Reach;Phone within Reach   SLP Total Time Spent   SLP Individual Total Time Spent (Mins) 60   Treatment Charges   SLP Cognitive Skill Development First 15 Minutes 1   SLP Cognitive Skill Development Additional 15 Minutes 3       Assessment    0900: Initiated medication list of pt's current medications. Pt remained attentive throughout the task and asked appropriate and relevant questions. Continue activity in upcoming ST session.     1000: Continued reviewing and creating list of pt's current medications. Pt was able to recall the purpose of 6/17current  medications when provided the name. Pt reported she would be interested in participating in functional medication sort using a weekly pill box.        Plan    Functional medication sort, target complex problem solving     Speech Therapy Problems (Active)     Problem: Expression STGs     Dates: Start: 09/29/21       Goal: STG-Within one week, patient will demonstrate use of SFA during conversation in order to improve word finding.      Dates: Start: 09/29/21       Goal Note filed on 10/04/21 1344 by Shruthi Zapien MS,CCC-SLP     To be addressed.               Problem: Memory STGs     Dates: Start: 09/29/21       Goal: STG-Within one week, patient will demonstrate use of memory strategies in order to recall daily events     Dates: Start: 09/29/21       Goal Note filed on 10/04/21 1344 by Shruthi Zapien MS,CCC-SLP     Mod to max A needed to recall daily events.                Problem: Problem Solving STGs     Dates: Start: 09/29/21       Goal: STG-Within one week, patient will complete medication management tasks with 80% accuracy, min verbal cues.      Dates: Start: 09/29/21       Goal Note filed on 10/04/21 5764 by Shruthi Zapien MS,CCC-SLP     This not yet ready to address this goal.               Problem: Speech/Swallowing LTGs     Dates: Start: 09/29/21       Goal: LTG-By discharge, patient will solve complex problems related to IADL's in order to d/c homw with mod I     Dates: Start: 09/29/21

## 2021-10-05 NOTE — THERAPY
Physical Therapy   Daily Treatment     Patient Name: Shaista Bocanegra  Age:  76 y.o., Sex:  female  Medical Record #: [id number]  Today's Date: 10/5/2021     Precautions  Precautions: Fall Risk  Comments: seizure, salas, cardiac    Subjective    Pt in bed resting, agreeable to PT.      Objective       10/05/21 1031   Precautions   Precautions Fall Risk   Comments seizure, salas, cardiac   Vitals   Pulse 70   Blood Pressure  (!) 88/59   Pulse Oximetry 98 %   Gait Functional Level of Assist    Gait Level Of Assist Contact Guard Assist   Assistive Device Front Wheel Walker;None  (CGA no AD, SBA FWW)   Distance (Feet) 250   # of Times Distance was Traveled 2  (1x no AD, 1x FWW)   Deviation Bradykinetic;Decreased Heel Strike;Decreased Base Of Support   Transfer Functional Level of Assist   Bed, Chair, Wheelchair Transfer Stand by Assist   Bed Chair Wheelchair Transfer Description Increased time;Set-up of equipment;Supervision for safety  (bed > WC)   Toilet Transfers Stand by Assist   Toilet Transfer Description Increased time;Set-up of equipment;Supervision for safety;Grab bar  (WC > toilet)   Sitting Lower Body Exercises   Ankle Pumps 1 set of 15   Hip Abduction 1 set of 15   Hip Adduction 1 set of 15   Long Arc Quad 1 set of 15   Marching 1 set of 15   Hamstring Curl 1 set of 15   Comments 2.5 lb ankle weights bilaterally and green tband for resistance    [place] Balance Scale   Sitting Unsupported (Score 0-4) 4   Change Of Positon: Sitting To Standing (Score 0-4) 4   Change Of Positon: Standing To Sitting (Score 0-4) 4   Transfers (Score 0-4) 4   Standing Unsupported (Score 0-4) 4   Standing With Eyes Closed (Score 0-4) 4   Standing With Feet Together (Score 0-4) 4   Tandem Standing (Score 0-4) 2   Interdisciplinary Plan of Care Collaboration   IDT Collaboration with  Certified Nursing Assistant   Patient Position at End of Therapy Seated;Call Light within Reach  (on toilet)   Collaboration Comments re: pt on toilet    PT  Total Time Spent   PT Individual Total Time Spent (Mins) 60   PT Charge Group   PT Gait Training 1   PT Therapeutic Exercise 1   PT Neuromuscular Re-Education / Balance 1   PT Therapeutic Activities 1       Assessment    Pt unable to complete King balance assessment due to report of dizziness and low BP upon assessment. Good stability with ambulation and no AD, difficulty coordinating use of FWW and causing anterior lean compared to no AD.     Strengths: Adequate strength, Independent prior level of function, Willingly participates in therapeutic activities  Barriers: Impaired activity tolerance, Hypotension, Fatigue, Impaired balance    Plan    Complete king assessment, continue gait training with no AD and consider trial of SPC, standing tolerance and balance (monitor for low BP symptoms), LE strength and endurance.         Physical Therapy Problems (Active)     Problem: Balance     Dates: Start: 09/29/21       Goal: STG-Within one week, patient will complete standardized balance assessment with score indicating low to moderate fall risk      Dates: Start: 09/29/21       Goal Note filed on 10/04/21 1135 by Agustina Cheema, PT     To be assessed                Problem: PT-Long Term Goals     Dates: Start: 09/29/21       Goal: LTG-By discharge, patient will ambulate 300 ft with LRAD, mod I      Dates: Start: 09/29/21          Goal: LTG-By discharge, patient will transfer one surface to another mod I with LRAD     Dates: Start: 09/29/21          Goal: LTG-By discharge, patient will ambulate up/down 12 stairs with 2HRs and spv      Dates: Start: 09/29/21          Goal: LTG-By discharge, patient will transfer in/out of a car spv with LRAD      Dates: Start: 09/29/21

## 2021-10-05 NOTE — DISCHARGE PLANNING
"CM spoke with patient and her son to update on IDT and target DC date of 10/14/21.  Answered questions.  Discussed privately with son that patient will need 24/7 supervision at DC.  He'll talk to family but doesn't think they can do it.  Discussed other options; shelter, SNF, group home.  Son stated he'd prefer longer rehab at SNF if needed as he wants patient to get as much rehab as possible.  Per Shruthi QUICK, \"patient was clearer yesterday and hopes she'll keep improving\".  CM will continue to monitor for DC needs.   "

## 2021-10-05 NOTE — PROGRESS NOTES
Patient care assumed. Report received from olvin Can RN. Patient is alert and calm, resting in bed. Call light and bedside table within reach. Will continue to monitor.

## 2021-10-05 NOTE — THERAPY
Occupational Therapy  Daily Treatment     Patient Name: Shaista Bocanegra  Age:  76 y.o., Sex:  female  Medical Record #: [id number]  Today's Date: 10/5/2021     Precautions  Precautions: (P) Fall Risk  Comments: (P) seizure, salas, cardiac         Subjective    Patient awake in bed and agreeable to OT.  Declined working on simulated grocery shopping activity as she stated she never grocery shops.     Objective       10/05/21 1331   Precautions   Precautions Fall Risk   Comments seizure, salas, cardiac   Functional Level of Assist   Bed, Chair, Wheelchair Transfer Stand by Assist   Bed Chair Wheelchair Transfer Description Set-up of equipment;Squat pivot transfer to wheelchair;Supervision for safety   Sitting Upper Body Exercises   Lat Pull 3 sets of 10;Bilateral;Weight (See Comments for lbs)  (25 lbs using equalizer)   Tricep Press 3 sets of 10;Bilateral;Weight (See Comments for lbs)  (20 lbs on rickshaw forward)   Upper Extremity Bike Minutes / Rest Breaks (See Comments)  (motomed cycle gear 2 x 10 minutes with one water break)   Bed Mobility    Supine to Sit Supervised   Scooting Supervised   Interdisciplinary Plan of Care Collaboration   IDT Collaboration with  Nursing   Patient Position at End of Therapy Seated;Self Releasing Lap Belt Applied;Call Light within Reach;Tray Table within Reach;Phone within Reach;Other (Comments)  (nurse in room)   Collaboration Comments handed patient off to nursing in room for med pass   OT Total Time Spent   OT Individual Total Time Spent (Mins) 60   OT Charge Group   OT Therapy Activity 1   OT Therapeutic Exercise  3       Assessment    Patient tolerated UB strength and endurance exercises without any complaints.  No complaints of pain this session.    Strengths: Able to follow instructions, Alert and oriented, Adequate strength, Effective communication skills, Independent prior level of function, Motivated for self care and independence, Pleasant and cooperative, Supportive family,  Willingly participates in therapeutic activities  Barriers: Decreased endurance, Fatigue, Generalized weakness, Impaired activity tolerance, Impaired balance, Pain (lack of sleep night before initial evaluation)    Plan    Educate on adaptive techniques to don socks (sock aide if needed), IADLs, standing balance, overall strength/endurance    Occupational Therapy Goals (Active)     Problem: Dressing     Dates: Start: 10/04/21       Goal: STG-Within one week, patient will dress LB with supervision using adaptive techniques or AE as needed     Dates: Start: 10/04/21             Problem: Functional Transfers     Dates: Start: 09/29/21       Goal: STG-Within one week, patient will transfer to toilet with supervision using grab bar and/or FWW     Dates: Start: 09/29/21       Goal Note filed on 10/04/21 1141 by Adrian Mathews OT/AMOS     SBA               Problem: OT Long Term Goals     Dates: Start: 09/29/21       Goal: LTG-By discharge, patient will complete basic self care tasks with mod I     Dates: Start: 09/29/21          Goal: LTG-By discharge, patient will perform bathroom transfers with mod I     Dates: Start: 09/29/21          Goal: LTG-By discharge, patient will complete basic home management with mod I     Dates: Start: 09/29/21             Problem: Toileting     Dates: Start: 10/04/21       Goal: STG-Within one week, patient will complete toileting tasks with supervision using grab bar and/or FWW for balance as needed     Dates: Start: 10/04/21

## 2021-10-05 NOTE — THERAPY
Speech Language Pathology  Daily Treatment     Patient Name: Shaista Bocanegra  Age:  76 y.o., Sex:  female  Medical Record #: [id number]  Today's Date: 10/4/2021     Precautions  Precautions: Fall Risk  Comments: seizure, salas, cardiac    Subjective    Patient pleasant and cooperative.     Objective       10/04/21 1431   Expressive Language   Expressive Language (WDL) X   Word Finding Deficits Supervision (5)   Cognition   Written Arithmetic Minimal (4)   SLP Total Time Spent   SLP Individual Total Time Spent (Mins) 60   Treatment Charges   SLP Cognitive Skill Development First 15 Minutes 1   SLP Cognitive Skill Development Additional 15 Minutes 3       Assessment    Patient performed single digit calculations with 100% acc,  4 digit subtraction involving borrowing and carrying with 69% with min cues to improve to 100%.  Patient demonstrated only 2 word finding errors in 15 min conversation with min cues needed use of semantic feature cues.          Plan    Medication management, financial management, safety recall and problem solving.    Passport items to be completed:  Express basic needs, understand food/liquid recommendations, consistently follow swallow precautions, manage finances, manage medications, arrive to therapy appointments on time, complete daily memory log entries, solve problems related to safety situations, review education related to hospitalization, complete caregiver training     Speech Therapy Problems (Active)     Problem: Expression STGs     Dates: Start: 09/29/21       Goal: STG-Within one week, patient will demonstrate use of SFA during conversation in order to improve word finding.      Dates: Start: 09/29/21       Goal Note filed on 10/04/21 1344 by Shruthi Zapien MS,CCC-SLP     To be addressed.               Problem: Memory STGs     Dates: Start: 09/29/21       Goal: STG-Within one week, patient will demonstrate use of memory strategies in order to recall daily events     Dates: Start:  09/29/21       Goal Note filed on 10/04/21 1344 by Shruthi Zapien MS,CCC-SLP     Mod to max A needed to recall daily events.               Problem: Problem Solving STGs     Dates: Start: 09/29/21       Goal: STG-Within one week, patient will complete medication management tasks with 80% accuracy, min verbal cues.      Dates: Start: 09/29/21       Goal Note filed on 10/04/21 1344 by Shruthi Zapien MS,CCC-SLP     This not yet ready to address this goal.               Problem: Speech/Swallowing LTGs     Dates: Start: 09/29/21       Goal: LTG-By discharge, patient will solve complex problems related to IADL's in order to d/c homw with mod I     Dates: Start: 09/29/21

## 2021-10-05 NOTE — PROGRESS NOTES
Hospital Medicine Daily Progress Note      Chief Complaint:  S/P MVR  Hypertension    Interval History:  No significant events or changes since last visit.    Review of Systems  Review of Systems   Constitutional: Negative for chills and fever.   Respiratory: Negative for shortness of breath.    Cardiovascular: Negative for chest pain.   Gastrointestinal: Negative for abdominal pain, diarrhea, nausea and vomiting.   Psychiatric/Behavioral: The patient is not nervous/anxious.         Physical Exam  Temp:  [36.3 °C (97.4 °F)-36.8 °C (98.2 °F)] 36.3 °C (97.4 °F)  Pulse:  [67-78] 67  Resp:  [16-18] 17  BP: (101-112)/(56-74) 111/61  SpO2:  [92 %-96 %] 95 %    Physical Exam  Vitals and nursing note reviewed.   Constitutional:       Appearance: Normal appearance.   HENT:      Head: Atraumatic.   Eyes:      Conjunctiva/sclera: Conjunctivae normal.      Pupils: Pupils are equal, round, and reactive to light.   Cardiovascular:      Rate and Rhythm: Normal rate and regular rhythm.      Heart sounds: No murmur heard.     Pulmonary:      Effort: Pulmonary effort is normal.      Breath sounds: No stridor. No wheezing or rales.   Abdominal:      General: There is no distension.      Palpations: Abdomen is soft.      Tenderness: There is no abdominal tenderness.   Musculoskeletal:      Cervical back: Normal range of motion and neck supple.      Right lower leg: No edema.      Left lower leg: No edema.   Skin:     General: Skin is warm and dry.      Findings: No rash.   Neurological:      Mental Status: She is alert and oriented to person, place, and time.   Psychiatric:         Mood and Affect: Mood normal.         Behavior: Behavior normal.         Fluids    Intake/Output Summary (Last 24 hours) at 10/5/2021 0941  Last data filed at 10/5/2021 0900  Gross per 24 hour   Intake 600 ml   Output 400 ml   Net 200 ml       Laboratory                      Assessment/Plan  * S/P mitral valve repair- (present on admission)  Assessment &  Plan  MVP with severe MVR  Now S/P MVR at [facility] on 9/16  F/U Echo EF 60% with normally functioning MVR  CXR: shows atelectasis  U/S chest: shows SQ hematoma    Anemia- (present on admission)  Assessment & Plan  H&H stable  Likely 2nd to krzysztof-op blood loss  B12 ok  Fe: 23, sats 10%  On Fe supplements  FOB x 2: negative     Seizure disorder (HCC)- (present on admission)  Assessment & Plan  On Keppra    Herpes- (present on admission)  Assessment & Plan  S/P Valtrex    Atrial fibrillation (HCC)- (present on admission)  Assessment & Plan  HR ok  S/P Maze procedure at [facility] (9/16)  Echo EF 60% with normally functioning MVR  On Metoprolol  On Eliquis  Monitor    Hypertension- (present on admission)  Assessment & Plan  BP ok  On Metoprolol  Note: also on Aldactone and Flomax  Monitor    History of CVA (cerebrovascular accident)- (present on admission)  Assessment & Plan  Hx of CVA x 2  On Eliquis and ASA  On Lipitor

## 2021-10-06 ENCOUNTER — HOSPITAL ENCOUNTER (OUTPATIENT)
Dept: CARDIOLOGY | Facility: MEDICAL CENTER | Age: 76
End: 2021-10-06
Attending: INTERNAL MEDICINE
Payer: COMMERCIAL

## 2021-10-06 PROCEDURE — 770010 HCHG ROOM/CARE - REHAB SEMI PRIVAT*

## 2021-10-06 PROCEDURE — 94660 CPAP INITIATION&MGMT: CPT

## 2021-10-06 PROCEDURE — 700102 HCHG RX REV CODE 250 W/ 637 OVERRIDE(OP): Performed by: PHYSICAL MEDICINE & REHABILITATION

## 2021-10-06 PROCEDURE — 700102 HCHG RX REV CODE 250 W/ 637 OVERRIDE(OP): Performed by: HOSPITALIST

## 2021-10-06 PROCEDURE — A9270 NON-COVERED ITEM OR SERVICE: HCPCS | Performed by: HOSPITALIST

## 2021-10-06 PROCEDURE — 97535 SELF CARE MNGMENT TRAINING: CPT

## 2021-10-06 PROCEDURE — 99232 SBSQ HOSP IP/OBS MODERATE 35: CPT | Performed by: HOSPITALIST

## 2021-10-06 PROCEDURE — 97130 THER IVNTJ EA ADDL 15 MIN: CPT

## 2021-10-06 PROCEDURE — 97530 THERAPEUTIC ACTIVITIES: CPT

## 2021-10-06 PROCEDURE — 99232 SBSQ HOSP IP/OBS MODERATE 35: CPT | Performed by: PHYSICAL MEDICINE & REHABILITATION

## 2021-10-06 PROCEDURE — 700101 HCHG RX REV CODE 250: Performed by: PHYSICAL MEDICINE & REHABILITATION

## 2021-10-06 PROCEDURE — 94760 N-INVAS EAR/PLS OXIMETRY 1: CPT

## 2021-10-06 PROCEDURE — 97129 THER IVNTJ 1ST 15 MIN: CPT

## 2021-10-06 PROCEDURE — A9270 NON-COVERED ITEM OR SERVICE: HCPCS | Performed by: PHYSICAL MEDICINE & REHABILITATION

## 2021-10-06 PROCEDURE — 97110 THERAPEUTIC EXERCISES: CPT

## 2021-10-06 RX ORDER — LANOLIN ALCOHOL/MO/W.PET/CERES
3 CREAM (GRAM) TOPICAL
Status: DISCONTINUED | OUTPATIENT
Start: 2021-10-06 | End: 2021-10-12

## 2021-10-06 RX ORDER — GABAPENTIN 100 MG/1
200 CAPSULE ORAL 3 TIMES DAILY
Status: DISCONTINUED | OUTPATIENT
Start: 2021-10-06 | End: 2021-10-11

## 2021-10-06 RX ADMIN — GABAPENTIN 200 MG: 100 CAPSULE ORAL at 15:05

## 2021-10-06 RX ADMIN — OMEPRAZOLE 20 MG: 20 CAPSULE, DELAYED RELEASE ORAL at 08:21

## 2021-10-06 RX ADMIN — APIXABAN 5 MG: 5 TABLET, FILM COATED ORAL at 08:21

## 2021-10-06 RX ADMIN — MELATONIN TAB 3 MG 3 MG: 3 TAB at 21:26

## 2021-10-06 RX ADMIN — LEVETIRACETAM 1000 MG: 500 TABLET, FILM COATED ORAL at 21:26

## 2021-10-06 RX ADMIN — ASPIRIN 81 MG: 81 TABLET, COATED ORAL at 08:20

## 2021-10-06 RX ADMIN — LIDOCAINE 1 PATCH: 50 PATCH TOPICAL at 08:00

## 2021-10-06 RX ADMIN — HYDROCODONE BITARTRATE AND ACETAMINOPHEN 2 TABLET: 5; 325 TABLET ORAL at 15:09

## 2021-10-06 RX ADMIN — HYDROCODONE BITARTRATE AND ACETAMINOPHEN 1 TABLET: 5; 325 TABLET ORAL at 21:26

## 2021-10-06 RX ADMIN — TAMSULOSIN HYDROCHLORIDE 0.8 MG: 0.4 CAPSULE ORAL at 17:46

## 2021-10-06 RX ADMIN — GABAPENTIN 200 MG: 100 CAPSULE ORAL at 21:26

## 2021-10-06 RX ADMIN — APIXABAN 5 MG: 5 TABLET, FILM COATED ORAL at 21:26

## 2021-10-06 RX ADMIN — METOPROLOL TARTRATE 12.5 MG: 25 TABLET, FILM COATED ORAL at 05:01

## 2021-10-06 RX ADMIN — SENNOSIDES AND DOCUSATE SODIUM 2 TABLET: 8.6; 5 TABLET ORAL at 21:27

## 2021-10-06 RX ADMIN — LEVETIRACETAM 1000 MG: 500 TABLET, FILM COATED ORAL at 08:21

## 2021-10-06 RX ADMIN — HYDROCODONE BITARTRATE AND ACETAMINOPHEN 2 TABLET: 5; 325 TABLET ORAL at 08:19

## 2021-10-06 RX ADMIN — GABAPENTIN 100 MG: 100 CAPSULE ORAL at 08:20

## 2021-10-06 RX ADMIN — ATORVASTATIN CALCIUM 80 MG: 40 TABLET, FILM COATED ORAL at 21:26

## 2021-10-06 RX ADMIN — SPIRONOLACTONE 25 MG: 25 TABLET, FILM COATED ORAL at 05:01

## 2021-10-06 RX ADMIN — HYDROCODONE BITARTRATE AND ACETAMINOPHEN 1 TABLET: 5; 325 TABLET ORAL at 02:22

## 2021-10-06 RX ADMIN — OXYCODONE HYDROCHLORIDE AND ACETAMINOPHEN 500 MG: 500 TABLET ORAL at 21:26

## 2021-10-06 RX ADMIN — FERROUS SULFATE TAB 325 MG (65 MG ELEMENTAL FE) 325 MG: 325 (65 FE) TAB at 08:20

## 2021-10-06 RX ADMIN — OXYCODONE HYDROCHLORIDE AND ACETAMINOPHEN 500 MG: 500 TABLET ORAL at 08:21

## 2021-10-06 ASSESSMENT — BALANCE ASSESSMENTS
SITTING TO STANDING: 4
TURN 360 DEGREES: 2
STANDING UNSUPPORTED ONE FOOT IN FRONT: 2
STANDING TO SITTING: 4
SITTING UNSUPPORTED: 4
STANDING ON ONE LEG: 1
LOOK OVER LEFT AND RIGHT SHOULDERS WHILE STANDING: 4
STANDING UNSUPPORTED WITH EYES CLOSED: 4
TRANSFERS: 4
PICK UP OBJECT FROM THE FLOOR FROM A STANDING POSITION: 3
REACHING FORWARD WITH OUTSTRETCHED ARM WHILE STANDING: 3
STANDING UNSUPPORTED: 4
LONG VERSION TOTAL SCORE (MAX 56): 46
STANDING UNSUPPORTED WITH FEET TOGETHER: 4
LONG VERSION TOTAL SCORE (MAX 56): 46
PLACE ALTERNATE FOOT ON STEP OR STOOL WHILE STANDING UNSUPPORTED: 3

## 2021-10-06 ASSESSMENT — ENCOUNTER SYMPTOMS
FEVER: 0
NAUSEA: 0
HALLUCINATIONS: 0
BLURRED VISION: 0
DIZZINESS: 0
PALPITATIONS: 0
SHORTNESS OF BREATH: 0
VOMITING: 0
HEADACHES: 0

## 2021-10-06 ASSESSMENT — PAIN DESCRIPTION - PAIN TYPE
TYPE: ACUTE PAIN

## 2021-10-06 ASSESSMENT — GAIT ASSESSMENTS
ASSISTIVE DEVICE: SINGLE POINT CANE;NONE
DISTANCE (FEET): 250
DEVIATION: BRADYKINETIC;DECREASED BASE OF SUPPORT
GAIT LEVEL OF ASSIST: STAND BY ASSIST

## 2021-10-06 NOTE — FLOWSHEET NOTE
10/06/21 1436   Events/Summary/Plan   Events/Summary/Plan Adjustment made to CPAP setting per pt request.   Vital Signs   Pulse 80   Respiration 18   Pulse Oximetry 95 %   $ Pulse Oximetry (Spot Check) Yes   Respiratory Assessment   Level of Consciousness Alert   Chest Exam   Work Of Breathing / Effort Within Normal Limits   Oxygen   O2 Delivery Device None - Room Air   Non-Invasive Ventilation LALY Group   Nocturnal CPAP or BIPAP CPAP - [facility]   $ System Evaluation Yes   Settings (If Known) auto 6-8 cmH20

## 2021-10-06 NOTE — PROGRESS NOTES
"Rehab Progress Note     Date of Service: 10/6/2021  Chief Complaint: follow up MVR repair    Interval Events (Subjective)    Patient seen and examined today in her room.  She reports she did not sleep well last night.  She would like some melatonin which she took at home.  She reports they ran out of hot pads for her to use on her back.  Her catheter was removed yesterday and she continues to retain requiring intermittent catheterization overnight.  Patient reports she thinks her friend can assist her at discharge.  Advised her she needs 24/7 assistance.  Hospitalist signed off today.    ROS: No changes to bowel, bladder, or mood.      Objective:  VITAL SIGNS: /83   Pulse 75   Temp 36.3 °C (97.4 °F) (Oral)   Resp 18   Ht 1.676 m (5' 6\")   Wt 60 kg (132 lb 3.2 oz)   SpO2 95%   BMI 21.34 kg/m²   Gen: alert, no apparent distress  CV: Regular rate and rhythm, no murmurs, healing incision in the right upper chest wall, no peripheral edema  Neuro: notable for nonfocal, generalized weakness      No results found for this or any previous visit (from the past 72 hour(s)).    Current Facility-Administered Medications   Medication Frequency   • gabapentin (NEURONTIN) capsule 200 mg TID   • melatonin tablet 3 mg QHS   • tamsulosin (FLOMAX) capsule 0.8 mg AFTER DINNER   • HYDROcodone-acetaminophen (NORCO) 5-325 MG per tablet 1-2 Tablet Q6HRS PRN   • lidocaine (LIDODERM) 5 % 1 Patch Q24HR   • metoprolol tartrate (LOPRESSOR) tablet 12.5 mg TWICE DAILY   • lidocaine (LIDODERM) 5 % 1 Patch Q24HR   • polyethylene glycol/lytes (MIRALAX) PACKET 1 Packet DAILY    And   • senna-docusate (PERICOLACE or SENOKOT S) 8.6-50 MG per tablet 2 Tablet BID    And   • magnesium hydroxide (MILK OF MAGNESIA) suspension 30 mL QDAY PRN    And   • bisacodyl (DULCOLAX) suppository 10 mg DAILY   • hydrOXYzine HCl (ATARAX) tablet 50 mg Q6HRS PRN   • QUEtiapine (Seroquel) tablet 25 mg TID PRN   • simethicone (MYLICON) chewable tab 80 mg TID PRN "   • Respiratory Therapy Consult Continuous RT   • Pharmacy Consult Request ...Pain Management Review 1 Each PHARMACY TO DOSE   • acetaminophen (Tylenol) tablet 650 mg Q4HRS PRN   • lactulose 20 GM/30ML solution 30 mL QDAY PRN   • docusate sodium (ENEMEEZ) enema 283 mg QDAY PRN   • sodium phosphate (Fleet) enema 133 mL QDAY PRN   • omeprazole (PRILOSEC) capsule 20 mg QAM AC   • artificial tears ophthalmic solution 1 Drop PRN   • benzocaine-menthol (CEPACOL) lozenge 1 Lozenge Q2HRS PRN   • mag hydrox-al hydrox-simeth (MAALOX PLUS ES or MYLANTA DS) suspension 20 mL Q2HRS PRN   • ondansetron (ZOFRAN ODT) dispertab 4 mg 4X/DAY PRN    Or   • ondansetron (ZOFRAN) syringe/vial injection 4 mg 4X/DAY PRN   • traZODone (DESYREL) tablet 50 mg QHS PRN   • sodium chloride (OCEAN) 0.65 % nasal spray 2 Spray PRN   • aspirin EC (ECOTRIN) tablet 81 mg DAILY   • ascorbic acid (Vitamin C) tablet 500 mg BID   • ferrous sulfate tablet 325 mg QDAY with Breakfast   • levETIRAcetam (KEPPRA) tablet 1,000 mg Q12HRS   • spironolactone (ALDACTONE) tablet 25 mg Q DAY   • atorvastatin (LIPITOR) tablet 80 mg Q EVENING   • apixaban (ELIQUIS) tablet 5 mg BID       Orders Placed This Encounter   Procedures   • Diet Order Diet: Regular (chopped meats)     Standing Status:   Standing     Number of Occurrences:   1     Order Specific Question:   Diet:     Answer:   Regular [1]     Comments:   chopped meats       Assessment:  Active Hospital Problems    Diagnosis    • *S/P mitral valve repair    • Hypoalbuminemia    • Other constipation    • Breast cancer (HCC)    • Upper back pain on right side    • Impaired mobility and ADLs    • Seizure disorder (HCC)    • Other hyperlipidemia    • Urinary retention    • Anemia    • Atrial fibrillation (HCC)    • Herpes    • Sleep apnea    • Hypertension    • History of CVA (cerebrovascular accident)      This patient is a 76 y.o. female admitted for acute inpatient rehabilitation with impaired cardiac endurance S/P  mitral valve repair.    I led and attended the weekly conference, and agree with the IDT conference documentation and plan of care as noted below.    Date of conference: 10/4/2021    Goals and barriers: See IDT note.    Biggest barriers: low endurance, generalized weakness, poor initiation, pain management, cognitive impairment    CM/social support: has family, but friend will support    Anticipated DC date: 10/14, patient does not have 24/7 support, may need further rehab at skilled facility    Home health: PT/OT/SLP/RN    Equip: shower chair, FWW    Follow up: PCP, cardiology, neurology, Dr. Sosa      Medical Decision Making and Plan:    S/p Mitral Valve repair  Dr. Nogueira 9/16  Continue full rehab program  PT/OT/SLP, 1 hr each discipline, 5 days per week    Outpatient follow up with cardiology and Dr. Nogueira as needed    Aspirin  Spironolactone      History of strokes  Cardio-embolic  Residual left hemiparesis  Aphasia  Dysphagia, resolved  Cognitive impairment  Continue full rehab program  PT/OT/SLP, 1 hr each discipline, 5 days per week     Eliquis  Statin     Outpatient follow up with Dr. Sosa, referral made    Right upper back pain, improved  Right anterior chest wall pain, improved  Lidoderm patch  Checked ultrasound - hematoma  PRN tylenol, last use 10/4  PRN oxycodone, not effective for right-sided chest wall pain  Switched to Norco, last use 10/6  Started low dose gabapentin 100 mg TID --> 200 mg TID 10/6  Continue hot pack     Seizure disorder  Tonic/clonic post-op  Keppra  Outpatient referral to neurology     Hypertension  Metoprolol  Spironolactone  Currently stable    Orthostatic hypotension, improved  Appreciate hospitalist assistance  May need to decrease dosing of BP meds     Atrial fibrillation  Metoprolol    Hyperlipidemia  Statin     Anemia  Ferrous sulfate  Vit C     GI prophylaxis  Omeprazole     History of herpes simplex  Valacyclovir discontinued, patient was not taking regularly    Sleep  apnea  2 L oxygen at night  To bring in home CPAP    Bowel program  Constipation  Increase bowel medications  Last BM 10/5    Bladder program  Urinary retention, continues  Started Flomax 9/28, increased dose 10/5 to 0.8 mg  Voiding trial yesterday, requiring catheterizations intermittently     DVT prophylaxis  Eliquis    Total time:  27 minutes.  I spent greater than 50% of the time for patient care, counseling, and coordination on this date, including patient face-to face time, unit/floor time with review of records/pertinent lab data and studies, as well as discussing diagnostic evaluation/work up, planned therapeutic interventions, and future disposition of care, as per the interval events/subjective and the assessment and plan as noted above.    I have performed a physical exam, reviewed and updated ROS, as well as the assessment and plan today 10/6/2021. In review of note from 10/5/2021 there are no new changes except as documented above.      Precious Rawls M.D.   Physical Medicine and Rehabilitation

## 2021-10-06 NOTE — PROGRESS NOTES
Hospital Medicine Daily Progress Note      Chief Complaint:  S/P MVR  Hypertension    Interval History:  No significant events or changes since last visit.    Review of Systems  Review of Systems   Constitutional: Negative for fever.   Eyes: Negative for blurred vision.   Respiratory: Negative for shortness of breath.    Cardiovascular: Negative for palpitations.   Gastrointestinal: Negative for nausea and vomiting.   Neurological: Negative for dizziness and headaches.   Psychiatric/Behavioral: Negative for hallucinations.        Physical Exam  Temp:  [36.3 °C (97.4 °F)-36.8 °C (98.3 °F)] 36.3 °C (97.4 °F)  Pulse:  [70-80] 75  Resp:  [18] 18  BP: ()/(57-83) 110/83  SpO2:  [94 %-98 %] 95 %    Physical Exam  Vitals and nursing note reviewed.   Constitutional:       General: She is not in acute distress.  HENT:      Mouth/Throat:      Mouth: Mucous membranes are moist.      Pharynx: Oropharynx is clear.   Eyes:      General: No scleral icterus.  Neck:      Vascular: No JVD.   Cardiovascular:      Rate and Rhythm: Normal rate and regular rhythm.      Heart sounds: Normal heart sounds. No murmur heard.     Pulmonary:      Effort: Pulmonary effort is normal.      Breath sounds: Normal breath sounds. No stridor.   Abdominal:      General: There is no distension.      Palpations: Abdomen is soft.      Tenderness: There is no abdominal tenderness.   Musculoskeletal:      Cervical back: No rigidity.      Right lower leg: No edema.      Left lower leg: No edema.   Skin:     General: Skin is warm and dry.      Findings: No rash.   Neurological:      Mental Status: She is alert and oriented to person, place, and time.   Psychiatric:         Mood and Affect: Mood normal.         Behavior: Behavior normal.         Fluids    Intake/Output Summary (Last 24 hours) at 10/6/2021 0918  Last data filed at 10/6/2021 0630  Gross per 24 hour   Intake 480 ml   Output 400 ml   Net 80 ml       Laboratory                       Assessment/Plan  * S/P mitral valve repair- (present on admission)  Assessment & Plan  MVP with severe MVR  Now S/P MVR at [facility] on 9/16  F/U Echo EF 60% with normally functioning MVR  CXR: shows atelectasis  U/S chest: shows SQ hematoma    Anemia- (present on admission)  Assessment & Plan  H&H stable  Likely 2nd to krzysztof-op blood loss  B12 ok  Fe: 23, sats 10%  On Fe supplements  FOB x 2: negative     Seizure disorder (HCC)- (present on admission)  Assessment & Plan  On Keppra    Herpes- (present on admission)  Assessment & Plan  S/P Valtrex    Atrial fibrillation (HCC)- (present on admission)  Assessment & Plan  HR ok  S/P Maze procedure at [facility] (9/16)  Echo EF 60% with normally functioning MVR  On Metoprolol  On Eliquis  Monitor    Hypertension- (present on admission)  Assessment & Plan  BP ok  On Metoprolol  Note: also on Aldactone and Flomax  Monitor    History of CVA (cerebrovascular accident)- (present on admission)  Assessment & Plan  Hx of CVA x 2  On Eliquis and ASA  On Lipitor    Note (10/06):  Patient has been doing well for a while.                          No medical issues to address a this time.                          Continue current care.                          Will sign off.                          Re-consult is needed.

## 2021-10-06 NOTE — THERAPY
"Speech Language Pathology  Daily Treatment     Patient Name: Shaista Bocanegra  Age:  76 y.o., Sex:  female  Medical Record #: [id number]  Today's Date: 10/6/2021     Precautions  Precautions: Fall Risk  Comments: seizure, salas, cardiac    Subjective    Pt was pleasant and cooperative during this ST session      Objective       10/06/21 1401   SLP Total Time Spent   SLP Individual Total Time Spent (Mins) 60   Treatment Charges   SLP Cognitive Skill Development First 15 Minutes 1   SLP Cognitive Skill Development Additional 15 Minutes 3       Assessment    Pt completed a medication sorting task with 14 of her current medications.  Pt reported that she usually sorts her medications into multiple AM/PM pill boxes for several weeks at a time.  Pt initially attempted to organize her medications into piles based when she takes them (AM, PM, Multiple times a day); however pt needed approximately 10 minutes to do this due to difficulty recalling labels she read.  Pt made several sorting errors (mainly multiple pills in one box) and was able to locate and fix approximately half of her errors without cues, pt required mod cues for attention to locate and correct the other half of her errors.  Pt also required mod cues to place all completed medications in the same pile.  Pt was inconsistent with setting medications she already sorted in the same location and because of this either attempted to sort medications a second time or placed bottles that she had not sorted yet in the \"completed\" pile.  Pt agreed that she would need assistance with this task when she returns home and stated that her friend Marivel could help her (Pt stated that her friend Marivel lives in [place] but plans on flying to [place] when she is discharged to assist her at home).           Plan    Continue medication sorting task       Speech Therapy Problems (Active)     Problem: Expression STGs     Dates: Start: 09/29/21       Goal: STG-Within one week, patient " will demonstrate use of SFA during conversation in order to improve word finding.      Dates: Start: 09/29/21       Goal Note filed on 10/04/21 1344 by Shruthi Zapien MS,CCC-SLP     To be addressed.               Problem: Memory STGs     Dates: Start: 09/29/21       Goal: STG-Within one week, patient will demonstrate use of memory strategies in order to recall daily events     Dates: Start: 09/29/21       Goal Note filed on 10/04/21 1344 by Shruthi Zapien MS,CCC-SLP     Mod to max A needed to recall daily events.               Problem: Problem Solving STGs     Dates: Start: 09/29/21       Goal: STG-Within one week, patient will complete medication management tasks with 80% accuracy, min verbal cues.      Dates: Start: 09/29/21       Goal Note filed on 10/04/21 1344 by Shruthi Zapien MS,CCC-SLP     This not yet ready to address this goal.               Problem: Speech/Swallowing LTGs     Dates: Start: 09/29/21       Goal: LTG-By discharge, patient will solve complex problems related to IADL's in order to d/c homw with mod I     Dates: Start: 09/29/21

## 2021-10-06 NOTE — CARE PLAN
"The patient is Stable - Low risk of patient condition declining or worsening    Shift Goals  Clinical Goals: pain management  Patient Goals: Sleep well      Problem: Pain - Standard  Goal: Alleviation of pain or a reduction in pain to the patient’s comfort goal  Outcome: Progressing: Pt requested PRN [place] with her bedtime meds. Pt requested that she be woken up in the night when her next PRN is due. Pt was educated about the importance of sleep and informed to ring the call light if she wakes up and needs pain medication. Pt stated \"I don't want to wake up when the pain gets too bad and the medication takes a long time to kick in.\" Pt agreed to having me check on her when her next pain med is due and if she wakes up easily she will have the med but if she's in a deep sleep and difficult to arouse, the med will be saved for morning.      Problem: Bladder / Voiding  Goal: Patient will establish and maintain regular urinary output  Outcome: Progressing: Pt is a Q4 hour scan, before bed she was toileted and scanned at 85 ml.          "

## 2021-10-06 NOTE — THERAPY
"Occupational Therapy  Daily Treatment     Patient Name: Shaista Bocanegra  Age:  76 y.o., Sex:  female  Medical Record #: [id number]  Today's Date: 10/6/2021     Precautions  Precautions: (P) Fall Risk  Comments: (P) seizure, salas, cardiac    Safety   ADL Safety : (P) Requires Supervision for Safety, Impaired Insight into Safety, Requires Cueing for Safety  Bathroom Safety: (P) Requires Supervision for Safety, Impaired Insight into Safety, Requires Cuing for Safety    Subjective    \"Can you get me a heat pack for my neck and shoulders? The other ones are all out.\"     Objective       10/06/21 1031   Precautions   Precautions Fall Risk   Comments seizure, salas, cardiac   Safety    ADL Safety  Requires Supervision for Safety;Impaired Insight into Safety;Requires Cueing for Safety   Bathroom Safety Requires Supervision for Safety;Impaired Insight into Safety;Requires Cuing for Safety   Pain   Intervention Heat Applied  (moist heat x 8 minutes)   Pain 0 - 10 Group   Location Neck;Shoulder   Location Orientation Right;Left;Posterior   Therapist Pain Assessment Prior to Activity;During Activity   Functional Level of Assist   Grooming Supervision;Standing   Grooming Description   (standing at sink to brush teeth and hair)   Lower Body Dressing Stand by Assist   Lower Body Dressing Description Supervision for safety;Set-up of equipment;Sock aid;Increased time;Verbal cueing  (don pants and don/doff socks, sock aide to don one sock)   Sitting Lower Body Exercises   Marching 3 sets of 15   Other Exercises R/L knee extensions 3 x 15   Comments while heat pack on shoulders/neck   Bed Mobility    Supine to Sit Supervised   Scooting Supervised   Interdisciplinary Plan of Care Collaboration   Patient Position at End of Therapy Seated;Self Releasing Lap Belt Applied;Call Light within Reach;Tray Table within Reach;Phone within Reach   OT Total Time Spent   OT Individual Total Time Spent (Mins) 60   OT Charge Group   OT Self Care / ADL 2 "   OT Therapy Activity 1   OT Therapeutic Exercise  1   Functional mobility with FWW in room, bathroom, hallways and gym with SBA.    Educated patient on how to use a sock aide to don socks after she was unable to don one of her socks with various adaptive techniques.    Assisted patient with ordering a sock aide on [employer] for home use.      Assessment    Patient's standing balance has improved, but still is requiring SBA/supervision for all adl tasks and transfers.  Endurance is improving, but neck and back pain continue to be present.  Strengths: Able to follow instructions, Alert and oriented, Adequate strength, Effective communication skills, Independent prior level of function, Motivated for self care and independence, Pleasant and cooperative, Supportive family, Willingly participates in therapeutic activities  Barriers: Decreased endurance, Fatigue, Generalized weakness, Impaired activity tolerance, Impaired balance, Pain (lack of sleep night before initial evaluation)    Plan    IADLs, standing balance, overall strength/endurance    Occupational Therapy Goals (Active)     Problem: Dressing     Dates: Start: 10/04/21       Goal: STG-Within one week, patient will dress LB with supervision using adaptive techniques or AE as needed     Dates: Start: 10/04/21             Problem: Functional Transfers     Dates: Start: 09/29/21       Goal: STG-Within one week, patient will transfer to toilet with supervision using grab bar and/or FWW     Dates: Start: 09/29/21       Goal Note filed on 10/04/21 1141 by Adrian Mathews OT/L     SBA               Problem: OT Long Term Goals     Dates: Start: 09/29/21       Goal: LTG-By discharge, patient will complete basic self care tasks with mod I     Dates: Start: 09/29/21          Goal: LTG-By discharge, patient will perform bathroom transfers with mod I     Dates: Start: 09/29/21          Goal: LTG-By discharge, patient will complete basic home management with mod I      Dates: Start: 09/29/21             Problem: Toileting     Dates: Start: 10/04/21       Goal: STG-Within one week, patient will complete toileting tasks with supervision using grab bar and/or FWW for balance as needed     Dates: Start: 10/04/21

## 2021-10-06 NOTE — CARE PLAN
"  Problem: Pain - Standard  Goal: Alleviation of pain or a reduction in pain to the patient’s comfort goal  Outcome: Progressing         Problem: Fall Risk - Rehab  Goal: Patient will remain free from falls  Outcome: Progressing   [place] Fall risk Assessment Score: 11    Moderate fall risk Interventions  - Bed and strip alarm   - Yellow sign by the door   - Yellow wrist band \"Fall risk\"  - Room near to the nurse station  - Do not leave patient unattended in the bathroom  - Fall risk education provided              "

## 2021-10-06 NOTE — THERAPY
"Physical Therapy   Daily Treatment     Patient Name: Shaista Bocanegra  Age:  76 y.o., Sex:  female  Medical Record #: [id number]  Today's Date: 10/6/2021     Precautions  Precautions: Fall Risk  Comments: seizure, salas, cardiac    Subjective    Pt seated in room, agreeable to PT.      Objective       10/06/21 1501   Precautions   Precautions Fall Risk   Comments seizure, salas, cardiac   Vitals   Patient BP Position Sitting   Blood Pressure  (!) 90/65  (97/61 taken 8 min later )   Gait Functional Level of Assist    Gait Level Of Assist Stand by Assist   Assistive Device Single Point Cane;None   Distance (Feet) 250   # of Times Distance was Traveled   (1x with no AD, 1x SPC)   Deviation Bradykinetic;Decreased Base Of Support   Neuro-Muscular Treatments   Neuro-Muscular Treatments Postural Changes;Weight Shift Right;Weight Shift Left;Sequencing   Comments \"obstacle course\" with 4 bolsters, air-ex pad, and 4\" step, no AD 2x each direction forward and 1x each direction lateral; CGA with intermittent min A    Ar[place]aga Balance Scale   Sitting Unsupported (Score 0-4) 4   Change Of Positon: Sitting To Standing (Score 0-4) 4   Change Of Positon: Standing To Sitting (Score 0-4) 4   Transfers (Score 0-4) 4   Standing Unsupported (Score 0-4) 4   Standing With Eyes Closed (Score 0-4) 4   Standing With Feet Together (Score 0-4) 4   Tandem Standing (Score 0-4) 2   Standing On One Leg (Score 0-4) 1   Turning Trunk (Feet Fixed) (Score 0-4) 4   Retrieving Objects From Floor (Score 0-4) 3   Turning 360 Degrees (Score 0-4) 2   Stool Stepping (Score 0-4) 3   Reaching Forward While Standing (Score 0-4) 3   Arteaga Balance Total Score (0-56) 46   Interdisciplinary Plan of Care Collaboration   IDT Collaboration with  Nursing   Patient Position at End of Therapy Seated;Call Light within Reach;Tray Table within Reach   Collaboration Comments RN administered medication at beginning of PT session    PT Total Time Spent   PT Individual Total Time Spent " (Mins) 60       Assessment    Pt reported dizziness toward end of session during obstacle course activity, BP was taken and found to be low. Pt motivated for participation. Completed last 6 items of Arteaga balance assessment, score of 46/56 indicates low fall risk.     Strengths: Adequate strength, Independent prior level of function, Willingly participates in therapeutic activities  Barriers: Impaired activity tolerance, Hypotension, Fatigue, Impaired balance    Plan    continue gait training with no AD vs SPC, standing tolerance and balance (monitor for low BP symptoms), LE strength and endurance.       Physical Therapy Problems (Active)     Problem: Balance     Dates: Start: 09/29/21       Goal: STG-Within one week, patient will complete standardized balance assessment with score indicating low to moderate fall risk      Dates: Start: 09/29/21       Goal Note filed on 10/04/21 1135 by Agustina Cheema, PT     To be assessed                Problem: PT-Long Term Goals     Dates: Start: 09/29/21       Goal: LTG-By discharge, patient will ambulate 300 ft with LRAD, mod I      Dates: Start: 09/29/21          Goal: LTG-By discharge, patient will transfer one surface to another mod I with LRAD     Dates: Start: 09/29/21          Goal: LTG-By discharge, patient will ambulate up/down 12 stairs with 2HRs and spv      Dates: Start: 09/29/21          Goal: LTG-By discharge, patient will transfer in/out of a car spv with LRAD      Dates: Start: 09/29/21

## 2021-10-07 PROCEDURE — 94660 CPAP INITIATION&MGMT: CPT

## 2021-10-07 PROCEDURE — 700101 HCHG RX REV CODE 250: Performed by: PHYSICAL MEDICINE & REHABILITATION

## 2021-10-07 PROCEDURE — 700102 HCHG RX REV CODE 250 W/ 637 OVERRIDE(OP): Performed by: HOSPITALIST

## 2021-10-07 PROCEDURE — 97116 GAIT TRAINING THERAPY: CPT

## 2021-10-07 PROCEDURE — A9270 NON-COVERED ITEM OR SERVICE: HCPCS | Performed by: HOSPITALIST

## 2021-10-07 PROCEDURE — 99232 SBSQ HOSP IP/OBS MODERATE 35: CPT | Performed by: PHYSICAL MEDICINE & REHABILITATION

## 2021-10-07 PROCEDURE — 97535 SELF CARE MNGMENT TRAINING: CPT | Mod: CO

## 2021-10-07 PROCEDURE — 97130 THER IVNTJ EA ADDL 15 MIN: CPT

## 2021-10-07 PROCEDURE — A9270 NON-COVERED ITEM OR SERVICE: HCPCS | Performed by: PHYSICAL MEDICINE & REHABILITATION

## 2021-10-07 PROCEDURE — 94760 N-INVAS EAR/PLS OXIMETRY 1: CPT

## 2021-10-07 PROCEDURE — 97129 THER IVNTJ 1ST 15 MIN: CPT

## 2021-10-07 PROCEDURE — 97530 THERAPEUTIC ACTIVITIES: CPT

## 2021-10-07 PROCEDURE — 700102 HCHG RX REV CODE 250 W/ 637 OVERRIDE(OP): Performed by: PHYSICAL MEDICINE & REHABILITATION

## 2021-10-07 PROCEDURE — 97110 THERAPEUTIC EXERCISES: CPT

## 2021-10-07 PROCEDURE — 770010 HCHG ROOM/CARE - REHAB SEMI PRIVAT*

## 2021-10-07 RX ADMIN — APIXABAN 5 MG: 5 TABLET, FILM COATED ORAL at 08:00

## 2021-10-07 RX ADMIN — SENNOSIDES AND DOCUSATE SODIUM 2 TABLET: 8.6; 5 TABLET ORAL at 08:01

## 2021-10-07 RX ADMIN — GABAPENTIN 200 MG: 100 CAPSULE ORAL at 21:53

## 2021-10-07 RX ADMIN — GABAPENTIN 200 MG: 100 CAPSULE ORAL at 08:00

## 2021-10-07 RX ADMIN — HYDROCODONE BITARTRATE AND ACETAMINOPHEN 1 TABLET: 5; 325 TABLET ORAL at 03:26

## 2021-10-07 RX ADMIN — SENNOSIDES AND DOCUSATE SODIUM 2 TABLET: 8.6; 5 TABLET ORAL at 21:58

## 2021-10-07 RX ADMIN — LEVETIRACETAM 1000 MG: 500 TABLET, FILM COATED ORAL at 08:00

## 2021-10-07 RX ADMIN — LIDOCAINE 1 PATCH: 50 PATCH TOPICAL at 07:58

## 2021-10-07 RX ADMIN — OXYCODONE HYDROCHLORIDE AND ACETAMINOPHEN 500 MG: 500 TABLET ORAL at 21:54

## 2021-10-07 RX ADMIN — ATORVASTATIN CALCIUM 80 MG: 40 TABLET, FILM COATED ORAL at 21:53

## 2021-10-07 RX ADMIN — BISACODYL 10 MG: 10 SUPPOSITORY RECTAL at 21:56

## 2021-10-07 RX ADMIN — SPIRONOLACTONE 25 MG: 25 TABLET, FILM COATED ORAL at 05:46

## 2021-10-07 RX ADMIN — APIXABAN 5 MG: 5 TABLET, FILM COATED ORAL at 21:53

## 2021-10-07 RX ADMIN — TAMSULOSIN HYDROCHLORIDE 0.8 MG: 0.4 CAPSULE ORAL at 18:01

## 2021-10-07 RX ADMIN — FERROUS SULFATE TAB 325 MG (65 MG ELEMENTAL FE) 325 MG: 325 (65 FE) TAB at 08:00

## 2021-10-07 RX ADMIN — MELATONIN TAB 3 MG 3 MG: 3 TAB at 21:54

## 2021-10-07 RX ADMIN — OMEPRAZOLE 20 MG: 20 CAPSULE, DELAYED RELEASE ORAL at 08:01

## 2021-10-07 RX ADMIN — OXYCODONE HYDROCHLORIDE AND ACETAMINOPHEN 500 MG: 500 TABLET ORAL at 08:00

## 2021-10-07 RX ADMIN — LIDOCAINE 1 PATCH: 50 PATCH TOPICAL at 21:00

## 2021-10-07 RX ADMIN — ASPIRIN 81 MG: 81 TABLET, COATED ORAL at 08:01

## 2021-10-07 RX ADMIN — METOPROLOL TARTRATE 12.5 MG: 25 TABLET, FILM COATED ORAL at 05:46

## 2021-10-07 RX ADMIN — GABAPENTIN 200 MG: 100 CAPSULE ORAL at 16:46

## 2021-10-07 RX ADMIN — TRAZODONE HYDROCHLORIDE 50 MG: 50 TABLET ORAL at 21:53

## 2021-10-07 RX ADMIN — LEVETIRACETAM 1000 MG: 500 TABLET, FILM COATED ORAL at 21:54

## 2021-10-07 ASSESSMENT — PAIN DESCRIPTION - PAIN TYPE
TYPE: ACUTE PAIN
TYPE: ACUTE PAIN

## 2021-10-07 ASSESSMENT — ACTIVITIES OF DAILY LIVING (ADL)
TOILET_TRANSFER_DESCRIPTION: ADAPTIVE EQUIPMENT;GRAB BAR
TOILET_TRANSFER_DESCRIPTION: VERBAL CUEING;INCREASED TIME
TUB_SHOWER_TRANSFER_DESCRIPTION: ADAPTIVE EQUIPMENT;GRAB BAR
BED_CHAIR_WHEELCHAIR_TRANSFER_DESCRIPTION: VERBAL CUEING;SUPERVISION FOR SAFETY

## 2021-10-07 ASSESSMENT — GAIT ASSESSMENTS
GAIT LEVEL OF ASSIST: SUPERVISED
DISTANCE (FEET): 300
DEVIATION: SHUFFLED GAIT;BRADYKINETIC

## 2021-10-07 NOTE — THERAPY
Physical Therapy   Daily Treatment     Patient Name: Shaista Bocanegra  Age:  76 y.o., Sex:  female  Medical Record #: [id number]  Today's Date: 10/7/2021     Precautions  Precautions: Fall Risk, Cardiac Precautions (See Comments)  Comments: Seizure precautions    Subjective    Pt reports she is agreeable to gait and exercise      Objective       10/07/21 1331   Gait Functional Level of Assist    Gait Level Of Assist Supervised   Assistive Device None   Distance (Feet) 300  (no LOB, CGA for safety)   # of Times Distance was Traveled 3   Deviation Shuffled Gait;Bradykinetic   Transfer Functional Level of Assist   Bed, Chair, Wheelchair Transfer Supervised   Bed Chair Wheelchair Transfer Description Verbal cueing;Supervision for safety   Toilet Transfers Supervised   Toilet Transfer Description Verbal cueing;Increased time   Sitting Lower Body Exercises   Nustep Resistance Level 4  (10 min, VCs for pacing and monitoring for any cardiac syptom)   Standing Lower Body Exercises   Hamstring Curl 2 sets of 15;Bilateral    Hip Extension 2 sets of 15;Bilateral    Hip Abduction 2 sets of 15;Bilateral   Marching 2 sets of 15   Heel Rise 2 sets of 15;Bilateral   Toe Rise 2 sets of 15;Bilateral   Mini Squat 2 sets of 15;Partial   Comments VCs and demo for form and tehcnique   Bed Mobility    Supine to Sit Modified Independent   Sit to Stand Supervised   Scooting Modified Independent   Rolling Modified Independent   Interdisciplinary Plan of Care Collaboration   Patient Position at End of Therapy Seated;Other (Comments)  (with CNA)   PT Total Time Spent   PT Individual Total Time Spent (Mins) 60   PT Charge Group   PT Gait Training 1   PT Therapeutic Exercise 2   PT Therapeutic Activities 1       Assessment    Pt demos improved BP beforesession today, no orthostatic symptoms, as well as decreased level of assist with gait and no LOB today. Pt also participates well in endurance activities with limited need for breaks.    Strengths:  Adequate strength, Independent prior level of function, Willingly participates in therapeutic activities  Barriers: Impaired activity tolerance, Hypotension, Fatigue, Impaired balance    Plan    continue gait training with no AD vs SPC, standing tolerance and balance (monitor for low BP symptoms), LE strength and endurance.     Physical Therapy Problems (Active)     Problem: Balance     Dates: Start: 09/29/21       Goal: STG-Within one week, patient will complete standardized balance assessment with score indicating low to moderate fall risk      Dates: Start: 09/29/21       Goal Note filed on 10/04/21 1135 by Agustina Cheema PT     To be assessed                Problem: PT-Long Term Goals     Dates: Start: 09/29/21       Goal: LTG-By discharge, patient will ambulate 300 ft with LRAD, mod I      Dates: Start: 09/29/21          Goal: LTG-By discharge, patient will transfer one surface to another mod I with LRAD     Dates: Start: 09/29/21          Goal: LTG-By discharge, patient will ambulate up/down 12 stairs with 2HRs and spv      Dates: Start: 09/29/21          Goal: LTG-By discharge, patient will transfer in/out of a car spv with LRAD      Dates: Start: 09/29/21

## 2021-10-07 NOTE — FLOWSHEET NOTE
10/07/21 0813   Events/Summary/Plan   Events/Summary/Plan 02 spot check, CPAP filled with sterile water   Vital Signs   Pulse 71   Respiration 18   Pulse Oximetry 94 %   $ Pulse Oximetry (Spot Check) Yes   Respiratory Assessment   Level of Consciousness Alert   Chest Exam   Work Of Breathing / Effort Within Normal Limits   Oxygen   O2 Delivery Device None - Room Air   Non-Invasive Ventilation LALY Group   Nocturnal CPAP or BIPAP CPAP - [facility]   $ System Evaluation Yes   Settings (If Known) auto 6-8

## 2021-10-07 NOTE — PROGRESS NOTES
"Rehab Progress Note     Date of Service: 10/7/2021  Chief Complaint: follow up MVR repair    Interval Events (Subjective)    Patient seen and examined at bedside with PT at side. Patient reports she continues to have neck, shoulder, and rib soreness. Reviewed with patient that rib pain takes a long time to heal due to ribs never having a chance to rest. Patient expresses understanidng. Reports she was able to void today, had a large meal yesterday and has not had a bowel movement yet but reports she thinks it will happen today because she had a large dinner last night.     ROS: No changes to bowel, bladder, or mood. Improved ability to urinate today.       Objective:  VITAL SIGNS: /68   Pulse 71   Temp 36.7 °C (98.1 °F) (Oral)   Resp 18   Ht 1.676 m (5' 6\")   Wt 60 kg (132 lb 3.2 oz)   SpO2 94%   BMI 21.34 kg/m²   Gen: alert, no apparent distress, seated comfortably in [facility], PT at side   CV: Regular rate and rhythm, no murmurs, healing incision in the right upper chest wall, no peripheral edema  Neuro: notable for nonfocal, generalized weakness      No results found for this or any previous visit (from the past 72 hour(s)).    Current Facility-Administered Medications   Medication Frequency   • gabapentin (NEURONTIN) capsule 200 mg TID   • melatonin tablet 3 mg QHS   • tamsulosin (FLOMAX) capsule 0.8 mg AFTER DINNER   • HYDROcodone-acetaminophen (NORCO) 5-325 MG per tablet 1-2 Tablet Q6HRS PRN   • lidocaine (LIDODERM) 5 % 1 Patch Q24HR   • metoprolol tartrate (LOPRESSOR) tablet 12.5 mg TWICE DAILY   • lidocaine (LIDODERM) 5 % 1 Patch Q24HR   • polyethylene glycol/lytes (MIRALAX) PACKET 1 Packet DAILY    And   • senna-docusate (PERICOLACE or SENOKOT S) 8.6-50 MG per tablet 2 Tablet BID    And   • magnesium hydroxide (MILK OF MAGNESIA) suspension 30 mL QDAY PRN    And   • bisacodyl (DULCOLAX) suppository 10 mg DAILY   • hydrOXYzine HCl (ATARAX) tablet 50 mg Q6HRS PRN   • QUEtiapine (Seroquel) tablet 25 mg " TID PRN   • simethicone (MYLICON) chewable tab 80 mg TID PRN   • Respiratory Therapy Consult Continuous RT   • Pharmacy Consult Request ...Pain Management Review 1 Each PHARMACY TO DOSE   • acetaminophen (Tylenol) tablet 650 mg Q4HRS PRN   • lactulose 20 GM/30ML solution 30 mL QDAY PRN   • docusate sodium (ENEMEEZ) enema 283 mg QDAY PRN   • sodium phosphate (Fleet) enema 133 mL QDAY PRN   • omeprazole (PRILOSEC) capsule 20 mg QAM AC   • artificial tears ophthalmic solution 1 Drop PRN   • benzocaine-menthol (CEPACOL) lozenge 1 Lozenge Q2HRS PRN   • mag hydrox-al hydrox-simeth (MAALOX PLUS ES or MYLANTA DS) suspension 20 mL Q2HRS PRN   • ondansetron (ZOFRAN ODT) dispertab 4 mg 4X/DAY PRN    Or   • ondansetron (ZOFRAN) syringe/vial injection 4 mg 4X/DAY PRN   • traZODone (DESYREL) tablet 50 mg QHS PRN   • sodium chloride (OCEAN) 0.65 % nasal spray 2 Spray PRN   • aspirin EC (ECOTRIN) tablet 81 mg DAILY   • ascorbic acid (Vitamin C) tablet 500 mg BID   • ferrous sulfate tablet 325 mg QDAY with Breakfast   • levETIRAcetam (KEPPRA) tablet 1,000 mg Q12HRS   • spironolactone (ALDACTONE) tablet 25 mg Q DAY   • atorvastatin (LIPITOR) tablet 80 mg Q EVENING   • apixaban (ELIQUIS) tablet 5 mg BID       Orders Placed This Encounter   Procedures   • Diet Order Diet: Regular (chopped meats)     Standing Status:   Standing     Number of Occurrences:   1     Order Specific Question:   Diet:     Answer:   Regular [1]     Comments:   chopped meats       Assessment:  Active Hospital Problems    Diagnosis    • *S/P mitral valve repair    • Hypoalbuminemia    • Other constipation    • Breast cancer (HCC)    • Upper back pain on right side    • Impaired mobility and ADLs    • Seizure disorder (HCC)    • Other hyperlipidemia    • Urinary retention    • Anemia    • Atrial fibrillation (HCC)    • Herpes    • Sleep apnea    • Hypertension    • History of CVA (cerebrovascular accident)      This patient is a 76 y.o. female admitted for acute  inpatient rehabilitation with impaired cardiac endurance S/P mitral valve repair.    Dr. Rawls led and attended the weekly conference, and agree with the IDT conference documentation and plan of care as noted below.    Date of conference: 10/4/2021    Goals and barriers: See IDT note.    Biggest barriers: low endurance, generalized weakness, poor initiation, pain management, cognitive impairment    CM/social support: has family, but friend will support    Anticipated DC date: 10/14, patient does not have 24/7 support, may need further rehab at skilled facility    Home health: PT/OT/SLP/RN    Equip: shower chair, FWW    Follow up: PCP, cardiology, neurology, Dr. Sosa      Medical Decision Making and Plan:    S/p Mitral Valve repair  Dr. Nogueira 9/16  Continue full rehab program  PT/OT/SLP, 1 hr each discipline, 5 days per week    Outpatient follow up with cardiology and Dr. Nogueira as needed    Aspirin  Spironolactone      History of strokes  Cardio-embolic  Residual left hemiparesis  Aphasia  Dysphagia, resolved  Cognitive impairment  Continue full rehab program  PT/OT/SLP, 1 hr each discipline, 5 days per week     Eliquis  Statin     Outpatient follow up with Dr. Sosa, referral made    Right upper back pain, improved  Right anterior chest wall pain, improved  Lidoderm patch  Checked ultrasound - hematoma  PRN tylenol, last use 10/4  PRN oxycodone, not effective for right-sided chest wall pain  Switched to Norco, last use 10/6  Started low dose gabapentin 100 mg TID --> 200 mg TID 10/6  Continue hot pack,   Pain controlled this morning      Seizure disorder  Tonic/clonic post-op  Keppra  Outpatient referral to neurology     Hypertension  Metoprolol  Spironolactone  Currently stable, mild hypotension, but asymptomatic     Orthostatic hypotension, improved  Appreciate hospitalist assistance  May need to decrease dosing of BP meds  10/7 stable      Atrial  fibrillation  Metoprolol    Hyperlipidemia  Statin     Anemia  Ferrous sulfate  Vit C     GI prophylaxis  Omeprazole     History of herpes simplex  Valacyclovir discontinued, patient was not taking regularly    Sleep apnea  2 L oxygen at night  To bring in home CPAP    Bowel program  Constipation  Increase bowel medications  Last BM 10/5    Bladder program  Urinary retention, continues  Started Flomax 9/28, increased dose 10/5 to 0.8 mg  Voiding trial yesterday, requiring catheterizations intermittently  Improved ability to void 10/7      DVT prophylaxis  Eliquis    Total time:  28 minutes.  I spent greater than 50% of the time for patient care, counseling, and coordination on this date, including patient face-to face time, unit/floor time with review of records/pertinent lab data and studies, as well as discussing diagnostic evaluation/work up, planned therapeutic interventions, and future disposition of care, as per the interval events/subjective and the assessment and plan as noted above.    I have performed a physical exam, reviewed and updated ROS, as well as the assessment and plan today 10/7/2021. In review of note from 10/6/2021 there are no new changes except as documented above.      Traci Bernstein D.O.   Physical Medicine and Rehabilitation

## 2021-10-07 NOTE — THERAPY
"Occupational Therapy  Daily Treatment     Patient Name: Shaista Bocanegra  Age:  76 y.o., Sex:  female  Medical Record #: [id number]  Today's Date: 10/7/2021     Precautions  Precautions: (P) Fall Risk, Cardiac Precautions (See Comments)  Comments: (P) Seizure precautions    Safety   ADL Safety : Requires Supervision for Safety, Impaired Insight into Safety, Requires Cueing for Safety  Bathroom Safety: Requires Supervision for Safety, Impaired Insight into Safety, Requires Cuing for Safety    Subjective    \" You'll have to help me .\"    Provided redirection to attempt tasks herself prior to requesting assist        Objective       10/07/21 1001   Precautions   Precautions Fall Risk;Cardiac Precautions (See Comments)   Comments Seizure precautions   Functional Level of Assist   Grooming Minimal Assist  (assist to comb back of hair )   Bathing Supervision   Bathing Description Grab bar;Hand held shower;Tub bench   Upper Body Dressing Supervision  (including clothing retrieval from closet )   Upper Body Dressing Description Supervision for safety;Verbal cueing   Lower Body Dressing Supervision   Lower Body Dressing Description Sock aid;Supervision for safety;Verbal cueing   Toileting Supervision   Bed, Chair, Wheelchair Transfer Stand by Assist   Toilet Transfers Stand by Assist   Toilet Transfer Description Adaptive equipment;Grab bar  (FWW)   Tub / Shower Transfers Stand by Assist   Tub Shower Transfer Description Adaptive equipment;Grab bar  (cues for safety and technique  going into shower )   Sitting Upper Body Exercises   Upper Extremity Bike Level 3 Resistance  (x 5 minutes    motomed )   Interdisciplinary Plan of Care Collaboration   Patient Position at End of Therapy Seated;Self Releasing Lap Belt Applied  (hand off to ST for tx )   OT Total Time Spent   OT Individual Total Time Spent (Mins) 60   OT Charge Group   OT Self Care / ADL 4      FWW bed <-> bathroom   Supervision  And cues   Assessment     cues for " encouragement of ability   Cues for safety and technique using FWW for transfer and mobility   Strengths: Able to follow instructions, Alert and oriented, Adequate strength, Effective communication skills, Independent prior level of function, Motivated for self care and independence, Pleasant and cooperative, Supportive family, Willingly participates in therapeutic activities  Barriers: Decreased endurance, Fatigue, Generalized weakness, Impaired activity tolerance, Impaired balance, Pain (lack of sleep night before initial evaluation)    Plan  IADLs, standing balance, overall strength/endurance      Occupational Therapy Goals (Active)     Problem: Dressing     Dates: Start: 10/04/21       Goal: STG-Within one week, patient will dress LB with supervision using adaptive techniques or AE as needed     Dates: Start: 10/04/21             Problem: Functional Transfers     Dates: Start: 09/29/21       Goal: STG-Within one week, patient will transfer to toilet with supervision using grab bar and/or FWW     Dates: Start: 09/29/21       Goal Note filed on 10/04/21 1141 by Adrian Mathews, OT/L     SBA               Problem: OT Long Term Goals     Dates: Start: 09/29/21       Goal: LTG-By discharge, patient will complete basic self care tasks with mod I     Dates: Start: 09/29/21          Goal: LTG-By discharge, patient will perform bathroom transfers with mod I     Dates: Start: 09/29/21          Goal: LTG-By discharge, patient will complete basic home management with mod I     Dates: Start: 09/29/21             Problem: Toileting     Dates: Start: 10/04/21       Goal: STG-Within one week, patient will complete toileting tasks with supervision using grab bar and/or FWW for balance as needed     Dates: Start: 10/04/21

## 2021-10-07 NOTE — CARE PLAN
The patient is Stable - Low risk of patient condition declining or worsening    Shift Goals  Clinical Goals: safety  Patient Goals: Safety      Problem: Pain - Standard  Goal: Alleviation of pain or a reduction in pain to the patient’s comfort goal  Outcome: Progressing: Patient able to verbalize pain level and verbalize an acceptable level of pain. Pt requested her PRN Q6 [place] with her bedtime medications.      Problem: Bladder / Voiding  Goal: Patient will establish and maintain regular urinary output  Outcome: Progressing: Pt bladder scanned Q4 hours to watch for retaining after salas removal.

## 2021-10-07 NOTE — CARE PLAN
Problem: Skin Integrity  Goal: Patient's skin integrity will be maintained or improve  Note: Patient's skin remains intact and free from new or accidental injury this shift.  Will continue to monitor.      Problem: Infection  Goal: Patient will remain free from infection  Note: Patient remains free from s/s infection; afebrile.  Will continue to monitor.    The patient is Stable - Low risk of patient condition declining or worsening    Shift Goals  Clinical Goals: safety   Patient Goals: Safety

## 2021-10-08 PROCEDURE — 770010 HCHG ROOM/CARE - REHAB SEMI PRIVAT*

## 2021-10-08 PROCEDURE — 99232 SBSQ HOSP IP/OBS MODERATE 35: CPT | Performed by: PHYSICAL MEDICINE & REHABILITATION

## 2021-10-08 PROCEDURE — 97530 THERAPEUTIC ACTIVITIES: CPT

## 2021-10-08 PROCEDURE — 700112 HCHG RX REV CODE 229: Performed by: PHYSICAL MEDICINE & REHABILITATION

## 2021-10-08 PROCEDURE — 97129 THER IVNTJ 1ST 15 MIN: CPT

## 2021-10-08 PROCEDURE — 700102 HCHG RX REV CODE 250 W/ 637 OVERRIDE(OP): Performed by: HOSPITALIST

## 2021-10-08 PROCEDURE — 97110 THERAPEUTIC EXERCISES: CPT

## 2021-10-08 PROCEDURE — 700101 HCHG RX REV CODE 250: Performed by: PHYSICAL MEDICINE & REHABILITATION

## 2021-10-08 PROCEDURE — 97535 SELF CARE MNGMENT TRAINING: CPT

## 2021-10-08 PROCEDURE — 97130 THER IVNTJ EA ADDL 15 MIN: CPT

## 2021-10-08 PROCEDURE — A9270 NON-COVERED ITEM OR SERVICE: HCPCS | Performed by: PHYSICAL MEDICINE & REHABILITATION

## 2021-10-08 PROCEDURE — 94760 N-INVAS EAR/PLS OXIMETRY 1: CPT

## 2021-10-08 PROCEDURE — 700102 HCHG RX REV CODE 250 W/ 637 OVERRIDE(OP): Performed by: PHYSICAL MEDICINE & REHABILITATION

## 2021-10-08 PROCEDURE — A9270 NON-COVERED ITEM OR SERVICE: HCPCS | Performed by: HOSPITALIST

## 2021-10-08 RX ADMIN — METOPROLOL TARTRATE 12.5 MG: 25 TABLET, FILM COATED ORAL at 05:37

## 2021-10-08 RX ADMIN — OXYCODONE HYDROCHLORIDE AND ACETAMINOPHEN 500 MG: 500 TABLET ORAL at 08:38

## 2021-10-08 RX ADMIN — DOCUSATE SODIUM 283 MG: 283 LIQUID RECTAL at 14:46

## 2021-10-08 RX ADMIN — APIXABAN 5 MG: 5 TABLET, FILM COATED ORAL at 20:19

## 2021-10-08 RX ADMIN — MELATONIN TAB 3 MG 3 MG: 3 TAB at 20:19

## 2021-10-08 RX ADMIN — OMEPRAZOLE 20 MG: 20 CAPSULE, DELAYED RELEASE ORAL at 08:39

## 2021-10-08 RX ADMIN — BISACODYL 10 MG: 10 SUPPOSITORY RECTAL at 20:18

## 2021-10-08 RX ADMIN — GABAPENTIN 200 MG: 100 CAPSULE ORAL at 08:39

## 2021-10-08 RX ADMIN — APIXABAN 5 MG: 5 TABLET, FILM COATED ORAL at 08:39

## 2021-10-08 RX ADMIN — GABAPENTIN 200 MG: 100 CAPSULE ORAL at 14:45

## 2021-10-08 RX ADMIN — LEVETIRACETAM 1000 MG: 500 TABLET, FILM COATED ORAL at 08:39

## 2021-10-08 RX ADMIN — SENNOSIDES AND DOCUSATE SODIUM 2 TABLET: 8.6; 5 TABLET ORAL at 08:39

## 2021-10-08 RX ADMIN — POLYETHYLENE GLYCOL 3350 1 PACKET: 17 POWDER, FOR SOLUTION ORAL at 08:45

## 2021-10-08 RX ADMIN — SENNOSIDES AND DOCUSATE SODIUM 2 TABLET: 8.6; 5 TABLET ORAL at 20:19

## 2021-10-08 RX ADMIN — ATORVASTATIN CALCIUM 80 MG: 40 TABLET, FILM COATED ORAL at 20:19

## 2021-10-08 RX ADMIN — HYDROCODONE BITARTRATE AND ACETAMINOPHEN 1 TABLET: 5; 325 TABLET ORAL at 11:39

## 2021-10-08 RX ADMIN — HYDROCODONE BITARTRATE AND ACETAMINOPHEN 1 TABLET: 5; 325 TABLET ORAL at 05:37

## 2021-10-08 RX ADMIN — LIDOCAINE 1 PATCH: 50 PATCH TOPICAL at 20:20

## 2021-10-08 RX ADMIN — LEVETIRACETAM 1000 MG: 500 TABLET, FILM COATED ORAL at 20:19

## 2021-10-08 RX ADMIN — ASPIRIN 81 MG: 81 TABLET, COATED ORAL at 08:38

## 2021-10-08 RX ADMIN — SPIRONOLACTONE 25 MG: 25 TABLET, FILM COATED ORAL at 05:38

## 2021-10-08 RX ADMIN — TAMSULOSIN HYDROCHLORIDE 0.8 MG: 0.4 CAPSULE ORAL at 18:16

## 2021-10-08 RX ADMIN — GABAPENTIN 200 MG: 100 CAPSULE ORAL at 20:18

## 2021-10-08 RX ADMIN — OXYCODONE HYDROCHLORIDE AND ACETAMINOPHEN 500 MG: 500 TABLET ORAL at 20:19

## 2021-10-08 RX ADMIN — METOPROLOL TARTRATE 12.5 MG: 25 TABLET, FILM COATED ORAL at 18:16

## 2021-10-08 RX ADMIN — FERROUS SULFATE TAB 325 MG (65 MG ELEMENTAL FE) 325 MG: 325 (65 FE) TAB at 08:38

## 2021-10-08 ASSESSMENT — ACTIVITIES OF DAILY LIVING (ADL)
TOILETING_LEVEL_OF_ASSIST_DESCRIPTION: SET-UP OF EQUIPMENT;SUPERVISION FOR SAFETY
TOILET_TRANSFER_DESCRIPTION: GRAB BAR;SET-UP OF EQUIPMENT;SUPERVISION FOR SAFETY
BED_CHAIR_WHEELCHAIR_TRANSFER_DESCRIPTION: SUPERVISION FOR SAFETY
TOILET_TRANSFER_DESCRIPTION: SUPERVISION FOR SAFETY

## 2021-10-08 ASSESSMENT — PAIN DESCRIPTION - PAIN TYPE: TYPE: ACUTE PAIN

## 2021-10-08 NOTE — THERAPY
Speech Language Pathology  Daily Treatment     Patient Name: Shaista Bocanegra  Age:  76 y.o., Sex:  female  Medical Record #: [id number]  Today's Date: 10/7/2021     Precautions  Precautions: Fall Risk, Cardiac Precautions (See Comments)  Comments: Seizure precautions    Subjective    Patient pleasant and cooperative.     Objective       10/07/21 1501   Cognition   Moderate Attention Minimal (4)   Medication Management  Minimal (4)   Interdisciplinary Plan of Care Collaboration   Patient Position at End of Therapy In Bed;Bed Alarm On;Call Light within Reach;Tray Table within Reach;Phone within Reach   SLP Total Time Spent   SLP Individual Total Time Spent (Mins) 30   Treatment Charges   SLP Cognitive Skill Development First 15 Minutes 1   SLP Cognitive Skill Development Additional 15 Minutes 1       Assessment    Patient completed medication replica sorting task.  She used her own strategy by sorting bottles into groups ( times taken)first.  This worked well overall, however, she did accidentally merged a group that had alreeady been placed in pill box with one that hadn't, subsequently missing the evening only doses.   Discussed using a strategiy to turn bottles over to indicate if they had been placed in pill box.  She was agreeable to this strategy.  Word finding was adequate in conversation during this session.  Patient is slow to process and needed min verbal cues to lock brakes and plan positioning of wheel chair to safely get into bed.         Plan    Target medication sorting, memory of daily events and safety sequencing,     Passport items to be completed:  Express basic needs, understand food/liquid recommendations, consistently follow swallow precautions, manage finances, manage medications, arrive to therapy appointments on time, complete daily memory log entries, solve problems related to safety situations, review education related to hospitalization, complete caregiver training     Speech Therapy Problems  (Active)     Problem: Expression STGs     Dates: Start: 09/29/21       Goal: STG-Within one week, patient will demonstrate use of SFA during conversation in order to improve word finding.      Dates: Start: 09/29/21       Goal Note filed on 10/04/21 1344 by Shruthi Zapien MS,CCC-SLP     To be addressed.               Problem: Memory STGs     Dates: Start: 09/29/21       Goal: STG-Within one week, patient will demonstrate use of memory strategies in order to recall daily events     Dates: Start: 09/29/21       Goal Note filed on 10/04/21 1344 by Shruthi Zapien MS,CCC-SLP     Mod to max A needed to recall daily events.               Problem: Problem Solving STGs     Dates: Start: 09/29/21       Goal: STG-Within one week, patient will complete medication management tasks with 80% accuracy, min verbal cues.      Dates: Start: 09/29/21       Goal Note filed on 10/04/21 1344 by Shruthi Zapien MS,CCC-SLP     This not yet ready to address this goal.               Problem: Speech/Swallowing LTGs     Dates: Start: 09/29/21       Goal: LTG-By discharge, patient will solve complex problems related to IADL's in order to d/c homw with mod I     Dates: Start: 09/29/21

## 2021-10-08 NOTE — CARE PLAN
Problem: Knowledge Deficit - Standard  Goal: Patient and family/care givers will demonstrate understanding of plan of care, disease process/condition, diagnostic tests and medications  Outcome: Progressing     Problem: Pain - Standard  Goal: Alleviation of pain or a reduction in pain to the patient’s comfort goal  Outcome: Progressing   The patient is Stable - Low risk of patient condition declining or worsening    Shift Goals  Clinical Goals: safety   Patient Goals: sleep    Progress made toward(s) clinical / shift goals:  Patient is sleeping    Patient is not progressing towards the following goals:

## 2021-10-08 NOTE — THERAPY
Speech Language Pathology  Daily Treatment     Patient Name: Shaista Bocanegra  Age:  76 y.o., Sex:  female  Medical Record #: [id number]  Today's Date: 10/7/2021     Precautions  Precautions: Fall Risk, Cardiac Precautions (See Comments)  Comments: Seizure precautions    Subjective    Patient with flat affect.  Agreeable to therapy.     Objective       10/07/21 1101   Cognition   Moderate Attention Minimal (4)   Medication Management  Minimal (4)   Interdisciplinary Plan of Care Collaboration   Patient Position at End of Therapy Seated;Chair Alarm On;Self Releasing Lap Belt Applied;Call Light within Reach;Tray Table within Reach   SLP Total Time Spent   SLP Individual Total Time Spent (Mins) 30   Treatment Charges   SLP Cognitive Skill Development First 15 Minutes 1   SLP Cognitive Skill Development Additional 15 Minutes 1       Assessment    Discussed med sorting task from yesterday.  Patient needed min cues to identify task challenges from yesterdays session.  We reviewed her medications and she reports that she does not know medications purposes from name.   She reports feeling overwhelmed by the number of medications she is currently taking and asks if they can be pared down.   Suggested patient discuss this with the doctor.  With review of medications, patient expressed concern that she used to be on potassium and notes that she no longer has that. Notified nurse of patients concern.  Patient reported that it would be easier for her if she could first sort the medications into groups by time a day taken with min A needed for attention in this task.  Med sorting initiated but not completed due to large quantity of med replicas to sort.         Plan    Complete medication sorting task.    Passport items to be completed:  Express basic needs, understand food/liquid recommendations, consistently follow swallow precautions, manage finances, manage medications, arrive to therapy appointments on time, complete daily  memory log entries, solve problems related to safety situations, review education related to hospitalization, complete caregiver training     Speech Therapy Problems (Active)     Problem: Expression STGs     Dates: Start: 09/29/21       Goal: STG-Within one week, patient will demonstrate use of SFA during conversation in order to improve word finding.      Dates: Start: 09/29/21       Goal Note filed on 10/04/21 1344 by Shruthi Zapien MS,CCC-SLP     To be addressed.               Problem: Memory STGs     Dates: Start: 09/29/21       Goal: STG-Within one week, patient will demonstrate use of memory strategies in order to recall daily events     Dates: Start: 09/29/21       Goal Note filed on 10/04/21 1344 by Shruthi Zapien MS,CCC-SLP     Mod to max A needed to recall daily events.               Problem: Problem Solving STGs     Dates: Start: 09/29/21       Goal: STG-Within one week, patient will complete medication management tasks with 80% accuracy, min verbal cues.      Dates: Start: 09/29/21       Goal Note filed on 10/04/21 1344 by Shruthi Zapien MS,CCC-SLP     This not yet ready to address this goal.               Problem: Speech/Swallowing LTGs     Dates: Start: 09/29/21       Goal: LTG-By discharge, patient will solve complex problems related to IADL's in order to d/c homw with mod I     Dates: Start: 09/29/21

## 2021-10-08 NOTE — THERAPY
Speech Language Pathology  Daily Treatment     Patient Name: Shaista Bocanegra  Age:  76 y.o., Sex:  female  Medical Record #: [id number]  Today's Date: 10/8/2021     Precautions  Precautions: Fall Risk, Cardiac Precautions (See Comments)  Comments: Seizure prec    Subjective    Pt seen for two ST session this date for a total of 90 minutes. Pt pleasant and cooperative during both sessions.      Objective       10/08/21 0931   Cognition   Moderate Attention Supervision (5)   Medication Management  Supervision (5)   Sleep/Wake Cycle   Sleep & Rest Awake;Resting   Interdisciplinary Plan of Care Collaboration   IDT Collaboration with  Certified Nursing Assistant   Patient Position at End of Therapy In Bed;Call Light within Reach;Phone within Reach;Tray Table within Reach   Collaboration Comments CLOF   SLP Total Time Spent   SLP Individual Total Time Spent (Mins) 90   Treatment Charges   SLP Cognitive Skill Development First 15 Minutes 1   SLP Cognitive Skill Development Additional 15 Minutes 5       Assessment    9:30-10:00: Pt completed daily log with MIN cues to recall therapists names and IND for recalling tasks completed during therapeutic activity. Pt recalled 0% of medication purposes without use of med list.     13:00-14:00: Pt completed replica medication sort with 92% acc (12/13), one error with two of the same medications placed in one slot. Pt referring to medication list to determine purpose of meds. Pt able to combine her system of sorting meds by times taken as well as flipping bottle over when completed.          Plan    Continue to address recall, problem solving.    Passport items to be completed:  Express basic needs, understand food/liquid recommendations, consistently follow swallow precautions, manage finances, manage medications, arrive to therapy appointments on time, complete daily memory log entries, solve problems related to safety situations, review education related to hospitalization, complete  caregiver training     Speech Therapy Problems (Active)     Problem: Expression STGs     Dates: Start: 09/29/21       Goal: STG-Within one week, patient will demonstrate use of SFA during conversation in order to improve word finding.      Dates: Start: 09/29/21       Goal Note filed on 10/04/21 1344 by Shruthi Zapien MS,CCC-SLP     To be addressed.               Problem: Memory STGs     Dates: Start: 09/29/21       Goal: STG-Within one week, patient will demonstrate use of memory strategies in order to recall daily events     Dates: Start: 09/29/21       Goal Note filed on 10/04/21 1344 by Shruthi Zapien MS,CCC-SLP     Mod to max A needed to recall daily events.               Problem: Problem Solving STGs     Dates: Start: 09/29/21       Goal: STG-Within one week, patient will complete medication management tasks with 80% accuracy, min verbal cues.      Dates: Start: 09/29/21       Goal Note filed on 10/04/21 1344 by Shruthi Zapien MS,CCC-SLP     This not yet ready to address this goal.               Problem: Speech/Swallowing LTGs     Dates: Start: 09/29/21       Goal: LTG-By discharge, patient will solve complex problems related to IADL's in order to d/c homw with mod I     Dates: Start: 09/29/21

## 2021-10-08 NOTE — PROGRESS NOTES
"Rehab Progress Note     Date of Service: 10/8/2021  Chief Complaint: follow up MVR repair    Interval Events (Subjective)    Patient seen and examined at bedside during lunch while visiting with family. Patient is asking about her potassium level. Patient reports she is \"very worried knowing her potassium\" because she was previously on a potassium medication and now she is not. Discussed with patient that we would need to recheck her K level to know if she needs to be on a K supplement. Patient requesting the lab be checked today.     ROS: No changes to bowel, bladder, or mood. Improved ability to urinate, denies further need for cathing.       Objective:  VITAL SIGNS: /59   Pulse 68   Temp 36.7 °C (98.1 °F) (Temporal)   Resp 16   Ht 1.676 m (5' 6\")   Wt 60 kg (132 lb 3.2 oz)   SpO2 94%   BMI 21.34 kg/m²   Gen: alert, no apparent distress, seated comfortably in [facility], eating lunch   CV: Regular rate and rhythm, no murmurs, healing incision in the right upper chest wall, no peripheral edema  Neuro: notable for nonfocal, generalized weakness      No results found for this or any previous visit (from the past 72 hour(s)).    Current Facility-Administered Medications   Medication Frequency   • gabapentin (NEURONTIN) capsule 200 mg TID   • melatonin tablet 3 mg QHS   • tamsulosin (FLOMAX) capsule 0.8 mg AFTER DINNER   • HYDROcodone-acetaminophen (NORCO) 5-325 MG per tablet 1-2 Tablet Q6HRS PRN   • lidocaine (LIDODERM) 5 % 1 Patch Q24HR   • metoprolol tartrate (LOPRESSOR) tablet 12.5 mg TWICE DAILY   • lidocaine (LIDODERM) 5 % 1 Patch Q24HR   • polyethylene glycol/lytes (MIRALAX) PACKET 1 Packet DAILY    And   • senna-docusate (PERICOLACE or SENOKOT S) 8.6-50 MG per tablet 2 Tablet BID    And   • magnesium hydroxide (MILK OF MAGNESIA) suspension 30 mL QDAY PRN    And   • bisacodyl (DULCOLAX) suppository 10 mg DAILY   • hydrOXYzine HCl (ATARAX) tablet 50 mg Q6HRS PRN   • QUEtiapine (Seroquel) tablet 25 mg TID " PRN   • simethicone (MYLICON) chewable tab 80 mg TID PRN   • Respiratory Therapy Consult Continuous RT   • Pharmacy Consult Request ...Pain Management Review 1 Each PHARMACY TO DOSE   • acetaminophen (Tylenol) tablet 650 mg Q4HRS PRN   • lactulose 20 GM/30ML solution 30 mL QDAY PRN   • docusate sodium (ENEMEEZ) enema 283 mg QDAY PRN   • sodium phosphate (Fleet) enema 133 mL QDAY PRN   • omeprazole (PRILOSEC) capsule 20 mg QAM AC   • artificial tears ophthalmic solution 1 Drop PRN   • benzocaine-menthol (CEPACOL) lozenge 1 Lozenge Q2HRS PRN   • mag hydrox-al hydrox-simeth (MAALOX PLUS ES or MYLANTA DS) suspension 20 mL Q2HRS PRN   • ondansetron (ZOFRAN ODT) dispertab 4 mg 4X/DAY PRN    Or   • ondansetron (ZOFRAN) syringe/vial injection 4 mg 4X/DAY PRN   • traZODone (DESYREL) tablet 50 mg QHS PRN   • sodium chloride (OCEAN) 0.65 % nasal spray 2 Spray PRN   • aspirin EC (ECOTRIN) tablet 81 mg DAILY   • ascorbic acid (Vitamin C) tablet 500 mg BID   • ferrous sulfate tablet 325 mg QDAY with Breakfast   • levETIRAcetam (KEPPRA) tablet 1,000 mg Q12HRS   • spironolactone (ALDACTONE) tablet 25 mg Q DAY   • atorvastatin (LIPITOR) tablet 80 mg Q EVENING   • apixaban (ELIQUIS) tablet 5 mg BID       Orders Placed This Encounter   Procedures   • Diet Order Diet: Regular (chopped meats)     Standing Status:   Standing     Number of Occurrences:   1     Order Specific Question:   Diet:     Answer:   Regular [1]     Comments:   chopped meats       Assessment:  Active Hospital Problems    Diagnosis    • *S/P mitral valve repair    • Hypoalbuminemia    • Other constipation    • Breast cancer (HCC)    • Upper back pain on right side    • Impaired mobility and ADLs    • Seizure disorder (HCC)    • Other hyperlipidemia    • Urinary retention    • Anemia    • Atrial fibrillation (HCC)    • Herpes    • Sleep apnea    • Hypertension    • History of CVA (cerebrovascular accident)      This patient is a 76 y.o. female admitted for acute  inpatient rehabilitation with impaired cardiac endurance S/P mitral valve repair.    Dr. Rawls led and attended the weekly conference, and agree with the IDT conference documentation and plan of care as noted below.    Date of conference: 10/4/2021    Goals and barriers: See IDT note.    Biggest barriers: low endurance, generalized weakness, poor initiation, pain management, cognitive impairment    CM/social support: has family, but friend will support    Anticipated DC date: 10/14, patient does not have 24/7 support, may need further rehab at skilled facility    Home health: PT/OT/SLP/RN    Equip: shower chair, FWW    Follow up: PCP, cardiology, neurology, Dr. Sosa      Medical Decision Making and Plan:    S/p Mitral Valve repair  Dr. Nogueira 9/16  Continue full rehab program  PT/OT/SLP, 1 hr each discipline, 5 days per week    Outpatient follow up with cardiology and Dr. Nogueira as needed    Aspirin  Spironolactone      History of strokes  Cardio-embolic  Residual left hemiparesis  Aphasia  Dysphagia, resolved  Cognitive impairment  Continue full rehab program  PT/OT/SLP, 1 hr each discipline, 5 days per week     Eliquis  Statin     Outpatient follow up with Dr. Sosa, referral made    Right upper back pain, improved  Right anterior chest wall pain, improved  Lidoderm patch  Checked ultrasound - hematoma  PRN tylenol, last use 10/4  PRN oxycodone, not effective for right-sided chest wall pain  Switched to Norco, last use 10/6  Started low dose gabapentin 100 mg TID --> 200 mg TID 10/6  Continue hot pack,   Pain controlled this morning      Seizure disorder  Tonic/clonic post-op  Keppra  Outpatient referral to neurology     Hypertension  Metoprolol  Spironolactone  Currently stable, mild hypotension, but asymptomatic     Orthostatic hypotension, improved  Appreciate hospitalist assistance  May need to decrease dosing of BP meds  10/8 stable      Atrial fibrillation  Metoprolol,l adequate rate control      Hyperlipidemia  Statin     Anemia  Ferrous sulfate  Vit C     GI prophylaxis  Omeprazole     History of herpes simplex  Valacyclovir discontinued, patient was not taking regularly    Sleep apnea  2 L oxygen at night  To bring in home CPAP    Bowel program  Constipation  Increase bowel medications  Last BM 10/5    Bladder program  Urinary retention, continues  Started Flomax 9/28, increased dose 10/5 to 0.8 mg  Voiding trial yesterday, requiring catheterizations intermittently  Improved ability to void 10/7      DVT prophylaxis  Eliquis    Total time:  28 minutes.  I spent greater than 50% of the time for patient care, counseling, and coordination on this date, including patient face-to face time, unit/floor time with review of records/pertinent lab data and studies, as well as discussing diagnostic evaluation/work up, planned therapeutic interventions, and future disposition of care, as per the interval events/subjective and the assessment and plan as noted above.    I have performed a physical exam, reviewed and updated ROS, as well as the assessment and plan today 10/8/2021. In review of note from 10/7/2021 there are no new changes except as documented above.      Traci Bernstein D.O.   Physical Medicine and Rehabilitation

## 2021-10-08 NOTE — THERAPY
Occupational Therapy  Daily Treatment     Patient Name: Shaista Bocanegra  Age:  76 y.o., Sex:  female  Medical Record #: [id number]  Today's Date: 10/8/2021     Precautions  Precautions: (P) Fall Risk, Cardiac Precautions (See Comments)  Comments: (P) Seizure prec    Safety   ADL Safety : (P) Requires Supervision for Safety, Impaired Insight into Safety, Requires Cueing for Safety, Impaired  Bathroom Safety: (P) Requires Supervision for Safety, Impaired Insight into Safety, Requires Cuing for Safety, Impaired    Subjective    Patient in bed awake.  Not very happy about a 7 am therapy today, but agreeable.      Objective       10/08/21 0701   Precautions   Precautions Fall Risk;Cardiac Precautions (See Comments)   Comments Seizure prec   Safety    ADL Safety  Requires Supervision for Safety;Impaired Insight into Safety;Requires Cueing for Safety;Impaired   Bathroom Safety Requires Supervision for Safety;Impaired Insight into Safety;Requires Cuing for Safety;Impaired   Pain 0 - 10 Group   Location Flank   Location Orientation Right   Therapist Pain Assessment During Activity;5   Functional Level of Assist   Grooming Stand by Assist   Grooming Description Standing at sink;Supervision for safety  (SBA standing to brush hair, wash face)   Upper Body Dressing Supervision   Upper Body Dressing Description Set-up of equipment;Supervision for safety   Lower Body Dressing Stand by Assist   Lower Body Dressing Description Set-up of equipment;Supervision for safety  (to don pants with drawstring)   Toileting Stand by Assist   Toileting Description Set-up of equipment;Supervision for safety   Toilet Transfers Stand by Assist   Toilet Transfer Description Grab bar;Set-up of equipment;Supervision for safety   Sitting Lower Body Exercises   Nustep Time (See Comments)  (level 4 x 12 minutes with B UE/LE)   Balance   Standing Balance (Static) Poor +   Standing Balance (Dynamic) Poor +   Comments loss of balance  with static standing in  bathroom near sink (posterior loss of balance) with CGA to correct   Bed Mobility    Supine to Sit Minimal Assist   Scooting Supervised   Interdisciplinary Plan of Care Collaboration   Patient Position at End of Therapy In Bed;Call Light within Reach;Tray Table within Reach;Phone within Reach   OT Total Time Spent   OT Individual Total Time Spent (Mins) 60   OT Charge Group   OT Self Care / ADL 2   OT Therapy Activity 1   OT Therapeutic Exercise  1     Functional mobility with FWW in room, bathroom, hallways, gym with SBA.    Assessment    Patient tolerated all activities well.  Had two small losses of balance while standing.  Continues to require SBA when performing activities in standing.  Strengths: Able to follow instructions, Alert and oriented, Adequate strength, Effective communication skills, Independent prior level of function, Motivated for self care and independence, Pleasant and cooperative, Supportive family, Willingly participates in therapeutic activities  Barriers: Decreased endurance, Fatigue, Generalized weakness, Impaired activity tolerance, Impaired balance, Pain (lack of sleep night before initial evaluation)    Plan    IADLs, standing balance, overall strength/endurance    Occupational Therapy Goals (Active)     Problem: Dressing     Dates: Start: 10/04/21       Goal: STG-Within one week, patient will dress LB with supervision using adaptive techniques or AE as needed     Dates: Start: 10/04/21             Problem: Functional Transfers     Dates: Start: 09/29/21       Goal: STG-Within one week, patient will transfer to toilet with supervision using grab bar and/or FWW     Dates: Start: 09/29/21       Goal Note filed on 10/04/21 1141 by Adrian Mathews, OT/L     SBA               Problem: OT Long Term Goals     Dates: Start: 09/29/21       Goal: LTG-By discharge, patient will complete basic self care tasks with mod I     Dates: Start: 09/29/21          Goal: LTG-By discharge, patient will  perform bathroom transfers with mod I     Dates: Start: 09/29/21          Goal: LTG-By discharge, patient will complete basic home management with mod I     Dates: Start: 09/29/21             Problem: Toileting     Dates: Start: 10/04/21       Goal: STG-Within one week, patient will complete toileting tasks with supervision using grab bar and/or FWW for balance as needed     Dates: Start: 10/04/21

## 2021-10-08 NOTE — THERAPY
"Physical Therapy   Daily Treatment     Patient Name: Shaista Bocanegra  Age:  76 y.o., Sex:  female  Medical Record #: [id number]  Today's Date: 10/8/2021     Precautions  Precautions: Fall Risk, Cardiac Precautions (See Comments)  Comments: Seizure prec    Subjective    Pt was supine in bed upon arrival and agreeable to treatment.  Pt reported she feels \"tired all the time\" but was agreeable to treatment.      Objective       10/08/21 1031   Precautions   Precautions Fall Risk;Cardiac Precautions (See Comments)   Transfer Functional Level of Assist   Bed, Chair, Wheelchair Transfer Supervised   Bed Chair Wheelchair Transfer Description Supervision for safety   Toilet Transfers Supervised   Toilet Transfer Description Supervision for safety   Supine Lower Body Exercise   Hip Abduction 2 sets of 15;Bilateral;Hook Lying;Medium Resistance Theraband   Hip Adduction  2 sets of 15;Bilateral   Straight Leg Raises Front;2 sets of 15;Bilateral   Heel Slide 2 sets of 15;Bilateral   Ankle Pumps Bilateral;2 sets of 15   Gluteal Isometrics 2 sets of 15;Bilateral   Quadriceps Isometrics 2 sets of 15;Bilateral   Bed Mobility    Supine to Sit Minimal Assist   Sit to Supine Stand by Assist   Sit to Stand Supervised   Scooting Supervised   Interdisciplinary Plan of Care Collaboration   Patient Position at End of Therapy In Bed;Call Light within Reach;Tray Table within Reach;Phone within Reach   PT Total Time Spent   PT Individual Total Time Spent (Mins) 30   PT Charge Group   PT Therapeutic Exercise 1   PT Therapeutic Activities 1     AMB to/from bathroom x 15 feet x 2 no AD and SBA.  Pt given hot pack at end of the session for neck pain.    Assessment    Pt completed all LE strengthening activities with good effort and min VCing for form.  Pt is motivated to improve, but limited d/t fatigue and poor endurance.   Strengths: Adequate strength, Independent prior level of function, Willingly participates in therapeutic activities  Barriers: " Impaired activity tolerance, Hypotension, Fatigue, Impaired balance    Plan    Continue gait training with no AD vs SPC, standing tolerance and balance (monitor for low BP symptoms), LE strength and endurance.     Physical Therapy Problems (Active)     Problem: Balance     Dates: Start: 09/29/21       Goal: STG-Within one week, patient will complete standardized balance assessment with score indicating low to moderate fall risk      Dates: Start: 09/29/21       Goal Note filed on 10/04/21 1135 by Agustina Cheema, PT     To be assessed                Problem: PT-Long Term Goals     Dates: Start: 09/29/21       Goal: LTG-By discharge, patient will ambulate 300 ft with LRAD, mod I      Dates: Start: 09/29/21          Goal: LTG-By discharge, patient will transfer one surface to another mod I with LRAD     Dates: Start: 09/29/21          Goal: LTG-By discharge, patient will ambulate up/down 12 stairs with 2HRs and spv      Dates: Start: 09/29/21          Goal: LTG-By discharge, patient will transfer in/out of a car spv with LRAD      Dates: Start: 09/29/21

## 2021-10-08 NOTE — FLOWSHEET NOTE
10/08/21 1044   Events/Summary/Plan   Events/Summary/Plan 02 spot check, CPAP filled with sterile water   Vital Signs   Pulse 76   Respiration 18   Pulse Oximetry 95 %   $ Pulse Oximetry (Spot Check) Yes   Respiratory Assessment   Level of Consciousness Alert   Chest Exam   Work Of Breathing / Effort Within Normal Limits   Oxygen   O2 Delivery Device None - Room Air   Non-Invasive Ventilation LALY Group   Nocturnal CPAP or BIPAP CPAP - [facility]   Settings (If Known) auto 6-8

## 2021-10-09 LAB
ERYTHROCYTE [DISTWIDTH] IN BLOOD BY AUTOMATED COUNT: 54 FL (ref 35.9–50)
HCT VFR BLD AUTO: 31.3 % (ref 37–47)
HGB BLD-MCNC: 9.9 G/DL (ref 12–16)
MCH RBC QN AUTO: 31.9 PG (ref 27–33)
MCHC RBC AUTO-ENTMCNC: 31.6 G/DL (ref 33.6–35)
MCV RBC AUTO: 101 FL (ref 81.4–97.8)
PLATELET # BLD AUTO: 299 K/UL (ref 164–446)
PMV BLD AUTO: 9.1 FL (ref 9–12.9)
RBC # BLD AUTO: 3.1 M/UL (ref 4.2–5.4)
WBC # BLD AUTO: 4.8 K/UL (ref 4.8–10.8)

## 2021-10-09 PROCEDURE — 85027 COMPLETE CBC AUTOMATED: CPT

## 2021-10-09 PROCEDURE — 36415 COLL VENOUS BLD VENIPUNCTURE: CPT

## 2021-10-09 PROCEDURE — 700102 HCHG RX REV CODE 250 W/ 637 OVERRIDE(OP): Performed by: PHYSICAL MEDICINE & REHABILITATION

## 2021-10-09 PROCEDURE — A9270 NON-COVERED ITEM OR SERVICE: HCPCS | Performed by: HOSPITALIST

## 2021-10-09 PROCEDURE — 97130 THER IVNTJ EA ADDL 15 MIN: CPT

## 2021-10-09 PROCEDURE — 700101 HCHG RX REV CODE 250: Performed by: PHYSICAL MEDICINE & REHABILITATION

## 2021-10-09 PROCEDURE — A9270 NON-COVERED ITEM OR SERVICE: HCPCS | Performed by: PHYSICAL MEDICINE & REHABILITATION

## 2021-10-09 PROCEDURE — 770010 HCHG ROOM/CARE - REHAB SEMI PRIVAT*

## 2021-10-09 PROCEDURE — 97129 THER IVNTJ 1ST 15 MIN: CPT

## 2021-10-09 PROCEDURE — 94760 N-INVAS EAR/PLS OXIMETRY 1: CPT

## 2021-10-09 PROCEDURE — 700102 HCHG RX REV CODE 250 W/ 637 OVERRIDE(OP): Performed by: HOSPITALIST

## 2021-10-09 RX ADMIN — LEVETIRACETAM 1000 MG: 500 TABLET, FILM COATED ORAL at 20:31

## 2021-10-09 RX ADMIN — LIDOCAINE 1 PATCH: 50 PATCH TOPICAL at 20:32

## 2021-10-09 RX ADMIN — APIXABAN 5 MG: 5 TABLET, FILM COATED ORAL at 09:44

## 2021-10-09 RX ADMIN — METOPROLOL TARTRATE 12.5 MG: 25 TABLET, FILM COATED ORAL at 05:41

## 2021-10-09 RX ADMIN — FERROUS SULFATE TAB 325 MG (65 MG ELEMENTAL FE) 325 MG: 325 (65 FE) TAB at 09:44

## 2021-10-09 RX ADMIN — SPIRONOLACTONE 25 MG: 25 TABLET, FILM COATED ORAL at 05:41

## 2021-10-09 RX ADMIN — OXYCODONE HYDROCHLORIDE AND ACETAMINOPHEN 500 MG: 500 TABLET ORAL at 09:44

## 2021-10-09 RX ADMIN — GABAPENTIN 200 MG: 100 CAPSULE ORAL at 09:44

## 2021-10-09 RX ADMIN — LEVETIRACETAM 1000 MG: 500 TABLET, FILM COATED ORAL at 09:44

## 2021-10-09 RX ADMIN — SENNOSIDES AND DOCUSATE SODIUM 2 TABLET: 8.6; 5 TABLET ORAL at 09:44

## 2021-10-09 RX ADMIN — METOPROLOL TARTRATE 12.5 MG: 25 TABLET, FILM COATED ORAL at 18:03

## 2021-10-09 RX ADMIN — GABAPENTIN 200 MG: 100 CAPSULE ORAL at 16:24

## 2021-10-09 RX ADMIN — HYDROCODONE BITARTRATE AND ACETAMINOPHEN 1 TABLET: 5; 325 TABLET ORAL at 13:56

## 2021-10-09 RX ADMIN — OMEPRAZOLE 20 MG: 20 CAPSULE, DELAYED RELEASE ORAL at 08:54

## 2021-10-09 RX ADMIN — ATORVASTATIN CALCIUM 80 MG: 40 TABLET, FILM COATED ORAL at 20:31

## 2021-10-09 RX ADMIN — OXYCODONE HYDROCHLORIDE AND ACETAMINOPHEN 500 MG: 500 TABLET ORAL at 20:31

## 2021-10-09 RX ADMIN — ASPIRIN 81 MG: 81 TABLET, COATED ORAL at 09:44

## 2021-10-09 RX ADMIN — HYDROCODONE BITARTRATE AND ACETAMINOPHEN 1 TABLET: 5; 325 TABLET ORAL at 06:04

## 2021-10-09 RX ADMIN — HYDROCODONE BITARTRATE AND ACETAMINOPHEN 1 TABLET: 5; 325 TABLET ORAL at 20:32

## 2021-10-09 RX ADMIN — GABAPENTIN 200 MG: 100 CAPSULE ORAL at 20:32

## 2021-10-09 RX ADMIN — BISACODYL 10 MG: 10 SUPPOSITORY RECTAL at 20:33

## 2021-10-09 RX ADMIN — MELATONIN TAB 3 MG 3 MG: 3 TAB at 20:32

## 2021-10-09 RX ADMIN — APIXABAN 5 MG: 5 TABLET, FILM COATED ORAL at 20:32

## 2021-10-09 RX ADMIN — TAMSULOSIN HYDROCHLORIDE 0.8 MG: 0.4 CAPSULE ORAL at 18:03

## 2021-10-09 RX ADMIN — POLYETHYLENE GLYCOL 3350 1 PACKET: 17 POWDER, FOR SOLUTION ORAL at 09:44

## 2021-10-09 RX ADMIN — SENNOSIDES AND DOCUSATE SODIUM 2 TABLET: 8.6; 5 TABLET ORAL at 20:32

## 2021-10-09 ASSESSMENT — PAIN DESCRIPTION - PAIN TYPE
TYPE: ACUTE PAIN
TYPE: ACUTE PAIN

## 2021-10-09 NOTE — THERAPY
"Speech Language Pathology  Daily Treatment     Patient Name: Shaista Bocanegra  Age:  76 y.o., Sex:  female  Medical Record #: [id number]  Today's Date: 10/9/2021     Precautions  Precautions: Fall Risk, Cardiac Precautions (See Comments)  Comments: Seizure prec    Subjective    Pt pleasant and cooperative during tx.  Pt reported feeling frustrated.  \"This used to be very easy for me.\"  Pt reported improvement in cognition each day.      Objective       10/09/21 1501   Cognition   Moderate Attention Supervision (5)   Complex Reasoning  / Problem Solving Moderate (3)   Functional Math / Financial Management Moderate (3)   SLP Total Time Spent   SLP Individual Total Time Spent (Mins) 60   Treatment Charges   SLP Cognitive Skill Development First 15 Minutes 1   SLP Cognitive Skill Development Additional 15 Minutes 3       Assessment    Word search: 100% independent.  Logical schedulin% independent.  Who has more money (coins): 60%  Independent, 100% given min-mod verbal cues.  Pt independently completed memory book.           Plan    Target attn, recall and problem solving.       Speech Therapy Problems (Active)     Problem: Expression STGs     Dates: Start: 21       Goal: STG-Within one week, patient will demonstrate use of SFA during conversation in order to improve word finding.      Dates: Start: 21       Goal Note filed on 10/04/21 1344 by Shruthi Zapien MS,CCC-SLP     To be addressed.               Problem: Memory STGs     Dates: Start: 21       Goal: STG-Within one week, patient will demonstrate use of memory strategies in order to recall daily events     Dates: Start: 21       Goal Note filed on 10/04/21 1344 by Shruthi Zapien MS,CCC-SLP     Mod to max A needed to recall daily events.               Problem: Problem Solving STGs     Dates: Start: 21       Goal: STG-Within one week, patient will complete medication management tasks with 80% accuracy, min verbal cues.      Dates: " Start: 09/29/21       Goal Note filed on 10/04/21 1344 by Shruthi Zapien MS,CCC-SLP     This not yet ready to address this goal.               Problem: Speech/Swallowing LTGs     Dates: Start: 09/29/21       Goal: LTG-By discharge, patient will solve complex problems related to IADL's in order to d/c homw with mod I     Dates: Start: 09/29/21

## 2021-10-09 NOTE — CARE PLAN
Problem: Pain - Standard  Goal: Alleviation of pain or a reduction in pain to the patient’s comfort goal  Outcome: Progressing  Patient able to verbalize pain on a 0/10 scale. Patients pain is being controlled with prn pain medications and rest.   Problem: Psychosocial  Goal: Patient's level of anxiety will decrease  Outcome: Progressing  Goal: Patient's ability to verbalize feelings about condition will improve  Outcome: Progressing

## 2021-10-09 NOTE — CARE PLAN
"  Problem: Bowel Elimination  Goal: Patient will participate in bowel management program  Note: Rectal exam prior to Dulcolax suppository administration at HS with large BM results. CPAP on at HS. No c/o pain at this time on scheduled pain medications. Will monitor.     Problem: Fall Risk - Rehab  Goal: Patient will remain free from falls  Note: Maddison Green Fall risk Assessment Score: 11      Moderate fall risk Interventions  - Bed and strip alarm   - Yellow sign by the door   - Yellow wrist band \"Fall risk\"  - Room near to the nurse station  - Do not leave patient unattended in the bathroom  - Fall risk education provided         The patient is Watcher - Medium risk of patient condition declining or worsening    Shift Goals  Clinical Goals: safety   Patient Goals: sleep        "

## 2021-10-09 NOTE — FLOWSHEET NOTE
10/09/21 1021   Events/Summary/Plan   Events/Summary/Plan 02 spot check, CPAP filled with sterile water   Vital Signs   Pulse 67   Respiration 16   Pulse Oximetry 94 %   $ Pulse Oximetry (Spot Check) Yes   Respiratory Assessment   Level of Consciousness Alert   Chest Exam   Work Of Breathing / Effort Within Normal Limits   Oxygen   O2 Delivery Device None - Room Air   Non-Invasive Ventilation [employer]   Nocturnal CPAP or BIPAP CPAP - [facility]   Settings (If Known) auto 6-8   FiO2 or LPM 0

## 2021-10-09 NOTE — CARE PLAN
Problem: Bowel Elimination  Goal: Patient will participate in bowel management program  Outcome: Progressing  Requested enemeze and had a large BM after 30 min

## 2021-10-10 PROCEDURE — 700102 HCHG RX REV CODE 250 W/ 637 OVERRIDE(OP): Performed by: HOSPITALIST

## 2021-10-10 PROCEDURE — A9270 NON-COVERED ITEM OR SERVICE: HCPCS | Performed by: HOSPITALIST

## 2021-10-10 PROCEDURE — 97129 THER IVNTJ 1ST 15 MIN: CPT

## 2021-10-10 PROCEDURE — 770010 HCHG ROOM/CARE - REHAB SEMI PRIVAT*

## 2021-10-10 PROCEDURE — 700102 HCHG RX REV CODE 250 W/ 637 OVERRIDE(OP): Performed by: PHYSICAL MEDICINE & REHABILITATION

## 2021-10-10 PROCEDURE — 700101 HCHG RX REV CODE 250: Performed by: PHYSICAL MEDICINE & REHABILITATION

## 2021-10-10 PROCEDURE — 94660 CPAP INITIATION&MGMT: CPT

## 2021-10-10 PROCEDURE — 97130 THER IVNTJ EA ADDL 15 MIN: CPT

## 2021-10-10 PROCEDURE — 94760 N-INVAS EAR/PLS OXIMETRY 1: CPT

## 2021-10-10 PROCEDURE — A9270 NON-COVERED ITEM OR SERVICE: HCPCS | Performed by: PHYSICAL MEDICINE & REHABILITATION

## 2021-10-10 RX ADMIN — GABAPENTIN 200 MG: 100 CAPSULE ORAL at 19:59

## 2021-10-10 RX ADMIN — OMEPRAZOLE 20 MG: 20 CAPSULE, DELAYED RELEASE ORAL at 08:47

## 2021-10-10 RX ADMIN — OXYCODONE HYDROCHLORIDE AND ACETAMINOPHEN 500 MG: 500 TABLET ORAL at 08:47

## 2021-10-10 RX ADMIN — BISACODYL 10 MG: 10 SUPPOSITORY RECTAL at 19:08

## 2021-10-10 RX ADMIN — SENNOSIDES AND DOCUSATE SODIUM 2 TABLET: 8.6; 5 TABLET ORAL at 08:47

## 2021-10-10 RX ADMIN — TAMSULOSIN HYDROCHLORIDE 0.8 MG: 0.4 CAPSULE ORAL at 18:33

## 2021-10-10 RX ADMIN — HYDROCODONE BITARTRATE AND ACETAMINOPHEN 1 TABLET: 5; 325 TABLET ORAL at 08:55

## 2021-10-10 RX ADMIN — POLYETHYLENE GLYCOL 3350 1 PACKET: 17 POWDER, FOR SOLUTION ORAL at 08:48

## 2021-10-10 RX ADMIN — GABAPENTIN 200 MG: 100 CAPSULE ORAL at 15:57

## 2021-10-10 RX ADMIN — OXYCODONE HYDROCHLORIDE AND ACETAMINOPHEN 500 MG: 500 TABLET ORAL at 19:57

## 2021-10-10 RX ADMIN — LIDOCAINE 1 PATCH: 50 PATCH TOPICAL at 19:57

## 2021-10-10 RX ADMIN — METOPROLOL TARTRATE 12.5 MG: 25 TABLET, FILM COATED ORAL at 05:42

## 2021-10-10 RX ADMIN — SPIRONOLACTONE 25 MG: 25 TABLET, FILM COATED ORAL at 05:42

## 2021-10-10 RX ADMIN — FERROUS SULFATE TAB 325 MG (65 MG ELEMENTAL FE) 325 MG: 325 (65 FE) TAB at 08:47

## 2021-10-10 RX ADMIN — HYDROCODONE BITARTRATE AND ACETAMINOPHEN 1 TABLET: 5; 325 TABLET ORAL at 02:58

## 2021-10-10 RX ADMIN — GABAPENTIN 200 MG: 100 CAPSULE ORAL at 08:48

## 2021-10-10 RX ADMIN — MELATONIN TAB 3 MG 3 MG: 3 TAB at 19:57

## 2021-10-10 RX ADMIN — HYDROCODONE BITARTRATE AND ACETAMINOPHEN 1 TABLET: 5; 325 TABLET ORAL at 15:16

## 2021-10-10 RX ADMIN — SENNOSIDES AND DOCUSATE SODIUM 2 TABLET: 8.6; 5 TABLET ORAL at 19:56

## 2021-10-10 RX ADMIN — APIXABAN 5 MG: 5 TABLET, FILM COATED ORAL at 19:56

## 2021-10-10 RX ADMIN — LEVETIRACETAM 1000 MG: 500 TABLET, FILM COATED ORAL at 19:56

## 2021-10-10 RX ADMIN — ASPIRIN 81 MG: 81 TABLET, COATED ORAL at 08:46

## 2021-10-10 RX ADMIN — APIXABAN 5 MG: 5 TABLET, FILM COATED ORAL at 08:47

## 2021-10-10 RX ADMIN — LEVETIRACETAM 1000 MG: 500 TABLET, FILM COATED ORAL at 08:47

## 2021-10-10 RX ADMIN — ATORVASTATIN CALCIUM 80 MG: 40 TABLET, FILM COATED ORAL at 19:57

## 2021-10-10 RX ADMIN — METOPROLOL TARTRATE 12.5 MG: 25 TABLET, FILM COATED ORAL at 18:33

## 2021-10-10 ASSESSMENT — PAIN DESCRIPTION - PAIN TYPE
TYPE: ACUTE PAIN
TYPE: ACUTE PAIN

## 2021-10-10 NOTE — THERAPY
"Speech Language Pathology  Daily Treatment     Patient Name: Shaista Bocanegra  Age:  76 y.o., Sex:  female  Medical Record #: [id number]  Today's Date: 10/10/2021     Precautions  Precautions: Fall Risk, Cardiac Precautions (See Comments)  Comments: Seizure prec    Subjective    Pt pleasant and cooperative during tx.       Objective       10/10/21 1101   Cognition   Moderate Attention Supervision (5)   SLP Total Time Spent   SLP Individual Total Time Spent (Mins) 30   Treatment Charges   SLP Cognitive Skill Development First 15 Minutes 1   SLP Cognitive Skill Development Additional 15 Minutes 1       Assessment    Alternating attn (pt required to state names of places for each letter of the alphabet):  76% independent.  Pt benefits from additional time to recall place in the alphabet, and to recall names of places with given letters.            Plan    Target executive function, continue \"who has more money\" sheet.         Speech Therapy Problems (Active)     Problem: Expression STGs     Dates: Start: 09/29/21       Goal: STG-Within one week, patient will demonstrate use of SFA during conversation in order to improve word finding.      Dates: Start: 09/29/21       Goal Note filed on 10/04/21 1344 by Shruthi Zapien MS,CCC-SLP     To be addressed.               Problem: Memory STGs     Dates: Start: 09/29/21       Goal: STG-Within one week, patient will demonstrate use of memory strategies in order to recall daily events     Dates: Start: 09/29/21       Goal Note filed on 10/04/21 1344 by Shruthi Zapien MS,CCC-SLP     Mod to max A needed to recall daily events.               Problem: Problem Solving STGs     Dates: Start: 09/29/21       Goal: STG-Within one week, patient will complete medication management tasks with 80% accuracy, min verbal cues.      Dates: Start: 09/29/21       Goal Note filed on 10/04/21 1344 by Shruthi Zapien MS,CCC-SLP     This not yet ready to address this goal.               Problem: " Speech/Swallowing LTGs     Dates: Start: 09/29/21       Goal: LTG-By discharge, patient will solve complex problems related to IADL's in order to d/c homw with mod I     Dates: Start: 09/29/21

## 2021-10-10 NOTE — CARE PLAN
Problem: Pain - Standard  Goal: Alleviation of pain or a reduction in pain to the patient’s comfort goal  Outcome: Progressing  Patient able to verbalize pain on a 0/10 scale. Patients pain is being controlled with prn pain medications and rest.      Problem: Psychosocial  Goal: Patient's level of anxiety will decrease  Outcome: Progressing     Problem: Bladder / Voiding  Goal: Patient will establish and maintain regular urinary output  Outcome: Progressing  PVRs less than 400cc on flomax 0.8mg     Problem: Skin Integrity  Goal: Patient's skin integrity will be maintained or improve  Outcome: Progressing  Incision without redness, swelling or drainage and skin edges are approximated

## 2021-10-10 NOTE — FLOWSHEET NOTE
10/10/21 1429   Events/Summary/Plan   Events/Summary/Plan Spo2 check   Vital Signs   Pulse 62   Respiration 16   Pulse Oximetry 92 %   $ Pulse Oximetry (Spot Check) Yes   Oxygen   O2 Delivery Device None - Room Air   Non-Invasive Ventilation [employer]   Nocturnal CPAP or BIPAP CPAP - [facility]   $ System Evaluation Yes  (7:59 hrs cpap use last night)

## 2021-10-10 NOTE — CARE PLAN
The patient is Stable - Low risk of patient condition declining or worsening    Shift Goals  Clinical Goals: sleep  Patient Goals: sleep      Problem: Pain - Standard  Goal: Alleviation of pain or a reduction in pain to the patient’s comfort goal  Outcome: Progressing: Patient able to verbalize pain level and verbalize an acceptable level of pain. Pt requested her PRN Norco when it was due. Next dosage is written on her whiteboard in which she can have again in 6 hours. Pt requested that I wake her up in the middle of the night for her next dose. Pt educated about the importance of sleep and taking pain medication only when needed. Pt stated she understood.      Problem: Bowel Elimination  Goal: Patient will participate in bowel management program  Outcome: Progressing: pt given her nightly suppository. Pt only had a very small BM, did a digital stimulation x1 to promote a larger result, no results. Pt did have 2 BMs yesterday, pt doesn't have any s/s of constipation.

## 2021-10-11 LAB
ANION GAP SERPL CALC-SCNC: 10 MMOL/L (ref 7–16)
BUN SERPL-MCNC: 11 MG/DL (ref 8–22)
CALCIUM SERPL-MCNC: 8.8 MG/DL (ref 8.5–10.5)
CHLORIDE SERPL-SCNC: 109 MMOL/L (ref 96–112)
CO2 SERPL-SCNC: 23 MMOL/L (ref 20–33)
CREAT SERPL-MCNC: 0.62 MG/DL (ref 0.5–1.4)
GLUCOSE SERPL-MCNC: 88 MG/DL (ref 65–99)
POTASSIUM SERPL-SCNC: 4 MMOL/L (ref 3.6–5.5)
SODIUM SERPL-SCNC: 142 MMOL/L (ref 135–145)

## 2021-10-11 PROCEDURE — 94760 N-INVAS EAR/PLS OXIMETRY 1: CPT

## 2021-10-11 PROCEDURE — 80048 BASIC METABOLIC PNL TOTAL CA: CPT

## 2021-10-11 PROCEDURE — 97129 THER IVNTJ 1ST 15 MIN: CPT

## 2021-10-11 PROCEDURE — 770010 HCHG ROOM/CARE - REHAB SEMI PRIVAT*

## 2021-10-11 PROCEDURE — 97535 SELF CARE MNGMENT TRAINING: CPT

## 2021-10-11 PROCEDURE — 99233 SBSQ HOSP IP/OBS HIGH 50: CPT | Performed by: PHYSICAL MEDICINE & REHABILITATION

## 2021-10-11 PROCEDURE — 700101 HCHG RX REV CODE 250: Performed by: PHYSICAL MEDICINE & REHABILITATION

## 2021-10-11 PROCEDURE — 94660 CPAP INITIATION&MGMT: CPT

## 2021-10-11 PROCEDURE — 700102 HCHG RX REV CODE 250 W/ 637 OVERRIDE(OP): Performed by: HOSPITALIST

## 2021-10-11 PROCEDURE — 97130 THER IVNTJ EA ADDL 15 MIN: CPT

## 2021-10-11 PROCEDURE — 97116 GAIT TRAINING THERAPY: CPT

## 2021-10-11 PROCEDURE — A9270 NON-COVERED ITEM OR SERVICE: HCPCS | Performed by: PHYSICAL MEDICINE & REHABILITATION

## 2021-10-11 PROCEDURE — 700102 HCHG RX REV CODE 250 W/ 637 OVERRIDE(OP): Performed by: PHYSICAL MEDICINE & REHABILITATION

## 2021-10-11 PROCEDURE — A9270 NON-COVERED ITEM OR SERVICE: HCPCS | Performed by: HOSPITALIST

## 2021-10-11 PROCEDURE — 36415 COLL VENOUS BLD VENIPUNCTURE: CPT

## 2021-10-11 PROCEDURE — 97112 NEUROMUSCULAR REEDUCATION: CPT

## 2021-10-11 RX ORDER — HYDROCODONE BITARTRATE AND ACETAMINOPHEN 5; 325 MG/1; MG/1
1-2 TABLET ORAL EVERY 8 HOURS PRN
Status: DISCONTINUED | OUTPATIENT
Start: 2021-10-11 | End: 2021-10-15

## 2021-10-11 RX ADMIN — BISACODYL 10 MG: 10 SUPPOSITORY RECTAL at 20:22

## 2021-10-11 RX ADMIN — APIXABAN 5 MG: 5 TABLET, FILM COATED ORAL at 20:25

## 2021-10-11 RX ADMIN — METOPROLOL TARTRATE 12.5 MG: 25 TABLET, FILM COATED ORAL at 05:30

## 2021-10-11 RX ADMIN — APIXABAN 5 MG: 5 TABLET, FILM COATED ORAL at 08:10

## 2021-10-11 RX ADMIN — LEVETIRACETAM 1000 MG: 500 TABLET, FILM COATED ORAL at 08:10

## 2021-10-11 RX ADMIN — LIDOCAINE 1 PATCH: 50 PATCH TOPICAL at 20:27

## 2021-10-11 RX ADMIN — HYDROCODONE BITARTRATE AND ACETAMINOPHEN 1 TABLET: 5; 325 TABLET ORAL at 03:37

## 2021-10-11 RX ADMIN — LEVETIRACETAM 1000 MG: 500 TABLET, FILM COATED ORAL at 20:25

## 2021-10-11 RX ADMIN — POLYETHYLENE GLYCOL 3350 1 PACKET: 17 POWDER, FOR SOLUTION ORAL at 08:11

## 2021-10-11 RX ADMIN — OMEPRAZOLE 20 MG: 20 CAPSULE, DELAYED RELEASE ORAL at 08:10

## 2021-10-11 RX ADMIN — SENNOSIDES AND DOCUSATE SODIUM 2 TABLET: 8.6; 5 TABLET ORAL at 08:10

## 2021-10-11 RX ADMIN — OXYCODONE HYDROCHLORIDE AND ACETAMINOPHEN 500 MG: 500 TABLET ORAL at 20:25

## 2021-10-11 RX ADMIN — OXYCODONE HYDROCHLORIDE AND ACETAMINOPHEN 500 MG: 500 TABLET ORAL at 08:10

## 2021-10-11 RX ADMIN — SPIRONOLACTONE 25 MG: 25 TABLET, FILM COATED ORAL at 05:30

## 2021-10-11 RX ADMIN — GABAPENTIN 200 MG: 100 CAPSULE ORAL at 08:10

## 2021-10-11 RX ADMIN — GABAPENTIN 200 MG: 100 CAPSULE ORAL at 13:53

## 2021-10-11 RX ADMIN — FERROUS SULFATE TAB 325 MG (65 MG ELEMENTAL FE) 325 MG: 325 (65 FE) TAB at 08:10

## 2021-10-11 RX ADMIN — TRAZODONE HYDROCHLORIDE 50 MG: 50 TABLET ORAL at 21:34

## 2021-10-11 RX ADMIN — LIDOCAINE 1 PATCH: 50 PATCH TOPICAL at 08:11

## 2021-10-11 RX ADMIN — HYDROCODONE BITARTRATE AND ACETAMINOPHEN 1 TABLET: 5; 325 TABLET ORAL at 16:56

## 2021-10-11 RX ADMIN — HYDROCODONE BITARTRATE AND ACETAMINOPHEN 1 TABLET: 5; 325 TABLET ORAL at 10:00

## 2021-10-11 RX ADMIN — SENNOSIDES AND DOCUSATE SODIUM 2 TABLET: 8.6; 5 TABLET ORAL at 20:25

## 2021-10-11 RX ADMIN — ASPIRIN 81 MG: 81 TABLET, COATED ORAL at 08:10

## 2021-10-11 RX ADMIN — TAMSULOSIN HYDROCHLORIDE 0.8 MG: 0.4 CAPSULE ORAL at 16:54

## 2021-10-11 RX ADMIN — MELATONIN TAB 3 MG 3 MG: 3 TAB at 21:33

## 2021-10-11 RX ADMIN — ATORVASTATIN CALCIUM 80 MG: 40 TABLET, FILM COATED ORAL at 20:25

## 2021-10-11 ASSESSMENT — PAIN DESCRIPTION - PAIN TYPE
TYPE: ACUTE PAIN
TYPE: ACUTE PAIN

## 2021-10-11 ASSESSMENT — ACTIVITIES OF DAILY LIVING (ADL)
BED_CHAIR_WHEELCHAIR_TRANSFER_DESCRIPTION: ADAPTIVE EQUIPMENT;INCREASED TIME;SET-UP OF EQUIPMENT;SUPERVISION FOR SAFETY
TOILETING_LEVEL_OF_ASSIST_DESCRIPTION: SET-UP OF EQUIPMENT;SUPERVISION FOR SAFETY;VERBAL CUEING;ADAPTIVE EQUIPMENT
TUB_SHOWER_TRANSFER_DESCRIPTION: GRAB BAR;SHOWER BENCH;SET-UP OF EQUIPMENT;SUPERVISION FOR SAFETY
BED_CHAIR_WHEELCHAIR_TRANSFER_DESCRIPTION: SET-UP OF EQUIPMENT;ADAPTIVE EQUIPMENT;SUPERVISION FOR SAFETY
TOILET_TRANSFER_DESCRIPTION: ADAPTIVE EQUIPMENT;GRAB BAR;INCREASED TIME;SUPERVISION FOR SAFETY

## 2021-10-11 ASSESSMENT — GAIT ASSESSMENTS
ASSISTIVE DEVICE: OTHER (COMMENTS)
DISTANCE (FEET): 250
GAIT LEVEL OF ASSIST: CONTACT GUARD ASSIST
DEVIATION: BRADYKINETIC;DECREASED HEEL STRIKE;DECREASED TOE OFF

## 2021-10-11 ASSESSMENT — PAIN SCALES - WONG BAKER: WONGBAKER_NUMERICALRESPONSE: HURTS JUST A LITTLE BIT

## 2021-10-11 NOTE — PROGRESS NOTES
Patient care assumed. Report received from [facility] KEATON Evans. Patient is alert and calm, resting in bed. Call light and bedside table within reach. Will continue to monitor.

## 2021-10-11 NOTE — THERAPY
Physical Therapy   Daily Treatment     Patient Name: Shaista Bocanegra  Age:  76 y.o., Sex:  female  Medical Record #: [id number]  Today's Date: 10/11/2021     Precautions  Precautions: Fall Risk, Cardiac Precautions (See Comments)  Comments: Seizure prec    Subjective    Patient agreeable to PT session.     Objective       10/11/21 1545   Vitals   O2 (LPM) 0   O2 Delivery Device None - Room Air   Gait Functional Level of Assist    Gait Level Of Assist Contact Guard Assist   Assistive Device Other (Comments)  (Gait belt)   Distance (Feet) 250   # of Times Distance was Traveled 2   Deviation Bradykinetic;Decreased Heel Strike;Decreased Toe Off   Wheelchair Functional Level of Assist   Wheelchair Assist   (n/a due to ambulatory status)   Distance Wheelchair (Feet or Distance)   (n/a)   Wheelchair Description   (n/a)   Transfer Functional Level of Assist   Bed, Chair, Wheelchair Transfer Stand by Assist   Bed Chair Wheelchair Transfer Description Adaptive equipment;Increased time;Set-up of equipment;Supervision for safety   Toilet Transfers Stand by Assist   Toilet Transfer Description Adaptive equipment;Grab bar;Increased time;Supervision for safety   Bed Mobility    Supine to Sit Modified Independent   Sit to Supine Modified Independent   Sit to Stand Contact Guard Assist  (SBA-CGA at gait belt, no UE support)   Scooting Modified Independent   Rolling Modified Independent   Neuro-Muscular Treatments   Comments Standing balance: no BUE support, CGA-Mod A at gait belt, 1-step cues esepcailly with new task learning.  2 sets of 10 toe touches on each LE to colored cones; 2 sets of 20 toe touches alternating at soccer ball; 1 set of 40 ft ambulation over uneven surface with CGA provided, and 1 set of 80 ft retroambulation with consistent 1-step cues attempting to increase B step length with decreased carryover for last activity.  x15 min   PT Total Time Spent   PT Individual Total Time Spent (Mins) 30   PT Charge Group   PT  Gait Training 1   PT Neuromuscular Re-Education / Balance 1     Discussed treatment goals for activities above.    Assessment    Patient very motivated for this added session at end of pt's day; improving activity tolerance; impaired but improving SLS during standing balance tasks; avoided self-direction of therapy but decreased carryover with instructions while attempting to address decreased B step length with retro ambulation; bradykinesia increases as fatigue increases.  Strengths: Adequate strength, Independent prior level of function, Willingly participates in therapeutic activities  Barriers: Impaired activity tolerance, Hypotension, Fatigue, Impaired balance    Plan    Standing balance with decreased UE support, Safety education with repetition, Coordination activities, Vector vs LiteGait usage for CV endurance, Monitor BP prn.    Passport items to be completed:  Passport items to be completed:  Get in/out of bed safely, in/out of a vehicle, safely use mobility device, walk or wheel around home/community, navigate up and down stairs, show how to get up/down from the ground, ensure home is accessible, demonstrate HEP, complete caregiver training    Physical Therapy Problems (Active)     Problem: PT-Long Term Goals     Dates: Start: 09/29/21       Goal: LTG-By discharge, patient will ambulate 300 ft with LRAD, mod I      Dates: Start: 09/29/21          Goal: LTG-By discharge, patient will transfer one surface to another mod I with LRAD     Dates: Start: 09/29/21          Goal: LTG-By discharge, patient will ambulate up/down 12 stairs with 2HRs and spv      Dates: Start: 09/29/21          Goal: LTG-By discharge, patient will transfer in/out of a car spv with LRAD      Dates: Start: 09/29/21

## 2021-10-11 NOTE — CARE PLAN
"  Problem: Pain - Standard  Goal: Alleviation of pain or a reduction in pain to the patient’s comfort goal  Note: Educate patient of non-pharmacological comfort measures: repositioning, relaxation/breathing technique, cold compress and activities.     Problem: Bladder / Voiding  Goal: Patient will establish and maintain regular urinary output  Note: Patient able to void with high PVR bladder scan, straight catheter with in sterile technique. UO yellow and hazy, encouraged increase fluids intake. Will monitor. HS bowel program with medium, loose BM result.   Maddison Green Fall risk Assessment Score: 11      Moderate fall risk Interventions  - Bed and strip alarm   - Yellow sign by the door   - Yellow wrist band \"Fall risk\"  - Room near to the nurse station  - Do not leave patient unattended in the bathroom  - Fall risk education provided           The patient is Watcher - Medium risk of patient condition declining or worsening    Shift Goals  Clinical Goals: sleep  Patient Goals: sleep        "

## 2021-10-11 NOTE — CARE PLAN
Problem: Balance  Goal: STG-Within one week, patient will complete standardized balance assessment with score indicating low to moderate fall risk   Outcome: Met  Note: 46/56

## 2021-10-11 NOTE — DISCHARGE PLANNING
CM spoke with patients son Fer.  Confirmed by him that patient DOES NOT have 24/7 assistance at DC and will need to go to SNF if unable to return home alone.  CM will update team at conference today.

## 2021-10-11 NOTE — THERAPY
Speech Language Pathology  Daily Treatment     Patient Name: Shaista Bocanegra  Age:  76 y.o., Sex:  female  Medical Record #: [id number]  Today's Date: 10/11/2021     Precautions  Precautions: Fall Risk, Cardiac Precautions (See Comments)  Comments: Seizure prec    Subjective    Patient pleasant and cooperative.  She expressed frustration trying to utilize some of her phone apps.  She also expressed frustration regarding her therapy schedule.     Objective       10/11/21 1231   Expressive Language   Word Finding Deficits Supervision (5)   Cognition   Moderate Attention Supervision (5)   Executive Functioning / Organization Moderate (3)   Written Arithmetic Minimal (4)   Functional Math / Financial Management Moderate (3)   SLP Total Time Spent   SLP Individual Total Time Spent (Mins) 90   Treatment Charges   SLP Cognitive Skill Development First 15 Minutes 1   SLP Cognitive Skill Development Additional 15 Minutes 3       Assessment    11:01-11:31Patient demonstrated only 2 word finding errors in conversation for which she benefited from min cues/prompts to implement SFA to aid in word finding.   Patient reported previous difficulty generating words for a given category.    12:31-13:31   Patient participated in generative naming tasks x 2 performing 5-6 words generated for given simple categories.  Patient worked on attention and mental manipulation in the contex of (who has more task -coins) she performed latter half of task with 55% acc for attention elements in combination with mental manipulation.   She was able to improve performance with cues to take notes and add up coin amounts to side.          Plan    Target medication management, tasks, attention, recall.    Passport items to be completed:  Express basic needs, understand food/liquid recommendations, consistently follow swallow precautions, manage finances, manage medications, arrive to therapy appointments on time, complete daily memory log entries, solve  problems related to safety situations, review education related to hospitalization, complete caregiver training     Speech Therapy Problems (Active)     Problem: Expression STGs     Dates: Start: 09/29/21       Goal: STG-Within one week, patient will demonstrate use of SFA during conversation in order to improve word finding.      Dates: Start: 09/29/21       Goal Note filed on 10/11/21 1227 by Mark Roblero MS,CCC-SLP     Min cues to utilize word finding strategies.                Problem: Memory STGs     Dates: Start: 09/29/21       Goal: STG-Within one week, patient will demonstrate use of memory strategies in order to recall daily events     Dates: Start: 09/29/21       Goal Note filed on 10/11/21 1227 by Mark Roblero MS,CCC-SLP     Min cues to utilize memory book. Min-mod cues to recall daily events.                Problem: Problem Solving STGs     Dates: Start: 09/29/21       Goal: STG-Within one week, patient will complete medication management tasks with 80% accuracy, min verbal cues.      Dates: Start: 09/29/21       Goal Note filed on 10/11/21 1227 by Mark Roblero MS,CCC-SLP     Not yet targeted (focused on attn)                Problem: Speech/Swallowing LTGs     Dates: Start: 09/29/21       Goal: LTG-By discharge, patient will solve complex problems related to IADL's in order to d/c homw with mod I     Dates: Start: 09/29/21

## 2021-10-11 NOTE — CARE PLAN
The patient is Stable - Low risk of patient condition declining or worsening    Shift Goals  Clinical Goals: pain management  Patient Goals: sleep      Problem: Pain - Standard  Goal: Alleviation of pain or a reduction in pain to the patient’s comfort goal  Outcome: Progressing Patient able to verbalize pain level and verbalize an acceptable level of pain.      Problem: Bladder / Voiding  Goal: Patient will establish and maintain regular urinary output  Outcome: Not Met patient still retaining urine, does urinate but has to be cath'd at intervals will continue to monitor.

## 2021-10-11 NOTE — THERAPY
"Occupational Therapy  Daily Treatment     Patient Name: Shaista Bocanegra  Age:  76 y.o., Sex:  female  Medical Record #: [id number]  Today's Date: 10/11/2021     Precautions  Precautions: (P) Fall Risk, Cardiac Precautions (See Comments)  Comments: (P) Seizure prec    Safety   ADL Safety : Requires Supervision for Safety, Impaired Insight into Safety, Requires Cueing for Safety, Impaired  Bathroom Safety: Requires Supervision for Safety, Impaired Insight into Safety, Requires Cuing for Safety, Impaired    Subjective    \"Can I please have a shower? I know we only have a half hour.\"     Objective       10/11/21 0901   Precautions   Precautions Fall Risk;Cardiac Precautions (See Comments)   Comments Seizure prec   Functional Level of Assist   Eating Independent   Grooming Stand by Assist;Standing   Grooming Description Standing at sink;Supervision for safety  (SBA standing at sink )   Bathing Supervision   Bathing Description Hand held shower;Tub bench;Set-up of equipment;Supervision for safety  (setup and supervision seated)   Upper Body Dressing Supervision   Upper Body Dressing Description Supervision for safety  (SBA to retrieve from closet, supervised to don standing)   Lower Body Dressing Minimal Assist   Lower Body Dressing Description Set-up of equipment;Supervision for safety;Verbal cueing;Sock aid  (assist w/ 1/10 tasks to don/doff pullups and socks, sock aid)   Toileting Stand by Assist   Toileting Description Set-up of equipment;Supervision for safety;Verbal cueing;Adaptive equipment  (fww for balance)   Bed, Chair, Wheelchair Transfer Stand by Assist   Bed Chair Wheelchair Transfer Description Set-up of equipment;Adaptive equipment;Supervision for safety  (fww)   Toilet Transfers Stand by Assist   Toilet Transfer Description Set-up of equipment;Supervision for safety;Adaptive equipment;Grab bar  (grab bar and FWW)   Tub / Shower Transfers Stand by Assist   Tub Shower Transfer Description Grab bar;Shower " bench;Set-up of equipment;Supervision for safety   Bed Mobility    Supine to Sit Supervised   Sit to Supine Supervised   Scooting Supervised   Interdisciplinary Plan of Care Collaboration   Patient Position at End of Therapy In Bed;Call Light within Reach;Tray Table within Reach;Phone within Reach   OT Total Time Spent   OT Individual Total Time Spent (Mins) 30   OT Charge Group   OT Self Care / ADL 2     SBA with FWW from bed <> bathroom.    Assessment    Patient completed adl routine with min A to SBA.  Continues to ask for assistance for things she is able to do, such as putting her watch on her wrist and pulling her sock up once it was mostly on her foot.  Still recommend 24/7 supervision at home.  Strengths: Able to follow instructions, Alert and oriented, Adequate strength, Effective communication skills, Independent prior level of function, Motivated for self care and independence, Pleasant and cooperative, Supportive family, Willingly participates in therapeutic activities  Barriers: Decreased endurance, Fatigue, Generalized weakness, Impaired activity tolerance, Impaired balance, Pain (lack of sleep night before initial evaluation)    Plan    IADLs, standing balance, overall strength/endurance    Occupational Therapy Goals (Active)     Problem: Dressing     Dates: Start: 10/04/21       Goal: STG-Within one week, patient will dress LB with supervision using adaptive techniques or AE as needed     Dates: Start: 10/04/21             Problem: Functional Transfers     Dates: Start: 09/29/21       Goal: STG-Within one week, patient will transfer to toilet with supervision using grab bar and/or FWW     Dates: Start: 09/29/21       Goal Note filed on 10/04/21 1141 by Adrian Mathews OT/L     SBA               Problem: OT Long Term Goals     Dates: Start: 09/29/21       Goal: LTG-By discharge, patient will complete basic self care tasks with mod I     Dates: Start: 09/29/21          Goal: LTG-By discharge,  patient will perform bathroom transfers with mod I     Dates: Start: 09/29/21          Goal: LTG-By discharge, patient will complete basic home management with mod I     Dates: Start: 09/29/21             Problem: Toileting     Dates: Start: 10/04/21       Goal: STG-Within one week, patient will complete toileting tasks with supervision using grab bar and/or FWW for balance as needed     Dates: Start: 10/04/21

## 2021-10-11 NOTE — PROGRESS NOTES
"Rehab Progress Note     Date of Service: 10/11/2021  Chief Complaint: follow up MVR repair    Interval Events (Subjective)    Patient seen and examined today during her speech therapy session.  She is upset because she didn't get any physical therapy over the weekend.  She does not feel ready for discharge, which the therapy team agrees.  She continues to have urinary retention.  She continues to have pain in her right neck, though it is improved and she has improved range of motion.      ROS: No changes to bowel, bladder, pain, mood, or sleep.         Objective:  VITAL SIGNS: /72   Pulse 60   Temp 36.7 °C (98.1 °F) (Temporal)   Resp 16   Ht 1.676 m (5' 6\")   Wt 60 kg (132 lb 3.2 oz)   SpO2 97%   BMI 21.34 kg/m²   Gen: alert, no apparent distress  CV: regular rate and rhythm, no murmurs, no peripheral edema, healed incision on right upper chest wall, decreased edema and bruising  Resp: clear to ascultation bilaterally, normal respiratory effort  GI: soft, non-tender abdomen, bowel sounds present  Neuro: notable for generalized weakness      Recent Results (from the past 72 hour(s))   CBC WITHOUT DIFFERENTIAL    Collection Time: 10/09/21  5:36 AM   Result Value Ref Range    WBC 4.8 4.8 - 10.8 K/uL    RBC 3.10 (L) 4.20 - 5.40 M/uL    Hemoglobin 9.9 (L) 12.0 - 16.0 g/dL    Hematocrit 31.3 (L) 37.0 - 47.0 %    .0 (H) 81.4 - 97.8 fL    MCH 31.9 27.0 - 33.0 pg    MCHC 31.6 (L) 33.6 - 35.0 g/dL    RDW 54.0 (H) 35.9 - 50.0 fL    Platelet Count 299 164 - 446 K/uL    MPV 9.1 9.0 - 12.9 fL   Basic Metabolic Panel    Collection Time: 10/11/21  6:08 AM   Result Value Ref Range    Sodium 142 135 - 145 mmol/L    Potassium 4.0 3.6 - 5.5 mmol/L    Chloride 109 96 - 112 mmol/L    Co2 23 20 - 33 mmol/L    Glucose 88 65 - 99 mg/dL    Bun 11 8 - 22 mg/dL    Creatinine 0.62 0.50 - 1.40 mg/dL    Calcium 8.8 8.5 - 10.5 mg/dL    Anion Gap 10.0 7.0 - 16.0   ESTIMATED GFR    Collection Time: 10/11/21  6:08 AM   Result " Value Ref Range    GFR If African American >60 >60 mL/min/1.73 m 2    GFR If Non African American >60 >60 mL/min/1.73 m 2       Current Facility-Administered Medications   Medication Frequency   • gabapentin (NEURONTIN) capsule 200 mg TID   • melatonin tablet 3 mg QHS   • tamsulosin (FLOMAX) capsule 0.8 mg AFTER DINNER   • HYDROcodone-acetaminophen (NORCO) 5-325 MG per tablet 1-2 Tablet Q6HRS PRN   • lidocaine (LIDODERM) 5 % 1 Patch Q24HR   • metoprolol tartrate (LOPRESSOR) tablet 12.5 mg TWICE DAILY   • lidocaine (LIDODERM) 5 % 1 Patch Q24HR   • polyethylene glycol/lytes (MIRALAX) PACKET 1 Packet DAILY    And   • senna-docusate (PERICOLACE or SENOKOT S) 8.6-50 MG per tablet 2 Tablet BID    And   • magnesium hydroxide (MILK OF MAGNESIA) suspension 30 mL QDAY PRN    And   • bisacodyl (DULCOLAX) suppository 10 mg DAILY   • hydrOXYzine HCl (ATARAX) tablet 50 mg Q6HRS PRN   • QUEtiapine (Seroquel) tablet 25 mg TID PRN   • simethicone (MYLICON) chewable tab 80 mg TID PRN   • Respiratory Therapy Consult Continuous RT   • Pharmacy Consult Request ...Pain Management Review 1 Each PHARMACY TO DOSE   • acetaminophen (Tylenol) tablet 650 mg Q4HRS PRN   • lactulose 20 GM/30ML solution 30 mL QDAY PRN   • docusate sodium (ENEMEEZ) enema 283 mg QDAY PRN   • sodium phosphate (Fleet) enema 133 mL QDAY PRN   • omeprazole (PRILOSEC) capsule 20 mg QAM AC   • artificial tears ophthalmic solution 1 Drop PRN   • benzocaine-menthol (CEPACOL) lozenge 1 Lozenge Q2HRS PRN   • mag hydrox-al hydrox-simeth (MAALOX PLUS ES or MYLANTA DS) suspension 20 mL Q2HRS PRN   • ondansetron (ZOFRAN ODT) dispertab 4 mg 4X/DAY PRN    Or   • ondansetron (ZOFRAN) syringe/vial injection 4 mg 4X/DAY PRN   • traZODone (DESYREL) tablet 50 mg QHS PRN   • sodium chloride (OCEAN) 0.65 % nasal spray 2 Spray PRN   • aspirin EC (ECOTRIN) tablet 81 mg DAILY   • ascorbic acid (Vitamin C) tablet 500 mg BID   • ferrous sulfate tablet 325 mg QDAY with Breakfast   •  levETIRAcetam (KEPPRA) tablet 1,000 mg Q12HRS   • spironolactone (ALDACTONE) tablet 25 mg Q DAY   • atorvastatin (LIPITOR) tablet 80 mg Q EVENING   • apixaban (ELIQUIS) tablet 5 mg BID       Orders Placed This Encounter   Procedures   • Diet Order Diet: Regular (chopped meats)     Standing Status:   Standing     Number of Occurrences:   1     Order Specific Question:   Diet:     Answer:   Regular [1]     Comments:   chopped meats       Assessment:  Active Hospital Problems    Diagnosis    • *S/P mitral valve repair    • Hypoalbuminemia    • Other constipation    • Breast cancer (HCC)    • Upper back pain on right side    • Impaired mobility and ADLs    • Seizure disorder (HCC)    • Other hyperlipidemia    • Urinary retention    • Anemia    • Atrial fibrillation (HCC)    • Herpes    • Sleep apnea    • Hypertension    • History of CVA (cerebrovascular accident)      This patient is a 76 y.o. female admitted for acute inpatient rehabilitation with impaired cardiac endurance S/P mitral valve repair.    I led and attended the weekly conference, and agree with the IDT conference documentation and plan of care as noted below.    Date of conference: 10/11/2021    Goals and barriers: See IDT note.    Biggest barriers: impaired balance, impaired safety awareness, cognitive impairment, poor attention to detail, urinary retention    Goals in next week: discharge to SNF    CM/social support: has family, but friend will support    Anticipated DC date: 10/28    Home health: PT/OT/SLP/RN    Equip: shower chair, FWW    Follow up: PCP, cardiology, neurology, Dr. Sosa, urology       Medical Decision Making and Plan:    S/p Mitral Valve repair  Dr. Nogueira 9/16  Continue full rehab program  PT/OT/SLP, 1 hr each discipline, 5 days per week    Outpatient follow up with cardiology and Dr. Nogueira as needed    Aspirin  Spironolactone      History of strokes  Cardio-embolic  Residual left hemiparesis  Aphasia, improved  Dysphagia,  resolved  Cognitive impairment, continues  Generalized weakness/poor endurance  Continue full rehab program  PT/OT/SLP, 1 hr each discipline, 5 days per week     Eliquis  Statin     Outpatient follow up with Dr. Sosa, referral made    Right upper back pain, improved  Right anterior chest wall pain, improved  Lidoderm patch  Checked ultrasound - hematoma  PRN tylenol, last use 10/4  PRN oxycodone, not effective for right-sided chest wall pain  Switched to Norco, last use 10/11, decreased timing to Q8  Started low dose gabapentin 100 mg TID --> 200 mg TID 10/6, discontinued due to urinary retention  Continue hot pack  Trial of TENs     Seizure disorder  Tonic/clonic post-op  Keppra  Outpatient referral to neurology     Hypertension  Metoprolol  Spironolactone  Appreciate hospitalist assistance    Orthostatic hypotension, resolved  Appreciate hospitalist assistance    Atrial fibrillation  Metoprolol    Hyperlipidemia  Statin     Anemia  Ferrous sulfate  Vit C     GI prophylaxis  Omeprazole     History of herpes simplex  Valacyclovir discontinued, patient was not taking regularly    Sleep apnea  2 L oxygen at night  To bring in home CPAP    Insomnia  Melatonin  PRN trazodone, last use 10/7    Bowel program  Constipation, resolved  Increase bowel medications  Last BM 10/10    Bladder program  Urinary retention, continues  Started Flomax 9/28, increased dose 10/5 to 0.8 mg  Still requiring catheterizations intermittently - last required 10/11  PVRs - high  May need replacement/bladder rest  Discontinued gabapentin 10/11     DVT prophylaxis  Eliquis    Total time:  40 minutes.  I spent greater than 50% of the time for patient care, counseling, and coordination on this date, including patient face-to face time, unit/floor time with review of records/pertinent lab data and studies, as well as discussing diagnostic evaluation/work up, planned therapeutic interventions, and future disposition of care, as per the interval  events/subjective and the assessment and plan as noted above.        Precious Rawls M.D.   Physical Medicine and Rehabilitation

## 2021-10-11 NOTE — CARE PLAN
Problem: Functional Transfers  Goal: STG-Within one week, patient will transfer to toilet with supervision using grab bar and/or FWW  Outcome: Not Met  Note: SBA/supervised     Problem: Dressing  Goal: STG-Within one week, patient will dress LB with supervision using adaptive techniques or AE as needed  Outcome: Not Met  Note: Min A     Problem: Toileting  Goal: STG-Within one week, patient will complete toileting tasks with supervision using grab bar and/or FWW for balance as needed  Outcome: Not Met  Note: SBA/supervision

## 2021-10-12 PROCEDURE — 94760 N-INVAS EAR/PLS OXIMETRY 1: CPT

## 2021-10-12 PROCEDURE — 97130 THER IVNTJ EA ADDL 15 MIN: CPT

## 2021-10-12 PROCEDURE — 94660 CPAP INITIATION&MGMT: CPT

## 2021-10-12 PROCEDURE — 700102 HCHG RX REV CODE 250 W/ 637 OVERRIDE(OP): Performed by: HOSPITALIST

## 2021-10-12 PROCEDURE — 97530 THERAPEUTIC ACTIVITIES: CPT

## 2021-10-12 PROCEDURE — 97110 THERAPEUTIC EXERCISES: CPT

## 2021-10-12 PROCEDURE — 99233 SBSQ HOSP IP/OBS HIGH 50: CPT | Performed by: PHYSICAL MEDICINE & REHABILITATION

## 2021-10-12 PROCEDURE — 97129 THER IVNTJ 1ST 15 MIN: CPT

## 2021-10-12 PROCEDURE — 700101 HCHG RX REV CODE 250: Performed by: PHYSICAL MEDICINE & REHABILITATION

## 2021-10-12 PROCEDURE — A9270 NON-COVERED ITEM OR SERVICE: HCPCS | Performed by: PHYSICAL MEDICINE & REHABILITATION

## 2021-10-12 PROCEDURE — 97140 MANUAL THERAPY 1/> REGIONS: CPT

## 2021-10-12 PROCEDURE — 770010 HCHG ROOM/CARE - REHAB SEMI PRIVAT*

## 2021-10-12 PROCEDURE — 97535 SELF CARE MNGMENT TRAINING: CPT

## 2021-10-12 PROCEDURE — 700102 HCHG RX REV CODE 250 W/ 637 OVERRIDE(OP): Performed by: PHYSICAL MEDICINE & REHABILITATION

## 2021-10-12 PROCEDURE — 97116 GAIT TRAINING THERAPY: CPT

## 2021-10-12 PROCEDURE — A9270 NON-COVERED ITEM OR SERVICE: HCPCS | Performed by: HOSPITALIST

## 2021-10-12 RX ORDER — METHOCARBAMOL 500 MG/1
500 TABLET, FILM COATED ORAL 4 TIMES DAILY PRN
Status: DISCONTINUED | OUTPATIENT
Start: 2021-10-12 | End: 2021-10-22 | Stop reason: HOSPADM

## 2021-10-12 RX ORDER — LANOLIN ALCOHOL/MO/W.PET/CERES
9 CREAM (GRAM) TOPICAL
Status: DISCONTINUED | OUTPATIENT
Start: 2021-10-12 | End: 2021-10-22 | Stop reason: HOSPADM

## 2021-10-12 RX ORDER — HYDROXYZINE HYDROCHLORIDE 25 MG/1
50 TABLET, FILM COATED ORAL EVERY 6 HOURS PRN
Status: DISCONTINUED | OUTPATIENT
Start: 2021-10-12 | End: 2021-10-14

## 2021-10-12 RX ADMIN — ATORVASTATIN CALCIUM 80 MG: 40 TABLET, FILM COATED ORAL at 20:36

## 2021-10-12 RX ADMIN — APIXABAN 5 MG: 5 TABLET, FILM COATED ORAL at 09:13

## 2021-10-12 RX ADMIN — HYDROXYZINE HYDROCHLORIDE 50 MG: 25 TABLET, FILM COATED ORAL at 03:17

## 2021-10-12 RX ADMIN — MELATONIN TAB 3 MG 9 MG: 3 TAB at 20:37

## 2021-10-12 RX ADMIN — ASPIRIN 81 MG: 81 TABLET, COATED ORAL at 09:13

## 2021-10-12 RX ADMIN — LEVETIRACETAM 1000 MG: 500 TABLET, FILM COATED ORAL at 20:35

## 2021-10-12 RX ADMIN — SPIRONOLACTONE 25 MG: 25 TABLET, FILM COATED ORAL at 05:28

## 2021-10-12 RX ADMIN — BISACODYL 10 MG: 10 SUPPOSITORY RECTAL at 20:40

## 2021-10-12 RX ADMIN — SENNOSIDES AND DOCUSATE SODIUM 2 TABLET: 8.6; 5 TABLET ORAL at 09:13

## 2021-10-12 RX ADMIN — HYDROCODONE BITARTRATE AND ACETAMINOPHEN 1 TABLET: 5; 325 TABLET ORAL at 12:16

## 2021-10-12 RX ADMIN — TRAZODONE HYDROCHLORIDE 50 MG: 50 TABLET ORAL at 20:37

## 2021-10-12 RX ADMIN — HYDROCODONE BITARTRATE AND ACETAMINOPHEN 1 TABLET: 5; 325 TABLET ORAL at 00:50

## 2021-10-12 RX ADMIN — HYDROCODONE BITARTRATE AND ACETAMINOPHEN 1 TABLET: 5; 325 TABLET ORAL at 20:36

## 2021-10-12 RX ADMIN — LEVETIRACETAM 1000 MG: 500 TABLET, FILM COATED ORAL at 09:13

## 2021-10-12 RX ADMIN — FERROUS SULFATE TAB 325 MG (65 MG ELEMENTAL FE) 325 MG: 325 (65 FE) TAB at 09:14

## 2021-10-12 RX ADMIN — HYDROXYZINE HYDROCHLORIDE 50 MG: 25 TABLET, FILM COATED ORAL at 20:37

## 2021-10-12 RX ADMIN — METOPROLOL TARTRATE 12.5 MG: 25 TABLET, FILM COATED ORAL at 17:41

## 2021-10-12 RX ADMIN — OXYCODONE HYDROCHLORIDE AND ACETAMINOPHEN 500 MG: 500 TABLET ORAL at 09:13

## 2021-10-12 RX ADMIN — OMEPRAZOLE 20 MG: 20 CAPSULE, DELAYED RELEASE ORAL at 09:13

## 2021-10-12 RX ADMIN — LIDOCAINE 1 PATCH: 50 PATCH TOPICAL at 09:17

## 2021-10-12 RX ADMIN — SENNOSIDES AND DOCUSATE SODIUM 2 TABLET: 8.6; 5 TABLET ORAL at 20:36

## 2021-10-12 RX ADMIN — TAMSULOSIN HYDROCHLORIDE 0.8 MG: 0.4 CAPSULE ORAL at 17:41

## 2021-10-12 RX ADMIN — METOPROLOL TARTRATE 12.5 MG: 25 TABLET, FILM COATED ORAL at 05:28

## 2021-10-12 RX ADMIN — APIXABAN 5 MG: 5 TABLET, FILM COATED ORAL at 20:37

## 2021-10-12 RX ADMIN — POLYETHYLENE GLYCOL 3350 1 PACKET: 17 POWDER, FOR SOLUTION ORAL at 09:13

## 2021-10-12 RX ADMIN — METHOCARBAMOL 500 MG: 500 TABLET ORAL at 20:37

## 2021-10-12 RX ADMIN — LIDOCAINE 1 PATCH: 50 PATCH TOPICAL at 20:37

## 2021-10-12 RX ADMIN — OXYCODONE HYDROCHLORIDE AND ACETAMINOPHEN 500 MG: 500 TABLET ORAL at 20:36

## 2021-10-12 ASSESSMENT — GAIT ASSESSMENTS
DISTANCE (FEET): 150
GAIT LEVEL OF ASSIST: STAND BY ASSIST
DEVIATION: BRADYKINETIC;DECREASED HEEL STRIKE;DECREASED TOE OFF

## 2021-10-12 ASSESSMENT — ACTIVITIES OF DAILY LIVING (ADL)
TOILET_TRANSFER_DESCRIPTION: GRAB BAR;INCREASED TIME
BED_CHAIR_WHEELCHAIR_TRANSFER_DESCRIPTION: INCREASED TIME

## 2021-10-12 ASSESSMENT — PAIN DESCRIPTION - PAIN TYPE: TYPE: ACUTE PAIN

## 2021-10-12 NOTE — CARE PLAN
Problem: Bladder / Voiding  Goal: Patient will establish and maintain regular urinary output  Outcome: Progressing  Goal: Patient will establish and maintain bladder regimen  Outcome: Progressing

## 2021-10-12 NOTE — FLOWSHEET NOTE
10/11/21 1929   Events/Summary/Plan   Events/Summary/Plan SpO2 check   Vital Signs   Pulse 78   Respiration 16   Pulse Oximetry 94 %   $ Pulse Oximetry (Spot Check) Yes   Oxygen   O2 Delivery Device None - Room Air   Non-Invasive Ventilation [facility]   Nocturnal CPAP or BIPAP CPAP - [facility]   $ System Evaluation Yes  (4:48 hrs cpap use last night)

## 2021-10-12 NOTE — PROGRESS NOTES
"Rehab Progress Note     Date of Service: 10/12/2021  Chief Complaint: follow up MVR repair    Interval Events (Subjective)    Patient seen and examined today in her room.  She reports her neck feels better after she had manual work on it by the OT.  Advised her she can ask for Robaxin for muscle spasms, so we can try to wean her off the Norco.  Patient also reports difficulty staying asleep. She takes 10 mg of melatonin at home.  We discussed her discharge date which has been moved out.  Patient denies having any anxiety.  She has no required catheterization since yesterday.   We discussed her medications and questions were answered.  She has no other complaints.       ROS: No changes to bowel, bladder, or mood.          Objective:  VITAL SIGNS: /65   Pulse 83   Temp 36.4 °C (97.5 °F) (Oral)   Resp 16   Ht 1.676 m (5' 6\")   Wt 60 kg (132 lb 3.2 oz)   SpO2 98%   BMI 21.34 kg/m²   Gen: alert, no apparent distress  CV: regular rate, irregular rhythm, no murmurs, no peripheral edema  Resp: clear to ascultation bilaterally, normal respiratory effort  GI: soft, non-tender abdomen, bowel sounds present    Recent Results (from the past 72 hour(s))   Basic Metabolic Panel    Collection Time: 10/11/21  6:08 AM   Result Value Ref Range    Sodium 142 135 - 145 mmol/L    Potassium 4.0 3.6 - 5.5 mmol/L    Chloride 109 96 - 112 mmol/L    Co2 23 20 - 33 mmol/L    Glucose 88 65 - 99 mg/dL    Bun 11 8 - 22 mg/dL    Creatinine 0.62 0.50 - 1.40 mg/dL    Calcium 8.8 8.5 - 10.5 mg/dL    Anion Gap 10.0 7.0 - 16.0   ESTIMATED GFR    Collection Time: 10/11/21  6:08 AM   Result Value Ref Range    GFR If African American >60 >60 mL/min/1.73 m 2    GFR If Non African American >60 >60 mL/min/1.73 m 2       Current Facility-Administered Medications   Medication Frequency   • melatonin tablet 9 mg QHS   • hydrOXYzine HCl (ATARAX) tablet 50 mg Q6HRS PRN   • methocarbamol (ROBAXIN) tablet 500 mg 4X/DAY PRN   • HYDROcodone-acetaminophen " (NORCO) 5-325 MG per tablet 1-2 Tablet Q8HRS PRN   • tamsulosin (FLOMAX) capsule 0.8 mg AFTER DINNER   • lidocaine (LIDODERM) 5 % 1 Patch Q24HR   • metoprolol tartrate (LOPRESSOR) tablet 12.5 mg TWICE DAILY   • lidocaine (LIDODERM) 5 % 1 Patch Q24HR   • polyethylene glycol/lytes (MIRALAX) PACKET 1 Packet DAILY    And   • senna-docusate (PERICOLACE or SENOKOT S) 8.6-50 MG per tablet 2 Tablet BID    And   • magnesium hydroxide (MILK OF MAGNESIA) suspension 30 mL QDAY PRN    And   • bisacodyl (DULCOLAX) suppository 10 mg DAILY   • simethicone (MYLICON) chewable tab 80 mg TID PRN   • Respiratory Therapy Consult Continuous RT   • Pharmacy Consult Request ...Pain Management Review 1 Each PHARMACY TO DOSE   • acetaminophen (Tylenol) tablet 650 mg Q4HRS PRN   • lactulose 20 GM/30ML solution 30 mL QDAY PRN   • docusate sodium (ENEMEEZ) enema 283 mg QDAY PRN   • sodium phosphate (Fleet) enema 133 mL QDAY PRN   • omeprazole (PRILOSEC) capsule 20 mg QAM AC   • artificial tears ophthalmic solution 1 Drop PRN   • benzocaine-menthol (CEPACOL) lozenge 1 Lozenge Q2HRS PRN   • mag hydrox-al hydrox-simeth (MAALOX PLUS ES or MYLANTA DS) suspension 20 mL Q2HRS PRN   • ondansetron (ZOFRAN ODT) dispertab 4 mg 4X/DAY PRN    Or   • ondansetron (ZOFRAN) syringe/vial injection 4 mg 4X/DAY PRN   • traZODone (DESYREL) tablet 50 mg QHS PRN   • sodium chloride (OCEAN) 0.65 % nasal spray 2 Spray PRN   • aspirin EC (ECOTRIN) tablet 81 mg DAILY   • ascorbic acid (Vitamin C) tablet 500 mg BID   • ferrous sulfate tablet 325 mg QDAY with Breakfast   • levETIRAcetam (KEPPRA) tablet 1,000 mg Q12HRS   • spironolactone (ALDACTONE) tablet 25 mg Q DAY   • atorvastatin (LIPITOR) tablet 80 mg Q EVENING   • apixaban (ELIQUIS) tablet 5 mg BID       Orders Placed This Encounter   Procedures   • Diet Order Diet: Regular (chopped meats)     Standing Status:   Standing     Number of Occurrences:   1     Order Specific Question:   Diet:     Answer:   Regular [1]      Comments:   chopped meats       Assessment:  Active Hospital Problems    Diagnosis    • *S/P mitral valve repair    • Hypoalbuminemia    • Other constipation    • Breast cancer (HCC)    • Upper back pain on right side    • Impaired mobility and ADLs    • Seizure disorder (HCC)    • Other hyperlipidemia    • Urinary retention    • Anemia    • Atrial fibrillation (HCC)    • Herpes    • Sleep apnea    • Hypertension    • History of CVA (cerebrovascular accident)      This patient is a 76 y.o. female admitted for acute inpatient rehabilitation with impaired cardiac endurance S/P mitral valve repair.    I led and attended the weekly conference, and agree with the IDT conference documentation and plan of care as noted below.    Date of conference: 10/11/2021    Goals and barriers: See IDT note.    Biggest barriers: impaired balance, impaired safety awareness, cognitive impairment, poor attention to detail, urinary retention    Goals in next week: discharge to SNF    CM/social support: has family, but friend will support    Anticipated DC date: 10/28    Home health: PT/OT/SLP/RN    Equip: shower chair, FWW    Follow up: PCP, cardiology, neurology, Dr. Sosa, urology       Medical Decision Making and Plan:    S/p Mitral Valve repair  Dr. Nogueira 9/16  Continue full rehab program  PT/OT/SLP, 1 hr each discipline, 5 days per week    Outpatient follow up with cardiology and Dr. Nogueira as needed    Aspirin  Spironolactone      History of strokes  Cardio-embolic  Residual left hemiparesis, mild  Aphasia, improved  Dysphagia, resolved  Cognitive impairment, continues  Generalized weakness/poor endurance  Continue full rehab program  PT/OT/SLP, 1 hr each discipline, 5 days per week     Eliquis  Statin     Outpatient follow up with Dr. Sosa, referral made    Right upper back pain, improved  Left upper back/neck pain  Right anterior chest wall pain, improved  Muscle spasms from her surgery  Lidoderm patch  Checked ultrasound -  hematoma  PRN tylenol, last use 10/4  PRN oxycodone, not effective for right-sided chest wall pain  Switched to Norco, last use 10/12, decreased timing to Q8  Started low dose gabapentin 100 mg TID --> 200 mg TID 10/6, discontinued due to urinary retention  Continue hot pack  Trial of TENs  Start PRN Robaxin     Seizure disorder  Tonic/clonic post-op  Kera  Outpatient referral to neurology     Hypertension  Metoprolol  Spironolactone  Appreciate hospitalist assistance    Atrial fibrillation  Metoprolol  Rate controlled    Hyperlipidemia  Statin     Anemia  Ferrous sulfate  Vit C     GI prophylaxis  Omeprazole     History of herpes simplex  Valacyclovir discontinued, patient was not taking regularly    Sleep apnea  2 L oxygen at night  To bring in home CPAP    Insomnia  Melatonin, increase dose to 9 mg (takes 10 at home)  PRN Atarax  PRN trazodone, last use 10/11    Bowel program  Constipation, resolved  Increase bowel medications  Last BM 10/10    Bladder program  Urinary retention, continues  Started Flomax 9/28, increased dose 10/5 to 0.8 mg  Still requiring catheterizations intermittently - last required 10/11  PVRs - 253  May need replacement/bladder rest  Discontinued gabapentin 10/11     DVT prophylaxis  Eliquis    Total time:  36 minutes.  I spent greater than 50% of the time for patient care, counseling, and coordination on this date, including patient face-to face time, unit/floor time with review of records/pertinent lab data and studies, as well as discussing diagnostic evaluation/work up, planned therapeutic interventions, and future disposition of care, as per the interval events/subjective and the assessment and plan as noted above.    Discussed medications, plan of care, muscle spasms, atrial fibrillation, extended discharge date.    I have performed a physical exam, reviewed and updated ROS, as well as the assessment and plan today 10/12/2021. In review of note from 10/11/2021 there are no new  changes except as documented above.          Precious Rawls M.D.   Physical Medicine and Rehabilitation

## 2021-10-12 NOTE — THERAPY
Speech Language Pathology  Daily Treatment     Patient Name: Shaista Bocanegra  Age:  76 y.o., Sex:  female  Medical Record #: [id number]  Today's Date: 10/12/2021     Precautions  Precautions: Fall Risk, Cardiac Precautions (See Comments)  Comments: Seizure prec    Subjective    Patient pleasant and cooperative.  Slept in and needed to get dressed for therapy.     Objective       10/12/21 0901   Cognition   Moderate Attention Minimal (4)   Functional Memory Activities Moderate (3)   Safety Awareness Moderate (3)   Planning / Decision Making Moderate (3)   Executive Functioning / Organization Moderate (3)   Medication Management  Supervision (5)   SLP Total Time Spent   SLP Individual Total Time Spent (Mins) 60   Treatment Charges   SLP Cognitive Skill Development First 15 Minutes 1   SLP Cognitive Skill Development Additional 15 Minutes 3       Assessment    Pt. displayed some impaired safety planning for fall prevention at onset of tx.  She attempted to stand up to walker with her phone in her hand and one hand on walker.  She then attempted to get up and walk between her bed and the wheel chair parked a few feet away without a device.  She attempted to make a deep reach to her bedside table to look at schedule, and then she went to reach for her wheel chair armrest to sit, but she wasn’t close enough to reach it.   Both this therapist and nurse were with her.  She needed verbal cues for safe management in each of these situations.    We discussed safety planning and problem solving for fall prevention, including delegating assistance if others are available, planning to place items she needs to carry before using walker, avoiding abandoning device to walk across room, and avoiding deep reaches, and backing up to the wheel chair until she feels it against legs to begin to reach for chair and sit.    Medication sorting task 15/16 correct with min cue to correct error.         Plan    Target recall of safety training,  medication management, safety planning and problem solving.    Passport items to be completed:  Express basic needs, understand food/liquid recommendations, consistently follow swallow precautions, manage finances, manage medications, arrive to therapy appointments on time, complete daily memory log entries, solve problems related to safety situations, review education related to hospitalization, complete caregiver training     Speech Therapy Problems (Active)     Problem: Expression STGs     Dates: Start: 09/29/21       Goal: STG-Within one week, patient will demonstrate use of SFA during conversation in order to improve word finding.      Dates: Start: 09/29/21       Goal Note filed on 10/11/21 1227 by Mark Roblero MS,CCC-SLP     Min cues to utilize word finding strategies.                Problem: Memory STGs     Dates: Start: 09/29/21       Goal: STG-Within one week, patient will demonstrate use of memory strategies in order to recall daily events     Dates: Start: 09/29/21       Goal Note filed on 10/11/21 1227 by Mark Roblero MS,CCC-SLP     Min cues to utilize memory book. Min-mod cues to recall daily events.                Problem: Problem Solving STGs     Dates: Start: 09/29/21       Goal: STG-Within one week, patient will complete medication management tasks with 80% accuracy, min verbal cues.      Dates: Start: 09/29/21       Goal Note filed on 10/11/21 1227 by Mark Roblero MS,CCC-SLP     Not yet targeted (focused on attn)                Problem: Speech/Swallowing LTGs     Dates: Start: 09/29/21       Goal: LTG-By discharge, patient will solve complex problems related to IADL's in order to d/c homw with mod I     Dates: Start: 09/29/21

## 2021-10-12 NOTE — CARE PLAN
Problem: Pain - Standard  Goal: Alleviation of pain or a reduction in pain to the patient’s comfort goal  Flowsheets (Taken 10/11/2021 2000)  Non Verbal Scale: Calm  Pain Rating Scale (NPRS): 2  Note: Patient able to verbalize pain level and verbalize an acceptable level of pain.      Problem: Bowel Elimination  Goal: Patient will participate in bowel management program  Note: Patient was placed on the toilet after suppository was placed at 2000. Patient did not have a BM. Patient's last BM was 10/10/21.    The patient is Stable - Low risk of patient condition declining or worsening

## 2021-10-12 NOTE — THERAPY
Physical Therapy   Daily Treatment     Patient Name: Shaista Bocanegra  Age:  76 y.o., Sex:  female  Medical Record #: [id number]  Today's Date: 10/12/2021     Precautions  Precautions: Fall Risk, Cardiac Precautions (See Comments)  Comments: Seizure prec    Subjective    Pt received in bed and agreeable to PT, c/o being very cold. Provided with warn blanket.     Objective       10/12/21 1031   Vitals   Vitals Comments Denied symptoms throughout session.   Pain   Intervention Declines   Cognition    Level of Consciousness Alert   Sleep/Wake Cycle   Sleep & Rest Awake;Resting   Gait Functional Level of Assist    Gait Level Of Assist Stand by Assist   Assistive Device   (trekking pole x2, no AD x2)   Distance (Feet) 150   # of Times Distance was Traveled 4   Deviation Bradykinetic;Decreased Heel Strike;Decreased Toe Off  (Cues for increased gait speed to improve momentum/balance)   Transfer Functional Level of Assist   Bed, Chair, Wheelchair Transfer Stand by Assist   Bed Chair Wheelchair Transfer Description Increased time   Toilet Transfers Stand by Assist   Toilet Transfer Description Grab bar;Increased time   Sitting Lower Body Exercises   Ankle Pumps 3 sets of 10   Hip Adduction 3 sets of 10   Long Arc Quad 3 sets of 10  (2# weight)   Marching 3 sets of 10  (2# weight)   Bed Mobility    Supine to Sit Modified Independent   Sit to Supine Modified Independent   Sit to Stand Stand by Assist   Interdisciplinary Plan of Care Collaboration   IDT Collaboration with  Nursing   Patient Position at End of Therapy Seated;Call Light within Reach  (in bathroom, RN aware)   Collaboration Comments Handoff re: pt in bathroom and to use call light when done.   PT Total Time Spent   PT Individual Total Time Spent (Mins) 60   PT Charge Group   PT Gait Training 2   PT Therapeutic Exercise 1   PT Therapeutic Activities 1     Provided cues for sequencing of when to place the trekking pole on the ground during contralateral limb stance  phase. Pt self-selected holding it in her R hand.    Assessment    Pt tolerated gait training with a unilateral trekking pole and with no assistive device with no losses of balance and no complaints of lightheadedness. Pt demonstrates observed improvement in gait speed when using the trekking pole. Also provided pt with and reviewed fall prevention handout with the pt per her request to talk about safety at home.  Strengths: Adequate strength, Independent prior level of function, Willingly participates in therapeutic activities  Barriers: Impaired activity tolerance, Hypotension, Fatigue, Impaired balance    Plan    Standing balance with decreased UE support, Safety education with repetition, Coordination activities, Vector vs LiteGait usage for CV endurance, Monitor BP prn.     Passport items to be completed:  Get in/out of bed safely, in/out of a vehicle, safely use mobility device, walk or wheel around home/community, navigate up and down stairs, show how to get up/down from the ground, ensure home is accessible, demonstrate HEP, complete caregiver training    Physical Therapy Problems (Active)     Problem: PT-Long Term Goals     Dates: Start: 09/29/21       Goal: LTG-By discharge, patient will ambulate 300 ft with LRAD, mod I      Dates: Start: 09/29/21          Goal: LTG-By discharge, patient will transfer one surface to another mod I with LRAD     Dates: Start: 09/29/21          Goal: LTG-By discharge, patient will ambulate up/down 12 stairs with 2HRs and spv      Dates: Start: 09/29/21          Goal: LTG-By discharge, patient will transfer in/out of a car spv with LRAD      Dates: Start: 09/29/21

## 2021-10-12 NOTE — THERAPY
Occupational Therapy  Daily Treatment     Patient Name: Shaista Bocanegra  Age:  76 y.o., Sex:  female  Medical Record #: [id number]  Today's Date: 10/12/2021     Precautions  Precautions: Fall Risk, Cardiac Precautions (See Comments)  Comments: Seizure prec    Safety   ADL Safety : Requires Supervision for Safety, Impaired Insight into Safety, Requires Cueing for Safety, Impaired  Bathroom Safety: Requires Supervision for Safety, Impaired Insight into Safety, Requires Cuing for Safety, Impaired    Subjective    Pt supine in bed upon arrival, pleasant and cooperative, agreeable to therapy      Objective       10/12/21 1401   Functional Level of Assist   Grooming Modified Independent;Seated   Toileting Supervision   Toilet Transfers Supervised  (stand pivot w/c<>toilet with GB)   Bed Mobility    Supine to Sit Modified Independent   Sit to Supine Modified Independent   Scooting Modified Independent   Rolling Modified Independent   Interdisciplinary Plan of Care Collaboration   Patient Position at End of Therapy In Bed;Call Light within Reach;Tray Table within Reach   OT Total Time Spent   OT Individual Total Time Spent (Mins) 60   OT Charge Group   OT Self Care / ADL 1   OT Manual Therapy Technique 3     manual therapy provided to B shoulders with focus on upper traps, levator scapulae. L shoulder with significantly more tension, pain, and limited cervical movement observed. STM, trigger point release, vibration provided with good results by end of session    Assessment    Pt reported significant pain at L upper shoulder and tenderness at incision site on R chest around ribs up to arm pit region at start of session - which is currently limiting her full participation in ADL's, mobility, and therapy participation. Pt initially required encouragement to participate, but ultimately agreeable to manual therapy in order to reduce pain. Pt tolerated session fairly well - with increase pain over trigger points, but otherwise good  results by end of session. Pt reported improve relief, reduce pain, and able to achieve increase cervical lateral flexion and rotation AROM.     Strengths: Able to follow instructions, Alert and oriented, Adequate strength, Effective communication skills, Independent prior level of function, Motivated for self care and independence, Pleasant and cooperative, Supportive family, Willingly participates in therapeutic activities  Barriers: Decreased endurance, Fatigue, Generalized weakness, Impaired activity tolerance, Impaired balance, Pain (lack of sleep night before initial evaluation)    Plan    Refer to Primary OT POC/goals. Can follow-up next week to re-assess muscle tension at shoulders     Occupational Therapy Goals (Active)     Problem: Dressing     Dates: Start: 10/04/21       Goal: STG-Within one week, patient will dress LB with supervision using adaptive techniques or AE as needed     Dates: Start: 10/04/21       Goal Note filed on 10/11/21 1141 by Adrian Mathews OT/L     Min A               Problem: Functional Transfers     Dates: Start: 09/29/21       Goal: STG-Within one week, patient will transfer to toilet with supervision using grab bar and/or FWW     Dates: Start: 09/29/21       Goal Note filed on 10/11/21 1141 by Adrian Mathews OT/L     SBA/supervised               Problem: OT Long Term Goals     Dates: Start: 09/29/21       Goal: LTG-By discharge, patient will complete basic self care tasks with mod I     Dates: Start: 09/29/21          Goal: LTG-By discharge, patient will perform bathroom transfers with mod I     Dates: Start: 09/29/21          Goal: LTG-By discharge, patient will complete basic home management with mod I     Dates: Start: 09/29/21             Problem: Toileting     Dates: Start: 10/04/21       Goal: STG-Within one week, patient will complete toileting tasks with supervision using grab bar and/or FWW for balance as needed     Dates: Start: 10/04/21       Goal Note filed on  10/11/21 1141 by Adrian Mathews, OT/L     SBA/supervision

## 2021-10-13 PROCEDURE — 97129 THER IVNTJ 1ST 15 MIN: CPT

## 2021-10-13 PROCEDURE — 99232 SBSQ HOSP IP/OBS MODERATE 35: CPT | Performed by: PHYSICAL MEDICINE & REHABILITATION

## 2021-10-13 PROCEDURE — 700102 HCHG RX REV CODE 250 W/ 637 OVERRIDE(OP)

## 2021-10-13 PROCEDURE — 700102 HCHG RX REV CODE 250 W/ 637 OVERRIDE(OP): Performed by: PHYSICAL MEDICINE & REHABILITATION

## 2021-10-13 PROCEDURE — 97140 MANUAL THERAPY 1/> REGIONS: CPT

## 2021-10-13 PROCEDURE — 97130 THER IVNTJ EA ADDL 15 MIN: CPT

## 2021-10-13 PROCEDURE — 770010 HCHG ROOM/CARE - REHAB SEMI PRIVAT*

## 2021-10-13 PROCEDURE — 97110 THERAPEUTIC EXERCISES: CPT

## 2021-10-13 PROCEDURE — A9270 NON-COVERED ITEM OR SERVICE: HCPCS | Performed by: PHYSICAL MEDICINE & REHABILITATION

## 2021-10-13 PROCEDURE — A9270 NON-COVERED ITEM OR SERVICE: HCPCS

## 2021-10-13 PROCEDURE — 97535 SELF CARE MNGMENT TRAINING: CPT

## 2021-10-13 PROCEDURE — 700102 HCHG RX REV CODE 250 W/ 637 OVERRIDE(OP): Performed by: HOSPITALIST

## 2021-10-13 PROCEDURE — A9270 NON-COVERED ITEM OR SERVICE: HCPCS | Performed by: HOSPITALIST

## 2021-10-13 PROCEDURE — 97530 THERAPEUTIC ACTIVITIES: CPT

## 2021-10-13 PROCEDURE — 700101 HCHG RX REV CODE 250: Performed by: PHYSICAL MEDICINE & REHABILITATION

## 2021-10-13 RX ORDER — TRAZODONE HYDROCHLORIDE 50 MG/1
100 TABLET ORAL
Status: DISCONTINUED | OUTPATIENT
Start: 2021-10-13 | End: 2021-10-14

## 2021-10-13 RX ORDER — TRAZODONE HYDROCHLORIDE 50 MG/1
TABLET ORAL
Status: COMPLETED
Start: 2021-10-13 | End: 2021-10-13

## 2021-10-13 RX ADMIN — SENNOSIDES AND DOCUSATE SODIUM 2 TABLET: 8.6; 5 TABLET ORAL at 08:02

## 2021-10-13 RX ADMIN — SENNOSIDES AND DOCUSATE SODIUM 2 TABLET: 8.6; 5 TABLET ORAL at 21:20

## 2021-10-13 RX ADMIN — TAMSULOSIN HYDROCHLORIDE 0.8 MG: 0.4 CAPSULE ORAL at 17:14

## 2021-10-13 RX ADMIN — APIXABAN 5 MG: 5 TABLET, FILM COATED ORAL at 08:02

## 2021-10-13 RX ADMIN — LEVETIRACETAM 1000 MG: 500 TABLET, FILM COATED ORAL at 21:21

## 2021-10-13 RX ADMIN — HYDROXYZINE HYDROCHLORIDE 50 MG: 25 TABLET, FILM COATED ORAL at 21:21

## 2021-10-13 RX ADMIN — OMEPRAZOLE 20 MG: 20 CAPSULE, DELAYED RELEASE ORAL at 08:02

## 2021-10-13 RX ADMIN — TRAZODONE HYDROCHLORIDE 100 MG: 50 TABLET ORAL at 21:34

## 2021-10-13 RX ADMIN — HYDROCODONE BITARTRATE AND ACETAMINOPHEN 1 TABLET: 5; 325 TABLET ORAL at 21:26

## 2021-10-13 RX ADMIN — LIDOCAINE 1 PATCH: 50 PATCH TOPICAL at 08:03

## 2021-10-13 RX ADMIN — ATORVASTATIN CALCIUM 80 MG: 40 TABLET, FILM COATED ORAL at 21:21

## 2021-10-13 RX ADMIN — METHOCARBAMOL 500 MG: 500 TABLET ORAL at 21:22

## 2021-10-13 RX ADMIN — LEVETIRACETAM 1000 MG: 500 TABLET, FILM COATED ORAL at 08:02

## 2021-10-13 RX ADMIN — HYDROCODONE BITARTRATE AND ACETAMINOPHEN 1 TABLET: 5; 325 TABLET ORAL at 09:13

## 2021-10-13 RX ADMIN — OXYCODONE HYDROCHLORIDE AND ACETAMINOPHEN 500 MG: 500 TABLET ORAL at 08:02

## 2021-10-13 RX ADMIN — FERROUS SULFATE TAB 325 MG (65 MG ELEMENTAL FE) 325 MG: 325 (65 FE) TAB at 08:02

## 2021-10-13 RX ADMIN — METOPROLOL TARTRATE 12.5 MG: 25 TABLET, FILM COATED ORAL at 17:15

## 2021-10-13 RX ADMIN — POLYETHYLENE GLYCOL 3350 1 PACKET: 17 POWDER, FOR SOLUTION ORAL at 08:01

## 2021-10-13 RX ADMIN — MELATONIN TAB 3 MG 9 MG: 3 TAB at 21:22

## 2021-10-13 RX ADMIN — OXYCODONE HYDROCHLORIDE AND ACETAMINOPHEN 500 MG: 500 TABLET ORAL at 21:22

## 2021-10-13 RX ADMIN — ASPIRIN 81 MG: 81 TABLET, COATED ORAL at 08:02

## 2021-10-13 RX ADMIN — APIXABAN 5 MG: 5 TABLET, FILM COATED ORAL at 21:22

## 2021-10-13 ASSESSMENT — ACTIVITIES OF DAILY LIVING (ADL)
BED_CHAIR_WHEELCHAIR_TRANSFER_DESCRIPTION: ADAPTIVE EQUIPMENT;SET-UP OF EQUIPMENT;SUPERVISION FOR SAFETY
TOILETING_LEVEL_OF_ASSIST_DESCRIPTION: SUPERVISION FOR SAFETY
TOILET_TRANSFER_DESCRIPTION: ADAPTIVE EQUIPMENT;GRAB BAR;SUPERVISION FOR SAFETY
TUB_SHOWER_TRANSFER_DESCRIPTION: GRAB BAR;SHOWER BENCH;SET-UP OF EQUIPMENT;SUPERVISION FOR SAFETY
BED_CHAIR_WHEELCHAIR_TRANSFER_DESCRIPTION: INCREASED TIME;SET-UP OF EQUIPMENT;SUPERVISION FOR SAFETY
TOILET_TRANSFER_DESCRIPTION: GRAB BAR;SET-UP OF EQUIPMENT;SUPERVISION FOR SAFETY

## 2021-10-13 NOTE — CARE PLAN
The patient is Watcher - Medium risk of patient condition declining or worsening    Shift Goals  Clinical Goals: Pain management, Safety      Problem: Pain - Standard  Goal: Alleviation of pain or a reduction in pain to the patient’s comfort goal  Note: Patient had 5/10 pain in sternum, PRN norco administered and scheduled lidocaine patches have been applied.      Problem: Bowel Elimination  Goal: Patient will participate in bowel management program  Note: Patient continent of bowel this shift, last BM 10/13/21, large and soft.

## 2021-10-13 NOTE — THERAPY
"Occupational Therapy  Daily Treatment     Patient Name: Shaista Bocanegra  Age:  76 y.o., Sex:  female  Medical Record #: [id number]  Today's Date: 10/13/2021     Precautions  Precautions: (P) Fall Risk, Cardiac Precautions (See Comments)  Comments: (P) seizure px     Safety   ADL Safety : Requires Supervision for Safety, Impaired Insight into Safety, Requires Cueing for Safety, Impaired  Bathroom Safety: Requires Supervision for Safety, Impaired Insight into Safety, Requires Cuing for Safety, Impaired    Subjective    \"I need a shower.\"   Patient expressed happiness her discharge date was extended two weeks because she didn't think she was ready to go home.     Objective       10/13/21 1231   Precautions   Precautions Fall Risk;Cardiac Precautions (See Comments)   Comments seizure px    Functional Level of Assist   Eating Independent   Grooming Supervision;Standing   Grooming Description Standing at sink;Supervision for safety  (to brush hair, cues to brush back of hair)   Bathing Supervision   Bathing Description Set-up of equipment;Supervision for safety;Tub bench;Grab bar;Hand held shower  (remained seated)   Upper Body Dressing Stand by Assist   Upper Body Dressing Description Assist device equipment;Supervision for safety  (sba to get from closet w/ FWW, supervised seated to don/doff)   Lower Body Dressing Stand by Assist   Lower Body Dressing Description Increased time;Supervision for safety;Verbal cueing  (sba to get from closet, SBA to don/doff socks/pants/pullups)   Toileting Stand by Assist   Toileting Description Supervision for safety   Bed, Chair, Wheelchair Transfer Stand by Assist   Bed Chair Wheelchair Transfer Description Adaptive equipment;Set-up of equipment;Supervision for safety  (FWW)   Toilet Transfers Supervised   Toilet Transfer Description Adaptive equipment;Grab bar;Supervision for safety   Tub / Shower Transfers Stand by Assist   Tub Shower Transfer Description Grab bar;Shower bench;Set-up of " equipment;Supervision for safety   Bed Mobility    Supine to Sit Modified Independent   Sit to Supine Modified Independent   Scooting Modified Independent   Interdisciplinary Plan of Care Collaboration   Patient Position at End of Therapy In Bed;Call Light within Reach;Tray Table within Reach;Phone within Reach   OT Total Time Spent   OT Individual Total Time Spent (Mins) 60   OT Charge Group   OT Self Care / ADL 4     Functional mobility with FWW in room and bathroom with SBA.  Assist required to don her watch on wrist.  Encouraged patient to stay out of bed more often when not in therapy.      Assessment    Completed adl routine with SBA, increased time, verbal cues for sequencing activities and encouragement to do more for herself.  Patient is concerned she may get sores on her bottom if she sits in the chair when not in therapy.  Strengths: Able to follow instructions, Alert and oriented, Adequate strength, Effective communication skills, Independent prior level of function, Motivated for self care and independence, Pleasant and cooperative, Supportive family, Willingly participates in therapeutic activities  Barriers: Decreased endurance, Fatigue, Generalized weakness, Impaired activity tolerance, Impaired balance, Pain (lack of sleep night before initial evaluation)    Plan    IADLs, standing balance, overall strength/endurance    Occupational Therapy Goals (Active)     Problem: Dressing     Dates: Start: 10/04/21       Goal: STG-Within one week, patient will dress LB with supervision using adaptive techniques or AE as needed     Dates: Start: 10/04/21       Goal Note filed on 10/11/21 1141 by Adrian Mathews, OT/L     Min A               Problem: Functional Transfers     Dates: Start: 09/29/21       Goal: STG-Within one week, patient will transfer to toilet with supervision using grab bar and/or FWW     Dates: Start: 09/29/21       Goal Note filed on 10/11/21 1141 by Adrian Mathews OT/L      SBA/supervised               Problem: OT Long Term Goals     Dates: Start: 09/29/21       Goal: LTG-By discharge, patient will complete basic self care tasks with mod I     Dates: Start: 09/29/21          Goal: LTG-By discharge, patient will perform bathroom transfers with mod I     Dates: Start: 09/29/21          Goal: LTG-By discharge, patient will complete basic home management with mod I     Dates: Start: 09/29/21             Problem: Toileting     Dates: Start: 10/04/21       Goal: STG-Within one week, patient will complete toileting tasks with supervision using grab bar and/or FWW for balance as needed     Dates: Start: 10/04/21       Goal Note filed on 10/11/21 1141 by Adrian Mathews OT/L     SBA/supervision

## 2021-10-13 NOTE — THERAPY
Speech Language Pathology  Daily Treatment     Patient Name: Shaista Bocanegra  Age:  76 y.o., Sex:  female  Medical Record #: [id number]  Today's Date: 10/13/2021     Precautions  Precautions: Fall Risk, Cardiac Precautions (See Comments)  Comments: seizure px     Subjective    Patient was willing to participate. Complains of fatigue after OT and requested therapy bedside.      Objective       10/13/21 1351   Cognition   Executive Functioning / Organization Minimal (4)   SLP Total Time Spent   SLP Individual Total Time Spent (Mins) 60   Treatment Charges   SLP Cognitive Skill Development First 15 Minutes 1   SLP Cognitive Skill Development Additional 15 Minutes 3       Assessment    Initiated calendar organization task. Patient required min cues and extra time to transfer simple appointments to calendar.     Plan    Continue calendar organization task.       Speech Therapy Problems (Active)     Problem: Expression STGs     Dates: Start: 09/29/21       Goal: STG-Within one week, patient will demonstrate use of SFA during conversation in order to improve word finding.      Dates: Start: 09/29/21       Goal Note filed on 10/11/21 1227 by Mark Roblero MS,CCC-SLP     Min cues to utilize word finding strategies.                Problem: Memory STGs     Dates: Start: 09/29/21       Goal: STG-Within one week, patient will demonstrate use of memory strategies in order to recall daily events     Dates: Start: 09/29/21       Goal Note filed on 10/11/21 1227 by Mark Roblero MS,CCC-SLP     Min cues to utilize memory book. Min-mod cues to recall daily events.                Problem: Problem Solving STGs     Dates: Start: 09/29/21       Goal: STG-Within one week, patient will complete medication management tasks with 80% accuracy, min verbal cues.      Dates: Start: 09/29/21       Goal Note filed on 10/11/21 1227 by Mark Roblero MS,CCC-SLP     Not yet targeted (focused on attn)                Problem: Speech/Swallowing LTGs      Dates: Start: 09/29/21       Goal: LTG-By discharge, patient will solve complex problems related to IADL's in order to d/c homw with mod I     Dates: Start: 09/29/21

## 2021-10-13 NOTE — CARE PLAN
Problem: Knowledge Deficit - Standard  Goal: Patient and family/care givers will demonstrate understanding of plan of care, disease process/condition, diagnostic tests and medications  Outcome: Progressing  Note: Educated patient regarding new medications ordered - patient requested atarax and muscle relaxant in addition to HS medications she has been taking.     Problem: Skin Integrity  Goal: Patient's skin integrity will be maintained or improve  Outcome: Progressing  Note: Patient educated to change positions to minimize the potential of skin break down and possible further complications of open wounds due to pressure, when sitting or laying for periods over half of an hour. Patient engaged in education and verbalizes understanding.     The patient is Stable - Low risk of patient condition declining or worsening    Shift Goals  Clinical Goals: pain management  Patient Goals: sleep (patient requested atarax states thoughts are keeping her up)    Progress made toward(s) clinical / shift goals:  Patient is resting comfortably in bed    Patient is not progressing towards the following goals:

## 2021-10-13 NOTE — THERAPY
"Physical Therapy   Daily Treatment     Patient Name: Shaista Bocanegra  Age:  76 y.o., Sex:  female  Medical Record #: [id number]  Today's Date: 10/13/2021     Precautions  Precautions: Fall Risk, Cardiac Precautions (See Comments)  Comments: seizure px     Subjective    \"My legs hurt and I really want to do something about this left one.\"      Objective       10/13/21 0931   Precautions   Precautions Fall Risk;Cardiac Precautions (See Comments)   Comments seizure px    Transfer Functional Level of Assist   Bed, Chair, Wheelchair Transfer Supervised   Bed Chair Wheelchair Transfer Description Increased time;Set-up of equipment;Supervision for safety   Toilet Transfers Supervised   Toilet Transfer Description Grab bar;Set-up of equipment;Supervision for safety   Sitting Lower Body Exercises   Nustep Resistance Level 4  (10 min, 360 steps)   Interdisciplinary Plan of Care Collaboration   Patient Position at End of Therapy Seated;Call Light within Reach;Tray Table within Reach   PT Total Time Spent   PT Individual Total Time Spent (Mins) 60   PT Charge Group   PT Therapeutic Exercise 1   PT Therapeutic Activities 2   PT Manual Therapy 1     Manual therapy to LLE: STM and trigger point to distal quads and tibialis anterior, passive stretching and PROM to L ankle dorsiflexion and plantar flexion. Pt in supine with bolster under knees during this time. Moist hot pack issued end of session.     Pt completed dressing ([facility] gown and donning dress) and brushed teeth while seated on toilet/ standing with grab bar, spv/ set-up.     Assessment    Pt requested manual therapy to LLE this session for pain control. Good functional strength demonstrated.     Strengths: Adequate strength, Independent prior level of function, Willingly participates in therapeutic activities  Barriers: Impaired activity tolerance, Hypotension, Fatigue, Impaired balance    Plan    Gait training with no AD vs walking stick, LE strength and " endurance, standing tolerance and balance.       Physical Therapy Problems (Active)     Problem: PT-Long Term Goals     Dates: Start: 09/29/21       Goal: LTG-By discharge, patient will ambulate 300 ft with LRAD, mod I      Dates: Start: 09/29/21          Goal: LTG-By discharge, patient will transfer one surface to another mod I with LRAD     Dates: Start: 09/29/21          Goal: LTG-By discharge, patient will ambulate up/down 12 stairs with 2HRs and spv      Dates: Start: 09/29/21          Goal: LTG-By discharge, patient will transfer in/out of a car spv with LRAD      Dates: Start: 09/29/21

## 2021-10-14 PROCEDURE — A9270 NON-COVERED ITEM OR SERVICE: HCPCS | Performed by: PHYSICAL MEDICINE & REHABILITATION

## 2021-10-14 PROCEDURE — A9270 NON-COVERED ITEM OR SERVICE: HCPCS | Performed by: HOSPITALIST

## 2021-10-14 PROCEDURE — 99233 SBSQ HOSP IP/OBS HIGH 50: CPT | Performed by: PHYSICAL MEDICINE & REHABILITATION

## 2021-10-14 PROCEDURE — 97535 SELF CARE MNGMENT TRAINING: CPT

## 2021-10-14 PROCEDURE — 97530 THERAPEUTIC ACTIVITIES: CPT

## 2021-10-14 PROCEDURE — 97130 THER IVNTJ EA ADDL 15 MIN: CPT

## 2021-10-14 PROCEDURE — 700101 HCHG RX REV CODE 250: Performed by: PHYSICAL MEDICINE & REHABILITATION

## 2021-10-14 PROCEDURE — 700102 HCHG RX REV CODE 250 W/ 637 OVERRIDE(OP): Performed by: HOSPITALIST

## 2021-10-14 PROCEDURE — 97110 THERAPEUTIC EXERCISES: CPT

## 2021-10-14 PROCEDURE — 770010 HCHG ROOM/CARE - REHAB SEMI PRIVAT*

## 2021-10-14 PROCEDURE — 97129 THER IVNTJ 1ST 15 MIN: CPT

## 2021-10-14 PROCEDURE — 700102 HCHG RX REV CODE 250 W/ 637 OVERRIDE(OP): Performed by: PHYSICAL MEDICINE & REHABILITATION

## 2021-10-14 RX ORDER — TRAZODONE HYDROCHLORIDE 50 MG/1
50 TABLET ORAL NIGHTLY PRN
Status: DISCONTINUED | OUTPATIENT
Start: 2021-10-14 | End: 2021-10-22 | Stop reason: HOSPADM

## 2021-10-14 RX ADMIN — MELATONIN TAB 3 MG 9 MG: 3 TAB at 20:30

## 2021-10-14 RX ADMIN — ATORVASTATIN CALCIUM 80 MG: 40 TABLET, FILM COATED ORAL at 20:30

## 2021-10-14 RX ADMIN — LEVETIRACETAM 1000 MG: 500 TABLET, FILM COATED ORAL at 08:18

## 2021-10-14 RX ADMIN — APIXABAN 5 MG: 5 TABLET, FILM COATED ORAL at 20:30

## 2021-10-14 RX ADMIN — OXYCODONE HYDROCHLORIDE AND ACETAMINOPHEN 500 MG: 500 TABLET ORAL at 20:30

## 2021-10-14 RX ADMIN — OXYCODONE HYDROCHLORIDE AND ACETAMINOPHEN 500 MG: 500 TABLET ORAL at 08:17

## 2021-10-14 RX ADMIN — TAMSULOSIN HYDROCHLORIDE 0.8 MG: 0.4 CAPSULE ORAL at 17:17

## 2021-10-14 RX ADMIN — APIXABAN 5 MG: 5 TABLET, FILM COATED ORAL at 08:17

## 2021-10-14 RX ADMIN — SENNOSIDES AND DOCUSATE SODIUM 2 TABLET: 8.6; 5 TABLET ORAL at 20:30

## 2021-10-14 RX ADMIN — LEVETIRACETAM 1000 MG: 500 TABLET, FILM COATED ORAL at 20:29

## 2021-10-14 RX ADMIN — LIDOCAINE 1 PATCH: 50 PATCH TOPICAL at 20:30

## 2021-10-14 RX ADMIN — ASPIRIN 81 MG: 81 TABLET, COATED ORAL at 08:17

## 2021-10-14 RX ADMIN — METOPROLOL TARTRATE 12.5 MG: 25 TABLET, FILM COATED ORAL at 17:17

## 2021-10-14 RX ADMIN — FERROUS SULFATE TAB 325 MG (65 MG ELEMENTAL FE) 325 MG: 325 (65 FE) TAB at 08:17

## 2021-10-14 RX ADMIN — SENNOSIDES AND DOCUSATE SODIUM 2 TABLET: 8.6; 5 TABLET ORAL at 08:18

## 2021-10-14 RX ADMIN — LIDOCAINE 1 PATCH: 50 PATCH TOPICAL at 08:18

## 2021-10-14 ASSESSMENT — PAIN DESCRIPTION - PAIN TYPE: TYPE: ACUTE PAIN

## 2021-10-14 ASSESSMENT — ACTIVITIES OF DAILY LIVING (ADL)
BED_CHAIR_WHEELCHAIR_TRANSFER_DESCRIPTION: SET-UP OF EQUIPMENT;SUPERVISION FOR SAFETY;ADAPTIVE EQUIPMENT
TOILET_TRANSFER_DESCRIPTION: GRAB BAR;ADAPTIVE EQUIPMENT
TOILETING_LEVEL_OF_ASSIST_DESCRIPTION: ADAPTIVE EQUIPMENT;SET-UP OF EQUIPMENT;SUPERVISION FOR SAFETY

## 2021-10-14 NOTE — THERAPY
"Physical Therapy   Daily Treatment     Patient Name: Shaista Bocanegra  Age:  76 y.o., Sex:  female  Medical Record #: [id number]  Today's Date: 10/14/2021     Precautions  Precautions: Fall Risk, Cardiac Precautions (See Comments)  Comments: seizure     Subjective    \"I can't get up. I am just SO tired!\" Agreeable to supine exercises in bed.      Objective       10/14/21 0931   Precautions   Precautions Fall Risk;Cardiac Precautions (See Comments)   Comments seizure    Supine Lower Body Exercise   Bridges 2 sets of 15  (Partial lift )   Hip Abduction 2 sets of 15   Hip Adduction  2 sets of 15   Straight Leg Raises 3 sets of 15   Heel Slide 3 sets of 15   Ankle Pumps 3 sets of 15   Comments Above exercises completed in bed with constant verbal cuing for attention and alertness    Interdisciplinary Plan of Care Collaboration   IDT Collaboration with  Physician   Patient Position at End of Therapy In Bed;Call Light within Reach;Tray Table within Reach   Collaboration Comments Dr Rawls regarding medical hold due to lethargy    Therapy Missed   Missed Therapy (Minutes) 30   Reason For Missed Therapy Medical - Patient on Hold from Therapy;Medical - Patient not Able To Participate  (pt with significant lethargy )   PT Total Time Spent   PT Individual Total Time Spent (Mins) 30   PT Charge Group   PT Therapeutic Exercise 2       Assessment    Pt limited this session by siginifcant lethargy, requiring frequent redirection to task and verbal cuing for alertness. Medical hold placed for second half of session.    Strengths: Adequate strength, Independent prior level of function, Willingly participates in therapeutic activities  Barriers: Impaired activity tolerance, Hypotension, Fatigue, Impaired balance    Plan    Gait training with no AD vs walking stick, LE strength and endurance, standing tolerance and balance.       Physical Therapy Problems (Active)     Problem: PT-Long Term Goals     Dates: Start: 09/29/21       Goal: " LTG-By discharge, patient will ambulate 300 ft with LRAD, mod I      Dates: Start: 09/29/21          Goal: LTG-By discharge, patient will transfer one surface to another mod I with LRAD     Dates: Start: 09/29/21          Goal: LTG-By discharge, patient will ambulate up/down 12 stairs with 2HRs and spv      Dates: Start: 09/29/21          Goal: LTG-By discharge, patient will transfer in/out of a car spv with LRAD      Dates: Start: 09/29/21

## 2021-10-14 NOTE — CARE PLAN
Problem: Knowledge Deficit - Standard  Goal: Patient and family/care givers will demonstrate understanding of plan of care, disease process/condition, diagnostic tests and medications  Outcome: Progressing     Problem: Pain - Standard  Goal: Alleviation of pain or a reduction in pain to the patient’s comfort goal  Outcome: Progressing   The patient is Stable - Low risk of patient condition declining or worsening    Shift Goals  Clinical Goals: Pain management, Safety  Patient Goals: sleep    Progress made toward(s) clinical / shift goals:  patient is resting in bed    Patient is not progressing towards the following goals:

## 2021-10-14 NOTE — THERAPY
Speech Language Pathology  Daily Treatment     Patient Name: Shaista Bocanegra  Age:  76 y.o., Sex:  female  Medical Record #: [id number]  Today's Date: 10/14/2021     Precautions  Precautions: Fall Risk, Cardiac Precautions (See Comments)  Comments: seizure     Subjective    Patient was willing to participate bedside.      Objective       10/14/21 0902   Cognition   Moderate Attention Moderate (3)   Complex Reasoning  / Problem Solving Moderate (3)   Planning / Decision Making Moderate (3)   SLP Total Time Spent   SLP Individual Total Time Spent (Mins) 60   Treatment Charges   SLP Cognitive Skill Development First 15 Minutes 1   SLP Cognitive Skill Development Additional 15 Minutes 3       Assessment    Continued calendar task from previous session. Patient required mod-max cues for initiation and organization of items. Patient with frequent c/o fatigue and requesting rest breaks. Task was eventually abandoned.   Patient was not able to provide specifics regarding d/c planning and possible need for cargiver assistance. Discussed likely d/c next week. Patient was not able to provide a plan for hiring or locating caregivers, but states a friend will stay with her for three weeks.        Plan    Problem solving related to d/c planning. Basic attention and functional problem solving tasks.       Speech Therapy Problems (Active)     Problem: Expression STGs     Dates: Start: 09/29/21       Goal: STG-Within one week, patient will demonstrate use of SFA during conversation in order to improve word finding.      Dates: Start: 09/29/21       Goal Note filed on 10/11/21 1227 by Mark Roblero MS,CCC-SLP     Min cues to utilize word finding strategies.                Problem: Memory STGs     Dates: Start: 09/29/21       Goal: STG-Within one week, patient will demonstrate use of memory strategies in order to recall daily events     Dates: Start: 09/29/21       Goal Note filed on 10/11/21 1227 by Mark Roblero MS,CCC-SLP      Min cues to utilize memory book. Min-mod cues to recall daily events.                Problem: Problem Solving STGs     Dates: Start: 09/29/21       Goal: STG-Within one week, patient will complete medication management tasks with 80% accuracy, min verbal cues.      Dates: Start: 09/29/21       Goal Note filed on 10/11/21 1227 by Mark Roblero MS,CCC-SLP     Not yet targeted (focused on attn)                Problem: Speech/Swallowing LTGs     Dates: Start: 09/29/21       Goal: LTG-By discharge, patient will solve complex problems related to IADL's in order to d/c homw with mod I     Dates: Start: 09/29/21

## 2021-10-14 NOTE — PROGRESS NOTES
"Rehab Progress Note     Date of Service: 10/13/2021  Chief Complaint: follow up MVR repair    Interval Events (Subjective)    Patient seen and examined today in her room.  She reports she slept better last night.  She was able to fall asleep at 9 and was up at 4 AM which is an improvement.  She reports decreased pain in her upper back and shoulders after getting some work done by occupational therapy yesterday.  She has no new complaints today.      ROS: No changes to bowel, bladder, or mood.            Objective:  VITAL SIGNS: /76   Pulse 80   Temp 36.5 °C (97.7 °F) (Temporal)   Resp 17   Ht 1.676 m (5' 6\")   Wt 60 kg (132 lb 3.2 oz)   SpO2 98%   BMI 21.34 kg/m²   Gen: alert, no apparent distress  Cardiovascular: Regular rate, irregular rhythm  Respiratory: Clear to auscultation  Neuro: notable for generalized weakness      Recent Results (from the past 72 hour(s))   Basic Metabolic Panel    Collection Time: 10/11/21  6:08 AM   Result Value Ref Range    Sodium 142 135 - 145 mmol/L    Potassium 4.0 3.6 - 5.5 mmol/L    Chloride 109 96 - 112 mmol/L    Co2 23 20 - 33 mmol/L    Glucose 88 65 - 99 mg/dL    Bun 11 8 - 22 mg/dL    Creatinine 0.62 0.50 - 1.40 mg/dL    Calcium 8.8 8.5 - 10.5 mg/dL    Anion Gap 10.0 7.0 - 16.0   ESTIMATED GFR    Collection Time: 10/11/21  6:08 AM   Result Value Ref Range    GFR If African American >60 >60 mL/min/1.73 m 2    GFR If Non African American >60 >60 mL/min/1.73 m 2       Current Facility-Administered Medications   Medication Frequency   • melatonin tablet 9 mg QHS   • hydrOXYzine HCl (ATARAX) tablet 50 mg Q6HRS PRN   • methocarbamol (ROBAXIN) tablet 500 mg 4X/DAY PRN   • HYDROcodone-acetaminophen (NORCO) 5-325 MG per tablet 1-2 Tablet Q8HRS PRN   • tamsulosin (FLOMAX) capsule 0.8 mg AFTER DINNER   • lidocaine (LIDODERM) 5 % 1 Patch Q24HR   • metoprolol tartrate (LOPRESSOR) tablet 12.5 mg TWICE DAILY   • lidocaine (LIDODERM) 5 % 1 Patch Q24HR   • polyethylene " glycol/lytes (MIRALAX) PACKET 1 Packet DAILY    And   • senna-docusate (PERICOLACE or SENOKOT S) 8.6-50 MG per tablet 2 Tablet BID    And   • magnesium hydroxide (MILK OF MAGNESIA) suspension 30 mL QDAY PRN    And   • bisacodyl (DULCOLAX) suppository 10 mg DAILY   • simethicone (MYLICON) chewable tab 80 mg TID PRN   • Respiratory Therapy Consult Continuous RT   • Pharmacy Consult Request ...Pain Management Review 1 Each PHARMACY TO DOSE   • acetaminophen (Tylenol) tablet 650 mg Q4HRS PRN   • lactulose 20 GM/30ML solution 30 mL QDAY PRN   • docusate sodium (ENEMEEZ) enema 283 mg QDAY PRN   • sodium phosphate (Fleet) enema 133 mL QDAY PRN   • omeprazole (PRILOSEC) capsule 20 mg QAM AC   • artificial tears ophthalmic solution 1 Drop PRN   • benzocaine-menthol (CEPACOL) lozenge 1 Lozenge Q2HRS PRN   • mag hydrox-al hydrox-simeth (MAALOX PLUS ES or MYLANTA DS) suspension 20 mL Q2HRS PRN   • ondansetron (ZOFRAN ODT) dispertab 4 mg 4X/DAY PRN    Or   • ondansetron (ZOFRAN) syringe/vial injection 4 mg 4X/DAY PRN   • traZODone (DESYREL) tablet 50 mg QHS PRN   • sodium chloride (OCEAN) 0.65 % nasal spray 2 Spray PRN   • aspirin EC (ECOTRIN) tablet 81 mg DAILY   • ascorbic acid (Vitamin C) tablet 500 mg BID   • ferrous sulfate tablet 325 mg QDAY with Breakfast   • levETIRAcetam (KEPPRA) tablet 1,000 mg Q12HRS   • spironolactone (ALDACTONE) tablet 25 mg Q DAY   • atorvastatin (LIPITOR) tablet 80 mg Q EVENING   • apixaban (ELIQUIS) tablet 5 mg BID       Orders Placed This Encounter   Procedures   • Diet Order Diet: Regular (chopped meats)     Standing Status:   Standing     Number of Occurrences:   1     Order Specific Question:   Diet:     Answer:   Regular [1]     Comments:   chopped meats       Assessment:  Active Hospital Problems    Diagnosis    • *S/P mitral valve repair    • Hypoalbuminemia    • Other constipation    • Breast cancer (HCC)    • Upper back pain on right side    • Impaired mobility and ADLs    • Seizure  disorder (HCC)    • Other hyperlipidemia    • Urinary retention    • Anemia    • Atrial fibrillation (HCC)    • Herpes    • Sleep apnea    • Hypertension    • History of CVA (cerebrovascular accident)      This patient is a 76 y.o. female admitted for acute inpatient rehabilitation with impaired cardiac endurance S/P mitral valve repair.    I led and attended the weekly conference, and agree with the IDT conference documentation and plan of care as noted below.    Date of conference: 10/11/2021    Goals and barriers: See IDT note.    Biggest barriers: impaired balance, impaired safety awareness, cognitive impairment, poor attention to detail, urinary retention    Goals in next week: discharge to SNF    CM/social support: has family, but friend will support    Anticipated DC date: 10/28    Home health: PT/OT/SLP/RN    Equip: shower chair, FWW    Follow up: PCP, cardiology, neurology, Dr. Sosa, urology       Medical Decision Making and Plan:    S/p Mitral Valve repair  Dr. Nogueira 9/16  Continue full rehab program  PT/OT/SLP, 1 hr each discipline, 5 days per week    Outpatient follow up with cardiology and Dr. Nogueira as needed    Aspirin  Spironolactone      History of strokes  Cardio-embolic  Residual left hemiparesis, mild  Aphasia, improved  Dysphagia, resolved  Cognitive impairment, continues  Generalized weakness/poor endurance  Continue full rehab program  PT/OT/SLP, 1 hr each discipline, 5 days per week     Eliquis  Statin     Outpatient follow up with Dr. Sosa, referral made    Right upper back pain, improved  Left upper back/neck pain, improved  Right anterior chest wall pain, improved  Muscle spasms from her surgery  Lidoderm patch  Checked ultrasound - hematoma on right chest wall  PRN tylenol, last use 10/4  PRN oxycodone, not effective for right-sided chest wall pain  Switched to Norco, last use 10/13, decreased timing to Q8  Started low dose gabapentin 100 mg TID --> 200 mg TID 10/6, discontinued due to  urinary retention  Continue hot pack  Trial of TENs  Started PRN Robaxin, last use 10/12     Seizure disorder  Tonic/clonic post-op  Keppra  Outpatient referral to neurology     Hypertension  Metoprolol  Spironolactone  Appreciate hospitalist assistance    Atrial fibrillation  Metoprolol  Rate controlled    Hyperlipidemia  Statin     Anemia  Ferrous sulfate  Vit C     GI prophylaxis  Omeprazole     History of herpes simplex  Valacyclovir discontinued, patient was not taking regularly    Sleep apnea  2 L oxygen at night  To bring in home CPAP    Insomnia  Melatonin, increase dose to 9 mg (takes 10 at home)  PRN Atarax, last use 10/12  PRN trazodone, last use 10/12, increase dose and schedule    Bowel program  Constipation, resolved  Increase bowel medications  Last BM 10/14    Bladder program  Urinary retention, continues  Started Flomax 9/28, increased dose 10/5 to 0.8 mg  Still requiring catheterizations intermittently - last required 10/11  PVRs - 253  May need replacement/bladder rest  Discontinued gabapentin 10/11     DVT prophylaxis  Eliquis    Total time:  27 minutes.  I spent greater than 50% of the time for patient care, counseling, and coordination on this date, including patient face-to face time, unit/floor time with review of records/pertinent lab data and studies, as well as discussing diagnostic evaluation/work up, planned therapeutic interventions, and future disposition of care, as per the interval events/subjective and the assessment and plan as noted above.    I have performed a physical exam, reviewed and updated ROS, as well as the assessment and plan today 10/13/2021. In review of note from 10/12/2021 there are no new changes except as documented above.      Precious Rawls M.D.   Physical Medicine and Rehabilitation

## 2021-10-14 NOTE — THERAPY
Occupational Therapy  Daily Treatment     Patient Name: Shaista Bocanegra  Age:  76 y.o., Sex:  female  Medical Record #: [id number]  Today's Date: 10/14/2021     Precautions  Precautions: (P) Fall Risk, Cardiac Precautions (See Comments)  Comments: (P) seizure prec    Safety   ADL Safety : Requires Supervision for Safety, Impaired Insight into Safety, Requires Cueing for Safety, Impaired  Bathroom Safety: Requires Supervision for Safety, Impaired Insight into Safety, Requires Cuing for Safety, Impaired    Subjective    Patient asleep in bed upon OT arrival for 7 am session.  Patient agreeable to get up, but took an extensive amount of time and cueing to do so.     Objective       10/14/21 0701   Precautions   Precautions Fall Risk;Cardiac Precautions (See Comments)   Comments seizure prec   Cognition    Level of Consciousness   (slow processing today)   Attention Impaired   Initiation Impaired   Functional Level of Assist   Grooming Stand by Assist;Standing   Grooming Description Supervision for safety;Standing at sink;Verbal cueing  (wash face, brush teeth standing)   Upper Body Dressing Moderate Assist   Upper Body Dressing Description Set-up of equipment;Supervision for safety;Increased time;Assist with closures  (assist to pull shirt down, assist to button shirt)   Lower Body Dressing Stand by Assist   Lower Body Dressing Description Set-up of equipment;Supervision for safety;Increased time  (setup/sba to don pants )   Toileting Stand by Assist   Toileting Description Adaptive equipment;Set-up of equipment;Supervision for safety   Bed, Chair, Wheelchair Transfer Stand by Assist   Bed Chair Wheelchair Transfer Description Set-up of equipment;Supervision for safety;Adaptive equipment  (fww)   Toilet Transfers Stand by Assist   Toilet Transfer Description Grab bar;Adaptive equipment  (fww)   Bed Mobility    Supine to Sit Minimal Assist   Scooting Stand by Assist   Skilled Intervention Verbal Cuing  (took 25 minutes to  get out of bed today)   Interdisciplinary Plan of Care Collaboration   Patient Position at End of Therapy Edge of Bed;Call Light within Reach;Tray Table within Reach;Phone within Reach   OT Total Time Spent   OT Individual Total Time Spent (Mins) 60   OT Charge Group   OT Self Care / ADL 3   OT Therapy Activity 1     Functional mobility in room, bathroom, hallway with SBA and FWW (bradykinetic).    Assessment    Patient took 25 minutes to encourage to wake up and get to EOB with max verbal cues.  Bradykinetic with her ambulation with FWW today.  Required assistance to pull shirt down over trunk and button her shirt after multiple attempts.  Initially asked OT to setup her toothpaste on toothbrush and give to her while using the toilet, which therapist stated she could do herself once done with toileting.  Patient attempted to multitask brushing her teeth and buttoning shirt, going back and forth between the two.  When done brushing teeth, patient required verbal cue to rinse toothpaste out of her mouth, as she began to exit bathroom without doing so.  Overall, patient was slow mentally and physically today with decreased processing, attention, initiation and problem solving.  Upon asking, patient stated she wore her CPAP overnight, but it was not on or near her when OT arrived at 7:00 and patient was still asleep.  OT requested no further 7 am therapy.    Strengths: Able to follow instructions, Alert and oriented, Adequate strength, Effective communication skills, Independent prior level of function, Motivated for self care and independence, Pleasant and cooperative, Supportive family, Willingly participates in therapeutic activities  Barriers: Decreased endurance, Fatigue, Generalized weakness, Impaired activity tolerance, Impaired balance, Pain (lack of sleep night before initial evaluation)    Plan    IADLs, standing balance, overall strength/endurance    Occupational Therapy Goals (Active)     Problem: Dressing      Dates: Start: 10/04/21       Goal: STG-Within one week, patient will dress LB with supervision using adaptive techniques or AE as needed     Dates: Start: 10/04/21       Goal Note filed on 10/11/21 1141 by Adrian Mathews, OT/L     Min A               Problem: Functional Transfers     Dates: Start: 09/29/21       Goal: STG-Within one week, patient will transfer to toilet with supervision using grab bar and/or FWW     Dates: Start: 09/29/21       Goal Note filed on 10/11/21 1141 by Adrian Mathews, OT/L     SBA/supervised               Problem: OT Long Term Goals     Dates: Start: 09/29/21       Goal: LTG-By discharge, patient will complete basic self care tasks with mod I     Dates: Start: 09/29/21          Goal: LTG-By discharge, patient will perform bathroom transfers with mod I     Dates: Start: 09/29/21          Goal: LTG-By discharge, patient will complete basic home management with mod I     Dates: Start: 09/29/21             Problem: Toileting     Dates: Start: 10/04/21       Goal: STG-Within one week, patient will complete toileting tasks with supervision using grab bar and/or FWW for balance as needed     Dates: Start: 10/04/21       Goal Note filed on 10/11/21 1141 by Adrian Mathews, OT/L     SBA/supervision

## 2021-10-15 PROCEDURE — 97530 THERAPEUTIC ACTIVITIES: CPT

## 2021-10-15 PROCEDURE — 700102 HCHG RX REV CODE 250 W/ 637 OVERRIDE(OP): Performed by: HOSPITALIST

## 2021-10-15 PROCEDURE — 94760 N-INVAS EAR/PLS OXIMETRY 1: CPT

## 2021-10-15 PROCEDURE — 97110 THERAPEUTIC EXERCISES: CPT

## 2021-10-15 PROCEDURE — 99232 SBSQ HOSP IP/OBS MODERATE 35: CPT | Performed by: PHYSICAL MEDICINE & REHABILITATION

## 2021-10-15 PROCEDURE — 700102 HCHG RX REV CODE 250 W/ 637 OVERRIDE(OP): Performed by: PHYSICAL MEDICINE & REHABILITATION

## 2021-10-15 PROCEDURE — 700101 HCHG RX REV CODE 250: Performed by: PHYSICAL MEDICINE & REHABILITATION

## 2021-10-15 PROCEDURE — 97535 SELF CARE MNGMENT TRAINING: CPT

## 2021-10-15 PROCEDURE — 770010 HCHG ROOM/CARE - REHAB SEMI PRIVAT*

## 2021-10-15 PROCEDURE — 97116 GAIT TRAINING THERAPY: CPT

## 2021-10-15 PROCEDURE — A9270 NON-COVERED ITEM OR SERVICE: HCPCS | Performed by: HOSPITALIST

## 2021-10-15 PROCEDURE — 97129 THER IVNTJ 1ST 15 MIN: CPT

## 2021-10-15 PROCEDURE — A9270 NON-COVERED ITEM OR SERVICE: HCPCS | Performed by: PHYSICAL MEDICINE & REHABILITATION

## 2021-10-15 PROCEDURE — 97130 THER IVNTJ EA ADDL 15 MIN: CPT

## 2021-10-15 RX ORDER — HYDROCODONE BITARTRATE AND ACETAMINOPHEN 5; 325 MG/1; MG/1
1 TABLET ORAL EVERY 8 HOURS PRN
Status: DISCONTINUED | OUTPATIENT
Start: 2021-10-15 | End: 2021-10-22 | Stop reason: HOSPADM

## 2021-10-15 RX ADMIN — SENNOSIDES AND DOCUSATE SODIUM 2 TABLET: 8.6; 5 TABLET ORAL at 08:44

## 2021-10-15 RX ADMIN — SPIRONOLACTONE 25 MG: 25 TABLET, FILM COATED ORAL at 05:02

## 2021-10-15 RX ADMIN — SENNOSIDES AND DOCUSATE SODIUM 2 TABLET: 8.6; 5 TABLET ORAL at 19:53

## 2021-10-15 RX ADMIN — APIXABAN 5 MG: 5 TABLET, FILM COATED ORAL at 19:54

## 2021-10-15 RX ADMIN — LEVETIRACETAM 1000 MG: 500 TABLET, FILM COATED ORAL at 08:44

## 2021-10-15 RX ADMIN — LIDOCAINE 1 PATCH: 50 PATCH TOPICAL at 19:52

## 2021-10-15 RX ADMIN — ATORVASTATIN CALCIUM 80 MG: 40 TABLET, FILM COATED ORAL at 19:54

## 2021-10-15 RX ADMIN — TAMSULOSIN HYDROCHLORIDE 0.8 MG: 0.4 CAPSULE ORAL at 18:11

## 2021-10-15 RX ADMIN — ASPIRIN 81 MG: 81 TABLET, COATED ORAL at 08:44

## 2021-10-15 RX ADMIN — METOPROLOL TARTRATE 12.5 MG: 25 TABLET, FILM COATED ORAL at 18:11

## 2021-10-15 RX ADMIN — LEVETIRACETAM 1000 MG: 500 TABLET, FILM COATED ORAL at 19:54

## 2021-10-15 RX ADMIN — MELATONIN TAB 3 MG 9 MG: 3 TAB at 19:52

## 2021-10-15 RX ADMIN — OMEPRAZOLE 20 MG: 20 CAPSULE, DELAYED RELEASE ORAL at 08:44

## 2021-10-15 RX ADMIN — OXYCODONE HYDROCHLORIDE AND ACETAMINOPHEN 500 MG: 500 TABLET ORAL at 08:44

## 2021-10-15 RX ADMIN — METOPROLOL TARTRATE 12.5 MG: 25 TABLET, FILM COATED ORAL at 05:02

## 2021-10-15 RX ADMIN — OXYCODONE HYDROCHLORIDE AND ACETAMINOPHEN 500 MG: 500 TABLET ORAL at 19:54

## 2021-10-15 RX ADMIN — LIDOCAINE 1 PATCH: 50 PATCH TOPICAL at 08:46

## 2021-10-15 RX ADMIN — FERROUS SULFATE TAB 325 MG (65 MG ELEMENTAL FE) 325 MG: 325 (65 FE) TAB at 08:44

## 2021-10-15 RX ADMIN — APIXABAN 5 MG: 5 TABLET, FILM COATED ORAL at 08:44

## 2021-10-15 ASSESSMENT — ACTIVITIES OF DAILY LIVING (ADL)
BED_CHAIR_WHEELCHAIR_TRANSFER_DESCRIPTION: ADAPTIVE EQUIPMENT;INCREASED TIME;SET-UP OF EQUIPMENT
BED_CHAIR_WHEELCHAIR_TRANSFER_DESCRIPTION: ADAPTIVE EQUIPMENT;SET-UP OF EQUIPMENT;SUPERVISION FOR SAFETY
TOILET_TRANSFER_DESCRIPTION: GRAB BAR;ADAPTIVE EQUIPMENT;SUPERVISION FOR SAFETY
TOILETING_LEVEL_OF_ASSIST_DESCRIPTION: SET-UP OF EQUIPMENT;SUPERVISION FOR SAFETY
TOILET_TRANSFER_DESCRIPTION: INCREASED TIME;GRAB BAR;SUPERVISION FOR SAFETY

## 2021-10-15 ASSESSMENT — GAIT ASSESSMENTS
GAIT LEVEL OF ASSIST: CONTACT GUARD ASSIST
DISTANCE (FEET): 250
DEVIATION: BRADYKINETIC;DECREASED BASE OF SUPPORT;DECREASED HEEL STRIKE;DECREASED TOE OFF

## 2021-10-15 ASSESSMENT — PATIENT HEALTH QUESTIONNAIRE - PHQ9
2. FEELING DOWN, DEPRESSED, IRRITABLE, OR HOPELESS: NOT AT ALL
SUM OF ALL RESPONSES TO PHQ9 QUESTIONS 1 AND 2: 0
1. LITTLE INTEREST OR PLEASURE IN DOING THINGS: NOT AT ALL

## 2021-10-15 ASSESSMENT — PAIN DESCRIPTION - PAIN TYPE
TYPE: ACUTE PAIN
TYPE: ACUTE PAIN

## 2021-10-15 NOTE — THERAPY
Speech Language Pathology  Daily Treatment     Patient Name: Shaista Bocanegra  Age:  76 y.o., Sex:  female  Medical Record #: [id number]  Today's Date: 10/15/2021     Precautions  Precautions: Fall Risk, Cardiac Precautions (See Comments)  Comments: seizure     Subjective    Pt pleasant and cooperative throughout therapy, reporting that she was cold and requesting additional blankets.      Objective       10/15/21 1103   SLP Total Time Spent   SLP Individual Total Time Spent (Mins) 60   Treatment Charges   SLP Cognitive Skill Development First 15 Minutes 1   SLP Cognitive Skill Development Additional 15 Minutes 3       Assessment    Functional medication management task presented with use of error identification work sheets, pt completed with approx 80% accuracy indep, increased to 100% provided MIN cues for attention to detail and to recheck the given item. Pt presented with attention task with financial management element involved - who has more, pt unable to complete task in its entirety due to time constraints however for the portion that she did complete she completed calculations with 80% accuracy indep and made a single error with selecting which total amount was more based off her given answers.         Plan  Cont who has more activity, review errors made       Speech Therapy Problems (Active)     Problem: Expression STGs     Dates: Start: 09/29/21       Goal: STG-Within one week, patient will demonstrate use of SFA during conversation in order to improve word finding.      Dates: Start: 09/29/21       Goal Note filed on 10/11/21 1227 by Mark Roblero MS,CCC-SLP     Min cues to utilize word finding strategies.                Problem: Memory STGs     Dates: Start: 09/29/21       Goal: STG-Within one week, patient will demonstrate use of memory strategies in order to recall daily events     Dates: Start: 09/29/21       Goal Note filed on 10/11/21 1227 by Mark Roblero MS,CCC-SLP     Min cues to utilize  memory book. Min-mod cues to recall daily events.                Problem: Problem Solving STGs     Dates: Start: 09/29/21       Goal: STG-Within one week, patient will complete medication management tasks with 80% accuracy, min verbal cues.      Dates: Start: 09/29/21       Goal Note filed on 10/11/21 1227 by Mark Roblero MS,CCC-SLP     Not yet targeted (focused on attn)                Problem: Speech/Swallowing LTGs     Dates: Start: 09/29/21       Goal: LTG-By discharge, patient will solve complex problems related to IADL's in order to d/c homw with mod I     Dates: Start: 09/29/21

## 2021-10-15 NOTE — PROGRESS NOTES
"Rehab Progress Note     Date of Service: 10/14/2021  Chief Complaint: follow up MVR repair    Interval Events (Subjective)    Patient seen and examined today in her room.  She is oversedated this morning due to the higher dose of trazodone we tried for her insomnia. In addition she also got a Norco, Robaxin, and Atarax at the same time - all of which were PRN medications.   She was unable to do a full therapy session with PT.  Patient has no other complaints.       ROS: No changes to bowel, bladder, pain, mood, or sleep.               Objective:  VITAL SIGNS: /75   Pulse 83   Temp 36.1 °C (96.9 °F) (Temporal)   Resp 18   Ht 1.676 m (5' 6\")   Wt 60 kg (132 lb 3.2 oz)   SpO2 94%   BMI 21.34 kg/m²   Gen: sleeping, but arousable      No results found for this or any previous visit (from the past 72 hour(s)).    Current Facility-Administered Medications   Medication Frequency   • traZODone (DESYREL) tablet 50 mg HS PRN   • melatonin tablet 9 mg QHS   • hydrOXYzine HCl (ATARAX) tablet 50 mg Q6HRS PRN   • methocarbamol (ROBAXIN) tablet 500 mg 4X/DAY PRN   • HYDROcodone-acetaminophen (NORCO) 5-325 MG per tablet 1-2 Tablet Q8HRS PRN   • tamsulosin (FLOMAX) capsule 0.8 mg AFTER DINNER   • lidocaine (LIDODERM) 5 % 1 Patch Q24HR   • metoprolol tartrate (LOPRESSOR) tablet 12.5 mg TWICE DAILY   • lidocaine (LIDODERM) 5 % 1 Patch Q24HR   • polyethylene glycol/lytes (MIRALAX) PACKET 1 Packet DAILY    And   • senna-docusate (PERICOLACE or SENOKOT S) 8.6-50 MG per tablet 2 Tablet BID    And   • magnesium hydroxide (MILK OF MAGNESIA) suspension 30 mL QDAY PRN    And   • bisacodyl (DULCOLAX) suppository 10 mg DAILY   • simethicone (MYLICON) chewable tab 80 mg TID PRN   • Respiratory Therapy Consult Continuous RT   • Pharmacy Consult Request ...Pain Management Review 1 Each PHARMACY TO DOSE   • acetaminophen (Tylenol) tablet 650 mg Q4HRS PRN   • lactulose 20 GM/30ML solution 30 mL QDAY PRN   • docusate sodium (ENEMEEZ) " enema 283 mg QDAY PRN   • sodium phosphate (Fleet) enema 133 mL QDAY PRN   • omeprazole (PRILOSEC) capsule 20 mg QAM AC   • artificial tears ophthalmic solution 1 Drop PRN   • benzocaine-menthol (CEPACOL) lozenge 1 Lozenge Q2HRS PRN   • mag hydrox-al hydrox-simeth (MAALOX PLUS ES or MYLANTA DS) suspension 20 mL Q2HRS PRN   • ondansetron (ZOFRAN ODT) dispertab 4 mg 4X/DAY PRN    Or   • ondansetron (ZOFRAN) syringe/vial injection 4 mg 4X/DAY PRN   • sodium chloride (OCEAN) 0.65 % nasal spray 2 Spray PRN   • aspirin EC (ECOTRIN) tablet 81 mg DAILY   • ascorbic acid (Vitamin C) tablet 500 mg BID   • ferrous sulfate tablet 325 mg QDAY with Breakfast   • levETIRAcetam (KEPPRA) tablet 1,000 mg Q12HRS   • spironolactone (ALDACTONE) tablet 25 mg Q DAY   • atorvastatin (LIPITOR) tablet 80 mg Q EVENING   • apixaban (ELIQUIS) tablet 5 mg BID       Orders Placed This Encounter   Procedures   • Diet Order Diet: Regular (chopped meats)     Standing Status:   Standing     Number of Occurrences:   1     Order Specific Question:   Diet:     Answer:   Regular [1]     Comments:   chopped meats       Assessment:  Active Hospital Problems    Diagnosis    • *S/P mitral valve repair    • Hypoalbuminemia    • Other constipation    • Breast cancer (HCC)    • Upper back pain on right side    • Impaired mobility and ADLs    • Seizure disorder (HCC)    • Other hyperlipidemia    • Urinary retention    • Anemia    • Atrial fibrillation (HCC)    • Herpes    • Sleep apnea    • Hypertension    • History of CVA (cerebrovascular accident)      This patient is a 76 y.o. female admitted for acute inpatient rehabilitation with impaired cardiac endurance S/P mitral valve repair.    I led and attended the weekly conference, and agree with the IDT conference documentation and plan of care as noted below.    Date of conference: 10/11/2021    Goals and barriers: See IDT note.    Biggest barriers: impaired balance, impaired safety awareness, cognitive  impairment, poor attention to detail, urinary retention    Goals in next week: discharge to SNF    CM/social support: has family, but friend will support    Anticipated DC date: 10/28    Home health: PT/OT/SLP/RN    Equip: shower chair, RASHIDA    Follow up: PCP, cardiology, neurology, Dr. Sosa, urology       Medical Decision Making and Plan:    S/p Mitral Valve repair  Dr. Nogueira 9/16  Continue full rehab program  PT/OT/SLP, 1 hr each discipline, 5 days per week    Outpatient follow up with cardiology and Dr. Nogueira as needed    Aspirin  Spironolactone      History of strokes  Cardio-embolic  Residual left hemiparesis, mild  Aphasia, improved  Dysphagia, resolved  Cognitive impairment, continues  Generalized weakness/poor endurance  Continue full rehab program  PT/OT/SLP, 1 hr each discipline, 5 days per week     Eliquis  Statin     Outpatient follow up with Dr. Sosa, referral made    Right upper back pain, improved  Left upper back/neck pain, improved  Right anterior chest wall pain, improved  Muscle spasms from her surgery  Lidoderm patch  Checked ultrasound - hematoma on right chest wall  PRN tylenol, last use 10/4  PRN oxycodone, not effective for right-sided chest wall pain  Switched to Norco, last use 10/13, decreased timing to Q8  Started low dose gabapentin 100 mg TID --> 200 mg TID 10/6, discontinued due to urinary retention  Continue hot pack  Trial of TENs  Started PRN Robaxin, last use 10/13     Seizure disorder  Tonic/clonic post-op  Keppra  Outpatient referral to neurology     Hypertension  Metoprolol  Spironolactone  Appreciate hospitalist assistance    Atrial fibrillation  Metoprolol  Rate controlled    Hyperlipidemia  Statin     Anemia  Ferrous sulfate  Vit C     GI prophylaxis  Omeprazole     History of herpes simplex  Valacyclovir discontinued, patient was not taking regularly    Sleep apnea  2 L oxygen at night  To bring in home CPAP    Insomnia  Melatonin, increase dose to 9 mg (takes 10 at home)  PRN  Atarax, last use 10/13  PRN trazodone, last use 10/13, increase dose and schedule    Oversedation this am  Received melatonin (scheduled), trazodone (scheduled), Robaxin (PRN), Atarax (PRN), and Norco (PRN) last night at the same time for some reason  Atarax discontinued, dose of trazodone decreased and changed to PRN    Bowel program  Constipation, resolved  Increase bowel medications  Last BM 10/14    Bladder program  Urinary retention, continues  Started Flomax 9/28, increased dose 10/5 to 0.8 mg  Still requiring catheterizations intermittently - last required 10/11  PVRs - 253 - last checked 10/12  May need replacement/bladder rest  Discontinued gabapentin 10/11     DVT prophylaxis  Eliquis    Total time:  38 minutes.  I spent greater than 50% of the time for patient care, counseling, and coordination on this date, including patient face-to face time, unit/floor time with review of records/pertinent lab data and studies, as well as discussing diagnostic evaluation/work up, planned therapeutic interventions, and future disposition of care, as per the interval events/subjective and the assessment and plan as noted above.    I have performed a physical exam, reviewed and updated ROS, as well as the assessment and plan today 10/14/2021. In review of note from 10/13/2021 there are no new changes except as documented above.            Precious Rawls M.D.   Physical Medicine and Rehabilitation

## 2021-10-15 NOTE — CARE PLAN
The patient is Watcher - Medium risk of patient condition declining or worsening    Shift Goals  Clinical Goals: Safety, Pain Management      Problem: Pain - Standard  Goal: Alleviation of pain or a reduction in pain to the patient’s comfort goal  Note: Patient has some sternal pain, no pain medication was needed by the patient this shift, heat packs and warm blankets were provided.      Problem: Skin Integrity  Goal: Patient's skin integrity will be maintained or improve  Note: Patient's skin remains intact and free from new or accidental injury this shift.  Will continue to monitor.

## 2021-10-15 NOTE — THERAPY
Physical Therapy   Daily Treatment     Patient Name: Shaista Bocanegra  Age:  76 y.o., Sex:  female  Medical Record #: [id number]  Today's Date: 10/15/2021     Precautions  Precautions: Fall Risk, Cardiac Precautions (See Comments)  Comments: seizure     Subjective    Pt in bed with son bedside, Dr Rawls in room; pt agreeable to PT.      Objective       10/15/21 1301   Precautions   Precautions Fall Risk;Cardiac Precautions (See Comments)   Comments seizure    Gait Functional Level of Assist    Gait Level Of Assist Contact Guard Assist   Assistive Device None   Distance (Feet) 250   # of Times Distance was Traveled 3   Deviation Bradykinetic;Decreased Base Of Support;Decreased Heel Strike;Decreased Toe Off   Transfer Functional Level of Assist   Bed, Chair, Wheelchair Transfer Stand by Assist   Bed Chair Wheelchair Transfer Description Adaptive equipment;Increased time;Set-up of equipment  (bed > WC )   Toilet Transfers Stand by Assist   Toilet Transfer Description Increased time;Grab bar;Supervision for safety   Standing Lower Body Exercises   Hamstring Curl 1 set of 10   Hip Extension 1 set of 10   Hip Abduction 1 set of 10   Marching 1 set of 10   Heel Rise 1 set of 10   Toe Rise 1 set of 10   Mini Squat 1 set of 10   Comments Completed exercises in // bars with BUE support    Interdisciplinary Plan of Care Collaboration   IDT Collaboration with  Physician;Family / Caregiver   Patient Position at End of Therapy Seated;Call Light within Reach;Tray Table within Reach   Collaboration Comments Dr Rawls spoke with pt and son at beginning of PT session regarding sleep/ wake cycle and importance of activity and OOB time   PT Total Time Spent   PT Individual Total Time Spent (Mins) 60   PT Charge Group   PT Gait Training 1   PT Therapeutic Exercise 2   PT Therapeutic Activities 1       Assessment    Pt participatory in session. Continues to be limited by fatigue.     Strengths: Adequate strength, Independent prior level of  function, Willingly participates in therapeutic activities  Barriers: Impaired activity tolerance, Hypotension, Fatigue, Impaired balance    Plan    Gait training with no AD vs walking stick, LE strength and endurance, standing tolerance and balance.       Physical Therapy Problems (Active)     Problem: PT-Long Term Goals     Dates: Start: 09/29/21       Goal: LTG-By discharge, patient will ambulate 300 ft with LRAD, mod I      Dates: Start: 09/29/21          Goal: LTG-By discharge, patient will transfer one surface to another mod I with LRAD     Dates: Start: 09/29/21          Goal: LTG-By discharge, patient will ambulate up/down 12 stairs with 2HRs and spv      Dates: Start: 09/29/21          Goal: LTG-By discharge, patient will transfer in/out of a car spv with LRAD      Dates: Start: 09/29/21

## 2021-10-15 NOTE — FLOWSHEET NOTE
10/15/21 1208   Events/Summary/Plan   Events/Summary/Plan 02 spot check, CPAP filled with sterile water   Vital Signs   Pulse 70   Respiration 18   Pulse Oximetry 94 %   $ Pulse Oximetry (Spot Check) Yes   Respiratory Assessment   Level of Consciousness Alert   Chest Exam   Work Of Breathing / Effort Within Normal Limits   Oxygen   O2 Delivery Device None - Room Air   Non-Invasive Ventilation LALY Group   Nocturnal CPAP or BIPAP CPAP - [facility]   Settings (If Known) auto 6-8   FiO2 or LPM 0

## 2021-10-15 NOTE — PROGRESS NOTES
"Rehab Progress Note     Date of Service: 10/15/2021  Chief Complaint: follow up MVR repair    Interval Events (Subjective)    Patient seen and examined first in the gym working on the bicycle.  She is not sedated today as she got fewer sleeping medicines overnight.      Patient then seen later today in her room.  Her son is present.  We discussed her poor sleep cycle regulation.  Son reports even at home she would sleep throughout the day and then have difficulty sleeping at night.  We also discussed decreasing her sleeping medications.  Patient still needs encouragement to get out of bed with physical therapy after her son leaves.  She has no new complaints today.    ROS: No changes to bowel, bladder, pain, mood, or sleep.       Objective:  VITAL SIGNS: /78   Pulse 85   Temp 36.4 °C (97.5 °F) (Oral)   Resp 18   Ht 1.676 m (5' 6\")   Wt 60 kg (132 lb 3.2 oz)   SpO2 97%   BMI 21.34 kg/m²   Gen: alert, no apparent distress  Neuro: notable for generalized weakness, impaired attention      No results found for this or any previous visit (from the past 72 hour(s)).    Current Facility-Administered Medications   Medication Frequency   • HYDROcodone-acetaminophen (NORCO) 5-325 MG per tablet 1 Tablet Q8HRS PRN   • traZODone (DESYREL) tablet 50 mg HS PRN   • melatonin tablet 9 mg QHS   • methocarbamol (ROBAXIN) tablet 500 mg 4X/DAY PRN   • tamsulosin (FLOMAX) capsule 0.8 mg AFTER DINNER   • lidocaine (LIDODERM) 5 % 1 Patch Q24HR   • metoprolol tartrate (LOPRESSOR) tablet 12.5 mg TWICE DAILY   • lidocaine (LIDODERM) 5 % 1 Patch Q24HR   • polyethylene glycol/lytes (MIRALAX) PACKET 1 Packet DAILY    And   • senna-docusate (PERICOLACE or SENOKOT S) 8.6-50 MG per tablet 2 Tablet BID    And   • magnesium hydroxide (MILK OF MAGNESIA) suspension 30 mL QDAY PRN    And   • bisacodyl (DULCOLAX) suppository 10 mg DAILY   • simethicone (MYLICON) chewable tab 80 mg TID PRN   • Respiratory Therapy Consult Continuous RT   • " Pharmacy Consult Request ...Pain Management Review 1 Each PHARMACY TO DOSE   • acetaminophen (Tylenol) tablet 650 mg Q4HRS PRN   • lactulose 20 GM/30ML solution 30 mL QDAY PRN   • docusate sodium (ENEMEEZ) enema 283 mg QDAY PRN   • sodium phosphate (Fleet) enema 133 mL QDAY PRN   • omeprazole (PRILOSEC) capsule 20 mg QAM AC   • artificial tears ophthalmic solution 1 Drop PRN   • benzocaine-menthol (CEPACOL) lozenge 1 Lozenge Q2HRS PRN   • mag hydrox-al hydrox-simeth (MAALOX PLUS ES or MYLANTA DS) suspension 20 mL Q2HRS PRN   • ondansetron (ZOFRAN ODT) dispertab 4 mg 4X/DAY PRN    Or   • ondansetron (ZOFRAN) syringe/vial injection 4 mg 4X/DAY PRN   • sodium chloride (OCEAN) 0.65 % nasal spray 2 Spray PRN   • aspirin EC (ECOTRIN) tablet 81 mg DAILY   • ascorbic acid (Vitamin C) tablet 500 mg BID   • ferrous sulfate tablet 325 mg QDAY with Breakfast   • levETIRAcetam (KEPPRA) tablet 1,000 mg Q12HRS   • spironolactone (ALDACTONE) tablet 25 mg Q DAY   • atorvastatin (LIPITOR) tablet 80 mg Q EVENING   • apixaban (ELIQUIS) tablet 5 mg BID       Orders Placed This Encounter   Procedures   • Diet Order Diet: Regular (chopped meats)     Standing Status:   Standing     Number of Occurrences:   1     Order Specific Question:   Diet:     Answer:   Regular [1]     Comments:   chopped meats       Assessment:  Active Hospital Problems    Diagnosis    • *S/P mitral valve repair    • Hypoalbuminemia    • Other constipation    • Breast cancer (HCC)    • Upper back pain on right side    • Impaired mobility and ADLs    • Seizure disorder (HCC)    • Other hyperlipidemia    • Urinary retention    • Anemia    • Atrial fibrillation (HCC)    • Herpes    • Sleep apnea    • Hypertension    • History of CVA (cerebrovascular accident)      This patient is a 76 y.o. female admitted for acute inpatient rehabilitation with impaired cardiac endurance S/P mitral valve repair.    I led and attended the weekly conference, and agree with the IDT  conference documentation and plan of care as noted below.    Date of conference: 10/11/2021    Goals and barriers: See IDT note.    Biggest barriers: impaired balance, impaired safety awareness, cognitive impairment, poor attention to detail, urinary retention    Goals in next week: improvement in motivation    CM/social support: has family, but friend will support    Anticipated DC date: 10/28    Home health: PT/OT/SLP/RN    Equip: shower chair, FWW    Follow up: PCP, cardiology, neurology, Dr. Sosa, urology       Medical Decision Making and Plan:    S/p Mitral Valve repair  Dr. Nogueira 9/16  Continue full rehab program  PT/OT/SLP, 1 hr each discipline, 5 days per week    Outpatient follow up with cardiology and Dr. Nogueira as needed    Aspirin  Spironolactone      History of strokes  Cardio-embolic  Residual left hemiparesis, mild  Aphasia, improved  Dysphagia, resolved  Cognitive impairment, continues  Generalized weakness/poor endurance  Continue full rehab program  PT/OT/SLP, 1 hr each discipline, 5 days per week     Eliquis  Statin     Outpatient follow up with Dr. Sosa, referral made    Right upper back pain, improved  Left upper back/neck pain, improved  Right anterior chest wall pain, improved  Muscle spasms from her surgery  Lidoderm patch  Checked ultrasound - hematoma on right chest wall  PRN tylenol, last use 10/4  PRN oxycodone, not effective for right-sided chest wall pain  Switched to Norco, last use 10/13, decreased timing to Q8  Started low dose gabapentin 100 mg TID --> 200 mg TID 10/6, discontinued due to urinary retention  Continue hot pack  Trial of TENs  Started PRN Robaxin, last use 10/13     Seizure disorder  Tonic/clonic post-op  Keppra  Outpatient referral to neurology     Hypertension  Metoprolol  Spironolactone  Appreciate hospitalist assistance    Atrial fibrillation  Metoprolol  Rate controlled    Hyperlipidemia  Statin     Anemia  Ferrous sulfate  Vit C     GI  prophylaxis  Omeprazole     History of herpes simplex  Valacyclovir discontinued, patient was not taking regularly    Sleep apnea  2 L oxygen at night  To bring in home CPAP    Insomnia  Melatonin, increase dose to 9 mg (takes 10 at home)  PRN Atarax, last use 10/13  PRN trazodone, last use 10/13    Oversedation 10/14, solved  Received melatonin (scheduled), trazodone (scheduled), Robaxin (PRN), Atarax (PRN), and Norco (PRN) last night at the same time for some reason  Atarax discontinued, dose of trazodone decreased and changed to PRN    Bowel program  Constipation, resolved  Increase bowel medications  Last BM 10/14    Bladder program  Urinary retention, continues  Started Flomax 9/28, increased dose 10/5 to 0.8 mg  Still requiring catheterizations intermittently - last required 10/11  PVRs - 253 - last checked 10/12, texted nursing regarding orders to continue to monitor  Discontinued gabapentin 10/11     DVT prophylaxis  Eliquis    Total time:  26 minutes.  I spent greater than 50% of the time for patient care, counseling, and coordination on this date, including patient face-to face time, unit/floor time with review of records/pertinent lab data and studies, as well as discussing diagnostic evaluation/work up, planned therapeutic interventions, and future disposition of care, as per the interval events/subjective and the assessment and plan as noted above.    Time today spent discussing sleep cycle regulation and medications with the patient and the son.    I have performed a physical exam, reviewed and updated ROS, as well as the assessment and plan today 10/15/2021. In review of note from 10/14/2021 there are no new changes except as documented above.      Precious Rawls M.D.   Physical Medicine and Rehabilitation

## 2021-10-15 NOTE — THERAPY
Occupational Therapy  Daily Treatment     Patient Name: Shaista Bocanegra  Age:  76 y.o., Sex:  female  Medical Record #: [id number]  Today's Date: 10/15/2021     Precautions  Precautions: Fall Risk, Cardiac Precautions (See Comments)  Comments: seizure    Safety   ADL Safety : Requires Supervision for Safety, Impaired Insight into Safety, Requires Cueing for Safety, Impaired  Bathroom Safety: Requires Supervision for Safety, Impaired Insight into Safety, Requires Cuing for Safety, Impaired    Subjective    Patient lying in bed and not dressed.  Requesting to go to therapy gym after getting dressed.     Objective       10/15/21 0931   Precautions   Precautions Fall Risk;Cardiac Precautions (See Comments)   Comments seizure   Functional Level of Assist   Grooming Stand by Assist;Standing   Grooming Description Standing at sink;Supervision for safety   Upper Body Dressing Stand by Assist   Upper Body Dressing Description Set-up of equipment;Supervision for safety;Verbal cueing;Increased time  (to don dress)   Toileting Stand by Assist   Toileting Description Set-up of equipment;Supervision for safety  (SBA)   Bed, Chair, Wheelchair Transfer Stand by Assist   Bed Chair Wheelchair Transfer Description Adaptive equipment;Set-up of equipment;Supervision for safety  (fww)   Toilet Transfers Stand by Assist   Toilet Transfer Description Grab bar;Adaptive equipment;Supervision for safety  (SBA for safety)   Sitting Lower Body Exercises   Nustep Time (See Comments)  (nustep level 4 x 12 with B UE/LE)   Balance   Standing Balance (Dynamic) Fair -   Comments Mild dynamic standing balance challenges without UE support while tossing objects at a target while taking a small step forward with L LE during toss followed by small step back afterwards.  Required SBA/CGA for safety.   Bed Mobility    Supine to Sit Supervised   Scooting Supervised   Interdisciplinary Plan of Care Collaboration   IDT Collaboration with  Physician   Patient  Position at End of Therapy Call Light within Reach;Tray Table within Reach;Phone within Reach   Collaboration Comments Dr Rawls assessed patient during session   OT Total Time Spent   OT Individual Total Time Spent (Mins) 60   OT Charge Group   OT Self Care / ADL 2   OT Therapy Activity 1   OT Therapeutic Exercise  1     Functional mobility with FWW and SBA in room, bathroom, hallways and therapy rooms.      Assessment    Patient continues to ask therapist to do adl tasks she could easily do, such as pulling the gown off her arm or putting her watch on her wrist.  Continues with impaired insight/awareness to things such as not realizing she donned her dress over her gown or that dress was not fully pulled down over her bottom after donning like a shirt.  Patient walks very slowly and is easily distracted by what is going on around her and looking into hospital rooms as she walks by.  Strengths: Able to follow instructions, Alert and oriented, Adequate strength, Effective communication skills, Independent prior level of function, Motivated for self care and independence, Pleasant and cooperative, Supportive family, Willingly participates in therapeutic activities  Barriers: Decreased endurance, Fatigue, Generalized weakness, Impaired activity tolerance, Impaired balance, Pain (lack of sleep night before initial evaluation)    Plan    IADLs, standing balance, overall strength/endurance, safety awareness, functional cognition, continue to encourage patient to be out of bed when not in therapy and encourage trying to increase independence     Occupational Therapy Goals (Active)     Problem: Dressing     Dates: Start: 10/04/21       Goal: STG-Within one week, patient will dress LB with supervision using adaptive techniques or AE as needed     Dates: Start: 10/04/21       Goal Note filed on 10/11/21 1141 by Adrian Mathews OT/L     Min A               Problem: Functional Transfers     Dates: Start: 09/29/21        Goal: STG-Within one week, patient will transfer to toilet with supervision using grab bar and/or FWW     Dates: Start: 09/29/21       Goal Note filed on 10/11/21 1141 by Adrian Mathews, OT/L     SBA/supervised               Problem: OT Long Term Goals     Dates: Start: 09/29/21       Goal: LTG-By discharge, patient will complete basic self care tasks with mod I     Dates: Start: 09/29/21          Goal: LTG-By discharge, patient will perform bathroom transfers with mod I     Dates: Start: 09/29/21          Goal: LTG-By discharge, patient will complete basic home management with mod I     Dates: Start: 09/29/21             Problem: Toileting     Dates: Start: 10/04/21       Goal: STG-Within one week, patient will complete toileting tasks with supervision using grab bar and/or FWW for balance as needed     Dates: Start: 10/04/21       Goal Note filed on 10/11/21 1141 by Adrian Mathews, OT/L     SBA/supervision

## 2021-10-15 NOTE — CARE PLAN
Problem: Pain - Standard  Goal: Alleviation of pain or a reduction in pain to the patient’s comfort goal  Note: Pt denies pain or discomfort. Scheduled Lidocaine patch applied to pt's right upper back. Heat pack applied for comfort. Advised to inform staff if pain occurs     Problem: Bowel Elimination  Goal: Patient will participate in bowel management program  Note: LBM - 10/14 per pt. Pt refused scheduled Bisacodyl suppository as pt said she had a BM today. Scheduled Senna given per MAR. Educated pt about importance of regular and routine BM and the risks of constipation. Pt  understood the plan of care but still refuses

## 2021-10-16 LAB
ERYTHROCYTE [DISTWIDTH] IN BLOOD BY AUTOMATED COUNT: 52.3 FL (ref 35.9–50)
HCT VFR BLD AUTO: 36.1 % (ref 37–47)
HGB BLD-MCNC: 11.4 G/DL (ref 12–16)
MCH RBC QN AUTO: 31.4 PG (ref 27–33)
MCHC RBC AUTO-ENTMCNC: 31.6 G/DL (ref 33.6–35)
MCV RBC AUTO: 99.4 FL (ref 81.4–97.8)
PLATELET # BLD AUTO: 198 K/UL (ref 164–446)
PMV BLD AUTO: 9.6 FL (ref 9–12.9)
RBC # BLD AUTO: 3.63 M/UL (ref 4.2–5.4)
WBC # BLD AUTO: 4.8 K/UL (ref 4.8–10.8)

## 2021-10-16 PROCEDURE — A9270 NON-COVERED ITEM OR SERVICE: HCPCS | Performed by: PHYSICAL MEDICINE & REHABILITATION

## 2021-10-16 PROCEDURE — 94760 N-INVAS EAR/PLS OXIMETRY 1: CPT

## 2021-10-16 PROCEDURE — 36415 COLL VENOUS BLD VENIPUNCTURE: CPT

## 2021-10-16 PROCEDURE — 700102 HCHG RX REV CODE 250 W/ 637 OVERRIDE(OP): Performed by: HOSPITALIST

## 2021-10-16 PROCEDURE — A9270 NON-COVERED ITEM OR SERVICE: HCPCS | Performed by: HOSPITALIST

## 2021-10-16 PROCEDURE — 85027 COMPLETE CBC AUTOMATED: CPT

## 2021-10-16 PROCEDURE — 770010 HCHG ROOM/CARE - REHAB SEMI PRIVAT*

## 2021-10-16 PROCEDURE — 97130 THER IVNTJ EA ADDL 15 MIN: CPT

## 2021-10-16 PROCEDURE — 700102 HCHG RX REV CODE 250 W/ 637 OVERRIDE(OP): Performed by: PHYSICAL MEDICINE & REHABILITATION

## 2021-10-16 PROCEDURE — 97129 THER IVNTJ 1ST 15 MIN: CPT

## 2021-10-16 RX ADMIN — LEVETIRACETAM 1000 MG: 500 TABLET, FILM COATED ORAL at 08:42

## 2021-10-16 RX ADMIN — OXYCODONE HYDROCHLORIDE AND ACETAMINOPHEN 500 MG: 500 TABLET ORAL at 21:00

## 2021-10-16 RX ADMIN — LEVETIRACETAM 1000 MG: 500 TABLET, FILM COATED ORAL at 21:01

## 2021-10-16 RX ADMIN — ASPIRIN 81 MG: 81 TABLET, COATED ORAL at 08:42

## 2021-10-16 RX ADMIN — SPIRONOLACTONE 25 MG: 25 TABLET, FILM COATED ORAL at 05:49

## 2021-10-16 RX ADMIN — OXYCODONE HYDROCHLORIDE AND ACETAMINOPHEN 500 MG: 500 TABLET ORAL at 08:42

## 2021-10-16 RX ADMIN — HYDROCODONE BITARTRATE AND ACETAMINOPHEN 1 TABLET: 5; 325 TABLET ORAL at 21:01

## 2021-10-16 RX ADMIN — APIXABAN 5 MG: 5 TABLET, FILM COATED ORAL at 08:42

## 2021-10-16 RX ADMIN — APIXABAN 5 MG: 5 TABLET, FILM COATED ORAL at 21:01

## 2021-10-16 RX ADMIN — FERROUS SULFATE TAB 325 MG (65 MG ELEMENTAL FE) 325 MG: 325 (65 FE) TAB at 08:42

## 2021-10-16 RX ADMIN — METOPROLOL TARTRATE 12.5 MG: 25 TABLET, FILM COATED ORAL at 05:49

## 2021-10-16 RX ADMIN — ATORVASTATIN CALCIUM 80 MG: 40 TABLET, FILM COATED ORAL at 21:00

## 2021-10-16 RX ADMIN — TAMSULOSIN HYDROCHLORIDE 0.8 MG: 0.4 CAPSULE ORAL at 17:16

## 2021-10-16 RX ADMIN — OMEPRAZOLE 20 MG: 20 CAPSULE, DELAYED RELEASE ORAL at 08:42

## 2021-10-16 RX ADMIN — MELATONIN TAB 3 MG 9 MG: 3 TAB at 21:02

## 2021-10-16 RX ADMIN — HYDROCODONE BITARTRATE AND ACETAMINOPHEN 1 TABLET: 5; 325 TABLET ORAL at 10:28

## 2021-10-16 ASSESSMENT — PAIN DESCRIPTION - PAIN TYPE: TYPE: ACUTE PAIN

## 2021-10-16 NOTE — THERAPY
"Speech Language Pathology  Daily Treatment     Patient Name: Shaista Bocanegra  Age:  76 y.o., Sex:  female  Medical Record #: [id number]  Today's Date: 10/16/2021     Precautions  Precautions: Fall Risk, Cardiac Precautions (See Comments)  Comments: seizure     Subjective    Pt pleasant and cooperative during tx.  \"I need to work on my reading.  Even my words are messed up.  I can't think of what to say.\"     Objective       10/16/21 1531   Expressive Language   Word Finding Deficits Minimal (4)   Cognition   Moderate Attention Moderate (3)   Functional Math / Financial Management Moderate (3)   SLP Total Time Spent   SLP Individual Total Time Spent (Mins) 60   Treatment Charges   SLP Cognitive Skill Development First 15 Minutes 1   SLP Cognitive Skill Development Additional 15 Minutes 3       Assessment    Single step word problems: 50% Jt, 100% given mod verbal cues.  Pt knew how to manipulate the numbers, but needed assistance correctly entering them into the calculator.  Pt stated that she had a headache after completing the word problems, and declined to correct her \"Who has more money\" task at this time.  Find the thes: 5/15 independently, 6/15 given min verbal cues.           Plan    Target attn, word finding, math        Speech Therapy Problems (Active)     Problem: Expression STGs     Dates: Start: 09/29/21       Goal: STG-Within one week, patient will demonstrate use of SFA during conversation in order to improve word finding.      Dates: Start: 09/29/21       Goal Note filed on 10/11/21 1227 by Mark Roblero MS,CCC-SLP     Min cues to utilize word finding strategies.                Problem: Memory STGs     Dates: Start: 09/29/21       Goal: STG-Within one week, patient will demonstrate use of memory strategies in order to recall daily events     Dates: Start: 09/29/21       Goal Note filed on 10/11/21 1227 by Mark Roblero MS,CCC-SLP     Min cues to utilize memory book. Min-mod cues to recall daily " events.                Problem: Problem Solving STGs     Dates: Start: 09/29/21       Goal: STG-Within one week, patient will complete medication management tasks with 80% accuracy, min verbal cues.      Dates: Start: 09/29/21       Goal Note filed on 10/11/21 1227 by Mark Roblero MS,CCC-SLP     Not yet targeted (focused on attn)                Problem: Speech/Swallowing LTGs     Dates: Start: 09/29/21       Goal: LTG-By discharge, patient will solve complex problems related to IADL's in order to d/c homw with mod I     Dates: Start: 09/29/21

## 2021-10-16 NOTE — CARE PLAN
Problem: Bowel Elimination  Goal: Patient will participate in bowel management program  Note: Patient refused HS Dulcolax suppository, redirection. Patient said that she has BM every morning. CN notified.     Problem: Fall Risk - Rehab  Goal: Patient will remain free from falls  Note: Maddison Green Fall risk Assessment Score: 10      Low fall risk interventions   - Call light within reach   - Yellow  socks   - Belongings within reach   - Bed in the lowest position          The patient is Stable - Low risk of patient condition declining or worsening    Shift Goals  Clinical Goals: Safety, Pain Management  Patient Goals: sleep

## 2021-10-16 NOTE — CARE PLAN
The patient is Watcher - Medium risk of patient condition declining or worsening    Shift Goals  Clinical Goals: Safety, Pain Management  Patient Goals: sleep    Progress made toward(s) clinical / shift goals:      Problem: Pain - Standard  Goal: Alleviation of pain or a reduction in pain to the patient’s comfort goal  Outcome: Progressing     Problem: Bowel Elimination  Goal: Patient will participate in bowel management program  Outcome: Progressing  Patient c/o having loose stool few times during shift with incontinences. She had senna last night. Will endorse night shift to hold stool softener tonight.        Patient is not progressing towards the following goals:

## 2021-10-16 NOTE — FLOWSHEET NOTE
10/16/21 1129   Events/Summary/Plan   Events/Summary/Plan 02 spot check, CPAP filled with sterile water   Vital Signs   Pulse 64   Respiration 16   Pulse Oximetry 96 %   $ Pulse Oximetry (Spot Check) Yes   Respiratory Assessment   Level of Consciousness Alert   Chest Exam   Work Of Breathing / Effort Within Normal Limits   Oxygen   O2 Delivery Device None - Room Air   Non-Invasive Ventilation L[facility] Group   Nocturnal CPAP or BIPAP CPAP - [facility]   Settings (If Known) auto 6-8   FiO2 or LPM 0

## 2021-10-17 PROCEDURE — A9270 NON-COVERED ITEM OR SERVICE: HCPCS | Performed by: HOSPITALIST

## 2021-10-17 PROCEDURE — 97110 THERAPEUTIC EXERCISES: CPT

## 2021-10-17 PROCEDURE — 770010 HCHG ROOM/CARE - REHAB SEMI PRIVAT*

## 2021-10-17 PROCEDURE — 700102 HCHG RX REV CODE 250 W/ 637 OVERRIDE(OP): Performed by: HOSPITALIST

## 2021-10-17 PROCEDURE — 97530 THERAPEUTIC ACTIVITIES: CPT

## 2021-10-17 PROCEDURE — A9270 NON-COVERED ITEM OR SERVICE: HCPCS | Performed by: PHYSICAL MEDICINE & REHABILITATION

## 2021-10-17 PROCEDURE — 97130 THER IVNTJ EA ADDL 15 MIN: CPT

## 2021-10-17 PROCEDURE — 700101 HCHG RX REV CODE 250: Performed by: PHYSICAL MEDICINE & REHABILITATION

## 2021-10-17 PROCEDURE — 94760 N-INVAS EAR/PLS OXIMETRY 1: CPT

## 2021-10-17 PROCEDURE — 94660 CPAP INITIATION&MGMT: CPT

## 2021-10-17 PROCEDURE — 97129 THER IVNTJ 1ST 15 MIN: CPT

## 2021-10-17 PROCEDURE — 700102 HCHG RX REV CODE 250 W/ 637 OVERRIDE(OP): Performed by: PHYSICAL MEDICINE & REHABILITATION

## 2021-10-17 RX ADMIN — SENNOSIDES AND DOCUSATE SODIUM 2 TABLET: 8.6; 5 TABLET ORAL at 19:49

## 2021-10-17 RX ADMIN — ASPIRIN 81 MG: 81 TABLET, COATED ORAL at 08:48

## 2021-10-17 RX ADMIN — OMEPRAZOLE 20 MG: 20 CAPSULE, DELAYED RELEASE ORAL at 08:47

## 2021-10-17 RX ADMIN — METOPROLOL TARTRATE 12.5 MG: 25 TABLET, FILM COATED ORAL at 17:59

## 2021-10-17 RX ADMIN — LIDOCAINE 1 PATCH: 50 PATCH TOPICAL at 19:49

## 2021-10-17 RX ADMIN — LEVETIRACETAM 1000 MG: 500 TABLET, FILM COATED ORAL at 08:47

## 2021-10-17 RX ADMIN — POLYETHYLENE GLYCOL 3350 1 PACKET: 17 POWDER, FOR SOLUTION ORAL at 08:43

## 2021-10-17 RX ADMIN — APIXABAN 5 MG: 5 TABLET, FILM COATED ORAL at 08:48

## 2021-10-17 RX ADMIN — TAMSULOSIN HYDROCHLORIDE 0.8 MG: 0.4 CAPSULE ORAL at 17:59

## 2021-10-17 RX ADMIN — ATORVASTATIN CALCIUM 80 MG: 40 TABLET, FILM COATED ORAL at 19:49

## 2021-10-17 RX ADMIN — LEVETIRACETAM 1000 MG: 500 TABLET, FILM COATED ORAL at 19:49

## 2021-10-17 RX ADMIN — HYDROCODONE BITARTRATE AND ACETAMINOPHEN 1 TABLET: 5; 325 TABLET ORAL at 19:52

## 2021-10-17 RX ADMIN — APIXABAN 5 MG: 5 TABLET, FILM COATED ORAL at 19:49

## 2021-10-17 RX ADMIN — MELATONIN TAB 3 MG 9 MG: 3 TAB at 19:49

## 2021-10-17 RX ADMIN — METHOCARBAMOL 500 MG: 500 TABLET ORAL at 22:50

## 2021-10-17 RX ADMIN — TRAZODONE HYDROCHLORIDE 50 MG: 50 TABLET ORAL at 22:50

## 2021-10-17 RX ADMIN — OXYCODONE HYDROCHLORIDE AND ACETAMINOPHEN 500 MG: 500 TABLET ORAL at 19:50

## 2021-10-17 RX ADMIN — FERROUS SULFATE TAB 325 MG (65 MG ELEMENTAL FE) 325 MG: 325 (65 FE) TAB at 08:48

## 2021-10-17 RX ADMIN — SPIRONOLACTONE 25 MG: 25 TABLET, FILM COATED ORAL at 05:44

## 2021-10-17 RX ADMIN — POLYVINYL ALCOHOL 1 DROP: 14 SOLUTION/ DROPS OPHTHALMIC at 11:58

## 2021-10-17 RX ADMIN — METOPROLOL TARTRATE 12.5 MG: 25 TABLET, FILM COATED ORAL at 05:43

## 2021-10-17 RX ADMIN — OXYCODONE HYDROCHLORIDE AND ACETAMINOPHEN 500 MG: 500 TABLET ORAL at 08:48

## 2021-10-17 ASSESSMENT — GAIT ASSESSMENTS
DEVIATION: BRADYKINETIC;DECREASED HEEL STRIKE;DECREASED TOE OFF
DISTANCE (FEET): 150
GAIT LEVEL OF ASSIST: CONTACT GUARD ASSIST

## 2021-10-17 ASSESSMENT — ACTIVITIES OF DAILY LIVING (ADL)
BED_CHAIR_WHEELCHAIR_TRANSFER_DESCRIPTION: ADAPTIVE EQUIPMENT;SUPERVISION FOR SAFETY;SET-UP OF EQUIPMENT
TOILET_TRANSFER_DESCRIPTION: ADAPTIVE EQUIPMENT
TOILETING_LEVEL_OF_ASSIST_DESCRIPTION: SUPERVISION FOR SAFETY

## 2021-10-17 ASSESSMENT — PAIN DESCRIPTION - PAIN TYPE
TYPE: ACUTE PAIN

## 2021-10-17 ASSESSMENT — PAIN SCALES - WONG BAKER: WONGBAKER_NUMERICALRESPONSE: DOESN'T HURT AT ALL

## 2021-10-17 NOTE — THERAPY
Physical Therapy   Daily Treatment     Patient Name: Shaista Bocanegra  Age:  76 y.o., Sex:  female  Medical Record #: [id number]  Today's Date: 10/17/2021     Precautions  Precautions: (P) Fall Risk, Cardiac Precautions (See Comments)  Comments: (P) seizure     Subjective    Patient reports that she feels that she is getting worse since she has been here. She reports that she is feeling more fatigued than before. Patient is agreeable to therapy. She appears ashen colored, confused, and indecisive.      Objective     10/17/21 0931   Precautions   Precautions Fall Risk;Cardiac Precautions (See Comments)   Comments seizure    Vitals   Pulse   (70-85)   Room Air Oximetry   (90-96 with exercise)   O2 (LPM) 0   O2 Delivery Device None - Room Air   Gait Functional Level of Assist    Gait Level Of Assist Contact Guard Assist   Assistive Device None   Distance (Feet) 150   # of Times Distance was Traveled 1   Deviation Bradykinetic;Decreased Heel Strike;Decreased Toe Off   Sitting Lower Body Exercises   Ankle Pumps 2 sets of 10;Bilateral   Long Arc Quad 2 sets of 10;Bilateral   Marching 2 sets of 10;Reciprocal   Nustep   (level 3 total of 10  min B LE no UEs)   Comments sit to stand from edge of bed 1 x 10    PT Total Time Spent   PT Individual Total Time Spent (Mins) 30   PT Charge Group   PT Therapeutic Exercise 2     Exercises performed at bedside, ambulates to gym with slow pace widened base of support. Performs nustep in intervals of 2 minutes with 30 seconds- 1 minute recovery, working to maintain about 60 SPM average. Pt educated on modified RPE scale. She reported working at a 7/10. Patient was SOB following the endurance training  and was unable to ambulate back to room.         Assessment    Appears to have decreased activity tolerance today compared to other sessions when performing endurance training on nustep. Vitals monitored throughout and were stable.     Strengths: Adequate strength, Independent prior level of  function, Willingly participates in therapeutic activities  Barriers: Impaired activity tolerance, Hypotension, Fatigue, Impaired balance    Plan    Continue to monitor activity tolerance. Continue endurance training, safety and tolerance to functional mobility and ambulation.     Passport items to be completed:  Get in/out of bed safely, in/out of a vehicle, safely use mobility device, walk or wheel around home/community, navigate up and down stairs, show how to get up/down from the ground, ensure home is accessible, demonstrate HEP, complete caregiver training    Physical Therapy Problems (Active)     Problem: Mobility     Dates: Start: 10/15/21       Goal: STG-Within one week, patient will ambulate 250 ft no AD, SBA      Dates: Start: 10/15/21          Goal: STG-Within one week, patient will ascend and descend four to six stairs with 2 HRs and CGA      Dates: Start: 10/15/21             Problem: Mobility Transfers     Dates: Start: 10/15/21       Goal: STG-Within one week, patient will transfer bed to chair spv without AD      Dates: Start: 10/15/21             Problem: PT-Long Term Goals     Dates: Start: 09/29/21       Goal: LTG-By discharge, patient will ambulate 300 ft with LRAD, mod I      Dates: Start: 09/29/21          Goal: LTG-By discharge, patient will transfer one surface to another mod I with LRAD     Dates: Start: 09/29/21          Goal: LTG-By discharge, patient will ambulate up/down 12 stairs with 2HRs and spv      Dates: Start: 09/29/21          Goal: LTG-By discharge, patient will transfer in/out of a car spv with LRAD      Dates: Start: 09/29/21

## 2021-10-17 NOTE — CARE PLAN
The patient is Stable - Low risk of patient condition declining or worsening    Shift Goals  Clinical Goals: safety, manage pain  Patient Goals: sleep well    Progress made toward(s) clinical / shift goals:  Resting in bed after hs/PRN pain meds. Good outcome, resting in bed, eyes closed, resp even, unlabored. Using call light for assist as needed.

## 2021-10-17 NOTE — PROGRESS NOTES
CLINICAL PHARMACY NOTE: MEDS TO [address] Select Patient?: No  Total # of Prescriptions Filled: 1   The following medications were delivered to the patient:  · Famciclovir 500 mg  Total # of Interventions Completed: 3  Time Spent (min): 15    Additional Documentation: Received shift report and assumed care of patient.  Patient awake, calm and stable, currently positioned in bed for comfort and safety; call light within reach.  Denies pain or discomfort at this time.  Will continue to monitor.

## 2021-10-17 NOTE — CARE PLAN
Problem: Functional Transfers  Goal: STG-Within one week, patient will transfer to toilet with supervision using grab bar and/or FWW  Outcome: Not Met  Note: SBA/supervision     Problem: Dressing  Goal: STG-Within one week, patient will dress LB with supervision using adaptive techniques or AE as needed  Outcome: Not Met  Note: Min A to SBA using AE prn     Problem: Toileting  Goal: STG-Within one week, patient will complete toileting tasks with supervision using grab bar and/or FWW for balance as needed  Outcome: Not Met  Note: SBA to supervision (SBA more often than supervised)

## 2021-10-17 NOTE — CARE PLAN
The patient is Stable - Low risk of patient condition declining or worsening    Shift Goals  Clinical Goals:  manage pain  Patient Goals: sleep well    Progress made toward(s) clinical / shift goals:   Problem: Pain - Standard  Goal: Alleviation of pain or a reduction in pain to the patient’s comfort goal  Outcome: Progressing   Patient able to verbalize pain level and verbalize an acceptable level of pain.

## 2021-10-17 NOTE — THERAPY
Speech Language Pathology  Daily Treatment     Patient Name: Shaista Bocanegra  Age:  76 y.o., Sex:  female  Medical Record #: [id number]  Today's Date: 10/17/2021     Precautions  Precautions: Fall Risk, Cardiac Precautions (See Comments)  Comments: seizure     Subjective    Pt pleasant and cooperative during tx.  Pt requested tx at bedside due to fatigue after PT.  Son present at the end of the session.      Objective       10/17/21 1001   Cognition   Moderate Attention Moderate (3)   Interdisciplinary Plan of Care Collaboration   IDT Collaboration with  Family / Caregiver   Collaboration Comments Son, Fer, present toward end of session.    SLP Total Time Spent   SLP Individual Total Time Spent (Mins) 60   Treatment Charges   SLP Cognitive Skill Development First 15 Minutes 1   SLP Cognitive Skill Development Additional 15 Minutes 3       Assessment    Alternating attn (pt required to state names of people for each letter of the alphabet): 80% Jt, 100% given mod verbal cues.  Pt stated that her  would manage finances.  SLP discussed with Pt and son, status of med management.  At this time the plan is to hire a professional to aid in med management.  Find the THEs: 14/15 (when given number of thes occurring on a specific line).           Plan    Attn, word finding, reading comprehension, math      Speech Therapy Problems (Active)     Problem: Expression STGs     Dates: Start: 09/29/21       Goal: STG-Within one week, patient will demonstrate use of SFA during conversation in order to improve word finding.      Dates: Start: 09/29/21       Goal Note filed on 10/11/21 1227 by Mark Roblero MS,CCC-SLP     Min cues to utilize word finding strategies.                Problem: Memory STGs     Dates: Start: 09/29/21       Goal: STG-Within one week, patient will demonstrate use of memory strategies in order to recall daily events     Dates: Start: 09/29/21       Goal Note filed on 10/11/21 1227 by Mark GIL  MS Annika,CCC-SLP     Min cues to utilize memory book. Min-mod cues to recall daily events.                Problem: Problem Solving STGs     Dates: Start: 09/29/21       Goal: STG-Within one week, patient will complete medication management tasks with 80% accuracy, min verbal cues.      Dates: Start: 09/29/21       Goal Note filed on 10/11/21 1227 by Mark Roblero MS,CCC-SLP     Not yet targeted (focused on attn)                Problem: Speech/Swallowing LTGs     Dates: Start: 09/29/21       Goal: LTG-By discharge, patient will solve complex problems related to IADL's in order to d/c homw with mod I     Dates: Start: 09/29/21

## 2021-10-17 NOTE — THERAPY
Occupational Therapy  Daily Treatment     Patient Name: Shaista Bocanegra  Age:  76 y.o., Sex:  female  Medical Record #: [id number]  Today's Date: 10/17/2021     Precautions  Precautions: (P) Fall Risk, Cardiac Precautions (See Comments)  Comments: seizure     Safety   ADL Safety : Requires Supervision for Safety, Impaired Insight into Safety, Requires Cueing for Safety, Impaired  Bathroom Safety: Requires Supervision for Safety, Impaired Insight into Safety, Requires Cuing for Safety, Impaired    Subjective    Patient stated she cut out a bunch of her medications yesterday, which is the cause of her being more mentally clear today.     Objective       10/17/21 1331   Precautions   Precautions Fall Risk;Cardiac Precautions (See Comments)   Functional Level of Assist   Grooming Supervision;Standing  (to wash hands at sink)   Toileting Supervision   Toileting Description Supervision for safety   Bed, Chair, Wheelchair Transfer Supervised   Bed Chair Wheelchair Transfer Description Adaptive equipment;Supervision for safety;Set-up of equipment  (setup/supervised w/ FWW)   Toilet Transfers Supervised   Toilet Transfer Description Adaptive equipment  (fww)   IADL Treatments   IADL Treatments Meal preparation;Kitchen mobility education;Home management   Kitchen Mobility Education Gathered all needed supplies to prepare oatmeal on stove with supervision using counter for support   Meal Preparation Prepared one serving of oatmeal on stove with supervision.  Did not turn stove burner off until told to.   Home Management Rinsed dishes and placed in  with supervision, though required assist to open door of .  Simulated grocery shopping task completed with cart with supervision while retrieving items from high/low shelves, in tight spots and placed things back on shelves.   Bed Mobility    Supine to Sit Modified Independent   Sit to Supine Modified Independent   Scooting Modified Independent   Rolling Modified  Independent   Interdisciplinary Plan of Care Collaboration   Patient Position at End of Therapy In Bed;Call Light within Reach;Tray Table within Reach;Phone within Reach   OT Total Time Spent   OT Individual Total Time Spent (Mins) 60   OT Charge Group   OT Therapy Activity 4     Functional mobility with FWW supervised in room, hallways, gyms.      Assessment    Patient much more mentally clear today with improved initiation and safety, though did not turn off stove burner until told to do so.    Strengths: Able to follow instructions, Alert and oriented, Adequate strength, Effective communication skills, Independent prior level of function, Motivated for self care and independence, Pleasant and cooperative, Supportive family, Willingly participates in therapeutic activities  Barriers: Decreased endurance, Fatigue, Generalized weakness, Impaired activity tolerance, Impaired balance, Pain (lack of sleep night before initial evaluation)    Plan    IADLs, standing balance, overall strength/endurance, safety awareness, functional cognition, continue to encourage patient to be out of bed when not in therapy and encourage trying to increase independence     Occupational Therapy Goals (Active)     Problem: Dressing     Dates: Start: 10/04/21       Goal: STG-Within one week, patient will dress LB with supervision using adaptive techniques or AE as needed     Dates: Start: 10/04/21       Goal Note filed on 10/11/21 1141 by Adrian Mathews, OT/L     Min A               Problem: Functional Transfers     Dates: Start: 09/29/21       Goal: STG-Within one week, patient will transfer to toilet with supervision using grab bar and/or FWW     Dates: Start: 09/29/21       Goal Note filed on 10/11/21 1141 by Adrian Mathews OT/L     SBA/supervised               Problem: OT Long Term Goals     Dates: Start: 09/29/21       Goal: LTG-By discharge, patient will complete basic self care tasks with mod I     Dates: Start: 09/29/21           Goal: LTG-By discharge, patient will perform bathroom transfers with mod I     Dates: Start: 09/29/21          Goal: LTG-By discharge, patient will complete basic home management with mod I     Dates: Start: 09/29/21             Problem: Toileting     Dates: Start: 10/04/21       Goal: STG-Within one week, patient will complete toileting tasks with supervision using grab bar and/or FWW for balance as needed     Dates: Start: 10/04/21       Goal Note filed on 10/11/21 1141 by Adrian Mathews, OT/L     SBA/supervision

## 2021-10-17 NOTE — FLOWSHEET NOTE
10/17/21 1547   Events/Summary/Plan   Events/Summary/Plan SpO2 check   Vital Signs   Pulse 78   Respiration 18   Pulse Oximetry 94 %   $ Pulse Oximetry (Spot Check) Yes   Oxygen   O2 Delivery Device None - Room Air   Non-Invasive Ventilation [facility]   Nocturnal CPAP or BIPAP CPAP - [facility]   $ System Evaluation Yes  (7:38 hrs cpap use last night.)

## 2021-10-18 PROCEDURE — 97130 THER IVNTJ EA ADDL 15 MIN: CPT

## 2021-10-18 PROCEDURE — A9270 NON-COVERED ITEM OR SERVICE: HCPCS | Performed by: HOSPITALIST

## 2021-10-18 PROCEDURE — 770010 HCHG ROOM/CARE - REHAB SEMI PRIVAT*

## 2021-10-18 PROCEDURE — 97530 THERAPEUTIC ACTIVITIES: CPT

## 2021-10-18 PROCEDURE — 700102 HCHG RX REV CODE 250 W/ 637 OVERRIDE(OP): Performed by: HOSPITALIST

## 2021-10-18 PROCEDURE — 97535 SELF CARE MNGMENT TRAINING: CPT

## 2021-10-18 PROCEDURE — 700102 HCHG RX REV CODE 250 W/ 637 OVERRIDE(OP): Performed by: PHYSICAL MEDICINE & REHABILITATION

## 2021-10-18 PROCEDURE — 97116 GAIT TRAINING THERAPY: CPT

## 2021-10-18 PROCEDURE — 99233 SBSQ HOSP IP/OBS HIGH 50: CPT | Performed by: PHYSICAL MEDICINE & REHABILITATION

## 2021-10-18 PROCEDURE — 97112 NEUROMUSCULAR REEDUCATION: CPT

## 2021-10-18 PROCEDURE — A9270 NON-COVERED ITEM OR SERVICE: HCPCS | Performed by: PHYSICAL MEDICINE & REHABILITATION

## 2021-10-18 PROCEDURE — 700101 HCHG RX REV CODE 250: Performed by: PHYSICAL MEDICINE & REHABILITATION

## 2021-10-18 PROCEDURE — 97129 THER IVNTJ 1ST 15 MIN: CPT

## 2021-10-18 RX ORDER — BISACODYL 10 MG
10 SUPPOSITORY, RECTAL RECTAL
Status: DISCONTINUED | OUTPATIENT
Start: 2021-10-18 | End: 2021-10-22 | Stop reason: HOSPADM

## 2021-10-18 RX ORDER — POLYETHYLENE GLYCOL 3350 17 G/17G
1 POWDER, FOR SOLUTION ORAL DAILY
Status: DISCONTINUED | OUTPATIENT
Start: 2021-10-19 | End: 2021-10-22 | Stop reason: HOSPADM

## 2021-10-18 RX ORDER — AMOXICILLIN 250 MG
2 CAPSULE ORAL 2 TIMES DAILY
Status: DISCONTINUED | OUTPATIENT
Start: 2021-10-18 | End: 2021-10-22 | Stop reason: HOSPADM

## 2021-10-18 RX ADMIN — MAGNESIUM HYDROXIDE 30 ML: 400 SUSPENSION ORAL at 21:40

## 2021-10-18 RX ADMIN — OXYCODONE HYDROCHLORIDE AND ACETAMINOPHEN 500 MG: 500 TABLET ORAL at 08:40

## 2021-10-18 RX ADMIN — LIDOCAINE 1 PATCH: 50 PATCH TOPICAL at 08:44

## 2021-10-18 RX ADMIN — OMEPRAZOLE 20 MG: 20 CAPSULE, DELAYED RELEASE ORAL at 08:39

## 2021-10-18 RX ADMIN — LEVETIRACETAM 1000 MG: 500 TABLET, FILM COATED ORAL at 21:34

## 2021-10-18 RX ADMIN — TAMSULOSIN HYDROCHLORIDE 0.8 MG: 0.4 CAPSULE ORAL at 17:46

## 2021-10-18 RX ADMIN — APIXABAN 5 MG: 5 TABLET, FILM COATED ORAL at 21:38

## 2021-10-18 RX ADMIN — ATORVASTATIN CALCIUM 80 MG: 40 TABLET, FILM COATED ORAL at 21:37

## 2021-10-18 RX ADMIN — OXYCODONE HYDROCHLORIDE AND ACETAMINOPHEN 500 MG: 500 TABLET ORAL at 21:36

## 2021-10-18 RX ADMIN — POLYVINYL ALCOHOL 1 DROP: 14 SOLUTION/ DROPS OPHTHALMIC at 22:19

## 2021-10-18 RX ADMIN — FERROUS SULFATE TAB 325 MG (65 MG ELEMENTAL FE) 325 MG: 325 (65 FE) TAB at 08:40

## 2021-10-18 RX ADMIN — SENNOSIDES AND DOCUSATE SODIUM 2 TABLET: 8.6; 5 TABLET ORAL at 21:36

## 2021-10-18 RX ADMIN — LIDOCAINE 1 PATCH: 50 PATCH TOPICAL at 21:33

## 2021-10-18 RX ADMIN — SPIRONOLACTONE 25 MG: 25 TABLET, FILM COATED ORAL at 05:34

## 2021-10-18 RX ADMIN — MELATONIN TAB 3 MG 9 MG: 3 TAB at 21:37

## 2021-10-18 RX ADMIN — METOPROLOL TARTRATE 12.5 MG: 25 TABLET, FILM COATED ORAL at 17:46

## 2021-10-18 RX ADMIN — SENNOSIDES AND DOCUSATE SODIUM 2 TABLET: 8.6; 5 TABLET ORAL at 08:41

## 2021-10-18 RX ADMIN — LEVETIRACETAM 1000 MG: 500 TABLET, FILM COATED ORAL at 08:40

## 2021-10-18 RX ADMIN — ASPIRIN 81 MG: 81 TABLET, COATED ORAL at 08:40

## 2021-10-18 RX ADMIN — POLYETHYLENE GLYCOL 3350 1 PACKET: 17 POWDER, FOR SOLUTION ORAL at 08:39

## 2021-10-18 RX ADMIN — METOPROLOL TARTRATE 12.5 MG: 25 TABLET, FILM COATED ORAL at 05:34

## 2021-10-18 RX ADMIN — APIXABAN 5 MG: 5 TABLET, FILM COATED ORAL at 08:40

## 2021-10-18 ASSESSMENT — GAIT ASSESSMENTS
DEVIATION: BRADYKINETIC;DECREASED HEEL STRIKE;DECREASED TOE OFF
GAIT LEVEL OF ASSIST: STAND BY ASSIST
ASSISTIVE DEVICE: NONE;FRONT WHEEL WALKER

## 2021-10-18 ASSESSMENT — ACTIVITIES OF DAILY LIVING (ADL)
BED_CHAIR_WHEELCHAIR_TRANSFER_DESCRIPTION: ADAPTIVE EQUIPMENT
TOILETING_LEVEL_OF_ASSIST_DESCRIPTION: ADAPTIVE EQUIPMENT;GRAB BAR
TOILET_TRANSFER_DESCRIPTION: GRAB BAR;ADAPTIVE EQUIPMENT

## 2021-10-18 ASSESSMENT — PAIN DESCRIPTION - PAIN TYPE
TYPE: ACUTE PAIN
TYPE: ACUTE PAIN

## 2021-10-18 ASSESSMENT — PAIN SCALES - WONG BAKER: WONGBAKER_NUMERICALRESPONSE: DOESN'T HURT AT ALL

## 2021-10-18 NOTE — THERAPY
"Physical Therapy   Daily Treatment     Patient Name: Shaista Bocanegra  Age:  76 y.o., Sex:  female  Medical Record #: [id number]  Today's Date: 10/18/2021     Precautions  Precautions: (P) Fall Risk, Cardiac Precautions (See Comments), Other (See Comments)  Comments: (P) seizure precaution    Subjective    Pt seen sitting EOB; consented to therapy.     Objective       10/18/21 1031   Precautions   Precautions Fall Risk;Cardiac Precautions (See Comments);Other (See Comments)   Comments seizure precaution   Vitals   O2 (LPM) 0   O2 Delivery Device None - Room Air   Pain 0 - 10 Group   Pain Rating Scale (NPRS) 0   Non Verbal Descriptors   Non Verbal Scale  Calm   Cognition    Level of Consciousness Alert   Gait Functional Level of Assist    Gait Level Of Assist Stand by Assist  (to CGA)   Assistive Device None, Front Wheel Walker   Distance (Feet)   (250 ft x 3, (185 ft x 1, 160 x 1 with FWW and supervision))   Deviation Bradykinetic;Decreased Heel Strike;Decreased Toe Off   Stairs Functional Level of Assist   Level of Assist with Stairs Stand by Assist   # of Stairs Climbed 4  (ascended 4 6\" steps, descended 6 4\" steps)   Transfer Functional Level of Assist   Bed, Chair, Wheelchair Transfer Modified Independent   Bed Chair Wheelchair Transfer Description Adaptive equipment  (stand step with FWW vs. no AD)   Bed Mobility    Supine to Sit Modified Independent   Sit to Supine Modified Independent   Sit to Stand Modified Independent   Scooting Modified Independent   Rolling Modified Independent   Interdisciplinary Plan of Care Collaboration   IDT Collaboration with  Physician;Physical Therapist   Patient Position at End of Therapy Edge of Bed;Call Light within Reach;Tray Table within Reach;Phone within Reach  (eating lunch at EOB)   Collaboration Comments CLOF   PT Total Time Spent   PT Individual Total Time Spent (Mins) 60   PT Charge Group   PT Gait Training 2   PT Neuromuscular Re-Education / Balance 1   PT Therapeutic " Activities 1     Pt completed 2 x 30 seconds each LE of rolling yellow ball anterior/posterior approx 6-8 inches in standing with HHA.     CGA-SBA for dynamic gait with no AD including zig-zag walking through cones x 4 for 15 ft; lateral zig-zag walking with reinforced VC's for proper technique.    Assessment    Pt tolerated session well; discussed with primary team and able to demonstrate safe negotiation throughout room Mod I with FWW. Required reinforcement through VC and demonstration for new dynamic gait tasks. Pt also had some difficulty with adequate weight shift to perform SLE balance task stated above.    Strengths: Adequate strength, Independent prior level of function, Willingly participates in therapeutic activities  Barriers: Impaired activity tolerance, Hypotension, Fatigue, Impaired balance    Plan    Continue to monitor activity tolerance. Continue endurance training, safety and tolerance to functional mobility and ambulation, balance training.    Passport items to be completed:  Get in/out of bed safely, in/out of a vehicle, safely use mobility device, walk or wheel around home/community, navigate up and down stairs, show how to get up/down from the ground, ensure home is accessible, demonstrate HEP, complete caregiver training    Physical Therapy Problems (Active)     Problem: Mobility     Dates: Start: 10/15/21       Goal: STG-Within one week, patient will ambulate 250 ft no AD, SBA      Dates: Start: 10/15/21          Goal: STG-Within one week, patient will ascend and descend four to six stairs with 2 HRs and CGA      Dates: Start: 10/15/21             Problem: Mobility Transfers     Dates: Start: 10/15/21       Goal: STG-Within one week, patient will transfer bed to chair spv without AD      Dates: Start: 10/15/21             Problem: PT-Long Term Goals     Dates: Start: 09/29/21       Goal: LTG-By discharge, patient will ambulate 300 ft with LRAD, mod I      Dates: Start: 09/29/21           Goal: LTG-By discharge, patient will transfer one surface to another mod I with LRAD     Dates: Start: 09/29/21          Goal: LTG-By discharge, patient will ambulate up/down 12 stairs with 2HRs and spv      Dates: Start: 09/29/21          Goal: LTG-By discharge, patient will transfer in/out of a car spv with LRAD      Dates: Start: 09/29/21

## 2021-10-18 NOTE — CARE PLAN
Problem: Memory STGs  Goal: STG-Within one week, patient will demonstrate use of memory strategies in order to recall daily events  Outcome: Not Met  Note: Min cues to recall daily events with use of memory book.

## 2021-10-18 NOTE — CARE PLAN
Problem: Pain - Standard  Goal: Alleviation of pain or a reduction in pain to the patient’s comfort goal  Note: Educate patient of non-pharmacological comfort measures: repositioning, relaxation/breathing technique, cold compress and activities.      Problem: Bowel Elimination  Goal: Patient will participate in bowel management program  Note: Patient refused scheduled Dulcolax suppository, redirection with no good effect. CN notified.     Problem: Fall Risk - Rehab  Goal: Patient will remain free from falls  Note: Maddison Green Fall risk Assessment Score: 10      Low fall risk interventions   - Call light within reach   - Yellow  socks   - Belongings within reach   - Bed in the lowest position          The patient is Stable - Low risk of patient condition declining or worsening    Shift Goals  Clinical Goals: Pain management  Patient Goals: sleep well

## 2021-10-18 NOTE — CARE PLAN
Problem: Mobility  Goal: STG-Within one week, patient will ambulate 250 ft no AD, SBA   Outcome: Met  Goal: STG-Within one week, patient will ascend and descend four to six stairs with 2 HRs and CGA   Outcome: Met     Problem: Mobility Transfers  Goal: STG-Within one week, patient will transfer bed to chair spv without AD   Outcome: Met

## 2021-10-18 NOTE — THERAPY
Speech Language Pathology  Daily Treatment     Patient Name: Shaista Bocanegra  Age:  76 y.o., Sex:  female  Medical Record #: [id number]  Today's Date: 10/18/2021     Precautions  Precautions: Fall Risk, Cardiac Precautions (See Comments), Other (See Comments)  Comments: seizure precaution    Subjective    Pt pleasant and cooperative during tx.       Objective       10/18/21 1301   Expressive Language   Word Finding Deficits Minimal (4)   Cognition   Executive Functioning / Organization Moderate (3)   SLP Total Time Spent   SLP Individual Total Time Spent (Mins) 60   Treatment Charges   SLP Cognitive Skill Development First 15 Minutes 1   SLP Cognitive Skill Development Additional 15 Minutes 3       Assessment    [place] category namin words per minute.  Logical scheduling (furniture delivery): 50% Jt, 100% given mod verbal cues to attend to written details.  Pt often skips lines when reading, decreasing overall comprehension.      Strengths: Able to follow instructions, Alert and oriented, Motivated for self care and independence, Pleasant and cooperative, Supportive family, Willingly participates in therapeutic activities, Making steady progress towards goals  Barriers: Impaired functional cognition    Plan    [place] category naming, executive function, attn.       Speech Therapy Problems (Active)     Problem: Expression STGs     Dates: Start: 10/18/21       Goal:  Pt will name 10 items per minute given specified category and min verbal cues.       Dates: Start: 10/18/21             Problem: Memory STGs     Dates: Start: 21       Goal: STG-Within one week, patient will demonstrate use of memory strategies in order to recall daily events     Dates: Start: 21       Goal Note filed on 10/18/21 0754 by Mark Roblero MS,CCC-SLP     Min cues to recall daily events with use of memory book.                Problem: Problem Solving STGs     Dates: Start: 10/18/21       Goal: STG-Within one week, patient  will complete alternating attn tasks with 80% accuracy given min verbal cues.       Dates: Start: 10/18/21          Goal: STG-Within one week, patient will complete executive function tasks with 80% accuracy, given min verbal cues.       Dates: Start: 10/18/21             Problem: Speech/Swallowing LTGs     Dates: Start: 09/29/21       Goal: LTG-By discharge, patient will solve complex problems related to IADL's in order to d/c homw with mod I     Dates: Start: 09/29/21

## 2021-10-18 NOTE — THERAPY
"Occupational Therapy  Daily Treatment     Patient Name: Shaista Bocanegra  Age:  76 y.o., Sex:  female  Medical Record #: [id number]  Today's Date: 10/18/2021     Precautions  Precautions: Fall Risk, Cardiac Precautions (See Comments), Other (See Comments)  Comments: seizure precaution    Safety   ADL Safety : Requires Supervision for Safety, Impaired Insight into Safety, Requires Cueing for Safety, Impaired  Bathroom Safety: Requires Supervision for Safety, Impaired Insight into Safety, Requires Cuing for Safety, Impaired    Subjective    \"I'm surprised to see you at this time!\"     Objective       10/18/21 0831   Precautions   Precautions Fall Risk;Cardiac Precautions (See Comments);Other (See Comments)   Comments seizure precaution   Pain 0 - 10 Group   Therapist Pain Assessment   (no complaints of pain)   Cognition    Level of Consciousness Alert   Functional Level of Assist   Eating Independent   Grooming Modified Independent;Standing   Grooming Description Adaptive equipment  (fww at sink)   Bathing Supervision   Bathing Description Grab bar;Hand held shower;Tub bench;Set-up of equipment;Supervision for safety   Upper Body Dressing Modified Independent   Upper Body Dressing Description Assist device equipment  (fww)   Lower Body Dressing Modified Independent   Lower Body Dressing Description Assistive devices  (fww to retrieve)   Toileting Modified Independent   Toileting Description Adaptive equipment;Grab bar  (fww and grab bar)   Toilet Transfers Modified Independent   Toilet Transfer Description Grab bar;Adaptive equipment  (fww and grab bar)   Tub / Shower Transfers Supervised   Tub Shower Transfer Description Shower bench;Grab bar;Set-up of equipment;Supervision for safety   Bed Mobility    Supine to Sit Modified Independent   Sit to Supine Modified Independent   Scooting Modified Independent   Rolling Modified Independent   Interdisciplinary Plan of Care Collaboration   IDT Collaboration with  Physical " Therapist   Patient Position at End of Therapy In Bed;Call Light within Reach;Tray Table within Reach;Phone within Reach   Collaboration Comments requested PT to assess for mod I in room with FWW   OT Total Time Spent   OT Individual Total Time Spent (Mins) 60   OT Charge Group   OT Self Care / ADL 3   OT Therapy Activity 1     Functional mobility with FWW in room, bathroom, hallways and gym with supervision and good safety today.    Dry stall shower transfer with hands on wall for support and shower chair with supervision.    Assessment    Patient demonstrated improved initiation, alertness, attention and safety for the second consecutive day in OT.  Appears to be ready to be cleared for mod I in room from FWW level.  Will discuss with physical therapy.  24 hour supervision is not recommended at this time when patient discharges home.  OT recommends intermittent supervision (if doing any cooking, when bathing, other IADLs).  Strengths: Able to follow instructions, Alert and oriented, Adequate strength, Effective communication skills, Independent prior level of function, Motivated for self care and independence, Pleasant and cooperative, Supportive family, Willingly participates in therapeutic activities  Barriers: Decreased endurance, Fatigue, Generalized weakness, Impaired activity tolerance, Impaired balance, Pain (lack of sleep night before initial evaluation)    Plan    ADL, IADLs, functional mobility, standing balance, endurance/strength building    Occupational Therapy Goals (Active)     Problem: Dressing     Dates: Start: 10/04/21       Goal: STG-Within one week, patient will dress LB with supervision using adaptive techniques or AE as needed     Dates: Start: 10/04/21       Goal Note filed on 10/17/21 1457 by Adrian Mathews OT/L     Min A to SBA using AE prn               Problem: Functional Transfers     Dates: Start: 09/29/21       Goal: STG-Within one week, patient will transfer to toilet with  supervision using grab bar and/or FWW     Dates: Start: 09/29/21       Goal Note filed on 10/17/21 1457 by Adrian Mathews OT/L     SBA/supervision               Problem: OT Long Term Goals     Dates: Start: 09/29/21       Goal: LTG-By discharge, patient will complete basic self care tasks with mod I     Dates: Start: 09/29/21          Goal: LTG-By discharge, patient will perform bathroom transfers with mod I     Dates: Start: 09/29/21          Goal: LTG-By discharge, patient will complete basic home management with mod I     Dates: Start: 09/29/21             Problem: Toileting     Dates: Start: 10/04/21       Goal: STG-Within one week, patient will complete toileting tasks with supervision using grab bar and/or FWW for balance as needed     Dates: Start: 10/04/21       Goal Note filed on 10/17/21 1457 by Adrian Mathews, OT/L     SBA to supervision (SBA more often than supervised)

## 2021-10-18 NOTE — CARE PLAN
The patient is Stable - Low risk of patient condition declining or worsening    Shift Goals  Clinical Goals: Pain management  Patient Goals: sleep well    Progress made toward(s) clinical / shift goals:    Problem: Pain - Standard  Goal: Alleviation of pain or a reduction in pain to the patient’s comfort goal  Outcome: ProgressingPatient able to verbalize pain level and verbalize an acceptable level of pain.      Problem: Fall Risk - Rehab  Goal: Patient will remain free from falls  Outcome: ProgressingPt uses call light consistently and appropriately. Waits for assistance does not attempt self transfer this shift. Able to verbalize needs.

## 2021-10-18 NOTE — PROGRESS NOTES
"Rehab Progress Note     Date of Service: 10/18/2021  Chief Complaint: follow up MVR repair    Interval Events (Subjective)    Patient seen and examined today in the therapy gym.  She is hoping we can move up her discharge to this Friday as her family will be able to help over the weekend.  She reports she continues to not sleep well at night and she thinks this is due to the mattress.  We discussed her sleeping medications that she got last night and she was surprised.  Advised her I would not make any more changes as she was oversedated the last time we increased her dosing.  She is in agreement.  She has no other concerns or complaints today.    ROS: No changes to bowel, bladder, pain, or mood.        Objective:  VITAL SIGNS: /78   Pulse 65   Temp 36.3 °C (97.3 °F) (Oral)   Resp 20   Ht 1.676 m (5' 6\")   Wt 60 kg (132 lb 3.2 oz)   SpO2 95%   BMI 21.34 kg/m²   Gen: alert, no apparent distress  CV: regular rate and rhythm, no murmurs, no peripheral edema, healing incision in the right upper chest wall  Resp: clear to ascultation bilaterally, normal respiratory effort  GI: soft, non-tender abdomen, bowel sounds present    Recent Results (from the past 72 hour(s))   CBC WITHOUT DIFFERENTIAL    Collection Time: 10/16/21  5:21 AM   Result Value Ref Range    WBC 4.8 4.8 - 10.8 K/uL    RBC 3.63 (L) 4.20 - 5.40 M/uL    Hemoglobin 11.4 (L) 12.0 - 16.0 g/dL    Hematocrit 36.1 (L) 37.0 - 47.0 %    MCV 99.4 (H) 81.4 - 97.8 fL    MCH 31.4 27.0 - 33.0 pg    MCHC 31.6 (L) 33.6 - 35.0 g/dL    RDW 52.3 (H) 35.9 - 50.0 fL    Platelet Count 198 164 - 446 K/uL    MPV 9.6 9.0 - 12.9 fL       Current Facility-Administered Medications   Medication Frequency   • HYDROcodone-acetaminophen (NORCO) 5-325 MG per tablet 1 Tablet Q8HRS PRN   • traZODone (DESYREL) tablet 50 mg HS PRN   • melatonin tablet 9 mg QHS   • methocarbamol (ROBAXIN) tablet 500 mg 4X/DAY PRN   • tamsulosin (FLOMAX) capsule 0.8 mg AFTER DINNER   • lidocaine " (LIDODERM) 5 % 1 Patch Q24HR   • metoprolol tartrate (LOPRESSOR) tablet 12.5 mg TWICE DAILY   • lidocaine (LIDODERM) 5 % 1 Patch Q24HR   • polyethylene glycol/lytes (MIRALAX) PACKET 1 Packet DAILY    And   • senna-docusate (PERICOLACE or SENOKOT S) 8.6-50 MG per tablet 2 Tablet BID    And   • magnesium hydroxide (MILK OF MAGNESIA) suspension 30 mL QDAY PRN    And   • bisacodyl (DULCOLAX) suppository 10 mg DAILY   • simethicone (MYLICON) chewable tab 80 mg TID PRN   • Respiratory Therapy Consult Continuous RT   • Pharmacy Consult Request ...Pain Management Review 1 Each PHARMACY TO DOSE   • acetaminophen (Tylenol) tablet 650 mg Q4HRS PRN   • lactulose 20 GM/30ML solution 30 mL QDAY PRN   • docusate sodium (ENEMEEZ) enema 283 mg QDAY PRN   • sodium phosphate (Fleet) enema 133 mL QDAY PRN   • omeprazole (PRILOSEC) capsule 20 mg QAM AC   • artificial tears ophthalmic solution 1 Drop PRN   • benzocaine-menthol (CEPACOL) lozenge 1 Lozenge Q2HRS PRN   • mag hydrox-al hydrox-simeth (MAALOX PLUS ES or MYLANTA DS) suspension 20 mL Q2HRS PRN   • ondansetron (ZOFRAN ODT) dispertab 4 mg 4X/DAY PRN    Or   • ondansetron (ZOFRAN) syringe/vial injection 4 mg 4X/DAY PRN   • sodium chloride (OCEAN) 0.65 % nasal spray 2 Spray PRN   • aspirin EC (ECOTRIN) tablet 81 mg DAILY   • ascorbic acid (Vitamin C) tablet 500 mg BID   • ferrous sulfate tablet 325 mg QDAY with Breakfast   • levETIRAcetam (KEPPRA) tablet 1,000 mg Q12HRS   • spironolactone (ALDACTONE) tablet 25 mg Q DAY   • atorvastatin (LIPITOR) tablet 80 mg Q EVENING   • apixaban (ELIQUIS) tablet 5 mg BID       Orders Placed This Encounter   Procedures   • Diet Order Diet: Regular (chopped meats)     Standing Status:   Standing     Number of Occurrences:   1     Order Specific Question:   Diet:     Answer:   Regular [1]     Comments:   chopped meats       Assessment:  Active Hospital Problems    Diagnosis    • *S/P mitral valve repair    • Hypoalbuminemia    • Other constipation     • Breast cancer (HCC)    • Upper back pain on right side    • Impaired mobility and ADLs    • Seizure disorder (HCC)    • Other hyperlipidemia    • Urinary retention    • Anemia    • Atrial fibrillation (HCC)    • Herpes    • Sleep apnea    • Hypertension    • History of CVA (cerebrovascular accident)      This patient is a 76 y.o. female admitted for acute inpatient rehabilitation with impaired cardiac endurance S/P mitral valve repair.    I led and attended the weekly conference, and agree with the IDT conference documentation and plan of care as noted below.    Date of conference: 10/18/2021    Goals and barriers: See IDT note.    Biggest barriers: mild cognitive impairment, forgot to turn off the stove, needs help with her iADLs, impaired attention to details    Goals in next week:     CM/social support: has family, but can only help intermittently    Anticipated DC date: 10/22    Home health: PT/OT/SLP/RN    Equip: shower chair, FWW    Follow up: PCP, cardiology, neurology, Dr. Sosa, urology     Needs flu shot    Medical Decision Making and Plan:    S/p Mitral Valve repair  Dr. Nogueira 9/16  Continue full rehab program  PT/OT/SLP, 1 hr each discipline, 5 days per week    Outpatient follow up with cardiology and Dr. Nogueira as needed    Aspirin  Spironolactone      History of strokes  Cardio-embolic  Residual left hemiparesis, mild  Aphasia, improved  Dysphagia, resolved  Cognitive impairment, improved  Generalized weakness/poor endurance  Continue full rehab program  PT/OT/SLP, 1 hr each discipline, 5 days per week     Eliquis  Statin     Outpatient follow up with Dr. Sosa, referral made    Currently mod Juliana in her room today    Right upper back pain, improved  Left upper back/neck pain, improved  Right anterior chest wall pain, improved  Muscle spasms from her surgery  Lidoderm patch  Checked ultrasound - hematoma on right chest wall  PRN tylenol, last use 10/4  PRN oxycodone, not effective for right-sided chest  wall pain  Switched to Norco, last use 10/17, decreased timing to Q8  Started low dose gabapentin 100 mg TID --> 200 mg TID 10/6, discontinued due to urinary retention  Continue hot pack  Trial of TENs  Started PRN Robaxin, last use 10/17     Seizure disorder  Tonic/clonic post-op  Keppra  Outpatient referral to neurology     Hypertension  Metoprolol  Spironolactone  Appreciate hospitalist assistance    Atrial fibrillation  Metoprolol  Rate controlled    Hyperlipidemia  Statin     Anemia  Ferrous sulfate  Vit C     GI prophylaxis  Omeprazole     History of herpes simplex  Valacyclovir discontinued, patient was not taking regularly    Sleep apnea  2 L oxygen at night  To bring in home CPAP    Insomnia  Melatonin, increase dose to 9 mg (takes 10 at home)  PRN Atarax, discontinued due to oversedation  PRN trazodone, last use 10/17    Bowel program  Constipation, resolved  Increase bowel medications  Last BM 10/16    Bladder program  Urinary retention, continues  Started Flomax 9/28, increased dose 10/5 to 0.8 mg  IC for over 400cc - last required 10/11  PVRs - 61, 106  Discontinued gabapentin 10/11     DVT prophylaxis  Eliquis    Total time:  40 minutes.  I spent greater than 50% of the time for patient care, counseling, and coordination on this date, including patient face-to face time, unit/floor time with review of records/pertinent lab data and studies, as well as discussing diagnostic evaluation/work up, planned therapeutic interventions, and future disposition of care, as per the interval events/subjective and the assessment and plan as noted above.      Precious Rawls M.D.   Physical Medicine and Rehabilitation

## 2021-10-19 DIAGNOSIS — Z86.73 HISTORY OF CVA (CEREBROVASCULAR ACCIDENT): ICD-10-CM

## 2021-10-19 PROCEDURE — A9270 NON-COVERED ITEM OR SERVICE: HCPCS | Performed by: PHYSICAL MEDICINE & REHABILITATION

## 2021-10-19 PROCEDURE — A9270 NON-COVERED ITEM OR SERVICE: HCPCS | Performed by: HOSPITALIST

## 2021-10-19 PROCEDURE — 99232 SBSQ HOSP IP/OBS MODERATE 35: CPT | Performed by: PHYSICAL MEDICINE & REHABILITATION

## 2021-10-19 PROCEDURE — 97116 GAIT TRAINING THERAPY: CPT | Mod: CQ

## 2021-10-19 PROCEDURE — 94760 N-INVAS EAR/PLS OXIMETRY 1: CPT

## 2021-10-19 PROCEDURE — 97530 THERAPEUTIC ACTIVITIES: CPT | Mod: CQ

## 2021-10-19 PROCEDURE — 97129 THER IVNTJ 1ST 15 MIN: CPT

## 2021-10-19 PROCEDURE — 97130 THER IVNTJ EA ADDL 15 MIN: CPT

## 2021-10-19 PROCEDURE — 770010 HCHG ROOM/CARE - REHAB SEMI PRIVAT*

## 2021-10-19 PROCEDURE — 97112 NEUROMUSCULAR REEDUCATION: CPT | Mod: CQ

## 2021-10-19 PROCEDURE — 97110 THERAPEUTIC EXERCISES: CPT | Mod: CQ

## 2021-10-19 PROCEDURE — 700102 HCHG RX REV CODE 250 W/ 637 OVERRIDE(OP): Performed by: PHYSICAL MEDICINE & REHABILITATION

## 2021-10-19 PROCEDURE — 94660 CPAP INITIATION&MGMT: CPT

## 2021-10-19 PROCEDURE — 97110 THERAPEUTIC EXERCISES: CPT

## 2021-10-19 PROCEDURE — 97530 THERAPEUTIC ACTIVITIES: CPT

## 2021-10-19 PROCEDURE — 700102 HCHG RX REV CODE 250 W/ 637 OVERRIDE(OP): Performed by: HOSPITALIST

## 2021-10-19 RX ADMIN — SENNOSIDES AND DOCUSATE SODIUM 2 TABLET: 8.6; 5 TABLET ORAL at 20:48

## 2021-10-19 RX ADMIN — ATORVASTATIN CALCIUM 80 MG: 40 TABLET, FILM COATED ORAL at 20:49

## 2021-10-19 RX ADMIN — HYDROCODONE BITARTRATE AND ACETAMINOPHEN 1 TABLET: 5; 325 TABLET ORAL at 09:56

## 2021-10-19 RX ADMIN — TAMSULOSIN HYDROCHLORIDE 0.8 MG: 0.4 CAPSULE ORAL at 17:52

## 2021-10-19 RX ADMIN — METOPROLOL TARTRATE 12.5 MG: 25 TABLET, FILM COATED ORAL at 17:52

## 2021-10-19 RX ADMIN — OXYCODONE HYDROCHLORIDE AND ACETAMINOPHEN 500 MG: 500 TABLET ORAL at 09:29

## 2021-10-19 RX ADMIN — POLYETHYLENE GLYCOL 3350 1 PACKET: 17 POWDER, FOR SOLUTION ORAL at 09:35

## 2021-10-19 RX ADMIN — APIXABAN 5 MG: 5 TABLET, FILM COATED ORAL at 09:29

## 2021-10-19 RX ADMIN — LEVETIRACETAM 1000 MG: 500 TABLET, FILM COATED ORAL at 09:30

## 2021-10-19 RX ADMIN — MELATONIN TAB 3 MG 9 MG: 3 TAB at 20:49

## 2021-10-19 RX ADMIN — OMEPRAZOLE 20 MG: 20 CAPSULE, DELAYED RELEASE ORAL at 09:33

## 2021-10-19 RX ADMIN — METHOCARBAMOL 500 MG: 500 TABLET ORAL at 20:49

## 2021-10-19 RX ADMIN — FERROUS SULFATE TAB 325 MG (65 MG ELEMENTAL FE) 325 MG: 325 (65 FE) TAB at 09:30

## 2021-10-19 RX ADMIN — SENNOSIDES AND DOCUSATE SODIUM 2 TABLET: 8.6; 5 TABLET ORAL at 09:33

## 2021-10-19 RX ADMIN — APIXABAN 5 MG: 5 TABLET, FILM COATED ORAL at 20:49

## 2021-10-19 RX ADMIN — HYDROCODONE BITARTRATE AND ACETAMINOPHEN 1 TABLET: 5; 325 TABLET ORAL at 20:49

## 2021-10-19 RX ADMIN — LEVETIRACETAM 1000 MG: 500 TABLET, FILM COATED ORAL at 20:48

## 2021-10-19 RX ADMIN — OXYCODONE HYDROCHLORIDE AND ACETAMINOPHEN 500 MG: 500 TABLET ORAL at 20:49

## 2021-10-19 RX ADMIN — METOPROLOL TARTRATE 12.5 MG: 25 TABLET, FILM COATED ORAL at 05:11

## 2021-10-19 RX ADMIN — TRAZODONE HYDROCHLORIDE 50 MG: 50 TABLET ORAL at 20:49

## 2021-10-19 RX ADMIN — ASPIRIN 81 MG: 81 TABLET, COATED ORAL at 09:30

## 2021-10-19 RX ADMIN — SPIRONOLACTONE 25 MG: 25 TABLET, FILM COATED ORAL at 05:11

## 2021-10-19 ASSESSMENT — GAIT ASSESSMENTS
DISTANCE (FEET): 220
GAIT LEVEL OF ASSIST: SUPERVISED
ASSISTIVE DEVICE: FRONT WHEEL WALKER
DEVIATION: BRADYKINETIC;DECREASED HEEL STRIKE;DECREASED TOE OFF;OTHER (COMMENT)

## 2021-10-19 ASSESSMENT — ACTIVITIES OF DAILY LIVING (ADL)
BED_CHAIR_WHEELCHAIR_TRANSFER_DESCRIPTION: INCREASED TIME;ADAPTIVE EQUIPMENT
TOILET_TRANSFER_DESCRIPTION: GRAB BAR;INCREASED TIME

## 2021-10-19 ASSESSMENT — PAIN DESCRIPTION - PAIN TYPE
TYPE: ACUTE PAIN
TYPE: ACUTE PAIN

## 2021-10-19 NOTE — CARE PLAN
Problem: Bowel Elimination  Goal: Patient will participate in bowel management program  Note: PRN MOM given for last BM 10/16/21. Encouraged increase fluids intake. CPAP on. Will monitor.     Problem: Fall Risk - Rehab  Goal: Patient will remain free from falls  Note: Maddison Green Fall risk Assessment Score: 10    Low fall risk interventions   - Call light within reach   - Yellow  socks   - Belongings within reach   - Bed in the lowest position          The patient is Stable - Low risk of patient condition declining or worsening    Shift Goals  Clinical Goals: PAIN MANAGEMENT  Patient Goals: sleep well

## 2021-10-19 NOTE — THERAPY
"Physical Therapy   Daily Treatment     Patient Name: Shaista Bocanegra  Age:  76 y.o., Sex:  female  Medical Record #: [id number]  Today's Date: 10/19/2021     Precautions  Precautions: Fall Risk, Cardiac Precautions (See Comments)  Comments: seizure prec    Subjective    \"am I allowed to walk around in here?\"     Objective       10/19/21 1300   Cognition    Safety Awareness Impaired   Attention Impaired   Initiation Impaired   Gait Functional Level of Assist    Gait Level Of Assist Supervised   Assistive Device Front Wheel Walker   Distance (Feet) 220   # of Times Distance was Traveled 2   Deviation Bradykinetic;Decreased Heel Strike;Decreased Toe Off;Other (Comment)  (cues for proximity to the walker)   Transfer Functional Level of Assist   Bed, Chair, Wheelchair Transfer Modified Independent   Bed Chair Wheelchair Transfer Description Increased time;Adaptive equipment   Toilet Transfers Modified Independent  (from walker level)   Toilet Transfer Description Grab bar;Increased time  (from ambulatory level )   Sitting Lower Body Exercises   Sitting Lower Body Exercises Yes   Ankle Pumps 3 sets of 10   Hip Abduction 3 sets of 10   Hip Adduction 3 sets of 10   Long Arc Quad 3 sets of 10   Marching 3 sets of 10   Hamstring Curl 3 sets of 10   Nustep Resistance Level 5;Time (See Comments)  (10' B LEUE)   Bed Mobility    Supine to Sit Modified Independent   Sit to Supine Modified Independent   Sit to Stand Modified Independent   Scooting Modified Independent   Rolling Modified Independent   Neuro-Muscular Treatments   Neuro-Muscular Treatments Weight Shift Right;Weight Shift Left;Postural Changes   Comments dynamic reaching activity-pt participated in dynamic standing balance activity with FWW locating laundry from washer and transferring it to the dryer at supervision level.    Interdisciplinary Plan of Care Collaboration   IDT Collaboration with  Physical Therapist;Occupational Therapist   Patient Position at End of " Therapy Other (Comments)  (Ofelia in room walker level)   Collaboration Comments CLOF   PT Total Time Spent   PT Individual Total Time Spent (Mins) 60   PT Charge Group   PT Gait Training 2   PT Therapeutic Exercise 2   PT Neuromuscular Re-Education / Balance 1   PT Therapeutic Activities 1   Supervising Physical Therapist Agustina Cheema       Assessment    Pt demonstrates F problem solving and F+ dynamic balance during laundry activity although decreased safety awareness is present as patient walks away from the walker multiple times during our treatment session. The patient is amb at a supervised level on indoor level surfaces, supervision for safety, walker management, and path finding  Strengths: Adequate strength, Independent prior level of function, Willingly participates in therapeutic activities  Barriers: Impaired activity tolerance, Hypotension, Fatigue, Impaired balance    Plan    Continue to monitor activity tolerance. Continue endurance training, safety and tolerance to functional mobility and ambulation, balance training.     Passport items to be completed:  Get in/out of bed safely, in/out of a vehicle, safely use mobility device, walk or wheel around home/community, navigate up and down stairs, show how to get up/down from the ground, ensure home is accessible, demonstrate HEP, complete caregiver training    Physical Therapy Problems (Active)     Problem: PT-Long Term Goals     Dates: Start: 09/29/21       Goal: LTG-By discharge, patient will ambulate 300 ft with LRAD, mod I      Dates: Start: 09/29/21          Goal: LTG-By discharge, patient will transfer one surface to another mod I with LRAD     Dates: Start: 09/29/21          Goal: LTG-By discharge, patient will ambulate up/down 12 stairs with 2HRs and spv      Dates: Start: 09/29/21          Goal: LTG-By discharge, patient will transfer in/out of a car spv with LRAD      Dates: Start: 09/29/21

## 2021-10-19 NOTE — THERAPY
"Occupational Therapy  Daily Treatment     Patient Name: Shaista Bocanegra  Age:  76 y.o., Sex:  female  Medical Record #: [id number]  Today's Date: 10/19/2021     Precautions  Precautions: (P) Fall Risk, Cardiac Precautions (See Comments)  Comments: (P) seizure prec    Safety   ADL Safety : Requires Supervision for Safety, Impaired Insight into Safety, Requires Cueing for Safety, Impaired  Bathroom Safety: Requires Supervision for Safety, Impaired Insight into Safety, Requires Cuing for Safety, Impaired    Subjective    \"Can we do some laundry?\"   \"Can I get weighed?\"     Objective       10/19/21 1031   Precautions   Precautions Fall Risk;Cardiac Precautions (See Comments)   Comments seizure prec   Sitting Upper Body Exercises   Chest Press Bilateral;Weight (See Comments for lbs)  (4 x 10 w/ 10 lbs on equalizer)   Lat Pull 3 sets of 10;Bilateral;Weight (See Comments for lbs)  (25 lbs using equalizer)   Tricep Press 3 sets of 10;Bilateral;Weight (See Comments for lbs)  (20 lbs on rickshaw forward)   IADL Treatments   Home Management Transported patient's dirty clothes in bag from her room to adl laundry room, loaded in washer with soap and started machine mod I.     Interdisciplinary Plan of Care Collaboration   Patient Position at End of Therapy Edge of Bed   OT Total Time Spent   OT Individual Total Time Spent (Mins) 60   OT Charge Group   OT Therapy Activity 2   OT Therapeutic Exercise  2     Weighted patient on two different scales, per her request.  Weights were 132.2 and 132.1 lbs.    Functional mobility with FWW in room, hallways, gym with supervision/mod I.    Assessment    Patient tolerated all activities well.  Passport is complete in OT section, other than family training.  Patient continues to demonstrate improved alertness, safety and cognition.  Strengths: Able to follow instructions, Alert and oriented, Adequate strength, Effective communication skills, Independent prior level of function, Motivated for " self care and independence, Pleasant and cooperative, Supportive family, Willingly participates in therapeutic activities  Barriers: Decreased endurance, Fatigue, Generalized weakness, Impaired activity tolerance, Impaired balance, Pain (lack of sleep night before initial evaluation)    Plan  ADL, IADLs, functional mobility, standing balance, endurance/strength building    Occupational Therapy Goals (Active)     Problem: Dressing     Dates: Start: 10/04/21       Goal: STG-Within one week, patient will dress LB with supervision using adaptive techniques or AE as needed     Dates: Start: 10/04/21       Goal Note filed on 10/17/21 1457 by Adrina Mathews, OT/L     Min A to SBA using AE prn               Problem: Functional Transfers     Dates: Start: 09/29/21       Goal: STG-Within one week, patient will transfer to toilet with supervision using grab bar and/or FWW     Dates: Start: 09/29/21       Goal Note filed on 10/17/21 1457 by Adrian Mathews, OT/L     SBA/supervision               Problem: OT Long Term Goals     Dates: Start: 09/29/21       Goal: LTG-By discharge, patient will complete basic self care tasks with mod I     Dates: Start: 09/29/21          Goal: LTG-By discharge, patient will perform bathroom transfers with mod I     Dates: Start: 09/29/21          Goal: LTG-By discharge, patient will complete basic home management with mod I     Dates: Start: 09/29/21             Problem: Toileting     Dates: Start: 10/04/21       Goal: STG-Within one week, patient will complete toileting tasks with supervision using grab bar and/or FWW for balance as needed     Dates: Start: 10/04/21       Goal Note filed on 10/17/21 1457 by Adrian Mathews, OT/L     SBA to supervision (SBA more often than supervised)

## 2021-10-19 NOTE — DISCHARGE PLANNING
TO spoke with Fer (son) yesterday regarding DC, home health and DME.  Answered questions.  Fer had more questions that were more specific for Dr. Rawls.  TO told him she would have Dr. Rawls call him.      TO had VM from Fer again.  Asked same questions about DME and home health.  TO called Fer back and got his VM.  Answered same questions.      Call back again from Fer...needs clarification about walker and home health.  TO will call back later today.

## 2021-10-19 NOTE — PROGRESS NOTES
"Rehab Progress Note     Date of Service: 10/19/2021  Chief Complaint: follow up MVR repair    Interval Events (Subjective)    Patient seen and examined today in the hallway.  She is happy to hear that her discharge date has been moved up to Friday.  Confirmed her home pharmacy.    ROS: No changes to bowel, bladder, pain, mood, or sleep.           Objective:  VITAL SIGNS: /62   Pulse 76   Temp 36.3 °C (97.3 °F) (Oral)   Resp 18   Ht 1.676 m (5' 6\")   Wt 60 kg (132 lb 3.2 oz)   SpO2 96%   BMI 21.34 kg/m²   Gen: alert, no apparent distress    No results found for this or any previous visit (from the past 72 hour(s)).    Current Facility-Administered Medications   Medication Frequency   • bisacodyl (DULCOLAX) suppository 10 mg QDAY PRN    And   • senna-docusate (PERICOLACE or SENOKOT S) 8.6-50 MG per tablet 2 Tablet BID    And   • polyethylene glycol/lytes (MIRALAX) PACKET 1 Packet DAILY    And   • magnesium hydroxide (MILK OF MAGNESIA) suspension 30 mL QDAY PRN   • HYDROcodone-acetaminophen (NORCO) 5-325 MG per tablet 1 Tablet Q8HRS PRN   • traZODone (DESYREL) tablet 50 mg HS PRN   • melatonin tablet 9 mg QHS   • methocarbamol (ROBAXIN) tablet 500 mg 4X/DAY PRN   • tamsulosin (FLOMAX) capsule 0.8 mg AFTER DINNER   • lidocaine (LIDODERM) 5 % 1 Patch Q24HR   • metoprolol tartrate (LOPRESSOR) tablet 12.5 mg TWICE DAILY   • lidocaine (LIDODERM) 5 % 1 Patch Q24HR   • simethicone (MYLICON) chewable tab 80 mg TID PRN   • Respiratory Therapy Consult Continuous RT   • Pharmacy Consult Request ...Pain Management Review 1 Each PHARMACY TO DOSE   • acetaminophen (Tylenol) tablet 650 mg Q4HRS PRN   • lactulose 20 GM/30ML solution 30 mL QDAY PRN   • docusate sodium (ENEMEEZ) enema 283 mg QDAY PRN   • sodium phosphate (Fleet) enema 133 mL QDAY PRN   • omeprazole (PRILOSEC) capsule 20 mg QAM AC   • artificial tears ophthalmic solution 1 Drop PRN   • benzocaine-menthol (CEPACOL) lozenge 1 Lozenge Q2HRS PRN   • mag " hydrox-al hydrox-simeth (MAALOX PLUS ES or MYLANTA DS) suspension 20 mL Q2HRS PRN   • ondansetron (ZOFRAN ODT) dispertab 4 mg 4X/DAY PRN    Or   • ondansetron (ZOFRAN) syringe/vial injection 4 mg 4X/DAY PRN   • sodium chloride (OCEAN) 0.65 % nasal spray 2 Spray PRN   • aspirin EC (ECOTRIN) tablet 81 mg DAILY   • ascorbic acid (Vitamin C) tablet 500 mg BID   • ferrous sulfate tablet 325 mg QDAY with Breakfast   • levETIRAcetam (KEPPRA) tablet 1,000 mg Q12HRS   • spironolactone (ALDACTONE) tablet 25 mg Q DAY   • atorvastatin (LIPITOR) tablet 80 mg Q EVENING   • apixaban (ELIQUIS) tablet 5 mg BID       Orders Placed This Encounter   Procedures   • Diet Order Diet: Regular (chopped meats)     Standing Status:   Standing     Number of Occurrences:   1     Order Specific Question:   Diet:     Answer:   Regular [1]     Comments:   chopped meats       Assessment:  Active Hospital Problems    Diagnosis    • *S/P mitral valve repair    • Hypoalbuminemia    • Other constipation    • Breast cancer (HCC)    • Upper back pain on right side    • Impaired mobility and ADLs    • Seizure disorder (HCC)    • Other hyperlipidemia    • Urinary retention    • Anemia    • Atrial fibrillation (HCC)    • Herpes    • Sleep apnea    • Hypertension    • History of CVA (cerebrovascular accident)      This patient is a 76 y.o. female admitted for acute inpatient rehabilitation with impaired cardiac endurance S/P mitral valve repair.    I led and attended the weekly conference, and agree with the IDT conference documentation and plan of care as noted below.    Date of conference: 10/18/2021    Goals and barriers: See IDT note.    Biggest barriers: mild cognitive impairment, forgot to turn off the stove, needs help with her iADLs, impaired attention to details    Goals in next week:     CM/social support: has family, but can only help intermittently    Anticipated DC date: 10/22    Home health: PT/OT/SLP/RN    Equip: shower chair, FWW    Follow  up: PCP, cardiology, neurology, Dr. Sosa, urology     Rx: [facility] [address]    Needs flu shot    Medical Decision Making and Plan:    S/p Mitral Valve repair  Dr. Nogueira 9/16  Continue full rehab program  PT/OT/SLP, 1 hr each discipline, 5 days per week    Outpatient follow up with cardiology and Dr. Nogueira as needed    Aspirin  Spironolactone      History of strokes  Cardio-embolic  Residual left hemiparesis, mild  Aphasia, improved  Dysphagia, resolved  Cognitive impairment, improved  Generalized weakness/poor endurance  Continue full rehab program  PT/OT/SLP, 1 hr each discipline, 5 days per week     Eliquis  Statin     Outpatient follow up with Dr. Sosa, referral made    Currently mod Indep in her room today    Right upper back pain, improved  Left upper back/neck pain, improved  Right anterior chest wall pain, improved  Muscle spasms from her surgery  Lidoderm patch  Checked ultrasound - hematoma on right chest wall  PRN tylenol, last use 10/4  PRN oxycodone, not effective for right-sided chest wall pain  Switched to Norco, last use 10/19, decreased timing to Q8  Started low dose gabapentin 100 mg TID --> 200 mg TID 10/6, discontinued due to urinary retention  Continue hot pack  Trial of TENs  Started PRN Robaxin, last use 10/17     Seizure disorder  Tonic/clonic post-op  Keppra  Outpatient referral to neurology     Hypertension  Metoprolol  Spironolactone  Appreciate hospitalist assistance    Atrial fibrillation  Metoprolol  Rate controlled    Hyperlipidemia  Statin     Anemia  Ferrous sulfate  Vit C     GI prophylaxis  Omeprazole     History of herpes simplex  Valacyclovir discontinued, patient was not taking regularly    Sleep apnea  2 L oxygen at night  To bring in home CPAP    Insomnia  Melatonin, increase dose to 9 mg (takes 10 at home)  PRN Atarax, discontinued due to oversedation  PRN trazodone, last use 10/17    Bowel program  Constipation, resolved  Increase bowel medications  Last BM 10/18    Bladder  program  Urinary retention, continues  Started Flomax 9/28, increased dose 10/5 to 0.8 mg  IC for over 400cc - last required 10/11  PVRs - 61, 106  Discontinued gabapentin 10/11     DVT prophylaxis  Eliquis    Total time:  26 minutes.  I spent greater than 50% of the time for patient care, counseling, and coordination on this date, including patient face-to face time, unit/floor time with review of records/pertinent lab data and studies, as well as discussing diagnostic evaluation/work up, planned therapeutic interventions, and future disposition of care, as per the interval events/subjective and the assessment and plan as noted above.    I have performed a physical exam, reviewed and updated ROS, as well as the assessment and plan today 10/19/2021. In review of note from 10/18/2021 there are no new changes except as documented above.            Precious Rawls M.D.   Physical Medicine and Rehabilitation

## 2021-10-19 NOTE — DISCHARGE PLANNING
TO spoke with patients son Fer.  Answered more questions.  He got upset with CM.  Stated he didn't get the information he got today from CM yesterday.  Expressed frustration and got angry.  CM apologized.  TO explained [facility] will be available as needed after DC.  Updated Dr. Rawls.

## 2021-10-19 NOTE — THERAPY
Speech Language Pathology  Daily Treatment     Patient Name: Shaista Bocanegra  Age:  76 y.o., Sex:  female  Medical Record #: [id number]  Today's Date: 10/19/2021     Precautions  Precautions: Fall Risk, Cardiac Precautions (See Comments), Other (See Comments)  Comments: seizure precaution    Subjective    Pt was in bed upon SLP arrival. Pt was pleasant and cooperative throughout therapy session. Son present for PM session with questions related to dc.     Objective       10/19/21 0903   Cognition   Complex Attention Moderate (3)   Visual Scanning / Cancellation Skills Minimal (4)   Complex Reasoning  / Problem Solving Minimal (4)   Executive Functioning / Organization Moderate (3)   SLP Total Time Spent   SLP Individual Total Time Spent (Mins) 60   Treatment Charges   SLP Cognitive Skill Development First 15 Minutes 1   SLP Cognitive Skill Development Additional 15 Minutes 3       Assessment    Pt presented with attention task, pt independently completed task with 7/11 trials, pt given min A to find the remaining 4 targets, pt found 3/4 targets, pt needed max A to find the final target.   Pts son present for PM session, son with questions related to d/c. Pts son expressed concern re: need for 24 hour care despite team not recommending. Reinforced that it is our job to ensure that safe dc occurs and we feel confident with pts current level of function. Pts son requesting information about caregiver services, stated he has asked mult people without information being provided, slp located CM and provided pt with brochures and recommendations. SLP also rec completion of family training with Son to allow son to observe CLOF and be trained in areas in which assistance is necessary/rec. Also discussed that pts sons nerves are normal and that hopefully with family training pts son with be reassured of pts strengths and feel safe with dc plan. Son verbalizing understanding and thanked SLP for assistance.       Strengths: Able  to follow instructions, Alert and oriented, Motivated for self care and independence, Pleasant and cooperative, Supportive family, Willingly participates in therapeutic activities, Making steady progress towards goals  Barriers: Impaired functional cognition    Plan    Prepare for dc      Speech Therapy Problems (Active)     Problem: Expression STGs     Dates: Start: 10/18/21       Goal:  Pt will name 10 items per minute given specified category and min verbal cues.       Dates: Start: 10/18/21             Problem: Memory STGs     Dates: Start: 09/29/21       Goal: STG-Within one week, patient will demonstrate use of memory strategies in order to recall daily events     Dates: Start: 09/29/21       Goal Note filed on 10/18/21 4446 by Mark Roblero MS,CCC-SLP     Min cues to recall daily events with use of memory book.                Problem: Problem Solving STGs     Dates: Start: 10/18/21       Goal: STG-Within one week, patient will complete alternating attn tasks with 80% accuracy given min verbal cues.       Dates: Start: 10/18/21          Goal: STG-Within one week, patient will complete executive function tasks with 80% accuracy, given min verbal cues.       Dates: Start: 10/18/21             Problem: Speech/Swallowing LTGs     Dates: Start: 09/29/21       Goal: LTG-By discharge, patient will solve complex problems related to IADL's in order to d/c homw with mod I     Dates: Start: 09/29/21

## 2021-10-19 NOTE — CARE PLAN
Problem: Knowledge Deficit - Standard  Goal: Patient and family/care givers will demonstrate understanding of plan of care, disease process/condition, diagnostic tests and medications  Outcome: Progressing   Pt education given regarding plan of car, pt shows some understanding, will continue to reinforce education and continue to monitor.     Problem: Fall Risk - Rehab  Goal: Patient will remain free from falls  Outcome: Progressing     Pt education given regarding fall precautions AND safety measures, pt shows understanding, pt Mod I in rm w device, will continue to reinforce education and continue to monitor.

## 2021-10-20 PROCEDURE — 700102 HCHG RX REV CODE 250 W/ 637 OVERRIDE(OP): Performed by: PHYSICAL MEDICINE & REHABILITATION

## 2021-10-20 PROCEDURE — 94760 N-INVAS EAR/PLS OXIMETRY 1: CPT

## 2021-10-20 PROCEDURE — 99232 SBSQ HOSP IP/OBS MODERATE 35: CPT | Performed by: PHYSICAL MEDICINE & REHABILITATION

## 2021-10-20 PROCEDURE — 97130 THER IVNTJ EA ADDL 15 MIN: CPT

## 2021-10-20 PROCEDURE — 700102 HCHG RX REV CODE 250 W/ 637 OVERRIDE(OP): Performed by: HOSPITALIST

## 2021-10-20 PROCEDURE — 97530 THERAPEUTIC ACTIVITIES: CPT

## 2021-10-20 PROCEDURE — 770010 HCHG ROOM/CARE - REHAB SEMI PRIVAT*

## 2021-10-20 PROCEDURE — A9270 NON-COVERED ITEM OR SERVICE: HCPCS | Performed by: HOSPITALIST

## 2021-10-20 PROCEDURE — 97116 GAIT TRAINING THERAPY: CPT

## 2021-10-20 PROCEDURE — A9270 NON-COVERED ITEM OR SERVICE: HCPCS | Performed by: PHYSICAL MEDICINE & REHABILITATION

## 2021-10-20 PROCEDURE — 97129 THER IVNTJ 1ST 15 MIN: CPT

## 2021-10-20 RX ADMIN — ATORVASTATIN CALCIUM 80 MG: 40 TABLET, FILM COATED ORAL at 20:19

## 2021-10-20 RX ADMIN — HYDROCODONE BITARTRATE AND ACETAMINOPHEN 1 TABLET: 5; 325 TABLET ORAL at 20:21

## 2021-10-20 RX ADMIN — METHOCARBAMOL 500 MG: 500 TABLET ORAL at 20:19

## 2021-10-20 RX ADMIN — POLYETHYLENE GLYCOL 3350 1 PACKET: 17 POWDER, FOR SOLUTION ORAL at 08:46

## 2021-10-20 RX ADMIN — APIXABAN 5 MG: 5 TABLET, FILM COATED ORAL at 08:45

## 2021-10-20 RX ADMIN — TAMSULOSIN HYDROCHLORIDE 0.8 MG: 0.4 CAPSULE ORAL at 18:04

## 2021-10-20 RX ADMIN — SPIRONOLACTONE 25 MG: 25 TABLET, FILM COATED ORAL at 05:43

## 2021-10-20 RX ADMIN — APIXABAN 5 MG: 5 TABLET, FILM COATED ORAL at 20:19

## 2021-10-20 RX ADMIN — OXYCODONE HYDROCHLORIDE AND ACETAMINOPHEN 500 MG: 500 TABLET ORAL at 20:19

## 2021-10-20 RX ADMIN — ASPIRIN 81 MG: 81 TABLET, COATED ORAL at 08:44

## 2021-10-20 RX ADMIN — MAGNESIUM HYDROXIDE 30 ML: 400 SUSPENSION ORAL at 11:12

## 2021-10-20 RX ADMIN — OMEPRAZOLE 20 MG: 20 CAPSULE, DELAYED RELEASE ORAL at 08:45

## 2021-10-20 RX ADMIN — OXYCODONE HYDROCHLORIDE AND ACETAMINOPHEN 500 MG: 500 TABLET ORAL at 08:45

## 2021-10-20 RX ADMIN — MELATONIN TAB 3 MG 9 MG: 3 TAB at 20:18

## 2021-10-20 RX ADMIN — LEVETIRACETAM 1000 MG: 500 TABLET, FILM COATED ORAL at 08:45

## 2021-10-20 RX ADMIN — TRAZODONE HYDROCHLORIDE 50 MG: 50 TABLET ORAL at 20:19

## 2021-10-20 RX ADMIN — FERROUS SULFATE TAB 325 MG (65 MG ELEMENTAL FE) 325 MG: 325 (65 FE) TAB at 08:46

## 2021-10-20 RX ADMIN — LEVETIRACETAM 1000 MG: 500 TABLET, FILM COATED ORAL at 20:18

## 2021-10-20 RX ADMIN — SENNOSIDES AND DOCUSATE SODIUM 2 TABLET: 8.6; 5 TABLET ORAL at 20:18

## 2021-10-20 RX ADMIN — SENNOSIDES AND DOCUSATE SODIUM 2 TABLET: 8.6; 5 TABLET ORAL at 08:46

## 2021-10-20 RX ADMIN — BISACODYL 10 MG: 10 SUPPOSITORY RECTAL at 18:10

## 2021-10-20 RX ADMIN — METOPROLOL TARTRATE 12.5 MG: 25 TABLET, FILM COATED ORAL at 18:04

## 2021-10-20 ASSESSMENT — GAIT ASSESSMENTS
GAIT LEVEL OF ASSIST: SUPERVISED
ASSISTIVE DEVICE: FRONT WHEEL WALKER
DISTANCE (FEET): 150
DEVIATION: BRADYKINETIC;DECREASED HEEL STRIKE;DECREASED TOE OFF

## 2021-10-20 ASSESSMENT — ACTIVITIES OF DAILY LIVING (ADL)
BED_CHAIR_WHEELCHAIR_TRANSFER_DESCRIPTION: ADAPTIVE EQUIPMENT
BED_CHAIR_WHEELCHAIR_TRANSFER_DESCRIPTION: ADAPTIVE EQUIPMENT;INCREASED TIME

## 2021-10-20 NOTE — FLOWSHEET NOTE
10/19/21 6515   Events/Summary/Plan   Events/Summary/Plan SpO2 check, swap out auto cpap for resmed aircurve 10   Vital Signs   Pulse 70   Respiration 16   Pulse Oximetry 96 %   $ Pulse Oximetry (Spot Check) Yes   Oxygen   O2 Delivery Device None - Room Air   Non-Invasive Ventilation [facility]   Nocturnal CPAP or BIPAP CPAP - [facility]   $ System Evaluation Yes   Settings (If Known) Auto CPAP

## 2021-10-20 NOTE — PROGRESS NOTES
"Rehab Progress Note     Date of Service: 10/20/2021  Chief Complaint: follow up MVR repair    Interval Events (Subjective)    Patient seen and examined today in her room.   She would like to make sure she is taking as few pills as possible on discharge.  Family to hire 24/7 help for her at home for a week, although therapy feels she is safe for intermittent supervision.  No interval events.     ROS: No changes to bowel, bladder, pain, mood, or sleep.       Objective:  VITAL SIGNS: /65   Pulse 64   Temp 36.3 °C (97.3 °F) (Oral)   Resp 20   Ht 1.676 m (5' 6\")   Wt 60 kg (132 lb 3.2 oz)   SpO2 94%   BMI 21.34 kg/m²   Gen: alert, no apparent distress  Neuro: notable for generalized weakness    No results found for this or any previous visit (from the past 72 hour(s)).    Current Facility-Administered Medications   Medication Frequency   • bisacodyl (DULCOLAX) suppository 10 mg QDAY PRN    And   • senna-docusate (PERICOLACE or SENOKOT S) 8.6-50 MG per tablet 2 Tablet BID    And   • polyethylene glycol/lytes (MIRALAX) PACKET 1 Packet DAILY    And   • magnesium hydroxide (MILK OF MAGNESIA) suspension 30 mL QDAY PRN   • HYDROcodone-acetaminophen (NORCO) 5-325 MG per tablet 1 Tablet Q8HRS PRN   • traZODone (DESYREL) tablet 50 mg HS PRN   • melatonin tablet 9 mg QHS   • methocarbamol (ROBAXIN) tablet 500 mg 4X/DAY PRN   • tamsulosin (FLOMAX) capsule 0.8 mg AFTER DINNER   • lidocaine (LIDODERM) 5 % 1 Patch Q24HR   • metoprolol tartrate (LOPRESSOR) tablet 12.5 mg TWICE DAILY   • lidocaine (LIDODERM) 5 % 1 Patch Q24HR   • simethicone (MYLICON) chewable tab 80 mg TID PRN   • Respiratory Therapy Consult Continuous RT   • Pharmacy Consult Request ...Pain Management Review 1 Each PHARMACY TO DOSE   • acetaminophen (Tylenol) tablet 650 mg Q4HRS PRN   • lactulose 20 GM/30ML solution 30 mL QDAY PRN   • docusate sodium (ENEMEEZ) enema 283 mg QDAY PRN   • sodium phosphate (Fleet) enema 133 mL QDAY PRN   • omeprazole " (PRILOSEC) capsule 20 mg QAM AC   • artificial tears ophthalmic solution 1 Drop PRN   • benzocaine-menthol (CEPACOL) lozenge 1 Lozenge Q2HRS PRN   • mag hydrox-al hydrox-simeth (MAALOX PLUS ES or MYLANTA DS) suspension 20 mL Q2HRS PRN   • ondansetron (ZOFRAN ODT) dispertab 4 mg 4X/DAY PRN    Or   • ondansetron (ZOFRAN) syringe/vial injection 4 mg 4X/DAY PRN   • sodium chloride (OCEAN) 0.65 % nasal spray 2 Spray PRN   • aspirin EC (ECOTRIN) tablet 81 mg DAILY   • ascorbic acid (Vitamin C) tablet 500 mg BID   • ferrous sulfate tablet 325 mg QDAY with Breakfast   • levETIRAcetam (KEPPRA) tablet 1,000 mg Q12HRS   • spironolactone (ALDACTONE) tablet 25 mg Q DAY   • atorvastatin (LIPITOR) tablet 80 mg Q EVENING   • apixaban (ELIQUIS) tablet 5 mg BID       Orders Placed This Encounter   Procedures   • Diet Order Diet: Regular (chopped meats)     Standing Status:   Standing     Number of Occurrences:   1     Order Specific Question:   Diet:     Answer:   Regular [1]     Comments:   chopped meats       Assessment:  Active Hospital Problems    Diagnosis    • *S/P mitral valve repair    • Hypoalbuminemia    • Other constipation    • Breast cancer (HCC)    • Upper back pain on right side    • Impaired mobility and ADLs    • Seizure disorder (HCC)    • Other hyperlipidemia    • Urinary retention    • Anemia    • Atrial fibrillation (HCC)    • Herpes    • Sleep apnea    • Hypertension    • History of CVA (cerebrovascular accident)      This patient is a 76 y.o. female admitted for acute inpatient rehabilitation with impaired cardiac endurance S/P mitral valve repair.    I led and attended the weekly conference, and agree with the IDT conference documentation and plan of care as noted below.    Date of conference: 10/18/2021    Goals and barriers: See IDT note.    Biggest barriers: mild cognitive impairment, forgot to turn off the stove, needs help with her iADLs, impaired attention to details    Goals in next week:     CM/social  support: has family, but can only help intermittently    Anticipated DC date: 10/22    Home health: PT/OT/SLP/RN    Equip: shower chair, FWW    Follow up: PCP, cardiology, neurology, Dr. Sosa, urology     Rx: [facility] [address]    Needs flu shot    Medical Decision Making and Plan:    S/p Mitral Valve repair  Dr. Nogueira 9/16  Continue full rehab program  PT/OT/SLP, 1 hr each discipline, 5 days per week    Outpatient follow up with cardiology and Dr. Nogueira as needed    Aspirin  Spironolactone      History of strokes  Cardio-embolic  Residual left hemiparesis, mild  Aphasia, improved  Dysphagia, resolved  Cognitive impairment, improved  Generalized weakness/poor endurance  Continue full rehab program  PT/OT/SLP, 1 hr each discipline, 5 days per week     Eliquis  Statin     Outpatient follow up with Dr. Sosa, referral made    Currently mod Juliana in her room    Right upper back pain, improved  Left upper back/neck pain, improved  Right anterior chest wall pain, improved  Muscle spasms from her surgery  Lidoderm patch  PRN tylenol, last use 10/4  PRN Norco, last use 10/19  PRN Robaxin, last use 10/19     Seizure disorder  Tonic/clonic post-op  Keppra  Outpatient referral to neurology     Hypertension  Metoprolol  Spironolactone  Appreciate hospitalist assistance    Atrial fibrillation  Metoprolol  Rate controlled    Hyperlipidemia  Statin     Anemia  Ferrous sulfate  Vit C     GI prophylaxis  Omeprazole     Sleep apnea  CPAP    Insomnia  Melatonin, increase dose to 9 mg (takes 10 at home)  PRN trazodone, last use 10/19    Bowel program  Constipation, resolved  Increase bowel medications  Last BM 10/18    Bladder program  Urinary retention, continues/improved  Started Flomax 9/28, increased dose 10/5 to 0.8 mg  IC for over 400cc - last required 10/11  PVRs - 61, 106  Discontinued gabapentin 10/11     DVT prophylaxis  Eliquis    Total time:  27 minutes.  I spent greater than 50% of the time for patient care, counseling, and  coordination on this date, including patient face-to face time, unit/floor time with review of records/pertinent lab data and studies, as well as discussing diagnostic evaluation/work up, planned therapeutic interventions, and future disposition of care, as per the interval events/subjective and the assessment and plan as noted above.    I have performed a physical exam, reviewed and updated ROS, as well as the assessment and plan today 10/20/2021. In review of note from 10/19/2021 there are no new changes except as documented above.    Precious Rawls M.D.   Physical Medicine and Rehabilitation

## 2021-10-20 NOTE — THERAPY
Occupational Therapy  Daily Treatment     Patient Name: Shaista Bocanegra  Age:  76 y.o., Sex:  female  Medical Record #: [id number]  Today's Date: 10/20/2021     Precautions  Precautions: (P) Fall Risk, Cardiac Precautions (See Comments)  Comments: seizure prec    Safety   ADL Safety : Requires Supervision for Safety, Impaired Insight into Safety, Requires Cueing for Safety, Impaired  Bathroom Safety: Requires Supervision for Safety, Impaired Insight into Safety, Requires Cuing for Safety, Impaired    Subjective    Patient in room with sonFer.  Just finished PT family training.     Objective       10/20/21 1031   Precautions   Precautions Fall Risk;Cardiac Precautions (See Comments)   Functional Level of Assist   Bed, Chair, Wheelchair Transfer Modified Independent   Bed Chair Wheelchair Transfer Description Adaptive equipment  (fww)   Bed Mobility    Supine to Sit Independent   Sit to Supine Independent   Scooting Independent   Rolling Independent   Interdisciplinary Plan of Care Collaboration   IDT Collaboration with  Family / Caregiver   Patient Position at End of Therapy In Bed;Call Light within Reach;Tray Table within Reach;Phone within Reach;Family / Friend in Room   Collaboration Comments spouse present for family training   OT Total Time Spent   OT Individual Total Time Spent (Mins) 30   OT Charge Group   OT Therapy Activity 2     OT verbally reviewed patient's CLOF with ADLs, IADLs, transfers and mobility with her son.  Discussed recommendations for supervision if patient is using the stove, oven or microwave and when she is bathing.  Discussed DME recommendations, including BSC, shower chair and HHSH.  Dry stall shower transfer with grab bar and shower chair with supervision.  No further questions or concerns from son.  Typed recommendations given to patient later in day to give to son for him to pass information on to other family members.    Assessment    Training went well with patient and son. Son was  impressed with how patient is functioning.  Ready to d/c home Friday.  Strengths: Able to follow instructions, Alert and oriented, Adequate strength, Effective communication skills, Independent prior level of function, Motivated for self care and independence, Pleasant and cooperative, Supportive family, Willingly participates in therapeutic activities  Barriers: Decreased endurance, Fatigue, Generalized weakness, Impaired activity tolerance, Impaired balance, Pain (lack of sleep night before initial evaluation)    Plan    ADL, IADLs, functional mobility, standing balance, endurance/strength building    Occupational Therapy Goals (Active)     Problem: Dressing     Dates: Start: 10/04/21       Goal: STG-Within one week, patient will dress LB with supervision using adaptive techniques or AE as needed     Dates: Start: 10/04/21       Goal Note filed on 10/17/21 1457 by Adrian Mathews, OT/L     Min A to SBA using AE prn               Problem: Functional Transfers     Dates: Start: 09/29/21       Goal: STG-Within one week, patient will transfer to toilet with supervision using grab bar and/or FWW     Dates: Start: 09/29/21       Goal Note filed on 10/17/21 1457 by dArian Mathews, OT/L     SBA/supervision               Problem: OT Long Term Goals     Dates: Start: 09/29/21       Goal: LTG-By discharge, patient will complete basic self care tasks with mod I     Dates: Start: 09/29/21          Goal: LTG-By discharge, patient will perform bathroom transfers with mod I     Dates: Start: 09/29/21          Goal: LTG-By discharge, patient will complete basic home management with mod I     Dates: Start: 09/29/21             Problem: Toileting     Dates: Start: 10/04/21       Goal: STG-Within one week, patient will complete toileting tasks with supervision using grab bar and/or FWW for balance as needed     Dates: Start: 10/04/21       Goal Note filed on 10/17/21 1457 by Adrian Mathews OT/L     SBA to supervision (SBA  more often than supervised)

## 2021-10-20 NOTE — THERAPY
Physical Therapy   Daily Treatment     Patient Name: Shaista Bocanegra  Age:  76 y.o., Sex:  female  Medical Record #: [id number]  Today's Date: 10/20/2021     Precautions  Precautions: Fall Risk, Cardiac Precautions (See Comments)  Comments: seizure prec    Subjective    Pt was supine in bed upon arrival and agreeable to treatment.  Pt's son present for family training.      Objective       10/20/21 1001   Precautions   Precautions Fall Risk;Cardiac Precautions (See Comments)   Gait Functional Level of Assist    Gait Level Of Assist Supervised   Assistive Device Front Wheel Walker   Distance (Feet) 150   # of Times Distance was Traveled 1   Deviation Bradykinetic;Decreased Heel Strike;Decreased Toe Off   Transfer Functional Level of Assist   Bed, Chair, Wheelchair Transfer Modified Independent   Bed Chair Wheelchair Transfer Description Adaptive equipment;Increased time   Bed Mobility    Supine to Sit Modified Independent   Sit to Stand Modified Independent   Scooting Modified Independent   Rolling Modified Independent   Interdisciplinary Plan of Care Collaboration   Patient Position at End of Therapy Seated;Family / Friend in Room  (Hand off to OT)   PT Total Time Spent   PT Individual Total Time Spent (Mins) 30   PT Charge Group   PT Gait Training 1   PT Therapeutic Activities 1     Completed family training with pt's son on mobility and ambulation with pt, pt's CLOF, and POC.  Discussed fall prevention and recovery; handout given.  Floor recovery was not addressed d/t time constraints, but will be completed in PM session.     Assessment    Pt and pt's son verbalized all education.  Pt demonstrated good understanding of pt's CLOF.     Strengths: Adequate strength, Independent prior level of function, Willingly participates in therapeutic activities  Barriers: Impaired activity tolerance, Hypotension, Fatigue, Impaired balance    Plan    Continue to monitor activity tolerance. Continue endurance training, safety and  tolerance to functional mobility and ambulation, balance training.  Complete floor recovery demonstration.     Passport items to be completed:  Get in/out of bed safely, in/out of a vehicle, safely use mobility device, walk or wheel around home/community, navigate up and down stairs, show how to get up/down from the ground, ensure home is accessible, demonstrate HEP, complete caregiver training     Physical Therapy Problems (Active)     Problem: PT-Long Term Goals     Dates: Start: 09/29/21       Goal: LTG-By discharge, patient will ambulate 300 ft with LRAD, mod I      Dates: Start: 09/29/21          Goal: LTG-By discharge, patient will transfer one surface to another mod I with LRAD     Dates: Start: 09/29/21          Goal: LTG-By discharge, patient will ambulate up/down 12 stairs with 2HRs and spv      Dates: Start: 09/29/21          Goal: LTG-By discharge, patient will transfer in/out of a car spv with LRAD      Dates: Start: 09/29/21

## 2021-10-20 NOTE — FLOWSHEET NOTE
10/20/21 1259   Events/Summary/Plan   Events/Summary/Plan 02 spot check. CPAP filled with sterile water   Vital Signs   Pulse 70   Respiration 18   Pulse Oximetry 94 %   $ Pulse Oximetry (Spot Check) Yes   Respiratory Assessment   Level of Consciousness Alert   Chest Exam   Work Of Breathing / Effort Within Normal Limits   Oxygen   O2 Delivery Device None - Room Air   Non-Invasive Ventilation [facility]   Nocturnal CPAP or BIPAP CPAP - [facility]   Settings (If Known) auto CPAP

## 2021-10-20 NOTE — THERAPY
Speech Language Pathology  Daily Treatment     Patient Name: Shaista Bocanegra  Age:  76 y.o., Sex:  female  Medical Record #: [id number]  Today's Date: 10/20/2021     Precautions  Precautions: Fall Risk, Cardiac Precautions (See Comments)  Comments: seizure prec    Subjective    Patient was aware of therapy schedule and willing to participate.      Objective       10/20/21 1302   Cognition   Medication Management  Supervision (5)   SLP Total Time Spent   SLP Individual Total Time Spent (Mins) 60   Treatment Charges   SLP Cognitive Skill Development First 15 Minutes 1   SLP Cognitive Skill Development Additional 15 Minutes 3       Assessment    ID'd med errors with 80% accuracy.   Completed functional pill sorting task with 100% accuracy. Cue x1 for medication with double dosage.   Patient independently double checked and self corrected work.   Patient utilized smart phone to set reminders.  Strengths: Able to follow instructions, Alert and oriented, Motivated for self care and independence, Pleasant and cooperative, Supportive family, Willingly participates in therapeutic activities, Making steady progress towards goals  Barriers: Impaired functional cognition    Plan    Complete final outcome assessment and d/c planning.     Passport items to be completed:  Express basic needs, understand food/liquid recommendations, consistently follow swallow precautions, manage finances, manage medications, arrive to therapy appointments on time, complete daily memory log entries, solve problems related to safety situations, review education related to hospitalization, complete caregiver training     Speech Therapy Problems (Active)     Problem: Expression STGs     Dates: Start: 10/18/21       Goal:  Pt will name 10 items per minute given specified category and min verbal cues.       Dates: Start: 10/18/21             Problem: Memory STGs     Dates: Start: 09/29/21       Goal: STG-Within one week, patient will demonstrate use of  memory strategies in order to recall daily events     Dates: Start: 09/29/21       Goal Note filed on 10/18/21 0754 by Mark Roblero MS,CCC-SLP     Min cues to recall daily events with use of memory book.                Problem: Problem Solving STGs     Dates: Start: 10/18/21       Goal: STG-Within one week, patient will complete alternating attn tasks with 80% accuracy given min verbal cues.       Dates: Start: 10/18/21          Goal: STG-Within one week, patient will complete executive function tasks with 80% accuracy, given min verbal cues.       Dates: Start: 10/18/21             Problem: Speech/Swallowing LTGs     Dates: Start: 09/29/21       Goal: LTG-By discharge, patient will solve complex problems related to IADL's in order to d/c homw with mod I     Dates: Start: 09/29/21

## 2021-10-20 NOTE — CARE PLAN
Problem: Knowledge Deficit - Standard  Goal: Patient and family/care givers will demonstrate understanding of plan of care, disease process/condition, diagnostic tests and medications  Outcome: Progressing     Problem: Psychosocial  Goal: Patient's level of anxiety will decrease  Outcome: Progressing   The patient is Stable - Low risk of patient condition declining or worsening    Shift Goals  Clinical Goals: PAIN MANAGEMENT  Patient Goals: sleep well    Progress made toward(s) clinical / shift goals:  Patient is resting no distress reported    Patient is not progressing towards the following goals:

## 2021-10-20 NOTE — THERAPY
Physical Therapy   Daily Treatment     Patient Name: Shaista Bocanegra  Age:  76 y.o., Sex:  female  Medical Record #: [id number]  Today's Date: 10/20/2021     Precautions  Precautions: Fall Risk, Cardiac Precautions (See Comments)  Comments: seizure prec    Subjective    Pt received in bed and agreeable to PT. Pt reported she has a meeting with a home caregiver company at 15:30, so will not be able to do a full hour of PT.     Objective       10/20/21 1501   Pain   Intervention Declines   Cognition    Level of Consciousness Alert   Sleep/Wake Cycle   Sleep & Rest Awake   Transfer Functional Level of Assist   Bed, Chair, Wheelchair Transfer Modified Independent   Bed Chair Wheelchair Transfer Description   (FWW)   Sitting Lower Body Exercises   Comments Provided with seated HEP handout and pink therband. Unable to go through them with her due to schedule conflict, but pt reports having done them all before.   Interdisciplinary Plan of Care Collaboration   IDT Collaboration with  Family / Caregiver   Patient Position at End of Therapy Seated  (in lobby meeting with caregiver company, son present)   Collaboration Comments Son present at the end of the session for meeting with caregiver company.   Therapy Missed   Missed Therapy (Minutes) 30   Reason For Missed Therapy Non-Medical - Other (Please Comment)  (schedule conflict)   PT Total Time Spent   PT Individual Total Time Spent (Mins) 30   PT Charge Group   PT Therapeutic Activities 2     Completed fall recovery training with the pt. Reviewed the handout and practiced a full recover with mod A to get from kneeling to sitting on the chair. Pt reports having a life alert button at home.     Assessment    Pt completed fall recovery training with assist. Pt seemed concerned about this, but has not had a fall in the past. Reassured her that she is not at a high risk for falls and not to expect to need this information. Pt unable to complete the full session due to schedule  conflict.  Strengths: Adequate strength, Independent prior level of function, Willingly participates in therapeutic activities  Barriers: Impaired activity tolerance, Hypotension, Fatigue, Impaired balance    Plan    Continue to monitor activity tolerance. Continue endurance training, safety and tolerance to functional mobility and ambulation, balance training.     Passport items to be completed:  Get in/out of bed safely, in/out of a vehicle, safely use mobility device, walk or wheel around home/community, navigate up and down stairs, show how to get up/down from the ground, ensure home is accessible, demonstrate HEP, complete caregiver training    Physical Therapy Problems (Active)     Problem: PT-Long Term Goals     Dates: Start: 09/29/21       Goal: LTG-By discharge, patient will ambulate 300 ft with LRAD, mod I      Dates: Start: 09/29/21          Goal: LTG-By discharge, patient will transfer one surface to another mod I with LRAD     Dates: Start: 09/29/21          Goal: LTG-By discharge, patient will ambulate up/down 12 stairs with 2HRs and spv      Dates: Start: 09/29/21          Goal: LTG-By discharge, patient will transfer in/out of a car spv with LRAD      Dates: Start: 09/29/21

## 2021-10-21 DIAGNOSIS — R33.9 URINARY RETENTION: ICD-10-CM

## 2021-10-21 DIAGNOSIS — G40.909 SEIZURE DISORDER (HCC): ICD-10-CM

## 2021-10-21 PROBLEM — B00.9 HERPES: Status: RESOLVED | Noted: 2021-05-31 | Resolved: 2021-10-21

## 2021-10-21 PROCEDURE — 94760 N-INVAS EAR/PLS OXIMETRY 1: CPT

## 2021-10-21 PROCEDURE — 97129 THER IVNTJ 1ST 15 MIN: CPT

## 2021-10-21 PROCEDURE — A9270 NON-COVERED ITEM OR SERVICE: HCPCS | Performed by: HOSPITALIST

## 2021-10-21 PROCEDURE — 700102 HCHG RX REV CODE 250 W/ 637 OVERRIDE(OP): Performed by: PHYSICAL MEDICINE & REHABILITATION

## 2021-10-21 PROCEDURE — 97530 THERAPEUTIC ACTIVITIES: CPT

## 2021-10-21 PROCEDURE — 700102 HCHG RX REV CODE 250 W/ 637 OVERRIDE(OP): Performed by: HOSPITALIST

## 2021-10-21 PROCEDURE — 770010 HCHG ROOM/CARE - REHAB SEMI PRIVAT*

## 2021-10-21 PROCEDURE — 97535 SELF CARE MNGMENT TRAINING: CPT

## 2021-10-21 PROCEDURE — 97110 THERAPEUTIC EXERCISES: CPT

## 2021-10-21 PROCEDURE — 99232 SBSQ HOSP IP/OBS MODERATE 35: CPT | Performed by: PHYSICAL MEDICINE & REHABILITATION

## 2021-10-21 PROCEDURE — A9270 NON-COVERED ITEM OR SERVICE: HCPCS | Performed by: PHYSICAL MEDICINE & REHABILITATION

## 2021-10-21 PROCEDURE — 97130 THER IVNTJ EA ADDL 15 MIN: CPT

## 2021-10-21 RX ORDER — TRAZODONE HYDROCHLORIDE 50 MG/1
50 TABLET ORAL NIGHTLY PRN
Qty: 30 TABLET | Refills: 2 | Status: SHIPPED | OUTPATIENT
Start: 2021-10-21 | End: 2021-12-03

## 2021-10-21 RX ORDER — LEVETIRACETAM 1000 MG/1
1000 TABLET ORAL EVERY 12 HOURS
Qty: 60 TABLET | Refills: 2 | Status: SHIPPED | OUTPATIENT
Start: 2021-10-21 | End: 2022-01-26

## 2021-10-21 RX ORDER — SPIRONOLACTONE 25 MG/1
25 TABLET ORAL DAILY
Qty: 30 TABLET | Refills: 2 | Status: SHIPPED | OUTPATIENT
Start: 2021-10-22 | End: 2022-05-12 | Stop reason: SDUPTHER

## 2021-10-21 RX ORDER — OMEPRAZOLE 20 MG/1
20 CAPSULE, DELAYED RELEASE ORAL
Qty: 30 CAPSULE | Refills: 2 | Status: SHIPPED | OUTPATIENT
Start: 2021-10-22 | End: 2021-12-03

## 2021-10-21 RX ORDER — TAMSULOSIN HYDROCHLORIDE 0.4 MG/1
0.8 CAPSULE ORAL
Qty: 60 CAPSULE | Refills: 2 | Status: ON HOLD | OUTPATIENT
Start: 2021-10-21 | End: 2021-12-07

## 2021-10-21 RX ORDER — ASPIRIN 81 MG/1
81 TABLET ORAL DAILY
Qty: 30 TABLET | Refills: 2 | COMMUNITY
Start: 2021-10-22 | End: 2021-12-10

## 2021-10-21 RX ORDER — FERROUS SULFATE 325(65) MG
325 TABLET ORAL
Qty: 30 TABLET | Refills: 2 | COMMUNITY
Start: 2021-10-22 | End: 2022-05-12

## 2021-10-21 RX ORDER — LIDOCAINE 4 G/G
1 PATCH TOPICAL DAILY
Qty: 30 PATCH | Refills: 2 | COMMUNITY
End: 2021-12-03

## 2021-10-21 RX ORDER — PHENOL 1.4 %
10 AEROSOL, SPRAY (ML) MUCOUS MEMBRANE
Qty: 30 TABLET | Refills: 2 | COMMUNITY
Start: 2021-10-21 | End: 2022-01-26

## 2021-10-21 RX ORDER — HYDROCODONE BITARTRATE AND ACETAMINOPHEN 5; 325 MG/1; MG/1
1 TABLET ORAL EVERY 8 HOURS PRN
Qty: 21 TABLET | Refills: 0 | Status: SHIPPED | OUTPATIENT
Start: 2021-10-21 | End: 2021-10-28

## 2021-10-21 RX ORDER — ATORVASTATIN CALCIUM 80 MG/1
80 TABLET, FILM COATED ORAL EVERY EVENING
Qty: 30 TABLET | Refills: 2 | Status: SHIPPED | OUTPATIENT
Start: 2021-10-21 | End: 2022-05-12 | Stop reason: SDUPTHER

## 2021-10-21 RX ORDER — ASCORBIC ACID 500 MG
500 TABLET ORAL 2 TIMES DAILY
Qty: 60 TABLET | Refills: 2 | COMMUNITY
Start: 2021-10-21 | End: 2022-01-25

## 2021-10-21 RX ORDER — AMOXICILLIN 250 MG
2 CAPSULE ORAL 2 TIMES DAILY
Qty: 120 TABLET | Refills: 2 | Status: ON HOLD | COMMUNITY
Start: 2021-10-21 | End: 2021-12-07

## 2021-10-21 RX ORDER — METHOCARBAMOL 500 MG/1
500 TABLET, FILM COATED ORAL 4 TIMES DAILY PRN
Qty: 30 TABLET | Refills: 0 | Status: SHIPPED | OUTPATIENT
Start: 2021-10-21 | End: 2021-12-03

## 2021-10-21 RX ORDER — POLYETHYLENE GLYCOL 3350 17 G/17G
17 POWDER, FOR SOLUTION ORAL DAILY
Qty: 30 EACH | Refills: 2 | Status: ON HOLD | COMMUNITY
Start: 2021-10-22 | End: 2021-12-07

## 2021-10-21 RX ADMIN — APIXABAN 5 MG: 5 TABLET, FILM COATED ORAL at 21:07

## 2021-10-21 RX ADMIN — FERROUS SULFATE TAB 325 MG (65 MG ELEMENTAL FE) 325 MG: 325 (65 FE) TAB at 08:36

## 2021-10-21 RX ADMIN — METOPROLOL TARTRATE 12.5 MG: 25 TABLET, FILM COATED ORAL at 05:49

## 2021-10-21 RX ADMIN — SENNOSIDES AND DOCUSATE SODIUM 2 TABLET: 8.6; 5 TABLET ORAL at 08:36

## 2021-10-21 RX ADMIN — ASPIRIN 81 MG: 81 TABLET, COATED ORAL at 08:36

## 2021-10-21 RX ADMIN — TAMSULOSIN HYDROCHLORIDE 0.8 MG: 0.4 CAPSULE ORAL at 18:00

## 2021-10-21 RX ADMIN — ATORVASTATIN CALCIUM 80 MG: 40 TABLET, FILM COATED ORAL at 21:07

## 2021-10-21 RX ADMIN — POLYETHYLENE GLYCOL 3350 1 PACKET: 17 POWDER, FOR SOLUTION ORAL at 08:33

## 2021-10-21 RX ADMIN — APIXABAN 5 MG: 5 TABLET, FILM COATED ORAL at 08:36

## 2021-10-21 RX ADMIN — OXYCODONE HYDROCHLORIDE AND ACETAMINOPHEN 500 MG: 500 TABLET ORAL at 08:36

## 2021-10-21 RX ADMIN — MELATONIN TAB 3 MG 9 MG: 3 TAB at 21:07

## 2021-10-21 RX ADMIN — SPIRONOLACTONE 25 MG: 25 TABLET, FILM COATED ORAL at 05:49

## 2021-10-21 RX ADMIN — METOPROLOL TARTRATE 12.5 MG: 25 TABLET, FILM COATED ORAL at 17:59

## 2021-10-21 RX ADMIN — SENNOSIDES AND DOCUSATE SODIUM 2 TABLET: 8.6; 5 TABLET ORAL at 21:06

## 2021-10-21 RX ADMIN — OXYCODONE HYDROCHLORIDE AND ACETAMINOPHEN 500 MG: 500 TABLET ORAL at 21:07

## 2021-10-21 RX ADMIN — METHOCARBAMOL 500 MG: 500 TABLET ORAL at 21:06

## 2021-10-21 RX ADMIN — LEVETIRACETAM 1000 MG: 500 TABLET, FILM COATED ORAL at 21:06

## 2021-10-21 RX ADMIN — TRAZODONE HYDROCHLORIDE 50 MG: 50 TABLET ORAL at 21:07

## 2021-10-21 RX ADMIN — LEVETIRACETAM 1000 MG: 500 TABLET, FILM COATED ORAL at 08:35

## 2021-10-21 ASSESSMENT — GAIT ASSESSMENTS
ASSISTIVE DEVICE: FRONT WHEEL WALKER
GAIT LEVEL OF ASSIST: SUPERVISED
DISTANCE (FEET): 250
ASSISTIVE DEVICE: FRONT WHEEL WALKER
GAIT LEVEL OF ASSIST: SUPERVISED
DISTANCE (FEET): 250
DEVIATION: BRADYKINETIC;DECREASED HEEL STRIKE;DECREASED TOE OFF

## 2021-10-21 ASSESSMENT — ACTIVITIES OF DAILY LIVING (ADL)
TOILETING_LEVEL_OF_ASSIST: ABLE TO COMPLETE TOILETING WITHOUT ASSIST
TUB_SHOWER_TRANSFER_DESCRIPTION: GRAB BAR;SHOWER BENCH;SUPERVISION FOR SAFETY
TOILET_TRANSFER_DESCRIPTION: INCREASED TIME;ADAPTIVE EQUIPMENT
BED_CHAIR_WHEELCHAIR_TRANSFER_DESCRIPTION: INCREASED TIME
TOILET_TRANSFER_LEVEL_OF_ASSIST: ABLE TO COMPLETE TOILET TRANSFER WITHOUT ASSIST
SHOWER_TRANSFER_LEVEL_OF_ASSIST: REQUIRES SUPERVISION WITH SHOWER TRANSFER

## 2021-10-21 NOTE — FLOWSHEET NOTE
10/21/21 1242   Events/Summary/Plan   Events/Summary/Plan 02 spot check.  CPAP filled with sterile water   Vital Signs   Pulse 64   Respiration 18   Pulse Oximetry 94 %   $ Pulse Oximetry (Spot Check) Yes   Respiratory Assessment   Level of Consciousness Alert   Chest Exam   Work Of Breathing / Effort Within Normal Limits   Oxygen   O2 Delivery Device None - Room Air   Non-Invasive Ventilation [facility]   Nocturnal CPAP or BIPAP CPAP - [facility]   Settings (If Known) auto

## 2021-10-21 NOTE — PROGRESS NOTES
"Rehab Progress Note     Date of Service: 10/21/2021  Chief Complaint: follow up MVR repair    Interval Events (Subjective)    Patient seen and examined today in her room.   She was able to sleep a total of 6 hours last night.  She is very happy to be discharging tomorrow.  No new complaints.     ROS: No changes to bowel, bladder, pain, mood, or sleep.         Objective:  VITAL SIGNS: /80   Pulse 64   Temp 36.5 °C (97.7 °F) (Oral)   Resp 18   Ht 1.676 m (5' 6\")   Wt 60 kg (132 lb 3.2 oz)   SpO2 94%   BMI 21.34 kg/m²   Gen: alert, no apparent distress    No results found for this or any previous visit (from the past 72 hour(s)).    Current Facility-Administered Medications   Medication Frequency   • bisacodyl (DULCOLAX) suppository 10 mg QDAY PRN    And   • senna-docusate (PERICOLACE or SENOKOT S) 8.6-50 MG per tablet 2 Tablet BID    And   • polyethylene glycol/lytes (MIRALAX) PACKET 1 Packet DAILY    And   • magnesium hydroxide (MILK OF MAGNESIA) suspension 30 mL QDAY PRN   • HYDROcodone-acetaminophen (NORCO) 5-325 MG per tablet 1 Tablet Q8HRS PRN   • traZODone (DESYREL) tablet 50 mg HS PRN   • melatonin tablet 9 mg QHS   • methocarbamol (ROBAXIN) tablet 500 mg 4X/DAY PRN   • tamsulosin (FLOMAX) capsule 0.8 mg AFTER DINNER   • lidocaine (LIDODERM) 5 % 1 Patch Q24HR   • metoprolol tartrate (LOPRESSOR) tablet 12.5 mg TWICE DAILY   • lidocaine (LIDODERM) 5 % 1 Patch Q24HR   • simethicone (MYLICON) chewable tab 80 mg TID PRN   • Respiratory Therapy Consult Continuous RT   • Pharmacy Consult Request ...Pain Management Review 1 Each PHARMACY TO DOSE   • acetaminophen (Tylenol) tablet 650 mg Q4HRS PRN   • lactulose 20 GM/30ML solution 30 mL QDAY PRN   • docusate sodium (ENEMEEZ) enema 283 mg QDAY PRN   • sodium phosphate (Fleet) enema 133 mL QDAY PRN   • omeprazole (PRILOSEC) capsule 20 mg QAM AC   • artificial tears ophthalmic solution 1 Drop PRN   • benzocaine-menthol (CEPACOL) lozenge 1 Lozenge Q2HRS PRN   • " mag hydrox-al hydrox-simeth (MAALOX PLUS ES or MYLANTA DS) suspension 20 mL Q2HRS PRN   • ondansetron (ZOFRAN ODT) dispertab 4 mg 4X/DAY PRN    Or   • ondansetron (ZOFRAN) syringe/vial injection 4 mg 4X/DAY PRN   • sodium chloride (OCEAN) 0.65 % nasal spray 2 Spray PRN   • aspirin EC (ECOTRIN) tablet 81 mg DAILY   • ascorbic acid (Vitamin C) tablet 500 mg BID   • ferrous sulfate tablet 325 mg QDAY with Breakfast   • levETIRAcetam (KEPPRA) tablet 1,000 mg Q12HRS   • spironolactone (ALDACTONE) tablet 25 mg Q DAY   • atorvastatin (LIPITOR) tablet 80 mg Q EVENING   • apixaban (ELIQUIS) tablet 5 mg BID       Orders Placed This Encounter   Procedures   • Diet Order Diet: Regular (chopped meats)     Standing Status:   Standing     Number of Occurrences:   1     Order Specific Question:   Diet:     Answer:   Regular [1]     Comments:   chopped meats       Assessment:  Active Hospital Problems    Diagnosis    • *S/P mitral valve repair    • Hypoalbuminemia    • Other constipation    • Breast cancer (HCC)    • Upper back pain on right side    • Impaired mobility and ADLs    • Seizure disorder (HCC)    • Other hyperlipidemia    • Urinary retention    • Anemia    • Atrial fibrillation (HCC)    • Herpes    • Sleep apnea    • Hypertension    • History of CVA (cerebrovascular accident)      This patient is a 76 y.o. female admitted for acute inpatient rehabilitation with impaired cardiac endurance S/P mitral valve repair.    I led and attended the weekly conference, and agree with the IDT conference documentation and plan of care as noted below.    Date of conference: 10/18/2021    Goals and barriers: See IDT note.    Biggest barriers: mild cognitive impairment, forgot to turn off the stove, needs help with her iADLs, impaired attention to details    Goals in next week:     CM/social support: has family, but can only help intermittently    Anticipated DC date: 10/22    Home health: PT/OT/SLP/RN    Equip: shower chair,  FWW    Follow up: PCP, cardiology, neurology, Dr. Sosa, urology     Rx: [facility] [address]    Needs flu shot - will order    Medical Decision Making and Plan:    S/p Mitral Valve repair  Dr. Nogueira 9/16  Continue full rehab program  PT/OT/SLP, 1 hr each discipline, 5 days per week    Outpatient follow up with cardiology and Dr. Nogueira as needed    Aspirin  Spironolactone      History of strokes  Cardio-embolic  Residual left hemiparesis, mild  Aphasia, improved  Dysphagia, resolved  Cognitive impairment, improved  Generalized weakness/poor endurance  Continue full rehab program  PT/OT/SLP, 1 hr each discipline, 5 days per week     Eliquis  Statin     Outpatient follow up with Dr. Sosa, referral made    Currently mod Indep in her room    Right upper back pain, improved  Left upper back/neck pain, improved  Right anterior chest wall pain, improved  Muscle spasms from her surgery  Lidoderm patch  PRN tylenol, last use 10/4  PRN Norco, last use 10/19  PRN Robaxin, last use 10/20     Seizure disorder  Tonic/clonic post-op  Keppra  Outpatient referral to neurology     Hypertension  Metoprolol  Spironolactone  Appreciate hospitalist assistance    Atrial fibrillation  Metoprolol  Rate controlled    Hyperlipidemia  Statin     Anemia  Ferrous sulfate  Vit C     GI prophylaxis  Omeprazole     Sleep apnea  CPAP    Insomnia  Melatonin, increase dose to 9 mg (takes 10 at home)  PRN trazodone, last use 10/20    Bowel program  Constipation, resolved  Increase bowel medications  Last BM 10/20    Bladder program  Urinary retention, continues/improved  Started Flomax 9/28, increased dose 10/5 to 0.8 mg  IC for over 400cc - last required 10/11  PVRs - 61, 106  Discontinued gabapentin 10/11     DVT prophylaxis  Eliquis    Total time:  26 minutes.  I spent greater than 50% of the time for patient care, counseling, and coordination on this date, including patient face-to face time, unit/floor time with review of records/pertinent lab data and  studies, as well as discussing diagnostic evaluation/work up, planned therapeutic interventions, and future disposition of care, as per the interval events/subjective and the assessment and plan as noted above.    I have performed a physical exam, reviewed and updated ROS, as well as the assessment and plan today 10/21/2021. In review of note from 10/20/2021 there are no new changes except as documented above.          Precious Rawls M.D.   Physical Medicine and Rehabilitation

## 2021-10-21 NOTE — CARE PLAN
Problem: Knowledge Deficit - Standard  Goal: Patient and family/care givers will demonstrate understanding of plan of care, disease process/condition, diagnostic tests and medications  Outcome: Progressing     Problem: Skin Integrity  Goal: Patient's skin integrity will be maintained or improve  Outcome: Progressing   The patient is Stable - Low risk of patient condition declining or worsening    Shift Goals  Clinical Goals: PAIN MANAGEMENT  Patient Goals: sleep well  Family Goals: family training    Progress made toward(s) clinical / shift goals:  Patient is resting no distress noted    Patient is not progressing towards the following goals:

## 2021-10-21 NOTE — DISCHARGE SUMMARY
Admission Date: 9/28/2021    Discharge Date: 10/22/2021    Attending Provider: Precious Rawls MD    Admission Diagnosis:   Active Hospital Problems    Diagnosis    • *S/P mitral valve repair    • Hypoalbuminemia    • Other constipation    • Breast cancer (HCC)    • Upper back pain on right side    • Impaired mobility and ADLs    • Seizure disorder (HCC)    • Other hyperlipidemia    • Urinary retention    • Anemia    • Atrial fibrillation (HCC)    • Sleep apnea    • Hypertension    • History of CVA (cerebrovascular accident)        Discharge Diagnosis:  Active Hospital Problems    Diagnosis    • *S/P mitral valve repair    • Hypoalbuminemia    • Other constipation    • Breast cancer (HCC)    • Upper back pain on right side    • Impaired mobility and ADLs    • Seizure disorder (HCC)    • Other hyperlipidemia    • Urinary retention    • Anemia    • Atrial fibrillation (HCC)    • Sleep apnea    • Hypertension    • History of CVA (cerebrovascular accident)        HPI per H&P:  The patient is a 76 y.o. female with a past medical history of Severe mitral regurgitation secondary to prolapse, atrial fibrillation, stroke in 2017 (right frontal lobe) and 5/2021 (left temporal lobe) with residual left hemiparesis, hyperlipidemia, breast cancer treated with right lumpectomy radiation chemotherapy, sleep apnea; now admitted for acute inpatient rehabilitation with severe functional debility after a mitral valve repair.       On admission the patient and medical record report she underwent elective minimally invasive complex mitral valve repair with ring annuloplasty and Maze procedure with left atrial appendage oversew on 9/16 at [place] with Dr. Monster Nogueira. Post-op complications included generalized tonic clonic seizure 2 hours after her repair. She was seen by neurology as her left arm weakness was also worse than her baseline. She had EEG which was negative for epileptic discharges, was started on Keppra. Etiology of her left  arm symptoms was thought due to recrudescence and not a new stroke. Patient developed urinary retention requiring straight caths, and a Medina was placed prior to her transfer here. She initially had an NG tube for nutrition and is now on a soft and bite sized diet.      Patient current reports sternal and mid back pain, new since surgery, and would like a pain pill. She reports her left arm is not quit back to baseline. She denies any changes in her vision, paresthesias, or weakness anywhere else. She has not moved her bowels in 3 days, and reports at home she does not go daily. She reports she was doing outpatient speech therapy for mild aphasia/word finding difficulties since her stroke in May, and had to stop because her insurance stopped paying. She reports her cognition is not back to her baseline since her surgery as well.      Patient was evaluated by Rehab Medicine physician and Physical Therapy, Occupational Therapy and Speech Therapy and determined to be appropriate for acute inpatient rehab and was transferred to [facility] on 9/28/2021  3:19 PM.     Rehab Hospital Course by Problem List:    S/p Mitral Valve repair  Dr. Nogueira 9/16     Outpatient follow up with cardiology  Outpatient follow up with Dr. Nogueira as needed     Aspirin  Spironolactone      History of strokes  Cardio-embolic  Residual left hemiparesis, mild  Aphasia, improved  Dysphagia, resolved  Cognitive impairment, improved  Generalized weakness/poor endurance     Eliquis  Statin     Outpatient follow up with Dr. Sosa, referral made     Currently shayne Andrews in her room     Right upper back pain, improved  Left upper back/neck pain, improved  Right anterior chest wall pain, improved  Muscle spasms from her surgery  Lidoderm patch  PRN tylenol, last use 10/22  PRN Norco, last use 10/20  PRN Robaxin, last use 10/22    I discussed the risks and benefits of using opioid medications for pain control.  I discussed the risk of  addiction, potential for overdose leading respiratory depression, which could be fatal.      I encouraged the patient to take this medication sparingly with the expressed goal of weaning off the medication as soon as is clinically appropriate.      I informed the patient that we are only able to provide a 7 day supply of these medications at discharge and no refills will be provided by the [facility] hospital medical team.  No replacement prescriptions will be provided for lost prescriptions.  Any medication refill would need to be provided by the patient's primary pain management provider at that provider's discretion.  The patient's primary provider may decide that ongoing opioid medications are no longer necessary.      NARxCHECK score was: Narcotic 140  Sedative 110  Date of last prescription: 8/31/2021    I answered the patient's questions regarding this treatment, and the patient indicated understanding and willingness to proceed.       Seizure disorder  Tonic/clonic post-op  Keppra  Outpatient follow up to neurology     Hypertension  Metoprolol  Spironolactone     Atrial fibrillation  Metoprolol  Rate controlled     Hyperlipidemia  Statin     Anemia  Ferrous sulfate  Vit C     GI prophylaxis  Omeprazole     Sleep apnea  CPAP     Insomnia  Melatonin, increase dose to 9 mg (takes 10 at home)  PRN trazodone, last use 10/21     Bowel program  Constipation, resolved/improved  Increase bowel medications  Last BM 10/20     Bladder program  Urinary retention, continues/improved  Started Flomax 9/28, increased dose 10/5 to 0.8 mg  IC for over 400cc - last required 10/11  PVRs - 61, 106  Discontinued gabapentin 10/11     DVT prophylaxis  Eliquis       Functional Status at Discharge  Eating:  Independent  Eating Description:  Set-up of equipment or meal/tube feeding  Grooming:  Modified Independent, Seated  Grooming Description:  Seated in wheelchair at sink  Bathing:  Supervision  Bathing Description:  Tub bench,  Hand held shower, Supervision for safety  Upper Body Dressing:  Modified Independent  Upper Body Dressing Description:   (seated )  Lower Body Dressing:  Modified Independent  Lower Body Dressing Description:   (seated )  Discharge Location : Home  Patient Discharging with Assist of: Paid Caregiver (pt paying for 24/7 caregiving x 1 week)  Level of Supervision Required: Intermittent Supervision  Recommended Equipment for Discharge: Front-Wheeled Walker;Shower Chair;Other (See Comments);Hand Held Shower Head (BSC)  Recommended Services Upon Discharge: Home Health Occupational Therapy  Long Term Goals Met: 0  Long Term Goals Not Met: 3  Reason(s) for Goals Not Met: requires supervision for bathing, shower transfer and some IADLs due to impaired memory/safety at times  Criteria for Termination of Services: Maximum Function Achieved for Inpatient Rehabilitation  Walk:  Supervised  Distance Walked:  250  Number of Times Distance Was Traveled:  2  Assistive Device:  Front Wheel Walker  Gait Deviation:  Bradykinetic, Decreased Heel Strike, Decreased Toe Off  Wheelchair:   (n/a due to ambulatory status)  Distance Propelled:   (n/a)   Wheelchair Description:   (n/a)  Stairs Stand by Assist  Stairs Description Extra time, Hand rails, Supervision for safety     Comprehension:  Minimal Assist  Comprehension Description:  Glasses, Verbal cues  Expression:  Minimal Assist  Expression Description:  Verbal cueing  Social Interaction:  Independent  Social Interaction Description:  Increased time  Problem Solving:  Moderate Assist  Problem Solving Description:  Verbal cueing, Increased time, Seat belt, Bed/chair alarm, Therapy schedule  Memory:  Moderate Assist  Memory Description:  Verbal cueing, Therapy schedule, Increased time, Seat belt, Bed/chair alarm       Precious HALEY M.D., personally performed a complete drug regimen review and no potential clinically significant medication issues were identified.     Discharge  Medication:     Medication List      START taking these medications      Instructions   ascorbic acid 500 MG tablet  Commonly known as: Vitamin C   Take 1 Tablet by mouth 2 times a day.  Dose: 500 mg     aspirin 81 MG EC tablet  Start taking on: October 22, 2021   Take 1 Tablet by mouth every day.  Dose: 81 mg     ferrous sulfate 325 (65 Fe) MG tablet  Start taking on: October 22, 2021   Take 1 Tablet by mouth every morning with breakfast.  Dose: 325 mg     HYDROcodone-acetaminophen 5-325 MG Tabs per tablet  Commonly known as: NORCO   Take 1 Tablet by mouth every 8 hours as needed for up to 7 days.  Dose: 1 Tablet     levetiracetam 1000 MG tablet  Commonly known as: KEPPRA   Take 1 Tablet by mouth every 12 hours.  Dose: 1,000 mg     methocarbamol 500 MG Tabs  Commonly known as: ROBAXIN   Take 1 Tablet by mouth 4 times a day as needed.  Dose: 500 mg     metoprolol tartrate 25 MG Tabs  Commonly known as: LOPRESSOR   Take 0.5 Tablets by mouth 2 times a day.  Dose: 12.5 mg     omeprazole 20 MG delayed-release capsule  Start taking on: October 22, 2021  Commonly known as: PRILOSEC   Take 1 Capsule by mouth every morning before breakfast.  Dose: 20 mg     polyethylene glycol/lytes 17 g Pack  Start taking on: October 22, 2021  Commonly known as: MIRALAX   Take 1 Packet by mouth every day.  Dose: 17 g     senna-docusate 8.6-50 MG Tabs  Commonly known as: PERICOLACE or SENOKOT S   Take 2 Tablets by mouth 2 times a day.  Dose: 2 Tablet     spironolactone 25 MG Tabs  Start taking on: October 22, 2021  Commonly known as: ALDACTONE   Take 1 Tablet by mouth every day.  Dose: 25 mg     tamsulosin 0.4 MG capsule  Commonly known as: FLOMAX   Take 2 Capsules by mouth 1/2 hour after dinner.  Dose: 0.8 mg     traZODone 50 MG Tabs  Commonly known as: DESYREL   Take 1 Tablet by mouth at bedtime as needed.  Dose: 50 mg        CHANGE how you take these medications      Instructions   atorvastatin 80 MG tablet  What changed:   · when to  take this  · additional instructions  Commonly known as: LIPITOR   Take 1 Tablet by mouth every evening.  Dose: 80 mg        CONTINUE taking these medications      Instructions   apixaban 5mg Tabs  Commonly known as: ELIQUIS   Take 1 Tablet by mouth 2 times a day.  Dose: 5 mg     B-12 PO   Take 25,000 mcg by mouth every day.  Dose: 25,000 mcg     ICAPS AREDS 2 PO   Take 1 tablet by mouth every morning.  Dose: 1 Tablet     Lidocaine 4 % Ptch   Apply 1 Patch topically every day.  Dose: 1 Patch     Melatonin 10 MG Tabs   Take 10 mg by mouth at bedtime.  Dose: 10 mg     MULTIVITAMINS PO   Take 1 Tab by mouth every day.  Dose: 1 Tablet     Vitamin D3 2000 UNIT Caps   Take 2,000 Units by mouth every day.  Dose: 2,000 Units        STOP taking these medications    metoprolol SR 25 MG Tb24  Commonly known as: TOPROL XL     potassium chloride 8 MEQ tablet  Commonly known as: KLOR-CON     valACYclovir 500 MG Tabs  Commonly known as: VALTREX            Discharge Diet:  Cardiac    Discharge Activity:  Do not return to work or driving until cleared by a physician.         Disposition:  Patient to discharge home with family support and community resources.     Discharge Location: Home with Home Health.     Agency Name / Address / Phone: [facility]: [phone].     Home Health: Registered Nurse, Home Health Aide, Occupational Therapist, Physical Therapist, Speech Therapist, .     DME Provider / Phone: [employer]: [phone].     Medical Equipment Ordered: Front Wheel Walker.     Follow-up Information:     Capri Simon M.D.  [address] [facility] [id number] [phone]  Please call your primary care doctor to set up your hospital follow up appointment.       Darin Meehan M.D.-Cardiology  [address]  [address]  [place] [address]  [phone]  On 10/28/2021  1pm     Opal Sosa D.O.-Physical Medicine and Rehab.  [address]  [address]  [place] [id number] [phone]  On 11/9/2021  10:00am-Please see attached  [facility].     [facility] [place]: [phone].  They will call you to set up appointment.        Jai Noe D.O.-Neurology  [address] [place] [id number] [phone]  On 11/19/2021  3pm    Condition on Discharge:  Good    More than 35 minutes was spent on discharging this patient, including face-to-face time, prescription management, and the dictation of this note.    Precious Rawls M.D.    Date of Service: 10/21/2021

## 2021-10-21 NOTE — THERAPY
Physical Therapy   Daily Treatment     Patient Name: Shaista Bocanegra  Age:  76 y.o., Sex:  female  Medical Record #: [id number]  Today's Date: 10/21/2021     Precautions  Precautions: Fall Risk, Cardiac Precautions (See Comments)  Comments: seizure prec    Subjective    Pt seated in room, agreeable to PT.      Objective       10/21/21 0931   Precautions   Precautions Fall Risk;Cardiac Precautions (See Comments)   Comments seizure prec   Gait Functional Level of Assist    Gait Level Of Assist Supervised   Assistive Device Front Wheel Walker   Distance (Feet) 250   # of Times Distance was Traveled 2   Sitting Lower Body Exercises   Nustep Resistance Level 5  (11 min, 483 steps)   Interdisciplinary Plan of Care Collaboration   IDT Collaboration with  Occupational Therapist   Patient Position at End of Therapy Seated;Call Light within Reach;Tray Table within Reach   Collaboration Comments OTNani notified pt requesting shower for OT session    PT Total Time Spent   PT Individual Total Time Spent (Mins) 30   PT Charge Group   PT Therapeutic Exercise 1   PT Therapeutic Activities 1       Assessment    Pt with good participation, requested to work on leg strengthening this session.     Strengths: Adequate strength, Independent prior level of function, Willingly participates in therapeutic activities  Barriers: Impaired activity tolerance, Hypotension, Fatigue, Impaired balance    Plan    Prepare for DC tomorrow, complete [employer]ESUS [employer]EL items and passport, issue HEP.     Physical Therapy Problems (Active)     Problem: PT-Long Term Goals     Dates: Start: 09/29/21       Goal: LTG-By discharge, patient will ambulate 300 ft with LRAD, mod I      Dates: Start: 09/29/21          Goal: LTG-By discharge, patient will transfer one surface to another mod I with LRAD     Dates: Start: 09/29/21          Goal: LTG-By discharge, patient will ambulate up/down 12 stairs with 2HRs and spv      Dates: Start: 09/29/21          Goal: LTG-By  discharge, patient will transfer in/out of a car spv with LRAD      Dates: Start: 09/29/21

## 2021-10-21 NOTE — THERAPY
Occupational Therapy  Daily Treatment     Patient Name: Shaista Bocanegra  Age:  76 y.o., Sex:  female  Medical Record #: [id number]  Today's Date: 10/21/2021     Precautions  Precautions: Fall Risk, Cardiac Precautions (See Comments)  Comments: seizure prec    Safety   ADL Safety : Requires Supervision for Safety, Impaired Insight into Safety, Requires Cueing for Safety, Impaired  Bathroom Safety: Requires Supervision for Safety, Impaired Insight into Safety, Requires Cuing for Safety, Impaired    Subjective    Pt requested to shower this session.      Objective     10/21/21 1001   Functional Level of Assist   Grooming Modified Independent;Seated   Grooming Description Seated in wheelchair at sink   Bathing Supervision   Bathing Description Tub bench;Hand held shower;Supervision for safety   Upper Body Dressing Modified Independent   Upper Body Dressing Description   (seated )   Lower Body Dressing Modified Independent   Lower Body Dressing Description   (seated )   Tub / Shower Transfers Supervised   Tub Shower Transfer Description Grab bar;Shower bench;Supervision for safety   Bed Mobility    Sit to Supine Independent   Interdisciplinary Plan of Care Collaboration   IDT Collaboration with  Occupational Therapist   Patient Position at End of Therapy In Bed;Call Light within Reach;Tray Table within Reach;Phone within Reach   Collaboration Comments CLOF with primary OT    OT Total Time Spent   OT Individual Total Time Spent (Mins) 30   OT Charge Group   OT Self Care / ADL 2       Assessment    Pt tolerated OT session well focused on ADLs. Required VC for shower transfer with FWW.   Strengths: Able to follow instructions, Alert and oriented, Adequate strength, Effective communication skills, Independent prior level of function, Motivated for self care and independence, Pleasant and cooperative, Supportive family, Willingly participates in therapeutic activities  Barriers: Decreased endurance, Fatigue, Generalized  weakness, Impaired activity tolerance, Impaired balance, Pain (lack of sleep night before initial evaluation)    Plan    Refer to primary OT POC; d/c home tomorrow 10/22    Passport items to be completed:  Perform bathroom transfers, complete dressing, complete feeding, get ready for the day, prepare a simple meal, participate in household tasks, adapt home for safety needs, demonstrate home exercise program, complete caregiver training     Occupational Therapy Goals (Active)     Problem: Dressing     Dates: Start: 10/04/21       Goal: STG-Within one week, patient will dress LB with supervision using adaptive techniques or AE as needed     Dates: Start: 10/04/21       Goal Note filed on 10/17/21 1457 by Adrian Mathews OT/L     Min A to SBA using AE prn               Problem: Functional Transfers     Dates: Start: 09/29/21       Goal: STG-Within one week, patient will transfer to toilet with supervision using grab bar and/or FWW     Dates: Start: 09/29/21       Goal Note filed on 10/17/21 1457 by Adrian Mathews OT/L     SBA/supervision               Problem: OT Long Term Goals     Dates: Start: 09/29/21       Goal: LTG-By discharge, patient will complete basic self care tasks with mod I     Dates: Start: 09/29/21          Goal: LTG-By discharge, patient will perform bathroom transfers with mod I     Dates: Start: 09/29/21          Goal: LTG-By discharge, patient will complete basic home management with mod I     Dates: Start: 09/29/21             Problem: Toileting     Dates: Start: 10/04/21       Goal: STG-Within one week, patient will complete toileting tasks with supervision using grab bar and/or FWW for balance as needed     Dates: Start: 10/04/21       Goal Note filed on 10/17/21 1457 by Adrian Mathews OT/L     SBA to supervision (SBA more often than supervised)

## 2021-10-21 NOTE — THERAPY
"Physical Therapy   Daily Treatment     Patient Name: Shaista Bocanegra  Age:  76 y.o., Sex:  female  Medical Record #: [id number]  Today's Date: 10/21/2021     Precautions  Precautions: Fall Risk, Cardiac Precautions (See Comments)  Comments: seizure prec    Subjective    Pt in bed resting, agreeable to PT.     Objective       10/21/21 1231   Precautions   Precautions Fall Risk;Cardiac Precautions (See Comments)   Comments seizure prec   Gait Functional Level of Assist    Gait Level Of Assist Supervised   Assistive Device Front Wheel Walker   Distance (Feet) 250   # of Times Distance was Traveled 3   Deviation Bradykinetic;Decreased Heel Strike;Decreased Toe Off   Stairs Functional Level of Assist   Level of Assist with Stairs Stand by Assist   # of Stairs Climbed 10  (4 6\" and 6 4\" )   Stairs Description Extra time;Hand rails;Supervision for safety  (2 HRs)   Transfer Functional Level of Assist   Bed, Chair, Wheelchair Transfer Modified Independent   Bed Chair Wheelchair Transfer Description Increased time  (from ambulation level in/out of flat full size bed with FWW)   Toilet Transfers Modified Independent   Toilet Transfer Description Increased time;Adaptive equipment  (FWW)   Sitting Lower Body Exercises   Ankle Pumps 1 set of 15   Hip Abduction 1 set of 15   Hip Adduction 1 set of 15   Long Arc Quad 1 set of 15   Marching 1 set of 15   Hamstring Curl 1 set of 15   Comments Seated LE HEP hand-out issued and pt completed above exercises with blue tband for resistance    Bed Mobility    Supine to Sit Modified Independent   Sit to Supine Modified Independent   Sit to Stand Modified Independent   Scooting Modified Independent   Rolling Modified Independent   Interdisciplinary Plan of Care Collaboration   Patient Position at End of Therapy In Bed;Call Light within Reach;Tray Table within Reach   PT Total Time Spent   PT Individual Total Time Spent (Mins) 60   PT Charge Group   PT Therapeutic Activities 4     Car transfer " completed from ambulation level in/out of passenger side of [facility] with FWW and spv.     Pt issued fall education hand-out and reviewed with PT also reviewed pt passport and completed PT item checklist.     Assessment    Pt demonstrates all functional activities at spv- mod I level. Demonstrates readiness to DC home tomorrow with spv.     Strengths: Adequate strength, Independent prior level of function, Willingly participates in therapeutic activities  Barriers: Impaired activity tolerance, Hypotension, Fatigue, Impaired balance    Plan    DC tomorrow.       Physical Therapy Problems (Active)     Problem: PT-Long Term Goals     Dates: Start: 09/29/21       Goal: LTG-By discharge, patient will ambulate 300 ft with LRAD, mod I      Dates: Start: 09/29/21          Goal: LTG-By discharge, patient will transfer one surface to another mod I with LRAD     Dates: Start: 09/29/21          Goal: LTG-By discharge, patient will ambulate up/down 12 stairs with 2HRs and spv      Dates: Start: 09/29/21          Goal: LTG-By discharge, patient will transfer in/out of a car spv with LRAD      Dates: Start: 09/29/21

## 2021-10-21 NOTE — THERAPY
Speech Language Pathology  Daily Treatment     Patient Name: Shaista Bocanegra  Age:  76 y.o., Sex:  female  Medical Record #: [id number]  Today's Date: 10/21/2021     Precautions  Precautions: Fall Risk, Cardiac Precautions (See Comments)  Comments: seizure prec    Subjective    Patient was willing to participate.      Objective       10/21/21 1402   Outcome Measures   Outcome Measures Utilized SCCAN   SCCAN (Scales of Cognitive and Communicative Ability for Neurorehabilitation)   Oral Expression - Raw Score 18   Oral Expression - Scale Performance Score 95   Orientation - Raw Score 12   Orientation - Scale Performance Score 100   Memory - Raw Score 11   Memory - Scale Performance Score 58   Speech Comprehension - Raw Score 12   Speech Comprehension - Scale Performance Score 92   Reading Comprehension - Raw Score 7   Reading Comprehension - Scale Performance Score 58   Writing - Raw Score 7   Writing - Scale Performance Score 100   Attention - Raw Score 14   Attention - Scale Performance Score 88   Problem Solving - Raw Score 18   Problem Solving - Scale Performance Score 78   SCCAN Total Raw Score 78   SCCAN Degree of Severity Mild Impairment   SLP Total Time Spent   SLP Individual Total Time Spent (Mins) 60   Treatment Charges   SLP Cognitive Skill Development First 15 Minutes 1   SLP Cognitive Skill Development Additional 15 Minutes 3       Assessment    Patient participated in SCCAN with a final raw score of 78 indicating mild cognitive deficits. Patient demonstrated improvements in all cognitive domaind, however still demonstrates deficits with problem solving.     Strengths: Able to follow instructions, Alert and oriented, Motivated for self care and independence, Pleasant and cooperative, Supportive family, Willingly participates in therapeutic activities, Making steady progress towards goals  Barriers: Impaired functional cognition    Plan    Prepare for d/c home.       Speech Therapy Problems (Active)      Problem: Expression STGs     Dates: Start: 10/18/21       Goal:  Pt will name 10 items per minute given specified category and min verbal cues.       Dates: Start: 10/18/21             Problem: Memory STGs     Dates: Start: 09/29/21       Goal: STG-Within one week, patient will demonstrate use of memory strategies in order to recall daily events     Dates: Start: 09/29/21       Goal Note filed on 10/18/21 [id number] by Mark Roblero MS,CCC-SLP     Min cues to recall daily events with use of memory book.                Problem: Problem Solving STGs     Dates: Start: 10/18/21       Goal: STG-Within one week, patient will complete alternating attn tasks with 80% accuracy given min verbal cues.       Dates: Start: 10/18/21          Goal: STG-Within one week, patient will complete executive function tasks with 80% accuracy, given min verbal cues.       Dates: Start: 10/18/21             Problem: Speech/Swallowing LTGs     Dates: Start: 09/29/21       Goal: LTG-By discharge, patient will solve complex problems related to IADL's in order to d/c homw with mod I     Dates: Start: 09/29/21

## 2021-10-21 NOTE — CARE PLAN
Problem: OT Long Term Goals  Goal: LTG-By discharge, patient will complete basic self care tasks with mod I  Outcome: Not Met  Goal: LTG-By discharge, patient will perform bathroom transfers with mod I  Outcome: Not Met  Goal: LTG-By discharge, patient will complete basic home management with mod I  Outcome: Not Met

## 2021-10-22 VITALS
DIASTOLIC BLOOD PRESSURE: 86 MMHG | HEIGHT: 66 IN | TEMPERATURE: 98.1 F | HEART RATE: 64 BPM | RESPIRATION RATE: 18 BRPM | BODY MASS INDEX: 21.24 KG/M2 | WEIGHT: 132.2 LBS | OXYGEN SATURATION: 93 % | SYSTOLIC BLOOD PRESSURE: 127 MMHG

## 2021-10-22 PROCEDURE — 700102 HCHG RX REV CODE 250 W/ 637 OVERRIDE(OP): Performed by: PHYSICAL MEDICINE & REHABILITATION

## 2021-10-22 PROCEDURE — 700111 HCHG RX REV CODE 636 W/ 250 OVERRIDE (IP): Performed by: PHYSICAL MEDICINE & REHABILITATION

## 2021-10-22 PROCEDURE — 99239 HOSP IP/OBS DSCHRG MGMT >30: CPT | Performed by: PHYSICAL MEDICINE & REHABILITATION

## 2021-10-22 PROCEDURE — 3E02340 INTRODUCTION OF INFLUENZA VACCINE INTO MUSCLE, PERCUTANEOUS APPROACH: ICD-10-PCS | Performed by: PHYSICAL MEDICINE & REHABILITATION

## 2021-10-22 PROCEDURE — A9270 NON-COVERED ITEM OR SERVICE: HCPCS | Performed by: PHYSICAL MEDICINE & REHABILITATION

## 2021-10-22 PROCEDURE — 90662 IIV NO PRSV INCREASED AG IM: CPT | Performed by: PHYSICAL MEDICINE & REHABILITATION

## 2021-10-22 RX ADMIN — OMEPRAZOLE 20 MG: 20 CAPSULE, DELAYED RELEASE ORAL at 08:27

## 2021-10-22 RX ADMIN — SPIRONOLACTONE 25 MG: 25 TABLET, FILM COATED ORAL at 05:19

## 2021-10-22 RX ADMIN — ACETAMINOPHEN 650 MG: 325 TABLET ORAL at 08:27

## 2021-10-22 RX ADMIN — SENNOSIDES AND DOCUSATE SODIUM 2 TABLET: 8.6; 5 TABLET ORAL at 08:28

## 2021-10-22 RX ADMIN — FERROUS SULFATE TAB 325 MG (65 MG ELEMENTAL FE) 325 MG: 325 (65 FE) TAB at 08:28

## 2021-10-22 RX ADMIN — POLYETHYLENE GLYCOL 3350 1 PACKET: 17 POWDER, FOR SOLUTION ORAL at 08:28

## 2021-10-22 RX ADMIN — ASPIRIN 81 MG: 81 TABLET, COATED ORAL at 08:28

## 2021-10-22 RX ADMIN — INFLUENZA A VIRUS A/VICTORIA/2570/2019 IVR-215 (H1N1) ANTIGEN (FORMALDEHYDE INACTIVATED), INFLUENZA A VIRUS A/TASMANIA/503/2020 IVR-221 (H3N2) ANTIGEN (FORMALDEHYDE INACTIVATED), INFLUENZA B VIRUS B/PHUKET/3073/2013 ANTIGEN (FORMALDEHYDE INACTIVATED), AND INFLUENZA B VIRUS B/WASHINGTON/02/2019 ANTIGEN (FORMALDEHYDE INACTIVATED) 0.7 ML: 60; 60; 60; 60 INJECTION, SUSPENSION INTRAMUSCULAR at 10:56

## 2021-10-22 RX ADMIN — APIXABAN 5 MG: 5 TABLET, FILM COATED ORAL at 08:28

## 2021-10-22 RX ADMIN — OXYCODONE HYDROCHLORIDE AND ACETAMINOPHEN 500 MG: 500 TABLET ORAL at 08:28

## 2021-10-22 RX ADMIN — LEVETIRACETAM 1000 MG: 500 TABLET, FILM COATED ORAL at 08:27

## 2021-10-22 ASSESSMENT — PAIN DESCRIPTION - PAIN TYPE: TYPE: ACUTE PAIN

## 2021-10-22 NOTE — DISCHARGE INSTRUCTIONS
[facility] NURSING DISCHARGE INSTRUCTIONS    Blood Pressure : 102/56  Weight: 60 kg (132 lb 3.2 oz)  Nursing recommendations for Shaista Bocanegra at time of discharge are as follows:  Client and Family Member verbalized understanding of all discharge instructions and prescriptions.     Review all your home medications and newly ordered medications with your doctor and/or pharmacist. Follow medication instructions as directed by your doctor and/or pharmacist.    Pain Management:   Discharge Pain Medication Instructions:  Comfort Goal: Comfort with Movement, Sleep Comfortably  Notify your primary care provider if pain is unrelieved with these measures, if the pain is new, or increased in intensity.    Discharge Skin Characteristics: Warm, Dry  Discharge Skin Exam: Clear  Skin / Wound Care Instructions: Please contact your primary care physician for any change in skin integrity.     If You Have Surgical Incisions / Wounds:  Monitor surgical site(s) for signs of increased swelling, redness or symptoms of drainage from the site or fever as this could indicate signs and symptoms of infection. If these symptoms are noted, notifiy your primary care provider.      Discharge Safety Instructions: Should Not Be Left Alone In The House     Discharge Safety Concerns: Impaired Judgement, Weakness  The interdisciplinary team has made recommendation that you should not be left alone  in the house due to impaired judgment and weakness  Anti-embolic stockings are not required  Open Mitral Valve Repair    Open mitral valve repair is a type of surgery to fix problems with the mitral valve. This is the valve between the upper chamber (atrium) and the lower chamber (ventricle) on the left side of the heart. The mitral valve has two flaps that open and close so that blood can move from the atrium to the ventricle. The valve allows blood to flow in only one direction.  You may need this surgery if your mitral valve is  "not working properly. The valve may not open far enough, not close all the way, or have other problems. Surgery may also be recommended if the valve is infected, if you have a weak heart muscle, or if you have symptoms from your mitral valve leaking.  \"Open\" means the surgery is done through a large incision in the chest. The procedure may involve:  · Reshaping the valve.  · Removing tissue.  · Fixing holes or tears.  · Fixing the flaps.  ·  the flaps, if they are joined together (fused).  Tell a health care provider about:  · Any allergies you have.  · All medicines you are taking, including vitamins, herbs, eye drops, creams, and over-the-counter medicines.  · Any problems you or family members have had with anesthetic medicines.  · Any blood disorders you have.  · Any surgeries you have had.  · Any medical conditions you have.  · Whether you are pregnant or may be pregnant.  What are the risks?  Generally, this is a safe procedure. However, problems may occur, including:  · Infection.  · Bleeding.  · Allergic reactions to medicines.  · Damage to other structures or organs.  · Blood clots.  · Breathing problems.  · Heart attack.  · Stroke.  · Kidney problems.  · Irregular heartbeat.  · Failing or failure of a clip or device that is used to repair the valve.  What happens before the procedure?  Eating and drinking restrictions  Follow instructions from your health care provider about eating and drinking restrictions before surgery.  Medicines  · Ask your health care provider about:  ? Changing or stopping your regular medicines. This is especially important if you are taking diabetes medicines or blood thinners.  ? Taking over-the-counter medicines, vitamins, herbs, and supplements.  ? Taking medicines such as aspirin and ibuprofen. These medicines can thin your blood. Do not take these medicines unless your health care provider tells you to take them.  · You may need to take antibiotic medicine. If so, " take your antibiotic as told by your health care provider. Do not stop taking the antibiotic even if you start to feel better.  General instructions  · Do not use any products that contain nicotine or tobacco, such as cigarettes and e-cigarettes. If you need help quitting, ask your health care provider.  · You may be asked to shower with a germ-killing soap.  · Ask your health care provider how your surgical site will be marked or identified.  · Plan to have a responsible adult care for you for at least 24 hours after you leave the hospital or clinic. This is important.  What happens during the procedure?  · To lower your risk of infection:  ? Your health care team will wash or sanitize their hands.  ? Your skin will be washed with soap.  ? Hair may be removed from the surgical area.  · An IV will be inserted into one of your veins.  · You will be given one or more of the following:  ? A medicine to help you relax (sedative).  ? A medicine to make you fall asleep (general anesthetic).  · A breathing tube may be placed in your throat and connected to a machine (ventilator) to help you breathe.  · An incision will be made in your chest, over your heart, through your breastbone.  · Muscles will be moved out of the way to reach your heart.  · Your heart may be stopped with medicine, and a tube may be placed in your heart and connected to a machine that keeps your blood pumping and does the work of your heart and lungs (heart-lung machine).  · Your mitral valve will be repaired as needed. A clip or device may be used to reshape the valve, fix holes or tears, or fix the flaps. The clip or device may stay in your heart.  · After the repair, your heart may start working on its own, or you may need electric shocks to start your heart.  · If you are connected to a heart-lung machine, it will be disconnected and removed.  · You may continue to have a breathing tube connected to a ventilator, or the tube may be removed. This  will depend on how well you are breathing.  · Small tubes may be placed to drain fluid from the surgical area.  · A small, thin tube (catheter) may be placed to drain urine from your bladder.  · Your incision will be closed with stitches (sutures). A bandage (dressing) may be placed over the incision.  The procedure may vary among health care providers and hospitals.  What happens after the procedure?  · Your blood pressure, heart rate, breathing rate, and blood oxygen level will be monitored until the medicines you were given have worn off.  · You may have some pain. You will be given medicine as needed.  · You may continue to receive fluids and medicines through an IV.  · You may continue to use a breathing tube and ventilator. You may be given oxygen through a device or tube that goes in the nostrils (nasal prongs).  · You may continue to have a catheter draining your urine and tubes draining fluid from the surgical area.  Summary  · Mitral valve repair is a type of surgery to fix the valve between the upper chamber (atrium) and the lower chamber (ventricle) on the left side of the heart.  · Follow instructions from your health care provider about eating and drinking restrictions before the procedure.  · Repairing the mitral valve may include reshaping it, fixing it, removing tissue, or fixing the flaps.  This information is not intended to replace advice given to you by your health care provider. Make sure you discuss any questions you have with your health care provider.  Document Released: 03/21/2018 Document Revised: 04/14/2020 Document Reviewed: 03/21/2018  Elsevier Patient Education © 2020 Elsevier Inc.    Suicidal Feelings: How to Help Yourself  Suicide is when you end your own life. There are many things you can do to help yourself feel better when struggling with these feelings. Many services and people are available to support you and others who struggle with similar feelings.   If you ever feel like  you may hurt yourself or others, or have thoughts about taking your own life, get help right away. To get help:  · Call your local emergency services (911 in the [place]  · The [facility] and human services helpline (211 in the [place]  · Go to your nearest emergency department.  · Call a suicide hotline to speak with a trained counselor. The following suicide hotlines are available in the [place]:  ? 0-213-660-TALK (1-438.354.5519).  ? 0-806-DJNEPBF (1-740.463.4481).  ? 1-304.498.1907. This is a hotline for American speakers.  ? 1-177.755.8317. This is a hotline for TTY users.  ? 5-360-4-U-HARRIET (1-831.138.2045). This is a hotline for lesbian, munson, bisexual, transgender, or questioning youth.  ? For a list of hotlines in [place], visit www.suicide.org/hotlines/international/tpkjfv-sgpebyf-iiawkdib.html  · Contact a crisis center or a local suicide prevention center. To find a crisis center or suicide prevention center:  ? Call your local hospital, clinic, community service organization, mental health center, social service provider, or health department. Ask for help with connecting to a crisis center.  ? For a list of crisis centers in the [place], visit: suicidepreventionlifeline.org  ? For a list of crisis centers in [place], visit: suicideprevention.ca  How to help yourself feel better    · Promise yourself that you will not do anything extreme when you have suicidal feelings. Remember, there is hope. Many people have gotten through suicidal thoughts and feelings, and you can too. If you have had these feelings before, remind yourself that you can get through them again.  · Let family, friends, teachers, or counselors know how you are feeling. Try not to separate yourself from those who care about you and want to help you. Talk with someone every day, even if you do not feel sociable. Face-to-face conversation is best to help them understand your feelings.  · Contact a mental health care  provider and work with this person regularly.  · Make a safety plan that you can follow during a crisis. Include phone numbers of suicide prevention hotlines, mental health professionals, and trusted friends and family members you can call during an emergency. Save these numbers on your phone.  · If you are thinking of taking a lot of medicine, give your medicine to someone who can give it to you as prescribed. If you are on antidepressants and are concerned you will overdose, tell your health care provider so that he or she can give you safer medicines.  · Try to stick to your routines. Follow a schedule every day. Make self-care a priority.  · Make a list of realistic goals, and cross them off when you achieve them. Accomplishments can give you a sense of worth.  · Wait until you are feeling better before doing things that you find difficult or unpleasant.  · Do things that you have always enjoyed to take your mind off your feelings. Try reading a book, or listening to or playing music. Spending time outside, in nature, may help you feel better.  Follow these instructions at home:    · Visit your primary health care provider every year for a checkup.  · Work with a mental health care provider as needed.  · Eat a well-balanced diet, and eat regular meals.  · Get plenty of rest.  · Exercise if you are able. Just 30 minutes of exercise each day can help you feel better.  · Take over-the-counter and prescription medicines only as told by your health care provider. Ask your mental health care provider about the possible side effects of any medicines you are taking.  · Do not use alcohol or drugs, and remove these substances from your home.  · Remove weapons, poisons, knives, and other deadly items from your home.  General recommendations  · Keep your living space well lit.  · When you are feeling well, write yourself a letter with tips and support that you can read when you are not feeling well.  · Remember that life's  difficulties can be sorted out with help. Conditions can be treated, and you can learn behaviors and ways of thinking that will help you.  Where to find more information  · National Suicide Prevention Lifeline: www.suicidepreventionlifeline.org  · Hopeline: www.hopeline.com  · American Foundation for Suicide Prevention: www.afsp.org  · The Pranav Project (for lesbian, munson, bisexual, transgender, or questioning youth): www.thetrevorproject.org  Contact a health care provider if:  · You feel as though you are a burden to others.  · You feel agitated, angry, vengeful, or have extreme mood swings.  · You have withdrawn from family and friends.  Get help right away if:  · You are talking about suicide or wishing to die.  · You start making plans for how to commit suicide.  · You feel that you have no reason to live.  · You start making plans for putting your affairs in order, saying goodbye, or giving your possessions away.  · You feel guilt, shame, or unbearable pain, and it seems like there is no way out.  · You are frequently using drugs or alcohol.  · You are engaging in risky behaviors that could lead to death.  If you have any of these symptoms, get help right away. Call emergency services, go to your nearest emergency department or crisis center, or call a suicide crisis helpline.  Summary  · Suicide is when you take your own life.  · Promise yourself that you will not do anything extreme when you have suicidal feelings.  · Let family, friends, teachers, or counselors know how you are feeling.  · Get help right away if you feel as though life is getting too tough to handle and you are thinking about suicide.  This information is not intended to replace advice given to you by your health care provider. Make sure you discuss any questions you have with your health care provider.  Document Released: 06/23/2004 Document Revised: 04/09/2020 Document Reviewed: 07/31/2018  Elsevier Patient Education © 2020 Elsevier  Inc.    Fall Prevention in the Home, Adult  Falls can cause injuries. They can happen to people of all ages. There are many things you can do to make your home safe and to help prevent falls. Ask for help when making these changes, if needed.  What actions can I take to prevent falls?  General Instructions  · Use good lighting in all rooms. Replace any light bulbs that burn out.  · Turn on the lights when you go into a dark area. Use night-lights.  · Keep items that you use often in easy-to-reach places. Lower the shelves around your home if necessary.  · Set up your furniture so you have a clear path. Avoid moving your furniture around.  · Do not have throw rugs and other things on the floor that can make you trip.  · Avoid walking on wet floors.  · If any of your floors are uneven, fix them.  · Add color or contrast paint or tape to clearly keeley and help you see:  ? Any grab bars or handrails.  ? First and last steps of stairways.  ? Where the edge of each step is.  · If you use a stepladder:  ? Make sure that it is fully opened. Do not climb a closed stepladder.  ? Make sure that both sides of the stepladder are locked into place.  ? Ask someone to hold the stepladder for you while you use it.  · If there are any pets around you, be aware of where they are.  What can I do in the bathroom?         · Keep the floor dry. Clean up any water that spills onto the floor as soon as it happens.  · Remove soap buildup in the tub or shower regularly.  · Use non-skid mats or decals on the floor of the tub or shower.  · Attach bath mats securely with double-sided, non-slip rug tape.  · If you need to sit down in the shower, use a plastic, non-slip stool.  · Install grab bars by the toilet and in the tub and shower. Do not use towel bars as grab bars.  What can I do in the bedroom?  · Make sure that you have a light by your bed that is easy to reach.  · Do not use any sheets or blankets that are too big for your bed. They  should not hang down onto the floor.  · Have a firm chair that has side arms. You can use this for support while you get dressed.  What can I do in the kitchen?  · Clean up any spills right away.  · If you need to reach something above you, use a strong step stool that has a grab bar.  · Keep electrical cords out of the way.  · Do not use floor polish or wax that makes floors slippery. If you must use wax, use non-skid floor wax.  What can I do with my stairs?  · Do not leave any items on the stairs.  · Make sure that you have a light switch at the top of the stairs and the bottom of the stairs. If you do not have them, ask someone to add them for you.  · Make sure that there are handrails on both sides of the stairs, and use them. Fix handrails that are broken or loose. Make sure that handrails are as long as the stairways.  · Install non-slip stair treads on all stairs in your home.  · Avoid having throw rugs at the top or bottom of the stairs. If you do have throw rugs, attach them to the floor with carpet tape.  · Choose a carpet that does not hide the edge of the steps on the stairway.  · Check any carpeting to make sure that it is firmly attached to the stairs. Fix any carpet that is loose or worn.  What can I do on the outside of my home?  · Use bright outdoor lighting.  · Regularly fix the edges of walkways and driveways and fix any cracks.  · Remove anything that might make you trip as you walk through a door, such as a raised step or threshold.  · Trim any bushes or trees on the path to your home.  · Regularly check to see if handrails are loose or broken. Make sure that both sides of any steps have handrails.  · Install guardrails along the edges of any raised decks and porches.  · Clear walking paths of anything that might make someone trip, such as tools or rocks.  · Have any leaves, snow, or ice cleared regularly.  · Use sand or salt on walking paths during winter.  · Clean up any spills in your garage  right away. This includes grease or oil spills.  What other actions can I take?  · Wear shoes that:  ? Have a low heel. Do not wear high heels.  ? Have rubber bottoms.  ? Are comfortable and fit you well.  ? Are closed at the toe. Do not wear open-toe sandals.  · Use tools that help you move around (mobility aids) if they are needed. These include:  ? Canes.  ? Walkers.  ? Scooters.  ? Crutches.  · Review your medicines with your doctor. Some medicines can make you feel dizzy. This can increase your chance of falling.  Ask your doctor what other things you can do to help prevent falls.  Where to find more information  · Centers for Disease Control and Prevention, STEADI: https://cdc.gov  · National [place] on Aging: https://wl6uwps.emir.nih.gov  Contact a doctor if:  · You are afraid of falling at home.  · You feel weak, drowsy, or dizzy at home.  · You fall at home.  Summary  · There are many simple things that you can do to make your home safe and to help prevent falls.  · Ways to make your home safe include removing tripping hazards and installing grab bars in the bathroom.  · Ask for help when making these changes in your home.  This information is not intended to replace advice given to you by your health care provider. Make sure you discuss any questions you have with your health care provider.  Document Released: 10/14/2010 Document Revised: 04/09/2020 Document Reviewed: 08/02/2018  Elsevier Patient Education © 2020 Elsevier Inc.   to increase circulation to the lower extremities.    Discharge Diet:       Discharge Liquids:    Discharge Bowel Function: Continent  Please contact your primary care physician for any changes in bowel habits.  Discharge Bowel Program:    Discharge Bladder Function: Continent  Discharge Urinary Devices:        Nursing Discharge Plan:        Case Management Discharge Instructions:   Discharge Location: Home with Home Health  Agency Name/Address/Phone: [facility]: [phone]  Home  Health: Registered Nurse, Home Health Aide, Occupational Therapist, Physical Therapist, Speech Therapist,   Outpatient Services:    DME Provider/Phone: [employer]: [phone]  Medical Equipment Ordered: Front Wheel Walker  Prescription Faxed to:        Discharge Medication Instructions:  Below are the medications your physician expects you to take upon discharge:Occupational Therapy Discharge Instructions for Shaista Bocanegra    10/21/2021    Level of Assist Required for Eating: Able to Complete Eating without Assist  Level of Assist Required for Grooming: Able to Complete Grooming without Assist  Level of Assist Required for Dressing: Requires Supervision with Dressing  Level of Assist Required for Toileting: Able to Complete Toileting without Assist  Level of Assist Required for Toilet Transfer: Able to Complete Toilet Transfer without Assist  Level of Assist Required for Bathing: Requires Supervision with Bathing  Equipment for Bathing: Shower Chair, Grab Bars in Tub / Shower, Hand Held Shower Head  Level of Assist Required for Shower Transfer: Requires Supervision with Shower Transfer  Equipment for Shower Transfer: Shower Chair, Grab Bars in Tub / Shower  Level of Assist Required for Home Mgmt: Requires Supervision with Home Management  Level of Assist Required for Meal Prep: Requires Supervision with Meal Preparation  Driving: Please Contact Physician Prior to Driving  Home Exercise Program: None Issued    Speech Therapy Discharge Instructions for Shaista Bocanegra    10/21/2021    Diet: Regular (7), Thin (0)    Strategies to Help Improve Your Memory   Your speech therapist can work with you to develop strategies to help you remember new information. There are 2 main types of strategies to help your memory: internal reminders and external reminders.     Internal Reminders     •Rehearsal: retelling yourself information you just learned, or restating it out loud in your own words.   •Repetition: saying  the same information over and over, either silently or out loud.   •Clarification: asking someone else to repeat or rephrase information.   •Chunking: grouping items together to reduce the number of items to remember, such as grouping 7-digit phone numbers into 2 chunks, one with 3 numbers and the other with 4 numbers.   •Rhyming: making a rhyme out of important information.   •Acronyms or alphabet cueing: creating a letter for each word you want to remember, or vice versa. One example is using the sentence “Every Good Boy Does Fine” to remember that the lines of a treble staff in music are the notes E, G, B, D, and F.   •Imagery (also called visualization): creating pictures of the information in your mind.   •Association: linking old information or habits with the new, such as remembering to take your medicine every night at the same time that you brush your teeth.   •Personal meaning: making the new information meaningful or emotionally important to you in some way.     External Reminders     •Using a paper or electronic calendar or day planner.   •Setting timers or alarms to remind you to do something.   •Writing down reminders such as to-do lists, shopping lists, and project outlines.   •Recording new information with a voice recorder.   •Using a medicine organizing tool.   •Creating specific, permanent places for important items. One example is always putting your keys, wallet, and cell phone in the same place every time you get home.Executive function is a set of mental skills that help you get things done. These skills are controlled by an area of the brain called the frontal lobe.    Executive function helps you:  • Manage time  • Pay attention  • Switch focus  • Plan and organize  • Remember details  • Avoid saying or doing the wrong thing  • Do things based on your experience  • Multitask    When executive function isn’t working as it should, your behavior is less controlled. This can affect your ability  to:  • Work or go to school  • Do things independently  • Maintain relationships    How to Manage Executive Function Problems  • Take a step-by-step approach to work.  • Rely on visual organizational aids.  • Use tools like time organizers, computers, or watches with alarms.  • Make schedules and look at them several times a day.  • Ask for written and oral instructions whenever possible.  • Plan for transition times and shifts in activities.  • Allow and budget extra time to get tasks done.    • When you are about to try a task that you haven’t done in a while (or struggled with the last time), think about the steps involved, try to anticipate possible challenges and identify what might give you trouble, come up with a plan to compensate of those challenges, and evaluate after the fact - to determine if your plan worked or if it needs adjustment.  •  If you experience an outcome that you didn’t expect, think back to what may have contributed to the problem.   • Break complex problems down into smaller parts.   Sometimes a complicated task can be overwhelming, but if you break it down into components, you will be able to find a place where you can cope with the information.    •  Be patient and persistent.    • Develop tools and strategies that will help organize, filter and narrow down information so that you can deal with it effectively.  •   It was great working with you Tushar, best of luck ! -Kirsten, SLP    Physical Therapy Discharge Instructions for Shaista Bocanegra    10/22/2021    Level of Assist Required for Ambulation: No Assist on Flat Surfaces, Supervision on Curbs, Supervision on Stairs  Device Recommended for Ambulation: Front-Wheeled Walker  Level of Assist Required for Transfers: Requires No Assist  Device Recommended for Transfers: None, Front-Wheeled Walker  Home Exercise Program: Refer to Home Exercise Program Handout for Details    Take care of yourself Tushar! Keep working to stay strong and be  safe!  -Agustina Cheema, PT, DPT

## 2021-10-22 NOTE — PROGRESS NOTES
Received shift report and assumed care of patient.  Patient awake, calm and stable, currently positioned in bed for comfort and safety; call light within reach. chest incisional pain at this time, see mar for medication.  Will continue to monitor.

## 2021-10-22 NOTE — PROGRESS NOTES
Patient discharged to home per order.  Discharge instructions reviewed with patient and son; they verbalize understanding and signed copies placed in chart.  Patient has all belongings; signed copy of form in chart.  Patient left facility at 1420 via w/c accompanied by rehab staff and son  Have enjoyed working with this pleasant patient.

## 2021-10-22 NOTE — CARE PLAN
Problem: Knowledge Deficit - Standard  Goal: Patient and family/care givers will demonstrate understanding of plan of care, disease process/condition, diagnostic tests and medications  Outcome: Progressing     Problem: Psychosocial  Goal: Patient's level of anxiety will decrease  Outcome: Progressing   The patient is Stable - Low risk of patient condition declining or worsening    Shift Goals  Clinical Goals: PAIN MANAGEMENT  Patient Goals: sleep  Family Goals: family training    Progress made toward(s) clinical / shift goals:  Patient is resting in bed do distress noted    Patient is not progressing towards the following goals:

## 2021-10-22 NOTE — DISCHARGE PLANNING
Case Management Discharge Instructions        Discharge Location: Home with Home Health.     Agency Name / Address / Phone: [facility]: [phone].     Home Health: Registered Nurse, Home Health Aide, Occupational Therapist, Physical Therapist, Speech Therapist, .     DME Provider / Phone: [employer]: [phone].     Medical Equipment Ordered: Front Wheel Walker.     Follow-up Information:     Capri Simon M.D.  [address]  [place] [id number] [phone]  Please call your primary care doctor to set up your hospital follow up appointment.       Darin Meehan M.D.-Cardiology  [address]  [address]  [place] [address]  [phone]  On 10/28/2021  1pm    Opal Sosa D.O.-Physical Medicine and Rehab.  [address]  [address]  [place] [address] [phone]  On 11/9/2021  10:00am-Please see attached map.     [facility] [place]: [phone].  They will call you to set up appointment.        Jai Noe D.O.-Neurology  [address]  [address]  [place] [address] [phone]  On 11/19/2021  3pm

## 2021-10-22 NOTE — CARE PLAN
Problem: Bladder / Voiding  Goal: Patient will establish and maintain regular urinary output  Outcome: Progressing  Note: Patient voiding adequate amounts of clear, yellow urine. Denies dysuria and flank pain: afebrile.      Problem: Skin Integrity  Goal: Patient's skin integrity will be maintained or improve  Outcome: Progressing  Note: Patient remains free from s/s infection; afebrile. . Patient's skin remains intact and free from new or accidental injury this shift.

## 2021-10-28 ENCOUNTER — OFFICE VISIT (OUTPATIENT)
Dept: CARDIOLOGY | Facility: MEDICAL CENTER | Age: 76
End: 2021-10-28
Payer: COMMERCIAL

## 2021-10-28 VITALS
BODY MASS INDEX: 21.16 KG/M2 | HEIGHT: 67 IN | OXYGEN SATURATION: 96 % | SYSTOLIC BLOOD PRESSURE: 94 MMHG | DIASTOLIC BLOOD PRESSURE: 68 MMHG | HEART RATE: 82 BPM | RESPIRATION RATE: 12 BRPM | WEIGHT: 134.8 LBS

## 2021-10-28 DIAGNOSIS — E78.5 DYSLIPIDEMIA: ICD-10-CM

## 2021-10-28 DIAGNOSIS — Z79.01 CHRONIC ANTICOAGULATION: ICD-10-CM

## 2021-10-28 DIAGNOSIS — I48.91 ATRIAL FIBRILLATION, UNSPECIFIED TYPE (HCC): ICD-10-CM

## 2021-10-28 DIAGNOSIS — Z98.890 S/P MITRAL VALVE REPAIR: ICD-10-CM

## 2021-10-28 DIAGNOSIS — I10 HYPERTENSION, UNSPECIFIED TYPE: ICD-10-CM

## 2021-10-28 PROBLEM — I34.0 SEVERE MITRAL REGURGITATION: Status: RESOLVED | Noted: 2017-08-27 | Resolved: 2021-10-28

## 2021-10-28 PROCEDURE — 99214 OFFICE O/P EST MOD 30 MIN: CPT | Performed by: INTERNAL MEDICINE

## 2021-10-28 ASSESSMENT — ENCOUNTER SYMPTOMS
BLURRED VISION: 0
COUGH: 0
DOUBLE VISION: 0
BRUISES/BLEEDS EASILY: 0
VOMITING: 0
DEPRESSION: 0
GASTROINTESTINAL NEGATIVE: 1
RESPIRATORY NEGATIVE: 1
ABDOMINAL PAIN: 0
FEVER: 0
CONSTITUTIONAL NEGATIVE: 1
MYALGIAS: 0
CARDIOVASCULAR NEGATIVE: 1
CHILLS: 0
WEAKNESS: 0
CLAUDICATION: 0
PSYCHIATRIC NEGATIVE: 1
DIZZINESS: 0
PALPITATIONS: 0
SHORTNESS OF BREATH: 0
EYES NEGATIVE: 1
NEUROLOGICAL NEGATIVE: 1
MUSCULOSKELETAL NEGATIVE: 1
FOCAL WEAKNESS: 0
WEIGHT LOSS: 0
HEADACHES: 0
NERVOUS/ANXIOUS: 0
NAUSEA: 0

## 2021-10-28 ASSESSMENT — FIBROSIS 4 INDEX: FIB4 SCORE: 1.64

## 2021-10-28 NOTE — PROGRESS NOTES
Chief Complaint   Patient presents with   • Dyslipidemia   • Atrial Fibrillation   • Hypertension       Subjective     Shaista Bocanegra is a 76 y.o. female who presents today for hospital follow up.    Since the discharge from rehabilitation center on 10/22/21 following mitral valve repair and surgical MAZE at [facility], she has been doing well. She denies fatigue, shortness of breath, dyspnea on exertion, chest pain, dizziness or syncope.    Past Medical History:   Diagnosis Date   • Anemia 9/28/2021   • Arthritis    • Atrial fibrillation (HCC)    • Benign essential HTN 3/19/2012   • Breast cancer (HCC)    • Cancer (HCC) 1998    breast    • Cardiac arrhythmia    • Chest tightness or pressure 3/19/2012   • Chickenpox    • Coronary heart disease    • Yoruba measles    • Gynecological disorder    • High cholesterol    • High risk medication use 3/19/2012   • Hypercholesterolemia 3/19/2012   • Mumps    • MVP (mitral valve prolapse) 3/19/2012   • Osteoporosis    • Seizure disorder ([facility]) 9/28/2021   • Sleep apnea     no CPAP   • Snoring    • Stroke ([facility]) 2017    residual minor weakness on left side   • Substance abuse (Formerly [facility])    • Tonsillitis    • Urinary bladder disorder     OAB   • Urinary incontinence    • Valvular heart disease    • Venereal disease      Past Surgical History:   Procedure Laterality Date   • CATARACT PHACO WITH IOL  3/16/2009    Performed by SHANNON GANDARA at SURGERY SAME DAY [facility]   • CATARACT PHACO WITH IOL  1/26/2009    Performed by SHANNON GANDARA at SURGERY SAME DAY [facility]   • BREAST BIOPSY     • HYSTERECTOMY LAPAROSCOPY     • LUMPECTOMY     • PB RADIATION THERAPY PLAN SIMPLE     • PB REMV 2ND CATARACT,CORN-SCLER SECTN     • IA CHEMOTHERAPY, UNSPECIFIED PROCEDURE     • PRIMARY C SECTION     • SINUSCOPE     • TONSILLECTOMY       Family History   Problem Relation Age of Onset   • Cancer Mother         breast   • No Known Problems Brother    • Heart Failure Neg Hx    • Heart  Disease Neg Hx      Social History     Socioeconomic History   • Marital status:      Spouse name: Not on file   • Number of children: 2   • Years of education: Not on file   • Highest education level: Not on file   Occupational History   • Not on file   Tobacco Use   • Smoking status: Never Smoker   • Smokeless tobacco: Never Used   Vaping Use   • Vaping Use: Never used   Substance and Sexual Activity   • Alcohol use: Not Currently     Comment: twice  a week   • Drug use: No   • Sexual activity: Yes     Partners: Male     Birth control/protection: Female Sterilization   Other Topics Concern   • Not on file   Social History Narrative   • Not on file     Social Determinants of Health     Financial Resource Strain:    • Difficulty of Paying Living Expenses:    Food Insecurity:    • Worried About Running Out of Food in the Last Year:    • Ran Out of Food in the Last Year:    Transportation Needs:    • Lack of Transportation (Medical):    • Lack of Transportation (Non-Medical):    Physical Activity:    • Days of Exercise per Week:    • Minutes of Exercise per Session:    Stress:    • Feeling of Stress :    Social Connections:    • Frequency of Communication with Friends and Family:    • Frequency of Social Gatherings with Friends and Family:    • Attends [employer] Services:    • Active Member of Clubs or Organizations:    • Attends Club or Organization Meetings:    • Marital Status:    Intimate Partner Violence:    • Fear of Current or Ex-Partner:    • Emotionally Abused:    • Physically Abused:    • Sexually Abused:      No Known Allergies  Outpatient Encounter Medications as of 10/28/2021   Medication Sig Dispense Refill   • atorvastatin (LIPITOR) 80 MG tablet Take 1 Tablet by mouth every evening. 30 Tablet 2   • ascorbic acid (VITAMIN C) 500 MG tablet Take 1 Tablet by mouth 2 times a day. 60 Tablet 2   • aspirin EC 81 MG EC tablet Take 1 Tablet by mouth every day. 30 Tablet 2   • ferrous sulfate 325 (65 Fe)  MG tablet Take 1 Tablet by mouth every morning with breakfast. 30 Tablet 2   • levETIRAcetam (KEPPRA) 1000 MG tablet Take 1 Tablet by mouth every 12 hours. 60 Tablet 2   • methocarbamol (ROBAXIN) 500 MG Tab Take 1 Tablet by mouth 4 times a day as needed. 30 Tablet 0   • metoprolol tartrate (LOPRESSOR) 25 MG Tab Take 0.5 Tablets by mouth 2 times a day. 30 Tablet 2   • omeprazole (PRILOSEC) 20 MG delayed-release capsule Take 1 Capsule by mouth every morning before breakfast. 30 Capsule 2   • senna-docusate (PERICOLACE OR SENOKOT S) 8.6-50 MG Tab Take 2 Tablets by mouth 2 times a day. 120 Tablet 2   • polyethylene glycol/lytes (MIRALAX) 17 g Pack Take 1 Packet by mouth every day. 30 Each 2   • spironolactone (ALDACTONE) 25 MG Tab Take 1 Tablet by mouth every day. 30 Tablet 2   • tamsulosin (FLOMAX) 0.4 MG capsule Take 2 Capsules by mouth 1/2 hour after dinner. 60 Capsule 2   • traZODone (DESYREL) 50 MG Tab Take 1 Tablet by mouth at bedtime as needed. 30 Tablet 2   • apixaban (ELIQUIS) 5mg Tab Take 1 Tablet by mouth 2 times a day. 60 Tablet 2   • HYDROcodone-acetaminophen (NORCO) 5-325 MG Tab per tablet Take 1 Tablet by mouth every 8 hours as needed for up to 7 days. 21 Tablet 0   • Lidocaine 4 % Patch Apply 1 Patch topically every day. 30 Patch 2   • Melatonin 10 MG Tab Take 10 mg by mouth at bedtime. 30 Tablet 2   • Cyanocobalamin (B-12 PO) Take 25,000 mcg by mouth every day.     • Multiple Vitamins-Minerals (ICAPS AREDS 2 PO) Take 1 tablet by mouth every morning.     • Cholecalciferol (VITAMIN D3) 2000 UNIT Cap Take 2,000 Units by mouth every day.     • Multiple Vitamin (MULTIVITAMINS PO) Take 1 Tab by mouth every day.       No facility-administered encounter medications on file as of 10/28/2021.     Review of Systems   Constitutional: Negative.  Negative for chills, fever, malaise/fatigue and weight loss.   HENT: Negative.  Negative for hearing loss.    Eyes: Negative.  Negative for blurred vision and double vision.  "  Respiratory: Negative.  Negative for cough and shortness of breath.    Cardiovascular: Negative.  Negative for chest pain, palpitations, claudication and leg swelling.   Gastrointestinal: Negative.  Negative for abdominal pain, nausea and vomiting.   Genitourinary: Negative.  Negative for dysuria and urgency.   Musculoskeletal: Negative.  Negative for joint pain and myalgias.   Skin: Negative.  Negative for itching and rash.   Neurological: Negative.  Negative for dizziness, focal weakness, weakness and headaches.   Endo/Heme/Allergies: Negative.  Does not bruise/bleed easily.   Psychiatric/Behavioral: Negative.  Negative for depression. The patient is not nervous/anxious.               Objective     BP (!) 94/68 (BP Location: Left arm, Patient Position: Sitting, BP Cuff Size: Adult)   Pulse 82   Resp 12   Ht 1.689 m (5' 6.5\")   Wt 61.1 kg (134 lb 12.8 oz)   SpO2 96%   BMI 21.43 kg/m²     Physical Exam  Constitutional:       Appearance: She is well-developed.   HENT:      Head: Normocephalic and atraumatic.   Neck:      Vascular: No JVD.   Cardiovascular:      Rate and Rhythm: Normal rate. Rhythm irregular.      Heart sounds: Normal heart sounds.   Pulmonary:      Effort: Pulmonary effort is normal.      Breath sounds: Normal breath sounds.   Abdominal:      General: Bowel sounds are normal.      Palpations: Abdomen is soft.      Comments: No hepatosplenomegaly.   Musculoskeletal:         General: Normal range of motion.   Lymphadenopathy:      Cervical: No cervical adenopathy.   Skin:     General: Skin is warm and dry.   Neurological:      Mental Status: She is alert and oriented to person, place, and time.                             CARDIAC STUDIES/PROCEDURES:     BIOTEL CONCLUSIONS (07/14/21)  BioTel 6 day Summary:   1. Atrial fibrillation with appropriate heart rate range. Average 69, range 35 to 120 bpm.   2. No significant pauses (up to 2.2 seconds).   3. Occasional PVCs, 2% burden.   4. 1 patient " trigger, no symptoms reported.   Conclusion: Persistent atrial fibrillation with appropriate heart rate, no pauses, occasional PVCs.     CARDIAC CATHETERIZATION CONCLUSIONS by Juventino Farah (05/08/20)  Angiographically normal appearing coronary arteries.  Mildly elevated LVEDP, filling pressures.  2+ mitral regurgitation.  Normal LV function.     CAROTID ULTRASOUND (08/18/17)  Normal carotids, subclavians and vertebrals.     CTA OF HEAD (09/04/21)          No evidence of flow-limiting stenosis in the cervical carotid or cervical vertebral arteries.  (study result reviewed)    CT OF HEAD (05/31/21)  1.  Moderate acute/subacute left temporal lobe infarct.  2.  Chronic ischemic changes.  3.  No acute intracranial hemorrhage.     CTA OF HEAD (05/31/21)  No acute large vessel occlusion or hemodynamically significant stenosis.  Acute/subacute appearing left temporal lobe infarct.    CTA OF NECK (09/04/21)           No evidence of flow-limiting stenosis in the cervical carotid or cervical vertebral arteries.  (study result reviewed)     CTA OF NECK (05/31/21)  1.  No stenosis or dissection is seen within the carotid or vertebral arteries bilaterally.  2.  Tree-in-bud nodularity in the right upper lobe is likely infectious/inflammatory.  3.  Mucosal thickening right maxillary sinus.  4.  Nasal bone deformities bilaterally are likely chronic.     CT OF HEAD (05/03/19)  NO ACUTE ABNORMALITIES ARE NOTED ON CT SCAN OF THE HEAD.  Right-sided encephalomalacia is noted.     CTA OF HEAD (08/18/17)  1.  There is a right M1 segment occlusion.  2.  There is collateral flow in the peripheral M3 branches seen on the right.  3.  There is mild decreased enhancement of the visualized right internal carotid artery however it is patent.  4.  Question of subtle hypodensity in the right insular cortex region. No abnormal brain parenchymal enhancement is seen.     CTA OF NECK (08/18/17)  1.  CT angiogram of the neck within normal  limits.  2.  Thrombosed right M1 segment.    ECHOCARDIOGRAM CONCLUSIONS (09/30/12)           Normal left ventricular chamber size.           he left ventricular ejection fraction is visually estimated to be 60%.           Known mitral valve repair which is functioning normally with appropriate transvalvular gradient.            No mitral regurgitation.           Right heart pressures are normal.   (study result reviewed)     ECHOCARDIOGRAM CONCLUSIONS (06/01/21)  Left ventricular ejection fraction is visually estimated to be 65%.  Diastolic function is difficult to assess with atrial fibrillation.  Severely dilated left atrium.  Negative bubble study including Valsalva.  Myxomatous changes of the mitral valve leaflets with prolapse of the anterior and posterior leaflets.  Moderate to severe eccentric mitral regurgitation, probably severe.  Pulmonary veins not well seen on the study.  Estimated right ventricular systolic pressure is 31 mmHg + estimated RAP.  Compared to the images of the study done 11- there has been no significant change, prior echo had pulmonary vein flow reversal consistent with severe mitral regurgitation.    ECHOCARDIOGRAM CONCLUSIONS (02/03/20)  Prior echo done on 05/03/2019. Compared to the images of the prior   study done -  there has been no significant change.   Normal left ventricular systolic function.  Left ventricular ejection fraction is visually estimated to be 65%.  Prolapse of the mitral leaflets was present.  Severe mitral regurgitation.  Mild tricuspid regurgitation.  Estimated right ventricular systolic pressure is 35 mmHg.     ECHOCARDIOGRAM CONCLUSIONS (05/03/19)  Prior echocardiogram 6/14/2018.  Normal left ventricular systolic function.   Mild to moderate eccentric mitral regurgitation.  Compared to the images of the study done - there has been slight improvement in mitral regurgitation.     ECHOCARDIOGRAM CONCLUSIONS (06/14/18)  Normal left ventricular systolic  function.  Left ventricular ejection fraction is visually estimated to be 60%.  Myxomatous changes of the mitral valve.  Prolapse of the anterior and posterior mitral leaflets.  Severe, eccentric mitral regurgitation.  Right heart pressures are normal.  Compared to the report of the study done 8/19/2017 severe mitral regurgitation is now present, previously reported as moderate but a MR   ERO was not recorded.      EKG performed on (09/04/21) was reviewed: EKG personally interpreted shows atrial fibrillation.  EKG performed on (06/09/21) was reviewed: EKG personally interpreted shows atrial fibrillation.  EKG performed on (03/19/21) EKG shows sinus rhythm with premature ventricular contractions.  EKG performed on (05/09/20) EKG shows sinus rhythm with premature ventricular contractions.  EKG performed on (05/08/20) EKG shows sinus rhythm with premature ventricular contractions.     TRANSESOPHAGEAL ECHOCARDIOGRAM CONCLUSIONS by Ayde Lopes (01/26/21)  LV EF 55%.  Biatrial enlargement.  There is severe eccentric mitral regurgitation with multiple jets, predominantly from flail leaflet of P2.  Echolucent space near P1 of unclear etiology, unusual for cleft leaflet.  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    Assessment & Plan     1. S/P mitral valve repair     2. Atrial fibrillation, unspecified type (HCC)     3. Chronic anticoagulation     4. Hypertension, unspecified type     5. Dyslipidemia         Medical Decision Making: Today's Assessment/Status/Plan:         1. Status post mnimally invasive complex mitral valve repair with A2 [place]-Miguel Angel neochordal reconstruction, A3 [place]-Miguel Angel neochordal reconstruction, P2 posterior ventricular anchoring remodeling suture and non-resectional leaflet remodeling and [place]-Miguel Angel NeoChord x2, P3 [place]-Miguel Angel NeoChord x1, and #34 sewing ring annuloplasty, maze procedure and left atrial appendage oversew by Monster [facility] at [facility] on 09/16/21): She is clinically  doing well.  2. Atrial fibrillation on anticoagulation therapy (Eliquis): She is doing well on anticoagulation and the ventricular rate is well controlled.  3. Status post left temporal lobe infarct (05/31/21) with history of cerebrovascular accident with right carotid arteriography with mechanical thrombectomy (08/18/17): She remains clinically stable with memory loss and mild confusion.  4. Additional information: She is family of Casey Cosme.     We will follow up in 3 months.     CC  Ayde Lopes

## 2021-11-01 NOTE — HPI: FULL BODY SKIN EXAMINATION
----- Message from Tameka Howe RPH sent at 11/1/2021 10:55 AM EDT -----    ----- Message -----  From: Catherine Will RegSched Rep  Sent: 11/1/2021  10:52 AM EDT  To: Tameka Howe RPH    Patient called to talk to you about jardiance. She is having symptoms of a bladder infection with burning and urgency. She has also pee'd herself about 3 times. She stated she did not take it yesterday or today.     Please advise.    Thanks,  Catherine Will      
Street
How Severe Are Your Spot(S)?: moderate
What Is The Reason For Today's Visit?: Skin Lesions
What Is The Reason For Today's Visit? (Being Monitored For X): the development of new lesions

## 2021-11-02 NOTE — PROGRESS NOTES
Monitor Summary:    Candy      -/.05/-     Patient Specific Counseling (Will Not Stick From Patient To Patient): will consider Cerave with sal acid at f/u in 2 weeks. Detail Level: Generalized Detail Level: Detailed Detail Level: Simple ambulatory

## 2021-11-09 ENCOUNTER — OFFICE VISIT (OUTPATIENT)
Dept: CARDIOLOGY | Facility: MEDICAL CENTER | Age: 76
End: 2021-11-09
Attending: INTERNAL MEDICINE

## 2021-11-09 ENCOUNTER — OFFICE VISIT (OUTPATIENT)
Dept: PHYSICAL MEDICINE AND REHAB | Facility: REHABILITATION | Age: 76
End: 2021-11-09
Payer: COMMERCIAL

## 2021-11-09 ENCOUNTER — TELEPHONE (OUTPATIENT)
Dept: CARDIOLOGY | Facility: MEDICAL CENTER | Age: 76
End: 2021-11-09

## 2021-11-09 VITALS
HEART RATE: 95 BPM | BODY MASS INDEX: 22.18 KG/M2 | WEIGHT: 138 LBS | SYSTOLIC BLOOD PRESSURE: 102 MMHG | HEIGHT: 66 IN | OXYGEN SATURATION: 95 % | DIASTOLIC BLOOD PRESSURE: 66 MMHG

## 2021-11-09 VITALS — SYSTOLIC BLOOD PRESSURE: 98 MMHG | HEART RATE: 66 BPM | DIASTOLIC BLOOD PRESSURE: 46 MMHG | OXYGEN SATURATION: 96 %

## 2021-11-09 DIAGNOSIS — Z86.73 HISTORY OF CVA (CEREBROVASCULAR ACCIDENT): ICD-10-CM

## 2021-11-09 DIAGNOSIS — G40.909 SEIZURE DISORDER (HCC): ICD-10-CM

## 2021-11-09 DIAGNOSIS — Z74.09 IMPAIRED MOBILITY AND ACTIVITIES OF DAILY LIVING: ICD-10-CM

## 2021-11-09 DIAGNOSIS — G81.94 LEFT HEMIPARESIS (HCC): ICD-10-CM

## 2021-11-09 DIAGNOSIS — Z78.9 IMPAIRED MOBILITY AND ACTIVITIES OF DAILY LIVING: ICD-10-CM

## 2021-11-09 DIAGNOSIS — R53.81 PHYSICAL DECONDITIONING: Primary | ICD-10-CM

## 2021-11-09 DIAGNOSIS — I48.19 PERSISTENT ATRIAL FIBRILLATION (HCC): ICD-10-CM

## 2021-11-09 DIAGNOSIS — Z74.09 IMPAIRED MOBILITY AND ENDURANCE: ICD-10-CM

## 2021-11-09 LAB — EKG IMPRESSION: NORMAL

## 2021-11-09 PROCEDURE — 93000 ELECTROCARDIOGRAM COMPLETE: CPT | Performed by: INTERNAL MEDICINE

## 2021-11-09 PROCEDURE — 99215 OFFICE O/P EST HI 40 MIN: CPT | Performed by: PHYSICAL MEDICINE & REHABILITATION

## 2021-11-09 PROCEDURE — 99215 OFFICE O/P EST HI 40 MIN: CPT | Performed by: INTERNAL MEDICINE

## 2021-11-09 ASSESSMENT — FIBROSIS 4 INDEX: FIB4 SCORE: 1.64

## 2021-11-09 NOTE — PROGRESS NOTES
[facility]  PM[facility]  [facility] [place] [address]  Ph: [phone]    NEW PATIENT EVALUATION    *Patient established in PM&R practice, however, patient new to writer as patient is transferring care. Therefore, patient billed as established.     Patient Name: Shaista Bocanegra   Patient : 1945  Patient Age: 76 y.o.     Examining Physician: Dr. Opal Sosa, DO    PM&R History to Date - Background Information:  Dates of Admission/Discharge to ARU: 2021    SUBJECTIVE:   Patient Identification: Shaista Bocanegra is a 76 y.o. female with PMH significant for Severe mitral regurgitation secondary to prolapse, atrial fibrillation, stroke in 2017 (right frontal lobe) and 2021 (left temporal lobe) with residual left hemiparesis, hyperlipidemia, breast cancer treated with right lumpectomy radiation chemotherapy, sleep apnea and rehabilitation history significant for physical deconditioning after mitral valve repair 2021 [facility] Nogueira course c/b generalized tonic-clonic seizure and is presenting to PM&R clinic for a NEW OUTPATIENT evaluation with the following chief complaint/s:    Chief Complaint: ARU discharge follow up    Accompanied by Today: kota Orta  History of Present Illness:    - Records reviewed: Acute rehab discharge summary reviewed in its entirety.  - Therapy: Established with PT/SLP/RN through [facility]  - Has hired caregivers in the morning  and evening -9 to assist with meals and eating.   - Bladder: Had retention in hospital but it is slow. Has urinary urgency. Last night was up 3x to go to bathroom, but night before did not get up at all. On Flomax 0.8mg nightly. Last night did drink water before bed.  - Bowel: Has BM volitionally.   - Sleep: Sometimes sleeps through the night, sometimes gets up.   - Has weakness in arms, legs. Generally weak.   - Doing HEP outside of therapy.   - Thinks during this whole process, vision has changed to some  degree. Cannot find TV glasses. Left eye blurry compared to prior. Is improved.   - Driving previously, not driving now. Marivel will leave. Morning caregiver will bring to doctors appointments in morning. Son can assist with afternoon appointments.   - No falls since being at home.   - Has never been good about preparing foods or cooking for herself.     Review of Systems:  All other pertinent positive review of systems are noted above in HPI.   All other systems reviewed and are negative.    Past Medical History:  Past Medical History:   Diagnosis Date   • Seizure disorder (HCC) 9/28/2021   • Anemia 9/28/2021   • Stroke (HCC) 2017    residual minor weakness on left side   • Benign essential HTN 3/19/2012   • Chest tightness or pressure 3/19/2012   • Hypercholesterolemia 3/19/2012   • MVP (mitral valve prolapse) 3/19/2012   • High risk medication use 3/19/2012   • Cancer (HCC) 1998    breast    • Arthritis    • Atrial fibrillation (HCC)    • Breast cancer (HCC)    • Cardiac arrhythmia    • Chickenpox    • Coronary heart disease    • Latvian measles    • Gynecological disorder    • High cholesterol    • Mumps    • Osteoporosis    • Sleep apnea     no CPAP   • Snoring    • Substance abuse (HCC)    • Tonsillitis    • Urinary bladder disorder     OAB   • Urinary incontinence    • Valvular heart disease    • Venereal disease       Past Surgical History:   Procedure Laterality Date   • CATARACT PHACO WITH IOL  3/16/2009    Performed by SHANNON GANDARA at SURGERY SAME DAY [facility] ORS   • CATARACT PHACO WITH IOL  1/26/2009    [facility]   • BREAST BIOPSY     • HYSTERECTOMY LAPAROSCOPY     • LUMPECTOMY     • PB RADIATION THERAPY PLAN SIMPLE     • PB REMV 2ND CATARACT,CORN-SCLER SECTN     • VA CHEMOTHERAPY, UNSPECIFIED PROCEDURE     • PRIMARY C SECTION     • SINUSCOPE     • TONSILLECTOMY          Current Outpatient Medications:   •  apixaban (ELIQUIS) 5mg Tab, Take 1  Tablet by mouth 2 times a day., Disp: 60 Tablet, Rfl: 2  •  atorvastatin (LIPITOR) 80 MG tablet, Take 1 Tablet by mouth every evening., Disp: 30 Tablet, Rfl: 2  •  ascorbic acid (VITAMIN C) 500 MG tablet, Take 1 Tablet by mouth 2 times a day., Disp: 60 Tablet, Rfl: 2  •  aspirin EC 81 MG EC tablet, Take 1 Tablet by mouth every day., Disp: 30 Tablet, Rfl: 2  •  ferrous sulfate 325 (65 Fe) MG tablet, Take 1 Tablet by mouth every morning with breakfast., Disp: 30 Tablet, Rfl: 2  •  levETIRAcetam (KEPPRA) 1000 MG tablet, Take 1 Tablet by mouth every 12 hours., Disp: 60 Tablet, Rfl: 2  •  Lidocaine 4 % Patch, Apply 1 Patch topically every day., Disp: 30 Patch, Rfl: 2  •  methocarbamol (ROBAXIN) 500 MG Tab, Take 1 Tablet by mouth 4 times a day as needed., Disp: 30 Tablet, Rfl: 0  •  metoprolol tartrate (LOPRESSOR) 25 MG Tab, Take 0.5 Tablets by mouth 2 times a day., Disp: 30 Tablet, Rfl: 2  •  omeprazole (PRILOSEC) 20 MG delayed-release capsule, Take 1 Capsule by mouth every morning before breakfast., Disp: 30 Capsule, Rfl: 2  •  Melatonin 10 MG Tab, Take 10 mg by mouth at bedtime., Disp: 30 Tablet, Rfl: 2  •  senna-docusate (PERICOLACE OR SENOKOT S) 8.6-50 MG Tab, Take 2 Tablets by mouth 2 times a day., Disp: 120 Tablet, Rfl: 2  •  polyethylene glycol/lytes (MIRALAX) 17 g Pack, Take 1 Packet by mouth every day., Disp: 30 Each, Rfl: 2  •  spironolactone (ALDACTONE) 25 MG Tab, Take 1 Tablet by mouth every day., Disp: 30 Tablet, Rfl: 2  •  tamsulosin (FLOMAX) 0.4 MG capsule, Take 2 Capsules by mouth 1/2 hour after dinner., Disp: 60 Capsule, Rfl: 2  •  traZODone (DESYREL) 50 MG Tab, Take 1 Tablet by mouth at bedtime as needed., Disp: 30 Tablet, Rfl: 2  •  Cyanocobalamin (B-12 PO), Take 25,000 mcg by mouth every day., Disp: , Rfl:   •  Multiple Vitamins-Minerals (ICAPS AREDS 2 PO), Take 1 tablet by mouth every morning., Disp: , Rfl:   •  Cholecalciferol (VITAMIN D3) 2000 UNIT Cap, Take 2,000 Units by mouth every day., Disp: ,  Rfl:   •  Multiple Vitamin (MULTIVITAMINS PO), Take 1 Tab by mouth every day., Disp: , Rfl:   No Known Allergies     Past Social History:  Social History     Socioeconomic History   • Marital status:      Spouse name: Not on file   • Number of children: 2   • Years of education: Not on file   • Highest education level: Not on file   Occupational History   • Not on file   Tobacco Use   • Smoking status: Never Smoker   • Smokeless tobacco: Never Used   Vaping Use   • Vaping Use: Never used   Substance and Sexual Activity   • Alcohol use: Not Currently     Comment: twice  a week   • Drug use: No   • Sexual activity: Yes     Partners: Male     Birth control/protection: Female Sterilization   Other Topics Concern   • Not on file   Social History Narrative   • Not on file     Social Determinants of Health     Financial Resource Strain:    • Difficulty of Paying Living Expenses: Not on file   Food Insecurity:    • Worried About Running Out of Food in the Last Year: Not on file   • Ran Out of Food in the Last Year: Not on file   Transportation Needs:    • Lack of Transportation (Medical): Not on file   • Lack of Transportation (Non-Medical): Not on file   Physical Activity:    • Days of Exercise per Week: Not on file   • Minutes of Exercise per Session: Not on file   Stress:    • Feeling of Stress : Not on file   Social Connections:    • Frequency of Communication with Friends and Family: Not on file   • Frequency of Social Gatherings with Friends and Family: Not on file   • Attends Restoration Services: Not on file   • Active Member of Clubs or Organizations: Not on file   • Attends Club or Organization Meetings: Not on file   • Marital Status: Not on file   Intimate Partner Violence:    • Fear of Current or Ex-Partner: Not on file   • Emotionally Abused: Not on file   • Physically Abused: Not on file   • Sexually Abused: Not on file   Housing Stability:    • Unable to Pay for Housing in the Last Year: Not on file    • Number of Places Lived in the Last Year: Not on file   • Unstable Housing in the Last Year: Not on file        Family History:  Family History   Problem Relation Age of Onset   • Cancer Mother         breast   • No Known Problems Brother    • Heart Failure Neg Hx    • Heart Disease Neg Hx        Depression and Opioid Screening  PHQ-9:  Depression Screen (PHQ-2/PHQ-9) 10/16/2021 10/17/2021 10/18/2021   PHQ-2 Total Score 0 0 0   PHQ-2 Total Score - - -   PHQ-9 Total Score - - -     Interpretation of PHQ-9 Total Score   Score Severity   1-4 No Depression   5-9 Mild Depression   10-14 Moderate Depression   15-19 Moderately Severe Depression   20-27 Severe Depression     OBJECTIVE:   Vital Signs:  Vitals:    11/09/21 1017   BP: (!) 98/46   Pulse: 66   SpO2: 96%        Pertinent Labs:  Lab Results   Component Value Date/Time    SODIUM 142 10/11/2021 06:08 AM    POTASSIUM 4.0 10/11/2021 06:08 AM    CHLORIDE 109 10/11/2021 06:08 AM    CO2 23 10/11/2021 06:08 AM    GLUCOSE 88 10/11/2021 06:08 AM    BUN 11 10/11/2021 06:08 AM    CREATININE 0.62 10/11/2021 06:08 AM    BUNCREATRAT 12 06/12/2020 05:12 AM       Lab Results   Component Value Date/Time    HBA1C 5.7 (H) 08/19/2021 01:12 PM    HBA1C 5.7 (H) 05/31/2021 11:14 AM       Lab Results   Component Value Date/Time    WBC 4.8 10/16/2021 05:21 AM    RBC 3.63 (L) 10/16/2021 05:21 AM    HEMOGLOBIN 11.4 (L) 10/16/2021 05:21 AM    HEMATOCRIT 36.1 (L) 10/16/2021 05:21 AM    MCV 99.4 (H) 10/16/2021 05:21 AM    MCH 31.4 10/16/2021 05:21 AM    MCHC 31.6 (L) 10/16/2021 05:21 AM    MPV 9.6 10/16/2021 05:21 AM    NEUTSPOLYS 78.10 (H) 09/29/2021 05:39 AM    LYMPHOCYTES 11.20 (L) 09/29/2021 05:39 AM    MONOCYTES 8.10 09/29/2021 05:39 AM    EOSINOPHILS 1.70 09/29/2021 05:39 AM    BASOPHILS 0.40 09/29/2021 05:39 AM       Lab Results   Component Value Date/Time    ASTSGOT 28 09/29/2021 05:39 AM    ALTSGPT 45 09/29/2021 05:39 AM        Physical Exam:   GEN: No apparent distress  HEENT: Head  normocephalic, atraumatic.  Sclera nonicteric bilaterally, no ocular discharge appreciated bilaterally.  CV: Extremities warm and well-perfused, no peripheral edema appreciated bilaterally.  PULMONARY: Breathing nonlabored on room air, no respiratory accessory muscle use.  Not requiring supplemental oxygen.  SKIN: No appreciable skin breakdown on exposed areas of skin.  PSYCH: Mood and affect within normal limits.  NEURO: Awake alert.  Conversational.  Logical thought content. Answers questions appropriately.    Motor Exam Upper Extremities   ? Myotome R L   Shoulder Abduction C5 5 4   Elbow flexion C5 5 4   Wrist extension C6 5 4   Elbow extension C7 5 4   Finger flexion C8 5 4   Finger abduction T1 5 4     Motor Exam Lower Extremities  ? Myotome R L   Hip flexion L2 5 5   Knee extension L3 5 5   Ankle dorsiflexion L4 5 5   Toe extension L5 5 5   Ankle plantarflexion S1 5 5     Gordon's negative bilaterally.  No clonus appreciated at BL ankles.   Ambulatory without assistive device  Some difficulty following conversation    Imaging:   MRI brain 6/1/2021  1.  Small area of acute infarct in the left temporal lobe.  2.  Chronic infarct in the right insular cortex and frontal lobe.  3.  Mild chronic microvascular ischemic disease.  4.  Mild cerebral volume loss.    MRI brain 8/18/2017  1.  Moderate sized RIGHT MCA territory acute ischemia involving the cortex and basal ganglia  2.  Flow void is present in the RIGHT MCA compatible with treatment effect  3.  No hemorrhage  4.  Mild white matter changes  5.  Mild atrophy    ASSESSMENT/PLAN: Shaista Bocanegra  is a 76 y.o. female with rehabilitation history significant for physical deconditioning after mitral valve repair 9/16/2021 [place] Dr. Monster Nogueira course c/b generalized tonic-clonic seizure, here for evaluation. The following plan was discussed with the patient who is in agreement.     Visit Diagnoses     ICD-10-CM   1. Physical deconditioning  R53.81   2. Seizure  disorder (HCC)  G40.909   3. History of CVA (cerebrovascular accident)  Z86.73   4. Left hemiparesis (HCC)  G81.94   5. Impaired mobility and endurance  Z74.09   6. Impaired mobility and activities of daily living  Z74.09    Z78.9        Friend Marivel assists with history    Rehab/Neuro/CV:   1. Physical deconditioning after mitral valve repair 9/16/2021 secondary to severe mitral regurgitation done at [place] Dr. Monster Nogueira course c/b generalized tonic-clonic seizure  2. History of cardioembolic stroke: 2017 right frontal lobe, 5/2021 left temporal lobe in setting of atrial fibrillation  3. Left eye blurry vision  -Records reviewed as aforementioned in the HPI.  -Therapy: [facility] health-PT/OT/nursing  -Private caregivers: Has hired caregivers from 9 AM to 1 PM and 5 PM to 9 PM to assist her mostly with meal making.  -Function: Function has baseline mild left hemiparesis from previous stroke. She feels generally deconditioned and weak and does not feel herself cognitively. States that she has memory issues. Difficulty remembering names and word recall.  -Occupation: Now retired.  -Driving status: Currently not driving. Extensive counseling regarding waiting to return to driving. Needs neurology clearance. Needs left eye blurry vision to improve or revisit optometrist.  -Counseled on waiting at least 3 to 4 months post hospitalization to see if vision improves, if not visit with optometry to reevaluate prescription  -Spent extensive time going over medications with patient. She wishes to be off some medications. Delineated which ones are mandatory and which ones could be as needed. Encourage follow-up with PCP to ensure corroboration with recommendations.  -Follows with Dr. Jagdish Denton, neurology  -Does not need to return to [facility]    Neuropathic Pain: Denies any neuropathic pain.    Neurogenic Bladder: Continent, volitional but does have frequency though frequency is inconsistent does have some nights where  she does not get up at all to use restroom.  -Med management: For now I would recommend that she continue on Flomax 0.8 mg given that she did have some retention while inpatient she is currently stating she has difficult with initiation of stream. We will plan to wean this in the future.  -Counseled on reduction of fluids before bed as conservative measure first.    Neurogenic Bowel:  -Med management: No longer on bowel medications volitionally.  -Having continent and volitional bowel movements.    Endo:   -Labs reviewed: Last vitamin D level 49, on supplementation but may be able to reduce dosage or switch to multivitamin which is multipurpose given her desire to reduce medication list    Sleep: Intermittently poor, likely due to urinary urgency at night only on some nights.  -Med management: Has trazodone and melatonin as needed  -Conservative measures as aforementioned including reducing fluids prior to bed    GI/Diet: Poor initiation of diet. Always has been this way. Does not like to prepare food for herself or cook meals.  -Currently has service to help her prepare meals. They will then switch to a service in [facility] which will deliver her premade meals. Family plans to keep the caregivers as long as needed for her to get adequate nutrition intake, however this is more complicated given this was a premorbid issue.      Follow up: 6-8 weeks for functional reevaluation, return to driving    Total time spent was 57 minutes.  Included in this time is the time spent preparing for the visit including record review, my exam and evaluation, counseling and education regarding that which is aforementioned in the assessment and plan. Time was spent ordering the appropriate labs, tests, procedures, referrals, medications. Included this time as the time spent obtaining history from someone other than the patient. Discussion involved the patient and friend.    Please note that this dictation was created using voice recognition  software. I have made every reasonable attempt to correct obvious errors but there may be errors of grammar and content that I may have overlooked prior to finalization of this note.    Dr. Opal Sosa DO, MS  Department of Physical Medicine & Rehabilitation  Neuro Rehabilitation [facility]

## 2021-11-09 NOTE — PROGRESS NOTES
Arrhythmia Clinic Note (New patient)     DOS: 11/9/2021    Referring physician: Dr Meehan    Chief complaint/Reason for consult: Afib    HPI: 76-year-old female with a history of mitral valve disease status post minimally invasive repair and maze with left atrial appendage ligation September 2021.  She had a history of paroxysmal atrial fibrillation with development of persistent atrial fibrillation this past year.  Unfortunately atrial fibrillation recurred following her maze procedure.  She has shortness of breath and fatigue.    ROS (+ highlighted in bold):  Constitutional: Fevers/chills/fatigue/weightloss  HEENT: Blurry vision/eye pain/sore throat/hearing loss  Respiratory: Shortness of breath/cough  Cardiovascular: Chest pain/palpitations/edema/orthopnea/syncope  GI: Nausea/vomitting/diarrhea  MSK: Arthralgias/myagias/muscle weakness  Skin: Rash/sores  Neurological: Numbness/tremors/vertigo  Endocrine: Excessive thirst/polyuria/cold intolerance/heat intolerance  Psych: Depression/anxiety    Past Medical History:   Diagnosis Date   • Anemia 9/28/2021   • Arthritis    • Atrial fibrillation (HCC)    • Benign essential HTN 3/19/2012   • Breast cancer (HCC)    • Cancer (HCC) 1998    breast    • Cardiac arrhythmia    • Chest tightness or pressure 3/19/2012   • Chickenpox    • Coronary heart disease    • [place] measles    • Gynecological disorder    • High cholesterol    • High risk medication use 3/19/2012   • Hypercholesterolemia 3/19/2012   • Mumps    • MVP (mitral valve prolapse) 3/19/2012   • Osteoporosis    • Seizure disorder (HCC) 9/28/2021   • Sleep apnea     no CPAP   • Snoring    • Stroke (HCC) 2017    residual minor weakness on left side   • Substance abuse ([facility])    • Tonsillitis    • Urinary bladder disorder     OAB   • Urinary incontinence    • Valvular heart disease    • Venereal disease        Past Surgical History:   Procedure Laterality Date   • CATARACT PHACO WITH IOL  3/16/2009    Performed by  SHANNON GANDARA at SURGERY SAME DAY [facility]S   • CATARACT PHACO WITH IOL  1/26/2009    Performed by SHANNON GANDARA at SURGERY SAME DAY [facility]   • BREAST BIOPSY     • HYSTERECTOMY LAPAROSCOPY     • LUMPECTOMY     • PB RADIATION THERAPY PLAN SIMPLE     • PB REMV 2ND CATARACT,CORN-SCLER SECTN     • VT CHEMOTHERAPY, UNSPECIFIED PROCEDURE     • PRIMARY C SECTION     • SINUSCOPE     • TONSILLECTOMY         Social History     Socioeconomic History   • Marital status:      Spouse name: Not on file   • Number of children: 2   • Years of education: Not on file   • Highest education level: Not on file   Occupational History   • Not on file   Tobacco Use   • Smoking status: Never Smoker   • Smokeless tobacco: Never Used   Vaping Use   • Vaping Use: Never used   Substance and Sexual Activity   • Alcohol use: Not Currently     Comment: twice  a week   • Drug use: No   • Sexual activity: Yes     Partners: Male     Birth control/protection: Female Sterilization   Other Topics Concern   • Not on file   Social History Narrative   • Not on file     Social Determinants of Health     Financial Resource Strain:    • Difficulty of Paying Living Expenses: Not on file   Food Insecurity:    • Worried About Running Out of Food in the Last Year: Not on file   • Ran Out of Food in the Last Year: Not on file   Transportation Needs:    • Lack of Transportation (Medical): Not on file   • Lack of Transportation (Non-Medical): Not on file   Physical Activity:    • Days of Exercise per Week: Not on file   • Minutes of Exercise per Session: Not on file   Stress:    • Feeling of Stress : Not on file   Social Connections:    • Frequency of Communication with Friends and Family: Not on file   • Frequency of Social Gatherings with Friends and Family: Not on file   • Attends Jain Services: Not on file   • Active Member of Clubs or Organizations: Not on file   • Attends Club or Organization Meetings: Not on file   • Marital Status:  Not on file   Intimate Partner Violence:    • Fear of Current or Ex-Partner: Not on file   • Emotionally Abused: Not on file   • Physically Abused: Not on file   • Sexually Abused: Not on file   Housing Stability:    • Unable to Pay for Housing in the Last Year: Not on file   • Number of Places Lived in the Last Year: Not on file   • Unstable Housing in the Last Year: Not on file       Family History   Problem Relation Age of Onset   • Cancer Mother         breast   • No Known Problems Brother    • Heart Failure Neg Hx    • Heart Disease Neg Hx        No Known Allergies    Current Outpatient Medications   Medication Sig Dispense Refill   • apixaban (ELIQUIS) 5mg Tab Take 1 Tablet by mouth 2 times a day. 60 Tablet 2   • atorvastatin (LIPITOR) 80 MG tablet Take 1 Tablet by mouth every evening. 30 Tablet 2   • ascorbic acid (VITAMIN C) 500 MG tablet Take 1 Tablet by mouth 2 times a day. 60 Tablet 2   • aspirin EC 81 MG EC tablet Take 1 Tablet by mouth every day. 30 Tablet 2   • ferrous sulfate 325 (65 Fe) MG tablet Take 1 Tablet by mouth every morning with breakfast. 30 Tablet 2   • levETIRAcetam (KEPPRA) 1000 MG tablet Take 1 Tablet by mouth every 12 hours. 60 Tablet 2   • Lidocaine 4 % Patch Apply 1 Patch topically every day. 30 Patch 2   • methocarbamol (ROBAXIN) 500 MG Tab Take 1 Tablet by mouth 4 times a day as needed. 30 Tablet 0   • metoprolol tartrate (LOPRESSOR) 25 MG Tab Take 0.5 Tablets by mouth 2 times a day. 30 Tablet 2   • omeprazole (PRILOSEC) 20 MG delayed-release capsule Take 1 Capsule by mouth every morning before breakfast. 30 Capsule 2   • Melatonin 10 MG Tab Take 10 mg by mouth at bedtime. 30 Tablet 2   • senna-docusate (PERICOLACE OR SENOKOT S) 8.6-50 MG Tab Take 2 Tablets by mouth 2 times a day. 120 Tablet 2   • polyethylene glycol/lytes (MIRALAX) 17 g Pack Take 1 Packet by mouth every day. 30 Each 2   • spironolactone (ALDACTONE) 25 MG Tab Take 1 Tablet by mouth every day. 30 Tablet 2   •  "tamsulosin (FLOMAX) 0.4 MG capsule Take 2 Capsules by mouth 1/2 hour after dinner. 60 Capsule 2   • traZODone (DESYREL) 50 MG Tab Take 1 Tablet by mouth at bedtime as needed. 30 Tablet 2   • Cyanocobalamin (B-12 PO) Take 25,000 mcg by mouth every day.     • Multiple Vitamins-Minerals (ICAPS AREDS 2 PO) Take 1 tablet by mouth every morning.     • Cholecalciferol (VITAMIN D3) 2000 UNIT Cap Take 2,000 Units by mouth every day.     • Multiple Vitamin (MULTIVITAMINS PO) Take 1 Tab by mouth every day.       No current facility-administered medications for this visit.       Physical Exam:  Vitals:    11/09/21 1407   BP: 102/66   BP Location: Left arm   Patient Position: Sitting   BP Cuff Size: Adult   Pulse: 95   SpO2: 95%   Weight: 62.6 kg (138 lb)   Height: 1.676 m (5' 6\")     General appearance: NAD, conversant   Eyes: anicteric sclerae, moist conjunctivae; no lid-lag; PERRLA  HENT: Atraumatic; oropharynx clear with moist mucous membranes and no mucosal ulcerations; normal hard and soft palate  Neck: Trachea midline; FROM, supple, no thyromegaly or lymphadenopathy  Lungs: CTA, with normal respiratory effort and no intercostal retractions  CV: Irregular, no MRGs, no JVD   Abdomen: Soft, non-tender; no masses or HSM  Extremities: No peripheral edema or extremity lymphadenopathy  Skin: Normal temperature, turgor and texture; no rash, ulcers or subcutaneous nodules  Psych: Appropriate affect, alert and oriented to person, place and time    Data:  Lipids:   Lab Results   Component Value Date/Time    CHOLSTRLTOT 122 08/18/2021 10:20 AM    TRIGLYCERIDE 63 08/18/2021 10:20 AM    HDL 57 08/18/2021 10:20 AM    LDL 52 08/18/2021 10:20 AM        BMP:  Lab Results   Component Value Date/Time    SODIUM 142 10/11/2021 0608    POTASSIUM 4.0 10/11/2021 0608    CHLORIDE 109 10/11/2021 0608    CO2 23 10/11/2021 0608    GLUCOSE 88 10/11/2021 0608    BUN 11 10/11/2021 0608    CREATININE 0.62 10/11/2021 0608    CALCIUM 8.8 10/11/2021 0608    " ANION 10.0 10/11/2021 0608        TSH:   Lab Results   Component Value Date/Time    TSHULTRASEN 1.780 09/30/2021 0541        THYROXINE (T4):   No results found for: PAOLAIR     CBC:   Lab Results   Component Value Date/Time    WBC 4.8 10/16/2021 05:21 AM    RBC 3.63 (L) 10/16/2021 05:21 AM    HEMOGLOBIN 11.4 (L) 10/16/2021 05:21 AM    HEMATOCRIT 36.1 (L) 10/16/2021 05:21 AM    MCV 99.4 (H) 10/16/2021 05:21 AM    MCH 31.4 10/16/2021 05:21 AM    MCHC 31.6 (L) 10/16/2021 05:21 AM    RDW 52.3 (H) 10/16/2021 05:21 AM    PLATELETCT 198 10/16/2021 05:21 AM    MPV 9.6 10/16/2021 05:21 AM    NEUTSPOLYS 78.10 (H) 09/29/2021 05:39 AM    LYMPHOCYTES 11.20 (L) 09/29/2021 05:39 AM    MONOCYTES 8.10 09/29/2021 05:39 AM    EOSINOPHILS 1.70 09/29/2021 05:39 AM    BASOPHILS 0.40 09/29/2021 05:39 AM    IMMGRAN 0.50 09/29/2021 05:39 AM    IMMGRAN 0 06/12/2020 05:12 AM    NRBC 0.00 09/29/2021 05:39 AM    NEUTS 6.60 09/29/2021 05:39 AM    NEUTS 2.4 06/12/2020 05:12 AM    LYMPHS 0.94 (L) 09/29/2021 05:39 AM    LYMPHS 1.1 06/12/2020 05:12 AM    MONOS 0.68 09/29/2021 05:39 AM    MONOS 0.3 06/12/2020 05:12 AM    EOS 0.14 09/29/2021 05:39 AM    EOS 0.1 06/12/2020 05:12 AM    BASO 0.03 09/29/2021 05:39 AM    BASO 0.0 06/12/2020 05:12 AM    IMMGRANAB 0.04 09/29/2021 05:39 AM    IMMGRANAB 0.0 06/12/2020 05:12 AM    NRBCAB 0.00 09/29/2021 05:39 AM        CBC w/o DIFF  Lab Results   Component Value Date/Time    WBC 4.8 10/16/2021 05:21 AM    RBC 3.63 (L) 10/16/2021 05:21 AM    HEMOGLOBIN 11.4 (L) 10/16/2021 05:21 AM    MCV 99.4 (H) 10/16/2021 05:21 AM    MCH 31.4 10/16/2021 05:21 AM    MCHC 31.6 (L) 10/16/2021 05:21 AM    RDW 52.3 (H) 10/16/2021 05:21 AM    MPV 9.6 10/16/2021 05:21 AM       Prior echo/stress results reviewed: Normal LVEF    EKG interpreted by me: Atrial fibrillation    EKG from March 2021 interpreted by me: Sinus rhythm    Impression/Plan:  1. Persistent atrial fibrillation (HCC)  EKG     1.  Persistent atrial fibrillation  2.   Mitral valve disease status post minimally invasive mitral valve repair  3.  History of maze  4.  Left atrial appendage ligation  5.  Anticoagulation management, Eliquis  6.  History of stroke    -We discussed treatment options for her atrial fibrillation, recurrent s/p MAZE  -Discussed RFA for persistent Afib. We discussed pulmonary vein isolation for therapeutic management and continued rhythm control.  We discussed the risks and benefits of this procedure.  Risks include 1-3% risk of major cardiovascular event including stroke, myocardial infarction, phrenic nerve damage, esophageal injury and/or fistula formation, cardiac perforation, pericardial effusion, tamponade, major bleeding, or death.  I quoted a 70 to 80% chance free of atrial fibrillation at 12 months.  We discussed that she may also need a second procedure.  - Continue Eliquis for now, if ABHINAV confirms KYUNG is absent, consider OAC discontinuation, however given CVA will continue for now  -Questions answered, pt wishes to proceed with AF ablation    Plan AF ablation post maze      Mark Richard MD  Cardiac Electrophysiology

## 2021-11-09 NOTE — TELEPHONE ENCOUNTER
----- Message from Mark Richard M.D. sent at 11/9/2021  2:58 PM PST -----  Please schedule Afib ablation, no meds to hold, thanks    MC

## 2021-11-09 NOTE — TELEPHONE ENCOUNTER
Called patient to schedule - she is not available to take down instructions at this time. She will call me back to schedule this procedure.

## 2021-11-10 NOTE — TELEPHONE ENCOUNTER
Patient scheduled for afib ablation on 1-7-22 with Dr. Richard. Patient has been instructed to check in at 10:00 for 12:00 case time. Message sent to authorizations. Emailed Carto.

## 2021-11-11 ENCOUNTER — TELEPHONE (OUTPATIENT)
Dept: SCHEDULING | Facility: IMAGING CENTER | Age: 76
End: 2021-11-11

## 2021-11-11 NOTE — TELEPHONE ENCOUNTER
Patient son, Fer, called to advise the pt left ankle is swollen. The Pt recently had a procedure at [facility] and their Dr advised to call the cards office to adjust the Pt meds. They can be reached at [phone]    Thank you,  Amie LY

## 2021-11-12 NOTE — TELEPHONE ENCOUNTER
Fer states that he is not sure how long she has had the ankle swelling.  It is minor swelling only located around the lower ankle, and is pitting.  It is only on the left side and is not hot or painful.  She has had no injury to that foot that he knows of.  The procedure at [facility] was a Mitral Valve done 2 months ago with access through the right groin.  She has physical therapy and a home health nurse coming to the house regularly.  I asked if the nurse ([facility]) can be notified and can check it for her and he offers to call the nurse and let her know.  Nurse will be advised to call us with a report after she sees her.

## 2021-11-15 NOTE — TELEPHONE ENCOUNTER
Alexandra a nurse with [facility] called stating that as of this morning Pt is showing no signs of lower extremity edema. Alexandra states if any questions to please reach out to the Pt.    Thank you

## 2021-11-17 ENCOUNTER — APPOINTMENT (RX ONLY)
Dept: URBAN - METROPOLITAN AREA CLINIC 4 | Facility: CLINIC | Age: 76
Setting detail: DERMATOLOGY
End: 2021-11-17

## 2021-11-17 DIAGNOSIS — L90.5 SCAR CONDITIONS AND FIBROSIS OF SKIN: ICD-10-CM

## 2021-11-17 DIAGNOSIS — L81.4 OTHER MELANIN HYPERPIGMENTATION: ICD-10-CM

## 2021-11-17 DIAGNOSIS — D22 MELANOCYTIC NEVI: ICD-10-CM | Status: STABLE

## 2021-11-17 DIAGNOSIS — L82.1 OTHER SEBORRHEIC KERATOSIS: ICD-10-CM

## 2021-11-17 DIAGNOSIS — L57.0 ACTINIC KERATOSIS: ICD-10-CM

## 2021-11-17 PROBLEM — D22.72 MELANOCYTIC NEVI OF LEFT LOWER LIMB, INCLUDING HIP: Status: ACTIVE | Noted: 2021-11-17

## 2021-11-17 PROBLEM — D22.5 MELANOCYTIC NEVI OF TRUNK: Status: ACTIVE | Noted: 2021-11-17

## 2021-11-17 PROCEDURE — 99213 OFFICE O/P EST LOW 20 MIN: CPT | Mod: 25

## 2021-11-17 PROCEDURE — ? DIAGNOSIS COMMENT

## 2021-11-17 PROCEDURE — 17000 DESTRUCT PREMALG LESION: CPT

## 2021-11-17 PROCEDURE — ? OBSERVATION AND MEASURE

## 2021-11-17 PROCEDURE — ? COUNSELING

## 2021-11-17 PROCEDURE — 17003 DESTRUCT PREMALG LES 2-14: CPT

## 2021-11-17 PROCEDURE — ? LIQUID NITROGEN

## 2021-11-17 ASSESSMENT — LOCATION ZONE DERM
LOCATION ZONE: ARM
LOCATION ZONE: HAND
LOCATION ZONE: FACE
LOCATION ZONE: TRUNK
LOCATION ZONE: TOE
LOCATION ZONE: NECK

## 2021-11-17 ASSESSMENT — LOCATION SIMPLE DESCRIPTION DERM
LOCATION SIMPLE: RIGHT HAND
LOCATION SIMPLE: LEFT FOREHEAD
LOCATION SIMPLE: LEFT BUTTOCK
LOCATION SIMPLE: POSTERIOR NECK
LOCATION SIMPLE: LEFT SHOULDER
LOCATION SIMPLE: LEFT UPPER BACK
LOCATION SIMPLE: RIGHT BUTTOCK
LOCATION SIMPLE: RIGHT SHOULDER
LOCATION SIMPLE: LEFT 5TH TOE

## 2021-11-17 ASSESSMENT — LOCATION DETAILED DESCRIPTION DERM
LOCATION DETAILED: MID POSTERIOR NECK
LOCATION DETAILED: LEFT POSTERIOR SHOULDER
LOCATION DETAILED: LEFT BUTTOCK
LOCATION DETAILED: RIGHT BUTTOCK
LOCATION DETAILED: LEFT ANTERIOR SHOULDER
LOCATION DETAILED: RIGHT RADIAL DORSAL HAND
LOCATION DETAILED: LEFT DORSAL 5TH TOE
LOCATION DETAILED: LEFT SUPERIOR MEDIAL FOREHEAD
LOCATION DETAILED: RIGHT POSTERIOR SHOULDER
LOCATION DETAILED: LEFT SUPERIOR UPPER BACK

## 2021-11-17 NOTE — PROCEDURE: LIQUID NITROGEN
Post-Care Instructions: I reviewed with the patient in detail post-care instructions. Patient is to wear sunprotection, and avoid picking at any of the treated lesions. Pt may apply Vaseline to crusted or scabbing areas.
Show Applicator Variable?: Yes
Consent: The patient's consent was obtained including but not limited to risks of crusting, scabbing, blistering, scarring, darker or lighter pigmentary change, recurrence, incomplete removal and infection.
Duration Of Freeze Thaw-Cycle (Seconds): 0
Render Note In Bullet Format When Appropriate: No
Detail Level: Detailed

## 2021-11-17 NOTE — HPI: SKIN LESIONS
Is This A New Presentation, Or A Follow-Up?: Skin Lesions
How Severe Is Your Skin Lesion?: mild
Have Your Skin Lesions Been Treated?: not been treated
left nephrolithiasis

## 2021-11-19 ENCOUNTER — OFFICE VISIT (OUTPATIENT)
Dept: NEUROLOGY | Facility: MEDICAL CENTER | Age: 76
End: 2021-11-19
Attending: PSYCHIATRY & NEUROLOGY
Payer: COMMERCIAL

## 2021-11-19 VITALS
WEIGHT: 138.89 LBS | TEMPERATURE: 97.1 F | OXYGEN SATURATION: 95 % | BODY MASS INDEX: 22.32 KG/M2 | HEART RATE: 93 BPM | SYSTOLIC BLOOD PRESSURE: 102 MMHG | HEIGHT: 66 IN | RESPIRATION RATE: 12 BRPM | DIASTOLIC BLOOD PRESSURE: 64 MMHG

## 2021-11-19 DIAGNOSIS — Z86.73 HISTORY OF ISCHEMIC STROKE: ICD-10-CM

## 2021-11-19 DIAGNOSIS — R56.9 SINGLE SEIZURE (HCC): ICD-10-CM

## 2021-11-19 PROCEDURE — 99417 PROLNG OP E/M EACH 15 MIN: CPT | Performed by: PSYCHIATRY & NEUROLOGY

## 2021-11-19 PROCEDURE — 99212 OFFICE O/P EST SF 10 MIN: CPT | Performed by: PSYCHIATRY & NEUROLOGY

## 2021-11-19 PROCEDURE — 99215 OFFICE O/P EST HI 40 MIN: CPT | Performed by: PSYCHIATRY & NEUROLOGY

## 2021-11-19 ASSESSMENT — FIBROSIS 4 INDEX: FIB4 SCORE: 1.64

## 2021-11-19 NOTE — PROGRESS NOTES
Chief Complaint   Patient presents with   • New Patient     Seizure disorder       History of present illness:  Shaista Bocanegra 76 y.o. female with HTN, atrial fibrillation on Eliquis presented 5/31/21 for word finding difficulty/confusion.     A MRI scan revealed left temporal/occipital infarct. She was just started on Eliquis 3 days prior to the ischemic stroke.   Patient had hospital admission recently where she underwent elective mitral valve repair.  Postoperatively she developed generalized tonic-clonic seizures 2 hours after the repair and her left arm weakness was worse than baseline.  EEG showed occasional right temporal parietal sharp waves and patient was started on Keppra.  She is currently on keppra 1000mg and there have been no further LOC spells, convulsions, abnormal behavior.     Past medical history:   Past Medical History:   Diagnosis Date   • Anemia 9/28/2021   • Arthritis    • Atrial fibrillation (HCC)    • Benign essential HTN 3/19/2012   • Breast cancer (HCC)    • Cancer (HCC) 1998    breast    • Cardiac arrhythmia    • Chest tightness or pressure 3/19/2012   • Chickenpox    • Coronary heart disease    • Upper sor[place] measles    • Gynecological disorder    • High cholesterol    • High risk medication use 3/19/2012   • Hypercholesterolemia 3/19/2012   • Mumps    • MVP (mitral valve prolapse) 3/19/2012   • Osteoporosis    • Seizure disorder (HCC) 9/28/2021   • Sleep apnea     no CPAP   • Snoring    • Stroke (HCC) 2017    residual minor weakness on left side   • Substance abuse (HCC)    • Tonsillitis    • Urinary bladder disorder     OAB   • Urinary incontinence    • Valvular heart disease    • Venereal disease        Past surgical history:   Past Surgical History:   Procedure Laterality Date   • CATARACT PHACO WITH IOL  3/16/2009    Performed by SHANNON GANDARA at SURGERY SAME DAY [facility]S   • CATARACT PHACO WITH IOL  1/26/2009    Performed by SHANNON GANDARA at SURGERY SAME DAY [facility] ORS   •  BREAST BIOPSY     • HYSTERECTOMY LAPAROSCOPY     • LUMPECTOMY     • PB RADIATION THERAPY PLAN SIMPLE     • PB REMV 2ND CATARACT,CORN-SCLER SECTN     • CA CHEMOTHERAPY, UNSPECIFIED PROCEDURE     • PRIMARY C SECTION     • SINUSCOPE     • TONSILLECTOMY         Family history:   Family History   Problem Relation Age of Onset   • Cancer Mother         breast   • No Known Problems Brother    • Heart Failure Neg Hx    • Heart Disease Neg Hx        Social history:   Social History     Socioeconomic History   • Marital status:      Spouse name: Not on file   • Number of children: 2   • Years of education: Not on file   • Highest education level: Not on file   Occupational History   • Not on file   Tobacco Use   • Smoking status: Never Smoker   • Smokeless tobacco: Never Used   Vaping Use   • Vaping Use: Never used   Substance and Sexual Activity   • Alcohol use: Not Currently     Comment: twice  a week   • Drug use: No   • Sexual activity: Yes     Partners: Male     Birth control/protection: Female Sterilization   Other Topics Concern   • Not on file   Social History Narrative   • Not on file     Social Determinants of Health     Financial Resource Strain:    • Difficulty of Paying Living Expenses: Not on file   Food Insecurity:    • Worried About Running Out of Food in the Last Year: Not on file   • Ran Out of Food in the Last Year: Not on file   Transportation Needs:    • Lack of Transportation (Medical): Not on file   • Lack of Transportation (Non-Medical): Not on file   Physical Activity:    • Days of Exercise per Week: Not on file   • Minutes of Exercise per Session: Not on file   Stress:    • Feeling of Stress : Not on file   Social Connections:    • Frequency of Communication with Friends and Family: Not on file   • Frequency of Social Gatherings with Friends and Family: Not on file   • Attends Orthodoxy Services: Not on file   • Active Member of Clubs or Organizations: Not on file   • Attends Club or  "Organization Meetings: Not on file   • Marital Status: Not on file   Intimate Partner Violence:    • Fear of Current or Ex-Partner: Not on file   • Emotionally Abused: Not on file   • Physically Abused: Not on file   • Sexually Abused: Not on file   Housing Stability:    • Unable to Pay for Housing in the Last Year: Not on file   • Number of Places Lived in the Last Year: Not on file   • Unstable Housing in the Last Year: Not on file       Current medications:   Current Outpatient Medications   Medication   • apixaban (ELIQUIS) 5mg Tab   • atorvastatin (LIPITOR) 80 MG tablet   • ascorbic acid (VITAMIN C) 500 MG tablet   • aspirin EC 81 MG EC tablet   • ferrous sulfate 325 (65 Fe) MG tablet   • levETIRAcetam (KEPPRA) 1000 MG tablet   • metoprolol tartrate (LOPRESSOR) 25 MG Tab   • omeprazole (PRILOSEC) 20 MG delayed-release capsule   • Melatonin 10 MG Tab   • spironolactone (ALDACTONE) 25 MG Tab   • tamsulosin (FLOMAX) 0.4 MG capsule   • traZODone (DESYREL) 50 MG Tab   • Multiple Vitamins-Minerals (ICAPS AREDS 2 PO)   • Cholecalciferol (VITAMIN D3) 2000 UNIT Cap   • Multiple Vitamin (MULTIVITAMINS PO)   • Lidocaine 4 % Patch   • methocarbamol (ROBAXIN) 500 MG Tab   • senna-docusate (PERICOLACE OR SENOKOT S) 8.6-50 MG Tab   • polyethylene glycol/lytes (MIRALAX) 17 g Pack   • Cyanocobalamin (B-12 PO)     No current facility-administered medications for this visit.       Medication Allergy:  No Known Allergies    Physical examination:   Vitals:    11/19/21 1507   BP: 102/64   BP Location: Left arm   Patient Position: Sitting   BP Cuff Size: Adult   Pulse: 93   Resp: 12   Temp: 36.2 °C (97.1 °F)   TempSrc: Temporal   SpO2: 95%   Weight: 63 kg (138 lb 14.2 oz)   Height: 1.676 m (5' 6\")     Neurological Exam  Mental Status   Able to name objects, name parts of objects, repeat and write. Follows three-step commands.    Cranial Nerves  CN II: Right inferior quadrantanopsia. Left normal visual field.  CN III, IV, VI: " Extraocular movements intact bilaterally. No nystagmus. Normal smooth pursuit.  CN V:  Right: Facial sensation is normal.  Left: Facial sensation is normal on the left.  CN VII:  Right: There is no facial weakness.  Left: There is no facial weakness.    Sensory  Light touch is normal in upper and lower extremities.     Coordination  Right: Finger-to-nose normal.  Left: Finger-to-nose normal.    Gait  Casual gait: Reduced right arm swing. Normal left arm swing.      Labs:  I reviewed the following labs personally:  None     Imaging:   CTA NECK   IMPRESSION:     1. No evidence of flow-limiting stenosis in the cervical carotid or cervical vertebral arteries.    9/4/21 CT head without contrast    I reviewed the images personally. There is a left parietal infarct.    9/17/21 EEG  INTERPRETATION:   Impression:   This is an abnormal EEG due to:   moderate diffuse slowing; with   further intermittent right hemispheric slowing; and occasional right temporal parietal epileptiform sharps.     There are no seizures captured.       ASSESSMENT AND PLAN:  Problem List Items Addressed This Visit     None      Visit Diagnoses     Single seizure (HCC)        Relevant Orders    Referral to Neurodiagnostics (EEG,EP,EMG/NCS/DBS)    History of ischemic stroke              1. Single seizure (HCC)  - Referral to Neurodiagnostics (EEG,EP,EMG/NCS/DBS)    2. History of ischemic stroke    76-year-old female with history of left temporal and right parietal infarction due to atrial fibrillation.  She also had single generalized seizure following mitral valve surgery.  Etiology of seizure is provoked from mitral valve surgery however EEG demonstrated epileptiform sharp waves.  I have ordered a repeat EEG given that she is still on Keppra if EEG is negative for epileptiform activity I will contact patient to wean off of Keppra.     is markedly concerned about patient's cognition.  I have counseled the family that this is likely sequela of  left temporal lobe infarction.  I will contact neuropsychologist Dr. Fer Johnson regarding possible cognitive rehab.    FOLLOW-UP:   No follow-ups on file.    Total time spent for the day for this patient unrelated to procedure time is: 55 minutes. I spent 26 minutes in face to face time and I spent 25 minutes pre-charting and 4 minutes in post-visit documentation.      Dr. Asad Noe D.O.  [facility] Neurology   Movement Disorders Specialist

## 2021-11-19 NOTE — PATIENT INSTRUCTIONS
I have ordered the EEG. You will be contacted to schedule this here. Following the results, I will reach out to you regarding the results and what should be done regarding your seizure drug.   Continue the keppra for now.

## 2021-11-22 NOTE — TELEPHONE ENCOUNTER
Due to a cancellation - patient is now scheduled for 12-7-21 with Dr. Richard. Patient has been instructed to check in at 8:00 for 10:00 case time. Message sent to authorizations. Emailed Parker.

## 2021-11-29 ENCOUNTER — APPOINTMENT (OUTPATIENT)
Dept: SLEEP MEDICINE | Facility: MEDICAL CENTER | Age: 76
End: 2021-11-29
Payer: MEDICARE

## 2021-12-03 ENCOUNTER — OFFICE VISIT (OUTPATIENT)
Dept: SLEEP MEDICINE | Facility: MEDICAL CENTER | Age: 76
End: 2021-12-03
Payer: COMMERCIAL

## 2021-12-03 VITALS
OXYGEN SATURATION: 91 % | SYSTOLIC BLOOD PRESSURE: 132 MMHG | TEMPERATURE: 97.7 F | HEART RATE: 72 BPM | RESPIRATION RATE: 16 BRPM | HEIGHT: 66 IN | BODY MASS INDEX: 22.34 KG/M2 | WEIGHT: 139 LBS | DIASTOLIC BLOOD PRESSURE: 72 MMHG

## 2021-12-03 DIAGNOSIS — I48.21 PERMANENT ATRIAL FIBRILLATION (HCC): ICD-10-CM

## 2021-12-03 DIAGNOSIS — I63.9 CEREBROVASCULAR ACCIDENT (CVA), UNSPECIFIED MECHANISM (HCC): ICD-10-CM

## 2021-12-03 DIAGNOSIS — G47.33 OSA (OBSTRUCTIVE SLEEP APNEA): ICD-10-CM

## 2021-12-03 DIAGNOSIS — R93.89 ABNORMAL CT OF THE CHEST: ICD-10-CM

## 2021-12-03 PROCEDURE — 99214 OFFICE O/P EST MOD 30 MIN: CPT | Performed by: INTERNAL MEDICINE

## 2021-12-03 ASSESSMENT — ENCOUNTER SYMPTOMS
PHOTOPHOBIA: 0
PALPITATIONS: 0
VOMITING: 0
COUGH: 0
SHORTNESS OF BREATH: 0
ORTHOPNEA: 0
TREMORS: 0
DIARRHEA: 0
FOCAL WEAKNESS: 0
ABDOMINAL PAIN: 0
HEADACHES: 0
BLURRED VISION: 0
CONSTIPATION: 0
MYALGIAS: 0
EYE DISCHARGE: 0
SORE THROAT: 0
HEARTBURN: 0
PND: 0
EYE REDNESS: 0
NAUSEA: 0
DOUBLE VISION: 0
WHEEZING: 0
SINUS PAIN: 0
CLAUDICATION: 0
CHILLS: 0
FALLS: 0
NECK PAIN: 0
SPEECH CHANGE: 0
DEPRESSION: 0
SPUTUM PRODUCTION: 0
WEIGHT LOSS: 0
STRIDOR: 0
WEAKNESS: 0
HEMOPTYSIS: 0
FEVER: 0
DIAPHORESIS: 0
BACK PAIN: 0
DIZZINESS: 0
EYE PAIN: 0

## 2021-12-03 ASSESSMENT — FIBROSIS 4 INDEX: FIB4 SCORE: 1.64

## 2021-12-03 NOTE — PROGRESS NOTES
Chief Complaint   Patient presents with   • Follow-Up     last seen 7/12/21   • Results     Ct Chest Thorax 8/12/21, echo 9/29/21, cxr 10/1/21,pft 7/27/21, HOS 9/28/21-10/22/21         HPI: This patient is a 76 y.o. female whom is followed in our clinic for abnormal imaging last seen by me on 7/12/2021 for initial consult. Patient's past medical history is significant for mitral valve regurgitation, severe, diastolic heart failure, history of cerebrovascular accident earlier this year as well as in 2017.  She has a remote history of early stage right-sided breast cancer status post lumpectomy, radiation and chemotherapy in 1998.  She is a lifelong non-smoker.  At the time of our last visit, the patient was still working part-time as a .  She had a CT of her head neck on May 31 following a stroke which demonstrated some tree-in-bud inflammation in the right upper lobe.  She was essentially asymptomatic from pulmonary standpoint but had had some issues with aspiration for which she was working with speech therapy.  We ordered pulmonary function testing which was done on 7/27/2021 and demonstrated mild airflow obstruction with mild air trapping and elevated DLCO.  CT chest done 8 August showed persistent, stable right upper lobe tree-in-bud inflammation and some mild tree-in-bud in the left lower lobe.  No nodules.  No lymphadenopathy.  Since our last visit she underwent mitral valve repair with ring annuloplasty and MAZE procedure which was c/b post-op sz. She had issues passing swallow evaluation but ultimately was d/c to acute inpt rehab and continues to work with speech therapy.  Unfortunately her A. fib has returned despite Maze procedure.  She does have a history of mild obstructive sleep apnea with an AHI of 11 prescribed CPAP therapy last seen by our providers in May.  I do not see the original sleep study in our system.  While her AHI was originally quite mild, she has since suffered cerebrovascular  accident and is at risk for central sleep apnea.  Certainly any untreated or inadequately treated sleep apnea could contribute to atrial fibrillation.  She needs follow-up with our sleep providers.  Otherwise the patient denies pulmonary symptoms such as cough, wheezing or shortness of breath.    Past Medical History:   Diagnosis Date   • Anemia 9/28/2021   • Arthritis    • Atrial fibrillation (HCC)    • Benign essential HTN 3/19/2012   • Breast cancer (HCC)    • Cancer ([facility]) 1998    breast    • Cardiac arrhythmia    • Chest tightness or pressure 3/19/2012   • Chickenpox    • Coronary heart disease    • [place] measles    • Gynecological disorder    • High cholesterol    • High risk medication use 3/19/2012   • Hypercholesterolemia 3/19/2012   • Mumps    • MVP (mitral valve prolapse) 3/19/2012   • Osteoporosis    • Seizure disorder (HCC) 9/28/2021   • Sleep apnea     no CPAP   • Snoring    • Stroke ([facility]) 2017    residual minor weakness on left side   • Substance abuse ([facility])    • Tonsillitis    • Urinary bladder disorder     OAB   • Urinary incontinence    • Valvular heart disease    • Venereal disease        Social History     Socioeconomic History   • Marital status:      Spouse name: Not on file   • Number of children: 2   • Years of education: Not on file   • Highest education level: Not on file   Occupational History   • Not on file   Tobacco Use   • Smoking status: Never Smoker   • Smokeless tobacco: Never Used   Vaping Use   • Vaping Use: Never used   Substance and Sexual Activity   • Alcohol use: Not Currently     Comment: twice  a week   • Drug use: No   • Sexual activity: Yes     Partners: Male     Birth control/protection: Female Sterilization   Other Topics Concern   • Not on file   Social History Narrative   • Not on file     Social Determinants of Health     Financial Resource Strain:    • Difficulty of Paying Living Expenses: Not on file   Food Insecurity:    • Worried About Running Out of Food  in the Last Year: Not on file   • Ran Out of Food in the Last Year: Not on file   Transportation Needs:    • Lack of Transportation (Medical): Not on file   • Lack of Transportation (Non-Medical): Not on file   Physical Activity:    • Days of Exercise per Week: Not on file   • Minutes of Exercise per Session: Not on file   Stress:    • Feeling of Stress : Not on file   Social Connections:    • Frequency of Communication with Friends and Family: Not on file   • Frequency of Social Gatherings with Friends and Family: Not on file   • Attends Yazidism Services: Not on file   • Active Member of Clubs or Organizations: Not on file   • Attends Club or Organization Meetings: Not on file   • Marital Status: Not on file   Intimate Partner Violence:    • Fear of Current or Ex-Partner: Not on file   • Emotionally Abused: Not on file   • Physically Abused: Not on file   • Sexually Abused: Not on file   Housing Stability:    • Unable to Pay for Housing in the Last Year: Not on file   • Number of Places Lived in the Last Year: Not on file   • Unstable Housing in the Last Year: Not on file       Family History   Problem Relation Age of Onset   • Cancer Mother         breast   • No Known Problems Brother    • Heart Failure Neg Hx    • Heart Disease Neg Hx        Current Outpatient Medications on File Prior to Visit   Medication Sig Dispense Refill   • apixaban (ELIQUIS) 5mg Tab Take 1 Tablet by mouth 2 times a day. 60 Tablet 2   • atorvastatin (LIPITOR) 80 MG tablet Take 1 Tablet by mouth every evening. 30 Tablet 2   • ascorbic acid (VITAMIN C) 500 MG tablet Take 1 Tablet by mouth 2 times a day. 60 Tablet 2   • aspirin EC 81 MG EC tablet Take 1 Tablet by mouth every day. 30 Tablet 2   • ferrous sulfate 325 (65 Fe) MG tablet Take 1 Tablet by mouth every morning with breakfast. 30 Tablet 2   • levETIRAcetam (KEPPRA) 1000 MG tablet Take 1 Tablet by mouth every 12 hours. 60 Tablet 2   • metoprolol tartrate (LOPRESSOR) 25 MG Tab Take  0.5 Tablets by mouth 2 times a day. 30 Tablet 2   • Melatonin 10 MG Tab Take 10 mg by mouth at bedtime. 30 Tablet 2   • polyethylene glycol/lytes (MIRALAX) 17 g Pack Take 17 g by mouth every day. 30 Each 2   • tamsulosin (FLOMAX) 0.4 MG capsule Take 2 Capsules by mouth 1/2 hour after dinner. 60 Capsule 2   • Cyanocobalamin (B-12 PO) Take 25,000 mcg by mouth every day.     • Cholecalciferol (VITAMIN D3) 2000 UNIT Cap Take 2,000 Units by mouth every day.     • Multiple Vitamin (MULTIVITAMINS PO) Take 1 Tab by mouth every day.     • senna-docusate (PERICOLACE OR SENOKOT S) 8.6-50 MG Tab Take 2 Tablets by mouth 2 times a day. (Patient not taking: Reported on 11/19/2021) 120 Tablet 2   • spironolactone (ALDACTONE) 25 MG Tab Take 1 Tablet by mouth every day. 30 Tablet 2   • traZODone (DESYREL) 50 MG Tab Take 1 Tablet by mouth at bedtime as needed. (Patient not taking: Reported on 12/3/2021) 30 Tablet 2     No current facility-administered medications on file prior to visit.       Patient has no known allergies.      ROS:   Review of Systems   Constitutional: Negative for chills, diaphoresis, fever, malaise/fatigue and weight loss.   HENT: Negative for congestion, ear discharge, ear pain, hearing loss, nosebleeds, sinus pain, sore throat and tinnitus.    Eyes: Negative for blurred vision, double vision, photophobia, pain, discharge and redness.   Respiratory: Negative for cough, hemoptysis, sputum production, shortness of breath, wheezing and stridor.    Cardiovascular: Negative for chest pain, palpitations, orthopnea, claudication, leg swelling and PND.   Gastrointestinal: Negative for abdominal pain, constipation, diarrhea, heartburn, nausea and vomiting.   Genitourinary: Negative for dysuria and urgency.   Musculoskeletal: Negative for back pain, falls, joint pain, myalgias and neck pain.   Skin: Negative for itching and rash.   Neurological: Negative for dizziness, tremors, speech change, focal weakness, weakness and  "headaches.   Endo/Heme/Allergies: Negative for environmental allergies.   Psychiatric/Behavioral: Negative for depression.       /72 (BP Location: Right arm, Patient Position: Sitting, BP Cuff Size: Adult)   Pulse 72   Temp 36.5 °C (97.7 °F) (Temporal)   Resp 16   Ht 1.676 m (5' 6\")   Wt 63 kg (139 lb)   SpO2 91%   Physical Exam  Vitals reviewed.   Constitutional:       General: She is not in acute distress.     Appearance: Normal appearance. She is well-developed and normal weight.   HENT:      Head: Normocephalic and atraumatic.      Right Ear: External ear normal.      Left Ear: External ear normal.      Nose: Nose normal. No congestion.      Mouth/Throat:      Mouth: Mucous membranes are moist.      Pharynx: Oropharynx is clear. No oropharyngeal exudate.   Eyes:      General: No scleral icterus.     Extraocular Movements: Extraocular movements intact.      Conjunctiva/sclera: Conjunctivae normal.      Pupils: Pupils are equal, round, and reactive to light.   Neck:      Vascular: No JVD.      Trachea: No tracheal deviation.   Cardiovascular:      Rate and Rhythm: Normal rate. Rhythm irregular.      Heart sounds: Normal heart sounds. No murmur heard.  No friction rub. No gallop.    Pulmonary:      Effort: Pulmonary effort is normal. No accessory muscle usage or respiratory distress.      Breath sounds: Normal breath sounds. No wheezing or rales.   Abdominal:      General: There is no distension.      Palpations: Abdomen is soft.      Tenderness: There is no abdominal tenderness.   Musculoskeletal:         General: No tenderness or deformity. Normal range of motion.      Cervical back: Normal range of motion and neck supple.      Right lower leg: No edema.      Left lower leg: No edema.   Lymphadenopathy:      Cervical: No cervical adenopathy.   Skin:     General: Skin is warm and dry.      Findings: No rash.      Nails: There is no clubbing.   Neurological:      Mental Status: She is alert and oriented " to person, place, and time.      Cranial Nerves: No cranial nerve deficit.   Psychiatric:         Mood and Affect: Mood normal.         Behavior: Behavior normal.         PFTs as reviewed by me personally: As per HPI    Imagaing as reviewed by me personally: As per HPI    Assessment:  1. Abnormal CT of the chest  CT-CHEST (THORAX) W/O   2. Cerebrovascular accident (CVA), unspecified mechanism (HCC)     3. LALY (obstructive sleep apnea)     4. Permanent atrial fibrillation (HCC)         Plan:  1.  Patient has very mild inflammatory changes in the right upper lobe which are likely chronic and more mild involvement of the left lower lobe.  No indication for bronchoscopy for sputum analysis given patient is asymptomatic.  I will however, see her back in 1 year with repeat CT chest or sooner if symptoms develop.  Continue to work with speech therapy as aspiration is a possibility  2.  Working with speech therapy and physical therapy  3.  See HPI.  Certainly recurrence is in a patient with a history of LALY makes attention to adequate therapy for LALY more important.  Patient is also at risk for CSA.  We will get her follow-up with sleep at discharge.  4.  Followed by cardiology.  Recurrent status post an ACE.  On anticoagulation.  See discussion above.  Return in about 1 year (around 12/3/2022) for ct chest; one year with CT and Dr. Lambert SLEEP MD only ASAP.

## 2021-12-06 ENCOUNTER — PRE-ADMISSION TESTING (OUTPATIENT)
Dept: ADMISSIONS | Facility: MEDICAL CENTER | Age: 76
End: 2021-12-06
Attending: ORTHOPAEDIC SURGERY
Payer: COMMERCIAL

## 2021-12-06 DIAGNOSIS — Z01.810 PRE-OPERATIVE CARDIOVASCULAR EXAMINATION: ICD-10-CM

## 2021-12-06 DIAGNOSIS — Z01.812 PRE-OPERATIVE LABORATORY EXAMINATION: ICD-10-CM

## 2021-12-06 LAB
ALBUMIN SERPL BCP-MCNC: 4.3 G/DL (ref 3.2–4.9)
ALBUMIN/GLOB SERPL: 1.7 G/DL
ALP SERPL-CCNC: 77 U/L (ref 30–99)
ALT SERPL-CCNC: 14 U/L (ref 2–50)
ANION GAP SERPL CALC-SCNC: 12 MMOL/L (ref 7–16)
AST SERPL-CCNC: 15 U/L (ref 12–45)
BASOPHILS # BLD AUTO: 0.5 % (ref 0–1.8)
BASOPHILS # BLD: 0.03 K/UL (ref 0–0.12)
BILIRUB SERPL-MCNC: 0.3 MG/DL (ref 0.1–1.5)
BUN SERPL-MCNC: 16 MG/DL (ref 8–22)
CALCIUM SERPL-MCNC: 9.1 MG/DL (ref 8.5–10.5)
CHLORIDE SERPL-SCNC: 104 MMOL/L (ref 96–112)
CO2 SERPL-SCNC: 23 MMOL/L (ref 20–33)
CREAT SERPL-MCNC: 0.79 MG/DL (ref 0.5–1.4)
EKG IMPRESSION: NORMAL
EOSINOPHIL # BLD AUTO: 0.14 K/UL (ref 0–0.51)
EOSINOPHIL NFR BLD: 2.5 % (ref 0–6.9)
ERYTHROCYTE [DISTWIDTH] IN BLOOD BY AUTOMATED COUNT: 49.7 FL (ref 35.9–50)
GLOBULIN SER CALC-MCNC: 2.6 G/DL (ref 1.9–3.5)
GLUCOSE SERPL-MCNC: 88 MG/DL (ref 65–99)
HCT VFR BLD AUTO: 40.7 % (ref 37–47)
HGB BLD-MCNC: 12.8 G/DL (ref 12–16)
IMM GRANULOCYTES # BLD AUTO: 0.02 K/UL (ref 0–0.11)
IMM GRANULOCYTES NFR BLD AUTO: 0.4 % (ref 0–0.9)
INR PPP: 1.36 (ref 0.87–1.13)
LYMPHOCYTES # BLD AUTO: 0.89 K/UL (ref 1–4.8)
LYMPHOCYTES NFR BLD: 15.8 % (ref 22–41)
MCH RBC QN AUTO: 30.7 PG (ref 27–33)
MCHC RBC AUTO-ENTMCNC: 31.4 G/DL (ref 33.6–35)
MCV RBC AUTO: 97.6 FL (ref 81.4–97.8)
MONOCYTES # BLD AUTO: 0.42 K/UL (ref 0–0.85)
MONOCYTES NFR BLD AUTO: 7.5 % (ref 0–13.4)
NEUTROPHILS # BLD AUTO: 4.13 K/UL (ref 2–7.15)
NEUTROPHILS NFR BLD: 73.3 % (ref 44–72)
NRBC # BLD AUTO: 0 K/UL
NRBC BLD-RTO: 0 /100 WBC
PLATELET # BLD AUTO: 229 K/UL (ref 164–446)
PMV BLD AUTO: 9.3 FL (ref 9–12.9)
POTASSIUM SERPL-SCNC: 4.6 MMOL/L (ref 3.6–5.5)
PROT SERPL-MCNC: 6.9 G/DL (ref 6–8.2)
PROTHROMBIN TIME: 16.3 SEC (ref 12–14.6)
RBC # BLD AUTO: 4.17 M/UL (ref 4.2–5.4)
SARS-COV-2 RNA RESP QL NAA+PROBE: NOTDETECTED
SODIUM SERPL-SCNC: 139 MMOL/L (ref 135–145)
SPECIMEN SOURCE: NORMAL
WBC # BLD AUTO: 5.6 K/UL (ref 4.8–10.8)

## 2021-12-06 PROCEDURE — 85610 PROTHROMBIN TIME: CPT

## 2021-12-06 PROCEDURE — 80053 COMPREHEN METABOLIC PANEL: CPT

## 2021-12-06 PROCEDURE — U0003 INFECTIOUS AGENT DETECTION BY NUCLEIC ACID (DNA OR RNA); SEVERE ACUTE RESPIRATORY SYNDROME CORONAVIRUS 2 (SARS-COV-2) (CORONAVIRUS DISEASE [COVID-19]), AMPLIFIED PROBE TECHNIQUE, MAKING USE OF HIGH THROUGHPUT TECHNOLOGIES AS DESCRIBED BY CMS-2020-01-R: HCPCS

## 2021-12-06 PROCEDURE — 85025 COMPLETE CBC W/AUTO DIFF WBC: CPT

## 2021-12-06 PROCEDURE — C9803 HOPD COVID-19 SPEC COLLECT: HCPCS

## 2021-12-06 PROCEDURE — 93005 ELECTROCARDIOGRAM TRACING: CPT

## 2021-12-06 PROCEDURE — U0005 INFEC AGEN DETEC AMPLI PROBE: HCPCS

## 2021-12-06 PROCEDURE — 36415 COLL VENOUS BLD VENIPUNCTURE: CPT

## 2021-12-06 PROCEDURE — 93010 ELECTROCARDIOGRAM REPORT: CPT | Performed by: INTERNAL MEDICINE

## 2021-12-06 ASSESSMENT — FIBROSIS 4 INDEX: FIB4 SCORE: 1.64

## 2021-12-07 ENCOUNTER — ANESTHESIA (OUTPATIENT)
Dept: CARDIOLOGY | Facility: MEDICAL CENTER | Age: 76
End: 2021-12-07

## 2021-12-07 ENCOUNTER — ANESTHESIA (OUTPATIENT)
Dept: CARDIOLOGY | Facility: MEDICAL CENTER | Age: 76
End: 2021-12-07
Payer: COMMERCIAL

## 2021-12-07 ENCOUNTER — ANESTHESIA EVENT (OUTPATIENT)
Dept: CARDIOLOGY | Facility: MEDICAL CENTER | Age: 76
End: 2021-12-07
Payer: COMMERCIAL

## 2021-12-07 ENCOUNTER — HOSPITAL ENCOUNTER (OUTPATIENT)
Facility: MEDICAL CENTER | Age: 76
End: 2021-12-08
Attending: INTERNAL MEDICINE | Admitting: INTERNAL MEDICINE
Payer: COMMERCIAL

## 2021-12-07 ENCOUNTER — ANESTHESIA EVENT (OUTPATIENT)
Dept: CARDIOLOGY | Facility: MEDICAL CENTER | Age: 76
End: 2021-12-07

## 2021-12-07 ENCOUNTER — APPOINTMENT (OUTPATIENT)
Dept: CARDIOLOGY | Facility: MEDICAL CENTER | Age: 76
End: 2021-12-07
Attending: INTERNAL MEDICINE
Payer: COMMERCIAL

## 2021-12-07 DIAGNOSIS — Z86.79 S/P ABLATION OF ATRIAL FIBRILLATION: ICD-10-CM

## 2021-12-07 DIAGNOSIS — Z98.890 S/P ABLATION OF ATRIAL FIBRILLATION: ICD-10-CM

## 2021-12-07 DIAGNOSIS — I48.19 PERSISTENT ATRIAL FIBRILLATION (HCC): ICD-10-CM

## 2021-12-07 LAB — EKG IMPRESSION: NORMAL

## 2021-12-07 PROCEDURE — 93662 INTRACARDIAC ECG (ICE): CPT | Mod: 26 | Performed by: INTERNAL MEDICINE

## 2021-12-07 PROCEDURE — 700102 HCHG RX REV CODE 250 W/ 637 OVERRIDE(OP): Performed by: INTERNAL MEDICINE

## 2021-12-07 PROCEDURE — G0378 HOSPITAL OBSERVATION PER HR: HCPCS

## 2021-12-07 PROCEDURE — 93010 ELECTROCARDIOGRAM REPORT: CPT | Performed by: INTERNAL MEDICINE

## 2021-12-07 PROCEDURE — 93613 INTRACARDIAC EPHYS 3D MAPG: CPT | Performed by: INTERNAL MEDICINE

## 2021-12-07 PROCEDURE — 93657 TX L/R ATRIAL FIB ADDL: CPT | Performed by: INTERNAL MEDICINE

## 2021-12-07 PROCEDURE — 93623 PRGRMD STIMJ&PACG IV RX NFS: CPT | Mod: 26 | Performed by: INTERNAL MEDICINE

## 2021-12-07 PROCEDURE — 93655 ICAR CATH ABLTJ DSCRT ARRHYT: CPT | Performed by: INTERNAL MEDICINE

## 2021-12-07 PROCEDURE — 93623 PRGRMD STIMJ&PACG IV RX NFS: CPT

## 2021-12-07 PROCEDURE — 700105 HCHG RX REV CODE 258: Performed by: INTERNAL MEDICINE

## 2021-12-07 PROCEDURE — 160002 HCHG RECOVERY MINUTES (STAT)

## 2021-12-07 PROCEDURE — 93656 COMPRE EP EVAL ABLTJ ATR FIB: CPT | Performed by: INTERNAL MEDICINE

## 2021-12-07 PROCEDURE — 700111 HCHG RX REV CODE 636 W/ 250 OVERRIDE (IP)

## 2021-12-07 PROCEDURE — 93662 INTRACARDIAC ECG (ICE): CPT

## 2021-12-07 PROCEDURE — 700101 HCHG RX REV CODE 250

## 2021-12-07 PROCEDURE — A9270 NON-COVERED ITEM OR SERVICE: HCPCS | Performed by: INTERNAL MEDICINE

## 2021-12-07 PROCEDURE — 93005 ELECTROCARDIOGRAM TRACING: CPT | Performed by: INTERNAL MEDICINE

## 2021-12-07 PROCEDURE — 85347 COAGULATION TIME ACTIVATED: CPT | Mod: 91

## 2021-12-07 PROCEDURE — 700111 HCHG RX REV CODE 636 W/ 250 OVERRIDE (IP): Performed by: ANESTHESIOLOGY

## 2021-12-07 PROCEDURE — 700101 HCHG RX REV CODE 250: Performed by: ANESTHESIOLOGY

## 2021-12-07 RX ORDER — PHENYLEPHRINE HYDROCHLORIDE 10 MG/ML
INJECTION, SOLUTION INTRAMUSCULAR; INTRAVENOUS; SUBCUTANEOUS PRN
Status: DISCONTINUED | OUTPATIENT
Start: 2021-12-07 | End: 2021-12-07 | Stop reason: SURG

## 2021-12-07 RX ORDER — HYDROMORPHONE HYDROCHLORIDE 1 MG/ML
0.2 INJECTION, SOLUTION INTRAMUSCULAR; INTRAVENOUS; SUBCUTANEOUS
Status: DISCONTINUED | OUTPATIENT
Start: 2021-12-07 | End: 2021-12-07 | Stop reason: HOSPADM

## 2021-12-07 RX ORDER — POLYETHYLENE GLYCOL 3350 17 G/17G
1 POWDER, FOR SOLUTION ORAL
Status: DISCONTINUED | OUTPATIENT
Start: 2021-12-07 | End: 2021-12-08 | Stop reason: HOSPADM

## 2021-12-07 RX ORDER — HYDROMORPHONE HYDROCHLORIDE 1 MG/ML
0.1 INJECTION, SOLUTION INTRAMUSCULAR; INTRAVENOUS; SUBCUTANEOUS
Status: DISCONTINUED | OUTPATIENT
Start: 2021-12-07 | End: 2021-12-07 | Stop reason: HOSPADM

## 2021-12-07 RX ORDER — SODIUM CHLORIDE, SODIUM LACTATE, POTASSIUM CHLORIDE, CALCIUM CHLORIDE 600; 310; 30; 20 MG/100ML; MG/100ML; MG/100ML; MG/100ML
INJECTION, SOLUTION INTRAVENOUS CONTINUOUS
Status: DISCONTINUED | OUTPATIENT
Start: 2021-12-07 | End: 2021-12-07

## 2021-12-07 RX ORDER — OMEPRAZOLE 20 MG/1
20 CAPSULE, DELAYED RELEASE ORAL DAILY
Qty: 30 CAPSULE | Refills: 0 | Status: SHIPPED | OUTPATIENT
Start: 2021-12-07 | End: 2022-01-26

## 2021-12-07 RX ORDER — ACETAMINOPHEN 325 MG/1
650 TABLET ORAL EVERY 6 HOURS PRN
Status: DISCONTINUED | OUTPATIENT
Start: 2021-12-07 | End: 2021-12-08 | Stop reason: HOSPADM

## 2021-12-07 RX ORDER — LIDOCAINE HYDROCHLORIDE 20 MG/ML
INJECTION, SOLUTION INFILTRATION; PERINEURAL
Status: COMPLETED
Start: 2021-12-07 | End: 2021-12-07

## 2021-12-07 RX ORDER — PROTAMINE SULFATE 10 MG/ML
INJECTION, SOLUTION INTRAVENOUS
Status: COMPLETED
Start: 2021-12-07 | End: 2021-12-07

## 2021-12-07 RX ORDER — ATORVASTATIN CALCIUM 80 MG/1
80 TABLET, FILM COATED ORAL EVERY EVENING
Status: DISCONTINUED | OUTPATIENT
Start: 2021-12-07 | End: 2021-12-08 | Stop reason: HOSPADM

## 2021-12-07 RX ORDER — OXYCODONE HCL 5 MG/5 ML
5 SOLUTION, ORAL ORAL
Status: DISCONTINUED | OUTPATIENT
Start: 2021-12-07 | End: 2021-12-07 | Stop reason: HOSPADM

## 2021-12-07 RX ORDER — AMOXICILLIN 250 MG
2 CAPSULE ORAL 2 TIMES DAILY
Status: DISCONTINUED | OUTPATIENT
Start: 2021-12-07 | End: 2021-12-07

## 2021-12-07 RX ORDER — SODIUM CHLORIDE, SODIUM LACTATE, POTASSIUM CHLORIDE, CALCIUM CHLORIDE 600; 310; 30; 20 MG/100ML; MG/100ML; MG/100ML; MG/100ML
INJECTION, SOLUTION INTRAVENOUS CONTINUOUS
Status: DISCONTINUED | OUTPATIENT
Start: 2021-12-07 | End: 2021-12-07 | Stop reason: HOSPADM

## 2021-12-07 RX ORDER — BUPIVACAINE HYDROCHLORIDE 2.5 MG/ML
INJECTION, SOLUTION EPIDURAL; INFILTRATION; INTRACAUDAL
Status: COMPLETED
Start: 2021-12-07 | End: 2021-12-07

## 2021-12-07 RX ORDER — HALOPERIDOL 5 MG/ML
1 INJECTION INTRAMUSCULAR
Status: DISCONTINUED | OUTPATIENT
Start: 2021-12-07 | End: 2021-12-07 | Stop reason: HOSPADM

## 2021-12-07 RX ORDER — AMOXICILLIN 250 MG
2 CAPSULE ORAL DAILY
Status: DISCONTINUED | OUTPATIENT
Start: 2021-12-08 | End: 2021-12-08 | Stop reason: HOSPADM

## 2021-12-07 RX ORDER — HYDROMORPHONE HYDROCHLORIDE 1 MG/ML
0.4 INJECTION, SOLUTION INTRAMUSCULAR; INTRAVENOUS; SUBCUTANEOUS
Status: DISCONTINUED | OUTPATIENT
Start: 2021-12-07 | End: 2021-12-07 | Stop reason: HOSPADM

## 2021-12-07 RX ORDER — SPIRONOLACTONE 25 MG/1
25 TABLET ORAL DAILY
Status: DISCONTINUED | OUTPATIENT
Start: 2021-12-08 | End: 2021-12-08 | Stop reason: HOSPADM

## 2021-12-07 RX ORDER — BISACODYL 10 MG
10 SUPPOSITORY, RECTAL RECTAL
Status: DISCONTINUED | OUTPATIENT
Start: 2021-12-07 | End: 2021-12-08 | Stop reason: HOSPADM

## 2021-12-07 RX ORDER — ENALAPRILAT 1.25 MG/ML
1.25 INJECTION INTRAVENOUS EVERY 6 HOURS PRN
Status: DISCONTINUED | OUTPATIENT
Start: 2021-12-07 | End: 2021-12-08 | Stop reason: HOSPADM

## 2021-12-07 RX ORDER — GUAIFENESIN/DEXTROMETHORPHAN 100-10MG/5
10 SYRUP ORAL EVERY 6 HOURS PRN
Status: DISCONTINUED | OUTPATIENT
Start: 2021-12-07 | End: 2021-12-08 | Stop reason: HOSPADM

## 2021-12-07 RX ORDER — ONDANSETRON 2 MG/ML
INJECTION INTRAMUSCULAR; INTRAVENOUS PRN
Status: DISCONTINUED | OUTPATIENT
Start: 2021-12-07 | End: 2021-12-07 | Stop reason: SURG

## 2021-12-07 RX ORDER — LEVETIRACETAM 500 MG/1
1000 TABLET ORAL EVERY 12 HOURS
Status: DISCONTINUED | OUTPATIENT
Start: 2021-12-07 | End: 2021-12-08 | Stop reason: HOSPADM

## 2021-12-07 RX ORDER — POLYETHYLENE GLYCOL 3350 17 G/17G
1 POWDER, FOR SOLUTION ORAL
Status: DISCONTINUED | OUTPATIENT
Start: 2021-12-07 | End: 2021-12-07

## 2021-12-07 RX ORDER — HEPARIN SODIUM 1000 [USP'U]/ML
INJECTION, SOLUTION INTRAVENOUS; SUBCUTANEOUS
Status: COMPLETED
Start: 2021-12-07 | End: 2021-12-07

## 2021-12-07 RX ORDER — OXYCODONE HCL 5 MG/5 ML
10 SOLUTION, ORAL ORAL
Status: DISCONTINUED | OUTPATIENT
Start: 2021-12-07 | End: 2021-12-07 | Stop reason: HOSPADM

## 2021-12-07 RX ORDER — MEPERIDINE HYDROCHLORIDE 25 MG/ML
12.5 INJECTION INTRAMUSCULAR; INTRAVENOUS; SUBCUTANEOUS
Status: DISCONTINUED | OUTPATIENT
Start: 2021-12-07 | End: 2021-12-07 | Stop reason: HOSPADM

## 2021-12-07 RX ORDER — CHOLECALCIFEROL (VITAMIN D3) 125 MCG
10 CAPSULE ORAL
Status: DISCONTINUED | OUTPATIENT
Start: 2021-12-07 | End: 2021-12-08 | Stop reason: HOSPADM

## 2021-12-07 RX ORDER — ONDANSETRON 2 MG/ML
4 INJECTION INTRAMUSCULAR; INTRAVENOUS
Status: DISCONTINUED | OUTPATIENT
Start: 2021-12-07 | End: 2021-12-07 | Stop reason: HOSPADM

## 2021-12-07 RX ORDER — ISOPROTERENOL HYDROCHLORIDE 0.2 MG/ML
INJECTION, SOLUTION INTRAVENOUS
Status: COMPLETED
Start: 2021-12-07 | End: 2021-12-07

## 2021-12-07 RX ORDER — DIPHENHYDRAMINE HYDROCHLORIDE 50 MG/ML
12.5 INJECTION INTRAMUSCULAR; INTRAVENOUS
Status: DISCONTINUED | OUTPATIENT
Start: 2021-12-07 | End: 2021-12-07 | Stop reason: HOSPADM

## 2021-12-07 RX ORDER — BISACODYL 10 MG
10 SUPPOSITORY, RECTAL RECTAL
Status: DISCONTINUED | OUTPATIENT
Start: 2021-12-07 | End: 2021-12-07

## 2021-12-07 RX ORDER — PROTAMINE SULFATE 10 MG/ML
INJECTION, SOLUTION INTRAVENOUS PRN
Status: DISCONTINUED | OUTPATIENT
Start: 2021-12-07 | End: 2021-12-07 | Stop reason: SURG

## 2021-12-07 RX ORDER — HEPARIN SODIUM 200 [USP'U]/100ML
INJECTION, SOLUTION INTRAVENOUS
Status: COMPLETED
Start: 2021-12-07 | End: 2021-12-07

## 2021-12-07 RX ORDER — DEXAMETHASONE SODIUM PHOSPHATE 4 MG/ML
INJECTION, SOLUTION INTRA-ARTICULAR; INTRALESIONAL; INTRAMUSCULAR; INTRAVENOUS; SOFT TISSUE PRN
Status: DISCONTINUED | OUTPATIENT
Start: 2021-12-07 | End: 2021-12-07 | Stop reason: SURG

## 2021-12-07 RX ADMIN — HEPARIN SODIUM: 1000 INJECTION, SOLUTION INTRAVENOUS; SUBCUTANEOUS at 11:28

## 2021-12-07 RX ADMIN — SODIUM CHLORIDE, POTASSIUM CHLORIDE, SODIUM LACTATE AND CALCIUM CHLORIDE: 600; 310; 30; 20 INJECTION, SOLUTION INTRAVENOUS at 09:27

## 2021-12-07 RX ADMIN — FENTANYL CITRATE 50 MCG: 50 INJECTION, SOLUTION INTRAMUSCULAR; INTRAVENOUS at 10:58

## 2021-12-07 RX ADMIN — LIDOCAINE HYDROCHLORIDE: 20 INJECTION, SOLUTION INFILTRATION; PERINEURAL at 11:27

## 2021-12-07 RX ADMIN — APIXABAN 5 MG: 5 TABLET, FILM COATED ORAL at 18:19

## 2021-12-07 RX ADMIN — PHENYLEPHRINE HYDROCHLORIDE 100 MCG: 10 INJECTION INTRAVENOUS at 12:49

## 2021-12-07 RX ADMIN — METOPROLOL TARTRATE 12.5 MG: 25 TABLET, FILM COATED ORAL at 18:19

## 2021-12-07 RX ADMIN — ATORVASTATIN CALCIUM 80 MG: 80 TABLET, FILM COATED ORAL at 18:19

## 2021-12-07 RX ADMIN — PROTAMINE SULFATE 50 MG: 10 INJECTION, SOLUTION INTRAVENOUS at 13:03

## 2021-12-07 RX ADMIN — LEVETIRACETAM 1000 MG: 500 TABLET ORAL at 18:19

## 2021-12-07 RX ADMIN — SUGAMMADEX 200 MG: 100 INJECTION, SOLUTION INTRAVENOUS at 13:03

## 2021-12-07 RX ADMIN — ROCURONIUM BROMIDE 30 MG: 10 INJECTION, SOLUTION INTRAVENOUS at 11:20

## 2021-12-07 RX ADMIN — ONDANSETRON 4 MG: 2 INJECTION INTRAMUSCULAR; INTRAVENOUS at 12:16

## 2021-12-07 RX ADMIN — HEPARIN SODIUM 6000 UNITS: 200 INJECTION, SOLUTION INTRAVENOUS at 11:28

## 2021-12-07 RX ADMIN — DEXAMETHASONE SODIUM PHOSPHATE 12 MG: 4 INJECTION, SOLUTION INTRA-ARTICULAR; INTRALESIONAL; INTRAMUSCULAR; INTRAVENOUS; SOFT TISSUE at 12:16

## 2021-12-07 RX ADMIN — PROTAMINE SULFATE 50 MG: 10 INJECTION, SOLUTION INTRAVENOUS at 13:04

## 2021-12-07 RX ADMIN — FENTANYL CITRATE 50 MCG: 50 INJECTION, SOLUTION INTRAMUSCULAR; INTRAVENOUS at 11:29

## 2021-12-07 RX ADMIN — Medication 10 MG: at 22:01

## 2021-12-07 RX ADMIN — SODIUM CHLORIDE, POTASSIUM CHLORIDE, SODIUM LACTATE AND CALCIUM CHLORIDE: 600; 310; 30; 20 INJECTION, SOLUTION INTRAVENOUS at 10:50

## 2021-12-07 RX ADMIN — PROPOFOL 150 MG: 10 INJECTION, EMULSION INTRAVENOUS at 10:58

## 2021-12-07 RX ADMIN — ISOPROTERENOL HYDROCHLORIDE 200 MCG: 0.2 INJECTION, SOLUTION INTRAMUSCULAR; INTRAVENOUS at 12:31

## 2021-12-07 RX ADMIN — BUPIVACAINE HYDROCHLORIDE: 2.5 INJECTION, SOLUTION EPIDURAL; INFILTRATION; INTRACAUDAL at 11:28

## 2021-12-07 RX ADMIN — ROCURONIUM BROMIDE 50 MG: 10 INJECTION, SOLUTION INTRAVENOUS at 10:59

## 2021-12-07 ASSESSMENT — COGNITIVE AND FUNCTIONAL STATUS - GENERAL
TOILETING: A LITTLE
HELP NEEDED FOR BATHING: A LITTLE
SUGGESTED CMS G CODE MODIFIER MOBILITY: CJ
DRESSING REGULAR UPPER BODY CLOTHING: A LITTLE
MOBILITY SCORE: 22
DRESSING REGULAR LOWER BODY CLOTHING: A LITTLE
DAILY ACTIVITIY SCORE: 18
SUGGESTED CMS G CODE MODIFIER DAILY ACTIVITY: CK
CLIMB 3 TO 5 STEPS WITH RAILING: A LITTLE
PERSONAL GROOMING: A LITTLE
EATING MEALS: A LITTLE
WALKING IN HOSPITAL ROOM: A LITTLE

## 2021-12-07 ASSESSMENT — PAIN DESCRIPTION - PAIN TYPE
TYPE: SURGICAL PAIN
TYPE: ACUTE PAIN
TYPE: SURGICAL PAIN
TYPE: SURGICAL PAIN
TYPE: ACUTE PAIN;REFERRED PAIN
TYPE: ACUTE PAIN
TYPE: SURGICAL PAIN

## 2021-12-07 ASSESSMENT — PAIN SCALES - GENERAL: PAIN_LEVEL: 1

## 2021-12-07 ASSESSMENT — FIBROSIS 4 INDEX
FIB4 SCORE: 1.37
FIB4 SCORE: 1.37

## 2021-12-07 NOTE — ANESTHESIA TIME REPORT
Anesthesia Start and Stop Event Times     Date Time Event    12/7/2021 0857 Ready for Procedure     1048 Anesthesia Start     1323 Anesthesia Stop        Responsible Staff  12/07/21    Name Role Begin End    Arya Clark M.D. Anesth 1048 1323        Preop Diagnosis (Free Text):  Pre-op Diagnosis             Preop Diagnosis (Codes):    Premium Reason  Non-Premium    Comments:

## 2021-12-07 NOTE — ANESTHESIA PROCEDURE NOTES
Airway    Date/Time: 12/7/2021 11:29 AM  Performed by: Arya Clark M.D.  Authorized by: Arya Clark M.D.     Location:  OR  Urgency:  Elective  Indications for Airway Management:  Anesthesia      Spontaneous Ventilation: absent    Sedation Level:  Deep  Preoxygenated: Yes    Patient Position:  Sniffing  Final Airway Type:  Endotracheal airway  Final Endotracheal Airway:  ETT  Cuffed: Yes    Technique Used for Successful ETT Placement:  Flexible bronchoscopy    Insertion Site:  Oral  ETT Size (mm):  6.5  Measured from:  Teeth  ETT to Teeth (cm):  21  Placement Verified by: auscultation and capnometry    Number of Attempts at Approach:  1   Difficult intubation  [place] scope  With bronchoscopy

## 2021-12-07 NOTE — ANESTHESIA PREPROCEDURE EVALUATION
Date/Time: 12/07/21 1030    Scheduled providers: Mark Richard M.D.; Arya Clark M.D.    Procedure: CL-EP ABLATION ATRIAL FIBRILLATION    Diagnosis: Persistent atrial fibrillation (HCC) [I48.19]    Indications: See Associated Dx    Location: [facility] IMAGING - CATH LAB - [facility]          Relevant Problems   ANESTHESIA   (positive) Sleep apnea      NEURO   (positive) History of CVA (cerebrovascular accident)   (positive) Seizure disorder (HCC)      CARDIAC   (positive) Atrial fibrillation (HCC)   (positive) Hypertension       Physical Exam    Airway   Mallampati: II  TM distance: >3 FB  Neck ROM: full       Cardiovascular - normal exam  Rhythm: regular  Rate: normal  (-) murmur     Dental - normal exam           Pulmonary - normal exam  Breath sounds clear to auscultation     Abdominal    Neurological - normal exam                 Anesthesia Plan    ASA 4       Plan - general       Airway plan will be ETT  ABHINAV Planned        Induction: intravenous    Postoperative Plan: Postoperative administration of opioids is intended.    Pertinent diagnostic labs and testing reviewed    Informed Consent:    Anesthetic plan and risks discussed with patient.    Use of blood products discussed with: patient whom consented to blood products.

## 2021-12-07 NOTE — ANESTHESIA POSTPROCEDURE EVALUATION
Patient: Shaista Bocanegra    Procedure Summary     Date: 12/07/21 Room / Location: [facility] IMAGING - CATH LAB [facility]    Anesthesia Start: 1048 Anesthesia Stop: 1323    Procedure: CL-EP ABLATION ATRIAL FIBRILLATION Diagnosis:       Persistent atrial fibrillation (HCC)      (See Associated Dx)    Scheduled Providers: Mark Richard M.D.; Arya Clark M.D. Responsible Provider: Arya Clark M.D.    Anesthesia Type: general ASA Status: 4          Final Anesthesia Type: general  Last vitals  BP   Blood Pressure : 127/74    Temp   36.4 °C (97.6 °F)    Pulse   74   Resp   16    SpO2   96 %      Anesthesia Post Evaluation    Patient location during evaluation: PACU  Patient participation: complete - patient participated  Level of consciousness: awake and alert  Pain score: 1    Airway patency: patent  Anesthetic complications: no  Cardiovascular status: hemodynamically stable  Respiratory status: acceptable  Hydration status: euvolemic    PONV: none          No complications documented.     Nurse Pain Score: 4 (NPRS)

## 2021-12-07 NOTE — OP REPORT
Electrophysiology Procedure Note    Procedures Performed:  Pulmonary Vein Isolation  Additional ablation for atrial fibrillation x2  Ablation of additional arrhythmia  Intracardiac Echocardiography  Three-dimensional intracardiac mapping  IV isoproterenol infusion with programmed stimulation    Electrophysiologist: Mark Richard MD    Assistant(s): None    Anesthesia: General anesthesia was provided by Dr. Clark of the Anesthesiology service.    Statement of Medical Necessity: This is a 76  year-old female with history of symptomatic persistent atrial  fibrillation     Description of Procedure:    Access and catheter placement: After obtaining informed written consent, the patient was  brought to the EP lab in the fasting, non-sedated stated. The patient was sedated and intubated  by the anesthesiologist. The patient was prepped and draped in the usual sterile fashion. Using  the modified Seldinger technique, access was obtained in bilateral  femoral veins. Guidewires were advanced into the IVC. In the right femoral vein, 2 sheaths of   8F were placed. In the left femoral vein 2 sheaths of 10F and 8F were placed. A deflectable  decapolar catheter was advanced through the LFV to the coronary sinus. A 10F intracardiac  echo catheter was advanced to the right atrium. Through one of the 8F short sheaths in the RFV,  a long wire was advanced to the SVC. The short sheath was  changed out for an 8.5 F braided Tor-flex ([employer]) sheath which was advanced to the SVC. The wire was  removed and the dilator was flushed. A 71-cm transseptal needle ([employer]) was advanced to the tip of the  dilator, and the obturator was removed. The transseptal needle was attached to the manifold and  flushed. Under intracardiac echocardiographic guidance, the sheath/needle   system was withdrawn to the fossa ovalis. At this time, 8500 units of heparin were administered.   Additional boluses of heparin were given as needed to keep -350 sec  during LA dwell time.   The needle was advanced out of the dilator, and RF energy applied via the [employer] needle.   ICE visualized the needle passage through the fossa ovalis into the left  atrium. Confirmation of left atrial location was confirmed by injecting saline and transducing  pressure. The dilator was advanced over the needle into the left atrium, and then the sheath was advanced over the dilator. The dilator and  needle were removed. The sheath was flushed and connected to a continuous heparinized   saline drip.  The transseptal catheterization  procedure was repeated through the second RFV access site a 98-cm transseptal needle  and a medium-curl Vizigo (Biosense Holder) sheath. Left atrial location was again confirmed by injecting saline  and transducing pressure. The needle and dilator were removed. The sheath was attached to  the drip line and flushed.     A 3.5-mm externally-irrigated ablation catheter (Biosense-Holder   Thermocool ST SF DF-curve) was advanced through the Vizigo sheath into the left atrium. A duodecapolar Penta-Ray catheter (Biosense-Holder) was  advanced through the Tor-flex sheath into the left atrium. A 3-dimensional electroanatomical  map was constructed using the CARTO system.    Attention was first turned to the left pulmonary veins. The Penta-Ray  catheter was placed in the LSPV and LIPV which showed conduction into the veins. A circumferential  ablation line using radiofrequency energy 20-40W was placed which resulted in LSPV and LIPV isolation.  The catheters were then moved to the RSPV and RIPV which showed evidence of conduction into the veins,   and ablation 20-40W was performed circumferentially around these veins   resulting in RSPV and RIPV isolation. Power was reduced near the esophagus.  The Penta-Ray was advanced into all four pulmonary veins and persistent conduction block into the right and left pulmonary veins was demonstrated.     The posterior wall was  evaluated as an additional target for treatment of atrial fibrillation, and posterior wall isolation was performed with roof and floor lines, 20-40W, resulting in isolation of the posterior wall.     The patient remained in atrial fibrillation so a single 200J external shock was applied, restoring sinus rhythm.     Isuprel 2 mcg/min was administered and burst pacing was performed in the left atrium. Atrial flutter, TCL 310ms was induced. LAT mapping in the left atrium suggested RA origin.    The left atrial sheaths and catheters were withdrawn to the right atrium. The CTI was targeted for ablation after LAT mapping confirmed typical flutter. Ablation was performed at 35W along the CTI resulting in termination of flutter and bidirectional block >210ms.    At this time Afib was easily reinducible. We re-advanced catheters into the left atrium. The posterior wall and veins remained isolated. We elected to target CFAE areas at this time, with ablation at 40W along the left atrial septal floor, the KYUNG ridge, and the anterior wall. Afib did not terminate with CFAE ablation, and cardioversion was performed at 200J restoring NSR.    The Catheters were withdrawn to the RA and the CTI line was noted to be durably blocked.  ICE visualization   of the pericardium confirmed no pericardial effusion at the end of the case. The  patient was awakened from anesthesia, catheters and sheaths were removed, Vascade MVP closure devices were deployed, and manual  pressure was held on bilateral groins until hemostasis was achieved.    Electrophysiological Findings:  Sinus cycle length 892 msec  Intervals-   H-V 35 msec   msec   msec   msec  AV block  ms  AVERP: 600/300 ms    Total RF time: 883 sec  Fluoro time: 0 min    Arrhythmias:  Atrial fibrillation was present at baseline  Tachycardias noted:  1) typical flutter TCL 310ms    Estimated blood loss - 30 mL    Complications: None    Impression:  1. Atrial  fibrillation, Persistent  2. Successful isolation of all four pulmonary veins  3. Successful posterior wall isolation with linear ablation  4. CFAE ablation in the left atrium for treatment of atrial fibrillation  5. Typical atrial flutter  6. Successful CTI ablation for typical flutter  7. ICE visualization of the KYUNG suggests no residual flow following KYUNG ligation    Recommendations:  1. Bed rest for 2 hours  2. Telemetry monitoring during recovery  3. Anticoagulant therapy for at least 6-12 months  4. Follow up in Arrhythmia clinic in 4 weeks      Mark Richard MD  Cardiac Electrophysiology

## 2021-12-08 ENCOUNTER — PATIENT OUTREACH (OUTPATIENT)
Dept: HEALTH INFORMATION MANAGEMENT | Facility: OTHER | Age: 76
End: 2021-12-08

## 2021-12-08 VITALS
TEMPERATURE: 97.6 F | HEART RATE: 66 BPM | RESPIRATION RATE: 16 BRPM | HEIGHT: 66 IN | OXYGEN SATURATION: 91 % | SYSTOLIC BLOOD PRESSURE: 113 MMHG | DIASTOLIC BLOOD PRESSURE: 59 MMHG | BODY MASS INDEX: 22.96 KG/M2 | WEIGHT: 142.86 LBS

## 2021-12-08 LAB
ACT BLD: 273 SEC (ref 74–137)
ACT BLD: 291 SEC (ref 74–137)
ANION GAP SERPL CALC-SCNC: 9 MMOL/L (ref 7–16)
BUN SERPL-MCNC: 24 MG/DL (ref 8–22)
CALCIUM SERPL-MCNC: 8.8 MG/DL (ref 8.5–10.5)
CHLORIDE SERPL-SCNC: 104 MMOL/L (ref 96–112)
CO2 SERPL-SCNC: 22 MMOL/L (ref 20–33)
CREAT SERPL-MCNC: 0.84 MG/DL (ref 0.5–1.4)
EKG IMPRESSION: NORMAL
ERYTHROCYTE [DISTWIDTH] IN BLOOD BY AUTOMATED COUNT: 49.3 FL (ref 35.9–50)
GLUCOSE SERPL-MCNC: 183 MG/DL (ref 65–99)
HCT VFR BLD AUTO: 33.1 % (ref 37–47)
HGB BLD-MCNC: 10.9 G/DL (ref 12–16)
MCH RBC QN AUTO: 31.8 PG (ref 27–33)
MCHC RBC AUTO-ENTMCNC: 32.9 G/DL (ref 33.6–35)
MCV RBC AUTO: 96.5 FL (ref 81.4–97.8)
PLATELET # BLD AUTO: 189 K/UL (ref 164–446)
PMV BLD AUTO: 9.5 FL (ref 9–12.9)
POTASSIUM SERPL-SCNC: 5.1 MMOL/L (ref 3.6–5.5)
RBC # BLD AUTO: 3.43 M/UL (ref 4.2–5.4)
SODIUM SERPL-SCNC: 135 MMOL/L (ref 135–145)
WBC # BLD AUTO: 6.9 K/UL (ref 4.8–10.8)

## 2021-12-08 PROCEDURE — A9270 NON-COVERED ITEM OR SERVICE: HCPCS | Performed by: INTERNAL MEDICINE

## 2021-12-08 PROCEDURE — 36415 COLL VENOUS BLD VENIPUNCTURE: CPT

## 2021-12-08 PROCEDURE — 700102 HCHG RX REV CODE 250 W/ 637 OVERRIDE(OP): Performed by: INTERNAL MEDICINE

## 2021-12-08 PROCEDURE — G0378 HOSPITAL OBSERVATION PER HR: HCPCS

## 2021-12-08 PROCEDURE — 93005 ELECTROCARDIOGRAM TRACING: CPT | Performed by: INTERNAL MEDICINE

## 2021-12-08 PROCEDURE — 85027 COMPLETE CBC AUTOMATED: CPT

## 2021-12-08 PROCEDURE — 93010 ELECTROCARDIOGRAM REPORT: CPT | Performed by: INTERNAL MEDICINE

## 2021-12-08 PROCEDURE — 99217 PR OBSERVATION CARE DISCHARGE: CPT | Performed by: NURSE PRACTITIONER

## 2021-12-08 PROCEDURE — 80048 BASIC METABOLIC PNL TOTAL CA: CPT

## 2021-12-08 RX ORDER — SPIRONOLACTONE 25 MG/1
TABLET ORAL
Status: ACTIVE
Start: 2021-12-08 | End: 2021-12-08

## 2021-12-08 RX ORDER — KETOROLAC TROMETHAMINE 30 MG/ML
15 INJECTION, SOLUTION INTRAMUSCULAR; INTRAVENOUS EVERY 6 HOURS PRN
Status: DISCONTINUED | OUTPATIENT
Start: 2021-12-08 | End: 2021-12-08 | Stop reason: HOSPADM

## 2021-12-08 RX ORDER — AMOXICILLIN 250 MG
CAPSULE ORAL
Status: ACTIVE
Start: 2021-12-08 | End: 2021-12-08

## 2021-12-08 RX ADMIN — SPIRONOLACTONE 25 MG: 25 TABLET ORAL at 04:10

## 2021-12-08 RX ADMIN — METOPROLOL TARTRATE 12.5 MG: 25 TABLET, FILM COATED ORAL at 04:09

## 2021-12-08 RX ADMIN — APIXABAN 5 MG: 5 TABLET, FILM COATED ORAL at 04:08

## 2021-12-08 RX ADMIN — LEVETIRACETAM 1000 MG: 500 TABLET ORAL at 04:10

## 2021-12-08 NOTE — PROGRESS NOTES
Received report from day shift RNHilda. Assumed care of pt @ 1900. Pt reports no pain at this time. Updated pt on plan of care. Pt resting comfortably in bed. Fall precautions in place. Educated on use of call light. Hourly rounding and continuous monitoring in place.

## 2021-12-08 NOTE — CARE PLAN
Problem: Knowledge Deficit - Standard  Goal: Patient and family/care givers will demonstrate understanding of plan of care, disease process/condition, diagnostic tests and medications  Outcome: Met   The patient is Stable - Low risk of patient condition declining or worsening         Progress made toward(s) clinical / shift goals:  DC home today    Patient is not progressing towards the following goals:

## 2021-12-08 NOTE — OR NURSING
Arrived to PACU in stable condition. Site to Left groin was ooozing and pressure held. Mouth area was red with blood as per Dr Clark stated she had a difficult intubation. Mouth suctioned as needed during PACU recovery. Left groin site wet with blood again and pressure held. 5lb sand bag placed for 10 min and site reassessed. Siobhan arrived to PACU to see patient and it was agreed that patient should stay the night. Awaiting orders. Patient is resting now.       Late entry. Patient was taken to Tele room with monitor on by me. Dr Richard came to bedside and saw patient in PACU. Family was called and updated.

## 2021-12-08 NOTE — DISCHARGE INSTRUCTIONS
Discharge Instructions    Discharged to home by car with relative. Discharged via wheelchair, hospital escort: Yes.  Special equipment needed: Not Applicable    Be sure to schedule a follow-up appointment with your primary care doctor or any specialists as instructed.     Discharge Plan:   Influenza Vaccine Indication: Not indicated: Previously immunized this influenza season and > 8 years of age    I understand that a diet low in cholesterol, fat, and sodium is recommended for good health. Unless I have been given specific instructions below for another diet, I accept this instruction as my diet prescription.   Other diet: Cardiac Diet    Special Instructions: None    · Is patient discharged on Warfarin / Coumadin?   No     Depression / Suicide Risk    As you are discharged from this [facility] facility, it is important to learn how to keep safe from harming yourself.    Recognize the warning signs:  · Abrupt changes in personality, positive or negative- including increase in energy   · Giving away possessions  · Change in eating patterns- significant weight changes-  positive or negative  · Change in sleeping patterns- unable to sleep or sleeping all the time   · Unwillingness or inability to communicate  · Depression  · Unusual sadness, discouragement and loneliness  · Talk of wanting to die  · Neglect of personal appearance   · Rebelliousness- reckless behavior  · Withdrawal from people/activities they love  · Confusion- inability to concentrate     If you or a loved one observes any of these behaviors or has concerns about self-harm, here's what you can do:  · Talk about it- your feelings and reasons for harming yourself  · Remove any means that you might use to hurt yourself (examples: pills, rope, extension cords, firearm)  · Get professional help from the community (Mental Health, Substance Abuse, psychological counseling)  · Do not be alone:Call your Safe Contact- someone whom you trust who will be there  for you.  · Call your local CRISIS HOTLINE 526-0269 or 902-325-4561  · Call your local Children's Mobile Crisis Response Team [place] (717) 099-1157 or www.Twillion  · Call the toll free National Suicide Prevention Hotlines   · National Suicide Prevention Lifeline 012-298-ERDJ (1890)  · National Hope Line Network 800-SUICIDE (612-6211)    [facility] Post Ablation Patient Instructions:  1. No lifting > 10 lbs x 1 week.      2. No soaking in baths, hot tubs, pools x 1 week.  May shower the day after discharge and take off groin dressings and leave  sites uncovered.  Continue to monitor sites daily for warmth, redness, discolored drainage.  It is common to have a small lump in the area where the cather was (usually the size of a marble); this will go away but takes approximately 6 weeks to normalize.     3.   Please take all medications as prescribed to you; please do not stop any medications prescribed post ablation unless directed by your healthcare provider.      4.   Please do not miss any doses of your blood thinner (if you have been started on, or take chronic blood thinners) without discussion with your healthcare provider first.     5.   Please walk and take deep breaths after discharge.  After discharge, if you experience neurological changes/signs of stroke or high fever you should be seen in the emergency dept.     6.   It is possible you may experience some chest discomfort or chest tightness post ablation.  This is usually secondary to inflammation and irritation of the tissues at the area of the ablation.  If this occurs, it is advised to try 400 mg of Ibuprofen with food as needed up to three times a day for a maximum of two days.  This should help to decrease pain and tissue inflammation.          **Please notify the office ([phone]) if this occurs.         ** DO NOT TAKE Ibuprofen IF HISTORY OF ALLERGY, SIGNIFICANT BLEEDING OR KIDNEY DISEASE  WITHOUT DISCUSSING WITH YOUR CARDIOLOGY PROVIDER FIRST.          ** If pain becomes severe or you have additional symptoms you may need to be medically evaluated; please contact the cardiology office ([phone]) for further direction.     7. It is possible that you may experience arrhythmia/Atrial Fibrillation post ablation.  This is secondary to irritation and inflammation of the cardiac tissues from the ablation.  If you have atrial fibrillation all day or feel poorly with it, please notify your cardiologist's via phone ([phone]) or [employer].      8.  Please contact call our office ([phone]) or message via HangIt message if you have any questions or concerns post procedurally.    9. You need to be seen for post ablation follow up 3-4 weeks post procedure. An appointment is scheduled for you.  Please contact the office ([phone]) if you need to change your appointment.

## 2021-12-08 NOTE — PROGRESS NOTES
Pt arrived from PACU at 1700.    Bilateral pedal pulses present and equal.  BL groin sites clean, dry, intact and soft to the touch.

## 2021-12-08 NOTE — DISCHARGE PLANNING
Anticipated Discharge Disposition: Home    Action: pt pending clearance from cardiology, pt currently on 2 liters O2, will likely wean, 6 clicks are 22, no case management needs identified.    Barriers to Discharge: cardilogy clearance    Plan: f/u with medical team and pt to discuss dc needs and barriers.

## 2021-12-08 NOTE — CARE PLAN
The patient is Stable - Low risk of patient condition declining or worsening         Progress made toward(s) clinical / shift goals:  Pt whiteboard updated on plan of care. Pt encouraged to call for assistance. Pt encouraged to voice any concerns or questions regarding care plan. Pt updated on plan of care as it develops and changes. Fall precautions in place. Bed in lowest position. Non-skid socks in place. Personal possessions within reach. Mobility sign on door. Bed-alarm on. Call light within reach. Pt educated regarding fall prevention and states understanding.       Problem: Knowledge Deficit - Standard  Goal: Patient and family/care givers will demonstrate understanding of plan of care, disease process/condition, diagnostic tests and medications  Outcome: Progressing

## 2021-12-09 NOTE — PROGRESS NOTES
"Ambulated with pt in room and then in hallway when pt stated she felt ready to do so. Monitored pt home O2 sat during ambulation. Took pt around nurses station and back towards room. Pt stated \"that wasn;t very far, lets keep going.\" Walked with pt to end of castañeda and back to room totaling 250ft+. Pt stated felt mildly dizzy but ok.. Stand by assist needed for ambulation only. O2 sats remained stable during ambulation. Assessed bilat groin sites post ambulation with no changes to sites noted. Remained CDI and soft withno hematomas.  "

## 2021-12-09 NOTE — PROGRESS NOTES
Assumed care of pt. Report received from Lyudmila CARRERO. Pt resting comfortably. Bilat groin access sites assessed. Dressing C/D/I. No s/s of active bleeding, no hematoma noted, soft to touch and bilat pedal pulses noted WNL.

## 2021-12-10 ENCOUNTER — TELEPHONE (OUTPATIENT)
Dept: CARDIOLOGY | Facility: MEDICAL CENTER | Age: 76
End: 2021-12-10

## 2021-12-10 ENCOUNTER — OFFICE VISIT (OUTPATIENT)
Dept: CARDIOLOGY | Facility: MEDICAL CENTER | Age: 76
End: 2021-12-10
Payer: COMMERCIAL

## 2021-12-10 VITALS
BODY MASS INDEX: 22.69 KG/M2 | HEART RATE: 72 BPM | OXYGEN SATURATION: 95 % | SYSTOLIC BLOOD PRESSURE: 110 MMHG | DIASTOLIC BLOOD PRESSURE: 68 MMHG | RESPIRATION RATE: 18 BRPM | HEIGHT: 66 IN | WEIGHT: 141.2 LBS

## 2021-12-10 DIAGNOSIS — Z86.73 HISTORY OF CVA (CEREBROVASCULAR ACCIDENT): ICD-10-CM

## 2021-12-10 DIAGNOSIS — Z79.01 CHRONIC ANTICOAGULATION: ICD-10-CM

## 2021-12-10 DIAGNOSIS — Z98.890 S/P LEFT ATRIAL APPENDAGE LIGATION: ICD-10-CM

## 2021-12-10 DIAGNOSIS — I48.19 PERSISTENT ATRIAL FIBRILLATION (HCC): ICD-10-CM

## 2021-12-10 DIAGNOSIS — G47.33 OSA (OBSTRUCTIVE SLEEP APNEA): ICD-10-CM

## 2021-12-10 DIAGNOSIS — I49.3 PVC (PREMATURE VENTRICULAR CONTRACTION): ICD-10-CM

## 2021-12-10 DIAGNOSIS — I34.0 SEVERE MITRAL REGURGITATION: ICD-10-CM

## 2021-12-10 DIAGNOSIS — R41.3 MEMORY LOSS: ICD-10-CM

## 2021-12-10 DIAGNOSIS — I10 ESSENTIAL HYPERTENSION: ICD-10-CM

## 2021-12-10 DIAGNOSIS — Z98.890 S/P MITRAL VALVE REPAIR: ICD-10-CM

## 2021-12-10 DIAGNOSIS — E78.00 PURE HYPERCHOLESTEROLEMIA: ICD-10-CM

## 2021-12-10 DIAGNOSIS — I10 BENIGN ESSENTIAL HTN: ICD-10-CM

## 2021-12-10 PROCEDURE — 99214 OFFICE O/P EST MOD 30 MIN: CPT | Performed by: INTERNAL MEDICINE

## 2021-12-10 RX ORDER — AMOXICILLIN 500 MG/1
2000 CAPSULE ORAL ONCE
Qty: 4 CAPSULE | Refills: 11 | Status: SHIPPED | OUTPATIENT
Start: 2021-12-10 | End: 2021-12-10

## 2021-12-10 ASSESSMENT — FIBROSIS 4 INDEX: FIB4 SCORE: 1.65

## 2021-12-10 NOTE — PROGRESS NOTES
"Subjective:   Chief Complaint:   Chief Complaint   Patient presents with   • Hypertension   • Atrial Fibrillation       \"Artemio\" Shaista Bocanegra is a 76 y.o. female who returns for moderate to severe mitral regurgitation, s/p minimally invasive complex MV repair [place]/KYUNG appendage oversew/maze, hypertension, hyperlipidemia, prior CVAx2, HTN , PAF, ablation of PAF.    Admitted to the hospital 5/8/2020 with dehydration on diuretics.  Has left and right heart catheterization that admission move mitral clip for severe mitral regurgitation.  Took a while for her to decide if she wanted to enter a trial for noninvasive mitral valve repair versus surgery.  Ultimately only underwent surgery with Dr. Monster Nogueira at [place] on 9/16/2021:  1. Minimally invasive complex mitral valve repair with A2 [place]iguel Angel neochordal reconstruction, A3 [place]iguel Angel neochordal reconstruction, P2 posterior ventricular anchoring remodeling suture and non-resectional leaflet remodeling and [place]iguel Angel NeoChord x2, P3 [place]iguel Angel NeoChord x1, and #34 sewing ring annuloplasty  2. Atrial fibrillation maze procedure and left atrial appendage oversew    Has atrial fibrillation, underwent ablation with Dr. Richard 12-7-21, did well.    Has HTN, BP controlled overall, elevated at times.    Has LALY, was on CPAP, now using device only.  Using meditation and leep specialist to help with habits.    Prior CVA, some light tingling sensation remains.    Patient received mechanical thrombectomy 8/18/2017, felt to be due to right carotid disease.    Then had another CVA, found to have afib, now on apixaban.  [id number] is 5 now.  Does note brain fog and word finding sometimes now.  Monitor for Afib, Ave 69, range , occasional PVCs, 2% burden.    Has hyperlipidemia, LDL 52.  No CAD on [facility] 2020.  On primary prevention statin    Has a ring that monitors HR at night, we looked at data, no spikes.    She is not limited by chest pain, pressure or tightness with activity. "   No significant dyspnea on exertion but only if she moves slowly.  No orthopnea or lower extremity swelling.     No significant palpitations, lightheadedness, or presyncope/syncope.   Rarely lightheaded.    No symptoms of leg claudication.     Remote syncope in the setting of low K.  Her K was stopped in hospital, she is low normal.    No family history of premature coronary artery disease.  No prior smoking history.  No history of diabetes.  No history of autoimmune disease such as lupus or rheumatoid arthritis.  No chronic kidney disease.  No ETOH overuse.   No caffeine overuse.  No recreation substance use.    Has lost some weight.    Lives in [place].  She is a .  Close to [place], father of her children.  Casey's girlfriend is Janae who sees me.  Casey sees our group, has ICD.  Here with her son Fer today.    She is fully vaccinated.    DATA REVIEWED by me:  ECG 12-8-21  Sinus, 67, PACs    ECG 9-6-2021  Afib, 53,     ECG 5-  Sinus, 65, PVC, first-degree AV delay    BioTel 6 day Summary: 7-14-21  1. Atrial fibrillation with appropriate heart rate range. Average 69, range 35 to 120 bpm.   2. No significant pauses (up to 2.2 seconds).   3. Occasional PVCs, 2% burden.   4. 1 patient trigger, no symptoms reported.   Conclusion: Persistent atrial fibrillation with appropriate heart rate, no pauses, occasional PVCs.     Zio 2017 2 weeks.  Sinus, brief atrial run.    EP Ablation, Dr. Richard 12-7-21  Pulmonary Vein Isolation  Additional ablation for atrial fibrillation x2  Ablation of additional arrhythmia  Intracardiac Echocardiography  Three-dimensional intracardiac mapping  IV isoproterenol infusion with programmed stimulation    ABHINAV 1-26-21  LV EF  55%.  Biatrial enlargement.  There is severe eccentric mitral regurgitation with multiple jets,   predominantly from flail leaflet of P2.  Echolucent space near P1 of unclear etiology, unusual for cleft   leaflet.  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    Echo 6-1-2021  Left ventricular ejection fraction is visually estimated to be 65%.  Diastolic function is difficult to assess with atrial fibrillation.  Severely dilated left atrium.  Negative bubble study including Valsalva.  Myxomatous changes of the mitral valve leaflets with prolapse of the   anterior and posterior leaflets.  Moderate to severe eccentric mitral regurgitation, probably severe.  Pulmonary veins not well seen on the study.  Estimated right ventricular systolic pressure is 31 mmHg + estimated   RAP.     Compared to the images of the study done 11- there has been no   significant change, prior echo had pulmonary vein flow reversal   consistent with severe mitral regurgitation.    Echo 11-  Left ventricular ejection fraction is visually estimated to be 60%.  Severely dilated left atrium.  Myxomatous changes of the mitral valve.  Bileaflet prolapse of the mitral valve is present.  Severe mitral regurgitation with pulmonary vein systolic flow reversal,   RV 73 mL, ERO 0.4  cm2.  Estimated right ventricular systolic pressure  is 30 mmHg.  Compared to the images of the prior study done 2/3/2020, no significant    change.    Echo 2/3/2020  Prior echo done on 05/03/2019. Compared to the images of the prior   study done -  there has been no significant change.   Normal left ventricular systolic function.  Left ventricular ejection fraction is visually estimated to be 65%.  Prolapse of the mitral leaflets was present.  Severe mitral regurgitation.  Mild tricuspid regurgitation.  Estimated right ventricular systolic pressure is 35 mmHg.    Echo 2017  Agitated saline study was performed, no evidence of right to left   shunt.  Normal left ventricular size, wall thickness, and systolic function.  Left ventricular ejection fraction is visually estimated to be 60%.  Mildly dilated left atrium.  Moderate mitral regurgitation due to an eccentric jet.  Mild tricuspid regurgitation.    Left  and right heart catheterization 5/8/2020  Right ventricle 33/5, EDP 15  Pulmonary artery 42/21, 29  pulmonary capillary wedge 23  Cardiac output 4.1 L/min by thermodilution method     1.  Left main coronary artery:  Normal.  2.  Left anterior descending artery:  Normal.   3.  Left circumflex coronary artery:  Normal.   4.  Right coronary artery:  Normal.  This is a right dominant system.  5.  Left ventricular end diastolic pressure:  25 mmHg.  No signficant gradient across the aortic valve.  6.  Left ventriculogram:  Ejection fraction of 65%, 2+ mitral regurgitation, normal sized thoracic aorta.    Surgery [place] Dr. Monster Nogueira  Date of surgery: 09/16/2021  Date of discharge: 9/28/2021   PROCEDURE/SURGERY:   1. Minimally invasive complex mitral valve repair with A2 [place]-Miguel Angel neochordal reconstruction, A3 [place]-Miguel Angel neochordal reconstruction, P2 posterior ventricular anchoring remodeling suture and non-resectional leaflet remodeling and [place]-Miguel Angel NeoChord x2, P3 [place]-Miguel Angel NeoChord x1, and #34 sewing ring annuloplasty, CPT code 27136.  2. Atrial fibrillation maze procedure and left atrial appendage oversew, CPT code 55631.         2017 CTA NECK   1.  CT angiogram of the neck within normal limits.  2.  Thrombosed right M1 segment.     2017 CAROTID DUPLEX CONCLUSIONS   nl carotids, subclavians and vertebral's     2017 MRI BRAIN  1.  Moderate sized RIGHT MCA territory acute ischemia involving the cortex and basal ganglia  2.  Flow void is present in the RIGHT MCA compatible with treatment effect  3.  No hemorrhage  4.  Mild white matter changes  5.  Mild atrophy    Most recent labs:       Lab Results   Component Value Date/Time    HEMOGLOBIN 10.9 (L) 12/08/2021 01:17 AM    HEMATOCRIT 33.1 (L) 12/08/2021 01:17 AM    MCV 96.5 12/08/2021 01:17 AM    INR 1.36 (H) 12/06/2021 01:07 PM      Lab Results   Component Value Date/Time    SODIUM 135 12/08/2021 01:17 AM    POTASSIUM 5.1 12/08/2021 01:17 AM    CHLORIDE 104 12/08/2021  01:17 AM    CO2 22 12/08/2021 01:17 AM    GLUCOSE 183 (H) 12/08/2021 01:17 AM    BUN 24 (H) 12/08/2021 01:17 AM    CREATININE 0.84 12/08/2021 01:17 AM      Lab Results   Component Value Date/Time    ASTSGOT 15 12/06/2021 01:07 PM    ALTSGPT 14 12/06/2021 01:07 PM    ALBUMIN 4.3 12/06/2021 01:07 PM      Lab Results   Component Value Date/Time    CHOLSTRLTOT 122 08/18/2021 10:20 AM    LDL 52 08/18/2021 10:20 AM    HDL 57 08/18/2021 10:20 AM    TRIGLYCERIDE 63 08/18/2021 10:20 AM           Past Medical History:   Diagnosis Date   • Anemia 9/28/2021   • Arthritis     toe   • Atrial fibrillation (HCC)    • Benign essential HTN 3/19/2012   • Breast cancer (HCC)    • Cancer (HCC) 1998    breast    • Cardiac arrhythmia    • Chest tightness or pressure 3/19/2012   • Chickenpox    • Coronary heart disease    • [place] measles    • Gynecological disorder    • High cholesterol    • High risk medication use 3/19/2012   • Hypercholesterolemia 3/19/2012   • Mumps    • MVP (mitral valve prolapse) 3/19/2012   • Osteoporosis    • Seizure disorder (HCC) 9/28/2021    last seizure may 2021   • Sleep apnea     CPAP at night   • Snoring    • Stroke (HCC) 2017    residual minor weakness on left side   • Substance abuse (HCC)    • Tonsillitis    • Urinary bladder disorder     OAB   • Urinary incontinence    • Valvular heart disease    • Venereal disease      Past Surgical History:   Procedure Laterality Date   • CATARACT PHACO WITH IOL  3/16/2009    Performed by SHANNON GANDARA at SURGERY SAME DAY [facility] ORS   • CATARACT PHACO WITH IOL  1/26/2009    Performed by SHANNON GANDARA at SURGERY SAME DAY [facility] ORS   • BREAST BIOPSY     • HYSTERECTOMY LAPAROSCOPY     • LUMPECTOMY     • PB RADIATION THERAPY PLAN SIMPLE     • PB REMV 2ND CATARACT,CORN-SCLER SECTN     • VT CHEMOTHERAPY, UNSPECIFIED PROCEDURE     • PRIMARY C SECTION     • SINUSCOPE     • TONSILLECTOMY       Family History   Problem Relation Age of Onset   • Cancer Mother          breast   • No Known Problems Brother    • Heart Failure Neg Hx    • Heart Disease Neg Hx      Social History     Socioeconomic History   • Marital status:      Spouse name: Not on file   • Number of children: 2   • Years of education: Not on file   • Highest education level: Not on file   Occupational History   • Not on file   Tobacco Use   • Smoking status: Never Smoker   • Smokeless tobacco: Never Used   Vaping Use   • Vaping Use: Never used   Substance and Sexual Activity   • Alcohol use: Yes     Comment: once a week   • Drug use: No   • Sexual activity: Yes     Partners: Male     Birth control/protection: Female Sterilization   Other Topics Concern   • Not on file   Social History Narrative   • Not on file     Social Determinants of Health     Financial Resource Strain:    • Difficulty of Paying Living Expenses: Not on file   Food Insecurity:    • Worried About Running Out of Food in the Last Year: Not on file   • Ran Out of Food in the Last Year: Not on file   Transportation Needs:    • Lack of Transportation (Medical): Not on file   • Lack of Transportation (Non-Medical): Not on file   Physical Activity:    • Days of Exercise per Week: Not on file   • Minutes of Exercise per Session: Not on file   Stress:    • Feeling of Stress : Not on file   Social Connections:    • Frequency of Communication with Friends and Family: Not on file   • Frequency of Social Gatherings with Friends and Family: Not on file   • Attends [employer] Services: Not on file   • Active Member of Clubs or Organizations: Not on file   • Attends Club or Organization Meetings: Not on file   • Marital Status: Not on file   Intimate Partner Violence:    • Fear of Current or Ex-Partner: Not on file   • Emotionally Abused: Not on file   • Physically Abused: Not on file   • Sexually Abused: Not on file   Housing Stability:    • Unable to Pay for Housing in the Last Year: Not on file   • Number of Places Lived in the Last Year: Not on file  "  • Unstable Housing in the Last Year: Not on file     No Known Allergies    Current Outpatient Medications   Medication Sig Dispense Refill   • amoxicillin (AMOXIL) 500 MG Cap Take 4 Capsules by mouth one time for 1 dose. 30-60 min prior to dental work 4 Capsule 11   • omeprazole (PRILOSEC) 20 MG delayed-release capsule Take 1 Capsule by mouth every day. Take before breakfast for 30 days post ablation to protect your esophagus. 30 Capsule 0   • apixaban (ELIQUIS) 5mg Tab Take 1 Tablet by mouth 2 times a day. 60 Tablet 2   • atorvastatin (LIPITOR) 80 MG tablet Take 1 Tablet by mouth every evening. 30 Tablet 2   • ascorbic acid (VITAMIN C) 500 MG tablet Take 1 Tablet by mouth 2 times a day. 60 Tablet 2   • ferrous sulfate 325 (65 Fe) MG tablet Take 1 Tablet by mouth every morning with breakfast. 30 Tablet 2   • levETIRAcetam (KEPPRA) 1000 MG tablet Take 1 Tablet by mouth every 12 hours. 60 Tablet 2   • metoprolol tartrate (LOPRESSOR) 25 MG Tab Take 0.5 Tablets by mouth 2 times a day. 30 Tablet 2   • Melatonin 10 MG Tab Take 10 mg by mouth at bedtime. 30 Tablet 2   • spironolactone (ALDACTONE) 25 MG Tab Take 1 Tablet by mouth every day. 30 Tablet 2   • Cyanocobalamin (B-12 PO) Take 25,000 mcg by mouth every day.     • Cholecalciferol (VITAMIN D3) 2000 UNIT Cap Take 2,000 Units by mouth every day.     • Multiple Vitamin (MULTIVITAMINS PO) Take 1 Tab by mouth every day.       No current facility-administered medications for this visit.       ROS    All others systems reviewed and negative.     Objective:     /68 (BP Location: Left arm, Patient Position: Sitting, BP Cuff Size: Adult)   Pulse 72   Resp 18   Ht 1.676 m (5' 6\")   Wt 64 kg (141 lb 3.2 oz)   SpO2 95%  Body mass index is 22.79 kg/m².    General: No acute distress. Well nourished.  HEENT: EOM grossly intact, no scleral icterus, no pharyngeal erythema.   Neck:  No JVD at 90, no bruits, trachea midline  CVS: RRR, frequent ectopy. Normal S1, S2. No sign " murmur, No LE edema.  2+ radial pulses, 2+ PT pulses, scar right chest  Resp: CTAB. No wheezing or crackles/rhonchi. Normal respiratory effort.  Abdomen: Soft, NT, no ana rosa hepatomegaly.  MSK/Ext: No clubbing or cyanosis.  Skin: Warm and dry, no rashes.  Neurological: CN III-XII grossly intact. No focal deficits.   Psych: A&O x 3, appropriate affect, adequate judgement, forgetful at times    Physical exam performed today and unchanged, except what is noted, compared to 7-22-21      Assessment:     1. Severe mitral regurgitation     2. S/P mitral valve repair     3. Persistent atrial fibrillation (HCC)     4. History of CVA (cerebrovascular accident)     5. Essential hypertension     6. Chronic anticoagulation     7. Benign essential HTN     8. LALY (obstructive sleep apnea)     9. PVC (premature ventricular contraction)     10. Memory loss     11. Pure hypercholesterolemia     12. S/P left atrial appendage ligation         Medical Decision Making:  Today's Assessment / Status / Plan:     -Doing really well after the MV repair, has been on ASA and apixaban for 3 months, okay to stop the aspirin now  -Abx prior to dental cleaning  -Stay on Eliquis for chads 2 vascular score of 5, including prior stroke from afib.  She did have left atrial appendage closure but will remain on blood thinner for now.  -Doing well after afib ablation, stay on metoprolol  -BP controlled, cont ernie  -PVCs at 2%, no concerns  -No CAD on LHC  -Cont primary prevention statin therapy, no significant CAD on left heart cath  -RTC 3 months with david LEON in 6 months    Written instructions given today:    -You want to get your teeth cleaned regularly to protect the valve.    -First dental cleaning 3 months after surgery but you want to take antibiotics 30 to 60 minutes prior.    -Take amoxicillin 2000 mg 1 time 30 to 60 minutes prior to dental work, this will be 4 pills of the 500 mg.    -I would recommend doing this for the first 2 years.  After  that, it is debatable whether or not there is a benefit to taking antibiotics prior to dental work.    -Try to get your teeth cleaned every 6 months.    -I sent a prescription with lots of refills to your pharmacy but the dentist can typically also just give you the antibiotics.    -Stop aspirin    -Finish the antibiotics given to you by your urologist    Return in about 3 months (around 3/10/2022).    It is my pleasure to participate in the care of Ms. Bocanegra.  Please do not hesitate to contact me with questions or concerns.    Ayde Denise MD, [facility]  Cardiologist [facility]    Please note that this dictation was created using voice recognition software. I have made every reasonable attempt to correct obvious errors, but it is possible there are errors of grammar and possibly content that I did not discover before finalizing the note.

## 2021-12-10 NOTE — LETTER
"     [facility]   [address]  [place] [address]  Phone: [phone]  Fax: [phone]              Shaista Bocanegra  1945    Encounter Date: 12/10/2021    Ayde Denise M.D.          PROGRESS NOTE:  Subjective:   Chief Complaint:   Chief Complaint   Patient presents with   • Hypertension   • Atrial Fibrillation       \"Artemio\" Shaista Bocanegra is a 76 y.o. female who returns for moderate to severe mitral regurgitation, s/p minimally invasive complex MV repair Tee/KYUNG appendage oversew/maze, hypertension, hyperlipidemia, prior CVAx2, HTN , PAF, ablation of PAF.    Admitted to the hospital 5/8/2020 with dehydration on diuretics.  Has left and right heart catheterization that admission move mitral clip for severe mitral regurgitation.  Took a while for her to decide if she wanted to enter a trial for noninvasive mitral valve repair versus surgery.  Ultimately only underwent surgery with Dr. Monster Nogueira at [place] on 9/16/2021:  1. Minimally invasive complex mitral valve repair with A2 [place]-Miguel Angel neochordal reconstruction, A3 [place]-Miguel Angel neochordal reconstruction, P2 posterior ventricular anchoring remodeling suture and non-resectional leaflet remodeling and [place]-Miguel Angel NeoChord x2, P3 [place]-Miguel Angel NeoChord x1, and #34 sewing ring annuloplasty  2. Atrial fibrillation maze procedure and left atrial appendage oversew    Has atrial fibrillation, underwent ablation with Dr. Richard 12-7-21, did well.    Has HTN, BP controlled overall, elevated at times.    Has LALY, was on CPAP, now using device only.  Using meditation and leep specialist to help with habits.    Prior CVA, some light tingling sensation remains.    Patient received mechanical thrombectomy 8/18/2017, felt to be due to right carotid disease.    Then had another CVA, found to have afib, now on apixaban.  [id number] is 5 now.  Does note brain fog and word finding sometimes now.  Monitor for Afib, Ave 69, range " , occasional PVCs, 2% burden.    Has hyperlipidemia, LDL 52.  No CAD on [facility] 2020.  On primary prevention statin    Has a ring that monitors HR at night, we looked at data, no spikes.    She is not limited by chest pain, pressure or tightness with activity.   No significant dyspnea on exertion but only if she moves slowly.  No orthopnea or lower extremity swelling.     No significant palpitations, lightheadedness, or presyncope/syncope.   Rarely lightheaded.    No symptoms of leg claudication.     Remote syncope in the setting of low K.  Her K was stopped in hospital, she is low normal.    No family history of premature coronary artery disease.  No prior smoking history.  No history of diabetes.  No history of autoimmune disease such as lupus or rheumatoid arthritis.  No chronic kidney disease.  No ETOH overuse.   No caffeine overuse.  No recreation substance use.    Has lost some weight.    Lives in [place].  She is a .  Close to [place], father of her children.  Casey's girlfriend is Janae who sees me.  Casey sees our group, has ICD.  Here with her son Fer today.    She is fully vaccinated.    DATA REVIEWED by me:  ECG 12-8-21  Sinus, 67, PACs    ECG 9-6-2021  Afib, 53,     ECG 5-  Sinus, 65, PVC, first-degree AV delay    BioTel 6 day Summary: 7-14-21  1. Atrial fibrillation with appropriate heart rate range. Average 69, range 35 to 120 bpm.   2. No significant pauses (up to 2.2 seconds).   3. Occasional PVCs, 2% burden.   4. 1 patient trigger, no symptoms reported.   Conclusion: Persistent atrial fibrillation with appropriate heart rate, no pauses, occasional PVCs.     Zio 2017 2 weeks.  Sinus, brief atrial run.    EP Ablation, Dr. Richard 12-7-21  Pulmonary Vein Isolation  Additional ablation for atrial fibrillation x2  Ablation of additional arrhythmia  Intracardiac Echocardiography  Three-dimensional intracardiac mapping  IV isoproterenol infusion with programmed stimulation    ABHINAV 1-26-21  LV EF   55%.  Biatrial enlargement.  There is severe eccentric mitral regurgitation with multiple jets,   predominantly from flail leaflet of P2.  Echolucent space near P1 of unclear etiology, unusual for cleft   leaflet.  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    Echo 6-1-2021  Left ventricular ejection fraction is visually estimated to be 65%.  Diastolic function is difficult to assess with atrial fibrillation.  Severely dilated left atrium.  Negative bubble study including Valsalva.  Myxomatous changes of the mitral valve leaflets with prolapse of the   anterior and posterior leaflets.  Moderate to severe eccentric mitral regurgitation, probably severe.  Pulmonary veins not well seen on the study.  Estimated right ventricular systolic pressure is 31 mmHg + estimated   RAP.     Compared to the images of the study done 11- there has been no   significant change, prior echo had pulmonary vein flow reversal   consistent with severe mitral regurgitation.    Echo 11-  Left ventricular ejection fraction is visually estimated to be 60%.  Severely dilated left atrium.  Myxomatous changes of the mitral valve.  Bileaflet prolapse of the mitral valve is present.  Severe mitral regurgitation with pulmonary vein systolic flow reversal,   RV 73 mL, ERO 0.4  cm2.  Estimated right ventricular systolic pressure  is 30 mmHg.  Compared to the images of the prior study done 2/3/2020, no significant    change.    Echo 2/3/2020  Prior echo done on 05/03/2019. Compared to the images of the prior   study done -  there has been no significant change.   Normal left ventricular systolic function.  Left ventricular ejection fraction is visually estimated to be 65%.  Prolapse of the mitral leaflets was present.  Severe mitral regurgitation.  Mild tricuspid regurgitation.  Estimated right ventricular systolic pressure is 35 mmHg.    Echo 2017  Agitated saline study was performed, no evidence of right to left   shunt.  Normal left  ventricular size, wall thickness, and systolic function.  Left ventricular ejection fraction is visually estimated to be 60%.  Mildly dilated left atrium.  Moderate mitral regurgitation due to an eccentric jet.  Mild tricuspid regurgitation.    Left and right heart catheterization 5/8/2020  Right ventricle 33/5, EDP 15  Pulmonary artery 42/21, 29  pulmonary capillary wedge 23  Cardiac output 4.1 L/min by thermodilution method     1.  Left main coronary artery:  Normal.  2.  Left anterior descending artery:  Normal.   3.  Left circumflex coronary artery:  Normal.   4.  Right coronary artery:  Normal.  This is a right dominant system.  5.  Left ventricular end diastolic pressure:  25 mmHg.  No signficant gradient across the aortic valve.  6.  Left ventriculogram:  Ejection fraction of 65%, 2+ mitral regurgitation, normal sized thoracic aorta.    Surgery [place] Dr. Monster Nogueira  Date of surgery: 09/16/2021  Date of discharge: 9/28/2021   PROCEDURE/SURGERY:   1. Minimally invasive complex mitral valve repair with A2 [place]-Miguel Angel neochordal reconstruction, A3 [place]-Miguel Angel neochordal reconstruction, P2 posterior ventricular anchoring remodeling suture and non-resectional leaflet remodeling and [place]-Miguel Angel NeoChord x2, P3 [place]-Miguel Angel NeoChord x1, and #34 sewing ring annuloplasty, CPT code 18987.  2. Atrial fibrillation maze procedure and left atrial appendage oversew, CPT code 70943.         2017 CTA NECK   1.  CT angiogram of the neck within normal limits.  2.  Thrombosed right M1 segment.     2017 CAROTID DUPLEX CONCLUSIONS   nl carotids, subclavians and vertebral's     2017 MRI BRAIN  1.  Moderate sized RIGHT MCA territory acute ischemia involving the cortex and basal ganglia  2.  Flow void is present in the RIGHT MCA compatible with treatment effect  3.  No hemorrhage  4.  Mild white matter changes  5.  Mild atrophy    Most recent labs:       Lab Results   Component Value Date/Time    HEMOGLOBIN 10.9 (L) 12/08/2021 01:17 AM     HEMATOCRIT 33.1 (L) 12/08/2021 01:17 AM    MCV 96.5 12/08/2021 01:17 AM    INR 1.36 (H) 12/06/2021 01:07 PM      Lab Results   Component Value Date/Time    SODIUM 135 12/08/2021 01:17 AM    POTASSIUM 5.1 12/08/2021 01:17 AM    CHLORIDE 104 12/08/2021 01:17 AM    CO2 22 12/08/2021 01:17 AM    GLUCOSE 183 (H) 12/08/2021 01:17 AM    BUN 24 (H) 12/08/2021 01:17 AM    CREATININE 0.84 12/08/2021 01:17 AM      Lab Results   Component Value Date/Time    ASTSGOT 15 12/06/2021 01:07 PM    ALTSGPT 14 12/06/2021 01:07 PM    ALBUMIN 4.3 12/06/2021 01:07 PM      Lab Results   Component Value Date/Time    CHOLSTRLTOT 122 08/18/2021 10:20 AM    LDL 52 08/18/2021 10:20 AM    HDL 57 08/18/2021 10:20 AM    TRIGLYCERIDE 63 08/18/2021 10:20 AM           Past Medical History:   Diagnosis Date   • Anemia 9/28/2021   • Arthritis     toe   • Atrial fibrillation (HCC)    • Benign essential HTN 3/19/2012   • Breast cancer (HCC)    • Cancer ([facility]) 1998    breast    • Cardiac arrhythmia    • Chest tightness or pressure 3/19/2012   • Chickenpox    • Coronary heart disease    • [place] measles    • Gynecological disorder    • High cholesterol    • High risk medication use 3/19/2012   • Hypercholesterolemia 3/19/2012   • Mumps    • MVP (mitral valve prolapse) 3/19/2012   • Osteoporosis    • Seizure disorder (HCC) 9/28/2021    last seizure may 2021   • Sleep apnea     CPAP at night   • Snoring    • Stroke ([facility]) 2017    residual minor weakness on left side   • Substance abuse ([facility])    • Tonsillitis    • Urinary bladder disorder     OAB   • Urinary incontinence    • Valvular heart disease    • Venereal disease      Past Surgical History:   Procedure Laterality Date   • CATARACT PHACO WITH IOL  3/16/2009    Performed by SHANNON GANDARA at SURGERY SAME DAY [facility] ORS   • CATARACT PHACO WITH IOL  1/26/2009    Performed by SHANNON GANDARA at SURGERY SAME DAY [facility] ORS   • BREAST BIOPSY     • HYSTERECTOMY LAPAROSCOPY     • LUMPECTOMY     • PB  RADIATION THERAPY PLAN SIMPLE     • PB REMV 2ND CATARACT,CORN-SCLER SECTN     • SD CHEMOTHERAPY, UNSPECIFIED PROCEDURE     • PRIMARY C SECTION     • SINUSCOPE     • TONSILLECTOMY       Family History   Problem Relation Age of Onset   • Cancer Mother         breast   • No Known Problems Brother    • Heart Failure Neg Hx    • Heart Disease Neg Hx      Social History     Socioeconomic History   • Marital status:      Spouse name: Not on file   • Number of children: 2   • Years of education: Not on file   • Highest education level: Not on file   Occupational History   • Not on file   Tobacco Use   • Smoking status: Never Smoker   • Smokeless tobacco: Never Used   Vaping Use   • Vaping Use: Never used   Substance and Sexual Activity   • Alcohol use: Yes     Comment: once a week   • Drug use: No   • Sexual activity: Yes     Partners: Male     Birth control/protection: Female Sterilization   Other Topics Concern   • Not on file   Social History Narrative   • Not on file     Social Determinants of Health     Financial Resource Strain:    • Difficulty of Paying Living Expenses: Not on file   Food Insecurity:    • Worried About Running Out of Food in the Last Year: Not on file   • Ran Out of Food in the Last Year: Not on file   Transportation Needs:    • Lack of Transportation (Medical): Not on file   • Lack of Transportation (Non-Medical): Not on file   Physical Activity:    • Days of Exercise per Week: Not on file   • Minutes of Exercise per Session: Not on file   Stress:    • Feeling of Stress : Not on file   Social Connections:    • Frequency of Communication with Friends and Family: Not on file   • Frequency of Social Gatherings with Friends and Family: Not on file   • Attends [employer] Services: Not on file   • Active Member of Clubs or Organizations: Not on file   • Attends Club or Organization Meetings: Not on file   • Marital Status: Not on file   Intimate Partner Violence:    • Fear of Current or Ex-Partner:  "Not on file   • Emotionally Abused: Not on file   • Physically Abused: Not on file   • Sexually Abused: Not on file   Housing Stability:    • Unable to Pay for Housing in the Last Year: Not on file   • Number of Places Lived in the Last Year: Not on file   • Unstable Housing in the Last Year: Not on file     No Known Allergies    Current Outpatient Medications   Medication Sig Dispense Refill   • amoxicillin (AMOXIL) 500 MG Cap Take 4 Capsules by mouth one time for 1 dose. 30-60 min prior to dental work 4 Capsule 11   • omeprazole (PRILOSEC) 20 MG delayed-release capsule Take 1 Capsule by mouth every day. Take before breakfast for 30 days post ablation to protect your esophagus. 30 Capsule 0   • apixaban (ELIQUIS) 5mg Tab Take 1 Tablet by mouth 2 times a day. 60 Tablet 2   • atorvastatin (LIPITOR) 80 MG tablet Take 1 Tablet by mouth every evening. 30 Tablet 2   • ascorbic acid (VITAMIN C) 500 MG tablet Take 1 Tablet by mouth 2 times a day. 60 Tablet 2   • ferrous sulfate 325 (65 Fe) MG tablet Take 1 Tablet by mouth every morning with breakfast. 30 Tablet 2   • levETIRAcetam (KEPPRA) 1000 MG tablet Take 1 Tablet by mouth every 12 hours. 60 Tablet 2   • metoprolol tartrate (LOPRESSOR) 25 MG Tab Take 0.5 Tablets by mouth 2 times a day. 30 Tablet 2   • Melatonin 10 MG Tab Take 10 mg by mouth at bedtime. 30 Tablet 2   • spironolactone (ALDACTONE) 25 MG Tab Take 1 Tablet by mouth every day. 30 Tablet 2   • Cyanocobalamin (B-12 PO) Take 25,000 mcg by mouth every day.     • Cholecalciferol (VITAMIN D3) 2000 UNIT Cap Take 2,000 Units by mouth every day.     • Multiple Vitamin (MULTIVITAMINS PO) Take 1 Tab by mouth every day.       No current facility-administered medications for this visit.       ROS    All others systems reviewed and negative.     Objective:     /68 (BP Location: Left arm, Patient Position: Sitting, BP Cuff Size: Adult)   Pulse 72   Resp 18   Ht 1.676 m (5' 6\")   Wt 64 kg (141 lb 3.2 oz)   SpO2 " 95%  Body mass index is 22.79 kg/m².    General: No acute distress. Well nourished.  HEENT: EOM grossly intact, no scleral icterus, no pharyngeal erythema.   Neck:  No JVD at 90, no bruits, trachea midline  CVS: RRR, frequent ectopy. Normal S1, S2. No sign murmur, No LE edema.  2+ radial pulses, 2+ PT pulses, scar right chest  Resp: CTAB. No wheezing or crackles/rhonchi. Normal respiratory effort.  Abdomen: Soft, NT, no ana rosa hepatomegaly.  MSK/Ext: No clubbing or cyanosis.  Skin: Warm and dry, no rashes.  Neurological: CN III-XII grossly intact. No focal deficits.   Psych: A&O x 3, appropriate affect, adequate judgement, forgetful at times    Physical exam performed today and unchanged, except what is noted, compared to 7-22-21      Assessment:     1. Severe mitral regurgitation     2. S/P mitral valve repair     3. Persistent atrial fibrillation (HCC)     4. History of CVA (cerebrovascular accident)     5. Essential hypertension     6. Chronic anticoagulation     7. Benign essential HTN     8. LALY (obstructive sleep apnea)     9. PVC (premature ventricular contraction)     10. Memory loss     11. Pure hypercholesterolemia     12. S/P left atrial appendage ligation         Medical Decision Making:  Today's Assessment / Status / Plan:     -Doing really well after the MV repair, has been on ASA and apixaban for 3 months, okay to stop the aspirin now  -Abx prior to dental cleaning  -Stay on Eliquis for chads 2 vascular score of 5, including prior stroke from afib.  She did have left atrial appendage closure but will remain on blood thinner for now.  -Doing well after afib ablation, stay on metoprolol  -BP controlled, cont ernie  -PVCs at 2%, no concerns  -No CAD on LHC  -Cont primary prevention statin therapy, no significant CAD on left heart cath  -RTC 3 months with DB me in 6 months    Written instructions given today:    -You want to get your teeth cleaned regularly to protect the valve.    -First dental cleaning  3 months after surgery but you want to take antibiotics 30 to 60 minutes prior.    -Take amoxicillin 2000 mg 1 time 30 to 60 minutes prior to dental work, this will be 4 pills of the 500 mg.    -I would recommend doing this for the first 2 years.  After that, it is debatable whether or not there is a benefit to taking antibiotics prior to dental work.    -Try to get your teeth cleaned every 6 months.    -I sent a prescription with lots of refills to your pharmacy but the dentist can typically also just give you the antibiotics.    -Stop aspirin    -Finish the antibiotics given to you by your urologist    Return in about 3 months (around 3/10/2022).    It is my pleasure to participate in the care of Ms. Bocanegra.  Please do not hesitate to contact me with questions or concerns.    Ayde Denise MD, [facility]  Cardiologist [facility]    Please note that this dictation was created using voice recognition software. I have made every reasonable attempt to correct obvious errors, but it is possible there are errors of grammar and possibly content that I did not discover before finalizing the note.        Capri Simon M.D.  [address]  [place] [address]  Via Fax: [phone]

## 2021-12-10 NOTE — TELEPHONE ENCOUNTER
Called pt per LS's request as she had missed her appt w/ LS today which is unusual and pt was just discharged from the hospital a couple of days ago. S/w pt, she states that she had a mix up with her ride which is why she missed the appt. LS had another opening today at 3:45 pm, pt states she can make this, pt scheduled.     To CAT, SUAD, pt to see you at 3:45 pm today

## 2021-12-10 NOTE — TELEPHONE ENCOUNTER
"**CHART PREP SAVED** No show      \"Artemio\" Shaista Bocanegra is a 76 y.o. female who returns for moderate to severe mitral regurgitation, hypertension, hyperlipidemia, prior CVAx2, HTN and now afib.    Admitted to the hospital 5/8/2020 with dehydration on diuretics.  Has left and right heart catheterization that admission move mitral clip for severe mitral regurgitation.  She is considering repair vs replacement.  Has met our MItraClip Team, our surgeon and also providers at [facility], offered  REPAIR MR trial with MitraClip and RESTORE Trial, JESUS.  No discussion of robotic, probably at [place].  She is going to [facility] soon to be enrolled in their trial, not sure if surgery or clip.    Surgery on foot, HR up a bit, 92-96. Has been in pain, getting better with accupunture.     Has HTN, /85, usually 125.  Started on amlodipine.  BP better, under 130, sometimes under 100, but no sxs.    Has LALY, was on CPAP, now using device only.  Using meditation and leep specialist to help with habits.    Prior CVA, some light tingling sensation remains.    Patient received mechanical thrombectomy 8/18/2017, felt to be due to right carotid disease.    Then had another CVA, found to have afib, now on apixaban.  GWNKQ5mary is 5 now.  Does note brain fog and word finding sometimes now.  Monitor for Afib, Ave 69, range , occasional PVCs, 2% burden.    Has hyperlipidemia, LDL 58.  No CAD on [facility] 2020.    Has a ring that monitors HR at night, we looked at data, no spikes.    She is not limited by chest pain, pressure or tightness with activity.   No significant dyspnea on exertion but only if she moves slowly.  No orthopnea or lower extremity swelling.     No significant palpitations, lightheadedness, or presyncope/syncope.   Rarely lightheaded.    No symptoms of leg claudication.     Remote syncope in the setting of low K.  Her K was stopped in hospital, she is low normal.    No family history of premature coronary artery " disease.  No prior smoking history.  No history of diabetes.  No history of autoimmune disease such as lupus or rheumatoid arthritis.  No chronic kidney disease.  No ETOH overuse.   No caffeine overuse.  No recreation substance use.    Has lost some weight.    Lives in [place].  She is a .  Close to [place], father of her children.  Casey's girlfriend is Janae who sees me.  Casey sees our group, has ICD.  Here with her son Fer today.    She is fully vaccinated.    DATA REVIEWED by me:  ECG 9-6-2021  Afib, 53,     ECG 5-  Sinus, 65, PVC, first-degree AV delay    BioTel 6 day Summary: 7-14-21    1. Atrial fibrillation with appropriate heart rate range. Average 69, range 35 to 120 bpm.   2. No significant pauses (up to 2.2 seconds).   3. Occasional PVCs, 2% burden.   4. 1 patient trigger, no symptoms reported.     Conclusion: Persistent atrial fibrillation with appropriate heart rate, no pauses, occasional PVCs.     Zio 2017 2 weeks.  Sinus, brief atrial run.    ABHINAV 1-26-21  LV EF  55%.  Biatrial enlargement.  There is severe eccentric mitral regurgitation with multiple jets,   predominantly from flail leaflet of P2.  Echolucent space near P1 of unclear etiology, unusual for cleft   leaflet.  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    Echo 6-1-2021  Left ventricular ejection fraction is visually estimated to be 65%.  Diastolic function is difficult to assess with atrial fibrillation.  Severely dilated left atrium.  Negative bubble study including Valsalva.  Myxomatous changes of the mitral valve leaflets with prolapse of the   anterior and posterior leaflets.  Moderate to severe eccentric mitral regurgitation, probably severe.  Pulmonary veins not well seen on the study.  Estimated right ventricular systolic pressure is 31 mmHg + estimated   RAP.     Compared to the images of the study done 11- there has been no   significant change, prior echo had pulmonary vein flow reversal   consistent with  severe mitral regurgitation.    Echo 11-  Left ventricular ejection fraction is visually estimated to be 60%.  Severely dilated left atrium.  Myxomatous changes of the mitral valve.  Bileaflet prolapse of the mitral valve is present.  Severe mitral regurgitation with pulmonary vein systolic flow reversal,   RV 73 mL, ERO 0.4  cm2.  Estimated right ventricular systolic pressure  is 30 mmHg.  Compared to the images of the prior study done 2/3/2020, no significant    change.    Echo 2/3/2020  Prior echo done on 05/03/2019. Compared to the images of the prior   study done -  there has been no significant change.   Normal left ventricular systolic function.  Left ventricular ejection fraction is visually estimated to be 65%.  Prolapse of the mitral leaflets was present.  Severe mitral regurgitation.  Mild tricuspid regurgitation.  Estimated right ventricular systolic pressure is 35 mmHg.    Echo 2017  Agitated saline study was performed, no evidence of right to left   shunt.  Normal left ventricular size, wall thickness, and systolic function.  Left ventricular ejection fraction is visually estimated to be 60%.  Mildly dilated left atrium.  Moderate mitral regurgitation due to an eccentric jet.  Mild tricuspid regurgitation.    Left and right heart catheterization 5/8/2020  Right ventricle 33/5, EDP 15  Pulmonary artery 42/21, 29  pulmonary capillary wedge 23  Cardiac output 4.1 L/min by thermodilution method     1.  Left main coronary artery:  Normal.  2.  Left anterior descending artery:  Normal.   3.  Left circumflex coronary artery:  Normal.   4.  Right coronary artery:  Normal.  This is a right dominant system.  5.  Left ventricular end diastolic pressure:  25 mmHg.  No signficant gradient across the aortic valve.  6.  Left ventriculogram:  Ejection fraction of 65%, 2+ mitral regurgitation, normal sized thoracic aorta.     2017 CTA NECK   1.  CT angiogram of the neck within normal limits.  2.  Thrombosed  right M1 segment.     2017 CAROTID DUPLEX CONCLUSIONS   nl carotids, subclavians and vertebral's     2017 MRI BRAIN  1.  Moderate sized RIGHT MCA territory acute ischemia involving the cortex and basal ganglia  2.  Flow void is present in the RIGHT MCA compatible with treatment effect  3.  No hemorrhage  4.  Mild white matter changes  5.  Mild atrophy

## 2021-12-11 NOTE — PATIENT INSTRUCTIONS
-You want to get your teeth cleaned regularly to protect the valve.    -First dental cleaning 3 months after surgery but you want to take antibiotics 30 to 60 minutes prior.    -Take amoxicillin 2000 mg 1 time 30 to 60 minutes prior to dental work, this will be 4 pills of the 500 mg.    -Stop aspirin    -Finish the antibiotics given to you by your urologist    -I would recommend doing this for the first 2 years.  After that, it is debatable whether or not there is a benefit to taking antibiotics prior to dental work.    -Try to get your teeth cleaned every 6 months.    -I sent a prescription with lots of refills to your pharmacy but the dentist can typically also just give you the antibiotics.

## 2021-12-13 ENCOUNTER — OFFICE VISIT (OUTPATIENT)
Dept: PHYSICAL MEDICINE AND REHAB | Facility: REHABILITATION | Age: 76
End: 2021-12-13
Payer: COMMERCIAL

## 2021-12-13 VITALS
WEIGHT: 135 LBS | BODY MASS INDEX: 21.69 KG/M2 | HEIGHT: 66 IN | SYSTOLIC BLOOD PRESSURE: 90 MMHG | DIASTOLIC BLOOD PRESSURE: 52 MMHG | OXYGEN SATURATION: 94 % | RESPIRATION RATE: 14 BRPM | HEART RATE: 81 BPM | TEMPERATURE: 97.9 F

## 2021-12-13 DIAGNOSIS — R53.81 PHYSICAL DECONDITIONING: Primary | ICD-10-CM

## 2021-12-13 DIAGNOSIS — G81.94 LEFT HEMIPARESIS (HCC): ICD-10-CM

## 2021-12-13 DIAGNOSIS — Z86.73 HISTORY OF CVA (CEREBROVASCULAR ACCIDENT): ICD-10-CM

## 2021-12-13 DIAGNOSIS — G40.909 SEIZURE DISORDER (HCC): ICD-10-CM

## 2021-12-13 DIAGNOSIS — Z98.890 S/P MITRAL VALVE REPAIR: ICD-10-CM

## 2021-12-13 DIAGNOSIS — H53.9 VISION CHANGES: ICD-10-CM

## 2021-12-13 PROCEDURE — 99214 OFFICE O/P EST MOD 30 MIN: CPT | Performed by: PHYSICAL MEDICINE & REHABILITATION

## 2021-12-13 ASSESSMENT — FIBROSIS 4 INDEX: FIB4 SCORE: 1.65

## 2021-12-13 NOTE — PROGRESS NOTES
[facility], [place]  PM&R Neuro Rehabilitation [facility]  [facility] [place] [address]  Ph: [phone]    FOLLOW UP PATIENT EVALUATION    Patient Name: Shaista Bocanegra   Patient : 1945  Patient Age: 76 y.o.     Examining Physician: Dr. Opal Sosa, DO    PM&R History to Date - Background Information:  Dates of Admission/Discharge to ARU: 2021    SUBJECTIVE:   Patient Identification: Shaista Bocanegra is a 76 y.o. female with PMH significant for Severe mitral regurgitation secondary to prolapse, atrial fibrillation, stroke in 2017 (right frontal lobe) and 2021 (left temporal lobe) with residual left hemiparesis, hyperlipidemia, breast cancer treated with right lumpectomy radiation chemotherapy, sleep apnea and rehabilitation history significant for physical deconditioning after mitral valve repair 2021 [place] Dr. Monster Nogueira course c/b generalized tonic-clonic seizure and is presenting to PM&R clinic for a FOLLOW UP OUTPATIENT evaluation with the following chief complaint/s:    Chief Complaint: Weakness after ablation.     History of Present Illness:    - Records reviewed: Patient hospitalized 2021 through 2021 for elective catheter ablation which occurred on 2021.  Course not complicated.  - Still feels weak and also foggy from the anesthesia.   - Has home health therapy still coming a couple times per week.   - Has trouble reading.   - Has new PCP she will be seeing end of December.   - Blurry vision has been an issue.   - Has a lot of trouble getting up in the morning. She is so tired.   - Not taking Flomax. Wishes it were more controlled. Has to change pads 2-5 times per day.   - No falls at home.   - Sometimes will use walker within the home, but doesn't really use it.     Review of Systems:  All other pertinent positive review of systems are noted above in HPI.   All other systems reviewed and are negative.    Past Medical History:  Past Medical History:   Diagnosis Date   •  Anemia 9/28/2021   • Seizure disorder (HCC) 9/28/2021    last seizure may 2021   • Stroke (HCC) 2017    residual minor weakness on left side   • Benign essential HTN 3/19/2012   • Chest tightness or pressure 3/19/2012   • Hypercholesterolemia 3/19/2012   • MVP (mitral valve prolapse) 3/19/2012   • High risk medication use 3/19/2012   • Cancer (HCC) 1998    breast    • Arthritis     toe   • Atrial fibrillation (HCC)    • Breast cancer (HCC)    • Cardiac arrhythmia    • Chickenpox    • Coronary heart disease    • Bulgarian measles    • Gynecological disorder    • High cholesterol    • Mumps    • Osteoporosis    • Sleep apnea     CPAP at night   • Snoring    • Substance abuse (HCC)    • Tonsillitis    • Urinary bladder disorder     OAB   • Urinary incontinence    • Valvular heart disease    • Venereal disease       Past Surgical History:   Procedure Laterality Date   • CATARACT PHACO WITH IOL  3/16/2009    Performed by SHANNON GANDARA at [facility]   • CATARACT PHACO WITH IOL  1/26/2009    Performed by SHANNON GANDARA at [facility]   • BREAST BIOPSY     • HYSTERECTOMY LAPAROSCOPY     • LUMPECTOMY     • PB RADIATION THERAPY PLAN SIMPLE     • PB REMV 2ND CATARACT,CORN-SCLER SECTN     • TX CHEMOTHERAPY, UNSPECIFIED PROCEDURE     • PRIMARY C SECTION     • SINUSCOPE     • TONSILLECTOMY          Current Outpatient Medications:   •  omeprazole (PRILOSEC) 20 MG delayed-release capsule, Take 1 Capsule by mouth every day. Take before breakfast for 30 days post ablation to protect your esophagus., Disp: 30 Capsule, Rfl: 0  •  apixaban (ELIQUIS) 5mg Tab, Take 1 Tablet by mouth 2 times a day., Disp: 60 Tablet, Rfl: 2  •  atorvastatin (LIPITOR) 80 MG tablet, Take 1 Tablet by mouth every evening., Disp: 30 Tablet, Rfl: 2  •  ascorbic acid (VITAMIN C) 500 MG tablet, Take 1 Tablet by mouth 2 times a day., Disp: 60 Tablet, Rfl: 2  •  ferrous sulfate 325 (65 Fe) MG tablet, Take 1 Tablet by mouth every  morning with breakfast., Disp: 30 Tablet, Rfl: 2  •  levETIRAcetam (KEPPRA) 1000 MG tablet, Take 1 Tablet by mouth every 12 hours., Disp: 60 Tablet, Rfl: 2  •  metoprolol tartrate (LOPRESSOR) 25 MG Tab, Take 0.5 Tablets by mouth 2 times a day., Disp: 30 Tablet, Rfl: 2  •  Melatonin 10 MG Tab, Take 10 mg by mouth at bedtime., Disp: 30 Tablet, Rfl: 2  •  spironolactone (ALDACTONE) 25 MG Tab, Take 1 Tablet by mouth every day., Disp: 30 Tablet, Rfl: 2  •  Cyanocobalamin (B-12 PO), Take 25,000 mcg by mouth every day., Disp: , Rfl:   •  Cholecalciferol (VITAMIN D3) 2000 UNIT Cap, Take 2,000 Units by mouth every day., Disp: , Rfl:   •  Multiple Vitamin (MULTIVITAMINS PO), Take 1 Tab by mouth every day., Disp: , Rfl:   No Known Allergies     Past Social History:  Social History     Socioeconomic History   • Marital status:      Spouse name: Not on file   • Number of children: 2   • Years of education: Not on file   • Highest education level: Not on file   Occupational History   • Not on file   Tobacco Use   • Smoking status: Never Smoker   • Smokeless tobacco: Never Used   Vaping Use   • Vaping Use: Never used   Substance and Sexual Activity   • Alcohol use: Yes     Comment: once a week   • Drug use: No   • Sexual activity: Yes     Partners: Male     Birth control/protection: Female Sterilization   Other Topics Concern   • Not on file   Social History Narrative   • Not on file     Social Determinants of Health     Financial Resource Strain:    • Difficulty of Paying Living Expenses: Not on file   Food Insecurity:    • Worried About Running Out of Food in the Last Year: Not on file   • Ran Out of Food in the Last Year: Not on file   Transportation Needs:    • Lack of Transportation (Medical): Not on file   • Lack of Transportation (Non-Medical): Not on file   Physical Activity:    • Days of Exercise per Week: Not on file   • Minutes of Exercise per Session: Not on file   Stress:    • Feeling of Stress : Not on file    Social Connections:    • Frequency of Communication with Friends and Family: Not on file   • Frequency of Social Gatherings with Friends and Family: Not on file   • Attends Sikhism Services: Not on file   • Active Member of Clubs or Organizations: Not on file   • Attends Club or Organization Meetings: Not on file   • Marital Status: Not on file   Intimate Partner Violence:    • Fear of Current or Ex-Partner: Not on file   • Emotionally Abused: Not on file   • Physically Abused: Not on file   • Sexually Abused: Not on file   Housing Stability:    • Unable to Pay for Housing in the Last Year: Not on file   • Number of Places Lived in the Last Year: Not on file   • Unstable Housing in the Last Year: Not on file        Family History:  Family History   Problem Relation Age of Onset   • Cancer Mother         breast   • No Known Problems Brother    • Heart Failure Neg Hx    • Heart Disease Neg Hx        Depression and Opioid Screening  PHQ-9:  Depression Screen (PHQ-2/PHQ-9) 10/16/2021 10/17/2021 10/18/2021   PHQ-2 Total Score 0 0 0   PHQ-2 Total Score - - -   PHQ-9 Total Score - - -     Interpretation of PHQ-9 Total Score   Score Severity   1-4 No Depression   5-9 Mild Depression   10-14 Moderate Depression   15-19 Moderately Severe Depression   20-27 Severe Depression     OBJECTIVE:   Vital Signs:  Vitals:    12/13/21 0935   BP: (!) 90/52   Pulse: 81   Resp: 14   Temp: 36.6 °C (97.9 °F)   SpO2: 94%        Pertinent Labs:  Lab Results   Component Value Date/Time    SODIUM 135 12/08/2021 01:17 AM    POTASSIUM 5.1 12/08/2021 01:17 AM    CHLORIDE 104 12/08/2021 01:17 AM    CO2 22 12/08/2021 01:17 AM    GLUCOSE 183 (H) 12/08/2021 01:17 AM    BUN 24 (H) 12/08/2021 01:17 AM    CREATININE 0.84 12/08/2021 01:17 AM    BUNCREATRAT 12 06/12/2020 05:12 AM       Lab Results   Component Value Date/Time    HBA1C 5.7 (H) 08/19/2021 01:12 PM    HBA1C 5.7 (H) 05/31/2021 11:14 AM       Lab Results   Component Value Date/Time    WBC 6.9  12/08/2021 01:17 AM    RBC 3.43 (L) 12/08/2021 01:17 AM    HEMOGLOBIN 10.9 (L) 12/08/2021 01:17 AM    HEMATOCRIT 33.1 (L) 12/08/2021 01:17 AM    MCV 96.5 12/08/2021 01:17 AM    MCH 31.8 12/08/2021 01:17 AM    MCHC 32.9 (L) 12/08/2021 01:17 AM    MPV 9.5 12/08/2021 01:17 AM    NEUTSPOLYS 73.30 (H) 12/06/2021 01:07 PM    LYMPHOCYTES 15.80 (L) 12/06/2021 01:07 PM    MONOCYTES 7.50 12/06/2021 01:07 PM    EOSINOPHILS 2.50 12/06/2021 01:07 PM    BASOPHILS 0.50 12/06/2021 01:07 PM       Lab Results   Component Value Date/Time    ASTSGOT 15 12/06/2021 01:07 PM    ALTSGPT 14 12/06/2021 01:07 PM        Physical Exam:   GEN: No apparent distress  HEENT: Head normocephalic, atraumatic.  Sclera nonicteric bilaterally, no ocular discharge appreciated bilaterally.  CV: Extremities warm and well-perfused, no peripheral edema appreciated bilaterally.  PULMONARY: Breathing nonlabored on room air, no respiratory accessory muscle use.  Not requiring supplemental oxygen.  SKIN: No appreciable skin breakdown on exposed areas of skin.  PSYCH: Mood and affect within normal limits.  NEURO: Awake alert.  Conversational.  Logical thought content.  Somewhat confused.  Get up and go less than 10 seconds.  Mild imbalance.  Ambulatory without assistive device.      ASSESSMENT/PLAN: Shaista Bocanegra  is a 76 y.o. female with rehabilitation history significant for physical deconditioning after mitral valve repair 9/16/2021 [place] Dr. Monster Nogueira course c/b generalized tonic-clonic seizure, here for evaluation. The following plan was discussed with the patient who is in agreement.     Visit Diagnoses     ICD-10-CM   1. Physical deconditioning  R53.81   2. Vision changes  H53.9   3. S/P mitral valve repair  Z98.890   4. Seizure disorder ([facility])  G40.909   5. History of CVA (cerebrovascular accident)  Z86.73   6. Left hemiparesis (HCC)  G81.94        Rehab/Neuro/CV:   1. Physical deconditioning after mitral valve repair 9/16/2021 secondary to severe  mitral regurgitation done at [place] Dr. Monster Nogueira course c/b generalized tonic-clonic seizure  2. History of cardioembolic stroke: 2017 right frontal lobe, 5/2021 left temporal lobe in setting of atrial fibrillation  3. Left eye blurry vision  -Records reviewed as aforementioned in the HPI.  -Therapy: Continue home health through [facility]  -Private caregivers: Patient has hired caregivers from 9 AM to 1 PM and 5 PM to 9 PM to assist her mostly with meal making.  -Function: Function has baseline mild left hemiparesis from previous stroke. She feels generally deconditioned and weak and does not feel herself cognitively. States that she has memory issues. Difficulty remembering names and word recall. 12/13/2021 Patient is about at the same place she was functionally as prior.  Still with memory issues, but has a lot of assistance including friends and hired caregivers to help her.  -Driving status: Currently not driving.  Patient states she has not been driving since her stroke.  She has now had seizures.  Needs ophthalmology/optometry as well as neurology evaluation prior to consideration to return to driving, though from my evaluation today, I recommend that she does not drive if at all possible.  Would recommend DMV evaluation if recommended in the future.  -Referral: Neuro-ophthalmology for continued visual deficits.  -Referral: Neurology to reestablish with Dr. Denton through [place].  -Counseled on importance of primary care physician establishment so that they can coordinate her care for her.    Neurogenic Bladder: Urinary urgency.  Gets Botox through urology [place].  -Consultants: Continue follow-up with urology.      Follow up: As needed.  Patient has a lot of specialist that she needs to see.  From rehabilitation perspective she should continue home health therapy and transition to outpatient if appropriate.  This can be managed by primary care physician.    Total time spent was 31 minutes.  Included in this  time is the time spent preparing for the visit including record review, my exam and evaluation, counseling and education regarding that which is aforementioned in the assessment and plan. Time was spent ordering the appropriate labs, tests, procedures, referrals, medications. Included this time as the time spent obtaining history from someone other than the patient. Discussion involved the patient.     Please note that this dictation was created using voice recognition software. I have made every reasonable attempt to correct obvious errors but there may be errors of grammar and content that I may have overlooked prior to finalization of this note.    Dr. Opal Sosa DO, MS  Department of Physical Medicine & Rehabilitation  Neuro Rehabilitation Clinic  [facility]

## 2021-12-14 ENCOUNTER — PATIENT MESSAGE (OUTPATIENT)
Dept: NEUROLOGY | Facility: MEDICAL CENTER | Age: 76
End: 2021-12-14

## 2021-12-17 ENCOUNTER — NON-PROVIDER VISIT (OUTPATIENT)
Dept: NEUROLOGY | Facility: MEDICAL CENTER | Age: 76
End: 2021-12-17
Attending: STUDENT IN AN ORGANIZED HEALTH CARE EDUCATION/TRAINING PROGRAM
Payer: COMMERCIAL

## 2021-12-17 DIAGNOSIS — R56.9 SINGLE SEIZURE (HCC): ICD-10-CM

## 2021-12-17 PROCEDURE — 95819 EEG AWAKE AND ASLEEP: CPT | Performed by: PSYCHIATRY & NEUROLOGY

## 2021-12-17 PROCEDURE — 95819 EEG AWAKE AND ASLEEP: CPT | Mod: 26 | Performed by: PSYCHIATRY & NEUROLOGY

## 2021-12-17 NOTE — PROCEDURES
VIDEO ELECTROENCEPHALOGRAM REPORT      Referring provider: Dr. Noe    DOS: 12/17/21 (total recording of 23 minutes).     INDICATION:  Shaista Bocanegra 76 y.o. female presenting with strokes and single seizure     CURRENT ANTIEPILEPTIC REGIMEN: LEV    TECHNIQUE: 30 channel video electroencephalogram (EEG) was performed in accordance with the international 10-20 system. The study was reviewed in bipolar and referential montages. The recording examined the patient during   awake, drowsy and sleep states    DESCRIPTION OF THE RECORD:  During the wakefulness, the background showed a symmetrical 9 Hz alpha activity posteriorly with amplitude of 70 mV.  There was reactivity to eye closure/opening.  A normal anterior-posterior gradient was noted with faster beta frequencies seen anteriorly.  During drowsiness, theta/delta frequencies were seen.    During the sleep state,symmetrical sleep spindles and vertex sharps were seen in the leads over the central regions. No slow wave stage seen.     ACTIVATION PROCEDURES:     hyperventilation was not performed    Intermittent Photic stimulation was performed in a stepwise fashion from 1 to 30 Hz and elicited no photic driving response.     ICTAL AND/OR INTERICTAL FINDINGS:   No focal or generalized epileptiform activity noted. intermittent left and right temporal independent regional slowing was seen during this routine study.  No clinical events or seizures were reported or recorded during the study.     EKG: sampling of the EKG recording demonstrated sinus rhythm.     EVENTS: none     INTERPRETATION:    This is an abnormal video EEG recording in the awake, drowsy and sleep states. The presence of intermittent left and right temporal independent regional slowing is suggestive of focal neuronal dysfunction. No epileptiform discharge seen. This does not rule out epilepsy.  If the clinical suspicion remains high for seizures, a prolonged recording to capture clinical or subclinical  events may be helpful.    Noah Cook MD  Diplomate in Neurology&Epilepsy  Office: [phone]  Fax: [phone]

## 2022-01-12 ENCOUNTER — HOSPITAL ENCOUNTER (OUTPATIENT)
Dept: LAB | Facility: MEDICAL CENTER | Age: 77
End: 2022-01-12
Attending: FAMILY MEDICINE
Payer: COMMERCIAL

## 2022-01-12 ENCOUNTER — HOSPITAL ENCOUNTER (OUTPATIENT)
Facility: MEDICAL CENTER | Age: 77
End: 2022-01-12
Attending: UROLOGY
Payer: COMMERCIAL

## 2022-01-12 LAB
ANION GAP SERPL CALC-SCNC: 7 MMOL/L (ref 7–16)
BASOPHILS # BLD AUTO: 0.5 % (ref 0–1.8)
BASOPHILS # BLD: 0.02 K/UL (ref 0–0.12)
BUN SERPL-MCNC: 16 MG/DL (ref 8–22)
CALCIUM SERPL-MCNC: 9.4 MG/DL (ref 8.5–10.5)
CHLORIDE SERPL-SCNC: 103 MMOL/L (ref 96–112)
CHOLEST SERPL-MCNC: 115 MG/DL (ref 100–199)
CO2 SERPL-SCNC: 28 MMOL/L (ref 20–33)
CREAT SERPL-MCNC: 0.8 MG/DL (ref 0.5–1.4)
EOSINOPHIL # BLD AUTO: 0.1 K/UL (ref 0–0.51)
EOSINOPHIL NFR BLD: 2.4 % (ref 0–6.9)
ERYTHROCYTE [DISTWIDTH] IN BLOOD BY AUTOMATED COUNT: 49.2 FL (ref 35.9–50)
FASTING STATUS PATIENT QL REPORTED: NORMAL
GLUCOSE SERPL-MCNC: 89 MG/DL (ref 65–99)
HCT VFR BLD AUTO: 44.2 % (ref 37–47)
HDLC SERPL-MCNC: 50 MG/DL
HGB BLD-MCNC: 14.2 G/DL (ref 12–16)
IMM GRANULOCYTES # BLD AUTO: 0.01 K/UL (ref 0–0.11)
IMM GRANULOCYTES NFR BLD AUTO: 0.2 % (ref 0–0.9)
LDLC SERPL CALC-MCNC: 53 MG/DL
LYMPHOCYTES # BLD AUTO: 0.98 K/UL (ref 1–4.8)
LYMPHOCYTES NFR BLD: 23.7 % (ref 22–41)
MCH RBC QN AUTO: 30.9 PG (ref 27–33)
MCHC RBC AUTO-ENTMCNC: 32.1 G/DL (ref 33.6–35)
MCV RBC AUTO: 96.1 FL (ref 81.4–97.8)
MONOCYTES # BLD AUTO: 0.3 K/UL (ref 0–0.85)
MONOCYTES NFR BLD AUTO: 7.3 % (ref 0–13.4)
NEUTROPHILS # BLD AUTO: 2.72 K/UL (ref 2–7.15)
NEUTROPHILS NFR BLD: 65.9 % (ref 44–72)
NRBC # BLD AUTO: 0 K/UL
NRBC BLD-RTO: 0 /100 WBC
PLATELET # BLD AUTO: 231 K/UL (ref 164–446)
PMV BLD AUTO: 10.1 FL (ref 9–12.9)
POTASSIUM SERPL-SCNC: 4.5 MMOL/L (ref 3.6–5.5)
RBC # BLD AUTO: 4.6 M/UL (ref 4.2–5.4)
SODIUM SERPL-SCNC: 138 MMOL/L (ref 135–145)
TRIGL SERPL-MCNC: 58 MG/DL (ref 0–149)
TSH SERPL DL<=0.005 MIU/L-ACNC: 2.08 UIU/ML (ref 0.38–5.33)
VIT B12 SERPL-MCNC: >4000 PG/ML (ref 211–911)
WBC # BLD AUTO: 4.1 K/UL (ref 4.8–10.8)

## 2022-01-12 PROCEDURE — 85025 COMPLETE CBC W/AUTO DIFF WBC: CPT

## 2022-01-12 PROCEDURE — 84443 ASSAY THYROID STIM HORMONE: CPT

## 2022-01-12 PROCEDURE — 36415 COLL VENOUS BLD VENIPUNCTURE: CPT

## 2022-01-12 PROCEDURE — 80061 LIPID PANEL: CPT

## 2022-01-12 PROCEDURE — 82607 VITAMIN B-12: CPT

## 2022-01-12 PROCEDURE — 80048 BASIC METABOLIC PNL TOTAL CA: CPT

## 2022-01-12 PROCEDURE — 87086 URINE CULTURE/COLONY COUNT: CPT

## 2022-01-15 LAB
BACTERIA UR CULT: NORMAL
SIGNIFICANT IND 70042: NORMAL
SITE SITE: NORMAL
SOURCE SOURCE: NORMAL

## 2022-01-19 ENCOUNTER — APPOINTMENT (OUTPATIENT)
Dept: SLEEP MEDICINE | Facility: MEDICAL CENTER | Age: 77
End: 2022-01-19
Payer: MEDICARE

## 2022-01-25 RX ORDER — TAMSULOSIN HYDROCHLORIDE 0.4 MG/1
0.4 CAPSULE ORAL DAILY
COMMUNITY

## 2022-01-26 ENCOUNTER — OFFICE VISIT (OUTPATIENT)
Dept: INTERNAL MEDICINE | Facility: OTHER | Age: 77
End: 2022-01-26
Payer: MEDICARE

## 2022-01-26 VITALS
DIASTOLIC BLOOD PRESSURE: 74 MMHG | SYSTOLIC BLOOD PRESSURE: 132 MMHG | WEIGHT: 137.8 LBS | HEIGHT: 66 IN | BODY MASS INDEX: 22.14 KG/M2 | HEART RATE: 79 BPM | OXYGEN SATURATION: 91 % | TEMPERATURE: 98.2 F

## 2022-01-26 DIAGNOSIS — R33.9 URINARY RETENTION: ICD-10-CM

## 2022-01-26 DIAGNOSIS — I69.391 DYSPHAGIA FOLLOWING CEREBROVASCULAR ACCIDENT: ICD-10-CM

## 2022-01-26 DIAGNOSIS — I48.19 PERSISTENT ATRIAL FIBRILLATION (HCC): ICD-10-CM

## 2022-01-26 DIAGNOSIS — I10 HYPERTENSION, UNSPECIFIED TYPE: ICD-10-CM

## 2022-01-26 DIAGNOSIS — Z00.00 PREVENTATIVE HEALTH CARE: ICD-10-CM

## 2022-01-26 DIAGNOSIS — Z86.73 HISTORY OF CVA (CEREBROVASCULAR ACCIDENT): ICD-10-CM

## 2022-01-26 DIAGNOSIS — F01.50 VASCULAR DEMENTIA WITHOUT BEHAVIORAL DISTURBANCE (HCC): ICD-10-CM

## 2022-01-26 DIAGNOSIS — G47.30 SLEEP APNEA, UNSPECIFIED TYPE: ICD-10-CM

## 2022-01-26 PROCEDURE — 99215 OFFICE O/P EST HI 40 MIN: CPT | Performed by: REGISTERED NURSE

## 2022-01-26 RX ORDER — TRAZODONE HYDROCHLORIDE 50 MG/1
50 TABLET ORAL NIGHTLY
COMMUNITY
End: 2022-02-11 | Stop reason: SDUPTHER

## 2022-01-26 SDOH — HEALTH STABILITY: MENTAL HEALTH: HOW OFTEN DO YOU HAVE A DRINK CONTAINING ALCOHOL?: 2-3 TIMES A WEEK

## 2022-01-26 SDOH — SOCIAL STABILITY: SOCIAL NETWORK: HOW OFTEN DO YOU ATTENT MEETINGS OF THE CLUB OR ORGANIZATION YOU BELONG TO?: NEVER

## 2022-01-26 SDOH — SOCIAL STABILITY: SOCIAL NETWORK
DO YOU BELONG TO ANY CLUBS OR ORGANIZATIONS SUCH AS CHURCH GROUPS UNIONS, FRATERNAL OR ATHLETIC GROUPS, OR SCHOOL GROUPS?: NO

## 2022-01-26 SDOH — HEALTH STABILITY: MENTAL HEALTH: HOW MANY STANDARD DRINKS CONTAINING ALCOHOL DO YOU HAVE ON A TYPICAL DAY?: 1 OR 2

## 2022-01-26 SDOH — ECONOMIC STABILITY: FOOD INSECURITY: WITHIN THE PAST 12 MONTHS, THE FOOD YOU BOUGHT JUST DIDN'T LAST AND YOU DIDN'T HAVE MONEY TO GET MORE.: NEVER TRUE

## 2022-01-26 SDOH — SOCIAL STABILITY: SOCIAL NETWORK: ARE YOU MARRIED, WIDOWED, DIVORCED, SEPARATED, NEVER MARRIED, OR LIVING WITH A PARTNER?: DIVORCED

## 2022-01-26 SDOH — HEALTH STABILITY: PHYSICAL HEALTH: ON AVERAGE, HOW MANY DAYS PER WEEK DO YOU ENGAGE IN MODERATE TO STRENUOUS EXERCISE (LIKE A BRISK WALK)?: 4 DAYS

## 2022-01-26 SDOH — ECONOMIC STABILITY: FOOD INSECURITY: WITHIN THE PAST 12 MONTHS, YOU WORRIED THAT YOUR FOOD WOULD RUN OUT BEFORE YOU GOT MONEY TO BUY MORE.: NEVER TRUE

## 2022-01-26 SDOH — ECONOMIC STABILITY: HOUSING INSECURITY
IN THE LAST 12 MONTHS, WAS THERE A TIME WHEN YOU DID NOT HAVE A STEADY PLACE TO SLEEP OR SLEPT IN A SHELTER (INCLUDING NOW)?: NO

## 2022-01-26 SDOH — SOCIAL STABILITY: SOCIAL NETWORK: HOW OFTEN DO YOU GET TOGETHER WITH FRIENDS OR RELATIVES?: THREE TIMES A WEEK

## 2022-01-26 SDOH — ECONOMIC STABILITY: TRANSPORTATION INSECURITY
IN THE PAST 12 MONTHS, HAS THE LACK OF TRANSPORTATION KEPT YOU FROM MEDICAL APPOINTMENTS OR FROM GETTING MEDICATIONS?: NO

## 2022-01-26 SDOH — ECONOMIC STABILITY: HOUSING INSECURITY: IN THE LAST 12 MONTHS, HOW MANY PLACES HAVE YOU LIVED?: 1

## 2022-01-26 SDOH — ECONOMIC STABILITY: INCOME INSECURITY: IN THE LAST 12 MONTHS, WAS THERE A TIME WHEN YOU WERE NOT ABLE TO PAY THE MORTGAGE OR RENT ON TIME?: NO

## 2022-01-26 SDOH — HEALTH STABILITY: MENTAL HEALTH: HOW OFTEN DO YOU HAVE 6 OR MORE DRINKS ON ONE OCCASION?: NEVER

## 2022-01-26 SDOH — SOCIAL STABILITY: SOCIAL NETWORK
IN A TYPICAL WEEK, HOW MANY TIMES DO YOU TALK ON THE PHONE WITH FAMILY, FRIENDS, OR NEIGHBORS?: MORE THAN THREE TIMES A WEEK

## 2022-01-26 SDOH — HEALTH STABILITY: PHYSICAL HEALTH: ON AVERAGE, HOW MANY MINUTES DO YOU ENGAGE IN EXERCISE AT THIS LEVEL?: 30 MIN

## 2022-01-26 SDOH — ECONOMIC STABILITY: TRANSPORTATION INSECURITY
IN THE PAST 12 MONTHS, HAS LACK OF TRANSPORTATION KEPT YOU FROM MEETINGS, WORK, OR FROM GETTING THINGS NEEDED FOR DAILY LIVING?: NO

## 2022-01-26 SDOH — SOCIAL STABILITY: SOCIAL NETWORK: HOW OFTEN DO YOU ATTEND CHURCH OR RELIGIOUS SERVICES?: NEVER

## 2022-01-26 SDOH — ECONOMIC STABILITY: INCOME INSECURITY: HOW HARD IS IT FOR YOU TO PAY FOR THE VERY BASICS LIKE FOOD, HOUSING, MEDICAL CARE, AND HEATING?: NOT HARD AT ALL

## 2022-01-26 ASSESSMENT — ENCOUNTER SYMPTOMS
CHILLS: 0
NERVOUS/ANXIOUS: 0
TINGLING: 0
DIARRHEA: 0
FALLS: 0
DEPRESSION: 0
INSOMNIA: 1
DOUBLE VISION: 0
BACK PAIN: 0
HEADACHES: 0
VOMITING: 0
FEVER: 0
NAUSEA: 0
TREMORS: 1
MYALGIAS: 0
CONSTIPATION: 0
WEAKNESS: 1
COUGH: 0
HEARTBURN: 0
BLURRED VISION: 1
HALLUCINATIONS: 0
BLOOD IN STOOL: 0
ABDOMINAL PAIN: 0
WHEEZING: 0
PALPITATIONS: 0
SPEECH CHANGE: 1
WEIGHT LOSS: 0
MEMORY LOSS: 1
GENERAL WELL-BEING: FAIR
NECK PAIN: 0
FOCAL WEAKNESS: 1
SPUTUM PRODUCTION: 0
SEIZURES: 1
ORTHOPNEA: 0
HEMOPTYSIS: 0
DIZZINESS: 0
SHORTNESS OF BREATH: 0

## 2022-01-26 ASSESSMENT — SOCIAL DETERMINANTS OF HEALTH (SDOH)
HOW OFTEN DO YOU GET TOGETHER WITH FRIENDS OR RELATIVES?: TWICE A WEEK
IN A TYPICAL WEEK, HOW MANY TIMES DO YOU TALK ON THE PHONE WITH FAMILY, FRIENDS, OR NEIGHBORS?: MORE THAN THREE TIMES A WEEK
HOW OFTEN DO YOU ATTENT MEETINGS OF THE CLUB OR ORGANIZATION YOU BELONG TO?: NEVER
DO YOU BELONG TO ANY CLUBS OR ORGANIZATIONS SUCH AS CHURCH GROUPS UNIONS, FRATERNAL OR ATHLETIC GROUPS, OR SCHOOL GROUPS?: NO
HOW OFTEN DO YOU ATTEND CHURCH OR RELIGIOUS SERVICES?: NEVER

## 2022-01-26 ASSESSMENT — FIBROSIS 4 INDEX: FIB4 SCORE: 1.35

## 2022-01-26 ASSESSMENT — LIFESTYLE VARIABLES: SUBSTANCE_ABUSE: 0

## 2022-01-26 ASSESSMENT — ACTIVITIES OF DAILY LIVING (ADL): BATHING_REQUIRES_ASSISTANCE: 1

## 2022-01-26 NOTE — ASSESSMENT & PLAN NOTE
· Continue follow up with cardiology/neurology  · Be aware of signs and symptoms of CVA  · Recommend a Mediterranean diet  · Continue ST and PT post CVA  · Encourage compliance with CPAP therapy and f/u with sleep specialist

## 2022-01-26 NOTE — PROGRESS NOTES
Who manages meds? Son, Fer, is filling pill boxes. Shaista feels she could fill boxes on her own. Helpers coming in make sure she is taking meds.   Pill box? Yes  How many times a week do you forget? Rarely forgets  How many times a day? Three, would like to take meds only one day a week  Medication cost a concern? No  Beer's meds? none  FRID drugs? Tamsulosin, metoprolol, trazodone  Concerns: memory, Wants to know how to make her life easier.   Falls: none  Meds dosed appropriately for renal and liver fxn? yes  Hx of MI, stroke, peripheral vascular disease? Stroke x 2, 8/17 and 5/21  Accompanied by: son, Fer  OTC Pain Relievers: rarely APAP  AKA Tushar  - meds reduced from 17. Did not like how many meds she was taking. Son reports she is having anxiety over how many meds she is taking    A. Fib  - recent mitral value repair  - ablation x 2. Son states she is still in a fib   - metoprolol  - Apixaban    Dyslipidemia  - labs wnl 1/12/22  - atorvastatin    Hypertension  132/74  - spironolactone  - metoprolol    Neurogenic Bladder  - tamsulosin - urology managing.     Insomnia  - trazodone - started 3 months ago in rehab.  - sometimes has difficulty falling asleep. Fer is concerned that sleep pattern is not good. Not sleeping well at night and sleeping during the day. Just one recent incident when woke up in middle of night and can't fall back asleep    Dietary Supplements  - Diagnosis of osteopenia - never taken meds for osteoporosis and no recent bone density. Drinks half gallon of cows milk every 2 days. Adequate calcium and vitamin d. 1000 IU in multiple vitamin  - cyanocobalamin stopped by Fer yesterday. Was taking 25,000 mcg a day and last level was 4000. Contacted PCP and did not hear back from them.   - Ferrous sulfate (hbg low in 12/21 and normal now) - iron started about 3 months ago.   - Eye vitamins - no macular degeneration. Always taken b/c thought good for eyes  - Women's Daily Multiple Vitamin  -  Melatonin - melatonin every night. Generally no trouble falling asleep generally.     Misc  - neurology stopped keppra recently    AM Meds  Iron  Metoprolol  Spironolactone  apixaban  Eye vitamin  Multiple vitamin    Evening  tamsulosin    PM  Apixaban  Trazodone  Metoprolol  Atorvastatin  Melatonin

## 2022-01-26 NOTE — PATIENT INSTRUCTIONS
We thank you for taking the time to share during your medical visit with our team of providers at the [facility]. During the medical visit we completed a Comprehensive Geriatric Assessment with a , pharmacist, and nurse practitioner, all specialty trained in Geriatrics.     Below are our Age Friendly recommendations. Our recommendations are shaped using the 4 M’s model of care. In using the 4 M’s we approach care from starting with What Matters most to you.  We then use Mobility, Medications and Mentation to support that. Please note, our recommendations are another point on your health care journey. We hope the time we spent together helps you achieve What Matters most to you.    What Matters     During the visit you shared that you were hoping to address what will help me maintain my health. You also shared that your family and helping others gives you meaning in life and that you are looking forward to volunteer and travel.      We appreciate your openness and honesty with sharing this personal information. In helping you achieve What Matters we know that happiness, safety, support, companionship, symptom control, adequate sleep, and advanced planning can help many of us achieve What Matters. Below are some resources that we discussed in clinic that we think will help you achieve What Matters, especially some information about advance care planning.     Mentation    Behavioral Health: When we think about our behavior, we think of both of our cognitive and physical behaviors. Sometimes working through some of our thoughts or emotions can be helpful. You mentioned that you already see a counselor/therapist to discuss some of these issues. Physical activity can also help us improve our mood. We recommend you be active to help you achieve What Matters most to you.     As you work through your treatment plan, please make sure to keep active and doing those things that give you meaning  in life. Thinking, memory, mood, and mental health matter! Just like your body changes with age, so can your brain. Ways to support mentation include being engaged in meaningful activities and managing stress and anxiety. Trying new ways to relax and connect may be helpful.    memory  During your assessment, we noted that your cognition may be affecting your ability to function in daily life. Support from family and friends can be very important as you continue this part of your health care journey. Below are the specific recommendations we discussed during your visit. Please continue to follow up with your provider about any concerns or sudden changes that you or your family note.       Medications  During our assessment, we reviewed your medications to make sure they are supporting What Matters most you. Based on our discussion we thought there may be opportunity to adjust some medications to help you better achieve your health goals. When adjusting medications, we generally like to only make one change at a time. Below are some recommendations for you to discuss with your primary provider.     1. The Age Related Eye Disease Study (AREDS) and the Age Related Eye Disease Study 2 (AREDS2) are the largest clinical trials investigating the benefits of eye vitamins on age related macular degeneration (AMD). The National [facility] [place], one of the federal government’s National Institutes of Health, funded these studies. The studies used 2 different formulas of supplements: AREDS and AREDS2. These studies found that people with mild or borderline age related macular degeneration did not benefit from either formula of the supplement. Both formulas slightly lowered the risk of progression in those with intermediate or advanced AMD. Those who smoke should use the AREDS2 formula (does not have beta carotene). Beta carotene seems to increase the risk of lung cancer in people who smoke.  Nutritional supplements have not been  proven to prevent or slow the progression of cataracts.    Recommendation: If you have intermediate or advanced Age Related Macular degeneration, it may be beneficial to take the AREDS or AREDS2 supplement. Since you do not have macular degeneration, I recommend you consider stopping the eye vitamins. Exercise has been shown to have a modest protective effect against macular degeneration.     2. Consider taking the iron every other day. This will increase the absorption and decrease the chance of side effects.     3. Melatonin is a hormone that is made in the body and it plays a key role in regulating sleep patterns. Melatonin production slows as people age. Melatonin supplements appear to reduce the amount of time it takes to fall asleep and the length of time you stay sleep. It is generally considered safe to take nightly at recommended dosages. Reported adverse effects include headache, dizziness, nausea and daytime drowsiness. Too much melatonin can disrupt your sleep cycle so use the lowest effective dose. The goal is to produce melatonin levels in the body as close to natural levels as possible.  Research has shown that supplementing with higher doses causes prolonged melatonin levels that are much higher than what occurs normally in the body. This may lead to desensitization. Prolonged levels can lead to an increased risk of side effects.     Recommendation: Melatonin can cause drowsiness the next day. You are already taking trazodone to help you sleep. If you stop trazodone at some point in the future and are having trouble sleeping, consider restarting melatonin.There is considerable debate about what the best dose of melatonin is, but the most recent research shows less is better. Consider trying melatonin 0.5 mg to 1 mg 60 minutes before bedtime on an empty stomach. If 0.5 mg to 1 mg is not effective after 4 or 5 nights, you can increase the dose to a maximum of 5 mg.  More is not better in this case.      4. Simplifying your medication regimen will make it easier to take them.   Morning medications (30 minutes after you eat breakfast)  Iron  Metoprolol  Spironolactone  Eliquis  Multiple vitamin    Bedtime medicines  Eliquis  Trazodone  Metoprolol  Atorvastatin    5. Older people are at a higher risk of dehydration than younger people. This is because as you get older, you sense thirst more slowly and less strongly than a younger person does. Also, as you get older, your body has more fat. Fat tissue contains less water than lean tissue. By the time you feel thirsty, your body is already slightly dehydrated.     Recommendation: Consider trying to drink 6 to 8, eight ounce, glasses of water daily unless you are told not to do so by your healthcare provider.     6. See handout on calcium and vitamin d recommendations.    7. Re-evaluate the risks/benefits of trazodone and iron in 3 to 6 months.     Mobility   During our assessment we were happy to see how active you are! We encourage you to keep this up and consider adding some of the below recommendations. At the [facility] we focus on how mobility can help you achieve What Matter’s. In general, we recommend all adults be active every day, to the best of their ability.     Other Recommendations  Medical Recommendations:  • Continue follow up with sleep specialist for management of CPAP therapy. Remember that untreated sleep apnea can significantly increase risk of recurrent stroke or heart attack. Work with your [employer] company to find a CPAP mask that fits comfortably.   • Encourage drinking 6-8 eight oz glasses of water a day.   • Increase physical exercise. See recommendations below.   • Mediterranean diets have been known to support brain health. Try incorporating this diet into your day to day meals. See handout.   • Continue follow up with cardiology.   • Continue with speech and physical therapy.     Social Work Care Plan    • Consider completing a driving  evaluation with an occupational therapist. An option for this would be at [facility] [address] [place] [address]. Call them at [phone] to discuss further. Also check with the rehab staff at [facility]. Your medical provider will need to write an order for the Occupational Therapist to conduct the driving evaluation.    •  Consider a home safety evaluation. An option is [facility] [address] [place] [address] ([phone]). The [facility] can evaluate your home and also help install grab bars, etc. However, there is a cost that is not covered by insurance.      • Practice good sleep hygiene. Develop a pattern before going to bed. Avoid reading and watching TV in bed. Limit naps to 20 minutes during the day. Provided handout.    · Consider using meditation and relaxation techniques to help reduce stress, and assist with sleep and general emotional health. Using meditation daily can help to minimize overall stress. There are many online resources and meditation classes in town. There are also free apps you can download on any smart phone such as 'Calm' and 'Headspace'. You can also go to www.[email or web address] and search for 'Guided Meditations'.     • You can upload the completed Advance Medical Directive to [place] AMD Lockbox for providers to access; the website is: [email or web address]. The Advance Directive Registry is a simple and secure approach to ensure that your medical wishes are followed.  A copy of your advance directive will be kept confidentially and readily available to you and your health care provider, when needed, 24-7.    • Consider attending [employer]). They have educational, social, cultural, and recreational programs. The contact information is: [phone]; [email or web address]    • Volunteer options include contacting [employer]) at the [facility], [phone], [email or web address] to discuss places to volunteer.    • Continue to meet with the psychologist you have seen over the years. You have had several significant changes in your life.    • Consider participating in the [facility]'s Community Wellness Programs. Call [phone] to inquire about programs to increase strength, and balance. Provided wellness flyer.     • Start a modest exercise plan- start with one activity (rowing machine, completing the PT exercises 5 days per week) one or two times a week for 15 minutes. After one week add a third day of exercise. After three weeks (from the start) increase exercise to 30 minutes if possible. Over time, increase to five days per week for 30 minutes each time.    List of Handouts:  • Calcium/Vitamin D Supplementation, Sleep , [place] Wellness Classes, MIND Diet, [employer] ([employer]) , Powerful tools for Caregivers , Retired and Senior Volunteer Program ([employer]) , [employer] Music , Stepping On - [place] [facility] , [employer] Social  and House Calls - Medication Reminders     Referral Recommendations (to be ordered by your primary care provider)  • None at this time      To follow up with our recommendation (orders, referrals, med changes, etc) we encourage you to follow with their primary care provider before implementing these changes.

## 2022-01-26 NOTE — NON-PROVIDER
MA CGA Assessment    NAME:  Shaista Bocanegra     Patient's Primary Language: English   Patient's Care Partner(s) Name:  Fer   Care Partners(s) Relationship to Patient: Son       IADL  Legend:   1- independent  2 - need assistance  3 - unable to complete       Are you still driving? No     Are you able to prepare your own meals and/or cook, need assistance or unable to complete? 2    Are you able to do shopping and errands, need assistance or unable to complete? 3    Are you able to do housekeeping, chores, need assistance or unable to complete? 3    Are you able to do laundry, need assistance or unable to complete? 1    Are you able to manage your medications, need assistance or unable to complete? 2    Are you able to do your own money management, need assistance or unable to complete? 2    Are you able to use the phone, need assistance or unable to use the phone? 1    ADL    Are you independent, need assistance, or unable to bathe yourself?  1    Are you independent, need assistance, or unable to dress yourself? 1    Are you independent, need assistance, or unable to feed yourself? 1    Are you independent, need assistance, or unable to get up out of a chair or off a bed? 2    Are you independent, need assistance, or unable to use the toilet by yourself? 1    Are you aware when you need to use the toilet? Yes     If no, please describe the problem you are experiencing.       Assist Devices: Patient uses:  Cane: No   Walker: Yes, only uses occasionally outside   Wheelchair: No   Ostomy: No   Other tubes: No   Amputations: No   Chronic oxygen use: No   CPAP/BIPAP use: C-PAP used nightly   : Reports urinary leakage during the last 6 months that has somewhat interfered with their daily activities or sleep.  If you have a problem with continence, do you wear special garments?  Pads       Fall Screening:   Any falls within the last year? No   Any injuries associated with the falls?  -If yes, what injury?   Do you worry  about falling? No   Do you feel unsteady when standing or walking? No   You have symptoms of lightheadedness or dizziness from lying to standing? No     Any throw rugs on floor? No   Do you have stairs? Yes   Do you have handrails on all stairs. Yes   Good lighting in all hallways. Yes   Any difficulty hearing? Yes   Wears hearing aids: Yes   Any difficulty with vision? Last eye exam: 3 months ago       FRAIL SCALE    Fatigue:  How much time during the past 4 weeks did you feel tired:  1=All the time, 2=Most the time, 3=Some of the time, 4=A little of the time,  5=None of the time  Answer: 3  (responses of 1 or 2 are scored as 1 and all others as 0)  SCORE: 0    Resistance:  By yourself and not using aids, do you have any difficulty walking up 10 steps without resting?  1=Yes, 0=No  SCORE: 0    Ambulation:  By yourself and not using aids, do you have any difficulty walking a couple of blocks?  1=Yes, 0=No  SCORE: 1    Illnesses: Did a doctor ever tell you that you have: (illness)?  How many (see list)    Hypertension:Yes   Diabetes: No   Cancer (other than minor skin cancer): Yes,breast 1989  Chronic Lung Disease: No   Heart attack: No   Congestive Heart Failure: No   Angina: No   Asthma: Yes as child only   Arthritis: Yes   Stroke: Yes, 2   Kidney Disease: No     The total illnesses (0-11) are recorded as 0-4 = 0 and 5-11 = 1  Total Illness Score: 5    SCORE: 1    Loss of Weight over last year:   If noted above, one year ago, how much did you weigh:?  140  -2.2lbs  - 1.57%   (percent change is computed as: weight 1 year ago- current weight/weight 1 year ago. X 100.  (more than 5% weight loss is scored as 1, and less than 5% is 0)    Score: 0    Total Score: 2    Scorin =  Robust Health  1-2=Pre-frail  3-5=Frail    Ask if they have been admitted to the hospital in the past three month:  No       MiniCog:   Words asked: [place] Cloud   Time 6:10    1/2; 3/3 for memory recall  Total score: 4/5            Depression Screen - PHQ- 9   0 = Not at all, 1 = Several days,  2 = More than half the days,  3 = Nearly every day     Little interest or pleasure in doing things? 1  Feeling down, depressed , or hopeless? 0  Trouble falling or staying asleep, or sleeping too much?  2  Feeling tired or having little energy? 2  Poor appetite or overeating?  2  Feeling bad about yourself - or that you are a failure or have let yourself or your family down?  0  Trouble concentrating on things, such as reading the newspaper or watching television? 2  Moving or speaking so slowly that other people could have noticed.  Or the opposite - being so fidgety or restless that you have been moving around a lot more than usual? 2  Thoughts that you would be better off dead, or of hurting yourself? 0  Patient Health Questionnaire Score:  11    Anxiety Screen/YOSELIN-7 Questionnaire  0 = Not at all, 1 = Several days,  2 = More than half the days,  3 = Nearly every day     Feeling nervous, anxious, or on edge: 0  Not being able to stop or control worryin  Worrying too much about different things: 0  Trouble relaxin  Being so restless that it's hard to sit still: 0  Becoming easily annoyed or irritable: 3  Feeling afraid as if something awful might happen: 2  Total: 5    Interpretation of YOSELIN 7 Total Score   Score Severity :  0-4 No Anxiety   5-9 Mild Anxiety  10-14 Moderate Anxiety  15-21 Severe Anxiety      I

## 2022-01-26 NOTE — PROGRESS NOTES
"  Interdisciplinary Comprehensive Geriatric Assessment (CGA) - [facility]    Brief Overview of assessment:    Our comprehensive geriatric assessment (CGA) team is comprised of a medical assistant (MA), medical provider, pharmacist, and , all of whom create a note during the assessment. The patient's assessment starts with the MA who screens the patient for many common geriatric syndromes. If possible, the MA does these assessments with the patient alone. The MA then introduces the patient and (s) to the rest of the CGA team and shares information collected during the screening assessments. The CGA team then completes the assessment and provides recommendations over the next 90 minutes.  We provide recommendations modeling that of an Age-Friendly Health System with the 4 Ms of Care (What Matters, Mentation, Medications, and Mobility). For any questions about our assessment or recommendations, please reach out to our office ([phone]). To learn more about providing age friendly care to your patients consider visiting this web site to learn more. http://www.ihi.org/Engage/Initiatives/Age-Friendly-Health-Systems/Pages/default.    In general, we encourage patients to follow-up with their primary care provider prior to implementing the recommendations (orders, referrals, med changes, etc) discussed during the visit today. See A/P and patient instructions below.      Visit Note:  Chief Complaint   Patient presents with   • Health Risk Assessments For Seniors     [facility]      Patient Name: Shaista \"Tushar\" Monico Bocanegra  Patient Age; 76 y.o.  Date of Consult: 1/26/2022  Referring Provider: Previous PCP: Lyn Woods MD  PCP: Capri Simon M.D.    Interdisciplinary Geriatric Consult Provided By:   MOLLY Sanchez, Pharm D  Nisreen Downey, PhD, [facility]    Assessment and Plan:   Dysphagia following cerebrovascular " "accident  · Continue follow up with Speech Therapy    History of CVA (cerebrovascular accident)  · Continue follow up with cardiology/neurology  · Be aware of signs and symptoms of CVA  · Recommend a Mediterranean diet  · Continue ST and PT post CVA  · Encourage compliance with CPAP therapy and f/u with sleep specialist    Hypertension  · Continue follow up with cardiology and/PCP    Sleep apnea  · Continue follow up with sleep specialist and encourage CPAP therapy compliance    Atrial fibrillation (HCC)  · Post ablation and mitral valve repair. Currently stable and continues f/u with cardiology.     Urinary retention  · Continues follow up with       History of Present Illness:   \"Jan\" is here with her semaj Monroe for a comprehensive geriatric assessment. They are here referred by her PCP with concerns about her memory. Her primary language is English. Her son is most recently more involved in her care and is interested in making sure she gets as many resources as possible r/t her deficits associated with chronic illnesses and recent strokes. She has a-fib and had recent mitral valve repair and ablation. She continues in a-fib and close monitoring by her cardiology team. She is establishing with a new PCP, Dr. Simon. She is  but still has a close relationship with her ex-. She has 2 step children and 2 biological children. She lives in a [facility]. She feels comfortable and safe in her home although her son worries about the stair access and her physical overall abilities.     Wakes up around 7:30 am. Has home aid come by around 9am. She participates in some home chores and then watches TV. Participates in PT with home aid. Goes to sleep by 10 pm. Has periodic insomnia. Does not like using her CPAP. Feels fatigue in the afternoons. Takes a nap in the afternoon while watching TV easily. Naps for 30 min to 1 hour. Does not plan intentional naps.     She is a retired . Retired 1 month " ago and feels that she was very happy in her career. She has a durable power of  for health care completed. She also has a trust and her former law partner is her . She is financially comfortable and has no major concerns regarding her finances at this time. She feels some boredom in her life due to her recent medical changes as well as the covid pandemic which has caused some isolation. She is interested in doing some volunteer work for [employer]. Her children and grandchildren are what matters to her.     Short term memory deficits include forgetting peoples names, misplacing belongings and occasionally forgetting to turn off stove. Son noticed more memory deficits after most recent stroke 2021. Had seizure post operatively after mitral valve repair and was started on Keppra which has since been discontinued. Uses CPAP machine nightly but does not    History provided by:Patient and Child(tanvi)  Patient is a Fair historian    76 y.o. female with Past Medical History:  9/28/2021: Anemia  No date: Arthritis      Comment:  toe  No date: Atrial fibrillation ([facility])  3/19/2012: Benign essential HTN  No date: Breast cancer ([facility])  1998: Cancer ([facility])      Comment:  breast   No date: Cardiac arrhythmia  3/19/2012: Chest tightness or pressure  No date: Chickenpox  No date: Coronary heart disease  No date: Citizen of the [place] measles  No date: Gynecological disorder  No date: High cholesterol  3/19/2012: High risk medication use  3/19/2012: Hypercholesterolemia  No date: Mumps  3/19/2012: MVP (mitral valve prolapse)  No date: Osteoporosis  9/28/2021: Seizure disorder ([facility])      Comment:  last seizure may 2021  No date: Sleep apnea      Comment:  CPAP at night  No date: Snoring  2017: Stroke ([facility])      Comment:  residual minor weakness on left side  No date: Substance abuse ([facility])  No date: Tonsillitis  No date: Urinary bladder disorder      Comment:  OAB  No date: Urinary incontinence  No date: Valvular heart  disease  No date: Venereal disease   5/2021 Left temporal lobe CVA      Review of System:   Review of Systems   Constitutional: Positive for malaise/fatigue. Negative for chills, fever and weight loss.   HENT: Positive for hearing loss. Negative for congestion and nosebleeds.    Eyes: Positive for blurred vision. Negative for double vision.   Respiratory: Negative for cough, hemoptysis, sputum production, shortness of breath and wheezing.    Cardiovascular: Negative for chest pain, palpitations, orthopnea and leg swelling.   Gastrointestinal: Negative for abdominal pain, blood in stool, constipation, diarrhea, heartburn, melena, nausea and vomiting.   Genitourinary: Positive for frequency and urgency. Negative for dysuria.   Musculoskeletal: Negative for back pain, falls, joint pain, myalgias and neck pain.   Skin: Negative for itching and rash.   Neurological: Positive for tremors, speech change, focal weakness, seizures and weakness. Negative for dizziness, tingling and headaches.   Psychiatric/Behavioral: Positive for memory loss. Negative for depression, hallucinations, substance abuse and suicidal ideas. The patient has insomnia. The patient is not nervous/anxious.       Hearing - wears bilateral hearing aids  Vision - is pending new eye rx refill  Appetite - fair. Has home aids 4x week that help with cooking and encouraging meals  Weight Loss  Diet - eats a lot of snacks. Enjoys eating socially but that has not been as possible lately.   Exercise - PT 4 days a week.   Dysphagia  Memory - impaired short term. Vascular dementia  Fatigue - yes in afternoons. Takes naps during day 30-60 min  Sleep - LALY/CPAP use  Dental Problems - no   Constipation - occasional  Diarrhea - no   Urinary Incontinence - yes uses pads. Continues follow up with   Falls - no   Dizziness/lightheadedness  Mood - boredom   SI - no   Any personal history of depression/anxiety/psychiatric disorder     Cognitive ROS  Memory concerns -  yes  Advance Directives - completed  History of TBI - no   Hallucinations - no   Tremor - mild left hand  Rigidity - no   Gait Changes - no   Behavior/personality changes - no   Alcohol use - 1 drink 2-3 x week  History of PVD/MI/Stroke - 8/2017 right frontal lobe CVA, 5/2021 left temporal lobe CVA, residual weakness of LL extremity  FH of dementia - no   Previous labs - yes reviewed  Previous Head Imaging - yes CT and MRI head/brain. See results below.     Screening for Cognitive Impairment    Three Minute Recall (captain, garden, picture)3  /3    Mikal clock face with all 12 numbers and set the hands to show 5 past 8.  No    Cognitive concerns identified deferred for follow up unless specifically addressed in assessment and plan.    Fall Risk Assessment    Has the patient had two or more falls in the last year or any fall with injury in the last year?  No    Safety Assessment    Throw rugs on floor.  Yes  Handrails on all stairs.  Yes  Good lighting in all hallways.  Yes  Difficulty hearing.  Yes  Patient counseled about all safety risks that were identified.    Functional Assessment ADLs    Are there any barriers preventing you from cooking for yourself or meeting nutritional needs?  Yes.    Are there any barriers preventing you from driving safely or obtaining transportation?  Yes.    Are there any barriers preventing you from using a telephone or calling for help?  No.    Are there any barriers preventing you from shopping?  Yes.    Are there any barriers preventing you from taking care of your own finances?  Yes.    Are there any barriers preventing you from managing your medications?  Yes.    Are there any barriers preventing you from showering, bathing or dressing yourself? Yes.    Are you currently engaging in any exercise or physical activity?  Yes.     What is your perception of your health?  Fair.    Health Maintenance Summary          Overdue - IMM ZOSTER VACCINES (2 of 3) Overdue since 9/15/2012     07/21/2012  Imm Admin: Zoster Vaccine Live (ZVL) (Zostavax) - HISTORICAL DATA          Scheduled - MAMMOGRAM (Yearly) Scheduled for 2/17/2022    02/10/2021  MA-SCREENING MAMMO BILAT W/TOMOSYNTHESIS W/CAD    02/21/2020  MA-SCREENING MAMMO BILAT W/TOMOSYNTHESIS W/CAD    02/12/2019  MA-SCREENING MAMMO BILAT W/TOMOSYNTHESIS W/CAD    02/06/2018  MA-MAMMO SCREENING BILAT W/FRAN W/CAD    02/01/2017  MA-SCREEN MAMMO W/CAD-BILAT    Only the first 5 history entries have been loaded, but more history exists.          PAP SMEAR (Every 3 Years) Tentatively due on 3/5/2024    03/05/2021  THINPREP PAP WITH HPV    03/05/2021  Pathology Gynecology Specimen          BONE DENSITY (Every 5 Years) Tentatively due on 5/31/2024 05/31/2019  DS-BONE DENSITY STUDY (DEXA)    05/11/2018  DS-BONE DENSITY STUDY (DEXA)    02/05/2016  DS-BONE DENSITY STUDY (DEXA)    03/05/2014  DS-BONE DENSITY STUDY (DEXA)    12/15/2006  DS-BONE DENSITY STUDY (DEXA)    Only the first 5 history entries have been loaded, but more history exists.          IMM DTaP/Tdap/Td Vaccine (3 - Td or Tdap) Next due on 3/20/2028    03/20/2018  Imm Admin: Tdap Vaccine    07/24/2013  Imm Admin: Tdap Vaccine    02/09/2006  Imm Admin: TD Vaccine          IMM HEP B VACCINE (Series Information) Aged Out    09/05/2006  Imm Admin: Hep A/HEP B Combined Vaccine (TwinRix)    04/04/2006  Imm Admin: Hep A/HEP B Combined Vaccine (TwinRix)    02/09/2006  Imm Admin: Hep A/HEP B Combined Vaccine (TwinRix)          IMM INFLUENZA (Series Information) Completed    12/21/2021  Outside Immunization: Fluzone High-Dose Quad    10/22/2021  Imm Admin: Influenza Vaccine Adult HD    09/17/2020  Imm Admin: Influenza Vaccine Quad Inj (Pf)    10/11/2018  Imm Admin: Influenza, Unspecified - HISTORICAL DATA    01/09/2018  Imm Admin: Influenza Vaccine Quad Inj (Pf)    Only the first 5 history entries have been loaded, but more history exists.          IMM PNEUMOCOCCAL VACCINE: 65+ Years (Series Information)  Completed    12/21/2021  Outside Immunization: PCV-13 (Prevnar 13)    08/15/2017  Imm Admin: Pneumococcal Vaccine (PCV7) - HISTORICAL DATA    11/03/2016  Imm Admin: Pneumococcal Conjugate Vaccine (Prevnar/PCV-13)    06/08/2015  Imm Admin: Pneumococcal polysaccharide vaccine (PPSV-23)    01/01/2008  Imm Admin: Pneumococcal polysaccharide vaccine (PPSV-23)          COVID-19 Vaccine (Series Information) Completed    01/06/2022  Imm Admin: Moderna SARS-CoV-2 Vaccine    02/22/2021  Imm Admin: COVID-19 Vaccine, unspecified - HISTORICAL DATA    01/23/2021  Imm Admin: COVID-19 Vaccine, unspecified - HISTORICAL DATA          IMM MENINGOCOCCAL VACCINE (MCV4) (Series Information) Aged Out    No completion history exists for this topic.          Discontinued - COLORECTAL CANCER SCREENING  Discontinued    09/29/2021  OCCULT BLOOD X3 (STOOL)                Patient Care Team:  Capri Simon M.D. as PCP - General (Family Medicine)  [facility] as Home Health Provider  Lynette Laurent M.D. (Internal Medicine)  [facility] [facility]  as Respiratory Therapist (DME Supplier)    IADL  Legend:   1- independent  2 - need assistance  3 - unable to complete         Are you still driving? No      Are you able to prepare your own meals and/or cook, need assistance or unable to complete? 2     Are you able to do shopping and errands, need assistance or unable to complete? 3     Are you able to do housekeeping, chores, need assistance or unable to complete? 3     Are you able to do laundry, need assistance or unable to complete? 1     Are you able to manage your medications, need assistance or unable to complete? 2     Are you able to do your own money management, need assistance or unable to complete? 2     Are you able to use the phone, need assistance or unable to use the phone? 1     ADL     Are you independent, need assistance, or unable to bathe yourself?  1     Are you independent, need assistance, or unable to dress yourself? 1     Are you  independent, need assistance, or unable to feed yourself? 1     Are you independent, need assistance, or unable to get up out of a chair or off a bed? 2     Are you independent, need assistance, or unable to use the toilet by yourself? 1     Are you aware when you need to use the toilet? Yes      I reviewed the patient's MiniCog, PHQ9, GAD7, falls, and frailty screens as documented in the Medical Assistant's note.   FRAIL Scale Score: 1/5  Mini Cog - 4/5  PHQ 9 score - 11  YOSELIN 7 - 5    Social History     Socioeconomic History   • Marital status:      Spouse name: Not on file   • Number of children: 2   • Years of education: Not on file   • Highest education level: Professional school degree (e.g., MD, DDS, DVM, ARACELI)   Occupational History   • Occupation:      Employer: Not Employed   Tobacco Use   • Smoking status: Never Smoker   • Smokeless tobacco: Never Used   Vaping Use   • Vaping Use: Never used   Substance and Sexual Activity   • Alcohol use: Yes     Alcohol/week: 1.2 oz     Types: 2 Glasses of wine per week     Comment: twice a week   • Drug use: No   • Sexual activity: Yes     Partners: Male     Birth control/protection: Female Sterilization   Other Topics Concern   • Not on file   Social History Narrative   • Not on file     Social Determinants of Health     Financial Resource Strain: Low Risk    • Difficulty of Paying Living Expenses: Not hard at all   Food Insecurity: No Food Insecurity   • Worried About Running Out of Food in the Last Year: Never true   • Ran Out of Food in the Last Year: Never true   Transportation Needs: No Transportation Needs   • Lack of Transportation (Medical): No   • Lack of Transportation (Non-Medical): No   Physical Activity: Insufficiently Active   • Days of Exercise per Week: 4 days   • Minutes of Exercise per Session: 30 min   Stress:    • Feeling of Stress : Not on file   Social Connections: Socially Isolated   • Frequency of Communication with Friends and  "Family: More than three times a week   • Frequency of Social Gatherings with Friends and Family: Twice a week   • Attends Judaism Services: Never   • Active Member of Clubs or Organizations: No   • Attends Club or Organization Meetings: Never   • Marital Status:    Intimate Partner Violence:    • Fear of Current or Ex-Partner: Not on file   • Emotionally Abused: Not on file   • Physically Abused: Not on file   • Sexually Abused: Not on file   Housing Stability: Low Risk    • Unable to Pay for Housing in the Last Year: No   • Number of Places Lived in the Last Year: 1   • Unstable Housing in the Last Year: No       Family History   Problem Relation Age of Onset   • Cancer Mother         breast   • No Known Problems Brother    • Heart Failure Neg Hx    • Heart Disease Neg Hx      Patient has no known allergies.    Current Outpatient Medications   Medication Instructions   • apixaban (ELIQUIS) 5 mg, Oral, 2 TIMES DAILY   • atorvastatin (LIPITOR) 80 mg, Oral, EVERY EVENING   • ferrous sulfate 325 mg, Oral, EVERY MORNING WITH BREAKFAST   • levetiracetam (KEPPRA) 1,000 mg, Oral, EVERY 12 HOURS   • Melatonin 10 mg, Oral, EVERY BEDTIME   • metoprolol tartrate (LOPRESSOR) 12.5 mg, Oral, 2 TIMES DAILY   • Multiple Vitamin (MULTIVITAMINS PO) Oral, DAILY   • Multiple Vitamins-Minerals (ICAPS AREDS 2 PO) Oral, DAILY   • omeprazole (PRILOSEC) 20 mg, Oral, DAILY, Take before breakfast for 30 days post ablation to protect your esophagus.   • spironolactone (ALDACTONE) 25 mg, Oral, DAILY   • tamsulosin (FLOMAX) 0.4 mg, Oral, AFTER BREAKFAST       Physical Exam:   /74 (BP Location: Left arm, Patient Position: Sitting, BP Cuff Size: Adult)   Pulse 79   Temp 36.8 °C (98.2 °F)   Ht 1.674 m (5' 5.9\")   Wt 62.5 kg (137 lb 12.8 oz)   SpO2 91%   BMI 22.31 kg/m²   Physical Exam  Constitutional:       General: She is not in acute distress.     Appearance: Normal appearance. She is normal weight.   HENT:      Head: " Normocephalic.   Eyes:      Conjunctiva/sclera: Conjunctivae normal.      Pupils: Pupils are equal, round, and reactive to light.   Pulmonary:      Effort: Pulmonary effort is normal.   Musculoskeletal:         General: Normal range of motion.      Cervical back: Normal range of motion.   Skin:     General: Skin is warm and dry.   Neurological:      Mental Status: She is alert. Mental status is at baseline.   Psychiatric:         Mood and Affect: Mood normal.         Behavior: Behavior normal.         Thought Content: Thought content normal.         Judgment: Judgment normal.       Labs;  CBC:  Lab Results   Component Value Date/Time    WBC 4.1 (L) 01/12/2022 10:40 AM    RBC 4.60 01/12/2022 10:40 AM    HEMOGLOBIN 14.2 01/12/2022 10:40 AM    HEMATOCRIT 44.2 01/12/2022 10:40 AM    MCV 96.1 01/12/2022 10:40 AM    MCH 30.9 01/12/2022 10:40 AM    MCHC 32.1 (L) 01/12/2022 10:40 AM    MPV 10.1 01/12/2022 10:40 AM    NEUTSPOLYS 65.90 01/12/2022 10:40 AM    LYMPHOCYTES 23.70 01/12/2022 10:40 AM    MONOCYTES 7.30 01/12/2022 10:40 AM    EOSINOPHILS 2.40 01/12/2022 10:40 AM    BASOPHILS 0.50 01/12/2022 10:40 AM      BMP:   Lab Results   Component Value Date/Time    SODIUM 138 01/12/2022 10:40 AM    POTASSIUM 4.5 01/12/2022 10:40 AM    CHLORIDE 103 01/12/2022 10:40 AM    CO2 28 01/12/2022 10:40 AM    GLUCOSE 89 01/12/2022 10:40 AM    BUN 16 01/12/2022 10:40 AM    CREATININE 0.80 01/12/2022 10:40 AM    BUNCREATRAT 12 06/12/2020 05:12 AM      LFT:   Lab Results   Component Value Date/Time    ASTSGOT 15 12/06/2021 01:07 PM    ALTSGPT 14 12/06/2021 01:07 PM    TBILIRUBIN 0.3 12/06/2021 01:07 PM    ALBUMIN 4.3 12/06/2021 01:07 PM    TOTPROTEIN 6.9 12/06/2021 01:07 PM    ALKPHOSPHAT 77 12/06/2021 01:07 PM      Calcium:   Lab Results   Component Value Date/Time    CALCIUM 9.4 01/12/2022 10:40 AM     VIT D:   Lab Results   Component Value Date/Time    25HYDROXY 49 09/29/2021 0539     TSH:   Lab Results   Component Value Date/Time     TSHULTRASEN 2.080 01/12/2022 1040     THYROXINE (T4): No results found for: FREET4  A1c:   Lab Results   Component Value Date/Time    HBA1C 5.7 (H) 08/19/2021 1312    AVGLUC 117 08/19/2021 1312     Lipids:   Lab Results   Component Value Date/Time    CHOLSTRLTOT 115 01/12/2022 10:40 AM    TRIGLYCERIDE 58 01/12/2022 10:40 AM    HDL 50 01/12/2022 10:40 AM    LDL 53 01/12/2022 10:40 AM       Imaging:  Results for orders placed during the hospital encounter of 09/04/21    CT-HEAD W/O    Impression  1. Ill-defined hypodensity in the left parietal lobe could relate to recent infarct. Further evaluation with MRI recommended.    2. No acute intracranial hemorrhage.    Results for orders placed during the hospital encounter of 08/18/17    MR-BRAIN-W/O    Impression  1.  Moderate sized RIGHT MCA territory acute ischemia involving the cortex and basal ganglia  2.  Flow void is present in the RIGHT MCA compatible with treatment effect  3.  No hemorrhage  4.  Mild white matter changes  5.  Mild atrophy    Results for orders placed during the hospital encounter of 05/31/21    MR-BRAIN-WITH & W/O    Impression  1.  Small area of acute infarct in the left temporal lobe.  2.  Chronic infarct in the right insular cortex and frontal lobe.  3.  Mild chronic microvascular ischemic disease.  4.  Mild cerebral volume loss.    My total time spent caring for the patient on the day of the encounter was 90 minutes.   This does not include time spent on separately billable procedures/tests.    This note was partially dictated with voice recognition software, for any confusion please do not hesitate to contact me.

## 2022-01-26 NOTE — PROGRESS NOTES
Participants in Assessment  Patient and her son Fer were present for the assessment.    Patient Concerns  Expect you guys to know how to make my life easier.  I did not make a list of things to discuss with the team.    Family Concerns  How much care does she need.  Safety about living on the top floor of a condo and getting out in an emergency.  Not too concerned about her health now.    Family & Support System    Marriage- - 22 years ago- former  is helpful; he calls daily    Children (First name; state where living)  Fer (step)- Nate Felipe (step)- [place]  Bobby- Nate Ojeda- [place]    Maxim Social Network Scale    Family score = 10   Social score = 15   Total score = 25  Not at risk of social isolation; she is not visiting in person with people due to COVID concerns.    Current Living Environment    Description of home and household members (single story, etc.)  A con[place] in [facility] by self for 15 years.  It is a fabulous space; I remodeled it.    Safety    • Do you feel safe in home? Yes; I don't take chances; standby assist for shower.    • Do you have any assistive or adaptive equipment in your home? Such as handrails, grab bars, bath seat. Has grab bars; a shower bench. Fer set them up after her hospitalization.    • Do you feel safe in neighborhood? Yes; I don't go out at night.      Typical day (Sleep routine; activities)  Sets alarm for 7-7:30; has breakfast; fixed by helpers.   Straighten up the house; watch TV  Completed her PT exercises when helpers are there.  Goes to bed by 10.  Uses CPAP.  Son says her sleep cycle got off when in [facility]  Falls asleep when watching TV ~ 30 minutes    Appetite  I am a muncher; eats at least two meals per day  The helpers prepare 3 meals per day.    Exercise and social activity  PT exercises; the helpers work with her to complete them.  No longer seen by a PT.  Walks in the hallway.    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 No      Advance Directives    • Do you have Advanced Directives? Yes  o Agents for [employer]- Bobby Monroe Leah o Agents for [employer] Finances- Bobby Monroe Leah  o Has a Trust- an  friend is the Executor    Economic Situation    What are your sources of income?  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    Monthly income: $10,000    Is income sufficient to meet monthly expenses? Yes      Behavioral Health     Alcohol consumption- Yes, two times per week; gin and tonic or wine   Use of street drugs- No  Use of medicinal marijuana or CBD- No     Does patient report a history of prior behavioral health treatment for patient?  Sees a psychologist when I need to see her.      Family history of behavioral health treatment- step dad was alcoholic.     Mood in the last two weeks. I am pretty happy but bored.    Suicidal Ideation- No    MENTAL STATUS/OBSERVATIONS   Participation: Active verbal participation  Grooming: Good, Casual and Neat  Orientation:Alert and Fully Oriented   Behavior: Calm  Eye contact: Good   Mood:Euthymic  Affect:Flexible and Full range  Thought process: Logical and Goal-directed  Thought content:  Within normal limits  Speech: Some difficulty with word recall post CVA.  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    Plans & Goals  Volunteer- [employer]  Travel    Summary    Patient was pleasant and cooperative; her son  Fer (step) was involved and supportive. Patient is cautious- she does not go outside of her apartment at night, she does not meet with friends due to concerns about COVID. Fer want to be sure she has the support she needs; she currently has help 4 hours per day for 4 days per week. They applied to have her LTC insurance pay the cost of this care. Although she has contact with family and friends, she reports she is bored; she had been working up until about a month ago when she was let go.      Social Work Care Plan    • Consider completing a driving evaluation with an occupational therapist. An option for this would be at [facility] [address] [place] [address] [place] [address]. Call them at [phone] to discuss further. Also check with the rehab staff at [facility]. Your medical provider will need to write an order for the Occupational Therapist to conduct the driving evaluation.    •  Consider a home safety evaluation. An option is [facility] [id number] [place] [address] ([phone]). The [facility] can evaluate your home and also help install grab bars, etc. However, there is a cost that is not covered by insurance.      • Practice good sleep hygiene. Develop a pattern before going to bed. Avoid reading and watching TV in bed. Limit naps to 20 minutes during the day. Provided handout.    · Consider using meditation and relaxation techniques to help reduce stress, and assist with sleep and general emotional health. Using meditation daily can help to minimize overall stress. There are many online resources and meditation classes in [facility]. There are also free apps you can download on any smart phone such as 'Calm' and 'Headspace'. You can also go to [email or web address] and search for 'Guided Meditations'.     • You can upload the completed Advance Medical Directive to [place] AMD Lockbox for providers to access; the website is: https://www.[place]vsos.gov/sos/online-services/[email or web address]-lockbox. The Advance  Directive Registry is a simple and secure approach to ensure that your medical wishes are followed.  A copy of your advance directive will be kept confidentially and readily available to you and your health care provider, when needed, 24-7.    • Consider attending "[employer] ([employer]). They have educational, social, cultural, and recreational programs. The contact information is: [phone]; [email or web address]    • Volunteer options include contacting [employer]) at the [facility], [phone], [email or web address] to discuss places to volunteer.    • Continue to meet with the psychologist you have seen over the years. You have had several significant changes in your life.    • Consider participating in the [facility]'s Community Wellness Programs. Call [phone] to inquire about programs to increase strength, and balance. Provided wellness flyer.     • Start a modest exercise plan- start with one activity (rowing machine, completing the PT exercises 5 days per week) one or two times a week for 15 minutes. After one week add a third day of exercise. After three weeks (from the start) increase exercise to 30 minutes if possible. Over time, increase to five days per week for 30 minutes each time.      What Matters    During the visit you shared that you were hoping to address what will help me maintain my health. You also shared that your family and helping others gives you meaning in life and that you are looking forward to volunteer and travel.    We appreciate your openness and honesty with sharing this personal information. In helping you achieve What Matters we know that happiness, safety, support, companionship, symptom control, adequate sleep, and advanced planning can help many of us achieve What Matters. Below are some resources that we discussed in clinic that we think will help you achieve What Matters, especially some information  about advance care planning.

## 2022-01-26 NOTE — ASSESSMENT & PLAN NOTE
· The Age Related Eye Disease Study (AREDS) and the Age Related Eye Disease Study 2 (AREDS2) are the largest clinical trials investigating the benefits of eye vitamins on age related macular degeneration (AMD). The National [employer], one of the federal government’s National Institutes of Health, funded these studies. The studies used 2 different formulas of supplements: AREDS and AREDS2. These studies found that people with mild or borderline age related macular degeneration did not benefit from either formula of the supplement. Both formulas slightly lowered the risk of progression in those with intermediate or advanced AMD. Those who smoke should use the AREDS2 formula (does not have beta carotene). Beta carotene seems to increase the risk of lung cancer in people who smoke.  Nutritional supplements have not been proven to prevent or slow the progression of cataracts.    Recommendation: If you have intermediate or advanced Age Related Macular degeneration, it may be beneficial to take the AREDS or AREDS2 supplement. Since you do not have macular degeneration, I recommend you consider stopping the eye vitamins. Exercise has been shown to have a modest protective effect against macular degeneration.   · Consider taking the iron every other day. This will increase the absorption and decrease the chance of side effects.     · Melatonin is a hormone that is made in the body and it plays a key role in regulating sleep patterns. Melatonin production slows as people age. Melatonin supplements appear to reduce the amount of time it takes to fall asleep and the length of time you stay sleep. It is generally considered safe to take nightly at recommended dosages. Reported adverse effects include headache, dizziness, nausea and daytime drowsiness. Too much melatonin can disrupt your sleep cycle so use the lowest effective dose. The goal is to produce melatonin levels in the body as close to natural levels as possible.   Research has shown that supplementing with higher doses causes prolonged melatonin levels that are much higher than what occurs normally in the body. This may lead to desensitization. Prolonged levels can lead to an increased risk of side effects.     Recommendation: Melatonin can cause drowsiness the next day. You are already taking trazodone to help you sleep. If you stop trazodone at some point in the future and are having trouble sleeping, consider restarting melatonin.There is considerable debate about what the best dose of melatonin is, but the most recent research shows less is better. Consider trying melatonin 0.5 mg to 1 mg 60 minutes before bedtime on an empty stomach. If 0.5 mg to 1 mg is not effective after 4 or 5 nights, you can increase the dose to a maximum of 5 mg.  More is not better in this case.     · Simplifying your medication regimen will make it easier to take them.   Morning medications (30 minutes after you eat breakfast)  Iron  Metoprolol  Spironolactone  Eliquis  Multiple vitamin    Bedtime medicines  Eliquis  Trazodone  Metoprolol  Atorvastatin    · Older people are at a higher risk of dehydration than younger people. This is because as you get older, you sense thirst more slowly and less strongly than a younger person does. Also, as you get older, your body has more fat. Fat tissue contains less water than lean tissue. By the time you feel thirsty, your body is already slightly dehydrated.     · See handout on calcium and vitamin d recommendations.    · Re-evaluate the risks/benefits of trazodone and iron in 3 to 6 months.     • Consider completing a driving evaluation with an occupational therapist. An option for this would be at [facility] [address] [place] [address]. Call them at [phone] to discuss further. Also check with the rehab staff at [facility]. Your medical provider will need to write an order for the Occupational Therapist to conduct the driving  evaluation.    •  Consider a home safety evaluation. An option is [facility] [address] [place] [address] ([phone]). The [facility] can evaluate your home and also help install grab bars, etc. However, there is a cost that is not covered by insurance.      • Practice good sleep hygiene. Develop a pattern before going to bed. Avoid reading and watching TV in bed. Limit naps to 20 minutes during the day. Provided handout.    · Consider using meditation and relaxation techniques to help reduce stress, and assist with sleep and general emotional health. Using meditation daily can help to minimize overall stress. There are many online resources and meditation classes in town. There are also free apps you can download on any smart phone such as 'Calm' and 'Headspace'. You can also go to [email or web address] and search for 'Guided Meditations'.     • You can upload the completed Advance Medical Directive to [facility] for providers to access; the website is: [email or web address]. The Advance Directive Registry is a simple and secure approach to ensure that your medical wishes are followed.  A copy of your advance directive will be kept confidentially and readily available to you and your health care provider, when needed, 24-7.    • Consider attending [employer] ([employer]). They have educational, social, cultural, and recreational programs. The contact information is: [phone]; [email or web address]    • Volunteer options include contacting [employer]) at the [facility], [phone], [email or web address] to discuss places to volunteer.    • Continue to meet with the psychologist you have seen over the years. You have had several significant changes in your life.    • Consider participating in the [facility]'s Community Wellness Programs. Call [phone] to inquire about programs to increase  strength, and balance. Provided wellness flyer.     • Start a modest exercise plan- start with one activity (rowing machine, completing the PT exercises 5 days per week) one or two times a week for 15 minutes. After one week add a third day of exercise. After three weeks (from the start) increase exercise to 30 minutes if possible. Over time, increase to five days per week for 30 minutes each time.

## 2022-02-07 NOTE — PROGRESS NOTES
"Subjective:   Chief Complaint:   Chief Complaint   Patient presents with   • Atrial Fibrillation     F/V Dx: Persistent atrial fibrillation (HCC)   • Hypertension   • Dyslipidemia       \"Artemio\" Shaista Bocanegra is a 76 y.o. female who returns for moderate to severe mitral regurgitation, s/p minimally invasive complex MV repair [place]/KYUNG appendage oversew/maze, hypertension, hyperlipidemia, prior CVAx2, HTN , PAF, ablation of PAF.    Admitted to the hospital 5/8/2020 with dehydration on diuretics.  Has left and right heart catheterization that admission move mitral clip for severe mitral regurgitation.  Took a while for her to decide if she wanted to enter a trial for noninvasive mitral valve repair versus surgery.  Ultimately only underwent surgery with Dr. Monster Nogueira at [place] on 9/16/2021:  1. Minimally invasive complex mitral valve repair with A2 [place]-Miguel Angel neochordal reconstruction, A3 [place]-Miguel Angel neochordal reconstruction, P2 posterior ventricular anchoring remodeling suture and non-resectional leaflet remodeling and [place]-Miguel Angel NeoChord x2, P3 [place]-Miguel Angel NeoChord x1, and #34 sewing ring annuloplasty  2. Atrial fibrillation maze procedure and left atrial appendage oversew    Has atrial fibrillation, underwent ablation with Dr. Richard 12-7-21, did well.  Off of metoprolol.    Has HTN, BP controlled overall, elevated at times.  Checking at home, mostly 110s-120s/70s, rare 91, rare 158.    Has LALY, was on CPAP, now using device only.  Using meditation and leep specialist to help with habits.    Prior CVA, some light tingling sensation remains.    Patient received mechanical thrombectomy 8/18/2017, felt to be due to right carotid disease.    Then had another CVA, found to have afib, now on apixaban.  [id number] is 5 now.  Does note brain fog and word finding sometimes now.  Monitor for Afib, Ave 69, range , occasional PVCs, 2% burden.  Remains on apixaban, did have left atrial appendage oversew so she had bleeding " problems, I would be comfortable with her coming off of the apixaban and switching back to aspirin.    Has hyperlipidemia, LDL 52.  No CAD on [facility] 2020.  On primary prevention statin    Has a ring that monitors HR at night, we looked at data, no spikes.    She is not limited by chest pain, pressure or tightness with activity.   No significant dyspnea on exertion but only if she moves slowly.  No orthopnea or lower extremity swelling.     No significant palpitations, lightheadedness, or presyncope/syncope.   Rarely lightheaded.    No symptoms of leg claudication.     Remote syncope in the setting of low K.  Her K was stopped in hospital, she is low normal.    Notes mild tremor, intention.    Using trazodone for sleep.    No family history of premature coronary artery disease.  No prior smoking history.  No history of diabetes.  No history of autoimmune disease such as lupus or rheumatoid arthritis.  No chronic kidney disease.  No ETOH overuse.   No caffeine overuse.  No recreation substance use.    Has lost some weight.    Lives in [place].  She is a .  Close to [place], father of her children.  Casey's girlfriend is Janae who sees me.  Casey sees our group, has ICD.  Here with her son Fer today.    She is fully vaccinated.    DATA REVIEWED by me:  ECG 12-8-21  Sinus, 67, PACs    ECG 9-6-2021  Afib, 53,     ECG 5-  Sinus, 65, PVC, first-degree AV delay    BioTel 6 day Summary: 7-14-21  1. Atrial fibrillation with appropriate heart rate range. Average 69, range 35 to 120 bpm.   2. No significant pauses (up to 2.2 seconds).   3. Occasional PVCs, 2% burden.   4. 1 patient trigger, no symptoms reported.   Conclusion: Persistent atrial fibrillation with appropriate heart rate, no pauses, occasional PVCs.     Zio 2017 2 weeks.  Sinus, brief atrial run.    EP Ablation, Dr. Richard 12-7-21  Pulmonary Vein Isolation  Additional ablation for atrial fibrillation x2  Ablation of additional arrhythmia  Intracardiac  Echocardiography  Three-dimensional intracardiac mapping  IV isoproterenol infusion with programmed stimulation    ABHINAV 1-26-21  LV EF  55%.  Biatrial enlargement.  There is severe eccentric mitral regurgitation with multiple jets,   predominantly from flail leaflet of P2.  Echolucent space near P1 of unclear etiology, unusual for cleft   leaflet.  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    Echo 6-1-2021  Left ventricular ejection fraction is visually estimated to be 65%.  Diastolic function is difficult to assess with atrial fibrillation.  Severely dilated left atrium.  Negative bubble study including Valsalva.  Myxomatous changes of the mitral valve leaflets with prolapse of the   anterior and posterior leaflets.  Moderate to severe eccentric mitral regurgitation, probably severe.  Pulmonary veins not well seen on the study.  Estimated right ventricular systolic pressure is 31 mmHg + estimated   RAP.     Compared to the images of the study done 11- there has been no   significant change, prior echo had pulmonary vein flow reversal   consistent with severe mitral regurgitation.    Echo 11-  Left ventricular ejection fraction is visually estimated to be 60%.  Severely dilated left atrium.  Myxomatous changes of the mitral valve.  Bileaflet prolapse of the mitral valve is present.  Severe mitral regurgitation with pulmonary vein systolic flow reversal,   RV 73 mL, ERO 0.4  cm2.  Estimated right ventricular systolic pressure  is 30 mmHg.  Compared to the images of the prior study done 2/3/2020, no significant    change.    Echo 2/3/2020  Prior echo done on 05/03/2019. Compared to the images of the prior   study done -  there has been no significant change.   Normal left ventricular systolic function.  Left ventricular ejection fraction is visually estimated to be 65%.  Prolapse of the mitral leaflets was present.  Severe mitral regurgitation.  Mild tricuspid regurgitation.  Estimated right  ventricular systolic pressure is 35 mmHg.    Echo 2017  Agitated saline study was performed, no evidence of right to left   shunt.  Normal left ventricular size, wall thickness, and systolic function.  Left ventricular ejection fraction is visually estimated to be 60%.  Mildly dilated left atrium.  Moderate mitral regurgitation due to an eccentric jet.  Mild tricuspid regurgitation.    Left and right heart catheterization 5/8/2020  Right ventricle 33/5, EDP 15  Pulmonary artery 42/21, 29  pulmonary capillary wedge 23  Cardiac output 4.1 L/min by thermodilution method     1.  Left main coronary artery:  Normal.  2.  Left anterior descending artery:  Normal.   3.  Left circumflex coronary artery:  Normal.   4.  Right coronary artery:  Normal.  This is a right dominant system.  5.  Left ventricular end diastolic pressure:  25 mmHg.  No signficant gradient across the aortic valve.  6.  Left ventriculogram:  Ejection fraction of 65%, 2+ mitral regurgitation, normal sized thoracic aorta.    Surgery Tee Nogueira  Date of surgery: 09/16/2021  Date of discharge: 9/28/2021   PROCEDURE/SURGERY:   1. Minimally invasive complex mitral valve repair with A2 [place]-Miguel Angel neochordal reconstruction, A3 [place]-Miguel Angel neochordal reconstruction, P2 posterior ventricular anchoring remodeling suture and non-resectional leaflet remodeling and [place]-Miguel Angel NeoChord x2, P3 [place]-Miguel Angel NeoChord x1, and #34 sewing ring annuloplasty, CPT code 64188.  2. Atrial fibrillation maze procedure and left atrial appendage oversew, CPT code 04530.         2017 CTA NECK   1.  CT angiogram of the neck within normal limits.  2.  Thrombosed right M1 segment.     2017 CAROTID DUPLEX CONCLUSIONS   nl carotids, subclavians and vertebral's     2017 MRI BRAIN  1.  Moderate sized RIGHT MCA territory acute ischemia involving the cortex and basal ganglia  2.  Flow void is present in the RIGHT MCA compatible with treatment effect  3.  No hemorrhage  4.  Mild white matter  changes  5.  Mild atrophy    Most recent labs:       Lab Results   Component Value Date/Time    HEMOGLOBIN 14.2 01/12/2022 10:40 AM    HEMATOCRIT 44.2 01/12/2022 10:40 AM    MCV 96.1 01/12/2022 10:40 AM    INR 1.36 (H) 12/06/2021 01:07 PM      Lab Results   Component Value Date/Time    SODIUM 138 01/12/2022 10:40 AM    POTASSIUM 4.5 01/12/2022 10:40 AM    CHLORIDE 103 01/12/2022 10:40 AM    CO2 28 01/12/2022 10:40 AM    GLUCOSE 89 01/12/2022 10:40 AM    BUN 16 01/12/2022 10:40 AM    CREATININE 0.80 01/12/2022 10:40 AM      Lab Results   Component Value Date/Time    ASTSGOT 15 12/06/2021 01:07 PM    ALTSGPT 14 12/06/2021 01:07 PM    ALBUMIN 4.3 12/06/2021 01:07 PM      Lab Results   Component Value Date/Time    CHOLSTRLTOT 115 01/12/2022 10:40 AM    LDL 53 01/12/2022 10:40 AM    HDL 50 01/12/2022 10:40 AM    TRIGLYCERIDE 58 01/12/2022 10:40 AM           Past Medical History:   Diagnosis Date   • Anemia 9/28/2021   • Arthritis     toe   • Atrial fibrillation (HCC)    • Benign essential HTN 3/19/2012   • Breast cancer (HCC)    • Cancer (HCC) 1998    breast    • Cardiac arrhythmia    • Chest tightness or pressure 3/19/2012   • Chickenpox    • Coronary heart disease    • [place] measles    • Gynecological disorder    • High cholesterol    • High risk medication use 3/19/2012   • Hypercholesterolemia 3/19/2012   • Mumps    • MVP (mitral valve prolapse) 3/19/2012   • Osteoporosis    • Seizure disorder (HCC) 9/28/2021    last seizure may 2021   • Sleep apnea     CPAP at night   • Snoring    • Stroke (HCC) 2017    residual minor weakness on left side   • Substance abuse ([facility])    • Tonsillitis    • Urinary bladder disorder     OAB   • Urinary incontinence    • Valvular heart disease    • Venereal disease      Past Surgical History:   Procedure Laterality Date   • CATARACT PHACO WITH IOL  3/16/2009    Performed by SHANNON GANDARA at SURGERY SAME DAY [facility]   • CATARACT PHACO WITH IOL  1/26/2009    Performed by JULIOCESAR  SHANNON GALLAGHER at SURGERY SAME DAY [facility] ORS   • BREAST BIOPSY     • HYSTERECTOMY LAPAROSCOPY     • LUMPECTOMY     • VT CHEMOTHERAPY, UNSPECIFIED PROCEDURE     • VT RADIATION THERAPY PLAN SIMPLE     • VT REMV 2ND CATARACT,CORN-SCLER SECTN     • PRIMARY C SECTION     • SINUSCOPE     • TONSILLECTOMY       Family History   Problem Relation Age of Onset   • Cancer Mother         breast   • No Known Problems Brother    • Heart Failure Neg Hx    • Heart Disease Neg Hx      Social History     Socioeconomic History   • Marital status:      Spouse name: Not on file   • Number of children: 2   • Years of education: Not on file   • Highest education level: Professional school degree (e.g., MD, DDS, DVM, ARACELI)   Occupational History   • Occupation:      Employer: Not Employed   Tobacco Use   • Smoking status: Never Smoker   • Smokeless tobacco: Never Used   Vaping Use   • Vaping Use: Never used   Substance and Sexual Activity   • Alcohol use: Yes     Alcohol/week: 1.2 oz     Types: 2 Glasses of wine per week     Comment: twice a week   • Drug use: No   • Sexual activity: Yes     Partners: Male     Birth control/protection: Female Sterilization   Other Topics Concern   • Not on file   Social History Narrative   • Not on file     Social Determinants of Health     Financial Resource Strain: Low Risk    • Difficulty of Paying Living Expenses: Not hard at all   Food Insecurity: No Food Insecurity   • Worried About Running Out of Food in the Last Year: Never true   • Ran Out of Food in the Last Year: Never true   Transportation Needs: No Transportation Needs   • Lack of Transportation (Medical): No   • Lack of Transportation (Non-Medical): No   Physical Activity: Insufficiently Active   • Days of Exercise per Week: 4 days   • Minutes of Exercise per Session: 30 min   Stress:    • Feeling of Stress : Not on file   Social Connections: Socially Isolated   • Frequency of Communication with Friends and Family: More than three  "times a week   • Frequency of Social Gatherings with Friends and Family: Twice a week   • Attends Caodaism Services: Never   • Active Member of Clubs or Organizations: No   • Attends Club or Organization Meetings: Never   • Marital Status:    Intimate Partner Violence:    • Fear of Current or Ex-Partner: Not on file   • Emotionally Abused: Not on file   • Physically Abused: Not on file   • Sexually Abused: Not on file   Housing Stability: Low Risk    • Unable to Pay for Housing in the Last Year: No   • Number of Places Lived in the Last Year: 1   • Unstable Housing in the Last Year: No     No Known Allergies    Current Outpatient Medications   Medication Sig Dispense Refill   • fexofenadine (ALLERGY RELIEF) 60 MG Tab Take 180 mg by mouth every day.     • Eszopiclone 2 MG Tab Take  by mouth.     • amoxicillin (AMOXIL) 500 MG Cap Take 4 Capsules by mouth 1 time a day as needed. 30 Capsule    • traZODone (DESYREL) 50 MG Tab Take 1 Tablet by mouth every evening. Indications: Trouble Sleeping 30 Tablet 0   • tamsulosin (FLOMAX) 0.4 MG capsule Take 0.4 mg by mouth 1/2 hour after breakfast.     • apixaban (ELIQUIS) 5mg Tab Take 1 Tablet by mouth 2 times a day. 60 Tablet 2   • atorvastatin (LIPITOR) 80 MG tablet Take 1 Tablet by mouth every evening. 30 Tablet 2   • ferrous sulfate 325 (65 Fe) MG tablet Take 1 Tablet by mouth every morning with breakfast. 30 Tablet 2   • spironolactone (ALDACTONE) 25 MG Tab Take 1 Tablet by mouth every day. 30 Tablet 2   • Multiple Vitamin (MULTIVITAMINS PO) Take 1 Tab by mouth every day.       No current facility-administered medications for this visit.       ROS    All others systems reviewed and negative.     Objective:     /76 (BP Location: Left arm, Patient Position: Sitting, BP Cuff Size: Adult)   Pulse 84   Resp 16   Ht 1.676 m (5' 6\")   Wt 62.6 kg (138 lb)   SpO2 95%  Body mass index is 22.27 kg/m².    General: No acute distress. Well nourished.  HEENT: EOM " grossly intact, no scleral icterus, no pharyngeal erythema.   Neck:  No JVD at 90, no bruits, trachea midline  CVS: RRR, frequent ectopy. Normal S1, S2. No sign murmur, No LE edema.  2+ radial pulses, 2+ PT pulses, scar right chest  Resp: CTAB. No wheezing or crackles/rhonchi. Normal respiratory effort.  Abdomen: Soft, NT, no ana rosa hepatomegaly.  MSK/Ext: No clubbing or cyanosis.  Skin: Warm and dry, no rashes.  Neurological: CN III-XII grossly intact. No focal deficits.   Psych: A&O x 3, appropriate affect, adequate judgement, forgetful at times    Physical exam performed today and unchanged, except what is noted, compared to 12/10/2021      Assessment:     1. Severe mitral regurgitation  EC-ECHOCARDIOGRAM COMPLETE W/O CONT   2. S/P mitral valve repair  EC-ECHOCARDIOGRAM COMPLETE W/O CONT   3. Persistent atrial fibrillation (HCC)     4. History of CVA (cerebrovascular accident)     5. Essential hypertension     6. Pure hypercholesterolemia     7. Memory loss     8. PVC (premature ventricular contraction)     9. LALY (obstructive sleep apnea)     10. Benign essential HTN     11. Chronic anticoagulation         Medical Decision Making:  Today's Assessment / Status / Plan:     -Doing really well after the MV repair  -Echo September 2022  -Abx prior to dental cleaning  -Stay on Eliquis for chads 2 vascular score of 5, including prior stroke from afib.  Remains on apixaban, did have left atrial appendage oversew so she had bleeding problems, I would be comfortable with her coming off of the apixaban and switching back to aspirin.  -Off of metoprolol after ablation, doing well  -BP controlled, cont ernie  -PVCs at 2%, no concerns, does not feel palpitations  -No CAD on LHC  -Cont primary prevention statin therapy, no significant CAD on left heart cath, LDL to goal  -RTC 3 months     Written instructions given today:    None    No follow-ups on file.    It is my pleasure to participate in the care of Ms. Bocanegra.  Please do  not hesitate to contact me with questions or concerns.    Ayde Denise MD, [facility]  Cardiologist [facility]    Please note that this dictation was created using voice recognition software. I have made every reasonable attempt to correct obvious errors, but it is possible there are errors of grammar and possibly content that I did not discover before finalizing the note.

## 2022-02-11 ENCOUNTER — OFFICE VISIT (OUTPATIENT)
Dept: CARDIOLOGY | Facility: MEDICAL CENTER | Age: 77
End: 2022-02-11
Payer: MEDICARE

## 2022-02-11 ENCOUNTER — OFFICE VISIT (OUTPATIENT)
Dept: SLEEP MEDICINE | Facility: MEDICAL CENTER | Age: 77
End: 2022-02-11
Payer: MEDICARE

## 2022-02-11 VITALS
RESPIRATION RATE: 16 BRPM | HEIGHT: 66 IN | OXYGEN SATURATION: 93 % | BODY MASS INDEX: 22.37 KG/M2 | DIASTOLIC BLOOD PRESSURE: 72 MMHG | HEART RATE: 76 BPM | SYSTOLIC BLOOD PRESSURE: 118 MMHG | WEIGHT: 139.2 LBS

## 2022-02-11 VITALS
RESPIRATION RATE: 16 BRPM | DIASTOLIC BLOOD PRESSURE: 74 MMHG | WEIGHT: 138.4 LBS | BODY MASS INDEX: 22.24 KG/M2 | HEIGHT: 66 IN | OXYGEN SATURATION: 96 % | SYSTOLIC BLOOD PRESSURE: 122 MMHG | HEART RATE: 89 BPM

## 2022-02-11 VITALS
SYSTOLIC BLOOD PRESSURE: 112 MMHG | HEART RATE: 84 BPM | DIASTOLIC BLOOD PRESSURE: 76 MMHG | HEIGHT: 66 IN | RESPIRATION RATE: 16 BRPM | BODY MASS INDEX: 22.18 KG/M2 | WEIGHT: 138 LBS | OXYGEN SATURATION: 95 %

## 2022-02-11 DIAGNOSIS — Z98.890 S/P MITRAL VALVE REPAIR: ICD-10-CM

## 2022-02-11 DIAGNOSIS — I10 BENIGN ESSENTIAL HTN: ICD-10-CM

## 2022-02-11 DIAGNOSIS — I48.19 PERSISTENT ATRIAL FIBRILLATION (HCC): ICD-10-CM

## 2022-02-11 DIAGNOSIS — Z79.01 CHRONIC ANTICOAGULATION: ICD-10-CM

## 2022-02-11 DIAGNOSIS — G47.33 OSA (OBSTRUCTIVE SLEEP APNEA): ICD-10-CM

## 2022-02-11 DIAGNOSIS — I34.0 SEVERE MITRAL REGURGITATION: ICD-10-CM

## 2022-02-11 DIAGNOSIS — E78.5 DYSLIPIDEMIA: ICD-10-CM

## 2022-02-11 DIAGNOSIS — I49.3 PVC (PREMATURE VENTRICULAR CONTRACTION): ICD-10-CM

## 2022-02-11 DIAGNOSIS — E78.00 PURE HYPERCHOLESTEROLEMIA: ICD-10-CM

## 2022-02-11 DIAGNOSIS — Z86.73 HISTORY OF CVA (CEREBROVASCULAR ACCIDENT): ICD-10-CM

## 2022-02-11 DIAGNOSIS — R41.3 MEMORY LOSS: ICD-10-CM

## 2022-02-11 DIAGNOSIS — F51.04 CHRONIC INSOMNIA: ICD-10-CM

## 2022-02-11 DIAGNOSIS — I10 ESSENTIAL HYPERTENSION: ICD-10-CM

## 2022-02-11 DIAGNOSIS — G47.33 OBSTRUCTIVE SLEEP APNEA SYNDROME: ICD-10-CM

## 2022-02-11 PROCEDURE — 99214 OFFICE O/P EST MOD 30 MIN: CPT | Performed by: INTERNAL MEDICINE

## 2022-02-11 PROCEDURE — 99213 OFFICE O/P EST LOW 20 MIN: CPT | Performed by: INTERNAL MEDICINE

## 2022-02-11 PROCEDURE — 99213 OFFICE O/P EST LOW 20 MIN: CPT | Performed by: FAMILY MEDICINE

## 2022-02-11 RX ORDER — TRAZODONE HYDROCHLORIDE 50 MG/1
50 TABLET ORAL NIGHTLY
Qty: 30 TABLET | Refills: 0 | Status: SHIPPED | OUTPATIENT
Start: 2022-02-11 | End: 2022-04-14

## 2022-02-11 RX ORDER — LOSARTAN POTASSIUM 50 MG/1
TABLET ORAL
COMMUNITY
Start: 2022-01-19 | End: 2022-02-11

## 2022-02-11 RX ORDER — FEXOFENADINE HCL 60 MG/1
180 TABLET, FILM COATED ORAL DAILY
COMMUNITY
End: 2022-05-12

## 2022-02-11 RX ORDER — TRAZODONE HYDROCHLORIDE 50 MG/1
50 TABLET ORAL
COMMUNITY
Start: 2022-01-24 | End: 2022-02-11

## 2022-02-11 RX ORDER — AMLODIPINE BESYLATE 10 MG/1
1 TABLET ORAL
COMMUNITY
Start: 2022-02-07 | End: 2022-02-11

## 2022-02-11 RX ORDER — AMOXICILLIN 500 MG/1
CAPSULE ORAL
COMMUNITY
End: 2022-02-11

## 2022-02-11 RX ORDER — ATORVASTATIN CALCIUM 80 MG/1
80 TABLET, FILM COATED ORAL DAILY
COMMUNITY
Start: 2022-01-24 | End: 2022-02-11

## 2022-02-11 RX ORDER — AMOXICILLIN 500 MG/1
2000 CAPSULE ORAL
Qty: 30 CAPSULE | COMMUNITY
Start: 2022-02-11 | End: 2022-05-12 | Stop reason: SDUPTHER

## 2022-02-11 RX ORDER — ESZOPICLONE 2 MG/1
TABLET, FILM COATED ORAL
COMMUNITY
End: 2022-05-12

## 2022-02-11 RX ORDER — AMLODIPINE BESYLATE 10 MG/1
TABLET ORAL
COMMUNITY
Start: 2022-02-07 | End: 2022-02-11

## 2022-02-11 RX ORDER — AMOXICILLIN 500 MG/1
CAPSULE ORAL
COMMUNITY
Start: 2021-12-10 | End: 2022-02-11 | Stop reason: SDUPTHER

## 2022-02-11 ASSESSMENT — PATIENT HEALTH QUESTIONNAIRE - PHQ9
5. POOR APPETITE OR OVEREATING: 0 - NOT AT ALL
CLINICAL INTERPRETATION OF PHQ2 SCORE: 1
SUM OF ALL RESPONSES TO PHQ QUESTIONS 1-9: 7

## 2022-02-11 ASSESSMENT — ENCOUNTER SYMPTOMS
FOCAL WEAKNESS: 0
TREMORS: 1
ABDOMINAL PAIN: 0
BLURRED VISION: 0
BRUISES/BLEEDS EASILY: 0
CHILLS: 0
WEAKNESS: 0
CONSTITUTIONAL NEGATIVE: 1
SHORTNESS OF BREATH: 0
HEADACHES: 0
PSYCHIATRIC NEGATIVE: 1
DOUBLE VISION: 0
MUSCULOSKELETAL NEGATIVE: 1
CARDIOVASCULAR NEGATIVE: 1
WEIGHT LOSS: 0
COUGH: 0
NAUSEA: 0
DEPRESSION: 0
CLAUDICATION: 0
FEVER: 0
RESPIRATORY NEGATIVE: 1
MYALGIAS: 0
GASTROINTESTINAL NEGATIVE: 1
VOMITING: 0
DIZZINESS: 0
EYES NEGATIVE: 1
PALPITATIONS: 0
NERVOUS/ANXIOUS: 0

## 2022-02-11 ASSESSMENT — FIBROSIS 4 INDEX
FIB4 SCORE: 1.35

## 2022-02-11 NOTE — PROGRESS NOTES
[facility] Follow Up Note     Date: 2/11/2022 / Time: 2:50 PM    Patient ID:   Name:             Shaista Bocanegra   YOB: 1945  Age:                 76 y.o.  female   MRN:               [id number]      Thank you for requesting a sleep medicine consultation on Shaista Bocanegra at the sleep center. She presents today with the chief complaints of LALY follow up.     HISTORY OF PRESENT ILLNESS:       She has mild LALY with AHI: 11/h, and was prescribed CPAP therapy which she finds cumbersome to use.  She switched to a mandibular advancement device however suffers from jaw pain precluding its use. She goes to sleep around 9-10 pm and wakes up around 5:30-6 am.She use to be on eszopiclone and trazodone. It can take up to sever hours to fall asleep. She wakes up once a night. It takes few mins to hours to fall back asleep. Overall, she doesnot finds her sleep refreshing.She does not take regular naps. She denies any symptoms of RLS, narcolepsy or any symptoms to suggest parasomnias such as nightmares, sleep walking or acting out of dreams.She is using CPAP most days of the week. However we do not have the access to her CPAP download.Pt denies snoring, gasping,choking.Pt also denies significant mask leak that is interfering with sleep.    REVIEW OF SYSTEMS:       Constitutional: Denies fevers, Denies weight changes  Eyes: Denies changes in vision, no eye pain  Ears/Nose/Throat/Mouth: Denies nasal congestion or sore throat   Cardiovascular: Denies chest pain or palpitations   Respiratory: Denies shortness of breath , Denies cough  Gastrointestinal/Hepatic: Denies abdominal pain, nausea, vomiting, diarrhea, constipation or GI bleeding   Genitourinary: Deniesdysuria or frequency  Musculoskeletal/Rheum: Denies  joint pain and swelling   Skin/Breast: Denies rash,   Neurological: Denies headache, confusion, memory loss or focal weakness/parasthesias  Psychiatric: denies mood disorder   Sleep: + insomnia      Comprehensive review of systems form is reviewed with the patient and is attached in the EMR.     PMH:  has a past medical history of Anemia (9/28/2021), Arthritis, Atrial fibrillation (HCC), Benign essential HTN (3/19/2012), Breast cancer ([facility]), Cancer ([facility]) (1998), Cardiac arrhythmia, Chest tightness or pressure (3/19/2012), Chickenpox, Coronary heart disease, [place] measles, Gynecological disorder, High cholesterol, High risk medication use (3/19/2012), Hypercholesterolemia (3/19/2012), Mumps, MVP (mitral valve prolapse) (3/19/2012), Osteoporosis, Seizure disorder ([facility]) (9/28/2021), Sleep apnea, Snoring, Stroke ([facility]) (2017), Substance abuse ([facility]), Tonsillitis, Urinary bladder disorder, Urinary incontinence, Valvular heart disease, and Venereal disease. She also has no past medical history of Addisons disease ([facility]), Adrenal disorder ([facility]), Allergy, Anxiety, Asthma, Blood transfusion without reported diagnosis, Cataract, CHF (congestive heart failure) ([facility]), Clotting disorder ([facility]), COPD (chronic obstructive pulmonary disease) ([facility]), Cushings syndrome ([facility]), Depression, Diabetes ([facility]), Diabetic neuropathy ([facility]), GERD (gastroesophageal reflux disease), Glaucoma, Goiter, Head ache, Heart attack ([facility]), HIV (human immunodeficiency virus infection) ([facility]), IBD (inflammatory bowel disease), Kidney disease, Meningitis, Migraine, Muscle disorder, Parathyroid disorder ([facility]), Pituitary disease ([facility]), Pulmonary emphysema ([facility]), Sickle cell disease ([facility]), Thyroid disease, Tuberculosis, or Urinary tract infection.  MEDS:   Current Outpatient Medications:   •  fexofenadine (ALLERGY RELIEF) 60 MG Tab, Take 180 mg by mouth every day., Disp: , Rfl:   •  Eszopiclone 2 MG Tab, Take  by mouth., Disp: , Rfl:   •  amoxicillin (AMOXIL) 500 MG Cap, Take 4 Capsules by mouth 1 time a day as needed., Disp: 30 Capsule, Rfl:   •  traZODone (DESYREL) 50 MG Tab, Take 1 Tablet by mouth every evening. Indications: Trouble Sleeping, Disp: 30  "Tablet, Rfl: 0  •  tamsulosin (FLOMAX) 0.4 MG capsule, Take 0.4 mg by mouth 1/2 hour after breakfast., Disp: , Rfl:   •  apixaban (ELIQUIS) 5mg Tab, Take 1 Tablet by mouth 2 times a day., Disp: 60 Tablet, Rfl: 2  •  atorvastatin (LIPITOR) 80 MG tablet, Take 1 Tablet by mouth every evening., Disp: 30 Tablet, Rfl: 2  •  ferrous sulfate 325 (65 Fe) MG tablet, Take 1 Tablet by mouth every morning with breakfast., Disp: 30 Tablet, Rfl: 2  •  spironolactone (ALDACTONE) 25 MG Tab, Take 1 Tablet by mouth every day., Disp: 30 Tablet, Rfl: 2  •  Multiple Vitamin (MULTIVITAMINS PO), Take 1 Tab by mouth every day., Disp: , Rfl:   ALLERGIES: No Known Allergies  SURGHX:   Past Surgical History:   Procedure Laterality Date   • CATARACT PHACO WITH IOL  3/16/2009    [facility]   • CATARACT PHACO WITH IOL  1/26/2009    Performed by SHANNON GANDARA at [facility]   • BREAST BIOPSY     • HYSTERECTOMY LAPAROSCOPY     • LUMPECTOMY     • VT CHEMOTHERAPY, UNSPECIFIED PROCEDURE     • VT RADIATION THERAPY PLAN SIMPLE     • VT REMV 2ND CATARACT,CORN-SCLER SECTN     • PRIMARY C SECTION     • SINUSCOPE     • TONSILLECTOMY       SOCHX:  reports that she has never smoked. She has never used smokeless tobacco. She reports current alcohol use of about 1.2 oz of alcohol per week. She reports that she does not use drugs..  FH:   Family History   Problem Relation Age of Onset   • Cancer Mother         breast   • No Known Problems Brother    • Heart Failure Neg Hx    • Heart Disease Neg Hx          Physical Exam:  Vitals/ General Appearance:   Weight/BMI: Body mass index is 22.34 kg/m².  /74 (BP Location: Left arm, Patient Position: Sitting, BP Cuff Size: Adult)   Pulse 89   Resp 16   Ht 1.676 m (5' 6\")   Wt 62.8 kg (138 lb 6.4 oz)   SpO2 96%   Vitals:    02/11/22 1440   BP: 122/74   BP Location: Left arm   Patient Position: Sitting   BP Cuff Size: Adult   Pulse: 89 " "  Resp: 16   SpO2: 96%   Weight: 62.8 kg (138 lb 6.4 oz)   Height: 1.676 m (5' 6\")       Pt. is alert and oriented to time, place and person. Cooperative and in no apparent distress.       Constitutional: Alert, no distress, well-groomed.  Skin: No rashes in visible areas.  Eye: Round. Conjunctiva clear, lids normal. No icterus.   ENMT: Lips pink without lesions, good dentition, moist mucous membranes. Phonation normal.  Neck: No masses, no thyromegaly. Moves freely without pain.  CV: Pulse as reported by patient  Respiratory: Unlabored respiratory effort, no cough or audible wheeze  Psych: Alert and oriented x3, normal affect and mood.     ASSESSMENT AND PLAN     1. Sleep Apnea    The pathophysiology of sleep anea and the increased risk of cardiovascular morbidity from untreated sleep apnea is discussed in detail with the patient.   She is urged to avoid supine sleep, weight gain and alcoholic beverages since all of these can worsen sleep apnea. She is cautioned against drowsy driving. If She feels sleepy while driving, She must pull over for a break/nap, rather than persist on the road, in the interest of She own safety and that of others on the road.   Plan   - Continue CPAP at the current pressure   - Compliance was reinforced     2. Chronic Insomnia: The importance of cognitive restructuring and behavior modification therapy is emphasized for long-term improvement rather than the use of hypnotic agents, which may offer short term relief but may lead to the development of tolerance and side effects with prolonged use. Evening exercise, wind-down before bedtime, and stimulus control (leaving the bed when unable to fall back asleep at night) are explained. Stimulus control and sleep hygiene was discussed in detail The patient is also given names of book on CBT-I and was recommended to maintain a sleep diary. Referral for CBT-I. Ok to use trazodone PRN which is managed by PCP. Recommended against the usage of "  eszopiclone.         Regarding treatment of other past medical problems and general health maintenance,  She is urged to follow up with PCP.

## 2022-02-11 NOTE — PROGRESS NOTES
Chief Complaint   Patient presents with   • Atrial Fibrillation   • Other     F/V Dx: S/P mitral valve repair       Subjective     Shaista Bocanegra is a 76 y.o. female who presents today for follow up of surgical mitral valve repair.    Since the patient's last visit on 10/28/21, she has been doing well clinically. She denies fatigue, shortness of breath, dyspnea on exertion, chest pain, dizziness or syncope. She keeps active around the house.     Past Medical History:   Diagnosis Date   • Anemia 9/28/2021   • Arthritis     toe   • Atrial fibrillation (HCC)    • Benign essential HTN 3/19/2012   • Breast cancer (HCC)    • Cancer (HCC) 1998    breast    • Cardiac arrhythmia    • Chest tightness or pressure 3/19/2012   • Chickenpox    • Coronary heart disease    • [place] measles    • Gynecological disorder    • High cholesterol    • High risk medication use 3/19/2012   • Hypercholesterolemia 3/19/2012   • Mumps    • MVP (mitral valve prolapse) 3/19/2012   • Osteoporosis    • Seizure disorder (HCC) 9/28/2021    last seizure may 2021   • Sleep apnea     CPAP at night   • Snoring    • Stroke (HCC) 2017    residual minor weakness on left side   • Substance abuse (HCC)    • Tonsillitis    • Urinary bladder disorder     OAB   • Urinary incontinence    • Valvular heart disease    • Venereal disease      Past Surgical History:   Procedure Laterality Date   • CATARACT PHACO WITH IOL  3/16/2009    Performed by SHANNON GANDARA at [facility]GERY [facility] DAY [facility] ORS   • CATARACT PHACO WITH IOL  1/26/2009    Performed by SHANNON GANDARA at SURGERY [facility] DAY [facility] ORS   • BREAST BIOPSY     • HYSTERECTOMY LAPAROSCOPY     • LUMPECTOMY     • CO CHEMOTHERAPY, UNSPECIFIED PROCEDURE     • CO RADIATION THERAPY PLAN SIMPLE     • CO REMV 2ND CATARACT,CORN-SCLER SECTN     • PRIMARY C SECTION     • SINUSCOPE     • TONSILLECTOMY       Family History   Problem Relation Age of Onset   • Cancer Mother         breast   • No Known Problems Brother    •  Heart Failure Neg Hx    • Heart Disease Neg Hx      Social History     Socioeconomic History   • Marital status:      Spouse name: Not on file   • Number of children: 2   • Years of education: Not on file   • Highest education level: Professional school degree (e.g., MD, LACEY, DVM, ARACELI)   Occupational History   • Occupation:      Employer: Not Employed   Tobacco Use   • Smoking status: Never Smoker   • Smokeless tobacco: Never Used   Vaping Use   • Vaping Use: Never used   Substance and Sexual Activity   • Alcohol use: Yes     Alcohol/week: 1.2 oz     Types: 2 Glasses of wine per week     Comment: twice a week   • Drug use: No   • Sexual activity: Yes     Partners: Male     Birth control/protection: Female Sterilization   Other Topics Concern   • Not on file   Social History Narrative   • Not on file     Social Determinants of Health     Financial Resource Strain: Low Risk    • Difficulty of Paying Living Expenses: Not hard at all   Food Insecurity: No Food Insecurity   • Worried About Running Out of Food in the Last Year: Never true   • Ran Out of Food in the Last Year: Never true   Transportation Needs: No Transportation Needs   • Lack of Transportation (Medical): No   • Lack of Transportation (Non-Medical): No   Physical Activity: Insufficiently Active   • Days of Exercise per Week: 4 days   • Minutes of Exercise per Session: 30 min   Stress:    • Feeling of Stress : Not on file   Social Connections: Socially Isolated   • Frequency of Communication with Friends and Family: More than three times a week   • Frequency of Social Gatherings with Friends and Family: Twice a week   • Attends Yazidi Services: Never   • Active Member of Clubs or Organizations: No   • Attends Club or Organization Meetings: Never   • Marital Status:    Intimate Partner Violence:    • Fear of Current or Ex-Partner: Not on file   • Emotionally Abused: Not on file   • Physically Abused: Not on file   • Sexually Abused:  Not on file   Housing Stability: Low Risk    • Unable to Pay for Housing in the Last Year: No   • Number of Places Lived in the Last Year: 1   • Unstable Housing in the Last Year: No     No Known Allergies     (Medications reviewed.)  Outpatient Encounter Medications as of 2/11/2022   Medication Sig Dispense Refill   • fexofenadine (ALLERGY RELIEF) 60 MG Tab Take 180 mg by mouth every day.     • Eszopiclone 2 MG Tab Take  by mouth.     • amoxicillin (AMOXIL) 500 MG Cap Take 4 Capsules by mouth 1 time a day as needed. 30 Capsule    • traZODone (DESYREL) 50 MG Tab Take 1 Tablet by mouth every evening. Indications: Trouble Sleeping 30 Tablet 0   • tamsulosin (FLOMAX) 0.4 MG capsule Take 0.4 mg by mouth 1/2 hour after breakfast.     • apixaban (ELIQUIS) 5mg Tab Take 1 Tablet by mouth 2 times a day. 60 Tablet 2   • atorvastatin (LIPITOR) 80 MG tablet Take 1 Tablet by mouth every evening. 30 Tablet 2   • ferrous sulfate 325 (65 Fe) MG tablet Take 1 Tablet by mouth every morning with breakfast. 30 Tablet 2   • spironolactone (ALDACTONE) 25 MG Tab Take 1 Tablet by mouth every day. 30 Tablet 2   • Multiple Vitamin (MULTIVITAMINS PO) Take 1 Tab by mouth every day.       No facility-administered encounter medications on file as of 2/11/2022.     Review of Systems   Constitutional: Negative.  Negative for chills, fever, malaise/fatigue and weight loss.   HENT: Negative.  Negative for hearing loss.    Eyes: Negative.  Negative for blurred vision and double vision.   Respiratory: Negative.  Negative for cough and shortness of breath.    Cardiovascular: Negative.  Negative for chest pain, palpitations, claudication and leg swelling.   Gastrointestinal: Negative.  Negative for abdominal pain, nausea and vomiting.   Genitourinary: Negative.  Negative for dysuria and urgency.   Musculoskeletal: Negative.  Negative for joint pain and myalgias.   Skin: Negative.  Negative for itching and rash.   Neurological: Positive for tremors.  "Negative for dizziness, focal weakness, weakness and headaches.   Endo/Heme/Allergies: Negative.  Does not bruise/bleed easily.   Psychiatric/Behavioral: Negative.  Negative for depression. The patient is not nervous/anxious.               Objective     /72 (BP Location: Left arm, Patient Position: Sitting, BP Cuff Size: Adult)   Pulse 76   Resp 16   Ht 1.676 m (5' 6\")   Wt 63.1 kg (139 lb 3.2 oz)   SpO2 93%   BMI 22.47 kg/m²     Physical Exam  Constitutional:       Appearance: She is well-developed.   HENT:      Head: Normocephalic and atraumatic.   Neck:      Vascular: No JVD.   Cardiovascular:      Rate and Rhythm: Normal rate and regular rhythm.      Heart sounds: Normal heart sounds.   Pulmonary:      Effort: Pulmonary effort is normal.      Breath sounds: Normal breath sounds.   Abdominal:      General: Bowel sounds are normal.      Palpations: Abdomen is soft.      Comments: No hepatosplenomegaly.   Musculoskeletal:         General: Normal range of motion.   Lymphadenopathy:      Cervical: No cervical adenopathy.   Skin:     General: Skin is warm and dry.   Neurological:      Mental Status: She is alert and oriented to person, place, and time.            CARDIAC STUDIES/PROCEDURES:    BIOTEL CONCLUSIONS (07/14/21)  BioTel 6 day Summary:   1. Atrial fibrillation with appropriate heart rate range. Average 69, range 35 to 120 bpm.   2. No significant pauses (up to 2.2 seconds).   3. Occasional PVCs, 2% burden.   4. 1 patient trigger, no symptoms reported.   Conclusion: Persistent atrial fibrillation with appropriate heart rate, no pauses, occasional PVCs.    CARDIAC CATHETERIZATION CONCLUSIONS by Juventino Farah (05/08/20)  Angiographically normal appearing coronary arteries  Mildly elevated LVEDP, filling pressures.  2+ mitral regurgitation.  Normal LV function.    CAROTID ULTRASOUND (08/18/17)  Normal carotids, subclavians and vertebrals.    CTA OF HEAD (09/04/21)          No evidence of " flow-limiting stenosis in the cervical carotid or cervical vertebral arteries.    CT OF HEAD (05/31/21)  1.  Moderate acute/subacute left temporal lobe infarct.  2.  Chronic ischemic changes.  3.  No acute intracranial hemorrhage.    CTA OF HEAD (05/31/21)  No acute large vessel occlusion or hemodynamically significant stenosis.  Acute/subacute appearing left temporal lobe infarct.    CTA OF NECK (09/04/21)           No evidence of flow-limiting stenosis in the cervical carotid or cervical vertebral arteries.    CTA OF NECK (05/31/21)  1.  No stenosis or dissection is seen within the carotid or vertebral arteries bilaterally.  2.  Tree-in-bud nodularity in the right upper lobe is likely infectious/inflammatory.  3.  Mucosal thickening right maxillary sinus.  4.  Nasal bone deformities bilaterally are likely chronic.    CT OF HEAD (05/03/19)  NO ACUTE ABNORMALITIES ARE NOTED ON CT SCAN OF THE HEAD.  Right-sided encephalomalacia is noted.    CTA OF HEAD (08/18/17)  1.  There is a right M1 segment occlusion.  2.  There is collateral flow in the peripheral M3 branches seen on the right.  3.  There is mild decreased enhancement of the visualized right internal carotid artery however it is patent.  4.  Question of subtle hypodensity in the right insular cortex region. No abnormal brain parenchymal enhancement is seen.    CTA OF NECK (08/18/17)  1.  CT angiogram of the neck within normal limits.  2.  Thrombosed right M1 segment.    ECHOCARDIOGRAM CONCLUSIONS (09/30/21)  Normal left ventricular chamber size.  The left ventricular ejection fraction is visually estimated to be 60%.  Known mitral valve repair which is functioning normally with appropriate transvalvular gradient.  No mitral regurgitation.  Right heart pressures are normal.     ECHOCARDIOGRAM CONCLUSIONS (06/01/21)  Left ventricular ejection fraction is visually estimated to be 65%.  Diastolic function is difficult to assess with atrial fibrillation.  Severely  dilated left atrium.  Negative bubble study including Valsalva.  Myxomatous changes of the mitral valve leaflets with prolapse of the anterior and posterior leaflets.  Moderate to severe eccentric mitral regurgitation, probably severe.  Pulmonary veins not well seen on the study.  Estimated right ventricular systolic pressure is 31 mmHg + estimated RAP.  Compared to the images of the study done 11- there has been no significant change, prior echo had pulmonary vein flow reversal consistent with  severe mitral regurgitation.    ECHOCARDIOGRAM CONCLUSIONS (02/03/20)  Prior echo done on 05/03/2019. Compared to the images of the prior study done -  there has been no significant change.   Normal left ventricular systolic function.  Left ventricular ejection fraction is visually estimated to be 65%.  Prolapse of the mitral leaflets was present.  Severe mitral regurgitation.  Mild tricuspid regurgitation.  Estimated right ventricular systolic pressure is 35 mmHg.    ECHOCARDIOGRAM CONCLUSIONS (05/03/19)  Prior echocardiogram 6/14/2018.  Normal left ventricular systolic function.   Mild to moderate eccentric mitral regurgitation.  Compared to the images of the study done - there has been slight improvement in mitral regurgitation.    ECHOCARDIOGRAM CONCLUSIONS (06/14/18)  Normal left ventricular systolic function.  Left ventricular ejection fraction is visually estimated to be 60%.  Myxomatous changes of the mitral valve.  Prolapse of the anterior and posterior mitral leaflets.  Severe, eccentric mitral regurgitation.  Right heart pressures are normal.  Compared to the report of the study done 8/19/2017 severe mitral regurgitation is now present, previously reported as moderate but a MR   ERO was not recorded.     EKG performed on (09/04/21) was reviewed: EKG personally interpreted shows atrial fibrillation.  EKG performed on (06/09/21) was reviewed: EKG personally interpreted shows atrial fibrillation.  EKG  performed on (03/19/21) EKG shows sinus rhythm with premature ventricular contractions.  EKG performed on (05/09/20) EKG shows sinus rhythm with premature ventricular contractions.  EKG performed on (05/08/20) EKG shows sinus rhythm with premature ventricular contractions.    Laboratory results of (01/12/22) were reviewed. Cholesterol profile of 115/58/50/53 mg/dL noted.    TRANSESOPHAGEAL ECHOCARDIOGRAM CONCLUSIONS by Ayde Lopes (01/26/21)  LV EF 55%.  Biatrial enlargement.  There is severe eccentric mitral regurgitation with multiple jets, predominantly from flail leaflet of P2.  Echolucent space near P1 of unclear etiology, unusual for cleft leaflet.  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    Assessment & Plan     1. S/P mitral valve repair     2. Dyslipidemia         Medical Decision Making: Today's Assessment/Status/Plan:        1. History of (minimally invasive complex mitral valve repair with A2 Eagar-Miguel Angel neochordal reconstruction, A3 Eagar-Miguel Angel neochordal reconstruction, P2 posterior ventricular anchoring remodeling suture and non-resectional leaflet remodeling and Eagar-Miguel Angel NeoChord x2, P3 Eagar-Miguel Angel NeoChord x1, and #34 sewing ring annuloplasty, maze procedure and left atrial appendage oversew by [facility] at [facility] on 09/16/21): mitral valve repair: She is clinically doing well. The mitral valve is functioning well.  2. Hyperlipidemia: She is doing well on statin therapy without myalgia symptoms.  3. Atrial fibrillation on anticoagulation therapy (Eliquis): She is doing well on anticoagulation and the ventricular rate is well controlled.  4. Status post left temporal lobe infarct (05/31/21) with history of cerebrovascular accident with right carotid arteriography with mechanical thrombectomy (08/18/17): She remains clinically stable with memory loss and mild confusion.  5. Additional information: She is family of Casey Hampton.     We will follow up with her PRN from  surgical mitral valve repair standpoint and have her continue follow up with her primary cardiologist, Ayde Lopes.     CC  Ayde Lopes and Capri Greenberg

## 2022-02-11 NOTE — LETTER
"     [facility]   [address], [address]  [place] [address]  Phone: [phone]  Fax: [phone]              Shaista Bocanegra  1945    Encounter Date: 2/11/2022    Ayde Denise M.D.          PROGRESS NOTE:  Subjective:   Chief Complaint:   Chief Complaint   Patient presents with   • Atrial Fibrillation     F/V Dx: Persistent atrial fibrillation (HCC)   • Hypertension   • Dyslipidemia       \"Artemio\" Shaista Bocanegra is a 76 y.o. female who returns for moderate to severe mitral regurgitation, s/p minimally invasive complex MV repair Tee/KYUNG appendage oversew/maze, hypertension, hyperlipidemia, prior CVAx2, HTN , PAF, ablation of PAF.    Admitted to the hospital 5/8/2020 with dehydration on diuretics.  Has left and right heart catheterization that admission move mitral clip for severe mitral regurgitation.  Took a while for her to decide if she wanted to enter a trial for noninvasive mitral valve repair versus surgery.  Ultimately only underwent surgery with Dr. Monster Nogueira at [place] on 9/16/2021:  1. Minimally invasive complex mitral valve repair with A2 [place]-Miguel Angel neochordal reconstruction, A3 [place]-Miguel Angel neochordal reconstruction, P2 posterior ventricular anchoring remodeling suture and non-resectional leaflet remodeling and [place]-Miguel Angel NeoChord x2, P3 [place]-Miguel Angel NeoChord x1, and #34 sewing ring annuloplasty  2. Atrial fibrillation maze procedure and left atrial appendage oversew    Has atrial fibrillation, underwent ablation with Dr. Richard 12-7-21, did well.  Off of metoprolol.    Has HTN, BP controlled overall, elevated at times.  Checking at home, mostly 110s-120s/70s, rare 91, rare 158.    Has LALY, was on CPAP, now using device only.  Using meditation and leep specialist to help with habits.    Prior CVA, some light tingling sensation remains.    Patient received mechanical thrombectomy 8/18/2017, felt to be due to right carotid disease.    Then had another CVA, " found to have afib, now on apixaban.  EHWHT3gias is 5 now.  Does note brain fog and word finding sometimes now.  Monitor for Afib, Ave 69, range , occasional PVCs, 2% burden.  Remains on apixaban, did have left atrial appendage oversew so she had bleeding problems, I would be comfortable with her coming off of the apixaban and switching back to aspirin.    Has hyperlipidemia, LDL 52.  No CAD on [facility] 2020.  On primary prevention statin    Has a ring that monitors HR at night, we looked at data, no spikes.    She is not limited by chest pain, pressure or tightness with activity.   No significant dyspnea on exertion but only if she moves slowly.  No orthopnea or lower extremity swelling.     No significant palpitations, lightheadedness, or presyncope/syncope.   Rarely lightheaded.    No symptoms of leg claudication.     Remote syncope in the setting of low K.  Her K was stopped in hospital, she is low normal.    Notes mild tremor, intention.    Using trazodone for sleep.    No family history of premature coronary artery disease.  No prior smoking history.  No history of diabetes.  No history of autoimmune disease such as lupus or rheumatoid arthritis.  No chronic kidney disease.  No ETOH overuse.   No caffeine overuse.  No recreation substance use.    Has lost some weight.    Lives in [place].  She is a .  Close to [place], father of her children.  Casey's girlfriend is Janae who sees me.  Casey sees our group, has ICD.  Here with her son Fer today.    She is fully vaccinated.    DATA REVIEWED by me:  ECG 12-8-21  Sinus, 67, PACs    ECG 9-6-2021  Afib, 53,     ECG 5-  Sinus, 65, PVC, first-degree AV delay    BioTel 6 day Summary: 7-14-21  1. Atrial fibrillation with appropriate heart rate range. Average 69, range 35 to 120 bpm.   2. No significant pauses (up to 2.2 seconds).   3. Occasional PVCs, 2% burden.   4. 1 patient trigger, no symptoms reported.   Conclusion: Persistent atrial fibrillation with  appropriate heart rate, no pauses, occasional PVCs.     Zio 2017 2 weeks.  Sinus, brief atrial run.    EP Ablation, Dr. Richard 12-7-21  Pulmonary Vein Isolation  Additional ablation for atrial fibrillation x2  Ablation of additional arrhythmia  Intracardiac Echocardiography  Three-dimensional intracardiac mapping  IV isoproterenol infusion with programmed stimulation    ABHINAV 1-26-21  LV EF  55%.  Biatrial enlargement.  There is severe eccentric mitral regurgitation with multiple jets,   predominantly from flail leaflet of P2.  Echolucent space near P1 of unclear etiology, unusual for cleft   leaflet.  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    Echo 6-1-2021  Left ventricular ejection fraction is visually estimated to be 65%.  Diastolic function is difficult to assess with atrial fibrillation.  Severely dilated left atrium.  Negative bubble study including Valsalva.  Myxomatous changes of the mitral valve leaflets with prolapse of the   anterior and posterior leaflets.  Moderate to severe eccentric mitral regurgitation, probably severe.  Pulmonary veins not well seen on the study.  Estimated right ventricular systolic pressure is 31 mmHg + estimated   RAP.     Compared to the images of the study done 11- there has been no   significant change, prior echo had pulmonary vein flow reversal   consistent with severe mitral regurgitation.    Echo 11-  Left ventricular ejection fraction is visually estimated to be 60%.  Severely dilated left atrium.  Myxomatous changes of the mitral valve.  Bileaflet prolapse of the mitral valve is present.  Severe mitral regurgitation with pulmonary vein systolic flow reversal,   RV 73 mL, ERO 0.4  cm2.  Estimated right ventricular systolic pressure  is 30 mmHg.  Compared to the images of the prior study done 2/3/2020, no significant    change.    Echo 2/3/2020  Prior echo done on 05/03/2019. Compared to the images of the prior   study done -  there has been no significant  change.   Normal left ventricular systolic function.  Left ventricular ejection fraction is visually estimated to be 65%.  Prolapse of the mitral leaflets was present.  Severe mitral regurgitation.  Mild tricuspid regurgitation.  Estimated right ventricular systolic pressure is 35 mmHg.    Echo 2017  Agitated saline study was performed, no evidence of right to left   shunt.  Normal left ventricular size, wall thickness, and systolic function.  Left ventricular ejection fraction is visually estimated to be 60%.  Mildly dilated left atrium.  Moderate mitral regurgitation due to an eccentric jet.  Mild tricuspid regurgitation.    Left and right heart catheterization 5/8/2020  Right ventricle 33/5, EDP 15  Pulmonary artery 42/21, 29  pulmonary capillary wedge 23  Cardiac output 4.1 L/min by thermodilution method     1.  Left main coronary artery:  Normal.  2.  Left anterior descending artery:  Normal.   3.  Left circumflex coronary artery:  Normal.   4.  Right coronary artery:  Normal.  This is a right dominant system.  5.  Left ventricular end diastolic pressure:  25 mmHg.  No signficant gradient across the aortic valve.  6.  Left ventriculogram:  Ejection fraction of 65%, 2+ mitral regurgitation, normal sized thoracic aorta.    Surgery Tee Dr. Monster Nogueira  Date of surgery: 09/16/2021  Date of discharge: 9/28/2021   PROCEDURE/SURGERY:   1. Minimally invasive complex mitral valve repair with A2 [place]-Miguel Angel neochordal reconstruction, A3 [place]-Miguel Angel neochordal reconstruction, P2 posterior ventricular anchoring remodeling suture and non-resectional leaflet remodeling and [place]-Miguel Angel NeoChord x2, P3 [place]-Miguel Angel NeoChord x1, and #34 sewing ring annuloplasty, CPT code 93696.  2. Atrial fibrillation maze procedure and left atrial appendage oversew, CPT code 93072.         2017 CTA NECK   1.  CT angiogram of the neck within normal limits.  2.  Thrombosed right M1 segment.     2017 CAROTID DUPLEX CONCLUSIONS   nl carotids, subclavians  and vertebral's     2017 MRI BRAIN  1.  Moderate sized RIGHT MCA territory acute ischemia involving the cortex and basal ganglia  2.  Flow void is present in the RIGHT MCA compatible with treatment effect  3.  No hemorrhage  4.  Mild white matter changes  5.  Mild atrophy    Most recent labs:       Lab Results   Component Value Date/Time    HEMOGLOBIN 14.2 01/12/2022 10:40 AM    HEMATOCRIT 44.2 01/12/2022 10:40 AM    MCV 96.1 01/12/2022 10:40 AM    INR 1.36 (H) 12/06/2021 01:07 PM      Lab Results   Component Value Date/Time    SODIUM 138 01/12/2022 10:40 AM    POTASSIUM 4.5 01/12/2022 10:40 AM    CHLORIDE 103 01/12/2022 10:40 AM    CO2 28 01/12/2022 10:40 AM    GLUCOSE 89 01/12/2022 10:40 AM    BUN 16 01/12/2022 10:40 AM    CREATININE 0.80 01/12/2022 10:40 AM      Lab Results   Component Value Date/Time    ASTSGOT 15 12/06/2021 01:07 PM    ALTSGPT 14 12/06/2021 01:07 PM    ALBUMIN 4.3 12/06/2021 01:07 PM      Lab Results   Component Value Date/Time    CHOLSTRLTOT 115 01/12/2022 10:40 AM    LDL 53 01/12/2022 10:40 AM    HDL 50 01/12/2022 10:40 AM    TRIGLYCERIDE 58 01/12/2022 10:40 AM           Past Medical History:   Diagnosis Date   • Anemia 9/28/2021   • Arthritis     toe   • Atrial fibrillation (HCC)    • Benign essential HTN 3/19/2012   • Breast cancer (HCC)    • Cancer (HCC) 1998    breast    • Cardiac arrhythmia    • Chest tightness or pressure 3/19/2012   • Chickenpox    • Coronary heart disease    • Portuguese measles    • Gynecological disorder    • High cholesterol    • High risk medication use 3/19/2012   • Hypercholesterolemia 3/19/2012   • Mumps    • MVP (mitral valve prolapse) 3/19/2012   • Osteoporosis    • Seizure disorder (HCC) 9/28/2021    last seizure may 2021   • Sleep apnea     CPAP at night   • Snoring    • Stroke (HCC) 2017    residual minor weakness on left side   • Substance abuse ([facility])    • Tonsillitis    • Urinary bladder disorder     OAB   • Urinary incontinence    • Valvular heart disease     • Venereal disease      Past Surgical History:   Procedure Laterality Date   • CATARACT PHACO WITH IOL  3/16/2009    Performed by SHANNON GANDARA at [facility]   • CATARACT PHACO WITH IOL  1/26/2009    Performed by SHANNON GANDARA at [facility]   • BREAST BIOPSY     • HYSTERECTOMY LAPAROSCOPY     • LUMPECTOMY     • NE CHEMOTHERAPY, UNSPECIFIED PROCEDURE     • NE RADIATION THERAPY PLAN SIMPLE     • NE REMV 2ND CATARACT,CORN-SCLER SECTN     • PRIMARY C SECTION     • SINUSCOPE     • TONSILLECTOMY       Family History   Problem Relation Age of Onset   • Cancer Mother         breast   • No Known Problems Brother    • Heart Failure Neg Hx    • Heart Disease Neg Hx      Social History     Socioeconomic History   • Marital status:      Spouse name: Not on file   • Number of children: 2   • Years of education: Not on file   • Highest education level: Professional school degree (e.g., MD, DDS, DVM, ARACELI)   Occupational History   • Occupation:      Employer: Not Employed   Tobacco Use   • Smoking status: Never Smoker   • Smokeless tobacco: Never Used   Vaping Use   • Vaping Use: Never used   Substance and Sexual Activity   • Alcohol use: Yes     Alcohol/week: 1.2 oz     Types: 2 Glasses of wine per week     Comment: twice a week   • Drug use: No   • Sexual activity: Yes     Partners: Male     Birth control/protection: Female Sterilization   Other Topics Concern   • Not on file   Social History Narrative   • Not on file     Social Determinants of Health     Financial Resource Strain: Low Risk    • Difficulty of Paying Living Expenses: Not hard at all   Food Insecurity: No Food Insecurity   • Worried About Running Out of Food in the Last Year: Never true   • Ran Out of Food in the Last Year: Never true   Transportation Needs: No Transportation Needs   • Lack of Transportation (Medical): No   • Lack of Transportation (Non-Medical): No   Physical Activity: Insufficiently Active    • Days of Exercise per Week: 4 days   • Minutes of Exercise per Session: 30 min   Stress:    • Feeling of Stress : Not on file   Social Connections: Socially Isolated   • Frequency of Communication with Friends and Family: More than three times a week   • Frequency of Social Gatherings with Friends and Family: Twice a week   • Attends Islam Services: Never   • Active Member of Clubs or Organizations: No   • Attends Club or Organization Meetings: Never   • Marital Status:    Intimate Partner Violence:    • Fear of Current or Ex-Partner: Not on file   • Emotionally Abused: Not on file   • Physically Abused: Not on file   • Sexually Abused: Not on file   Housing Stability: Low Risk    • Unable to Pay for Housing in the Last Year: No   • Number of Places Lived in the Last Year: 1   • Unstable Housing in the Last Year: No     No Known Allergies    Current Outpatient Medications   Medication Sig Dispense Refill   • fexofenadine (ALLERGY RELIEF) 60 MG Tab Take 180 mg by mouth every day.     • Eszopiclone 2 MG Tab Take  by mouth.     • amoxicillin (AMOXIL) 500 MG Cap Take 4 Capsules by mouth 1 time a day as needed. 30 Capsule    • traZODone (DESYREL) 50 MG Tab Take 1 Tablet by mouth every evening. Indications: Trouble Sleeping 30 Tablet 0   • tamsulosin (FLOMAX) 0.4 MG capsule Take 0.4 mg by mouth 1/2 hour after breakfast.     • apixaban (ELIQUIS) 5mg Tab Take 1 Tablet by mouth 2 times a day. 60 Tablet 2   • atorvastatin (LIPITOR) 80 MG tablet Take 1 Tablet by mouth every evening. 30 Tablet 2   • ferrous sulfate 325 (65 Fe) MG tablet Take 1 Tablet by mouth every morning with breakfast. 30 Tablet 2   • spironolactone (ALDACTONE) 25 MG Tab Take 1 Tablet by mouth every day. 30 Tablet 2   • Multiple Vitamin (MULTIVITAMINS PO) Take 1 Tab by mouth every day.       No current facility-administered medications for this visit.       ROS    All others systems reviewed and negative.     Objective:     /76 (BP  "Location: Left arm, Patient Position: Sitting, BP Cuff Size: Adult)   Pulse 84   Resp 16   Ht 1.676 m (5' 6\")   Wt 62.6 kg (138 lb)   SpO2 95%  Body mass index is 22.27 kg/m².    General: No acute distress. Well nourished.  HEENT: EOM grossly intact, no scleral icterus, no pharyngeal erythema.   Neck:  No JVD at 90, no bruits, trachea midline  CVS: RRR, frequent ectopy. Normal S1, S2. No sign murmur, No LE edema.  2+ radial pulses, 2+ PT pulses, scar right chest  Resp: CTAB. No wheezing or crackles/rhonchi. Normal respiratory effort.  Abdomen: Soft, NT, no ana rosa hepatomegaly.  MSK/Ext: No clubbing or cyanosis.  Skin: Warm and dry, no rashes.  Neurological: CN III-XII grossly intact. No focal deficits.   Psych: A&O x 3, appropriate affect, adequate judgement, forgetful at times    Physical exam performed today and unchanged, except what is noted, compared to 12/10/2021      Assessment:     1. Severe mitral regurgitation  EC-ECHOCARDIOGRAM COMPLETE W/O CONT   2. S/P mitral valve repair  EC-ECHOCARDIOGRAM COMPLETE W/O CONT   3. Persistent atrial fibrillation (HCC)     4. History of CVA (cerebrovascular accident)     5. Essential hypertension     6. Pure hypercholesterolemia     7. Memory loss     8. PVC (premature ventricular contraction)     9. LALY (obstructive sleep apnea)     10. Benign essential HTN     11. Chronic anticoagulation         Medical Decision Making:  Today's Assessment / Status / Plan:     -Doing really well after the MV repair  -Echo September 2022  -Abx prior to dental cleaning  -Stay on Eliquis for chads 2 vascular score of 5, including prior stroke from afib.  Remains on apixaban, did have left atrial appendage oversew so she had bleeding problems, I would be comfortable with her coming off of the apixaban and switching back to aspirin.  -Off of metoprolol after ablation, doing well  -BP controlled, cont ernie  -PVCs at 2%, no concerns, does not feel palpitations  -No CAD on [facility]  -Cont " primary prevention statin therapy, no significant CAD on left heart cath, LDL to goal  -RTC 3 months     Written instructions given today:    None    No follow-ups on file.    It is my pleasure to participate in the care of Ms. Bocanegra.  Please do not hesitate to contact me with questions or concerns.    Ayde Denise MD, [facility]  Cardiologist [facility]    Please note that this dictation was created using voice recognition software. I have made every reasonable attempt to correct obvious errors, but it is possible there are errors of grammar and possibly content that I did not discover before finalizing the note.          No Recipients

## 2022-02-11 NOTE — PATIENT INSTRUCTIONS
Sleep hygiene (ref: UpToDate)  ?Sleep as long as necessary to feel rested (usually seven to eight hours for adults) and then get out of bed  ?Maintain a regular sleep schedule, particularly a regular wake-up time in the morning  ?Try not to force sleep  ?Avoid caffeinated beverages after lunch  ?Avoid alcohol near bedtime (eg, late afternoon and evening)  ?Avoid smoking or other nicotine intake, particularly during the evening  ?Adjust the bedroom environment as needed to decrease stimuli (eg, reduce ambient light, turn off the television or radio)  ?Avoid use of light-emitting screens  (laptops, tablets, smartphones, [employer]) 2 hours before bedtime   ?Resolve concerns or worries before bedtime  ?Exercise regularly for at least 20 minutes, preferably more than four to five hours prior to bedtime.  ?Avoid daytime naps, especially if they are longer than 20 to 30 minutes or occur late in the day    Stimulus control (Ref: UpToDate)    Patients with insomnia may associate their bed and bedroom with the fear of not sleeping or other arousing events, rather than the more pleasurable anticipation of sleep. The longer one stays in bed trying to sleep, the stronger the association becomes. This perpetuates the difficulty falling asleep.Stimulus control therapy is a strategy whose purpose is to disrupt this association by enhancing the likelihood of sleep . Patients should not go to bed until they are sleepy and should use the bed primarily for sleep (and not for reading, watching television, eating, or worrying). They should not spend more than 20 minutes in bed awake. If they are awake after 20 minutes, they should leave the bedroom and engage in a relaxing activity, such as reading or listening to soothing music. Patients should not engage in activities that stimulate them or reward them for being awake in the middle of the night, such as eating or watching television. In addition, they should not return to bed until they  "are tired and feel ready to sleep. If they return to bed and still cannot sleep within 20 minutes, the process should be repeated. An alarm should be set to wake the patient at the same time every morning, including weekends. Daytime naps are not allowed.    Books:  \"Say Samaria to Insomnia\" by Dexter Motta OR \"No More Sleepless Nights\" by Brian Dixon    "

## 2022-02-15 ENCOUNTER — GYNECOLOGY VISIT (OUTPATIENT)
Dept: OBGYN | Facility: CLINIC | Age: 77
End: 2022-02-15
Payer: MEDICARE

## 2022-02-15 VITALS
SYSTOLIC BLOOD PRESSURE: 131 MMHG | DIASTOLIC BLOOD PRESSURE: 78 MMHG | HEART RATE: 82 BPM | BODY MASS INDEX: 22.02 KG/M2 | HEIGHT: 66 IN | WEIGHT: 137 LBS

## 2022-02-15 DIAGNOSIS — N95.2 ATROPHIC VAGINITIS: ICD-10-CM

## 2022-02-15 DIAGNOSIS — R35.1 NOCTURIA: ICD-10-CM

## 2022-02-15 DIAGNOSIS — K59.09 CHRONIC CONSTIPATION: ICD-10-CM

## 2022-02-15 DIAGNOSIS — N32.81 OAB (OVERACTIVE BLADDER): Primary | ICD-10-CM

## 2022-02-15 DIAGNOSIS — Z86.73 HISTORY OF CVA (CEREBROVASCULAR ACCIDENT): ICD-10-CM

## 2022-02-15 LAB
APPEARANCE UR: CLEAR
BILIRUB UR STRIP-MCNC: NORMAL MG/DL
COLOR UR AUTO: YELLOW
GLUCOSE UR STRIP.AUTO-MCNC: NEGATIVE MG/DL
KETONES UR STRIP.AUTO-MCNC: NEGATIVE MG/DL
LEUKOCYTE ESTERASE UR QL STRIP.AUTO: NORMAL
NITRITE UR QL STRIP.AUTO: NEGATIVE
PH UR STRIP.AUTO: 6 [PH] (ref 5–8)
PROT UR QL STRIP: NORMAL MG/DL
RBC UR QL AUTO: NORMAL
SP GR UR STRIP.AUTO: >=1.03
UROBILINOGEN UR STRIP-MCNC: NORMAL MG/DL

## 2022-02-15 PROCEDURE — 81002 URINALYSIS NONAUTO W/O SCOPE: CPT | Performed by: STUDENT IN AN ORGANIZED HEALTH CARE EDUCATION/TRAINING PROGRAM

## 2022-02-15 PROCEDURE — 99214 OFFICE O/P EST MOD 30 MIN: CPT | Performed by: STUDENT IN AN ORGANIZED HEALTH CARE EDUCATION/TRAINING PROGRAM

## 2022-02-15 RX ORDER — ESTRADIOL 0.1 MG/G
CREAM VAGINAL
Qty: 1 EACH | Refills: 3 | Status: SHIPPED | OUTPATIENT
Start: 2022-02-15 | End: 2022-09-28

## 2022-02-15 RX ORDER — POLYETHYLENE GLYCOL 3350 17 G/17G
17 POWDER, FOR SOLUTION ORAL DAILY
Qty: 30 EACH | Refills: 2 | Status: SHIPPED | OUTPATIENT
Start: 2022-02-15 | End: 2022-05-12

## 2022-02-15 ASSESSMENT — FIBROSIS 4 INDEX: FIB4 SCORE: 1.35

## 2022-02-15 NOTE — NON-PROVIDER
PT here today for consult   PT States urinary incontinence.   Hysterectomy? Yes; Approx. 1985  Good #: [phone] (home)   PVR : 3  Unable to leave sample   Pharmacy Verified

## 2022-02-15 NOTE — PROGRESS NOTES
Urogynecology and Pelvic Reconstructive Surgery Consultation Visit    Shaista Bocanegra MRN:[id number] :1945    History supplemented by a friend accompanying her    Reason for Visit:   Chief Complaint   Patient presents with   • New Patient     Consult          Subjective     History of Presenting Illness:    Ms.Janet Monico Bocanegra is a 76 y.o. year old P2 who was referred for the evaluation and management of persistent urinary frequency.     She reports that her bladder started to bother her about 1 year ago.  She is most bothered by how often she has to use the bathroom both during the day and at nighttime.  Fortunately, she does not experience any urinary leakage with either stress or urge.    Of note, medically she had a stroke 4 years ago followed by a smaller stroke which may relate to the symptom onset.    She is undergone a significant work-up and treatment at her urologist.  Treatments included anticholinergic medications, mirabegron, percutaneous tibial nerve stimulation, intra detrusor chemodenervation with Botox.    Unfortunately, the Botox which was administered in December of last year did not help her symptoms and made it more difficult for her to empty her bladder.  She is last seen a few weeks ago it by her urologist who performed a urodynamic test.  (Urodynamics is not available for full review of parentheses.  The report stated that she had under active detrusor muscle and impaired bladder emptying.  They counseled her for clean intermittent catheterization at this time but she declined.    Prior Pelvic surgery:   Supracervical hysterecotmy, BSO      Prior treatment:   Anticholinergics, mirabegron  PTNS-unsuccessful  Botox x2 -worsen function and emptying.     Fluid intake:   Mostly water, 1 cup of coffee, alcohol 3 times per week.    Pelvic floor symptom review:     Bladder:   Voids per day: 10-12 Voids per night: 0-1      Urinary incontinence episodes per day: None   Urge leakage:  None   Stress  leakage: None   Continuous / insensible urine loss: No    Nocturnal enuresis: No    Leakage volume: none   Number of pads/day: none    Bladder emptying: Incomplete   Voiding symptoms: Hesitancy   UTI in last 12 months: No   Other urologic history:       Prolapse:     Prolapse symptoms: None     Bowel:    Constipation: Yes -sometimes   Bowel movements per day: 2 per week, getting worse.     Straining to empty bowels: No    Splinting to evacuate: No    Painful evacuation: No    Difficulty emptying rectum: No    Incontinence to stool: No   Incontinence to gas: No     Blood in stool: No    Hemorrhoids: No    Bowel conditions: no       Sexual function:    Sexually active: No but may become active   Gender of partners: Male   Pain with intercourse: No       Pelvic Pain: No      Past medical and surgical history      Past medical history:  Past Medical History:   Diagnosis Date   • Anemia 9/28/2021   • Seizure disorder (HCC) 9/28/2021    last seizure may 2021   • Stroke (HCC) 2017    residual minor weakness on left side   • Benign essential HTN 3/19/2012   • Chest tightness or pressure 3/19/2012   • Hypercholesterolemia 3/19/2012   • MVP (mitral valve prolapse) 3/19/2012   • High risk medication use 3/19/2012   • Cancer (HCC) 1998    breast    • Arthritis     toe   • Atrial fibrillation (HCC)    • Breast cancer (HCC)    • Cardiac arrhythmia    • Chickenpox    • Coronary heart disease    • Syrian measles    • Gynecological disorder    • High cholesterol    • Mumps    • Osteoporosis    • Sleep apnea     CPAP at night   • Snoring    • Substance abuse (HCC)    • Tonsillitis    • Urinary bladder disorder     OAB   • Urinary incontinence    • Valvular heart disease    • Venereal disease      Past surgical history:  Past Surgical History:   Procedure Laterality Date   • CATARACT PHACO WITH IOL  3/16/2009    Performed by SHANNON GANDARA at SURGERY SAME DAY [facility]   • CATARACT PHACO WITH IOL  1/26/2009    Performed by  "SHANNON GANDARA at SURGERY SAME DAY [facility] ORS   • BREAST BIOPSY     • HYSTERECTOMY LAPAROSCOPY     • LUMPECTOMY     • VA CHEMOTHERAPY, UNSPECIFIED PROCEDURE     • VA RADIATION THERAPY PLAN SIMPLE     • VA REMV 2ND CATARACT,CORN-SCLER SECTN     • PRIMARY C SECTION     • SINUSCOPE     • TONSILLECTOMY       Medications:has a current medication list which includes the following prescription(s): fexofenadine, eszopiclone, amoxicillin, trazodone, tamsulosin, apixaban, atorvastatin, ferrous sulfate, spironolactone, and multiple vitamin.  Allergies:Patient has no known allergies.  Family history:  Family History   Problem Relation Age of Onset   • Cancer Mother         breast   • No Known Problems Brother    • Heart Failure Neg Hx    • Heart Disease Neg Hx      Social history: reports that she has never smoked. She has never used smokeless tobacco. She reports current alcohol use of about 1.2 oz of alcohol per week. She reports that she does not use drugs.    Review of systems: A full review of systems was performed, and negative with the exception of want is noted above in the HPI.        Objective        /78 (BP Location: Right arm, Patient Position: Sitting, BP Cuff Size: Adult)   Pulse 82   Ht 1.676 m (5' 6\")   Wt 62.1 kg (137 lb)   BMI 22.11 kg/m²     Physical Exam  Vitals reviewed. Exam conducted with a chaperone present (MA - see notes.).   Constitutional:       Appearance: Normal appearance.   HENT:      Head: Normocephalic.      Mouth/Throat:      Mouth: Mucous membranes are moist.   Cardiovascular:      Rate and Rhythm: Normal rate.   Pulmonary:      Effort: Pulmonary effort is normal.   Abdominal:      Palpations: Abdomen is soft. There is no mass.      Tenderness: There is no abdominal tenderness.   Skin:     General: Skin is warm and dry.   Neurological:      Mental Status: She is alert.      Comments: Some memory impairment   Psychiatric:         Mood and Affect: Mood normal. "         Genitourinary: Deferred        Procedure Performed: No    Diagnostic test and records review:    Urine dipstick: could not urinate     Post-void residual: 3mL, performed by Bladder Scanner    Labs:     Radiology: n/a    Documentation reviewed: Prior EMR Records    Outside records reviewed: Care everywhere urology records           Assessment & Plan     Ms.Janet Monico Bocanegra is a 76 y.o. year old P2 with bothersome overactive bladder, possible neurogenic component, chronic constipation. We discussed my recommendations for further diagnosis and treatment at length today.     1. OAB (overactive bladder)  2. Nocturia  3. History of CVA (cerebrovascular accident)  - She has overactive bladder and urgency incontinence which has not responded significantly to behavioral therapy, or to oral medications, PTNS or botox.  Unfortunately, recent Botox therapy has made it so is more difficult for her to empty her bladder.  Fortunately today she had a normal postvoid residual.  I counseled her and her friend that like to optimize her conservative management and function with pelvic floor physical therapy, vaginal estrogen supplementation, fluid management.  Given that she only has urinary frequency and some nocturia without incontinence, I want her to think about how bothersome the symptoms are to her.  If they are tolerable, it may be better to observe and tolerate the symptoms rather to go for another third line treatment.  However, she would be a candidate for sacral neuromodulation therapy both for her overactive bladder, nocturia, incomplete bladder emptying.  I did explain to her that this involves a “test phase” with temporary lead implantation, followed by a full permanent implant of lad and batter if therapy results in >50% improvement in symptoms. Therapy is successful in reducing OAB symptoms in approximately 70% of patients.  No matter what, I would want to wait for her Botox to wear off before considering this  therapy, which should be a Judith at the earliest.  - Literature provided about sacral neuromodulation  - Vaginal estrogen prescription given  - Referral provided for pelvic floor physical therapy ([facility])      4. Chronic constipation  She is counseled that chronic constipation and poor bowel function often influences her bladder function.  I do recommend that she try to optimize her bowel regimen and decrease constipation.  I recommend that she stop over-the-counter stool softeners (Colace) as these are often not helpful.  Instead, I recommend increasing magnesium supplementation, warm oral liquid intake over cold, and daily MiraLAX powder.  MiraLAX is very effective and safe to take daily as prevention for constipation.  She may use stimulant laxatives or Fleet enemas intermittently for stool impaction and pressure but not for daily use.  Patient and her friend verbalized understanding and had the opportunity to ask questions.    5. Atrophic vaginitis  Her exam confirms vaginal atrophy / genitorurinary syndrome of menopause. This is very common and due to low estrogen levels, which render the vaginal tissue thin, irritated, and open to colonization with gut deepthi. This can lead to irritation, dryness, painful sex, urinary infections and urinary urgency. Discussed risks, benefits, and indications for vaginal estrogen therapy.  Vaginal estrogen has negligible absorption into the bloodstream and is not associated with increased risks for uterine or breast cancers. Prescription given for estrace vaginal cream to be placed inside vagina nightly for 2 weeks, then twice weekly thereafter. The effects of vaginal estrogen can take weeks to months.               Fausto Tiwari MD, FACOG    Female Pelvic Medicine and Reconstructive Surgery  Department of Obstetrics and Gynecology  [facility]      This medical record contains text that has been entered with the  assistance of computer voice recognition and dictation software.  Therefore, it may contain unintended errors in text, spelling, punctuation, or grammar

## 2022-02-15 NOTE — NON-PROVIDER
Urogynecology and Pelvic Reconstructive Surgery Consultation Visit    Shaista Bocanegra MRN:[id number] :1945    Referred by: ***    Reason for Visit: No chief complaint on file.        Subjective     History of Presenting Illness:    Ms.Janet Monico Bocanegra is a 76 y.o. year old P*** who was referred by  *** for the evaluation and management of ***.         Prior Pelvic surgery:   ***     Prior treatment:   ***     Fluid intake:   ***    Pelvic floor symptom review:     Bladder:   Voids per day: *** Voids per night: ***      Urinary incontinence episodes per day: ***    Urge leakage:  {none/on movement to bathroom/full bladder:13519}   Stress leakage: {none/with cough/with laugh/with excercise/..:60611}   Continuous / insensible urine loss: {YES/NO:}   Nocturnal enuresis: {YES/NO:}   Leakage volume: {drops/moderate/large...:28052}   Number of pads/day: ***    Bladder emptying: {complete/incomplete:63405}   Voiding symptoms: {none/hesitancy/post-void...:30203}   UTI in last 12 months: {Yes/No:81187}   Other urologic history: {hematuria/stones/pyelonephritis:08005}      Prolapse:     Prolapse symptoms: {none/bulge/exteriorized:85977}   Degree of prolapse: {to introitus/beyond introitus/..:68510}   Exacerbating factors: ***    Relieving factors:  ***    Duration of prolapse symptoms: ***       Bowel:    Constipation: {YES/NO:}   Bowel movements per day: ***    Straining to empty bowels: {YES/NO:}   Splinting to evacuate: {YES/NO:}   Painful evacuation: {YES/NO:}   Difficulty emptying rectum: {YES/NO:}   Incontinence to stool: {Yes/No:}   Incontinence to gas: {YES/NO:}    Blood in stool: {YES/NO:}   Hemorrhoids: {YES/NO:}   Bowel conditions: {IBS/crohns/uc/celiac/cancer/..:20184}   Most recent colonoscopy: ***       Sexual function:    Sexually active: {YES/NO:}   Gender of partners: {male/female/trans-male/trans...:39054}   Pain with intercourse:  {Yes/No:92354}       Pelvic Pain: {Yes/No:88833}      Past medical and surgical history    Past obstetric history   Number of vaginal deliveries: ***   Number of  deliveries: ***   History of vacuum/forceps: {YES/NO:}   History of obstetric anal sphincter injury: {YES/NO:}    Past gynecological history:    Last menstrual period: No LMP recorded. Patient has had a hysterectomy.   History of abnormal uterine bleeding: {YES/NO:}   History of fibroids: {YES/NO:}   History of endometrial polyps:  {YES/NO:}   History of endometriosis: {YES/NO:}   History of cervical dysplasia: {YES/NO:}   Last pap: ***   Current contraception: ***   Prior GYN surgery: {tubal ligation/adnexal surgery/hyst...:67271}    Past medical history:  Past Medical History:   Diagnosis Date   • Anemia 2021   • Seizure disorder (HCC) 2021    last seizure may 2021   • Stroke (HCC) 2017    residual minor weakness on left side   • Benign essential HTN 3/19/2012   • Chest tightness or pressure 3/19/2012   • Hypercholesterolemia 3/19/2012   • MVP (mitral valve prolapse) 3/19/2012   • High risk medication use 3/19/2012   • Cancer (HCC) 1998    breast    • Arthritis     toe   • Atrial fibrillation (HCC)    • Breast cancer (HCC)    • Cardiac arrhythmia    • Chickenpox    • Coronary heart disease    • Malagasy measles    • Gynecological disorder    • High cholesterol    • Mumps    • Osteoporosis    • Sleep apnea     CPAP at night   • Snoring    • Substance abuse (HCC)    • Tonsillitis    • Urinary bladder disorder     OAB   • Urinary incontinence    • Valvular heart disease    • Venereal disease      Past surgical history:  Past Surgical History:   Procedure Laterality Date   • CATARACT PHACO WITH IOL  3/16/2009    Performed by SHANNON GANDARA at SURGERY SAME DAY [facility] ORS   • CATARACT PHACO WITH IOL  2009    Performed by SHANNON GANDARA at SURGERY SAME DAY [facility] ORS   • BREAST BIOPSY     •  "HYSTERECTOMY LAPAROSCOPY     • LUMPECTOMY     • HI CHEMOTHERAPY, UNSPECIFIED PROCEDURE     • HI RADIATION THERAPY PLAN SIMPLE     • HI REMV 2ND CATARACT,CORN-SCLER SECTN     • PRIMARY C SECTION     • SINUSCOPE     • TONSILLECTOMY       Medications:has a current medication list which includes the following prescription(s): fexofenadine, eszopiclone, amoxicillin, trazodone, tamsulosin, apixaban, atorvastatin, ferrous sulfate, spironolactone, and multiple vitamin.  Allergies:Patient has no known allergies.  Family history:  Family History   Problem Relation Age of Onset   • Cancer Mother         breast   • No Known Problems Brother    • Heart Failure Neg Hx    • Heart Disease Neg Hx      Social history: reports that she has never smoked. She has never used smokeless tobacco. She reports current alcohol use of about 1.2 oz of alcohol per week. She reports that she does not use drugs.    Review of systems: A full review of systems was performed, and negative with the exception of want is noted above in the HPI.        Objective        There were no vitals taken for this visit.    Physical Exam    Genitourinary:    External female genitalia: {wnl/abnormal:59229}   Vulva: {wnl/atrophic/lichen sclerosis/...:06934}   Bulbocavernosus reflex: {Intact/Absent:12425}   Anal wink reflex: {Intact/Absent:17453}   Perineal sensation: {wnl/decreased/asymmetrical/...:88951}   Urethra: {wnl/atrophic/caruncle/prolapse/...:92839}   Vagina: {wnl/atrophic/friable/stenotic/...:28439}   Atrophy: {Desc; none/mild/moderate/severe:241902::\"None\"}   Cough stress test: {not performed/positive/negative:73970}    Pelvic floor:    POP-Q: Aa *** / Ba *** / TVL *** / C *** / D *** / Ap *** / Bp *** / GH *** / PB ***   Prolapse stage: ***   Paravaginal defect: {left/right/bilateral:53725}   Cervical elongation: {YES/NO:20266}   Urethral tenderness: {YES/NO:20266}   Bladder/ suprapubic tenderness: {YES/NO:20266}   Levator tenderness: " {none/left/right/bilateral:31768}   Levator muscle tone: {wnl/hypertonic/hypotonic:27072}   Pelvic floor contraction strength (modified Oxford scale): {0=none/1=flicker/2=weak/...:99014}   Pelvic floor contraction duration: {absent/brief/normal:93035}   Bimanual exam: {small, mobile uterus/ bulky uterus/...:23455}   Anal resting tone: {wnl/absent/decreased/...:09118}   Anal squeeze tone: {wnl/absent/decreased/...:60120}   Palpable anal sphincter defect: {YES/NO:20266}   Granulation tissue: {YES/NO:20266}   Epithelial erosions: {YES/NO:20266}   Epithelial ulcerations: {YES/NO:20266}   Fistula: {none/apical/anterior/...:55277}   Vaginal band/stricture: {YES/NO:20266}    Procedure Performed: {no/bladder instillation/urethral......:02596}    Diagnostic test and records review:    Urine dipstick: ***     Post-void residual: ***mL, performed by {bladder scanner/cath....:51420}    Labs:     Radiology: ***    Documentation reviewed: {prior emr records/flowsheet/...:13669}    Outside records reviewed: *** pages           Assessment & Plan     Ms.Janet Monico Bocanegra is a 76 y.o. year old P*** with ***. We discussed my recommendations for further diagnosis and treatment at length today.     There are no diagnoses linked to this encounter.               Fausto Tiwari MD, FACOG    Female Pelvic Medicine and Reconstructive Surgery  Department of Obstetrics and Gynecology  [facility]        This medical record contains text that has been entered with the assistance of computer voice recognition and dictation software.  Therefore, it may contain unintended errors in text, spelling, punctuation, or grammar

## 2022-02-17 ENCOUNTER — HOSPITAL ENCOUNTER (OUTPATIENT)
Dept: RADIOLOGY | Facility: MEDICAL CENTER | Age: 77
End: 2022-02-17
Attending: FAMILY MEDICINE
Payer: MEDICARE

## 2022-02-17 DIAGNOSIS — Z12.31 ENCOUNTER FOR SCREENING MAMMOGRAM FOR MALIGNANT NEOPLASM OF BREAST: ICD-10-CM

## 2022-02-17 PROCEDURE — 77063 BREAST TOMOSYNTHESIS BI: CPT

## 2022-02-23 ENCOUNTER — TELEPHONE (OUTPATIENT)
Dept: INTERNAL MEDICINE | Facility: OTHER | Age: 77
End: 2022-02-23
Payer: MEDICARE

## 2022-02-23 NOTE — TELEPHONE ENCOUNTER
Call to son Fer to f/u on Jan CGA appt. He expressed that it was incredibly beneficial, was apprehensive about what to expect but felt the coverage of 3 providers was great and thorough. They have reviewed packet together and are looking forward to a 6 month f/u.    your PMD,   Phone: (   )    -  Fax: (   )    -  Follow Up Time: 1-3 Days

## 2022-03-02 ENCOUNTER — SPEECH THERAPY (OUTPATIENT)
Dept: SPEECH THERAPY | Facility: REHABILITATION | Age: 77
End: 2022-03-02
Attending: FAMILY MEDICINE
Payer: MEDICARE

## 2022-03-02 DIAGNOSIS — R41.3 MEMORY DEFICIT: ICD-10-CM

## 2022-03-02 DIAGNOSIS — I69.30 LATE EFFECT OF STROKE: ICD-10-CM

## 2022-03-02 DIAGNOSIS — R47.01 APHASIA: ICD-10-CM

## 2022-03-02 PROCEDURE — 92523 SPEECH SOUND LANG COMPREHEN: CPT

## 2022-03-02 ASSESSMENT — ENCOUNTER SYMPTOMS: NO PATIENT REPORTED PAIN: 1

## 2022-03-03 ENCOUNTER — APPOINTMENT (OUTPATIENT)
Dept: RADIOLOGY | Facility: MEDICAL CENTER | Age: 77
End: 2022-03-03
Attending: FAMILY MEDICINE
Payer: MEDICARE

## 2022-03-03 NOTE — OP THERAPY EVALUATION
Outpatient Speech Therapy  INITIAL EVALUATION    [address]  [address]  [place] [address]  Phone:  [phone]  Fax:  [phone]    Date of Evaluation: 03/02/2022    Patient: Shaista Bocanegra  YOB: 1945  MRN: [id number]     Referring Provider: [place] [address]   Referring Diagnosis No admission diagnoses are documented for this encounter.     Time Calculation    Start time: 1120  Stop time: 1201 Time Calculation (min): 41 minutes           Chief Complaint: Aphasia    Visit Diagnoses     ICD-10-CM   1. Late effect of stroke  I69.30   2. Aphasia  R47.01   3. Memory deficit  R41.3     Subjective:   Reason for Therapy:     Reason For Evaluation:  Stroke Rehabilitaion, CVA, Aphasia and Speech/Language    Onset Date:  5/30/2021    Onset Description:  Patient presents to outpatient speech therapy in the setting of increased complaints regarding cognitive communication function in areas of word finding, reading comprehension and written expression and changes to speech production, described as reduced vocal loudness following CVA. Patient reports history is significant for CVA, 4 years prior, 9/2017.     MRI 5/31/2021 revealed  IMPRESSION:     1.  Small area of acute infarct in the left temporal lobe.  2.  Chronic infarct in the right insular cortex and frontal lobe.  3.  Mild chronic microvascular ischemic disease.  4.  Mild cerebral volume loss  Social Support:     Social support system: father of her children, Casey, present and supportive.    Patient Mental Status:  Alert and Responsive  Progress Factors:     Progression:  Staying the same  Pain:     no pain reported    Therapy History:     Current Diet:  Regular Diet and Thin Liquids    Nutritional Concerns Restrictions and Allergies:  Patient reported no difficulty swallowing at this time    Hearing:  Hearing Aids (Bilateral)    Vision:  Eye Glasses (reading)     Handedness:  Right-handed  Additional Subjective Comments:      Patient is a 77 y.o. female with history of multiple CVAs, most recently in May 2021.  Patient denied any difficulty swallowing, and reported she is coming to speech therapy to get help with word finding and higher level cognitive deficits, including memory.          Past Medical History:   Diagnosis Date   • Anemia 9/28/2021   • Arthritis     toe   • Atrial fibrillation (HCC)    • Benign essential HTN 3/19/2012   • Breast cancer (HCC)    • Cancer (HCC) 1998    breast    • Cardiac arrhythmia    • Chest tightness or pressure 3/19/2012   • Chickenpox    • Coronary heart disease    • [place] measles    • Gynecological disorder    • High cholesterol    • High risk medication use 3/19/2012   • Hypercholesterolemia 3/19/2012   • Mumps    • MVP (mitral valve prolapse) 3/19/2012   • Osteoporosis    • Seizure disorder (HCC) 9/28/2021    last seizure may 2021   • Sleep apnea     CPAP at night   • Snoring    • Stroke ([facility]) 2017    residual minor weakness on left side   • Substance abuse ([facility])    • Tonsillitis    • Urinary bladder disorder     OAB   • Urinary incontinence    • Valvular heart disease    • Venereal disease      Past Surgical History:   Procedure Laterality Date   • CATARACT PHACO WITH IOL  3/16/2009    Performed by SHANNON GANDARA at SURGERY SAME DAY [facility] ORS   • CATARACT PHACO WITH IOL  1/26/2009    Performed by SHANNON GANDARA at SURGERY SAME DAY [facility] ORS   • BREAST BIOPSY     • HYSTERECTOMY LAPAROSCOPY     • LUMPECTOMY     • W[place] CHEMOTHERAPY, UNSPECIFIED PROCEDURE     • W[place] RADIATION THERAPY PLAN SIMPLE     • W[place] REMV 2ND CATARACT,CORN-SCLER SECTN     • PRIMARY C SECTION     • SINUSCOPE     • TONSILLECTOMY       Objective:   Other Treatment Interventions:  Speech Language Evaluation  Treatment Intervention tool(s) used:  Western Aphasia BAttery  Objective Details:  The Western Aphasia Battery (WAB) was administered.  See results  below:      1. SpontaniousSpeech 19/20    -Information Content 10/10  -Fluency, Grammatical Competence, Paraphasias  9/10    2. Auditory Verbal Comprehension 10/10  - Yes/No Questions 60/60   - Auditory Word Recognition 60/60   - Sequential Commands 80/80      3. Repetition 100/100  1010     4.  Naming and Word Finding 10/10  - Object Naming 60/60   - Word Fluency 9/20   - Sentence Completion 10/10   - Responsive Speech 10/10      Aphasia Quotient:  99  Mild  The Aphasia Quotient is the essential summary value of the individuals aphasic deficits, and it is equal to the severity of aphasia regardless of the etiology or type.       Speech Therapy Assessment:     Expressive Language Assessment:     Repeats speech accurately WFL.    Patient's ability to use automatic language appropriately is WFL.    Patient's ability to verbalize wants and needs is Minimal.    Patient's ability to formulate complex/abstract language is Minimal.    Expressive language comments: Patient reports frequent word finding issues, which she compensates for with circumlocutions.    Receptive Language Assessment:     Patient ability to identify body parts: [facility]    Patient ability to discriminate right and left: [facility]    Personal information yes/no questions: [facility]    Complex yes/no questions: [facility]    Patient follows 2 step simple commands:[facility]    Receptive language comments: Auditory comprehension is intact for conversational information.  Further assessment will be completed with higher complexity auditory information.     Reading Comprehension Assessment:     Patient ability matching identical items: [facility]    Patient ability to comprehend single words: [facility]    Patient ability to comprehend short phrases: [facility]    Patient ability to comprehend simple paragraphs: [facility]    Reading comprehension comments: Patient reports difficulty with comprehension and retention of books.    Speech Therapy Plan :   Prognosis & Recommendations  Impression Summary:  Patient  presents with mild expressive asphasia characterized by word finding deficits, with auditory comprehension intact.  Patient also reports difficulty with memory and higher level reasoning.  Further assessment will be completed for cognitive linguistic skills.  Patient would benefit from skilled speech therapy to provide word finding strategies and improve higher level cognitive skills for greater independence within the home and community.  Patient verbalized understanding and agreement with current plan of care.  Goals  Short Term Goals:  1. Patient will state and implement compensatory strategies addressing word finding, including semantic feature analysis, to independent level 4/5 obligatory opportunities.  2.   Patient will participate in further cognitive linguistic assessment for further goal development  Short Term Goal Duration (Weeks):  2-4 weeks  Long Term Goals:  -Patient will improve speech-language skills and utilize compensatory strategies to communicate wants and needs effectively with different conversational partners, maintain safety and participate socially in functional living environment  with 90% accuracy.  Long Term Goal Duration (Weeks):  2-4 weeks  Therapy Recommendations  Recommendation:  Individual Speech Therapy,  Planned Therapy Interventions:  Compensatory Strategy Training, Patient/Caregiver Education, Home Program and Speech/Language training,   Frequency:  1x week  Duration (in visits):  4 (4 X [id number])  Duration (in weeks):  4      Referring provider co-signature:  I have reviewed this plan of care and my co-signature certifies the need for services.    Certification Period: 03/02/2022 to  03/30/22    Physician Signature: ________________________________ Date: ______________

## 2022-03-04 ENCOUNTER — HOSPITAL ENCOUNTER (OUTPATIENT)
Dept: RADIOLOGY | Facility: MEDICAL CENTER | Age: 77
End: 2022-03-04
Attending: FAMILY MEDICINE
Payer: MEDICARE

## 2022-03-04 DIAGNOSIS — R92.8 ABNORMAL MAMMOGRAM: ICD-10-CM

## 2022-03-04 PROCEDURE — G0279 TOMOSYNTHESIS, MAMMO: HCPCS | Mod: RT

## 2022-03-04 PROCEDURE — 76642 ULTRASOUND BREAST LIMITED: CPT | Mod: RT

## 2022-03-09 ENCOUNTER — SPEECH THERAPY (OUTPATIENT)
Dept: SPEECH THERAPY | Facility: REHABILITATION | Age: 77
End: 2022-03-09
Attending: FAMILY MEDICINE
Payer: MEDICARE

## 2022-03-09 DIAGNOSIS — R47.01 APHASIA: ICD-10-CM

## 2022-03-09 DIAGNOSIS — R41.841 COGNITIVE COMMUNICATION DEFICIT: ICD-10-CM

## 2022-03-09 DIAGNOSIS — I69.30 LATE EFFECT OF STROKE: ICD-10-CM

## 2022-03-09 PROCEDURE — 92507 TX SP LANG VOICE COMM INDIV: CPT

## 2022-03-09 ASSESSMENT — ENCOUNTER SYMPTOMS: NO PATIENT REPORTED PAIN: 1

## 2022-03-09 NOTE — OP THERAPY DAILY TREATMENT
Outpatient Speech Therapy  DAILY TREATMENT     [facility] Speech [address]  [address]  [place] [address]  Phone:  [phone]  Fax:  [phone]    Date: 2022    Patient: Shaista Bocanegra  YOB: 1945  MRN: [id number]     Time Calculation      1117 1200 43 min         Chief Complaint: Poor Memory    Visit #: 2    Subjective:   Reason for Therapy:     Reason For Evaluation:  CVA, Aphasia and Speech/Language    Onset Date:  2021  Social Support:     Social support system: father of her children, Casey, present and supportive.    Patient Mental Status:  Alert and Responsive  Progress Factors:     Progression:  Staying the same  Pain:     no pain reported    Therapy History:     Hearing:  Hearing Aids (Bilateral)    Vision:  Eye Glasses    Handedness:  Right-handed  Additional Subjective Comments:      Patient reported no significant changes.          Objective:   Other Treatment Interventions:  Cognitive linguistics assessment  Treatment Intervention tool(s) used:  Cognitive Linguistic Quick Test  Objective Details:  Patient was administered the Cognitive Linguistic Quick Test (CLQT) with the following scores:     Personal Facts: 8/8  (Criterion cut off for ages 18-69:  8, for 70-89: 8)   - This task assesses memory and language abilities.     Symbol Cancellation  (Criterion cut off for ages 18-69: 11, for 70-89: 10)    -  This is a non-linguistic task of visual attention and perception.      Confrontation Namin/10 (Criterion cut off for ages 18-69: 10, for 70-89: 10)   - This language task is for word retrieval.     Clock Drawing: 10/13 (Criterion cut off for ages 18-69: 12, for 70-89: 11)   - This serves a mini-screening tool for all cognitive domains analyzing visuospatial, planning, number and time concepts.     Story retell: 7/10 (Criterion cut off for ages 18-69: 6, for 70-89: 5)   - This assesses auditory memory and comprehension, working memory, and  language output skills.      Symbol trails: 6/10 (Criterion cut off for ages 18-69: 9, for 70-89: 6)   - This is a non-linguistic task used to assess planning, self-monitoring, working memory and visual attention.     Generative Namin/9 (Criterion cut off for ages 18-69: 5, for 70-89: 4)   - This task assesses word search and retrieval skills in semantic and phonemic categories.      Design Memory:  (Criterion cut off for ages 18-69: 5, for 70-89: 4)   - This is a non-linguistic task that can provide information about visual discrimination and analysis, attention, and visual memory.      Mazes:  (Criterion cut off for ages 18-69: 7, for 70-89: 4)   - This task requires planning, mental flexibility, self-monitoring, and visual discrimination.      Design generation:  (Criterion cut off for ages 18-69: 6, for 70-89: 5)   - This nonlinguistic task is for creativity and mental flexibly.      These scores are then placed in to the 5 cognitive domain areas:    Attention:  155 Mild  Memory:  123 Mild  Executive Function: 19 WNL  Language: 29 WNL  Visuospatial skills:  58 Mild  Clock Drawing Severity Rating:  10 Mild    These overall scores combine to create a composite severity rating of 3.4 indicating that cognition is Mildly impaired at this time.            Speech Therapy Assessment:     Expressive Language Assessment:     Repeats speech accurately WFL.    Patient's ability to use automatic language appropriately is WFL.    Patient's ability to verbalize wants and needs is Minimal.    Patient's ability to formulate complex/abstract language is Minimal.    Expressive language comments: Patient reports frequent word finding issues, which she compensates for with circumlocutions.    Receptive Language Assessment:     Patient ability to identify body parts: WFL    Patient ability to discriminate right and left: WFL    Personal information yes/no questions: WFL    Complex yes/no questions: WFL    Patient  follows 2 step simple commands:[facility]    Receptive language comments: Auditory comprehension is intact for conversational information.  Further assessment will be completed with higher complexity auditory information.     Reading Comprehension Assessment:     Patient ability matching identical items: [facility]    Patient ability to comprehend single words: [facility]    Patient ability to comprehend short phrases: [facility]    Patient ability to comprehend simple paragraphs: [facility]    Reading comprehension comments: Patient reports difficulty with comprehension and retention of books.    Cognitive Linguistic Assessment:     Patient attention selective: Minimal    Patient attention divided: Minimal    Patient recent and short term memory: Moderate    Patient complex reasoning ability: Moderate    Patient complex problem solving ability: Moderate    Patient executive functioning ability: Minimal        Speech Therapy Plan :   Prognosis & Recommendations  Impression Summary:  Patient presents with mild expressive asphasia characterized by word finding deficits, with auditory comprehension intact.  Patient also presents with mild cognitive linguistic deficits, in the areas of attention, memory, and visuospatial skills. Patient would benefit from skilled speech therapy to provide word finding strategies and improve higher level cognitive skills for greater independence within the home and community.  Patient verbalized understanding and agreement with current plan of care.  Prognosis:  Good  Goals  Short Term Goals:  1. Patient will state and implement compensatory strategies addressing word finding, including semantic feature analysis, to independent level 4/5 obligatory opportunities.  2. Patient will complete problem solving/reasoning tasks including deductive reasoning puzzles and verbal math story problems given minimal  cues completing to 85% accuracy.   3.  Patient will improve memory recall, including working memory, short term and time  delayed recall, implementing compensatory strategies as necessary,and completing time delayed recall to newly learned information with 80% or greater accuracy with min cues.  4. Patient will complete visuospatial and scanning tasks with 80% accuracy to reduce left neglect.  Short Term Goal Duration (Weeks):  2-4 weeks  Long Term Goals:  -Patient will improve speech-language skills and utilize compensatory strategies to communicate wants and needs effectively with different conversational partners, maintain safety and participate socially in functional living environment  with 90% accuracy.  Long Term Goal Duration (Weeks):  2-4 weeks  Therapy Recommendations  Recommendation:  Individual Speech Therapy,  Planned Therapy Interventions:  Compensatory Strategy Training, Patient/Caregiver Education, Home Program and Speech/Language training,   Frequency:  1x week  Duration (in visits):  4 (4 X 92507)  Duration (in weeks):  4

## 2022-03-16 ENCOUNTER — SPEECH THERAPY (OUTPATIENT)
Dept: SPEECH THERAPY | Facility: REHABILITATION | Age: 77
End: 2022-03-16
Attending: FAMILY MEDICINE
Payer: MEDICARE

## 2022-03-16 DIAGNOSIS — I69.30 LATE EFFECT OF STROKE: ICD-10-CM

## 2022-03-16 DIAGNOSIS — R41.841 COGNITIVE COMMUNICATION DEFICIT: ICD-10-CM

## 2022-03-16 DIAGNOSIS — R41.3 MEMORY DEFICIT: ICD-10-CM

## 2022-03-16 DIAGNOSIS — R47.01 APHASIA: ICD-10-CM

## 2022-03-16 PROCEDURE — 92507 TX SP LANG VOICE COMM INDIV: CPT

## 2022-03-16 ASSESSMENT — ENCOUNTER SYMPTOMS: NO PATIENT REPORTED PAIN: 1

## 2022-03-16 NOTE — OP THERAPY DAILY TREATMENT
Outpatient Speech Therapy  DAILY TREATMENT     [facility] Speech [address].  [address]  [place] [address]  Phone:  [phone]  Fax:  [phone]    Date: 03/16/2022    Patient: Shaista Bocanegra  YOB: 1945  MRN: [id number]     Time Calculation    Start time: 1333  Stop time: 1413 Time Calculation (min): 40 minutes         Chief Complaint: Poor Memory    Visit #: 3    Subjective:   Reason for Therapy:     Reason For Evaluation:  CVA, Aphasia and Speech/Language    Onset Date:  5/30/2021  Social Support:     Social support system: father of her children, Casey, present and supportive.    Patient Mental Status:  Alert and Responsive  Progress Factors:     Progression:  Staying the same  Pain:     no pain reported    Therapy History:     Hearing:  Hearing Aids (Bilateral)    Vision:  Eye Glasses    Handedness:  Right-handed  Additional Subjective Comments:      Patient reported she's not sure why she's coming to speech therapy.  She did endorse having difficulty with word-finding and memory.          Objective:   Treatments/Interventions Performed:  Cognitive-Linguistic training, Patient/Caregiver education and Speech/Language treatment  Objective Details:  1. Patient will state and implement compensatory strategies addressing word finding, including semantic feature analysis, to independent level 4/5 obligatory opportunities.  --Progressing, production of synonyms 60%, antonyms 80%  2. Patient will complete problem solving/reasoning tasks including deductive reasoning puzzles and verbal math story problems given minimal  cues completing to 85% accuracy.   --Progressing, patient completed money market math problems with moderate prompts 70%   3.  Patient will improve memory recall, including working memory, short term and time delayed recall, implementing compensatory strategies as necessary,and completing time delayed recall to newly learned information with 80% or greater  accuracy with min cues.  --Not formally addressed.  4. Patient will complete visuospatial and scanning tasks with 80% accuracy   --Patient set up and played solitaire as recommended as home carryover activity with min prompts.         Speech Therapy Assessment:     Expressive Language Assessment:     Repeats speech accurately WFL.    Patient's ability to use automatic language appropriately is WFL.    Patient's ability to verbalize wants and needs is Minimal.    Patient's ability to formulate complex/abstract language is Minimal.    Expressive language comments: Patient reports frequent word finding issues, which she compensates for with circumlocutions.    Receptive Language Assessment:     Patient ability to identify body parts: [facility]    Patient ability to discriminate right and left: [facility]    Personal information yes/no questions: [facility]    Complex yes/no questions: [facility]    Patient follows 2 step simple commands:[facility]    Receptive language comments: Auditory comprehension is intact for conversational information.  Further assessment will be completed with higher complexity auditory information.     Reading Comprehension Assessment:     Patient ability matching identical items: [facility]    Patient ability to comprehend single words: [facility]    Patient ability to comprehend short phrases: [facility]    Patient ability to comprehend simple paragraphs: [facility]    Reading comprehension comments: Patient reports difficulty with comprehension and retention of books.    Cognitive Linguistic Assessment:     Patient attention selective: Minimal    Patient attention divided: Minimal    Patient recent and short term memory: Moderate    Patient complex reasoning ability: Moderate    Patient complex problem solving ability: Moderate    Patient executive functioning ability: Minimal        Speech Therapy Plan :   Prognosis & Recommendations  Impression Summary:  Patient participated in therapy activities.  Patient continues to present with mild expressive  asphasia characterized by word finding deficits, with auditory comprehension intact.  Patient also presents with mild cognitive linguistic deficits, in the areas of attention, memory, and visuospatial skills. Patient would benefit from skilled speech therapy to provide word finding strategies and improve higher level cognitive skills for greater independence within the home and community.  Patient verbalized understanding and agreement with current plan of care.  Prognosis:  Good  Goals  Short Term Goals:  1. Patient will state and implement compensatory strategies addressing word finding, including semantic feature analysis, to independent level 4/5 obligatory opportunities.  2. Patient will complete problem solving/reasoning tasks including deductive reasoning puzzles and verbal math story problems given minimal  cues completing to 85% accuracy.   3.  Patient will improve memory recall, including working memory, short term and time delayed recall, implementing compensatory strategies as necessary,and completing time delayed recall to newly learned information with 80% or greater accuracy with min cues.  4. Patient will complete visuospatial and scanning tasks with 80% accuracy   Short Term Goal Duration (Weeks):  2-4 weeks  Long Term Goals:  -Patient will improve speech-language skills and utilize compensatory strategies to communicate wants and needs effectively with different conversational partners, maintain safety and participate socially in functional living environment  with 90% accuracy.  Long Term Goal Duration (Weeks):  2-4 weeks  Therapy Recommendations  Recommendation:  Individual Speech Therapy,  Planned Therapy Interventions:  Compensatory Strategy Training, Patient/Caregiver Education, Home Program and Speech/Language training,   Frequency:  1x week  Duration (in visits):  4 (4 X 92507)  Duration (in weeks):  4

## 2022-03-21 ENCOUNTER — SPEECH THERAPY (OUTPATIENT)
Dept: SPEECH THERAPY | Facility: REHABILITATION | Age: 77
End: 2022-03-21
Attending: FAMILY MEDICINE
Payer: MEDICARE

## 2022-03-21 DIAGNOSIS — I69.30 LATE EFFECT OF STROKE: ICD-10-CM

## 2022-03-21 DIAGNOSIS — R41.841 COGNITIVE COMMUNICATION DEFICIT: ICD-10-CM

## 2022-03-21 DIAGNOSIS — R41.3 MEMORY DEFICIT: ICD-10-CM

## 2022-03-21 DIAGNOSIS — R47.01 APHASIA: ICD-10-CM

## 2022-03-21 PROCEDURE — 92507 TX SP LANG VOICE COMM INDIV: CPT

## 2022-03-21 ASSESSMENT — ENCOUNTER SYMPTOMS: NO PATIENT REPORTED PAIN: 1

## 2022-03-21 NOTE — OP THERAPY DAILY TREATMENT
Outpatient Speech Therapy  DAILY TREATMENT     [facility] Speech [address]  [address]  [place] [address]  Phone:  [phone]  Fax:  [phone]    Date: 03/21/2022    Patient: Shaista Bocanegra  YOB: 1945  MRN: [id number]     Time Calculation    Start time: 0902  Stop time: 0945 Time Calculation (min): 43 minutes         Chief Complaint: Poor Memory    Visit #: 4    Subjective:   Reason for Therapy:     Reason For Evaluation:  CVA, Aphasia and Speech/Language    Onset Date:  5/30/2021  Social Support:     Social support system: father of her children, Casey, present and supportive.    Patient Mental Status:  Alert and Responsive  Progress Factors:     Progression:  Staying the same  Pain:     no pain reported    Therapy History:     Hearing:  Hearing Aids (Bilateral)    Vision:  Eye Glasses    Handedness:  Right-handed  Additional Subjective Comments:      Patient reported she won playing mexican train with friends.  No other significant changes.          Objective:   Treatments/Interventions Performed:  Cognitive-Linguistic training, Patient/Caregiver education and Speech/Language treatment  Objective Details:  1. Patient will state and implement compensatory strategies addressing word finding, including semantic feature analysis, to independent level 4/5 obligatory opportunities.  --Progressing  2. Patient will complete problem solving/reasoning tasks including deductive reasoning puzzles and verbal math story problems given minimal  cues completing to 85% accuracy.   --Progressing, patient completed money market math problems 90% and produced correct change with min prompts 80%   3.  Patient will improve memory recall, including working memory, short term and time delayed recall, implementing compensatory strategies as necessary,and completing time delayed recall to newly learned information with 80% or greater accuracy with min cues.  --Patient was introduced to memory  strategies, reviewed external strategies with recommendation for keeping a memory journal.  Visualization and association were introduced as well with examples.   4. Patient will complete visuospatial and scanning tasks with 80% accuracy   --Not formally addressed         Speech Therapy Assessment:     Expressive Language Assessment:     Repeats speech accurately WFL.    Patient's ability to use automatic language appropriately is WFL.    Patient's ability to verbalize wants and needs is Minimal.    Patient's ability to formulate complex/abstract language is Minimal.    Expressive language comments: Patient reports frequent word finding issues, which she compensates for with circumlocutions.    Receptive Language Assessment:     Patient ability to identify body parts: [facility]    Patient ability to discriminate right and left: [facility]    Personal information yes/no questions: [facility]    Complex yes/no questions: [facility]    Patient follows 2 step simple commands:[facility]    Receptive language comments: Auditory comprehension is intact for conversational information.  Further assessment will be completed with higher complexity auditory information.     Reading Comprehension Assessment:     Patient ability matching identical items: [facility]    Patient ability to comprehend single words: [facility]    Patient ability to comprehend short phrases: [facility]    Patient ability to comprehend simple paragraphs: [facility]    Reading comprehension comments: Patient reports difficulty with comprehension and retention of books.    Cognitive Linguistic Assessment:     Patient attention selective: Minimal    Patient attention divided: Minimal    Patient recent and short term memory: Moderate    Patient complex reasoning ability: Moderate    Patient complex problem solving ability: Moderate    Patient executive functioning ability: Minimal        Speech Therapy Plan :   Prognosis & Recommendations  Impression Summary: Patient participated in therapy activities. Patient  continues to present with mild expressive asphasia characterized by word finding deficits, with auditory comprehension intact.  Patient also presents with mild cognitive linguistic deficits, in the areas of attention, memory, and visuospatial skills. Patient would benefit from skilled speech therapy to provide word finding strategies and improve higher level cognitive skills for greater independence within the home and community.  Patient verbalized understanding and agreement with current plan of care.  Prognosis:  Good  Goals  Short Term Goals:  1. Patient will state and implement compensatory strategies addressing word finding, including semantic feature analysis, to independent level 4/5 obligatory opportunities.  2. Patient will complete problem solving/reasoning tasks including deductive reasoning puzzles and verbal math story problems given minimal  cues completing to 85% accuracy.   3.  Patient will improve memory recall, including working memory, short term and time delayed recall, implementing compensatory strategies as necessary,and completing time delayed recall to newly learned information with 80% or greater accuracy with min cues.  4. Patient will complete visuospatial and scanning tasks with 80% accuracy   Short Term Goal Duration (Weeks):  2-4 weeks  Long Term Goals:  -Patient will improve speech-language skills and utilize compensatory strategies to communicate wants and needs effectively with different conversational partners, maintain safety and participate socially in functional living environment  with 90% accuracy.  Long Term Goal Duration (Weeks):  2-4 weeks  Therapy Recommendations  Recommendation:  Individual Speech Therapy,  Planned Therapy Interventions:  Compensatory Strategy Training, Patient/Caregiver Education, Home Program and Speech/Language training,   Frequency:  1x week  Duration (in visits):  4 (4 X 92507)  Duration (in weeks):  4

## 2022-03-29 ENCOUNTER — HOSPITAL ENCOUNTER (OUTPATIENT)
Dept: LAB | Facility: MEDICAL CENTER | Age: 77
End: 2022-03-29
Attending: FAMILY MEDICINE
Payer: MEDICARE

## 2022-03-29 LAB
ANION GAP SERPL CALC-SCNC: 9 MMOL/L (ref 7–16)
BASOPHILS # BLD AUTO: 0.2 % (ref 0–1.8)
BASOPHILS # BLD: 0.01 K/UL (ref 0–0.12)
BUN SERPL-MCNC: 19 MG/DL (ref 8–22)
CALCIUM SERPL-MCNC: 9.3 MG/DL (ref 8.5–10.5)
CHLORIDE SERPL-SCNC: 102 MMOL/L (ref 96–112)
CHOLEST SERPL-MCNC: 121 MG/DL (ref 100–199)
CO2 SERPL-SCNC: 26 MMOL/L (ref 20–33)
CREAT SERPL-MCNC: 0.79 MG/DL (ref 0.5–1.4)
EOSINOPHIL # BLD AUTO: 0.03 K/UL (ref 0–0.51)
EOSINOPHIL NFR BLD: 0.5 % (ref 0–6.9)
ERYTHROCYTE [DISTWIDTH] IN BLOOD BY AUTOMATED COUNT: 51.9 FL (ref 35.9–50)
FASTING STATUS PATIENT QL REPORTED: NORMAL
GFR SERPLBLD CREATININE-BSD FMLA CKD-EPI: 76 ML/MIN/1.73 M 2
GLUCOSE SERPL-MCNC: 82 MG/DL (ref 65–99)
HCT VFR BLD AUTO: 43.5 % (ref 37–47)
HDLC SERPL-MCNC: 54 MG/DL
HGB BLD-MCNC: 14.2 G/DL (ref 12–16)
IMM GRANULOCYTES # BLD AUTO: 0.02 K/UL (ref 0–0.11)
IMM GRANULOCYTES NFR BLD AUTO: 0.3 % (ref 0–0.9)
LDLC SERPL CALC-MCNC: 56 MG/DL
LYMPHOCYTES # BLD AUTO: 0.96 K/UL (ref 1–4.8)
LYMPHOCYTES NFR BLD: 14.7 % (ref 22–41)
MCH RBC QN AUTO: 32.2 PG (ref 27–33)
MCHC RBC AUTO-ENTMCNC: 32.6 G/DL (ref 33.6–35)
MCV RBC AUTO: 98.6 FL (ref 81.4–97.8)
MONOCYTES # BLD AUTO: 0.45 K/UL (ref 0–0.85)
MONOCYTES NFR BLD AUTO: 6.9 % (ref 0–13.4)
NEUTROPHILS # BLD AUTO: 5.04 K/UL (ref 2–7.15)
NEUTROPHILS NFR BLD: 77.4 % (ref 44–72)
NRBC # BLD AUTO: 0 K/UL
NRBC BLD-RTO: 0 /100 WBC
PLATELET # BLD AUTO: 202 K/UL (ref 164–446)
PMV BLD AUTO: 10 FL (ref 9–12.9)
POTASSIUM SERPL-SCNC: 5 MMOL/L (ref 3.6–5.5)
RBC # BLD AUTO: 4.41 M/UL (ref 4.2–5.4)
SODIUM SERPL-SCNC: 137 MMOL/L (ref 135–145)
TRIGL SERPL-MCNC: 57 MG/DL (ref 0–149)
TSH SERPL DL<=0.005 MIU/L-ACNC: 1.86 UIU/ML (ref 0.38–5.33)
VIT B12 SERPL-MCNC: 3859 PG/ML (ref 211–911)
WBC # BLD AUTO: 6.5 K/UL (ref 4.8–10.8)

## 2022-03-29 PROCEDURE — 84443 ASSAY THYROID STIM HORMONE: CPT

## 2022-03-29 PROCEDURE — 82607 VITAMIN B-12: CPT

## 2022-03-29 PROCEDURE — 85025 COMPLETE CBC W/AUTO DIFF WBC: CPT

## 2022-03-29 PROCEDURE — 80061 LIPID PANEL: CPT

## 2022-03-29 PROCEDURE — 80048 BASIC METABOLIC PNL TOTAL CA: CPT

## 2022-03-29 PROCEDURE — 36415 COLL VENOUS BLD VENIPUNCTURE: CPT

## 2022-03-30 ENCOUNTER — SPEECH THERAPY (OUTPATIENT)
Dept: SPEECH THERAPY | Facility: REHABILITATION | Age: 77
End: 2022-03-30
Attending: FAMILY MEDICINE
Payer: MEDICARE

## 2022-03-30 DIAGNOSIS — I69.30 LATE EFFECT OF STROKE: ICD-10-CM

## 2022-03-30 DIAGNOSIS — R47.01 APHASIA: ICD-10-CM

## 2022-03-30 DIAGNOSIS — R41.841 COGNITIVE COMMUNICATION DEFICIT: ICD-10-CM

## 2022-03-30 PROCEDURE — 92507 TX SP LANG VOICE COMM INDIV: CPT

## 2022-03-30 ASSESSMENT — ENCOUNTER SYMPTOMS: NO PATIENT REPORTED PAIN: 1

## 2022-03-30 NOTE — OP THERAPY DAILY TREATMENT
Outpatient Speech Therapy  DAILY TREATMENT     [facility] Speech [address].  [address]  [place] [address]  Phone:  [phone]  Fax:  [phone]    Date: 03/30/2022    Patient: Shaista Bocanegra  YOB: 1945  MRN: [id number]     Time Calculation    Start time: 0945  Stop time: 1028 Time Calculation (min): 43 minutes         Chief Complaint: Poor Memory    Visit #: 5         Please refer to discharge summary.

## 2022-03-30 NOTE — OP THERAPY DISCHARGE SUMMARY
Outpatient Speech Therapy  DISCHARGE SUMMARY NOTE      [facility] Speech Therapy [place] [address]  [address]  [place] [address]  Phone:  [phone]  Fax:  [phone]    Date of Visit: 03/30/2022    Patient: Shaista Bocanegra  YOB: 1945  MRN: [id number]     Referring Provider: Capri Simon M.D.  [address] [place] [address]   Referring Diagnosis: Dysphagia following cerebral infarction [I69.391]     Visit #: 5    Time Calculation    Start time: 0945  Stop time: 1028 Time Calculation (min): 43 minutes           Chief Complaint: Poor Memory    Visit Diagnoses     ICD-10-CM   1. Late effect of stroke  I69.30   2. Aphasia  R47.01   3. Cognitive communication deficit  R41.841       Subjective:   Reason for Therapy:     Reason For Evaluation:  CVA, Aphasia and Speech/Language    Onset Date:  5/30/2021  Social Support:     Social support system: father of her children, Casey, present and supportive.    Patient Mental Status:  Alert and Responsive  Progress Factors:     Progression:  Staying the same  Pain:     no pain reported    Therapy History:     Hearing:  Hearing Aids (Bilateral)    Vision:  Eye Glasses    Handedness:  Right-handed  Additional Subjective Comments:      Patient reported she has been feeling like her social interactions have been going well.  She has been playing [employer] and completing money management exercises.          Objective:   Treatments/Interventions Performed:  Cognitive-Linguistic training, Patient/Caregiver education and Speech/Language treatment  Objective Details:  1. Patient will state and implement compensatory strategies addressing word finding, including semantic feature analysis, to independent level 4/5 obligatory opportunities.  --Progressing, patient is able to complete SFA exercises with min-no cues.  2. Patient will complete problem solving/reasoning tasks including deductive reasoning puzzles and verbal math story problems given minimal  cues  "completing to 85% accuracy.   --Progressing, patient completed deductive puzzles to follow clues to arrange colored chips in correct order 100% with min-mod initial prompts.  3.  Patient will improve memory recall, including working memory, short term and time delayed recall, implementing compensatory strategies as necessary,and completing time delayed recall to newly learned information with 80% or greater accuracy with min cues.  --Patient was introduced to memory strategies, reviewed external strategies with recommendation for keeping a memory journal.  Visualization and association were introduced as well with examples.   4. Patient will complete visuospatial and scanning tasks with 80% accuracy   --Progressing, patient introduced to visuospatial games on \"[employer]\" vivi      Speech Therapy Assessment:     Expressive Language Assessment:     Repeats speech accurately WFL.    Patient's ability to use automatic language appropriately is WFL.    Patient's ability to verbalize wants and needs is Minimal.    Patient's ability to formulate complex/abstract language is Minimal.    Expressive language comments: Patient reports frequent word finding issues, which she compensates for with circumlocutions.    Receptive Language Assessment:     Patient ability to identify body parts: [facility]    Patient ability to discriminate right and left: [facility]    Personal information yes/no questions: [facility]    Complex yes/no questions: [facility]    Patient follows 2 step simple commands:[facility]    Receptive language comments: Auditory comprehension is intact for conversational information and following directives.    Reading Comprehension Assessment:     Patient ability matching identical items: [facility]    Patient ability to comprehend single words: [facility]    Patient ability to comprehend short phrases: [facility]    Patient ability to comprehend simple paragraphs: [facility]    Reading comprehension comments: Patient reports difficulty with comprehension and retention of " books.    Cognitive Linguistic Assessment:     Patient attention selective: Minimal    Patient attention divided: Minimal    Patient recent and short term memory: Minimal    Patient complex reasoning ability: Minimal    Patient complex problem solving ability: Minimal    Patient executive functioning ability: Minimal        Speech Therapy Plan :   Prognosis & Recommendations  Impression Summary:  Patient actively in all therapy activities.  Patient continues to present with mild expressive asphasia characterized by word finding deficits, with auditory comprehension intact.  Patient also presents with mild cognitive linguistic deficits, in the areas of attention, memory, and visuospatial skills. Patient has made good progress towards goals, and has been provided with several home activities to carry over cognitive stimulation.  Goals for therapy have been met and Patient feels ready to discharge at this time.  Patient was instructed to contact doctor if new symptoms arise for new speech therapy referral.    Prognosis:  Good  Goals  Short Term Goals:  1. Patient will state and implement compensatory strategies addressing word finding, including semantic feature analysis, to independent level 4/5 obligatory opportunities.  2. Patient will complete problem solving/reasoning tasks including deductive reasoning puzzles and verbal math story problems given minimal  cues completing to 85% accuracy.   3.  Patient will improve memory recall, including working memory, short term and time delayed recall, implementing compensatory strategies as necessary,and completing time delayed recall to newly learned information with 80% or greater accuracy with min cues.  4. Patient will complete visuospatial and scanning tasks with 80% accuracy   Short Term Goal Duration (Weeks):  2-4 weeks  Patient progression on Short Term Goals:  Goals have been met at this time  Long Term Goals:  -Patient will improve speech-language skills and  utilize compensatory strategies to communicate wants and needs effectively with different conversational partners, maintain safety and participate socially in functional living environment  with 90% accuracy.  Long Term Goal Duration (Weeks):  2-4 weeks  Therapy Recommendations  Recommendation:  No further speech therapy indicated at this time,  Planned Therapy Interventions:  Home Program,   Frequency:  1x week  Duration (in visits):  4 (4 X [id number])  Duration (in weeks):  4

## 2022-04-06 ENCOUNTER — APPOINTMENT (OUTPATIENT)
Dept: SPEECH THERAPY | Facility: REHABILITATION | Age: 77
End: 2022-04-06
Attending: FAMILY MEDICINE
Payer: MEDICARE

## 2022-04-13 ENCOUNTER — APPOINTMENT (OUTPATIENT)
Dept: SPEECH THERAPY | Facility: REHABILITATION | Age: 77
End: 2022-04-13
Attending: FAMILY MEDICINE
Payer: MEDICARE

## 2022-04-13 DIAGNOSIS — I10 HYPERTENSION, UNSPECIFIED TYPE: ICD-10-CM

## 2022-04-14 RX ORDER — TRAZODONE HYDROCHLORIDE 50 MG/1
50 TABLET ORAL NIGHTLY
Qty: 90 TABLET | Refills: 3 | Status: SHIPPED | OUTPATIENT
Start: 2022-04-14 | End: 2022-05-12

## 2022-04-20 ENCOUNTER — APPOINTMENT (OUTPATIENT)
Dept: SPEECH THERAPY | Facility: REHABILITATION | Age: 77
End: 2022-04-20
Attending: FAMILY MEDICINE
Payer: MEDICARE

## 2022-04-22 ENCOUNTER — HOSPITAL ENCOUNTER (OUTPATIENT)
Facility: MEDICAL CENTER | Age: 77
End: 2022-04-22
Attending: OBSTETRICS & GYNECOLOGY
Payer: MEDICARE

## 2022-04-22 ENCOUNTER — GYNECOLOGY VISIT (OUTPATIENT)
Dept: OBGYN | Facility: CLINIC | Age: 77
End: 2022-04-22
Payer: MEDICARE

## 2022-04-22 VITALS — WEIGHT: 142 LBS | BODY MASS INDEX: 22.92 KG/M2 | SYSTOLIC BLOOD PRESSURE: 117 MMHG | DIASTOLIC BLOOD PRESSURE: 70 MMHG

## 2022-04-22 DIAGNOSIS — Z01.419 WELL WOMAN EXAM WITH ROUTINE GYNECOLOGICAL EXAM: ICD-10-CM

## 2022-04-22 PROCEDURE — 88175 CYTOPATH C/V AUTO FLUID REDO: CPT

## 2022-04-22 PROCEDURE — 87624 HPV HI-RISK TYP POOLED RSLT: CPT

## 2022-04-22 PROCEDURE — G0101 CA SCREEN;PELVIC/BREAST EXAM: HCPCS | Performed by: OBSTETRICS & GYNECOLOGY

## 2022-04-22 ASSESSMENT — FIBROSIS 4 INDEX: FIB4 SCORE: 1.57

## 2022-04-22 NOTE — PROGRESS NOTES
Shaista Bocanerga is a 77 y.o.  female who presents for her Annual Gynecologic Exam      HPI Comments: Pt presents for her annual well woman exam.   Without complaints.   Positive hx of breast cancer on left side s/p chemo and radiation. Pt believes she was BRCA negative as her daughter was tested and negative 25 yrs ago.   Positive history of abdominal supracervical hysterectomy, and possibly BSO for unknown reason 20 yrs ago.      No LMP recorded. Patient has had a hysterectomy.    Mammogram 2022  Colonoscopy within past 5 yrs    Review of Systems:   Pertinent positives documented in HPI and all other systems reviewed & are negative    All PMH, PSH, allergies, social history and FH reviewed and updated today:    PGYN Hx:   NILM with positive HPV in   Denies hx excision procedures   Hx supracervical hysterectomy in 37yo    OB History    Para Term  AB Living   3 2 2   1 2   SAB IAB Ectopic Molar Multiple Live Births   1         2      # Outcome Date GA Lbr Alonzo/2nd Weight Sex Delivery Anes PTL Lv   3 Term 80    F CS-Unspec   MAXI   2 Term 78    M Vag-Spont   MAXI   1 SAB              Past Medical History:   Diagnosis Date   • Anemia 2021   • Arthritis     toe   • Atrial fibrillation (HCC)    • Benign essential HTN 3/19/2012   • Breast cancer (HCC)    • Cancer (HCC) 1998    breast    • Cardiac arrhythmia    • Chest tightness or pressure 3/19/2012   • Chickenpox    • Coronary heart disease    • [place] measles    • Gynecological disorder    • High cholesterol    • High risk medication use 3/19/2012   • Hypercholesterolemia 3/19/2012   • Mumps    • MVP (mitral valve prolapse) 3/19/2012   • Osteoporosis    • Seizure disorder (HCC) 2021    last seizure may 2021   • Sleep apnea     CPAP at night   • Snoring    • Stroke (HCC)     residual minor weakness on left side   • Substance abuse (HCC)    • Tonsillitis    • Urinary bladder disorder     OAB   • Urinary incontinence    •  Valvular heart disease    • Venereal disease      Past Surgical History:   Procedure Laterality Date   • CATARACT PHACO WITH IOL  3/16/2009    Performed by SHANNON GANDARA at SURGERY SAME DAY [facility] ORS   • CATARACT PHACO WITH IOL  1/26/2009    Performed by SHANNON GANDARA at SURGERY SAME DAY [facility] ORS   • BREAST BIOPSY     • HYSTERECTOMY LAPAROSCOPY     • LUMPECTOMY     • MN CHEMOTHERAPY, UNSPECIFIED PROCEDURE     • MN RADIATION THERAPY PLAN SIMPLE     • MN REMV 2ND CATARACT,CORN-SCLER SECTN     • PRIMARY C SECTION     • SINUSCOPE     • TONSILLECTOMY       Medications:   Current Outpatient Medications Ordered in Epic   Medication Sig Dispense Refill   • traZODone (DESYREL) 50 MG Tab TAKE 1 TABLET BY MOUTH EVERY EVENING. INDICATIONS: TROUBLE SLEEPING 90 Tablet 3   • estradiol (ESTRACE VAGINAL) 0.1 MG/GM vaginal cream Apply 1g cream inside vagina using applicator nightly for 2 weeks, then twice per week thereafter 1 Each 3   • fexofenadine (ALLEGRA) 60 MG Tab Take 180 mg by mouth every day.     • Eszopiclone 2 MG Tab Take  by mouth.     • amoxicillin (AMOXIL) 500 MG Cap Take 4 Capsules by mouth 1 time a day as needed. 30 Capsule    • tamsulosin (FLOMAX) 0.4 MG capsule Take 0.4 mg by mouth 1/2 hour after breakfast.     • apixaban (ELIQUIS) 5mg Tab Take 1 Tablet by mouth 2 times a day. 60 Tablet 2   • atorvastatin (LIPITOR) 80 MG tablet Take 1 Tablet by mouth every evening. 30 Tablet 2   • ferrous sulfate 325 (65 Fe) MG tablet Take 1 Tablet by mouth every morning with breakfast. 30 Tablet 2   • spironolactone (ALDACTONE) 25 MG Tab Take 1 Tablet by mouth every day. 30 Tablet 2   • Multiple Vitamin (MULTIVITAMINS PO) Take 1 Tab by mouth every day.     • polyethylene glycol/lytes (MIRALAX) 17 g Pack Take 1 Packet by mouth every day for 90 days. Take 17g dissoloved in 8oz of water or juice once daily to prevent and treat constipation. Hold for diarrhea. 30 Each 2     No current Epic-ordered facility-administered  medications on file.     Patient has no known allergies.  Social History     Socioeconomic History   • Marital status:    • Number of children: 2   • Highest education level: Professional school degree (e.g., MD, DDS, DVM, ARACELI)   Occupational History   • Occupation:      Employer: Not Employed   Tobacco Use   • Smoking status: Never Smoker   • Smokeless tobacco: Never Used   Vaping Use   • Vaping Use: Never used   Substance and Sexual Activity   • Alcohol use: Yes     Alcohol/week: 1.2 oz     Types: 2 Glasses of wine per week     Comment: twice a week   • Drug use: No   • Sexual activity: Yes     Partners: Male     Birth control/protection: Female Sterilization     Social Determinants of Health     Financial Resource Strain: Low Risk    • Difficulty of Paying Living Expenses: Not hard at all   Food Insecurity: No Food Insecurity   • Worried About Running Out of Food in the Last Year: Never true   • Ran Out of Food in the Last Year: Never true   Transportation Needs: No Transportation Needs   • Lack of Transportation (Medical): No   • Lack of Transportation (Non-Medical): No   Physical Activity: Insufficiently Active   • Days of Exercise per Week: 4 days   • Minutes of Exercise per Session: 30 min   Social Connections: Socially Isolated   • Frequency of Communication with Friends and Family: More than three times a week   • Frequency of Social Gatherings with Friends and Family: Twice a week   • Attends Confucianist Services: Never   • Active Member of Clubs or Organizations: No   • Attends Club or Organization Meetings: Never   • Marital Status:    Housing Stability: Low Risk    • Unable to Pay for Housing in the Last Year: No   • Number of Places Lived in the Last Year: 1   • Unstable Housing in the Last Year: No     Family History   Problem Relation Age of Onset   • Cancer Mother         breast   • No Known Problems Brother    • Heart Failure Neg Hx    • Heart Disease Neg Hx           Objective:    Vital measurements:  /70 (BP Location: Right arm, Patient Position: Sitting)   Wt 64.4 kg (142 lb)   Body mass index is 22.92 kg/m². (Goal BM I>18 <25)    Physical Exam   Nursing note and vitals reviewed.  Constitutional: She is oriented to person, place, and time. She appears well-developed and well-nourished. No distress.     HEENT:   Head: Normocephalic and atraumatic.   Right Ear: External ear normal.   Left Ear: External ear normal.   Nose: Nose normal.   Eyes: Conjunctivae and EOM are normal. Pupils are equal, round, and reactive to light. No scleral icterus.     Neck: Normal range of motion. Neck supple. No tracheal deviation present. No thyromegaly present. No cervical or supraclavicular lymphadenopathy.    Pulmonary/Chest: Effort normal and breath sounds normal. No respiratory distress. She has no wheezes. She has no rales. She exhibits no tenderness.     Cardiovascular: Regular, rate and rhythm. No edema.    Breast: Bilateral breasts noted with fiber dense changes.  There is a healed previous lumpectomy scar over the right superior breast.  No lymphadenopathy bilaterally.    Abdominal: Soft. Bowel sounds are normal. She exhibits no distension and no mass. No tenderness. She has no rebound and no guarding.     Genitourinary:  Pelvic exam was performed with patient supine.  External genitalia with no abnormal pigmentation, labial fusion, rash, tenderness, lesion or injury to the labia bilaterally.  Vagina is pale with no lesions, foul discharge, erythema, tenderness or bleeding. No foreign body around the vagina or signs of injury.   Cervix exhibits no motion tenderness, no discharge and no friability, no lesions.   Uterus is surgically absent   No adnexal fullness bilaterally    Musculoskeletal: Normal range of motion. Non tender. She exhibits no edema and no tenderness.     Lymphadenopathy: She has no cervical or supraclavicular adenopathy.     Neurological: She is alert and oriented to person,  place, and time. She exhibits normal muscle tone.     Skin: Skin is warm and dry. No rash noted. She is not diaphoretic. No erythema. No pallor.     Psychiatric: She has a normal mood and affect. Her behavior is normal. Judgment and thought content normal.        Assessment:     1. Well woman exam with routine gynecological exam  THINPREP PAP WITH HPV     Plan:     #Well Woman  - Pap and physical exam performed. Discuss pap smear screening guidelines.  Patient was positive HPV last year and will need a colposcopy if she continues to have positive high risk HPV.  - Self breast awareness discussed.  Patient's mammogram is up-to-date  - Encouraged exercise and proper diet.  - See medications and orders placed in encounter report.  - Weight bearing exercise daily to strengthen bones  - Calcium 1200mg daily and Vitamin D 800 IU daily    #Vulvovaginal atrophy  Continue on vaginal estrogen cream provided to her by the urogynecologist.

## 2022-04-22 NOTE — NON-PROVIDER
Patient here for annual exam  Last pap done/result: 03/05/2021, +HPV  LMP: hysterectomy  BCM: hysterectomy  Last mammogram, if applicable:03/04/2022  Phone number: [phone] (home)   Pharmacy verified  Pt needs a refill on her Estradiol

## 2022-04-24 DIAGNOSIS — Z01.419 WELL WOMAN EXAM WITH ROUTINE GYNECOLOGICAL EXAM: ICD-10-CM

## 2022-04-27 ENCOUNTER — APPOINTMENT (OUTPATIENT)
Dept: SPEECH THERAPY | Facility: REHABILITATION | Age: 77
End: 2022-04-27
Attending: FAMILY MEDICINE
Payer: MEDICARE

## 2022-04-29 DIAGNOSIS — N72 HIGH RISK HUMAN PAPILLOMA VIRUS (HPV) INFECTION OF CERVIX: ICD-10-CM

## 2022-04-29 DIAGNOSIS — B97.7 HIGH RISK HUMAN PAPILLOMA VIRUS (HPV) INFECTION OF CERVIX: ICD-10-CM

## 2022-05-02 ENCOUNTER — TELEPHONE (OUTPATIENT)
Dept: OBGYN | Facility: CLINIC | Age: 77
End: 2022-05-02
Payer: MEDICARE

## 2022-05-02 ENCOUNTER — APPOINTMENT (OUTPATIENT)
Dept: RADIOLOGY | Facility: MEDICAL CENTER | Age: 77
End: 2022-05-02
Attending: EMERGENCY MEDICINE
Payer: MEDICARE

## 2022-05-02 ENCOUNTER — HOSPITAL ENCOUNTER (EMERGENCY)
Facility: MEDICAL CENTER | Age: 77
End: 2022-05-02
Attending: EMERGENCY MEDICINE
Payer: MEDICARE

## 2022-05-02 ENCOUNTER — APPOINTMENT (OUTPATIENT)
Dept: RADIOLOGY | Facility: MEDICAL CENTER | Age: 77
End: 2022-05-02
Attending: STUDENT IN AN ORGANIZED HEALTH CARE EDUCATION/TRAINING PROGRAM
Payer: MEDICARE

## 2022-05-02 VITALS
TEMPERATURE: 97.8 F | BODY MASS INDEX: 21.63 KG/M2 | OXYGEN SATURATION: 94 % | DIASTOLIC BLOOD PRESSURE: 75 MMHG | HEART RATE: 79 BPM | WEIGHT: 134 LBS | SYSTOLIC BLOOD PRESSURE: 144 MMHG | RESPIRATION RATE: 19 BRPM

## 2022-05-02 DIAGNOSIS — R51.9 NONINTRACTABLE HEADACHE, UNSPECIFIED CHRONICITY PATTERN, UNSPECIFIED HEADACHE TYPE: ICD-10-CM

## 2022-05-02 DIAGNOSIS — G45.9 TIA (TRANSIENT ISCHEMIC ATTACK): ICD-10-CM

## 2022-05-02 LAB
ALBUMIN SERPL BCP-MCNC: 4 G/DL (ref 3.2–4.9)
ALBUMIN/GLOB SERPL: 1.8 G/DL
ALP SERPL-CCNC: 62 U/L (ref 30–99)
ALT SERPL-CCNC: 18 U/L (ref 2–50)
ANION GAP SERPL CALC-SCNC: 10 MMOL/L (ref 7–16)
APTT PPP: 31.2 SEC (ref 24.7–36)
AST SERPL-CCNC: 16 U/L (ref 12–45)
BASOPHILS # BLD AUTO: 0.3 % (ref 0–1.8)
BASOPHILS # BLD: 0.02 K/UL (ref 0–0.12)
BILIRUB SERPL-MCNC: 0.6 MG/DL (ref 0.1–1.5)
BUN SERPL-MCNC: 20 MG/DL (ref 8–22)
CALCIUM SERPL-MCNC: 9 MG/DL (ref 8.5–10.5)
CHLORIDE SERPL-SCNC: 102 MMOL/L (ref 96–112)
CHOLEST SERPL-MCNC: 115 MG/DL (ref 100–199)
CO2 SERPL-SCNC: 22 MMOL/L (ref 20–33)
CREAT SERPL-MCNC: 0.72 MG/DL (ref 0.5–1.4)
EKG IMPRESSION: NORMAL
EOSINOPHIL # BLD AUTO: 0.05 K/UL (ref 0–0.51)
EOSINOPHIL NFR BLD: 0.7 % (ref 0–6.9)
ERYTHROCYTE [DISTWIDTH] IN BLOOD BY AUTOMATED COUNT: 47.3 FL (ref 35.9–50)
GFR SERPLBLD CREATININE-BSD FMLA CKD-EPI: 85 ML/MIN/1.73 M 2
GLOBULIN SER CALC-MCNC: 2.2 G/DL (ref 1.9–3.5)
GLUCOSE SERPL-MCNC: 107 MG/DL (ref 65–99)
HCT VFR BLD AUTO: 42.2 % (ref 37–47)
HDLC SERPL-MCNC: 55 MG/DL
HGB BLD-MCNC: 14.5 G/DL (ref 12–16)
IMM GRANULOCYTES # BLD AUTO: 0.02 K/UL (ref 0–0.11)
IMM GRANULOCYTES NFR BLD AUTO: 0.3 % (ref 0–0.9)
INR PPP: 1.15 (ref 0.87–1.13)
LDLC SERPL CALC-MCNC: 49 MG/DL
LYMPHOCYTES # BLD AUTO: 0.78 K/UL (ref 1–4.8)
LYMPHOCYTES NFR BLD: 11.5 % (ref 22–41)
MCH RBC QN AUTO: 33.5 PG (ref 27–33)
MCHC RBC AUTO-ENTMCNC: 34.4 G/DL (ref 33.6–35)
MCV RBC AUTO: 97.5 FL (ref 81.4–97.8)
MONOCYTES # BLD AUTO: 0.51 K/UL (ref 0–0.85)
MONOCYTES NFR BLD AUTO: 7.5 % (ref 0–13.4)
NEUTROPHILS # BLD AUTO: 5.41 K/UL (ref 2–7.15)
NEUTROPHILS NFR BLD: 79.7 % (ref 44–72)
NRBC # BLD AUTO: 0 K/UL
NRBC BLD-RTO: 0 /100 WBC
PLATELET # BLD AUTO: 199 K/UL (ref 164–446)
PMV BLD AUTO: 9.1 FL (ref 9–12.9)
POTASSIUM SERPL-SCNC: 4.5 MMOL/L (ref 3.6–5.5)
PROT SERPL-MCNC: 6.2 G/DL (ref 6–8.2)
PROTHROMBIN TIME: 14.4 SEC (ref 12–14.6)
RBC # BLD AUTO: 4.33 M/UL (ref 4.2–5.4)
SODIUM SERPL-SCNC: 134 MMOL/L (ref 135–145)
TRIGL SERPL-MCNC: 56 MG/DL (ref 0–149)
TROPONIN T SERPL-MCNC: 10 NG/L (ref 6–19)
WBC # BLD AUTO: 6.8 K/UL (ref 4.8–10.8)

## 2022-05-02 PROCEDURE — 85730 THROMBOPLASTIN TIME PARTIAL: CPT

## 2022-05-02 PROCEDURE — 36415 COLL VENOUS BLD VENIPUNCTURE: CPT

## 2022-05-02 PROCEDURE — 70551 MRI BRAIN STEM W/O DYE: CPT | Mod: ME

## 2022-05-02 PROCEDURE — 93005 ELECTROCARDIOGRAM TRACING: CPT | Performed by: EMERGENCY MEDICINE

## 2022-05-02 PROCEDURE — 70496 CT ANGIOGRAPHY HEAD: CPT | Mod: MG

## 2022-05-02 PROCEDURE — A9270 NON-COVERED ITEM OR SERVICE: HCPCS | Performed by: EMERGENCY MEDICINE

## 2022-05-02 PROCEDURE — 700117 HCHG RX CONTRAST REV CODE 255: Performed by: EMERGENCY MEDICINE

## 2022-05-02 PROCEDURE — 700102 HCHG RX REV CODE 250 W/ 637 OVERRIDE(OP): Performed by: EMERGENCY MEDICINE

## 2022-05-02 PROCEDURE — 85025 COMPLETE CBC W/AUTO DIFF WBC: CPT

## 2022-05-02 PROCEDURE — 70498 CT ANGIOGRAPHY NECK: CPT | Mod: MG

## 2022-05-02 PROCEDURE — 84484 ASSAY OF TROPONIN QUANT: CPT

## 2022-05-02 PROCEDURE — 99284 EMERGENCY DEPT VISIT MOD MDM: CPT | Mod: GC | Performed by: PSYCHIATRY & NEUROLOGY

## 2022-05-02 PROCEDURE — 85610 PROTHROMBIN TIME: CPT

## 2022-05-02 PROCEDURE — 80053 COMPREHEN METABOLIC PANEL: CPT

## 2022-05-02 PROCEDURE — 71045 X-RAY EXAM CHEST 1 VIEW: CPT

## 2022-05-02 PROCEDURE — 99285 EMERGENCY DEPT VISIT HI MDM: CPT

## 2022-05-02 PROCEDURE — 80061 LIPID PANEL: CPT

## 2022-05-02 RX ORDER — ACETAMINOPHEN 500 MG
1000 TABLET ORAL ONCE
Status: COMPLETED | OUTPATIENT
Start: 2022-05-02 | End: 2022-05-02

## 2022-05-02 RX ADMIN — ACETAMINOPHEN 1000 MG: 500 TABLET ORAL at 12:24

## 2022-05-02 RX ADMIN — IOHEXOL 80 ML: 350 INJECTION, SOLUTION INTRAVENOUS at 13:54

## 2022-05-02 ASSESSMENT — FIBROSIS 4 INDEX: FIB4 SCORE: 1.57

## 2022-05-02 NOTE — ED PROVIDER NOTES
ED Provider Note    Scribed for Kiera Kyle M.D. by Lyssa Rao. 5/2/2022  11:26 AM    Primary care provider: Capri Simon M.D.  Means of arrival: EMS  History obtained from: Patient and EMS  History limited by: None    CHIEF COMPLAINT  Chief Complaint   Patient presents with   • Blurred Vision       HPI  Shaista Bocanegra is a 77 y.o. female who presents to the Emergency Department via EMS for evaluation of acute blurry vision onset around 2 AM this morning. Patient reports that she noticed her vision getting extremely blurry early this morning, but wanted to see if it would resolve with time. However, patient's blurry vision did not improve. Patient states that she was concerned that she was having a stroke, as she had a stroke with similar symptoms one year ago, prompting her to call EMS and come to the ED for further evaluation. Currently in the ED, patient states that her headache is almost completely resolved. Denies any chest pain, shortness of breath, or fevers. No alleviating factors reported. Denies being on any blood thinners. Denies alcohol or cigarette use. Denies history of heart attack, hypertension, or diabetes.     REVIEW OF SYSTEMS  HEENT:  No ear pain, congestion, or sore throat   EYES: no discharge, redness, or vision changes  CARDIAC: no chest pain, no palpitations    PULMONARY: no dyspnea, cough, or congestion   GI: no vomiting, diarrhea, or abdominal pain   : no dysuria, back pain, or hematuria   Neuro: no weakness, numbness, aphasia, or headache  Musculoskeletal: no swelling, deformity, pain, or joint swelling  Endocrine: no fevers, sweating, or weight loss   SKIN: no rash, erythema, or contusions     See history of present illness. All other systems are negative. C.    PAST MEDICAL HISTORY   has a past medical history of Anemia (9/28/2021), Arthritis, Atrial fibrillation (HCC), Benign essential HTN (3/19/2012), Breast cancer (HCC), Cancer (HCC) (1998), Cardiac arrhythmia, Chest  tightness or pressure (3/19/2012), Chickenpox, Coronary heart disease, [place] measles, Gynecological disorder, High cholesterol, High risk medication use (3/19/2012), Hypercholesterolemia (3/19/2012), Mumps, MVP (mitral valve prolapse) (3/19/2012), Osteoporosis, Seizure disorder (HCC) (9/28/2021), Sleep apnea, Snoring, Stroke (HCC) (2017), Substance abuse ([facility]), Tonsillitis, Urinary bladder disorder, Urinary incontinence, Valvular heart disease, and Venereal disease.    SURGICAL HISTORY   has a past surgical history that includes cataract phaco with iol (1/26/2009); cataract phaco with iol (3/16/2009); breast biopsy; radiation therapy plan simple; chemotherapy, unspecified procedure; lumpectomy; remv 2nd cataract,corn-scler sectn; hysterectomy laparoscopy; sinuscope; tonsillectomy; and primary c section.    SOCIAL HISTORY  Social History     Tobacco Use   • Smoking status: Never Smoker   • Smokeless tobacco: Never Used   Vaping Use   • Vaping Use: Never used   Substance Use Topics   • Alcohol use: Yes     Alcohol/week: 1.2 oz     Types: 2 Glasses of wine per week     Comment: twice a week   • Drug use: No      Social History     Substance and Sexual Activity   Drug Use No       FAMILY HISTORY  Family History   Problem Relation Age of Onset   • Cancer Mother         breast   • No Known Problems Brother    • Heart Failure Neg Hx    • Heart Disease Neg Hx        CURRENT MEDICATIONS  Home Medications     Reviewed by Gisel Stapleton R.N. (Registered Nurse) on 05/02/22 at 1135  Med List Status: <None>   Medication Last Dose Status   amoxicillin (AMOXIL) 500 MG Cap  Active   apixaban (ELIQUIS) 5mg Tab  Active   atorvastatin (LIPITOR) 80 MG tablet  Active   estradiol (ESTRACE VAGINAL) 0.1 MG/GM vaginal cream  Active   Eszopiclone 2 MG Tab  Active   ferrous sulfate 325 (65 Fe) MG tablet  Active   fexofenadine (ALLEGRA) 60 MG Tab  Active   Multiple Vitamin (MULTIVITAMINS PO)  Active   polyethylene glycol/lytes (MIRALAX) 17 g  Pack  Active   spironolactone (ALDACTONE) 25 MG Tab  Active   tamsulosin (FLOMAX) 0.4 MG capsule  Active   traZODone (DESYREL) 50 MG Tab  Active              ALLERGIES  No Known Allergies    PHYSICAL EXAM  VITAL SIGNS: /83   Pulse 68   Resp (!) 22   Wt 60.8 kg (134 lb)   SpO2 94%   BMI 21.63 kg/m²     Constitutional: Well developed, Well nourished, No acute distress, Non-toxic appearance.   HEENT: Normocephalic, Atraumatic,  external ears normal, pharynx pink,  Mucous  Membranes moist, No rhinorrhea or mucosal edema  Eyes: PERRL, EOMI, Conjunctiva normal, No discharge.   Neck: Normal range of motion, No tenderness, Supple, No stridor.   Lymphatic: No lymphadenopathy    Cardiovascular: Regular Rate and Rhythm, No murmurs,  rubs, or gallops.   Thorax & Lungs: Lungs clear to auscultation bilaterally, No respiratory distress, No wheezes, rhales or rhonchi, No chest wall tenderness.   Abdomen: Bowel sounds normal, Soft, non tender, non distended,  No pulsatile masses., no rebound guarding or peritoneal signs.   Skin: Warm, Dry, No erythema, No rash,   Back:  No CVA tenderness,  No spinal tenderness, bony crepitance, step offs, or instability.   Neurologic: Alert & oriented clear speech no focal deficits. NIH Score of 0.   Extremities: Equal, intact distal pulses, No cyanosis, clubbing or edema,  No tenderness.   Musculoskeletal: Good range of motion in all major joints. No tenderness to palpation or major deformities noted.     DIAGNOSTIC STUDIES / PROCEDURES    LABS  Results for orders placed or performed during the hospital encounter of 05/02/22   CBC WITH DIFFERENTIAL   Result Value Ref Range    WBC 6.8 4.8 - 10.8 K/uL    RBC 4.33 4.20 - 5.40 M/uL    Hemoglobin 14.5 12.0 - 16.0 g/dL    Hematocrit 42.2 37.0 - 47.0 %    MCV 97.5 81.4 - 97.8 fL    MCH 33.5 (H) 27.0 - 33.0 pg    MCHC 34.4 33.6 - 35.0 g/dL    RDW 47.3 35.9 - 50.0 fL    Platelet Count 199 164 - 446 K/uL    MPV 9.1 9.0 - 12.9 fL     Neutrophils-Polys 79.70 (H) 44.00 - 72.00 %    Lymphocytes 11.50 (L) 22.00 - 41.00 %    Monocytes 7.50 0.00 - 13.40 %    Eosinophils 0.70 0.00 - 6.90 %    Basophils 0.30 0.00 - 1.80 %    Immature Granulocytes 0.30 0.00 - 0.90 %    Nucleated RBC 0.00 /100 WBC    Neutrophils (Absolute) 5.41 2.00 - 7.15 K/uL    Lymphs (Absolute) 0.78 (L) 1.00 - 4.80 K/uL    Monos (Absolute) 0.51 0.00 - 0.85 K/uL    Eos (Absolute) 0.05 0.00 - 0.51 K/uL    Baso (Absolute) 0.02 0.00 - 0.12 K/uL    Immature Granulocytes (abs) 0.02 0.00 - 0.11 K/uL    NRBC (Absolute) 0.00 K/uL   COMP METABOLIC PANEL   Result Value Ref Range    Sodium 134 (L) 135 - 145 mmol/L    Potassium 4.5 3.6 - 5.5 mmol/L    Chloride 102 96 - 112 mmol/L    Co2 22 20 - 33 mmol/L    Anion Gap 10.0 7.0 - 16.0    Glucose 107 (H) 65 - 99 mg/dL    Bun 20 8 - 22 mg/dL    Creatinine 0.72 0.50 - 1.40 mg/dL    Calcium 9.0 8.5 - 10.5 mg/dL    AST(SGOT) 16 12 - 45 U/L    ALT(SGPT) 18 2 - 50 U/L    Alkaline Phosphatase 62 30 - 99 U/L    Total Bilirubin 0.6 0.1 - 1.5 mg/dL    Albumin 4.0 3.2 - 4.9 g/dL    Total Protein 6.2 6.0 - 8.2 g/dL    Globulin 2.2 1.9 - 3.5 g/dL    A-G Ratio 1.8 g/dL   PROTHROMBIN TIME   Result Value Ref Range    PT 14.4 12.0 - 14.6 sec    INR 1.15 (H) 0.87 - 1.13   APTT   Result Value Ref Range    APTT 31.2 24.7 - 36.0 sec   TROPONIN   Result Value Ref Range    Troponin T 10 6 - 19 ng/L   ESTIMATED GFR   Result Value Ref Range    GFR (CKD-EPI) 85 >60 mL/min/1.73 m 2   EKG (NOW)   Result Value Ref Range    Report       [facility] Emergency Dept.    Test Date:  2022  Pt Name:    HORACE GUZMAN                  Department: ER  MRN:        [id number]                      Room:       [facility]  Gender:     Female                       Technician: [id number]  :        [id number]                   Requested By:CARLOS PARRY  Order #:    [id number]                    Reading MD: CARLOS PARRY MD    Measurements  Intervals                                 Axis  Rate:       69                           P:          64  OK:         224                          QRS:        72  QRSD:       84                           T:          23  QT:         460  QTc:        493    Interpretive Statements  SINUS RHYTHM  VENTRICULAR PREMATURE COMPLEX  FIRST DEGREE AV BLOCK  PROBABLE LEFT ATRIAL ABNORMALITY  RSR' IN V1 OR V2, PROBABLY NORMAL VARIANT  BORDERLINE INFERIOR Q WAVES  BORDERLINE R WAVE PROGRESSION, ANTERIOR LEADS  BORDERLINE PROLONGED QT INTERVAL  Compared to ECG 12/08/2021 05:51:19  Ventricular premature  complex(es) now present  RSR' in V1 or V2 now present  Sinus arrhythmia no longer present  T-wave abnormality no longer present  Electronically Signed On 5-2-2022 11:44:16 PDT by CARLOS PARRY MD         All labs reviewed by me.    EKG  12 Lead EKG interpreted by me, as shown above.     RADIOLOGY  CT-CTA HEAD WITH & W/O-POST PROCESS   Final Result      1.  Moderate sized area of chronic infarction with surrounding encephalomalacia in the right frontal parasylvian region.      2.  Otherwise unremarkable CT angiogram of the head.      3.  Age-related cerebral atrophy.      CT-CTA NECK WITH & W/O-POST PROCESSING   Final Result      CT angiogram of the neck within normal limits.      DX-CHEST-PORTABLE (1 VIEW)   Final Result      No acute cardiac or pulmonary abnormalities are identified.      MR-BRAIN-W/O    (Results Pending)     The radiologist's interpretation of all radiological studies have been reviewed by me.    COURSE & MEDICAL DECISION MAKING  Nursing notes, VS, PMSFHx reviewed in chart.    11:26 AM Patient seen and examined at bedside. After my exam, I explained to the patient the plan of care, which includes obtaining lab work and imaging for further evaluation. Patient understands and verbalizes agreement to plan of care. Ordered CT-CTA neck with and without, DX-chest, CT-CTA with and without, EKG, CBC with diff, CMP, Prothrombin Time, APTT, and Troponin to evaluate  her symptoms. NIH Score of 0. The differential diagnoses include but are not limited to: TIA vs CVA vs retinal artery occlusion      12:07 PM - Patient was reevaluated at bedside. Patient complains of a headache. Patient will be treated with Tylenol for her symptoms.     1:36 PM - Ordered MR-Brain without for further evaluation.     4:08 PM - I inquired about how long until patient has an MRI. I was told two hours from now.     4:59 PM - Patient's care will be transferred to another physician. As long as the MRI is unchanged from her previous MRI, then patient will be stable for discharge with instructions to follow up with Neurology.       FINAL IMPRESSION  1. TIA (transient ischemic attack)          Lyssa HALEY (Scribe), am scribing for, and in the presence of, Kiera Kyle M.D..    Electronically signed by: Lyssa Rao (Scribe), 5/2/2022    Kiera HALEY M.D. personally performed the services described in this documentation, as scribed by Lyssa Rao in my presence, and it is both accurate and complete.    C    The note accurately reflects work and decisions made by me.  Kiera Kyle M.D.  5/2/2022  5:35 PM

## 2022-05-02 NOTE — TELEPHONE ENCOUNTER
LVMTCB  ----- Message from Vanessa Murray D.O. sent at 4/29/2022  7:51 PM PDT -----  Please schedule patient for colposcopy

## 2022-05-02 NOTE — CONSULTS
Neurology Initial Consult H&P  Neurohospitalist Service, [facility]    Referring Physician: Kiera Kyle M.D.    Chief Complaint   Patient presents with   • Blurred Vision       HPI: Shaista Bocanegra is a 77 y.o. female with past medical history of HTN, CAD, DLD, LALY, MVP s/p valve repair, CVA in 2017 and 2021. She presented to the ED for concerns of potential stroke symptoms.  Patient stated that she woke up this mornings at around 1-2 am then she developed a headache, she started reading a book and noticed that reading was difficult she described as if the letters would move up and down, headache did not improved and she checked her BP which was slightly elevated low 140's. Patient was able to fall asleep and woke up at around 9 am still feeling a headache, pain was moderate not severe, she did not take any medications for it, she also noticed that the visual issues were not present anymore.  Patient denied dizziness, double vision, vision loss, lightheadedness, syncope, palpitations, chest pain, SOB or focal deficits.   In the ED patient only had a complaint of a headache, her physical findings were unremarkable.  NIHHS: 0  I spoke with her Son Fer and ex- stating that they did not appreciate any abnormal behavior or deficits, but they also were concerned because in 2021 she developed eye abnormalities when she had the stroke.  Vital signs in the ED in the borderline high low 140's. She was given tylenol which made her headache improve.  Patient will go for Imaging.    Review of systems: In addition to what is detailed in the HPI above, all other systems reviewed and are negative.    Past Medical History:    has a past medical history of Anemia (9/28/2021), Arthritis, Atrial fibrillation (HCC), Benign essential HTN (3/19/2012), Breast cancer (HCC), Cancer (HCC) (1998), Cardiac arrhythmia, Chest tightness or pressure (3/19/2012), Chickenpox, Coronary heart disease, [place]  measles, Gynecological disorder, High cholesterol, High risk medication use (3/19/2012), Hypercholesterolemia (3/19/2012), Mumps, MVP (mitral valve prolapse) (3/19/2012), Osteoporosis, Seizure disorder ([facility]) (9/28/2021), Sleep apnea, Snoring, Stroke ([facility]) (2017), Substance abuse ([facility]), Tonsillitis, Urinary bladder disorder, Urinary incontinence, Valvular heart disease, and Venereal disease.    She has no past medical history of Addisons disease ([facility]), Adrenal disorder ([facility]), Allergy, Anxiety, Asthma, Blood transfusion without reported diagnosis, Cataract, CHF (congestive heart failure) ([facility]), Clotting disorder ([facility]), COPD (chronic obstructive pulmonary disease) ([facility]), Cushings syndrome ([facility]), Depression, Diabetes ([facility]), Diabetic neuropathy ([facility]), GERD (gastroesophageal reflux disease), Glaucoma, Goiter, Head ache, Heart attack ([facility]), HIV (human immunodeficiency virus infection) ([facility]), IBD (inflammatory bowel disease), Kidney disease, Meningitis, Migraine, Muscle disorder, Parathyroid disorder ([facility]), Pituitary disease ([facility]), Pulmonary emphysema ([facility]), Sickle cell disease ([facility]), Thyroid disease, Tuberculosis, or Urinary tract infection.    FHx:  family history includes Cancer in her mother; No Known Problems in her brother.    SHx:   reports that she has never smoked. She has never used smokeless tobacco. She reports current alcohol use of about 1.2 oz of alcohol per week. She reports that she does not use drugs.    Allergies:  No Known Allergies    Medications:  No current facility-administered medications for this encounter.    Current Outpatient Medications:   •  traZODone (DESYREL) 50 MG Tab, TAKE 1 TABLET BY MOUTH EVERY EVENING. INDICATIONS: TROUBLE SLEEPING, Disp: 90 Tablet, Rfl: 3  •  estradiol (ESTRACE VAGINAL) 0.1 MG/GM vaginal cream, Apply 1g cream inside vagina using applicator nightly for 2 weeks, then twice per week thereafter, Disp: 1 Each, Rfl: 3  •  polyethylene glycol/lytes (MIRALAX) 17 g Pack, Take 1  Packet by mouth every day for 90 days. Take 17g dissoloved in 8oz of water or juice once daily to prevent and treat constipation. Hold for diarrhea., Disp: 30 Each, Rfl: 2  •  fexofenadine (ALLEGRA) 60 MG Tab, Take 180 mg by mouth every day., Disp: , Rfl:   •  Eszopiclone 2 MG Tab, Take  by mouth., Disp: , Rfl:   •  amoxicillin (AMOXIL) 500 MG Cap, Take 4 Capsules by mouth 1 time a day as needed., Disp: 30 Capsule, Rfl:   •  tamsulosin (FLOMAX) 0.4 MG capsule, Take 0.4 mg by mouth 1/2 hour after breakfast., Disp: , Rfl:   •  apixaban (ELIQUIS) 5mg Tab, Take 1 Tablet by mouth 2 times a day., Disp: 60 Tablet, Rfl: 2  •  atorvastatin (LIPITOR) 80 MG tablet, Take 1 Tablet by mouth every evening., Disp: 30 Tablet, Rfl: 2  •  ferrous sulfate 325 (65 Fe) MG tablet, Take 1 Tablet by mouth every morning with breakfast., Disp: 30 Tablet, Rfl: 2  •  spironolactone (ALDACTONE) 25 MG Tab, Take 1 Tablet by mouth every day., Disp: 30 Tablet, Rfl: 2  •  Multiple Vitamin (MULTIVITAMINS PO), Take 1 Tab by mouth every day., Disp: , Rfl:     Physical Examination:     General: Patient is awake, alert and oriented times 3, and in no acute distress, she seemed very comfortable in bed.  Eye: PERRLA, normal ocular movements, no pain.  Neck: There is normal range of motion, no adenopathy.  CV: regular rate, no murmurs or gallops appreciated.  Extremities:  clear, dry, intact, without peripheral edema, peripheral pulses present and strong.    NEUROLOGICAL EXAM:     /83   Pulse 68   Resp (!) 22   Wt 60.8 kg (134 lb)   SpO2 94%   BMI 21.63 kg/m²       Mental status: Awake, alert and fully oriented.  Speech and language: Speech is clear and fluent. The patient is able to name and repeat, and follow commands.  Cranial nerve exam: Pupils are equal, round and reactive to light bilaterally. Visual fields are full. There is no nystagmus. Extraocular muscles are intact. Face is symmetric. Sensation in the face is intact to light touch.  Palate elevates symmetrically. Tongue is midline.  Motor exam: There is sustained antigravity with no downward drift in bilateral arms and legs.  There is no pronator drift. Tone is normal. No abnormal movements were seen on exam.  Sensory exam: Reacts to tactile in all 4 extremities, no neglect to double stim   Deep tendon reflexes:  2+ throughout. Toes down-going bilaterally.  Coordination: No ataxia on bilateral finger-to-nose testing  Gait: Deferred due to patient preference      NIHSS: National Institutes of Health Stroke Scale    [0] 1a:Level of Consciousness    0-alert 1-drowsy   2-stupor   3-coma  [0] 1b:LOC Questions                  0-both  1-one      2-neither  [0] 1c:LOC Commands                   0-both  1-one      2-neither  [0] 2: Best Gaze                     0-nl    1-partial  2-forced  [0] 3: Visual Fields                   0-nl    1-partial  2-complete 3-bilat  [0] 4: Facial Paresis                0-nl    1-minor    2-partial  3-full  MOTOR                       0-nl  [0] 5: Right Arm           1-drift  [0] 6: Left Arm             2-some effort vs gravity  [0] 7: Right Leg           3-no effort vs gravity  [0] 8: Left Leg             4-no movement                             x-untestable  [0] 9: Limb Ataxia                    0-abs   1-1_limb   2-2+_limbs       x-untestable  [0] 10:Sensory                        0-nl    1-partial  2-dense  [0] 11:Best Language/Aphasia         0-nl    1-mild/mod 2-severe   3-mute  [0] 12:Dysarthria                     0-nl    1-mild/mod 2-severe       x-untestable  [0] 13:Neglect/Inattention            0-none  1-partial  2-complete  [0] TOTAL      Objective Data:    Labs:  Lab Results   Component Value Date/Time    PROTHROMBTM 14.4 05/02/2022 11:46 AM    INR 1.15 (H) 05/02/2022 11:46 AM      Lab Results   Component Value Date/Time    WBC 6.8 05/02/2022 11:46 AM    RBC 4.33 05/02/2022 11:46 AM    HEMOGLOBIN 14.5 05/02/2022 11:46 AM    HEMATOCRIT 42.2 05/02/2022  11:46 AM    MCV 97.5 05/02/2022 11:46 AM    MCH 33.5 (H) 05/02/2022 11:46 AM    MCHC 34.4 05/02/2022 11:46 AM    MPV 9.1 05/02/2022 11:46 AM    NEUTSPOLYS 79.70 (H) 05/02/2022 11:46 AM    LYMPHOCYTES 11.50 (L) 05/02/2022 11:46 AM    MONOCYTES 7.50 05/02/2022 11:46 AM    EOSINOPHILS 0.70 05/02/2022 11:46 AM    BASOPHILS 0.30 05/02/2022 11:46 AM      Lab Results   Component Value Date/Time    SODIUM 134 (L) 05/02/2022 11:46 AM    POTASSIUM 4.5 05/02/2022 11:46 AM    CHLORIDE 102 05/02/2022 11:46 AM    CO2 22 05/02/2022 11:46 AM    GLUCOSE 107 (H) 05/02/2022 11:46 AM    BUN 20 05/02/2022 11:46 AM    CREATININE 0.72 05/02/2022 11:46 AM    BUNCREATRAT 12 06/12/2020 05:12 AM      Lab Results   Component Value Date/Time    CHOLSTRLTOT 121 03/29/2022 11:34 AM    LDL 56 03/29/2022 11:34 AM    HDL 54 03/29/2022 11:34 AM    TRIGLYCERIDE 57 03/29/2022 11:34 AM       Lab Results   Component Value Date/Time    ALKPHOSPHAT 62 05/02/2022 11:46 AM    ASTSGOT 16 05/02/2022 11:46 AM    ALTSGPT 18 05/02/2022 11:46 AM    TBILIRUBIN 0.6 05/02/2022 11:46 AM        Imaging/Testing:    I interpreted and/or reviewed the patient's neuroimaging    DX-CHEST-PORTABLE (1 VIEW)   Final Result      No acute cardiac or pulmonary abnormalities are identified.      CT-CTA HEAD WITH & W/O-POST PROCESS    (Results Pending)   CT-CTA NECK WITH & W/O-POST PROCESSING    (Results Pending)       Assessment and Plan:    Shaista Bocanegra is a 77 y.o. presenting to the ED with concerns of stroke symptoms, had a headache at 2 am, also noticed visual disturbances with reading at 2 am but resolved by the time she woke up this morning after 9 am. After she woke up this morning she continued experiencing headaches and decided to come to the ED for evaluation. She checked her BP at home stating BP reading low 140's, usually her BP is around 120's-130's.  She has history of 2 CVA's in the past in 2017 and most recently in 2021.  Per family members (son, ex-)  patient seems to be at her baseline, they did not noticed any focal deficits or abnormal behavior.    Problem list:  - Headache  - Visual Disturbance (Resolved)  - A fib s/p ablation  - CAD, Hypertension, LALY    Plan:  - CTA Head and Neck  - MRI brain  - Continue Eliquis  - Neurology will follow up  - Continue statin  - Thank you for your consultation     Dr. Chetan MD, PYG2    Please see Dr. Pollard' attestation for further orders and recommendations.     The evaluation of the patient, and recommended management, was discussed with the resident staff.

## 2022-05-02 NOTE — ED TRIAGE NOTES
Patient woke up at 1 am and noted a HA with with blurred vision around 2am she became more aware, since has noted improvement  Previous history of a stroke.  No focal weakness noted.

## 2022-05-04 ENCOUNTER — APPOINTMENT (OUTPATIENT)
Dept: SPEECH THERAPY | Facility: REHABILITATION | Age: 77
End: 2022-05-04
Payer: MEDICARE

## 2022-05-06 ENCOUNTER — OFFICE VISIT (OUTPATIENT)
Dept: SLEEP MEDICINE | Facility: MEDICAL CENTER | Age: 77
End: 2022-05-06
Payer: MEDICARE

## 2022-05-06 VITALS
BODY MASS INDEX: 22.82 KG/M2 | HEIGHT: 66 IN | RESPIRATION RATE: 16 BRPM | OXYGEN SATURATION: 96 % | SYSTOLIC BLOOD PRESSURE: 102 MMHG | WEIGHT: 142 LBS | DIASTOLIC BLOOD PRESSURE: 68 MMHG | HEART RATE: 66 BPM

## 2022-05-06 DIAGNOSIS — G47.33 OSA (OBSTRUCTIVE SLEEP APNEA): ICD-10-CM

## 2022-05-06 PROCEDURE — 99214 OFFICE O/P EST MOD 30 MIN: CPT | Performed by: FAMILY MEDICINE

## 2022-05-06 ASSESSMENT — FIBROSIS 4 INDEX: FIB4 SCORE: 1.5

## 2022-05-06 NOTE — PATIENT INSTRUCTIONS
"CPAP and BPAP Information  CPAP and BPAP are methods of helping a person breathe with the use of air pressure. CPAP stands for \"continuous positive airway pressure.\" BPAP stands for \"bi-level positive airway pressure.\" In both methods, air is blown through your nose or mouth and into your air passages to help you breathe well.  CPAP and BPAP use different amounts of pressure to blow air. With CPAP, the amount of pressure stays the same while you breathe in and out. With BPAP, the amount of pressure is increased when you breathe in (inhale) so that you can take larger breaths. Your health care provider will recommend whether CPAP or BPAP would be more helpful for you.  Why are CPAP and BPAP treatments used?  CPAP or BPAP can be helpful if you have:  · Sleep apnea.  · Chronic obstructive pulmonary disease (COPD).  · Heart failure.  · Medical conditions that weaken the muscles of the chest including muscular dystrophy, or neurological diseases such as amyotrophic lateral sclerosis (ALS).  · Other problems that cause breathing to be weak, abnormal, or difficult.  CPAP is most commonly used for obstructive sleep apnea (LALY) to keep the airways from collapsing when the muscles relax during sleep.  How is CPAP or BPAP administered?  Both CPAP and BPAP are provided by a small machine with a flexible plastic tube that attaches to a plastic mask. You wear the mask. Air is blown through the mask into your nose or mouth. The amount of pressure that is used to blow the air can be adjusted on the machine. Your health care provider will determine the pressure setting that should be used based on your individual needs.  When should CPAP or BPAP be used?  In most cases, the mask only needs to be worn during sleep. Generally, the mask needs to be worn throughout the night and during any daytime naps. People with certain medical conditions may also need to wear the mask at other times when they are awake. Follow instructions from your " health care provider about when to use the machine.  What are some tips for using the mask?    · Because the mask needs to be snug, some people feel trapped or closed-in (claustrophobic) when first using the mask. If you feel this way, you may need to get used to the mask. One way to do this is by holding the mask loosely over your nose or mouth and then gradually applying the mask more snugly. You can also gradually increase the amount of time that you use the mask.  · Masks are available in various types and sizes. Some fit over your mouth and nose while others fit over just your nose. If your mask does not fit well, talk with your health care provider about getting a different one.  · If you are using a mask that fits over your nose and you tend to breathe through your mouth, a chin strap may be applied to help keep your mouth closed.  · The CPAP and BPAP machines have alarms that may sound if the mask comes off or develops a leak.  · If you have trouble with the mask, it is very important that you talk with your health care provider about finding a way to make the mask easier to tolerate. Do not stop using the mask. Stopping the use of the mask could have a negative impact on your health.  What are some tips for using the machine?  · Place your CPAP or BPAP machine on a secure table or stand near an electrical outlet.  · Know where the on/off switch is located on the machine.  · Follow instructions from your health care provider about how to set the pressure on your machine and when you should use it.  · Do not eat or drink while the CPAP or BPAP machine is on. Food or fluids could get pushed into your lungs by the pressure of the CPAP or BPAP.  · Do not smoke. Tobacco smoke residue can damage the machine.  · For home use, CPAP and BPAP machines can be rented or purchased through home health care companies. Many different brands of machines are available. Renting a machine before purchasing may help you find out  which particular machine works well for you.  · Keep the CPAP or BPAP machine and attachments clean. Ask your health care provider for specific instructions.  Get help right away if:  · You have redness or open areas around your nose or mouth where the mask fits.  · You have trouble using the CPAP or BPAP machine.  · You cannot tolerate wearing the CPAP or BPAP mask.  · You have pain, discomfort, and bloating in your abdomen.  Summary  · CPAP and BPAP are methods of helping a person breathe with the use of air pressure.  · Both CPAP and BPAP are provided by a small machine with a flexible plastic tube that attaches to a plastic mask.  · If you have trouble with the mask, it is very important that you talk with your health care provider about finding a way to make the mask easier to tolerate.  This information is not intended to replace advice given to you by your health care provider. Make sure you discuss any questions you have with your health care provider.  Document Released: 09/15/2005 Document Revised: 04/08/2020 Document Reviewed: 11/06/2017  Elsevier Patient Education © 2020 [employer]

## 2022-05-06 NOTE — PROGRESS NOTES
[facility] Follow Up Note     Date: 5/6/2022 / Time: 10:40 AM    Patient ID:   Name:             Shaista Bocanegra   YOB: 1945  Age:                 77 y.o.  female   MRN:               [id number]      Thank you for requesting a sleep medicine consultation on Shaista Bocanegra at the sleep center. She presents today with the chief complaints of ALLY follow up. She has mild LALY with AHI: 11/h, and was prescribed CPAP therapy which she finds cumbersome to use.  She switched to a mandibular advancement device however suffers from jaw pain which limits its use.    HISTORY OF PRESENT ILLNESS:       Pt is currently on CPAP 7 cm. She also has a mandibular advancement device and using it more than the CPAP. She is getting a new dental appliance in next couple weeks due to the discomfort with the current one. She goes to sleep around 10-11 pm and wakes up around 7 am. She is getting about 7 hrs of sleep on a good night. She is using doxylamine 25 mg 2 tabs per night. Overall, she doesnot finds her sleep refreshing. She drinks about 3 caffeinated beverages per day. She denies any symptoms of RLS, narcolepsy or any symptoms to suggest parasomnias such as nightmares, sleep walking or acting out of dreams.      She is not using CPAP most days of the week. Pt reports 3 hrs of average nightly use of CPAP. Pt denies snoring, gasping,choking.Pt also denies significant mask leak that is interfering with sleep. The 30 day compliance was downloaded which shows inadequate compliance with more that 4 hr usage about 52%. The AHI is has improved to 2.9/hr. The mask leak is normal.       REVIEW OF SYSTEMS:       Constitutional: Denies fevers, Denies weight changes  Eyes: Denies changes in vision, no eye pain  Ears/Nose/Throat/Mouth: Denies nasal congestion or sore throat   Cardiovascular: Denies chest pain or palpitations   Respiratory: Denies shortness of breath , Denies cough  Gastrointestinal/Hepatic: Denies  abdominal pain, nausea, vomiting, diarrhea, constipation or GI bleeding   Genitourinary: Deniesdysuria or frequency  Musculoskeletal/Rheum: Denies  joint pain and swelling   Skin/Breast: Denies rash,   Neurological: Denies headache, confusion, memory loss or focal weakness/parasthesias  Psychiatric: denies mood disorder   Sleep: + insomnia and non restful sleep    Comprehensive review of systems form is reviewed with the patient and is attached in the EMR.     PMH:  has a past medical history of Anemia (9/28/2021), Arthritis, Atrial fibrillation ([facility]), Benign essential HTN (3/19/2012), Breast cancer ([facility]), Cancer ([facility]) (1998), Cardiac arrhythmia, Chest tightness or pressure (3/19/2012), Chickenpox, Coronary heart disease, Citizen of [place] measles, Gynecological disorder, High cholesterol, High risk medication use (3/19/2012), Hypercholesterolemia (3/19/2012), Mumps, MVP (mitral valve prolapse) (3/19/2012), Osteoporosis, Seizure disorder ([facility]) (9/28/2021), Sleep apnea, Snoring, Stroke ([facility]) (2017), Substance abuse ([facility]), Tonsillitis, Urinary bladder disorder, Urinary incontinence, Valvular heart disease, and Venereal disease.    She has no past medical history of Addisons disease ([facility]), Adrenal disorder ([facility]), Allergy, Anxiety, Asthma, Blood transfusion without reported diagnosis, Cataract, CHF (congestive heart failure) ([facility]), Clotting disorder ([facility]), COPD (chronic obstructive pulmonary disease) ([facility]), Cushings syndrome ([facility]), Depression, Diabetes ([facility]), Diabetic neuropathy ([facility]), GERD (gastroesophageal reflux disease), Glaucoma, Goiter, Head ache, Heart attack ([facility]), HIV (human immunodeficiency virus infection) ([facility]), IBD (inflammatory bowel disease), Kidney disease, Meningitis, Migraine, Muscle disorder, Parathyroid disorder ([facility]), Pituitary disease ([facility]), Pulmonary emphysema ([facility]), Sickle cell disease ([facility]), Thyroid disease, Tuberculosis, or Urinary tract infection.  MEDS:   Current Outpatient Medications:   •  estradiol  (ESTRACE VAGINAL) 0.1 MG/GM vaginal cream, Apply 1g cream inside vagina using applicator nightly for 2 weeks, then twice per week thereafter, Disp: 1 Each, Rfl: 3  •  fexofenadine (ALLEGRA) 60 MG Tab, Take 180 mg by mouth every day., Disp: , Rfl:   •  Eszopiclone 2 MG Tab, Take  by mouth., Disp: , Rfl:   •  tamsulosin (FLOMAX) 0.4 MG capsule, Take 0.4 mg by mouth 1/2 hour after breakfast., Disp: , Rfl:   •  apixaban (ELIQUIS) 5mg Tab, Take 1 Tablet by mouth 2 times a day., Disp: 60 Tablet, Rfl: 2  •  atorvastatin (LIPITOR) 80 MG tablet, Take 1 Tablet by mouth every evening., Disp: 30 Tablet, Rfl: 2  •  ferrous sulfate 325 (65 Fe) MG tablet, Take 1 Tablet by mouth every morning with breakfast., Disp: 30 Tablet, Rfl: 2  •  spironolactone (ALDACTONE) 25 MG Tab, Take 1 Tablet by mouth every day., Disp: 30 Tablet, Rfl: 2  •  Multiple Vitamin (MULTIVITAMINS PO), Take 1 Tab by mouth every day., Disp: , Rfl:   •  traZODone (DESYREL) 50 MG Tab, TAKE 1 TABLET BY MOUTH EVERY EVENING. INDICATIONS: TROUBLE SLEEPING (Patient not taking: Reported on 5/6/2022), Disp: 90 Tablet, Rfl: 3  •  polyethylene glycol/lytes (MIRALAX) 17 g Pack, Take 1 Packet by mouth every day for 90 days. Take 17g dissoloved in 8oz of water or juice once daily to prevent and treat constipation. Hold for diarrhea., Disp: 30 Each, Rfl: 2  •  amoxicillin (AMOXIL) 500 MG Cap, Take 4 Capsules by mouth 1 time a day as needed. (Patient not taking: Reported on 5/6/2022), Disp: 30 Capsule, Rfl:   ALLERGIES: No Known Allergies  SURGHX:   Past Surgical History:   Procedure Laterality Date   • CATARACT PHACO WITH IOL  3/16/2009    [facility]   • CATARACT PHACO WITH IOL  1/26/2009    Performed by SHANNON GANDARA [facility]   • BREAST BIOPSY     • HYSTERECTOMY LAPAROSCOPY     • LUMPECTOMY     • NE CHEMOTHERAPY, UNSPECIFIED PROCEDURE     • NE RADIATION THERAPY PLAN SIMPLE     • NE REMV 2ND  "CATARACT,CORN-SCLER SECTN     • PRIMARY C SECTION     • SINUSCOPE     • TONSILLECTOMY       SOCHX:  reports that she has never smoked. She has never used smokeless tobacco. She reports current alcohol use of about 1.2 oz of alcohol per week. She reports that she does not use drugs..  FH:   Family History   Problem Relation Age of Onset   • Cancer Mother         breast   • No Known Problems Brother    • Heart Failure Neg Hx    • Heart Disease Neg Hx          Physical Exam:  Vitals/ General Appearance:   Weight/BMI: Body mass index is 22.92 kg/m².  /68 (BP Location: Left arm, Patient Position: Sitting, BP Cuff Size: Adult)   Pulse 66   Resp 16   Ht 1.676 m (5' 6\")   Wt 64.4 kg (142 lb)   SpO2 96%   Vitals:    05/06/22 1027   BP: 102/68   BP Location: Left arm   Patient Position: Sitting   BP Cuff Size: Adult   Pulse: 66   Resp: 16   SpO2: 96%   Weight: 64.4 kg (142 lb)   Height: 1.676 m (5' 6\")       Pt. is alert and oriented to time, place and person. Cooperative and in no apparent distress.       Constitutional: Alert, no distress, well-groomed.  Skin: No rashes in visible areas.  Eye: Round. Conjunctiva clear, lids normal. No icterus.   ENMT: Lips pink without lesions, good dentition, moist mucous membranes. Phonation normal.  Neck: No masses, no thyromegaly. Moves freely without pain.  CV: Pulse as reported by patient  Respiratory: Unlabored respiratory effort, no cough or audible wheeze  Psych: Alert and oriented x3, normal affect and mood.     ASSESSMENT AND PLAN     1. Sleep Apnea:      The pathophysiology of sleep anea and the increased risk of cardiovascular morbidity from untreated sleep apnea is discussed in detail with the patient.   She is urged to avoid supine sleep, weight gain and alcoholic beverages since all of these can worsen sleep apnea. She is cautioned against drowsy driving. If She feels sleepy while driving, She must pull over for a break/nap, rather than persist on the road, in the " interest of She own safety and that of others on the road.   Plan   - Continue dental appliance and CPAP as per pts choice.Repeat HST on new appliance.    - compliance download was reviewed and discussed with the pt   - compliance was reinforced     2. Regarding treatment of other past medical problems and general health maintenance,  She is urged to follow up with PCP.      The recent recall from [employer] is due to disintegration of the polyurethane foam was discussed in detail.  She denies any symptoms mentioned in the recall.  Risk and benefits of using versus withholding therapy was reviewed with him in detail.  Due to improvement in quality of life,she would like to continue using the therapy.  Therefore I recommend to continue using your machine until and unless you experience black debris/particles within the air path or experiencing headaches, upper airway irritation, cough, chest pressure, sinus infection, irritation to skin/eyes/respiratory tract, inflammatory response, asthma, nausea/vomiting to stop using the PAP therapy immediately and contact the office.  Alternative therapy including surgeries and mandibular advancement device was discussed in detail.  Patient was also recommended to register CPAP machine online for possible repair/replacement.

## 2022-05-09 NOTE — PROGRESS NOTES
"Subjective:   Chief Complaint:   Chief Complaint   Patient presents with   • Dyslipidemia   • Atrial Fibrillation     F/V Dx: Persistent atrial fibrillation (HCC)       \"Artemio\" Shaista Bocanegra is a 77 y.o. female who returns for moderate to severe mitral regurgitation, s/p minimally invasive complex MV repair [facility]/KYUNG appendage oversew/maze, hypertension, hyperlipidemia, prior CVAx2, HTN , PAF, ablation of PAF.    Admitted to the hospital 5/8/2020 with dehydration on diuretics.  Has left and right heart catheterization that admission move mitral clip for severe mitral regurgitation.  Took a while for her to decide if she wanted to enter a trial for noninvasive mitral valve repair versus surgery.  Ultimately only underwent surgery with Dr. Monster Nogueira at [facility] on 9/16/2021:  1. Minimally invasive complex mitral valve repair with A2 [place]-Miguel Angel neochordal reconstruction, A3 [place]-Miguel Angel neochordal reconstruction, P2 posterior ventricular anchoring remodeling suture and non-resectional leaflet remodeling and [place]-Miguel Angel NeoChord x2, P3 [place]-Miguel Angel NeoChord x1, and #34 sewing ring annuloplasty  2. Atrial fibrillation maze procedure and left atrial appendage oversew  She was transferred back to [facility] rehab.    Monitor for Afib 7-2021, Ave 69, range , occasional PVCs, 2% burden, prior to ablation.  Has persistent atrial fibrillation, underwent ablation with Dr. Richard 12-7-21, did well.  This was after her valve replacement.  I thought she was off of metoprolol but is still on her medication list.    Has HTN, BP controlled overall, elevated at times.  Checking at home, mostly 110s-120s/70s, rare 91, rare 158.  Today, blood pressure little low.  TIA  At home on occasion she has blood pressures in the 130s 140s but predominantly in the low 100s.  On tamsulosin per urology, also on spironolactone.    Has been on iron supplements.  Hemoglobin previously low but MCV okay.    Has LALY, was on CPAP, now using device only.  Using " meditation and leep specialist to help with habits.    Prior CVA, some light tingling sensation remains.    Patient received mechanical thrombectomy 8/18/2017, felt to be due to right carotid disease.  Then had another CVA 5-2021, found to have afib, now on apixaban.  [id number] is 5 now.  Does note brain fog and word finding sometimes now after CVA.  Ha been on apixaban, did have left atrial appendage oversew so she had bleeding problems, I said she could come off of the apixaban and switching back to aspirin but this did not happen.  Hemoglobin was down to 8.8 in September 2021, MCV was 103.  At some point she was started on iron.   Then woke up with HA, blurred vision, had MRI/CTA, nothing new, was told possible TIA by ED provider, she was not admitted. /83.    Has hyperlipidemia, LDL 49.  No CAD on [facility] 2020.  On secondary prevention statin.    Has a ring that monitors HR at night, we looked at data, no spikes.    She is not limited by chest pain, pressure or tightness with activity.   No significant dyspnea on exertion but only if she moves slowly.  No orthopnea or lower extremity swelling.     No significant palpitations, lightheadedness, or presyncope/syncope.   Rarely lightheaded.    No symptoms of leg claudication.     Remote syncope in the setting of low K.  Her K was stopped in hospital, she is low normal.    Notes mild tremor, intention.    Using trazodone for sleep.    No family history of premature coronary artery disease.  No prior smoking history.  No history of diabetes.  No history of autoimmune disease such as lupus or rheumatoid arthritis.  No chronic kidney disease.  No ETOH overuse.   No caffeine overuse.  No recreation substance use.    Has lost some weight.    Lives in [place].  She is a .  Close to [place], father of her children comes at times.  Casey sees our group, has ICD.  Here with her son Fer today.    DATA REVIEWED by me:  ECG 5-2-22  Sinus, 69, first-degree AV delay PVC,  delayed R wave progression, small inferior Q waves of undetermined significance    ECG 12-8-21  Sinus, 67, PACs    ECG 9-6-2021  Afib, 53,     ECG 5-  Sinus, 65, PVC, first-degree AV delay    BioTel 6 day Summary: 7-14-21  1. Atrial fibrillation with appropriate heart rate range. Average 69, range 35 to 120 bpm.   2. No significant pauses (up to 2.2 seconds).   3. Occasional PVCs, 2% burden.   4. 1 patient trigger, no symptoms reported.   Conclusion: Persistent atrial fibrillation with appropriate heart rate, no pauses, occasional PVCs.     Zio 2017 2 weeks.  Sinus, brief atrial run.    EP Ablation, Dr. Richard 12-7-21  Pulmonary Vein Isolation  Additional ablation for atrial fibrillation x2  Ablation of additional arrhythmia  Intracardiac Echocardiography  Three-dimensional intracardiac mapping  IV isoproterenol infusion with programmed stimulation    ABHINAV 1-26-21  LV EF  55%.  Biatrial enlargement.  There is severe eccentric mitral regurgitation with multiple jets,   predominantly from flail leaflet of P2.  Echolucent space near P1 of unclear etiology, unusual for cleft   leaflet.  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    Echo 6-1-2021  Left ventricular ejection fraction is visually estimated to be 65%.  Diastolic function is difficult to assess with atrial fibrillation.  Severely dilated left atrium.  Negative bubble study including Valsalva.  Myxomatous changes of the mitral valve leaflets with prolapse of the   anterior and posterior leaflets.  Moderate to severe eccentric mitral regurgitation, probably severe.  Pulmonary veins not well seen on the study.  Estimated right ventricular systolic pressure is 31 mmHg + estimated   RAP.     Compared to the images of the study done 11- there has been no   significant change, prior echo had pulmonary vein flow reversal   consistent with severe mitral regurgitation.    Echo 11-  Left ventricular ejection fraction is visually estimated to be  60%.  Severely dilated left atrium.  Myxomatous changes of the mitral valve.  Bileaflet prolapse of the mitral valve is present.  Severe mitral regurgitation with pulmonary vein systolic flow reversal,   RV 73 mL, ERO 0.4  cm2.  Estimated right ventricular systolic pressure  is 30 mmHg.  Compared to the images of the prior study done 2/3/2020, no significant    change.    Echo 2/3/2020  Prior echo done on 05/03/2019. Compared to the images of the prior   study done -  there has been no significant change.   Normal left ventricular systolic function.  Left ventricular ejection fraction is visually estimated to be 65%.  Prolapse of the mitral leaflets was present.  Severe mitral regurgitation.  Mild tricuspid regurgitation.  Estimated right ventricular systolic pressure is 35 mmHg.    Echo 2017  Agitated saline study was performed, no evidence of right to left   shunt.  Normal left ventricular size, wall thickness, and systolic function.  Left ventricular ejection fraction is visually estimated to be 60%.  Mildly dilated left atrium.  Moderate mitral regurgitation due to an eccentric jet.  Mild tricuspid regurgitation.    Left and right heart catheterization 5/8/2020  Right ventricle 33/5, EDP 15  Pulmonary artery 42/21, 29  pulmonary capillary wedge 23  Cardiac output 4.1 L/min by thermodilution method     1.  Left main coronary artery:  Normal.  2.  Left anterior descending artery:  Normal.   3.  Left circumflex coronary artery:  Normal.   4.  Right coronary artery:  Normal.  This is a right dominant system.  5.  Left ventricular end diastolic pressure:  25 mmHg.  No signficant gradient across the aortic valve.  6.  Left ventriculogram:  Ejection fraction of 65%, 2+ mitral regurgitation, normal sized thoracic aorta.    Surgery Tee Nogueira  Date of surgery: 09/16/2021  Date of discharge: 9/28/2021   PROCEDURE/SURGERY:   1. Minimally invasive complex mitral valve repair with A2 [place]-Miguel Angel neochordal  reconstruction, A3 [place]-Miguel Angel neochordal reconstruction, P2 posterior ventricular anchoring remodeling suture and non-resectional leaflet remodeling and [place]-Miguel Angel NeoChord x2, P3 [place]-Miguel Angel NeoChord x1, and #34 sewing ring annuloplasty, CPT code 01166.  2. Atrial fibrillation maze procedure and left atrial appendage oversew, CPT code 87867.     2017 CTA NECK   1.  CT angiogram of the neck within normal limits.  2.  Thrombosed right M1 segment.     2017 CAROTID DUPLEX CONCLUSIONS   nl carotids, subclavians and vertebral's    MRI brain 5-2-22  No acute intracranial process.  Remote infarcts in the right frontal, left occipital region as described above.  Remote hemorrhage into the right basal ganglia is noted with hemosiderin staining. Remote microhemorrhage into the left medial occipital periatrial white matter is also noted.  Age-related volume loss and chronic microvascular ischemic changes.    CTA head neck 5-2-22  CT angiogram of the neck within normal limits.   1.  Moderate sized area of chronic infarction with surrounding encephalomalacia in the right frontal parasylvian region.  2.  Otherwise unremarkable CT angiogram of the head.  3.  Age-related cerebral atrophy.    2017 MRI BRAIN  1.  Moderate sized RIGHT MCA territory acute ischemia involving the cortex and basal ganglia  2.  Flow void is present in the RIGHT MCA compatible with treatment effect  3.  No hemorrhage  4.  Mild white matter changes  5.  Mild atrophy    Most recent labs:       Lab Results   Component Value Date/Time    HEMOGLOBIN 14.5 05/02/2022 11:46 AM    HEMATOCRIT 42.2 05/02/2022 11:46 AM    MCV 97.5 05/02/2022 11:46 AM    INR 1.15 (H) 05/02/2022 11:46 AM      Lab Results   Component Value Date/Time    SODIUM 134 (L) 05/02/2022 11:46 AM    POTASSIUM 4.5 05/02/2022 11:46 AM    CHLORIDE 102 05/02/2022 11:46 AM    CO2 22 05/02/2022 11:46 AM    GLUCOSE 107 (H) 05/02/2022 11:46 AM    BUN 20 05/02/2022 11:46 AM    CREATININE 0.72 05/02/2022 11:46 AM       Lab Results   Component Value Date/Time    ASTSGOT 16 05/02/2022 11:46 AM    ALTSGPT 18 05/02/2022 11:46 AM    ALBUMIN 4.0 05/02/2022 11:46 AM      Lab Results   Component Value Date/Time    CHOLSTRLTOT 115 05/02/2022 11:46 AM    LDL 49 05/02/2022 11:46 AM    HDL 55 05/02/2022 11:46 AM    TRIGLYCERIDE 56 05/02/2022 11:46 AM           Past Medical History:   Diagnosis Date   • Anemia 9/28/2021   • Arthritis     toe   • Atrial fibrillation (HCC)    • Benign essential HTN 3/19/2012   • Breast cancer (HCC)    • Cancer (HCC) 1998    breast    • Cardiac arrhythmia    • Chest tightness or pressure 3/19/2012   • Chickenpox    • Coronary heart disease    • [place] measles    • Gynecological disorder    • High cholesterol    • High risk medication use 3/19/2012   • Hypercholesterolemia 3/19/2012   • Mumps    • MVP (mitral valve prolapse) 3/19/2012   • Osteoporosis    • Seizure disorder ([facility]) 9/28/2021    last seizure may 2021   • Sleep apnea     CPAP at night   • Snoring    • Stroke ([facility]) 2017    residual minor weakness on left side   • Substance abuse ([facility])    • Tonsillitis    • Urinary bladder disorder     OAB   • Urinary incontinence    • Valvular heart disease    • Venereal disease      Past Surgical History:   Procedure Laterality Date   • CATARACT PHACO WITH IOL  3/16/2009    Performed by SHANNON GANDARA at SURGERY SAME DAY [facility]S   • CATARACT PHACO WITH IOL  1/26/2009    Performed by SHANNON GANDARA at SURGERY SAME DAY [facility] OR[facility]   • BREAST BIOPSY     • HYSTERECTOMY LAPAROSCOPY     • LUMPECTOMY     • VT CHEMOTHERAPY, UNSPECIFIED PROCEDURE     • VT RADIATION THERAPY PLAN SIMPLE     • VT REMV 2ND CATARACT,CORN-SCLER SECTN     • PRIMARY C SECTION     • SINUSCOPE     • TONSILLECTOMY       Family History   Problem Relation Age of Onset   • Cancer Mother         breast   • No Known Problems Brother    • Heart Failure Neg Hx    • Heart Disease Neg Hx      Social History     Socioeconomic History   • Marital  status:      Spouse name: Not on file   • Number of children: 2   • Years of education: Not on file   • Highest education level: Professional school degree (e.g., MD, ELMAS, DVM, ARACELI)   Occupational History   • Occupation:      Employer: Not Employed   Tobacco Use   • Smoking status: Never Smoker   • Smokeless tobacco: Never Used   Vaping Use   • Vaping Use: Never used   Substance and Sexual Activity   • Alcohol use: Yes     Alcohol/week: 1.2 oz     Types: 2 Glasses of wine per week     Comment: twice a week   • Drug use: No   • Sexual activity: Yes     Partners: Male     Birth control/protection: Female Sterilization   Other Topics Concern   • Not on file   Social History Narrative   • Not on file     Social Determinants of Health     Financial Resource Strain: Low Risk    • Difficulty of Paying Living Expenses: Not hard at all   Food Insecurity: No Food Insecurity   • Worried About Running Out of Food in the Last Year: Never true   • Ran Out of Food in the Last Year: Never true   Transportation Needs: No Transportation Needs   • Lack of Transportation (Medical): No   • Lack of Transportation (Non-Medical): No   Physical Activity: Insufficiently Active   • Days of Exercise per Week: 4 days   • Minutes of Exercise per Session: 30 min   Stress: Not on file   Social Connections: Socially Isolated   • Frequency of Communication with Friends and Family: More than three times a week   • Frequency of Social Gatherings with Friends and Family: Twice a week   • Attends Sikhism Services: Never   • Active Member of Clubs or Organizations: No   • Attends Club or Organization Meetings: Never   • Marital Status:    Intimate Partner Violence: Not on file   Housing Stability: Low Risk    • Unable to Pay for Housing in the Last Year: No   • Number of Places Lived in the Last Year: 1   • Unstable Housing in the Last Year: No     No Known Allergies    Current Outpatient Medications   Medication Sig Dispense  "Refill   • cyanocobalamin (VITAMIN B-12) 100 MCG Tab Take 100 mcg by mouth every day.     • amoxicillin (AMOXIL) 500 MG Cap Take 4 Capsules by mouth 1 time a day as needed. 30 Capsule    • apixaban (ELIQUIS) 5mg Tab Take 1 Tablet by mouth 2 times a day. 180 Tablet 7   • spironolactone (ALDACTONE) 25 MG Tab Take 1 Tablet by mouth every day. 90 Tablet 7   • atorvastatin (LIPITOR) 80 MG tablet Take 1 Tablet by mouth every evening. 90 Tablet 7   • estradiol (ESTRACE VAGINAL) 0.1 MG/GM vaginal cream Apply 1g cream inside vagina using applicator nightly for 2 weeks, then twice per week thereafter 1 Each 3   • tamsulosin (FLOMAX) 0.4 MG capsule Take 0.4 mg by mouth 1/2 hour after breakfast.     • Multiple Vitamin (MULTIVITAMINS PO) Take 1 Tab by mouth every day.       No current facility-administered medications for this visit.       ROS    All others systems reviewed and negative.     Objective:     BP (!) 92/60 (BP Location: Left arm, Patient Position: Sitting, BP Cuff Size: Adult)   Pulse 77   Resp 16   Ht 1.676 m (5' 6\")   Wt 65.3 kg (144 lb)   SpO2 95%  Body mass index is 23.24 kg/m².    General: No acute distress. Well nourished.  HEENT: EOM grossly intact, no scleral icterus, no pharyngeal erythema.   Neck:  No JVD at 90, no bruits, trachea midline  CVS: RRR, occasional ectopy. Normal S1, S2. No sign murmur, No LE edema.  2+ radial pulses, 2+ PT pulses, scar right chest  Resp: CTAB. No wheezing or crackles/rhonchi. Normal respiratory effort.  Abdomen: Soft, NT, no ana rosa hepatomegaly.  MSK/Ext: No clubbing or cyanosis.  Skin: Warm and dry, no rashes.  Neurological: CN III-XII grossly intact. No focal deficits.   Psych: A&O x 3, appropriate affect, adequate judgement, forgetful at times    Physical exam performed today and unchanged, except what is noted, compared to 2-11-22      Assessment:     1. TIA (transient ischemic attack)  Referral to Neurology   2. S/P mitral valve repair  spironolactone (ALDACTONE) 25 " MG Tab    CANCELED: EC-ECHOCARDIOGRAM COMPLETE W/O CONT   3. Persistent atrial fibrillation (HCC)     4. Memory loss     5. Essential hypertension     6. LALY (obstructive sleep apnea)     7. PVC (premature ventricular contraction)     8. Benign essential HTN     9. S/P ablation of atrial fibrillation     10. S/P left atrial appendage ligation     11. MVP (mitral valve prolapse)     12. H/O atrioventricular anthony ablation     13. Atrial fibrillation, unspecified type (HCC)  apixaban (ELIQUIS) 5mg Tab   14. Other hyperlipidemia  atorvastatin (LIPITOR) 80 MG tablet       Medical Decision Making:  Today's Assessment / Status / Plan:     -Doing really well after the MV repair  -Echo September 2022, ordering today  -Abx prior to dental cleaning  -Stay on Eliquis for chads 2 vascular score of 5, including prior stroke from afib. Did have left atrial appendage oversew, last visit some concerns about bleeding so I said she could remain on aspirin alone.  She stayed on the Eliquis and was in the ED with possible TIA.  I am not convinced that her symptoms are consistent with a TIA so I have asked her to see neurology.  She will stay on Eliquis in the meantime.  -Has been on iron supplements, hemoglobin is normal now.  MCV not consistent with iron deficiency.  This coincides with postsurgery bleeding.  I would like her to stop the iron for now and make sure her blood count stays okay to make sure were not masking some underlying bleeding problem.  -I am asking that they stop the iron and follow the CBC with her PCP.  -Stop metoprolol after ablation as was intended, this will reduce polypharmacy and allow her blood pressure to come up little bit  -If she has recurrence of symptomatic atrial fibrillation or rapid atrial fibrillation, we can always restart metoprolol down the road  -BP controlled, cont ernie  -PVCs at 2%, no concerns, does not feel palpitations  -No CAD on C  -Cont secondary prevention statin therapy, no  significant CAD on left heart cath but it was thought her for stroke is related to carotid disease, LDL to goal at 42  -RTC 3 months with Dr. Meehan    Written instructions given today:    -Referral to neurology.  I would like to have another opinion about whether or not she had a TIA.  It could have ramifications about keeping her on the Eliquis long-term.    -I am in favor of keeping her on the Eliquis for now but she did have a sore over the left atrial appendage which makes it less likely she would have a stroke from A. fib.    -Stop metoprolol for now, do not throw it away.    -Stop iron supplementation for now.  To have the chemo follow-up on her blood count to make sure it remains normal.  I believe the iron was to supplement her after open heart surgery at which time she would have lost a lot of blood.  We need to make sure she does not have a bleeding problem on her Eliquis.    -Her echocardiogram is scheduled for September 13, 2022 at 10:15 AM.    Return in about 3 months (around 8/12/2022).    It is my pleasure to participate in the care of Ms. Bocanegra.  Please do not hesitate to contact me with questions or concerns.    Ayde Denise MD, [facility]  Cardiologist [facility]    Please note that this dictation was created using voice recognition software. I have made every reasonable attempt to correct obvious errors, but it is possible there are errors of grammar and possibly content that I did not discover before finalizing the note.

## 2022-05-11 ENCOUNTER — APPOINTMENT (OUTPATIENT)
Dept: SPEECH THERAPY | Facility: REHABILITATION | Age: 77
End: 2022-05-11
Payer: MEDICARE

## 2022-05-12 ENCOUNTER — OFFICE VISIT (OUTPATIENT)
Dept: CARDIOLOGY | Facility: MEDICAL CENTER | Age: 77
End: 2022-05-12
Payer: MEDICARE

## 2022-05-12 VITALS
RESPIRATION RATE: 16 BRPM | SYSTOLIC BLOOD PRESSURE: 92 MMHG | HEART RATE: 77 BPM | OXYGEN SATURATION: 95 % | DIASTOLIC BLOOD PRESSURE: 60 MMHG | HEIGHT: 66 IN | BODY MASS INDEX: 23.14 KG/M2 | WEIGHT: 144 LBS

## 2022-05-12 DIAGNOSIS — E78.49 OTHER HYPERLIPIDEMIA: ICD-10-CM

## 2022-05-12 DIAGNOSIS — I48.19 PERSISTENT ATRIAL FIBRILLATION (HCC): ICD-10-CM

## 2022-05-12 DIAGNOSIS — R41.3 MEMORY LOSS: ICD-10-CM

## 2022-05-12 DIAGNOSIS — I49.3 PVC (PREMATURE VENTRICULAR CONTRACTION): ICD-10-CM

## 2022-05-12 DIAGNOSIS — Z98.890 H/O ATRIOVENTRICULAR NODAL ABLATION: ICD-10-CM

## 2022-05-12 DIAGNOSIS — I10 ESSENTIAL HYPERTENSION: ICD-10-CM

## 2022-05-12 DIAGNOSIS — Z98.890 S/P ABLATION OF ATRIAL FIBRILLATION: ICD-10-CM

## 2022-05-12 DIAGNOSIS — I34.1 MVP (MITRAL VALVE PROLAPSE): ICD-10-CM

## 2022-05-12 DIAGNOSIS — Z98.890 S/P MITRAL VALVE REPAIR: ICD-10-CM

## 2022-05-12 DIAGNOSIS — G47.33 OSA (OBSTRUCTIVE SLEEP APNEA): ICD-10-CM

## 2022-05-12 DIAGNOSIS — G45.9 TIA (TRANSIENT ISCHEMIC ATTACK): ICD-10-CM

## 2022-05-12 DIAGNOSIS — I10 BENIGN ESSENTIAL HTN: ICD-10-CM

## 2022-05-12 DIAGNOSIS — Z98.890 S/P LEFT ATRIAL APPENDAGE LIGATION: ICD-10-CM

## 2022-05-12 DIAGNOSIS — I48.91 ATRIAL FIBRILLATION, UNSPECIFIED TYPE (HCC): ICD-10-CM

## 2022-05-12 DIAGNOSIS — Z86.79 S/P ABLATION OF ATRIAL FIBRILLATION: ICD-10-CM

## 2022-05-12 PROCEDURE — 99214 OFFICE O/P EST MOD 30 MIN: CPT | Performed by: INTERNAL MEDICINE

## 2022-05-12 RX ORDER — SPIRONOLACTONE 25 MG/1
25 TABLET ORAL DAILY
Qty: 90 TABLET | Refills: 7 | Status: SHIPPED | OUTPATIENT
Start: 2022-05-12 | End: 2022-11-28 | Stop reason: SDUPTHER

## 2022-05-12 RX ORDER — UBIDECARENONE 75 MG
100 CAPSULE ORAL DAILY
COMMUNITY
End: 2022-12-07

## 2022-05-12 RX ORDER — AMOXICILLIN 500 MG/1
2000 CAPSULE ORAL
Qty: 30 CAPSULE | COMMUNITY
Start: 2022-05-12 | End: 2022-08-24

## 2022-05-12 RX ORDER — ATORVASTATIN CALCIUM 80 MG/1
80 TABLET, FILM COATED ORAL EVERY EVENING
Qty: 90 TABLET | Refills: 7 | Status: SHIPPED | OUTPATIENT
Start: 2022-05-12 | End: 2022-11-28 | Stop reason: SDUPTHER

## 2022-05-12 ASSESSMENT — FIBROSIS 4 INDEX: FIB4 SCORE: 1.5

## 2022-05-12 NOTE — LETTER
"     [facility]   [address], [address]  [place] [address]  Phone: [phone]  Fax: [phone]              Shaista Bocanegra  1945    Encounter Date: 5/12/2022    Ayde Denise M.D.          PROGRESS NOTE:  Subjective:   Chief Complaint:   Chief Complaint   Patient presents with   • Dyslipidemia   • Atrial Fibrillation     F/V Dx: Persistent atrial fibrillation (HCC)       \"Artemio\" Shaista Bocanegra is a 77 y.o. female who returns for moderate to severe mitral regurgitation, s/p minimally invasive complex MV repair [facility]/KYUNG appendage oversew/maze, hypertension, hyperlipidemia, prior CVAx2, HTN , PAF, ablation of PAF.    Admitted to the hospital 5/8/2020 with dehydration on diuretics.  Has left and right heart catheterization that admission move mitral clip for severe mitral regurgitation.  Took a while for her to decide if she wanted to enter a trial for noninvasive mitral valve repair versus surgery.  Ultimately only underwent surgery with Dr. Monster Nogueira at [facility] on 9/16/2021:  1. Minimally invasive complex mitral valve repair with A2 [place]-Miguel Angel neochordal reconstruction, A3 [place]-Miguel Angel neochordal reconstruction, P2 posterior ventricular anchoring remodeling suture and non-resectional leaflet remodeling and [place]-Miguel Angel NeoChord x2, P3 [place]-Miguel Angel NeoChord x1, and #34 sewing ring annuloplasty  2. Atrial fibrillation maze procedure and left atrial appendage oversew  She was transferred back to [facility] rehab.    Monitor for Afib 7-2021, Ave 69, range , occasional PVCs, 2% burden, prior to ablation.  Has persistent atrial fibrillation, underwent ablation with Dr. Richard 12-7-21, did well.  This was after her valve replacement.  I thought she was off of metoprolol but is still on her medication list.    Has HTN, BP controlled overall, elevated at times.  Checking at home, mostly 110s-120s/70s, rare 91, rare 158.  Today, blood pressure little low.  TIA  At home on " occasion she has blood pressures in the 130s 140s but predominantly in the low 100s.  On tamsulosin per urology, also on spironolactone.    Has been on iron supplements.  Hemoglobin previously low but MCV okay.    Has LALY, was on CPAP, now using device only.  Using meditation and leep specialist to help with habits.    Prior CVA, some light tingling sensation remains.    Patient received mechanical thrombectomy 8/18/2017, felt to be due to right carotid disease.  Then had another CVA 5-2021, found to have afib, now on apixaban.  [id number] is 5 now.  Does note brain fog and word finding sometimes now after CVA.  Ha been on apixaban, did have left atrial appendage oversew so she had bleeding problems, I said she could come off of the apixaban and switching back to aspirin but this did not happen.  Hemoglobin was down to 8.8 in September 2021, MCV was 103.  At some point she was started on iron.   Then woke up with HA, blurred vision, had MRI/CTA, nothing new, was told possible TIA by ED provider, she was not admitted. /83.    Has hyperlipidemia, LDL 49.  No CAD on [facility] 2020.  On secondary prevention statin.    Has a ring that monitors HR at night, we looked at data, no spikes.    She is not limited by chest pain, pressure or tightness with activity.   No significant dyspnea on exertion but only if she moves slowly.  No orthopnea or lower extremity swelling.     No significant palpitations, lightheadedness, or presyncope/syncope.   Rarely lightheaded.    No symptoms of leg claudication.     Remote syncope in the setting of low K.  Her K was stopped in hospital, she is low normal.    Notes mild tremor, intention.    Using trazodone for sleep.    No family history of premature coronary artery disease.  No prior smoking history.  No history of diabetes.  No history of autoimmune disease such as lupus or rheumatoid arthritis.  No chronic kidney disease.  No ETOH overuse.   No caffeine overuse.  No recreation  substance use.    Has lost some weight.    Lives in [place].  She is a .  Close to [place], father of her children comes at times.  Casey sees our group, has ICD.  Here with her son Fer today.    DATA REVIEWED by me:  ECG 5-2-22  Sinus, 69, first-degree AV delay PVC, delayed R wave progression, small inferior Q waves of undetermined significance    ECG 12-8-21  Sinus, 67, PACs    ECG 9-6-2021  Afib, 53,     ECG 5-  Sinus, 65, PVC, first-degree AV delay    BioTel 6 day Summary: 7-14-21  1. Atrial fibrillation with appropriate heart rate range. Average 69, range 35 to 120 bpm.   2. No significant pauses (up to 2.2 seconds).   3. Occasional PVCs, 2% burden.   4. 1 patient trigger, no symptoms reported.   Conclusion: Persistent atrial fibrillation with appropriate heart rate, no pauses, occasional PVCs.     Zio 2017 2 weeks.  Sinus, brief atrial run.    EP Ablation, Dr. Richard 12-7-21  Pulmonary Vein Isolation  Additional ablation for atrial fibrillation x2  Ablation of additional arrhythmia  Intracardiac Echocardiography  Three-dimensional intracardiac mapping  IV isoproterenol infusion with programmed stimulation    ABHINAV 1-26-21  LV EF  55%.  Biatrial enlargement.  There is severe eccentric mitral regurgitation with multiple jets,   predominantly from flail leaflet of P2.  Echolucent space near P1 of unclear etiology, unusual for cleft   leaflet.  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    Echo 6-1-2021  Left ventricular ejection fraction is visually estimated to be 65%.  Diastolic function is difficult to assess with atrial fibrillation.  Severely dilated left atrium.  Negative bubble study including Valsalva.  Myxomatous changes of the mitral valve leaflets with prolapse of the   anterior and posterior leaflets.  Moderate to severe eccentric mitral regurgitation, probably severe.  Pulmonary veins not well seen on the study.  Estimated right ventricular systolic pressure is 31 mmHg + estimated    RAP.     Compared to the images of the study done 11- there has been no   significant change, prior echo had pulmonary vein flow reversal   consistent with severe mitral regurgitation.    Echo 11-  Left ventricular ejection fraction is visually estimated to be 60%.  Severely dilated left atrium.  Myxomatous changes of the mitral valve.  Bileaflet prolapse of the mitral valve is present.  Severe mitral regurgitation with pulmonary vein systolic flow reversal,   RV 73 mL, ERO 0.4  cm2.  Estimated right ventricular systolic pressure  is 30 mmHg.  Compared to the images of the prior study done 2/3/2020, no significant    change.    Echo 2/3/2020  Prior echo done on 05/03/2019. Compared to the images of the prior   study done -  there has been no significant change.   Normal left ventricular systolic function.  Left ventricular ejection fraction is visually estimated to be 65%.  Prolapse of the mitral leaflets was present.  Severe mitral regurgitation.  Mild tricuspid regurgitation.  Estimated right ventricular systolic pressure is 35 mmHg.    Echo 2017  Agitated saline study was performed, no evidence of right to left   shunt.  Normal left ventricular size, wall thickness, and systolic function.  Left ventricular ejection fraction is visually estimated to be 60%.  Mildly dilated left atrium.  Moderate mitral regurgitation due to an eccentric jet.  Mild tricuspid regurgitation.    Left and right heart catheterization 5/8/2020  Right ventricle 33/5, EDP 15  Pulmonary artery 42/21, 29  pulmonary capillary wedge 23  Cardiac output 4.1 L/min by thermodilution method     1.  Left main coronary artery:  Normal.  2.  Left anterior descending artery:  Normal.   3.  Left circumflex coronary artery:  Normal.   4.  Right coronary artery:  Normal.  This is a right dominant system.  5.  Left ventricular end diastolic pressure:  25 mmHg.  No signficant gradient across the aortic valve.  6.  Left ventriculogram:   Ejection fraction of 65%, 2+ mitral regurgitation, normal sized thoracic aorta.    Surgery Tee Nogueira  Date of surgery: 09/16/2021  Date of discharge: 9/28/2021   PROCEDURE/SURGERY:   1. Minimally invasive complex mitral valve repair with A2 [place]-Miguel Angel neochordal reconstruction, A3 [place]-Miguel Angel neochordal reconstruction, P2 posterior ventricular anchoring remodeling suture and non-resectional leaflet remodeling and [place]-Miguel Angel NeoChord x2, P3 [place]-Miguel Angel NeoChord x1, and #34 sewing ring annuloplasty, CPT code 71272.  2. Atrial fibrillation maze procedure and left atrial appendage oversew, CPT code 96142.     2017 CTA NECK   1.  CT angiogram of the neck within normal limits.  2.  Thrombosed right M1 segment.     2017 CAROTID DUPLEX CONCLUSIONS   nl carotids, subclavians and vertebral's    MRI brain 5-2-22  No acute intracranial process.  Remote infarcts in the right frontal, left occipital region as described above.  Remote hemorrhage into the right basal ganglia is noted with hemosiderin staining. Remote microhemorrhage into the left medial occipital periatrial white matter is also noted.  Age-related volume loss and chronic microvascular ischemic changes.    CTA head neck 5-2-22  CT angiogram of the neck within normal limits.   1.  Moderate sized area of chronic infarction with surrounding encephalomalacia in the right frontal parasylvian region.  2.  Otherwise unremarkable CT angiogram of the head.  3.  Age-related cerebral atrophy.    2017 MRI BRAIN  1.  Moderate sized RIGHT MCA territory acute ischemia involving the cortex and basal ganglia  2.  Flow void is present in the RIGHT MCA compatible with treatment effect  3.  No hemorrhage  4.  Mild white matter changes  5.  Mild atrophy    Most recent labs:       Lab Results   Component Value Date/Time    HEMOGLOBIN 14.5 05/02/2022 11:46 AM    HEMATOCRIT 42.2 05/02/2022 11:46 AM    MCV 97.5 05/02/2022 11:46 AM    INR 1.15 (H) 05/02/2022 11:46 AM      Lab Results    Component Value Date/Time    SODIUM 134 (L) 05/02/2022 11:46 AM    POTASSIUM 4.5 05/02/2022 11:46 AM    CHLORIDE 102 05/02/2022 11:46 AM    CO2 22 05/02/2022 11:46 AM    GLUCOSE 107 (H) 05/02/2022 11:46 AM    BUN 20 05/02/2022 11:46 AM    CREATININE 0.72 05/02/2022 11:46 AM      Lab Results   Component Value Date/Time    ASTSGOT 16 05/02/2022 11:46 AM    ALTSGPT 18 05/02/2022 11:46 AM    ALBUMIN 4.0 05/02/2022 11:46 AM      Lab Results   Component Value Date/Time    CHOLSTRLTOT 115 05/02/2022 11:46 AM    LDL 49 05/02/2022 11:46 AM    HDL 55 05/02/2022 11:46 AM    TRIGLYCERIDE 56 05/02/2022 11:46 AM           Past Medical History:   Diagnosis Date   • Anemia 9/28/2021   • Arthritis     toe   • Atrial fibrillation (HCC)    • Benign essential HTN 3/19/2012   • Breast cancer (HCC)    • Cancer (HCC) 1998    breast    • Cardiac arrhythmia    • Chest tightness or pressure 3/19/2012   • Chickenpox    • Coronary heart disease    • Kosovan measles    • Gynecological disorder    • High cholesterol    • High risk medication use 3/19/2012   • Hypercholesterolemia 3/19/2012   • Mumps    • MVP (mitral valve prolapse) 3/19/2012   • Osteoporosis    • Seizure disorder ([facility]) 9/28/2021    last seizure may 2021   • Sleep apnea     CPAP at night   • Snoring    • Stroke ([facility]) 2017    residual minor weakness on left side   • Substance abuse ([facility])    • Tonsillitis    • Urinary bladder disorder     OAB   • Urinary incontinence    • Valvular heart disease    • Venereal disease      Past Surgical History:   Procedure Laterality Date   • CATARACT PHACO WITH IOL  3/16/2009    Performed by SHANNON GANDARA at SURGERY SAME DAY [facility] ORS   • CATARACT PHACO WITH IOL  1/26/2009    Performed by SHANNON GANDARA at SURGERY SAME DAY [facility] ORS   • BREAST BIOPSY     • HYSTERECTOMY LAPAROSCOPY     • LUMPECTOMY     • MI CHEMOTHERAPY, UNSPECIFIED PROCEDURE     • MI RADIATION THERAPY PLAN SIMPLE     • MI REMV 2ND CATARACT,CORN-SCLER SECTN     • PRIMARY  C SECTION     • SINUSCOPE     • TONSILLECTOMY       Family History   Problem Relation Age of Onset   • Cancer Mother         breast   • No Known Problems Brother    • Heart Failure Neg Hx    • Heart Disease Neg Hx      Social History     Socioeconomic History   • Marital status:      Spouse name: Not on file   • Number of children: 2   • Years of education: Not on file   • Highest education level: Professional school degree (e.g., MD, LACEY, DVM, ARACELI)   Occupational History   • Occupation:      Employer: Not Employed   Tobacco Use   • Smoking status: Never Smoker   • Smokeless tobacco: Never Used   Vaping Use   • Vaping Use: Never used   Substance and Sexual Activity   • Alcohol use: Yes     Alcohol/week: 1.2 oz     Types: 2 Glasses of wine per week     Comment: twice a week   • Drug use: No   • Sexual activity: Yes     Partners: Male     Birth control/protection: Female Sterilization   Other Topics Concern   • Not on file   Social History Narrative   • Not on file     Social Determinants of Health     Financial Resource Strain: Low Risk    • Difficulty of Paying Living Expenses: Not hard at all   Food Insecurity: No Food Insecurity   • Worried About Running Out of Food in the Last Year: Never true   • Ran Out of Food in the Last Year: Never true   Transportation Needs: No Transportation Needs   • Lack of Transportation (Medical): No   • Lack of Transportation (Non-Medical): No   Physical Activity: Insufficiently Active   • Days of Exercise per Week: 4 days   • Minutes of Exercise per Session: 30 min   Stress: Not on file   Social Connections: Socially Isolated   • Frequency of Communication with Friends and Family: More than three times a week   • Frequency of Social Gatherings with Friends and Family: Twice a week   • Attends Quaker Services: Never   • Active Member of Clubs or Organizations: No   • Attends Club or Organization Meetings: Never   • Marital Status:    Intimate Partner Violence:  "Not on file   Housing Stability: Low Risk    • Unable to Pay for Housing in the Last Year: No   • Number of Places Lived in the Last Year: 1   • Unstable Housing in the Last Year: No     No Known Allergies    Current Outpatient Medications   Medication Sig Dispense Refill   • cyanocobalamin (VITAMIN B-12) 100 MCG Tab Take 100 mcg by mouth every day.     • amoxicillin (AMOXIL) 500 MG Cap Take 4 Capsules by mouth 1 time a day as needed. 30 Capsule    • apixaban (ELIQUIS) 5mg Tab Take 1 Tablet by mouth 2 times a day. 180 Tablet 7   • spironolactone (ALDACTONE) 25 MG Tab Take 1 Tablet by mouth every day. 90 Tablet 7   • atorvastatin (LIPITOR) 80 MG tablet Take 1 Tablet by mouth every evening. 90 Tablet 7   • estradiol (ESTRACE VAGINAL) 0.1 MG/GM vaginal cream Apply 1g cream inside vagina using applicator nightly for 2 weeks, then twice per week thereafter 1 Each 3   • tamsulosin (FLOMAX) 0.4 MG capsule Take 0.4 mg by mouth 1/2 hour after breakfast.     • Multiple Vitamin (MULTIVITAMINS PO) Take 1 Tab by mouth every day.       No current facility-administered medications for this visit.       ROS    All others systems reviewed and negative.     Objective:     BP (!) 92/60 (BP Location: Left arm, Patient Position: Sitting, BP Cuff Size: Adult)   Pulse 77   Resp 16   Ht 1.676 m (5' 6\")   Wt 65.3 kg (144 lb)   SpO2 95%  Body mass index is 23.24 kg/m².    General: No acute distress. Well nourished.  HEENT: EOM grossly intact, no scleral icterus, no pharyngeal erythema.   Neck:  No JVD at 90, no bruits, trachea midline  CVS: RRR, occasional ectopy. Normal S1, S2. No sign murmur, No LE edema.  2+ radial pulses, 2+ PT pulses, scar right chest  Resp: CTAB. No wheezing or crackles/rhonchi. Normal respiratory effort.  Abdomen: Soft, NT, no ana rosa hepatomegaly.  MSK/Ext: No clubbing or cyanosis.  Skin: Warm and dry, no rashes.  Neurological: CN III-XII grossly intact. No focal deficits.   Psych: A&O x 3, appropriate affect, " adequate judgement, forgetful at times    Physical exam performed today and unchanged, except what is noted, compared to 2-11-22      Assessment:     1. TIA (transient ischemic attack)  Referral to Neurology   2. S/P mitral valve repair  spironolactone (ALDACTONE) 25 MG Tab    CANCELED: EC-ECHOCARDIOGRAM COMPLETE W/O CONT   3. Persistent atrial fibrillation (HCC)     4. Memory loss     5. Essential hypertension     6. LALY (obstructive sleep apnea)     7. PVC (premature ventricular contraction)     8. Benign essential HTN     9. S/P ablation of atrial fibrillation     10. S/P left atrial appendage ligation     11. MVP (mitral valve prolapse)     12. H/O atrioventricular anthony ablation     13. Atrial fibrillation, unspecified type (HCC)  apixaban (ELIQUIS) 5mg Tab   14. Other hyperlipidemia  atorvastatin (LIPITOR) 80 MG tablet       Medical Decision Making:  Today's Assessment / Status / Plan:     -Doing really well after the MV repair  -Echo September 2022, ordering today  -Abx prior to dental cleaning  -Stay on Eliquis for chads 2 vascular score of 5, including prior stroke from afib. Did have left atrial appendage oversew, last visit some concerns about bleeding so I said she could remain on aspirin alone.  She stayed on the Eliquis and was in the ED with possible TIA.  I am not convinced that her symptoms are consistent with a TIA so I have asked her to see neurology.  She will stay on Eliquis in the meantime.  -Has been on iron supplements, hemoglobin is normal now.  MCV not consistent with iron deficiency.  This coincides with postsurgery bleeding.  I would like her to stop the iron for now and make sure her blood count stays okay to make sure were not masking some underlying bleeding problem.  -I am asking that they stop the iron and follow the CBC with her PCP.  -Stop metoprolol after ablation as was intended, this will reduce polypharmacy and allow her blood pressure to come up little bit  -If she has  recurrence of symptomatic atrial fibrillation or rapid atrial fibrillation, we can always restart metoprolol down the road  -BP controlled, cont ernie  -PVCs at 2%, no concerns, does not feel palpitations  -No CAD on LHC  -Cont secondary prevention statin therapy, no significant CAD on left heart cath but it was thought her for stroke is related to carotid disease, LDL to goal at 42  -RTC 3 months with Dr. Meehan    Written instructions given today:    -Referral to neurology.  I would like to have another opinion about whether or not she had a TIA.  It could have ramifications about keeping her on the Eliquis long-term.    -I am in favor of keeping her on the Eliquis for now but she did have a sore over the left atrial appendage which makes it less likely she would have a stroke from A. fib.    -Stop metoprolol for now, do not throw it away.    -Stop iron supplementation for now.  To have the chemo follow-up on her blood count to make sure it remains normal.  I believe the iron was to supplement her after open heart surgery at which time she would have lost a lot of blood.  We need to make sure she does not have a bleeding problem on her Eliquis.    -Her echocardiogram is scheduled for September 13, 2022 at 10:15 AM.    Return in about 3 months (around 8/12/2022).    It is my pleasure to participate in the care of Ms. Bocanegra.  Please do not hesitate to contact me with questions or concerns.    Ayde Denise MD, [facility]  Cardiologist [facility]    Please note that this dictation was created using voice recognition software. I have made every reasonable attempt to correct obvious errors, but it is possible there are errors of grammar and possibly content that I did not discover before finalizing the note.          No Recipients

## 2022-05-18 ENCOUNTER — APPOINTMENT (RX ONLY)
Dept: URBAN - METROPOLITAN AREA CLINIC 4 | Facility: CLINIC | Age: 77
Setting detail: DERMATOLOGY
End: 2022-05-18

## 2022-05-18 DIAGNOSIS — L82.1 OTHER SEBORRHEIC KERATOSIS: ICD-10-CM

## 2022-05-18 DIAGNOSIS — L81.4 OTHER MELANIN HYPERPIGMENTATION: ICD-10-CM

## 2022-05-18 DIAGNOSIS — D22 MELANOCYTIC NEVI: ICD-10-CM | Status: STABLE

## 2022-05-18 DIAGNOSIS — L90.5 SCAR CONDITIONS AND FIBROSIS OF SKIN: ICD-10-CM

## 2022-05-18 DIAGNOSIS — L57.0 ACTINIC KERATOSIS: ICD-10-CM

## 2022-05-18 PROBLEM — D22.72 MELANOCYTIC NEVI OF LEFT LOWER LIMB, INCLUDING HIP: Status: ACTIVE | Noted: 2022-05-18

## 2022-05-18 PROBLEM — D22.5 MELANOCYTIC NEVI OF TRUNK: Status: ACTIVE | Noted: 2022-05-18

## 2022-05-18 PROBLEM — D48.5 NEOPLASM OF UNCERTAIN BEHAVIOR OF SKIN: Status: ACTIVE | Noted: 2022-05-18

## 2022-05-18 PROCEDURE — ? LIQUID NITROGEN

## 2022-05-18 PROCEDURE — 99213 OFFICE O/P EST LOW 20 MIN: CPT | Mod: 25

## 2022-05-18 PROCEDURE — 11103 TANGNTL BX SKIN EA SEP/ADDL: CPT

## 2022-05-18 PROCEDURE — 17000 DESTRUCT PREMALG LESION: CPT | Mod: 59

## 2022-05-18 PROCEDURE — 11102 TANGNTL BX SKIN SINGLE LES: CPT

## 2022-05-18 PROCEDURE — ? BIOPSY BY SHAVE METHOD

## 2022-05-18 PROCEDURE — ? DIAGNOSIS COMMENT

## 2022-05-18 PROCEDURE — 17003 DESTRUCT PREMALG LES 2-14: CPT

## 2022-05-18 PROCEDURE — ? OBSERVATION AND MEASURE

## 2022-05-18 PROCEDURE — ? COUNSELING

## 2022-05-18 ASSESSMENT — LOCATION SIMPLE DESCRIPTION DERM
LOCATION SIMPLE: RIGHT HAND
LOCATION SIMPLE: LEFT BUTTOCK
LOCATION SIMPLE: LEFT 5TH TOE
LOCATION SIMPLE: RIGHT SHOULDER
LOCATION SIMPLE: CHEST
LOCATION SIMPLE: LEFT FOREHEAD
LOCATION SIMPLE: RIGHT BUTTOCK
LOCATION SIMPLE: LEFT UPPER BACK
LOCATION SIMPLE: LEFT SHOULDER
LOCATION SIMPLE: POSTERIOR NECK
LOCATION SIMPLE: RIGHT FOREHEAD

## 2022-05-18 ASSESSMENT — LOCATION DETAILED DESCRIPTION DERM
LOCATION DETAILED: LEFT LATERAL SUPERIOR CHEST
LOCATION DETAILED: RIGHT BUTTOCK
LOCATION DETAILED: LEFT SUPERIOR MEDIAL FOREHEAD
LOCATION DETAILED: LEFT MEDIAL SUPERIOR CHEST
LOCATION DETAILED: RIGHT LATERAL FOREHEAD
LOCATION DETAILED: LEFT SUPERIOR UPPER BACK
LOCATION DETAILED: MID POSTERIOR NECK
LOCATION DETAILED: RIGHT RADIAL DORSAL HAND
LOCATION DETAILED: LEFT POSTERIOR SHOULDER
LOCATION DETAILED: RIGHT POSTERIOR SHOULDER
LOCATION DETAILED: LEFT DORSAL 5TH TOE
LOCATION DETAILED: LEFT BUTTOCK

## 2022-05-18 ASSESSMENT — LOCATION ZONE DERM
LOCATION ZONE: FACE
LOCATION ZONE: HAND
LOCATION ZONE: TRUNK
LOCATION ZONE: TOE
LOCATION ZONE: ARM
LOCATION ZONE: NECK

## 2022-05-18 NOTE — PROCEDURE: BIOPSY BY SHAVE METHOD
Detail Level: Detailed
Detail Level: Detailed
Depth Of Biopsy: dermis
Was A Bandage Applied: Yes
Size Of Lesion In Cm: 0.7
X Size Of Lesion In Cm: 0
Anticipated Plan (Based On Presumed Biopsy Results): Margins have been treated with cryosurgery at the time of biopsy
Biopsy Type: H and E
Biopsy Method: Personna blade
Anesthesia Type: 1% lidocaine with 1:100,000 epinephrine and a 1:10 solution of 8.4% sodium bicarbonate
Anesthesia Volume In Cc: 1
Hemostasis: Aluminum Chloride
Wound Care: Petrolatum
Dressing: bandage
Type Of Destruction Used: Cryotherapy
Curettage Text: The wound bed was treated with curettage after the biopsy was performed.
Cryotherapy Text: The wound bed was treated with cryotherapy after the biopsy was performed.
Electrodesiccation Text: The wound bed was treated with electrodesiccation after the biopsy was performed.
Electrodesiccation And Curettage Text: The wound bed was treated with electrodesiccation and curettage after the biopsy was performed.
Silver Nitrate Text: The wound bed was treated with silver nitrate after the biopsy was performed.
Lab: [id number]
Lab Facility: 
Render Path Notes In Note?: No
Consent: Written consent was obtained and risks were reviewed including but not limited to scarring, infection, bleeding, scabbing, incomplete removal, nerve damage and allergy to anesthesia.
Post-Care Instructions: I reviewed with the patient in detail post-care instructions. Patient is to keep the biopsy site dry overnight, and then apply bacitracin/petroleum twice daily until healed.
Notification Instructions: Patient will be notified of biopsy results. However, patient instructed to call the office if not contacted within 2 weeks.
Billing Type: Third-Party Bill
Information: Selecting Yes will display possible errors in your note based on the variables you have selected. This validation is only offered as a suggestion for you. PLEASE NOTE THAT THE VALIDATION TEXT WILL BE REMOVED WHEN YOU FINALIZE YOUR NOTE. IF YOU WANT TO FAX A PRELIMINARY NOTE YOU WILL NEED TO TOGGLE THIS TO 'NO' IF YOU DO NOT WANT IT IN YOUR FAXED NOTE.
Size Of Lesion In Cm: 0.5

## 2022-05-25 ENCOUNTER — OFFICE VISIT (OUTPATIENT)
Dept: NEUROLOGY | Facility: MEDICAL CENTER | Age: 77
End: 2022-05-25
Attending: PSYCHIATRY & NEUROLOGY
Payer: MEDICARE

## 2022-05-25 VITALS
DIASTOLIC BLOOD PRESSURE: 68 MMHG | WEIGHT: 140.65 LBS | BODY MASS INDEX: 22.6 KG/M2 | OXYGEN SATURATION: 96 % | HEIGHT: 66 IN | TEMPERATURE: 96.7 F | HEART RATE: 98 BPM | SYSTOLIC BLOOD PRESSURE: 126 MMHG

## 2022-05-25 DIAGNOSIS — I48.20 CHRONIC ATRIAL FIBRILLATION (HCC): ICD-10-CM

## 2022-05-25 DIAGNOSIS — Z87.898 HISTORY OF SEIZURE: ICD-10-CM

## 2022-05-25 DIAGNOSIS — Z86.73 HISTORY OF CVA (CEREBROVASCULAR ACCIDENT): ICD-10-CM

## 2022-05-25 PROBLEM — G40.909 SEIZURE DISORDER (HCC): Status: RESOLVED | Noted: 2021-09-28 | Resolved: 2022-05-25

## 2022-05-25 PROCEDURE — 99214 OFFICE O/P EST MOD 30 MIN: CPT | Performed by: PSYCHIATRY & NEUROLOGY

## 2022-05-25 PROCEDURE — 99212 OFFICE O/P EST SF 10 MIN: CPT | Performed by: PSYCHIATRY & NEUROLOGY

## 2022-05-25 RX ORDER — TRAZODONE HYDROCHLORIDE 50 MG/1
50 TABLET ORAL NIGHTLY
COMMUNITY
End: 2022-08-24

## 2022-05-25 ASSESSMENT — FIBROSIS 4 INDEX: FIB4 SCORE: 1.5

## 2022-05-25 NOTE — PROGRESS NOTES
"Chief Complaint   Patient presents with   • Follow-Up     General Neurology       History of present illness:  Shaista Bocanegra 77 y.o. female with HTN, atrial fibrillation on Eliquis presented 5/31/21 for word finding difficulty/confusion.     A MRI scan revealed left temporal/occipital infarct. She was just started on Eliquis 3 days prior to the ischemic stroke.   Patient had hospital admission recently where she underwent elective mitral valve repair.  Postoperatively she developed generalized tonic-clonic seizures 2 hours after the repair and her left arm weakness was worse than baseline.  EEG showed occasional right temporal parietal sharp waves and patient was started on Keppra.  She is currently on keppra 1000mg and there have been no further LOC spells, convulsions, abnormal behavior.      5/25/22: After the patient had a normal EEG in December, I directed her to wean off of Keppra.  In early May, patient had a emergency department visit for acute blurry vision.  She had a brain MRI that was unchanged from prior and was discharged with neurology follow-up.  She had a headache that accompanied the blurry vision. The vision was \"not clear\". There is no history of migraine. The headache and blurry vision resolved after 4 hours. The headache was not severe. The headache has not recurred since.   There have been no seizures since the last visit.     Past medical history:   Past Medical History:   Diagnosis Date   • Anemia 9/28/2021   • Arthritis     toe   • Atrial fibrillation (HCC)    • Benign essential HTN 3/19/2012   • Breast cancer (HCC)    • Cancer (HCC) 1998    breast    • Cardiac arrhythmia    • Chest tightness or pressure 3/19/2012   • Chickenpox    • Coronary heart disease    • [place] measles    • Gynecological disorder    • High cholesterol    • High risk medication use 3/19/2012   • Hypercholesterolemia 3/19/2012   • Mumps    • MVP (mitral valve prolapse) 3/19/2012   • Osteoporosis    • Seizure disorder " ([facility]) 9/28/2021    last seizure may 2021   • Sleep apnea     CPAP at night   • Snoring    • Stroke ([facility]) 2017    residual minor weakness on left side   • Substance abuse ([facility])    • Tonsillitis    • Urinary bladder disorder     OAB   • Urinary incontinence    • Valvular heart disease    • Venereal disease        Past surgical history:   Past Surgical History:   Procedure Laterality Date   • CATARACT PHACO WITH IOL  3/16/2009    Performed by SHANNON GANDARA at [facility]   • CATARACT PHACO WITH IOL  1/26/2009    Performed by SHANNON GANDARA at [facility]   • BREAST BIOPSY     • HYSTERECTOMY LAPAROSCOPY     • LUMPECTOMY     • [place] CHEMOTHERAPY, UNSPECIFIED PROCEDURE     • NJ RADIATION THERAPY PLAN SIMPLE     • NJ REMV 2ND CATARACT,CORN-SCLER SECTN     • PRIMARY C SECTION     • SINUSCOPE     • TONSILLECTOMY         Family history:   Family History   Problem Relation Age of Onset   • Cancer Mother         breast   • No Known Problems Brother    • Heart Failure Neg Hx    • Heart Disease Neg Hx        Social history:   Social History     Socioeconomic History   • Marital status:      Spouse name: Not on file   • Number of children: 2   • Years of education: Not on file   • Highest education level: Professional school degree (e.g., MD, DDS, DVM, ARACELI)   Occupational History   • Occupation:      Employer: Not Employed   Tobacco Use   • Smoking status: Never Smoker   • Smokeless tobacco: Never Used   Vaping Use   • Vaping Use: Never used   Substance and Sexual Activity   • Alcohol use: Yes     Alcohol/week: 1.2 oz     Types: 2 Glasses of wine per week     Comment: twice a week   • Drug use: No   • Sexual activity: Yes     Partners: Male     Birth control/protection: Female Sterilization   Other Topics Concern   • Not on file   Social History Narrative   • Not on file     Social Determinants of Health     Financial Resource Strain: Low Risk    • Difficulty of Paying Living  "Expenses: Not hard at all   Food Insecurity: No Food Insecurity   • Worried About Running Out of Food in the Last Year: Never true   • Ran Out of Food in the Last Year: Never true   Transportation Needs: No Transportation Needs   • Lack of Transportation (Medical): No   • Lack of Transportation (Non-Medical): No   Physical Activity: Insufficiently Active   • Days of Exercise per Week: 4 days   • Minutes of Exercise per Session: 30 min   Stress: Not on file   Social Connections: Socially Isolated   • Frequency of Communication with Friends and Family: More than three times a week   • Frequency of Social Gatherings with Friends and Family: Twice a week   • Attends Mandaeism Services: Never   • Active Member of Clubs or Organizations: No   • Attends Club or Organization Meetings: Never   • Marital Status:    Intimate Partner Violence: Not on file   Housing Stability: Low Risk    • Unable to Pay for Housing in the Last Year: No   • Number of Places Lived in the Last Year: 1   • Unstable Housing in the Last Year: No       Current medications:   Current Outpatient Medications   Medication   • traZODone (DESYREL) 50 MG Tab   • cyanocobalamin (VITAMIN B-12) 100 MCG Tab   • apixaban (ELIQUIS) 5mg Tab   • spironolactone (ALDACTONE) 25 MG Tab   • atorvastatin (LIPITOR) 80 MG tablet   • estradiol (ESTRACE VAGINAL) 0.1 MG/GM vaginal cream   • tamsulosin (FLOMAX) 0.4 MG capsule   • Multiple Vitamin (MULTIVITAMINS PO)   • amoxicillin (AMOXIL) 500 MG Cap     No current facility-administered medications for this visit.       Medication Allergy:  No Known Allergies    Physical examination:   Vitals:    05/25/22 0938 05/25/22 0948   BP: 132/70 126/68   BP Location: Right arm Right arm   Patient Position: Sitting Standing   BP Cuff Size: Adult Adult   Pulse: 100 98   Temp: 35.9 °C (96.7 °F)    TempSrc: Temporal    SpO2: 92% 96%   Weight: 63.8 kg (140 lb 10.5 oz)    Height: 1.676 m (5' 6\")      Neurological Exam    Cranial " Nerves  CN II: Right inferior quadrantanopsia. Left normal visual field.  CN III, IV, VI: Extraocular movements intact bilaterally. No nystagmus. Normal smooth pursuit.  CN V:  Right: Facial sensation is normal.  Left: Facial sensation is normal on the left.  CN VII:  Right: There is no facial weakness.  Left: There is no facial weakness.  CN XI:  Right: Trapezius strength is normal.  Left: Trapezius strength is normal.    Motor   No pronator drift.    Coordination  Right: Finger-to-nose normal. Rapid alternating movement normal.Left: Finger-to-nose normal. Rapid alternating movement normal.       Labs:  I reviewed the following labs personally:  None     Imagin/2/22 CTA NECK   IMPRESSION:  CT angiogram of the neck within normal limits.    21 CT head without contrast     I reviewed the images personally. There is a left parietal infarct.     21 EEG  INTERPRETATION:   Impression:   This is an abnormal EEG due to:   moderate diffuse slowing; with   further intermittent right hemispheric slowing; and occasional right temporal parietal epileptiform sharps.     There are no seizures captured.     2021 EEG   INTERPRETATION:     This is an abnormal video EEG recording in the awake, drowsy and sleep states. The presence of intermittent left and right temporal independent regional slowing is suggestive of focal neuronal dysfunction. No epileptiform discharge seen. This does not rule out epilepsy.  If the clinical suspicion remains high for seizures, a prolonged recording to capture clinical or subclinical events may be helpful.    2022 MRI BRAIN W/O   I reviewed the images personally and agree with the following read:   IMPRESSION:        No acute intracranial process.     Remote infarcts in the right frontal, left occipital region as described above.     Remote hemorrhage into the right basal ganglia is noted with hemosiderin staining. Remote microhemorrhage into the left medial occipital periatrial  white matter is also noted.     Age-related volume loss and chronic microvascular ischemic changes.    ASSESSMENT AND PLAN:  Problem List Items Addressed This Visit     History of CVA (cerebrovascular accident)    Atrial fibrillation (HCC)      Other Visit Diagnoses     History of seizure              1. History of CVA (cerebrovascular accident)  She is doing well following 2 ischemic strokes of the right MCA and the left temporal lobe.  Both of these strokes were secondary to atrial fibrillation.  She was not on anticoagulation during the first right MCA infarct and was just recently started on it during her second left temporal lobe infarct.  Currently she is on Eliquis and is doing well.  Recently, she had an emergency room visit for headache and blurry vision.  Her MRI was negative for stroke.  Her symptoms may have been migraine or blood pressure related.    2. Chronic atrial fibrillation (HCC)    3. History of seizure  At her last visit, I ordered an EEG and discontinued Keppra after it was negative for epileptiform activity.  Her history is only significant for a single seizure following a mitral valve repair, therefore she does not need chronic antiepileptic therapy.    FOLLOW-UP:   Return if symptoms worsen or fail to improve.    Total time spent for the day for this patient unrelated to procedure time is: 35 minutes. I spent 23 minutes in face to face time and I spent 4 minutes pre-charting and 8 minutes in post-visit documentation.      ZULEIMA RaderO.  [facility]

## 2022-06-08 ENCOUNTER — APPOINTMENT (RX ONLY)
Dept: URBAN - METROPOLITAN AREA CLINIC 4 | Facility: CLINIC | Age: 77
Setting detail: DERMATOLOGY
End: 2022-06-08

## 2022-06-08 PROBLEM — D04.5 CARCINOMA IN SITU OF SKIN OF TRUNK: Status: ACTIVE | Noted: 2022-06-08

## 2022-06-08 PROBLEM — D04.71 CARCINOMA IN SITU OF SKIN OF RIGHT LOWER LIMB, INCLUDING HIP: Status: ACTIVE | Noted: 2022-06-08

## 2022-06-08 PROCEDURE — ? COUNSELING

## 2022-06-08 PROCEDURE — ? DEFER

## 2022-06-08 PROCEDURE — ? PRESCRIPTION

## 2022-06-08 PROCEDURE — 17260 DSTRJ MAL LES T/A/L 0.5 CM/<: CPT

## 2022-06-08 PROCEDURE — ? ADDITIONAL NOTES

## 2022-06-08 PROCEDURE — ? DIAGNOSIS COMMENT

## 2022-06-08 PROCEDURE — 99213 OFFICE O/P EST LOW 20 MIN: CPT | Mod: 25

## 2022-06-08 PROCEDURE — ? CURETTAGE AND DESTRUCTION

## 2022-06-08 RX ORDER — MUPIROCIN 20 MG/G
1 OINTMENT TOPICAL BID
Qty: 22 | Refills: 0 | Status: ERX | COMMUNITY
Start: 2022-06-08

## 2022-06-08 RX ADMIN — MUPIROCIN 1: 20 OINTMENT TOPICAL at 00:00

## 2022-06-08 NOTE — PROCEDURE: ADDITIONAL NOTES
Detail Level: Detailed
Additional Notes: Pt can use mupirocin cream on back as well.
Render Risk Assessment In Note?: no

## 2022-06-08 NOTE — PROCEDURE: DIAGNOSIS COMMENT
Comment: [id number], biopsy proven SCC Insitu at least
Render Risk Assessment In Note?: no
Detail Level: Detailed
Comment: [id number], biopsy proven SCC Insitu

## 2022-06-08 NOTE — PROCEDURE: CURETTAGE AND DESTRUCTION
Detail Level: Detailed
Biopsy Photograph Reviewed: Yes
Number Of Curettages: 3
Size Of Lesion After Curettage: 0
Add Intralesional Injection: No
Total Volume (Ccs): 1
Anesthesia Type: 1% lidocaine with 1:100,000 epinephrine and a 1:10 solution of 8.4% sodium bicarbonate
Cautery Type: electrodesiccation
What Was Performed First?: Curettage
Final Size Statement: The size of the lesion after curettage was
Additional Information: (Optional): The wound was cleaned, and a pressure dressing was applied.  The patient received detailed post-op instructions.
Consent was obtained from the patient. The risks, benefits and alternatives to therapy were discussed in detail. Specifically, the risks of infection, scarring, bleeding, prolonged wound healing, nerve injury, incomplete removal, allergy to anesthesia and recurrence were addressed. Alternatives to ED&C, such as: surgical removal and XRT were also discussed.  Prior to the procedure, the treatment site was clearly identified and confirmed by the patient. All components of Universal Protocol/PAUSE Rule completed.
Post-Care Instructions: I reviewed with the patient in detail post-care instructions. Patient is to keep the biopsy site dry overnight, and then apply bacitracin/petroleum twice daily until healed.
Bill As A Line Item Or As Units: Line Item

## 2022-06-08 NOTE — PROCEDURE: DEFER
Detail Level: Detailed
Instructions (Optional): Re-evaluate in two months as wound is still healing.
Introduction Text (Please End With A Colon): The following procedure was deferred:

## 2022-06-21 ENCOUNTER — HOSPITAL ENCOUNTER (OUTPATIENT)
Dept: CARDIOLOGY | Facility: MEDICAL CENTER | Age: 77
End: 2022-06-21
Attending: INTERNAL MEDICINE
Payer: MEDICARE

## 2022-06-21 DIAGNOSIS — I34.0 SEVERE MITRAL REGURGITATION: ICD-10-CM

## 2022-06-21 DIAGNOSIS — Z98.890 S/P MITRAL VALVE REPAIR: ICD-10-CM

## 2022-06-21 PROCEDURE — 93306 TTE W/DOPPLER COMPLETE: CPT

## 2022-06-22 LAB
LV EJECT FRACT  99904: 45
LV EJECT FRACT MOD 2C 99903: 47.18
LV EJECT FRACT MOD 4C 99902: 39.51
LV EJECT FRACT MOD BP 99901: 42.09

## 2022-06-22 PROCEDURE — 93306 TTE W/DOPPLER COMPLETE: CPT | Mod: 26 | Performed by: INTERNAL MEDICINE

## 2022-07-27 ENCOUNTER — HOME STUDY (OUTPATIENT)
Dept: SLEEP MEDICINE | Facility: MEDICAL CENTER | Age: 77
End: 2022-07-27
Payer: MEDICARE

## 2022-07-27 DIAGNOSIS — G47.33 OSA (OBSTRUCTIVE SLEEP APNEA): ICD-10-CM

## 2022-07-27 PROCEDURE — G0399 HOME SLEEP TEST/TYPE 3 PORTA: HCPCS | Performed by: FAMILY MEDICINE

## 2022-08-05 ENCOUNTER — OFFICE VISIT (OUTPATIENT)
Dept: SLEEP MEDICINE | Facility: MEDICAL CENTER | Age: 77
End: 2022-08-05
Payer: MEDICARE

## 2022-08-05 VITALS
HEIGHT: 66 IN | OXYGEN SATURATION: 95 % | WEIGHT: 140 LBS | BODY MASS INDEX: 22.5 KG/M2 | SYSTOLIC BLOOD PRESSURE: 128 MMHG | DIASTOLIC BLOOD PRESSURE: 74 MMHG | HEART RATE: 85 BPM

## 2022-08-05 DIAGNOSIS — Z86.73 HISTORY OF CVA (CEREBROVASCULAR ACCIDENT): ICD-10-CM

## 2022-08-05 DIAGNOSIS — G47.33 OSA (OBSTRUCTIVE SLEEP APNEA): ICD-10-CM

## 2022-08-05 DIAGNOSIS — Z79.01 CHRONIC ANTICOAGULATION: ICD-10-CM

## 2022-08-05 DIAGNOSIS — I48.91 ATRIAL FIBRILLATION, UNSPECIFIED TYPE (HCC): ICD-10-CM

## 2022-08-05 DIAGNOSIS — I10 HYPERTENSION, UNSPECIFIED TYPE: ICD-10-CM

## 2022-08-05 PROCEDURE — 99213 OFFICE O/P EST LOW 20 MIN: CPT | Performed by: NURSE PRACTITIONER

## 2022-08-05 RX ORDER — CHLORHEXIDINE GLUCONATE ORAL RINSE 1.2 MG/ML
SOLUTION DENTAL
COMMUNITY
Start: 2022-07-12 | End: 2022-08-24

## 2022-08-05 RX ORDER — HYDROCODONE BITARTRATE AND ACETAMINOPHEN 7.5; 325 MG/1; MG/1
TABLET ORAL
COMMUNITY
Start: 2022-07-12 | End: 2022-08-24

## 2022-08-05 RX ORDER — AMLODIPINE BESYLATE 10 MG/1
10 TABLET ORAL
COMMUNITY
Start: 2022-06-15 | End: 2022-09-28

## 2022-08-05 ASSESSMENT — FIBROSIS 4 INDEX: FIB4 SCORE: 1.5

## 2022-08-05 NOTE — PROGRESS NOTES
Chief Complaint   Patient presents with   • Apnea     LALY-Last seen 05/06/2022   • Results     HST-07/27/2022       HPI:      Mrs. Bocanegra is a 78 y/o female patient who is in today for LALY f/u and sleep study results. She is a former patient of Dr. Vaughn at [facility]. PMH includes dyslipidemia, CVA with mild left hemiparesis in 2017, osteopenia, hypertension, A. fib is on Eliquis, never smoker, tonsillectomy, CAD, breast cancer, history of seizure, LALY, vascular dementia, MVP status post repair.    Patient has a [employer] RespirAVIcodes CPAP machine which she registered and also received a replacement dream station 2.  Patient has been utilizing a dental appliance and CPAP interchangeably.  She does complain sometimes of jaw pain with a dental appliance and obtained a new one through Dr. Hope's office.  She reports being in [place] on 7/24/2022 where she was hospitalized for some dizziness and in the process her new dental device got lost.  She states she completed the sleep study with her old dental appliance.  She used the CPAP over the last 30 days only for 2 days.  Compliance report from 7/6/22-8/4/22 was downloaded and reviewed with the patient which showed CPAP 7 cmH2O, 6.7% compliance, 7 hrs 24 min use, AHI of 3.9. She is tolerating the pressure and mask well. She goes to bed at 10 pm and wakes up at 7 am. She denies morning headache or snoring.  She does utilize trazodone 50 mg nightly to help with sleep.  She continues to follow-up with cardiology.  She follows up with neurology as needed.      Sleep/Card Study History:   HSS (on old dental appliance, new dental appliance got lost) from 7/27/22 indicated mild LALY with an AHI of 5.3.  Lowest SPO2 was 74%, baseline was 94% and average 91%.  Time spent below 88% SPO2 was 53 minutes.  SUSHIL was 6.5.    Echo from 6/22/22 indicated mildly reduced left ventricular systolic function. The left ventricular ejection fraction is estimated to be 45%. Mild  global hypokinesis. Known mitral valve repair which is functioning normally with appropriate transvalvular gradient. Unable to estimate right ventricular systolic pressure due to an inadequate tricuspid regurgitant jet.    Patient was diagnosed with mild LALY with an AHI of 11 by Dr. Sanchez.     ROS:    Constitutional: Denies fevers, Denies weight changes  Eyes: Denies changes in vision, no eye pain  Ears/Nose/Throat/Mouth: Denies nasal congestion or sore throat   Cardiovascular: Denies chest pain or palpitations   Respiratory: Denies shortness of breath , Denies cough  Gastrointestinal/Hepatic: Denies abdominal pain, nausea, vomiting,   Skin/Breast: Denies rash,   Neurological: Denies headache, confusion,   Psychiatric: denies mood disorder   Sleep: denies snoring       Past Medical History:   Diagnosis Date   • Anemia 9/28/2021   • Arthritis     toe   • Atrial fibrillation (HCC)    • Benign essential HTN 3/19/2012   • Breast cancer (HCC)    • Cancer (HCC) 1998    breast    • Cardiac arrhythmia    • Chest tightness or pressure 3/19/2012   • Chickenpox    • Coronary heart disease    • [place] measles    • Gynecological disorder    • High cholesterol    • High risk medication use 3/19/2012   • Hypercholesterolemia 3/19/2012   • Mumps    • MVP (mitral valve prolapse) 3/19/2012   • Osteoporosis    • Seizure disorder (HCC) 9/28/2021    last seizure may 2021   • Sleep apnea     CPAP at night   • Snoring    • Stroke (HCC) 2017    residual minor weakness on left side   • Substance abuse (HCC)    • Tonsillitis    • Urinary bladder disorder     OAB   • Urinary incontinence    • Valvular heart disease    • Venereal disease        Past Surgical History:   Procedure Laterality Date   • CATARACT PHACO WITH IOL  3/16/2009    Performed by SHANNON GANDARA at [facility]   • CATARACT PHACO WITH IOL  1/26/2009    Performed by SHANNON GANDARA at [facility]   • BREAST BIOPSY     • HYSTERECTOMY  LAPAROSCOPY     • LUMPECTOMY     • SC CHEMOTHERAPY, UNSPECIFIED PROCEDURE     • SC RADIATION THERAPY PLAN SIMPLE     • SC REMV 2ND CATARACT,CORN-SCLER SECTN     • PRIMARY C SECTION     • SINUSCOPE     • TONSILLECTOMY         Family History   Problem Relation Age of Onset   • Cancer Mother         breast   • No Known Problems Brother    • Heart Failure Neg Hx    • Heart Disease Neg Hx        Social History     Socioeconomic History   • Marital status:      Spouse name: Not on file   • Number of children: 2   • Years of education: Not on file   • Highest education level: Professional school degree (e.g., MD, ELMAS, DVM, ARACELI)   Occupational History   • Occupation:      Employer: Not Employed   Tobacco Use   • Smoking status: Never Smoker   • Smokeless tobacco: Never Used   Vaping Use   • Vaping Use: Never used   Substance and Sexual Activity   • Alcohol use: Yes     Alcohol/week: 1.2 oz     Types: 2 Glasses of wine per week     Comment: twice a week   • Drug use: No   • Sexual activity: Yes     Partners: Male     Birth control/protection: Female Sterilization   Other Topics Concern   • Not on file   Social History Narrative   • Not on file     Social Determinants of Health     Financial Resource Strain: Low Risk    • Difficulty of Paying Living Expenses: Not hard at all   Food Insecurity: No Food Insecurity   • Worried About Running Out of Food in the Last Year: Never true   • Ran Out of Food in the Last Year: Never true   Transportation Needs: No Transportation Needs   • Lack of Transportation (Medical): No   • Lack of Transportation (Non-Medical): No   Physical Activity: Insufficiently Active   • Days of Exercise per Week: 4 days   • Minutes of Exercise per Session: 30 min   Stress: Not on file   Social Connections: Socially Isolated   • Frequency of Communication with Friends and Family: More than three times a week   • Frequency of Social Gatherings with Friends and Family: Twice a week   • Attends  Sabianist Services: Never   • Active Member of Clubs or Organizations: No   • Attends Club or Organization Meetings: Never   • Marital Status:    Intimate Partner Violence: Not on file   Housing Stability: Low Risk    • Unable to Pay for Housing in the Last Year: No   • Number of Places Lived in the Last Year: 1   • Unstable Housing in the Last Year: No       Allergies as of 08/05/2022   • (No Known Allergies)        Vitals:  Vitals:    08/05/22 1014   BP: 128/74   Pulse: 85   SpO2: 95%       Current medications as of today   Current Outpatient Medications   Medication Sig Dispense Refill   • cyanocobalamin (VITAMIN B-12) 100 MCG Tab Take 100 mcg by mouth every day.     • apixaban (ELIQUIS) 5mg Tab Take 1 Tablet by mouth 2 times a day. 180 Tablet 7   • spironolactone (ALDACTONE) 25 MG Tab Take 1 Tablet by mouth every day. 90 Tablet 7   • atorvastatin (LIPITOR) 80 MG tablet Take 1 Tablet by mouth every evening. 90 Tablet 7   • estradiol (ESTRACE VAGINAL) 0.1 MG/GM vaginal cream Apply 1g cream inside vagina using applicator nightly for 2 weeks, then twice per week thereafter 1 Each 3   • tamsulosin (FLOMAX) 0.4 MG capsule Take 0.4 mg by mouth 1/2 hour after breakfast.     • Multiple Vitamin (MULTIVITAMINS PO) Take 1 Tab by mouth every day.     • traZODone (DESYREL) 50 MG Tab Take 50 mg by mouth every evening.     • amoxicillin (AMOXIL) 500 MG Cap Take 4 Capsules by mouth 1 time a day as needed. (Patient not taking: Reported on 5/25/2022) 30 Capsule      No current facility-administered medications for this visit.         Physical Exam: Limited by COVID-19 precautions.  Appearance: Well developed, well nourished, no acute distress  Eyes: PERRL, EOM intact, sclera white, conjunctiva moist  Ears: no lesions or deformities  Hearing: grossly intact  Nose: no lesions or deformities  Respiratory effort: no intercostal retractions or use of accessory muscles  Extremities: no cyanosis or edema  Abdomen: soft   Gait and  Station: normal  Digits and nails: no clubbing, cyanosis, petechiae or nodes.  Cranial nerves: grossly intact  Skin: no visible rashes, lesions or ulcers noted  Orientation: Oriented to time, person and place  Mood and affect: mood and affect appropriate, normal interaction with examiner  Judgement: Intact    Assessment:  1. LALY (obstructive sleep apnea)  Overnight Oximetry    DME Mask and Supplies    CANCELED: DME Mask and Supplies   2. Atrial fibrillation, unspecified type (HCC)     3. Chronic anticoagulation     4. Hypertension, unspecified type     5. History of CVA (cerebrovascular accident)           Plan  Discussed the cardiovascular and neuropsychiatric risks of untreated LALY; including but not limited to: HTN, DM, MI, ASCVD, CVA, CHF, traffic accidents.     1. Compliance report from 7/6/22-8/4/22 was downloaded and reviewed with the patient which showed CPAP 7 cmH2O, 6.7% compliance, 7 hrs 24 min use, AHI of 3.9. Advised patient to use the CPAP every night for more than four hours for optimal health benefit.  Advised patient to not use dental appliance as it is contributing to nocturnal hypoxia.    *DME order ([facility]) for mask (small dreamwear nasal mask or MOC) and supplies was placed today. Clean mask and supplies weekly with soap and water, and change supplies per insurance guidelines.     *HSS (on old dental appliance, new dental appliance got lost) from 7/27/22 indicated mild LALY with an AHI of 5.3.  Lowest SPO2 was 74%, baseline was 94% and average 91%.  Time spent below 88% SPO2 was 53 minutes.  SUSHIL was 6.5.    *Order OPO on CPAP 7 cmH2O to r/o nocturnal hypoxia.     2-4. Continue to f/u with cardiology, Dr. Denise. Is on Eliquis.   5. Continue to f/u with neurology, Dr. Noe.   6. Sleep hygiene discussed. Recommend keeping a set sleep/wake schedule. Logging enough hours of sleep. Limiting/Avoiding naps. No caffeine after noon and no heavy meals in the evening.   Chronic insomnia: utilizes  trazodone 50 mg qhs prn.   7. Follow up with appropriate healthcare providers for all other medical problems.  8. F/u in 6 weeks for LALY.       ELVIA Bush.      This dictation was created using voice recognition software. The accuracy of the dictation is limited to the abilities of the software. I expect there may be some errors of grammar and possibly content.

## 2022-08-08 NOTE — PROCEDURES
DIAGNOSTIC HOME SLEEP TEST (HST) REPORT       PATIENT ID:  NAME:  Shaista Bocanegra  MRN:               [id number]  YOB: 1945  DATE OF STUDY: 7/27/22      Impression:     This study shows evidence of:     1.HST was done on mandibular advancement device. Mild obstructive sleep apnea with  Respiratory Event Index (SHERRILL) of 5.3 per hour and worse in supine sleep with SHERRILL at 6.4. These findings are based on the recording time (flow evaluation time). It is not possible with this device to determine a traditional apnea+hypopnea index (AHI) for total sleep time since EEG channels are not available.     2. O2 Sat. emiliano was 74% and mean O2 sat was 91% and baseline O2 at 94 %. O2 sat was below 88% for 8% of the flow evaluation time. Oxygen Desaturation (>=4%) Index was elevated at 6.5/hr. AVG HR was 68 BPM.      TECHNICAL DESCRIPTION:  Screwpulp Device used was a type-III home study device. Home sleep study recording included: Airflow recording by nasal pressure transducer; Respiratory Effort by abdominal Respiratory Bands; O2 by finger oximetry. A position sensor and a snore channel was also used.    Scoring Criteria: A modification of the the AASM Manual for the Scoring of Sleep and Associated Events, 2012, was used.   Obstructive apnea was scored by cessation of airflow for at least 10 seconds with continuing respiratory effort.  Central apnea was scored by cessation of airflow for at least 10 seconds with no effort.  Hypopnea was scored by a 30% or more reduction in airflow for at least 10 seconds accompanied by an arterial oxygen desaturation of 3% or more.  (For Medicare patients, hypopneas were scored by a 30% or more reduction in airflow for at least 10 seconds accompanied by an arterial oxygen saturation of 4% or more, as required by their insurance, CMS.        General sleep summary: . Total recording time is 12 hours and 0 minutes and total flow evaluation time is 10 hours and 31 minutes. The  patient spent 10 hours and 31 minutes in the supine position    Respiratory events:    Apneas: 6 (Obstructive apnea index 0.4/hr, Central apnea index 0.2 /hr, mixed 0 /hour)  Hypopneas: 49    Recommendations:    1. CPAP titration study vs Auto CPAP trial .   2.   In general patients with sleep apnea are advised to avoid alcohol and sedatives and to not operate a motor vehicle while drowsy. In some cases alternative treatment options may prove effective in resolving sleep apnea in these options include upper airway surgery, the use of a dental orthotic or weight loss and positional therapy. Clinical correlation is required.         Maycol Can MD

## 2022-08-24 ENCOUNTER — OFFICE VISIT (OUTPATIENT)
Dept: CARDIOLOGY | Facility: MEDICAL CENTER | Age: 77
End: 2022-08-24
Payer: MEDICARE

## 2022-08-24 VITALS
HEART RATE: 88 BPM | HEIGHT: 66 IN | OXYGEN SATURATION: 96 % | BODY MASS INDEX: 23.63 KG/M2 | RESPIRATION RATE: 16 BRPM | WEIGHT: 147 LBS | SYSTOLIC BLOOD PRESSURE: 112 MMHG | DIASTOLIC BLOOD PRESSURE: 78 MMHG

## 2022-08-24 DIAGNOSIS — I42.0 DCM (DILATED CARDIOMYOPATHY) (HCC): ICD-10-CM

## 2022-08-24 DIAGNOSIS — Z98.890 S/P MITRAL VALVE REPAIR: ICD-10-CM

## 2022-08-24 DIAGNOSIS — Z86.73 HISTORY OF CVA (CEREBROVASCULAR ACCIDENT): ICD-10-CM

## 2022-08-24 DIAGNOSIS — E78.5 DYSLIPIDEMIA: ICD-10-CM

## 2022-08-24 DIAGNOSIS — Z79.01 CHRONIC ANTICOAGULATION: ICD-10-CM

## 2022-08-24 DIAGNOSIS — I10 PRIMARY HYPERTENSION: ICD-10-CM

## 2022-08-24 DIAGNOSIS — I48.0 PAROXYSMAL ATRIAL FIBRILLATION (HCC): ICD-10-CM

## 2022-08-24 PROCEDURE — 99214 OFFICE O/P EST MOD 30 MIN: CPT | Performed by: INTERNAL MEDICINE

## 2022-08-24 RX ORDER — ASPIRIN 81 MG/1
81 TABLET, CHEWABLE ORAL
COMMUNITY
Start: 2021-09-28 | End: 2022-08-24

## 2022-08-24 RX ORDER — TAMSULOSIN HYDROCHLORIDE 0.4 MG/1
0.4 CAPSULE ORAL DAILY
COMMUNITY
End: 2022-08-24

## 2022-08-24 ASSESSMENT — ENCOUNTER SYMPTOMS
GASTROINTESTINAL NEGATIVE: 1
CLAUDICATION: 0
MUSCULOSKELETAL NEGATIVE: 1
FEVER: 0
BRUISES/BLEEDS EASILY: 0
MYALGIAS: 0
WEIGHT LOSS: 0
CARDIOVASCULAR NEGATIVE: 1
CONSTITUTIONAL NEGATIVE: 1
RESPIRATORY NEGATIVE: 1
BLURRED VISION: 0
COUGH: 0
PSYCHIATRIC NEGATIVE: 1
DIZZINESS: 0
HEADACHES: 0
VOMITING: 0
NEUROLOGICAL NEGATIVE: 1
SHORTNESS OF BREATH: 0
DEPRESSION: 0
DOUBLE VISION: 0
EYES NEGATIVE: 1
PALPITATIONS: 0
NAUSEA: 0
CHILLS: 0
WEAKNESS: 0
NERVOUS/ANXIOUS: 0
FOCAL WEAKNESS: 0
ABDOMINAL PAIN: 0

## 2022-08-24 ASSESSMENT — FIBROSIS 4 INDEX: FIB4 SCORE: 1.5

## 2022-08-24 NOTE — PROGRESS NOTES
Chief Complaint   Patient presents with    Mitral Valve Prolapse     F/V Dx: S/P mitral valve repair    Dyslipidemia    Atrial Fibrillation       Subjective     Shaista Bocanegra is a 77 y.o. female who presents today for follow up of mitral valve repair .    Since the patient's last visit on 05/12/22 with Ayde Lopes, she has been doing well clinically. She denies fatigue, shortness of breath, dyspnea on exertion, chest pain, dizziness or syncope. She suffered another transient ischemic attack on 05/02/22. She keeps active exercising 3 times per week. She resides in a condo by herself, however, her family is urging her to move into senior living facilities.     Past Medical History:   Diagnosis Date    Anemia 9/28/2021    Arthritis     toe    Atrial fibrillation (HCC)     Benign essential HTN 3/19/2012    Breast cancer ([facility])     Cancer ([facility]) 1998    breast     Cardiac arrhythmia     Chest tightness or pressure 3/19/2012    Chickenpox     Coronary heart disease     [place] measles     Gynecological disorder     High cholesterol     High risk medication use 3/19/2012    Hypercholesterolemia 3/19/2012    Mumps     MVP (mitral valve prolapse) 3/19/2012    Osteoporosis     Seizure disorder (HCC) 9/28/2021    last seizure may 2021    Sleep apnea     CPAP at night    Snoring     Stroke ([facility]) 2017    residual minor weakness on left side    Substance abuse ([facility])     Tonsillitis     Urinary bladder disorder     OAB    Urinary incontinence     Valvular heart disease     Venereal disease      Past Surgical History:   Procedure Laterality Date    CATARACT PHACO WITH IOL  3/16/2009    Performed by SHANNON GANDARA at SURGERY SAME DAY [facility] ORS    CATARACT PHACO WITH IOL  1/26/2009    Performed by SHANNON GANDARA at SURGERY SAME DAY [facility] ORS    BREAST BIOPSY      HYSTERECTOMY LAPAROSCOPY      LUMPECTOMY      NH CHEMOTHERAPY, UNSPECIFIED PROCEDURE      NH RADIATION THERAPY PLAN SIMPLE      NH REMV 2ND  CATARACT,CORN-SCLER SECTN      PRIMARY C SECTION      SINUSCOPE      TONSILLECTOMY       Family History   Problem Relation Age of Onset    Cancer Mother         breast    No Known Problems Brother     Heart Failure Neg Hx     Heart Disease Neg Hx      Social History     Socioeconomic History    Marital status:      Spouse name: Not on file    Number of children: 2    Years of education: Not on file    Highest education level: Professional school degree (e.g., MD, LACEY, DVM, ARACELI)   Occupational History    Occupation:      Employer: Not Employed   Tobacco Use    Smoking status: Never    Smokeless tobacco: Never   Vaping Use    Vaping Use: Never used   Substance and Sexual Activity    Alcohol use: Yes     Alcohol/week: 1.2 oz     Types: 2 Glasses of wine per week     Comment: twice a week    Drug use: No    Sexual activity: Yes     Partners: Male     Birth control/protection: Female Sterilization   Other Topics Concern    Not on file   Social History Narrative    Not on file     Social Determinants of Health     Financial Resource Strain: Low Risk     Difficulty of Paying Living Expenses: Not hard at all   Food Insecurity: No Food Insecurity    Worried About Running Out of Food in the Last Year: Never true    Ran Out of Food in the Last Year: Never true   Transportation Needs: No Transportation Needs    Lack of Transportation (Medical): No    Lack of Transportation (Non-Medical): No   Physical Activity: Insufficiently Active    Days of Exercise per Week: 4 days    Minutes of Exercise per Session: 30 min   Stress: Not on file   Social Connections: Socially Isolated    Frequency of Communication with Friends and Family: More than three times a week    Frequency of Social Gatherings with Friends and Family: Twice a week    Attends [employer] Services: Never    Active Member of Clubs or Organizations: No    Attends Club or Organization Meetings: Never    Marital Status:    Intimate Partner Violence: Not  on file   Housing Stability: Low Risk     Unable to Pay for Housing in the Last Year: No    Number of Places Lived in the Last Year: 1    Unstable Housing in the Last Year: No     No Known Allergies    (Medications reviewed.)  Outpatient Encounter Medications as of 8/24/2022   Medication Sig Dispense Refill    amLODIPine (NORVASC) 10 MG Tab Take 10 mg by mouth every day. FOR BLOOD PRESSURE      cyanocobalamin (VITAMIN B-12) 100 MCG Tab Take 100 mcg by mouth every day.      apixaban (ELIQUIS) 5mg Tab Take 1 Tablet by mouth 2 times a day. 180 Tablet 7    spironolactone (ALDACTONE) 25 MG Tab Take 1 Tablet by mouth every day. 90 Tablet 7    atorvastatin (LIPITOR) 80 MG tablet Take 1 Tablet by mouth every evening. 90 Tablet 7    estradiol (ESTRACE VAGINAL) 0.1 MG/GM vaginal cream Apply 1g cream inside vagina using applicator nightly for 2 weeks, then twice per week thereafter 1 Each 3    tamsulosin (FLOMAX) 0.4 MG capsule Take 0.4 mg by mouth 1/2 hour after breakfast.      Multiple Vitamin (MULTIVITAMINS PO) Take 1 Tab by mouth every day.      [DISCONTINUED] aspirin (ASA) 81 MG Chew Tab chewable tablet Chew 81 mg. (Patient not taking: Reported on 8/24/2022)      [DISCONTINUED] COVID-19 mRNA vaccine, Moderna, (COVID-19 VACCINE) 100 MCG/0.5ML Suspension injection Moderna COVID-19 Vaccine (PF) 100 mcg/0.5 mL intramuscular susp. (EUA)   ADMINISTER 0.5ML IN THE MUSCLE AS DIRECTED (Patient not taking: Reported on 8/24/2022)      [DISCONTINUED] tamsulosin (FLOMAX) 0.4 MG capsule Take 0.4 mg by mouth every day. (Patient not taking: Reported on 8/24/2022)      [DISCONTINUED] chlorhexidine (PERIDEX) 0.12 % Solution RINSE MOUTH WITH 15ML (1 CAPFUL) FOR 30 SECONDS IN MORNING AND EVENING AFTER BRUSHING, THEN SPIT (Patient not taking: Reported on 8/24/2022)      [DISCONTINUED] mupirocin (BACTROBAN) 2 % Ointment USE TWICE DAILY ON ANKLE WOUND FOR TWO WEEKS, ONCE DAILY TO PROCEDURE WOUND ON BACK FOR TWO WEEKS (Patient not taking:  "Reported on 8/24/2022)      [DISCONTINUED] HYDROcodone-acetaminophen (NORCO) 7.5-325 MG tab TAKE 1 TABLET BY MOUTH EVERY 4 TO 6 HOURS AS NEEDED FOR PAIN ( NOT COVERED/INS ) (Patient not taking: Reported on 8/24/2022)      [DISCONTINUED] traZODone (DESYREL) 50 MG Tab Take 50 mg by mouth every evening. (Patient not taking: Reported on 8/24/2022)      [DISCONTINUED] amoxicillin (AMOXIL) 500 MG Cap Take 4 Capsules by mouth 1 time a day as needed. (Patient not taking: No sig reported) 30 Capsule      No facility-administered encounter medications on file as of 8/24/2022.     Review of Systems   Constitutional: Negative.  Negative for chills, fever, malaise/fatigue and weight loss.   HENT: Negative.  Negative for hearing loss.    Eyes: Negative.  Negative for blurred vision and double vision.   Respiratory: Negative.  Negative for cough and shortness of breath.    Cardiovascular: Negative.  Negative for chest pain, palpitations, claudication and leg swelling.   Gastrointestinal: Negative.  Negative for abdominal pain, nausea and vomiting.   Genitourinary: Negative.  Negative for dysuria and urgency.   Musculoskeletal: Negative.  Negative for joint pain and myalgias.   Skin: Negative.  Negative for itching and rash.   Neurological: Negative.  Negative for dizziness, focal weakness, weakness and headaches.   Endo/Heme/Allergies: Negative.  Does not bruise/bleed easily.   Psychiatric/Behavioral: Negative.  Negative for depression. The patient is not nervous/anxious.             Objective     /78 (BP Location: Right arm, Patient Position: Sitting, BP Cuff Size: Adult)   Pulse 88   Resp 16   Ht 1.676 m (5' 6\")   Wt 66.7 kg (147 lb)   SpO2 96%   BMI 23.73 kg/m²     Physical Exam  Constitutional:       Appearance: Normal appearance. She is well-developed and normal weight.   HENT:      Head: Normocephalic and atraumatic.   Neck:      Vascular: No JVD.   Cardiovascular:      Rate and Rhythm: Normal rate and regular " rhythm.      Heart sounds: Normal heart sounds.   Pulmonary:      Effort: Pulmonary effort is normal.      Breath sounds: Normal breath sounds.   Abdominal:      General: Bowel sounds are normal.      Palpations: Abdomen is soft.      Comments: No hepatosplenomegaly.   Musculoskeletal:         General: Normal range of motion.   Lymphadenopathy:      Cervical: No cervical adenopathy.   Skin:     General: Skin is warm and dry.   Neurological:      Mental Status: She is alert and oriented to person, place, and time.          CARDIAC STUDIES/PROCEDURES:     BIOTEL CONCLUSIONS (07/14/21)  BioTel 6 day Summary:   1. Atrial fibrillation with appropriate heart rate range. Average 69, range 35 to 120 bpm.   2. No significant pauses (up to 2.2 seconds).   3. Occasional PVCs, 2% burden.   4. 1 patient trigger, no symptoms reported.   Conclusion: Persistent atrial fibrillation with appropriate heart rate, no pauses, occasional PVCs.     CARDIAC CATHETERIZATION CONCLUSIONS by Juventino Farah (05/08/20)  Angiographically normal appearing coronary arteries  Mildly elevated LVEDP, filling pressures.  2+ mitral regurgitation.  Normal LV function.     CAROTID ULTRASOUND (08/18/17)  Normal carotids, subclavians and vertebrals.     CTA OF HEAD (05/02/22)  1.  Moderate sized area of chronic infarction with surrounding encephalomalacia in the right frontal parasylvian region.  2.  Otherwise unremarkable CT angiogram of the head.  3.  Age-related cerebral atrophy  (study result reviewed)    CTA OF NECK (05/02/22)  CT angiogram of the neck within normal limits.  (study result reviewed)     CTA OF HEAD (09/04/21)          No evidence of flow-limiting stenosis in the cervical carotid or cervical vertebral arteries.     CT OF HEAD (05/31/21)  1.  Moderate acute/subacute left temporal lobe infarct.  2.  Chronic ischemic changes.  3.  No acute intracranial hemorrhage.     CTA OF HEAD (05/31/21)  No acute large vessel occlusion or  hemodynamically significant stenosis.  Acute/subacute appearing left temporal lobe infarct.     CTA OF NECK (09/04/21)           No evidence of flow-limiting stenosis in the cervical carotid or cervical vertebral arteries.     CTA OF NECK (05/31/21)  1.  No stenosis or dissection is seen within the carotid or vertebral arteries bilaterally.  2.  Tree-in-bud nodularity in the right upper lobe is likely infectious/inflammatory.  3.  Mucosal thickening right maxillary sinus.  4.  Nasal bone deformities bilaterally are likely chronic.     CT OF HEAD (05/03/19)  NO ACUTE ABNORMALITIES ARE NOTED ON CT SCAN OF THE HEAD.  Right-sided encephalomalacia is noted.     CTA OF HEAD (08/18/17)  1.  There is a right M1 segment occlusion.  2.  There is collateral flow in the peripheral M3 branches seen on the right.  3.  There is mild decreased enhancement of the visualized right internal carotid artery however it is patent.  4.  Question of subtle hypodensity in the right insular cortex region. No abnormal brain parenchymal enhancement is seen.     CTA OF NECK (08/18/17)  1.  CT angiogram of the neck within normal limits.  2.  Thrombosed right M1 segment.     ECHOCARDIOGRAM CONCLUSIONS (06/21/22)  No acute intracranial process.  Remote infarcts in the right frontal, left occipital region as described above.  Remote hemorrhage into the right basal ganglia is noted with hemosiderin staining. Remote microhemorrhage into the left medial occipital periatrial white matter is also noted.  Age-related volume loss and chronic microvascular ischemic changes.  (study result reviewed)    ECHOCARDIOGRAM CONCLUSIONS (09/30/21)  Normal left ventricular chamber size.  The left ventricular ejection fraction is visually estimated to be 60%.  Known mitral valve repair which is functioning normally with appropriate transvalvular gradient.  No mitral regurgitation.  Right heart pressures are normal.      ECHOCARDIOGRAM CONCLUSIONS (06/01/21)  Left  ventricular ejection fraction is visually estimated to be 65%.  Diastolic function is difficult to assess with atrial fibrillation.  Severely dilated left atrium.  Negative bubble study including Valsalva.  Myxomatous changes of the mitral valve leaflets with prolapse of the anterior and posterior leaflets.  Moderate to severe eccentric mitral regurgitation, probably severe.  Pulmonary veins not well seen on the study.  Estimated right ventricular systolic pressure is 31 mmHg + estimated RAP.  Compared to the images of the study done 11- there has been no significant change, prior echo had pulmonary vein flow reversal consistent with  severe mitral regurgitation.     ECHOCARDIOGRAM CONCLUSIONS (02/03/20)  Prior echo done on 05/03/2019. Compared to the images of the prior study done -  there has been no significant change.   Normal left ventricular systolic function.  Left ventricular ejection fraction is visually estimated to be 65%.  Prolapse of the mitral leaflets was present.  Severe mitral regurgitation.  Mild tricuspid regurgitation.  Estimated right ventricular systolic pressure is 35 mmHg.     ECHOCARDIOGRAM CONCLUSIONS (05/03/19)  Prior echocardiogram 6/14/2018.  Normal left ventricular systolic function.   Mild to moderate eccentric mitral regurgitation.  Compared to the images of the study done - there has been slight improvement in mitral regurgitation.     ECHOCARDIOGRAM CONCLUSIONS (06/14/18)  Normal left ventricular systolic function.  Left ventricular ejection fraction is visually estimated to be 60%.  Myxomatous changes of the mitral valve.  Prolapse of the anterior and posterior mitral leaflets.  Severe, eccentric mitral regurgitation.  Right heart pressures are normal.  Compared to the report of the study done 8/19/2017 severe mitral regurgitation is now present, previously reported as moderate but a MR   ERO was not recorded.      EKG performed on (05/02/22) was reviewed: EKG personally  interpreted shows sinus rhythm.  EKG performed on (09/04/21) EKG shows atrial fibrillation.  EKG performed on (06/09/21) EKG shows atrial fibrillation.  EKG performed on (03/19/21) EKG shows sinus rhythm with premature ventricular contractions.  EKG performed on (05/09/20) EKG shows sinus rhythm with premature ventricular contractions.  EKG performed on (05/08/20) EKG shows sinus rhythm with premature ventricular contractions.     Laboratory results of (05/02/22) were reviewed. Cholesterol profile of 115/66/55/49 mg/dL noted.  Laboratory results of (01/12/22) were reviewed. Cholesterol profile of 115/58/50/53 mg/dL noted.     TRANSESOPHAGEAL ECHOCARDIOGRAM CONCLUSIONS by Ayde Lopes (01/26/21)  LV EF 55%.  Biatrial enlargement.  There is severe eccentric mitral regurgitation with multiple jets, predominantly from flail leaflet of P2.  Echolucent space near P1 of unclear etiology, unusual for cleft leaflet.  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       Assessment & Plan     1. S/P mitral valve repair        2. Paroxysmal atrial fibrillation (HCC)        3. Chronic anticoagulation        4. Primary hypertension        5. Dyslipidemia        6. History of CVA (cerebrovascular accident)            Medical Decision Making: Today's Assessment/Status/Plan:          History of mitral valve repair (minimally invasive complex mitral valve repair with A2 Wilburton-Miguel Angel neochordal reconstruction, A3 Wilburton-Miguel Angel neochordal reconstruction, P2 posterior ventricular anchoring remodeling suture and non-resectional leaflet remodeling and Wilburton-Miguel Angel NeoChord x2, P3 Wilburton-Miguel Angel NeoChord x1, and #34 sewing ring annuloplasty, maze procedure and left atrial appendage oversew by Monster Jones at [facility] on 09/16/21): She is clinically doing well from valvular standpoint.  2.   Dilated cardiomyopathy: Her volume status is adequate. We will continue with current care. We will repeat an echocardiogram.  3.   Atrial fibrillation  on anticoagulation therapy (Eliquis): She is doing well on anticoagulation and the ventricular rate is well controlled.   4.    Hypertension: Blood pressure is well controlled. We will continue with amlodipine.  5.    Hyperlipidemia: She is doing well on statin therapy without myalgia symptoms.  6.    Status post left temporal lobe infarct (05/31/21) with history of cerebrovascular accident with right carotid arteriography with mechanical thrombectomy (08/18/17) and transient ischemic attack (05/02/22): She remains clinically stable with memory loss and mild confusion.  7.    Additional information: She is family of Zbigniew Cosmey.    We will follow up in 3 months.     CC  Capri Greenberg

## 2022-08-31 ENCOUNTER — APPOINTMENT (RX ONLY)
Dept: URBAN - METROPOLITAN AREA CLINIC 4 | Facility: CLINIC | Age: 77
Setting detail: DERMATOLOGY
End: 2022-08-31

## 2022-08-31 DIAGNOSIS — L57.0 ACTINIC KERATOSIS: ICD-10-CM

## 2022-08-31 DIAGNOSIS — Z86.007 PERSONAL HISTORY OF IN-SITU NEOPLASM OF SKIN: ICD-10-CM

## 2022-08-31 PROCEDURE — 17003 DESTRUCT PREMALG LES 2-14: CPT

## 2022-08-31 PROCEDURE — 17000 DESTRUCT PREMALG LESION: CPT

## 2022-08-31 PROCEDURE — ? DIAGNOSIS COMMENT

## 2022-08-31 PROCEDURE — ? LIQUID NITROGEN

## 2022-08-31 PROCEDURE — ? COUNSELING

## 2022-08-31 PROCEDURE — 99212 OFFICE O/P EST SF 10 MIN: CPT | Mod: 25

## 2022-08-31 ASSESSMENT — LOCATION ZONE DERM
LOCATION ZONE: ARM
LOCATION ZONE: LEG
LOCATION ZONE: TRUNK

## 2022-08-31 ASSESSMENT — LOCATION DETAILED DESCRIPTION DERM
LOCATION DETAILED: RIGHT POSTERIOR SHOULDER
LOCATION DETAILED: LEFT MEDIAL UPPER BACK
LOCATION DETAILED: UPPER STERNUM
LOCATION DETAILED: RIGHT MEDIAL DISTAL PRETIBIAL REGION
LOCATION DETAILED: LEFT MEDIAL SUPERIOR CHEST
LOCATION DETAILED: RIGHT SUPERIOR UPPER BACK
LOCATION DETAILED: LEFT SUPERIOR MEDIAL UPPER BACK
LOCATION DETAILED: LOWER STERNUM
LOCATION DETAILED: MIDDLE STERNUM
LOCATION DETAILED: SUPERIOR THORACIC SPINE

## 2022-08-31 ASSESSMENT — LOCATION SIMPLE DESCRIPTION DERM
LOCATION SIMPLE: RIGHT PRETIBIAL REGION
LOCATION SIMPLE: UPPER BACK
LOCATION SIMPLE: CHEST
LOCATION SIMPLE: RIGHT UPPER BACK
LOCATION SIMPLE: RIGHT SHOULDER
LOCATION SIMPLE: LEFT UPPER BACK

## 2022-08-31 NOTE — PROCEDURE: DIAGNOSIS COMMENT
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 Clear today with no evidence of recurrence.
Render Risk Assessment In Note?: no
Detail Level: Detailed
Comment: C&D scar, hx of SCC Insitu. [id number].

## 2022-09-19 ENCOUNTER — SPEECH THERAPY (OUTPATIENT)
Dept: SPEECH THERAPY | Facility: REHABILITATION | Age: 77
End: 2022-09-19
Attending: PHYSICIAN ASSISTANT
Payer: MEDICARE

## 2022-09-19 DIAGNOSIS — R41.841 COGNITIVE COMMUNICATION DEFICIT: ICD-10-CM

## 2022-09-19 PROCEDURE — 92523 SPEECH SOUND LANG COMPREHEN: CPT

## 2022-09-19 ASSESSMENT — ENCOUNTER SYMPTOMS: NO PATIENT REPORTED PAIN: 1

## 2022-09-19 NOTE — OP THERAPY EVALUATION
"  Outpatient Speech Therapy  INITIAL EVALUATION    [address]  [address]  [place] [address]  Phone:  [phone]  Fax:  [phone]    Date of Evaluation: 09/19/2022    Patient: Shaista Bocanegra  YOB: 1945  MRN: [id number]     Referring Provider: SULEIMAN [address] [place] [address]   Referring Diagnosis Dysphagia following cerebral infarction [I69.391]     Time Calculation    Start time: 1034  Stop time: 1117 Time Calculation (min): 43 minutes           Chief Complaint: Poor Memory    Visit Diagnoses     ICD-10-CM   1. Cognitive communication deficit  R41.841     Subjective:   Reason for Therapy:     Reason For Evaluation:  CVA, Cognition and Aphasia    Onset Date:  5/30/2021  Social Support:     Social support system: father of her children, Casey, present and supportive.    Patient Mental Status:  Alert and Responsive  Progress Factors:     Progression:  Staying the same  Pain:     no pain reported    Therapy History:     Current Diet:  Regular Diet and Thin Liquids    Nutritional Concerns Restrictions and Allergies:  Patient reported no difficulty swallowing at this time    Hearing:  Hearing Aids (Bilateral)    Vision:  Eye Glasses (reading)    Handedness:  Right-handed  Additional Subjective Comments:      Patient is a 77 y.o. female with history of multiple CVAs, most recently in May 2021.  Patient denied any difficulty swallowing, and reported she is coming to speech therapy to get help with word finding and \"brain fog\".  Patient reported she has not been completing many home exercises, as was previously recommended.  Patient has been referred to speech therapy for a cognitive linguistic evaluation.      Past Medical History:   Diagnosis Date    Anemia 9/28/2021    Arthritis     toe    Atrial fibrillation (HCC)     Benign essential HTN 3/19/2012    Breast cancer (HCC)     Cancer (HCC) 1998    breast     Cardiac arrhythmia     Chest " tightness or pressure 3/19/2012    Chickenpox     Coronary heart disease     [place] measles     Gynecological disorder     High cholesterol     High risk medication use 3/19/2012    Hypercholesterolemia 3/19/2012    Mumps     MVP (mitral valve prolapse) 3/19/2012    Osteoporosis     Seizure disorder (HCC) 2021    last seizure may 2021    Sleep apnea     CPAP at night    Snoring     Stroke (HCC) 2017    residual minor weakness on left side    Substance abuse (HCC)     Tonsillitis     Urinary bladder disorder     OAB    Urinary incontinence     Valvular heart disease     Venereal disease      Past Surgical History:   Procedure Laterality Date    CATARACT PHACO WITH IOL  3/16/2009    [facility]    CATARACT PHACO WITH IOL  2009    [facility]    BREAST BIOPSY      HYSTERECTOMY LAPAROSCOPY      LUMPECTOMY      AL CHEMOTHERAPY, UNSPECIFIED PROCEDURE      AL RADIATION THERAPY PLAN SIMPLE      AL REMV 2ND CATARACT,CORN-SCLER SECTN      PRIMARY C SECTION      SINUSCOPE      TONSILLECTOMY       Objective:   Objective Details:  Patient was administered the Cognitive Linguistic Quick Test (CLQT) with the following scores:     Personal Facts: 0/8  (Criterion cut off for ages 18-69:  8, for 70-89: 8)   - This task assesses memory and language abilities.     Symbol Cancellation 0/12 (Criterion cut off for ages 18-69: 11, for 70-89: 10)   -  This is a non-linguistic task of visual attention and perception.      Confrontation Naming: 10/10 (Criterion cut off for ages 18-69: 10, for 70-89: 10)   - This language task is for word retrieval.     Clock Drawin/13 (Criterion cut off for ages 18-69: 12, for 70-89: 11)   - This serves a mini-screening tool for all cognitive domains analyzing visuospatial, planning, number and time concepts.     Story retell: /10 (Criterion cut off for ages 18-69: 6, for 70-89: 5)   - This  assesses auditory memory and comprehension, working memory, and language output skills.      Symbol trails: 0/10 (Criterion cut off for ages 18-69: 9, for 70-89: 6)   - This is a non-linguistic task used to assess planning, self-monitoring, working memory and visual attention.     Generative Namin/9 (Criterion cut off for ages 18-69: 5, for 70-89: 4)   - This task assesses word search and retrieval skills in semantic and phonemic categories.      Design Memory: 5/6 (Criterion cut off for ages 18-69: 5, for 70-89: 4)   - This is a non-linguistic task that can provide information about visual discrimination and analysis, attention, and visual memory.      Mazes: 4/8 (Criterion cut off for ages 18-69: 7, for 70-89: 4)   - This task requires planning, mental flexibility, self-monitoring, and visual discrimination.      Design generation: 0/13 (Criterion cut off for ages 18-69: 6, for 70-89: 5)   - This nonlinguistic task is for creativity and mental flexibly.      These scores are then placed in to the 5 cognitive domain areas:    Attention:  36 Severe  Memory:  140 Mild  Executive Function: 8 Moderate  Language: 27 Mild  Visuospatial skills:  32 Moderate  Clock Drawing Severity Ratin WNL    These overall scores combine to create a composite severity rating of 2.2 indicating that cognitive linguistic skills are moderately impaired at this time.     Speech Therapy Assessment:     Cognitive Linguistic Assessment:     Patient attention sustained: Moderate    Patient attention divided: Severe    Patient immediate memory: Moderate    Patient recent and short term memory: Moderate    Patient procedural memory: Severe    Patient biographical information memory: WFL    Patient complex reasoning ability: Moderate    Patient complex problem solving ability: Moderate    Patient executive functioning ability: Severe    Patient decision making and planning ability: Moderate    Patient initiation and self monitoring  ability: Moderate    Speech Therapy Plan :   Prognosis & Recommendations  Impression Summary:  Patient presents with moderate cognitive linguistic deficits, with significant decline in attention, executive function, and visuospatial skills since last seen in March of this year.  Patient reported she has not been completing HEP of cognitive stimulation exercises that had been previously provided. Patient would benefit from skilled speech therapy to provide strategies for higher level cognitive skills for greater independence within the home and community.  Patient verbalized understanding and agreement with current plan of care.  Prognosis:  Fair  Goals  Short Term Goals:  1.  Patient will improve attention, completing complex, divided attention tasks with 80% accuracy.   2.  Patient will improve executive functions completing complex problem solving and reasoning tasks with 80% accuracy, independent.   3.  Patient will utilize word-finding strategies including SFA in the setting of specific word confrontation with 85% accuracy and min assist    4.  Patient will improve memory recall using internal and external strategies to improve and support working memory, short term and time delayed recall, implementing compensatory strategies as necessary, with 80% or greater accuracy with mod cues.   5.  Patient will complete visuospatial and scanning tasks with 80% accuracy to reduce left neglect.      Short Term Goal Duration (Weeks):  2-4 weeks  Long Term Goals:  -Patient will improve speech-language skills and utilize compensatory strategies to communicate wants and needs effectively with different conversational partners, maintain safety and participate socially in functional living environment  with 90% accuracy.  Long Term Goal Duration (Weeks):  2-4 weeks  Therapy Recommendations  Recommendation:  Individual Speech Therapy,  Planned Therapy Interventions:  Compensatory Strategy Training, Patient/Caregiver Education,  Home Program and Cognitive-Linguistic training,   Frequency:  1x week  Duration (in visits):  4 (4 X 92507)  Duration (in weeks):  4      Referring provider co-signature:  I have reviewed this plan of care and my co-signature certifies the need for services.    Certification Period: 09/19/2022 to  10/17/22    Physician Signature: ________________________________ Date: ______________

## 2022-09-23 ENCOUNTER — HOME STUDY (OUTPATIENT)
Dept: SLEEP MEDICINE | Facility: MEDICAL CENTER | Age: 77
End: 2022-09-23
Attending: NURSE PRACTITIONER
Payer: MEDICARE

## 2022-09-23 DIAGNOSIS — G47.33 OSA (OBSTRUCTIVE SLEEP APNEA): ICD-10-CM

## 2022-09-23 PROCEDURE — 94762 N-INVAS EAR/PLS OXIMTRY CONT: CPT | Performed by: INTERNAL MEDICINE

## 2022-09-26 ENCOUNTER — SPEECH THERAPY (OUTPATIENT)
Dept: SPEECH THERAPY | Facility: REHABILITATION | Age: 77
End: 2022-09-26
Attending: PHYSICIAN ASSISTANT
Payer: MEDICARE

## 2022-09-26 DIAGNOSIS — R41.841 COGNITIVE COMMUNICATION DEFICIT: ICD-10-CM

## 2022-09-26 PROCEDURE — 92507 TX SP LANG VOICE COMM INDIV: CPT

## 2022-09-26 ASSESSMENT — ENCOUNTER SYMPTOMS: NO PATIENT REPORTED PAIN: 1

## 2022-09-26 NOTE — OP THERAPY DAILY TREATMENT
Outpatient Speech Therapy  DAILY TREATMENT     [facility] [address].  [address]  [place] [address]  Phone:  [phone]  Fax:  [phone]    Date: 09/26/2022    Patient: Shaista Bocanegra  YOB: 1945  MRN: [id number]     Time Calculation    Start time: 0945  Stop time: 1029 Time Calculation (min): 44 minutes         Chief Complaint: Poor Memory    Visit #: 2    Subjective:   Reason for Therapy:     Reason For Evaluation:  CVA, Cognition and Aphasia    Onset Date:  5/30/2021  Social Support:     Patient Mental Status:  Alert and Responsive  Pain:     no pain reported    Therapy History:     Current Diet:  Regular Diet and Thin Liquids    Nutritional Concerns Restrictions and Allergies:  Patient reported no difficulty swallowing at this time    Hearing:  Hearing Aids (Bilateral)    Vision:  Eye Glasses (reading)    Handedness:  Right-handed  Additional Subjective Comments:      Patient found her HEP folder from previous visits, and reviewed contents.  She has played solitaire and word search puzzles since last visit.         Objective:   Treatments/Interventions Performed:  Cognitive-Linguistic training, Patient/Caregiver education and Speech/Language treatment  Objective Details:  1.  Patient will improve attention, completing complex, divided attention tasks with 80% accuracy.   --Not formally addressed  2.  Patient will improve executive functions completing complex problem solving and reasoning tasks with 80% accuracy, independent.   -_ reviewed previous HEP, and came up with list of activities that can be done at home on a daily basis for cognitive stimulation.  3.  Patient will utilize word-finding strategies including SFA in the setting of specific word confrontation with 85% accuracy and min assist    -Reviewed SFA strategies  4.  Patient will improve memory recall using internal and external strategies to improve and support working memory, short term and time  delayed recall, implementing compensatory strategies as necessary, with 80% or greater accuracy with mod cues.   --Reviewed external strategies, and recommended a conversation journal where she can take notes on conversations she has with various friends and family members.  5.  Patient will complete visuospatial and scanning tasks with 80% accuracy to reduce left neglect.    --Not formally addressed       Speech Therapy Assessment:     Cognitive Linguistic Assessment:     Patient attention sustained: Moderate    Patient attention divided: Severe    Patient immediate memory: Moderate    Patient recent and short term memory: Moderate    Patient procedural memory: Severe    Patient biographical information memory: WFL    Patient complex reasoning ability: Moderate    Patient complex problem solving ability: Moderate    Patient executive functioning ability: Severe    Patient decision making and planning ability: Moderate    Patient initiation and self monitoring ability: Moderate      Speech Therapy Plan :   Prognosis & Recommendations  Impression Summary:  Patient presents with moderate cognitive linguistic deficits, with significant decline in attention, executive function, and visuospatial skills since last seen in March of this year.  Patient reported she has not been completing HEP of cognitive stimulation exercises that had been previously provided. Patient would benefit from skilled speech therapy to provide strategies for higher level cognitive skills for greater independence within the home and community.  Patient verbalized understanding and agreement with current plan of care.  Prognosis:  Fair  Goals  Short Term Goals:  1.  Patient will improve attention, completing complex, divided attention tasks with 80% accuracy.   2.  Patient will improve executive functions completing complex problem solving and reasoning tasks with 80% accuracy, independent.   3.  Patient will utilize word-finding strategies  including SFA in the setting of specific word confrontation with 85% accuracy and min assist    4.  Patient will improve memory recall using internal and external strategies to improve and support working memory, short term and time delayed recall, implementing compensatory strategies as necessary, with 80% or greater accuracy with mod cues.   5.  Patient will complete visuospatial and scanning tasks with 80% accuracy to reduce left neglect.      Short Term Goal Duration (Weeks):  2-4 weeks  Long Term Goals:  -Patient will improve speech-language skills and utilize compensatory strategies to communicate wants and needs effectively with different conversational partners, maintain safety and participate socially in functional living environment  with 90% accuracy.  Long Term Goal Duration (Weeks):  2-4 weeks  Therapy Recommendations  Recommendation:  Individual Speech Therapy,  Planned Therapy Interventions:  Compensatory Strategy Training, Patient/Caregiver Education, Home Program and Cognitive-Linguistic training,   Frequency:  1x week

## 2022-09-26 NOTE — PROCEDURES
Interpretation:    This overnight oximetry was recorded on 9/24/2022 with the patient on CPAP 7 cm water.  The analysis duration was 7 hours 47.  17 total oximetric events occurred encompassing 19.3 minutes .  The average event duration was 60.2 seconds .  The saturations were less than 90% for 3.4% of the recording.  The emiliano saturation was 86% .  The patient spent 1.1 minutes with saturations < 88%.  Overall, this continuous nocturnal oximetry demonstrates normal nocturnal oximetry while on positive airway pressure therapy.      Recommendation:    Clinical correlation is needed.  Recommend continuing CPAP 7 cm water.  There is no indication for bleed-in oxygen therapy.

## 2022-09-28 ENCOUNTER — OFFICE VISIT (OUTPATIENT)
Dept: SLEEP MEDICINE | Facility: MEDICAL CENTER | Age: 77
End: 2022-09-28
Payer: MEDICARE

## 2022-09-28 ENCOUNTER — APPOINTMENT (OUTPATIENT)
Dept: SPEECH THERAPY | Facility: REHABILITATION | Age: 77
End: 2022-09-28
Attending: PHYSICIAN ASSISTANT
Payer: MEDICARE

## 2022-09-28 VITALS
OXYGEN SATURATION: 94 % | BODY MASS INDEX: 23.14 KG/M2 | HEART RATE: 96 BPM | DIASTOLIC BLOOD PRESSURE: 80 MMHG | WEIGHT: 144 LBS | HEIGHT: 66 IN | SYSTOLIC BLOOD PRESSURE: 118 MMHG

## 2022-09-28 DIAGNOSIS — I48.91 ATRIAL FIBRILLATION, UNSPECIFIED TYPE (HCC): ICD-10-CM

## 2022-09-28 DIAGNOSIS — G47.33 OSA (OBSTRUCTIVE SLEEP APNEA): ICD-10-CM

## 2022-09-28 DIAGNOSIS — Z79.01 CHRONIC ANTICOAGULATION: ICD-10-CM

## 2022-09-28 DIAGNOSIS — I10 HYPERTENSION, UNSPECIFIED TYPE: ICD-10-CM

## 2022-09-28 DIAGNOSIS — Z86.73 HISTORY OF CVA (CEREBROVASCULAR ACCIDENT): ICD-10-CM

## 2022-09-28 DIAGNOSIS — F51.04 CHRONIC INSOMNIA: ICD-10-CM

## 2022-09-28 PROCEDURE — 99213 OFFICE O/P EST LOW 20 MIN: CPT | Performed by: NURSE PRACTITIONER

## 2022-09-28 ASSESSMENT — FIBROSIS 4 INDEX: FIB4 SCORE: 1.5

## 2022-09-28 NOTE — PROGRESS NOTES
Chief Complaint   Patient presents with    Follow-Up     LALY-Last seen 08/05/22       HPI:      Mrs. Bocanegra is a 78 y/o female patient who is in today for LALY f/u. She is a former patient of Dr. Sanchez's at [facility]. PMH includes dyslipidemia, CVA with mild left hemiparesis in 2017, osteopenia, hypertension, A. fib is on Eliquis, never smoker, tonsillectomy, CAD, breast cancer, history of seizure, LALY, vascular dementia, MVP status post repair.    Patient is accompanied by her son.  Patient received a replacement SCYFIX RespirReading Room dream station 2 CPAP machine in the summer 2022.  Compliance report from 8/29/22-9/27/22 was downloaded and reviewed with the patient which showed CPAP 7 cmH2O, 96.7% compliance, 5 hrs 40 min use, AHI of 2.1. She is tolerating the pressure and mask well.  She denies morning headache or snoring.  She goes to bed at 11 pm and wakes up between 5-6 am. She does utilize trazodone 50 mg nightly to help with sleep.  Patient reports that she typically only uses the dental appliance when she travels and she also obtained a travel CPAP in case she does not tolerate the dental appliance.  Patient continues to be in the process of obtaining a new dental appliance through Dr. Hope's office and only recently had a new mold made.  She states her old dental appliance was causing her significant jaw pain.  She continues to follow-up with cardiology and continues to take Eliquis.  She follows up with neurology as needed. She denies any new health problems or medications.    Sleep/Card Study History:   OPO on CPAP 7 cmH2O from 9/23/22 indicated the emiliano saturation was 86% .  The patient spent 1.1 minutes with saturations < 88%.  Overall, this continuous nocturnal oximetry demonstrates normal nocturnal oximetry while on positive airway pressure therapy.    HSS (on old dental appliance, new dental appliance got lost) from 7/27/22 indicated mild LALY with an AHI of 5.3.  Lowest SPO2 was  74%, baseline was 94% and average 91%.  Time spent below 88% SPO2 was 53 minutes.  SUSHIL was 6.5.     Echo from 6/22/22 indicated mildly reduced left ventricular systolic function. The left ventricular ejection fraction is estimated to be 45%. Mild global hypokinesis. Known mitral valve repair which is functioning normally with appropriate transvalvular gradient. Unable to estimate right ventricular systolic pressure due to an inadequate tricuspid regurgitant jet.     Patient was diagnosed with mild LALY with an AHI of 11 by Dr. Sanchez.     ROS:    Constitutional: Denies fevers, Denies weight changes  Eyes: Denies changes in vision, no eye pain  Ears/Nose/Throat/Mouth: Denies nasal congestion or sore throat   Cardiovascular: Denies chest pain or palpitations   Respiratory: Denies shortness of breath , Denies cough  Gastrointestinal/Hepatic: Denies abdominal pain, nausea, vomiting,   Skin/Breast: Denies rash,   Neurological: Denies headache, confusion,   Psychiatric: denies mood disorder   Sleep: denies snoring       Past Medical History:   Diagnosis Date    Anemia 09/28/2021    Arthritis     toe    Atrial fibrillation (HCC)     Benign essential HTN 03/19/2012    Breast cancer (HCC)     Cancer ([facility]) 1998    breast     Cardiac arrhythmia     Chest tightness or pressure 03/19/2012    Chickenpox     Coronary heart disease     Thai measles     Gynecological disorder     High cholesterol     High risk medication use 03/19/2012    Hypercholesterolemia 03/19/2012    Mumps     MVP (mitral valve prolapse) 03/19/2012    Osteoporosis     Seizure disorder ([facility]) 09/28/2021    last seizure may 2021    Sleep apnea     CPAP at night    Snoring     Stroke ([facility]) 2017    residual minor weakness on left side    Substance abuse ([facility])     Tonsillitis     Urinary bladder disorder     OAB    Urinary incontinence     Valvular heart disease     Venereal disease        Past Surgical History:   Procedure Laterality Date    CATARACT PHACO WITH  IOL  3/16/2009    Performed by SHANNON GANDARA at [facility]    CATARACT PHACO WITH IOL  1/26/2009    Performed by SHANNON GANDARA at [facility]    BREAST BIOPSY      HYSTERECTOMY LAPAROSCOPY      LUMPECTOMY      FL CHEMOTHERAPY, UNSPECIFIED PROCEDURE      FL RADIATION THERAPY PLAN SIMPLE      FL REMV 2ND CATARACT,CORN-SCLER SECTN      PRIMARY C SECTION      SINUSCOPE      TONSILLECTOMY         Family History   Problem Relation Age of Onset    Cancer Mother         breast    No Known Problems Brother     Heart Failure Neg Hx     Heart Disease Neg Hx        Social History     Socioeconomic History    Marital status:      Spouse name: Not on file    Number of children: 2    Years of education: Not on file    Highest education level: Professional school degree (e.g., MD, DDS, DVM, ARACELI)   Occupational History    Occupation:      Employer: Not Employed   Tobacco Use    Smoking status: Never    Smokeless tobacco: Never   Vaping Use    Vaping Use: Never used   Substance and Sexual Activity    Alcohol use: Yes     Alcohol/week: 1.2 oz     Types: 2 Glasses of wine per week     Comment: twice a week    Drug use: No    Sexual activity: Yes     Partners: Male     Birth control/protection: Female Sterilization   Other Topics Concern    Not on file   Social History Narrative    Not on file     Social Determinants of Health     Financial Resource Strain: Low Risk     Difficulty of Paying Living Expenses: Not hard at all   Food Insecurity: No Food Insecurity    Worried About Running Out of Food in the Last Year: Never true    Ran Out of Food in the Last Year: Never true   Transportation Needs: No Transportation Needs    Lack of Transportation (Medical): No    Lack of Transportation (Non-Medical): No   Physical Activity: Insufficiently Active    Days of Exercise per Week: 4 days    Minutes of Exercise per Session: 30 min   Stress: Not on file   Social Connections: Socially  Isolated    Frequency of Communication with Friends and Family: More than three times a week    Frequency of Social Gatherings with Friends and Family: Twice a week    Attends Judaism Services: Never    Active Member of Clubs or Organizations: No    Attends Club or Organization Meetings: Never    Marital Status:    Intimate Partner Violence: Not on file   Housing Stability: Low Risk     Unable to Pay for Housing in the Last Year: No    Number of Places Lived in the Last Year: 1    Unstable Housing in the Last Year: No       Allergies as of 09/28/2022    (No Known Allergies)        Vitals:  Vitals:    09/28/22 1435   BP: 118/80   Pulse: 96   SpO2: 94%       Current medications as of today   Current Outpatient Medications   Medication Sig Dispense Refill    cyanocobalamin (VITAMIN B-12) 100 MCG Tab Take 100 mcg by mouth every day.      apixaban (ELIQUIS) 5mg Tab Take 1 Tablet by mouth 2 times a day. 180 Tablet 7    spironolactone (ALDACTONE) 25 MG Tab Take 1 Tablet by mouth every day. 90 Tablet 7    atorvastatin (LIPITOR) 80 MG tablet Take 1 Tablet by mouth every evening. 90 Tablet 7    tamsulosin (FLOMAX) 0.4 MG capsule Take 0.4 mg by mouth 1/2 hour after breakfast.      Multiple Vitamin (MULTIVITAMINS PO) Take 1 Tab by mouth every day.       No current facility-administered medications for this visit.         Physical Exam: Limited by COVID-19 precautions.  Appearance: Well developed, well nourished, no acute distress  Eyes: PERRL, EOM intact, sclera white, conjunctiva moist  Ears: no lesions or deformities  Hearing: grossly intact  Nose: no lesions or deformities  Respiratory effort: no intercostal retractions or use of accessory muscles  Extremities: no cyanosis or edema  Abdomen: soft  Gait and Station: normal  Digits and nails: no clubbing, cyanosis, petechiae or nodes.  Cranial nerves: grossly intact  Skin: no visible rashes, lesions or ulcers noted  Orientation: Oriented to time, person and  place  Mood and affect: mood and affect appropriate, normal interaction with examiner  Judgement: Intact    Assessment:  1. LALY (obstructive sleep apnea)  DME Mask and Supplies    Overnight Home Sleep Study      2. Chronic insomnia        3. Atrial fibrillation, unspecified type (HCC)        4. Chronic anticoagulation        5. Hypertension, unspecified type        6. History of CVA (cerebrovascular accident)              Plan  Discussed the cardiovascular and neuropsychiatric risks of untreated LALY; including but not limited to: HTN, DM, MI, ASCVD, CVA, CHF, traffic accidents.     1. Compliance report from 8/29/22-9/27/22 was downloaded and reviewed with the patient which showed CPAP 7 cmH2O, 96.7% compliance, 5 hrs 40 min use, AHI of 2.1.  Patient is compliant and benefiting from CPAP therapy for management of LALY. Advised patient to continue to use the CPAP every night for more than four hours for optimal health benefit.    *DME order ([employer] [facility]) for mask (small dreamwear nasal mask  or MOC) and supplies was placed today. Continue to clean mask and supplies weekly with soap and water, and change supplies per insurance guidelines.     *Patient uses a dental appliance during travel.  She is in the process of obtaining a new one though Dr. Hope's office due to her previous dental appliance causing significant jaw pain.  We will repeat HSS in about 6 months with new dental appliance when it has been fully adjusted to assess efficacy device.  Will message patient with results when available.    2. Sleep hygiene discussed. Recommend keeping a set sleep/wake schedule. Logging enough hours of sleep. Limiting/Avoiding naps. No caffeine after noon and no heavy meals in the evening.   Chronic insomnia: utilizes trazodone 50 mg qhs prn.   3-5. Continue to f/u with cardiology, Dr. Denise. Is on Eliquis.   6.  Continue to f/u with neurology, Dr. Noe.   7. Follow up with appropriate healthcare providers for all other  medical problems.  8. F/u in 1 year for LALY, sooner if needed.       ELVIA Bush.      This dictation was created using voice recognition software. The accuracy of the dictation is limited to the abilities of the software. I expect there may be some errors of grammar and possibly content.

## 2022-10-03 ENCOUNTER — SPEECH THERAPY (OUTPATIENT)
Dept: SPEECH THERAPY | Facility: REHABILITATION | Age: 77
End: 2022-10-03
Attending: PHYSICIAN ASSISTANT
Payer: MEDICARE

## 2022-10-03 DIAGNOSIS — R41.841 COGNITIVE COMMUNICATION DEFICIT: ICD-10-CM

## 2022-10-03 PROCEDURE — 92507 TX SP LANG VOICE COMM INDIV: CPT

## 2022-10-03 ASSESSMENT — ENCOUNTER SYMPTOMS: NO PATIENT REPORTED PAIN: 1

## 2022-10-03 NOTE — OP THERAPY DAILY TREATMENT
Outpatient Speech Therapy  DAILY TREATMENT     [facility] [address]  [address]  [place] [address]  Phone:  [phone]  Fax:  [phone]    Date: 10/03/2022    Patient: Shaista Bocanegra  YOB: 1945  MRN: [id number]     Time Calculation    Start time: 0911  Stop time: 0947 Time Calculation (min): 36 minutes         Chief Complaint: Poor Memory    Visit #: 3    Subjective:   Reason for Therapy:     Reason For Evaluation:  CVA, Cognition and Aphasia    Onset Date:  5/30/2021  Social Support:     Patient Mental Status:  Alert and Responsive  Pain:     no pain reported    Therapy History:     Hearing:  Hearing Aids (Bilateral)    Vision:  Eye Glasses (reading)    Handedness:  Right-handed  Additional Subjective Comments:      Patient reported she has played solitaire and word search puzzles since last visit.         Objective:   Treatments/Interventions Performed:  Cognitive-Linguistic training, Patient/Caregiver education and Speech/Language treatment  Objective Details:  1.  Patient will improve attention, completing complex, divided attention tasks with 80% accuracy.   --Progressing, Patient completed trail making task with alternating shapes of increasing size 100% with initial cues to take time and scan before acting.  2.  Patient will improve executive functions completing complex problem solving and reasoning tasks with 80% accuracy, independent.   -_ reviewed previous HEP, and came up with list of activities that can be done at home on a daily basis for cognitive stimulation.  3.  Patient will utilize word-finding strategies including SFA in the setting of specific word confrontation with 85% accuracy and min assist    -Reviewed SFA strategies  4.  Patient will improve memory recall using internal and external strategies to improve and support working memory, short term and time delayed recall, implementing compensatory strategies as necessary, with 80% or greater  accuracy with mod cues.   --Reviewed external strategies, and recommended a conversation journal where she can take notes on conversations she has with various friends and family members.  5.  Patient will complete visuospatial and scanning tasks with 80% accuracy to reduce left neglect.    --Addressed with trail making task       Speech Therapy Assessment:     Cognitive Linguistic Assessment:     Patient attention sustained: Moderate    Patient attention divided: Severe    Patient immediate memory: Moderate    Patient recent and short term memory: Moderate    Patient procedural memory: Severe    Patient biographical information memory: WFL    Patient complex reasoning ability: Moderate    Patient complex problem solving ability: Moderate    Patient executive functioning ability: Severe    Patient decision making and planning ability: Moderate    Patient initiation and self monitoring ability: Moderate      Speech Therapy Plan :   Prognosis & Recommendations  Impression Summary:  Patient actively participated in therapy session.  Patient presents with moderate cognitive linguistic deficits, with significant decline in attention, executive function, and visuospatial skills since last seen in March of this year.  Patient reported she has not been completing HEP of cognitive stimulation exercises that had been previously provided. Patient would benefit from skilled speech therapy to provide strategies for higher level cognitive skills for greater independence within the home and community.  Patient verbalized understanding and agreement with current plan of care.  Prognosis:  Fair  Goals  Short Term Goals:  1.  Patient will improve attention, completing complex, divided attention tasks with 80% accuracy.   2.  Patient will improve executive functions completing complex problem solving and reasoning tasks with 80% accuracy, independent.   3.  Patient will utilize word-finding strategies including SFA in the setting of  specific word confrontation with 85% accuracy and min assist    4.  Patient will improve memory recall using internal and external strategies to improve and support working memory, short term and time delayed recall, implementing compensatory strategies as necessary, with 80% or greater accuracy with mod cues.   5.  Patient will complete visuospatial and scanning tasks with 80% accuracy to reduce left neglect.      Short Term Goal Duration (Weeks):  2-4 weeks  Long Term Goals:  -Patient will improve speech-language skills and utilize compensatory strategies to communicate wants and needs effectively with different conversational partners, maintain safety and participate socially in functional living environment  with 90% accuracy.  Long Term Goal Duration (Weeks):  2-4 weeks  Therapy Recommendations  Recommendation:  Individual Speech Therapy,  Planned Therapy Interventions:  Compensatory Strategy Training, Patient/Caregiver Education, Home Program and Cognitive-Linguistic training,   Frequency:  1x week

## 2022-10-05 ENCOUNTER — APPOINTMENT (OUTPATIENT)
Dept: SPEECH THERAPY | Facility: REHABILITATION | Age: 77
End: 2022-10-05
Attending: PHYSICIAN ASSISTANT
Payer: MEDICARE

## 2022-10-10 ENCOUNTER — SPEECH THERAPY (OUTPATIENT)
Dept: SPEECH THERAPY | Facility: REHABILITATION | Age: 77
End: 2022-10-10
Attending: PHYSICIAN ASSISTANT
Payer: MEDICARE

## 2022-10-10 DIAGNOSIS — R41.841 COGNITIVE COMMUNICATION DEFICIT: ICD-10-CM

## 2022-10-10 PROCEDURE — 92507 TX SP LANG VOICE COMM INDIV: CPT

## 2022-10-10 ASSESSMENT — ENCOUNTER SYMPTOMS: NO PATIENT REPORTED PAIN: 1

## 2022-10-10 ASSESSMENT — SOCIAL DETERMINANTS OF HEALTH (SDOH): SOCIAL_SUPPORT_SYSTEM: CAREGIVER

## 2022-10-10 NOTE — OP THERAPY DAILY TREATMENT
Outpatient Speech Therapy  DAILY TREATMENT     [facility] Speech [address].  [address]  [place] [address]  Phone:  [phone]  Fax:  [phone]    Date: 10/10/2022    Patient: Shaista Bocanegra  YOB: 1945  MRN: [id number]     Time Calculation    Start time: 0949  Stop time: 1035 Time Calculation (min): 46 minutes         Chief Complaint: Poor Memory    Visit #: 4    Subjective:   Reason for Therapy:     Reason For Evaluation:  CVA, Cognition and Aphasia    Onset Date:  5/30/2021  Social Support:     Accompanied By:  Caregiver (Radha)    Patient Mental Status:  Alert and Responsive  Pain:     no pain reported    Therapy History:     Hearing:  Hearing Aids (Bilateral)    Vision:  Eye Glasses (reporting blurry vision)    Handedness:  Right-handed  Additional Subjective Comments:      Patient reported she has played solitaire and word search puzzles since last visit.         Objective:   Treatments/Interventions Performed:  Cognitive-Linguistic training, Patient/Caregiver education and Speech/Language treatment  Objective Details:  1.  Patient will improve attention, completing complex, divided attention tasks with 80% accuracy.   --Progressing, Patient completed trail making task with alternating shapes of increasing size 100% with initial cues to take time and scan before acting.  2.  Patient will improve executive functions completing complex problem solving and reasoning tasks with 80% accuracy, independent.   --Patient sequenced 4-step picture sequences 70% with cues to double check and alter sequences.  Patient was able to identify and self-correct errors.  3.  Patient will utilize word-finding strategies including SFA in the setting of specific word confrontation with 85% accuracy and min assist    -Reviewed SFA strategies  4.  Patient will improve memory recall using internal and external strategies to improve and support working memory, short term and time delayed  recall, implementing compensatory strategies as necessary, with 80% or greater accuracy with mod cues.   --Patient's caregiver was present, discussed strategies to increase independence with schedule at home.  Patient reported she is used to delegating tasks to others, but was encouraged to designate tasks to herself to increase cognitive stimulation, especially with memory tasks.    5.  Patient will complete visuospatial and scanning tasks with 80% accuracy to reduce left neglect.    --Addressed with trail making task       Speech Therapy Assessment:     Cognitive Linguistic Assessment:     Patient attention sustained: Moderate    Patient attention divided: Severe    Patient immediate memory: Moderate    Patient recent and short term memory: Moderate    Patient procedural memory: Severe    Patient biographical information memory: WFL    Patient complex reasoning ability: Moderate    Patient complex problem solving ability: Moderate    Patient executive functioning ability: Severe    Patient decision making and planning ability: Moderate    Patient initiation and self monitoring ability: Moderate      Speech Therapy Plan :   Prognosis & Recommendations  Impression Summary:  Patient actively participated in therapy session.  Patient presents with moderate cognitive linguistic deficits, with significant decline in attention, executive function, and visuospatial skills since last seen in March of this year.  Patient reported she has not been completing HEP of cognitive stimulation exercises that had been previously provided. Patient would benefit from skilled speech therapy to provide strategies for higher level cognitive skills for greater independence within the home and community.  Patient verbalized understanding and agreement with current plan of care.  Prognosis:  Fair  Goals  Short Term Goals:  1.  Patient will improve attention, completing complex, divided attention tasks with 80% accuracy.   2.  Patient will  improve executive functions completing complex problem solving and reasoning tasks with 80% accuracy, independent.   3.  Patient will utilize word-finding strategies including SFA in the setting of specific word confrontation with 85% accuracy and min assist    4.  Patient will improve memory recall using internal and external strategies to improve and support working memory, short term and time delayed recall, implementing compensatory strategies as necessary, with 80% or greater accuracy with mod cues.   5.  Patient will complete visuospatial and scanning tasks with 80% accuracy to reduce left neglect.      Short Term Goal Duration (Weeks):  2-4 weeks  Long Term Goals:  -Patient will improve speech-language skills and utilize compensatory strategies to communicate wants and needs effectively with different conversational partners, maintain safety and participate socially in functional living environment  with 90% accuracy.  Long Term Goal Duration (Weeks):  2-4 weeks  Therapy Recommendations  Recommendation:  Individual Speech Therapy,  Planned Therapy Interventions:  Compensatory Strategy Training, Patient/Caregiver Education, Home Program and Cognitive-Linguistic training,   Frequency:  1x week

## 2022-10-12 ENCOUNTER — APPOINTMENT (OUTPATIENT)
Dept: SPEECH THERAPY | Facility: REHABILITATION | Age: 77
End: 2022-10-12
Attending: PHYSICIAN ASSISTANT
Payer: MEDICARE

## 2022-10-13 ENCOUNTER — OFFICE VISIT (OUTPATIENT)
Dept: NEUROLOGY | Facility: MEDICAL CENTER | Age: 77
End: 2022-10-13
Attending: PSYCHIATRY & NEUROLOGY
Payer: MEDICARE

## 2022-10-13 VITALS
RESPIRATION RATE: 13 BRPM | WEIGHT: 153.66 LBS | BODY MASS INDEX: 24.7 KG/M2 | TEMPERATURE: 98.4 F | OXYGEN SATURATION: 97 % | HEIGHT: 66 IN | SYSTOLIC BLOOD PRESSURE: 115 MMHG | HEART RATE: 88 BPM | DIASTOLIC BLOOD PRESSURE: 74 MMHG

## 2022-10-13 DIAGNOSIS — H53.131 ACUTE LOSS OF VISION, RIGHT: ICD-10-CM

## 2022-10-13 PROCEDURE — 99212 OFFICE O/P EST SF 10 MIN: CPT | Performed by: PSYCHIATRY & NEUROLOGY

## 2022-10-13 PROCEDURE — 99214 OFFICE O/P EST MOD 30 MIN: CPT | Performed by: PSYCHIATRY & NEUROLOGY

## 2022-10-13 RX ORDER — FEXOFENADINE HCL 60 MG/1
60 TABLET, FILM COATED ORAL DAILY
COMMUNITY
End: 2023-02-09

## 2022-10-13 RX ORDER — FERROUS SULFATE 325(65) MG
325 TABLET ORAL DAILY
COMMUNITY
End: 2022-12-07

## 2022-10-13 RX ORDER — ESTRADIOL 0.1 MG/D
1 FILM, EXTENDED RELEASE TRANSDERMAL
COMMUNITY
End: 2022-12-07

## 2022-10-13 ASSESSMENT — FIBROSIS 4 INDEX: FIB4 SCORE: 1.5

## 2022-10-13 NOTE — PATIENT INSTRUCTIONS
I have ordered a brain MRI scan to evaluate for new strokes     I have sent a referral to ophthalmology to evaluate your right eye vision loss

## 2022-10-13 NOTE — PROGRESS NOTES
"Chief Complaint   Patient presents with    Follow-Up     Seizure, Patient c/o had a seizure on October 6, 2022 and had dizziness and trouble reading. She states the being able to read is better but not normal.        History of present illness:  Shaista Bocanegra 77 y.o. female with HTN, atrial fibrillation on Eliquis presented 5/31/21 for word finding difficulty/confusion.     A MRI scan revealed left temporal/occipital infarct. She was just started on Eliquis 3 days prior to the ischemic stroke.   Patient had hospital admission recently where she underwent elective mitral valve repair.  Postoperatively she developed generalized tonic-clonic seizures 2 hours after the repair and her left arm weakness was worse than baseline.  EEG showed occasional right temporal parietal sharp waves and patient was started on Keppra.  She is currently on keppra 1000mg and there have been no further LOC spells, convulsions, abnormal behavior.       5/25/22: After the patient had a normal EEG in December, I directed her to wean off of Keppra.  In early May, patient had a emergency department visit for acute blurry vision.  She had a brain MRI that was unchanged from prior and was discharged with neurology follow-up.  She had a headache that accompanied the blurry vision. The vision was \"not clear\". There is no history of migraine. The headache and blurry vision resolved after 4 hours. The headache was not severe. The headache has not recurred since.   There have been no seizures since the last visit.      10/13/22: Patient was previously seen for 2 acute ischemic strokes of the left temporal lobe both secondary to atrial fibrillation.  At her prior visit she was on Eliquis.  On 10/6 she had a dizzy spell. She was walking around and felt faint and unclear. She was unable to stand up properly. This lasted for a few minutes and she sat down and waited for it to pass. She describes a lightheaded feeling like she was going to faint.   She " also has had trouble seeing from her right eye. This has been a dramatic change compared to before the incident. She has trouble reading from her right eye.       Past medical history:   Past Medical History:   Diagnosis Date    Anemia 09/28/2021    Arthritis     toe    Atrial fibrillation (HCC)     Benign essential HTN 03/19/2012    Breast cancer (HCC)     Cancer (HCC) 1998    breast     Cardiac arrhythmia     Chest tightness or pressure 03/19/2012    Chickenpox     Coronary heart disease     French measles     Gynecological disorder     High cholesterol     High risk medication use 03/19/2012    Hypercholesterolemia 03/19/2012    Mumps     MVP (mitral valve prolapse) 03/19/2012    Osteoporosis     Seizure disorder (HCC) 09/28/2021    last seizure may 2021    Sleep apnea     CPAP at night    Snoring     Stroke ([facility]) 2017    residual minor weakness on left side    Substance abuse ([facility])     Tonsillitis     Urinary bladder disorder     OAB    Urinary incontinence     Valvular heart disease     Venereal disease        Past surgical history:   Past Surgical History:   Procedure Laterality Date    CATARACT PHACO WITH IOL  3/16/2009    Performed by SHANNON GANDARA at [facility]    CATARACT PHACO WITH IOL  1/26/2009    Performed by SHANNON GANDARA at [facility]    BREAST BIOPSY      HYSTERECTOMY LAPAROSCOPY      LUMPECTOMY      MS CHEMOTHERAPY, UNSPECIFIED PROCEDURE      MS RADIATION THERAPY PLAN SIMPLE      MS REMV 2ND CATARACT,CORN-SCLER SECTN      PRIMARY C SECTION      SINUSCOPE      TONSILLECTOMY         Family history:   Family History   Problem Relation Age of Onset    Cancer Mother         breast    No Known Problems Brother     Heart Failure Neg Hx     Heart Disease Neg Hx        Social history:   Social History     Socioeconomic History    Marital status:      Spouse name: Not on file    Number of children: 2    Years of education: Not on file    Highest education  level: Professional school degree (e.g., MD, ELMAS, DVM, ARACELI)   Occupational History    Occupation:      Employer: Not Employed   Tobacco Use    Smoking status: Never    Smokeless tobacco: Never   Vaping Use    Vaping Use: Never used   Substance and Sexual Activity    Alcohol use: Yes     Alcohol/week: 1.2 oz     Types: 2 Glasses of wine per week     Comment: twice a week    Drug use: No    Sexual activity: Yes     Partners: Male     Birth control/protection: Female Sterilization   Other Topics Concern    Not on file   Social History Narrative    Not on file     Social Determinants of Health     Financial Resource Strain: Low Risk     Difficulty of Paying Living Expenses: Not hard at all   Food Insecurity: No Food Insecurity    Worried About Running Out of Food in the Last Year: Never true    Ran Out of Food in the Last Year: Never true   Transportation Needs: No Transportation Needs    Lack of Transportation (Medical): No    Lack of Transportation (Non-Medical): No   Physical Activity: Insufficiently Active    Days of Exercise per Week: 4 days    Minutes of Exercise per Session: 30 min   Stress: Not on file   Social Connections: Socially Isolated    Frequency of Communication with Friends and Family: More than three times a week    Frequency of Social Gatherings with Friends and Family: Twice a week    Attends Yarsani Services: Never    Active Member of Clubs or Organizations: No    Attends Club or Organization Meetings: Never    Marital Status:    Intimate Partner Violence: Not on file   Housing Stability: Low Risk     Unable to Pay for Housing in the Last Year: No    Number of Places Lived in the Last Year: 1    Unstable Housing in the Last Year: No       Current medications:   Current Outpatient Medications   Medication    ferrous sulfate 325 (65 Fe) MG tablet    fexofenadine (ALLEGRA) 60 MG Tab    estradiol (VIVELLE DOT) 0.1 MG/24HR PATCH BIWEEKLY    apixaban (ELIQUIS) 5mg Tab    spironolactone  "(ALDACTONE) 25 MG Tab    atorvastatin (LIPITOR) 80 MG tablet    tamsulosin (FLOMAX) 0.4 MG capsule    cyanocobalamin (VITAMIN B-12) 100 MCG Tab    Multiple Vitamin (MULTIVITAMINS PO)     No current facility-administered medications for this visit.       Medication Allergy:  No Known Allergies    Physical examination:   Vitals:    10/13/22 1403   BP: 115/74   BP Location: Left arm   Patient Position: Sitting   BP Cuff Size: Adult   Pulse: 88   Resp: 13   Temp: 36.9 °C (98.4 °F)   TempSrc: Temporal   SpO2: 97%   Weight: 69.7 kg (153 lb 10.6 oz)   Height: 1.676 m (5' 6\")     Neurological Exam  Mental Status  Awake and alert. Speech is normal. Language is fluent with no aphasia.    Cranial Nerves  CN II: Right inferior quadrantanopsia. Left normal visual field. Right funduscopic exam: disc intact. Left funduscopic exam: disc intact.  CN III, IV, VI: Extraocular movements intact bilaterally. No nystagmus. Normal smooth pursuit.   Right pupil: 4 mm. Round. Reactive to light.   Left pupil: 4 mm. Round. Reactive to light.     Labs:  I reviewed the following labs personally:  None     Imagin/2/22 CTA NECK   IMPRESSION:  CT angiogram of the neck within normal limits.     21 CT head without contrast     I reviewed the images personally. There is a left parietal infarct.     21 EEG  INTERPRETATION:   Impression:   This is an abnormal EEG due to:   moderate diffuse slowing; with   further intermittent right hemispheric slowing; and occasional right temporal parietal epileptiform sharps.     There are no seizures captured.      2021 EEG   INTERPRETATION:     This is an abnormal video EEG recording in the awake, drowsy and sleep states. The presence of intermittent left and right temporal independent regional slowing is suggestive of focal neuronal dysfunction. No epileptiform discharge seen. This does not rule out epilepsy.  If the clinical suspicion remains high for seizures, a prolonged recording to capture " clinical or subclinical events may be helpful.     5/2022 MRI BRAIN W/O   I reviewed the images personally and agree with the following read:   IMPRESSION:        No acute intracranial process.     Remote infarcts in the right frontal, left occipital region as described above.     Remote hemorrhage into the right basal ganglia is noted with hemosiderin staining. Remote microhemorrhage into the left medial occipital periatrial white matter is also noted.     Age-related volume loss and chronic microvascular ischemic changes.    ASSESSMENT AND PLAN:  Problem List Items Addressed This Visit    None  Visit Diagnoses       Acute loss of vision, right        Relevant Orders    Referral to Ophthalmology    MR-BRAIN-W/O            1. Acute loss of vision, right  - Referral to Ophthalmology  - MR-BRAIN-W/O; Future    Other orders  - ferrous sulfate 325 (65 Fe) MG tablet; Take 325 mg by mouth every day.  - fexofenadine (ALLEGRA) 60 MG Tab; Take 60 mg by mouth every day.  - estradiol (VIVELLE DOT) 0.1 MG/24HR PATCH BIWEEKLY; Place 1 Patch on the skin.    77-year-old female with history of atrial fibrillation, and history of left temporal lobe infarct, currently on Eliquis.  She presents for acute decrease in vision from her right eye starting on October 6 after a dizzy spell.  Her examination reveals a right inferior quadrantanopia, however this could be explained by her previous left temporal/occipital lobe infarct.  I have sent a referral to ophthalmology to evaluate for etiologies of right eye vision loss.  I also ordered a brain MRI scan to evaluate for a new occipital or temporal lobe infarct.  It is unclear what the management would be if there are recurrent strokes, given that she is already on Eliquis, and also has had a Maze procedure for A-fib and left atrial appendage oversew.    FOLLOW-UP:   Return in about 8 weeks (around 12/8/2022).    Total time spent for the day for this patient unrelated to procedure time is:  31 minutes. I spent 21 minutes in face to face time and I spent 5 minutes pre-charting and 5 minutes in post-visit documentation.      Dr. Asad Noe D.O.  [facility]

## 2022-10-17 ENCOUNTER — APPOINTMENT (OUTPATIENT)
Dept: SPEECH THERAPY | Facility: REHABILITATION | Age: 77
End: 2022-10-17
Attending: PHYSICIAN ASSISTANT
Payer: MEDICARE

## 2022-10-19 ENCOUNTER — APPOINTMENT (OUTPATIENT)
Dept: SPEECH THERAPY | Facility: REHABILITATION | Age: 77
End: 2022-10-19
Attending: PHYSICIAN ASSISTANT
Payer: MEDICARE

## 2022-10-24 ENCOUNTER — OFFICE VISIT (OUTPATIENT)
Dept: PHYSICAL MEDICINE AND REHAB | Facility: MEDICAL CENTER | Age: 77
End: 2022-10-24
Payer: MEDICARE

## 2022-10-24 VITALS
BODY MASS INDEX: 23.46 KG/M2 | SYSTOLIC BLOOD PRESSURE: 102 MMHG | HEIGHT: 66 IN | DIASTOLIC BLOOD PRESSURE: 66 MMHG | TEMPERATURE: 97.4 F | RESPIRATION RATE: 14 BRPM | WEIGHT: 146 LBS | OXYGEN SATURATION: 98 % | HEART RATE: 96 BPM

## 2022-10-24 DIAGNOSIS — Z86.73 HISTORY OF CVA (CEREBROVASCULAR ACCIDENT): Primary | ICD-10-CM

## 2022-10-24 DIAGNOSIS — R41.3 SHORT-TERM MEMORY LOSS: ICD-10-CM

## 2022-10-24 PROCEDURE — 99215 OFFICE O/P EST HI 40 MIN: CPT | Performed by: PHYSICAL MEDICINE & REHABILITATION

## 2022-10-24 ASSESSMENT — FIBROSIS 4 INDEX: FIB4 SCORE: 1.5

## 2022-10-24 NOTE — PROGRESS NOTES
[facility]  [facility]  [address] [place] [address]  Ph: [phone]    FOLLOW UP PATIENT EVALUATION    Patient Name: Shaista Bocanegra   Patient : 1945  Patient Age: 76 y.o.     Examining Physician: Dr. Opal Sosa, DO    PM&R History to Date - Background Information:  Dates of Admission/Discharge to ARU: 2021-10/22/21    SUBJECTIVE:   Patient Identification: Shaista Bocanegra is a 76 y.o. female with PMH significant for Severe mitral regurgitation secondary to prolapse, atrial fibrillation, stroke in  (right frontal lobe) and 2021 (left temporal lobe) with residual left hemiparesis, hyperlipidemia, breast cancer treated with right lumpectomy radiation chemotherapy, sleep apnea and rehabilitation history significant for physical deconditioning after mitral valve repair 2021 [facility] N[facility] course c/b generalized tonic-clonic seizure s/p elective catheter ablation of atrial fibrillation 2021 and is presenting to PM&R clinic for a FOLLOW UP OUTPATIENT evaluation with the following chief complaint/s:    Chief Complaint: Weakness after ablation.     History of Present Illness:    - Records reviewed: Patient was hospitalized in May for blurry vision.  MRI was performed and there were no new infarcts.  - Records reviewed: Saw neurology 10/13/2022.  Referred to ophthalmology for her visual issues and ordered repeat MRI brain.  - Thinks she has been doing okay.   - Also had small stroke in August in [place]. These records are not available.   - Was told by SLP that she has STM issues. Thinks this has been going on for the past few months. She feels as though it is getting worse.   - Physically she feels fine. However she states she is not very cognizant of it and other people will tell her how she is doing.   - Sometimes BP is low.   - Has help 4x per week. 9AM-1PM  - Patient is not driving. They help drive her to appointments. She has her meals made and  prepped for the days she does not have help.   - Has not had any falls.   - No longer needs the care-giving at night.   - She is still catheterizing, this is after she urinates. She wears a pad during the day, but does not really have any incontinence.  - Sleep: Has CPAP. Cannot tell how much she is sleeping or not.    - Is a retired .    Review of Systems:  All other pertinent positive review of systems are noted above in HPI.   All other systems reviewed and are negative.    Past Medical History:  Past Medical History:   Diagnosis Date    Anemia 09/28/2021    Seizure disorder (HCC) 09/28/2021    last seizure may 2021    Stroke (HCC) 2017    residual minor weakness on left side    Benign essential HTN 03/19/2012    Chest tightness or pressure 03/19/2012    Hypercholesterolemia 03/19/2012    MVP (mitral valve prolapse) 03/19/2012    High risk medication use 03/19/2012    Cancer (HCC) 1998    breast     Arthritis     toe    Atrial fibrillation (HCC)     Breast cancer (HCC)     Cardiac arrhythmia     Chickenpox     Coronary heart disease     [place] measles     Gynecological disorder     High cholesterol     Mumps     Osteoporosis     Sleep apnea     CPAP at night    Snoring     Substance abuse (HCC)     Tonsillitis     Urinary bladder disorder     OAB    Urinary incontinence     Valvular heart disease     Venereal disease       Past Surgical History:   Procedure Laterality Date    CATARACT PHACO WITH IOL  3/16/2009    Performed by SHANNON GANDARA at [facility]    CATARACT PHACO WITH IOL  1/26/2009    Performed by SHANNON GANDARA at [facility]    BREAST BIOPSY      HYSTERECTOMY LAPAROSCOPY      LUMPECTOMY      CO CHEMOTHERAPY, UNSPECIFIED PROCEDURE      CO RADIATION THERAPY PLAN SIMPLE      CO REMV 2ND CATARACT,CORN-SCLER SECTN      PRIMARY C SECTION      SINUSCOPE      TONSILLECTOMY          Current Outpatient Medications:     ferrous sulfate 325 (65 Fe) MG tablet, Take  325 mg by mouth every day., Disp: , Rfl:     fexofenadine (ALLEGRA) 60 MG Tab, Take 60 mg by mouth every day., Disp: , Rfl:     cyanocobalamin (VITAMIN B-12) 100 MCG Tab, Take 100 mcg by mouth every day., Disp: , Rfl:     apixaban (ELIQUIS) 5mg Tab, Take 1 Tablet by mouth 2 times a day., Disp: 180 Tablet, Rfl: 7    spironolactone (ALDACTONE) 25 MG Tab, Take 1 Tablet by mouth every day., Disp: 90 Tablet, Rfl: 7    atorvastatin (LIPITOR) 80 MG tablet, Take 1 Tablet by mouth every evening., Disp: 90 Tablet, Rfl: 7    tamsulosin (FLOMAX) 0.4 MG capsule, Take 0.4 mg by mouth 1/2 hour after breakfast., Disp: , Rfl:     Multiple Vitamin (MULTIVITAMINS PO), Take 1 Tab by mouth every day., Disp: , Rfl:     estradiol (VIVELLE DOT) 0.1 MG/24HR PATCH BIWEEKLY, Place 1 Patch on the skin. (Patient not taking: Reported on 10/24/2022), Disp: , Rfl:   No Known Allergies     Past Social History:  Social History     Socioeconomic History    Marital status:      Spouse name: Not on file    Number of children: 2    Years of education: Not on file    Highest education level: Professional school degree (e.g., MD, DDS, DVM, ARACELI)   Occupational History    Occupation:      Employer: Not Employed   Tobacco Use    Smoking status: Never    Smokeless tobacco: Never   Vaping Use    Vaping Use: Never used   Substance and Sexual Activity    Alcohol use: Yes     Alcohol/week: 1.2 oz     Types: 2 Glasses of wine per week     Comment: twice a week    Drug use: No    Sexual activity: Yes     Partners: Male     Birth control/protection: Female Sterilization   Other Topics Concern    Not on file   Social History Narrative    Not on file     Social Determinants of Health     Financial Resource Strain: Low Risk     Difficulty of Paying Living Expenses: Not hard at all   Food Insecurity: No Food Insecurity    Worried About Running Out of Food in the Last Year: Never true    Ran Out of Food in the Last Year: Never true   Transportation Needs: No  Transportation Needs    Lack of Transportation (Medical): No    Lack of Transportation (Non-Medical): No   Physical Activity: Insufficiently Active    Days of Exercise per Week: 4 days    Minutes of Exercise per Session: 30 min   Stress: Not on file   Social Connections: Socially Isolated    Frequency of Communication with Friends and Family: More than three times a week    Frequency of Social Gatherings with Friends and Family: Twice a week    Attends Hinduism Services: Never    Active Member of Clubs or Organizations: No    Attends Club or Organization Meetings: Never    Marital Status:    Intimate Partner Violence: Not on file   Housing Stability: Low Risk     Unable to Pay for Housing in the Last Year: No    Number of Places Lived in the Last Year: 1    Unstable Housing in the Last Year: No        Family History:  Family History   Problem Relation Age of Onset    Cancer Mother         breast    No Known Problems Brother     Heart Failure Neg Hx     Heart Disease Neg Hx        Depression and Opioid Screening  PHQ-9:  Depression Screen (PHQ-2/PHQ-9) 10/17/2021 10/18/2021 2/11/2022   PHQ-2 Total Score 0 0 -   PHQ-2 Total Score - - 1   PHQ-9 Total Score - - -   PHQ-9 Total Score - - 7     Interpretation of PHQ-9 Total Score   Score Severity   1-4 No Depression   5-9 Mild Depression   10-14 Moderate Depression   15-19 Moderately Severe Depression   20-27 Severe Depression     OBJECTIVE:   Vital Signs:  Vitals:    10/24/22 1103   BP: 102/66   Pulse: 96   Resp: 14   Temp: 36.3 °C (97.4 °F)   SpO2: 98%        Pertinent Labs:  Lab Results   Component Value Date/Time    SODIUM 134 (L) 05/02/2022 11:46 AM    POTASSIUM 4.5 05/02/2022 11:46 AM    CHLORIDE 102 05/02/2022 11:46 AM    CO2 22 05/02/2022 11:46 AM    GLUCOSE 107 (H) 05/02/2022 11:46 AM    BUN 20 05/02/2022 11:46 AM    CREATININE 0.72 05/02/2022 11:46 AM    BUNCREATRAT 12 06/12/2020 05:12 AM       Lab Results   Component Value Date/Time    HBA1C 5.7 (H)  08/19/2021 01:12 PM    HBA1C 5.7 (H) 05/31/2021 11:14 AM       Lab Results   Component Value Date/Time    WBC 6.8 05/02/2022 11:46 AM    RBC 4.33 05/02/2022 11:46 AM    HEMOGLOBIN 14.5 05/02/2022 11:46 AM    HEMATOCRIT 42.2 05/02/2022 11:46 AM    MCV 97.5 05/02/2022 11:46 AM    MCH 33.5 (H) 05/02/2022 11:46 AM    MCHC 34.4 05/02/2022 11:46 AM    MPV 9.1 05/02/2022 11:46 AM    NEUTSPOLYS 79.70 (H) 05/02/2022 11:46 AM    LYMPHOCYTES 11.50 (L) 05/02/2022 11:46 AM    MONOCYTES 7.50 05/02/2022 11:46 AM    EOSINOPHILS 0.70 05/02/2022 11:46 AM    BASOPHILS 0.30 05/02/2022 11:46 AM       Lab Results   Component Value Date/Time    ASTSGOT 16 05/02/2022 11:46 AM    ALTSGPT 18 05/02/2022 11:46 AM        Physical Exam:   GEN: No apparent distress  HEENT: Head normocephalic, atraumatic.  Sclera nonicteric bilaterally, no ocular discharge appreciated bilaterally.  CV: Extremities warm and well-perfused, no peripheral edema appreciated bilaterally.  PULMONARY: Breathing nonlabored on room air, no respiratory accessory muscle use.  Not requiring supplemental oxygen.  SKIN: No appreciable skin breakdown on exposed areas of skin.  PSYCH: Mood and affect within normal limits.  NEURO: Awake alert.  Conversational.  Logical thought content.  Answers questions appropriately.  Obvious short-term memory deficits appreciated throughout conversation.    Motor Exam Upper Extremities   ? Myotome R L   Shoulder Abduction C5 5 5   Elbow flexion C5 5 5   Wrist extension C6 5 5   Elbow extension C7 5 5   Finger flexion C8 5 5   Finger abduction T1 5 5     Motor Exam Lower Extremities  ? Myotome R L   Hip flexion L2 5 5   Knee extension L3 5 5   Ankle dorsiflexion L4 5 5   Toe extension L5 5 5   Ankle plantarflexion S1 5 5     Varun's negative bilaterally.  Normoreflexic L4 bilaterally 2+/4  Ambulatory without assistive device.    ASSESSMENT/PLAN: Shaista Bocanegra  is a 76 y.o. female with rehabilitation history significant for physical  deconditioning after mitral valve repair 9/16/2021 [facility] Monster Nogueira course c/b generalized tonic-clonic seizure, here for evaluation. The following plan was discussed with the patient who is in agreement.     Visit Diagnoses     ICD-10-CM   1. History of CVA (cerebrovascular accident)  Z86.73   2. Short-term memory loss  R41.3     Caregiver assists with history.    Rehab/Neuro/CV:   Physical deconditioning after mitral valve repair 9/16/2021 secondary to severe mitral regurgitation done at [facility] Monster Nogueira course c/b generalized tonic-clonic seizure s/p elective catheter ablation of atrial fibrillation 12/7/2021.  History of cardioembolic stroke: 2017 right frontal lobe, 5/2021 left temporal lobe in setting of atrial fibrillation  Left eye blurry vision  -Records reviewed as aforementioned in the HPI.  -Referral: Physical therapy for short-term memory evaluation and compensatory/adaptive strategies.  -Private caregivers: Patient has hired caregivers 9 AM to 1 PM Monday Wednesday Friday.  -Current level of function: Chronic ophthalmologic complaints as well as short-term memory deficits.  -Driving status: Currently not driving.  I highly recommend she do not return to driving due to multiple concerning chronic issues including her visual complaints as well as her short-term memory impairment.  -Consultants: Established with pulmonology, neurology, cardiology  -Printed referral for patient to have information for ophthalmology.    Counseled today that functionally a lot of her impairments are most likely chronic.  She does report that she had a new small stroke in August in [place].  These records are unavailable to me.  However, based on evaluation from my prior clinic visit 12/2021, her deficits persist and do not seem significantly changed.  She is able to live independently with the help of caregivers a few times a week.  I discussed with her that at this point it is maintenance of her current  function and focus on maintaining independence as long as safely able to do so.  I have put a formal referral for speech therapy to reevaluate her short-term memory and assist with adaptive/compensatory strategies to assist with her memory, but I do not expect significant improvement in her memory function.    Neurogenic Bladder:   Urinary urgency and incontinence  -Improved, no longer getting Botox through [facility] [place].      Follow up: As needed.    Total time spent was 41 minutes.  Included in this time is the time spent preparing for the visit including record review, my exam and evaluation, counseling and education regarding that which is aforementioned in the assessment and plan.  Time was spent documenting into patient's electronic health record.  Time was spent ordering the appropriate labs, tests, procedures, referrals, medications.  Including this time was also the time spent in care coordination. Included this time as the time spent obtaining history from someone other than the patient.  Some of the time included occurred outside of charting time.  Discussion involved the patient and caregiver.    Please note that this dictation was created using voice recognition software. I have made every reasonable attempt to correct obvious errors but there may be errors of grammar and content that I may have overlooked prior to finalization of this note.    Dr. Opal Sosa DO, MS  Department of Physical Medicine & Rehabilitation  Neuro Rehabilitation Clinic  [facility]

## 2022-10-26 ENCOUNTER — SPEECH THERAPY (OUTPATIENT)
Dept: SPEECH THERAPY | Facility: REHABILITATION | Age: 77
End: 2022-10-26
Attending: PHYSICIAN ASSISTANT
Payer: MEDICARE

## 2022-10-26 DIAGNOSIS — R41.841 COGNITIVE COMMUNICATION DEFICIT: ICD-10-CM

## 2022-10-26 PROCEDURE — 92507 TX SP LANG VOICE COMM INDIV: CPT

## 2022-10-26 ASSESSMENT — SOCIAL DETERMINANTS OF HEALTH (SDOH): SOCIAL_SUPPORT_SYSTEM: CAREGIVER

## 2022-10-26 NOTE — OP THERAPY PROGRESS SUMMARY
Outpatient Speech Therapy  PROGRESS SUMMARY NOTE      [facility] Speech Therapy [place] [address] [address]  [address]  [place] [address]  Phone:  [phone]  Fax:  [phone]    Date of Visit: 10/26/2022    Patient: Shaista Bocanegra  YOB: 1945  MRN: [id number]     Referring Provider: Capri Simon M.D.  [address] [place] [address]   Referring Diagnosis Dysphagia following cerebral infarction [I69.391]      Visit #: 5    Progress Report Period: 9/19/22-10/26/22    Time Calculation    Start time: 0900  Stop time: 0945 Time Calculation (min): 45 minutes         Chief Complaint: Poor Memory    Visit Diagnoses     ICD-10-CM   1. Cognitive communication deficit  R41.841       Subjective:   Reason for Therapy:     Reason For Evaluation:  CVA, Cognition and Aphasia    Onset Date:  5/30/2021  Social Support:     Accompanied By:  Caregiver (Radha)    Patient Mental Status:  Alert and Responsive  Therapy History:     Hearing:  Hearing Aids (Bilateral)    Vision:  Eye Glasses (reporting blurry vision)    Handedness:  Right-handed  Additional Subjective Comments:      Patient reported she has played solitaire and word search puzzles since last visit. She also reported she is willing to try taking on tasks that she usually delegates to others.         Objective:   Treatments/Interventions Performed:  Cognitive-Linguistic training, Patient/Caregiver education and Speech/Language treatment  Objective Details:  1.  Patient will improve attention, completing complex, divided attention tasks with 80% accuracy.   --Progressing, Patient completed trail making task with alternating shapes of increasing size 100% with initial cues to take time and scan before acting.  2.  Patient will improve executive functions completing complex problem solving and reasoning tasks with 80% accuracy, independent.   --Patient sequenced 4-step picture sequences 70% with cues to double check and alter sequences.  Patient was able  to identify and self-correct errors.  3.  Patient will utilize word-finding strategies including SFA in the setting of specific word confrontation with 85% accuracy and min assist    -Reviewed SFA strategies  4.  Patient will improve memory recall using internal and external strategies to improve and support working memory, short term and time delayed recall, implementing compensatory strategies as necessary, with 80% or greater accuracy with mod cues.   --Progressing, Patient recalled sets of 4 words 70% and was able to complete category exclusion with them 80%  5.  Patient will complete visuospatial and scanning tasks with 80% accuracy to reduce left neglect.    --Addressed with trail making task    Speech Therapy Assessment:     Cognitive Linguistic Assessment:     Patient attention sustained: Moderate    Patient attention divided: Severe    Patient immediate memory: Moderate    Patient recent and short term memory: Moderate    Patient procedural memory: Severe    Patient biographical information memory: WFL    Patient complex reasoning ability: Moderate    Patient complex problem solving ability: Moderate    Patient executive functioning ability: Severe    Patient decision making and planning ability: Moderate    Patient initiation and self monitoring ability: Moderate      Speech Therapy Plan :   Prognosis & Recommendations  Impression Summary:  Patient actively participated in therapy session.  Patient presents with moderate cognitive linguistic deficits, with significant decline in attention, executive function, and visuospatial skills since last seen in March of this year.  Patient would benefit from skilled speech therapy to provide strategies for higher level cognitive skills for greater independence within the home and community.  Patient verbalized understanding and agreement with current plan of care.  Prognosis:  Fair  Goals  Short Term Goals:  1.  Patient will improve attention, completing complex,  divided attention tasks with 80% accuracy.   2.  Patient will improve executive functions completing complex problem solving and reasoning tasks with 80% accuracy, independent.   3.  Patient will utilize word-finding strategies including SFA in the setting of specific word confrontation with 85% accuracy and min assist    4.  Patient will improve memory recall using internal and external strategies to improve and support working memory, short term and time delayed recall, implementing compensatory strategies as necessary, with 80% or greater accuracy with mod cues.   5.  Patient will complete visuospatial and scanning tasks with 80% accuracy to reduce left neglect.      Short Term Goal Duration (Weeks):  2-4 weeks  Long Term Goals:  -Patient will improve speech-language skills and utilize compensatory strategies to communicate wants and needs effectively with different conversational partners, maintain safety and participate socially in functional living environment  with 90% accuracy.  Long Term Goal Duration (Weeks):  2-4 weeks  Therapy Recommendations  Recommendation:  Individual Speech Therapy,  Planned Therapy Interventions:  Compensatory Strategy Training, Patient/Caregiver Education, Home Program and Cognitive-Linguistic training,   Frequency:  1x week  Duration (in visits):  4 (4 X [address]2507)  Duration (in weeks):  4      Referring provider co-signature:  I have reviewed this plan of care and my co-signature certifies the need for services.    Certification Period: 10/26/2022 to 11/23/22    Physician Signature: ________________________________ Date: ______________

## 2022-10-26 NOTE — OP THERAPY DAILY TREATMENT
Outpatient Speech Therapy  DAILY TREATMENT     [facility] Speech [address].  [address]  [place] [address]  Phone:  [phone]  Fax:  [phone]    Date: 10/26/2022    Patient: Shaista Bocanegra  YOB: 1945  MRN: [id number]     Time Calculation    Start time: 0900  Stop time: 0945 Time Calculation (min): 45 minutes         Chief Complaint: Poor Memory    Visit #: 5       Please refer to progress summary

## 2022-11-02 ENCOUNTER — SPEECH THERAPY (OUTPATIENT)
Dept: SPEECH THERAPY | Facility: REHABILITATION | Age: 77
End: 2022-11-02
Attending: PHYSICIAN ASSISTANT
Payer: MEDICARE

## 2022-11-02 DIAGNOSIS — R41.841 COGNITIVE COMMUNICATION DEFICIT: ICD-10-CM

## 2022-11-02 PROCEDURE — 92507 TX SP LANG VOICE COMM INDIV: CPT

## 2022-11-02 ASSESSMENT — SOCIAL DETERMINANTS OF HEALTH (SDOH): SOCIAL_SUPPORT_SYSTEM: CAREGIVER

## 2022-11-02 NOTE — OP THERAPY DAILY TREATMENT
"  Outpatient Speech Therapy  DAILY TREATMENT     [facility] Speech [address].  [address]  [place] [address]  Phone:  [phone]  Fax:  [phone]    Date: 11/02/2022    Patient: Shaista Bocanegra  YOB: 1945  MRN: [id number]     Time Calculation    Start time: 0947  Stop time: 1030 Time Calculation (min): 43 minutes         Chief Complaint: Poor Memory    Visit #: 6    Subjective:   Reason for Therapy:     Reason For Evaluation:  CVA, Cognition and Aphasia    Onset Date:  5/30/2021  Social Support:     Accompanied By:  Caregiver (Radha)    Patient Mental Status:  Alert and Responsive  Therapy History:     Hearing:  Hearing Aids (Bilateral)    Vision:  Eye Glasses (reporting blurry vision)    Handedness:  Right-handed  Additional Subjective Comments:      Patient reported she has played solitaire and word search puzzles since last visit. She also reported she is willing to try taking on tasks that she usually delegates to others.         Objective:   Treatments/Interventions Performed:  Cognitive-Linguistic training, Patient/Caregiver education and Speech/Language treatment  Objective Details:  1.  Patient will improve attention, completing complex, divided attention tasks with 80% accuracy.   --Not formally addressed  2.  Patient will improve executive functions completing complex problem solving and reasoning tasks with 80% accuracy, independent.   --Patient participated in formulating a list of home carryover activities based on current activities and areas of interest.  Continued discussion of taking on tasks that she is able to do rather than delegating to others to increase independence and functional \"cognitive stimulation\"  3.  Patient will utilize word-finding strategies including SFA in the setting of specific word confrontation with 85% accuracy and min assist    -Reviewed SFA strategies  4.  Patient will improve memory recall using internal and external strategies " to improve and support working memory, short term and time delayed recall, implementing compensatory strategies as necessary, with 80% or greater accuracy with mod cues.   --Progressing, Patient played Memory with pairs of cards, with cues to slow down and focus on task, rather than peek at cards to complete game.  5.  Patient will complete visuospatial and scanning tasks with 80% accuracy to reduce left neglect.    --Not formally addressed       Speech Therapy Assessment:     Cognitive Linguistic Assessment:     Patient attention sustained: Moderate    Patient attention divided: Severe    Patient immediate memory: Moderate    Patient recent and short term memory: Moderate    Patient procedural memory: Severe    Patient biographical information memory: WFL    Patient complex reasoning ability: Moderate    Patient complex problem solving ability: Moderate    Patient executive functioning ability: Severe    Patient decision making and planning ability: Moderate    Patient initiation and self monitoring ability: Moderate      Speech Therapy Plan :   Prognosis & Recommendations  Impression Summary:  Patient actively participated in therapy session.  Patient presents with moderate cognitive linguistic deficits, with significant decline in attention, executive function, and visuospatial skills since last seen in March of this year.  Patient would benefit from skilled speech therapy to provide strategies for higher level cognitive skills for greater independence within the home and community.  Patient verbalized understanding and agreement with current plan of care.  Prognosis:  Fair  Goals  Short Term Goals:  1.  Patient will improve attention, completing complex, divided attention tasks with 80% accuracy.   2.  Patient will improve executive functions completing complex problem solving and reasoning tasks with 80% accuracy, independent.   3.  Patient will utilize word-finding strategies including SFA in the setting of  specific word confrontation with 85% accuracy and min assist    4.  Patient will improve memory recall using internal and external strategies to improve and support working memory, short term and time delayed recall, implementing compensatory strategies as necessary, with 80% or greater accuracy with mod cues.   5.  Patient will complete visuospatial and scanning tasks with 80% accuracy to reduce left neglect.      Short Term Goal Duration (Weeks):  2-4 weeks  Long Term Goals:  -Patient will improve speech-language skills and utilize compensatory strategies to communicate wants and needs effectively with different conversational partners, maintain safety and participate socially in functional living environment  with 90% accuracy.  Long Term Goal Duration (Weeks):  2-4 weeks  Therapy Recommendations  Recommendation:  Individual Speech Therapy,  Planned Therapy Interventions:  Compensatory Strategy Training, Patient/Caregiver Education, Home Program and Cognitive-Linguistic training,   Frequency:  1x week

## 2022-11-07 ENCOUNTER — PATIENT MESSAGE (OUTPATIENT)
Dept: HEALTH INFORMATION MANAGEMENT | Facility: OTHER | Age: 77
End: 2022-11-07

## 2022-11-07 ENCOUNTER — SPEECH THERAPY (OUTPATIENT)
Dept: SPEECH THERAPY | Facility: REHABILITATION | Age: 77
End: 2022-11-07
Attending: PHYSICIAN ASSISTANT
Payer: MEDICARE

## 2022-11-07 DIAGNOSIS — R41.841 COGNITIVE COMMUNICATION DEFICIT: ICD-10-CM

## 2022-11-07 PROCEDURE — 92507 TX SP LANG VOICE COMM INDIV: CPT

## 2022-11-07 ASSESSMENT — SOCIAL DETERMINANTS OF HEALTH (SDOH): SOCIAL_SUPPORT_SYSTEM: CAREGIVER

## 2022-11-07 NOTE — OP THERAPY DAILY TREATMENT
"  Outpatient Speech Therapy  DAILY TREATMENT     [facility] Speech [address].  [address]  [place] [address]  Phone:  [phone]  Fax:  [phone]    Date: 11/07/2022    Patient: Shaista Bocanegra  YOB: 1945  MRN: [id number]     Time Calculation    Start time: 1115  Stop time: 1203 Time Calculation (min): 48 minutes         Chief Complaint: Poor Memory    Visit #: 7    Subjective:   Reason for Therapy:     Reason For Evaluation:  CVA, Cognition and Aphasia    Onset Date:  5/30/2021  Social Support:     Accompanied By:  Caregiver (Radha)    Patient Mental Status:  Alert and Responsive  Therapy History:     Hearing:  Hearing Aids (Bilateral)    Vision:  Eye Glasses (reporting blurry vision)    Handedness:  Right-handed  Additional Subjective Comments:      Patient reported she has played solitaire and word search puzzles since last visit. She recently put up calendar at home.        Objective:   Treatments/Interventions Performed:  Cognitive-Linguistic training, Patient/Caregiver education and Speech/Language treatment  Objective Details:  1.  Patient will improve attention, completing complex, divided attention tasks with 80% accuracy.   --Patient completed alternating scanning task with 100% accuracy and organized scanning from L->R  2.  Patient will improve executive functions completing complex problem solving and reasoning tasks with 80% accuracy, independent.   --Patient participated in formulating a list of home carryover activities based on current activities and areas of interest.  Continued discussion of taking on tasks that she is able to do rather than delegating to others to increase independence and functional \"cognitive stimulation\"  3.  Patient will utilize word-finding strategies including SFA in the setting of specific word confrontation with 85% accuracy and min assist    -Reviewed SFA strategies, working on incorporating activity into home activity with " care-givers  4.  Patient will improve memory recall using internal and external strategies to improve and support working memory, short term and time delayed recall, implementing compensatory strategies as necessary, with 80% or greater accuracy with mod cues.   --Progressing, Patient used association strategies to recall items associated with people from different professions 2/5  5.  Patient will complete visuospatial and scanning tasks with 80% accuracy.    --Addressed with attention activity.        Speech Therapy Assessment:     Cognitive Linguistic Assessment:     Patient attention sustained: Minimal    Patient attention divided: Moderate    Patient immediate memory: Moderate    Patient recent and short term memory: Moderate    Patient procedural memory: Moderate    Patient biographical information memory: WFL    Patient complex reasoning ability: Moderate    Patient complex problem solving ability: Moderate    Patient executive functioning ability: Moderate    Patient decision making and planning ability: Moderate    Patient initiation and self monitoring ability: Moderate      Speech Therapy Plan :   Prognosis & Recommendations  Impression Summary:  Patient actively participated in therapy session.  Patient presents with moderate cognitive linguistic deficits, with significant decline in attention, executive function, and visuospatial skills since last seen in March of this year.  Patient would benefit from skilled speech therapy to provide strategies for higher level cognitive skills for greater independence within the home and community.  Patient verbalized understanding and agreement with current plan of care.  Prognosis:  Fair  Goals  Short Term Goals:  1.  Patient will improve attention, completing complex, divided attention tasks with 80% accuracy.   2.  Patient will improve executive functions completing complex problem solving and reasoning tasks with 80% accuracy, independent.   3.  Patient will  utilize word-finding strategies including SFA in the setting of specific word confrontation with 85% accuracy and min assist    4.  Patient will improve memory recall using internal and external strategies to improve and support working memory, short term and time delayed recall, implementing compensatory strategies as necessary, with 80% or greater accuracy with mod cues.   5.  Patient will complete visuospatial and scanning tasks with 80% accuracy.      Short Term Goal Duration (Weeks):  2-4 weeks  Long Term Goals:  -Patient will improve speech-language skills and utilize compensatory strategies to communicate wants and needs effectively with different conversational partners, maintain safety and participate socially in functional living environment  with 90% accuracy.  Long Term Goal Duration (Weeks):  2-4 weeks  Therapy Recommendations  Recommendation:  Individual Speech Therapy,  Planned Therapy Interventions:  Compensatory Strategy Training, Patient/Caregiver Education, Home Program and Cognitive-Linguistic training,   Frequency:  1x week

## 2022-11-09 ENCOUNTER — HOSPITAL ENCOUNTER (OUTPATIENT)
Dept: RADIOLOGY | Facility: MEDICAL CENTER | Age: 77
End: 2022-11-09
Attending: PSYCHIATRY & NEUROLOGY
Payer: MEDICARE

## 2022-11-09 DIAGNOSIS — H53.131 ACUTE LOSS OF VISION, RIGHT: ICD-10-CM

## 2022-11-09 PROCEDURE — 70551 MRI BRAIN STEM W/O DYE: CPT

## 2022-11-14 ENCOUNTER — HOSPITAL ENCOUNTER (OUTPATIENT)
Dept: CARDIOLOGY | Facility: MEDICAL CENTER | Age: 77
End: 2022-11-14
Attending: INTERNAL MEDICINE
Payer: MEDICARE

## 2022-11-14 ENCOUNTER — HOSPITAL ENCOUNTER (OUTPATIENT)
Dept: RADIOLOGY | Facility: MEDICAL CENTER | Age: 77
End: 2022-11-14
Attending: FAMILY MEDICINE
Payer: MEDICARE

## 2022-11-14 DIAGNOSIS — I42.0 DCM (DILATED CARDIOMYOPATHY) (HCC): ICD-10-CM

## 2022-11-14 DIAGNOSIS — R92.8 ABNORMAL FINDING ON RADIOLOGICAL EXAMINATION OF BREAST: ICD-10-CM

## 2022-11-14 LAB
LV EJECT FRACT  99904: 55
LV EJECT FRACT MOD 2C 99903: 59.05
LV EJECT FRACT MOD 4C 99902: 52.27
LV EJECT FRACT MOD BP 99901: 54.84

## 2022-11-14 PROCEDURE — 77065 DX MAMMO INCL CAD UNI: CPT | Mod: RT

## 2022-11-14 PROCEDURE — 93306 TTE W/DOPPLER COMPLETE: CPT | Mod: 26 | Performed by: INTERNAL MEDICINE

## 2022-11-14 PROCEDURE — 93306 TTE W/DOPPLER COMPLETE: CPT

## 2022-11-15 ENCOUNTER — TELEPHONE (OUTPATIENT)
Dept: CARDIOLOGY | Facility: MEDICAL CENTER | Age: 77
End: 2022-11-15
Payer: MEDICARE

## 2022-11-15 NOTE — TELEPHONE ENCOUNTER
----- Message from Darin Meehan M.D. sent at 11/14/2022 12:25 PM PST -----  Please call patient with echocardiogram results showing normally functioning surgical mitral valve repair and normal left ventricular systolic function. FYI-Her last echo showed mildly reduced left ventricular systolic function. Sheila LESTER

## 2022-11-16 ENCOUNTER — TELEPHONE (OUTPATIENT)
Dept: INTERNAL MEDICINE | Facility: OTHER | Age: 77
End: 2022-11-16
Payer: MEDICARE

## 2022-11-16 NOTE — TELEPHONE ENCOUNTER
Pt is scheduled for a 13 month f/u [facility] in Feb. Son Fer called [phone] to seek assistance with her disability insurance through [employer] that she has been paying a lot for. Apparently, they did an assessment and she didn't meet the qualifications to renew her coverage and Fer has questions, please call.

## 2022-11-21 ENCOUNTER — TELEPHONE (OUTPATIENT)
Dept: INTERNAL MEDICINE | Facility: OTHER | Age: 77
End: 2022-11-21

## 2022-11-21 ENCOUNTER — SPEECH THERAPY (OUTPATIENT)
Dept: SPEECH THERAPY | Facility: REHABILITATION | Age: 77
End: 2022-11-21
Attending: PHYSICIAN ASSISTANT
Payer: MEDICARE

## 2022-11-21 DIAGNOSIS — R41.841 COGNITIVE COMMUNICATION DEFICIT: ICD-10-CM

## 2022-11-21 PROCEDURE — 92507 TX SP LANG VOICE COMM INDIV: CPT

## 2022-11-21 ASSESSMENT — SOCIAL DETERMINANTS OF HEALTH (SDOH): SOCIAL_SUPPORT_SYSTEM: CAREGIVER

## 2022-11-21 NOTE — TELEPHONE ENCOUNTER
"MSW intern contacted the patient's son Fer to discuss his mother's disability insurance. The patient has paid for disability insurance with [employer] for \"20 or 25 years\" and was recently denied coverage. Fer reports this is because she is primarily affected cognitively and doesn't meet the requirements for aid with ADLs    The patient's family members provide a lot of assistance and they also have [employer] providing care and transportation four days/week. They had hoped to be able to receive disability benefits to help subsidize this cost.    MSW intern provided support and navigation.      Taniya Choudhury  MSW Intern    "

## 2022-11-21 NOTE — OP THERAPY DISCHARGE SUMMARY
"  Outpatient Speech Therapy  DISCHARGE SUMMARY NOTE      [facility] Speech [place] [address] [address]  [address]  [place] [address]  Phone:  [phone]  Fax:  [phone]    Date of Visit: 11/21/2022    Patient: Shaista Bocanegra  YOB: 1945  MRN: [id number]     Referring Provider: SULEIMAN [address] [place] [address]   Referring Diagnosis: Dysphagia following cerebral infarction [I69.391]     Visit #: 8    Time Calculation    Start time: 1050  Stop time: 1124 Time Calculation (min): 34 minutes           Chief Complaint: Poor Memory    Visit Diagnoses     ICD-10-CM   1. Cognitive communication deficit  R41.841       Subjective:   Reason for Therapy:     Reason For Evaluation:  CVA, Cognition and Aphasia    Onset Date:  5/30/2021  Social Support:     Accompanied By:  Caregiver (Radha)    Patient Mental Status:  Alert and Responsive  Therapy History:     Hearing:  Hearing Aids (Bilateral)    Vision:  Eye Glasses (reporting blurry vision)    Handedness:  Right-handed  Additional Subjective Comments:      Patient reported she has been completing home activities as recommended.      Objective:   Treatments/Interventions Performed:  Cognitive-Linguistic training, Patient/Caregiver education and Speech/Language treatment  Objective Details:  1.  Patient will improve attention, completing complex, divided attention tasks with 80% accuracy.   --Patient completed alternating scanning task with 100% accuracy and organized scanning from L->R  2.  Patient will improve executive functions completing complex problem solving and reasoning tasks with 80% accuracy, independent.   --Patient participated in formulating a list of home carryover activities based on current activities and areas of interest.  Continued discussion of taking on tasks that she is able to do rather than delegating to others to increase independence and functional \"cognitive stimulation\"  3.  Patient will utilize " word-finding strategies including SFA in the setting of specific word confrontation with 85% accuracy and min assist    -Reviewed SFA strategies, working on incorporating activity into home activity with care-givers  4.  Patient will improve memory recall using internal and external strategies to improve and support working memory, short term and time delayed recall, implementing compensatory strategies as necessary, with 80% or greater accuracy with mod cues.   --Patient used association strategies to recall items associated with people from different professions 2/5  5.  Patient will complete visuospatial and scanning tasks with 80% accuracy.    --Addressed with attention activity.     Speech Therapy Assessment:     Cognitive Linguistic Assessment:     Patient attention sustained: Minimal    Patient attention divided: Moderate    Patient immediate memory: Moderate    Patient recent and short term memory: Moderate    Patient procedural memory: Moderate    Patient biographical information memory: WFL    Patient complex reasoning ability: Moderate    Patient complex problem solving ability: Moderate    Patient executive functioning ability: Moderate    Patient decision making and planning ability: Moderate    Patient initiation and self monitoring ability: Moderate      Speech Therapy Plan :   Prognosis & Recommendations  Impression Summary:  Patient actively participated in therapy session.  Patient's has demonstrated consistent use of external memory strategies to manage activities of [place] living, but has reached a plateau with use of internal memory strategies.  Much of therapy has focused on increasing and maintaining independence at home, as well as setting up a home program of cognitive stimulation activities.  Goals for therapy have been met or have plateaued, therefore Patient will be discharged at this time.  Patient was instructed to contact doctor if new symptoms arise for new speech therapy  referral.      Prognosis:  Fair  Goals  Short Term Goals:  1.  Patient will improve attention, completing complex, divided attention tasks with 80% accuracy.   2.  Patient will improve executive functions completing complex problem solving and reasoning tasks with 80% accuracy, independent.   3.  Patient will utilize word-finding strategies including SFA in the setting of specific word confrontation with 85% accuracy and min assist    4.  Patient will improve memory recall using internal and external strategies to improve and support working memory, short term and time delayed recall, implementing compensatory strategies as necessary, with 80% or greater accuracy with mod cues.   5.  Patient will complete visuospatial and scanning tasks with 80% accuracy.      Short Term Goal Duration (Weeks):  2-4 weeks  Long Term Goals:  -Patient will improve speech-language skills and utilize compensatory strategies to communicate wants and needs effectively with different conversational partners, maintain safety and participate socially in functional living environment  with 90% accuracy.  Long Term Goal Duration (Weeks):  2-4 weeks  Therapy Recommendations  Recommendation:  No further speech therapy indicated at this time,  Planned Therapy Interventions:  Home Program,       Functional Assessment Used

## 2022-11-21 NOTE — OP THERAPY DAILY TREATMENT
Outpatient Speech Therapy  DAILY TREATMENT     [facility] Speech [address].  [address]  [place] [address]  Phone:  [phone]  Fax:  [phone]    Date: 11/21/2022    Patient: Shaista Bocanegra  YOB: 1945  MRN: [id number]     Time Calculation    Start time: 1050  Stop time: 1124 Time Calculation (min): 34 minutes         Chief Complaint: Poor Memory    Visit #: 8      Please refer to Discharge summary.

## 2022-11-22 NOTE — TELEPHONE ENCOUNTER
"MSW intern contacted the patient's son Fer to discuss his mother's disability insurance. The patient has paid for disability insurance with [employer] for \"20 or 25 years\" and was recently denied coverage. Fer reports this is because she is primarily affected cognitively and doesn't meet the requirements for aid with ADLs     The patient's family members provide a lot of assistance and they also have [employer] providing care and transportation four days/week. They had hoped to be able to receive disability benefits to help subsidize this cost.     MSW intern provided support and navigation.        Taniya Choudhury  MSW Intern  "

## 2022-11-26 NOTE — PATIENT INSTRUCTIONS
-Referral to neurology.  I would like to have another opinion about whether or not she had a TIA.  It could have ramifications about keeping her on the Eliquis long-term.    -I am in favor of keeping her on the Eliquis for now but she did have a sore over the left atrial appendage which makes it less likely she would have a stroke from A. fib.    -Stop metoprolol for now, do not throw it away.    -Stop iron supplementation for now.  To have the chemo follow-up on her blood count to make sure it remains normal.  I believe the iron was to supplement her after open heart surgery at which time she would have lost a lot of blood.  We need to make sure she does not have a bleeding problem on her Eliquis.    -Her echocardiogram is scheduled for September 13, 2022 at 10:15 AM.   AHRF with aspiration in patient with renal transplant, ATN, DM, HFpEF  physical as above  now s/ reintubation  continue MV  consider tracheostomy  restart cefepime pending sputum culture  restart feeds  follow renal function

## 2022-11-28 ENCOUNTER — OFFICE VISIT (OUTPATIENT)
Dept: CARDIOLOGY | Facility: MEDICAL CENTER | Age: 77
End: 2022-11-28
Payer: MEDICARE

## 2022-11-28 VITALS
SYSTOLIC BLOOD PRESSURE: 108 MMHG | HEIGHT: 66 IN | WEIGHT: 146 LBS | RESPIRATION RATE: 16 BRPM | DIASTOLIC BLOOD PRESSURE: 60 MMHG | OXYGEN SATURATION: 96 % | HEART RATE: 86 BPM | BODY MASS INDEX: 23.46 KG/M2

## 2022-11-28 DIAGNOSIS — Z79.01 CHRONIC ANTICOAGULATION: ICD-10-CM

## 2022-11-28 DIAGNOSIS — Z98.890 S/P MITRAL VALVE REPAIR: ICD-10-CM

## 2022-11-28 DIAGNOSIS — I10 PRIMARY HYPERTENSION: ICD-10-CM

## 2022-11-28 DIAGNOSIS — Z86.73 HISTORY OF CVA (CEREBROVASCULAR ACCIDENT): ICD-10-CM

## 2022-11-28 DIAGNOSIS — I48.0 PAROXYSMAL ATRIAL FIBRILLATION (HCC): ICD-10-CM

## 2022-11-28 DIAGNOSIS — I42.0 DCM (DILATED CARDIOMYOPATHY) (HCC): ICD-10-CM

## 2022-11-28 DIAGNOSIS — Z79.899 HIGH RISK MEDICATION USE: ICD-10-CM

## 2022-11-28 DIAGNOSIS — I48.91 ATRIAL FIBRILLATION, UNSPECIFIED TYPE (HCC): ICD-10-CM

## 2022-11-28 DIAGNOSIS — E78.49 OTHER HYPERLIPIDEMIA: ICD-10-CM

## 2022-11-28 DIAGNOSIS — E78.5 DYSLIPIDEMIA: ICD-10-CM

## 2022-11-28 DIAGNOSIS — D69.59 SECONDARY THROMBOCYTOPENIA: ICD-10-CM

## 2022-11-28 PROCEDURE — 99214 OFFICE O/P EST MOD 30 MIN: CPT | Performed by: NURSE PRACTITIONER

## 2022-11-28 RX ORDER — CHLORHEXIDINE GLUCONATE ORAL RINSE 1.2 MG/ML
SOLUTION DENTAL
COMMUNITY
End: 2022-12-07

## 2022-11-28 RX ORDER — HYDROCODONE BITARTRATE AND ACETAMINOPHEN 7.5; 325 MG/1; MG/1
TABLET ORAL
COMMUNITY
End: 2022-12-07

## 2022-11-28 RX ORDER — CEFDINIR 300 MG/1
CAPSULE ORAL
COMMUNITY
End: 2022-12-07

## 2022-11-28 RX ORDER — ATORVASTATIN CALCIUM 80 MG/1
80 TABLET, FILM COATED ORAL EVERY EVENING
Qty: 90 TABLET | Refills: 3 | Status: SHIPPED | OUTPATIENT
Start: 2022-11-28 | End: 2023-11-27

## 2022-11-28 RX ORDER — SPIRONOLACTONE 25 MG/1
25 TABLET ORAL DAILY
Qty: 90 TABLET | Refills: 3 | Status: SHIPPED | OUTPATIENT
Start: 2022-11-28 | End: 2023-08-08

## 2022-11-28 RX ORDER — AMOXICILLIN 500 MG/1
CAPSULE ORAL
COMMUNITY
Start: 2022-11-16 | End: 2022-12-07

## 2022-11-28 ASSESSMENT — ENCOUNTER SYMPTOMS
FEVER: 0
COUGH: 0
BLURRED VISION: 0
SHORTNESS OF BREATH: 0
LOSS OF CONSCIOUSNESS: 0
WEIGHT LOSS: 0
BLOOD IN STOOL: 0
ABDOMINAL PAIN: 0
FALLS: 0
MYALGIAS: 0
PALPITATIONS: 0
ORTHOPNEA: 0
VOMITING: 0
CLAUDICATION: 0
DIZZINESS: 1
NAUSEA: 0
TINGLING: 0
PND: 0
BRUISES/BLEEDS EASILY: 0

## 2022-11-28 ASSESSMENT — FIBROSIS 4 INDEX: FIB4 SCORE: 1.5

## 2022-11-28 NOTE — PATIENT INSTRUCTIONS
Drink 64 ounces of water daily. You may add 8 ounces of electrolyte replacement fluids too.     Check if you are taking metoprolol and ask the prescriber (listed on the bottle) why you are taking this medication while dizzy.     Blood work at earliest convenience

## 2022-11-28 NOTE — PROGRESS NOTES
Chief Complaint   Patient presents with    Follow-Up     F/V DX: S/P mitral valve repair       Subjective     Shaista Bocanegra is a 77 y.o. female who presents today mitral valve repair follow-up.  Patient was last seen by Dr. Meehan on 8/24/2022.  Patient has additional medical problems of TIA/CVA, A. fib, breast cancer, LALY, and dementia.    Today, patient reports dizziness with walking short distances become more frequent.  Patient denies adequate oral hydration intake and is working on this at home.  Otherwise, patient denies near syncope or tunnel vision with associated dizziness and denies chest pain, shortness of breath, palpitations, orthopnea, PND or Edema.  Blood pressure well controlled in office today.  We discussed her echocardiogram with improved LV function.    We will check blood work for electrolyte, renal, and hematologic function abnormalities likely associated with dizziness.  Otherwise, patient to continue medications as previously prescribed follow-up with Dr. Meehan in 6 months.      Past Medical History:   Diagnosis Date    Anemia 09/28/2021    Arthritis     toe    Atrial fibrillation (HCC)     Benign essential HTN 03/19/2012    Breast cancer (HCC)     Cancer ([facility]) 1998    breast     Cardiac arrhythmia     Chest tightness or pressure 03/19/2012    Chickenpox     Coronary heart disease     [place] measles     Gynecological disorder     High cholesterol     High risk medication use 03/19/2012    Hypercholesterolemia 03/19/2012    Mumps     MVP (mitral valve prolapse) 03/19/2012    Osteoporosis     Seizure disorder ([facility]) 09/28/2021    last seizure may 2021    Sleep apnea     CPAP at night    Snoring     Stroke ([facility]) 2017    residual minor weakness on left side    Substance abuse ([facility])     Tonsillitis     Urinary bladder disorder     OAB    Urinary incontinence     Valvular heart disease     Venereal disease      Past Surgical History:   Procedure Laterality Date    CATARACT PHACO WITH IOL   3/16/2009    Performed by SHANNON GANDARA at [facility]    CATARACT PHACO WITH IOL  1/26/2009    Performed by SHANNON GANDARA at [facility]    BREAST BIOPSY      HYSTERECTOMY LAPAROSCOPY      LUMPECTOMY      AL CHEMOTHERAPY, UNSPECIFIED PROCEDURE      AL RADIATION THERAPY PLAN SIMPLE      AL REMV 2ND CATARACT,CORN-SCLER SECTN      PRIMARY C SECTION      SINUSCOPE      TONSILLECTOMY       Family History   Problem Relation Age of Onset    Cancer Mother         breast    No Known Problems Brother     Heart Failure Neg Hx     Heart Disease Neg Hx      Social History     Socioeconomic History    Marital status:      Spouse name: Not on file    Number of children: 2    Years of education: Not on file    Highest education level: Professional school degree (e.g., MD, DDS, DVM, ARACELI)   Occupational History    Occupation:      Employer: Not Employed   Tobacco Use    Smoking status: Never    Smokeless tobacco: Never   Vaping Use    Vaping Use: Never used   Substance and Sexual Activity    Alcohol use: Yes     Alcohol/week: 1.2 oz     Types: 2 Glasses of wine per week     Comment: twice a week    Drug use: No    Sexual activity: Yes     Partners: Male     Birth control/protection: Female Sterilization   Other Topics Concern    Not on file   Social History Narrative    Not on file     Social Determinants of Health     Financial Resource Strain: Low Risk     Difficulty of Paying Living Expenses: Not hard at all   Food Insecurity: No Food Insecurity    Worried About Running Out of Food in the Last Year: Never true    Ran Out of Food in the Last Year: Never true   Transportation Needs: No Transportation Needs    Lack of Transportation (Medical): No    Lack of Transportation (Non-Medical): No   Physical Activity: Insufficiently Active    Days of Exercise per Week: 4 days    Minutes of Exercise per Session: 30 min   Stress: Not on file   Social Connections: Socially Isolated     Frequency of Communication with Friends and Family: More than three times a week    Frequency of Social Gatherings with Friends and Family: Twice a week    Attends Temple Services: Never    Active Member of Clubs or Organizations: No    Attends Club or Organization Meetings: Never    Marital Status:    Intimate Partner Violence: Not on file   Housing Stability: Low Risk     Unable to Pay for Housing in the Last Year: No    Number of Places Lived in the Last Year: 1    Unstable Housing in the Last Year: No     No Known Allergies  Outpatient Encounter Medications as of 11/28/2022   Medication Sig Dispense Refill    apixaban (ELIQUIS) 5mg Tab Take 1 Tablet by mouth 2 times a day. 180 Tablet 3    atorvastatin (LIPITOR) 80 MG tablet Take 1 Tablet by mouth every evening. 90 Tablet 3    spironolactone (ALDACTONE) 25 MG Tab Take 1 Tablet by mouth every day. 90 Tablet 3    ferrous sulfate 325 (65 Fe) MG tablet Take 325 mg by mouth every day.      fexofenadine (ALLEGRA) 60 MG Tab Take 60 mg by mouth every day.      estradiol (VIVELLE DOT) 0.1 MG/24HR PATCH BIWEEKLY Place 1 Patch on the skin.      tamsulosin (FLOMAX) 0.4 MG capsule Take 0.4 mg by mouth 1/2 hour after breakfast.      Multiple Vitamin (MULTIVITAMINS PO) Take 1 Tab by mouth every day.      amoxicillin (AMOXIL) 500 MG Cap       cefdinir (OMNICEF) 300 MG Cap cefdinir 300 mg capsule   TAKE 1 CAPSULE (300 MG TOTAL) BY MOUTH TWO TIMES A DAY FOR 7 DAYS.      chlorhexidine (PERIDEX) 0.12 % Solution chlorhexidine gluconate 0.12 % mouthwash      HYDROcodone-acetaminophen (NORCO) 7.5-325 MG tab hydrocodone 7.5 mg-acetaminophen 325 mg tablet      mupirocin (BACTROBAN) 2 % Ointment mupirocin 2 % topical ointment   USE TWICE DAILY ON ANKLE WOUND FOR TWO WEEKS, ONCE DAILY TO PROCEDURE WOUND ON BACK FOR TWO WEEKS      cyanocobalamin (VITAMIN B-12) 100 MCG Tab Take 100 mcg by mouth every day.      [DISCONTINUED] apixaban (ELIQUIS) 5mg Tab Take 1 Tablet by mouth 2  "times a day. 180 Tablet 7    [DISCONTINUED] spironolactone (ALDACTONE) 25 MG Tab Take 1 Tablet by mouth every day. 90 Tablet 7    [DISCONTINUED] atorvastatin (LIPITOR) 80 MG tablet Take 1 Tablet by mouth every evening. 90 Tablet 7     No facility-administered encounter medications on file as of 11/28/2022.     Review of Systems   Constitutional:  Negative for fever, malaise/fatigue and weight loss.   Eyes:  Negative for blurred vision.   Respiratory:  Negative for cough and shortness of breath.    Cardiovascular:  Negative for chest pain, palpitations, orthopnea, claudication, leg swelling and PND.   Gastrointestinal:  Negative for abdominal pain, blood in stool, nausea and vomiting.   Genitourinary:  Negative for dysuria, frequency and hematuria.   Musculoskeletal:  Negative for falls and myalgias.   Neurological:  Positive for dizziness. Negative for tingling and loss of consciousness.   Endo/Heme/Allergies:  Does not bruise/bleed easily.            Objective     /60 (BP Location: Right arm, Patient Position: Sitting, BP Cuff Size: Adult)   Pulse 86   Resp 16   Ht 1.676 m (5' 6\")   Wt 66.2 kg (146 lb)   SpO2 96%   BMI 23.57 kg/m²     Physical Exam  Vitals reviewed.   Constitutional:       Appearance: She is well-developed.      Comments: forgetfull   HENT:      Head: Normocephalic and atraumatic.   Eyes:      Pupils: Pupils are equal, round, and reactive to light.   Neck:      Vascular: No JVD.   Cardiovascular:      Rate and Rhythm: Normal rate and regular rhythm.      Heart sounds: Normal heart sounds. No murmur heard.    No friction rub. No gallop.   Pulmonary:      Effort: Pulmonary effort is normal. No respiratory distress.      Breath sounds: Normal breath sounds.   Abdominal:      General: Bowel sounds are normal. There is no distension.      Palpations: Abdomen is soft.   Musculoskeletal:      Right lower leg: No edema.      Left lower leg: No edema.   Skin:     General: Skin is warm and dry. "      Findings: No erythema.   Neurological:      General: No focal deficit present.      Mental Status: She is alert and oriented to person, place, and time.   Psychiatric:         Behavior: Behavior normal.          Lab Results   Component Value Date/Time    CHOLSTRLTOT 115 05/02/2022 11:46 AM    LDL 49 05/02/2022 11:46 AM    HDL 55 05/02/2022 11:46 AM    TRIGLYCERIDE 56 05/02/2022 11:46 AM       Lab Results   Component Value Date/Time    SODIUM 134 (L) 05/02/2022 11:46 AM    POTASSIUM 4.5 05/02/2022 11:46 AM    CHLORIDE 102 05/02/2022 11:46 AM    CO2 22 05/02/2022 11:46 AM    GLUCOSE 107 (H) 05/02/2022 11:46 AM    BUN 20 05/02/2022 11:46 AM    CREATININE 0.72 05/02/2022 11:46 AM    BUNCREATRAT 12 06/12/2020 05:12 AM     Lab Results   Component Value Date/Time    ALKPHOSPHAT 62 05/02/2022 11:46 AM    ASTSGOT 16 05/02/2022 11:46 AM    ALTSGPT 18 05/02/2022 11:46 AM    TBILIRUBIN 0.6 05/02/2022 11:46 AM      BIOTEL CONCLUSIONS (07/14/21)  BioTel 6 day Summary:   1. Atrial fibrillation with appropriate heart rate range. Average 69, range 35 to 120 bpm.   2. No significant pauses (up to 2.2 seconds).   3. Occasional PVCs, 2% burden.   4. 1 patient trigger, no symptoms reported.   Conclusion: Persistent atrial fibrillation with appropriate heart rate, no pauses, occasional PVCs.     CARDIAC CATHETERIZATION CONCLUSIONS by Juventino Farah (05/08/20)  Angiographically normal appearing coronary arteries  Mildly elevated LVEDP, filling pressures.  2+ mitral regurgitation.  Normal LV function.     CAROTID ULTRASOUND (08/18/17)  Normal carotids, subclavians and vertebrals.     CTA OF HEAD (05/02/22)  1.  Moderate sized area of chronic infarction with surrounding encephalomalacia in the right frontal parasylvian region.  2.  Otherwise unremarkable CT angiogram of the head.  3.  Age-related cerebral atrophy  (study result reviewed)     CTA OF NECK (05/02/22)  CT angiogram of the neck within normal limits.  (study result  reviewed)      CTA OF HEAD (09/04/21)          No evidence of flow-limiting stenosis in the cervical carotid or cervical vertebral arteries.     CT OF HEAD (05/31/21)  1.  Moderate acute/subacute left temporal lobe infarct.  2.  Chronic ischemic changes.  3.  No acute intracranial hemorrhage.     CTA OF HEAD (05/31/21)  No acute large vessel occlusion or hemodynamically significant stenosis.  Acute/subacute appearing left temporal lobe infarct.     CTA OF NECK (09/04/21)           No evidence of flow-limiting stenosis in the cervical carotid or cervical vertebral arteries.     CTA OF NECK (05/31/21)  1.  No stenosis or dissection is seen within the carotid or vertebral arteries bilaterally.  2.  Tree-in-bud nodularity in the right upper lobe is likely infectious/inflammatory.  3.  Mucosal thickening right maxillary sinus.  4.  Nasal bone deformities bilaterally are likely chronic.     CT OF HEAD (05/03/19)  NO ACUTE ABNORMALITIES ARE NOTED ON CT SCAN OF THE HEAD.  Right-sided encephalomalacia is noted.     CTA OF HEAD (08/18/17)  1.  There is a right M1 segment occlusion.  2.  There is collateral flow in the peripheral M3 branches seen on the right.  3.  There is mild decreased enhancement of the visualized right internal carotid artery however it is patent.  4.  Question of subtle hypodensity in the right insular cortex region. No abnormal brain parenchymal enhancement is seen.     CTA OF NECK (08/18/17)  1.  CT angiogram of the neck within normal limits.  2.  Thrombosed right M1 segment.     ECHOCARDIOGRAM CONCLUSIONS (06/21/22)  No acute intracranial process.  Remote infarcts in the right frontal, left occipital region as described above.  Remote hemorrhage into the right basal ganglia is noted with hemosiderin staining. Remote microhemorrhage into the left medial occipital periatrial white matter is also noted.  Age-related volume loss and chronic microvascular ischemic changes.  (study result reviewed)      ECHOCARDIOGRAM CONCLUSIONS (09/30/21)  Normal left ventricular chamber size.  The left ventricular ejection fraction is visually estimated to be 60%.  Known mitral valve repair which is functioning normally with appropriate transvalvular gradient.  No mitral regurgitation.  Right heart pressures are normal.      ECHOCARDIOGRAM CONCLUSIONS (06/01/21)  Left ventricular ejection fraction is visually estimated to be 65%.  Diastolic function is difficult to assess with atrial fibrillation.  Severely dilated left atrium.  Negative bubble study including Valsalva.  Myxomatous changes of the mitral valve leaflets with prolapse of the anterior and posterior leaflets.  Moderate to severe eccentric mitral regurgitation, probably severe.  Pulmonary veins not well seen on the study.  Estimated right ventricular systolic pressure is 31 mmHg + estimated RAP.  Compared to the images of the study done 11- there has been no significant change, prior echo had pulmonary vein flow reversal consistent with  severe mitral regurgitation.     ECHOCARDIOGRAM CONCLUSIONS (02/03/20)  Prior echo done on 05/03/2019. Compared to the images of the prior study done -  there has been no significant change.   Normal left ventricular systolic function.  Left ventricular ejection fraction is visually estimated to be 65%.  Prolapse of the mitral leaflets was present.  Severe mitral regurgitation.  Mild tricuspid regurgitation.  Estimated right ventricular systolic pressure is 35 mmHg.     ECHOCARDIOGRAM CONCLUSIONS (05/03/19)  Prior echocardiogram 6/14/2018.  Normal left ventricular systolic function.   Mild to moderate eccentric mitral regurgitation.  Compared to the images of the study done - there has been slight improvement in mitral regurgitation.     ECHOCARDIOGRAM CONCLUSIONS (06/14/18)  Normal left ventricular systolic function.  Left ventricular ejection fraction is visually estimated to be 60%.  Myxomatous changes of the mitral  valve.  Prolapse of the anterior and posterior mitral leaflets.  Severe, eccentric mitral regurgitation.  Right heart pressures are normal.  Compared to the report of the study done 8/19/2017 severe mitral regurgitation is now present, previously reported as moderate but a MR   ERO was not recorded.     Transthoracic echocardiogram (11/14/2022):   Prior study on 06/21/22, compared to the images of the prior study,   there has been improvement of LVSF.   Normal left ventricular systolic function.  The left ventricular ejection fraction is visually estimated to be 55%.  Known mitral valve repair which is functioning normally with   appropriate transvalvular gradient.  Mean gradient of 2 mmHg.  No mitral regurgitation.  Estimated right ventricular systolic pressure is 30 mmHg.    Assessment & Plan     1. Atrial fibrillation, unspecified type (HCC)  apixaban (ELIQUIS) 5mg Tab    Comp Metabolic Panel    CBC WITHOUT DIFFERENTIAL      2. Other hyperlipidemia  atorvastatin (LIPITOR) 80 MG tablet    Comp Metabolic Panel    CBC WITHOUT DIFFERENTIAL      3. S/P mitral valve repair  spironolactone (ALDACTONE) 25 MG Tab    Comp Metabolic Panel    CBC WITHOUT DIFFERENTIAL      4. Dyslipidemia  Comp Metabolic Panel    CBC WITHOUT DIFFERENTIAL      5. History of CVA (cerebrovascular accident)  Comp Metabolic Panel    CBC WITHOUT DIFFERENTIAL      6. Primary hypertension  Comp Metabolic Panel    CBC WITHOUT DIFFERENTIAL      7. DCM (dilated cardiomyopathy) (HCC)        8. Paroxysmal atrial fibrillation (HCC)        9. Chronic anticoagulation        10. High risk medication use        11. Secondary thrombocytopenia            Medical Decision Making: Today's Assessment/Status/Plan:        Valvular cardiomyopathy; status post mitral valve repair (minimally invasive complex mitral valve repair, maze procedure and left atrial appendage oversew by [facility] at [facility] on 09/16/21)  -No MR on TTE  -Improved LV  function  -Continue Aldactone 25 mg daily    Paroxysmal Atrial Fibrillation (PAF); Status post left temporal lobe infarct (05/31/21) with history of cerebrovascular accident with right carotid arteriography with mechanical thrombectomy (08/18/17) and transient ischemic attack (05/02/22)  - Asymptomatic, denies AF breakthrough, rate controlled.   - On OAC with Eliquis, continue.    Hypertension; hyperlipidemia  -Blood pressure well controlled in office today  -Continue atorvastatin 80 mg daily.  LDL 49 (5/2/2022) at goal    High risk medication use; chronic OAC use  -This includes Eliquis, spironolactone  -Patient denies signs or symptoms of bleeding  -Will continue to closely monitor for side effects of patient's high risk medication(s) including blood count, renal function and electrolytes  -Close monitoring discussed with patient.  Lab work ordered.      FU in clinic in 6 months. Sooner if needed.    Patient verbalizes understanding and agrees with the plan of care.     I personally spent a total of 32 minutes which includes face-to-face time and non-face-to-face time spent on preparing to see the patient, reviewing prior notes and tests, obtaining history from the patient, performing a medically appropriate exam, counseling and educating the patient, ordering medications/tests/procedures/referrals as clinically indicated, and documenting information in the electronic medical record.    SVETLANA HuddlestonR.N.   [facility]  [phone]    PLEASE NOTE: This Note was created using voice recognition Software. I have made every reasonable attempt to correct obvious errors, but I expect that there are errors of grammar and possibly content that I did not discover before finalizing the note

## 2022-11-30 ENCOUNTER — APPOINTMENT (RX ONLY)
Dept: URBAN - METROPOLITAN AREA CLINIC 4 | Facility: CLINIC | Age: 77
Setting detail: DERMATOLOGY
End: 2022-11-30

## 2022-11-30 DIAGNOSIS — L81.4 OTHER MELANIN HYPERPIGMENTATION: ICD-10-CM

## 2022-11-30 DIAGNOSIS — L90.5 SCAR CONDITIONS AND FIBROSIS OF SKIN: ICD-10-CM

## 2022-11-30 DIAGNOSIS — Z86.007 PERSONAL HISTORY OF IN-SITU NEOPLASM OF SKIN: ICD-10-CM

## 2022-11-30 DIAGNOSIS — D18.0 HEMANGIOMA: ICD-10-CM

## 2022-11-30 DIAGNOSIS — L82.1 OTHER SEBORRHEIC KERATOSIS: ICD-10-CM

## 2022-11-30 DIAGNOSIS — L57.0 ACTINIC KERATOSIS: ICD-10-CM

## 2022-11-30 DIAGNOSIS — D22 MELANOCYTIC NEVI: ICD-10-CM | Status: STABLE

## 2022-11-30 PROBLEM — D22.72 MELANOCYTIC NEVI OF LEFT LOWER LIMB, INCLUDING HIP: Status: ACTIVE | Noted: 2022-11-30

## 2022-11-30 PROBLEM — D22.5 MELANOCYTIC NEVI OF TRUNK: Status: ACTIVE | Noted: 2022-11-30

## 2022-11-30 PROBLEM — D18.01 HEMANGIOMA OF SKIN AND SUBCUTANEOUS TISSUE: Status: ACTIVE | Noted: 2022-11-30

## 2022-11-30 PROBLEM — D48.5 NEOPLASM OF UNCERTAIN BEHAVIOR OF SKIN: Status: ACTIVE | Noted: 2022-11-30

## 2022-11-30 PROCEDURE — 17000 DESTRUCT PREMALG LESION: CPT | Mod: 59

## 2022-11-30 PROCEDURE — 17003 DESTRUCT PREMALG LES 2-14: CPT

## 2022-11-30 PROCEDURE — ? LIQUID NITROGEN

## 2022-11-30 PROCEDURE — ? DIAGNOSIS COMMENT

## 2022-11-30 PROCEDURE — ? OBSERVATION AND MEASURE

## 2022-11-30 PROCEDURE — ? COUNSELING

## 2022-11-30 PROCEDURE — ? BIOPSY BY SHAVE METHOD

## 2022-11-30 PROCEDURE — 99213 OFFICE O/P EST LOW 20 MIN: CPT | Mod: 25

## 2022-11-30 PROCEDURE — 11102 TANGNTL BX SKIN SINGLE LES: CPT

## 2022-11-30 ASSESSMENT — LOCATION SIMPLE DESCRIPTION DERM
LOCATION SIMPLE: POSTERIOR NECK
LOCATION SIMPLE: LEFT LIP
LOCATION SIMPLE: RIGHT HAND
LOCATION SIMPLE: CHEST
LOCATION SIMPLE: RIGHT BUTTOCK
LOCATION SIMPLE: LEFT UPPER BACK
LOCATION SIMPLE: RIGHT SHOULDER
LOCATION SIMPLE: LEFT FOREHEAD
LOCATION SIMPLE: LEFT SHOULDER
LOCATION SIMPLE: RIGHT PRETIBIAL REGION
LOCATION SIMPLE: LEFT 5TH TOE
LOCATION SIMPLE: LEFT BUTTOCK

## 2022-11-30 ASSESSMENT — LOCATION DETAILED DESCRIPTION DERM
LOCATION DETAILED: MID POSTERIOR NECK
LOCATION DETAILED: LEFT INFERIOR VERMILION LIP
LOCATION DETAILED: LEFT POSTERIOR SHOULDER
LOCATION DETAILED: LEFT SUPERIOR MEDIAL FOREHEAD
LOCATION DETAILED: LEFT DORSAL 5TH TOE
LOCATION DETAILED: LEFT SUPERIOR UPPER BACK
LOCATION DETAILED: LEFT BUTTOCK
LOCATION DETAILED: LEFT ANTERIOR SHOULDER
LOCATION DETAILED: RIGHT POSTERIOR SHOULDER
LOCATION DETAILED: RIGHT BUTTOCK
LOCATION DETAILED: LEFT MEDIAL UPPER BACK
LOCATION DETAILED: MIDDLE STERNUM
LOCATION DETAILED: RIGHT MEDIAL DISTAL PRETIBIAL REGION
LOCATION DETAILED: RIGHT RADIAL DORSAL HAND

## 2022-11-30 ASSESSMENT — LOCATION ZONE DERM
LOCATION ZONE: HAND
LOCATION ZONE: LEG
LOCATION ZONE: TOE
LOCATION ZONE: FACE
LOCATION ZONE: ARM
LOCATION ZONE: LIP
LOCATION ZONE: TRUNK
LOCATION ZONE: NECK

## 2022-11-30 NOTE — PROCEDURE: BIOPSY BY SHAVE METHOD
Detail Level: Detailed
Depth Of Biopsy: dermis
Was A Bandage Applied: Yes
Size Of Lesion In Cm: 0.5
X Size Of Lesion In Cm: 0
Biopsy Type: H and E
Biopsy Method: Personna blade
Anesthesia Type: 0.5% lidocaine without epinephrine
Anesthesia Volume In Cc: 1
Hemostasis: Aluminum Chloride
Wound Care: Petrolatum
Dressing: bandage
Destruction After The Procedure: No
Type Of Destruction Used: Cryotherapy
Curettage Text: The wound bed was treated with curettage after the biopsy was performed.
Cryotherapy Text: The wound bed was treated with cryotherapy after the biopsy was performed.
Electrodesiccation Text: The wound bed was treated with electrodesiccation after the biopsy was performed.
Electrodesiccation And Curettage Text: The wound bed was treated with electrodesiccation and curettage after the biopsy was performed.
Silver Nitrate Text: The wound bed was treated with silver nitrate after the biopsy was performed.
Lab: [id number]
Lab Facility: 
Consent: Written consent was obtained and risks were reviewed including but not limited to scarring, infection, bleeding, scabbing, incomplete removal, nerve damage and allergy to anesthesia.
Post-Care Instructions: I reviewed with the patient in detail post-care instructions. Patient is to keep the biopsy site dry overnight, and then apply bacitracin/petroleum twice daily until healed.
Notification Instructions: Patient will be notified of biopsy results. However, patient instructed to call the office if not contacted within 2 weeks.
Billing Type: Third-Party Bill
Information: Selecting Yes will display possible errors in your note based on the variables you have selected. This validation is only offered as a suggestion for you. PLEASE NOTE THAT THE VALIDATION TEXT WILL BE REMOVED WHEN YOU FINALIZE YOUR NOTE. IF YOU WANT TO FAX A PRELIMINARY NOTE YOU WILL NEED TO TOGGLE THIS TO 'NO' IF YOU DO NOT WANT IT IN YOUR FAXED NOTE.

## 2022-12-03 ENCOUNTER — HOSPITAL ENCOUNTER (OUTPATIENT)
Dept: RADIOLOGY | Facility: MEDICAL CENTER | Age: 77
End: 2022-12-03
Attending: INTERNAL MEDICINE
Payer: MEDICARE

## 2022-12-03 DIAGNOSIS — R93.89 ABNORMAL CT OF THE CHEST: ICD-10-CM

## 2022-12-03 PROCEDURE — 71250 CT THORAX DX C-: CPT

## 2022-12-07 PROBLEM — R42 DIZZINESS: Status: ACTIVE | Noted: 2022-12-07

## 2022-12-14 ENCOUNTER — HOSPITAL ENCOUNTER (OUTPATIENT)
Dept: LAB | Facility: MEDICAL CENTER | Age: 77
End: 2022-12-14
Attending: NURSE PRACTITIONER
Payer: MEDICARE

## 2022-12-14 ENCOUNTER — APPOINTMENT (RX ONLY)
Dept: URBAN - METROPOLITAN AREA CLINIC 4 | Facility: CLINIC | Age: 77
Setting detail: DERMATOLOGY
End: 2022-12-14

## 2022-12-14 DIAGNOSIS — Z86.73 HISTORY OF CVA (CEREBROVASCULAR ACCIDENT): ICD-10-CM

## 2022-12-14 DIAGNOSIS — E78.5 DYSLIPIDEMIA: ICD-10-CM

## 2022-12-14 DIAGNOSIS — I48.91 ATRIAL FIBRILLATION, UNSPECIFIED TYPE (HCC): ICD-10-CM

## 2022-12-14 DIAGNOSIS — E78.49 OTHER HYPERLIPIDEMIA: ICD-10-CM

## 2022-12-14 DIAGNOSIS — Z98.890 S/P MITRAL VALVE REPAIR: ICD-10-CM

## 2022-12-14 DIAGNOSIS — I10 PRIMARY HYPERTENSION: ICD-10-CM

## 2022-12-14 PROBLEM — C44.612 BASAL CELL CARCINOMA OF SKIN OF RIGHT UPPER LIMB, INCLUDING SHOULDER: Status: ACTIVE | Noted: 2022-12-14

## 2022-12-14 LAB
ALBUMIN SERPL BCP-MCNC: 4.3 G/DL (ref 3.2–4.9)
ALBUMIN/GLOB SERPL: 1.6 G/DL
ALP SERPL-CCNC: 71 U/L (ref 30–99)
ALT SERPL-CCNC: 25 U/L (ref 2–50)
ANION GAP SERPL CALC-SCNC: 9 MMOL/L (ref 7–16)
AST SERPL-CCNC: 25 U/L (ref 12–45)
BILIRUB SERPL-MCNC: 0.6 MG/DL (ref 0.1–1.5)
BUN SERPL-MCNC: 18 MG/DL (ref 8–22)
CALCIUM ALBUM COR SERPL-MCNC: 9.1 MG/DL (ref 8.5–10.5)
CALCIUM SERPL-MCNC: 9.3 MG/DL (ref 8.5–10.5)
CHLORIDE SERPL-SCNC: 102 MMOL/L (ref 96–112)
CO2 SERPL-SCNC: 26 MMOL/L (ref 20–33)
CREAT SERPL-MCNC: 0.82 MG/DL (ref 0.5–1.4)
ERYTHROCYTE [DISTWIDTH] IN BLOOD BY AUTOMATED COUNT: 51 FL (ref 35.9–50)
GFR SERPLBLD CREATININE-BSD FMLA CKD-EPI: 72 ML/MIN/1.73 M 2
GLOBULIN SER CALC-MCNC: 2.7 G/DL (ref 1.9–3.5)
GLUCOSE SERPL-MCNC: 84 MG/DL (ref 65–99)
HCT VFR BLD AUTO: 44.6 % (ref 37–47)
HGB BLD-MCNC: 14.6 G/DL (ref 12–16)
MCH RBC QN AUTO: 32.4 PG (ref 27–33)
MCHC RBC AUTO-ENTMCNC: 32.7 G/DL (ref 33.6–35)
MCV RBC AUTO: 98.9 FL (ref 81.4–97.8)
PLATELET # BLD AUTO: 235 K/UL (ref 164–446)
PMV BLD AUTO: 9.8 FL (ref 9–12.9)
POTASSIUM SERPL-SCNC: 4.7 MMOL/L (ref 3.6–5.5)
PROT SERPL-MCNC: 7 G/DL (ref 6–8.2)
RBC # BLD AUTO: 4.51 M/UL (ref 4.2–5.4)
SODIUM SERPL-SCNC: 137 MMOL/L (ref 135–145)
WBC # BLD AUTO: 4.9 K/UL (ref 4.8–10.8)

## 2022-12-14 PROCEDURE — ? EXCISION

## 2022-12-14 PROCEDURE — ? COUNSELING

## 2022-12-14 PROCEDURE — 12032 INTMD RPR S/A/T/EXT 2.6-7.5: CPT

## 2022-12-14 PROCEDURE — ? DIAGNOSIS COMMENT

## 2022-12-14 PROCEDURE — 36415 COLL VENOUS BLD VENIPUNCTURE: CPT

## 2022-12-14 PROCEDURE — 80053 COMPREHEN METABOLIC PANEL: CPT

## 2022-12-14 PROCEDURE — 11602 EXC TR-EXT MAL+MARG 1.1-2 CM: CPT

## 2022-12-14 PROCEDURE — 85027 COMPLETE CBC AUTOMATED: CPT

## 2022-12-14 NOTE — PROCEDURE: EXCISION
Body Location Override (Optional - Billing Will Still Be Based On Selected Body Map Location If Applicable): right anterior shoulder
Biopsy Photograph Reviewed: Yes
Previous Accession (Optional): [id number]
Size Of Lesion In Cm: 1.2
X Size Of Lesion In Cm (Optional): 0
Size Of Margin In Cm: 0.2
Anesthesia Volume In Cc: 6
Was An Eye Clamp Used?: No
Eye Clamp Note Details: An eye clamp was used during the procedure.
Excision Method: Fusiform
Saucerization Depth: dermis and superficial adipose tissue
Repair Type: Intermediate
Suturegard Retention Suture: 2-0 Nylon
Retention Suture Bite Size: 3 mm
Length To Time In Minutes Device Was In Place: 10
Number Of Hemigard Strips Per Side: 1
Intermediate / Complex Repair - Final Wound Length In Cm: 4.2
Undermining Type: Entire Wound
Debridement Text: The wound edges were debrided prior to proceeding with the closure to facilitate wound healing.
Helical Rim Text: The closure involved the helical rim.
Vermilion Border Text: The closure involved the vermilion border.
Nostril Rim Text: The closure involved the nostril rim.
Retention Suture Text: Retention sutures were placed to support the closure and prevent dehiscence.
Suture Removal: 14 days
Lab: [id number]
Lab Facility: 
Graft Donor Site Bandage (Optional-Leave Blank If You Don't Want In Note): Steri-strips and a pressure bandage were applied to the donor site.
Epidermal Closure Graft Donor Site (Optional): simple interrupted
Billing Type: Third-Party Bill
Excision Depth: adipose tissue
Scalpel Size: 15 blade
Anesthesia Type: 1% lidocaine with 1:100,000 epinephrine and a 1:10 solution of 8.4% sodium bicarbonate
Hemostasis: Electrocautery
Estimated Blood Loss (Cc): minimal
Detail Level: Detailed
Anesthesia Type: 1% lidocaine with 1:100,000 epinephrine and 408mcg clindamycin/ml and a 1:10 solution of 8.4% sodium bicarbonate
Deep Sutures: 4-0 Vicryl
Number Of Deep Sutures (Optional): 3
Epidermal Sutures: 4-0 Surgipro
Epidermal Closure: running cuticular
Wound Care: Petrolatum
Dressing: pressure dressing
Suturegard Intro: Intraoperative tissue expansion was performed, utilizing the SUTUREGARD device, in order to reduce wound tension.
Suturegard Body: The suture ends were repeatedly re-tightened and re-clamped to achieve the desired tissue expansion.
Hemigard Intro: Due to skin fragility and wound tension, it was decided to use HEMIGARD adhesive retention suture devices to permit a linear closure. The skin was cleaned and dried for a 6cm distance away from the wound. Excessive hair, if present, was removed to allow for adhesion.
Hemigard Postcare Instructions: The HEMIGARD strips are to remain completely dry for at least 5-7 days.
Positioning (Leave Blank If You Do Not Want): The patient was placed in a comfortable position exposing the surgical site.
Pre-Excision Curettage Text (Leave Blank If You Do Not Want): Prior to drawing the surgical margin the visible lesion was removed with electrodesiccation and curettage to clearly define the lesion size.
Complex Repair Preamble Text (Leave Blank If You Do Not Want): Extensive wide undermining was performed.
Intermediate Repair Preamble Text (Leave Blank If You Do Not Want): Undermining was performed with blunt dissection.
Curvilinear Excision Additional Text (Leave Blank If You Do Not Want): The margin was drawn around the clinically apparent lesion.  A curvilinear shape was then drawn on the skin incorporating the lesion and margins.  Incisions were then made along these lines to the appropriate tissue plane and the lesion was extirpated.
Fusiform Excision Additional Text (Leave Blank If You Do Not Want): The margin was drawn around the clinically apparent lesion.  A fusiform shape was then drawn on the skin incorporating the lesion and margins.  Incisions were then made along these lines to the appropriate tissue plane and the lesion was extirpated.
Elliptical Excision Additional Text (Leave Blank If You Do Not Want): The margin was drawn around the clinically apparent lesion.  An elliptical shape was then drawn on the skin incorporating the lesion and margins.  Incisions were then made along these lines to the appropriate tissue plane and the lesion was extirpated.
Saucerization Excision Additional Text (Leave Blank If You Do Not Want): The margin was drawn around the clinically apparent lesion.  Incisions were then made along these lines, in a tangential fashion, to the appropriate tissue plane and the lesion was extirpated.
Slit Excision Additional Text (Leave Blank If You Do Not Want): A linear line was drawn on the skin overlying the lesion. An incision was made slowly until the lesion was visualized.  Once visualized, the lesion was removed with blunt dissection.
Excisional Biopsy Additional Text (Leave Blank If You Do Not Want): The margin was drawn around the clinically apparent lesion. An elliptical shape was then drawn on the skin incorporating the lesion and margins.  Incisions were then made along these lines to the appropriate tissue plane and the lesion was extirpated.
Perilesional Excision Additional Text (Leave Blank If You Do Not Want): The margin was drawn around the clinically apparent lesion. Incisions were then made along these lines to the appropriate tissue plane and the lesion was extirpated.
Repair Performed By Another Provider Text (Leave Blank If You Do Not Want): After the tissue was excised the defect was repaired by another provider.
No Repair - Repaired With Adjacent Surgical Defect Text (Leave Blank If You Do Not Want): After the excision the defect was repaired concurrently with another surgical defect which was in close approximation.
Adjacent Tissue Transfer Text: The defect edges were debeveled with a #15 scalpel blade.  Given the location of the defect and the proximity to free margins an adjacent tissue transfer was deemed most appropriate.  Using a sterile surgical marker, an appropriate flap was drawn incorporating the defect and placing the expected incisions within the relaxed skin tension lines where possible.    The area thus outlined was incised deep to adipose tissue with a #15 scalpel blade.  The skin margins were undermined to an appropriate distance in all directions utilizing iris scissors.
Advancement Flap (Single) Text: The defect edges were debeveled with a #15 scalpel blade.  Given the location of the defect and the proximity to free margins a single advancement flap was deemed most appropriate.  Using a sterile surgical marker, an appropriate advancement flap was drawn incorporating the defect and placing the expected incisions within the relaxed skin tension lines where possible.    The area thus outlined was incised deep to adipose tissue with a #15 scalpel blade.  The skin margins were undermined to an appropriate distance in all directions utilizing iris scissors.
Advancement Flap (Double) Text: The defect edges were debeveled with a #15 scalpel blade.  Given the location of the defect and the proximity to free margins a double advancement flap was deemed most appropriate.  Using a sterile surgical marker, the appropriate advancement flaps were drawn incorporating the defect and placing the expected incisions within the relaxed skin tension lines where possible.    The area thus outlined was incised deep to adipose tissue with a #15 scalpel blade.  The skin margins were undermined to an appropriate distance in all directions utilizing iris scissors.
Burow's Advancement Flap Text: The defect edges were debeveled with a #15 scalpel blade.  Given the location of the defect and the proximity to free margins a Burow's advancement flap was deemed most appropriate.  Using a sterile surgical marker, the appropriate advancement flap was drawn incorporating the defect and placing the expected incisions within the relaxed skin tension lines where possible.    The area thus outlined was incised deep to adipose tissue with a #15 scalpel blade.  The skin margins were undermined to an appropriate distance in all directions utilizing iris scissors.
Chonodrocutaneous Helical Advancement Flap Text: The defect edges were debeveled with a #15 scalpel blade.  Given the location of the defect and the proximity to free margins a chondrocutaneous helical advancement flap was deemed most appropriate.  Using a sterile surgical marker, the appropriate advancement flap was drawn incorporating the defect and placing the expected incisions within the relaxed skin tension lines where possible.    The area thus outlined was incised deep to adipose tissue with a #15 scalpel blade.  The skin margins were undermined to an appropriate distance in all directions utilizing iris scissors.
Crescentic Advancement Flap Text: The defect edges were debeveled with a #15 scalpel blade.  Given the location of the defect and the proximity to free margins a crescentic advancement flap was deemed most appropriate.  Using a sterile surgical marker, the appropriate advancement flap was drawn incorporating the defect and placing the expected incisions within the relaxed skin tension lines where possible.    The area thus outlined was incised deep to adipose tissue with a #15 scalpel blade.  The skin margins were undermined to an appropriate distance in all directions utilizing iris scissors.
A-T Advancement Flap Text: The defect edges were debeveled with a #15 scalpel blade.  Given the location of the defect, shape of the defect and the proximity to free margins an A-T advancement flap was deemed most appropriate.  Using a sterile surgical marker, an appropriate advancement flap was drawn incorporating the defect and placing the expected incisions within the relaxed skin tension lines where possible.    The area thus outlined was incised deep to adipose tissue with a #15 scalpel blade.  The skin margins were undermined to an appropriate distance in all directions utilizing iris scissors.
O-T Advancement Flap Text: The defect edges were debeveled with a #15 scalpel blade.  Given the location of the defect, shape of the defect and the proximity to free margins an O-T advancement flap was deemed most appropriate.  Using a sterile surgical marker, an appropriate advancement flap was drawn incorporating the defect and placing the expected incisions within the relaxed skin tension lines where possible.    The area thus outlined was incised deep to adipose tissue with a #15 scalpel blade.  The skin margins were undermined to an appropriate distance in all directions utilizing iris scissors.
O-L Flap Text: The defect edges were debeveled with a #15 scalpel blade.  Given the location of the defect, shape of the defect and the proximity to free margins an O-L flap was deemed most appropriate.  Using a sterile surgical marker, an appropriate advancement flap was drawn incorporating the defect and placing the expected incisions within the relaxed skin tension lines where possible.    The area thus outlined was incised deep to adipose tissue with a #15 scalpel blade.  The skin margins were undermined to an appropriate distance in all directions utilizing iris scissors.
O-Z Flap Text: The defect edges were debeveled with a #15 scalpel blade.  Given the location of the defect, shape of the defect and the proximity to free margins an O-Z flap was deemed most appropriate.  Using a sterile surgical marker, an appropriate transposition flap was drawn incorporating the defect and placing the expected incisions within the relaxed skin tension lines where possible. The area thus outlined was incised deep to adipose tissue with a #15 scalpel blade.  The skin margins were undermined to an appropriate distance in all directions utilizing iris scissors.
Double O-Z Flap Text: The defect edges were debeveled with a #15 scalpel blade.  Given the location of the defect, shape of the defect and the proximity to free margins a Double O-Z flap was deemed most appropriate.  Using a sterile surgical marker, an appropriate transposition flap was drawn incorporating the defect and placing the expected incisions within the relaxed skin tension lines where possible. The area thus outlined was incised deep to adipose tissue with a #15 scalpel blade.  The skin margins were undermined to an appropriate distance in all directions utilizing iris scissors.
V-Y Flap Text: The defect edges were debeveled with a #15 scalpel blade.  Given the location of the defect, shape of the defect and the proximity to free margins a V-Y flap was deemed most appropriate.  Using a sterile surgical marker, an appropriate advancement flap was drawn incorporating the defect and placing the expected incisions within the relaxed skin tension lines where possible.    The area thus outlined was incised deep to adipose tissue with a #15 scalpel blade.  The skin margins were undermined to an appropriate distance in all directions utilizing iris scissors.
Advancement-Rotation Flap Text: The defect edges were debeveled with a #15 scalpel blade.  Given the location of the defect, shape of the defect and the proximity to free margins an advancement-rotation flap was deemed most appropriate.  Using a sterile surgical marker, an appropriate flap was drawn incorporating the defect and placing the expected incisions within the relaxed skin tension lines where possible. The area thus outlined was incised deep to adipose tissue with a #15 scalpel blade.  The skin margins were undermined to an appropriate distance in all directions utilizing iris scissors.
Mercedes Flap Text: The defect edges were debeveled with a #15 scalpel blade.  Given the location of the defect, shape of the defect and the proximity to free margins a Mercedes flap was deemed most appropriate.  Using a sterile surgical marker, an appropriate advancement flap was drawn incorporating the defect and placing the expected incisions within the relaxed skin tension lines where possible. The area thus outlined was incised deep to adipose tissue with a #15 scalpel blade.  The skin margins were undermined to an appropriate distance in all directions utilizing iris scissors.
Modified Advancement Flap Text: The defect edges were debeveled with a #15 scalpel blade.  Given the location of the defect, shape of the defect and the proximity to free margins a modified advancement flap was deemed most appropriate.  Using a sterile surgical marker, an appropriate advancement flap was drawn incorporating the defect and placing the expected incisions within the relaxed skin tension lines where possible.    The area thus outlined was incised deep to adipose tissue with a #15 scalpel blade.  The skin margins were undermined to an appropriate distance in all directions utilizing iris scissors.
Mucosal Advancement Flap Text: Given the location of the defect, shape of the defect and the proximity to free margins a mucosal advancement flap was deemed most appropriate. Incisions were made with a 15 blade scalpel in the appropriate fashion along the cutaneous vermilion border and the mucosal lip. The remaining actinically damaged mucosal tissue was excised.  The mucosal advancement flap was then elevated to the gingival sulcus with care taken to preserve the neurovascular structures and advanced into the primary defect. Care was taken to ensure that precise realignment of the vermilion border was achieved.
Peng Advancement Flap Text: The defect edges were debeveled with a #15 scalpel blade.  Given the location of the defect, shape of the defect and the proximity to free margins a Peng advancement flap was deemed most appropriate.  Using a sterile surgical marker, an appropriate advancement flap was drawn incorporating the defect and placing the expected incisions within the relaxed skin tension lines where possible. The area thus outlined was incised deep to adipose tissue with a #15 scalpel blade.  The skin margins were undermined to an appropriate distance in all directions utilizing iris scissors.
Hatchet Flap Text: The defect edges were debeveled with a #15 scalpel blade.  Given the location of the defect, shape of the defect and the proximity to free margins a hatchet flap was deemed most appropriate.  Using a sterile surgical marker, an appropriate hatchet flap was drawn incorporating the defect and placing the expected incisions within the relaxed skin tension lines where possible.    The area thus outlined was incised deep to adipose tissue with a #15 scalpel blade.  The skin margins were undermined to an appropriate distance in all directions utilizing iris scissors.
Rotation Flap Text: The defect edges were debeveled with a #15 scalpel blade.  Given the location of the defect, shape of the defect and the proximity to free margins a rotation flap was deemed most appropriate.  Using a sterile surgical marker, an appropriate rotation flap was drawn incorporating the defect and placing the expected incisions within the relaxed skin tension lines where possible.    The area thus outlined was incised deep to adipose tissue with a #15 scalpel blade.  The skin margins were undermined to an appropriate distance in all directions utilizing iris scissors.
Spiral Flap Text: The defect edges were debeveled with a #15 scalpel blade.  Given the location of the defect, shape of the defect and the proximity to free margins a spiral flap was deemed most appropriate.  Using a sterile surgical marker, an appropriate rotation flap was drawn incorporating the defect and placing the expected incisions within the relaxed skin tension lines where possible. The area thus outlined was incised deep to adipose tissue with a #15 scalpel blade.  The skin margins were undermined to an appropriate distance in all directions utilizing iris scissors.
Staged Advancement Flap Text: The defect edges were debeveled with a #15 scalpel blade.  Given the location of the defect, shape of the defect and the proximity to free margins a staged advancement flap was deemed most appropriate.  Using a sterile surgical marker, an appropriate advancement flap was drawn incorporating the defect and placing the expected incisions within the relaxed skin tension lines where possible. The area thus outlined was incised deep to adipose tissue with a #15 scalpel blade.  The skin margins were undermined to an appropriate distance in all directions utilizing iris scissors.
Star Wedge Flap Text: The defect edges were debeveled with a #15 scalpel blade.  Given the location of the defect, shape of the defect and the proximity to free margins a star wedge flap was deemed most appropriate.  Using a sterile surgical marker, an appropriate rotation flap was drawn incorporating the defect and placing the expected incisions within the relaxed skin tension lines where possible. The area thus outlined was incised deep to adipose tissue with a #15 scalpel blade.  The skin margins were undermined to an appropriate distance in all directions utilizing iris scissors.
Transposition Flap Text: The defect edges were debeveled with a #15 scalpel blade.  Given the location of the defect and the proximity to free margins a transposition flap was deemed most appropriate.  Using a sterile surgical marker, an appropriate transposition flap was drawn incorporating the defect.    The area thus outlined was incised deep to adipose tissue with a #15 scalpel blade.  The skin margins were undermined to an appropriate distance in all directions utilizing iris scissors.
Muscle Hinge Flap Text: The defect edges were debeveled with a #15 scalpel blade.  Given the size, depth and location of the defect and the proximity to free margins a muscle hinge flap was deemed most appropriate.  Using a sterile surgical marker, an appropriate hinge flap was drawn incorporating the defect. The area thus outlined was incised with a #15 scalpel blade.  The skin margins were undermined to an appropriate distance in all directions utilizing iris scissors.
Mustarde Flap Text: The defect edges were debeveled with a #15 scalpel blade.  Given the size, depth and location of the defect and the proximity to free margins a Mustarde flap was deemed most appropriate.  Using a sterile surgical marker, an appropriate flap was drawn incorporating the defect. The area thus outlined was incised with a #15 scalpel blade.  The skin margins were undermined to an appropriate distance in all directions utilizing iris scissors.
Nasal Turnover Hinge Flap Text: The defect edges were debeveled with a #15 scalpel blade.  Given the size, depth, location of the defect and the defect being full thickness a nasal turnover hinge flap was deemed most appropriate.  Using a sterile surgical marker, an appropriate hinge flap was drawn incorporating the defect. The area thus outlined was incised with a #15 scalpel blade. The flap was designed to recreate the nasal mucosal lining and the alar rim. The skin margins were undermined to an appropriate distance in all directions utilizing iris scissors.
Nasalis-Muscle-Based Myocutaneous Island Pedicle Flap Text: Using a #15 blade, an incision was made around the donor flap to the level of the nasalis muscle. Wide lateral undermining was then performed in both the subcutaneous plane above the nasalis muscle, and in a submuscular plane just above periosteum. This allowed the formation of a free nasalis muscle axial pedicle (based on the angular artery) which was still attached to the actual cutaneous flap, increasing its mobility and vascular viability. Hemostasis was obtained with pinpoint electrocoagulation. The flap was mobilized into position and the pivotal anchor points positioned and stabilized with buried interrupted sutures. Subcutaneous and dermal tissues were closed in a multilayered fashion with sutures. Tissue redundancies were excised, and the epidermal edges were apposed without significant tension and sutured with sutures.
Orbicularis Oris Muscle Flap Text: The defect edges were debeveled with a #15 scalpel blade.  Given that the defect affected the competency of the oral sphincter an orbicularis oris muscle flap was deemed most appropriate to restore this competency and normal muscle function.  Using a sterile surgical marker, an appropriate flap was drawn incorporating the defect. The area thus outlined was incised with a #15 scalpel blade.
Melolabial Transposition Flap Text: The defect edges were debeveled with a #15 scalpel blade.  Given the location of the defect and the proximity to free margins a melolabial flap was deemed most appropriate.  Using a sterile surgical marker, an appropriate melolabial transposition flap was drawn incorporating the defect.    The area thus outlined was incised deep to adipose tissue with a #15 scalpel blade.  The skin margins were undermined to an appropriate distance in all directions utilizing iris scissors.
Rhombic Flap Text: The defect edges were debeveled with a #15 scalpel blade.  Given the location of the defect and the proximity to free margins a rhombic flap was deemed most appropriate.  Using a sterile surgical marker, an appropriate rhombic flap was drawn incorporating the defect.    The area thus outlined was incised deep to adipose tissue with a #15 scalpel blade.  The skin margins were undermined to an appropriate distance in all directions utilizing iris scissors.
Rhomboid Transposition Flap Text: The defect edges were debeveled with a #15 scalpel blade.  Given the location of the defect and the proximity to free margins a rhomboid transposition flap was deemed most appropriate.  Using a sterile surgical marker, an appropriate rhomboid flap was drawn incorporating the defect.    The area thus outlined was incised deep to adipose tissue with a #15 scalpel blade.  The skin margins were undermined to an appropriate distance in all directions utilizing iris scissors.
Bi-Rhombic Flap Text: The defect edges were debeveled with a #15 scalpel blade.  Given the location of the defect and the proximity to free margins a bi-rhombic flap was deemed most appropriate.  Using a sterile surgical marker, an appropriate rhombic flap was drawn incorporating the defect. The area thus outlined was incised deep to adipose tissue with a #15 scalpel blade.  The skin margins were undermined to an appropriate distance in all directions utilizing iris scissors.
Helical Rim Advancement Flap Text: The defect edges were debeveled with a #15 blade scalpel.  Given the location of the defect and the proximity to free margins (helical rim) a double helical rim advancement flap was deemed most appropriate.  Using a sterile surgical marker, the appropriate advancement flaps were drawn incorporating the defect and placing the expected incisions between the helical rim and antihelix where possible.  The area thus outlined was incised through and through with a #15 scalpel blade.  With a skin hook and iris scissors, the flaps were gently and sharply undermined and freed up.
Bilateral Helical Rim Advancement Flap Text: The defect edges were debeveled with a #15 blade scalpel.  Given the location of the defect and the proximity to free margins (helical rim) a bilateral helical rim advancement flap was deemed most appropriate.  Using a sterile surgical marker, the appropriate advancement flaps were drawn incorporating the defect and placing the expected incisions between the helical rim and antihelix where possible.  The area thus outlined was incised through and through with a #15 scalpel blade.  With a skin hook and iris scissors, the flaps were gently and sharply undermined and freed up.
Ear Star Wedge Flap Text: The defect edges were debeveled with a #15 blade scalpel.  Given the location of the defect and the proximity to free margins (helical rim) an ear star wedge flap was deemed most appropriate.  Using a sterile surgical marker, the appropriate flap was drawn incorporating the defect and placing the expected incisions between the helical rim and antihelix where possible.  The area thus outlined was incised through and through with a #15 scalpel blade.
Banner Transposition Flap Text: The defect edges were debeveled with a #15 scalpel blade.  Given the location of the defect and the proximity to free margins a Banner transposition flap was deemed most appropriate.  Using a sterile surgical marker, an appropriate flap drawn around the defect. The area thus outlined was incised deep to adipose tissue with a #15 scalpel blade.  The skin margins were undermined to an appropriate distance in all directions utilizing iris scissors.
Bilobed Flap Text: The defect edges were debeveled with a #15 scalpel blade.  Given the location of the defect and the proximity to free margins a bilobe flap was deemed most appropriate.  Using a sterile surgical marker, an appropriate bilobe flap drawn around the defect.    The area thus outlined was incised deep to adipose tissue with a #15 scalpel blade.  The skin margins were undermined to an appropriate distance in all directions utilizing iris scissors.
Bilobed Transposition Flap Text: The defect edges were debeveled with a #15 scalpel blade.  Given the location of the defect and the proximity to free margins a bilobed transposition flap was deemed most appropriate.  Using a sterile surgical marker, an appropriate bilobe flap drawn around the defect.    The area thus outlined was incised deep to adipose tissue with a #15 scalpel blade.  The skin margins were undermined to an appropriate distance in all directions utilizing iris scissors.
Trilobed Flap Text: The defect edges were debeveled with a #15 scalpel blade.  Given the location of the defect and the proximity to free margins a trilobed flap was deemed most appropriate.  Using a sterile surgical marker, an appropriate trilobed flap drawn around the defect.    The area thus outlined was incised deep to adipose tissue with a #15 scalpel blade.  The skin margins were undermined to an appropriate distance in all directions utilizing iris scissors.
Dorsal Nasal Flap Text: The defect edges were debeveled with a #15 scalpel blade.  Given the location of the defect and the proximity to free margins a dorsal nasal flap was deemed most appropriate.  Using a sterile surgical marker, an appropriate dorsal nasal flap was drawn around the defect.    The area thus outlined was incised deep to adipose tissue with a #15 scalpel blade.  The skin margins were undermined to an appropriate distance in all directions utilizing iris scissors.
Island Pedicle Flap Text: The defect edges were debeveled with a #15 scalpel blade.  Given the location of the defect, shape of the defect and the proximity to free margins an island pedicle advancement flap was deemed most appropriate.  Using a sterile surgical marker, an appropriate advancement flap was drawn incorporating the defect, outlining the appropriate donor tissue and placing the expected incisions within the relaxed skin tension lines where possible.    The area thus outlined was incised deep to adipose tissue with a #15 scalpel blade.  The skin margins were undermined to an appropriate distance in all directions around the primary defect and laterally outward around the island pedicle utilizing iris scissors.  There was minimal undermining beneath the pedicle flap.
Island Pedicle Flap With Canthal Suspension Text: The defect edges were debeveled with a #15 scalpel blade.  Given the location of the defect, shape of the defect and the proximity to free margins an island pedicle advancement flap was deemed most appropriate.  Using a sterile surgical marker, an appropriate advancement flap was drawn incorporating the defect, outlining the appropriate donor tissue and placing the expected incisions within the relaxed skin tension lines where possible. The area thus outlined was incised deep to adipose tissue with a #15 scalpel blade.  The skin margins were undermined to an appropriate distance in all directions around the primary defect and laterally outward around the island pedicle utilizing iris scissors.  There was minimal undermining beneath the pedicle flap. A suspension suture was placed in the canthal tendon to prevent tension and prevent ectropion.
Alar Island Pedicle Flap Text: The defect edges were debeveled with a #15 scalpel blade.  Given the location of the defect, shape of the defect and the proximity to the alar rim an island pedicle advancement flap was deemed most appropriate.  Using a sterile surgical marker, an appropriate advancement flap was drawn incorporating the defect, outlining the appropriate donor tissue and placing the expected incisions within the nasal ala running parallel to the alar rim. The area thus outlined was incised with a #15 scalpel blade.  The skin margins were undermined minimally to an appropriate distance in all directions around the primary defect and laterally outward around the island pedicle utilizing iris scissors.  There was minimal undermining beneath the pedicle flap.
Double Island Pedicle Flap Text: The defect edges were debeveled with a #15 scalpel blade.  Given the location of the defect, shape of the defect and the proximity to free margins a double island pedicle advancement flap was deemed most appropriate.  Using a sterile surgical marker, an appropriate advancement flap was drawn incorporating the defect, outlining the appropriate donor tissue and placing the expected incisions within the relaxed skin tension lines where possible.    The area thus outlined was incised deep to adipose tissue with a #15 scalpel blade.  The skin margins were undermined to an appropriate distance in all directions around the primary defect and laterally outward around the island pedicle utilizing iris scissors.  There was minimal undermining beneath the pedicle flap.
Island Pedicle Flap-Requiring Vessel Identification Text: The defect edges were debeveled with a #15 scalpel blade.  Given the location of the defect, shape of the defect and the proximity to free margins an island pedicle advancement flap was deemed most appropriate.  Using a sterile surgical marker, an appropriate advancement flap was drawn, based on the axial vessel mentioned above, incorporating the defect, outlining the appropriate donor tissue and placing the expected incisions within the relaxed skin tension lines where possible.    The area thus outlined was incised deep to adipose tissue with a #15 scalpel blade.  The skin margins were undermined to an appropriate distance in all directions around the primary defect and laterally outward around the island pedicle utilizing iris scissors.  There was minimal undermining beneath the pedicle flap.
Keystone Flap Text: The defect edges were debeveled with a #15 scalpel blade.  Given the location of the defect, shape of the defect a keystone flap was deemed most appropriate.  Using a sterile surgical marker, an appropriate keystone flap was drawn incorporating the defect, outlining the appropriate donor tissue and placing the expected incisions within the relaxed skin tension lines where possible. The area thus outlined was incised deep to adipose tissue with a #15 scalpel blade.  The skin margins were undermined to an appropriate distance in all directions around the primary defect and laterally outward around the flap utilizing iris scissors.
O-T Plasty Text: The defect edges were debeveled with a #15 scalpel blade.  Given the location of the defect, shape of the defect and the proximity to free margins an O-T plasty was deemed most appropriate.  Using a sterile surgical marker, an appropriate O-T plasty was drawn incorporating the defect and placing the expected incisions within the relaxed skin tension lines where possible.    The area thus outlined was incised deep to adipose tissue with a #15 scalpel blade.  The skin margins were undermined to an appropriate distance in all directions utilizing iris scissors.
O-Z Plasty Text: The defect edges were debeveled with a #15 scalpel blade.  Given the location of the defect, shape of the defect and the proximity to free margins an O-Z plasty (double transposition flap) was deemed most appropriate.  Using a sterile surgical marker, the appropriate transposition flaps were drawn incorporating the defect and placing the expected incisions within the relaxed skin tension lines where possible.    The area thus outlined was incised deep to adipose tissue with a #15 scalpel blade.  The skin margins were undermined to an appropriate distance in all directions utilizing iris scissors.  Hemostasis was achieved with electrocautery.  The flaps were then transposed into place, one clockwise and the other counterclockwise, and anchored with interrupted buried subcutaneous sutures.
Double O-Z Plasty Text: The defect edges were debeveled with a #15 scalpel blade.  Given the location of the defect, shape of the defect and the proximity to free margins a Double O-Z plasty (double transposition flap) was deemed most appropriate.  Using a sterile surgical marker, the appropriate transposition flaps were drawn incorporating the defect and placing the expected incisions within the relaxed skin tension lines where possible. The area thus outlined was incised deep to adipose tissue with a #15 scalpel blade.  The skin margins were undermined to an appropriate distance in all directions utilizing iris scissors.  Hemostasis was achieved with electrocautery.  The flaps were then transposed into place, one clockwise and the other counterclockwise, and anchored with interrupted buried subcutaneous sutures.
V-Y Plasty Text: The defect edges were debeveled with a #15 scalpel blade.  Given the location of the defect, shape of the defect and the proximity to free margins an V-Y advancement flap was deemed most appropriate.  Using a sterile surgical marker, an appropriate advancement flap was drawn incorporating the defect and placing the expected incisions within the relaxed skin tension lines where possible.    The area thus outlined was incised deep to adipose tissue with a #15 scalpel blade.  The skin margins were undermined to an appropriate distance in all directions utilizing iris scissors.
H Plasty Text: Given the location of the defect, shape of the defect and the proximity to free margins a H-plasty was deemed most appropriate for repair.  Using a sterile surgical marker, the appropriate advancement arms of the H-plasty were drawn incorporating the defect and placing the expected incisions within the relaxed skin tension lines where possible. The area thus outlined was incised deep to adipose tissue with a #15 scalpel blade. The skin margins were undermined to an appropriate distance in all directions utilizing iris scissors.  The opposing advancement arms were then advanced into place in opposite direction and anchored with interrupted buried subcutaneous sutures.
W Plasty Text: The lesion was extirpated to the level of the fat with a #15 scalpel blade.  Given the location of the defect, shape of the defect and the proximity to free margins a W-plasty was deemed most appropriate for repair.  Using a sterile surgical marker, the appropriate transposition arms of the W-plasty were drawn incorporating the defect and placing the expected incisions within the relaxed skin tension lines where possible.    The area thus outlined was incised deep to adipose tissue with a #15 scalpel blade.  The skin margins were undermined to an appropriate distance in all directions utilizing iris scissors.  The opposing transposition arms were then transposed into place in opposite direction and anchored with interrupted buried subcutaneous sutures.
Z Plasty Text: The lesion was extirpated to the level of the fat with a #15 scalpel blade.  Given the location of the defect, shape of the defect and the proximity to free margins a Z-plasty was deemed most appropriate for repair.  Using a sterile surgical marker, the appropriate transposition arms of the Z-plasty were drawn incorporating the defect and placing the expected incisions within the relaxed skin tension lines where possible.    The area thus outlined was incised deep to adipose tissue with a #15 scalpel blade.  The skin margins were undermined to an appropriate distance in all directions utilizing iris scissors.  The opposing transposition arms were then transposed into place in opposite direction and anchored with interrupted buried subcutaneous sutures.
Zygomaticofacial Flap Text: Given the location of the defect, shape of the defect and the proximity to free margins a zygomaticofacial flap was deemed most appropriate for repair.  Using a sterile surgical marker, the appropriate flap was drawn incorporating the defect and placing the expected incisions within the relaxed skin tension lines where possible. The area thus outlined was incised deep to adipose tissue with a #15 scalpel blade with preservation of a vascular pedicle.  The skin margins were undermined to an appropriate distance in all directions utilizing iris scissors.  The flap was then placed into the defect and anchored with interrupted buried subcutaneous sutures.
Cheek Interpolation Flap Text: A decision was made to reconstruct the defect utilizing an interpolation axial flap and a staged reconstruction.  A telfa template was made of the defect.  This telfa template was then used to outline the Cheek Interpolation flap.  The donor area for the pedicle flap was then injected with anesthesia.  The flap was excised through the skin and subcutaneous tissue down to the layer of the underlying musculature.  The interpolation flap was carefully excised within this deep plane to maintain its blood supply.  The edges of the donor site were undermined.   The donor site was closed in a primary fashion.  The pedicle was then rotated into position and sutured.  Once the tube was sutured into place, adequate blood supply was confirmed with blanching and refill.  The pedicle was then wrapped with xeroform gauze and dressed appropriately with a telfa and gauze bandage to ensure continued blood supply and protect the attached pedicle.
Cheek-To-Nose Interpolation Flap Text: A decision was made to reconstruct the defect utilizing an interpolation axial flap and a staged reconstruction.  A telfa template was made of the defect.  This telfa template was then used to outline the Cheek-To-Nose Interpolation flap.  The donor area for the pedicle flap was then injected with anesthesia.  The flap was excised through the skin and subcutaneous tissue down to the layer of the underlying musculature.  The interpolation flap was carefully excised within this deep plane to maintain its blood supply.  The edges of the donor site were undermined.   The donor site was closed in a primary fashion.  The pedicle was then rotated into position and sutured.  Once the tube was sutured into place, adequate blood supply was confirmed with blanching and refill.  The pedicle was then wrapped with xeroform gauze and dressed appropriately with a telfa and gauze bandage to ensure continued blood supply and protect the attached pedicle.
Interpolation Flap Text: A decision was made to reconstruct the defect utilizing an interpolation axial flap and a staged reconstruction.  A telfa template was made of the defect.  This telfa template was then used to outline the interpolation flap.  The donor area for the pedicle flap was then injected with anesthesia.  The flap was excised through the skin and subcutaneous tissue down to the layer of the underlying musculature.  The interpolation flap was carefully excised within this deep plane to maintain its blood supply.  The edges of the donor site were undermined.   The donor site was closed in a primary fashion.  The pedicle was then rotated into position and sutured.  Once the tube was sutured into place, adequate blood supply was confirmed with blanching and refill.  The pedicle was then wrapped with xeroform gauze and dressed appropriately with a telfa and gauze bandage to ensure continued blood supply and protect the attached pedicle.
Melolabial Interpolation Flap Text: A decision was made to reconstruct the defect utilizing an interpolation axial flap and a staged reconstruction.  A telfa template was made of the defect.  This telfa template was then used to outline the melolabial interpolation flap.  The donor area for the pedicle flap was then injected with anesthesia.  The flap was excised through the skin and subcutaneous tissue down to the layer of the underlying musculature.  The pedicle flap was carefully excised within this deep plane to maintain its blood supply.  The edges of the donor site were undermined.   The donor site was closed in a primary fashion.  The pedicle was then rotated into position and sutured.  Once the tube was sutured into place, adequate blood supply was confirmed with blanching and refill.  The pedicle was then wrapped with xeroform gauze and dressed appropriately with a telfa and gauze bandage to ensure continued blood supply and protect the attached pedicle.
Mastoid Interpolation Flap Text: A decision was made to reconstruct the defect utilizing an interpolation axial flap and a staged reconstruction.  A telfa template was made of the defect.  This telfa template was then used to outline the mastoid interpolation flap.  The donor area for the pedicle flap was then injected with anesthesia.  The flap was excised through the skin and subcutaneous tissue down to the layer of the underlying musculature.  The pedicle flap was carefully excised within this deep plane to maintain its blood supply.  The edges of the donor site were undermined.   The donor site was closed in a primary fashion.  The pedicle was then rotated into position and sutured.  Once the tube was sutured into place, adequate blood supply was confirmed with blanching and refill.  The pedicle was then wrapped with xeroform gauze and dressed appropriately with a telfa and gauze bandage to ensure continued blood supply and protect the attached pedicle.
Posterior Auricular Interpolation Flap Text: A decision was made to reconstruct the defect utilizing an interpolation axial flap and a staged reconstruction.  A telfa template was made of the defect.  This telfa template was then used to outline the posterior auricular interpolation flap.  The donor area for the pedicle flap was then injected with anesthesia.  The flap was excised through the skin and subcutaneous tissue down to the layer of the underlying musculature.  The pedicle flap was carefully excised within this deep plane to maintain its blood supply.  The edges of the donor site were undermined.   The donor site was closed in a primary fashion.  The pedicle was then rotated into position and sutured.  Once the tube was sutured into place, adequate blood supply was confirmed with blanching and refill.  The pedicle was then wrapped with xeroform gauze and dressed appropriately with a telfa and gauze bandage to ensure continued blood supply and protect the attached pedicle.
Paramedian Forehead Flap Text: A decision was made to reconstruct the defect utilizing an interpolation axial flap and a staged reconstruction.  A telfa template was made of the defect.  This telfa template was then used to outline the paramedian forehead pedicle flap.  The donor area for the pedicle flap was then injected with anesthesia.  The flap was excised through the skin and subcutaneous tissue down to the layer of the underlying musculature.  The pedicle flap was carefully excised within this deep plane to maintain its blood supply.  The edges of the donor site were undermined.   The donor site was closed in a primary fashion.  The pedicle was then rotated into position and sutured.  Once the tube was sutured into place, adequate blood supply was confirmed with blanching and refill.  The pedicle was then wrapped with xeroform gauze and dressed appropriately with a telfa and gauze bandage to ensure continued blood supply and protect the attached pedicle.
Abbe Flap (Upper To Lower Lip) Text: The defect of the lower lip was assessed and measured.  Given the location and size of the defect, an Abbe flap was deemed most appropriate.  Using a sterile surgical marker, an appropriate Abbe flap was measured and drawn on the upper lip. Local anesthesia was then infiltrated.  A scalpel was then used to incise the upper lip through and through the skin, vermilion, muscle and mucosa, leaving the flap pedicled on the opposite side.  The flap was then rotated and transferred to the lower lip defect.  The flap was then sutured into place with a three layer technique, closing the orbicularis oris muscle layer with subcutaneous buried sutures, followed by a mucosal layer and an epidermal layer.
Abbe Flap (Lower To Upper Lip) Text: The defect of the upper lip was assessed and measured.  Given the location and size of the defect, an Abbe flap was deemed most appropriate.  Using a sterile surgical marker, an appropriate Abbe flap was measured and drawn on the lower lip. Local anesthesia was then infiltrated. A scalpel was then used to incise the upper lip through and through the skin, vermilion, muscle and mucosa, leaving the flap pedicled on the opposite side.  The flap was then rotated and transferred to the lower lip defect.  The flap was then sutured into place with a three layer technique, closing the orbicularis oris muscle layer with subcutaneous buried sutures, followed by a mucosal layer and an epidermal layer.
Estlander Flap (Upper To Lower Lip) Text: The defect of the lower lip was assessed and measured.  Given the location and size of the defect, an Estlander flap was deemed most appropriate.  Using a sterile surgical marker, an appropriate Estlander flap was measured and drawn on the upper lip. Local anesthesia was then infiltrated. A scalpel was then used to incise the lateral aspect of the flap, through skin, muscle and mucosa, leaving the flap pedicled medially.  The flap was then rotated and positioned to fill the lower lip defect.  The flap was then sutured into place with a three layer technique, closing the orbicularis oris muscle layer with subcutaneous buried sutures, followed by a mucosal layer and an epidermal layer.
Lip Wedge Excision Repair Text: Given the location of the defect and the proximity to free margins a full thickness wedge repair was deemed most appropriate.  Using a sterile surgical marker, the appropriate repair was drawn incorporating the defect and placing the expected incisions perpendicular to the vermilion border.  The vermilion border was also meticulously outlined to ensure appropriate reapproximation during the repair.  The area thus outlined was incised through and through with a #15 scalpel blade.  The muscularis and dermis were reaproximated with deep sutures following hemostasis. Care was taken to realign the vermilion border before proceeding with the superficial closure.  Once the vermilion was realigned the superfical and mucosal closure was finished.
Ftsg Text: The defect edges were debeveled with a #15 scalpel blade.  Given the location of the defect, shape of the defect and the proximity to free margins a full thickness skin graft was deemed most appropriate.  Using a sterile surgical marker, the primary defect shape was transferred to the donor site. The area thus outlined was incised deep to adipose tissue with a #15 scalpel blade.  The harvested graft was then trimmed of adipose tissue until only dermis and epidermis was left.  The skin margins of the secondary defect were undermined to an appropriate distance in all directions utilizing iris scissors.  The secondary defect was closed with interrupted buried subcutaneous sutures.  The skin edges were then re-apposed with running  sutures.  The skin graft was then placed in the primary defect and oriented appropriately.
Split-Thickness Skin Graft Text: The defect edges were debeveled with a #15 scalpel blade.  Given the location of the defect, shape of the defect and the proximity to free margins a split thickness skin graft was deemed most appropriate.  Using a sterile surgical marker, the primary defect shape was transferred to the donor site. The split thickness graft was then harvested.  The skin graft was then placed in the primary defect and oriented appropriately.
Burow's Graft Text: The defect edges were debeveled with a #15 scalpel blade.  Given the location of the defect, shape of the defect, the proximity to free margins and the presence of a standing cone deformity a Burow's skin graft was deemed most appropriate. The standing cone was removed and this tissue was then trimmed to the shape of the primary defect. The adipose tissue was also removed until only dermis and epidermis were left.  The skin margins of the secondary defect were undermined to an appropriate distance in all directions utilizing iris scissors.  The secondary defect was closed with interrupted buried subcutaneous sutures.  The skin edges were then re-apposed with running  sutures.  The skin graft was then placed in the primary defect and oriented appropriately.
Cartilage Graft Text: The defect edges were debeveled with a #15 scalpel blade.  Given the location of the defect, shape of the defect, the fact the defect involved a full thickness cartilage defect a cartilage graft was deemed most appropriate.  An appropriate donor site was identified, cleansed, and anesthetized. The cartilage graft was then harvested and transferred to the recipient site, oriented appropriately and then sutured into place.  The secondary defect was then repaired using a primary closure.
Composite Graft Text: The defect edges were debeveled with a #15 scalpel blade.  Given the location of the defect, shape of the defect, the proximity to free margins and the fact the defect was full thickness a composite graft was deemed most appropriate.  The defect was outline and then transferred to the donor site.  A full thickness graft was then excised from the donor site. The graft was then placed in the primary defect, oriented appropriately and then sutured into place.  The secondary defect was then repaired using a primary closure.
Epidermal Autograft Text: The defect edges were debeveled with a #15 scalpel blade.  Given the location of the defect, shape of the defect and the proximity to free margins an epidermal autograft was deemed most appropriate.  Using a sterile surgical marker, the primary defect shape was transferred to the donor site. The epidermal graft was then harvested.  The skin graft was then placed in the primary defect and oriented appropriately.
Dermal Autograft Text: The defect edges were debeveled with a #15 scalpel blade.  Given the location of the defect, shape of the defect and the proximity to free margins a dermal autograft was deemed most appropriate.  Using a sterile surgical marker, the primary defect shape was transferred to the donor site. The area thus outlined was incised deep to adipose tissue with a #15 scalpel blade.  The harvested graft was then trimmed of adipose and epidermal tissue until only dermis was left.  The skin graft was then placed in the primary defect and oriented appropriately.
Skin Substitute Text: The defect edges were debeveled with a #15 scalpel blade.  Given the location of the defect, shape of the defect and the proximity to free margins a skin substitute graft was deemed most appropriate.  The graft material was trimmed to fit the size of the defect. The graft was then placed in the primary defect and oriented appropriately.
Tissue Cultured Epidermal Autograft Text: The defect edges were debeveled with a #15 scalpel blade.  Given the location of the defect, shape of the defect and the proximity to free margins a tissue cultured epidermal autograft was deemed most appropriate.  The graft was then trimmed to fit the size of the defect.  The graft was then placed in the primary defect and oriented appropriately.
Xenograft Text: The defect edges were debeveled with a #15 scalpel blade.  Given the location of the defect, shape of the defect and the proximity to free margins a xenograft was deemed most appropriate.  The graft was then trimmed to fit the size of the defect.  The graft was then placed in the primary defect and oriented appropriately.
Purse String (Intermediate) Text: Given the location of the defect and the characteristics of the surrounding skin a purse string intermediate closure was deemed most appropriate.  Undermining was performed circumfirentially around the surgical defect.  A purse string suture was then placed and tightened.
Purse String (Simple) Text: Given the location of the defect and the characteristics of the surrounding skin a purse string simple closure was deemed most appropriate.  Undermining was performed circumferentially around the surgical defect.  A purse string suture was then placed and tightened.
Partial Purse String (Intermediate) Text: Given the location of the defect and the characteristics of the surrounding skin an intermediate purse string closure was deemed most appropriate.  Undermining was performed circumferentially around the surgical defect.  A purse string suture was then placed and tightened. Wound tension of the circular defect prevented complete closure of the wound.
Partial Purse String (Simple) Text: Given the location of the defect and the characteristics of the surrounding skin a simple purse string closure was deemed most appropriate.  Undermining was performed circumferentially around the surgical defect.  A purse string suture was then placed and tightened. Wound tension of the circular defect prevented complete closure of the wound.
Complex Repair And Single Advancement Flap Text: The defect edges were debeveled with a #15 scalpel blade.  The primary defect was closed partially with a complex linear closure.  Given the location of the remaining defect, shape of the defect and the proximity to free margins a single advancement flap was deemed most appropriate for complete closure of the defect.  Using a sterile surgical marker, an appropriate advancement flap was drawn incorporating the defect and placing the expected incisions within the relaxed skin tension lines where possible.    The area thus outlined was incised deep to adipose tissue with a #15 scalpel blade.  The skin margins were undermined to an appropriate distance in all directions utilizing iris scissors.
Complex Repair And Double Advancement Flap Text: The defect edges were debeveled with a #15 scalpel blade.  The primary defect was closed partially with a complex linear closure.  Given the location of the remaining defect, shape of the defect and the proximity to free margins a double advancement flap was deemed most appropriate for complete closure of the defect.  Using a sterile surgical marker, an appropriate advancement flap was drawn incorporating the defect and placing the expected incisions within the relaxed skin tension lines where possible.    The area thus outlined was incised deep to adipose tissue with a #15 scalpel blade.  The skin margins were undermined to an appropriate distance in all directions utilizing iris scissors.
Complex Repair And Modified Advancement Flap Text: The defect edges were debeveled with a #15 scalpel blade.  The primary defect was closed partially with a complex linear closure.  Given the location of the remaining defect, shape of the defect and the proximity to free margins a modified advancement flap was deemed most appropriate for complete closure of the defect.  Using a sterile surgical marker, an appropriate advancement flap was drawn incorporating the defect and placing the expected incisions within the relaxed skin tension lines where possible.    The area thus outlined was incised deep to adipose tissue with a #15 scalpel blade.  The skin margins were undermined to an appropriate distance in all directions utilizing iris scissors.
Complex Repair And A-T Advancement Flap Text: The defect edges were debeveled with a #15 scalpel blade.  The primary defect was closed partially with a complex linear closure.  Given the location of the remaining defect, shape of the defect and the proximity to free margins an A-T advancement flap was deemed most appropriate for complete closure of the defect.  Using a sterile surgical marker, an appropriate advancement flap was drawn incorporating the defect and placing the expected incisions within the relaxed skin tension lines where possible.    The area thus outlined was incised deep to adipose tissue with a #15 scalpel blade.  The skin margins were undermined to an appropriate distance in all directions utilizing iris scissors.
Complex Repair And O-T Advancement Flap Text: The defect edges were debeveled with a #15 scalpel blade.  The primary defect was closed partially with a complex linear closure.  Given the location of the remaining defect, shape of the defect and the proximity to free margins an O-T advancement flap was deemed most appropriate for complete closure of the defect.  Using a sterile surgical marker, an appropriate advancement flap was drawn incorporating the defect and placing the expected incisions within the relaxed skin tension lines where possible.    The area thus outlined was incised deep to adipose tissue with a #15 scalpel blade.  The skin margins were undermined to an appropriate distance in all directions utilizing iris scissors.
Complex Repair And O-L Flap Text: The defect edges were debeveled with a #15 scalpel blade.  The primary defect was closed partially with a complex linear closure.  Given the location of the remaining defect, shape of the defect and the proximity to free margins an O-L flap was deemed most appropriate for complete closure of the defect.  Using a sterile surgical marker, an appropriate flap was drawn incorporating the defect and placing the expected incisions within the relaxed skin tension lines where possible.    The area thus outlined was incised deep to adipose tissue with a #15 scalpel blade.  The skin margins were undermined to an appropriate distance in all directions utilizing iris scissors.
Complex Repair And Bilobe Flap Text: The defect edges were debeveled with a #15 scalpel blade.  The primary defect was closed partially with a complex linear closure.  Given the location of the remaining defect, shape of the defect and the proximity to free margins a bilobe flap was deemed most appropriate for complete closure of the defect.  Using a sterile surgical marker, an appropriate advancement flap was drawn incorporating the defect and placing the expected incisions within the relaxed skin tension lines where possible.    The area thus outlined was incised deep to adipose tissue with a #15 scalpel blade.  The skin margins were undermined to an appropriate distance in all directions utilizing iris scissors.
Complex Repair And Melolabial Flap Text: The defect edges were debeveled with a #15 scalpel blade.  The primary defect was closed partially with a complex linear closure.  Given the location of the remaining defect, shape of the defect and the proximity to free margins a melolabial flap was deemed most appropriate for complete closure of the defect.  Using a sterile surgical marker, an appropriate advancement flap was drawn incorporating the defect and placing the expected incisions within the relaxed skin tension lines where possible.    The area thus outlined was incised deep to adipose tissue with a #15 scalpel blade.  The skin margins were undermined to an appropriate distance in all directions utilizing iris scissors.
Complex Repair And Rotation Flap Text: The defect edges were debeveled with a #15 scalpel blade.  The primary defect was closed partially with a complex linear closure.  Given the location of the remaining defect, shape of the defect and the proximity to free margins a rotation flap was deemed most appropriate for complete closure of the defect.  Using a sterile surgical marker, an appropriate advancement flap was drawn incorporating the defect and placing the expected incisions within the relaxed skin tension lines where possible.    The area thus outlined was incised deep to adipose tissue with a #15 scalpel blade.  The skin margins were undermined to an appropriate distance in all directions utilizing iris scissors.
Complex Repair And Rhombic Flap Text: The defect edges were debeveled with a #15 scalpel blade.  The primary defect was closed partially with a complex linear closure.  Given the location of the remaining defect, shape of the defect and the proximity to free margins a rhombic flap was deemed most appropriate for complete closure of the defect.  Using a sterile surgical marker, an appropriate advancement flap was drawn incorporating the defect and placing the expected incisions within the relaxed skin tension lines where possible.    The area thus outlined was incised deep to adipose tissue with a #15 scalpel blade.  The skin margins were undermined to an appropriate distance in all directions utilizing iris scissors.
Complex Repair And Transposition Flap Text: The defect edges were debeveled with a #15 scalpel blade.  The primary defect was closed partially with a complex linear closure.  Given the location of the remaining defect, shape of the defect and the proximity to free margins a transposition flap was deemed most appropriate for complete closure of the defect.  Using a sterile surgical marker, an appropriate advancement flap was drawn incorporating the defect and placing the expected incisions within the relaxed skin tension lines where possible.    The area thus outlined was incised deep to adipose tissue with a #15 scalpel blade.  The skin margins were undermined to an appropriate distance in all directions utilizing iris scissors.
Complex Repair And V-Y Plasty Text: The defect edges were debeveled with a #15 scalpel blade.  The primary defect was closed partially with a complex linear closure.  Given the location of the remaining defect, shape of the defect and the proximity to free margins a V-Y plasty was deemed most appropriate for complete closure of the defect.  Using a sterile surgical marker, an appropriate advancement flap was drawn incorporating the defect and placing the expected incisions within the relaxed skin tension lines where possible.    The area thus outlined was incised deep to adipose tissue with a #15 scalpel blade.  The skin margins were undermined to an appropriate distance in all directions utilizing iris scissors.
Complex Repair And M Plasty Text: The defect edges were debeveled with a #15 scalpel blade.  The primary defect was closed partially with a complex linear closure.  Given the location of the remaining defect, shape of the defect and the proximity to free margins an M plasty was deemed most appropriate for complete closure of the defect.  Using a sterile surgical marker, an appropriate advancement flap was drawn incorporating the defect and placing the expected incisions within the relaxed skin tension lines where possible.    The area thus outlined was incised deep to adipose tissue with a #15 scalpel blade.  The skin margins were undermined to an appropriate distance in all directions utilizing iris scissors.
Complex Repair And Double M Plasty Text: The defect edges were debeveled with a #15 scalpel blade.  The primary defect was closed partially with a complex linear closure.  Given the location of the remaining defect, shape of the defect and the proximity to free margins a double M plasty was deemed most appropriate for complete closure of the defect.  Using a sterile surgical marker, an appropriate advancement flap was drawn incorporating the defect and placing the expected incisions within the relaxed skin tension lines where possible.    The area thus outlined was incised deep to adipose tissue with a #15 scalpel blade.  The skin margins were undermined to an appropriate distance in all directions utilizing iris scissors.
Complex Repair And W Plasty Text: The defect edges were debeveled with a #15 scalpel blade.  The primary defect was closed partially with a complex linear closure.  Given the location of the remaining defect, shape of the defect and the proximity to free margins a W plasty was deemed most appropriate for complete closure of the defect.  Using a sterile surgical marker, an appropriate advancement flap was drawn incorporating the defect and placing the expected incisions within the relaxed skin tension lines where possible.    The area thus outlined was incised deep to adipose tissue with a #15 scalpel blade.  The skin margins were undermined to an appropriate distance in all directions utilizing iris scissors.
Complex Repair And Z Plasty Text: The defect edges were debeveled with a #15 scalpel blade.  The primary defect was closed partially with a complex linear closure.  Given the location of the remaining defect, shape of the defect and the proximity to free margins a Z plasty was deemed most appropriate for complete closure of the defect.  Using a sterile surgical marker, an appropriate advancement flap was drawn incorporating the defect and placing the expected incisions within the relaxed skin tension lines where possible.    The area thus outlined was incised deep to adipose tissue with a #15 scalpel blade.  The skin margins were undermined to an appropriate distance in all directions utilizing iris scissors.
Complex Repair And Dorsal Nasal Flap Text: The defect edges were debeveled with a #15 scalpel blade.  The primary defect was closed partially with a complex linear closure.  Given the location of the remaining defect, shape of the defect and the proximity to free margins a dorsal nasal flap was deemed most appropriate for complete closure of the defect.  Using a sterile surgical marker, an appropriate flap was drawn incorporating the defect and placing the expected incisions within the relaxed skin tension lines where possible.    The area thus outlined was incised deep to adipose tissue with a #15 scalpel blade.  The skin margins were undermined to an appropriate distance in all directions utilizing iris scissors.
Complex Repair And Ftsg Text: The defect edges were debeveled with a #15 scalpel blade.  The primary defect was closed partially with a complex linear closure.  Given the location of the defect, shape of the defect and the proximity to free margins a full thickness skin graft was deemed most appropriate to repair the remaining defect.  The graft was trimmed to fit the size of the remaining defect.  The graft was then placed in the primary defect, oriented appropriately, and sutured into place.
Complex Repair And Burow's Graft Text: The defect edges were debeveled with a #15 scalpel blade.  The primary defect was closed partially with a complex linear closure.  Given the location of the defect, shape of the defect, the proximity to free margins and the presence of a standing cone deformity a Burow's graft was deemed most appropriate to repair the remaining defect.  The graft was trimmed to fit the size of the remaining defect.  The graft was then placed in the primary defect, oriented appropriately, and sutured into place.
Complex Repair And Split-Thickness Skin Graft Text: The defect edges were debeveled with a #15 scalpel blade.  The primary defect was closed partially with a complex linear closure.  Given the location of the defect, shape of the defect and the proximity to free margins a split thickness skin graft was deemed most appropriate to repair the remaining defect.  The graft was trimmed to fit the size of the remaining defect.  The graft was then placed in the primary defect, oriented appropriately, and sutured into place.
Complex Repair And Epidermal Autograft Text: The defect edges were debeveled with a #15 scalpel blade.  The primary defect was closed partially with a complex linear closure.  Given the location of the defect, shape of the defect and the proximity to free margins an epidermal autograft was deemed most appropriate to repair the remaining defect.  The graft was trimmed to fit the size of the remaining defect.  The graft was then placed in the primary defect, oriented appropriately, and sutured into place.
Complex Repair And Dermal Autograft Text: The defect edges were debeveled with a #15 scalpel blade.  The primary defect was closed partially with a complex linear closure.  Given the location of the defect, shape of the defect and the proximity to free margins an dermal autograft was deemed most appropriate to repair the remaining defect.  The graft was trimmed to fit the size of the remaining defect.  The graft was then placed in the primary defect, oriented appropriately, and sutured into place.
Complex Repair And Tissue Cultured Epidermal Autograft Text: The defect edges were debeveled with a #15 scalpel blade.  The primary defect was closed partially with a complex linear closure.  Given the location of the defect, shape of the defect and the proximity to free margins an tissue cultured epidermal autograft was deemed most appropriate to repair the remaining defect.  The graft was trimmed to fit the size of the remaining defect.  The graft was then placed in the primary defect, oriented appropriately, and sutured into place.
Complex Repair And Xenograft Text: The defect edges were debeveled with a #15 scalpel blade.  The primary defect was closed partially with a complex linear closure.  Given the location of the defect, shape of the defect and the proximity to free margins a xenograft was deemed most appropriate to repair the remaining defect.  The graft was trimmed to fit the size of the remaining defect.  The graft was then placed in the primary defect, oriented appropriately, and sutured into place.
Complex Repair And Skin Substitute Graft Text: The defect edges were debeveled with a #15 scalpel blade.  The primary defect was closed partially with a complex linear closure.  Given the location of the remaining defect, shape of the defect and the proximity to free margins a skin substitute graft was deemed most appropriate to repair the remaining defect.  The graft was trimmed to fit the size of the remaining defect.  The graft was then placed in the primary defect, oriented appropriately, and sutured into place.
Path Notes (To The Dermatopathologist): Please check margins.
Consent was obtained from the patient. The risks and benefits to therapy were discussed in detail. Specifically, the risks of infection, scarring, bleeding, prolonged wound healing, incomplete removal, allergy to anesthesia, nerve injury and recurrence were addressed. Prior to the procedure, the treatment site was clearly identified and confirmed by the patient. All components of Universal Protocol/PAUSE Rule completed.
Post-Care Instructions: I reviewed with the patient in detail post-care instructions. Patient is not to engage in any heavy lifting and moderate exercise for the next 14 days. Should the patient develop any fevers, chills, bleeding, severe pain patient will contact the office immediately.
Home Suture Removal Text: Patient was provided a home suture removal kit and will remove their sutures at home.  If they have any questions or difficulties they will call the office.
Where Do You Want The Question To Include Opioid Counseling Located?: Case Summary Tab
Information: Selecting Yes will display possible errors in your note based on the variables you have selected. This validation is only offered as a suggestion for you. PLEASE NOTE THAT THE VALIDATION TEXT WILL BE REMOVED WHEN YOU FINALIZE YOUR NOTE. IF YOU WANT TO FAX A PRELIMINARY NOTE YOU WILL NEED TO TOGGLE THIS TO 'NO' IF YOU DO NOT WANT IT IN YOUR FAXED NOTE.

## 2022-12-23 ENCOUNTER — APPOINTMENT (OUTPATIENT)
Dept: NEUROLOGY | Facility: MEDICAL CENTER | Age: 77
End: 2022-12-23
Payer: MEDICARE

## 2022-12-28 ENCOUNTER — APPOINTMENT (RX ONLY)
Dept: URBAN - METROPOLITAN AREA CLINIC 4 | Facility: CLINIC | Age: 77
Setting detail: DERMATOLOGY
End: 2022-12-28

## 2022-12-28 DIAGNOSIS — Z48.02 ENCOUNTER FOR REMOVAL OF SUTURES: ICD-10-CM

## 2022-12-28 PROCEDURE — ? SUTURE REMOVAL (GLOBAL PERIOD)

## 2022-12-28 PROCEDURE — ? COUNSELING

## 2022-12-28 ASSESSMENT — LOCATION DETAILED DESCRIPTION DERM: LOCATION DETAILED: RIGHT ANTERIOR SHOULDER

## 2022-12-28 ASSESSMENT — LOCATION ZONE DERM: LOCATION ZONE: ARM

## 2022-12-28 ASSESSMENT — LOCATION SIMPLE DESCRIPTION DERM: LOCATION SIMPLE: RIGHT SHOULDER

## 2022-12-28 NOTE — PROCEDURE: SUTURE REMOVAL (GLOBAL PERIOD)
Detail Level: Detailed
Add 64777 Cpt? (Important Note: In 2017 The Use Of 65159 Is Being Tracked By Cms To Determine Future Global Period Reimbursement For Global Periods): no

## 2022-12-29 ENCOUNTER — OFFICE VISIT (OUTPATIENT)
Dept: NEUROLOGY | Facility: MEDICAL CENTER | Age: 77
End: 2022-12-29
Attending: PSYCHIATRY & NEUROLOGY
Payer: MEDICARE

## 2022-12-29 ENCOUNTER — HOSPITAL ENCOUNTER (OUTPATIENT)
Facility: MEDICAL CENTER | Age: 77
End: 2022-12-29
Attending: STUDENT IN AN ORGANIZED HEALTH CARE EDUCATION/TRAINING PROGRAM
Payer: MEDICARE

## 2022-12-29 VITALS
TEMPERATURE: 98.9 F | RESPIRATION RATE: 18 BRPM | HEIGHT: 66 IN | HEART RATE: 89 BPM | OXYGEN SATURATION: 95 % | WEIGHT: 156.53 LBS | SYSTOLIC BLOOD PRESSURE: 118 MMHG | BODY MASS INDEX: 25.16 KG/M2 | DIASTOLIC BLOOD PRESSURE: 86 MMHG

## 2022-12-29 DIAGNOSIS — F01.A0 VASCULAR DEMENTIA, MILD, WITHOUT BEHAVIORAL DISTURBANCE, PSYCHOTIC DISTURBANCE, MOOD DISTURBANCE, AND ANXIETY (HCC): ICD-10-CM

## 2022-12-29 PROCEDURE — 87086 URINE CULTURE/COLONY COUNT: CPT

## 2022-12-29 PROCEDURE — 99214 OFFICE O/P EST MOD 30 MIN: CPT | Performed by: PSYCHIATRY & NEUROLOGY

## 2022-12-29 PROCEDURE — 87186 SC STD MICRODIL/AGAR DIL: CPT

## 2022-12-29 PROCEDURE — 87077 CULTURE AEROBIC IDENTIFY: CPT

## 2022-12-29 PROCEDURE — 99212 OFFICE O/P EST SF 10 MIN: CPT | Performed by: PSYCHIATRY & NEUROLOGY

## 2022-12-29 RX ORDER — DONEPEZIL HYDROCHLORIDE 5 MG/1
TABLET, FILM COATED ORAL
Qty: 210 TABLET | Refills: 0 | Status: SHIPPED | OUTPATIENT
Start: 2022-12-29 | End: 2023-03-14

## 2022-12-29 ASSESSMENT — MONTREAL COGNITIVE ASSESSMENT (MOCA)
2. COPY DRAWING: 0/1
6. READ LIST OF DIGITS [FORWARD/BACKWARD]: 2/2
5. MEMORY TRIALS: FIRST TRIAL
4. NAME EACH OF THE THREE ANIMALS SHOWN: 3/3
DELAYED RECALL SUBSCORE: 1/5
WHAT IS THE TOTAL SCORE (OUT OF 30): 22
11. FOR EACH PAIR OF WORDS, WHAT CATEGORY DO THEY BELONG TO (OUT OF 2): 2/2
7. [VIGILENCE] TAP WHEN HEARING DESIGNATED LETTER: 1/1
ADD 1 POINT IF LESS THAN OR EQUAL TO 12 YR EDUCATION LEVEL: 0
WHAT IS THE VERSION OF MOCA ADMINISTERED: 7.1
8. SERIAL SUBTRACTION OF 7S: 2 OR 3/5
ORIENTATION SUBSCORE: 4/6
9. REPEAT EACH SENTENCE: 2/2
1. ALTERNATING TRAIL MAKING: 1/1
10. [FLUENCY] NAME WORDS STARTING WITH DESIGNATED LETTER: 1/1
3. DRAW A CLOCK: CONTOUR, NUMBERS, HANDS: 3/3

## 2022-12-29 ASSESSMENT — FIBROSIS 4 INDEX: FIB4 SCORE: 1.68

## 2022-12-29 NOTE — PROGRESS NOTES
"Chief Complaint   Patient presents with    Follow-Up     HTN, atrial fibrillation       History of present illness:  Shaista Bocanegra 77 y.o. female with HTN, atrial fibrillation on Eliquis presented 5/31/21 for word finding difficulty/confusion.     A MRI scan revealed left temporal/occipital infarct. She was just started on Eliquis 3 days prior to the ischemic stroke.   Patient had hospital admission recently where she underwent elective mitral valve repair.  Postoperatively she developed generalized tonic-clonic seizures 2 hours after the repair and her left arm weakness was worse than baseline.  EEG showed occasional right temporal parietal sharp waves and patient was started on Keppra.  She is currently on keppra 1000mg and there have been no further LOC spells, convulsions, abnormal behavior.       5/25/22: After the patient had a normal EEG in December, I directed her to wean off of Keppra.  In early May, patient had a emergency department visit for acute blurry vision.  She had a brain MRI that was unchanged from prior and was discharged with neurology follow-up.  She had a headache that accompanied the blurry vision. The vision was \"not clear\". There is no history of migraine. The headache and blurry vision resolved after 4 hours. The headache was not severe. The headache has not recurred since.   There have been no seizures since the last visit.       10/13/22: Patient was previously seen for 2 acute ischemic strokes of the left temporal lobe both secondary to atrial fibrillation.  At her prior visit she was on Eliquis.  On 10/6 she had a dizzy spell. She was walking around and felt faint and unclear. She was unable to stand up properly. This lasted for a few minutes and she sat down and waited for it to pass. She describes a lightheaded feeling like she was going to faint.   She also has had trouble seeing from her right eye. This has been a dramatic change compared to before the incident. She has trouble " reading from her right eye.      12/29/22: She has had 1 year of progressive word finding trouble since her stroke. She is forgetful as well. She is concerned about her driving because she could not figure out how to turn the car off.     Past medical history:   Past Medical History:   Diagnosis Date    Anemia 09/28/2021    Arthritis     toe    Atrial fibrillation (HCC)     Benign essential HTN 03/19/2012    Breast cancer (HCC)     Cancer ([facility]) 1998    breast     Cardiac arrhythmia     Chest tightness or pressure 03/19/2012    Chickenpox     Coronary heart disease     [place] measles     Gynecological disorder     High cholesterol     High risk medication use 03/19/2012    Hypercholesterolemia 03/19/2012    Mumps     MVP (mitral valve prolapse) 03/19/2012    Osteoporosis     Seizure disorder ([facility]) 09/28/2021    last seizure may 2021    Sleep apnea     CPAP at night    Snoring     Stroke ([facility]) 2017    residual minor weakness on left side    Substance abuse ([facility])     Tonsillitis     Urinary bladder disorder     OAB    Urinary incontinence     Valvular heart disease     Venereal disease        Past surgical history:   Past Surgical History:   Procedure Laterality Date    CATARACT PHACO WITH IOL  3/16/2009    Performed by SHANNON GANDARA at [facility]    CATARACT PHACO WITH IOL  1/26/2009    Performed by SHANNON GANDARA at [facility]    BREAST BIOPSY      HYSTERECTOMY LAPAROSCOPY      LUMPECTOMY      PA CHEMOTHERAPY, UNSPECIFIED PROCEDURE      PA RADIATION THERAPY PLAN SIMPLE      PA REMV 2ND CATARACT,CORN-SCLER SECTN      PRIMARY C SECTION      SINUSCOPE      TONSILLECTOMY         Family history:   Family History   Problem Relation Age of Onset    Cancer Mother         breast    No Known Problems Brother     Heart Failure Neg Hx     Heart Disease Neg Hx        Social history:   Social History     Socioeconomic History    Marital status:      Spouse name: Not on file     Number of children: 2    Years of education: Not on file    Highest education level: Professional school degree (e.g., MD, DDS, DVM, ARACELI)   Occupational History    Occupation:      Employer: Not Employed   Tobacco Use    Smoking status: Never    Smokeless tobacco: Never   Vaping Use    Vaping Use: Never used   Substance and Sexual Activity    Alcohol use: Yes     Alcohol/week: 1.2 oz     Types: 2 Glasses of wine per week     Comment: twice a week    Drug use: No    Sexual activity: Yes     Partners: Male     Birth control/protection: Female Sterilization   Other Topics Concern    Not on file   Social History Narrative    Not on file     Social Determinants of Health     Financial Resource Strain: Low Risk     Difficulty of Paying Living Expenses: Not hard at all   Food Insecurity: No Food Insecurity    Worried About Running Out of Food in the Last Year: Never true    Ran Out of Food in the Last Year: Never true   Transportation Needs: No Transportation Needs    Lack of Transportation (Medical): No    Lack of Transportation (Non-Medical): No   Physical Activity: Insufficiently Active    Days of Exercise per Week: 4 days    Minutes of Exercise per Session: 30 min   Stress: Not on file   Social Connections: Socially Isolated    Frequency of Communication with Friends and Family: More than three times a week    Frequency of Social Gatherings with Friends and Family: Twice a week    Attends Anabaptism Services: Never    Active Member of Clubs or Organizations: No    Attends Club or Organization Meetings: Never    Marital Status:    Intimate Partner Violence: Not on file   Housing Stability: Low Risk     Unable to Pay for Housing in the Last Year: No    Number of Places Lived in the Last Year: 1    Unstable Housing in the Last Year: No       Current medications:   Current Outpatient Medications   Medication    donepezil (ARICEPT) 5 MG Tab    Multiple Vitamins-Minerals (MULTIVITAMIN WOMEN 50+ PO)    metoprolol  "SR (TOPROL XL) 25 MG TABLET SR 24 HR    ferrous sulfate 325 (65 Fe) MG tablet    apixaban (ELIQUIS) 5mg Tab    atorvastatin (LIPITOR) 80 MG tablet    spironolactone (ALDACTONE) 25 MG Tab    fexofenadine (ALLEGRA) 60 MG Tab    tamsulosin (FLOMAX) 0.4 MG capsule     No current facility-administered medications for this visit.       Medication Allergy:  No Known Allergies    Physical examination:   Vitals:    22 1036   BP: 118/86   BP Location: Left arm   Patient Position: Sitting   BP Cuff Size: Adult   Pulse: 89   Resp: 18   Temp: 37.2 °C (98.9 °F)   TempSrc: Temporal   SpO2: 95%   Weight: 71 kg (156 lb 8.4 oz)   Height: 1.676 m (5' 6\")       [place] Cognitive Assessment (MOCA) Version Number: 7.1   VISUOSPACIAL / EXECUTIVE   Clock Drawing: 3/3  Spatial Drawin/1  Cube Drawin/1    NAMING  Naming: 3/3    MEMORY  Memory: First trial    ATTENTION  Digits: 2/2  Letters: 1/1  Subtraction: 2 or 3/5    LANGUAGE  Repeat Phrases: 2/2  Fluency: 1/1    ABSTRACTION  Abstraction: 2/2    DELAYED RECALL  Recall words: 1/5  Category Cue (if applicable):    Multiple Choice Cue (if applicable):     ORIENTATION  Orientation: 4/6    Add 1 point if less than or equal to 12 yr education level: 0   MOCA TOTAL SCORE:    Biomechanical/Visual Limitations (if applicable):         Labs:  I reviewed the following labs personally:  None    Imagin/9/22 MRI BRAIN W/O   I reviewed the images personally and agree with the following read:     IMPRESSION:     1.  No acute abnormality.  2.  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  3.  Minimal chronic microhemorrhages.  4.  Moderate chronic microvascular ischemic disease.  5.  Moderate cerebral volume loss.  6.  There has been no significant interval change.    ASSESSMENT AND PLAN:  Problem List Items Addressed This Visit    None  Visit Diagnoses       Vascular dementia, mild, without behavioral disturbance, psychotic disturbance, " mood disturbance, and anxiety        Relevant Medications    donepezil (ARICEPT) 5 MG Tab            1. Vascular dementia, mild, without behavioral disturbance, psychotic disturbance, mood disturbance, and anxiety  - donepezil (ARICEPT) 5 MG Tab; Take 1 Tablet by mouth every evening for 30 days, THEN 2 Tablets every evening for 90 days.  Dispense: 210 Tablet; Refill: 0    She scored 22/30 on the MOCA today. I have reviewed the brain MRI scan with her that reveals multifocal large vascular territory infarctions. This is the likely explanation for her cognitive impairment. I have counseled her that I doubt that she has Alzheimer's dementia or a neurodegenerative etiology of memory loss. She was started on donepezil today.     FOLLOW-UP:   Return if symptoms worsen or fail to improve.    Dr. Asad Noe D.O.  [facility]

## 2022-12-29 NOTE — PATIENT INSTRUCTIONS
Start taking donepezil 5mg once daily for treatment of cognitive impairment.     After 1 month, increase the dose to 10mg (2 tabs) every day.     This drug may cause diarrhea and GI upset as its side effects.     I do not think that you have Alzheimer's, and the memory impairment is due to your history of stroke. If your cognition worsens, return to clinic.

## 2023-01-01 LAB
BACTERIA UR CULT: ABNORMAL
BACTERIA UR CULT: ABNORMAL
SIGNIFICANT IND 70042: ABNORMAL
SITE SITE: ABNORMAL
SOURCE SOURCE: ABNORMAL

## 2023-01-04 ENCOUNTER — APPOINTMENT (OUTPATIENT)
Dept: SPEECH THERAPY | Facility: REHABILITATION | Age: 78
End: 2023-01-04
Attending: PHYSICAL MEDICINE & REHABILITATION
Payer: MEDICARE

## 2023-01-09 ENCOUNTER — APPOINTMENT (OUTPATIENT)
Dept: SPEECH THERAPY | Facility: REHABILITATION | Age: 78
End: 2023-01-09
Attending: PHYSICAL MEDICINE & REHABILITATION
Payer: MEDICARE

## 2023-01-16 ENCOUNTER — APPOINTMENT (OUTPATIENT)
Dept: SPEECH THERAPY | Facility: REHABILITATION | Age: 78
End: 2023-01-16
Attending: PHYSICAL MEDICINE & REHABILITATION
Payer: MEDICARE

## 2023-01-18 PROBLEM — M19.049 HAND ARTHRITIS: Status: ACTIVE | Noted: 2023-01-18

## 2023-01-23 ENCOUNTER — APPOINTMENT (OUTPATIENT)
Dept: SPEECH THERAPY | Facility: REHABILITATION | Age: 78
End: 2023-01-23
Attending: PHYSICAL MEDICINE & REHABILITATION
Payer: MEDICARE

## 2023-01-23 NOTE — TELEPHONE ENCOUNTER
1/25/2023    Patient: [facility]   YOB: 1960   Date of Visit: 1/23/2023     Servando Parikh MD  [address] [facility]  [address]  [address] [place] [address]    Dear Servando Parikh MD,      Thank you for referring Mr. Jessica Henderson to [address] [place] [address] for evaluation. My notes for this consultation are attached. If you have questions, please do not hesitate to call me. I look forward to following your patient along with you.       Sincerely,    Jacob Zhang MD LS    Pt called stating amlodipine was increased from 2.5mg to 5mg on 1/18/21.  If questions please call Pt back at [phone].

## 2023-01-24 NOTE — PROCEDURE: DIAGNOSIS COMMENT
Addended by: KAYLEY ANDRES on: 1/24/2023 01:44 PM     Modules accepted: Orders    
Comment: History of skin cancers
Detail Level: Simple
Comment: See photo 8/12/19
Detail Level: Detailed
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 Clear today with no evidence of recurrence.
Render Risk Assessment In Note?: no
Comment: C&D scar, hx of SCC Insitu. [id number].

## 2023-01-27 ENCOUNTER — OFFICE VISIT (OUTPATIENT)
Dept: SLEEP MEDICINE | Facility: MEDICAL CENTER | Age: 78
End: 2023-01-27
Payer: MEDICARE

## 2023-01-27 VITALS
HEIGHT: 67 IN | BODY MASS INDEX: 24.01 KG/M2 | DIASTOLIC BLOOD PRESSURE: 64 MMHG | RESPIRATION RATE: 16 BRPM | SYSTOLIC BLOOD PRESSURE: 102 MMHG | HEART RATE: 90 BPM | WEIGHT: 153 LBS | OXYGEN SATURATION: 96 %

## 2023-01-27 DIAGNOSIS — R91.8 LUNG NODULES: ICD-10-CM

## 2023-01-27 DIAGNOSIS — G47.33 OSA (OBSTRUCTIVE SLEEP APNEA): ICD-10-CM

## 2023-01-27 PROCEDURE — 99214 OFFICE O/P EST MOD 30 MIN: CPT | Performed by: INTERNAL MEDICINE

## 2023-01-27 ASSESSMENT — FIBROSIS 4 INDEX: FIB4 SCORE: 1.68

## 2023-01-27 ASSESSMENT — ENCOUNTER SYMPTOMS
DIAPHORESIS: 0
ABDOMINAL PAIN: 0
FEVER: 0
WHEEZING: 0
NAUSEA: 0
SPEECH CHANGE: 0
BACK PAIN: 0
PHOTOPHOBIA: 0
EYE DISCHARGE: 0
WEAKNESS: 0
SHORTNESS OF BREATH: 0
SPUTUM PRODUCTION: 0
DOUBLE VISION: 0
FALLS: 0
PND: 0
FOCAL WEAKNESS: 0
SORE THROAT: 0
HEARTBURN: 0
ORTHOPNEA: 0
CLAUDICATION: 0
HEADACHES: 0
PALPITATIONS: 0
DEPRESSION: 0
WEIGHT LOSS: 0
MYALGIAS: 0
DIARRHEA: 0
EYE REDNESS: 0
EYE PAIN: 0
STRIDOR: 0
VOMITING: 0
DIZZINESS: 0
BLURRED VISION: 0
NECK PAIN: 0
COUGH: 0
TREMORS: 0
CONSTIPATION: 0
HEMOPTYSIS: 0
SINUS PAIN: 0
CHILLS: 0

## 2023-01-27 NOTE — Clinical Note
I know there is probably not much that can be done but I told her I would let you now she is having a hard time tolerating her mouth piece

## 2023-01-27 NOTE — PROGRESS NOTES
Chief Complaint   Patient presents with    Follow-Up     Last seen 12/3/21     Results     CT Chest Thorax          HPI: This patient is a 77 y.o. female whom is followed in our clinic for abnormal CT chest last seen by me on 12/3/21. Patient's past medical history is significant for mitral valve regurgitation, severe, diastolic heart failure, history of cerebrovascular accident earlier this year as well as in 2017.  She has a remote history of early stage right-sided breast cancer status post lumpectomy, radiation and chemotherapy in 1998.  She is a lifelong non-smoker.   She had a CT of her head neck May 2021 following a stroke which demonstrated some tree-in-bud inflammation in the right upper lobe.  She was essentially asymptomatic from pulmonary standpoint but had had some issues with aspiration for which she was working with speech therapy.  We ordered pulmonary function testing which was done on 7/27/2021 and demonstrated mild airflow obstruction with mild air trapping and elevated DLCO.  CT chest done August 2021 showed persistent, stable right upper lobe tree-in-bud inflammation and some mild tree-in-bud in the left lower lobe.  No nodules.  No lymphadenopathy.  She remained asx. She has had issues with AF with failed MAZE procedure which was c/b seizure. She is followed by cardiology, neurology and sleep medicine. Most recent CT from December shows stable to slightly improved tree-in-bud nodularity of the YUNIOR and RUL. No new nodules or LAD. Pt has no cough. She has less exercise tolerance than prior to her stroke and dx of AF but works with a  3 days per week and tries to add 15 minutes on the treadmill when she can. No f/c, nt sweats or wt loss. She is having a hard time tolerating her mouth guard for sleep apnea stating sleep is better without it.     Past Medical History:   Diagnosis Date    Anemia 09/28/2021    Arthritis     toe    Atrial fibrillation (HCC)     Benign essential HTN 03/19/2012     Breast cancer (HCC)     Cancer ([facility]) 1998    breast     Cardiac arrhythmia     Chest tightness or pressure 03/19/2012    Chickenpox     Coronary heart disease     [place] measles     Gynecological disorder     High cholesterol     High risk medication use 03/19/2012    Hypercholesterolemia 03/19/2012    Mumps     MVP (mitral valve prolapse) 03/19/2012    Osteoporosis     Seizure disorder ([facility]) 09/28/2021    last seizure may 2021    Sleep apnea     CPAP at night    Snoring     Stroke ([facility]) 2017    residual minor weakness on left side    Substance abuse ([facility])     Tonsillitis     Urinary bladder disorder     OAB    Urinary incontinence     Valvular heart disease     Venereal disease        Social History     Socioeconomic History    Marital status:      Spouse name: Not on file    Number of children: 2    Years of education: Not on file    Highest education level: Professional school degree (e.g., MD, DDS, DVM, ARACELI)   Occupational History    Occupation:      Employer: Not Employed   Tobacco Use    Smoking status: Never     Passive exposure: Never    Smokeless tobacco: Never   Vaping Use    Vaping Use: Never used   Substance and Sexual Activity    Alcohol use: Yes     Alcohol/week: 1.2 oz     Types: 2 Glasses of wine per week     Comment: twice a week    Drug use: No    Sexual activity: Yes     Partners: Male     Birth control/protection: Female Sterilization   Other Topics Concern    Not on file   Social History Narrative    Not on file     Social Determinants of Health     Financial Resource Strain: Not on file   Food Insecurity: Not on file   Transportation Needs: Not on file   Physical Activity: Not on file   Stress: Not on file   Social Connections: Not on file   Intimate Partner Violence: Not on file   Housing Stability: Not on file       Family History   Problem Relation Age of Onset    Cancer Mother         breast    No Known Problems Brother     Heart Failure Neg Hx     Heart Disease Neg Hx         Current Outpatient Medications on File Prior to Visit   Medication Sig Dispense Refill    estradiol (ESTRACE) 0.1 MG/GM vaginal cream Insert 0.1 g into the vagina every day.      donepezil (ARICEPT) 5 MG Tab Take 1 Tablet by mouth every evening for 30 days, THEN 2 Tablets every evening for 90 days. (Patient not taking: Reported on 1/18/2023) 210 Tablet 0    Multiple Vitamins-Minerals (MULTIVITAMIN WOMEN 50+ PO) Take 1 Tablet by mouth every day.      metoprolol SR (TOPROL XL) 25 MG TABLET SR 24 HR Take 0.5 Tablets by mouth 2 times a day. 90 Tablet 3    ferrous sulfate 325 (65 Fe) MG tablet Take 1 Tablet by mouth every 48 hours. 100 Tablet 3    apixaban (ELIQUIS) 5mg Tab Take 1 Tablet by mouth 2 times a day. 180 Tablet 3    atorvastatin (LIPITOR) 80 MG tablet Take 1 Tablet by mouth every evening. 90 Tablet 3    spironolactone (ALDACTONE) 25 MG Tab Take 1 Tablet by mouth every day. 90 Tablet 3    fexofenadine (ALLEGRA) 60 MG Tab Take 60 mg by mouth every day.      tamsulosin (FLOMAX) 0.4 MG capsule Take 0.4 mg by mouth 1/2 hour after breakfast.       No current facility-administered medications on file prior to visit.       Patient has no known allergies.      ROS:   Review of Systems   Constitutional:  Negative for chills, diaphoresis, fever, malaise/fatigue and weight loss.   HENT:  Negative for congestion, ear discharge, ear pain, hearing loss, nosebleeds, sinus pain, sore throat and tinnitus.    Eyes:  Negative for blurred vision, double vision, photophobia, pain, discharge and redness.   Respiratory:  Negative for cough, hemoptysis, sputum production, shortness of breath, wheezing and stridor.    Cardiovascular:  Negative for chest pain, palpitations, orthopnea, claudication, leg swelling and PND.   Gastrointestinal:  Negative for abdominal pain, constipation, diarrhea, heartburn, nausea and vomiting.   Genitourinary:  Negative for dysuria and urgency.   Musculoskeletal:  Negative for back pain, falls, joint  "pain, myalgias and neck pain.   Skin:  Negative for itching and rash.   Neurological:  Negative for dizziness, tremors, speech change, focal weakness, weakness and headaches.   Endo/Heme/Allergies:  Negative for environmental allergies.   Psychiatric/Behavioral:  Negative for depression.      /64 (BP Location: Left arm, Patient Position: Sitting, BP Cuff Size: Adult)   Pulse 90   Resp 16   Ht 1.689 m (5' 6.5\")   Wt 69.4 kg (153 lb)   SpO2 96%   Physical Exam  Constitutional:       General: She is not in acute distress.     Appearance: Normal appearance. She is well-developed and normal weight.   HENT:      Head: Normocephalic and atraumatic.      Right Ear: External ear normal.      Left Ear: External ear normal.      Nose: Nose normal. No congestion.      Mouth/Throat:      Mouth: Mucous membranes are moist.      Pharynx: Oropharynx is clear. No oropharyngeal exudate.   Eyes:      General: No scleral icterus.     Extraocular Movements: Extraocular movements intact.      Conjunctiva/sclera: Conjunctivae normal.      Pupils: Pupils are equal, round, and reactive to light.   Neck:      Vascular: No JVD.      Trachea: No tracheal deviation.   Cardiovascular:      Rate and Rhythm: Normal rate. Rhythm irregular.      Heart sounds: Normal heart sounds. No murmur heard.    No friction rub. No gallop.   Pulmonary:      Effort: Pulmonary effort is normal. No accessory muscle usage or respiratory distress.      Breath sounds: Normal breath sounds. No wheezing or rales.   Abdominal:      General: There is no distension.      Palpations: Abdomen is soft.      Tenderness: There is no abdominal tenderness.   Musculoskeletal:         General: No tenderness or deformity. Normal range of motion.      Cervical back: Normal range of motion and neck supple.      Right lower leg: No edema.      Left lower leg: No edema.   Lymphadenopathy:      Cervical: No cervical adenopathy.   Skin:     General: Skin is warm and dry.      " Findings: No rash.      Nails: There is no clubbing.   Neurological:      Mental Status: She is alert and oriented to person, place, and time.      Cranial Nerves: No cranial nerve deficit.      Gait: Gait normal.   Psychiatric:         Behavior: Behavior normal.       PFTs as reviewed by me personally: as per hPI    Imaging as reviewed by me personally:  as per HPI    Assessment:  1. Lung nodules  CT-CHEST (THORAX) W/O      2. LALY (obstructive sleep apnea)            Plan:  Chronic, stable and pt is relatively asx. This could be aspiration, possible NTM however findings are mild, not progressive and pt is asx therefore I would not treat at this point even if isolated. Continue observational management.  Pt having issues tolerating oral device. Encouraged her to f/u with sleep medicine.    Return in about 1 year (around 1/27/2024) for ct chest.

## 2023-01-27 NOTE — PATIENT INSTRUCTIONS
CT of the chest from December showed stable to slightly improved, small nodular or inflammatory areas in the left upper lung and right upper lung. No change. No new findings. No concerning findings. These areas may be related to mucous in the airways, radiation changes after breast cancer or aspiration with history of stroke. Sometimes, atypical bacteria such as mycobacterial species can cause changes like this in the lung but it does not need to be treated unless the CT scan of lung is worsening or if the patient is having significant respiratory symptoms such as persistent  cough productive of sputum, unexplained weight loss or fevers. We will just continue to monitor with a repeat CT scan of the chest in December 2023

## 2023-01-30 ENCOUNTER — APPOINTMENT (OUTPATIENT)
Dept: SPEECH THERAPY | Facility: REHABILITATION | Age: 78
End: 2023-01-30
Attending: PHYSICAL MEDICINE & REHABILITATION
Payer: MEDICARE

## 2023-01-31 ENCOUNTER — TELEPHONE (OUTPATIENT)
Dept: NEUROLOGY | Facility: MEDICAL CENTER | Age: 78
End: 2023-01-31
Payer: MEDICARE

## 2023-01-31 NOTE — TELEPHONE ENCOUNTER
Patient caregiver called to inform Dr. Noe that [employer] will not cover Donepezil any longer and wanted to know if there is a generic or similar medication that the patient can be on? Please advise. Thank you.DION Narayan.

## 2023-01-31 NOTE — TELEPHONE ENCOUNTER
Donepezil is a generic version of this class of drug.  If this is not covered, may be due to the diagnosis of vascular dementia.  I would not recommend trying alternative drugs, as they are usually of only mild benefit.    ZULEIMA RaderO.  [facility]

## 2023-02-06 ENCOUNTER — APPOINTMENT (OUTPATIENT)
Dept: SPEECH THERAPY | Facility: REHABILITATION | Age: 78
End: 2023-02-06
Attending: PHYSICIAN ASSISTANT
Payer: MEDICARE

## 2023-02-13 ENCOUNTER — OFFICE VISIT (OUTPATIENT)
Dept: INTERNAL MEDICINE | Facility: OTHER | Age: 78
End: 2023-02-13
Payer: MEDICARE

## 2023-02-13 VITALS
OXYGEN SATURATION: 95 % | BODY MASS INDEX: 24.52 KG/M2 | WEIGHT: 154.2 LBS | TEMPERATURE: 97.5 F | HEART RATE: 90 BPM | RESPIRATION RATE: 12 BRPM | SYSTOLIC BLOOD PRESSURE: 126 MMHG | DIASTOLIC BLOOD PRESSURE: 74 MMHG

## 2023-02-13 DIAGNOSIS — M85.89 OSTEOPENIA OF MULTIPLE SITES: ICD-10-CM

## 2023-02-13 DIAGNOSIS — Z86.73 HISTORY OF CVA (CEREBROVASCULAR ACCIDENT): ICD-10-CM

## 2023-02-13 DIAGNOSIS — R33.9 URINARY RETENTION: ICD-10-CM

## 2023-02-13 DIAGNOSIS — Z00.00 PREVENTATIVE HEALTH CARE: ICD-10-CM

## 2023-02-13 DIAGNOSIS — I10 PRIMARY HYPERTENSION: ICD-10-CM

## 2023-02-13 DIAGNOSIS — I48.0 PAROXYSMAL ATRIAL FIBRILLATION (HCC): ICD-10-CM

## 2023-02-13 DIAGNOSIS — Z79.01 CHRONIC ANTICOAGULATION: ICD-10-CM

## 2023-02-13 DIAGNOSIS — E78.49 OTHER HYPERLIPIDEMIA: ICD-10-CM

## 2023-02-13 DIAGNOSIS — Z98.890 S/P MITRAL VALVE REPAIR: ICD-10-CM

## 2023-02-13 DIAGNOSIS — G47.33 OBSTRUCTIVE SLEEP APNEA SYNDROME: ICD-10-CM

## 2023-02-13 DIAGNOSIS — F01.50 VASCULAR DEMENTIA WITHOUT BEHAVIORAL DISTURBANCE (HCC): ICD-10-CM

## 2023-02-13 DIAGNOSIS — E78.5 DYSLIPIDEMIA: ICD-10-CM

## 2023-02-13 PROCEDURE — 99215 OFFICE O/P EST HI 40 MIN: CPT | Performed by: REGISTERED NURSE

## 2023-02-13 ASSESSMENT — ENCOUNTER SYMPTOMS
CHILLS: 0
BLURRED VISION: 1
DEPRESSION: 0
ABDOMINAL PAIN: 1
DIZZINESS: 1
SEIZURES: 0
PALPITATIONS: 0
SENSORY CHANGE: 0
FEVER: 0
COUGH: 0
GENERAL WELL-BEING: GOOD

## 2023-02-13 ASSESSMENT — PATIENT HEALTH QUESTIONNAIRE - PHQ9
SUM OF ALL RESPONSES TO PHQ QUESTIONS 1-9: 8
CLINICAL INTERPRETATION OF PHQ2 SCORE: 1
5. POOR APPETITE OR OVEREATING: 1 - SEVERAL DAYS

## 2023-02-13 ASSESSMENT — ACTIVITIES OF DAILY LIVING (ADL): BATHING_REQUIRES_ASSISTANCE: 1

## 2023-02-13 ASSESSMENT — MONTREAL COGNITIVE ASSESSMENT (MOCA)
ORIENTATION SUBSCORE: 6/6
3. DRAW A CLOCK: CONTOUR, NUMBERS, HANDS: 3/3
DELAYED RECALL SUBSCORE: 1/5
10. [FLUENCY] NAME WORDS STARTING WITH DESIGNATED LETTER: 1/1
7. [VIGILENCE] TAP WHEN HEARING DESIGNATED LETTER: 1/1
4. NAME EACH OF THE THREE ANIMALS SHOWN: 3/3
9. REPEAT EACH SENTENCE: 2/2
5. MEMORY TRIALS: FIRST TRIAL;SECOND TRIAL
2. COPY DRAWING: 0/1
6. READ LIST OF DIGITS [FORWARD/BACKWARD]: 2/2
8. SERIAL SUBTRACTION OF 7S: 1/5
11. FOR EACH PAIR OF WORDS, WHAT CATEGORY DO THEY BELONG TO (OUT OF 2): 1/2
WHAT IS THE TOTAL SCORE (OUT OF 30): 21
1. ALTERNATING TRAIL MAKING: 0/1

## 2023-02-13 ASSESSMENT — ANXIETY QUESTIONNAIRES
1. FEELING NERVOUS, ANXIOUS, OR ON EDGE: SEVERAL DAYS
IF YOU CHECKED OFF ANY PROBLEMS ON THIS QUESTIONNAIRE, HOW DIFFICULT HAVE THESE PROBLEMS MADE IT FOR YOU TO DO YOUR WORK, TAKE CARE OF THINGS AT HOME, OR GET ALONG WITH OTHER PEOPLE: SOMEWHAT DIFFICULT
2. NOT BEING ABLE TO STOP OR CONTROL WORRYING: NOT AT ALL
4. TROUBLE RELAXING: MORE THAN HALF THE DAYS
3. WORRYING TOO MUCH ABOUT DIFFERENT THINGS: MORE THAN HALF THE DAYS
GAD7 TOTAL SCORE: 8
5. BEING SO RESTLESS THAT IT IS HARD TO SIT STILL: SEVERAL DAYS
6. BECOMING EASILY ANNOYED OR IRRITABLE: MORE THAN HALF THE DAYS
7. FEELING AFRAID AS IF SOMETHING AWFUL MIGHT HAPPEN: NOT AT ALL

## 2023-02-13 ASSESSMENT — FIBROSIS 4 INDEX: FIB4 SCORE: 1.68

## 2023-02-13 NOTE — PROGRESS NOTES
Recommendations    1.As we discussed, you are taking spironolactone but we are not quite sure why. This medication lowers blood pressure but is not generally used as a first choice for treating blood pressure. Your blood pressure today was 126/74, which would indicate that you probably need to take a blood pressure medication. Some options that I would recommend are lisinopril or losartan if this medication is being used solely for blood pressure.     2.There is confusion as to what the tamsulosin dose should be. I recommend you call the urologist and verify what the current dose is. It would be helpful to have the urologist always prescribe this medication to avoid confusion with the dose.    3.There is a company called [employer] that prefills pill boxes that fit into a dispensing device. This might be a good solution managing medications. See handout.    4. You recently started a medication called donepezil. Acetylcholine is a chemical messenger that plays a critical role in memory and cognitive function. Donepezil  works by stopping the enzyme acetylcholinesterase from breaking down acetylcholine in the body. This provides the brain with more acetylcholine. It will not reverse dementia, but the hope is that it will slow the progression of the dementia. Some of the more common side effects include diarrhea, nausea, insomnia, dizziness, headache, and abnormal dreams.    It is important to watch for side effects, especially with dose changes. Should you recognize side effects, contact your provider. As with all medications, it is a good idea to evaluate risks/benefits every 6 months.     5. Please review handout on calcium and vitamin d. You are getting adequate vitamin d in the multiple vitamin. If you are only drinking 6 ounces of milk a day, you are likely not getting enough calcium.     6.Older people are at a higher risk of dehydration than younger people. This is because as you get older, you sense thirst more  slowly and less strongly than a younger person does. Also, as you get older, your body has more fat. Fat tissue contains less water than lean tissue. By the time you feel thirsty, your body is already slightly dehydrated.     Consider trying to drink 6 to 8, eight ounce, glasses of water daily unless you are told not to do so by your healthcare provider.       Accompanied by: Bridget Monroe and Bobby, chrisr Lynette  Primary concern: long term use of atorvastatin. Artemio does not like taking meds and would like to reduce the number she is taking.   Who manages meds? Fer filling pill box  Pill box? yes  How many times a week do you forget? Once a week  How many times a day? Am and pm  Medication cost a concern? no  Beer's meds? none  FRID drugs? Tamsulosin, donepezil,   Falls: no falls but Fer reports there are bruises and wounds, dizzy once a week,   Meds dosed appropriately for renal and liver fxn? yes  Hx of MI, stroke, peripheral vascular disease? Stroke x 2  OTC Pain Relievers: occasionally APAP-once a month    - does not like drinking water    A.fib/CV  - HR 90, /74  Apixaban  Atorvastatin  - total: 115 (5/2/22)  - HDL: 55  - LDL 49    Vascular Dementia  Donepezil - dose increased from 5 mg to 10 mg recently. Near syncopal episode last week. Denies diarrhea/stomach upset, however has long standing history of stomach upset    Osteopenia  - still drinking milk in cereal (six ounces a day)  - MVI has 1000 IU vitamin d    Misc  Estradiol vaginal cream - occasionally  Tamsulosin -neurogenic bladder. Urology managing?? PCP,Dr. Simon, prescribed at [facility].     Vitamin  B12  MVI

## 2023-02-13 NOTE — PATIENT INSTRUCTIONS
We thank you for taking the time to share during your medical visit with our team of providers at the [facility]. During the medical visit we completed a Comprehensive Geriatric Assessment with a , pharmacist, and nurse practitioner, all specialty trained in Geriatrics.     Below are our Age Friendly recommendations. Our recommendations are shaped using the 4 M’s model of care. In using the 4 M’s we approach care from starting with What Matters most to you.  We then use Mobility, Medications and Mentation to support that. Please note, our recommendations are another point on your health care journey. We hope the time we spent together helps you achieve What Matters most to you.    What Matters     During the visit you shared that you were hoping to address * You also shared that being with your family gives you meaning in life and that you have a goal to remain in your home for as long as possible.     We appreciate your openness and honesty with sharing this personal information. In helping you achieve What Matters we know that happiness, safety, support, companionship, symptom control, adequate sleep, and advanced planning can help many of us achieve What Matters. Below are some resources that we discussed in clinic that we think will help you achieve What Matters, especially some information about advance care planning.     Mentation    Mood  During your assessment we felt that your mood may be a playing a role in your health. To better address your mood, we recommend a variety of medical, pharmacological, and behavioral health approaches to treatment.     Medical: Sometimes depression or anxiety can be secondary to medical conditions.     Pharmacological: Sometimes medications can be a helpful tool in dealing with our mood.    Behavioral Health: When we think about our behavior, we think of both of our cognitive and physical behaviors. Sometimes working through some of our  thoughts and/or emotions can be helpful. We recommend seeing a counselor/therapist to discuss some of these issues. Physical activity can help us improve our mood. We recommend you be active to help you achieve What Matters most to you.     As you work through your treatment plan, please make sure to keep active and doing those things that give you meaning in life. Thinking, memory, mood, and mental health matter! Just like your body changes with age, so can your brain. Ways to support mentation include being engaged in meaningful activities and managing stress and anxiety. Trying new ways to relax and connect may be helpful.    Memory  During your assessment, we noted that your cognition is affecting your ability to function in daily life. Support from family and friends can be very important as you continue this part of your health care journey. Below are the specific recommendations we discussed during your visit. Please continue to follow up with your provider about any concerns or sudden changes that you or your family note.     Medications  During our assessment, we reviewed your medications to make sure they are supporting What Matters most you. Based on our discussion we thought there may be opportunity to adjust some medications to help you better achieve your health goals. When adjusting medications, we generally like to only make one change at a time. Below are some recommendations for you to discuss with your primary provider.     1.As we discussed, you are taking spironolactone but we are not quite sure why. This medication lowers blood pressure but is not generally used as a first choice for treating blood pressure. Your blood pressure today was 126/74, which would indicate that you probably need to take a blood pressure medication. Some options that I would recommend are lisinopril or losartan if this medication is being used solely for blood pressure.     2.There is confusion as to what the  tamsulosin dose should be. I recommend you call the urologist and verify what the current dose is. It would be helpful to have the urologist always prescribe this medication to avoid confusion with the dose.    3.There is a company called [employer] that prefills pill boxes that fit into a dispensing device. This might be a good solution managing medications. See handout.    4. You recently started a medication called donepezil. Acetylcholine is a chemical messenger that plays a critical role in memory and cognitive function. Donepezil  works by stopping the enzyme acetylcholinesterase from breaking down acetylcholine in the body. This provides the brain with more acetylcholine. It will not reverse dementia, but the hope is that it will slow the progression of the dementia. Some of the more common side effects include diarrhea, nausea, insomnia, dizziness, headache, and abnormal dreams.    It is important to watch for side effects, especially with dose changes. Should you recognize side effects, contact your provider. As with all medications, it is a good idea to evaluate risks/benefits every 6 months.     5. Please review handout on calcium and vitamin d. You are getting adequate vitamin d in the multiple vitamin. If you are only drinking 6 ounces of milk a day, you are likely not getting enough calcium.     6.Older people are at a higher risk of dehydration than younger people. This is because as you get older, you sense thirst more slowly and less strongly than a younger person does. Also, as you get older, your body has more fat. Fat tissue contains less water than lean tissue. By the time you feel thirsty, your body is already slightly dehydrated.     Consider trying to drink 6 to 8, eight ounce, glasses of water daily unless you are told not to do so by your healthcare provider.     Mobility   During our assessment we were happy to see how active you are! We encourage you to keep this up. At the [facility]  we focus on how mobility can help you achieve What Matter’s. In general, we recommend all adults be active every day, to the best of their ability.     Other Recommendations  Medical Recommendations:  Recommend appropriate immunizations. Shingrix for shingles recommended if not already obtained and may be obtained at pharmacy.   Follow up with Dr. White's office and have sleep study with dental device in place to determine if dental device is correcting sleep apnea.   Encourage 3 meals a day. In particular a Mediterranean diet has been found to support brain health. Please consider incorporating some of this diet into your daily meals. (See handout).   Drink 8 eight ounce glasses of water a day. This is important for overall health but staying hydrated can also avoid unnecessary dizziness.   Follow up with your hearing exam and identify if new hearing aids are needed. Decreased hearing can lead to worsening cognitive impairment.   Consider additional home care support services. At [facility], we support individuals staying at home as long as they want and can do so safely. We are here to support you in your journey and can continue discussing additional environments as your health journey changes and continues.   It is important to avoid any falls due to your risk of fracture, complications due to blood thinners and sequela of events that can lead to increased mortality. Please report any falls and consider a home safety evaluation to remove any home environments that could increase your risk of falls.   Recommend annual glaucoma screening with annual eye exam.   Colonoscopy or other colon cancer screening recommended.   DEXA scan for bone density screening recommended as indicated.   Recommend updating labs as indicated and following up with PCP.   Exercise regularly. Have a goal of 30 minutes a day 5x a week. Try new activities that challenge you and work on balance such as Darron Chi, Yoga or dance classes.    Engage in brain healthy activities such as crossword puzzles, Sudoku or OLLI. (See Handout).   For your care partners, we recommend completing the CARES program which helps with supporting and training care partners in how to provide care for individuals with cognitive impairment. (See handout).     Social Work Care Plan:  We hear that you would like to live in your home for as long as possible. Consider connecting to a private pay case management service to assist with streamlining services and receive personalized care guidance. I recommend that you reach out to Specialized Care [facility] for a consultation. They have nurse  who can attend medical appointments, assist with care plans and share their knowledge of service providers that match your current needs including a meal service. [email or web address]. [phone].  Be sure to review the call order for the medical alert system you are using. It may be more helpful to have the 1st call be a family member vs. 911.   We hear your desire to want to drive again and your family member concerns related to driving. Consider completing a driving evaluation with an occupational therapist. An option for this is [employer]. Cost is $90 for an on the road assessment. They do not report to [facility]. This may give you some peace of mind related to your driving skills.  For questions related to assisted living facilities contact [facility] at [phone] or [email or web address] and click the “Request Help” button. [facility] is a direct link to aging services in [place]. A service navigator can answer most questions you have. They provide resources, service navigation and case management free of charge.   Consider engaging with Dementia Friendly [place]: Join people living with dementia, family care partners, and interested community members for a weekly conversation about living well with dementia. All  sessions are hosted by a person living with dementia and a family care partner.  Dementia Conversations with Dmitriy and Joann: Mondays from 10:00 - 11:30 AM  2. Dementia Conversations with Jamilah and Sheree: Fridays from 1:00 - 2:30 PM  To register, visit [email or web address]  Memory Café at [facility]- A memory café is a safe, supportive and social even with engaging activities for those affected by early stage dementia, mild memory loss or other mild cognitive impairments and for their families, friends or care partners.  Tuesdays 10:30am-11:30am. Meeting room at [facility].  Consider attending [facility] [employer] [place]. They have educational, social, cultural, and recreational programs. Many of these programs are meeting in person again. $85/year membership. [phone]; [email or web address]   For caregiver support, review the Manual About Living with Dementia via the Dementia Action [place]. This is a thorough resource of information for understanding a dementia diagnosis that was co-authored with people living with dementia and their care partners. Manual About Living With Dementia, [email or web address]        Referral Recommendations    Chronic Care Management Program      To follow up with our recommendation (orders, referrals, med changes, etc) we encourage you to follow with their primary care provider before implementing these changes.

## 2023-02-13 NOTE — PROGRESS NOTES
"Comprehensive Geriatric Psychosocial Assessment Follow-Up Visit      Tell us what brings you back in today?  Present for this visit are patient and her 3 children, Fer, Bobby and Lynette.     \"I do this periodically because I think I should. On any given day I can't remember typical things. I make lists. I write them but I can't remember if I did them. 3 days x week I have an assistant from [employer]. They do housework and take  me to appointments from 9am-1pm. My ex- helps and so do my sons. I also have a friendly neighbor. People come in Monday, Wednesday and Friday and check my vitals.\"      Are there any concerns your family/care partner would like to bring to our attention?  Fer reports awareness that Artemio wants to be as independent as possible and doesn't want to burden her family and friends with requests for help. Fer is committed to being the point of contact and primary care partner. Expressed concern that Artemio could fall or have another stroke and be unable to get out of her building safely. Asked if there are medications contributing to increased confusion. Reports Artemio has been struggling to drink enough water throughout the day and needs assistance with shopping, dressing/bathing, cooking, household tasks and bill pay. Hoping to learn more about what else they can be doing to support Artemio's goal to remain at her home, in a safer way.     Lynette reports concerns about her mother continuing to live alone. Has noticed additional changes in her memory. Expressed concern that one of her brothers may \"find you dead on the floor and you laugh about it.\" [place] and is happy to help in any way that she can. Recognizes that her mother is unwilling to move into assisted living at this time.    Bobby reports wanting to support Artemio's desire to live at home for as long as possible. He has noticed additional memory decline and is concerned that there is not enough support at this time. " "Bobby reports wanting to support his brother Fer, who has become the main contact for medical appointments and in-home supports.     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     Living situation:   No changes since last visit.    Typical day/Sleep/Appetite   \"Wake up at 6am. Get out of bed at 8am. Staff comes in at 9am and stays until 1pm. Wait until they get there to take a shower. Moving around by 10am, go to [employer] and go to appointments. Try to read for a few hours, I may doze off. I read big print books that I get at [employer]. Netflix. Snacks. I rarely eat 3 meals. Typically 1 meal. Fruit, bananas, peanut butter.\" Confirms home assistant is making prepared meals for her. Enjoys wine and chocolate on days her stomach hurts. \"Sometimes I do some exercise at home. 3 days x week I go to the gym. Sometimes I eat dinner. Reading and more tv time. Talk on the phone. Go to bed about 9. Asleep about 10:00pm but I wake 2-3 times x night.\" Reports that she has stopped using cpap entirely due to noise keeping her awake. Using a mouth nightguard.       Driving: Uses Lyft or has assistant drive her. Sons often pick her up. Transportation is a barrier. Continues to have a car and reports she \"will not drive it again\". Also endorses moving the car to a new space last week.      Exercise: 3 x week at gym. 1 hour x week.      Social activities and engagement: Significant reduction in socialization.   Finances: No changes since last visit. Family report finances are not a concern at this time.    Behavioral Health:  Any changes to the amount of alcohol you drink? Last 2 weeks no alcohol, occasionally drinks when out for dinner.   Do you use medicinal marijuana or CBD products? \"No.\"  Do you see a counselor, therapist, psychologist or psychiatrist? \"Once every 6 months, psychologist Dr. Moreno, whom I have " "seen for many years.\"     Mood:   How has your mood been this past week?  \"Annoyed. Not enough energy to do the things I want to do. I know I am not getting enough done and that causes me anxiety.\"    Suicidal Ideation screening: (CSRS screening if yes)  Do you have any current thoughts of suicide? Denied.   Do you have any current thoughts that you would be better off dead? Denied.   Do you have any thoughts of wanting to harm another? Denied.   Do you have family history of suicide? \"It's possible that my father  from suicide, but we really do not know.\"    Is there anything you are looking forward to? Reports looking forward to seeing grandchildren and spending time with her children.     Social Work Care Plan  We hear that you would like to live in your home for as long as possible. Consider connecting to a private pay case management service to assist with streamlining services and receive personalized care guidance. I recommend that you reach out to [facility] Care [facility] for a consultation. They have nurse  who can attend medical appointments, assist with care plans and share their knowledge of service providers that match your current needs including a meal service. [email or web address]. [phone].  Be sure to review the call order for the medical alert system you are using. It may be more helpful to have the 1st call be a family member vs. 911.   We hear your desire to want to drive again and your family member concerns related to driving. Consider completing a driving evaluation with an occupational therapist. An option for this is [employer]. Cost is $90 for an on the road assessment. They do not report to [facility]. This may give you some peace of mind related to your driving skills.  For questions related to assisted living facilities contact [facility] at [phone] or [email or web address] and click the “Request Help” button. [place] [facility] is a direct link to aging services in [place]. A service navigator can answer most questions you have. They provide resources, service navigation and case management free of charge.   Consider engaging with Dementia Friendly [place]: Join people living with dementia, family care partners, and interested community members for a weekly conversation about living well with dementia. All sessions are hosted by a person living with dementia and a family care partner.  Dementia Conversations with Dmitriy and Joann: Mondays from 10:00 - 11:30 AM  2. Dementia Conversations with Jamilah and Sheree: Fridays from 1:00 - 2:30 PM  To register, visit [email or web address]  Memory Café at [facility]- A memory café is a safe, supportive and social even with engaging activities for those affected by early stage dementia, mild memory loss or other mild cognitive impairments and for their families, friends or care partners.  Tuesdays 10:30am-11:30am. Meeting room at [address] [place] [address].  Consider attending Ursula [employer] [place]. They have educational, social, cultural, and recreational programs. Many of these programs are meeting in person again. $85/year membership. [phone]; [email or web address]   For caregiver support, review the Manual About Living with Dementia via the Dementia Action [place]. This is a thorough resource of information for understanding a dementia diagnosis that was co-authored with people living with dementia and their care partners. Manual About Living With Dementia, [email or web address]

## 2023-02-13 NOTE — PROGRESS NOTES
[facility] Assessment    NAME: Shaista Bocanegra     Patient's Primary Language if not English:   Patient's Care Partner(s) Name: Bobby Monroe Leah   Care Partners(s) Relationship to Patient: Son son, daughter      IADL  Legend:   1- independent  2 - need assistance  3 - unable to complete       Are you still driving? No     Are you able to prepare your own meals and/or cook, need assistance or unable to complete? 3    Are you able to do shopping and errands, need assistance or unable to complete? 2    Are you able to do housekeeping, chores, need assistance or unable to complete? 2    Are you able to do laundry, need assistance or unable to complete? 1    Are you able to manage your medications, need assistance or unable to complete? 2    Are you able to do your own money management, need assistance or unable to complete? 2    Are you able to use the phone, need assistance or unable to use the phone? 1    ADL    Are you independent, need assistance, or unable to bathe yourself? 2    Are you independent, need assistance, or unable to dress yourself? 2    Are you independent, need assistance, or unable to feed yourself? 1    Are you independent, need assistance, or unable to get up out of a chair or off a bed? 1    Are you independent, need assistance, or unable to use the toilet by yourself? 1    Are you aware when you need to use the toilet? Yes     If no, please describe the problem you are experiencing.       Assist Devices: Patient uses:  Cane: No   Walker: No   Wheelchair: No   Amputations:No   Chronic oxygen use:No   CPAP/BIPAP use: Yes, C-PAP and a   : Reports urinary leakage during the last 6 months that has somewhat interfered with their daily activities or sleep.  If you have a problem with continence, do you wear special garments?  PADS       Fall Screening:   Any falls within the last year? None  Any injuries associated with the falls?   -If yes, what injury?   Do you worry about falling?No, family  does   Do you feel unsteady when standing or walking? Yes   You have symptoms of lightheadedness or dizziness from lying to standing? Yes     Any throw rugs on floor? Yes   Do you have stairs? Yes    Do you have handrails on all stairs? Yes   Good lighting in all hallways. Yes   Any difficulty hearing? 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 Yes   Last eye exam: 3 months ago       FRAIL SCALE    Fatigue:  How much time during the past 4 weeks did you feel tired:  1=All the time, 2=Most the time, 3=Some of the time, 4=A little of the time,  5=None of the time  Answer: 3  (responses of 1 or 2 are scored as 1 and all others as 0)  SCORE: 0    Resistance:  By yourself and not using aids, do you have any difficulty walking up 10 steps without resting?  1=Yes, 0=No  SCORE: 1    Ambulation:  By yourself and not using aids, do you have any difficulty walking a couple of blocks?  1=Yes, 0=No  SCORE:1      Loss of Weight over last year:   If noted above, one year ago, how much did you weigh:?  137.9 Up 16 lbs   (percent change is computed as: weight 1 year ago- current weight/weight 1 year ago. X 100.  (more than 5% weight loss is scored as 1, and less than 5% is 0)    Score: 0    Total Score: 2    Scorin =  Robust Health  1-2=Pre-frail  3-5=Frail    Ask if they have been admitted to the hospital in the past three month: No       Mini Cog Clock-  2/2  Time 1:25       Depression Screen - PHQ- 9   0 = Not at all, 1 = Several days,  2 = More than half the days,  3 = Nearly every day     Little interest or pleasure in doing things?   Feeling down, depressed , or hopeless?   Trouble falling or staying asleep, or sleeping too much?    Feeling tired or having little energy?   Poor appetite or overeating?    Feeling bad about yourself - or that you are a failure or have let yourself or your family down?    Trouble concentrating on things, such as reading the newspaper or watching television?    Moving or speaking so slowly that other people could have noticed.  Or the opposite - being so fidgety or restless that you have been moving around a lot more than usual?   Thoughts that you would be better off dead, or of hurting yourself?   Patient Health Questionnaire Score:      Anxiety Screen/YOSELIN-7 Questionnaire  0 = Not at all, 1 = Several days,  2 = More than half the days,  3 = Nearly every day     Feeling nervous, anxious or on edge 1  Not being able to stop or control worryin  Worrying too much about different things: 2  Trouble relaxin  Being so restless that it's hard to sit still: 1  Becoming easily annoyed or irritable: 2  Feeling afraid as if something awful might happen: 0  Total Score : 8        Interpretation of YOSELIN 7 Total Score   Score Severity :  0-4 No Anxiety   5-9 Mild Anxiety  10-14 Moderate Anxiety  15-21 Severe Anxiety      I

## 2023-02-13 NOTE — PROGRESS NOTES
Interdisciplinary Comprehensive Geriatric Assessment (CGA) - [facility]    Brief Overview of assessment:    Our comprehensive geriatric assessment (CGA) team is comprised of a medical assistant (MA), medical provider, pharmacist, and , all of whom create a note during the assessment. The patient's assessment starts with the MA who screens the patient for many common geriatric syndromes. If possible, the MA does these assessments with the patient alone. The MA then introduces the patient and (s) to the rest of the CGA team and shares information collected during the screening assessments. The CGA team then completes the assessment and provides recommendations over the next 90 minutes.  We provide recommendations modeling that of an Age-Friendly Health System with the 4 Ms of Care (What Matters, Mentation, Medications, and Mobility). For any questions about our assessment or recommendations, please reach out to our office ([phone]). To learn more about providing age friendly care to your patients consider visiting this web site to learn more. [email or web address].org/Engage/Initiatives/Age-Friendly-Health-Systems/Pages/default.    In general, we encourage patients to follow-up with their primary care provider prior to implementing the recommendations (orders, referrals, med changes, etc) discussed during the visit today. See A/P and patient instructions below.      Visit Note:  Chief Complaint   Patient presents with    Health Risk Assessments For [facility]     [place] 13 month f/u CGA #2      Patient Name: Shaista Bocanegra  Patient Age; 77 y.o.  Date of Consult: 2/13/2023  Referring Provider: Follow Up  PCP: Capri Simon M.D.    Interdisciplinary Geriatric Consult Provided By:   MOLLY Sanchez, Pharm D  Opal Beckham, MSW, [facility]  Rylee Dosch, social work intern    Assessment and Plan:   Osteopenia  Continue follow up and management with  PCP    Dyslipidemia  Continue medical management and diet    History of CVA (cerebrovascular accident)  Continue medical management    Hypertension  Continue medical management    Sleep apnea  Continues follow up with sleep specialist Filipe PIERRE  Recommend regular CPAP use    Preventative health care  See CGA recommendations    Atrial fibrillation (HCC)  Continue follow up with cardiologist.     Other hyperlipidemia  Continue medical management    S/P mitral valve repair  Stable  Chronic anticoagulation    Vascular dementia without behavioral disturbance (HCC)  MoCA 21/30 on 2/13/2023    Urinary retention  Self Caths  On medication therapy      We thank you for taking the time to share during your medical visit with our team of providers at the [facility]. During the medical visit we completed a Comprehensive Geriatric Assessment with a , pharmacist, and nurse practitioner, all specialty trained in Geriatrics.     Below are our Age Friendly recommendations. Our recommendations are shaped using the 4 M’s model of care. In using the 4 M’s we approach care from starting with What Matters most to you.  We then use Mobility, Medications and Mentation to support that. Please note, our recommendations are another point on your health care journey. We hope the time we spent together helps you achieve What Matters most to you.    What Matters     During the visit you shared that you were hoping to address * You also shared that being with your family gives you meaning in life and that you have a goal to remain in your home for as long as possible.     We appreciate your openness and honesty with sharing this personal information. In helping you achieve What Matters we know that happiness, safety, support, companionship, symptom control, adequate sleep, and advanced planning can help many of us achieve What Matters. Below are some resources that we discussed in clinic that we think will  help you achieve What Matters, especially some information about advance care planning.     Mentation    Mood  During your assessment we felt that your mood may be a playing a role in your health. To better address your mood, we recommend a variety of medical, pharmacological, and behavioral health approaches to treatment.     Medical: Sometimes depression or anxiety can be secondary to medical conditions.     Pharmacological: Sometimes medications can be a helpful tool in dealing with our mood.    Behavioral Health: When we think about our behavior, we think of both of our cognitive and physical behaviors. Sometimes working through some of our thoughts and/or emotions can be helpful. We recommend seeing a counselor/therapist to discuss some of these issues. Physical activity can help us improve our mood. We recommend you be active to help you achieve What Matters most to you.     As you work through your treatment plan, please make sure to keep active and doing those things that give you meaning in life. Thinking, memory, mood, and mental health matter! Just like your body changes with age, so can your brain. Ways to support mentation include being engaged in meaningful activities and managing stress and anxiety. Trying new ways to relax and connect may be helpful.    Memory  During your assessment, we noted that your cognition is affecting your ability to function in daily life. Support from family and friends can be very important as you continue this part of your health care journey. Below are the specific recommendations we discussed during your visit. Please continue to follow up with your provider about any concerns or sudden changes that you or your family note.     Medications  During our assessment, we reviewed your medications to make sure they are supporting What Matters most you. Based on our discussion we thought there may be opportunity to adjust some medications to help you better achieve your health  goals. When adjusting medications, we generally like to only make one change at a time. Below are some recommendations for you to discuss with your primary provider.     1.As we discussed, you are taking spironolactone but we are not quite sure why. This medication lowers blood pressure but is not generally used as a first choice for treating blood pressure. Your blood pressure today was 126/74, which would indicate that you probably need to take a blood pressure medication. Some options that I would recommend are lisinopril or losartan if this medication is being used solely for blood pressure.     2.There is confusion as to what the tamsulosin dose should be. I recommend you call the urologist and verify what the current dose is. It would be helpful to have the urologist always prescribe this medication to avoid confusion with the dose.    3.There is a company called [employer] that prefills pill boxes that fit into a dispensing device. This might be a good solution managing medications. See handout.    4. You recently started a medication called donepezil. Acetylcholine is a chemical messenger that plays a critical role in memory and cognitive function. Donepezil  works by stopping the enzyme acetylcholinesterase from breaking down acetylcholine in the body. This provides the brain with more acetylcholine. It will not reverse dementia, but the hope is that it will slow the progression of the dementia. Some of the more common side effects include diarrhea, nausea, insomnia, dizziness, headache, and abnormal dreams.    It is important to watch for side effects, especially with dose changes. Should you recognize side effects, contact your provider. As with all medications, it is a good idea to evaluate risks/benefits every 6 months.     5. Please review handout on calcium and vitamin d. You are getting adequate vitamin d in the multiple vitamin. If you are only drinking 6 ounces of milk a day, you are likely not  getting enough calcium.     6.Older people are at a higher risk of dehydration than younger people. This is because as you get older, you sense thirst more slowly and less strongly than a younger person does. Also, as you get older, your body has more fat. Fat tissue contains less water than lean tissue. By the time you feel thirsty, your body is already slightly dehydrated.     Consider trying to drink 6 to 8, eight ounce, glasses of water daily unless you are told not to do so by your healthcare provider.     Mobility   During our assessment we were happy to see how active you are! We encourage you to keep this up. At the [facility] we focus on how mobility can help you achieve What Matter’s. In general, we recommend all adults be active every day, to the best of their ability.     Other Recommendations  Medical Recommendations:  Recommend appropriate immunizations. Shingrix for shingles recommended if not already obtained and may be obtained at pharmacy.   Follow up with Dr. White's office and have sleep study with dental device in place to determine if dental device is correcting sleep apnea.   Encourage 3 meals a day. In particular a Mediterranean diet has been found to support brain health. Please consider incorporating some of this diet into your daily meals. (See handout).   Drink 8 eight ounce glasses of water a day. This is important for overall health but staying hydrated can also avoid unnecessary dizziness.   Follow up with your hearing exam and identify if new hearing aids are needed. Decreased hearing can lead to worsening cognitive impairment.   Consider additional home care support services. At [place], we support individuals staying at home as long as they want and can do so safely. We are here to support you in your journey and can continue discussing additional environments as your health journey changes and continues.   It is important to avoid any falls due to your risk of fracture,  complications due to blood thinners and sequela of events that can lead to increased mortality. Please report any falls and consider a home safety evaluation to remove any home environments that could increase your risk of falls.   Recommend annual glaucoma screening with annual eye exam.   Colonoscopy or other colon cancer screening recommended.   DEXA scan for bone density screening recommended as indicated.   Recommend updating labs as indicated and following up with PCP.   Exercise regularly. Have a goal of 30 minutes a day 5x a week. Try new activities that challenge you and work on balance such as Darron Chi, Yoga or dance classes.   Engage in brain healthy activities such as crossword puzzles, Sudoku or OLLI. (See Handout).   For your care partners, we recommend completing the CARES program which helps with supporting and training care partners in how to provide care for individuals with cognitive impairment. (See handout).     Social Work Care Plan:  We hear that you would like to live in your home for as long as possible. Consider connecting to a private pay case management service to assist with streamlining services and receive personalized care guidance. I recommend that you reach out to [facility] for a consultation. They have nurse  who can attend medical appointments, assist with care plans and share their knowledge of service providers that match your current needs including a meal service. [email or web address]. [phone].  Be sure to review the call order for the medical alert system you are using. It may be more helpful to have the 1st call be a family member vs. 911.   We hear your desire to want to drive again and your family member concerns related to driving. Consider completing a driving evaluation with an occupational therapist. An option for this is [employer]. Cost is $90 for an on the road assessment. They do not report to DMV.  This may give you some peace of mind related to your driving skills.  For questions related to assisted living facilities contact [facility] at [phone] or [email or web address] and click the “Request Help” button. [facility] is a direct link to aging services in [place]. A service navigator can answer most questions you have. They provide resources, service navigation and case management free of charge.   Consider engaging with D[facility] [place]: Join people living with dementia, family care partners, and interested community members for a weekly conversation about living well with dementia. All sessions are hosted by a person living with dementia and a family care partner.  Dementia Conversations with Dmitriy and Joann: Mondays from 10:00 - 11:30 AM  2. Dementia Conversations with Jamilah and Sheree: Fridays from 1:00 - 2:30 PM  To register, visit [email or web address]  Memory Café at [facility]- A memory café is a safe, supportive and social even with engaging activities for those affected by early stage dementia, mild memory loss or other mild cognitive impairments and for their families, friends or care partners.  Tuesdays 10:30am-11:30am. Meeting room at [address] [facility].  Consider attending [employer] [place]. They have educational, social, cultural, and recreational programs. Many of these programs are meeting in person again. $85/year membership. [phone]; [email or web address]   For caregiver support, review the Manual About Living with Dementia via the [facility] [place]. This is a thorough resource of information for understanding a dementia diagnosis that was co-authored with people living with dementia and their care partners. Manual About Living With Dementia, [email or web address]/pathways-to-well-being-manual/      Referral Recommendations    Chronic Care Management Program      To  "follow up with our recommendation (orders, referrals, med changes, etc) we encourage you to follow with their primary care provider before implementing these changes.    History of Present Illness:   Tushar is concerned about her deteriorating mentation. She is here with her sons Bobby and Fer as well as Lynette (daughter) who lives out of state. Fer indicates that he is concerned about her driving. She is not driving at the moment. Fer notices that she is more confused in the morning and evenings but \"clearer\" in the middle of the day.     Artemio lives at home with her . They have home care givers from home instead coming in 3 days a weeks for a few hours to help with appointments. There are some limitations related to the caregivers services she receives.     Fer is the most primary caregiver. He indicates that he is not burdened by being his mother's care partner but he does report some challenges in tracking what is going on with all of his mothers specialists and caregivers.     Artemio is interested in staying in her home as long as possible. There is a history of her having multiple CVA's in which she was alone at home and was found on the floor. Her family has some concerns about safety. Artemio dismisses some of her families concerns about safety including her inability to care for her own nutrition, medications, hydration and other ADL's. Her children are also concerned about her decreased amount of socialization. She has a life alert necklace and also has the Apple watch with the Life Alert system. Her family is also concerned that there is a fireplace that requires manual ignition with a lighter. Artemio lives on the 11th floor of her building and admits that she does not leave her apartment when there is a fire drill.     Artemio was a  for many years and was used to being very social and outside of the home most of the time. Her family is very concerned that she is now socially isolated " and does not have many people that she interacts with on a daily basis.     TYPICAL DAY: Wakes up around 6am. Has caregiver come in around 9am. Takes shower and gets out of house by 10am. Caregiver leaves around 1pm. She then reads in the afternoons. Her caregivers prepare at least one meal for her. She does not like to cook and admits she never has. She snacks on fresh fruit and especially enjoys bananas. Her family worries about her nutrition balance and would like more support with providing her with meals. Often skips dinner. Watches Netflix in the evening and goes to bed around 9 and go to sleep by 10 or 11pm.     Recent events include an episode of dizziness last week in which she called EMS and they responded within 30 minutes. By that time, her symptoms had improved.     Denies THC or CBD. Drinks alcohol 1-2 times a week.     Sees a psychologist from time to time, Dr. Neely.       History provided by:Patient and Child(tanvi)  Patient is a Fair historian    77 y.o. female with Past Medical History:  09/28/2021: Anemia  No date: Arthritis      Comment:  toe  No date: Atrial fibrillation ([facility])  03/19/2012: Benign essential HTN  No date: Breast cancer ([facility])  1998: Cancer ([facility])      Comment:  breast   No date: Cardiac arrhythmia  03/19/2012: Chest tightness or pressure  No date: Chickenpox  No date: Coronary heart disease  No date: Citizen of [place] measles  No date: Gynecological disorder  No date: High cholesterol  03/19/2012: High risk medication use  03/19/2012: Hypercholesterolemia  No date: Mumps  03/19/2012: MVP (mitral valve prolapse)  No date: Osteoporosis  09/28/2021: Seizure disorder ([facility])      Comment:  last seizure may 2021  No date: Sleep apnea      Comment:  CPAP at night  No date: Snoring  2017: Stroke ([facility])      Comment:  residual minor weakness on left side  No date: Substance abuse ([facility])  No date: Tonsillitis  No date: Urinary bladder disorder      Comment:  OAB  No date: Urinary incontinence  No date:  "Valvular heart disease  No date: Venereal disease      Review of System:   Review of Systems   Constitutional:  Negative for chills and fever.   Eyes:  Positive for blurred vision.   Respiratory:  Negative for cough.    Cardiovascular:  Negative for chest pain, palpitations and leg swelling.   Gastrointestinal:  Positive for abdominal pain.   Genitourinary:  Negative for dysuria.   Skin:  Negative for rash.   Neurological:  Positive for dizziness. Negative for sensory change and seizures.   Psychiatric/Behavioral:  Negative for depression and suicidal ideas.     Hearing - worsening, not wearing hearing aids  Vision - needs big print books due to visual acuity  Appetite - decreased, poor water intake  Weight Loss  Diet - no special diet  Exercise - goes to the gym 3 days a week - does not enjoy it  Dysphagia - no   Memory - reports some worsening. Needs to re-read frequently  Fatigue - yes, rare naps  Sleep - uses CPAP inconsistently. Also has a mouth guard.   Dental Problems  Constipation - occasional, does not drink much water. Iron supplement eliminated.   Diarrhea - no   Urinary Incontinence - self caths for urinary retention. Being followed by Urology.   Falls - family is concerned that she has more falls than she reports. They find that she has small abrasions and bruises on her arms and legs that Artemio cannot explain.   Dizziness/lightheadedness  Mood - \"annoyed\" due to lack of energy and productivity. Frustration.   SI - denies   Any personal history of depression/anxiety/psychiatric disorder     Cognitive ROS  Memory concerns - yes  Advance Directives - yes  History of TBI - no  Hallucinations - no   Tremor - no   Rigidity - no   Gait Changes - shuffling noted  Behavior/personality changes - increased forgetfulness, repetitive questions  Alcohol use - 1-2 a week  History of PVD/MI/Stroke - yes x3  FH of dementia - no   Previous labs - see below  Previous Head Imaging - see below    IADL  Legend:   1- " independent  2 - need assistance  3 - unable to complete         Are you still driving? No      Are you able to prepare your own meals and/or cook, need assistance or unable to complete? 3     Are you able to do shopping and errands, need assistance or unable to complete? 2     Are you able to do housekeeping, chores, need assistance or unable to complete? 2     Are you able to do laundry, need assistance or unable to complete? 1     Are you able to manage your medications, need assistance or unable to complete? 2     Are you able to do your own money management, need assistance or unable to complete? 2     Are you able to use the phone, need assistance or unable to use the phone? 1     ADL     Are you independent, need assistance, or unable to bathe yourself? 2     Are you independent, need assistance, or unable to dress yourself? 2     Are you independent, need assistance, or unable to feed yourself? 1     Are you independent, need assistance, or unable to get up out of a chair or off a bed? 1     Are you independent, need assistance, or unable to use the toilet by yourself? 1     Are you aware when you need to use the toilet? Yes      If no, please describe the problem you are experiencing.         Assist Devices: Patient uses:  Cane: No   Walker: No   Wheelchair: No   Amputations:No   Chronic oxygen use:No   CPAP/BIPAP use: Yes, C-PAP and a   : Reports urinary leakage during the last 6 months that has somewhat interfered with their daily activities or sleep.  If you have a problem with continence, do you wear special garments?  PADS         Fall Screening:   Any falls within the last year? None  Any injuries associated with the falls?   -If yes, what injury?   Do you worry about falling?No, family does   Do you feel unsteady when standing or walking? Yes   You have symptoms of lightheadedness or dizziness from lying to standing? Yes      Any throw rugs on floor? Yes   Do you have stairs? Yes    Do  you have handrails on all stairs? Yes   Good lighting in all hallways. Yes   Any difficulty hearing? 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 Yes   Last eye exam: 3 months ago         FRAIL SCALE     Fatigue:  How much time during the past 4 weeks did you feel tired:  1=All the time, 2=Most the time, 3=Some of the time, 4=A little of the time,  5=None of the time  Answer: 3  (responses of 1 or 2 are scored as 1 and all others as 0)  SCORE: 0     Resistance:  By yourself and not using aids, do you have any difficulty walking up 10 steps without resting?  1=Yes, 0=No  SCORE: 1     Ambulation:  By yourself and not using aids, do you have any difficulty walking a couple of blocks?  1=Yes, 0=No  SCORE:1        Loss of Weight over last year:   If noted above, one year ago, how much did you weigh:?  137.9 Up 16 lbs   (percent change is computed as: weight 1 year ago- current weight/weight 1 year ago. X 100.  (more than 5% weight loss is scored as 1, and less than 5% is 0)     Score: 0     Total Score: 2     Scorin =  Robust Health  1-2=Pre-frail  3-5=Frail     Ask if they have been admitted to the hospital in the past three month: No         Mini Cog Clock-  2/2  Time 1:25         2023   PHQ-9 Screening   Little interest or pleasure in doing things 1 - several days 1 - several days 0 - not at all 0 - not at all   Feeling down, depressed, or hopeless 0 - not at all 0 - not at all 0 - not at all 0 - not at all   Trouble falling or staying asleep, or sleeping too much 2 - more than half the days 1 - several days     Feeling tired or having little energy 2 - more than half the days 1 - several days     Poor appetite or overeating 1 - several days 0 - not at all     Feeling bad about yourself - or that you are a failure or have let yourself or your family down 0 - not at all 0 - not at all     Trouble concentrating on things, such as reading the newspaper or  watching television 1 - several days 1 - several days     Moving or speaking so slowly that other people could have noticed. Or the opposite - being so fidgety or restless that you have been moving around a lot more than usual 1 - several days 3 - nearly every day     Thoughts that you would be better off dead, or of hurting yourself in some way 0 - not at all 0 - not at all     PHQ-2 Total Score 1 1 0 0   PHQ-9 Total Score 8 7         Multiple values from one day are sorted in reverse-chronological order       Interpretation of PHQ-9 Total Score   Score Severity   1-4 No Depression   5-9 Mild Depression   10-14 Moderate Depression   15-19 Moderately Severe Depression   20-27 Severe Depression       Anxiety Screen/YOSELIN-7 Questionnaire  0 = Not at all, 1 = Several days,  2 = More than half the days,  3 = Nearly every day      Feeling nervous, anxious or on edge 1  Not being able to stop or control worryin  Worrying too much about different things: 2  Trouble relaxin  Being so restless that it's hard to sit still: 1  Becoming easily annoyed or irritable: 2  Feeling afraid as if something awful might happen: 0  Total Score :  8                                                   Interpretation of YOSELIN 7 Total Score   Score Severity :  0-4 No Anxiety   5-9 Mild Anxiety  10-14 Moderate Anxiety  15-21 Severe Anxiety     I reviewed the patient's MiniCog, PHQ9, GAD7, falls, and frailty screens as documented in the Medical Assistant's note.     MoCA - 22/  PHQ 9 score - 8  YOSELIN 7 - 8    Depression Screening  Little interest or pleasure in doing things?  1 - several days  Feeling down, depressed , or hopeless? 0 - not at all  Trouble falling or staying asleep, or sleeping too much?  2 - more than half the days  Feeling tired or having little energy?  2 - more than half the days  Poor appetite or overeating?  1 - several days  Feeling bad about yourself - or that you are a failure or have let yourself or your family down?  0 - not at all  Trouble concentrating on things, such as reading the newspaper or watching television? 1 - several days  Moving or speaking so slowly that other people could have noticed.  Or the opposite - being so fidgety or restless that you have been moving around a lot more than usual?  1 - several days  Thoughts that you would be better off dead, or of hurting yourself?  0 - not at all  Patient Health Questionnaire Score: 8    If depressive symptoms identified deferred to follow up visit unless specifically addressed in assessment and plan.    Interpretation of PHQ-9 Total Score   Score Severity   1-4 No Depression   5-9 Mild Depression   10-14 Moderate Depression   15-19 Moderately Severe Depression   20-27 Severe Depression    Screening for Cognitive Impairment  Three Minute Recall (daughter, heaven, mountain)  /3    Mikal clock face with all 12 numbers and set the hands to show 10 past 11.  Yes    Cognitive concerns identified deferred for follow up unless specifically addressed in assessment and plan.    Fall Risk Assessment  Has the patient had two or more falls in the last year or any fall with injury in the last year?  No      Functional Assessment ADLs  Are there any barriers preventing you from cooking for yourself or meeting nutritional needs?  Yes.    Are there any barriers preventing you from driving safely or obtaining transportation?  Yes.    Are there any barriers preventing you from using a telephone or calling for help?  No    Are there any barriers preventing you from shopping?  Yes.    Are there any barriers preventing you from taking care of your own finances?  Yes    Are there any barriers preventing you from managing your medications?  Yes    Are there any barriers preventing you from showering, bathing or dressing yourself? Yes    Are you currently engaging in any exercise or physical activity?  No.    What is your perception of your health? Good    Advance Care Planning  Do you have an  Advance Directive, Living Will, Durable Power of , or POLST? Yes                 Health Maintenance Summary            Overdue - Annual Wellness Visit (Every 366 Days) Overdue - never done      No completion history exists for this topic.              Overdue - HEPATITIS C SCREENING (Once) Overdue - never done      No completion history exists for this topic.              Overdue - COVID-19 Vaccine (5 - Booster) Overdue since 10/19/2022      08/24/2022  Imm Admin: PFIZER GAINES CAP SARS-COV-2 VACCINATION (12+)    01/06/2022  Imm Admin: MODERNA SARS-COV-2 VACCINE (12+)    02/22/2021  Imm Admin: COVID-19 Vaccine, unspecified - HISTORICAL DATA    01/23/2021  Imm Admin: COVID-19 Vaccine, unspecified - HISTORICAL DATA              BONE DENSITY (Every 5 Years) Next due on 5/31/2024 05/31/2019  DS-BONE DENSITY STUDY (DEXA)    05/11/2018  DS-BONE DENSITY STUDY (DEXA)    02/05/2016  DS-BONE DENSITY STUDY (DEXA)    03/05/2014  DS-BONE DENSITY STUDY (DEXA)    12/15/2006  DS-BONE DENSITY STUDY (DEXA)    Only the first 5 history entries have been loaded, but more history exists.              IMM DTaP/Tdap/Td Vaccine (3 - Td or Tdap) Next due on 3/20/2028      03/20/2018  Imm Admin: Tdap Vaccine    07/24/2013  Imm Admin: Tdap Vaccine    02/09/2006  Imm Admin: TD Vaccine              IMM HEP B VACCINE (Series Information) Completed      09/05/2006  Imm Admin: Hep A/HEP B Combined Vaccine (TwinRix)    04/04/2006  Imm Admin: Hep A/HEP B Combined Vaccine (TwinRix)    02/09/2006  Imm Admin: Hep A/HEP B Combined Vaccine (TwinRix)              IMM PNEUMOCOCCAL VACCINE: 65+ Years (Series Information) Completed      12/21/2021  Imm Admin: Pneumococcal Conjugate Vaccine (Prevnar/PCV-13)    08/15/2017  Imm Admin: Pneumococcal Vaccine (PCV7) - HISTORICAL DATA    11/03/2016  Imm Admin: Pneumococcal Conjugate Vaccine (Prevnar/PCV-13)    06/08/2015  Imm Admin: Pneumococcal polysaccharide vaccine (PPSV-23)    01/01/2008  Imm Admin:  Pneumococcal polysaccharide vaccine (PPSV-23)              IMM INFLUENZA (Series Information) Completed      08/24/2022  Imm Admin: Influenza Vaccine Adult HD    12/21/2021  Imm Admin: Influenza Vaccine Adult HD    12/21/2021  Imm Admin: Influenza (IM) Preservative Free - HISTORICAL DATA    10/22/2021  Imm Admin: Influenza Vaccine Adult HD    09/17/2020  Imm Admin: Influenza Vaccine Quad Inj (Pf)    Only the first 5 history entries have been loaded, but more history exists.              IMM ZOSTER VACCINES (Series Information) Completed      08/24/2022  Imm Admin: Zoster Vaccine Recombinant (RZV) (SHINGRIX)    03/04/2020  Imm Admin: Zoster Vaccine Recombinant (RZV) (SHINGRIX)    11/25/2019  Imm Admin: Zoster Vaccine Recombinant (RZV) (SHINGRIX)    07/21/2012  Imm Admin: Zoster Vaccine Live (ZVL) (Zostavax) - HISTORICAL DATA              IMM MENINGOCOCCAL ACWY VACCINE (Series Information) Aged Out      No completion history exists for this topic.              Discontinued - MAMMOGRAM  Discontinued        Frequency changed to Never automatically (Topic No Longer Applies)    11/14/2022  MA-DIAGNOSTIC MAMMO RIGHT W/TOMOSYNTHESIS W/CAD    03/04/2022  MA-DIAGNOSTIC MAMMO RIGHT W/TOMOSYNTHESIS W/O CAD    02/17/2022  MA-SCREENING MAMMO BILAT W/TOMOSYNTHESIS W/CAD    02/10/2021  MA-SCREENING MAMMO BILAT W/TOMOSYNTHESIS W/CAD    Only the first 5 history entries have been loaded, but more history exists.              Discontinued - CERVICAL CANCER SCREENING  Discontinued        Frequency changed to Never automatically (Topic No Longer Applies)    04/22/2022  THINPREP PAP WITH HPV    04/22/2022  Pathology Gynecology Specimen    03/05/2021  THINPREP PAP WITH HPV    03/05/2021  Pathology Gynecology Specimen              Discontinued - COLORECTAL CANCER SCREENING  Discontinued        Frequency changed to Never automatically (Topic No Longer Applies)    09/29/2021  OCCULT BLOOD X3 (STOOL)                    Patient Care  Team:  Capri Simon M.D. as PCP - General (Family Medicine)  [facility]  as Respiratory Therapist (DME Supplier)  Tamika Mondragon MS,CCC-SLP  Tamika Mondragon MS,CCC-SLP as Speech Therapist (Speech Pathology)  Duplicate Matteo Castaneda as Referring Physician (Oral & Maxillofacial Surgery)  Yonis Joyce M.D. (Ophthalmology)  MOLLY Bush (Nurse Practitioner Family)  Kaleigh Chavez R.N. as Registered Nurse (Geriatric Medicine)  Karrie Verma (Geriatric Medicine)      Social History     Socioeconomic History    Marital status:      Spouse name: Not on file    Number of children: 2    Years of education: Not on file    Highest education level: Professional school degree (e.g., MD, DDS, DVM, ARACELI)   Occupational History    Occupation:      Employer: Not Employed   Tobacco Use    Smoking status: Never     Passive exposure: Never    Smokeless tobacco: Never   Vaping Use    Vaping Use: Never used   Substance and Sexual Activity    Alcohol use: Yes     Alcohol/week: 1.2 oz     Types: 2 Glasses of wine per week     Comment: twice a week    Drug use: No    Sexual activity: Yes     Partners: Male     Birth control/protection: Female Sterilization   Other Topics Concern    Not on file   Social History Narrative    Not on file     Social Determinants of Health     Financial Resource Strain: Not on file   Food Insecurity: Not on file   Transportation Needs: Not on file   Physical Activity: Not on file   Stress: Not on file   Social Connections: Not on file   Intimate Partner Violence: Not on file   Housing Stability: Not on file       Family History   Problem Relation Age of Onset    Cancer Mother         breast    No Known Problems Brother     Heart Failure Neg Hx     Heart Disease Neg Hx        ALLERGIES:  Patient has no known allergies.      Current Outpatient Medications:     cyanocobalamin (VITAMIN B12) 1000 MCG Tab, Take 1,000 mcg by mouth every day., Disp: , Rfl:     estradiol (ESTRACE) 0.1 MG/GM  vaginal cream, Insert 0.1 g into the vagina every day., Disp: , Rfl:     donepezil (ARICEPT) 5 MG Tab, Take 1 Tablet by mouth every evening for 30 days, THEN 2 Tablets every evening for 90 days., Disp: 210 Tablet, Rfl: 0    Multiple Vitamins-Minerals (MULTIVITAMIN WOMEN 50+ PO), Take 1 Tablet by mouth every day., Disp: , Rfl:     apixaban (ELIQUIS) 5mg Tab, Take 1 Tablet by mouth 2 times a day., Disp: 180 Tablet, Rfl: 3    atorvastatin (LIPITOR) 80 MG tablet, Take 1 Tablet by mouth every evening., Disp: 90 Tablet, Rfl: 3    spironolactone (ALDACTONE) 25 MG Tab, Take 1 Tablet by mouth every day., Disp: 90 Tablet, Rfl: 3    tamsulosin (FLOMAX) 0.4 MG capsule, Take 0.4 mg by mouth 1/2 hour after breakfast., Disp: , Rfl:       Physical Exam:   /74 (BP Location: Left arm, Patient Position: Sitting, BP Cuff Size: Adult)   Pulse 90   Temp 36.4 °C (97.5 °F)   Resp 12   Wt 69.9 kg (154 lb 3.2 oz)   SpO2 95%   BMI 24.52 kg/m²   Physical Exam  HENT:      Head: Normocephalic.   Eyes:      Pupils: Pupils are equal, round, and reactive to light.   Cardiovascular:      Rate and Rhythm: Normal rate and regular rhythm.   Pulmonary:      Effort: Pulmonary effort is normal.      Breath sounds: Normal breath sounds.   Musculoskeletal:         General: No swelling or tenderness.   Skin:     General: Skin is warm and dry.   Neurological:      Mental Status: She is alert and oriented to person, place, and time. Mental status is at baseline.      Motor: No weakness.   Psychiatric:         Mood and Affect: Mood is anxious and depressed. Affect is not inappropriate.         Speech: Speech is not rapid and pressured or slurred.         Behavior: Behavior is slowed. Behavior is not withdrawn or combative.         Thought Content: Thought content does not include homicidal or suicidal ideation. Thought content does not include homicidal or suicidal plan.         Cognition and Memory: Cognition is impaired. Memory is impaired.          Judgment: Judgment normal. Judgment is not impulsive.       Labs:  CBC:  Lab Results   Component Value Date/Time    WBC 4.9 12/14/2022 10:06 AM    RBC 4.51 12/14/2022 10:06 AM    HEMOGLOBIN 14.6 12/14/2022 10:06 AM    HEMATOCRIT 44.6 12/14/2022 10:06 AM    MCV 98.9 (H) 12/14/2022 10:06 AM    MCH 32.4 12/14/2022 10:06 AM    MCHC 32.7 (L) 12/14/2022 10:06 AM    MPV 9.8 12/14/2022 10:06 AM    NEUTSPOLYS 76.5 09/18/2022 03:43 AM    NEUTSPOLYS 79.70 (H) 05/02/2022 11:46 AM    LYMPHOCYTES 11.6 09/18/2022 03:43 AM    LYMPHOCYTES 11.50 (L) 05/02/2022 11:46 AM    MONOCYTES 10.1 09/18/2022 03:43 AM    MONOCYTES 7.50 05/02/2022 11:46 AM    EOSINOPHILS 1.3 09/18/2022 03:43 AM    EOSINOPHILS 0.70 05/02/2022 11:46 AM    BASOPHILS 0.5 09/18/2022 03:43 AM    BASOPHILS 0.30 05/02/2022 11:46 AM      BMP:   Lab Results   Component Value Date/Time    SODIUM 137 12/14/2022 10:06 AM    POTASSIUM 4.7 12/14/2022 10:06 AM    CHLORIDE 102 12/14/2022 10:06 AM    CO2 26 12/14/2022 10:06 AM    GLUCOSE 84 12/14/2022 10:06 AM    BUN 18 12/14/2022 10:06 AM    CREATININE 0.82 12/14/2022 10:06 AM    BUNCREATRAT 28.8 09/18/2022 03:43 AM    BUNCREATRAT 12 06/12/2020 05:12 AM      LFT:   Lab Results   Component Value Date/Time    ASTSGOT 25 12/14/2022 10:06 AM    ALTSGPT 25 12/14/2022 10:06 AM    TBILIRUBIN 0.6 12/14/2022 10:06 AM    ALBUMIN 4.3 12/14/2022 10:06 AM    TOTPROTEIN 7.0 12/14/2022 10:06 AM    ALKPHOSPHAT 71 12/14/2022 10:06 AM      Calcium:   Lab Results   Component Value Date/Time    CALCIUM 9.3 12/14/2022 10:06 AM     VIT D:   Lab Results   Component Value Date/Time    25HYDROXY 49 09/29/2021 0539     TSH:   Lab Results   Component Value Date/Time    TSHULTRASEN 1.860 03/29/2022 1134     THYROXINE (T4): No results found for: FREET4  A1c:   Lab Results   Component Value Date/Time    HBA1C 5.7 (H) 08/19/2021 1312    AVGLUC 117 08/19/2021 1312     Lipids:   Lab Results   Component Value Date/Time    CHOLSTRLTOT 115 05/02/2022 11:46 AM     TRIGLYCERIDE 56 05/02/2022 11:46 AM    HDL 55 05/02/2022 11:46 AM    LDL 49 05/02/2022 11:46 AM     Still need B12, HIV, RPR    Imaging:  Results for orders placed during the hospital encounter of 09/04/21    CT-HEAD W/O    Impression  1. Ill-defined hypodensity in the left parietal lobe could relate to recent infarct. Further evaluation with MRI recommended.      2. No acute intracranial hemorrhage.    Results for orders placed during the hospital encounter of 11/09/22    MR-BRAIN-W/O    Impression  1.  No acute abnormality.  2.  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  3.  Minimal chronic microhemorrhages.  4.  Moderate chronic microvascular ischemic disease.  5.  Moderate cerebral volume loss.  6.  There has been no significant interval change.    Results for orders placed during the hospital encounter of 05/31/21    MR-BRAIN-WITH & W/O    Impression  1.  Small area of acute infarct in the left temporal lobe.  2.  Chronic infarct in the right insular cortex and frontal lobe.  3.  Mild chronic microvascular ischemic disease.  4.  Mild cerebral volume loss.    My total time spent caring for the patient on the day of the encounter was 90 minutes. This visit was done concurrently with a , pharmacist and myself.     This does not include time spent on separately billable procedures/tests.    This note was partially dictated with voice recognition software, for any confusion please do not hesitate to contact me.

## 2023-02-15 ENCOUNTER — APPOINTMENT (OUTPATIENT)
Dept: SPEECH THERAPY | Facility: REHABILITATION | Age: 78
End: 2023-02-15
Attending: PHYSICIAN ASSISTANT
Payer: MEDICARE

## 2023-02-17 ENCOUNTER — HOSPITAL ENCOUNTER (OUTPATIENT)
Dept: RADIOLOGY | Facility: MEDICAL CENTER | Age: 78
End: 2023-02-17
Attending: INTERNAL MEDICINE
Payer: MEDICARE

## 2023-02-17 DIAGNOSIS — R91.8 LUNG NODULES: ICD-10-CM

## 2023-02-17 PROCEDURE — 71250 CT THORAX DX C-: CPT

## 2023-02-20 ENCOUNTER — APPOINTMENT (OUTPATIENT)
Dept: SPEECH THERAPY | Facility: REHABILITATION | Age: 78
End: 2023-02-20
Attending: PHYSICIAN ASSISTANT
Payer: MEDICARE

## 2023-02-22 PROBLEM — F51.01 PRIMARY INSOMNIA: Status: ACTIVE | Noted: 2023-02-22

## 2023-02-27 ENCOUNTER — APPOINTMENT (OUTPATIENT)
Dept: SPEECH THERAPY | Facility: REHABILITATION | Age: 78
End: 2023-02-27
Attending: PHYSICIAN ASSISTANT
Payer: MEDICARE

## 2023-03-16 ENCOUNTER — TELEPHONE (OUTPATIENT)
Dept: SLEEP MEDICINE | Facility: MEDICAL CENTER | Age: 78
End: 2023-03-16
Payer: MEDICARE

## 2023-03-16 NOTE — TELEPHONE ENCOUNTER
"VOICEMAIL  1. Caller Name: Radha \"healthcare assistant\"                          Call Back Number: [phone]    2. Message: states patient is scheduled to see Dr. Lambert 3/23/23. Patient has a Ct, would like to confirm if Ct is needed prior to scheduled appointment.     3. Patient approves office to leave a detailed voicemail/MyChart message: N\A      Last seen 1/27/23 Dr. Lambert     Return in about 1 year (around 1/27/2024) for ct chest.    Ct Chest Thorax completed 2/17/23  "

## 2023-03-29 NOTE — TELEPHONE ENCOUNTER
EOS scanned for LS to review on 7/14/2021        Prior Authorization Not Needed per Insurance    Medication: Phentermine 15mg  Insurance Company: [employer]â„¢ - Phone [phone] Fax [phone]  Expected CoPay:      Pharmacy [facility]illing the [facility]: [facility] [address] [place] [address]  Pharmacy Notified: Yes  Patient Notified: No    Pharmacy was able to get a paid claim. Pharmacy will notify patient when ready.

## 2023-03-31 ENCOUNTER — HOME STUDY (OUTPATIENT)
Dept: SLEEP MEDICINE | Facility: MEDICAL CENTER | Age: 78
End: 2023-03-31
Attending: NURSE PRACTITIONER
Payer: MEDICARE

## 2023-03-31 DIAGNOSIS — G47.33 OSA (OBSTRUCTIVE SLEEP APNEA): ICD-10-CM

## 2023-04-01 PROCEDURE — G0400 HOME SLEEP TEST/TYPE 4 PORTA: HCPCS | Performed by: STUDENT IN AN ORGANIZED HEALTH CARE EDUCATION/TRAINING PROGRAM

## 2023-04-04 ENCOUNTER — APPOINTMENT (OUTPATIENT)
Dept: RADIOLOGY | Facility: MEDICAL CENTER | Age: 78
End: 2023-04-04
Attending: INTERNAL MEDICINE
Payer: MEDICARE

## 2023-04-04 ENCOUNTER — HOSPITAL ENCOUNTER (OUTPATIENT)
Facility: MEDICAL CENTER | Age: 78
End: 2023-04-05
Attending: EMERGENCY MEDICINE | Admitting: INTERNAL MEDICINE
Payer: MEDICARE

## 2023-04-04 ENCOUNTER — APPOINTMENT (OUTPATIENT)
Dept: RADIOLOGY | Facility: MEDICAL CENTER | Age: 78
End: 2023-04-04
Attending: EMERGENCY MEDICINE
Payer: MEDICARE

## 2023-04-04 DIAGNOSIS — R42 LIGHTHEADEDNESS: ICD-10-CM

## 2023-04-04 DIAGNOSIS — I44.0 PROLONGED P-R INTERVAL: ICD-10-CM

## 2023-04-04 DIAGNOSIS — R53.1 GENERAL WEAKNESS: ICD-10-CM

## 2023-04-04 DIAGNOSIS — E86.0 DEHYDRATION: ICD-10-CM

## 2023-04-04 PROBLEM — E86.9 VOLUME DEPLETION: Status: ACTIVE | Noted: 2023-04-04

## 2023-04-04 PROBLEM — D68.318 ACQUIRED CIRCULATING ANTICOAGULANTS (HCC): Status: ACTIVE | Noted: 2021-06-09

## 2023-04-04 PROBLEM — I95.1 ORTHOSTASIS: Status: ACTIVE | Noted: 2023-04-04

## 2023-04-04 LAB
ALBUMIN SERPL BCP-MCNC: 3.9 G/DL (ref 3.2–4.9)
ALBUMIN/GLOB SERPL: 1.6 G/DL
ALP SERPL-CCNC: 61 U/L (ref 30–99)
ALT SERPL-CCNC: 16 U/L (ref 2–50)
ANION GAP SERPL CALC-SCNC: 11 MMOL/L (ref 7–16)
APPEARANCE UR: CLEAR
AST SERPL-CCNC: 18 U/L (ref 12–45)
BACTERIA #/AREA URNS HPF: ABNORMAL /HPF
BASOPHILS # BLD AUTO: 0.4 % (ref 0–1.8)
BASOPHILS # BLD: 0.02 K/UL (ref 0–0.12)
BILIRUB SERPL-MCNC: 0.4 MG/DL (ref 0.1–1.5)
BILIRUB UR QL STRIP.AUTO: NEGATIVE
BUN SERPL-MCNC: 23 MG/DL (ref 8–22)
CALCIUM ALBUM COR SERPL-MCNC: 8.9 MG/DL (ref 8.5–10.5)
CALCIUM SERPL-MCNC: 8.8 MG/DL (ref 8.5–10.5)
CHLORIDE SERPL-SCNC: 106 MMOL/L (ref 96–112)
CO2 SERPL-SCNC: 24 MMOL/L (ref 20–33)
COLOR UR: YELLOW
CREAT SERPL-MCNC: 0.8 MG/DL (ref 0.5–1.4)
EKG IMPRESSION: NORMAL
EOSINOPHIL # BLD AUTO: 0.04 K/UL (ref 0–0.51)
EOSINOPHIL NFR BLD: 0.9 % (ref 0–6.9)
EPI CELLS #/AREA URNS HPF: NEGATIVE /HPF
ERYTHROCYTE [DISTWIDTH] IN BLOOD BY AUTOMATED COUNT: 45.6 FL (ref 35.9–50)
GFR SERPLBLD CREATININE-BSD FMLA CKD-EPI: 74 ML/MIN/1.73 M 2
GLOBULIN SER CALC-MCNC: 2.4 G/DL (ref 1.9–3.5)
GLUCOSE SERPL-MCNC: 119 MG/DL (ref 65–99)
GLUCOSE UR STRIP.AUTO-MCNC: NEGATIVE MG/DL
HCT VFR BLD AUTO: 42.2 % (ref 37–47)
HGB BLD-MCNC: 14.1 G/DL (ref 12–16)
HYALINE CASTS #/AREA URNS LPF: ABNORMAL /LPF
IMM GRANULOCYTES # BLD AUTO: 0.02 K/UL (ref 0–0.11)
IMM GRANULOCYTES NFR BLD AUTO: 0.4 % (ref 0–0.9)
KETONES UR STRIP.AUTO-MCNC: NEGATIVE MG/DL
LEUKOCYTE ESTERASE UR QL STRIP.AUTO: ABNORMAL
LYMPHOCYTES # BLD AUTO: 0.84 K/UL (ref 1–4.8)
LYMPHOCYTES NFR BLD: 18.3 % (ref 22–41)
MAGNESIUM SERPL-MCNC: 1.6 MG/DL (ref 1.5–2.5)
MCH RBC QN AUTO: 32.3 PG (ref 27–33)
MCHC RBC AUTO-ENTMCNC: 33.4 G/DL (ref 33.6–35)
MCV RBC AUTO: 96.6 FL (ref 81.4–97.8)
MICRO URNS: ABNORMAL
MONOCYTES # BLD AUTO: 0.24 K/UL (ref 0–0.85)
MONOCYTES NFR BLD AUTO: 5.2 % (ref 0–13.4)
NEUTROPHILS # BLD AUTO: 3.42 K/UL (ref 2–7.15)
NEUTROPHILS NFR BLD: 74.8 % (ref 44–72)
NITRITE UR QL STRIP.AUTO: POSITIVE
NRBC # BLD AUTO: 0 K/UL
NRBC BLD-RTO: 0 /100 WBC
PH UR STRIP.AUTO: 8 [PH] (ref 5–8)
PLATELET # BLD AUTO: 195 K/UL (ref 164–446)
PMV BLD AUTO: 9.1 FL (ref 9–12.9)
POTASSIUM SERPL-SCNC: 4.3 MMOL/L (ref 3.6–5.5)
PROT SERPL-MCNC: 6.3 G/DL (ref 6–8.2)
PROT UR QL STRIP: NEGATIVE MG/DL
RBC # BLD AUTO: 4.37 M/UL (ref 4.2–5.4)
RBC # URNS HPF: ABNORMAL /HPF
RBC UR QL AUTO: NEGATIVE
SODIUM SERPL-SCNC: 141 MMOL/L (ref 135–145)
SP GR UR STRIP.AUTO: 1.01
TROPONIN T SERPL-MCNC: 9 NG/L (ref 6–19)
UROBILINOGEN UR STRIP.AUTO-MCNC: 0.2 MG/DL
WBC # BLD AUTO: 4.6 K/UL (ref 4.8–10.8)
WBC #/AREA URNS HPF: ABNORMAL /HPF

## 2023-04-04 PROCEDURE — 70450 CT HEAD/BRAIN W/O DYE: CPT

## 2023-04-04 PROCEDURE — 700105 HCHG RX REV CODE 258: Performed by: INTERNAL MEDICINE

## 2023-04-04 PROCEDURE — 99223 1ST HOSP IP/OBS HIGH 75: CPT | Performed by: INTERNAL MEDICINE

## 2023-04-04 PROCEDURE — 85025 COMPLETE CBC W/AUTO DIFF WBC: CPT

## 2023-04-04 PROCEDURE — 700105 HCHG RX REV CODE 258: Performed by: EMERGENCY MEDICINE

## 2023-04-04 PROCEDURE — G0378 HOSPITAL OBSERVATION PER HR: HCPCS

## 2023-04-04 PROCEDURE — 84484 ASSAY OF TROPONIN QUANT: CPT

## 2023-04-04 PROCEDURE — A9270 NON-COVERED ITEM OR SERVICE: HCPCS | Performed by: INTERNAL MEDICINE

## 2023-04-04 PROCEDURE — 36415 COLL VENOUS BLD VENIPUNCTURE: CPT

## 2023-04-04 PROCEDURE — 93880 EXTRACRANIAL BILAT STUDY: CPT

## 2023-04-04 PROCEDURE — 81001 URINALYSIS AUTO W/SCOPE: CPT

## 2023-04-04 PROCEDURE — 99285 EMERGENCY DEPT VISIT HI MDM: CPT

## 2023-04-04 PROCEDURE — 700102 HCHG RX REV CODE 250 W/ 637 OVERRIDE(OP): Performed by: INTERNAL MEDICINE

## 2023-04-04 PROCEDURE — 80053 COMPREHEN METABOLIC PANEL: CPT

## 2023-04-04 PROCEDURE — A9270 NON-COVERED ITEM OR SERVICE: HCPCS | Performed by: NURSE PRACTITIONER

## 2023-04-04 PROCEDURE — 700102 HCHG RX REV CODE 250 W/ 637 OVERRIDE(OP): Performed by: NURSE PRACTITIONER

## 2023-04-04 PROCEDURE — 93005 ELECTROCARDIOGRAM TRACING: CPT | Performed by: EMERGENCY MEDICINE

## 2023-04-04 PROCEDURE — 83735 ASSAY OF MAGNESIUM: CPT

## 2023-04-04 RX ORDER — BISACODYL 10 MG
10 SUPPOSITORY, RECTAL RECTAL
Status: DISCONTINUED | OUTPATIENT
Start: 2023-04-04 | End: 2023-04-05 | Stop reason: HOSPADM

## 2023-04-04 RX ORDER — LABETALOL HYDROCHLORIDE 5 MG/ML
10 INJECTION, SOLUTION INTRAVENOUS EVERY 4 HOURS PRN
Status: DISCONTINUED | OUTPATIENT
Start: 2023-04-04 | End: 2023-04-05 | Stop reason: HOSPADM

## 2023-04-04 RX ORDER — DONEPEZIL HYDROCHLORIDE 5 MG/1
5 TABLET, FILM COATED ORAL NIGHTLY
Status: DISCONTINUED | OUTPATIENT
Start: 2023-04-04 | End: 2023-04-05 | Stop reason: HOSPADM

## 2023-04-04 RX ORDER — ATORVASTATIN CALCIUM 80 MG/1
80 TABLET, FILM COATED ORAL EVERY EVENING
Status: DISCONTINUED | OUTPATIENT
Start: 2023-04-04 | End: 2023-04-05 | Stop reason: HOSPADM

## 2023-04-04 RX ORDER — CHOLECALCIFEROL (VITAMIN D3) 125 MCG
5 CAPSULE ORAL NIGHTLY
Status: DISCONTINUED | OUTPATIENT
Start: 2023-04-04 | End: 2023-04-05 | Stop reason: HOSPADM

## 2023-04-04 RX ORDER — CHOLECALCIFEROL (VITAMIN D3) 125 MCG
1000 CAPSULE ORAL DAILY
Status: DISCONTINUED | OUTPATIENT
Start: 2023-04-05 | End: 2023-04-05 | Stop reason: HOSPADM

## 2023-04-04 RX ORDER — ACETAMINOPHEN 325 MG/1
650 TABLET ORAL EVERY 6 HOURS PRN
Status: DISCONTINUED | OUTPATIENT
Start: 2023-04-04 | End: 2023-04-05 | Stop reason: HOSPADM

## 2023-04-04 RX ORDER — ONDANSETRON 2 MG/ML
4 INJECTION INTRAMUSCULAR; INTRAVENOUS EVERY 4 HOURS PRN
Status: DISCONTINUED | OUTPATIENT
Start: 2023-04-04 | End: 2023-04-05 | Stop reason: HOSPADM

## 2023-04-04 RX ORDER — SODIUM CHLORIDE, SODIUM LACTATE, POTASSIUM CHLORIDE, CALCIUM CHLORIDE 600; 310; 30; 20 MG/100ML; MG/100ML; MG/100ML; MG/100ML
INJECTION, SOLUTION INTRAVENOUS CONTINUOUS
Status: DISCONTINUED | OUTPATIENT
Start: 2023-04-04 | End: 2023-04-05 | Stop reason: HOSPADM

## 2023-04-04 RX ORDER — TAMSULOSIN HYDROCHLORIDE 0.4 MG/1
0.4 CAPSULE ORAL DAILY
Status: DISCONTINUED | OUTPATIENT
Start: 2023-04-05 | End: 2023-04-05 | Stop reason: HOSPADM

## 2023-04-04 RX ORDER — POLYETHYLENE GLYCOL 3350 17 G/17G
1 POWDER, FOR SOLUTION ORAL
Status: DISCONTINUED | OUTPATIENT
Start: 2023-04-04 | End: 2023-04-05 | Stop reason: HOSPADM

## 2023-04-04 RX ORDER — DONEPEZIL HYDROCHLORIDE 5 MG/1
5 TABLET, FILM COATED ORAL NIGHTLY
COMMUNITY
End: 2024-01-31

## 2023-04-04 RX ORDER — ONDANSETRON 4 MG/1
4 TABLET, ORALLY DISINTEGRATING ORAL EVERY 4 HOURS PRN
Status: DISCONTINUED | OUTPATIENT
Start: 2023-04-04 | End: 2023-04-05 | Stop reason: HOSPADM

## 2023-04-04 RX ORDER — AMOXICILLIN 250 MG
2 CAPSULE ORAL 2 TIMES DAILY
Status: DISCONTINUED | OUTPATIENT
Start: 2023-04-04 | End: 2023-04-05 | Stop reason: HOSPADM

## 2023-04-04 RX ORDER — SODIUM CHLORIDE 9 MG/ML
500 INJECTION, SOLUTION INTRAVENOUS ONCE
Status: COMPLETED | OUTPATIENT
Start: 2023-04-04 | End: 2023-04-04

## 2023-04-04 RX ADMIN — DONEPEZIL HYDROCHLORIDE 5 MG: 5 TABLET, FILM COATED ORAL at 22:29

## 2023-04-04 RX ADMIN — ACETAMINOPHEN 650 MG: 325 TABLET, FILM COATED ORAL at 22:32

## 2023-04-04 RX ADMIN — Medication 5 MG: at 22:27

## 2023-04-04 RX ADMIN — SODIUM CHLORIDE 500 ML: 9 INJECTION, SOLUTION INTRAVENOUS at 13:33

## 2023-04-04 RX ADMIN — ATORVASTATIN CALCIUM 80 MG: 80 TABLET, FILM COATED ORAL at 18:42

## 2023-04-04 RX ADMIN — ACETAMINOPHEN 650 MG: 325 TABLET, FILM COATED ORAL at 15:01

## 2023-04-04 RX ADMIN — SODIUM CHLORIDE, POTASSIUM CHLORIDE, SODIUM LACTATE AND CALCIUM CHLORIDE: 600; 310; 30; 20 INJECTION, SOLUTION INTRAVENOUS at 15:02

## 2023-04-04 RX ADMIN — APIXABAN 5 MG: 5 TABLET, FILM COATED ORAL at 18:42

## 2023-04-04 RX ADMIN — SODIUM CHLORIDE, POTASSIUM CHLORIDE, SODIUM LACTATE AND CALCIUM CHLORIDE: 600; 310; 30; 20 INJECTION, SOLUTION INTRAVENOUS at 22:26

## 2023-04-04 ASSESSMENT — LIFESTYLE VARIABLES
CONSUMPTION TOTAL: NEGATIVE
EVER FELT BAD OR GUILTY ABOUT YOUR DRINKING: NO
EVER HAD A DRINK FIRST THING IN THE MORNING TO STEADY YOUR NERVES TO GET RID OF A HANGOVER: NO
ON A TYPICAL DAY WHEN YOU DRINK ALCOHOL HOW MANY DRINKS DO YOU HAVE: 1
AVERAGE NUMBER OF DAYS PER WEEK YOU HAVE A DRINK CONTAINING ALCOHOL: 0
DOES PATIENT WANT TO STOP DRINKING: NO
TOTAL SCORE: 0
HAVE PEOPLE ANNOYED YOU BY CRITICIZING YOUR DRINKING: NO
TOTAL SCORE: 0
TOTAL SCORE: 0
ALCOHOL_USE: NO
HAVE YOU EVER FELT YOU SHOULD CUT DOWN ON YOUR DRINKING: NO
HOW MANY TIMES IN THE PAST YEAR HAVE YOU HAD 5 OR MORE DRINKS IN A DAY: 0

## 2023-04-04 ASSESSMENT — CHA2DS2 SCORE
SEX: FEMALE
DIABETES: NO
HYPERTENSION: YES
AGE 65 TO 74: NO
CHF OR LEFT VENTRICULAR DYSFUNCTION: NO
AGE 75 OR GREATER: YES
PRIOR STROKE OR TIA OR THROMBOEMBOLISM: YES
CHA2DS2 VASC SCORE: 6
VASCULAR DISEASE: NO

## 2023-04-04 ASSESSMENT — PAIN DESCRIPTION - PAIN TYPE
TYPE: ACUTE PAIN

## 2023-04-04 ASSESSMENT — FIBROSIS 4 INDEX
FIB4 SCORE: 1.7
FIB4 SCORE: 1.846153846153846154
FIB4 SCORE: 1.846153846153846154

## 2023-04-04 ASSESSMENT — PATIENT HEALTH QUESTIONNAIRE - PHQ9
1. LITTLE INTEREST OR PLEASURE IN DOING THINGS: NOT AT ALL
SUM OF ALL RESPONSES TO PHQ9 QUESTIONS 1 AND 2: 0
2. FEELING DOWN, DEPRESSED, IRRITABLE, OR HOPELESS: NOT AT ALL

## 2023-04-04 NOTE — ED TRIAGE NOTES
"Chief Complaint   Patient presents with    Lightheadedness     X 3 hours    Dizziness    Weakness    Nausea     Pt BIB [facility] from home. Pt states she started to experience above symptoms this morning. Pt states she has had \"strokes\" in the past and these symptoms are similar. Pt arrives A&O4, GCS 15.     BP (!) 144/72   Pulse 69   Temp 36.9 °C (98.4 °F) (Temporal)   Resp 16   Ht 1.676 m (5' 6\")   Wt 70.8 kg (156 lb)   SpO2 97%         "

## 2023-04-04 NOTE — ED NOTES
Pt ambulated to the bathroom with minimal assistance. Complained of the same lightheadedness as she was having before. UA collected and sent to lab.

## 2023-04-04 NOTE — H&P
Hospital Medicine History & Physical Note    Date of Service  4/4/2023    Primary Care Physician  MOLLY Read    Consultants  None    Code Status  Full Code    Chief Complaint  Chief Complaint   Patient presents with    Lightheadedness     X 3 hours    Dizziness    Weakness    Nausea       History of Presenting Illness  Shaista Bocanegra is a 78 y.o. female with vascular dementia, atrial fibrillation, hypertension, hyperlipidemia, history of mitral valve prolapse status post mitral valve replacement, history of stroke with residual left-sided weakness, who was doing well until this morning at around 9:45 AM while walking when she had acute onset lightheadedness and felt near syncopal.  She had no other complaint such as vertigo, focal weakness or numbness, speech changes.  Fearing that she will fall, she let herself down to the floor but fell anyways hitting her head on the carpeted floor.  She did not fully lose consciousness.  She tried to crawl to reach the phone and called 911, but shared that she felt lightheaded every time she tries to stand up.  EMS also noted that she became very lightheaded when they tried to stand her up.  Nausea, vomiting, bowel movement changes, abdominal pain.  She did admit that she has not been eating or drinking as much in the past several days.  Otherwise she has no other complaint such as chest pain, shortness of breath. She was then brought to the ED.     ED course:  On evaluation, vital signs were stable, however she did have orthostatic drop in her systolic blood pressure went from 148 to 119.  Labs were remarkable for BUN of 23.  Creatinine is normal.  Troponin was negative.  She had no leukocytosis.  Head CT showed nothing acute.  She was felt to be dehydrated, and was started on IV fluids, and was admitted to the hospitalist service in the CDU.    I discussed the plan of care with patient, family, and ERP .    Review of Systems  ROS    Pertinent positives/negatives as  mentioned above.     A complete review of systems was personally done by me. All other systems were negative.       Past Medical History   has a past medical history of Anemia (09/28/2021), Arthritis, Atrial fibrillation ([facility]), Benign essential HTN (03/19/2012), Breast cancer ([facility]), Cancer ([facility]) (1998), Cardiac arrhythmia, Chest tightness or pressure (03/19/2012), Chickenpox, Coronary heart disease, [place] measles, Gynecological disorder, High cholesterol, High risk medication use (03/19/2012), Hypercholesterolemia (03/19/2012), Mumps, MVP (mitral valve prolapse) (03/19/2012), Osteoporosis, Seizure disorder ([facility]) (09/28/2021), Sleep apnea, Snoring, Stroke ([facility]) (2017), Substance abuse ([facility]), Tonsillitis, Urinary bladder disorder, Urinary incontinence, Valvular heart disease, and Venereal disease.    Surgical History   has a past surgical history that includes cataract phaco with iol (1/26/2009); cataract phaco with iol (3/16/2009); breast biopsy; pr radiation therapy plan simple; pr chemotherapy, unspecified procedure; lumpectomy; pr remv 2nd cataract,corn-scler sectn; hysterectomy laparoscopy; sinuscope; tonsillectomy; and primary c section.     Family History  family history includes Cancer in her mother; No Known Problems in her brother.     Social History   reports that she has never smoked. She has never been exposed to tobacco smoke. She has never used smokeless tobacco. She reports current alcohol use of about 1.2 oz per week. She reports that she does not use drugs.    Allergies  No Known Allergies    Medications  Prior to Admission Medications   Prescriptions Last Dose Informant Patient Reported? Taking?   Multiple Vitamins-Minerals (MULTIVITAMIN WOMEN 50+ PO) 4/4/2023 at AM Patient Yes No   Sig: Take 1 Tablet by mouth every day.   apixaban (ELIQUIS) 5mg Tab 4/4/2023 at AM Patient No No   Sig: Take 1 Tablet by mouth 2 times a day.   atorvastatin (LIPITOR) 80 MG tablet 4/3/2023 at PM Patient No No   Sig:  Take 1 Tablet by mouth every evening.   cyanocobalamin (VITAMIN B12) 1000 MCG Tab 4/4/2023 at AM Patient Yes No   Sig: Take 1,000 mcg by mouth every day.   donepezil (ARICEPT) 5 MG Tab 4/3/2023 at PM Patient Yes Yes   Sig: Take 5 mg by mouth every evening.   estradiol (ESTRACE) 0.1 MG/GM vaginal cream ABOUT 1 WEEK AGO at [facility] Patient Yes No   Sig: Insert 0.1 g into the vagina 1 time a day as needed.   spironolactone (ALDACTONE) 25 MG Tab 4/4/2023 at AM Patient No No   Sig: Take 1 Tablet by mouth every day.   tamsulosin (FLOMAX) 0.4 MG capsule 4/4/2023 at AM Patient Yes No   Sig: Take 0.4 mg by mouth every day.      Facility-Administered Medications: None       Physical Exam  Temp:  [36.9 °C (98.4 °F)] 36.9 °C (98.4 °F)  Pulse:  [68-90] 75  Resp:  [16] 16  BP: (119-148)/(61-77) 119/66  SpO2:  [86 %-97 %] 95 %  Blood Pressure : 119/66   Temperature: 36.9 °C (98.4 °F)   Pulse: 75   Respiration: 16   Pulse Oximetry: 95 %       Physical Exam  Vitals reviewed.   Constitutional:       General: She is not in acute distress.     Appearance: Normal appearance. She is normal weight. She is not ill-appearing or diaphoretic.   HENT:      Head: Normocephalic and atraumatic.      Right Ear: External ear normal.      Left Ear: External ear normal.      Mouth/Throat:      Mouth: Mucous membranes are dry.      Pharynx: No oropharyngeal exudate or posterior oropharyngeal erythema.   Eyes:      General: No scleral icterus.     Extraocular Movements: Extraocular movements intact.      Conjunctiva/sclera: Conjunctivae normal.      Pupils: Pupils are equal, round, and reactive to light.   Cardiovascular:      Rate and Rhythm: Normal rate and regular rhythm.      Heart sounds: Normal heart sounds. No murmur heard.  Pulmonary:      Effort: Pulmonary effort is normal. No respiratory distress.      Breath sounds: Normal breath sounds. No stridor. No wheezing, rhonchi or rales.   Chest:      Chest wall: No tenderness.   Abdominal:      General:  Bowel sounds are normal. There is no distension.      Palpations: Abdomen is soft. There is no mass.      Tenderness: There is no abdominal tenderness. There is no guarding or rebound.   Musculoskeletal:         General: No swelling. Normal range of motion.      Cervical back: Normal range of motion and neck supple. No rigidity. No muscular tenderness.      Right lower leg: No edema.      Left lower leg: No edema.   Lymphadenopathy:      Cervical: No cervical adenopathy.   Skin:     General: Skin is warm and dry.      Coloration: Skin is not jaundiced.      Findings: No rash.      Comments: Poor skin turgor   Neurological:      General: No focal deficit present.      Mental Status: She is alert and oriented to person, place, and time. Mental status is at baseline.      Cranial Nerves: No cranial nerve deficit.      Comments: Unchanged chronic left-sided weakness from previous stroke   Psychiatric:         Mood and Affect: Mood normal.         Behavior: Behavior normal.         Thought Content: Thought content normal.         Judgment: Judgment normal.       Laboratory:  Recent Labs     04/04/23  1055   WBC 4.6*   RBC 4.37   HEMOGLOBIN 14.1   HEMATOCRIT 42.2   MCV 96.6   MCH 32.3   MCHC 33.4*   RDW 45.6   PLATELETCT 195   MPV 9.1     Recent Labs     04/04/23  1055   SODIUM 141   POTASSIUM 4.3   CHLORIDE 106   CO2 24   GLUCOSE 119*   BUN 23*   CREATININE 0.80   CALCIUM 8.8     Recent Labs     04/04/23  1055   ALTSGPT 16   ASTSGOT 18   ALKPHOSPHAT 61   TBILIRUBIN 0.4   GLUCOSE 119*         No results for input(s): NTPROBNP in the last 72 hours.      Recent Labs     04/04/23  1055   TROPONINT 9       Imaging:  CT-HEAD W/O   Final Result      1.  Chronic ischemic changes.   2.  No acute intracranial abnormality.         EC-ECHOCARDIOGRAM COMPLETE W/O CONT    (Results Pending)   US-CAROTID DOPPLER BILAT    (Results Pending)         Imaging studies and EKG results reviewed as above.      Assessment/Plan:  Justification for  Admission Status  I anticipate this patient is appropriate for observation status at this time because of dehydration and orthostasis requiring IV fluid rehydration.  Anticipate that she will quickly respond to IV fluid rehydration, and can be discharged within less than 2 midnights.      * Lightheadedness and presyncope due to orthostasis and volume depletion- (present on admission)  Assessment & Plan  - Likely from poor oral intake in the past few days.  Appears to be volume depleted, with elevated BUN, and with significant drop in orthostatic blood pressure.  I do not think she is having stroke/TIA as no new neurologic symptoms.  -Hydrate with IV LR at 125 cc/h.  -Monitor on telemetry given her history of atrial fibrillation.  For completion, I will get an updated echocardiogram to evaluate her mitral valve replacement.  Check carotid ultrasound as well.  -Repeat orthostatic blood pressures after adequately hydrated.  -PT/OT evaluation.    Volume depletion and dehydration- (present on admission)  Assessment & Plan  - Due to poor oral intake.  Rehydrate with IV LR at 125 cc/h.  Recheck BMP in the morning.  Recheck orthostatics in the morning as well.    Vascular dementia without behavioral disturbance (HCC)- (present on admission)  Assessment & Plan  - Resume home Aricept.    S/P mitral valve repair- (present on admission)  Assessment & Plan  - Will get an updated echocardiogram to evaluate.    Acquired circulating anticoagulants (HCC)- (present on admission)  Assessment & Plan  - Resume Eliquis for her atrial fibrillation.    Atrial fibrillation (HCC)- (present on admission)  Assessment & Plan  - Rate controlled.  Resume Eliquis.  Monitor on telemetry.    History of CVA (cerebrovascular accident)- (present on admission)  Assessment & Plan  - She has no new neurologic deficits, has unchanged left-sided weakness from her previous stroke.  I do not think she has TIA/acute CVA, as presenting symptoms are more  consistent with orthostasis.  -Resume Eliquis, and statin.  -PT/OT evaluation.    Dyslipidemia- (present on admission)  Assessment & Plan  - Resume home statin.        VTE prophylaxis: therapeutic anticoagulation with Eliquis

## 2023-04-04 NOTE — ASSESSMENT & PLAN NOTE
- Likely from poor oral intake in the past few days.  Appears to be volume depleted, with elevated BUN, and with significant drop in orthostatic blood pressure.  I do not think she is having stroke/TIA as no new neurologic symptoms.  -Hydrate with IV LR at 125 cc/h.  -Monitor on telemetry given her history of atrial fibrillation.  For completion, I will get an updated echocardiogram to evaluate her mitral valve replacement.  Check carotid ultrasound as well.  -Repeat orthostatic blood pressures after adequately hydrated.  -PT/OT evaluation.

## 2023-04-04 NOTE — PROGRESS NOTES
Assumed day shift care around 1410  Patient a+o x 4  3/10 headache, will admin acetaminophen when meds have been verified  IV fluids a/o  Monitored on telemetry, vss, 97% oxygen saturation on room air  Denies sob/lightheadedness/dizziness/numbness/tingling/chest pain/palpitations  Dizziness with positional changes per patient  No needs at this time, wctm      Fall precautions/hourly rounding maintained, call light within reach and functioning, all items within reach.  Patient encouraged to call for assistance, poc reviewed with patient, ?'s/concerns answered.

## 2023-04-04 NOTE — ASSESSMENT & PLAN NOTE
- She has no new neurologic deficits, has unchanged left-sided weakness from her previous stroke.  I do not think she has TIA/acute CVA, as presenting symptoms are more consistent with orthostasis.  -Resume Eliquis, and statin.  -PT/OT evaluation.

## 2023-04-04 NOTE — ASSESSMENT & PLAN NOTE
- Due to poor oral intake.  Rehydrate with IV LR at 125 cc/h.  Recheck BMP in the morning.  Recheck orthostatics in the morning as well.

## 2023-04-04 NOTE — ED PROVIDER NOTES
ED Provider Note    CHIEF COMPLAINT  Chief Complaint   Patient presents with    Lightheadedness     X 3 hours    Dizziness    Weakness    Nausea       EXTERNAL RECORDS REVIEWED  Outpatient labs & studies MRI from 11/2022 reviewed.  This demonstrated chronic infarcts as below in the MDM.    HPI/ROS  LIMITATION TO HISTORY   Select: : None  OUTSIDE HISTORIAN(S):  none    Shaista Bocanegra is a 78 y.o. female with history of A-fib, hypertension, dyslipidemia, mitral valve prolapse, and prior stroke which affected her left side who presents for evaluation of dizziness/lightheadedness.    Patient states her lightheadedness was acute in onset at 945 this morning.  It caused her to fall to the floor on a carpet and hit her head.  She denies loss of consciousness.  She had to crawl back to her bed and phone where she was able to call 911.  Patient reports persistent lightheadedness.  She reports associated nausea and a mild central frontal headache.  Patient feels that the left-sided weakness that she has at baseline may be slightly increased today.  She had a stroke 4 years ago that affected her left side.  She does not use an assistive device to mobilize.    Patient denies syncope, loss of consciousness, vomiting, diarrhea, fever, chills, prior head trauma, palpitations, chest pain, shortness of breath.    PAST MEDICAL HISTORY   has a past medical history of Anemia (09/28/2021), Arthritis, Atrial fibrillation ([facility]), Benign essential HTN (03/19/2012), Breast cancer ([facility]), Cancer ([facility]) (1998), Cardiac arrhythmia, Chest tightness or pressure (03/19/2012), Chickenpox, Coronary heart disease, [place] measles, Gynecological disorder, High cholesterol, High risk medication use (03/19/2012), Hypercholesterolemia (03/19/2012), Mumps, MVP (mitral valve prolapse) (03/19/2012), Osteoporosis, Seizure disorder ([facility]) (09/28/2021), Sleep apnea, Snoring, Stroke ([facility]) (2017), Substance abuse ([facility]), Tonsillitis, Urinary bladder disorder,  "Urinary incontinence, Valvular heart disease, and Venereal disease.    SURGICAL HISTORY   has a past surgical history that includes cataract phaco with iol (1/26/2009); cataract phaco with iol (3/16/2009); breast biopsy; radiation therapy plan simple; chemotherapy, unspecified procedure; lumpectomy; remv 2nd cataract,corn-scler sectn; hysterectomy laparoscopy; sinuscope; tonsillectomy; and primary c section.    FAMILY HISTORY  Family History   Problem Relation Age of Onset    Cancer Mother         breast    No Known Problems Brother     Heart Failure Neg Hx     Heart Disease Neg Hx        SOCIAL HISTORY  Social History     Tobacco Use    Smoking status: Never     Passive exposure: Never    Smokeless tobacco: Never   Vaping Use    Vaping Use: Never used   Substance and Sexual Activity    Alcohol use: Yes     Alcohol/week: 1.2 oz     Types: 2 Glasses of wine per week     Comment: twice a week    Drug use: No    Sexual activity: Yes     Partners: Male     Birth control/protection: Female Sterilization       CURRENT MEDICATIONS  Home Medications       Reviewed by Edelmira Chery R.N. (Registered Nurse) on 04/04/23 at 1049  Med List Status: Not Addressed     Medication Last Dose Status   apixaban (ELIQUIS) 5mg Tab  Active   atorvastatin (LIPITOR) 80 MG tablet  Active   cyanocobalamin (VITAMIN B12) 1000 MCG Tab  Active   donepezil (ARICEPT) 10 MG tablet  Active   estradiol (ESTRACE) 0.1 MG/GM vaginal cream  Active   Multiple Vitamins-Minerals (MULTIVITAMIN WOMEN 50+ PO)  Active   spironolactone (ALDACTONE) 25 MG Tab  Active   tamsulosin (FLOMAX) 0.4 MG capsule  Active                    ALLERGIES  No Known Allergies    PHYSICAL EXAM  VITAL SIGNS: /66   Pulse 75   Temp 36.9 °C (98.4 °F) (Temporal)   Resp 16   Ht 1.676 m (5' 6\")   Wt 70.8 kg (156 lb)   SpO2 95%   BMI 25.18 kg/m²    General:  WDWN female, nontoxic appearing in NAD; A+Ox3; V/S as above; elevated BP  Skin: warm and dry; good color; no " rash  HEENT: NCAT; EOMs intact; PERRL; no scleral icterus   Neck: FROM; no LAD, no stridor, no meningismus  Cardiovascular: Regular heart rate and rhythm.  No murmurs, rubs, or gallops; pulses 2+ bilaterally radially and DP areas  Lungs: No respiratory distress or tachypnea; Clear to auscultation with good air movement bilaterally.  No wheezes, rhonchi, or rales.   Abdomen: BS present; soft; NTND; no rebound, guarding, or rigidity.  No organomegaly or pulsatile mass  Extremities: CASEY x 4; no e/o trauma; no pedal edema; neg Elmer's  Neurologic: CNs III-XII formally intact; no facial droop, no nystagmus, speech clear; distal sensation intact; strength 5/5 UE/LEs  Psychiatric: Appropriate affect, normal mood      DIAGNOSTIC STUDIES / PROCEDURES  EKG  I have independently interpreted this EKG. no acute ST changes noted.  Prolonged ID interval noted.  No delta wave    LABS  Results for orders placed or performed during the hospital encounter of 04/04/23   CBC WITH DIFFERENTIAL   Result Value Ref Range    WBC 4.6 (L) 4.8 - 10.8 K/uL    RBC 4.37 4.20 - 5.40 M/uL    Hemoglobin 14.1 12.0 - 16.0 g/dL    Hematocrit 42.2 37.0 - 47.0 %    MCV 96.6 81.4 - 97.8 fL    MCH 32.3 27.0 - 33.0 pg    MCHC 33.4 (L) 33.6 - 35.0 g/dL    RDW 45.6 35.9 - 50.0 fL    Platelet Count 195 164 - 446 K/uL    MPV 9.1 9.0 - 12.9 fL    Neutrophils-Polys 74.80 (H) 44.00 - 72.00 %    Lymphocytes 18.30 (L) 22.00 - 41.00 %    Monocytes 5.20 0.00 - 13.40 %    Eosinophils 0.90 0.00 - 6.90 %    Basophils 0.40 0.00 - 1.80 %    Immature Granulocytes 0.40 0.00 - 0.90 %    Nucleated RBC 0.00 /100 WBC    Neutrophils (Absolute) 3.42 2.00 - 7.15 K/uL    Lymphs (Absolute) 0.84 (L) 1.00 - 4.80 K/uL    Monos (Absolute) 0.24 0.00 - 0.85 K/uL    Eos (Absolute) 0.04 0.00 - 0.51 K/uL    Baso (Absolute) 0.02 0.00 - 0.12 K/uL    Immature Granulocytes (abs) 0.02 0.00 - 0.11 K/uL    NRBC (Absolute) 0.00 K/uL   CMP   Result Value Ref Range    Sodium 141 135 - 145 mmol/L     Potassium 4.3 3.6 - 5.5 mmol/L    Chloride 106 96 - 112 mmol/L    Co2 24 20 - 33 mmol/L    Anion Gap 11.0 7.0 - 16.0    Glucose 119 (H) 65 - 99 mg/dL    Bun 23 (H) 8 - 22 mg/dL    Creatinine 0.80 0.50 - 1.40 mg/dL    Calcium 8.8 8.5 - 10.5 mg/dL    AST(SGOT) 18 12 - 45 U/L    ALT(SGPT) 16 2 - 50 U/L    Alkaline Phosphatase 61 30 - 99 U/L    Total Bilirubin 0.4 0.1 - 1.5 mg/dL    Albumin 3.9 3.2 - 4.9 g/dL    Total Protein 6.3 6.0 - 8.2 g/dL    Globulin 2.4 1.9 - 3.5 g/dL    A-G Ratio 1.6 g/dL   TROPONIN   Result Value Ref Range    Troponin T 9 6 - 19 ng/L   CORRECTED CALCIUM   Result Value Ref Range    Correct Calcium 8.9 8.5 - 10.5 mg/dL   ESTIMATED GFR   Result Value Ref Range    GFR (CKD-EPI) 74 >60 mL/min/1.73 m 2   EKG   Result Value Ref Range    Report       [facility] Emergency Dept.    Test Date:  2023  Pt Name:    HORACE GUZMAN                  Department: ER  MRN:        [id number]                      Room:        03  Gender:     Female                       Technician: [id number]  :        [id number]                   Requested By:JOAQUIN ROD  Order #:    [id number]                    Reading MD: JOAQUIN ROD MD    Measurements  Intervals                                Axis  Rate:       66                           P:          36  ME:         274                          QRS:        77  QRSD:       91                           T:          60  QT:         467  QTc:        490    Interpretive Statements  Sinus rhythm  Atrial premature complexes  Prolonged ME interval  RSR' in V1 or V2, probably normal variant  Borderline prolonged QT interval  Compared to ECG 2022 11:36:40  Atrial premature complex(es) now present  Ventricular premature complex(es) no longer present  Electronically Signed On 2023  11:27:26 PDT by JOAQUIN ROD MD           RADIOLOGY  Radiologist interpretation:   CT-HEAD W/O   Final Result      1.  Chronic ischemic changes.   2.  No acute  intracranial abnormality.               COURSE & MEDICAL DECISION MAKING    ED Observation Status? No; Patient does not meet criteria for ED Observation.     INITIAL ASSESSMENT, COURSE AND PLAN  Care Narrative: This is an 78-year-old female with a history of A-fib, hypertension, dyslipidemia, mitral valve prolapse, and stroke 4 years ago who presents for dizziness/lightheadedness.    At this time, I see no lateralizing symptoms or discrete nystagmus to suggest a CVA.  I considered atypical ACS given generalized weakness, lightheadedness, nausea and an elderly woman with history of hypertension and dyslipidemia.  I also considered dehydration and electrolyte abnormality along with UTI.    EKG demonstrates no acute ST changes or arrhythmia.  Prolonged PA interval is noted.    Orthostatics positive. Patient's heart rate went from 70 to 90 heart rate, BP from 142 to 119 systolic.    BUN is elevated to 23, indicating dehydration.  Troponin is negative.    Normal saline bolus ordered.  Paging hospitalist/CDU for further work-up/rehydration.    1:30 PM  I advised the patient and her son who is now at the bedside of the plan to hospitalize her overnight for further work-up.  They are amenable to this plan.  I discussed the case with the hospitalist who agrees to see the patient.    MRI from 11/2022:  IMPRESSION:  1.  No acute abnormality.  2.  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  3.  Minimal chronic microhemorrhages.  4.  Moderate chronic microvascular ischemic disease.  5.  Moderate cerebral volume loss.  6.  There has been no significant interval change.    HYDRATION: Based on the patient's presentation of Dehydration the patient was given IV fluids. IV Hydration was used because oral hydration was not adequate alone. Upon recheck following hydration, the patient was stable.      ADDITIONAL PROBLEM LIST  Prolonged PA interval  MVP    DISPOSITION AND DISCUSSIONS  I have  discussed management of the patient with the following physicians and JOANNA's:    Phoebe        FINAL DIAGNOSIS  1. Lightheadedness    2. Dehydration    3. General weakness    4. Prolonged P-R interval           Electronically signed by: Millie Reed M.D., 4/4/2023 10:52 AM

## 2023-04-05 ENCOUNTER — APPOINTMENT (RX ONLY)
Dept: URBAN - METROPOLITAN AREA CLINIC 4 | Facility: CLINIC | Age: 78
Setting detail: DERMATOLOGY
End: 2023-04-05

## 2023-04-05 ENCOUNTER — APPOINTMENT (OUTPATIENT)
Dept: CARDIOLOGY | Facility: MEDICAL CENTER | Age: 78
End: 2023-04-05
Attending: INTERNAL MEDICINE
Payer: MEDICARE

## 2023-04-05 VITALS
OXYGEN SATURATION: 94 % | DIASTOLIC BLOOD PRESSURE: 96 MMHG | SYSTOLIC BLOOD PRESSURE: 142 MMHG | TEMPERATURE: 98.1 F | RESPIRATION RATE: 16 BRPM | WEIGHT: 156.53 LBS | BODY MASS INDEX: 26.08 KG/M2 | HEART RATE: 76 BPM | HEIGHT: 65 IN

## 2023-04-05 LAB
ANION GAP SERPL CALC-SCNC: 11 MMOL/L (ref 7–16)
BASOPHILS # BLD AUTO: 0.2 % (ref 0–1.8)
BASOPHILS # BLD: 0.01 K/UL (ref 0–0.12)
BUN SERPL-MCNC: 20 MG/DL (ref 8–22)
CALCIUM SERPL-MCNC: 8.7 MG/DL (ref 8.5–10.5)
CHLORIDE SERPL-SCNC: 110 MMOL/L (ref 96–112)
CO2 SERPL-SCNC: 18 MMOL/L (ref 20–33)
CREAT SERPL-MCNC: 0.72 MG/DL (ref 0.5–1.4)
EOSINOPHIL # BLD AUTO: 0.08 K/UL (ref 0–0.51)
EOSINOPHIL NFR BLD: 1.7 % (ref 0–6.9)
ERYTHROCYTE [DISTWIDTH] IN BLOOD BY AUTOMATED COUNT: 48 FL (ref 35.9–50)
GFR SERPLBLD CREATININE-BSD FMLA CKD-EPI: 84 ML/MIN/1.73 M 2
GLUCOSE SERPL-MCNC: 97 MG/DL (ref 65–99)
HCT VFR BLD AUTO: 40.8 % (ref 37–47)
HGB BLD-MCNC: 13.2 G/DL (ref 12–16)
IMM GRANULOCYTES # BLD AUTO: 0.01 K/UL (ref 0–0.11)
IMM GRANULOCYTES NFR BLD AUTO: 0.2 % (ref 0–0.9)
LV EJECT FRACT  99904: 60
LV EJECT FRACT MOD 2C 99903: 55.15
LV EJECT FRACT MOD 4C 99902: 26.99
LV EJECT FRACT MOD BP 99901: 44.07
LYMPHOCYTES # BLD AUTO: 1.31 K/UL (ref 1–4.8)
LYMPHOCYTES NFR BLD: 27.8 % (ref 22–41)
MCH RBC QN AUTO: 32.4 PG (ref 27–33)
MCHC RBC AUTO-ENTMCNC: 32.4 G/DL (ref 33.6–35)
MCV RBC AUTO: 100.2 FL (ref 81.4–97.8)
MONOCYTES # BLD AUTO: 0.46 K/UL (ref 0–0.85)
MONOCYTES NFR BLD AUTO: 9.8 % (ref 0–13.4)
NEUTROPHILS # BLD AUTO: 2.84 K/UL (ref 2–7.15)
NEUTROPHILS NFR BLD: 60.3 % (ref 44–72)
NRBC # BLD AUTO: 0 K/UL
NRBC BLD-RTO: 0 /100 WBC
PLATELET # BLD AUTO: 176 K/UL (ref 164–446)
PMV BLD AUTO: 9.3 FL (ref 9–12.9)
POTASSIUM SERPL-SCNC: 4.5 MMOL/L (ref 3.6–5.5)
RBC # BLD AUTO: 4.07 M/UL (ref 4.2–5.4)
SODIUM SERPL-SCNC: 139 MMOL/L (ref 135–145)
WBC # BLD AUTO: 4.7 K/UL (ref 4.8–10.8)

## 2023-04-05 PROCEDURE — 85025 COMPLETE CBC W/AUTO DIFF WBC: CPT

## 2023-04-05 PROCEDURE — 700102 HCHG RX REV CODE 250 W/ 637 OVERRIDE(OP): Performed by: INTERNAL MEDICINE

## 2023-04-05 PROCEDURE — A9270 NON-COVERED ITEM OR SERVICE: HCPCS | Performed by: INTERNAL MEDICINE

## 2023-04-05 PROCEDURE — 93306 TTE W/DOPPLER COMPLETE: CPT | Mod: 26 | Performed by: INTERNAL MEDICINE

## 2023-04-05 PROCEDURE — 700105 HCHG RX REV CODE 258: Performed by: INTERNAL MEDICINE

## 2023-04-05 PROCEDURE — 80048 BASIC METABOLIC PNL TOTAL CA: CPT

## 2023-04-05 PROCEDURE — 93306 TTE W/DOPPLER COMPLETE: CPT

## 2023-04-05 PROCEDURE — 99239 HOSP IP/OBS DSCHRG MGMT >30: CPT | Performed by: INTERNAL MEDICINE

## 2023-04-05 PROCEDURE — G0378 HOSPITAL OBSERVATION PER HR: HCPCS

## 2023-04-05 RX ORDER — QUETIAPINE FUMARATE 25 MG/1
12.5 TABLET, FILM COATED ORAL ONCE
Status: DISCONTINUED | OUTPATIENT
Start: 2023-04-05 | End: 2023-04-05 | Stop reason: HOSPADM

## 2023-04-05 RX ADMIN — CYANOCOBALAMIN TAB 500 MCG 1000 MCG: 500 TAB at 05:09

## 2023-04-05 RX ADMIN — SENNOSIDES AND DOCUSATE SODIUM 2 TABLET: 50; 8.6 TABLET ORAL at 05:09

## 2023-04-05 RX ADMIN — TAMSULOSIN HYDROCHLORIDE 0.4 MG: 0.4 CAPSULE ORAL at 05:08

## 2023-04-05 RX ADMIN — SODIUM CHLORIDE, POTASSIUM CHLORIDE, SODIUM LACTATE AND CALCIUM CHLORIDE: 600; 310; 30; 20 INJECTION, SOLUTION INTRAVENOUS at 05:13

## 2023-04-05 RX ADMIN — APIXABAN 5 MG: 5 TABLET, FILM COATED ORAL at 05:08

## 2023-04-05 ASSESSMENT — COPD QUESTIONNAIRES
COPD SCREENING SCORE: 2
HAVE YOU SMOKED AT LEAST 100 CIGARETTES IN YOUR ENTIRE LIFE: NO/DON'T KNOW
DURING THE PAST 4 WEEKS HOW MUCH DID YOU FEEL SHORT OF BREATH: NONE/LITTLE OF THE TIME
DO YOU EVER COUGH UP ANY MUCUS OR PHLEGM?: NO/ONLY WITH OCCASIONAL COLDS OR INFECTIONS

## 2023-04-05 ASSESSMENT — PAIN DESCRIPTION - PAIN TYPE
TYPE: ACUTE PAIN
TYPE: ACUTE PAIN

## 2023-04-05 NOTE — THERAPY
Physical Therapy Contact Note    Patient Name: Shaista Bocanegra  Age:  78 y.o., Sex:  female  Medical Record #: [id number]  Today's Date: 4/5/2023    PT Consult received/acknowledged. Pt discussed in rounds, L sided weakness baseline and she is mobilizing well with nsg. No indication for acute PT eval at this time. Will CX orders. Please re-order if needed for mobility assessment.    Shanell Bennett, PT, DPT  Ext. [phone]

## 2023-04-05 NOTE — DISCHARGE SUMMARY
Discharge Summary    CHIEF COMPLAINT ON ADMISSION  Chief Complaint   Patient presents with    Lightheadedness     X 3 hours    Dizziness    Weakness    Nausea       Reason for Admission  EMS     Admission Date  4/4/2023    CODE STATUS  Full Code    HPI & HOSPITAL COURSE  Shaista Bocanegra is a 78 y.o. female with vascular dementia, atrial fibrillation, hypertension, hyperlipidemia, history of mitral valve prolapse status post mitral valve replacement, history of stroke with residual left-sided weakness, who was doing well until in the morning of admission at around 9:45 AM while walking when she had acute onset lightheadedness and felt near syncopal.  She had no other complaint such as vertigo, focal weakness or numbness, speech changes.  Fearing that she will fall, she let herself down to the floor but fell anyways hitting her head on the carpeted floor.  She did not fully lose consciousness.  She tried to crawl to reach the phone and called 911, but shared that she felt lightheaded every time she tries to stand up.  EMS also noted that she became very lightheaded when they tried to stand her up.  Nausea, vomiting, bowel movement changes, abdominal pain.  She did admit that she has not been eating or drinking as much in the past several days.  Otherwise she has no other complaint such as chest pain, shortness of breath. She was then brought to the ED.      On evaluation, vital signs were stable, however she did have orthostatic drop in her systolic blood pressure dropping from 148 to 119 with symptoms.  Labs were remarkable for BUN of 23.  Creatinine was normal.  Troponin was negative.  She had no leukocytosis.  Head CT showed nothing acute.  She was felt to be dehydrated, and was started on IV fluids.  Further work-up was pursued, with bilateral carotid ultrasound which was normal, and echocardiogram showed normal left ventricular systolic function, with known mitral valve repair which is functioning normally with  Reviewed. appropriate transvalvular gradient.  Her urinalysis showed 20-50 WBC with moderate leukocyte esterase and positive nitrate, but she denied any urinary symptoms and thus there were no indications for antibiotics.     She clinically improved with IV fluid hydration.  She had no further orthostatic dizziness, and her orthostatic vital signs have normalized.  Her electrolytes and renal function remain normal, and she had no leukocytosis.  Her hemoglobin is stable. I have personally seen and examined the patient on the day of discharge. With her clinical improvement, she was deemed ready to discharge from the hospital as she did not have any further hospitalization needs. Patient felt comfortable going home. The discharge plan was discussed with the patient, with which she was agreeable to.     Therefore, she is discharged in good and stable condition to home with close outpatient follow-up.      Discharge Date  4/5/2023      FOLLOW UP ITEMS POST DISCHARGE  -She is counseled extensively to keep yourself well-hydrated at home.  -Follow-up with PCP.  - counseled to seek immediate medical attention, or return to the ED for recurrent or worsening symptoms.      DISCHARGE DIAGNOSES  Principal Problem:    Lightheadedness and presyncope due to orthostasis and volume depletion POA: Yes  Active Problems:    Volume depletion and dehydration POA: Yes    Dyslipidemia POA: Yes    History of CVA (cerebrovascular accident) POA: Yes    Atrial fibrillation (HCC) POA: Yes    Acquired circulating anticoagulants (HCC) POA: Yes    S/P mitral valve repair POA: Yes    Vascular dementia without behavioral disturbance (HCC) POA: Yes  Resolved Problems:    * No resolved hospital problems. *      FOLLOW UP  Future Appointments   Date Time Provider Department Center   4/26/2023 10:00 AM MOLLY Read [facility]   5/22/2023 10:00 AM Darin Meehan M.D. [facility] None   6/5/2023 11:00 AM [facility] [address] [place] [address]   9/29/2023 10:40 AM [place]  [facility]     No follow-up provider specified.    MEDICATIONS ON DISCHARGE     Medication List        CONTINUE taking these medications        Instructions   apixaban 5mg Tabs  Commonly known as: ELIQUIS   Take 1 Tablet by mouth 2 times a day.  Dose: 5 mg     atorvastatin 80 MG tablet  Commonly known as: LIPITOR   Take 1 Tablet by mouth every evening.  Dose: 80 mg     cyanocobalamin 1000 MCG Tabs  Commonly known as: VITAMIN B12   Take 1,000 mcg by mouth every day.  Dose: 1,000 mcg     donepezil 5 MG Tabs  Commonly known as: ARICEPT   Take 5 mg by mouth every evening.  Dose: 5 mg     estradiol 0.1 MG/GM vaginal cream  Commonly known as: ESTRACE   Insert 0.1 g into the vagina 1 time a day as needed.  Dose: 0.1 g     MULTIVITAMIN WOMEN 50+ PO   Take 1 Tablet by mouth every day.  Dose: 1 Tablet     spironolactone 25 MG Tabs  Commonly known as: ALDACTONE   Take 1 Tablet by mouth every day.  Dose: 25 mg     tamsulosin 0.4 MG capsule  Commonly known as: FLOMAX   Take 0.4 mg by mouth every day.  Dose: 0.4 mg              Allergies  No Known Allergies    DIET  Orders Placed This Encounter   Procedures    Diet Order Diet: Regular     Standing Status:   Standing     Number of Occurrences:   1     Order Specific Question:   Diet:     Answer:   Regular [1]       ACTIVITY  As tolerated.  Weight bearing as tolerated    CONSULTATIONS  None    PROCEDURES  None    LABORATORY  Lab Results   Component Value Date    SODIUM 139 04/05/2023    POTASSIUM 4.5 04/05/2023    CHLORIDE 110 04/05/2023    CO2 18 (L) 04/05/2023    GLUCOSE 97 04/05/2023    BUN 20 04/05/2023    CREATININE 0.72 04/05/2023        Lab Results   Component Value Date    WBC 4.7 (L) 04/05/2023    HEMOGLOBIN 13.2 04/05/2023    HEMATOCRIT 40.8 04/05/2023    PLATELETCT 176 04/05/2023        Total time of the discharge process = 42 minutes.

## 2023-04-05 NOTE — CARE PLAN
Problem: Pain - Standard  Goal: Alleviation of pain or a reduction in pain to the patient’s comfort goal  Outcome: Progressing     Problem: Knowledge Deficit - Standard  Goal: Patient and family/care givers will demonstrate understanding of plan of care, disease process/condition, diagnostic tests and medications  Outcome: Progressing     Problem: Psychosocial  Goal: Patient's level of anxiety will decrease  Outcome: Progressing     Problem: Discharge Barriers/Planning  Goal: Patient's continuum of care needs are met  Outcome: Progressing     Problem: Mobility  Goal: Patient's capacity to carry out activities will improve  Outcome: Progressing   The patient is Stable - Low risk of patient condition declining or worsening    Shift Goals  Patient Goals: Echo, IVF    Progress made toward(s) clinical / shift goals:  improving    Patient is not progressing towards the following goals:

## 2023-04-05 NOTE — PROGRESS NOTES
Assumed care. A/O, sitting up in bed. Pending ECHO. NAD. Denies dizziness, nausea, CP or other symptoms. States feels ok. Will do ortho vs as ordered. BP noted to be elevated.

## 2023-04-05 NOTE — PROCEDURES
DIAGNOSTIC HOME SLEEP TEST (HST) REPORT WatchPAT      PATIENT ID:  NAME:  Shaista Bocanegra  MRN:               [id number]  YOB: 1945  DATE OF STUDY: 04/01/2023      Impression:     This study shows evidence of:      1. Mild obstructive sleep apnea with PAT 4% apnea hypopnea index(pAHI) of 6.6 per hour.  PAT respiratory disturbance index (pRDI) was 24 per hour. These findings are based on 7 channels recording of PAT signal with sleep staging, heart rate, pulse oximetry, actigraphy, body position, snoring and respiratory movement.     2. Oxygenation O2 Sat. mean O2 sat was 90%,  emiliano was 86%,  and maximum O2 at 96 %. O2 sat was at or  below 88% for 22.8 min of evaluation time. Oxygen Desaturation (>=4%) Index was 6.4/hr. AVG HR was 70 BPM.      TECHNICAL DESCRIPTION: Patient underwent home sleep apnea testing with peripheral arterial tone signal (WatchPAT™). This is a Type IV portable monitor and device per Medicare. Monitoring was done with 7 channels recording of PAT signal with sleep staging, heart rate, pulse oximetry, actigraphy, body position, snoring and respiratory movement. Prior to using the device, the patient received verbal and written instructions for its application and was provided with the help desk phone number for additional telephonic instruction with 24-hour availability of qualified personnel to answer questions.    Respiratory events:    pRDI: Total 168 (REM index 28.8/hr. NREM index 23/hr, All Night 24/hr)    3% pAHIc: Total 7 ( All Night 1.1/hr)    4% pAHI: Total 46 (All Night 6.6/hr)    General sleep summary: . Total recording time is 8 hours and 3 minutes and total Sleep time is 7 hours and 0 minutes. The patient spent 20 minutes in the supine position and 400.5 minutes in the nonsupine position.      Recommendations:    1.  Sleep study likely done with oral appliance in place.  Study indicated mild obstructive sleep apnea with an overall AHI of 6.6.  There was  indication of potential mild nocturnal hypoxia with 22.8 minutes at or below 88% saturation.  2.  Given nocturnal hypoxia patient may be a candidate for nocturnal supplemental oxygen.     3.  In general patients with sleep apnea are advised to avoid alcohol and sedatives and to not operate a motor vehicle while drowsy. In some cases alternative treatment options may prove effective in resolving sleep apnea in these options include upper airway surgery, the use of a dental orthotic or weight loss and positional therapy. Clinical correlation is required.         Pj Junior MD

## 2023-04-05 NOTE — TELEPHONE ENCOUNTER
Nissa Lambert M.D.  You 3 hours ago (1:00 PM)       I guess this is mute now but no CT is needed until next year        ican[employer] message was sent to the patient.

## 2023-04-05 NOTE — PROGRESS NOTES
4 Eyes Skin Assessment Completed by ALISE Heard and ALISE Rowell.    Head WDL  Ears WDL  Nose WDL  Mouth WDL  Neck WDL  Breast/Chest WDL  Shoulder Blades Non-Blanching  Spine WDL  (R) Arm/Elbow/Hand WDL  (L) Arm/Elbow/Hand WDL  Abdomen WDL  Groin WDL  Scrotum/Coccyx/Buttocks WDL  (R) Leg Bruising  (L) Leg WDL  (R) Heel/Foot/Toe WDL  (L) Heel/Foot/Toe WDL          Devices In Places Tele Box and Pulse Ox      Interventions In Place Gray Ear Foams    Possible Skin Injury No    Pictures Uploaded Into Epic N/A  Wound Consult Placed N/A  RN Wound Prevention Protocol Ordered No

## 2023-04-10 ENCOUNTER — HOSPITAL ENCOUNTER (OUTPATIENT)
Facility: MEDICAL CENTER | Age: 78
End: 2023-04-10
Payer: MEDICARE

## 2023-04-10 DIAGNOSIS — R33.9 URINARY RETENTION: ICD-10-CM

## 2023-04-10 LAB
APPEARANCE UR: CLEAR
BACTERIA #/AREA URNS HPF: ABNORMAL /HPF
BILIRUB UR QL STRIP.AUTO: NEGATIVE
COLOR UR: YELLOW
EPI CELLS #/AREA URNS HPF: ABNORMAL /HPF
GLUCOSE UR STRIP.AUTO-MCNC: NEGATIVE MG/DL
HYALINE CASTS #/AREA URNS LPF: ABNORMAL /LPF
KETONES UR STRIP.AUTO-MCNC: NEGATIVE MG/DL
LEUKOCYTE ESTERASE UR QL STRIP.AUTO: ABNORMAL
MICRO URNS: ABNORMAL
NITRITE UR QL STRIP.AUTO: POSITIVE
PH UR STRIP.AUTO: 7.5 [PH] (ref 5–8)
PROT UR QL STRIP: NEGATIVE MG/DL
RBC # URNS HPF: ABNORMAL /HPF
RBC UR QL AUTO: NEGATIVE
SP GR UR STRIP.AUTO: 1.01
TRANS CELLS #/AREA URNS HPF: ABNORMAL /HPF
UROBILINOGEN UR STRIP.AUTO-MCNC: 0.2 MG/DL
WBC #/AREA URNS HPF: ABNORMAL /HPF

## 2023-04-10 PROCEDURE — 87077 CULTURE AEROBIC IDENTIFY: CPT

## 2023-04-10 PROCEDURE — 87086 URINE CULTURE/COLONY COUNT: CPT

## 2023-04-10 PROCEDURE — 87186 SC STD MICRODIL/AGAR DIL: CPT

## 2023-04-10 PROCEDURE — 81001 URINALYSIS AUTO W/SCOPE: CPT

## 2023-04-26 PROBLEM — C50.919 BREAST CANCER (HCC): Chronic | Status: ACTIVE | Noted: 2021-09-29

## 2023-04-26 PROBLEM — E86.9 VOLUME DEPLETION: Chronic | Status: ACTIVE | Noted: 2023-04-04

## 2023-04-26 PROBLEM — I48.91 ATRIAL FIBRILLATION (HCC): Chronic | Status: ACTIVE | Noted: 2021-05-31

## 2023-04-26 PROBLEM — F01.50 VASCULAR DEMENTIA WITHOUT BEHAVIORAL DISTURBANCE (HCC): Chronic | Status: ACTIVE | Noted: 2022-01-26

## 2023-04-26 PROBLEM — J30.1 SEASONAL ALLERGIC RHINITIS DUE TO POLLEN: Status: ACTIVE | Noted: 2023-04-26

## 2023-04-26 PROBLEM — R35.0 URINARY FREQUENCY: Chronic | Status: ACTIVE | Noted: 2023-04-26

## 2023-04-26 PROBLEM — D68.318 ACQUIRED CIRCULATING ANTICOAGULANTS (HCC): Chronic | Status: ACTIVE | Noted: 2021-06-09

## 2023-04-26 PROBLEM — R35.0 URINARY FREQUENCY: Status: ACTIVE | Noted: 2023-04-26

## 2023-04-26 PROBLEM — J30.1 SEASONAL ALLERGIC RHINITIS DUE TO POLLEN: Chronic | Status: ACTIVE | Noted: 2023-04-26

## 2023-04-26 PROBLEM — I42.0 DCM (DILATED CARDIOMYOPATHY) (HCC): Chronic | Status: ACTIVE | Noted: 2022-08-24

## 2023-04-28 PROBLEM — I42.0 DCM (DILATED CARDIOMYOPATHY) (HCC): Chronic | Status: RESOLVED | Noted: 2022-08-24 | Resolved: 2023-04-28

## 2023-05-22 ENCOUNTER — OFFICE VISIT (OUTPATIENT)
Dept: CARDIOLOGY | Facility: MEDICAL CENTER | Age: 78
End: 2023-05-22
Attending: INTERNAL MEDICINE
Payer: MEDICARE

## 2023-05-22 VITALS
WEIGHT: 157 LBS | OXYGEN SATURATION: 93 % | BODY MASS INDEX: 25.23 KG/M2 | HEART RATE: 83 BPM | RESPIRATION RATE: 16 BRPM | DIASTOLIC BLOOD PRESSURE: 68 MMHG | HEIGHT: 66 IN | SYSTOLIC BLOOD PRESSURE: 110 MMHG

## 2023-05-22 DIAGNOSIS — E78.5 DYSLIPIDEMIA: ICD-10-CM

## 2023-05-22 DIAGNOSIS — Z98.890 S/P MITRAL VALVE REPAIR: ICD-10-CM

## 2023-05-22 DIAGNOSIS — I48.0 PAROXYSMAL ATRIAL FIBRILLATION (HCC): Chronic | ICD-10-CM

## 2023-05-22 PROBLEM — R42 DIZZINESS: Status: RESOLVED | Noted: 2022-12-07 | Resolved: 2023-05-22

## 2023-05-22 PROCEDURE — 3074F SYST BP LT 130 MM HG: CPT | Performed by: INTERNAL MEDICINE

## 2023-05-22 PROCEDURE — 99212 OFFICE O/P EST SF 10 MIN: CPT | Performed by: INTERNAL MEDICINE

## 2023-05-22 PROCEDURE — 99214 OFFICE O/P EST MOD 30 MIN: CPT | Performed by: INTERNAL MEDICINE

## 2023-05-22 PROCEDURE — 3078F DIAST BP <80 MM HG: CPT | Performed by: INTERNAL MEDICINE

## 2023-05-22 ASSESSMENT — ENCOUNTER SYMPTOMS
NERVOUS/ANXIOUS: 0
EYES NEGATIVE: 1
RESPIRATORY NEGATIVE: 1
GASTROINTESTINAL NEGATIVE: 1
CONSTITUTIONAL NEGATIVE: 1
CHILLS: 0
SHORTNESS OF BREATH: 0
WEAKNESS: 0
BRUISES/BLEEDS EASILY: 0
HEADACHES: 0
ABDOMINAL PAIN: 0
DOUBLE VISION: 0
BLURRED VISION: 0
FOCAL WEAKNESS: 0
NEUROLOGICAL NEGATIVE: 1
CARDIOVASCULAR NEGATIVE: 1
NAUSEA: 0
MYALGIAS: 0
PSYCHIATRIC NEGATIVE: 1
WEIGHT LOSS: 0
DEPRESSION: 0
DIZZINESS: 0
PALPITATIONS: 0
FEVER: 0
VOMITING: 0
MUSCULOSKELETAL NEGATIVE: 1
COUGH: 0
CLAUDICATION: 0

## 2023-05-22 ASSESSMENT — FIBROSIS 4 INDEX: FIB4 SCORE: 2.045454545454545455

## 2023-05-22 NOTE — PROGRESS NOTES
Chief Complaint   Patient presents with    Atrial Fibrillation     & S/P mitral valve repair       Subjective     Artemio Bocanegra is a 78 y.o. female who presents today for follow up of mitral valve repair.    Since the patient's last visit on 04/24/23, she has been doing well clinically. She denies fatigue, shortness of breath, dyspnea on exertion, chest pain, dizziness or syncope. She was admitted to [facility] on 04/04/23 for dizziness, near syncope due to dehydration.She denies fatigue, shortness of breath, dyspnea on exertion, chest pain, dizziness or syncope. She went to [facility] and [facility].     Past Medical History:   Diagnosis Date    Acquired circulating anticoagulants (HCC)     Anemia 09/28/2021    Arthritis     toe    Atrial fibrillation ([facility])     Benign essential HTN 03/19/2012    Breast cancer ([facility])     Cancer ([facility]) 1998    breast     Cardiac arrhythmia     Chest tightness or pressure 03/19/2012    Chickenpox     Coronary heart disease     DCM (dilated cardiomyopathy) ([facility]) 8/24/2022    Sinhala measles     Gynecological disorder     High cholesterol     High risk medication use 03/19/2012    Hypercholesterolemia 03/19/2012    Mumps     MVP (mitral valve prolapse) 03/19/2012    Osteoporosis     Seasonal allergic rhinitis due to pollen 4/26/2023    Seizure disorder ([facility]) 09/28/2021    last seizure may 2021    Sleep apnea     CPAP at night    Snoring     Stroke ([facility]) 2017    residual minor weakness on left side    Substance abuse ([facility])     Tonsillitis     Urinary bladder disorder     OAB    Urinary incontinence     Valvular heart disease     Venereal disease      Past Surgical History:   Procedure Laterality Date    CATARACT PHACO WITH IOL  3/16/2009    Performed by SHANNON GANDARA at SURGERY SAME DAY [facility] ORS    CATARACT PHACO WITH IOL  1/26/2009    Performed by SHANNON GANDARA at SURGERY SAME DAY [facility] ORS    BREAST BIOPSY      HYSTERECTOMY LAPAROSCOPY      LUMPECTOMY      [place]  CHEMOTHERAPY, UNSPECIFIED PROCEDURE      [place] RADIATION THERAPY PLAN SIMPLE      [place] REMV 2ND CATARACT,CORN-SCLER SECTN      PRIMARY C SECTION      SINUSCOPE      TONSILLECTOMY       Family History   Problem Relation Age of Onset    Cancer Mother         breast    No Known Problems Brother     Heart Failure Neg Hx     Heart Disease Neg Hx      Social History     Socioeconomic History    Marital status:      Spouse name: Not on file    Number of children: 2    Years of education: Not on file    Highest education level: Professional school degree (e.g., MD, DDS, DVM, ARACELI)   Occupational History    Occupation:      Employer: Not Employed   Tobacco Use    Smoking status: Never     Passive exposure: Never    Smokeless tobacco: Never   Vaping Use    Vaping Use: Never used   Substance and Sexual Activity    Alcohol use: Yes     Alcohol/week: 1.2 oz     Types: 2 Glasses of wine per week     Comment: twice a week    Drug use: No    Sexual activity: Yes     Partners: Male     Birth control/protection: Female Sterilization   Other Topics Concern    Not on file   Social History Narrative    Not on file     Social Determinants of Health     Financial Resource Strain: Low Risk  (1/26/2022)    Overall Financial Resource Strain (CARDIA)     Difficulty of Paying Living Expenses: Not hard at all   Food Insecurity: No Food Insecurity (1/26/2022)    Hunger Vital Sign     Worried About Running Out of Food in the Last Year: Never true     Ran Out of Food in the Last Year: Never true   Transportation Needs: No Transportation Needs (1/26/2022)    PRAPARE - Transportation     Lack of Transportation (Medical): No     Lack of Transportation (Non-Medical): No   Physical Activity: Insufficiently Active (1/26/2022)    Exercise Vital Sign     Days of Exercise per Week: 4 days     Minutes of Exercise per Session: 30 min   Stress: Not on file   Social Connections: Socially Isolated (1/26/2022)    Social Connection and Isolation Panel  [NHANES]     Frequency of Communication with Friends and Family: More than three times a week     Frequency of Social Gatherings with Friends and Family: Twice a week     Attends Taoist Services: Never     Active Member of Clubs or Organizations: No     Attends Club or Organization Meetings: Never     Marital Status:    Intimate Partner Violence: Not on file   Housing Stability: Low Risk  (1/26/2022)    Housing Stability Vital Sign     Unable to Pay for Housing in the Last Year: No     Number of Places Lived in the Last Year: 1     Unstable Housing in the Last Year: No     No Known Allergies    (Medications reviewed.)  Outpatient Encounter Medications as of 5/22/2023   Medication Sig Dispense Refill    mirabegron ER (MYRBETRIQ) 25 MG TABLET SR 24 HR Take 1 Tablet by mouth every day. For overactive bladder 90 Tablet 3    donepezil (ARICEPT) 5 MG Tab Take 5 mg by mouth every evening.      cyanocobalamin (VITAMIN B12) 1000 MCG Tab Take 1,000 mcg by mouth every day.      estradiol (ESTRACE) 0.1 MG/GM vaginal cream Insert 0.1 g into the vagina 1 time a day as needed.      Multiple Vitamins-Minerals (MULTIVITAMIN WOMEN 50+ PO) Take 1 Tablet by mouth every day.      apixaban (ELIQUIS) 5mg Tab Take 1 Tablet by mouth 2 times a day. 180 Tablet 3    atorvastatin (LIPITOR) 80 MG tablet Take 1 Tablet by mouth every evening. 90 Tablet 3    spironolactone (ALDACTONE) 25 MG Tab Take 1 Tablet by mouth every day. 90 Tablet 3    tamsulosin (FLOMAX) 0.4 MG capsule Take 0.4 mg by mouth every day.       No facility-administered encounter medications on file as of 5/22/2023.     Review of Systems   Constitutional: Negative.  Negative for chills, fever, malaise/fatigue and weight loss.   HENT: Negative.  Negative for hearing loss.    Eyes: Negative.  Negative for blurred vision and double vision.   Respiratory: Negative.  Negative for cough and shortness of breath.    Cardiovascular: Negative.  Negative for chest pain,  "palpitations, claudication and leg swelling.   Gastrointestinal: Negative.  Negative for abdominal pain, nausea and vomiting.   Genitourinary: Negative.  Negative for dysuria and urgency.   Musculoskeletal: Negative.  Negative for joint pain and myalgias.   Skin: Negative.  Negative for itching and rash.   Neurological: Negative.  Negative for dizziness, focal weakness, weakness and headaches.   Endo/Heme/Allergies: Negative.  Does not bruise/bleed easily.   Psychiatric/Behavioral: Negative.  Negative for depression. The patient is not nervous/anxious.               Objective     /68 (BP Location: Left arm, Patient Position: Sitting, BP Cuff Size: Adult)   Pulse 83   Resp 16   Ht 1.664 m (5' 5.5\")   Wt 71.2 kg (157 lb)   SpO2 93%   BMI 25.73 kg/m²     Physical Exam  Constitutional:       Appearance: Normal appearance. She is well-developed and normal weight.   HENT:      Head: Normocephalic and atraumatic.   Neck:      Vascular: No JVD.   Cardiovascular:      Rate and Rhythm: Normal rate and regular rhythm.      Heart sounds: Normal heart sounds.   Pulmonary:      Effort: Pulmonary effort is normal.      Breath sounds: Normal breath sounds.   Abdominal:      General: Bowel sounds are normal.      Palpations: Abdomen is soft.      Comments: No hepatosplenomegaly.   Musculoskeletal:         General: Normal range of motion.   Lymphadenopathy:      Cervical: No cervical adenopathy.   Skin:     General: Skin is warm and dry.   Neurological:      Mental Status: She is alert and oriented to person, place, and time.            CARDIAC STUDIES/PROCEDURES:     BIOTEL CONCLUSIONS (07/14/21)  BioTel 6 day Summary:   1. Atrial fibrillation with appropriate heart rate range. Average 69, range 35 to 120 bpm.   2. No significant pauses (up to 2.2 seconds).   3. Occasional PVCs, 2% burden.   4. 1 patient trigger, no symptoms reported.   Conclusion: Persistent atrial fibrillation with appropriate heart rate, no pauses, " occasional PVCs.     CARDIAC CATHETERIZATION CONCLUSIONS by Juventino Farah (05/08/20)  Angiographically normal appearing coronary arteries  Mildly elevated LVEDP, filling pressures.  2+ mitral regurgitation.  Normal LV function.    CAROTID ULTRASOUND (04/04/23)  Bilateral.   Flow velocities and Doppler waveforms are normal throughout the carotid   arteries without evidence of plaque.   The bilateral subclavian and vertebral artery waveforms are antegrade and   normal in character and velocity.   (study result reviewed)      CAROTID ULTRASOUND (08/18/17)  Normal carotids, subclavians and vertebrals.     CTA OF HEAD (05/02/22)  1.  Moderate sized area of chronic infarction with surrounding encephalomalacia in the right frontal parasylvian region.  2.  Otherwise unremarkable CT angiogram of the head.  3.  Age-related cerebral atrophy    CTA OF NECK (05/02/22)  CT angiogram of the neck within normal limits.    CTA OF HEAD (09/04/21)          No evidence of flow-limiting stenosis in the cervical carotid or cervical vertebral arteries.     CT OF HEAD (05/31/21)  1.  Moderate acute/subacute left temporal lobe infarct.  2.  Chronic ischemic changes.  3.  No acute intracranial hemorrhage.     CTA OF HEAD (05/31/21)  No acute large vessel occlusion or hemodynamically significant stenosis.  Acute/subacute appearing left temporal lobe infarct.     CTA OF NECK (09/04/21)           No evidence of flow-limiting stenosis in the cervical carotid or cervical vertebral arteries.     CTA OF NECK (05/31/21)  1.  No stenosis or dissection is seen within the carotid or vertebral arteries bilaterally.  2.  Tree-in-bud nodularity in the right upper lobe is likely infectious/inflammatory.  3.  Mucosal thickening right maxillary sinus.  4.  Nasal bone deformities bilaterally are likely chronic.     CT OF HEAD (05/03/19)  NO ACUTE ABNORMALITIES ARE NOTED ON CT SCAN OF THE HEAD.  Right-sided encephalomalacia is noted.     CTA OF HEAD  (08/18/17)  1.  There is a right M1 segment occlusion.  2.  There is collateral flow in the peripheral M3 branches seen on the right.  3.  There is mild decreased enhancement of the visualized right internal carotid artery however it is patent.  4.  Question of subtle hypodensity in the right insular cortex region. No abnormal brain parenchymal enhancement is seen.     CTA OF NECK (08/18/17)  1.  CT angiogram of the neck within normal limits.  2.  Thrombosed right M1 segment.    ECHOCARDIOGRAM CONCLUSIONS (04/05/23)  Normal left ventricular systolic function.  Known mitral valve repair which is functioning normally with   appropriate transvalvular gradient.  (study result reviewed)      ECHOCARDIOGRAM CONCLUSIONS (06/21/22)  No acute intracranial process.  Remote infarcts in the right frontal, left occipital region as described above.  Remote hemorrhage into the right basal ganglia is noted with hemosiderin staining. Remote microhemorrhage into the left medial occipital periatrial white matter is also noted.  Age-related volume loss and chronic microvascular ischemic changes.     ECHOCARDIOGRAM CONCLUSIONS (09/30/21)  Normal left ventricular chamber size.  The left ventricular ejection fraction is visually estimated to be 60%.  Known mitral valve repair which is functioning normally with appropriate transvalvular gradient.  No mitral regurgitation.  Right heart pressures are normal.      ECHOCARDIOGRAM CONCLUSIONS (06/01/21)  Left ventricular ejection fraction is visually estimated to be 65%.  Diastolic function is difficult to assess with atrial fibrillation.  Severely dilated left atrium.  Negative bubble study including Valsalva.  Myxomatous changes of the mitral valve leaflets with prolapse of the anterior and posterior leaflets.  Moderate to severe eccentric mitral regurgitation, probably severe.  Pulmonary veins not well seen on the study.  Estimated right ventricular systolic pressure is 31 mmHg + estimated  RAP.  Compared to the images of the study done 11- there has been no significant change, prior echo had pulmonary vein flow reversal consistent with  severe mitral regurgitation.     ECHOCARDIOGRAM CONCLUSIONS (02/03/20)  Prior echo done on 05/03/2019. Compared to the images of the prior study done -  there has been no significant change.   Normal left ventricular systolic function.  Left ventricular ejection fraction is visually estimated to be 65%.  Prolapse of the mitral leaflets was present.  Severe mitral regurgitation.  Mild tricuspid regurgitation.  Estimated right ventricular systolic pressure is 35 mmHg.     ECHOCARDIOGRAM CONCLUSIONS (05/03/19)  Prior echocardiogram 6/14/2018.  Normal left ventricular systolic function.   Mild to moderate eccentric mitral regurgitation.  Compared to the images of the study done - there has been slight improvement in mitral regurgitation.     ECHOCARDIOGRAM CONCLUSIONS (06/14/18)  Normal left ventricular systolic function.  Left ventricular ejection fraction is visually estimated to be 60%.  Myxomatous changes of the mitral valve.  Prolapse of the anterior and posterior mitral leaflets.  Severe, eccentric mitral regurgitation.  Right heart pressures are normal.  Compared to the report of the study done 8/19/2017 severe mitral regurgitation is now present, previously reported as moderate but a MR   ERO was not recorded.      EKG performed on (04/04/23) was reviewed: EKG personally interpreted shows sinus rhythm.  EKG performed on (05/02/22) EKG shows sinus rhythm.  EKG performed on (09/04/21) EKG shows atrial fibrillation.  EKG performed on (06/09/21) EKG shows atrial fibrillation.  EKG performed on (03/19/21) EKG shows sinus rhythm with premature ventricular contractions.  EKG performed on (05/09/20) EKG shows sinus rhythm with premature ventricular contractions.  EKG performed on (05/08/20) EKG shows sinus rhythm with premature ventricular contractions.      Laboratory results of (05/02/22) Cholesterol profile of 115/66/55/49 mg/dL noted.  Laboratory results of (01/12/22) Cholesterol profile of 115/58/50/53 mg/dL noted.     TRANSESOPHAGEAL ECHOCARDIOGRAM CONCLUSIONS by Ayde Lopes (01/26/21)  LV EF 55%.  Biatrial enlargement.  There is severe eccentric mitral regurgitation with multiple jets, predominantly from flail leaflet of P2.  Echolucent space near P1 of unclear etiology, unusual for cleft leaflet.  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    Assessment & Plan     1. S/P mitral valve repair        2. Dyslipidemia        3. Paroxysmal atrial fibrillation (HCC)            Medical Decision Making: Today's Assessment/Status/Plan:          History of  mitral valve repair (minimally invasive complex mitral valve repair with A2 [place]-Miguel Angel neochordal reconstruction, A3 [place]-Miguel Angel neochordal reconstruction, P2 posterior ventricular anchoring remodeling suture and non-resectional leaflet remodeling and [place]-Miguel Angel NeoChord x2, P3 [place]-Miguel Angel NeoChord x1, and #34 sewing ring annuloplasty, maze procedure and left atrial appendage oversew by Monster [facility] at [facility] on 09/16/21): Stable.  Hyperlipidemia: She is doing well on statin therapy without myalgia symptoms.  Atrial fibrillation on anticoagulation therapy (Eliquis): She is doing well on anticoagulation and the ventricular rate is well controlled.   Status post left temporal lobe infarct (05/31/21) with history of cerebrovascular accident with right carotid arteriography with mechanical thrombectomy (08/18/17) and transient ischemic attack (05/02/22): She remains clinically stable with memory loss and mild confusion.  Additional information: She is family of Casey Hampton.    We will follow up the patient in one year.     CC Mr. Davidd Cullen IGNACIO

## 2023-06-05 ENCOUNTER — HOSPITAL ENCOUNTER (OUTPATIENT)
Dept: RADIOLOGY | Facility: MEDICAL CENTER | Age: 78
End: 2023-06-05
Attending: FAMILY MEDICINE
Payer: MEDICARE

## 2023-06-05 DIAGNOSIS — Z12.31 VISIT FOR SCREENING MAMMOGRAM: ICD-10-CM

## 2023-06-13 ENCOUNTER — HOSPITAL ENCOUNTER (EMERGENCY)
Facility: MEDICAL CENTER | Age: 78
End: 2023-06-13
Attending: EMERGENCY MEDICINE
Payer: MEDICARE

## 2023-06-13 VITALS
RESPIRATION RATE: 21 BRPM | WEIGHT: 156 LBS | BODY MASS INDEX: 25.56 KG/M2 | HEART RATE: 65 BPM | TEMPERATURE: 97 F | SYSTOLIC BLOOD PRESSURE: 139 MMHG | DIASTOLIC BLOOD PRESSURE: 60 MMHG | OXYGEN SATURATION: 94 %

## 2023-06-13 DIAGNOSIS — R42 DIZZINESS: ICD-10-CM

## 2023-06-13 DIAGNOSIS — R42 LIGHTHEADEDNESS: ICD-10-CM

## 2023-06-13 DIAGNOSIS — R79.9 ELEVATED BUN: ICD-10-CM

## 2023-06-13 LAB
ANION GAP SERPL CALC-SCNC: 12 MMOL/L (ref 7–16)
BASOPHILS # BLD AUTO: 0.3 % (ref 0–1.8)
BASOPHILS # BLD: 0.02 K/UL (ref 0–0.12)
BUN SERPL-MCNC: 27 MG/DL (ref 8–22)
CALCIUM SERPL-MCNC: 9.1 MG/DL (ref 8.5–10.5)
CHLORIDE SERPL-SCNC: 107 MMOL/L (ref 96–112)
CO2 SERPL-SCNC: 23 MMOL/L (ref 20–33)
CREAT SERPL-MCNC: 0.88 MG/DL (ref 0.5–1.4)
EKG IMPRESSION: NORMAL
EOSINOPHIL # BLD AUTO: 0.07 K/UL (ref 0–0.51)
EOSINOPHIL NFR BLD: 1.2 % (ref 0–6.9)
ERYTHROCYTE [DISTWIDTH] IN BLOOD BY AUTOMATED COUNT: 49.8 FL (ref 35.9–50)
GFR SERPLBLD CREATININE-BSD FMLA CKD-EPI: 66 ML/MIN/1.73 M 2
GLUCOSE SERPL-MCNC: 106 MG/DL (ref 65–99)
HCT VFR BLD AUTO: 42.3 % (ref 37–47)
HGB BLD-MCNC: 13.9 G/DL (ref 12–16)
IMM GRANULOCYTES # BLD AUTO: 0.02 K/UL (ref 0–0.11)
IMM GRANULOCYTES NFR BLD AUTO: 0.3 % (ref 0–0.9)
LYMPHOCYTES # BLD AUTO: 0.89 K/UL (ref 1–4.8)
LYMPHOCYTES NFR BLD: 14.9 % (ref 22–41)
MCH RBC QN AUTO: 32.3 PG (ref 27–33)
MCHC RBC AUTO-ENTMCNC: 32.9 G/DL (ref 32.2–35.5)
MCV RBC AUTO: 98.1 FL (ref 81.4–97.8)
MONOCYTES # BLD AUTO: 0.35 K/UL (ref 0–0.85)
MONOCYTES NFR BLD AUTO: 5.8 % (ref 0–13.4)
NEUTROPHILS # BLD AUTO: 4.64 K/UL (ref 1.82–7.42)
NEUTROPHILS NFR BLD: 77.5 % (ref 44–72)
NRBC # BLD AUTO: 0 K/UL
NRBC BLD-RTO: 0 /100 WBC (ref 0–0.2)
PLATELET # BLD AUTO: 343 K/UL (ref 164–446)
PMV BLD AUTO: 8.8 FL (ref 9–12.9)
POTASSIUM SERPL-SCNC: 4.2 MMOL/L (ref 3.6–5.5)
RBC # BLD AUTO: 4.31 M/UL (ref 4.2–5.4)
SODIUM SERPL-SCNC: 142 MMOL/L (ref 135–145)
TROPONIN T SERPL-MCNC: 13 NG/L (ref 6–19)
WBC # BLD AUTO: 6 K/UL (ref 4.8–10.8)

## 2023-06-13 PROCEDURE — 700105 HCHG RX REV CODE 258: Performed by: EMERGENCY MEDICINE

## 2023-06-13 PROCEDURE — 94760 N-INVAS EAR/PLS OXIMETRY 1: CPT

## 2023-06-13 PROCEDURE — 93005 ELECTROCARDIOGRAM TRACING: CPT | Performed by: EMERGENCY MEDICINE

## 2023-06-13 PROCEDURE — 36415 COLL VENOUS BLD VENIPUNCTURE: CPT

## 2023-06-13 PROCEDURE — 99285 EMERGENCY DEPT VISIT HI MDM: CPT

## 2023-06-13 PROCEDURE — 84484 ASSAY OF TROPONIN QUANT: CPT

## 2023-06-13 PROCEDURE — 80048 BASIC METABOLIC PNL TOTAL CA: CPT

## 2023-06-13 PROCEDURE — 85025 COMPLETE CBC W/AUTO DIFF WBC: CPT

## 2023-06-13 RX ORDER — SODIUM CHLORIDE 9 MG/ML
1000 INJECTION, SOLUTION INTRAVENOUS ONCE
Status: COMPLETED | OUTPATIENT
Start: 2023-06-13 | End: 2023-06-13

## 2023-06-13 RX ADMIN — SODIUM CHLORIDE 1000 ML: 9 INJECTION, SOLUTION INTRAVENOUS at 12:46

## 2023-06-13 ASSESSMENT — FIBROSIS 4 INDEX: FIB4 SCORE: 2.045454545454545455

## 2023-06-13 NOTE — ED NOTES
Pt discharged home as ordered by erp. Pt instructed to follow up with her PCP and return here as needed. Pt verbalized understanding and left in a wheelchair to family

## 2023-06-13 NOTE — ED PROVIDER NOTES
"  ER Provider Note    Scribed for Bayron Hutchinson M.D. by Lyssa Rao. 6/13/2023   11:22 AM    Primary Care Provider: MOLLY Read    CHIEF COMPLAINT  Chief Complaint   Patient presents with    Dizziness     EXTERNAL RECORDS REVIEWED  Inpatient Notes The patient was previously admitted in April 2023 for similar symptoms. At that time, patient was positive for orthostatic blood pressure but otherwise had a negative workup. Her symptoms were improved with hydration.  and Outpatient Notes shows that the patient is followed by Cardiology for atrial fibrillation. She has a history of mitral valve repair.     HPI/ROS  LIMITATION TO HISTORY   Select: : None  OUTSIDE HISTORIAN(S):  Family      Shaista Bocanegra is a 78 y.o. female who presents to the ED for evaluation of acute dizziness onset 1 hour ago. The patient began to experience dizziness this morning while she was laying down in bed. She describes the episodes as \"more like lightheadedness.\" The patient notes that this current episode is similar to how she felt previously when she was admitted to the hospital in April 2023. At that time, her symptoms were thought to be due to dehydration. Currently in the ED, she reports still feeling dizzy. Denies chest pain, shortness of breath, vomiting or diarrhea. No alleviating or exacerbating factors reported. The patient takes her medication regularly. No known drug allergies.     PAST MEDICAL HISTORY  Past Medical History:   Diagnosis Date    Acquired circulating anticoagulants (HCC)     Anemia 09/28/2021    Arthritis     toe    Atrial fibrillation (HCC)     Benign essential HTN 03/19/2012    Breast cancer (HCC)     Cancer (HCC) 1998    breast     Cardiac arrhythmia     Chest tightness or pressure 03/19/2012    Chickenpox     Coronary heart disease     DCM (dilated cardiomyopathy) (HCC) 8/24/2022    [place] measles     Gynecological disorder     High cholesterol     High risk medication use 03/19/2012    " Hypercholesterolemia 03/19/2012    Mumps     MVP (mitral valve prolapse) 03/19/2012    Osteoporosis     Seasonal allergic rhinitis due to pollen 4/26/2023    Seizure disorder ([facility]) 09/28/2021    last seizure may 2021    Sleep apnea     CPAP at night    Snoring     Stroke ([facility]) 2017    residual minor weakness on left side    Substance abuse ([facility])     Tonsillitis     Urinary bladder disorder     OAB    Urinary incontinence     Valvular heart disease     Venereal disease        SURGICAL HISTORY  Past Surgical History:   Procedure Laterality Date    CATARACT PHACO WITH IOL  3/16/2009    Performed by SHANNON GANDARA at [facility]    CATARACT PHACO WITH IOL  1/26/2009    Performed by SHANNON GANDARA at [facility]    BREAST BIOPSY      HYSTERECTOMY LAPAROSCOPY      LUMPECTOMY      [place] CHEMOTHERAPY, UNSPECIFIED PROCEDURE      W[place] RADIATION THERAPY PLAN SIMPLE      [place] REMV 2ND CATARACT,CORN-SCLER SECTN      PRIMARY C SECTION      SINUSCOPE      TONSILLECTOMY         FAMILY HISTORY  Family History   Problem Relation Age of Onset    Cancer Mother         breast    No Known Problems Brother     Heart Failure Neg Hx     Heart Disease Neg Hx        SOCIAL HISTORY   reports that she has never smoked. She has never been exposed to tobacco smoke. She has never used smokeless tobacco. She reports current alcohol use of about 1.2 oz of alcohol per week. She reports that she does not use drugs.    CURRENT MEDICATIONS  Previous Medications    APIXABAN (ELIQUIS) 5MG TAB    Take 1 Tablet by mouth 2 times a day.    ATORVASTATIN (LIPITOR) 80 MG TABLET    Take 1 Tablet by mouth every evening.    CYANOCOBALAMIN (VITAMIN B12) 1000 MCG TAB    Take 1,000 mcg by mouth every day.    DONEPEZIL (ARICEPT) 5 MG TAB    Take 5 mg by mouth every evening.    ESTRADIOL (ESTRACE) 0.1 MG/GM VAGINAL CREAM    Insert 0.1 g into the vagina 1 time a day as needed.    MIRABEGRON ER (MYRBETRIQ) 25 MG TABLET SR 24 HR    Take 1  Tablet by mouth every day. For overactive bladder    MULTIPLE VITAMINS-MINERALS (MULTIVITAMIN WOMEN 50+ PO)    Take 1 Tablet by mouth every day.    SPIRONOLACTONE (ALDACTONE) 25 MG TAB    Take 1 Tablet by mouth every day.    TAMSULOSIN (FLOMAX) 0.4 MG CAPSULE    Take 0.4 mg by mouth every day.       ALLERGIES  No Known Allergies     PHYSICAL EXAM  /81   Pulse 63   Temp 35.9 °C (96.7 °F) (Temporal)   Resp 18   Wt 70.8 kg (156 lb)   SpO2 98%   BMI 25.56 kg/m²      Nursing note and vitals reviewed.  Constitutional: Well-developed and well-nourished. No distress.   HENT:  Dry mucus membranes. Head is normocephalic and atraumatic. Oropharynx is clear without exudate or erythema.   Eyes:  No nystagmus. Pupils are equal, round, and reactive to light. Conjunctiva are normal.   Cardiovascular: Normal rate and regular rhythm. No murmur heard. Normal radial pulses.  Pulmonary/Chest: Breath sounds normal. No wheezes or rales.   Abdominal: Soft and non-tender. No distention    Musculoskeletal: Extremities exhibit normal range of motion without edema or tenderness.   Neurological: Awake, alert and oriented to person, place, and time. No focal deficits noted.  Skin: Skin is warm and dry. No rash.   Psychiatric: Normal mood and affect. Appropriate for clinical situation    DIAGNOSTIC STUDIES    Labs:   Results for orders placed or performed during the hospital encounter of 06/13/23   CBC WITH DIFFERENTIAL   Result Value Ref Range    WBC 6.0 4.8 - 10.8 K/uL    RBC 4.31 4.20 - 5.40 M/uL    Hemoglobin 13.9 12.0 - 16.0 g/dL    Hematocrit 42.3 37.0 - 47.0 %    MCV 98.1 (H) 81.4 - 97.8 fL    MCH 32.3 27.0 - 33.0 pg    MCHC 32.9 32.2 - 35.5 g/dL    RDW 49.8 35.9 - 50.0 fL    Platelet Count 343 164 - 446 K/uL    MPV 8.8 (L) 9.0 - 12.9 fL    Neutrophils-Polys 77.50 (H) 44.00 - 72.00 %    Lymphocytes 14.90 (L) 22.00 - 41.00 %    Monocytes 5.80 0.00 - 13.40 %    Eosinophils 1.20 0.00 - 6.90 %    Basophils 0.30 0.00 - 1.80 %     Immature Granulocytes 0.30 0.00 - 0.90 %    Nucleated RBC 0.00 0.00 - 0.20 /100 WBC    Neutrophils (Absolute) 4.64 1.82 - 7.42 K/uL    Lymphs (Absolute) 0.89 (L) 1.00 - 4.80 K/uL    Monos (Absolute) 0.35 0.00 - 0.85 K/uL    Eos (Absolute) 0.07 0.00 - 0.51 K/uL    Baso (Absolute) 0.02 0.00 - 0.12 K/uL    Immature Granulocytes (abs) 0.02 0.00 - 0.11 K/uL    NRBC (Absolute) 0.00 K/uL   BASIC METABOLIC PANEL   Result Value Ref Range    Sodium 142 135 - 145 mmol/L    Potassium 4.2 3.6 - 5.5 mmol/L    Chloride 107 96 - 112 mmol/L    Co2 23 20 - 33 mmol/L    Glucose 106 (H) 65 - 99 mg/dL    Bun 27 (H) 8 - 22 mg/dL    Creatinine 0.88 0.50 - 1.40 mg/dL    Calcium 9.1 8.5 - 10.5 mg/dL    Anion Gap 12.0 7.0 - 16.0   TROPONIN   Result Value Ref Range    Troponin T 13 6 - 19 ng/L   ESTIMATED GFR   Result Value Ref Range    GFR (CKD-EPI) 66 >60 mL/min/1.73 m 2   EKG   Result Value Ref Range    Report       [facility] Emergency Dept.    Test Date:  2023  Pt Name:    HORACE GUZMAN                  Department: ER  MRN:        [id number]                      Room:       [facility]  Gender:     Female                       Technician: [id number]  :        [id number]                   Requested By:ER TRIAGE PROTOCOL  Order #:    [id number]                    Reading MD: BARI MCGINNIS MD    Measurements  Intervals                                Axis  Rate:       64                           P:          66  MA:         251                          QRS:        73  QRSD:       108                          T:          54  QT:         470  QTc:        485    Interpretive Statements  Sinus rhythm  Prolonged MA interval  Compared to ECG 2023 11:13:07  Atrial premature complex(es) no longer present  Electronically Signed On 2023 12:42:49 PDT by BARI MCGINNIS MD         EKG:   I have independently interpreted this EKG as detailed above.     INITIAL ASSESSMENT AND PLAN    11:22 AM - Patient was evaluated at bedside.  Patient presents to the ED for dizziness that started this morning. After my exam, I discussed with the patient the plan of care, which includes obtaining lab work for further evaluation. Patient understands and verbalizes agreement to plan of care. Ordered for CBC w/ Diff, Basic Metabolic Panel and Troponin to evaluate. The patient will be medicated with NS infusion 1000 mL for her symptoms. Differential diagnoses include but not limited to: arrhyhtmia, anemia, orthostatic hypotension, Dehydration     HYDRATION: Based on the patient's presentation of Other Dizziness the patient was given IV fluids. IV Hydration was used because oral hydration was not adequate alone. Upon recheck following hydration, the patient was improved.     12:45 PM - Patient was reevaluated at bedside. Patient feels improved after fluids. Discussed lab results with the patient and informed them that her results were reassuring.  I then informed the patient of my plan for discharge, which includes strict return precautions for any new or worsening symptoms. Patient understands and verbalizes agreement to plan of care. Patient is comfortable going home at this time.      ED Observation Status? Yes; I am placing the patient in to an observation status due to a diagnostic uncertainty as well as therapeutic intensity. Patient placed in observation status at 11:38 AM, 6/13/2023.     Observation plan is as follows: We will manage their symptoms, evaluate with lab work and then reassess after results are reviewed     Upon Reevaluation, the patient's condition has: Improved; and will be discharged.    Patient discharged from ED Observation status at 12:45 PM (Time) 6/13/2023 (Date).      COURSE AND MEDICAL DECISION MAKING    BUN is minimally elevated.  May be consistent with some component of volume depletion.  Clinically the patient has some dry mucous membranes.  She is treated with IV fluids in the emergency department for symptom  control.    DISPOSITION AND DISCUSSIONS    I have discussed management of the patient with the following physicians and JOANNA's:  None    Discussion of management with other [facility] or appropriate source(s): None     Escalation of care considered, and ultimately not performed: acute inpatient care management, however at this time, the patient is most appropriate for outpatient management.  Over the patient has already been admitted for similar symptoms had an extensive work-up.  I do not feel that additional admission would be beneficial.    Barriers to care at this time, including but not limited to:  None .     Decision tools and prescription drugs considered including, but not limited to: NIH Stroke Scale 0 .    Patient will be discharged home.    FOLLOW UP:  MOLLY Read  [address] [id number] [phone]    Schedule an appointment as soon as possible for a visit       [facility], Emergency Dept  [address] [id number] [phone]    If symptoms worsen    FINAL DIAGNOSIS  1. Lightheadedness         Lyssa HALEY (Scribe), am scribing for, and in the presence of, Bayron Hutchinson M.D..    Electronically signed by: Lyssa Rao (David), 6/13/2023    Bayron HALEY M.D. personally performed the services described in this documentation, as scribed by Lyssa Rao in my presence, and it is both accurate and complete.      The note accurately reflects work and decisions made by me.  Bayron Hutchinson M.D.  6/13/2023  1:38 PM

## 2023-06-13 NOTE — ED TRIAGE NOTES
Pt MINNA david with c/c of dizziness. Pt stating she woke up this morning and felt dizzy, she went to get up to go to the restroom and the symptoms worsened. Pt called who son who called EMS. Hx of the same and seen here in April

## 2023-06-19 ENCOUNTER — HOSPITAL ENCOUNTER (OUTPATIENT)
Dept: LAB | Facility: MEDICAL CENTER | Age: 78
End: 2023-06-19
Payer: MEDICARE

## 2023-06-19 DIAGNOSIS — R35.0 URINARY FREQUENCY: ICD-10-CM

## 2023-06-19 DIAGNOSIS — F01.50 VASCULAR DEMENTIA WITHOUT BEHAVIORAL DISTURBANCE (HCC): ICD-10-CM

## 2023-06-19 DIAGNOSIS — D64.9 ANEMIA, UNSPECIFIED TYPE: ICD-10-CM

## 2023-06-19 DIAGNOSIS — E78.49 OTHER HYPERLIPIDEMIA: ICD-10-CM

## 2023-06-19 DIAGNOSIS — Z13.21 ENCOUNTER FOR VITAMIN DEFICIENCY SCREENING: ICD-10-CM

## 2023-06-19 LAB
25(OH)D3 SERPL-MCNC: 55 NG/ML (ref 30–100)
ALBUMIN SERPL BCP-MCNC: 3.9 G/DL (ref 3.2–4.9)
ALBUMIN/GLOB SERPL: 1.3 G/DL
ALP SERPL-CCNC: 63 U/L (ref 30–99)
ALT SERPL-CCNC: 15 U/L (ref 2–50)
ANION GAP SERPL CALC-SCNC: 14 MMOL/L (ref 7–16)
APPEARANCE UR: CLEAR
AST SERPL-CCNC: 16 U/L (ref 12–45)
BACTERIA #/AREA URNS HPF: ABNORMAL /HPF
BASOPHILS # BLD AUTO: 0.4 % (ref 0–1.8)
BASOPHILS # BLD: 0.02 K/UL (ref 0–0.12)
BILIRUB SERPL-MCNC: 0.5 MG/DL (ref 0.1–1.5)
BILIRUB UR QL STRIP.AUTO: NEGATIVE
BUN SERPL-MCNC: 25 MG/DL (ref 8–22)
CALCIUM ALBUM COR SERPL-MCNC: 9.7 MG/DL (ref 8.5–10.5)
CALCIUM SERPL-MCNC: 9.6 MG/DL (ref 8.5–10.5)
CAOX CRY #/AREA URNS HPF: ABNORMAL /HPF
CHLORIDE SERPL-SCNC: 104 MMOL/L (ref 96–112)
CHOLEST SERPL-MCNC: 107 MG/DL (ref 100–199)
CO2 SERPL-SCNC: 24 MMOL/L (ref 20–33)
COLOR UR: YELLOW
CREAT SERPL-MCNC: 0.88 MG/DL (ref 0.5–1.4)
EOSINOPHIL # BLD AUTO: 0.04 K/UL (ref 0–0.51)
EOSINOPHIL NFR BLD: 0.8 % (ref 0–6.9)
EPI CELLS #/AREA URNS HPF: ABNORMAL /HPF
ERYTHROCYTE [DISTWIDTH] IN BLOOD BY AUTOMATED COUNT: 49.4 FL (ref 35.9–50)
FASTING STATUS PATIENT QL REPORTED: NORMAL
FERRITIN SERPL-MCNC: 111 NG/ML (ref 10–291)
FOLATE SERPL-MCNC: 32.5 NG/ML
GFR SERPLBLD CREATININE-BSD FMLA CKD-EPI: 66 ML/MIN/1.73 M 2
GLOBULIN SER CALC-MCNC: 3 G/DL (ref 1.9–3.5)
GLUCOSE SERPL-MCNC: 88 MG/DL (ref 65–99)
GLUCOSE UR STRIP.AUTO-MCNC: NEGATIVE MG/DL
HCT VFR BLD AUTO: 42.6 % (ref 37–47)
HDLC SERPL-MCNC: 48 MG/DL
HGB BLD-MCNC: 14.1 G/DL (ref 12–16)
IMM GRANULOCYTES # BLD AUTO: 0.01 K/UL (ref 0–0.11)
IMM GRANULOCYTES NFR BLD AUTO: 0.2 % (ref 0–0.9)
IRON SATN MFR SERPL: 41 % (ref 15–55)
IRON SERPL-MCNC: 97 UG/DL (ref 40–170)
KETONES UR STRIP.AUTO-MCNC: NEGATIVE MG/DL
LDLC SERPL CALC-MCNC: 50 MG/DL
LEUKOCYTE ESTERASE UR QL STRIP.AUTO: ABNORMAL
LYMPHOCYTES # BLD AUTO: 0.94 K/UL (ref 1–4.8)
LYMPHOCYTES NFR BLD: 19.7 % (ref 22–41)
MCH RBC QN AUTO: 32.2 PG (ref 27–33)
MCHC RBC AUTO-ENTMCNC: 33.1 G/DL (ref 32.2–35.5)
MCV RBC AUTO: 97.3 FL (ref 81.4–97.8)
MICRO URNS: ABNORMAL
MONOCYTES # BLD AUTO: 0.31 K/UL (ref 0–0.85)
MONOCYTES NFR BLD AUTO: 6.5 % (ref 0–13.4)
NEUTROPHILS # BLD AUTO: 3.45 K/UL (ref 1.82–7.42)
NEUTROPHILS NFR BLD: 72.4 % (ref 44–72)
NITRITE UR QL STRIP.AUTO: POSITIVE
NRBC # BLD AUTO: 0 K/UL
NRBC BLD-RTO: 0 /100 WBC (ref 0–0.2)
PH UR STRIP.AUTO: 6 [PH] (ref 5–8)
PLATELET # BLD AUTO: 263 K/UL (ref 164–446)
PMV BLD AUTO: 9.8 FL (ref 9–12.9)
POTASSIUM SERPL-SCNC: 4.7 MMOL/L (ref 3.6–5.5)
PROT SERPL-MCNC: 6.9 G/DL (ref 6–8.2)
PROT UR QL STRIP: NEGATIVE MG/DL
RBC # BLD AUTO: 4.38 M/UL (ref 4.2–5.4)
RBC # URNS HPF: ABNORMAL /HPF
RBC UR QL AUTO: NEGATIVE
RENAL EPI CELLS #/AREA URNS HPF: NEGATIVE /HPF
SODIUM SERPL-SCNC: 142 MMOL/L (ref 135–145)
SP GR UR STRIP.AUTO: 1.02
TIBC SERPL-MCNC: 239 UG/DL (ref 250–450)
TRIGL SERPL-MCNC: 44 MG/DL (ref 0–149)
UIBC SERPL-MCNC: 142 UG/DL (ref 110–370)
UROBILINOGEN UR STRIP.AUTO-MCNC: 0.2 MG/DL
VIT B12 SERPL-MCNC: 959 PG/ML (ref 211–911)
WBC # BLD AUTO: 4.8 K/UL (ref 4.8–10.8)
WBC #/AREA URNS HPF: ABNORMAL /HPF

## 2023-06-19 PROCEDURE — 80061 LIPID PANEL: CPT

## 2023-06-19 PROCEDURE — 81001 URINALYSIS AUTO W/SCOPE: CPT

## 2023-06-19 PROCEDURE — 87086 URINE CULTURE/COLONY COUNT: CPT

## 2023-06-19 PROCEDURE — 80053 COMPREHEN METABOLIC PANEL: CPT

## 2023-06-19 PROCEDURE — 85025 COMPLETE CBC W/AUTO DIFF WBC: CPT

## 2023-06-19 PROCEDURE — 87186 SC STD MICRODIL/AGAR DIL: CPT

## 2023-06-19 PROCEDURE — 83550 IRON BINDING TEST: CPT

## 2023-06-19 PROCEDURE — 82728 ASSAY OF FERRITIN: CPT

## 2023-06-19 PROCEDURE — 82746 ASSAY OF FOLIC ACID SERUM: CPT

## 2023-06-19 PROCEDURE — 82607 VITAMIN B-12: CPT

## 2023-06-19 PROCEDURE — 87077 CULTURE AEROBIC IDENTIFY: CPT

## 2023-06-19 PROCEDURE — 84425 ASSAY OF VITAMIN B-1: CPT

## 2023-06-19 PROCEDURE — 83540 ASSAY OF IRON: CPT

## 2023-06-19 PROCEDURE — 36415 COLL VENOUS BLD VENIPUNCTURE: CPT

## 2023-06-19 PROCEDURE — 82306 VITAMIN D 25 HYDROXY: CPT | Mod: GA

## 2023-06-22 LAB — VIT B1 BLD-MCNC: 142 NMOL/L (ref 70–180)

## 2023-06-28 PROBLEM — M19.049 HAND ARTHRITIS: Chronic | Status: ACTIVE | Noted: 2023-01-18

## 2023-06-30 ENCOUNTER — HOSPITAL ENCOUNTER (OUTPATIENT)
Dept: RADIOLOGY | Facility: MEDICAL CENTER | Age: 78
End: 2023-06-30
Attending: FAMILY MEDICINE
Payer: MEDICARE

## 2023-06-30 PROBLEM — N32.81 OVERACTIVE BLADDER: Status: ACTIVE | Noted: 2023-06-30

## 2023-06-30 PROBLEM — N32.81 OVERACTIVE BLADDER: Chronic | Status: ACTIVE | Noted: 2023-06-30

## 2023-07-07 ENCOUNTER — APPOINTMENT (OUTPATIENT)
Dept: LAB | Facility: MEDICAL CENTER | Age: 78
End: 2023-07-07
Payer: MEDICARE

## 2023-07-07 ENCOUNTER — HOSPITAL ENCOUNTER (OUTPATIENT)
Facility: MEDICAL CENTER | Age: 78
End: 2023-07-07
Attending: STUDENT IN AN ORGANIZED HEALTH CARE EDUCATION/TRAINING PROGRAM
Payer: MEDICARE

## 2023-07-07 PROCEDURE — 87077 CULTURE AEROBIC IDENTIFY: CPT

## 2023-07-07 PROCEDURE — 87186 SC STD MICRODIL/AGAR DIL: CPT

## 2023-07-07 PROCEDURE — 87086 URINE CULTURE/COLONY COUNT: CPT

## 2023-07-17 ENCOUNTER — HOSPITAL ENCOUNTER (OUTPATIENT)
Dept: RADIOLOGY | Facility: MEDICAL CENTER | Age: 78
End: 2023-07-17
Payer: MEDICARE

## 2023-07-17 DIAGNOSIS — R92.8 FOLLOW-UP EXAMINATION OF ABNORMAL MAMMOGRAM: ICD-10-CM

## 2023-07-17 PROCEDURE — G0279 TOMOSYNTHESIS, MAMMO: HCPCS

## 2023-08-08 DIAGNOSIS — Z98.890 S/P MITRAL VALVE REPAIR: ICD-10-CM

## 2023-08-08 RX ORDER — SPIRONOLACTONE 25 MG/1
25 TABLET ORAL DAILY
Qty: 90 TABLET | Refills: 3 | Status: SHIPPED | OUTPATIENT
Start: 2023-08-08

## 2023-09-15 PROBLEM — R32 URINARY INCONTINENCE: Status: ACTIVE | Noted: 2023-09-15

## 2023-09-15 PROBLEM — R32 URINARY INCONTINENCE: Chronic | Status: ACTIVE | Noted: 2023-09-15

## 2023-09-15 PROBLEM — R35.0 URINARY FREQUENCY: Chronic | Status: RESOLVED | Noted: 2023-04-26 | Resolved: 2023-09-15

## 2023-09-19 PROBLEM — N39.0 RECURRENT UTI: Status: ACTIVE | Noted: 2023-09-19

## 2023-09-21 ENCOUNTER — HOSPITAL ENCOUNTER (OUTPATIENT)
Dept: LAB | Facility: MEDICAL CENTER | Age: 78
End: 2023-09-21
Payer: MEDICARE

## 2023-09-21 DIAGNOSIS — R35.0 URINARY FREQUENCY: Chronic | ICD-10-CM

## 2023-09-21 LAB
APPEARANCE UR: CLEAR
BACTERIA #/AREA URNS HPF: NEGATIVE /HPF
BILIRUB UR QL STRIP.AUTO: NEGATIVE
COLOR UR: YELLOW
EPI CELLS #/AREA URNS HPF: ABNORMAL /HPF
GLUCOSE UR STRIP.AUTO-MCNC: NEGATIVE MG/DL
HYALINE CASTS #/AREA URNS LPF: ABNORMAL /LPF
KETONES UR STRIP.AUTO-MCNC: NEGATIVE MG/DL
LEUKOCYTE ESTERASE UR QL STRIP.AUTO: ABNORMAL
MICRO URNS: ABNORMAL
NITRITE UR QL STRIP.AUTO: NEGATIVE
PH UR STRIP.AUTO: 6 [PH] (ref 5–8)
PROT UR QL STRIP: NEGATIVE MG/DL
RBC # URNS HPF: ABNORMAL /HPF
RBC UR QL AUTO: NEGATIVE
SP GR UR STRIP.AUTO: 1.02
UROBILINOGEN UR STRIP.AUTO-MCNC: 0.2 MG/DL
WBC #/AREA URNS HPF: ABNORMAL /HPF

## 2023-09-21 PROCEDURE — 81001 URINALYSIS AUTO W/SCOPE: CPT

## 2023-09-21 PROCEDURE — 87086 URINE CULTURE/COLONY COUNT: CPT

## 2023-09-23 LAB
BACTERIA UR CULT: NORMAL
SIGNIFICANT IND 70042: NORMAL
SITE SITE: NORMAL
SOURCE SOURCE: NORMAL

## 2023-09-29 ENCOUNTER — OFFICE VISIT (OUTPATIENT)
Dept: SLEEP MEDICINE | Facility: MEDICAL CENTER | Age: 78
End: 2023-09-29
Attending: PHYSICIAN ASSISTANT
Payer: MEDICARE

## 2023-09-29 VITALS
BODY MASS INDEX: 24.91 KG/M2 | RESPIRATION RATE: 16 BRPM | WEIGHT: 155 LBS | DIASTOLIC BLOOD PRESSURE: 78 MMHG | HEIGHT: 66 IN | HEART RATE: 85 BPM | SYSTOLIC BLOOD PRESSURE: 122 MMHG | OXYGEN SATURATION: 94 %

## 2023-09-29 DIAGNOSIS — G47.34 NOCTURNAL HYPOXIA: ICD-10-CM

## 2023-09-29 DIAGNOSIS — G47.33 OSA (OBSTRUCTIVE SLEEP APNEA): ICD-10-CM

## 2023-09-29 PROCEDURE — 99213 OFFICE O/P EST LOW 20 MIN: CPT | Performed by: PHYSICIAN ASSISTANT

## 2023-09-29 PROCEDURE — 99212 OFFICE O/P EST SF 10 MIN: CPT | Performed by: PHYSICIAN ASSISTANT

## 2023-09-29 PROCEDURE — 3078F DIAST BP <80 MM HG: CPT | Performed by: PHYSICIAN ASSISTANT

## 2023-09-29 PROCEDURE — 3074F SYST BP LT 130 MM HG: CPT | Performed by: PHYSICIAN ASSISTANT

## 2023-09-29 ASSESSMENT — ENCOUNTER SYMPTOMS
HEARTBURN: 0
HEADACHES: 0
COUGH: 0
INSOMNIA: 1
SHORTNESS OF BREATH: 0
PALPITATIONS: 0
DIZZINESS: 1
SPUTUM PRODUCTION: 0
WEIGHT LOSS: 0
TREMORS: 0
SORE THROAT: 0
SINUS PAIN: 0
ORTHOPNEA: 0
FEVER: 0
CHILLS: 0
WHEEZING: 0

## 2023-09-29 ASSESSMENT — FIBROSIS 4 INDEX: FIB4 SCORE: 1.26

## 2023-09-29 NOTE — PATIENT INSTRUCTIONS
1-reviewed home sleep study results  2-low oxygen levels noted  3-titration study ordered for medicare requirements  4-will contact patient with results and send appropriate orders as indicated   5-follow up in 3 months   6-may need follow up oxygen only home testing

## 2023-09-29 NOTE — PROGRESS NOTES
"Chief Complaint   Patient presents with    Follow-Up     09/28/22 guadalupe    Results     SS    Apnea       HPI:  Shaista Bocanegra is a 78 y.o. year old female here today for follow-up on obstructive sleep apnea.  Patient also followed by pulmonary clinic with patient last seen by Dr. Nissa Lambert 1/27/2023 for lung nodules.  Last seen for her LALY 9/28/2022 by IGNACIO Bush.  Patient is a never smoker.  She is accompanied by a caregiver from the assisted living facility where she resides.    Past Medical History: Atrial fibrillation, breast cancer 2001 right breast, vascular dementia, seasonal allergic rhinitis, CVA, dysphagia, seizure, hypertension, mitral valve repair, anemia, recurrent UTI.    Vitals:  /78   Pulse 85   Resp 16   Ht 1.676 m (5' 6\")   Wt 70.3 kg (155 lb)   SpO2 94%  BMI 25 kg/m²    Recent Imaging: Echocardiogram obtained 4/4/2023 demonstrating normal ventricular chamber size, wall thickness, systolic function.  LVEF estimate 60%.  Indeterminate diastolic function due to mitral valve disease, normal right ventricular size and systolic function, known mitral valve repair functioning normally, trace tricuspid regurgitation, unable to estimate RVSP due to inadequate tricuspid regurgitant jet.    Currently using  [employer]  CPAP @7 cm H20 pressure; compliance reviewed for 8/29/2023 through 9/27/2023, days used 24/30, average daily usage 4 hours 13 minutes, 43% of days greater than or equal to 4 hours, mask leak at 2 minutes 32 seconds, AHI 4.1 per hour.  See media for full report.    Device obtained as a replacement unit from medical recall July 2022.  DME provider [facility]  Mask interface nasal mask.    Reviewed sleep study results from 3/31/2023 completed on oral appliance demonstrating overall AHI of 6.6 with mild nocturnal hypoxia, sats less than or equal to 88% for 22.8 minutes.  Update titration study given patient's frequent awakenings/tolerance issues on CPAP " Requested medication(s) are due for refill today: Yes  Patient has already received a courtesy refill: No  Other reason request has been forwarded to provider: therapy.      Overnight oximetry on 9/24/2022 with CPAP at 7 demonstrated low O2 sat of 86% with sats less than or equal to 88% for 1.1 minutes.    Home sleep study completed 7/27/2022 with oral mandibular advancement device demonstrated SHERRILL of 5.3 events per hour increasing to 6.4 in supine position.  Low O2 sat of 74% with sats less than or equal to 88% for 8% of flow evaluation time or 50 min.  Titration study versus auto CPAP trial was recommended.    Sleep schedule goes to bed 11 PM, wakens 5-6 AM , and gets up during the night frequently.  Was previously using a dental appliance however this demonstrated lack of adequate control of her sleep apnea and hypoxia.   Symptoms  frequent awakenings, taking Tylenol PM in an attempt to maintain sleep.     [place] Sleepiness Scale reported as 7/24 on 3/27/2020          Review of Systems   Constitutional:  Positive for malaise/fatigue (mild). Negative for chills, fever and weight loss.   HENT:  Positive for hearing loss. Negative for congestion, nosebleeds, sinus pain, sore throat and tinnitus.    Eyes:         Presc glasses   Respiratory:  Negative for cough, sputum production, shortness of breath and wheezing.    Cardiovascular:  Negative for chest pain, palpitations, orthopnea and leg swelling.   Gastrointestinal:  Negative for heartburn.        No dentures, no swallowing  issues   Neurological:  Positive for dizziness (positional change). Negative for tremors and headaches.   Psychiatric/Behavioral:  The patient has insomnia (takes tylenol pm).        Past Medical History:   Diagnosis Date    Acquired circulating anticoagulants (HCC)     Anemia 09/28/2021    Arthritis     toe    Atrial fibrillation (HCC)     Benign essential HTN 03/19/2012    Breast cancer (HCC)     Cancer (HCC) 1998    breast     Cardiac arrhythmia     Chest tightness or pressure 03/19/2012    Chickenpox     Coronary heart disease     DCM (dilated cardiomyopathy) (HCC) 8/24/2022    [place] measles      Gynecological disorder     High cholesterol     High risk medication use 03/19/2012    Hypercholesterolemia 03/19/2012    Mumps     MVP (mitral valve prolapse) 03/19/2012    Osteoporosis     Seasonal allergic rhinitis due to pollen 4/26/2023    Seizure disorder (HCC) 09/28/2021    last seizure may 2021    Sleep apnea     CPAP at night    Snoring     Stroke ([facility]) 2017    residual minor weakness on left side    Substance abuse ([facility])     Tonsillitis     Urinary bladder disorder     OAB    Urinary frequency 4/26/2023    Urinary incontinence     Valvular heart disease     Venereal disease        Past Surgical History:   Procedure Laterality Date    CATARACT PHACO WITH IOL  3/16/2009    Performed by SHANNON GANDARA [facility]    CATARACT PHACO WITH IOL  1/26/2009    Performed by SHANNON GANDARA at [facility]    BREAST BIOPSY      HYSTERECTOMY LAPAROSCOPY      LUMPECTOMY      SD CHEMOTHERAPY, UNSPECIFIED PROCEDURE      SD RADIATION THERAPY PLAN SIMPLE      SD REMV 2ND CATARACT,CORN-SCLER SECTN      PRIMARY C SECTION      SINUSCOPE      TONSILLECTOMY         Family History   Problem Relation Age of Onset    Cancer Mother         breast    No Known Problems Brother     Heart Failure Neg Hx     Heart Disease Neg Hx        Social History     Socioeconomic History    Marital status:      Spouse name: Not on file    Number of children: 2    Years of education: Not on file    Highest education level: Professional school degree (e.g., MD, DDS, DVM, ARCAELI)   Occupational History    Occupation:      Employer: Not Employed   Tobacco Use    Smoking status: Never     Passive exposure: Never    Smokeless tobacco: Never   Vaping Use    Vaping Use: Never used   Substance and Sexual Activity    Alcohol use: Yes     Alcohol/week: 1.2 oz     Types: 2 Glasses of wine per week     Comment: twice a week    Drug use: No    Sexual activity: Yes     Partners: Male     Birth  control/protection: Female Sterilization   Other Topics Concern    Not on file   Social History Narrative    Not on file     Social Determinants of Health     Financial Resource Strain: Low Risk  (1/26/2022)    Overall Financial Resource Strain (CARDIA)     Difficulty of Paying Living Expenses: Not hard at all   Food Insecurity: No Food Insecurity (1/26/2022)    Hunger Vital Sign     Worried About Running Out of Food in the Last Year: Never true     Ran Out of Food in the Last Year: Never true   Transportation Needs: No Transportation Needs (1/26/2022)    PRAPARE - Transportation     Lack of Transportation (Medical): No     Lack of Transportation (Non-Medical): No   Physical Activity: Insufficiently Active (1/26/2022)    Exercise Vital Sign     Days of Exercise per Week: 4 days     Minutes of Exercise per Session: 30 min   Stress: Not on file   Social Connections: Socially Isolated (1/26/2022)    Social Connection and Isolation Panel [NHANES]     Frequency of Communication with Friends and Family: More than three times a week     Frequency of Social Gatherings with Friends and Family: Twice a week     Attends Evangelical Services: Never     Active Member of Clubs or Organizations: No     Attends Club or Organization Meetings: Never     Marital Status:    Intimate Partner Violence: Not on file   Housing Stability: Low Risk  (1/26/2022)    Housing Stability Vital Sign     Unable to Pay for Housing in the Last Year: No     Number of Places Lived in the Last Year: 1     Unstable Housing in the Last Year: No       Allergies as of 09/29/2023    (No Known Allergies)          Current medications as of today   Current Outpatient Medications   Medication Sig Dispense Refill    Mirabegron ER (MYRBETRIQ) 50 MG TABLET SR 24 HR Take 50 mg by mouth every day. FOR OVERACTIVE BLADDER. 90 Tablet 3    spironolactone (ALDACTONE) 25 MG Tab TAKE 1 TABLET BY MOUTH EVERY DAY 90 Tablet 3    donepezil (ARICEPT) 5 MG Tab Take 5 mg by  mouth every evening.      cyanocobalamin (VITAMIN B12) 1000 MCG Tab Take 1,000 mcg by mouth every day.      estradiol (ESTRACE) 0.1 MG/GM vaginal cream Insert 0.1 g into the vagina 1 time a day as needed.      Multiple Vitamins-Minerals (MULTIVITAMIN WOMEN 50+ PO) Take 1 Tablet by mouth every day.      apixaban (ELIQUIS) 5mg Tab Take 1 Tablet by mouth 2 times a day. 180 Tablet 3    atorvastatin (LIPITOR) 80 MG tablet Take 1 Tablet by mouth every evening. 90 Tablet 3    tamsulosin (FLOMAX) 0.4 MG capsule Take 0.4 mg by mouth every day.       No current facility-administered medications for this visit.         Physical Exam:   Gen:           Alert and oriented, No apparent distress. Mood and affect appropriate, normal interaction with examiner.   Hearing:     Grossly intact.  Nose:          Normal, no lesions or deformities.  Dentition:    fair dentition.   Oropharynx:   Tongue normal, posterior pharynx without erythema or exudate.  Mallampati Classification: III  Neck:        Supple, trachea midline, no masses.  Respiratory Effort: No intercostal retractions or use of accessory muscles.   Gait and Station: Mild alteration  Digits and Nails: No clubbing, cyanosis, petechiae, or nodes.   Skin:        No rashes, lesions or ulcers noted.               Ext:           No cyanosis or edema.      Immunizations:  Flu: 8/24/2022  Pneumovax 23: 6/8/2015, 1/1/2008  Prevnar 13: 12/21/2021, 11/3/2016  Moderna SARS CoV2 Vaccine: 1/6/2022, 2/22/2021, 1/23/2021    Assessment / Plan:  1. LALY (obstructive sleep apnea)  - Polysomnography Titration    Patient was using a mandibular device with residual sleep apnea and nocturnal hypoxia.  She resume CPAP use but is experiencing frequent awakenings despite taking Tylenol PM, concerns regarding effectiveness of therapy and nocturnal hypoxia.  Titration study ordered for Medicare requirements for supplemental O2.  We will contact patient with results and send appropriate orders as indicated.   Will need follow-up in 3 months.  May need follow-up overnight oximetry to assure adequate sats with bleed in O2 with indicated.    2. Nocturnal hypoxia  - Polysomnography Titration          Follow-up:   Return in about 3 months (around 12/29/2023) for Return with Laney Hand PA-C.    Please note that this dictation was created using voice recognition software. I have made every reasonable attempt to correct obvious errors, but it is possible there are errors of grammar and possibly content that I did not discover before finalizing the note.

## 2023-11-01 NOTE — PROCEDURE: LIQUID NITROGEN
Duration Of Freeze Thaw-Cycle (Seconds): 0
Post-Care Instructions: I reviewed with the patient in detail post-care instructions. Patient is to wear sunprotection, and avoid picking at any of the treated lesions. Pt may apply Vaseline to crusted or scabbing areas.
Detail Level: Detailed
Render Post-Care Instructions In Note?: no
Consent: The patient's consent was obtained including but not limited to risks of crusting, scabbing, blistering, scarring, darker or lighter pigmentary change, recurrence, incomplete removal and infection.
at times/confused

## 2023-11-20 ENCOUNTER — TELEPHONE (OUTPATIENT)
Dept: MEDICAL GROUP | Facility: MEDICAL CENTER | Age: 78
End: 2023-11-20

## 2023-11-23 DIAGNOSIS — E78.49 OTHER HYPERLIPIDEMIA: ICD-10-CM

## 2023-11-27 ENCOUNTER — TELEPHONE (OUTPATIENT)
Dept: SLEEP MEDICINE | Facility: MEDICAL CENTER | Age: 78
End: 2023-11-27

## 2023-11-27 RX ORDER — ATORVASTATIN CALCIUM 80 MG/1
80 TABLET, FILM COATED ORAL EVERY EVENING
Qty: 90 TABLET | Refills: 2 | Status: SHIPPED | OUTPATIENT
Start: 2023-11-27

## 2023-11-27 NOTE — TELEPHONE ENCOUNTER
Patient spoke with front office staff, Karrie , requesting a order for her travel device. Patient stated that device has already been paid for but she is unable to pick it up until she brings in RX for device.     I informed Karrie to inform patient her message will be forward to provider and that she may need to wait until after patient completes Titration SS scheduled on 11/29/2023, patient also has scheduled appt on 12/27/2023 with LILI Hand PA-C    Last Seen 09/29/2023 with LILI Hand PA-C

## 2023-11-27 NOTE — TELEPHONE ENCOUNTER
Is the patient due for a refill? Yes    Was the patient seen the past year? Yes    Date of last office visit: 5/22/2023    Does the patient have an upcoming appointment?  Yes   If yes, When? 5/20/2024    Provider to refill:LILI    Does the patients insurance require a 100 day supply?  No

## 2023-11-28 NOTE — PROCEDURE: DIAGNOSIS COMMENT
No
Comment: History of skin cancers
Detail Level: Simple
Comment: See photo 8/12/19
Detail Level: Detailed

## 2023-11-29 ENCOUNTER — SLEEP STUDY (OUTPATIENT)
Dept: SLEEP MEDICINE | Facility: MEDICAL CENTER | Age: 78
End: 2023-11-29
Attending: PHYSICIAN ASSISTANT
Payer: MEDICARE

## 2023-11-29 ENCOUNTER — PATIENT MESSAGE (OUTPATIENT)
Dept: HEALTH INFORMATION MANAGEMENT | Facility: OTHER | Age: 78
End: 2023-11-29

## 2023-11-29 DIAGNOSIS — G47.33 OSA (OBSTRUCTIVE SLEEP APNEA): ICD-10-CM

## 2023-11-29 DIAGNOSIS — G47.34 NOCTURNAL HYPOXIA: ICD-10-CM

## 2023-11-29 PROBLEM — Z85.3 HISTORY OF BREAST CANCER: Status: ACTIVE | Noted: 2021-09-29

## 2023-11-29 PROCEDURE — 95811 POLYSOM 6/>YRS CPAP 4/> PARM: CPT | Performed by: INTERNAL MEDICINE

## 2023-11-30 NOTE — PROCEDURES
Patient: HORACE GUZMAN  ID: [id number] Date: 11/29/2023 Exam No.:   MONTAGE: Standard  STUDY TYPE: Treatment  RECORDING TECHNIQUE:   After the scalp was prepared, gold plated electrodes were applied to the scalp according to the International 10-20 System. EEG (electroencephalogram) was continuously monitored from the O1-M2, O2-M1, C3-M2, C4-M1, F3-M2, and F4-M1. EOGs (electrooculograms) were monitored by electrodes placed at the left and right outer canthi. Chin EMG (electromyogram) was monitored by electrodes placed on the mentalis and sub-mentalis muscles. Nasal and oral airflow were monitored using a triple port thermocouple as well as oronasal pressure transducer. Respiratory effort was measured by inductive plethysmography technology employing abdominal and thoracic belts. Blood oxygen saturation and pulse were monitored by pulse oximetry. Heart rhythm was monitored by surface electrocardiogram. Leg EMG was studied using surface electrodes placed on left and right anterior tibialis. A microphone was used to monitor tracheal sounds and snoring. Body position was monitored and documented by technician observation.   SCORING CRITERIA:   A modification of the AASM manual for scoring of sleep and associated events was used. Obstructive apneas were scored by cessation of airflow for at least 10 seconds with continuing respiratory effort. Central apneas were scored by cessation of airflow for at least 10 seconds with no respiratory effort. Hypopneas were scored by a 30% or more reduction in airflow for at least 10 seconds accompanied by arterial oxygen desaturation of 3% or an arousal. For CMS (Medicare) patients, per AASM rule 1B, hypopneas are scored by 30% with mild reduction in airflow for at least 10 seconds accompanied by arterial saturation decreased at 4%.  Study start time was 10:48:03 PM. Diagnostic recording time was 7h 19.5m with a total sleep time of 5h 12.5m resulting in a sleep efficiency of 71.10%%. Sleep  latency from the start of the study was 06 minutes and the latency from sleep to REM was 60 minutes. In total,32 arousals were scored for an arousal index of 6.1.  Respiratory:  There were a total of 0 apneas consisting of 0 obstructive apneas, 0 mixed apneas, and 0 central apneas. A total of 11 hypopneas were scored. The apnea index was 0.00 per hour and the hypopnea index was 2.11 per hour resulting in an overall AHI of 2.11. AHI during REM was 4.2 and AHI while supine was 2.08.  Oximetry:  There was a mean oxygen saturation of 93.0%. The minimum oxygen saturation in NREM was 88.0 % and in REM was 85.0%. The patient spent 11.8 minutes of TST with SaO2 <88%.  Cardiac:  The highest heart rate seen while awake was 91 BPM while the highest heart rate during sleep was 78 BPM with an average sleeping heart rate of 71 BPM.  Limb Movements:  There were a total of 285 PLMs during sleep which resulted in a PLMS index of 54.7. Of these, 23 were associated with arousals which resulted in a PLMS arousal index of 4.4.  Titration:   CPAP was tried from 5 to 9cm H2O. O2 was tried from 1-2Lpm.  The apnea-hypopnea index normalized on CPAP pressures of 7-8 CWP which included some supine REM sleep.  Supplemental oxygen was necessary to raise the sats to an acceptable level.  This was a fully attended sleep study. This test was technically adequate.  The patient used a small DreamWear mask and heated humidification  .    ASSESSMENT:    Excellent CPAP titration to 7-8 CWP with normalized apnea hypopnea indices and normalized mean saturations (with bleed-in oxygen 1-2 LPM.)            RECOMMENDATION:    Recommend auto titrating CPAP 6-10 CWP and O2 2 LPM using a small DreamWear mask and heated humidification followed by clinical and compliance review 31 to 90 days after she receives her equipment.          NOTES:     The AHI is the number of apneas (cessation of airflow for 10 seconds or longer) and hypopneas (shallow breaths  accompanied by at least a 3% drop in the oxygen saturation) that occur per hour. Less than 5 per hour is normal, 5-15 per hour is mild sleep apnea, 15-30 per hour moderate sleep apnea, and greater than 30 per hour severe sleep apnea.    Patients with sleep apnea are at increased risk of cardiovascular disease including systemic arterial hypertension, pulmonary arterial hypertension, (transient or fixed), transient ischemic attacks, and an elevated risk of stroke, heart attack, sudden death, or arrhythmia between the hours of 11 PM and 6 AM.  Sleep apnea patients have an increased risk of motor vehicle accidents, type 2 diabetes, gastroesophageal reflux, repetitive mechanical trauma to pharyngeal muscles with inflammation and denervation, frequent EEG arousals leading to nonrestorative sleep, excessive daytime somnolence, fatigue, depression, poor pain control, irritability, and a lower quality of life.    Dr. Shamar Heaton MD

## 2023-12-27 ENCOUNTER — OFFICE VISIT (OUTPATIENT)
Dept: SLEEP MEDICINE | Facility: MEDICAL CENTER | Age: 78
End: 2023-12-27
Attending: PHYSICIAN ASSISTANT
Payer: MEDICARE

## 2023-12-27 VITALS
HEART RATE: 85 BPM | BODY MASS INDEX: 25.23 KG/M2 | RESPIRATION RATE: 16 BRPM | WEIGHT: 157 LBS | OXYGEN SATURATION: 96 % | SYSTOLIC BLOOD PRESSURE: 122 MMHG | DIASTOLIC BLOOD PRESSURE: 80 MMHG | HEIGHT: 66 IN

## 2023-12-27 DIAGNOSIS — G47.33 OSA (OBSTRUCTIVE SLEEP APNEA): ICD-10-CM

## 2023-12-27 PROCEDURE — 3079F DIAST BP 80-89 MM HG: CPT | Performed by: PHYSICIAN ASSISTANT

## 2023-12-27 PROCEDURE — 3074F SYST BP LT 130 MM HG: CPT | Performed by: PHYSICIAN ASSISTANT

## 2023-12-27 PROCEDURE — 99213 OFFICE O/P EST LOW 20 MIN: CPT | Performed by: PHYSICIAN ASSISTANT

## 2023-12-27 PROCEDURE — 99212 OFFICE O/P EST SF 10 MIN: CPT | Performed by: PHYSICIAN ASSISTANT

## 2023-12-27 ASSESSMENT — ENCOUNTER SYMPTOMS
SHORTNESS OF BREATH: 0
PALPITATIONS: 0
SPUTUM PRODUCTION: 0
INSOMNIA: 1
DIZZINESS: 0
WEIGHT LOSS: 0
HEADACHES: 0
ROS GI COMMENTS: NO DENTURES, NO MISSING TEETH, SOME SWALLOWING ISSUES
CHILLS: 0
HEARTBURN: 0
SORE THROAT: 0
TREMORS: 0
WHEEZING: 0
ORTHOPNEA: 0
FEVER: 0
SINUS PAIN: 0
COUGH: 0

## 2023-12-27 ASSESSMENT — FIBROSIS 4 INDEX: FIB4 SCORE: 1.26

## 2023-12-27 NOTE — PATIENT INSTRUCTIONS
1-order provided for travel cpap unit, copy to patient  2-reviewed compliance   Work on increased daily use, therapeutic is 6-6.5 hours  3-sleep onset and maintenance issues   Trial extended release melatonin at 5 mg   Using low dose generic unisom as well  4-will send updated mask and supply order to [facility]  5-reviewed sleep study results  6-increase pressure on cpap from 7 TO 8  7-will need oxygen bleed in at 2L   8-follow up in 3 months

## 2023-12-27 NOTE — PROGRESS NOTES
"Chief Complaint   Patient presents with    Follow-Up    Apnea       HPI:  Shaista Bocanegra is a 78 y.o. year old female here today for follow-up on obstructive sleep apnea.  Last seen in clinic 9/29/2023 by Laney Hand PA-C.  Patient is also followed by pulmonary clinic for lung nodules.  Patient is again accompanied by a caregiver from her assisted living facility where she resides.    Past Medical History:  Atrial fibrillation, breast cancer 2001 right breast, vascular dementia, seasonal allergic rhinitis, CVA, dysphagia, seizure, hypertension, mitral valve repair, anemia, recurrent UTI.     Vitals:  /80   Pulse 85   Resp 16   Ht 1.676 m (5' 6\")   Wt 71.2 kg (157 lb)   SpO2 96% BMI of 25.34 kg/m².    Recent Imaging: Echocardiogram obtained 4/4/2023 demonstrating normal ventricular chamber size, wall thickness, systolic function. LVEF estimate 60%. Indeterminate diastolic function due to mitral valve disease, normal right ventricular size and systolic function, known mitral valve repair functioning normally, trace tricuspid regurgitation, unable to estimate RVSP due to inadequate tricuspid regurgitant jet.     Currently using  [employer] RespirSnip2Codes  CPAP @ 7 cm H20 pressure; compliance reviewed for 11/27/2023-12/26/2023, days used 21/30, average daily usage 3 hours 35 min, 26.7% of days greater than or equal to 4 hours, significant mask leak at  1 min per day, AHI 5.7 per hour.See media for full report     Device obtained obtained as a replacement unit from medical recall July 2022  DME provider [facility]  Mask interface nasal mask    Reviewed titration sleep study results obtained 11/29/2023 demonstrating successful titration to 7-8 cm water pressure with need for bleed in O2 at 2 L.  Recommend auto CPAP of 6-10 versus CPAP pressures increased to 8 cm water pressure which we completed in clinic.    Reviewed sleep study results from 3/31/2023 completed on oral appliance demonstrating overall " AHI of 6.6 with mild nocturnal hypoxia, sats less than or equal to 88% for 22.8 minutes.  Update titration study given patient's frequent awakenings/tolerance issues on CPAP therapy.       Overnight oximetry on 9/24/2022 with CPAP at 7 demonstrated low O2 sat of 86% with sats less than or equal to 88% for 1.1 minutes.     Home sleep study completed 7/27/2022 with oral mandibular advancement device demonstrated SHERRILL of 5.3 events per hour increasing to 6.4 in supine position.  Low O2 sat of 74% with sats less than or equal to 88% for 8% of flow evaluation time or 50 min.  Titration study versus auto CPAP trial was recommended.       Sleep schedule goes to bed 10 PM and reports does not fall asleep until 1 AM, wakens at 5 or 6 AM but stays in bed until 8 AM    Symptoms  include frequent awakening, consider Unisom or generic versus Tylenol PM which she is currently taking.     [place] Sleepiness Scale reported as 7/24 on 3/27/2020.        Review of Systems   Constitutional:  Negative for chills, fever, malaise/fatigue and weight loss.   HENT:  Positive for hearing loss. Negative for congestion, nosebleeds, sinus pain, sore throat and tinnitus.    Eyes:         Presc glasses   Respiratory:  Negative for cough, sputum production, shortness of breath and wheezing.    Cardiovascular:  Negative for chest pain, palpitations, orthopnea and leg swelling.   Gastrointestinal:  Negative for heartburn.        No dentures, no missing teeth, some swallowing issues    Neurological:  Negative for dizziness, tremors and headaches.   Psychiatric/Behavioral:  The patient has insomnia (falling asleep).        Past Medical History:   Diagnosis Date    Acquired circulating anticoagulants (HCC)     Anemia 09/28/2021    Arthritis     toe    Atrial fibrillation (HCC)     Benign essential HTN 03/19/2012    Breast cancer (HCC)     Cancer (HCC) 1998    breast     Cardiac arrhythmia     Chest tightness or pressure 03/19/2012    Chickenpox      Coronary heart disease     DCM (dilated cardiomyopathy) ([facility]) 8/24/2022    Malay measles     Gynecological disorder     High cholesterol     High risk medication use 03/19/2012    Hypercholesterolemia 03/19/2012    Mumps     MVP (mitral valve prolapse) 03/19/2012    Osteoporosis     Seasonal allergic rhinitis due to pollen 4/26/2023    Seizure disorder ([facility]) 09/28/2021    last seizure may 2021    Sleep apnea     CPAP at night    Snoring     Stroke ([facility]) 2017    residual minor weakness on left side    Substance abuse ([facility])     Tonsillitis     Urinary bladder disorder     OAB    Urinary frequency 4/26/2023    Urinary incontinence     Valvular heart disease     Venereal disease        Past Surgical History:   Procedure Laterality Date    CATARACT PHACO WITH IOL  3/16/2009    Performed by SHANNON GANDARA at [facility]    CATARACT PHACO WITH IOL  1/26/2009    Performed by SHANNON GANDARA at [facility]    BREAST BIOPSY      HYSTERECTOMY LAPAROSCOPY      LUMPECTOMY      FL CHEMOTHERAPY, UNSPECIFIED PROCEDURE      FL RADIATION THERAPY PLAN SIMPLE      FL REMV 2ND CATARACT,CORN-SCLER SECTN      PRIMARY C SECTION      SINUSCOPE      TONSILLECTOMY         Family History   Problem Relation Age of Onset    Cancer Mother         breast    No Known Problems Brother     Heart Failure Neg Hx     Heart Disease Neg Hx        Social History     Socioeconomic History    Marital status:      Spouse name: Not on file    Number of children: 2    Years of education: Not on file    Highest education level: Professional school degree (e.g., MD, DDS, DVM, ARACELI)   Occupational History    Occupation:      Employer: Not Employed   Tobacco Use    Smoking status: Never     Passive exposure: Never    Smokeless tobacco: Never   Vaping Use    Vaping Use: Never used   Substance and Sexual Activity    Alcohol use: Yes     Alcohol/week: 1.2 oz     Types: 2 Glasses of wine per week     Comment: twice a  week    Drug use: No    Sexual activity: Yes     Partners: Male     Birth control/protection: Female Sterilization   Other Topics Concern    Not on file   Social History Narrative    Not on file     Social Determinants of Health     Financial Resource Strain: Low Risk  (1/26/2022)    Overall Financial Resource Strain (CARDIA)     Difficulty of Paying Living Expenses: Not hard at all   Food Insecurity: No Food Insecurity (1/26/2022)    Hunger Vital Sign     Worried About Running Out of Food in the Last Year: Never true     Ran Out of Food in the Last Year: Never true   Transportation Needs: No Transportation Needs (1/26/2022)    PRAPARE - Transportation     Lack of Transportation (Medical): No     Lack of Transportation (Non-Medical): No   Physical Activity: Insufficiently Active (1/26/2022)    Exercise Vital Sign     Days of Exercise per Week: 4 days     Minutes of Exercise per Session: 30 min   Stress: Not on file   Social Connections: Socially Isolated (1/26/2022)    Social Connection and Isolation Panel [NHANES]     Frequency of Communication with Friends and Family: More than three times a week     Frequency of Social Gatherings with Friends and Family: Twice a week     Attends Oriental orthodox Services: Never     Active Member of Clubs or Organizations: No     Attends Club or Organization Meetings: Never     Marital Status:    Intimate Partner Violence: Not on file   Housing Stability: Low Risk  (1/26/2022)    Housing Stability Vital Sign     Unable to Pay for Housing in the Last Year: No     Number of Places Lived in the Last Year: 1     Unstable Housing in the Last Year: No       Allergies as of 12/27/2023    (No Known Allergies)          Current medications as of today   Current Outpatient Medications   Medication Sig Dispense Refill    atorvastatin (LIPITOR) 80 MG tablet TAKE 1 TABLET BY MOUTH EVERY DAY IN THE EVENING 90 Tablet 2    Mirabegron ER (MYRBETRIQ) 50 MG TABLET SR 24 HR Take 50 mg by mouth every  day. FOR OVERACTIVE BLADDER. 90 Tablet 3    spironolactone (ALDACTONE) 25 MG Tab TAKE 1 TABLET BY MOUTH EVERY DAY 90 Tablet 3    donepezil (ARICEPT) 5 MG Tab Take 5 mg by mouth every evening.      cyanocobalamin (VITAMIN B12) 1000 MCG Tab Take 1,000 mcg by mouth every day.      estradiol (ESTRACE) 0.1 MG/GM vaginal cream Insert 0.1 g into the vagina 1 time a day as needed.      Multiple Vitamins-Minerals (MULTIVITAMIN WOMEN 50+ PO) Take 1 Tablet by mouth every day.      apixaban (ELIQUIS) 5mg Tab Take 1 Tablet by mouth 2 times a day. 180 Tablet 3    tamsulosin (FLOMAX) 0.4 MG capsule Take 0.4 mg by mouth every day.       No current facility-administered medications for this visit.         Physical Exam:   Gen:           Alert and oriented, No apparent distress. Mood and affect appropriate, normal interaction with examiner.   Hearing:     Grossly intact.  Nose:          Normal, no lesions or deformities.  Dentition:    fair dentition.   Oropharynx:   Tongue normal, posterior pharynx without erythema or exudate.  Mallampati Classification: III  Neck:        Supple, trachea midline, no masses.  Respiratory Effort: No intercostal retractions or use of accessory muscles.   Gait and Station: Normal.  Digits and Nails: No clubbing, cyanosis, petechiae, or nodes.   Skin:        No rashes, lesions or ulcers noted.               Ext:           No cyanosis or edema.      Immunizations:  Flu: Recommended  Pneumovax 23: 6/8/2015  Prevnar 13: 12/21/2021, 11/3/2016  Moderna SARS CoV2 Vaccine: 1/6/2022, 2/22/2021, 1/23/2021    Assessment / Plan:  1. LALY (obstructive sleep apnea)  - DME CPAP    Patient is requesting order for travel CPAP unit, she has apparently already ordered and paid for this but requires a provider order to have released to her.  Order placed and copy provided to patient as requested.  [facility] mask [facility]  Reviewed sleep study results, will increase pressure on CPAP from 7-8.   Will need O2 bleed in at 2 L.  Order placed for O2 to [facility] as well.  Patient to follow-up in 3 months sooner if needed.  Reviewed sleep onset and maintenance issues with recommendation for patient to trial extended release melatonin at 5 mg and consider Unisom or generic rather than Tylenol PM.  Okay      Follow-up:   Return in about 3 months (around 3/27/2024) for Return with Laney Hand PA-C.    Please note that this dictation was created using voice recognition software. I have made every reasonable attempt to correct obvious errors, but it is possible there are errors of grammar and possibly content that I did not discover before finalizing the note.

## 2024-01-16 ENCOUNTER — HOSPITAL ENCOUNTER (EMERGENCY)
Facility: MEDICAL CENTER | Age: 79
End: 2024-01-16
Attending: EMERGENCY MEDICINE
Payer: MEDICARE

## 2024-01-16 ENCOUNTER — APPOINTMENT (OUTPATIENT)
Dept: RADIOLOGY | Facility: MEDICAL CENTER | Age: 79
End: 2024-01-16
Attending: EMERGENCY MEDICINE
Payer: MEDICARE

## 2024-01-16 VITALS
HEIGHT: 66 IN | SYSTOLIC BLOOD PRESSURE: 126 MMHG | HEART RATE: 71 BPM | DIASTOLIC BLOOD PRESSURE: 76 MMHG | BODY MASS INDEX: 24.91 KG/M2 | OXYGEN SATURATION: 92 % | WEIGHT: 155 LBS | TEMPERATURE: 98 F | RESPIRATION RATE: 19 BRPM

## 2024-01-16 DIAGNOSIS — R55 NEAR SYNCOPE: ICD-10-CM

## 2024-01-16 LAB
ACANTHOCYTES BLD QL SMEAR: NORMAL
ALBUMIN SERPL BCP-MCNC: 3.2 G/DL (ref 3.2–4.9)
ALBUMIN/GLOB SERPL: 1.1 G/DL
ALP SERPL-CCNC: 54 U/L (ref 30–99)
ALT SERPL-CCNC: 16 U/L (ref 2–50)
ANION GAP SERPL CALC-SCNC: 9 MMOL/L (ref 7–16)
ANISOCYTOSIS BLD QL SMEAR: ABNORMAL
AST SERPL-CCNC: 16 U/L (ref 12–45)
BASOPHILS # BLD AUTO: 0 % (ref 0–1.8)
BASOPHILS # BLD: 0 K/UL (ref 0–0.12)
BILIRUB SERPL-MCNC: 0.3 MG/DL (ref 0.1–1.5)
BUN SERPL-MCNC: 18 MG/DL (ref 8–22)
BURR CELLS BLD QL SMEAR: NORMAL
CALCIUM ALBUM COR SERPL-MCNC: 8.6 MG/DL (ref 8.5–10.5)
CALCIUM SERPL-MCNC: 8 MG/DL (ref 8.5–10.5)
CHLORIDE SERPL-SCNC: 107 MMOL/L (ref 96–112)
CO2 SERPL-SCNC: 22 MMOL/L (ref 20–33)
CREAT SERPL-MCNC: 0.91 MG/DL (ref 0.5–1.4)
EKG IMPRESSION: NORMAL
EOSINOPHIL # BLD AUTO: 0 K/UL (ref 0–0.51)
EOSINOPHIL NFR BLD: 0 % (ref 0–6.9)
ERYTHROCYTE [DISTWIDTH] IN BLOOD BY AUTOMATED COUNT: 52.6 FL (ref 35.9–50)
GFR SERPLBLD CREATININE-BSD FMLA CKD-EPI: 63 ML/MIN/1.73 M 2
GLOBULIN SER CALC-MCNC: 2.9 G/DL (ref 1.9–3.5)
GLUCOSE SERPL-MCNC: 122 MG/DL (ref 65–99)
HCT VFR BLD AUTO: 37 % (ref 37–47)
HGB BLD-MCNC: 12.4 G/DL (ref 12–16)
LG PLATELETS BLD QL SMEAR: NORMAL
LYMPHOCYTES # BLD AUTO: 0.86 K/UL (ref 1–4.8)
LYMPHOCYTES NFR BLD: 14.8 % (ref 22–41)
MACROCYTES BLD QL SMEAR: ABNORMAL
MANUAL DIFF BLD: NORMAL
MCH RBC QN AUTO: 33.7 PG (ref 27–33)
MCHC RBC AUTO-ENTMCNC: 33.5 G/DL (ref 32.2–35.5)
MCV RBC AUTO: 100.5 FL (ref 81.4–97.8)
METAMYELOCYTES NFR BLD MANUAL: 0.9 %
MONOCYTES # BLD AUTO: 0.25 K/UL (ref 0–0.85)
MONOCYTES NFR BLD AUTO: 4.3 % (ref 0–13.4)
MORPHOLOGY BLD-IMP: NORMAL
NEUTROPHILS # BLD AUTO: 4.64 K/UL (ref 1.82–7.42)
NEUTROPHILS NFR BLD: 72.2 % (ref 44–72)
NEUTS BAND NFR BLD MANUAL: 7.8 % (ref 0–10)
NRBC # BLD AUTO: 0 K/UL
NRBC BLD-RTO: 0 /100 WBC (ref 0–0.2)
OVALOCYTES BLD QL SMEAR: NORMAL
PLATELET # BLD AUTO: 166 K/UL (ref 164–446)
PLATELET BLD QL SMEAR: NORMAL
PMV BLD AUTO: 9.7 FL (ref 9–12.9)
POIKILOCYTOSIS BLD QL SMEAR: NORMAL
POTASSIUM SERPL-SCNC: 4.2 MMOL/L (ref 3.6–5.5)
PROT SERPL-MCNC: 6.1 G/DL (ref 6–8.2)
RBC # BLD AUTO: 3.68 M/UL (ref 4.2–5.4)
RBC BLD AUTO: PRESENT
SODIUM SERPL-SCNC: 138 MMOL/L (ref 135–145)
TROPONIN T SERPL-MCNC: 10 NG/L (ref 6–19)
WBC # BLD AUTO: 5.8 K/UL (ref 4.8–10.8)

## 2024-01-16 PROCEDURE — 70450 CT HEAD/BRAIN W/O DYE: CPT

## 2024-01-16 PROCEDURE — 93005 ELECTROCARDIOGRAM TRACING: CPT | Performed by: EMERGENCY MEDICINE

## 2024-01-16 PROCEDURE — 700105 HCHG RX REV CODE 258: Performed by: EMERGENCY MEDICINE

## 2024-01-16 PROCEDURE — 99284 EMERGENCY DEPT VISIT MOD MDM: CPT

## 2024-01-16 PROCEDURE — 85007 BL SMEAR W/DIFF WBC COUNT: CPT

## 2024-01-16 PROCEDURE — 84484 ASSAY OF TROPONIN QUANT: CPT

## 2024-01-16 PROCEDURE — 36415 COLL VENOUS BLD VENIPUNCTURE: CPT

## 2024-01-16 PROCEDURE — 80053 COMPREHEN METABOLIC PANEL: CPT

## 2024-01-16 PROCEDURE — 85027 COMPLETE CBC AUTOMATED: CPT

## 2024-01-16 RX ORDER — SODIUM CHLORIDE, SODIUM LACTATE, POTASSIUM CHLORIDE, CALCIUM CHLORIDE 600; 310; 30; 20 MG/100ML; MG/100ML; MG/100ML; MG/100ML
1000 INJECTION, SOLUTION INTRAVENOUS ONCE
Status: COMPLETED | OUTPATIENT
Start: 2024-01-16 | End: 2024-01-16

## 2024-01-16 RX ADMIN — SODIUM CHLORIDE, POTASSIUM CHLORIDE, SODIUM LACTATE AND CALCIUM CHLORIDE 1000 ML: 600; 310; 30; 20 INJECTION, SOLUTION INTRAVENOUS at 14:48

## 2024-01-16 ASSESSMENT — LIFESTYLE VARIABLES: DO YOU DRINK ALCOHOL: NO

## 2024-01-16 ASSESSMENT — FIBROSIS 4 INDEX: FIB4 SCORE: 1.26

## 2024-01-16 NOTE — ED PROVIDER NOTES
CHIEF COMPLAINT  Chief Complaint   Patient presents with    Near Syncopal     BIB EMS from a restaurant. Ate lunch with a friend then suddenly became dizzy and weak, felt like passing out.    On EMS arrival, pt was pale and hypotensive 86/59.   Gave 1L NS and improved BP to 107/65.   B     Has lingering dizziness.     Dizziness       LIMITATION TO HISTORY   Memory issues noted - short term.  On Aricept    HPI    Shaista Bocanegra is a 78 y.o. female has had episodes of dizziness requiring admission to hospital and ER visits.  Both the hospitalization and ER visit referred to the lightheaded dizziness that responded to IV fluids with no acute issues noted    Chief complaint is dizzy  Occurred while she was having lunch  She felt lightheaded.  Associate with being hypotensive  As per the son who came later he stated she was white pale appearing.  Nursing approved IV fluids.  No associated chest pain no associated weakness no associated arthritic dysarthria no side weakness or numbness.    States that she thinks it may have been a letter on the bottle of something to drink she is up with a letter away because she thinks she drank the fluids.    OUTSIDE HISTORIAN(S):  Family was present at the end of discharge.  They asked about IV hydration and states it does happen intermittently.  They have had workup.  Unknown etiology.    EXTERNAL RECORDS REVIEWED       Was in the emergency room on  for dizziness that responded to IV fluids    INITIAL ASSESSMENT AND PLAN     11:22 AM - Patient was evaluated at bedside. Patient presents to the ED for dizziness that started this morning. After my exam, I discussed with the patient the plan of care, which includes obtaining lab work for further evaluation. Patient understands and verbalizes agreement to plan of care. Ordered for CBC w/ Diff, Basic Metabolic Panel and Troponin to evaluate. The patient will be medicated with NS infusion 1000 mL for her symptoms.  Differential diagnoses include but not limited to: arrhyhtmia, anemia, orthostatic hypotension, Dehydration      HYDRATION: Based on the patient's presentation of Other Dizziness the patient was given IV fluids. IV Hydration was used because oral hydration was not adequate alone. Upon recheck following hydration, the patient was improved.      12:45 PM - Patient was reevaluated at bedside. Patient feels improved after fluids. Discussed lab results with the patient and informed them that her results were reassuring.  I then informed the patient of my plan for discharge, which includes strict return precautions for any new or worsening symptoms. Patient understands and verbalizes agreement to plan of care. Patient is comfortable going home at this time.       ED Observation Status? Yes; I am placing the patient in to an observation status due to a diagnostic uncertainty as well as therapeutic intensity. Patient placed in observation status at 11:38 AM, 6/13/2023.      Observation plan is as follows: We will manage thei    Was hospitalized in April 2023 for dizziness.  Risk responded to IV fluids    CHIEF COMPLAINT ON ADMISSION       Chief Complaint   Patient presents with    Lightheadedness       X 3 hours    Dizziness    Weakness    Nausea         Reason for Admission  EMS      Admission Date  4/4/2023     CODE STATUS  Full Code     HPI & HOSPITAL COURSE  Shaista Bocanegra is a 78 y.o. female with vascular dementia, atrial fibrillation, hypertension, hyperlipidemia, history of mitral valve prolapse status post mitral valve replacement, history of stroke with residual left-sided weakness, who was doing well until in the morning of admission at around 9:45 AM while walking when she had acute onset lightheadedness and felt near syncopal.  She had no other complaint such as vertigo, focal weakness or numbness, speech changes.  Fearing that she will fall, she let herself down to the floor but fell anyways hitting her head  on the carpeted floor.  She did not fully lose consciousness.  She tried to crawl to reach the phone and called 911, but shared that she felt lightheaded every time she tries to stand up.  EMS also noted that she became very lightheaded when they tried to stand her up.  Nausea, vomiting, bowel movement changes, abdominal pain.  She did admit that she has not been eating or drinking as much in the past several days.  Otherwise she has no other complaint such as chest pain, shortness of breath. She was then brought to the ED.      On evaluation, vital signs were stable, however she did have orthostatic drop in her systolic blood pressure dropping from 148 to 119 with symptoms.  Labs were remarkable for BUN of 23.  Creatinine was normal.  Troponin was negative.  She had no leukocytosis.  Head CT showed nothing acute.  She was felt to be dehydrated, and was started on IV fluids.  Further work-up was pursued, with bilateral carotid ultrasound which was normal, and echocardiogram showed normal left ventricular systolic function, with known mitral valve repair which is functioning normally with appropriate transvalvular gradient.  Her urinalysis showed 20-50 WBC with moderate leukocyte esterase and positive nitrate, but she denied any urinary symptoms and thus there were no indications for antibiotics.      She clinically improved with IV fluid hydration.  She had no further orthostatic dizziness, and her orthostatic vital signs have normalized.  Her electrolytes and renal function remain normal, and she had no leukocytosis.  Her hemoglobin is stable. I have personally seen and examined the patient on the day of discharge. With her clinical improvement, she was deemed ready to discharge from the hospital as she did not have any further hospitalization needs. Patient felt comfortable going home. The discharge plan was discussed with the patient, with which she was agreeable to.      Therefore, she is discharged in good and  stable condition to home with close outpatient follow-up.        Discharge Date  4/5/2023    REVIEW OF SYSTEMS  No fevers no chills.  No chest pain or shortness of breath    PAST MEDICAL HISTORY  Past Medical History:   Diagnosis Date    Acquired circulating anticoagulants ([facility])     Anemia 09/28/2021    Arthritis     toe    Atrial fibrillation ([facility])     Benign essential HTN 03/19/2012    Breast cancer ([facility])     Cancer ([facility]) 1998    breast     Cardiac arrhythmia     Chest tightness or pressure 03/19/2012    Chickenpox     Coronary heart disease     DCM (dilated cardiomyopathy) ([facility]) 8/24/2022    Iranian measles     Gynecological disorder     High cholesterol     High risk medication use 03/19/2012    Hypercholesterolemia 03/19/2012    Mumps     MVP (mitral valve prolapse) 03/19/2012    Osteoporosis     Seasonal allergic rhinitis due to pollen 4/26/2023    Seizure disorder ([facility]) 09/28/2021    last seizure may 2021    Sleep apnea     CPAP at night    Snoring     Stroke ([facility]) 2017    residual minor weakness on left side    Substance abuse ([facility])     Tonsillitis     Urinary bladder disorder     OAB    Urinary frequency 4/26/2023    Urinary incontinence     Valvular heart disease     Venereal disease        FAMILY HISTORY  Family History   Problem Relation Age of Onset    Cancer Mother         breast    No Known Problems Brother     Heart Failure Neg Hx     Heart Disease Neg Hx        SOCIAL HISTORY  Social History     Tobacco Use    Smoking status: Never     Passive exposure: Never    Smokeless tobacco: Never   Vaping Use    Vaping Use: Never used   Substance Use Topics    Alcohol use: Yes     Alcohol/week: 1.2 oz     Types: 2 Glasses of wine per week     Comment: twice a week    Drug use: No     Social History     Substance and Sexual Activity   Drug Use No       SURGICAL HISTORY  Past Surgical History:   Procedure Laterality Date    CATARACT PHACO WITH IOL  3/16/2009    Performed by SHANNON GANDARA at SURGERY SAME DAY  "[facility]    CATARACT PHACO WITH IOL  1/26/2009    Performed by SHANNON GANDARA at SURGERY SAME DAY [facility]    BREAST BIOPSY      HYSTERECTOMY LAPAROSCOPY      LUMPECTOMY      DE CHEMOTHERAPY, UNSPECIFIED PROCEDURE      DE RADIATION THERAPY PLAN SIMPLE      DE REMV 2ND CATARACT,CORN-SCLER SECTN      PRIMARY C SECTION      SINUSCOPE      TONSILLECTOMY         CURRENT MEDICATIONS  No current facility-administered medications for this encounter.    Current Outpatient Medications:     atorvastatin (LIPITOR) 80 MG tablet, TAKE 1 TABLET BY MOUTH EVERY DAY IN THE EVENING, Disp: 90 Tablet, Rfl: 2    Mirabegron ER (MYRBETRIQ) 50 MG TABLET SR 24 HR, Take 50 mg by mouth every day. FOR OVERACTIVE BLADDER., Disp: 90 Tablet, Rfl: 3    spironolactone (ALDACTONE) 25 MG Tab, TAKE 1 TABLET BY MOUTH EVERY DAY, Disp: 90 Tablet, Rfl: 3    donepezil (ARICEPT) 5 MG Tab, Take 5 mg by mouth every evening., Disp: , Rfl:     cyanocobalamin (VITAMIN B12) 1000 MCG Tab, Take 1,000 mcg by mouth every day., Disp: , Rfl:     estradiol (ESTRACE) 0.1 MG/GM vaginal cream, Insert 0.1 g into the vagina 1 time a day as needed., Disp: , Rfl:     Multiple Vitamins-Minerals (MULTIVITAMIN WOMEN 50+ PO), Take 1 Tablet by mouth every day., Disp: , Rfl:     apixaban (ELIQUIS) 5mg Tab, Take 1 Tablet by mouth 2 times a day., Disp: 180 Tablet, Rfl: 3    tamsulosin (FLOMAX) 0.4 MG capsule, Take 0.4 mg by mouth every day., Disp: , Rfl:     ALLERGIES  No Known Allergies    PHYSICAL EXAM  VITAL SIGNS: BP 99/56   Pulse 73   Temp 36 °C (96.8 °F) (Oral)   Resp 14   Ht 1.676 m (5' 6\")   Wt 70.3 kg (155 lb)   SpO2 96%   BMI 25.02 kg/m²   Reviewed and noted.  Sitting up on the bed the patient had a blood pressure of 107.  Lying down it was 100.  The patient did not have any symptoms.  Standing the patient did not any symptoms either.  Constitutional: Well developed, Well nourished, no acute distress..  HENT: Normocephalic, atraumatic, bilateral external ears " normal, No intraoral erythema, edema, exudate  Eyes: PERRLA, conjunctiva pink, no scleral icterus.   Cardiovascular: Regular rate and rhythm. No murmurs, rubs or gallops.  No dependent edema or calf tenderness  Respiratory: Lungs clear to auscultation bilaterally. No wheezes, rales, or rhonchi.  Abdominal:  Abdomen soft, non-tender, non distended. No rebound, or guarding.    Skin: No erythema, no rash. No wounds or bruising.  Genitourinary: No costovertebral angle tenderness.   Musculoskeletal: no deformities.   Neurologic: Alert, no facial droop noted. All extra ocular muscles intact. Moves all extremities with out weakness noted.  Stroke scale of 0.  She has no facial droop visual fields grossly intact no dysarthria good fit cerebellar finger-nose good strength in all extremities sensation light touch in all extremities.  She is alert and oriented to the month and name.  Psychiatric: Affect normal, Judgment normal, Mood normal.         MEDICAL DECISION MAKING:  PROBLEMS EVALUATED THIS VISIT:  Near syncope.  Patient is..  Pap smear single episode nothing secondary to hypotension.  Etiology exact etiology of the hypotension.  Dehydration infection anemia metabolic hematologic central nervous systems are all differential.  She had a normal neurological exam         PLAN:  CT head  IV fluids  CBC  EKG  Monitored setting  Lab work          Diagnostic tests and prescription drugs considered including, but not limited to: Select: Considered admission for further workup patient looked well.    Escalation of care considered, and ultimately not performed: Visit chronic condition considered admission but the patient at this point has had admission before had workup..     Barriers to care at this time, including but not limited to: Select: At this point possibly her memory may be a slight barrier to care..     RESULTS    LABS Ordered and Reviewed by Me:  Results for orders placed or performed during the hospital encounter of  01/16/24   CBC WITH DIFFERENTIAL   Result Value Ref Range    WBC 5.8 4.8 - 10.8 K/uL    RBC 3.68 (L) 4.20 - 5.40 M/uL    Hemoglobin 12.4 12.0 - 16.0 g/dL    Hematocrit 37.0 37.0 - 47.0 %    .5 (H) 81.4 - 97.8 fL    MCH 33.7 (H) 27.0 - 33.0 pg    MCHC 33.5 32.2 - 35.5 g/dL    RDW 52.6 (H) 35.9 - 50.0 fL    Platelet Count 166 164 - 446 K/uL    MPV 9.7 9.0 - 12.9 fL    Neutrophils-Polys 72.20 (H) 44.00 - 72.00 %    Lymphocytes 14.80 (L) 22.00 - 41.00 %    Monocytes 4.30 0.00 - 13.40 %    Eosinophils 0.00 0.00 - 6.90 %    Basophils 0.00 0.00 - 1.80 %    Nucleated RBC 0.00 0.00 - 0.20 /100 WBC    Neutrophils (Absolute) 4.64 1.82 - 7.42 K/uL    Lymphs (Absolute) 0.86 (L) 1.00 - 4.80 K/uL    Monos (Absolute) 0.25 0.00 - 0.85 K/uL    Eos (Absolute) 0.00 0.00 - 0.51 K/uL    Baso (Absolute) 0.00 0.00 - 0.12 K/uL    NRBC (Absolute) 0.00 K/uL    Anisocytosis 1+     Macrocytosis 1+    COMP METABOLIC PANEL   Result Value Ref Range    Sodium 138 135 - 145 mmol/L    Potassium 4.2 3.6 - 5.5 mmol/L    Chloride 107 96 - 112 mmol/L    Co2 22 20 - 33 mmol/L    Anion Gap 9.0 7.0 - 16.0    Glucose 122 (H) 65 - 99 mg/dL    Bun 18 8 - 22 mg/dL    Creatinine 0.91 0.50 - 1.40 mg/dL    Calcium 8.0 (L) 8.5 - 10.5 mg/dL    Correct Calcium 8.6 8.5 - 10.5 mg/dL    AST(SGOT) 16 12 - 45 U/L    ALT(SGPT) 16 2 - 50 U/L    Alkaline Phosphatase 54 30 - 99 U/L    Total Bilirubin 0.3 0.1 - 1.5 mg/dL    Albumin 3.2 3.2 - 4.9 g/dL    Total Protein 6.1 6.0 - 8.2 g/dL    Globulin 2.9 1.9 - 3.5 g/dL    A-G Ratio 1.1 g/dL   TROPONIN   Result Value Ref Range    Troponin T 10 6 - 19 ng/L   ESTIMATED GFR   Result Value Ref Range    GFR (CKD-EPI) 63 >60 mL/min/1.73 m 2   DIFFERENTIAL MANUAL   Result Value Ref Range    Bands-Stabs 7.80 0.00 - 10.00 %    Metamyelocytes 0.90 %    Manual Diff Status PERFORMED    PERIPHERAL SMEAR REVIEW   Result Value Ref Range    Peripheral Smear Review see below    PLATELET ESTIMATE   Result Value Ref Range    Plt Estimation  Normal    MORPHOLOGY   Result Value Ref Range    RBC Morphology Present     Large Platelets 1+     Poikilocytosis 2+     Ovalocytes 1+     Echinocytes 1+     Acanthocytes 1+    EKG   Result Value Ref Range    Report       [facility] Emergency Dept.    Test Date:  2024  Pt Name:    HORACE GUZMAN                  Department: ER  MRN:        [id number]                      Room:       [facility]  Gender:     Female                       Technician: [id number]  :        [id number]                   Requested By:ER TRIAGE PROTOCOL  Order #:    [id number]                    Reading MD:    Measurements  Intervals                                Axis  Rate:       72                           P:          78  NC:         264                          QRS:        53  QRSD:       89                           T:          34  QT:         446  QTc:        489    Interpretive Statements  Sinus rhythm  Prolonged NC interval  Low voltage, precordial leads  Consider anterior infarct  Compared to ECG 2023 11:16:33  Low QRS voltage now present  Myocardial infarct finding now present       Placed on a monitor  Sinus rhythm noted.    RADIOLOGY        Radiologist interpretation:   CT-HEAD W/O   Final Result      1.  Diffuse atrophy and white matter changes.   2.  No acute intracranial hemorrhage or territorial infarct.   3.  Multiple chronic infarcts.                  ED COURSE:    ED Observation Status? No   No noted need for observation for developing issue    INTERVENTIONS BY ME:  Medications   LR infusion (bolus) (0 mL Intravenous Stopped 24 1548)       Response on recheck:  None..    CONSULTANTS/OTHER GROUPS CONTACTED    None.  Patient have outpatient follow-up with her doctor.    FINAL DISPO PLAN   New Prescriptions    No medications on file         Followup:  MOLLY Read  [address] [address]  [phone]    Schedule an appointment as soon as possible for a visit   For follow up    [facility]  [facility], Emergency Dept  [address] [address]  [phone]  Go to   If symptoms worsen      CONDITION: Stable.     FINAL IMPRESSION  1. Near syncope    2.  Hypotension

## 2024-01-16 NOTE — ED NOTES
Pt aware of plan of care. Son now at bedside.   Blood drawn and sent to lab. IVF now infusing.  Awaiting CT.   Call light in reach. Will cont to monitor.

## 2024-01-16 NOTE — ED TRIAGE NOTES
"Chief Complaint   Patient presents with    Near Syncopal     BIB EMS from a restaurant. Ate lunch with a friend then suddenly became dizzy and weak, felt like passing out.    On EMS arrival, pt was pale and hypotensive 86/59.   Gave 1L NS and improved BP to 107/65.   B     Has lingering dizziness.     Dizziness     BP 99/56   Pulse 73   Temp 36 °C (96.8 °F) (Oral)   Resp 14   Ht 1.676 m (5' 6\")   Wt 70.3 kg (155 lb)   SpO2 96%   BMI 25.02 kg/m²     "

## 2024-01-17 NOTE — DISCHARGE INSTRUCTIONS
"Feeling lightheaded with low blood pressure could be many different issues.  If you want to continue workup you can see your doctor about getting consultation to a cardiologist to monitor the heartbeat.  Also do a test called \"tilt table testing to see if this is related to her positioning.  I would recommend continue keeping her hydrated is much as possible    In addition they can do blood work such as steroid level testing.        "

## 2024-01-21 DIAGNOSIS — I48.91 ATRIAL FIBRILLATION, UNSPECIFIED TYPE (HCC): ICD-10-CM

## 2024-01-22 RX ORDER — APIXABAN 5 MG/1
5 TABLET, FILM COATED ORAL 2 TIMES DAILY
Qty: 180 TABLET | Refills: 1 | Status: SHIPPED | OUTPATIENT
Start: 2024-01-22

## 2024-01-24 ENCOUNTER — HOSPITAL ENCOUNTER (OUTPATIENT)
Dept: LAB | Facility: MEDICAL CENTER | Age: 79
End: 2024-01-24
Payer: MEDICARE

## 2024-01-24 DIAGNOSIS — I10 PRIMARY HYPERTENSION: ICD-10-CM

## 2024-01-24 DIAGNOSIS — N39.0 RECURRENT UTI: ICD-10-CM

## 2024-01-24 DIAGNOSIS — F01.50 VASCULAR DEMENTIA WITHOUT BEHAVIORAL DISTURBANCE (HCC): Chronic | ICD-10-CM

## 2024-01-24 DIAGNOSIS — N32.81 OVERACTIVE BLADDER: Chronic | ICD-10-CM

## 2024-01-24 LAB
ALBUMIN SERPL BCP-MCNC: 4 G/DL (ref 3.2–4.9)
ALBUMIN/GLOB SERPL: 1.1 G/DL
ALP SERPL-CCNC: 69 U/L (ref 30–99)
ALT SERPL-CCNC: 20 U/L (ref 2–50)
ANION GAP SERPL CALC-SCNC: 13 MMOL/L (ref 7–16)
APPEARANCE UR: CLEAR
AST SERPL-CCNC: 26 U/L (ref 12–45)
BACTERIA #/AREA URNS HPF: NEGATIVE /HPF
BASOPHILS # BLD AUTO: 0.4 % (ref 0–1.8)
BASOPHILS # BLD: 0.02 K/UL (ref 0–0.12)
BILIRUB SERPL-MCNC: 0.3 MG/DL (ref 0.1–1.5)
BILIRUB UR QL STRIP.AUTO: NEGATIVE
BUN SERPL-MCNC: 20 MG/DL (ref 8–22)
CALCIUM ALBUM COR SERPL-MCNC: 9.1 MG/DL (ref 8.5–10.5)
CALCIUM SERPL-MCNC: 9.1 MG/DL (ref 8.5–10.5)
CHLORIDE SERPL-SCNC: 101 MMOL/L (ref 96–112)
CHOLEST SERPL-MCNC: 124 MG/DL (ref 100–199)
CO2 SERPL-SCNC: 24 MMOL/L (ref 20–33)
COLOR UR: YELLOW
CREAT SERPL-MCNC: 0.81 MG/DL (ref 0.5–1.4)
EOSINOPHIL # BLD AUTO: 0.04 K/UL (ref 0–0.51)
EOSINOPHIL NFR BLD: 0.8 % (ref 0–6.9)
EPI CELLS #/AREA URNS HPF: NEGATIVE /HPF
ERYTHROCYTE [DISTWIDTH] IN BLOOD BY AUTOMATED COUNT: 51.9 FL (ref 35.9–50)
GFR SERPLBLD CREATININE-BSD FMLA CKD-EPI: 73 ML/MIN/1.73 M 2
GLOBULIN SER CALC-MCNC: 3.6 G/DL (ref 1.9–3.5)
GLUCOSE SERPL-MCNC: 90 MG/DL (ref 65–99)
GLUCOSE UR STRIP.AUTO-MCNC: NEGATIVE MG/DL
HCT VFR BLD AUTO: 40.5 % (ref 37–47)
HDLC SERPL-MCNC: 51 MG/DL
HGB BLD-MCNC: 13.5 G/DL (ref 12–16)
HYALINE CASTS #/AREA URNS LPF: NORMAL /LPF
IMM GRANULOCYTES # BLD AUTO: 0.01 K/UL (ref 0–0.11)
IMM GRANULOCYTES NFR BLD AUTO: 0.2 % (ref 0–0.9)
KETONES UR STRIP.AUTO-MCNC: NEGATIVE MG/DL
LDLC SERPL CALC-MCNC: 58 MG/DL
LEUKOCYTE ESTERASE UR QL STRIP.AUTO: ABNORMAL
LYMPHOCYTES # BLD AUTO: 0.97 K/UL (ref 1–4.8)
LYMPHOCYTES NFR BLD: 19.6 % (ref 22–41)
MCH RBC QN AUTO: 33.2 PG (ref 27–33)
MCHC RBC AUTO-ENTMCNC: 33.3 G/DL (ref 32.2–35.5)
MCV RBC AUTO: 99.5 FL (ref 81.4–97.8)
MICRO URNS: ABNORMAL
MONOCYTES # BLD AUTO: 0.47 K/UL (ref 0–0.85)
MONOCYTES NFR BLD AUTO: 9.5 % (ref 0–13.4)
NEUTROPHILS # BLD AUTO: 3.44 K/UL (ref 1.82–7.42)
NEUTROPHILS NFR BLD: 69.5 % (ref 44–72)
NITRITE UR QL STRIP.AUTO: NEGATIVE
NRBC # BLD AUTO: 0 K/UL
NRBC BLD-RTO: 0 /100 WBC (ref 0–0.2)
PH UR STRIP.AUTO: 7 [PH] (ref 5–8)
PLATELET # BLD AUTO: 239 K/UL (ref 164–446)
PMV BLD AUTO: 9.9 FL (ref 9–12.9)
POTASSIUM SERPL-SCNC: 4.1 MMOL/L (ref 3.6–5.5)
PROT SERPL-MCNC: 7.6 G/DL (ref 6–8.2)
PROT UR QL STRIP: NEGATIVE MG/DL
RBC # BLD AUTO: 4.07 M/UL (ref 4.2–5.4)
RBC # URNS HPF: NORMAL /HPF
RBC UR QL AUTO: NEGATIVE
RENAL EPI CELLS #/AREA URNS HPF: NORMAL /HPF
SODIUM SERPL-SCNC: 138 MMOL/L (ref 135–145)
SP GR UR STRIP.AUTO: 1.01
TRIGL SERPL-MCNC: 77 MG/DL (ref 0–149)
UROBILINOGEN UR STRIP.AUTO-MCNC: 0.2 MG/DL
VIT B12 SERPL-MCNC: 1231 PG/ML (ref 211–911)
WBC # BLD AUTO: 5 K/UL (ref 4.8–10.8)
WBC #/AREA URNS HPF: NORMAL /HPF

## 2024-01-24 PROCEDURE — 87086 URINE CULTURE/COLONY COUNT: CPT

## 2024-01-24 PROCEDURE — 85025 COMPLETE CBC W/AUTO DIFF WBC: CPT

## 2024-01-24 PROCEDURE — 36415 COLL VENOUS BLD VENIPUNCTURE: CPT

## 2024-01-24 PROCEDURE — 80053 COMPREHEN METABOLIC PANEL: CPT

## 2024-01-24 PROCEDURE — 82607 VITAMIN B-12: CPT

## 2024-01-24 PROCEDURE — 81001 URINALYSIS AUTO W/SCOPE: CPT

## 2024-01-24 PROCEDURE — 84425 ASSAY OF VITAMIN B-1: CPT

## 2024-01-24 PROCEDURE — 80061 LIPID PANEL: CPT

## 2024-01-25 ENCOUNTER — TELEPHONE (OUTPATIENT)
Dept: PHARMACY | Facility: MEDICAL CENTER | Age: 79
End: 2024-01-25
Payer: MEDICARE

## 2024-01-25 NOTE — TELEPHONE ENCOUNTER
Spoke with patient and offered services-patient declined at this time but will reach out if anything is needed.    Arelis Denise  Rx Coordinator   [phone]

## 2024-01-26 LAB
BACTERIA UR CULT: NORMAL
SIGNIFICANT IND 70042: NORMAL
SITE SITE: NORMAL
SOURCE SOURCE: NORMAL

## 2024-01-28 LAB — VIT B1 BLD-MCNC: 158 NMOL/L (ref 70–180)

## 2024-02-01 PROBLEM — E86.9 VOLUME DEPLETION: Chronic | Status: RESOLVED | Noted: 2023-04-04 | Resolved: 2024-02-01

## 2024-02-14 NOTE — PROGRESS NOTES
Received bedside report from ALISE Rea. POC discussed. First assessment completed, call light within reach. Fall precautions in place. Will continue to monitor.      Acuity of illness
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
## 2024-03-27 ENCOUNTER — OFFICE VISIT (OUTPATIENT)
Dept: SLEEP MEDICINE | Facility: MEDICAL CENTER | Age: 79
End: 2024-03-27
Attending: PHYSICIAN ASSISTANT
Payer: MEDICARE

## 2024-03-27 VITALS
WEIGHT: 160 LBS | DIASTOLIC BLOOD PRESSURE: 72 MMHG | OXYGEN SATURATION: 95 % | RESPIRATION RATE: 14 BRPM | BODY MASS INDEX: 25.71 KG/M2 | HEIGHT: 66 IN | HEART RATE: 92 BPM | SYSTOLIC BLOOD PRESSURE: 100 MMHG

## 2024-03-27 DIAGNOSIS — G47.33 OSA (OBSTRUCTIVE SLEEP APNEA): ICD-10-CM

## 2024-03-27 DIAGNOSIS — G47.34 NOCTURNAL HYPOXIA: ICD-10-CM

## 2024-03-27 PROCEDURE — 99213 OFFICE O/P EST LOW 20 MIN: CPT | Performed by: PHYSICIAN ASSISTANT

## 2024-03-27 PROCEDURE — 3074F SYST BP LT 130 MM HG: CPT | Performed by: PHYSICIAN ASSISTANT

## 2024-03-27 PROCEDURE — 99212 OFFICE O/P EST SF 10 MIN: CPT | Performed by: PHYSICIAN ASSISTANT

## 2024-03-27 PROCEDURE — 3078F DIAST BP <80 MM HG: CPT | Performed by: PHYSICIAN ASSISTANT

## 2024-03-27 ASSESSMENT — FIBROSIS 4 INDEX: FIB4 SCORE: 1.97

## 2024-03-27 ASSESSMENT — ENCOUNTER SYMPTOMS
DIZZINESS: 0
SPUTUM PRODUCTION: 0
COUGH: 0
SHORTNESS OF BREATH: 0
CHILLS: 0
HEADACHES: 0
WHEEZING: 0
HEARTBURN: 0
FEVER: 0
PALPITATIONS: 0
TREMORS: 0
ROS GI COMMENTS: NO DENTURES, NO MISSING TEETH, NO SWALLOWING ISSUES
SORE THROAT: 0
INSOMNIA: 0
WEIGHT LOSS: 0
ORTHOPNEA: 0
SINUS PAIN: 0

## 2024-03-27 NOTE — PATIENT INSTRUCTIONS
1-reviewed compliance, patient usage markedly decreased  2-will work on improving nightly usage, don't think mask change would be beneficial  3-therapeutic goal is 6-6.5 hours use every day  4-Today we reviewed equipment cleaning  once weekly minimum  mask, tubing and water chamber  use dedicated container  use mild soap and water  SoClean or other ozone  are not recommended  white vinegar and water solution is no longer recommended  hang tubing to dry  mask sanitizing wipes are an option for use   5-As a reminder use distilled water only in humidifier chamber.    6-Equipment replacement schedule : Mask cushion every month, Head gear every 6 months, Tubing every 3 months, Ultra-fine filters 2 times per month, Humidifier chamber every 6 months  7-follow up in 3 months

## 2024-03-27 NOTE — PROGRESS NOTES
"Chief Complaint   Patient presents with    Follow-Up     3 month f/v LALY   LOV 12/27/23       HPI:  Shaista Bocanegra is a 79 y.o. year old female here today for follow-up on obstructive sleep apnea.  Patient last seen in clinic 12/27/2023 by BRENTON Rg.  She is also followed by the pulmonary clinic for lung nodules.  She is again accompanied by caregiver from her assisted living facility where she resides.    Past Medical History: Atrial fibrillation, breast cancer 2001 right breast, vascular dementia, seasonal allergic rhinitis, CVA, dysphagia, seizure, hypertension, mitral valve repair, anemia, recurrent UTI.     Vitals:  /72 (BP Location: Left arm, Patient Position: Sitting, BP Cuff Size: Adult)   Pulse 92   Resp 14   Ht 1.676 m (5' 6\")   Wt 72.6 kg (160 lb)   SpO2 95% BMI of 25.82 kg/m².    Recent Imaging: Echocardiogram obtained 4/4/2023 demonstrating normal ventricular chamber size, wall thickness, systolic function. LVEF estimate 60%. Indeterminate diastolic function due to mitral valve disease, normal right ventricular size and systolic function, known mitral valve repair functioning normally, trace tricuspid regurgitation, unable to estimate RVSP due to inadequate tricuspid regurgitant jet.     Currently using  [employer]  CPAP @8 cm H20 pressure; compliance reviewed for 2/26/2024 through 3/26/2024, days used 26/30, average daily usage 34 minutes 40 seconds, 0% of days greater than or equal to 4 hours, mask leak at 2 minutes 14 seconds, AHI 8 per hour.  See media for full report    Device obtained July 2022 (recall replacement)  DME provider [facility]  Mask interface nasal mask    Reviewed titration sleep study results obtained 11/29/2023 demonstrating successful titration to 7-8 cm water pressure with need for bleed in O2 at 2 L.  Recommend auto CPAP of 6-10 versus CPAP pressures increased to 8 cm water pressure which we completed in clinic.     Reviewed sleep study " results from 3/31/2023 completed on oral appliance demonstrating overall AHI of 6.6 with mild nocturnal hypoxia, sats less than or equal to 88% for 22.8 minutes.  Update titration study given patient's frequent awakenings/tolerance issues on CPAP therapy.       Overnight oximetry on 9/24/2022 with CPAP at 7 demonstrated low O2 sat of 86% with sats less than or equal to 88% for 1.1 minutes.     Home sleep study completed 7/27/2022 with oral mandibular advancement device demonstrated SHERRILL of 5.3 events per hour increasing to 6.4 in supine position.  Low O2 sat of 74% with sats less than or equal to 88% for 8% of flow evaluation time or 50 min.  Titration study versus auto CPAP trial was recommended.     Sleep schedule goes to bed 11 PM, wakens 3 AM-6 or 7 AM , and gets up during the night bathroom x 1   Symptoms denies morning headache, experiences daytime somnolence if watching tv late afternoon but does not nap    [place] Sleepiness Scale reported as 7/24 on 3/27/2020      Review of Systems   Constitutional:  Negative for chills, fever, malaise/fatigue and weight loss.   HENT:  Positive for hearing loss (hearing aids). Negative for congestion, nosebleeds, sinus pain, sore throat and tinnitus.    Eyes:         Presc glasses   Respiratory:  Negative for cough, sputum production, shortness of breath and wheezing.    Cardiovascular:  Negative for chest pain, palpitations, orthopnea and leg swelling.   Gastrointestinal:  Negative for heartburn.        No dentures, no missing teeth, no swallowing issues   Neurological:  Negative for dizziness, tremors and headaches.   Psychiatric/Behavioral:  The patient does not have insomnia.        Past Medical History:   Diagnosis Date    Acquired circulating anticoagulants (HCC)     Anemia 09/28/2021    Arthritis     toe    Atrial fibrillation (HCC)     Benign essential HTN 03/19/2012    Breast cancer (HCC)     Cancer (HCC) 1998    breast     Cardiac arrhythmia     Chest tightness or  pressure 03/19/2012    Chickenpox     Coronary heart disease     DCM (dilated cardiomyopathy) ([facility]) 08/24/2022    [place] measles     Gynecological disorder     High cholesterol     High risk medication use 03/19/2012    Hypercholesterolemia 03/19/2012    Mumps     MVP (mitral valve prolapse) 03/19/2012    Osteoporosis     Preventative health care 05/21/2020    Recurrent UTI 09/19/2023    Seasonal allergic rhinitis due to pollen 04/26/2023    Seizure disorder ([facility]) 09/28/2021    last seizure may 2021    Sleep apnea     CPAP at night    Snoring     Stroke ([facility]) 2017    residual minor weakness on left side    Substance abuse ([facility])     Tonsillitis     Urinary bladder disorder     OAB    Urinary frequency 04/26/2023    Urinary incontinence     Valvular heart disease     Venereal disease     Volume depletion and dehydration 04/04/2023       Past Surgical History:   Procedure Laterality Date    CATARACT PHACO WITH IOL  3/16/2009    Performed by SHANNON GANDARA at [facility]    CATARACT PHACO WITH IOL  1/26/2009    Performed by SHANNON GANDARA at [facility]    BREAST BIOPSY      HYSTERECTOMY LAPAROSCOPY      LUMPECTOMY      MA CHEMOTHERAPY, UNSPECIFIED PROCEDURE      MA RADIATION THERAPY PLAN SIMPLE      MA REMV 2ND CATARACT,CORN-SCLER SECTN      PRIMARY C SECTION      SINUSCOPE      TONSILLECTOMY         Family History   Problem Relation Age of Onset    Cancer Mother         breast    No Known Problems Brother     Heart Failure Neg Hx     Heart Disease Neg Hx        Social History     Socioeconomic History    Marital status:      Spouse name: Not on file    Number of children: 2    Years of education: Not on file    Highest education level: Professional school degree (e.g., MD, DDS, DVM, ARACELI)   Occupational History    Occupation:      Employer: Not Employed   Tobacco Use    Smoking status: Never     Passive exposure: Never    Smokeless tobacco: Never   Vaping Use     Vaping Use: Never used   Substance and Sexual Activity    Alcohol use: Yes     Alcohol/week: 1.2 oz     Types: 2 Glasses of wine per week     Comment: twice a week    Drug use: No    Sexual activity: Yes     Partners: Male     Birth control/protection: Female Sterilization   Other Topics Concern    Not on file   Social History Narrative    Not on file     Social Determinants of Health     Financial Resource Strain: Low Risk  (1/26/2022)    Overall Financial Resource Strain (CARDIA)     Difficulty of Paying Living Expenses: Not hard at all   Food Insecurity: No Food Insecurity (1/26/2022)    Hunger Vital Sign     Worried About Running Out of Food in the Last Year: Never true     Ran Out of Food in the Last Year: Never true   Transportation Needs: No Transportation Needs (1/26/2022)    PRAPARE - Transportation     Lack of Transportation (Medical): No     Lack of Transportation (Non-Medical): No   Physical Activity: Insufficiently Active (1/26/2022)    Exercise Vital Sign     Days of Exercise per Week: 4 days     Minutes of Exercise per Session: 30 min   Stress: Not on file   Social Connections: Socially Isolated (1/26/2022)    Social Connection and Isolation Panel [NHANES]     Frequency of Communication with Friends and Family: More than three times a week     Frequency of Social Gatherings with Friends and Family: Twice a week     Attends Shinto Services: Never     Active Member of Clubs or Organizations: No     Attends Club or Organization Meetings: Never     Marital Status:    Intimate Partner Violence: Not on file   Housing Stability: Low Risk  (1/26/2022)    Housing Stability Vital Sign     Unable to Pay for Housing in the Last Year: No     Number of Places Lived in the Last Year: 1     Unstable Housing in the Last Year: No       Allergies as of 03/27/2024    (No Known Allergies)          Current medications as of today   Current Outpatient Medications   Medication Sig Dispense Refill    ELIQUIS 5 MG  Tab TAKE 1 TABLET BY MOUTH TWICE A  Tablet 1    atorvastatin (LIPITOR) 80 MG tablet TAKE 1 TABLET BY MOUTH EVERY DAY IN THE EVENING 90 Tablet 2    spironolactone (ALDACTONE) 25 MG Tab TAKE 1 TABLET BY MOUTH EVERY DAY 90 Tablet 3    cyanocobalamin (VITAMIN B12) 1000 MCG Tab Take 1,000 mcg by mouth every day.      estradiol (ESTRACE) 0.1 MG/GM vaginal cream Insert 0.1 g into the vagina 1 time a day as needed.      Multiple Vitamins-Minerals (MULTIVITAMIN WOMEN 50+ PO) Take 1 Tablet by mouth every day.      tamsulosin (FLOMAX) 0.4 MG capsule Take 0.4 mg by mouth every day.       No current facility-administered medications for this visit.         Physical Exam:   Gen:           Alert and oriented, No apparent distress. Mood and affect appropriate, normal interaction with examiner.   Hearing:     Grossly intact.  Nose:          Normal, no lesions or deformities.  Dentition:    fair dentition.   Oropharynx:   Tongue normal, posterior pharynx without erythema or exudate.  Mallampati Classification: III  Neck:        Supple, trachea midline, no masses.  Respiratory Effort: No intercostal retractions or use of accessory muscles.   Gait and Station: Age-appropriate  Digits and Nails: No clubbing, cyanosis, petechiae, or nodes.   Skin:        No rashes, lesions or ulcers noted.               Ext:           No cyanosis or edema.      Immunizations:  Flu: Recommended  Pneumovax 23: 6/8/2015  Prevnar 13: 12/21/2021, 11/3/2016  Moderna SARS CoV2 Vaccine: 1/6/2022, 2/22/2021, 1/23/2021    Assessment / Plan:  1. LALY (obstructive sleep apnea)    Reviewed compliance, patient usage marked decreased.  Reviewed risks associated with untreated or undertreated sleep apnea.  Patient states will work on improving nightly usage.  Support given.  Did not think mask change would be beneficial as tolerance is reported not to be the issue.  Reviewed therapeutic goal of 6-6.5 hours every day.  Reviewed equipment cleaning, reminded to use  distilled water only humidifier chamber, reviewed equipment replacement schedule.  Patient to follow-up in 3 months, sooner if needed.    2. Nocturnal hypoxia    Continue oxygen use with CPAP therapy.       Follow-up:   Return in about 3 months (around 6/27/2024) for Return with Laney Hand PA-C.    Please note that this dictation was created using voice recognition software. I have made every reasonable attempt to correct obvious errors, but it is possible there are errors of grammar and possibly content that I did not discover before finalizing the note.

## 2024-03-29 ENCOUNTER — HOSPITAL ENCOUNTER (OUTPATIENT)
Dept: RADIOLOGY | Facility: MEDICAL CENTER | Age: 79
End: 2024-03-29
Payer: MEDICARE

## 2024-03-29 DIAGNOSIS — Z85.3 HISTORY OF BREAST CANCER: ICD-10-CM

## 2024-03-29 PROCEDURE — G0279 TOMOSYNTHESIS, MAMMO: HCPCS

## 2024-03-29 PROCEDURE — 77065 DX MAMMO INCL CAD UNI: CPT | Mod: RT

## 2024-05-10 NOTE — PATIENT INSTRUCTIONS
"1) if your BP increases >130/80 consistently - more than 3-5 days in a row, then we will consider restarting amlodipine 5mg   2) check labs in about 3 weeks     General healthly nutrition advice:  - the USDA food pattern (https://www.cnpp.usda.gov/USDAFoodPatterns)  - plate method (https://www.choosemyplate.gov/)  - consume diet that emphasizes intake of vegetables, fruits, and whole grains,  - use low fat diary products, poultry, fish, legumes, nontropical vegetable oils, nuts  - limit intake of sweets, sugar-sweetned beverages, and red meats   - reduce saturated and trans fats to <6% of your daily calories   - consume no more than 2,400mg of sodium daily (look at food labels) or if you have high BP then reduce to no more than 1,500mg of sodium daily     BP lowering diet:   - DASH diet (https://www.heart.org/en/health-topics/high-blood-pressure/changes-you-can-make-to-manage-high-blood-pressure/managing-blood-pressure-with-a-heart-healthy-diet)    Diabetes diet:  - visit diabetes.org to review \"food and fitness\" section and \"Create your plate\" for plate method education (http://www.diabetes.org/food-and-fitness/)     Cholesterol-reducing diets:   - AHA diet  (http://www.heart.org/en/healthy-living/healthy-eating/eat-smart/nutrition-basics/aha-diet-and-lifestyle-recommendations)   - Mediterranean diet (http://www.heart.org/en/healthy-living/healthy-eating/eat-smart/nutrition-basics/mediterranean-diet)   - lowering triglycerides: (http://my.americanheart.org/idc/groups/ahamah-public/@[facility]/@sop/@[facility]/documents/downloadable/[email or web address].pdf)     " Is The Patient Presenting As Previously Scheduled?: Yes

## 2024-05-15 ENCOUNTER — HOSPITAL ENCOUNTER (OUTPATIENT)
Dept: CARDIOLOGY | Facility: MEDICAL CENTER | Age: 79
End: 2024-05-15
Attending: INTERNAL MEDICINE
Payer: MEDICARE

## 2024-05-15 DIAGNOSIS — Z98.890 S/P MITRAL VALVE REPAIR: ICD-10-CM

## 2024-05-16 ENCOUNTER — TELEPHONE (OUTPATIENT)
Dept: CARDIOLOGY | Facility: MEDICAL CENTER | Age: 79
End: 2024-05-16
Payer: MEDICARE

## 2024-05-16 LAB
LV EJECT FRACT  99904: 60
LV EJECT FRACT MOD 2C 99903: 60.42
LV EJECT FRACT MOD 4C 99902: 62.55
LV EJECT FRACT MOD BP 99901: 60.83

## 2024-05-16 PROCEDURE — 93306 TTE W/DOPPLER COMPLETE: CPT | Mod: 26 | Performed by: INTERNAL MEDICINE

## 2024-05-16 NOTE — TELEPHONE ENCOUNTER
----- Message from Darin Meehan M.D. sent at 5/16/2024  9:55 AM PDT -----  Please notify patient of unremarkable echocardiogram results showing normally functioning surgical mitral valve repair results with trace mitral regurgitation. Thank you.  TAYLER

## 2024-05-16 NOTE — TELEPHONE ENCOUNTER
Phone Number Called: [phone]    Call outcome: Left detailed message for patient. Informed to call back with any additional questions.    Message: Called to share ECHO results. Pt has OV 5/20/24 with LILI

## 2024-05-20 ENCOUNTER — OFFICE VISIT (OUTPATIENT)
Dept: CARDIOLOGY | Facility: MEDICAL CENTER | Age: 79
End: 2024-05-20
Attending: INTERNAL MEDICINE
Payer: MEDICARE

## 2024-05-20 VITALS
WEIGHT: 163.4 LBS | OXYGEN SATURATION: 94 % | SYSTOLIC BLOOD PRESSURE: 114 MMHG | DIASTOLIC BLOOD PRESSURE: 76 MMHG | RESPIRATION RATE: 14 BRPM | HEIGHT: 66 IN | HEART RATE: 117 BPM | BODY MASS INDEX: 26.26 KG/M2

## 2024-05-20 DIAGNOSIS — I48.0 PAROXYSMAL ATRIAL FIBRILLATION (HCC): Chronic | ICD-10-CM

## 2024-05-20 DIAGNOSIS — E78.5 DYSLIPIDEMIA: ICD-10-CM

## 2024-05-20 DIAGNOSIS — Z98.890 S/P MITRAL VALVE REPAIR: ICD-10-CM

## 2024-05-20 LAB — EKG IMPRESSION: NORMAL

## 2024-05-20 PROCEDURE — 3074F SYST BP LT 130 MM HG: CPT | Performed by: INTERNAL MEDICINE

## 2024-05-20 PROCEDURE — 99214 OFFICE O/P EST MOD 30 MIN: CPT | Performed by: INTERNAL MEDICINE

## 2024-05-20 PROCEDURE — 93010 ELECTROCARDIOGRAM REPORT: CPT | Performed by: INTERNAL MEDICINE

## 2024-05-20 PROCEDURE — G2211 COMPLEX E/M VISIT ADD ON: HCPCS | Performed by: INTERNAL MEDICINE

## 2024-05-20 PROCEDURE — 3078F DIAST BP <80 MM HG: CPT | Performed by: INTERNAL MEDICINE

## 2024-05-20 ASSESSMENT — ENCOUNTER SYMPTOMS
MYALGIAS: 0
CLAUDICATION: 0
NEUROLOGICAL NEGATIVE: 1
CONSTITUTIONAL NEGATIVE: 1
CHILLS: 0
WEAKNESS: 0
GASTROINTESTINAL NEGATIVE: 1
ABDOMINAL PAIN: 0
FEVER: 0
DIZZINESS: 0
DEPRESSION: 0
DOUBLE VISION: 0
PSYCHIATRIC NEGATIVE: 1
BLURRED VISION: 0
SHORTNESS OF BREATH: 0
CARDIOVASCULAR NEGATIVE: 1
COUGH: 0
FOCAL WEAKNESS: 0
RESPIRATORY NEGATIVE: 1
WEIGHT LOSS: 0
HEADACHES: 0
PALPITATIONS: 0
MUSCULOSKELETAL NEGATIVE: 1
EYES NEGATIVE: 1
VOMITING: 0
NERVOUS/ANXIOUS: 0
BRUISES/BLEEDS EASILY: 0
NAUSEA: 0

## 2024-05-20 ASSESSMENT — FIBROSIS 4 INDEX: FIB4 SCORE: 1.97

## 2024-05-20 NOTE — PROGRESS NOTES
Chief Complaint   Patient presents with    Follow-Up     Dx: S/P mitral valve repair        Atrial Fibrillation    Hyperlipidemia       Subjective     Artemio Bocanegra is a 79 y.o. female who presents today for annual follow up of mitral valve repair.    Since the patient's last visit on 05/22/23, she has been doing well clinically from cardiac standpoint. She admit to mild dyspnea on exertion. She denies fatigue, chest pain, shortness of breath, palpitations, lower extremity edema, dizziness or syncope. She keeps active exercising with a . She enjoyed a family trip to [place].     Past Medical History:   Diagnosis Date    Acquired circulating anticoagulants ([facility])     Anemia 09/28/2021    Arthritis     toe    Atrial fibrillation ([facility])     Benign essential HTN 03/19/2012    Breast cancer ([facility])     Cancer ([facility]) 1998    breast     Cardiac arrhythmia     Chest tightness or pressure 03/19/2012    Chickenpox     Coronary heart disease     DCM (dilated cardiomyopathy) ([facility]) 08/24/2022    Bengali measles     Gynecological disorder     High cholesterol     High risk medication use 03/19/2012    Hypercholesterolemia 03/19/2012    Mumps     MVP (mitral valve prolapse) 03/19/2012    Osteoporosis     Preventative health care 05/21/2020    Recurrent UTI 09/19/2023    Seasonal allergic rhinitis due to pollen 04/26/2023    Seizure disorder ([facility]) 09/28/2021    last seizure may 2021    Sleep apnea     CPAP at night    Snoring     Stroke ([facility]) 2017    residual minor weakness on left side    Substance abuse ([facility])     Tonsillitis     Urinary bladder disorder     OAB    Urinary frequency 04/26/2023    Urinary incontinence     Valvular heart disease     Venereal disease     Volume depletion and dehydration 04/04/2023     Past Surgical History:   Procedure Laterality Date    CATARACT PHACO WITH IOL  3/16/2009    Performed by SHANNON GANDARA at SURGERY SAME DAY [facility]    CATARACT PHACO WITH IOL  1/26/2009    Performed by JULIOCESAR  SHANNON GALLAGHER at SURGERY SAME DAY [facility]    BREAST BIOPSY      HYSTERECTOMY LAPAROSCOPY      LUMPECTOMY      MN CHEMOTHERAPY, UNSPECIFIED PROCEDURE      MN RADIATION THERAPY PLAN SIMPLE      MN REMV 2ND CATARACT,CORN-SCLER SECTN      PRIMARY C SECTION      SINUSCOPE      TONSILLECTOMY       Family History   Problem Relation Age of Onset    Cancer Mother         breast    No Known Problems Brother     Heart Failure Neg Hx     Heart Disease Neg Hx      Social History     Socioeconomic History    Marital status:      Spouse name: Not on file    Number of children: 2    Years of education: Not on file    Highest education level: Professional school degree (e.g., MD, DDS, DVM, ARACELI)   Occupational History    Occupation:      Employer: Not Employed   Tobacco Use    Smoking status: Never     Passive exposure: Never    Smokeless tobacco: Never   Vaping Use    Vaping status: Never Used   Substance and Sexual Activity    Alcohol use: Yes     Alcohol/week: 1.2 oz     Types: 2 Glasses of wine per week     Comment: twice a week    Drug use: No    Sexual activity: Yes     Partners: Male     Birth control/protection: Female Sterilization   Other Topics Concern    Not on file   Social History Narrative    Not on file     Social Determinants of Health     Financial Resource Strain: Low Risk  (1/26/2022)    Overall Financial Resource Strain (CARDIA)     Difficulty of Paying Living Expenses: Not hard at all   Food Insecurity: No Food Insecurity (1/26/2022)    Hunger Vital Sign     Worried About Running Out of Food in the Last Year: Never true     Ran Out of Food in the Last Year: Never true   Transportation Needs: No Transportation Needs (1/26/2022)    PRAPARE - Transportation     Lack of Transportation (Medical): No     Lack of Transportation (Non-Medical): No   Physical Activity: Insufficiently Active (1/26/2022)    Exercise Vital Sign     Days of Exercise per Week: 4 days     Minutes of Exercise per Session: 30 min    Stress: Not on file   Social Connections: Socially Isolated (1/26/2022)    Social Connection and Isolation Panel [NHANES]     Frequency of Communication with Friends and Family: More than three times a week     Frequency of Social Gatherings with Friends and Family: Twice a week     Attends Restorationist Services: Never     Active Member of Clubs or Organizations: No     Attends Club or Organization Meetings: Never     Marital Status:    Intimate Partner Violence: Not on file   Housing Stability: Low Risk  (1/26/2022)    Housing Stability Vital Sign     Unable to Pay for Housing in the Last Year: No     Number of Places Lived in the Last Year: 1     Unstable Housing in the Last Year: No     No Known Allergies    (Medications reviewed.)  Outpatient Encounter Medications as of 5/20/2024   Medication Sig Dispense Refill    Mirabegron ER 50 MG TABLET SR 24 HR Take 50 mg by mouth every day. FOR BLADDER 90 Tablet 3    ELIQUIS 5 MG Tab TAKE 1 TABLET BY MOUTH TWICE A  Tablet 1    atorvastatin (LIPITOR) 80 MG tablet TAKE 1 TABLET BY MOUTH EVERY DAY IN THE EVENING 90 Tablet 2    spironolactone (ALDACTONE) 25 MG Tab TAKE 1 TABLET BY MOUTH EVERY DAY 90 Tablet 3    cyanocobalamin (VITAMIN B12) 1000 MCG Tab Take 1,000 mcg by mouth every day.      estradiol (ESTRACE) 0.1 MG/GM vaginal cream Insert 0.1 g into the vagina 1 time a day as needed.      Multiple Vitamins-Minerals (MULTIVITAMIN WOMEN 50+ PO) Take 1 Tablet by mouth every day.      tamsulosin (FLOMAX) 0.4 MG capsule Take 0.4 mg by mouth every day.       No facility-administered encounter medications on file as of 5/20/2024.     Review of Systems   Constitutional: Negative.  Negative for chills, fever, malaise/fatigue and weight loss.   HENT: Negative.  Negative for hearing loss.    Eyes: Negative.  Negative for blurred vision and double vision.   Respiratory: Negative.  Negative for cough and shortness of breath.    Cardiovascular: Negative.  Negative for  "chest pain, palpitations, claudication and leg swelling.   Gastrointestinal: Negative.  Negative for abdominal pain, nausea and vomiting.   Genitourinary: Negative.  Negative for dysuria and urgency.   Musculoskeletal: Negative.  Negative for joint pain and myalgias.   Skin: Negative.  Negative for itching and rash.   Neurological: Negative.  Negative for dizziness, focal weakness, weakness and headaches.   Endo/Heme/Allergies: Negative.  Does not bruise/bleed easily.   Psychiatric/Behavioral: Negative.  Negative for depression. The patient is not nervous/anxious.               Objective     /76 (BP Location: Left arm, Patient Position: Sitting, BP Cuff Size: Adult)   Pulse (!) 117   Resp 14   Ht 1.676 m (5' 6\")   Wt 74.1 kg (163 lb 6.4 oz)   SpO2 94%   BMI 26.37 kg/m²     Physical Exam  Constitutional:       Appearance: Normal appearance. She is well-developed and normal weight.   HENT:      Head: Normocephalic and atraumatic.   Neck:      Vascular: No JVD.   Cardiovascular:      Rate and Rhythm: Regular rhythm. Tachycardia present.      Heart sounds: Normal heart sounds.   Pulmonary:      Effort: Pulmonary effort is normal.      Breath sounds: Normal breath sounds.   Abdominal:      General: Bowel sounds are normal.      Palpations: Abdomen is soft.      Comments: No hepatosplenomegaly.   Musculoskeletal:         General: Normal range of motion.   Lymphadenopathy:      Cervical: No cervical adenopathy.   Skin:     General: Skin is warm and dry.   Neurological:      Mental Status: She is alert and oriented to person, place, and time.            CARDIAC STUDIES/PROCEDURES:    ABLATION by Wisam Arellano (12/07/21)   1. Atrial fibrillation, Persistent  2. Successful isolation of all four pulmonary veins  3. Successful posterior wall isolation with linear ablation  4. CFAE ablation in the left atrium for treatment of atrial fibrillation  5. Typical atrial flutter  6. Successful CTI ablation for typical " flutter  7. ICE visualization of the KYUNG suggests no residual flow following KYUNG ligation    BIOTEL CONCLUSIONS (07/14/21)  BioTel 6 day Summary:   1. Atrial fibrillation with appropriate heart rate range. Average 69, range 35 to 120 bpm.   2. No significant pauses (up to 2.2 seconds).   3. Occasional PVCs, 2% burden.   4. 1 patient trigger, no symptoms reported.   Conclusion: Persistent atrial fibrillation with appropriate heart rate, no pauses, occasional PVCs.     CARDIAC CATHETERIZATION CONCLUSIONS by Juventino Farah (05/08/20)  Angiographically normal appearing coronary arteries  Mildly elevated LVEDP, filling pressures.  2+ mitral regurgitation.  Normal LV function.     CAROTID ULTRASOUND (04/04/23)  Bilateral.   Flow velocities and Doppler waveforms are normal throughout the carotid   arteries without evidence of plaque.   The bilateral subclavian and vertebral artery waveforms are antegrade and   normal in character and velocity.     CAROTID ULTRASOUND (08/18/17)  Normal carotids, subclavians and vertebrals.     CTA OF HEAD (05/02/22)  1.  Moderate sized area of chronic infarction with surrounding encephalomalacia in the right frontal parasylvian region.  2.  Otherwise unremarkable CT angiogram of the head.  3.  Age-related cerebral atrophy     CTA OF NECK (05/02/22)  CT angiogram of the neck within normal limits.     CTA OF HEAD (09/04/21)          No evidence of flow-limiting stenosis in the cervical carotid or cervical vertebral arteries.     CT OF HEAD (05/31/21)  1.  Moderate acute/subacute left temporal lobe infarct.  2.  Chronic ischemic changes.  3.  No acute intracranial hemorrhage.     CTA OF HEAD (05/31/21)  No acute large vessel occlusion or hemodynamically significant stenosis.  Acute/subacute appearing left temporal lobe infarct.     CTA OF NECK (09/04/21)           No evidence of flow-limiting stenosis in the cervical carotid or cervical vertebral arteries.     CTA OF NECK  (05/31/21)  1.  No stenosis or dissection is seen within the carotid or vertebral arteries bilaterally.  2.  Tree-in-bud nodularity in the right upper lobe is likely infectious/inflammatory.  3.  Mucosal thickening right maxillary sinus.  4.  Nasal bone deformities bilaterally are likely chronic.     CT OF HEAD (05/03/19)  NO ACUTE ABNORMALITIES ARE NOTED ON CT SCAN OF THE HEAD.  Right-sided encephalomalacia is noted.     CTA OF HEAD (08/18/17)  1.  There is a right M1 segment occlusion.  2.  There is collateral flow in the peripheral M3 branches seen on the right.  3.  There is mild decreased enhancement of the visualized right internal carotid artery however it is patent.  4.  Question of subtle hypodensity in the right insular cortex region. No abnormal brain parenchymal enhancement is seen.     CTA OF NECK (08/18/17)  1.  CT angiogram of the neck within normal limits.  2.  Thrombosed right M1 segment.     ECHOCARDIOGRAM CONCLUSIONS (05/15/24)  Prior study on 04/05/2023, compared to the report of the prior study,   there has been no significant change.   Normal left ventricular systolic function.  The left ventricular ejection fraction is visually estimated to be 60%.  Known mitral valve repair which is functioning normally with appropriate transvalvular gradient.  Mean transvalvular gradient is 3 mmHg   Trace mitral regurgitation.  (study result reviewed)     ECHOCARDIOGRAM CONCLUSIONS (04/05/23)  Normal left ventricular systolic function.  Known mitral valve repair which is functioning normally with   appropriate transvalvular gradient.    ECHOCARDIOGRAM CONCLUSIONS (06/21/22)  No acute intracranial process.  Remote infarcts in the right frontal, left occipital region as described above.  Remote hemorrhage into the right basal ganglia is noted with hemosiderin staining. Remote microhemorrhage into the left medial occipital periatrial white matter is also noted.  Age-related volume loss and chronic microvascular  ischemic changes.     ECHOCARDIOGRAM CONCLUSIONS (09/30/21)  Normal left ventricular chamber size.  The left ventricular ejection fraction is visually estimated to be 60%.  Known mitral valve repair which is functioning normally with appropriate transvalvular gradient.  No mitral regurgitation.  Right heart pressures are normal.      ECHOCARDIOGRAM CONCLUSIONS (06/01/21)  Left ventricular ejection fraction is visually estimated to be 65%.  Diastolic function is difficult to assess with atrial fibrillation.  Severely dilated left atrium.  Negative bubble study including Valsalva.  Myxomatous changes of the mitral valve leaflets with prolapse of the anterior and posterior leaflets.  Moderate to severe eccentric mitral regurgitation, probably severe.  Pulmonary veins not well seen on the study.  Estimated right ventricular systolic pressure is 31 mmHg + estimated RAP.  Compared to the images of the study done 11- there has been no significant change, prior echo had pulmonary vein flow reversal consistent with  severe mitral regurgitation.     ECHOCARDIOGRAM CONCLUSIONS (02/03/20)  Prior echo done on 05/03/2019. Compared to the images of the prior study done -  there has been no significant change.   Normal left ventricular systolic function.  Left ventricular ejection fraction is visually estimated to be 65%.  Prolapse of the mitral leaflets was present.  Severe mitral regurgitation.  Mild tricuspid regurgitation.  Estimated right ventricular systolic pressure is 35 mmHg.     ECHOCARDIOGRAM CONCLUSIONS (05/03/19)  Prior echocardiogram 6/14/2018.  Normal left ventricular systolic function.   Mild to moderate eccentric mitral regurgitation.  Compared to the images of the study done - there has been slight improvement in mitral regurgitation.     ECHOCARDIOGRAM CONCLUSIONS (06/14/18)  Normal left ventricular systolic function.  Left ventricular ejection fraction is visually estimated to be 60%.  Myxomatous changes  of the mitral valve.  Prolapse of the anterior and posterior mitral leaflets.  Severe, eccentric mitral regurgitation.  Right heart pressures are normal.  Compared to the report of the study done 8/19/2017 severe mitral regurgitation is now present, previously reported as moderate but a MR   ERO was not recorded.      EKG was ordered for atrial fibrillation, performed on (05/20/24) was reviewed: EKG, personally interpreted shows sinus tachycardia.  EKG performed on (04/04/23) EKG shows sinus rhythm.  EKG performed on (05/02/22) EKG shows sinus rhythm.  EKG performed on (09/04/21) EKG shows atrial fibrillation.  EKG performed on (06/09/21) EKG shows atrial fibrillation.  EKG performed on (03/19/21) EKG shows sinus rhythm with premature ventricular contractions.  EKG performed on (05/09/20) EKG shows sinus rhythm with premature ventricular contractions.  EKG performed on (05/08/20) EKG shows sinus rhythm with premature ventricular contractions.     Laboratory results of (01/24/24) were reviewed. Cholesterol profile of 124/77/51/58 mg/dL noted.  Laboratory results of (05/02/22) Cholesterol profile of 115/66/55/49 mg/dL noted.  Laboratory results of (01/12/22) Cholesterol profile of 115/58/50/53 mg/dL noted.     TRANSESOPHAGEAL ECHOCARDIOGRAM CONCLUSIONS by Ayde Lopes (01/26/21)  LV EF 55%.  Biatrial enlargement.  There is severe eccentric mitral regurgitation with multiple jets, predominantly from flail leaflet of P2.  Echolucent space near P1 of unclear etiology, unusual for cleft leaflet.  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    Assessment & Plan     1. S/P mitral valve repair  EC-ECHOCARDIOGRAM COMPLETE W/O CONT      2. Dyslipidemia        3. Paroxysmal atrial fibrillation (HCC)  EKG          Medical Decision Making: Today's Assessment/Status/Plan:        History of mitral valve repair (minimally invasive complex mitral valve repair with A2 Phoenix-Miguel Angel neochordal reconstruction, A3 Phoenix-Miguel Angel neochordal  reconstruction, P2 posterior ventricular anchoring remodeling suture and non-resectional leaflet remodeling and Stur[place]-Miguel Angel NeoChord x2, P3 Stur[place]-Miguel Angel NeoChord x1, and #34 sewing ring annuloplasty, maze procedure and left atrial appendage oversew by Monster Jones at [facility] [place] [facility] on 09/16/21): She is clinically doing well from valvular standpoint.  Hyperlipidemia: She is doing well on statin therapy without myalgia symptoms.   Atrial fibrillation on anticoagulation therapy (Eliquis): She is doing well on anticoagulation without palpitations.    Status post left temporal lobe infarct (05/31/21) with history of cerebrovascular accident with right carotid arteriography with mechanical thrombectomy (08/18/17) and transient ischemic attack (05/02/22): She remains clinically stable with memory loss and mild confusion.  Additional information: She is family of late Casey Hampton.    We will follow up the patient in one year with an echocardiogram.    CC Cullen Borwn

## 2024-05-29 PROBLEM — N64.4 BREAST PAIN, LEFT: Chronic | Status: ACTIVE | Noted: 2024-05-29

## 2024-05-29 PROBLEM — N64.4 BREAST PAIN, LEFT: Status: ACTIVE | Noted: 2024-05-29

## 2024-06-16 ENCOUNTER — APPOINTMENT (OUTPATIENT)
Dept: RADIOLOGY | Facility: MEDICAL CENTER | Age: 79
End: 2024-06-16
Attending: EMERGENCY MEDICINE
Payer: MEDICARE

## 2024-06-16 ENCOUNTER — HOSPITAL ENCOUNTER (EMERGENCY)
Facility: MEDICAL CENTER | Age: 79
End: 2024-06-16
Attending: EMERGENCY MEDICINE
Payer: MEDICARE

## 2024-06-16 VITALS
HEIGHT: 66 IN | WEIGHT: 160 LBS | SYSTOLIC BLOOD PRESSURE: 148 MMHG | BODY MASS INDEX: 25.71 KG/M2 | HEART RATE: 74 BPM | OXYGEN SATURATION: 93 % | TEMPERATURE: 97 F | DIASTOLIC BLOOD PRESSURE: 67 MMHG | RESPIRATION RATE: 16 BRPM

## 2024-06-16 DIAGNOSIS — R55 NEAR SYNCOPE: ICD-10-CM

## 2024-06-16 DIAGNOSIS — G93.89 PNEUMOCEPHALUS: ICD-10-CM

## 2024-06-16 LAB
ALBUMIN SERPL BCP-MCNC: 3.7 G/DL (ref 3.2–4.9)
ALBUMIN/GLOB SERPL: 1.4 G/DL
ALP SERPL-CCNC: 64 U/L (ref 30–99)
ALT SERPL-CCNC: 24 U/L (ref 2–50)
ANION GAP SERPL CALC-SCNC: 11 MMOL/L (ref 7–16)
AST SERPL-CCNC: 24 U/L (ref 12–45)
BASOPHILS # BLD AUTO: 0.3 % (ref 0–1.8)
BASOPHILS # BLD: 0.02 K/UL (ref 0–0.12)
BILIRUB SERPL-MCNC: 0.5 MG/DL (ref 0.1–1.5)
BUN SERPL-MCNC: 19 MG/DL (ref 8–22)
CALCIUM ALBUM COR SERPL-MCNC: 9.3 MG/DL (ref 8.5–10.5)
CALCIUM SERPL-MCNC: 9.1 MG/DL (ref 8.5–10.5)
CHLORIDE SERPL-SCNC: 104 MMOL/L (ref 96–112)
CO2 SERPL-SCNC: 24 MMOL/L (ref 20–33)
CREAT SERPL-MCNC: 0.81 MG/DL (ref 0.5–1.4)
EKG IMPRESSION: NORMAL
EOSINOPHIL # BLD AUTO: 0.04 K/UL (ref 0–0.51)
EOSINOPHIL NFR BLD: 0.7 % (ref 0–6.9)
ERYTHROCYTE [DISTWIDTH] IN BLOOD BY AUTOMATED COUNT: 49.5 FL (ref 35.9–50)
GFR SERPLBLD CREATININE-BSD FMLA CKD-EPI: 73 ML/MIN/1.73 M 2
GLOBULIN SER CALC-MCNC: 2.7 G/DL (ref 1.9–3.5)
GLUCOSE SERPL-MCNC: 111 MG/DL (ref 65–99)
HCT VFR BLD AUTO: 40.6 % (ref 37–47)
HGB BLD-MCNC: 13.9 G/DL (ref 12–16)
IMM GRANULOCYTES # BLD AUTO: 0.04 K/UL (ref 0–0.11)
IMM GRANULOCYTES NFR BLD AUTO: 0.7 % (ref 0–0.9)
LYMPHOCYTES # BLD AUTO: 1.12 K/UL (ref 1–4.8)
LYMPHOCYTES NFR BLD: 19 % (ref 22–41)
MCH RBC QN AUTO: 33.6 PG (ref 27–33)
MCHC RBC AUTO-ENTMCNC: 34.2 G/DL (ref 32.2–35.5)
MCV RBC AUTO: 98.1 FL (ref 81.4–97.8)
MONOCYTES # BLD AUTO: 0.43 K/UL (ref 0–0.85)
MONOCYTES NFR BLD AUTO: 7.3 % (ref 0–13.4)
NEUTROPHILS # BLD AUTO: 4.23 K/UL (ref 1.82–7.42)
NEUTROPHILS NFR BLD: 72 % (ref 44–72)
NRBC # BLD AUTO: 0 K/UL
NRBC BLD-RTO: 0 /100 WBC (ref 0–0.2)
PLATELET # BLD AUTO: 178 K/UL (ref 164–446)
PMV BLD AUTO: 9.5 FL (ref 9–12.9)
POTASSIUM SERPL-SCNC: 4.1 MMOL/L (ref 3.6–5.5)
PROT SERPL-MCNC: 6.4 G/DL (ref 6–8.2)
RBC # BLD AUTO: 4.14 M/UL (ref 4.2–5.4)
SODIUM SERPL-SCNC: 139 MMOL/L (ref 135–145)
TROPONIN T SERPL-MCNC: 9 NG/L (ref 6–19)
WBC # BLD AUTO: 5.9 K/UL (ref 4.8–10.8)

## 2024-06-16 PROCEDURE — 85025 COMPLETE CBC W/AUTO DIFF WBC: CPT

## 2024-06-16 PROCEDURE — 99285 EMERGENCY DEPT VISIT HI MDM: CPT

## 2024-06-16 PROCEDURE — 96374 THER/PROPH/DIAG INJ IV PUSH: CPT

## 2024-06-16 PROCEDURE — 700105 HCHG RX REV CODE 258: Performed by: EMERGENCY MEDICINE

## 2024-06-16 PROCEDURE — 70450 CT HEAD/BRAIN W/O DYE: CPT

## 2024-06-16 PROCEDURE — 80053 COMPREHEN METABOLIC PANEL: CPT

## 2024-06-16 PROCEDURE — 700111 HCHG RX REV CODE 636 W/ 250 OVERRIDE (IP): Performed by: EMERGENCY MEDICINE

## 2024-06-16 PROCEDURE — 36415 COLL VENOUS BLD VENIPUNCTURE: CPT

## 2024-06-16 PROCEDURE — 96375 TX/PRO/DX INJ NEW DRUG ADDON: CPT

## 2024-06-16 PROCEDURE — 93005 ELECTROCARDIOGRAM TRACING: CPT | Performed by: EMERGENCY MEDICINE

## 2024-06-16 PROCEDURE — 71045 X-RAY EXAM CHEST 1 VIEW: CPT

## 2024-06-16 PROCEDURE — 84484 ASSAY OF TROPONIN QUANT: CPT

## 2024-06-16 RX ORDER — ACETAMINOPHEN 10 MG/ML
1000 INJECTION, SOLUTION INTRAVENOUS ONCE
Status: COMPLETED | OUTPATIENT
Start: 2024-06-16 | End: 2024-06-16

## 2024-06-16 RX ORDER — SODIUM CHLORIDE 9 MG/ML
1000 INJECTION, SOLUTION INTRAVENOUS ONCE
Status: COMPLETED | OUTPATIENT
Start: 2024-06-16 | End: 2024-06-16

## 2024-06-16 RX ORDER — ONDANSETRON 2 MG/ML
4 INJECTION INTRAMUSCULAR; INTRAVENOUS ONCE
Status: COMPLETED | OUTPATIENT
Start: 2024-06-16 | End: 2024-06-16

## 2024-06-16 RX ADMIN — ACETAMINOPHEN 1000 MG: 1000 INJECTION INTRAVENOUS at 16:00

## 2024-06-16 RX ADMIN — SODIUM CHLORIDE 1000 ML: 9 INJECTION, SOLUTION INTRAVENOUS at 15:30

## 2024-06-16 RX ADMIN — ONDANSETRON 4 MG: 2 INJECTION INTRAMUSCULAR; INTRAVENOUS at 16:00

## 2024-06-16 RX ADMIN — SODIUM CHLORIDE 1000 ML: 9 INJECTION, SOLUTION INTRAVENOUS at 16:45

## 2024-06-16 ASSESSMENT — FIBROSIS 4 INDEX: FIB4 SCORE: 1.97

## 2024-06-16 NOTE — ED PROVIDER NOTES
"ED Provider Note    CHIEF COMPLAINT  Chief Complaint   Patient presents with    Dizziness     Pt report she felt dizzy while sitting down today, and vomited. Pt was given 4mg zofran by EMS PTA. Pt now complaining of headache 6/10.        EXTERNAL RECORDS REVIEWED  5/20/2024, outpatient cardiology note, mitral valve repair, paroxysmal atrial fibrillation.  Echocardiogram revealing normal left ventricular systolic function    HPI/ROS    Shaista Bocanegra is a 79 y.o. female who presents via EMS for evaluation of near syncope.  She was watching some Netflix, felt lightheaded and then vomited.  The patient states this has happened to her multiple times before, multiple ER evaluations in the past couple of years with 1 hospitalization for the same.  She reports every time to tell her she is dehydrated.  She states that she usually tries to focus on her hydration, yesterday she thinks she \"slipped up\" and did not drink enough water.  Then she vomited today.  No diarrhea.  No abdominal pain or chest pain.  She reports feeling a little bit improved after being treated with medication on the ambulance.  Has a headache, no focal weakness numbness or tingling.    PAST MEDICAL HISTORY   has a past medical history of Acquired circulating anticoagulants ([facility]), Anemia (09/28/2021), Arthritis, Atrial fibrillation ([facility]), Benign essential HTN (03/19/2012), Breast cancer ([facility]), Cancer ([facility]) (1998), Cardiac arrhythmia, Chest tightness or pressure (03/19/2012), Chickenpox, Coronary heart disease, DCM (dilated cardiomyopathy) ([facility]) (08/24/2022), Swedish measles, Gynecological disorder, High cholesterol, High risk medication use (03/19/2012), Hypercholesterolemia (03/19/2012), Mumps, MVP (mitral valve prolapse) (03/19/2012), Osteoporosis, Preventative health care (05/21/2020), Recurrent UTI (09/19/2023), Seasonal allergic rhinitis due to pollen (04/26/2023), Seizure disorder ([facility]) (09/28/2021), Sleep apnea, Snoring, Stroke ([facility]) " "(2017), Substance abuse (HCC), Tonsillitis, Urinary bladder disorder, Urinary frequency (04/26/2023), Urinary incontinence, Valvular heart disease, Venereal disease, and Volume depletion and dehydration (04/04/2023).    SURGICAL HISTORY   has a past surgical history that includes cataract phaco with iol (1/26/2009); cataract phaco with iol (3/16/2009); breast biopsy; radiation therapy plan simple; chemotherapy, unspecified procedure; lumpectomy; remv 2nd cataract,corn-scler sectn; hysterectomy laparoscopy; sinuscope; tonsillectomy; and primary c section.    FAMILY HISTORY  Family History   Problem Relation Age of Onset    Cancer Mother         breast    No Known Problems Brother     Heart Failure Neg Hx     Heart Disease Neg Hx        SOCIAL HISTORY  Social History     Tobacco Use    Smoking status: Never     Passive exposure: Never    Smokeless tobacco: Never   Vaping Use    Vaping status: Never Used   Substance and Sexual Activity    Alcohol use: Yes     Alcohol/week: 1.2 oz     Types: 2 Glasses of wine per week     Comment: twice a week    Drug use: No    Sexual activity: Yes     Partners: Male     Birth control/protection: Female Sterilization       CURRENT MEDICATIONS  Home Medications       Reviewed by Efren Pressley R.N. (Registered Nurse) on 06/16/24 at 1515  Med List Status: Partial     Medication Last Dose Status   atorvastatin (LIPITOR) 80 MG tablet  Active   cetirizine (ZYRTEC) 10 MG Tab  Active   cyanocobalamin (VITAMIN B12) 1000 MCG Tab  Active   ELIQUIS 5 MG Tab  Active   estradiol (ESTRACE) 0.1 MG/GM vaginal cream  Active   Mirabegron ER 50 MG TABLET SR 24 HR  Active   Multiple Vitamins-Minerals (MULTIVITAMIN WOMEN 50+ PO)  Active   spironolactone (ALDACTONE) 25 MG Tab  Active   tamsulosin (FLOMAX) 0.4 MG capsule  Active                    ALLERGIES  No Known Allergies    PHYSICAL EXAM  VITAL SIGNS: BP (!) 144/70   Pulse (!) 54   Temp 35.8 °C (96.5 °F) (Temporal)   Resp 16   Ht 1.676 m (5' 6\")  "  Wt 72.6 kg (160 lb)   SpO2 98%   BMI 25.82 kg/m²    Constitutional: Well appearing patient in no acute distress.  Awake and alert, not toxic nor ill in appearance.  HENT: Dry mucous membranes.  Normocephalic, no obvious evidence of acute trauma.  Eyes: No scleral icterus. Normal conjunctiva   Thorax & Lungs: Normal nonlabored respirations. I appreciate no wheezing, rhonchi or rales. There is normal air movement.  Upon cardiac ascultation I appreciate an irregular rhythm with a normal rate  Abdomen: The abdomen is not visibly distended. Upon palpation, I find it to be without tenderness.  No mass appreciated.  Skin: The exposed portions of skin reveal no obvious rash or other abnormalities.  Extremities/Musculoskeletal: No obvious sign of acute trauma. No asymmetric calf tenderness or edema.   Neurologic: Alert & oriented. No focal deficits observed.      EKG/LABS  Results for orders placed or performed during the hospital encounter of 06/16/24   CBC WITH DIFFERENTIAL   Result Value Ref Range    WBC 5.9 4.8 - 10.8 K/uL    RBC 4.14 (L) 4.20 - 5.40 M/uL    Hemoglobin 13.9 12.0 - 16.0 g/dL    Hematocrit 40.6 37.0 - 47.0 %    MCV 98.1 (H) 81.4 - 97.8 fL    MCH 33.6 (H) 27.0 - 33.0 pg    MCHC 34.2 32.2 - 35.5 g/dL    RDW 49.5 35.9 - 50.0 fL    Platelet Count 178 164 - 446 K/uL    MPV 9.5 9.0 - 12.9 fL    Neutrophils-Polys 72.00 44.00 - 72.00 %    Lymphocytes 19.00 (L) 22.00 - 41.00 %    Monocytes 7.30 0.00 - 13.40 %    Eosinophils 0.70 0.00 - 6.90 %    Basophils 0.30 0.00 - 1.80 %    Immature Granulocytes 0.70 0.00 - 0.90 %    Nucleated RBC 0.00 0.00 - 0.20 /100 WBC    Neutrophils (Absolute) 4.23 1.82 - 7.42 K/uL    Lymphs (Absolute) 1.12 1.00 - 4.80 K/uL    Monos (Absolute) 0.43 0.00 - 0.85 K/uL    Eos (Absolute) 0.04 0.00 - 0.51 K/uL    Baso (Absolute) 0.02 0.00 - 0.12 K/uL    Immature Granulocytes (abs) 0.04 0.00 - 0.11 K/uL    NRBC (Absolute) 0.00 K/uL   COMP METABOLIC PANEL   Result Value Ref Range    Sodium 139  135 - 145 mmol/L    Potassium 4.1 3.6 - 5.5 mmol/L    Chloride 104 96 - 112 mmol/L    Co2 24 20 - 33 mmol/L    Anion Gap 11.0 7.0 - 16.0    Glucose 111 (H) 65 - 99 mg/dL    Bun 19 8 - 22 mg/dL    Creatinine 0.81 0.50 - 1.40 mg/dL    Calcium 9.1 8.5 - 10.5 mg/dL    Correct Calcium 9.3 8.5 - 10.5 mg/dL    AST(SGOT) 24 12 - 45 U/L    ALT(SGPT) 24 2 - 50 U/L    Alkaline Phosphatase 64 30 - 99 U/L    Total Bilirubin 0.5 0.1 - 1.5 mg/dL    Albumin 3.7 3.2 - 4.9 g/dL    Total Protein 6.4 6.0 - 8.2 g/dL    Globulin 2.7 1.9 - 3.5 g/dL    A-G Ratio 1.4 g/dL   TROPONIN   Result Value Ref Range    Troponin T 9 6 - 19 ng/L   ESTIMATED GFR   Result Value Ref Range    GFR (CKD-EPI) 73 >60 mL/min/1.73 m 2   EKG   Result Value Ref Range    Report       [facility] Emergency Dept.    Test Date:  2024  Pt Name:    HORACE GUZMAN                  Department: ER  MRN:        [id number]                      Room:        18  Gender:     Female                       Technician: [id number]  :        [id number]                   Requested By:ER TRIAGE PROTOCOL  Order #:    [id number]                    Reading MD: KEVEN MASON MD    Measurements  Intervals                                Axis  Rate:       61                           P:          0  AR:         0                            QRS:        83  QRSD:       183                          T:          60  QT:         512  QTc:        516    Interpretive Statements  Atrial flutter, rate of 61, narrow QRS, upright T waves.  No ST segment  deviation.  My impression of this EKG: No acute ischemia  Electronically Signed On 2024 15:48:34 PDT by KEVEN MASON MD        I have independently interpreted this EKG    RADIOLOGY/PROCEDURES   I have independently interpreted the diagnostic imaging associated with this visit and am waiting the final reading from the radiologist.   My preliminary interpretation is as follows: No ICH    Radiologist  interpretation:  CT-HEAD W/O   Final Result      1.  Pneumocephalus present in the right facial soft tissues and lesser extent subarachnoid spaces      2. Brain shows white matter change and volume loss without hemorrhage      3. Right frontotemporal encephalomalacia      DX-CHEST-PORTABLE (1 VIEW)   Final Result      No acute cardiopulmonary abnormality identified.          COURSE & MEDICAL DECISION MAKING    ASSESSMENT, COURSE AND PLAN  Care Narrative: 79-year-old female with lightheadedness, reoccurring problem for her, feeling better after Zofran on the ambulance.  She did vomit this morning.  Decreased oral intake yesterday.  She does appear dehydrated on exam.  Her EKG reveals atrial flutter, she does have a history of atrial fibrillation on Eliquis.  No other abnormalities appreciated on exam.  Will administer IV fluids and another dose of Zofran.  Will check blood work.  She will be reevaluated    Blood work looks great.  Upon reevaluation she is improved but still has some lightheadedness.  The patient is requesting a CAT scan of her head, reports that she has had some memory issues recently, she also has a headache.  IV acetaminophen, head CT will be obtained    CT scan reveals some pneumocephalus without evidence of facial fracture.  No ICH.  I discussed the case with Dr. Rosales, neurosurgeon, he would have the patient follow-up with him but no need for antibiotics or further intervention at this time.  The patient is again reevaluated, she is now feeling better.  Discharged home in stable condition with strict return instructions provided to the patient and her son      DISPOSITION AND DISCUSSIONS    Escalation of care considered, and ultimately not performed: I did consider escalation to inpatient hospitalization given the pneumocephalus but after consultation with the on-call neurosurgeon, hospitalization is not indicated      Decision tools and prescription drugs considered including, but not limited  to: I considered prescription for prophylactic antibiotics but after consultation with the on-call neurosurgeon antibiotics not indicated.    FINAL DIAGNOSIS  1. Near syncope    2. Pneumocephalus           Electronically signed by: Diallo Villalobos M.D., 6/16/2024 3:19 PM

## 2024-06-16 NOTE — ED TRIAGE NOTES
Chief Complaint   Patient presents with    Dizziness     Pt report she felt dizzy while sitting down today, and vomited. Pt was given 4mg zofran by EMS PTA. Pt now complaining of headache 6/10.      Pt BIB EMS for above complaint from home.

## 2024-06-17 NOTE — ED NOTES
Pt provided discharge instructions. Pt verbalized understanding of all instructions. IV removed, cathlon intact, site without s/s of infection. Pt to lobby via wheelchair.

## 2024-06-24 DIAGNOSIS — I48.91 ATRIAL FIBRILLATION, UNSPECIFIED TYPE (HCC): ICD-10-CM

## 2024-06-25 ENCOUNTER — OFFICE VISIT (OUTPATIENT)
Dept: URGENT CARE | Facility: CLINIC | Age: 79
End: 2024-06-25
Payer: MEDICARE

## 2024-06-25 VITALS
WEIGHT: 162 LBS | HEIGHT: 66 IN | RESPIRATION RATE: 16 BRPM | OXYGEN SATURATION: 95 % | HEART RATE: 116 BPM | DIASTOLIC BLOOD PRESSURE: 74 MMHG | SYSTOLIC BLOOD PRESSURE: 122 MMHG | TEMPERATURE: 98.3 F | BODY MASS INDEX: 26.03 KG/M2

## 2024-06-25 DIAGNOSIS — Z85.3 HISTORY OF BREAST CANCER: ICD-10-CM

## 2024-06-25 DIAGNOSIS — N63.10 MASS OF RIGHT BREAST, UNSPECIFIED QUADRANT: ICD-10-CM

## 2024-06-25 PROCEDURE — 3074F SYST BP LT 130 MM HG: CPT | Performed by: PHYSICIAN ASSISTANT

## 2024-06-25 PROCEDURE — 3078F DIAST BP <80 MM HG: CPT | Performed by: PHYSICIAN ASSISTANT

## 2024-06-25 PROCEDURE — 99213 OFFICE O/P EST LOW 20 MIN: CPT | Performed by: PHYSICIAN ASSISTANT

## 2024-06-25 RX ORDER — APIXABAN 5 MG/1
5 TABLET, FILM COATED ORAL 2 TIMES DAILY
Qty: 180 TABLET | Refills: 3 | Status: SHIPPED | OUTPATIENT
Start: 2024-06-25

## 2024-06-25 ASSESSMENT — ENCOUNTER SYMPTOMS
CHILLS: 0
ROS SKIN COMMENTS: RIGHT BREAST MASS
BREAST MASS: 1
FEVER: 0

## 2024-06-25 ASSESSMENT — FIBROSIS 4 INDEX: FIB4 SCORE: 2.23

## 2024-06-25 NOTE — PROGRESS NOTES
Subjective:     Shaista Bocanegra  is a 79 y.o. female who presents for Breast Mass (Pt states there is something growing on R breast, x few days )      Breast Mass  Pertinent negatives include no chills or fever.   Patient presents to urgent care noting a history of breast cancer and appreciation of a breast mass on right breast over the last 1 week.  She denies much evolution or change in palpable mass in the right breast over the last 1 week.  She does have a history of lumpectomy and masses adjacent to prior scar.  She denies other symptoms.  Denies fever or chills.  She did reach out to her primary care and was instructed to present to urgent care or ER.    Review of Systems   Constitutional:  Negative for chills and fever.   Skin:         Right breast mass       Medications:    atorvastatin  cetirizine Tabs  cyanocobalamin Tabs  Eliquis Tabs  estradiol  Mirabegron ER Tb24  MULTIVITAMIN WOMEN 50+ PO  spironolactone Tabs  tamsulosin    Allergies: Patient has no known allergies.    Problem List: Shaista Bocanegra does not have any pertinent problems on file.    Surgical History:  Past Surgical History:   Procedure Laterality Date    CATARACT PHACO WITH IOL  3/16/2009    Performed by SHANNON GANDARA at [facility]    CATARACT PHACO WITH IOL  1/26/2009    Performed by SHANNON GANDARA at [facility]    BREAST BIOPSY      HYSTERECTOMY LAPAROSCOPY      LUMPECTOMY      SD CHEMOTHERAPY, UNSPECIFIED PROCEDURE      SD RADIATION THERAPY PLAN SIMPLE      SD REMV 2ND CATARACT,CORN-SCLER SECTN      PRIMARY C SECTION      SINUSCOPE      TONSILLECTOMY         Past Social Hx: Shaista Bocanegra  reports that she has never smoked. She has never been exposed to tobacco smoke. She has never used smokeless tobacco. She reports current alcohol use of about 1.2 oz of alcohol per week. She reports that she does not use drugs.     Past Family Hx:  Shaista Bocanegra family history includes Cancer in her  "mother; No Known Problems in her brother.     Problem list, medications, and allergies reviewed by myself today in Epic.     Objective:   /74   Pulse (!) 116   Temp 36.8 °C (98.3 °F)   Resp 16   Ht 1.676 m (5' 6\")   Wt 73.5 kg (162 lb)   SpO2 95%   BMI 26.15 kg/m²     Physical Exam  Vitals and nursing note reviewed.   Constitutional:       General: She is not in acute distress.     Appearance: She is well-developed. She is not diaphoretic.   HENT:      Head: Normocephalic and atraumatic.      Right Ear: External ear normal.      Left Ear: External ear normal.      Nose: Nose normal.   Eyes:      General: Lids are normal. No scleral icterus.        Right eye: No discharge.         Left eye: No discharge.      Conjunctiva/sclera: Conjunctivae normal.   Pulmonary:      Effort: Pulmonary effort is normal. No respiratory distress.   Chest:       Musculoskeletal:         General: Normal range of motion.      Cervical back: Neck supple.   Skin:     General: Skin is warm and dry.      Coloration: Skin is not pale.      Findings: No erythema.   Neurological:      Mental Status: She is alert and oriented to person, place, and time. She is not disoriented.   Psychiatric:         Speech: Speech normal.         Behavior: Behavior normal.       Assessment/Plan:   Assessment      1. Mass of right breast, unspecified quadrant    2. History of breast cancer  Breast ultrasound is ordered.  Next available time slot happens to be July 3 on Wednesday the patient apparently already has a scheduled ultrasound to evaluate right breast.  F/u w/ PCP    I have worn an N95 mask, gloves and eye protection for the entire encounter with this patient.     Differential diagnosis, natural history, supportive care, and indications for immediate follow-up discussed.    "

## 2024-06-27 DIAGNOSIS — I48.91 ATRIAL FIBRILLATION, UNSPECIFIED TYPE (HCC): ICD-10-CM

## 2024-06-27 RX ORDER — APIXABAN 5 MG/1
5 TABLET, FILM COATED ORAL 2 TIMES DAILY
Qty: 180 TABLET | Refills: 1 | OUTPATIENT
Start: 2024-06-27

## 2024-06-28 ENCOUNTER — TELEPHONE (OUTPATIENT)
Dept: SLEEP MEDICINE | Facility: MEDICAL CENTER | Age: 79
End: 2024-06-28
Payer: MEDICARE

## 2024-07-01 ENCOUNTER — APPOINTMENT (OUTPATIENT)
Dept: SLEEP MEDICINE | Facility: MEDICAL CENTER | Age: 79
End: 2024-07-01
Attending: PHYSICIAN ASSISTANT
Payer: MEDICARE

## 2024-07-01 VITALS
HEART RATE: 82 BPM | SYSTOLIC BLOOD PRESSURE: 112 MMHG | BODY MASS INDEX: 25.55 KG/M2 | DIASTOLIC BLOOD PRESSURE: 70 MMHG | RESPIRATION RATE: 14 BRPM | OXYGEN SATURATION: 91 % | WEIGHT: 159 LBS | HEIGHT: 66 IN

## 2024-07-01 DIAGNOSIS — G47.33 OSA (OBSTRUCTIVE SLEEP APNEA): ICD-10-CM

## 2024-07-01 PROCEDURE — 3074F SYST BP LT 130 MM HG: CPT | Performed by: PHYSICIAN ASSISTANT

## 2024-07-01 PROCEDURE — 99213 OFFICE O/P EST LOW 20 MIN: CPT | Performed by: PHYSICIAN ASSISTANT

## 2024-07-01 PROCEDURE — 3078F DIAST BP <80 MM HG: CPT | Performed by: PHYSICIAN ASSISTANT

## 2024-07-01 PROCEDURE — 99212 OFFICE O/P EST SF 10 MIN: CPT | Performed by: PHYSICIAN ASSISTANT

## 2024-07-01 ASSESSMENT — ENCOUNTER SYMPTOMS
TREMORS: 0
FEVER: 0
HEARTBURN: 0
INSOMNIA: 1
SINUS PAIN: 0
SORE THROAT: 0
PALPITATIONS: 0
COUGH: 0
DIZZINESS: 1
WHEEZING: 0
SPUTUM PRODUCTION: 0
CHILLS: 0
HEADACHES: 0
ORTHOPNEA: 0
WEIGHT LOSS: 0
ROS GI COMMENTS: NO DENTURES, NO MISSING TEETH, NO SWALLOWING ISSUES
SHORTNESS OF BREATH: 0

## 2024-07-01 ASSESSMENT — FIBROSIS 4 INDEX: FIB4 SCORE: 2.23

## 2024-07-03 ENCOUNTER — HOSPITAL ENCOUNTER (OUTPATIENT)
Dept: RADIOLOGY | Facility: MEDICAL CENTER | Age: 79
End: 2024-07-03
Payer: MEDICARE

## 2024-07-03 DIAGNOSIS — N64.4 BREAST PAIN, LEFT: ICD-10-CM

## 2024-07-03 DIAGNOSIS — N64.4 BREAST PAIN, RIGHT: ICD-10-CM

## 2024-07-03 PROCEDURE — 76642 ULTRASOUND BREAST LIMITED: CPT | Mod: RT

## 2024-07-08 DIAGNOSIS — Z98.890 S/P MITRAL VALVE REPAIR: ICD-10-CM

## 2024-07-08 RX ORDER — SPIRONOLACTONE 25 MG/1
25 TABLET ORAL DAILY
Qty: 90 TABLET | Refills: 3 | Status: SHIPPED | OUTPATIENT
Start: 2024-07-08

## 2024-08-15 NOTE — PROGRESS NOTES
Quality 226: Preventive Care And Screening: Tobacco Use: Screening And Cessation Intervention: Patient screened for tobacco use and is an ex/non-smoker Subjective:   Date of Consultation:6/6/2018  9:08 AM  Primary care physician:Bethany Crane M.D.        Reason for Consultation:  Ms. Bocanegra  is a pleasant 71 y.o. year old female who presents for follow-up of osteopenia    Osteopenia  She first presented to Dr. Ward with last bone density in March 2012 with T score of -2.3 at the left femoral neck of the hip with no previous fractures.  She was previously on anti-resorptive drug for a few years.  She reports that it was 5 years starting around 1998.  She was last seen in 2/29/2016 for follow-up.  At the time she was taking calcium and vitamin D.  She denies falls.  At the last visit she had started fosamax in August 2016 and doing well    Her bone density in 1/21/2016 showed   The mean bone mineral density for the lumbar spine is 1.058 g/cm2, which corresponds to a T score of -1.0 and a Z score of 0.7.  The proximal left femur has a mean bone mineral density of 0.795 g/cm2, with a T score of -1.7 and a Z score of -0.1.When compared with the most recent study dated 3/5/2014, there has been a 3.0% decrease in the bone mineral density of the lumbar spine and a 0.9% increase in the bone mineral density of the left femur.    She started alendronate about August 2016 however the prescription was written to start 6/8/2016 and stopped in 2017.  However she cracked her tooth had to stop alendronate therapy.  She has been off since.    Vitamin D 5/2015 from [facility] at 75.8  Now she is taking daily vitamin D    She remains off therapy     She did repeat her DEXA in 5/2018  The mean bone mineral density for the lumbar spine is 0.975 g/cm2, which corresponds to a T score of -1.7 and a Z score of 0.0.  The proximal left femur has a mean bone mineral density of 0.749 g/cm2, with a T score of -2.1 and a Z score of -0.3.  When compared with the most recent study dated 2/5/2016, there has been an 8.0% decrease in the bone mineral density of the lumbar spine and a 5.8%  Quality 431: Preventive Care And Screening: Unhealthy Alcohol Use - Screening: Patient not identified as an unhealthy alcohol user when screened for unhealthy alcohol use using a systematic screening method decrease in the bone mineral density of the left femur.  According to the World Health Organization classification, bone mineral density of this patient is osteopenia with increased risk of fracture. Percentage decrease in bone mineral density is statistically significant.  10-year Probability of Fracture:  Major Osteoporotic     20.1%  Hip     5.6%  Population      [place] ()  Based on left femur neck BMD    FRAX score again suggests she would benefit from treatment of osteoporosis.  We have to wait until her bone graft is healed.    She takes calcium and vitamin D.  SHe is working on weight bearing exercises    broken tooth  Had a bone graft  Will get flipper next week  Implant in 4 months  Thus far healing well.  No complications  No signs of osteonecrosis of jaw    Abnormal LFT  She does drink 2-3 alcoholic drinks a day  She had labs done recently at [facility]  Not repeated since November    Renal insufficiency  She states that as long as she hydrates before labs her labs are fine.  She had labs done recently at [facility] demonstrated a creatinine of 1.6 which is reasonable.   We have not repeated labs since November    History of breast cancer treated with radiation therapy    History of R MCA stroke 8/2017  Physical therapy helped  Working on her exercises      Insomnia  She has a CPAP  Able to fall asleep   No able to stay asleep      Past Medical History: she has a left wrist fracture as a result of fall while rollerskating.    Family History: hip fracture mom had in her late 80's    Past Medical History:   Diagnosis Date   • Benign essential HTN 3/19/2012   • Breast cancer (HCC)    • Cancer (HCC)    • Chest tightness or pressure 3/19/2012   • High risk medication use 3/19/2012   • Hypercholesterolemia 3/19/2012   • MVP (mitral valve prolapse) 3/19/2012   • Renal insufficiency 3/19/2012   • Stroke (HCC)      Past Surgical History:   Procedure Laterality Date   • CATARACT PHACO WITH IOL  3/16/2009     Performed by SHANNON GANDARA at SURGERY [facility] DAY [facility]   • CATARACT PHACO WITH IOL  1/26/2009    Performed by SHANNON GANDARA at SURGERY [facility] DAY [facility]   • BREAST BIOPSY     • LUMPECTOMY     • PB RADIATION THERAPY PLAN SIMPLE     • MD CHEMOTHERAPY, UNSPECIFIED PROCEDURE       No Known Allergies  Outpatient Encounter Prescriptions as of 6/6/2018   Medication Sig Dispense Refill   • triamterene/hctz (MAXZIDE-25/DYAZIDE) 37.5-25 MG Cap Take 1 Cap by mouth every morning.     • lisinopril (PRINIVIL) 20 MG Tab TAKE 1 TABLET DAILY 90 Tab 3   • metoprolol SR (TOPROL XL) 25 MG TABLET SR 24 HR Take 1 Tab by mouth every day. 90 Tab 4   • metoprolol SR (TOPROL XL) 50 MG TABLET SR 24 HR Take 1 Tab by mouth every day. 90 Tab 3   • atorvastatin (LIPITOR) 80 MG tablet Take 1 Tab by mouth every evening. 90 Tab 3   • Cholecalciferol (VITAMIN D3 PO) Take 2,000 Units by mouth every day.     • Cyanocobalamin (VITAMIN B-12 PO) Take 25,000 mcg by mouth every day.     • potassium chloride SA (K-DUR) 10 MEQ Tab CR Take 10 mEq by mouth every day.     • aspirin (ASA) 81 MG Chew Tab chewable tablet Take 1 Tab by mouth every day. 14 Tab 1   • valacyclovir (VALTREX) 500 MG Tab Take 500 mg by mouth every day.     • Multiple Vitamins-Minerals (ICAPS AREDS 2 PO) Take 1 Tab by mouth every day.     • calcium carbonate (OS-CRISTOFER 500) 500 MG Tab Take 1,000 mg by mouth every day.     • Multiple Vitamin (MULTIVITAMINS PO) Take 1 Tab by mouth every day.     • Doxepin HCl (SILENOR) 3 MG Tab Take 1-2 Tabs by mouth at bedtime as needed. 30 Tab 3     No facility-administered encounter medications on file as of 6/6/2018.      Social History     Social History   • Marital status:      Spouse name: N/A   • Number of children: N/A   • Years of education: N/A     Occupational History   • Not on file.     Social History Main Topics   • Smoking status: Never Smoker   • Smokeless tobacco: Never Used   • Alcohol use Yes      Comment: 3 glasses of  Quality 130: Documentation Of Current Medications In The Medical Record: Current Medications Documented Detail Level: Detailed wine   • Drug use: No   • Sexual activity: Not on file     Other Topics Concern   • Not on file     Social History Narrative   • No narrative on file      History   Smoking Status   • Never Smoker   Smokeless Tobacco   • Never Used     History   Alcohol Use   • Yes     Comment: 3 glasses of wine     History   Drug Use No      OB History   No data available      No LMP recorded. Patient has had a hysterectomy.    [facility]     Family History   Problem Relation Age of Onset   • Cancer Mother    • Heart Failure Neg Hx    • Heart Disease Neg Hx        Review of Systems   Constitutional: Negative for chills and fever.   Gastrointestinal: Negative for nausea and vomiting.   Musculoskeletal: Negative for joint pain and myalgias.   Psychiatric/Behavioral: The patient has insomnia.         Objective:   /80   Pulse 65   Temp 36.7 °C (98.1 °F)   Resp 16   Wt 62.8 kg (138 lb 6.4 oz)   SpO2 92%   BMI 21.68 kg/m²     Physical Exam   Constitutional: She is oriented to person, place, and time. She appears well-developed and well-nourished. No distress.   HENT:   Head: Normocephalic and atraumatic.   Right Ear: External ear normal.   Left Ear: External ear normal.   Missing tooth on the lower left jaw.  Gum tissue appears normal   Eyes: Conjunctivae are normal. Right eye exhibits no discharge. Left eye exhibits no discharge. No scleral icterus.   Pulmonary/Chest: No stridor. No respiratory distress.   Musculoskeletal:   No synovitis in the upper extremities   Neurological: She is alert and oriented to person, place, and time.   Can't appreciate the previously noted Mild left facial droop   Skin: Skin is warm. No rash noted. She is not diaphoretic. No erythema.   Limited skin exam   Psychiatric: She has a normal mood and affect. Her behavior is normal. Judgment and thought content normal.     Labs:    No results found for: QNTTBGOLD  No results found for: HEPBCORTOT, HEPBCORIGM, HEPBSAG  No results found for: HEPCAB  Lab  Results   Component Value Date/Time    SODIUM 144 11/30/2017 12:56 PM    SODIUM 140 08/28/2017 12:01 AM    POTASSIUM 3.6 11/30/2017 12:56 PM    POTASSIUM 3.0 (L) 08/28/2017 12:01 AM    CHLORIDE 104 11/30/2017 12:56 PM    CHLORIDE 104 08/28/2017 12:01 AM    CO2 24 11/30/2017 12:56 PM    CO2 28 08/28/2017 12:01 AM    GLUCOSE 89 11/30/2017 12:56 PM    GLUCOSE 108 (H) 08/28/2017 12:01 AM    BUN 21 11/30/2017 12:56 PM    BUN 23 (H) 08/28/2017 12:01 AM    CREATININE 0.90 11/30/2017 12:56 PM    CREATININE 0.88 08/28/2017 12:01 AM    BUNCREATRAT 23 11/30/2017 12:56 PM      Lab Results   Component Value Date/Time    WBC 5.3 08/27/2017 02:36 PM    RBC 3.72 (L) 08/27/2017 02:36 PM    HEMOGLOBIN 13.0 08/27/2017 02:36 PM    HEMATOCRIT 38.9 08/27/2017 02:36 PM    .6 (H) 08/27/2017 02:36 PM    MCH 34.9 (H) 08/27/2017 02:36 PM    MCHC 33.4 (L) 08/27/2017 02:36 PM    MPV 9.5 08/27/2017 02:36 PM    NEUTSPOLYS 65.10 08/27/2017 02:36 PM    LYMPHOCYTES 25.20 08/27/2017 02:36 PM    MONOCYTES 7.60 08/27/2017 02:36 PM    EOSINOPHILS 1.50 08/27/2017 02:36 PM    BASOPHILS 0.40 08/27/2017 02:36 PM      Lab Results   Component Value Date/Time    CALCIUM 9.3 11/30/2017 12:56 PM    CALCIUM 9.3 08/28/2017 12:01 AM    ASTSGOT 39 11/30/2017 12:56 PM    ASTSGOT 29 08/27/2017 02:36 PM    ALTSGPT 40 (H) 11/30/2017 12:56 PM    ALTSGPT 20 08/27/2017 02:36 PM    ALKPHOSPHAT 87 11/30/2017 12:56 PM    ALKPHOSPHAT 40 08/27/2017 02:36 PM    TBILIRUBIN 0.8 11/30/2017 12:56 PM    TBILIRUBIN 0.8 08/27/2017 02:36 PM    ALBUMIN 4.6 11/30/2017 12:56 PM    ALBUMIN 4.6 08/27/2017 02:36 PM    TOTPROTEIN 7.2 11/30/2017 12:56 PM    TOTPROTEIN 7.3 08/27/2017 02:36 PM       Assessment:     1. Osteopenia of left lower leg  VITAMIN D,25 HYDROXY    COMP METABOLIC PANEL   2. ALT (SGPT) level raised             Medical Decision Making:  Today's Assessment / Status / Plan:     Osteopenia  I would like to continue alendronate however as she may be need invasive dental  surgery we will hold this until she has had a chance to complete her surgery.  DEXA shows we need treatment.  We have to wait 4 months for th bone graft to heal.  Then we will consider alendronate or prolia.  Provided information for Prolia.  Would prefer to use the drug with the lowest risk of osteonecrosis of the jaw.    Started alendronate 8/2016 and now we will hold 5/2017    Hold bisphosphonate for now.      Cracked tooth  willl see dentist  Reports that she will need a possible impalnt.  Plan for surgery 2/2018    Renal insufficiency and abnormal LFT  I will check  Labs for LFT    1. Osteopenia of left lower leg  VITAMIN D,25 HYDROXY    COMP METABOLIC PANEL   2. ALT (SGPT) level raised             Return in about 5 months (around 11/6/2018).    I have spent greater than 50% of this 30 minute visit in counseling/coordination of care with the patient regarding discussion of risks of invasive dental surgery given that she has been on bisphosphonates, plan to hold bisphosphonates for now and the rationale and also a discussion that I want to follow up with her within a month and to keep up with the plan for any surgical intervention regarding her cracked tooth.    She agreed and verbalized her agreement and understanding with the current plan. I answered all questions and concerns she has at this time and advised her to call at any time in there interim with questions or concerns in regards to her care.    Thank you for allowing me to participate in her care, I will continue to follow closely.

## 2024-08-25 DIAGNOSIS — E78.49 OTHER HYPERLIPIDEMIA: ICD-10-CM

## 2024-08-26 RX ORDER — ATORVASTATIN CALCIUM 80 MG/1
80 TABLET, FILM COATED ORAL EVERY EVENING
Qty: 90 TABLET | Refills: 2 | Status: SHIPPED | OUTPATIENT
Start: 2024-08-26

## 2024-08-26 NOTE — TELEPHONE ENCOUNTER
Is the patient due for a refill? Yes    Was the patient seen the past year? Yes    Date of last office visit: 5.20.2024    Does the patient have an upcoming appointment?  No      Provider to refill:JI    Does the patient have detention Plus and need 100-day supply? (This applies to ALL medications) Patient does not have SCP

## 2024-08-30 ENCOUNTER — HOSPITAL ENCOUNTER (OUTPATIENT)
Dept: RADIOLOGY | Facility: MEDICAL CENTER | Age: 79
End: 2024-08-30
Attending: CLINICAL NURSE SPECIALIST
Payer: MEDICARE

## 2024-08-30 DIAGNOSIS — G93.89 OTHER SPECIFIED DISORDERS OF BRAIN: ICD-10-CM

## 2024-08-30 PROCEDURE — 70486 CT MAXILLOFACIAL W/O DYE: CPT

## 2024-09-04 PROBLEM — R33.9 URINARY RETENTION: Chronic | Status: RESOLVED | Noted: 2021-09-28 | Resolved: 2024-09-04

## 2024-09-04 PROBLEM — R33.9 URINARY RETENTION: Chronic | Status: ACTIVE | Noted: 2021-09-28

## 2024-10-02 ENCOUNTER — HOSPITAL ENCOUNTER (OUTPATIENT)
Dept: LAB | Facility: MEDICAL CENTER | Age: 79
End: 2024-10-02
Payer: MEDICARE

## 2024-10-02 DIAGNOSIS — R30.0 DYSURIA: ICD-10-CM

## 2024-10-02 DIAGNOSIS — N39.0 RECURRENT UTI: ICD-10-CM

## 2024-10-02 PROCEDURE — 81001 URINALYSIS AUTO W/SCOPE: CPT

## 2024-10-02 PROCEDURE — 87086 URINE CULTURE/COLONY COUNT: CPT

## 2024-10-04 ENCOUNTER — TELEPHONE (OUTPATIENT)
Dept: SLEEP MEDICINE | Facility: MEDICAL CENTER | Age: 79
End: 2024-10-04
Payer: MEDICARE

## 2024-10-04 LAB
BACTERIA UR CULT: NORMAL
SIGNIFICANT IND 70042: NORMAL
SITE SITE: NORMAL
SOURCE SOURCE: NORMAL

## 2024-10-07 ENCOUNTER — OFFICE VISIT (OUTPATIENT)
Dept: SLEEP MEDICINE | Facility: MEDICAL CENTER | Age: 79
End: 2024-10-07
Attending: PHYSICIAN ASSISTANT
Payer: MEDICARE

## 2024-10-07 VITALS
RESPIRATION RATE: 18 BRPM | BODY MASS INDEX: 25.71 KG/M2 | DIASTOLIC BLOOD PRESSURE: 76 MMHG | WEIGHT: 160 LBS | HEIGHT: 66 IN | SYSTOLIC BLOOD PRESSURE: 116 MMHG | OXYGEN SATURATION: 93 % | HEART RATE: 81 BPM

## 2024-10-07 DIAGNOSIS — G47.33 OSA (OBSTRUCTIVE SLEEP APNEA): ICD-10-CM

## 2024-10-07 PROCEDURE — 3078F DIAST BP <80 MM HG: CPT | Performed by: PHYSICIAN ASSISTANT

## 2024-10-07 PROCEDURE — 3074F SYST BP LT 130 MM HG: CPT | Performed by: PHYSICIAN ASSISTANT

## 2024-10-07 PROCEDURE — 99213 OFFICE O/P EST LOW 20 MIN: CPT | Performed by: PHYSICIAN ASSISTANT

## 2024-10-07 ASSESSMENT — FIBROSIS 4 INDEX: FIB4 SCORE: 2.23

## 2024-10-09 ASSESSMENT — ENCOUNTER SYMPTOMS
INSOMNIA: 1
WEIGHT LOSS: 0
PALPITATIONS: 0
SORE THROAT: 0
HEADACHES: 0
SINUS PAIN: 0
SPUTUM PRODUCTION: 0
COUGH: 0
DIZZINESS: 1
FEVER: 0
WHEEZING: 0
TREMORS: 0
HEARTBURN: 0
ORTHOPNEA: 0
SHORTNESS OF BREATH: 0
CHILLS: 0

## 2024-10-16 ENCOUNTER — APPOINTMENT (OUTPATIENT)
Dept: RADIOLOGY | Facility: MEDICAL CENTER | Age: 79
End: 2024-10-16
Attending: EMERGENCY MEDICINE
Payer: MEDICARE

## 2024-10-16 ENCOUNTER — HOSPITAL ENCOUNTER (OUTPATIENT)
Facility: MEDICAL CENTER | Age: 79
End: 2024-10-17
Attending: EMERGENCY MEDICINE | Admitting: HOSPITALIST
Payer: MEDICARE

## 2024-10-16 DIAGNOSIS — R55 SYNCOPE AND COLLAPSE: ICD-10-CM

## 2024-10-16 DIAGNOSIS — R55 SYNCOPE, UNSPECIFIED SYNCOPE TYPE: ICD-10-CM

## 2024-10-16 LAB
ANION GAP SERPL CALC-SCNC: 13 MMOL/L (ref 7–16)
APPEARANCE UR: CLEAR
BACTERIA #/AREA URNS HPF: NEGATIVE /HPF
BASOPHILS # BLD AUTO: 0.4 % (ref 0–1.8)
BASOPHILS # BLD: 0.02 K/UL (ref 0–0.12)
BILIRUB UR QL STRIP.AUTO: NEGATIVE
BUN SERPL-MCNC: 19 MG/DL (ref 8–22)
CALCIUM SERPL-MCNC: 9.1 MG/DL (ref 8.5–10.5)
CHLORIDE SERPL-SCNC: 106 MMOL/L (ref 96–112)
CO2 SERPL-SCNC: 22 MMOL/L (ref 20–33)
COLOR UR: YELLOW
CREAT SERPL-MCNC: 1 MG/DL (ref 0.5–1.4)
EKG IMPRESSION: NORMAL
EOSINOPHIL # BLD AUTO: 0.06 K/UL (ref 0–0.51)
EOSINOPHIL NFR BLD: 1.2 % (ref 0–6.9)
EPI CELLS #/AREA URNS HPF: ABNORMAL /HPF
ERYTHROCYTE [DISTWIDTH] IN BLOOD BY AUTOMATED COUNT: 50.4 FL (ref 35.9–50)
GFR SERPLBLD CREATININE-BSD FMLA CKD-EPI: 56 ML/MIN/1.73 M 2
GLUCOSE SERPL-MCNC: 116 MG/DL (ref 65–99)
GLUCOSE UR STRIP.AUTO-MCNC: NEGATIVE MG/DL
HCT VFR BLD AUTO: 40 % (ref 37–47)
HGB BLD-MCNC: 13.3 G/DL (ref 12–16)
HYALINE CASTS #/AREA URNS LPF: ABNORMAL /LPF
IMM GRANULOCYTES # BLD AUTO: 0.01 K/UL (ref 0–0.11)
IMM GRANULOCYTES NFR BLD AUTO: 0.2 % (ref 0–0.9)
KETONES UR STRIP.AUTO-MCNC: ABNORMAL MG/DL
LEUKOCYTE ESTERASE UR QL STRIP.AUTO: ABNORMAL
LYMPHOCYTES # BLD AUTO: 1.01 K/UL (ref 1–4.8)
LYMPHOCYTES NFR BLD: 20.7 % (ref 22–41)
MAGNESIUM SERPL-MCNC: 1.8 MG/DL (ref 1.5–2.5)
MCH RBC QN AUTO: 33.5 PG (ref 27–33)
MCHC RBC AUTO-ENTMCNC: 33.3 G/DL (ref 32.2–35.5)
MCV RBC AUTO: 100.8 FL (ref 81.4–97.8)
MICRO URNS: ABNORMAL
MONOCYTES # BLD AUTO: 0.44 K/UL (ref 0–0.85)
MONOCYTES NFR BLD AUTO: 9 % (ref 0–13.4)
MUCOUS THREADS #/AREA URNS HPF: ABNORMAL /HPF
NEUTROPHILS # BLD AUTO: 3.33 K/UL (ref 1.82–7.42)
NEUTROPHILS NFR BLD: 68.5 % (ref 44–72)
NITRITE UR QL STRIP.AUTO: NEGATIVE
NRBC # BLD AUTO: 0 K/UL
NRBC BLD-RTO: 0 /100 WBC (ref 0–0.2)
PH UR STRIP.AUTO: 6 [PH] (ref 5–8)
PLATELET # BLD AUTO: 169 K/UL (ref 164–446)
PMV BLD AUTO: 9.8 FL (ref 9–12.9)
POTASSIUM SERPL-SCNC: 3.9 MMOL/L (ref 3.6–5.5)
PROT UR QL STRIP: NEGATIVE MG/DL
RBC # BLD AUTO: 3.97 M/UL (ref 4.2–5.4)
RBC # URNS HPF: ABNORMAL /HPF
RBC UR QL AUTO: NEGATIVE
SODIUM SERPL-SCNC: 141 MMOL/L (ref 135–145)
SP GR UR STRIP.AUTO: 1.01
UROBILINOGEN UR STRIP.AUTO-MCNC: 0.2 MG/DL
WBC # BLD AUTO: 4.9 K/UL (ref 4.8–10.8)
WBC #/AREA URNS HPF: ABNORMAL /HPF

## 2024-10-16 PROCEDURE — 99223 1ST HOSP IP/OBS HIGH 75: CPT | Mod: AI | Performed by: HOSPITALIST

## 2024-10-16 PROCEDURE — G0378 HOSPITAL OBSERVATION PER HR: HCPCS

## 2024-10-16 PROCEDURE — 83735 ASSAY OF MAGNESIUM: CPT

## 2024-10-16 PROCEDURE — 93005 ELECTROCARDIOGRAM TRACING: CPT | Performed by: EMERGENCY MEDICINE

## 2024-10-16 PROCEDURE — 700102 HCHG RX REV CODE 250 W/ 637 OVERRIDE(OP): Performed by: HOSPITALIST

## 2024-10-16 PROCEDURE — 81001 URINALYSIS AUTO W/SCOPE: CPT

## 2024-10-16 PROCEDURE — A9270 NON-COVERED ITEM OR SERVICE: HCPCS | Performed by: HOSPITALIST

## 2024-10-16 PROCEDURE — 80048 BASIC METABOLIC PNL TOTAL CA: CPT

## 2024-10-16 PROCEDURE — A9270 NON-COVERED ITEM OR SERVICE: HCPCS

## 2024-10-16 PROCEDURE — 700102 HCHG RX REV CODE 250 W/ 637 OVERRIDE(OP)

## 2024-10-16 PROCEDURE — 93005 ELECTROCARDIOGRAM TRACING: CPT

## 2024-10-16 PROCEDURE — 85025 COMPLETE CBC W/AUTO DIFF WBC: CPT

## 2024-10-16 PROCEDURE — 36415 COLL VENOUS BLD VENIPUNCTURE: CPT

## 2024-10-16 PROCEDURE — 70450 CT HEAD/BRAIN W/O DYE: CPT

## 2024-10-16 PROCEDURE — 99285 EMERGENCY DEPT VISIT HI MDM: CPT

## 2024-10-16 RX ORDER — HYDRALAZINE HYDROCHLORIDE 20 MG/ML
10 INJECTION INTRAMUSCULAR; INTRAVENOUS EVERY 4 HOURS PRN
Status: DISCONTINUED | OUTPATIENT
Start: 2024-10-16 | End: 2024-10-17 | Stop reason: HOSPADM

## 2024-10-16 RX ORDER — POLYETHYLENE GLYCOL 3350 17 G/17G
1 POWDER, FOR SOLUTION ORAL
Status: DISCONTINUED | OUTPATIENT
Start: 2024-10-16 | End: 2024-10-17 | Stop reason: HOSPADM

## 2024-10-16 RX ORDER — TAMSULOSIN HYDROCHLORIDE 0.4 MG/1
0.4 CAPSULE ORAL 2 TIMES DAILY
Status: DISCONTINUED | OUTPATIENT
Start: 2024-10-16 | End: 2024-10-17 | Stop reason: HOSPADM

## 2024-10-16 RX ORDER — AMOXICILLIN 250 MG
2 CAPSULE ORAL EVERY EVENING
Status: DISCONTINUED | OUTPATIENT
Start: 2024-10-16 | End: 2024-10-17 | Stop reason: HOSPADM

## 2024-10-16 RX ORDER — ONDANSETRON 2 MG/ML
4 INJECTION INTRAMUSCULAR; INTRAVENOUS EVERY 4 HOURS PRN
Status: DISCONTINUED | OUTPATIENT
Start: 2024-10-16 | End: 2024-10-17 | Stop reason: HOSPADM

## 2024-10-16 RX ORDER — ACETAMINOPHEN 325 MG/1
650 TABLET ORAL EVERY 6 HOURS PRN
Status: DISCONTINUED | OUTPATIENT
Start: 2024-10-16 | End: 2024-10-17 | Stop reason: HOSPADM

## 2024-10-16 RX ORDER — NITROFURANTOIN 25; 75 MG/1; MG/1
100 CAPSULE ORAL 2 TIMES DAILY
Status: ON HOLD | COMMUNITY
Start: 2024-10-04 | End: 2024-10-17

## 2024-10-16 RX ORDER — ATORVASTATIN CALCIUM 80 MG/1
80 TABLET, FILM COATED ORAL EVERY EVENING
Status: DISCONTINUED | OUTPATIENT
Start: 2024-10-17 | End: 2024-10-17 | Stop reason: HOSPADM

## 2024-10-16 RX ORDER — ONDANSETRON 4 MG/1
4 TABLET, ORALLY DISINTEGRATING ORAL EVERY 4 HOURS PRN
Status: DISCONTINUED | OUTPATIENT
Start: 2024-10-16 | End: 2024-10-17 | Stop reason: HOSPADM

## 2024-10-16 RX ADMIN — TAMSULOSIN HYDROCHLORIDE 0.4 MG: 0.4 CAPSULE ORAL at 19:41

## 2024-10-16 RX ADMIN — Medication 5 MG: at 23:10

## 2024-10-16 RX ADMIN — APIXABAN 5 MG: 5 TABLET, FILM COATED ORAL at 19:41

## 2024-10-16 SDOH — ECONOMIC STABILITY: TRANSPORTATION INSECURITY
IN THE PAST 12 MONTHS, HAS LACK OF RELIABLE TRANSPORTATION KEPT YOU FROM MEDICAL APPOINTMENTS, MEETINGS, WORK OR FROM GETTING THINGS NEEDED FOR DAILY LIVING?: NO

## 2024-10-16 ASSESSMENT — SOCIAL DETERMINANTS OF HEALTH (SDOH)

## 2024-10-16 ASSESSMENT — ENCOUNTER SYMPTOMS
SPUTUM PRODUCTION: 0
VOMITING: 0
FEVER: 0
SPEECH CHANGE: 0
EYE PAIN: 0
CHILLS: 0
BACK PAIN: 0
MYALGIAS: 0
LOSS OF CONSCIOUSNESS: 1
NECK PAIN: 0
WEAKNESS: 0
CONSTIPATION: 0
COUGH: 0
SORE THROAT: 0
MEMORY LOSS: 1
WHEEZING: 0
HEMOPTYSIS: 0
ABDOMINAL PAIN: 0
DEPRESSION: 0
DIZZINESS: 0
DIARRHEA: 0
DIAPHORESIS: 0
BRUISES/BLEEDS EASILY: 0
FOCAL WEAKNESS: 0
HEADACHES: 0
SHORTNESS OF BREATH: 0
CLAUDICATION: 0
SENSORY CHANGE: 0
EYE DISCHARGE: 0
PALPITATIONS: 0
NAUSEA: 0

## 2024-10-16 ASSESSMENT — LIFESTYLE VARIABLES
EVER FELT BAD OR GUILTY ABOUT YOUR DRINKING: NO
SUBSTANCE_ABUSE: 0
HAVE PEOPLE ANNOYED YOU BY CRITICIZING YOUR DRINKING: NO
ON A TYPICAL DAY WHEN YOU DRINK ALCOHOL HOW MANY DRINKS DO YOU HAVE: 1
ALCOHOL_USE: YES
CONSUMPTION TOTAL: NEGATIVE
TOTAL SCORE: 0
DOES PATIENT WANT TO STOP DRINKING: NO
TOTAL SCORE: 0
AVERAGE NUMBER OF DAYS PER WEEK YOU HAVE A DRINK CONTAINING ALCOHOL: 7
TOTAL SCORE: 0
HAVE YOU EVER FELT YOU SHOULD CUT DOWN ON YOUR DRINKING: NO
EVER HAD A DRINK FIRST THING IN THE MORNING TO STEADY YOUR NERVES TO GET RID OF A HANGOVER: NO
HOW MANY TIMES IN THE PAST YEAR HAVE YOU HAD 5 OR MORE DRINKS IN A DAY: 0

## 2024-10-16 ASSESSMENT — FIBROSIS 4 INDEX
FIB4 SCORE: 2.35
FIB4 SCORE: 2.23

## 2024-10-16 ASSESSMENT — CHA2DS2 SCORE
VASCULAR DISEASE: NO
CHA2DS2 VASC SCORE: 6
AGE 75 OR GREATER: YES
SEX: FEMALE
HYPERTENSION: YES
AGE 65 TO 74: NO
DIABETES: NO
PRIOR STROKE OR TIA OR THROMBOEMBOLISM: YES
CHF OR LEFT VENTRICULAR DYSFUNCTION: NO

## 2024-10-16 ASSESSMENT — PAIN DESCRIPTION - PAIN TYPE: TYPE: ACUTE PAIN

## 2024-10-17 ENCOUNTER — HOME HEALTH ADMISSION (OUTPATIENT)
Dept: HOME HEALTH SERVICES | Facility: HOME HEALTHCARE | Age: 79
End: 2024-10-17
Payer: MEDICARE

## 2024-10-17 ENCOUNTER — APPOINTMENT (OUTPATIENT)
Dept: RADIOLOGY | Facility: MEDICAL CENTER | Age: 79
End: 2024-10-17
Attending: HOSPITALIST
Payer: MEDICARE

## 2024-10-17 ENCOUNTER — APPOINTMENT (OUTPATIENT)
Dept: CARDIOLOGY | Facility: MEDICAL CENTER | Age: 79
End: 2024-10-17
Attending: HOSPITALIST
Payer: MEDICARE

## 2024-10-17 VITALS
HEART RATE: 84 BPM | RESPIRATION RATE: 16 BRPM | DIASTOLIC BLOOD PRESSURE: 63 MMHG | SYSTOLIC BLOOD PRESSURE: 136 MMHG | WEIGHT: 162.26 LBS | OXYGEN SATURATION: 92 % | TEMPERATURE: 98.2 F | BODY MASS INDEX: 26.08 KG/M2 | HEIGHT: 66 IN

## 2024-10-17 LAB
ANION GAP SERPL CALC-SCNC: 14 MMOL/L (ref 7–16)
BASOPHILS # BLD AUTO: 0.2 % (ref 0–1.8)
BASOPHILS # BLD: 0.01 K/UL (ref 0–0.12)
BUN SERPL-MCNC: 22 MG/DL (ref 8–22)
CALCIUM SERPL-MCNC: 8.9 MG/DL (ref 8.5–10.5)
CHLORIDE SERPL-SCNC: 110 MMOL/L (ref 96–112)
CHOLEST SERPL-MCNC: 87 MG/DL (ref 100–199)
CO2 SERPL-SCNC: 21 MMOL/L (ref 20–33)
CREAT SERPL-MCNC: 0.77 MG/DL (ref 0.5–1.4)
EOSINOPHIL # BLD AUTO: 0.06 K/UL (ref 0–0.51)
EOSINOPHIL NFR BLD: 1.2 % (ref 0–6.9)
ERYTHROCYTE [DISTWIDTH] IN BLOOD BY AUTOMATED COUNT: 49.6 FL (ref 35.9–50)
EST. AVERAGE GLUCOSE BLD GHB EST-MCNC: 128 MG/DL
GFR SERPLBLD CREATININE-BSD FMLA CKD-EPI: 77 ML/MIN/1.73 M 2
GLUCOSE SERPL-MCNC: 136 MG/DL (ref 65–99)
HBA1C MFR BLD: 6.1 % (ref 4–5.6)
HCT VFR BLD AUTO: 37 % (ref 37–47)
HDLC SERPL-MCNC: 44 MG/DL
HGB BLD-MCNC: 12.6 G/DL (ref 12–16)
IMM GRANULOCYTES # BLD AUTO: 0.01 K/UL (ref 0–0.11)
IMM GRANULOCYTES NFR BLD AUTO: 0.2 % (ref 0–0.9)
LDLC SERPL CALC-MCNC: 37 MG/DL
LV EJECT FRACT  99904: 60
LV EJECT FRACT MOD 2C 99903: 54.62
LV EJECT FRACT MOD 4C 99902: 56.41
LYMPHOCYTES # BLD AUTO: 1.2 K/UL (ref 1–4.8)
LYMPHOCYTES NFR BLD: 23.7 % (ref 22–41)
MCH RBC QN AUTO: 33.3 PG (ref 27–33)
MCHC RBC AUTO-ENTMCNC: 34.1 G/DL (ref 32.2–35.5)
MCV RBC AUTO: 97.9 FL (ref 81.4–97.8)
MONOCYTES # BLD AUTO: 0.51 K/UL (ref 0–0.85)
MONOCYTES NFR BLD AUTO: 10.1 % (ref 0–13.4)
NEUTROPHILS # BLD AUTO: 3.27 K/UL (ref 1.82–7.42)
NEUTROPHILS NFR BLD: 64.6 % (ref 44–72)
NRBC # BLD AUTO: 0 K/UL
NRBC BLD-RTO: 0 /100 WBC (ref 0–0.2)
PLATELET # BLD AUTO: 164 K/UL (ref 164–446)
PMV BLD AUTO: 9.8 FL (ref 9–12.9)
POTASSIUM SERPL-SCNC: 3.9 MMOL/L (ref 3.6–5.5)
RBC # BLD AUTO: 3.78 M/UL (ref 4.2–5.4)
SODIUM SERPL-SCNC: 145 MMOL/L (ref 135–145)
TRIGL SERPL-MCNC: 32 MG/DL (ref 0–149)
TSH SERPL DL<=0.005 MIU/L-ACNC: 2.13 UIU/ML (ref 0.38–5.33)
VIT B12 SERPL-MCNC: 1383 PG/ML (ref 211–911)
WBC # BLD AUTO: 5.1 K/UL (ref 4.8–10.8)

## 2024-10-17 PROCEDURE — 84443 ASSAY THYROID STIM HORMONE: CPT

## 2024-10-17 PROCEDURE — 80048 BASIC METABOLIC PNL TOTAL CA: CPT

## 2024-10-17 PROCEDURE — 99239 HOSP IP/OBS DSCHRG MGMT >30: CPT | Performed by: INTERNAL MEDICINE

## 2024-10-17 PROCEDURE — 83036 HEMOGLOBIN GLYCOSYLATED A1C: CPT

## 2024-10-17 PROCEDURE — 93306 TTE W/DOPPLER COMPLETE: CPT | Mod: 26 | Performed by: INTERNAL MEDICINE

## 2024-10-17 PROCEDURE — 80061 LIPID PANEL: CPT

## 2024-10-17 PROCEDURE — 82607 VITAMIN B-12: CPT

## 2024-10-17 PROCEDURE — G0378 HOSPITAL OBSERVATION PER HR: HCPCS

## 2024-10-17 PROCEDURE — 93306 TTE W/DOPPLER COMPLETE: CPT

## 2024-10-17 PROCEDURE — A9270 NON-COVERED ITEM OR SERVICE: HCPCS | Performed by: HOSPITALIST

## 2024-10-17 PROCEDURE — 700102 HCHG RX REV CODE 250 W/ 637 OVERRIDE(OP): Performed by: HOSPITALIST

## 2024-10-17 PROCEDURE — 36415 COLL VENOUS BLD VENIPUNCTURE: CPT

## 2024-10-17 PROCEDURE — 93880 EXTRACRANIAL BILAT STUDY: CPT

## 2024-10-17 PROCEDURE — 85025 COMPLETE CBC W/AUTO DIFF WBC: CPT

## 2024-10-17 RX ADMIN — ATORVASTATIN CALCIUM 80 MG: 80 TABLET, FILM COATED ORAL at 09:45

## 2024-10-17 RX ADMIN — TAMSULOSIN HYDROCHLORIDE 0.4 MG: 0.4 CAPSULE ORAL at 05:24

## 2024-10-17 RX ADMIN — APIXABAN 5 MG: 5 TABLET, FILM COATED ORAL at 05:24

## 2024-11-01 PROBLEM — R55 SYNCOPAL EPISODES: Chronic | Status: ACTIVE | Noted: 2024-10-16

## 2024-11-25 PROBLEM — Z71.0 DISCUSSION ABOUT ADVANCE CARE PLANNING HELD WITH FAMILY MEMBER: Status: ACTIVE | Noted: 2024-11-25

## 2024-12-13 ENCOUNTER — HOSPITAL ENCOUNTER (OUTPATIENT)
Dept: RADIOLOGY | Facility: MEDICAL CENTER | Age: 79
End: 2024-12-13
Attending: CLINICAL NURSE SPECIALIST
Payer: MEDICARE

## 2024-12-13 DIAGNOSIS — G93.89 PNEUMOCEPHALUS: ICD-10-CM

## 2024-12-13 PROCEDURE — A9579 GAD-BASE MR CONTRAST NOS,1ML: HCPCS | Mod: JZ | Performed by: CLINICAL NURSE SPECIALIST

## 2024-12-13 PROCEDURE — 70553 MRI BRAIN STEM W/O & W/DYE: CPT

## 2024-12-13 PROCEDURE — 700117 HCHG RX CONTRAST REV CODE 255: Mod: JZ | Performed by: CLINICAL NURSE SPECIALIST

## 2024-12-13 RX ADMIN — GADOTERIDOL 15 ML: 279.3 INJECTION, SOLUTION INTRAVENOUS at 10:09

## 2025-01-07 ENCOUNTER — TELEPHONE (OUTPATIENT)
Dept: SLEEP MEDICINE | Facility: MEDICAL CENTER | Age: 80
End: 2025-01-07
Payer: MEDICARE

## 2025-01-08 ENCOUNTER — OFFICE VISIT (OUTPATIENT)
Dept: SLEEP MEDICINE | Facility: MEDICAL CENTER | Age: 80
End: 2025-01-08
Attending: PHYSICIAN ASSISTANT
Payer: MEDICARE

## 2025-01-08 VITALS
HEART RATE: 81 BPM | BODY MASS INDEX: 25.71 KG/M2 | DIASTOLIC BLOOD PRESSURE: 64 MMHG | OXYGEN SATURATION: 94 % | RESPIRATION RATE: 16 BRPM | SYSTOLIC BLOOD PRESSURE: 116 MMHG | HEIGHT: 66 IN | WEIGHT: 160 LBS

## 2025-01-08 DIAGNOSIS — G47.33 OSA (OBSTRUCTIVE SLEEP APNEA): ICD-10-CM

## 2025-01-08 PROCEDURE — 3078F DIAST BP <80 MM HG: CPT | Performed by: PHYSICIAN ASSISTANT

## 2025-01-08 PROCEDURE — 99213 OFFICE O/P EST LOW 20 MIN: CPT | Performed by: PHYSICIAN ASSISTANT

## 2025-01-08 PROCEDURE — 3074F SYST BP LT 130 MM HG: CPT | Performed by: PHYSICIAN ASSISTANT

## 2025-01-08 ASSESSMENT — ENCOUNTER SYMPTOMS
TREMORS: 0
WEIGHT LOSS: 0
PALPITATIONS: 1
SINUS PAIN: 0
ROS GI COMMENTS: NO DENTURES, NO MISSING TEETH OR SWALLOWING ISSUES
SPUTUM PRODUCTION: 0
INSOMNIA: 0
DIZZINESS: 1
SORE THROAT: 0
HEARTBURN: 0
SHORTNESS OF BREATH: 1
WHEEZING: 0
HEADACHES: 0
COUGH: 0
ORTHOPNEA: 0
CHILLS: 0
FEVER: 0

## 2025-01-08 ASSESSMENT — FIBROSIS 4 INDEX: FIB4 SCORE: 2.42

## 2025-01-08 NOTE — PROGRESS NOTES
"Chief Complaint   Patient presents with    Apnea     Last Office Visit 10/7/24 with Laney Hand P.A.-C.    Settings: CPAP @8 cm H20 pressure       HPI:  Shaista Bocanegra is a 79 y.o. year old female here today for follow-up on obstructive sleep apnea.  Last seen in clinic 10/7/2024 by BRENTON Rg.  She is accompanied by care assistant but reports she lives independently.  She is also followed by the pulmonary clinic for lung nodules.    Past Medical History: Primary insomnia, LALY, atrial fibrillation, breast cancer 2001 right breast, vascular dementia, seasonal allergic rhinitis, CVA, dysphagia, seizure, hypertension, mitral valve repair, anemia, recurrent UTI.  Reports wearing mouthguard and addition to PAP therapy    Vitals:  /64 (BP Location: Left arm, Patient Position: Sitting, BP Cuff Size: Adult)   Pulse 81   Resp 16   Ht 1.676 m (5' 6\")   Wt 72.6 kg (160 lb)   SpO2 94% BMI of 25.82 kg/m².    Recent Imaging: Echocardiogram obtained 10/17/2024 demonstrating normal left ventricular chamber size, wall thickness, systolic function.  LVEF estimated 60%.  Indeterminant diastolic function due to mitral valve disease.  Normal right ventricular size and systolic function.  Enlarged right atrium.  Trace tricuspid regurgitation with estimated RVSP of 36 mmHg.  Mild pulmonic insufficiency.    Currently using  [employer]  CPAP @8 cm H20 pressure; compliance reviewed for 12/8/2024 through 1/6/2024, days used 26/30, average daily usage 1 hour 6 minutes, 3.3% of days greater than or equal to 4 hours, mask leak at 0 seconds, AHI 12 per hour.  Minimal usage noted.  Essentially patient uses while lying down to rest and then takes the device off in order to sleep.  See media for full report.    Device obtained July 2022 for recall replacement  DME provider [employer] [facility]  Mask interface nasal mask    Home sleep study completed 7/27/2022 with oral mandibular advancement device demonstrated SHERRILL of " 5.3 events per hour increasing to 6.4 in supine position.  Low O2 sat of 74% with sats less than or equal to 88% for 8% of flow evaluation time or 50 min.  Titration study versus auto CPAP trial was recommended.     Overnight oximetry on 9/24/2022 with CPAP at 7 demonstrated low O2 sat of 86% with sats less than or equal to 88% for 1.1 minutes.     Reviewed sleep study results from 3/31/2023 completed on oral appliance demonstrating overall AHI of 6.6 with mild nocturnal hypoxia, sats less than or equal to 88% for 22.8 minutes.  Update titration study given patient's frequent awakenings/tolerance issues on CPAP therapy.       Polysomnogram  titration study obtained 11/29/2023 demonstrating successful titration to 7-8 cm water pressure with need for bleed in O2 at 2 L.  Recommend auto CPAP of 6-10 versus CPAP pressures increased to 8 cm water pressure which we completed in clinic.        Sleep schedule goes to bed 12 AM, wakens 6-7 a.m. , and gets up during the night bathroom x 1   Symptoms denies morning headache and experiences daytime somnolence     [place] Sleepiness Scale reported as 7/24 on 3/27/2020      Review of Systems   Constitutional:  Negative for chills, fever, malaise/fatigue and weight loss.   HENT:  Positive for hearing loss (getting more deaf, doesn't always wear hearing aids). Negative for congestion, nosebleeds, sinus pain, sore throat and tinnitus.    Eyes:         Presc glasses    Respiratory:  Positive for shortness of breath (with activity). Negative for cough, sputum production and wheezing.    Cardiovascular:  Positive for palpitations and leg swelling (trace). Negative for chest pain and orthopnea.   Gastrointestinal:  Negative for heartburn.        No dentures, no missing teeth or swallowing issues    Neurological:  Positive for dizziness (recent syncope episode). Negative for tremors and headaches.   Psychiatric/Behavioral:  The patient does not have insomnia.        Past Medical History:    Diagnosis Date    Acquired circulating anticoagulants (HCC)     Anemia 09/28/2021    Arthritis     toe    Atrial fibrillation (HCC)     Benign essential HTN 03/19/2012    Breast cancer (HCC)     Cancer ([facility]) 1998    breast     Cardiac arrhythmia     Chest tightness or pressure 03/19/2012    Chickenpox     Coronary heart disease     DCM (dilated cardiomyopathy) ([facility]) 08/24/2022    N[place]ali measles     Gynecological disorder     High cholesterol     High risk medication use 03/19/2012    Hypercholesterolemia 03/19/2012    Mumps     MVP (mitral valve prolapse) 03/19/2012    Osteoporosis     Preventative health care 05/21/2020    Recurrent UTI 09/19/2023    Seasonal allergic rhinitis due to pollen 04/26/2023    Seizure disorder ([facility]) 09/28/2021    last seizure may 2021    Sleep apnea     CPAP at night    Snoring     Stroke ([facility]) 2017    residual minor weakness on left side    Substance abuse ([facility])     Tonsillitis     Urinary bladder disorder     OAB    Urinary frequency 04/26/2023    Urinary incontinence     Urinary retention 09/28/2021    Valvular heart disease     Venereal disease     Volume depletion and dehydration 04/04/2023       Past Surgical History:   Procedure Laterality Date    CATARACT PHACO WITH IOL  3/16/2009    Performed by SHANNON GANDARA at SURGERY SAME DAY [facility]    CATARACT PHACO WITH IOL  1/26/2009    Performed by SHANNON GANDARA at SURGERY [facility] DAY [facility]    BREAST BIOPSY      HYSTERECTOMY LAPAROSCOPY      LUMPECTOMY      GA CHEMOTHERAPY, UNSPECIFIED PROCEDURE      GA RADIATION THERAPY PLAN SIMPLE      GA REMV 2ND CATARACT,CORN-SCLER SECTN      PRIMARY C SECTION      SINUSCOPE      TONSILLECTOMY         Family History   Problem Relation Age of Onset    Cancer Mother         breast    No Known Problems Brother     Heart Failure Neg Hx     Heart Disease Neg Hx        Social History     Socioeconomic History    Marital status:      Spouse name: Not on file    Number of children: 2     Years of education: Not on file    Highest education level: Professional school degree (e.g., MD, DDS, DVM, ARACELI)   Occupational History    Occupation:      Employer: Not Employed   Tobacco Use    Smoking status: Never     Passive exposure: Never    Smokeless tobacco: Never   Vaping Use    Vaping status: Never Used   Substance and Sexual Activity    Alcohol use: Yes     Alcohol/week: 1.2 oz     Types: 2 Glasses of wine per week     Comment: twice a week    Drug use: No    Sexual activity: Yes     Partners: Male     Birth control/protection: Female Sterilization   Other Topics Concern    Not on file   Social History Narrative    Not on file     Social Drivers of Health     Financial Resource Strain: Low Risk  (1/26/2022)    Overall Financial Resource Strain (CARDIA)     Difficulty of Paying Living Expenses: Not hard at all   Food Insecurity: No Food Insecurity (10/16/2024)    Hunger Vital Sign     Worried About Running Out of Food in the Last Year: Never true     Ran Out of Food in the Last Year: Never true   Transportation Needs: No Transportation Needs (10/16/2024)    PRAPARE - Transportation     Lack of Transportation (Medical): No     Lack of Transportation (Non-Medical): No   Physical Activity: Insufficiently Active (1/26/2022)    Exercise Vital Sign     Days of Exercise per Week: 4 days     Minutes of Exercise per Session: 30 min   Stress: Not on file   Social Connections: Socially Isolated (1/26/2022)    Social Connection and Isolation Panel [NHANES]     Frequency of Communication with Friends and Family: More than three times a week     Frequency of Social Gatherings with Friends and Family: Twice a week     Attends Nondenominational Services: Never     Active Member of Clubs or Organizations: No     Attends Club or Organization Meetings: Never     Marital Status:    Intimate Partner Violence: Not At Risk (10/16/2024)    Humiliation, Afraid, Rape, and Kick questionnaire     Fear of Current or  Ex-Partner: No     Emotionally Abused: No     Physically Abused: No     Sexually Abused: No   Housing Stability: Low Risk  (10/16/2024)    Housing Stability Vital Sign     Unable to Pay for Housing in the Last Year: No     Number of Times Moved in the Last Year: 1     Homeless in the Last Year: No       Allergies as of 01/08/2025    (No Known Allergies)          Current medications as of today   Current Outpatient Medications   Medication Sig Dispense Refill    Cranberry-Vit C-Lactobacillus ([facility]) 450-30 MG Tab Take 2 Tablets by mouth every day with lunch. FOR UTI PREVENTION 180 Tablet 3    D-Mannose 350 MG Cap Take 2 Capsules by mouth every day. FOR UTI PREVENTION 180 Capsule 3    Mirabegron ER 50 MG TABLET SR 24 HR Take 50 mg by mouth every day. FOR URINARY FREQUENCY/OVERACTIVE BLADDER 90 Tablet 3    fexofenadine (ALLEGRA) 60 MG Tab Take 1 Tablet by mouth every day. FOR ALLERGIES 90 Tablet 1    atorvastatin (LIPITOR) 80 MG tablet TAKE 1 TABLET BY MOUTH EVERY DAY IN THE EVENING (Patient taking differently: Take 80 mg by mouth every day.) 90 Tablet 2    spironolactone (ALDACTONE) 25 MG Tab TAKE 1 TABLET BY MOUTH EVERY DAY 90 Tablet 3    ELIQUIS 5 MG Tab TAKE 1 TABLET BY MOUTH TWICE A  Tablet 3    Multiple Vitamins-Minerals (MULTIVITAMIN WOMEN 50+ PO) Take 1 Tablet by mouth every day.      tamsulosin (FLOMAX) 0.4 MG capsule Take 0.4 mg by mouth 2 times a day.       No current facility-administered medications for this visit.         Physical Exam:   Gen:           Alert and oriented, No apparent distress. Mood and affect appropriate, normal interaction with examiner.   Hearing:     Grossly intact.  Nose:          Normal, no lesions or deformities.  Dentition:    fair dentition.   Oropharynx:   Tongue normal, posterior pharynx without erythema or exudate.  Mallampati Classification: III  Neck:        Supple, trachea midline, no masses.  Respiratory Effort: No intercostal retractions or  use of accessory muscles.   Gait and Station: Grossly normal.  Digits and Nails: No clubbing, cyanosis, petechiae, or nodes.   Skin:        No rashes, lesions or ulcers noted.               Ext:           No cyanosis or edema.      Immunizations:  Flu: Annual recommended  Pneumovax 23: 6/8/2015  Prevnar 13: 12/21/2021, 11/3/2016  Moderna SARS CoV2 Vaccine: 1/6/2022, 2/22/2021, 1/23/2021    Assessment / Plan:  1. LALY (obstructive sleep apnea)  - DME Mask and Supplies    Patient offered option of stretching out appointments as sooner appointments are not resulting in increased usage time.  However patient wants opportunity to improve usage and review results.  Currently she has a tendency to wear device while resting and take off device for sleeping remainder of night.  Reviewed therapeutic goals, reviewed risks associated with untreated or undertreated sleep apnea.  Understands need to wear device while sleeping.  Briefly discussed strategies to help improve usage.  May need pressure changes for elevated apneas but insufficient use to determine.  Denies taking off PAP therapy due to pressure intolerance or shortness of breath.  Will work on increasing time with short-term follow-up to review.  Updated order for mask and supplies placed.     Follow-up:   Return in about 3 months (around 4/8/2025) for Return with Laney Hand PA-C.    Please note that this dictation was created using voice recognition software. I have made every reasonable attempt to correct obvious errors, but it is possible there are errors of grammar and possibly content that I did not discover before finalizing the note.

## 2025-01-08 NOTE — PATIENT INSTRUCTIONS
1-reviewed compliance which is poor  2-tendency to wear device while resting and take off while sleeping  3-needs to wear device while sleeping   4-goal is 6 hours  5-briefly discussed strategies to improve use  6-consider pressure change for elevated apnea   7-patient reports not taking off due to pressure intolerance or shortness of breath  8-will work on increasing time  9-short term follow up to review   10-updated order for mask and supplies

## 2025-01-23 ENCOUNTER — TELEPHONE (OUTPATIENT)
Dept: OBGYN | Facility: CLINIC | Age: 80
End: 2025-01-23
Payer: MEDICARE

## 2025-01-23 PROBLEM — M95.4 CHEST WALL DEFORMITY: Chronic | Status: ACTIVE | Noted: 2025-01-23

## 2025-01-23 PROBLEM — N90.89 LESION OF LABIA: Chronic | Status: ACTIVE | Noted: 2025-01-23

## 2025-01-23 PROBLEM — N90.89 LESION OF LABIA: Status: ACTIVE | Noted: 2025-01-23

## 2025-01-23 PROBLEM — M95.4 CHEST WALL DEFORMITY: Status: ACTIVE | Noted: 2025-01-23

## 2025-01-31 ENCOUNTER — OFFICE VISIT (OUTPATIENT)
Dept: CARDIOLOGY | Facility: MEDICAL CENTER | Age: 80
End: 2025-01-31
Attending: INTERNAL MEDICINE
Payer: MEDICARE

## 2025-01-31 VITALS
SYSTOLIC BLOOD PRESSURE: 100 MMHG | DIASTOLIC BLOOD PRESSURE: 68 MMHG | HEART RATE: 95 BPM | WEIGHT: 166 LBS | HEIGHT: 66 IN | BODY MASS INDEX: 26.68 KG/M2 | OXYGEN SATURATION: 94 % | RESPIRATION RATE: 16 BRPM

## 2025-01-31 DIAGNOSIS — I48.0 PAROXYSMAL ATRIAL FIBRILLATION (HCC): Chronic | ICD-10-CM

## 2025-01-31 DIAGNOSIS — Z98.890 S/P MITRAL VALVE REPAIR: ICD-10-CM

## 2025-01-31 DIAGNOSIS — Z79.01 CHRONIC ANTICOAGULATION: ICD-10-CM

## 2025-01-31 DIAGNOSIS — E78.5 DYSLIPIDEMIA: ICD-10-CM

## 2025-01-31 LAB — EKG IMPRESSION: NORMAL

## 2025-01-31 PROCEDURE — 93005 ELECTROCARDIOGRAM TRACING: CPT | Mod: TC | Performed by: INTERNAL MEDICINE

## 2025-01-31 PROCEDURE — 99212 OFFICE O/P EST SF 10 MIN: CPT | Performed by: INTERNAL MEDICINE

## 2025-01-31 ASSESSMENT — ENCOUNTER SYMPTOMS
FEVER: 0
BRUISES/BLEEDS EASILY: 0
SHORTNESS OF BREATH: 0
FOCAL WEAKNESS: 0
CARDIOVASCULAR NEGATIVE: 1
DOUBLE VISION: 0
VOMITING: 0
ABDOMINAL PAIN: 0
PALPITATIONS: 0
CHILLS: 0
MYALGIAS: 0
WEAKNESS: 0
CONSTITUTIONAL NEGATIVE: 1
PSYCHIATRIC NEGATIVE: 1
BLURRED VISION: 0
MUSCULOSKELETAL NEGATIVE: 1
NAUSEA: 0
DIZZINESS: 0
RESPIRATORY NEGATIVE: 1
DEPRESSION: 0
CLAUDICATION: 0
NEUROLOGICAL NEGATIVE: 1
COUGH: 0
GASTROINTESTINAL NEGATIVE: 1
EYES NEGATIVE: 1
HEADACHES: 0
WEIGHT LOSS: 0
NERVOUS/ANXIOUS: 0

## 2025-01-31 ASSESSMENT — FIBROSIS 4 INDEX: FIB4 SCORE: 2.42

## 2025-01-31 NOTE — PROGRESS NOTES
Chief Complaint   Patient presents with    Atrial Fibrillation     FV DX:Atrial fibrillation, unspecified type    Hyperlipidemia     FV DX:Atrial fibrillation, unspecified type       Subjective     Artemio Bocanegra is a 79 y.o. female who presents today for annual follow up of mitral valve repair, dyslipidemia, atrial fibrillation with prior ablation on chronic anticoagulation therapy, prior stroke.    Since the patient's last visit on 05/20/24, she has been doing well clinically from cardiac standpoint. She denies fatigue, chest pain, shortness of breath, palpitations, lower extremity edema, dizziness or syncope. She was admitted to [facility] in [place] for syncope in October and was found to be in atrial flutter. Since the discharge she has been doing well clinically from cardiac standpoint. She denies fatigue, chest pain, shortness of breath, palpitations, lower extremity edema, dizziness or syncope. She keeps active exercising with a , 3 times per week.     Past Medical History:   Diagnosis Date    Acquired circulating anticoagulants ([facility])     Anemia 09/28/2021    Arthritis     toe    Atrial fibrillation ([facility])     Benign essential HTN 03/19/2012    Breast cancer ([facility])     Cancer ([facility]) 1998    breast     Cardiac arrhythmia     Chest tightness or pressure 03/19/2012    Chickenpox     Coronary heart disease     DCM (dilated cardiomyopathy) ([facility]) 08/24/2022    Greek measles     Gynecological disorder     High cholesterol     High risk medication use 03/19/2012    Hypercholesterolemia 03/19/2012    Mumps     MVP (mitral valve prolapse) 03/19/2012    Osteoporosis     Preventative health care 05/21/2020    Recurrent UTI 09/19/2023    Seasonal allergic rhinitis due to pollen 04/26/2023    Seizure disorder ([facility]) 09/28/2021    last seizure may 2021    Sleep apnea     CPAP at night    Snoring     Stroke ([facility]) 2017    residual minor weakness on left side    Substance abuse ([facility])     Tonsillitis     Urinary bladder  disorder     OAB    Urinary frequency 04/26/2023    Urinary incontinence     Urinary retention 09/28/2021    Valvular heart disease     Venereal disease     Volume depletion and dehydration 04/04/2023     Past Surgical History:   Procedure Laterality Date    CATARACT PHACO WITH IOL  3/16/2009    Performed by SHANNON GANDARA at [facility]    CATARACT PHACO WITH IOL  1/26/2009    Performed by SHANNON GANDARA at [facility]    BREAST BIOPSY      HYSTERECTOMY LAPAROSCOPY      LUMPECTOMY      KS CHEMOTHERAPY, UNSPECIFIED PROCEDURE      KS RADIATION THERAPY PLAN SIMPLE      KS REMV 2ND CATARACT,CORN-SCLER SECTN      PRIMARY C SECTION      SINUSCOPE      TONSILLECTOMY       Family History   Problem Relation Age of Onset    Cancer Mother         breast    No Known Problems Brother     Heart Failure Neg Hx     Heart Disease Neg Hx      Social History     Socioeconomic History    Marital status:      Spouse name: Not on file    Number of children: 2    Years of education: Not on file    Highest education level: Professional school degree (e.g., MD, DDS, DVM, ARACELI)   Occupational History    Occupation:      Employer: Not Employed   Tobacco Use    Smoking status: Never     Passive exposure: Never    Smokeless tobacco: Never   Vaping Use    Vaping status: Never Used   Substance and Sexual Activity    Alcohol use: Yes     Alcohol/week: 1.2 oz     Types: 2 Glasses of wine per week     Comment: twice a week    Drug use: No    Sexual activity: Yes     Partners: Male     Birth control/protection: Female Sterilization   Other Topics Concern    Not on file   Social History Narrative    Not on file     Social Drivers of Health     Financial Resource Strain: Low Risk  (1/26/2022)    Overall Financial Resource Strain (CARDIA)     Difficulty of Paying Living Expenses: Not hard at all   Food Insecurity: No Food Insecurity (10/16/2024)    Hunger Vital Sign     Worried About Running Out of  Food in the Last Year: Never true     Ran Out of Food in the Last Year: Never true   Transportation Needs: No Transportation Needs (10/16/2024)    PRAPARE - Transportation     Lack of Transportation (Medical): No     Lack of Transportation (Non-Medical): No   Physical Activity: Insufficiently Active (1/26/2022)    Exercise Vital Sign     Days of Exercise per Week: 4 days     Minutes of Exercise per Session: 30 min   Stress: Not on file   Social Connections: Socially Isolated (1/26/2022)    Social Connection and Isolation Panel [NHANES]     Frequency of Communication with Friends and Family: More than three times a week     Frequency of Social Gatherings with Friends and Family: Twice a week     Attends [employer] Services: Never     Active Member of Clubs or Organizations: No     Attends Club or Organization Meetings: Never     Marital Status:    Intimate Partner Violence: Not At Risk (10/16/2024)    Humiliation, Afraid, Rape, and Kick questionnaire     Fear of Current or Ex-Partner: No     Emotionally Abused: No     Physically Abused: No     Sexually Abused: No   Housing Stability: Low Risk  (10/16/2024)    Housing Stability Vital Sign     Unable to Pay for Housing in the Last Year: No     Number of Times Moved in the Last Year: 1     Homeless in the Last Year: No     No Known Allergies    (Medications reviewed.)  Outpatient Encounter Medications as of 1/31/2025   Medication Sig Dispense Refill    Cranberry-Vit C-Lactobacillus ([employer] URINARY HEALTH/CRANBERRY) 450-30 MG Tab Take 2 Tablets by mouth every day with lunch. FOR UTI PREVENTION 180 Tablet 3    D-Mannose 350 MG Cap Take 2 Capsules by mouth every day. FOR UTI PREVENTION 180 Capsule 3    Mirabegron ER 50 MG TABLET SR 24 HR Take 50 mg by mouth every day. FOR URINARY FREQUENCY/OVERACTIVE BLADDER 90 Tablet 3    fexofenadine (ALLEGRA) 60 MG Tab Take 1 Tablet by mouth every day. FOR ALLERGIES 90 Tablet 1    atorvastatin (LIPITOR) 80 MG tablet TAKE 1 TABLET BY  "MOUTH EVERY DAY IN THE EVENING (Patient taking differently: Take 80 mg by mouth every day.) 90 Tablet 2    spironolactone (ALDACTONE) 25 MG Tab TAKE 1 TABLET BY MOUTH EVERY DAY 90 Tablet 3    ELIQUIS 5 MG Tab TAKE 1 TABLET BY MOUTH TWICE A  Tablet 3    Multiple Vitamins-Minerals (MULTIVITAMIN WOMEN 50+ PO) Take 1 Tablet by mouth every day.      tamsulosin (FLOMAX) 0.4 MG capsule Take 0.4 mg by mouth 2 times a day.       No facility-administered encounter medications on file as of 1/31/2025.     Review of Systems   Constitutional: Negative.  Negative for chills, fever, malaise/fatigue and weight loss.   HENT: Negative.  Negative for hearing loss.    Eyes: Negative.  Negative for blurred vision and double vision.   Respiratory: Negative.  Negative for cough and shortness of breath.    Cardiovascular: Negative.  Negative for chest pain, palpitations, claudication and leg swelling.   Gastrointestinal: Negative.  Negative for abdominal pain, nausea and vomiting.   Genitourinary: Negative.  Negative for dysuria and urgency.   Musculoskeletal: Negative.  Negative for joint pain and myalgias.   Skin: Negative.  Negative for itching and rash.   Neurological: Negative.  Negative for dizziness, focal weakness, weakness and headaches.   Endo/Heme/Allergies: Negative.  Does not bruise/bleed easily.   Psychiatric/Behavioral: Negative.  Negative for depression. The patient is not nervous/anxious.               Objective     /68 (BP Location: Left arm, Patient Position: Sitting, BP Cuff Size: Adult)   Pulse 95   Resp 16   Ht 1.676 m (5' 6\")   SpO2 94%   BMI 26.79 kg/m²     Physical Exam  Constitutional:       Appearance: Normal appearance. She is well-developed and normal weight.   HENT:      Head: Normocephalic and atraumatic.   Neck:      Vascular: No JVD.   Cardiovascular:      Rate and Rhythm: Normal rate and regular rhythm.      Heart sounds: Normal heart sounds.   Pulmonary:      Effort: Pulmonary effort is " normal.      Breath sounds: Normal breath sounds.   Abdominal:      General: Bowel sounds are normal.      Palpations: Abdomen is soft.      Comments: No hepatosplenomegaly.   Musculoskeletal:         General: Normal range of motion.   Lymphadenopathy:      Cervical: No cervical adenopathy.   Skin:     General: Skin is warm and dry.   Neurological:      Mental Status: She is alert and oriented to person, place, and time.            CARDIAC STUDIES/PROCEDURES:     ABLATION by Wisam Arellano (12/07/21)   1. Atrial fibrillation, Persistent  2. Successful isolation of all four pulmonary veins  3. Successful posterior wall isolation with linear ablation  4. CFAE ablation in the left atrium for treatment of atrial fibrillation  5. Typical atrial flutter  6. Successful CTI ablation for typical flutter  7. ICE visualization of the KYUNG suggests no residual flow following KYUNG ligation     BIOTEL CONCLUSIONS (07/14/21)  BioTel 6 day Summary:   1. Atrial fibrillation with appropriate heart rate range. Average 69, range 35 to 120 bpm.   2. No significant pauses (up to 2.2 seconds).   3. Occasional PVCs, 2% burden.   4. 1 patient trigger, no symptoms reported.   Conclusion: Persistent atrial fibrillation with appropriate heart rate, no pauses, occasional PVCs.     CARDIAC CATHETERIZATION CONCLUSIONS by Juventino Farah (05/08/20)  Angiographically normal appearing coronary arteries  Mildly elevated LVEDP, filling pressures.  2+ mitral regurgitation.  Normal LV function.     CAROTID ULTRASOUND (04/04/23)  Bilateral.   Flow velocities and Doppler waveforms are normal throughout the carotid   arteries without evidence of plaque.   The bilateral subclavian and vertebral artery waveforms are antegrade and   normal in character and velocity.      CAROTID ULTRASOUND (08/18/17)  Normal carotids, subclavians and vertebrals.     CTA OF HEAD (05/02/22)  1.  Moderate sized area of chronic infarction with surrounding  encephalomalacia in the right frontal parasylvian region.  2.  Otherwise unremarkable CT angiogram of the head.  3.  Age-related cerebral atrophy     CTA OF NECK (05/02/22)  CT angiogram of the neck within normal limits.     CTA OF HEAD (09/04/21)          No evidence of flow-limiting stenosis in the cervical carotid or cervical vertebral arteries.     CT OF HEAD (05/31/21)  1.  Moderate acute/subacute left temporal lobe infarct.  2.  Chronic ischemic changes.  3.  No acute intracranial hemorrhage.     CTA OF HEAD (05/31/21)  No acute large vessel occlusion or hemodynamically significant stenosis.  Acute/subacute appearing left temporal lobe infarct.     CTA OF NECK (09/04/21)           No evidence of flow-limiting stenosis in the cervical carotid or cervical vertebral arteries.     CTA OF NECK (05/31/21)  1.  No stenosis or dissection is seen within the carotid or vertebral arteries bilaterally.  2.  Tree-in-bud nodularity in the right upper lobe is likely infectious/inflammatory.  3.  Mucosal thickening right maxillary sinus.  4.  Nasal bone deformities bilaterally are likely chronic.     CT OF HEAD (05/03/19)  NO ACUTE ABNORMALITIES ARE NOTED ON CT SCAN OF THE HEAD.  Right-sided encephalomalacia is noted.     CTA OF HEAD (08/18/17)  1.  There is a right M1 segment occlusion.  2.  There is collateral flow in the peripheral M3 branches seen on the right.  3.  There is mild decreased enhancement of the visualized right internal carotid artery however it is patent.  4.  Question of subtle hypodensity in the right insular cortex region. No abnormal brain parenchymal enhancement is seen.     CTA OF NECK (08/18/17)  1.  CT angiogram of the neck within normal limits.  2.  Thrombosed right M1 segment.    ECHOCARDIOGRAM CONCLUSIONS (10/17/24)  Normal left ventricular systolic function.  Known mitral valve repair which is functioning normally with   appropriate transvalvular gradient.  (study result reviewed)       ECHOCARDIOGRAM CONCLUSIONS (05/15/24)  Prior study on 04/05/2023, compared to the report of the prior study,   there has been no significant change.   Normal left ventricular systolic function.  The left ventricular ejection fraction is visually estimated to be 60%.  Known mitral valve repair which is functioning normally with appropriate transvalvular gradient.  Mean transvalvular gradient is 3 mmHg   Trace mitral regurgitation.    ECHOCARDIOGRAM CONCLUSIONS (04/05/23)  Normal left ventricular systolic function.  Known mitral valve repair which is functioning normally with   appropriate transvalvular gradient.     ECHOCARDIOGRAM CONCLUSIONS (06/21/22)  No acute intracranial process.  Remote infarcts in the right frontal, left occipital region as described above.  Remote hemorrhage into the right basal ganglia is noted with hemosiderin staining. Remote microhemorrhage into the left medial occipital periatrial white matter is also noted.  Age-related volume loss and chronic microvascular ischemic changes.     ECHOCARDIOGRAM CONCLUSIONS (09/30/21)  Normal left ventricular chamber size.  The left ventricular ejection fraction is visually estimated to be 60%.  Known mitral valve repair which is functioning normally with appropriate transvalvular gradient.  No mitral regurgitation.  Right heart pressures are normal.      ECHOCARDIOGRAM CONCLUSIONS (06/01/21)  Left ventricular ejection fraction is visually estimated to be 65%.  Diastolic function is difficult to assess with atrial fibrillation.  Severely dilated left atrium.  Negative bubble study including Valsalva.  Myxomatous changes of the mitral valve leaflets with prolapse of the anterior and posterior leaflets.  Moderate to severe eccentric mitral regurgitation, probably severe.  Pulmonary veins not well seen on the study.  Estimated right ventricular systolic pressure is 31 mmHg + estimated RAP.  Compared to the images of the study done 11- there has been  no significant change, prior echo had pulmonary vein flow reversal consistent with  severe mitral regurgitation.     ECHOCARDIOGRAM CONCLUSIONS (02/03/20)  Prior echo done on 05/03/2019. Compared to the images of the prior study done -  there has been no significant change.   Normal left ventricular systolic function.  Left ventricular ejection fraction is visually estimated to be 65%.  Prolapse of the mitral leaflets was present.  Severe mitral regurgitation.  Mild tricuspid regurgitation.  Estimated right ventricular systolic pressure is 35 mmHg.     ECHOCARDIOGRAM CONCLUSIONS (05/03/19)  Prior echocardiogram 6/14/2018.  Normal left ventricular systolic function.   Mild to moderate eccentric mitral regurgitation.  Compared to the images of the study done - there has been slight improvement in mitral regurgitation.     ECHOCARDIOGRAM CONCLUSIONS (06/14/18)  Normal left ventricular systolic function.  Left ventricular ejection fraction is visually estimated to be 60%.  Myxomatous changes of the mitral valve.  Prolapse of the anterior and posterior mitral leaflets.  Severe, eccentric mitral regurgitation.  Right heart pressures are normal.  Compared to the report of the study done 8/19/2017 severe mitral regurgitation is now present, previously reported as moderate but a MR   ERO was not recorded.      EKG was ordered for atrial fibrillation, performed on (01/31/25) was reviewed: EKG, personally interpreted shows sinus rhythm.   EKG performed on (10/16/24) was reviewed: EKG personally interpreted shows atrial flutter.   EKG performed on (05/20/24) EKG shows sinus tachycardia.  EKG performed on (04/04/23) EKG shows sinus rhythm.  EKG performed on (05/02/22) EKG shows sinus rhythm.  EKG performed on (09/04/21) EKG shows atrial fibrillation.  EKG performed on (06/09/21) EKG shows atrial fibrillation.  EKG performed on (03/19/21) EKG shows sinus rhythm with premature ventricular contractions.  EKG performed on (05/09/20)  EKG shows sinus rhythm with premature ventricular contractions.  EKG performed on (05/08/20) EKG shows sinus rhythm with premature ventricular contractions.     Laboratory results of (10/17/24) were reviewed. Cholesterol profile of 87/32/44/37 mg/dL noted.  Laboratory results of (01/24/24) Cholesterol profile of 124/77/51/58 mg/dL noted.  Laboratory results of (05/02/22) Cholesterol profile of 115/66/55/49 mg/dL noted.  Laboratory results of (01/12/22) Cholesterol profile of 115/58/50/53 mg/dL noted.     TRANSESOPHAGEAL ECHOCARDIOGRAM CONCLUSIONS by Ayde Lopes (01/26/21)  LV EF 55%.  Biatrial enlargement.  There is severe eccentric mitral regurgitation with multiple jets, predominantly from flail leaflet of P2.  Echolucent space near P1 of unclear etiology, unusual for cleft leaflet.  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    Assessment & Plan     1. S/P mitral valve repair        2. Dyslipidemia        3. Paroxysmal atrial fibrillation (HCC)  EKG      4. Chronic anticoagulation            Medical Decision Making: Today's Assessment/Status/Plan:        History of mitral valve repair (minimally invasive complex mitral valve repair with A2 Wainwright-Miguel Angel neochordal reconstruction, A3 Wainwright-Miguel Angel neochordal reconstruction, P2 posterior ventricular anchoring remodeling suture and non-resectional leaflet remodeling and Wainwright-Miguel Angel NeoChord x2, P3 Wainwright-Miguel Angel NeoChord x1, and #34 sewing ring annuloplasty, maze procedure and left atrial appendage oversew by Monster Jones at [facility] on 09/16/21): She is an 79 year old female with mitral valve repair, dyslipidemia, atrial fibrillation with prior ablation on chronic anticoagulation therapy, prior stroke. She is clinically doing well from valvular standpoint. We will repeat an echocardiogram in one year.    Hyperlipidemia: She is doing well on statin therapy without myalgia symptoms.  Atrial fibrillation with prior ablation on anticoagulation therapy (Eliquis):  She is doing well on anticoagulation without palpitations.   Status post left temporal lobe infarct (05/31/21) with history of cerebrovascular accident with right carotid arteriography with mechanical thrombectomy (08/18/17) and transient ischemic attack (05/02/22): She remains clinically stable with memory loss and mild confusion.    We will follow up in 9 months.     CC Cullen Brown

## 2025-02-12 ENCOUNTER — APPOINTMENT (OUTPATIENT)
Dept: URBAN - METROPOLITAN AREA CLINIC 4 | Facility: CLINIC | Age: 80
Setting detail: DERMATOLOGY
End: 2025-02-12

## 2025-02-12 DIAGNOSIS — Z86.007 PERSONAL HISTORY OF IN-SITU NEOPLASM OF SKIN: ICD-10-CM

## 2025-02-12 DIAGNOSIS — Z85.828 PERSONAL HISTORY OF OTHER MALIGNANT NEOPLASM OF SKIN: ICD-10-CM

## 2025-02-12 DIAGNOSIS — L90.5 SCAR CONDITIONS AND FIBROSIS OF SKIN: ICD-10-CM

## 2025-02-12 DIAGNOSIS — L57.0 ACTINIC KERATOSIS: ICD-10-CM

## 2025-02-12 DIAGNOSIS — I78.8 OTHER DISEASES OF CAPILLARIES: ICD-10-CM

## 2025-02-12 DIAGNOSIS — L82.1 OTHER SEBORRHEIC KERATOSIS: ICD-10-CM

## 2025-02-12 DIAGNOSIS — L81.4 OTHER MELANIN HYPERPIGMENTATION: ICD-10-CM

## 2025-02-12 DIAGNOSIS — D22 MELANOCYTIC NEVI: ICD-10-CM | Status: STABLE

## 2025-02-12 PROBLEM — D22.5 MELANOCYTIC NEVI OF TRUNK: Status: ACTIVE | Noted: 2025-02-12

## 2025-02-12 PROBLEM — D22.72 MELANOCYTIC NEVI OF LEFT LOWER LIMB, INCLUDING HIP: Status: ACTIVE | Noted: 2025-02-12

## 2025-02-12 PROCEDURE — ? OBSERVATION

## 2025-02-12 PROCEDURE — 17003 DESTRUCT PREMALG LES 2-14: CPT

## 2025-02-12 PROCEDURE — 99213 OFFICE O/P EST LOW 20 MIN: CPT | Mod: 25

## 2025-02-12 PROCEDURE — 17000 DESTRUCT PREMALG LESION: CPT

## 2025-02-12 PROCEDURE — ? COUNSELING

## 2025-02-12 PROCEDURE — ? DIAGNOSIS COMMENT

## 2025-02-12 PROCEDURE — ? LIQUID NITROGEN

## 2025-02-12 ASSESSMENT — LOCATION SIMPLE DESCRIPTION DERM
LOCATION SIMPLE: CHEST
LOCATION SIMPLE: RIGHT SHOULDER
LOCATION SIMPLE: LEFT SHOULDER
LOCATION SIMPLE: LEFT HAND
LOCATION SIMPLE: LEFT UPPER BACK
LOCATION SIMPLE: LEFT SCALP
LOCATION SIMPLE: UPPER BACK
LOCATION SIMPLE: RIGHT PRETIBIAL REGION
LOCATION SIMPLE: RIGHT FOREARM
LOCATION SIMPLE: NOSE
LOCATION SIMPLE: LOWER BACK
LOCATION SIMPLE: POSTERIOR NECK
LOCATION SIMPLE: LEFT ANTERIOR NECK
LOCATION SIMPLE: RIGHT UPPER ARM
LOCATION SIMPLE: RIGHT HAND
LOCATION SIMPLE: LEFT FOREHEAD
LOCATION SIMPLE: LEFT FOREARM
LOCATION SIMPLE: LEFT CHEEK
LOCATION SIMPLE: LEFT 5TH TOE

## 2025-02-12 ASSESSMENT — LOCATION DETAILED DESCRIPTION DERM
LOCATION DETAILED: MIDDLE STERNUM
LOCATION DETAILED: MID POSTERIOR NECK
LOCATION DETAILED: RIGHT POSTERIOR SHOULDER
LOCATION DETAILED: RIGHT LATERAL SUPERIOR CHEST
LOCATION DETAILED: RIGHT RADIAL DORSAL HAND
LOCATION DETAILED: LEFT MEDIAL UPPER BACK
LOCATION DETAILED: NASAL TIP
LOCATION DETAILED: LEFT ULNAR DORSAL HAND
LOCATION DETAILED: LEFT PROXIMAL DORSAL FOREARM
LOCATION DETAILED: RIGHT PROXIMAL DORSAL FOREARM
LOCATION DETAILED: LEFT INFERIOR LATERAL NECK
LOCATION DETAILED: LEFT MEDIAL FRONTAL SCALP
LOCATION DETAILED: LEFT SUPERIOR MEDIAL FOREHEAD
LOCATION DETAILED: INFERIOR THORACIC SPINE
LOCATION DETAILED: LEFT CENTRAL MALAR CHEEK
LOCATION DETAILED: RIGHT DISTAL DORSAL FOREARM
LOCATION DETAILED: LEFT DORSAL 5TH TOE
LOCATION DETAILED: LEFT SUPERIOR UPPER BACK
LOCATION DETAILED: NASAL DORSUM
LOCATION DETAILED: RIGHT ANTERIOR SHOULDER
LOCATION DETAILED: RIGHT MEDIAL DISTAL PRETIBIAL REGION
LOCATION DETAILED: LEFT POSTERIOR SHOULDER
LOCATION DETAILED: RIGHT ANTERIOR PROXIMAL UPPER ARM
LOCATION DETAILED: LEFT LATERAL SUPERIOR CHEST
LOCATION DETAILED: SUPERIOR LUMBAR SPINE

## 2025-02-12 ASSESSMENT — LOCATION ZONE DERM
LOCATION ZONE: HAND
LOCATION ZONE: FACE
LOCATION ZONE: TRUNK
LOCATION ZONE: SCALP
LOCATION ZONE: NOSE
LOCATION ZONE: ARM
LOCATION ZONE: NECK
LOCATION ZONE: LEG
LOCATION ZONE: TOE

## 2025-02-12 NOTE — PROCEDURE: LIQUID NITROGEN
Show Applicator Variable?: Yes
Render Note In Bullet Format When Appropriate: No
Duration Of Freeze Thaw-Cycle (Seconds): 0
Number Of Freeze-Thaw Cycles: 2 freeze-thaw cycles
Detail Level: Detailed
Post-Care Instructions: I reviewed with the patient in detail post-care instructions. Patient is to wear sunprotection, and avoid picking at any of the treated lesions. Pt may apply Vaseline to crusted or scabbing areas.
Consent: The patient's consent was obtained including but not limited to risks of crusting, scabbing, blistering, scarring, darker or lighter pigmentary change, recurrence, incomplete removal and infection.

## 2025-02-12 NOTE — PROCEDURE: DIAGNOSIS COMMENT
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 Clear today with no evidence of recurrence.
Render Risk Assessment In Note?: no
Detail Level: Detailed
Comment: C&D scar, hx of SCC Insitu. [id number].
Comment: History of skin cancers
Detail Level: Simple
Comment: See photo 8/12/19
Comment: [id number], hx of BCC Infiltrative

## 2025-02-26 ENCOUNTER — APPOINTMENT (OUTPATIENT)
Dept: URBAN - METROPOLITAN AREA CLINIC 4 | Facility: CLINIC | Age: 80
Setting detail: DERMATOLOGY
End: 2025-02-26

## 2025-02-26 DIAGNOSIS — L72.8 OTHER FOLLICULAR CYSTS OF THE SKIN AND SUBCUTANEOUS TISSUE: ICD-10-CM

## 2025-02-26 DIAGNOSIS — L82.1 OTHER SEBORRHEIC KERATOSIS: ICD-10-CM

## 2025-02-26 DIAGNOSIS — L57.0 ACTINIC KERATOSIS: ICD-10-CM

## 2025-02-26 PROCEDURE — 99212 OFFICE O/P EST SF 10 MIN: CPT

## 2025-02-26 PROCEDURE — ? COUNSELING

## 2025-02-26 PROCEDURE — ? ADDITIONAL NOTES

## 2025-02-26 ASSESSMENT — LOCATION SIMPLE DESCRIPTION DERM
LOCATION SIMPLE: CHEST
LOCATION SIMPLE: RIGHT SCALP
LOCATION SIMPLE: VULVA

## 2025-02-26 ASSESSMENT — LOCATION DETAILED DESCRIPTION DERM
LOCATION DETAILED: LEFT LABIA MAJORA
LOCATION DETAILED: RIGHT MEDIAL FRONTAL SCALP
LOCATION DETAILED: UPPER STERNUM

## 2025-02-26 ASSESSMENT — LOCATION ZONE DERM
LOCATION ZONE: SCALP
LOCATION ZONE: VULVA
LOCATION ZONE: TRUNK

## 2025-02-26 NOTE — HPI: SKIN LESION
Is This A New Presentation, Or A Follow-Up?: Skin Lesions
What Type Of Note Output Would You Prefer (Optional)?: Standard Output
How Severe Is Your Skin Lesion?: mild
Has Your Skin Lesion Been Treated?: not been treated
Additional History: Pt was seen about 2 weeks ago but is coming in today because she is not sure but if lesions are new or were treated.

## 2025-02-26 NOTE — PROCEDURE: ADDITIONAL NOTES
Additional Notes: Lesion was previously treated with cryotherapy at [facility] on 2/12/2025. Evaluated as a healing scab today. Discussed with pt to gently massage in the shower so scab flakes off, pt understands. Discussed with pt if lesion develops again to contact clinic for further evaluation.
Detail Level: Detailed
Render Risk Assessment In Note?: no
Additional Notes: Discussed with pt that if lesion becomes bothersome to contact clinic.

## 2025-04-02 ENCOUNTER — HOSPITAL ENCOUNTER (OUTPATIENT)
Dept: RADIOLOGY | Facility: MEDICAL CENTER | Age: 80
End: 2025-04-02
Payer: MEDICARE

## 2025-04-02 DIAGNOSIS — Z12.31 VISIT FOR SCREENING MAMMOGRAM: ICD-10-CM

## 2025-04-02 PROCEDURE — 77067 SCR MAMMO BI INCL CAD: CPT

## 2025-04-14 ENCOUNTER — OFFICE VISIT (OUTPATIENT)
Dept: SLEEP MEDICINE | Facility: MEDICAL CENTER | Age: 80
End: 2025-04-14
Attending: PHYSICIAN ASSISTANT
Payer: MEDICARE

## 2025-04-14 VITALS
WEIGHT: 166 LBS | SYSTOLIC BLOOD PRESSURE: 104 MMHG | BODY MASS INDEX: 26.68 KG/M2 | DIASTOLIC BLOOD PRESSURE: 62 MMHG | HEIGHT: 66 IN | RESPIRATION RATE: 16 BRPM | HEART RATE: 74 BPM | OXYGEN SATURATION: 91 %

## 2025-04-14 DIAGNOSIS — G47.34 NOCTURNAL HYPOXIA: ICD-10-CM

## 2025-04-14 DIAGNOSIS — G47.33 OSA (OBSTRUCTIVE SLEEP APNEA): ICD-10-CM

## 2025-04-14 PROCEDURE — 3078F DIAST BP <80 MM HG: CPT | Performed by: PHYSICIAN ASSISTANT

## 2025-04-14 PROCEDURE — 99213 OFFICE O/P EST LOW 20 MIN: CPT | Performed by: PHYSICIAN ASSISTANT

## 2025-04-14 PROCEDURE — 3074F SYST BP LT 130 MM HG: CPT | Performed by: PHYSICIAN ASSISTANT

## 2025-04-14 ASSESSMENT — ENCOUNTER SYMPTOMS
TREMORS: 0
SORE THROAT: 0
ROS GI COMMENTS: NO DENTURES, NO MISSING TEETH, NO SWALLOWING ISSUES
FEVER: 0
HEARTBURN: 0
WEIGHT LOSS: 0
SPUTUM PRODUCTION: 0
WHEEZING: 0
SHORTNESS OF BREATH: 0
CHILLS: 0
HEADACHES: 0
DIZZINESS: 1
SINUS PAIN: 0
COUGH: 0
INSOMNIA: 1
PALPITATIONS: 0
ORTHOPNEA: 0

## 2025-04-14 ASSESSMENT — FIBROSIS 4 INDEX: FIB4 SCORE: 2.449489742783178099

## 2025-04-14 NOTE — PATIENT INSTRUCTIONS
1-noted never received nocturnal oxygen for use with cpap  2-update overnight oximetry to confirm continued need  3-patient instructed to wear device as long as possible that night for reliable results  4-struggling with cpap use limited to about one hour per day  5-adjustment in cpap pressures to 8-12 to assess for benefit  6-no mask leak noted with nasal mask  7-previously trialed on oral mandibular device but able to tolerate wearing with fewer apneas than on cpap device  8-results appointment for overnight oximetry

## 2025-04-14 NOTE — PROGRESS NOTES
"Chief Complaint   Patient presents with    Apnea     Last Office Visit 1/8/25 with Laney Hand P.A.-C.    Settings: CPAP @8 cm H20 pressure       HPI:  Shaista Bocanegra is a 80 y.o. year old female here today for follow-up on obstructive sleep apnea.  Last seen in clinic 1/8/2025 by BRENTON Rg.  Previously evaluated by Dr. Maycol Can on 5/6/2022.  She is accompanied by a care assistant from an assisted living facility where she lives independently.  She is also followed by pulmonary clinic for lung nodules.    Past Medical History: Primary insomnia, LALY, atrial fibrillation, breast cancer 2001 right breast, vascular dementia, seasonal allergic rhinitis, CVA, dysphagia, seizure, hypertension, mitral valve repair, anemia, recurrent UTI.  Patient usually wears a mouthguard in addition to her Pap therapy.    Vitals:  /62 (BP Location: Left arm, Patient Position: Sitting, BP Cuff Size: Adult)   Pulse 74   Resp 16   Ht 1.676 m (5' 6\")   Wt 75.3 kg (166 lb)   SpO2 91% BMI of 26.79 kg/m².    Recent Imaging: Echocardiogram obtained 10/17/2024 demonstrating normal left ventricular chamber size, wall thickness, systolic function. LVEF estimated 60%. Indeterminant diastolic function due to mitral valve disease. Normal right ventricular size and systolic function. Enlarged right atrium. Trace tricuspid regurgitation with estimated RVSP of 36 mmHg. Mild pulmonic insufficiency.     Currently using  [employer]  CPAP @8 cm H20 pressure; compliance reviewed for 3/15/2025 through 4/13/2025, days used 20/30, average daily usage 1 hour 8 minutes, 3% of days greater than or equal to 4 hours, no significant mask leak, AHI 12.8 per hour.  See media for full report.    Device obtained July 2022 for recall  DME provider [facility]  Mask interface nasal mask    Home sleep study completed 7/27/2022 with oral mandibular advancement device demonstrated SHERRILL of 5.3 events per hour increasing to 6.4 in supine " position.  Low O2 sat of 74% with sats less than or equal to 88% for 8% of flow evaluation time or 50 min.  Titration study versus auto CPAP trial was recommended.      Overnight oximetry on 9/24/2022 with CPAP at 7 demonstrated low O2 sat of 86% with sats less than or equal to 88% for 1.1 minutes.      Reviewed sleep study results from 3/31/2023 completed on oral appliance demonstrating overall AHI of 6.6 with mild nocturnal hypoxia, sats less than or equal to 88% for 22.8 minutes.  Update titration study given patient's frequent awakenings/tolerance issues on CPAP therapy.        Polysomnogram  titration study obtained 11/29/2023 demonstrating successful titration to 7-8 cm water pressure with need for bleed in O2 at 2 L.  Recommend auto CPAP of 6-10 versus CPAP pressures increased to 8 cm water pressure which we completed in clinic.      Sleep schedule goes to bed 12 AM and wakens 6-7 a.m. and gets up during the night bathroom x 1   Symptoms denies day time somnolence and denies morning headache    [place] Sleepiness Scale reported as 7/24 on 3/27/2020.      Review of Systems   Constitutional:  Negative for chills, fever, malaise/fatigue and weight loss.   HENT:  Positive for hearing loss (mild). Negative for congestion, nosebleeds, sinus pain, sore throat and tinnitus.    Eyes:         Presc glasses    Respiratory:  Negative for cough, sputum production, shortness of breath and wheezing.    Cardiovascular:  Negative for chest pain, palpitations, orthopnea and leg swelling.   Gastrointestinal:  Negative for heartburn.        No dentures, no missing teeth, no swallowing issues    Neurological:  Positive for dizziness (with position change). Negative for tremors and headaches.        Brain fog    Psychiatric/Behavioral:  The patient has insomnia (weaning off ETOH, occasional OTC sleeping tablet).        Past Medical History:   Diagnosis Date    Acquired circulating anticoagulants (HCC)     Anemia 09/28/2021     Arthritis     toe    Atrial fibrillation (formerly [facility])     Benign essential HTN 03/19/2012    Breast cancer (formerly [facility])     Cancer (formerly [facility]) 1998    breast     Cardiac arrhythmia     Chest tightness or pressure 03/19/2012    Chickenpox     Coronary heart disease     DCM (dilated cardiomyopathy) (formerly [facility]) 08/24/2022    Thai measles     Gynecological disorder     High cholesterol     High risk medication use 03/19/2012    Hypercholesterolemia 03/19/2012    Mumps     MVP (mitral valve prolapse) 03/19/2012    Osteoporosis     Preventative health care 05/21/2020    Recurrent UTI 09/19/2023    Seasonal allergic rhinitis due to pollen 04/26/2023    Seizure disorder (formerly [facility]) 09/28/2021    last seizure may 2021    Sleep apnea     CPAP at night    Snoring     Stroke (formerly [facility]) 2017    residual minor weakness on left side    Substance abuse (formerly [facility])     Tonsillitis     Urinary bladder disorder     OAB    Urinary frequency 04/26/2023    Urinary incontinence     Urinary retention 09/28/2021    Valvular heart disease     Venereal disease     Volume depletion and dehydration 04/04/2023       Past Surgical History:   Procedure Laterality Date    CATARACT PHACO WITH IOL  3/16/2009    Performed by SHANNON GANDARA at [facility]    CATARACT PHACO WITH IOL  1/26/2009    Performed by SHANNON GANDARA at [facility]    BREAST BIOPSY      HYSTERECTOMY LAPAROSCOPY      LUMPECTOMY      WI CHEMOTHERAPY, UNSPECIFIED PROCEDURE      WI RADIATION THERAPY PLAN SIMPLE      WI REMV 2ND CATARACT,CORN-SCLER SECTN      PRIMARY C SECTION      SINUSCOPE      TONSILLECTOMY         Family History   Problem Relation Age of Onset    Cancer Mother         breast    No Known Problems Brother     Heart Failure Neg Hx     Heart Disease Neg Hx        Social History     Socioeconomic History    Marital status:      Spouse name: Not on file    Number of children: 2    Years of education: Not on file    Highest education level: Professional school  degree (e.g., MD, DDS, DVM, ARACELI)   Occupational History    Occupation:      Employer: Not Employed   Tobacco Use    Smoking status: Never     Passive exposure: Never    Smokeless tobacco: Never   Vaping Use    Vaping status: Never Used   Substance and Sexual Activity    Alcohol use: Yes     Alcohol/week: 1.2 oz     Types: 2 Glasses of wine per week     Comment: twice a week    Drug use: No    Sexual activity: Yes     Partners: Male     Birth control/protection: Female Sterilization   Other Topics Concern    Not on file   Social History Narrative    Not on file     Social Drivers of Health     Financial Resource Strain: Low Risk  (1/26/2022)    Overall Financial Resource Strain (CARDIA)     Difficulty of Paying Living Expenses: Not hard at all   Food Insecurity: No Food Insecurity (10/16/2024)    Hunger Vital Sign     Worried About Running Out of Food in the Last Year: Never true     Ran Out of Food in the Last Year: Never true   Transportation Needs: No Transportation Needs (10/16/2024)    PRAPARE - Transportation     Lack of Transportation (Medical): No     Lack of Transportation (Non-Medical): No   Physical Activity: Insufficiently Active (1/26/2022)    Exercise Vital Sign     Days of Exercise per Week: 4 days     Minutes of Exercise per Session: 30 min   Stress: Not on file   Social Connections: Socially Isolated (1/26/2022)    Social Connection and Isolation Panel [NHANES]     Frequency of Communication with Friends and Family: More than three times a week     Frequency of Social Gatherings with Friends and Family: Twice a week     Attends Gnosticist Services: Never     Active Member of Clubs or Organizations: No     Attends Club or Organization Meetings: Never     Marital Status:    Intimate Partner Violence: Not At Risk (10/16/2024)    Humiliation, Afraid, Rape, and Kick questionnaire     Fear of Current or Ex-Partner: No     Emotionally Abused: No     Physically Abused: No     Sexually Abused: No    Housing Stability: Low Risk  (10/16/2024)    Housing Stability Vital Sign     Unable to Pay for Housing in the Last Year: No     Number of Times Moved in the Last Year: 1     Homeless in the Last Year: No       Allergies as of 04/14/2025    (No Known Allergies)          Current medications as of today   Current Outpatient Medications   Medication Sig Dispense Refill    Cranberry-Vit C-Lactobacillus ([facility]/CRANBERRY) 450-30 MG Tab Take 2 Tablets by mouth every day with lunch. FOR UTI PREVENTION 180 Tablet 3    D-Mannose 350 MG Cap Take 2 Capsules by mouth every day. FOR UTI PREVENTION 180 Capsule 3    Mirabegron ER 50 MG TABLET SR 24 HR Take 50 mg by mouth every day. FOR URINARY FREQUENCY/OVERACTIVE BLADDER 90 Tablet 3    fexofenadine (ALLEGRA) 60 MG Tab Take 1 Tablet by mouth every day. FOR ALLERGIES 90 Tablet 1    atorvastatin (LIPITOR) 80 MG tablet TAKE 1 TABLET BY MOUTH EVERY DAY IN THE EVENING (Patient taking differently: Take 80 mg by mouth every day.) 90 Tablet 2    spironolactone (ALDACTONE) 25 MG Tab TAKE 1 TABLET BY MOUTH EVERY DAY 90 Tablet 3    ELIQUIS 5 MG Tab TAKE 1 TABLET BY MOUTH TWICE A  Tablet 3    Multiple Vitamins-Minerals (MULTIVITAMIN WOMEN 50+ PO) Take 1 Tablet by mouth every day.      tamsulosin (FLOMAX) 0.4 MG capsule Take 0.4 mg by mouth 2 times a day.       No current facility-administered medications for this visit.         Physical Exam:   Gen:           Alert and oriented, No apparent distress. Mood and affect appropriate, normal interaction with examiner.   Hearing:     Grossly intact.  Nose:          Normal, no lesions or deformities.  Dentition:    Fair dentition.   Oropharynx:   Tongue normal, posterior pharynx without erythema or exudate.  Mallampati Classification: III  Neck:        Supple, trachea midline, no masses.  Respiratory Effort: No intercostal retractions or use of accessory muscles.   Gait and Station: Grossly normal.  Digits and Nails: No clubbing,  cyanosis, petechiae, or nodes.   Skin:        No rashes, lesions or ulcers noted.               Ext:           No cyanosis or edema.      Immunizations:  Flu: Annual recommended  Pneumovax 23: 6/8/2015  Prevnar 13: 12/21/2021, 11/3/2016  Moderna SARS CoV2 Vaccine: 1/6/2022, 2/22/2021, 1/23/2021     Assessment / Plan:  1. LALY (obstructive sleep apnea)  - Overnight Oximetry; Future    Patient continues to struggle with CPAP use with daily usage limited to about 1 hour, reports using device and then removing for sleep.  Reviewed risks associated with untreated or undertreated sleep apnea.  Additionally adjustment in CPAP pressures was made to 8-12 will assess for benefit on follow-up.  No mask leak was noted with nasal mask.  Previously trialed on an oral mandibular device which she reports being able to tolerate wearing with fewer apneas than on CPAP device however usage is sporadic which makes it difficult to evaluate.  2. Nocturnal hypoxia  - Overnight Oximetry; Future    Patient noted never received nocturnal oxygen for use with CPAP, will update overnight oximetry to confirm continued need.  Patient was instructed to wear her CPAP device as long as possible that night for reliable results.  States understanding.  Short-term follow-up to review results.    Follow-up:   Return in about 6 weeks (around 5/26/2025) for Return with Laney Hand PA-C.    Please note that this dictation was created using voice recognition software. I have made every reasonable attempt to correct obvious errors, but it is possible there are errors of grammar and possibly content that I did not discover before finalizing the note.

## 2025-04-22 NOTE — DISCHARGE SUMMARY
CHIEF COMPLAINT ON ADMISSION     CODE STATUS  Full     HPI & HOSPITAL COURSE  Shaista Bocanegra is a very pleasant patient of Dr. Mark Richard admitted on 12/7/2021 for elective catheter ablation on 12/7/2021.  Patient underwent successful PVI, PWI with linear ablation, CPAE ablatio in the LA and CTI ablation for typical flutter. ICE visualization of the KYUNG suggested no residual flow from previous KYUNG ligation.     Other past medical history significant for MR with minimally invasive repair and MAZE with KYUNG ligation in September 2021, HTN, HLD, CVA in 2017 and anemia.       Post-ablation, patient did have a sore throat from intubation, required O2 via nasal cannula and also had some right groin site  ongoing oozing requiring manual pressure. It was decided to keep the patient overnight for observation. This morning the left and right femoral cath site remained clean, dry, and intact with no signs of hematoma, bleeding, or infection. Vitals and laboratory workup remained unremarkable. Patients O2 saturation remained above 90% with ambulation on room air. Per nursing patient was able to ambulate without assistance.       Reviewed discharge instructions with patient related to lifting precautions for one week and the importance and rationale of daily omeprazole for 30 days post procedure.      Thoroughly discussed the discharge instructions as below with patient. Patient was discharged home with family with no further questions/concerns, their outpatient follow up has been arranged by our office as below.       FOLLOW UP  Future Appointments   Date Time Provider Department Center   12/10/2021  9:15 AM Ayde Denise M.D. [facility] None   12/13/2021  9:45 AM Opal Sosa D.O. [facility] None   12/17/2021 10:30 AM EEG/EP LAB [facility] None   1/6/2022  3:15 PM MOLLY Brush [facility] None   1/14/2022 10:40 AM Darin Meehan M.D. [facility] None   1/19/2022 10:20 AM Maycol Can M.D. [facility] None     MEDICATIONS ON DISCHARGE    Get a tennis elbow band and use it on the left forearm just 1 inch below the elbow joint.  Make sure that the pressure-point is on the outer side of the forearm.  Using most of the time.  Apply the diclofenac gel given to you to the localized area of pain.  Gently massage until it is dried up.  After that apply warm compresses on it.  Follow instruction on warm compresses as described below.  Apply warm compresses to the affected area at least 3-4 times a day.  You can use a wash rag, moisten it with water, put it on the affected area and then wrap it with a heating pad.  Or you can put the washrag after moistening in the microwave for 20 to 25 seconds and check the temperature and then apply on the affected area  for at least 3 to 4 minutes.  Repeat this at least 3-4 times a day.  Mix mupirocin and triamcinolone together and apply spot daily twice a day to the affected right forearm.     Medication List      START taking these medications      Instructions   omeprazole 20 MG delayed-release capsule  Commonly known as: PRILOSEC   Take 1 Capsule by mouth every day. Take before breakfast for 30 days post ablation to protect your esophagus.  Dose: 20 mg        CONTINUE taking these medications      Instructions   apixaban 5mg Tabs  Commonly known as: ELIQUIS   Take 1 Tablet by mouth 2 times a day.  Dose: 5 mg     ascorbic acid 500 MG tablet  Commonly known as: Vitamin C   Take 1 Tablet by mouth 2 times a day.  Dose: 500 mg     aspirin 81 MG EC tablet   Take 1 Tablet by mouth every day.  Dose: 81 mg     atorvastatin 80 MG tablet  Commonly known as: LIPITOR   Take 1 Tablet by mouth every evening.  Dose: 80 mg     B-12 PO   Take 25,000 mcg by mouth every day.  Dose: 25,000 mcg     ferrous sulfate 325 (65 Fe) MG tablet   Take 1 Tablet by mouth every morning with breakfast.  Dose: 325 mg     levetiracetam 1000 MG tablet  Commonly known as: KEPPRA   Take 1 Tablet by mouth every 12 hours.  Dose: 1,000 mg     Melatonin 10 MG Tabs   Take 10 mg by mouth at bedtime.  Dose: 10 mg     metoprolol tartrate 25 MG Tabs  Commonly known as: LOPRESSOR   Take 0.5 Tablets by mouth 2 times a day.  Dose: 12.5 mg     MULTIVITAMINS PO   Take 1 Tab by mouth every day.  Dose: 1 Tablet     spironolactone 25 MG Tabs  Commonly known as: ALDACTONE   Take 1 Tablet by mouth every day.  Dose: 25 mg     Vitamin D3 2000 UNIT Caps   Take 2,000 Units by mouth every day.  Dose: 2,000 Units          [facility] Post Ablation Patient Instructions:  1. No lifting > 10 lbs x 1 week.       2. No soaking in baths, hot tubs, pools x 1 week.  May shower the day after discharge and take off groin dressings and leave uncovered.  Continue to monitor sites daily for warmth, redness, discolored drainage.  It is common to have a small lump in the area where the cather was (usually the size of a small marble); this will  go away but takes approximately 6 weeks to normalize.      3.   Please take all medications as prescribed to you; please do not stop any medications prescribed post ablation unless directed by your healthcare provider.       4.   Please do not miss any doses of your blood thinner (if you have been started on, or take chronic blood thinners).      5.   Please walk and take deep breaths after discharge.  After discharge, if  experiences neurological changes/signs of stroke, high fever, you should be seen in the emergency dept.      6.   It is possible you may experience some chest discomfort or chest tightness post ablation.  This is usually secondary to inflammation and irritation of the tissues at the area of the ablation.  If this occurs, it is advised to try 400 mg of Ibuprofen with food as needed up to three times a day for a maximum of two days.  This should help to decrease pain and tissue inflammation.          **Please notify the office ([phone]) if this occurs.         ** DO NOT TAKE Ibuprofen IF HISTORY OF SIGNIFICANT BLEEDING OR KIDNEY DISEASE WITHOUT DISCUSSING WITH YOUR CARDIOLOGY PROVIDER FIRST.          ** If pain becomes severe or you have additional symptoms you may need to be medically evaluated; please contact the cardiology office ([phone]) for further direction.      7.  Please contact call our office ([phone]) or message via [employer] if you have any questions or concerns post procedurally.     8.  You need to be seen for post ablation follow up 2-4 weeks post procedure.  If you do not have an appointment scheduled, please call [phone] to schedule your follow up appointment.

## 2025-04-23 PROBLEM — R55 SYNCOPAL EPISODES: Chronic | Status: RESOLVED | Noted: 2024-10-16 | Resolved: 2025-04-23

## 2025-05-05 ENCOUNTER — HOME STUDY (OUTPATIENT)
Dept: SLEEP MEDICINE | Facility: MEDICAL CENTER | Age: 80
End: 2025-05-05
Attending: PHYSICIAN ASSISTANT
Payer: MEDICARE

## 2025-05-05 DIAGNOSIS — G47.34 NOCTURNAL HYPOXIA: ICD-10-CM

## 2025-05-05 DIAGNOSIS — G47.33 OSA (OBSTRUCTIVE SLEEP APNEA): ICD-10-CM

## 2025-05-05 PROCEDURE — 94762 N-INVAS EAR/PLS OXIMTRY CONT: CPT | Performed by: INTERNAL MEDICINE

## 2025-05-05 PROCEDURE — 94762 N-INVAS EAR/PLS OXIMTRY CONT: CPT | Performed by: PHYSICIAN ASSISTANT

## 2025-05-19 ENCOUNTER — OFFICE VISIT (OUTPATIENT)
Dept: SLEEP MEDICINE | Facility: MEDICAL CENTER | Age: 80
End: 2025-05-19
Attending: PHYSICIAN ASSISTANT
Payer: MEDICARE

## 2025-05-19 VITALS
WEIGHT: 162 LBS | DIASTOLIC BLOOD PRESSURE: 72 MMHG | SYSTOLIC BLOOD PRESSURE: 116 MMHG | HEART RATE: 74 BPM | OXYGEN SATURATION: 92 % | BODY MASS INDEX: 26.15 KG/M2

## 2025-05-19 DIAGNOSIS — Z78.9 INTOLERANCE OF CONTINUOUS POSITIVE AIRWAY PRESSURE (CPAP) VENTILATION: ICD-10-CM

## 2025-05-19 DIAGNOSIS — G47.33 OSA (OBSTRUCTIVE SLEEP APNEA): ICD-10-CM

## 2025-05-19 DIAGNOSIS — G47.34 NOCTURNAL HYPOXIA: Primary | ICD-10-CM

## 2025-05-19 PROCEDURE — 3074F SYST BP LT 130 MM HG: CPT | Performed by: PHYSICIAN ASSISTANT

## 2025-05-19 PROCEDURE — 99213 OFFICE O/P EST LOW 20 MIN: CPT | Performed by: PHYSICIAN ASSISTANT

## 2025-05-19 PROCEDURE — 3078F DIAST BP <80 MM HG: CPT | Performed by: PHYSICIAN ASSISTANT

## 2025-05-19 PROCEDURE — 99214 OFFICE O/P EST MOD 30 MIN: CPT | Performed by: PHYSICIAN ASSISTANT

## 2025-05-19 ASSESSMENT — ENCOUNTER SYMPTOMS
ORTHOPNEA: 0
TREMORS: 0
HEADACHES: 0
HEARTBURN: 0
SHORTNESS OF BREATH: 0
SORE THROAT: 0
SPUTUM PRODUCTION: 0
FEVER: 0
WEIGHT LOSS: 0
SINUS PAIN: 0
WHEEZING: 0
PALPITATIONS: 0
COUGH: 0
INSOMNIA: 1
CHILLS: 0
DIZZINESS: 1
ROS GI COMMENTS: NO DENTURES, NO MISSING TEETH, SOME SWALLOWING ISSUES

## 2025-05-19 ASSESSMENT — FIBROSIS 4 INDEX: FIB4 SCORE: 2.449489742783178099

## 2025-05-19 NOTE — PROGRESS NOTES
Chief Complaint   Patient presents with    Apnea     Last Office Visit 4/14/25 with Laney Hand P.A.-C.    OPO COMPLETED ON PAP 8CMH2O DONE ON 05/05/2025    Settings: CPAP @8-12 cm H20 pressure       HPI:  Shaista Bocanegra is a 80 y.o. year old female here today for follow-up on obstructive sleep apnea.  Last seen in clinic 4/14/2025 by BRENTON Rg.  Previously evaluated by a Dr. Maycol Can on 5/6/2022.  Patient is accompanied by care assistant from assisted living facility where she lives independently.  Additionally patient son Fer was listening to office visit via telephone.  She is also followed by pulmonary clinic for lung nodules.    Past Medical History: Primary insomnia, LALY, atrial fibrillation, breast cancer 2001 right breast, vascular dementia, seasonal allergic rhinitis, CVA, dysphagia, seizure, hypertension, mitral valve repair, anemia, recurrent UTI.  Patient usually wears a mouthguard in addition to her Pap therapy.     Vitals:  /72 (BP Location: Left arm, Patient Position: Sitting, BP Cuff Size: Adult)   Pulse 74   Wt 73.5 kg (162 lb)   SpO2 92% BMI of 26.15 kg/m².    Recent Imaging: Echocardiogram obtained 10/17/2024 demonstrating normal left ventricular chamber size, wall thickness, systolic function. LVEF estimated 60%. Indeterminant diastolic function due to mitral valve disease. Normal right ventricular size and systolic function. Enlarged right atrium. Trace tricuspid regurgitation with estimated RVSP of 36 mmHg. Mild pulmonic insufficiency.     Currently using  [employer]  auto CPAP @8-12 cm H20 pressure; compliance reviewed for 4/19/2025 through 5/18/2025, days used 30/30, average daily usage 2 hours 22 minutes, 16% of days greater than or equal to 4 hours, mask leak at 7 minutes 28 seconds, AHI 11.1 per hour.  Unable to tolerate device long enough to fully assess benefit, in part due to cognitive issues.  Patient previously reported wearing device until she  gets sleepy then taking the device off.    Device obtained July 2022 for recall  [facility]  Mask interface nasal mask    Home sleep study completed 7/27/2022 with oral mandibular advancement device demonstrated SHERRILL of 5.3 events per hour increasing to 6.4 in supine position.  Low O2 sat of 74% with sats less than or equal to 88% for 8% of flow evaluation time or 50 min.  Titration study versus auto CPAP trial was recommended.      Overnight oximetry on 9/24/2022 with CPAP at 7 demonstrated low O2 sat of 86% with sats less than or equal to 88% for 1.1 minutes.      Reviewed sleep study results from 3/31/2023 completed on oral appliance demonstrating overall AHI of 6.6 with mild nocturnal hypoxia, sats less than or equal to 88% for 22.8 minutes.  Update titration study given patient's frequent awakenings/tolerance issues on CPAP therapy.        Polysomnogram  titration study obtained 11/29/2023 demonstrating successful titration to 7-8 cm water pressure with need for bleed in O2 at 2 L.  Recommend auto CPAP of 6-10 versus CPAP pressures increased to 8 cm water pressure which we completed in clinic.      Sleep schedule goes to bed 12 AM, wakens 6-7 a.m. , and gets up during the night bathroom x 1   Symptoms denies day time somnolence and denies morning headache    [place] Sleepiness Scale No data recorded   Stop Bang Score No data recorded         Review of Systems   Constitutional:  Negative for chills, fever, malaise/fatigue and weight loss.   HENT:  Positive for hearing loss. Negative for congestion, nosebleeds, sinus pain, sore throat and tinnitus.    Eyes:         Presc glasses    Respiratory:  Negative for cough, sputum production, shortness of breath and wheezing.    Cardiovascular:  Positive for leg swelling (trace left). Negative for chest pain, palpitations and orthopnea.   Gastrointestinal:  Negative for heartburn.        No dentures, no missing teeth, some swallowing issues     Neurological:  Positive for dizziness (sometimes with position change). Negative for tremors and headaches.   Psychiatric/Behavioral:  The patient has insomnia (falls asleep late).        Past Medical History[1]    Past Surgical History[2]    Family History   Problem Relation Age of Onset    Cancer Mother         breast    No Known Problems Brother     Heart Failure Neg Hx     Heart Disease Neg Hx        Social History     Socioeconomic History    Marital status:      Spouse name: Not on file    Number of children: 2    Years of education: Not on file    Highest education level: Professional school degree (e.g., MD, DDS, DVM, ARACELI)   Occupational History    Occupation:      Employer: Not Employed   Tobacco Use    Smoking status: Never     Passive exposure: Never    Smokeless tobacco: Never   Vaping Use    Vaping status: Never Used   Substance and Sexual Activity    Alcohol use: Yes     Alcohol/week: 1.2 oz     Types: 2 Glasses of wine per week     Comment: twice a week    Drug use: No    Sexual activity: Yes     Partners: Male     Birth control/protection: Female Sterilization   Other Topics Concern    Not on file   Social History Narrative    Not on file     Social Drivers of Health     Financial Resource Strain: Low Risk  (1/26/2022)    Overall Financial Resource Strain (CARDIA)     Difficulty of Paying Living Expenses: Not hard at all   Food Insecurity: No Food Insecurity (10/16/2024)    Hunger Vital Sign     Worried About Running Out of Food in the Last Year: Never true     Ran Out of Food in the Last Year: Never true   Transportation Needs: No Transportation Needs (10/16/2024)    PRAPARE - Transportation     Lack of Transportation (Medical): No     Lack of Transportation (Non-Medical): No   Physical Activity: Insufficiently Active (1/26/2022)    Exercise Vital Sign     Days of Exercise per Week: 4 days     Minutes of Exercise per Session: 30 min   Stress: Not on file   Social Connections: Socially  Isolated (1/26/2022)    Social Connection and Isolation Panel [NHANES]     Frequency of Communication with Friends and Family: More than three times a week     Frequency of Social Gatherings with Friends and Family: Twice a week     Attends Cheondoism Services: Never     Active Member of Clubs or Organizations: No     Attends Club or Organization Meetings: Never     Marital Status:    Intimate Partner Violence: Not At Risk (10/16/2024)    Humiliation, Afraid, Rape, and Kick questionnaire     Fear of Current or Ex-Partner: No     Emotionally Abused: No     Physically Abused: No     Sexually Abused: No   Housing Stability: Low Risk  (10/16/2024)    Housing Stability Vital Sign     Unable to Pay for Housing in the Last Year: No     Number of Times Moved in the Last Year: 1     Homeless in the Last Year: No       Allergies as of 05/19/2025    (No Known Allergies)          Current medications as of today Current Medications[3]      Physical Exam:   Gen:           Alert and oriented, No apparent distress. Mood and affect appropriate, normal interaction with examiner.   Hearing:     Grossly intact.  Nose:          Normal, no lesions or deformities.  Dentition:    Fair dentition.   Oropharynx:   Tongue normal, posterior pharynx without erythema or exudate.  Mallampati Classification: III  Neck:        Supple, trachea midline, no masses.  Respiratory Effort: No intercostal retractions or use of accessory muscles.   Gait and Station: Grossly normal.  Digits and Nails: No clubbing, cyanosis, petechiae, or nodes.   Skin:        No rashes, lesions or ulcers noted.               Ext:           No cyanosis or edema.      Immunizations:  Flu: Annual recommended  Pneumovax 23: 6/8/2015  Prevnar 13: 12/21/2021, 11/3/2016  SARS CoV2 Vaccine: 1/6/2022, 2/22/2021, 1/23/2021    Assessment / Plan:  1. Nocturnal hypoxia  - DME O2 New Set Up    2. Intolerance of continuous positive airway pressure (CPAP) ventilation    3. LALY  (obstructive sleep apnea)    Despite multiple attempts at education and redirection patient continues to struggle with adequate device usage.  Pressure change made in clinic at last visit but insufficient use to adequately assess with elevated AHI noted.  Previous sleep study showed adequate improvement with oral mandibular device but patient reports unable to tolerate wearing that device all night either.  Overnight oximetry did show low O2 levels but may not have been obtained on CPAP therapy due to inability to tolerate sufficiently.  After discussion with patient and son, will place order for nocturnal oxygen at 2 L per nasal cannula due to patient inability to tolerate PAP therapy or oral mandibular device for long enough time to achieve therapeutic goals and maintaining adequate nocturnal oxygen levels throughout night.  Patient will need to wear her oxygen for entirety of night, stated understanding.  Follow-up in 6 months, sooner if needed.  Likely will need follow-up overnight oximetry on supplemental O2.      Follow-up:   Return in about 6 months (around 11/19/2025) for Return with Laney Hand PA-C.    Please note that this dictation was created using voice recognition software. I have made every reasonable attempt to correct obvious errors, but it is possible there are errors of grammar and possibly content that I did not discover before finalizing the note.         [1]   Past Medical History:  Diagnosis Date    Acquired circulating anticoagulants (HCC)     Anemia 09/28/2021    Arthritis     toe    Atrial fibrillation (HCC)     Benign essential HTN 03/19/2012    Breast cancer (HCC)     Cancer (HCC) 1998    breast     Cardiac arrhythmia     Chest tightness or pressure 03/19/2012    Chickenpox     Coronary heart disease     DCM (dilated cardiomyopathy) (HCC) 08/24/2022    [place] measles     Gynecological disorder     High cholesterol     High risk medication use 03/19/2012    Hypercholesterolemia  03/19/2012    Mumps     MVP (mitral valve prolapse) 03/19/2012    Osteoporosis     Preventative health care 05/21/2020    Recurrent UTI 09/19/2023    Seasonal allergic rhinitis due to pollen 04/26/2023    Seizure disorder (HCC) 09/28/2021    last seizure may 2021    Sleep apnea     CPAP at night    Snoring     Stroke ([facility]) 2017    residual minor weakness on left side    Substance abuse ([facility])     Syncopal episodes 10/16/2024    Tonsillitis     Urinary bladder disorder     OAB    Urinary frequency 04/26/2023    Urinary incontinence     Urinary retention 09/28/2021    Valvular heart disease     Venereal disease     Volume depletion and dehydration 04/04/2023   [2]   Past Surgical History:  Procedure Laterality Date    CATARACT PHACO WITH IOL  3/16/2009    Performed by SHANNON GANDARA at [facility]    CATARACT PHACO WITH IOL  1/26/2009    [facility]    BREAST BIOPSY      HYSTERECTOMY LAPAROSCOPY      LUMPECTOMY      AZ CHEMOTHERAPY, UNSPECIFIED PROCEDURE      AZ RADIATION THERAPY PLAN SIMPLE      AZ REMV 2ND CATARACT,CORN-SCLER SECTN      PRIMARY C SECTION      SINUSCOPE      TONSILLECTOMY     [3]   Current Outpatient Medications   Medication Sig Dispense Refill    Trospium Chloride 60 MG CAPSULE SR 24 HR Take 1 Capsule by mouth every day.      Cranberry-Vit C-Lactobacillus ([facility]/CRANBERRY) 450-30 MG Tab Take 2 Tablets by mouth every day with lunch. FOR UTI PREVENTION 180 Tablet 3    D-Mannose 350 MG Cap Take 2 Capsules by mouth every day. FOR UTI PREVENTION 180 Capsule 3    fexofenadine (ALLEGRA) 60 MG Tab Take 1 Tablet by mouth every day. FOR ALLERGIES 90 Tablet 1    atorvastatin (LIPITOR) 80 MG tablet TAKE 1 TABLET BY MOUTH EVERY DAY IN THE EVENING (Patient taking differently: Take 80 mg by mouth every day.) 90 Tablet 2    spironolactone (ALDACTONE) 25 MG Tab TAKE 1 TABLET BY MOUTH EVERY DAY 90 Tablet 3    ELIQUIS 5 MG Tab TAKE 1  TABLET BY MOUTH TWICE A  Tablet 3    Multiple Vitamins-Minerals (MULTIVITAMIN WOMEN 50+ PO) Take 1 Tablet by mouth every day.       No current facility-administered medications for this visit.

## 2025-05-19 NOTE — PATIENT INSTRUCTIONS
1-reviewed compliance   2-continues to struggle with adequate usage  3-pressure change made in clinic at last visit but insufficient use to adequately assess  4-previous sleep study showed adequate improvement with oral mandibular   5-overnight oximetry did show low oxygen levels but may not have been on cpap due to inability to tolerate sufficiently   6-will place order for nocturnal oxygen at 2L per nasal cannula due to inability to wear/tolerate cpap or oral mandibular device long enough to achieve therapeutic goals and maintain adequate nocturnal oxygen levels  7-follow up in 6 months   8-likely will need follow up overnight oximetry

## 2025-05-20 NOTE — PROCEDURES
This is an overnight oximetry study performed on May 5, 2025 for duration of 7 hours and 34 minutes on positive airway pressure at 8 cm of water.    Basal SpO2 was 88%.  Total time spent below saturation of 80% was 231 minutes.  Mild clustering of desaturation events.  Consider titration study.

## 2025-05-30 DIAGNOSIS — E78.49 OTHER HYPERLIPIDEMIA: ICD-10-CM

## 2025-05-30 RX ORDER — ATORVASTATIN CALCIUM 80 MG/1
80 TABLET, FILM COATED ORAL EVERY EVENING
Qty: 90 TABLET | Refills: 2 | Status: SHIPPED | OUTPATIENT
Start: 2025-05-30

## 2025-05-30 NOTE — TELEPHONE ENCOUNTER
Is the patient due for a refill? Yes    Was the patient seen the last 15 months? Yes    Date of last office visit: 1/13/2025    Does the patient have an upcoming appointment?  Yes   If yes, When? 9/17/2025    Provider to refill:LILI    Does the patient have group home Plus and need 100-day supply? (This applies to ALL medications) Patient does not have SCP

## 2025-06-16 DIAGNOSIS — I48.91 ATRIAL FIBRILLATION, UNSPECIFIED TYPE (HCC): ICD-10-CM

## 2025-06-17 ENCOUNTER — TELEPHONE (OUTPATIENT)
Dept: CARDIOLOGY | Facility: MEDICAL CENTER | Age: 80
End: 2025-06-17
Payer: MEDICARE

## 2025-06-17 PROCEDURE — RXMED WILLOW AMBULATORY MEDICATION CHARGE: Performed by: INTERNAL MEDICINE

## 2025-06-17 RX ORDER — APIXABAN 5 MG/1
5 TABLET, FILM COATED ORAL 2 TIMES DAILY
Qty: 180 TABLET | Refills: 2 | Status: SHIPPED | OUTPATIENT
Start: 2025-06-17

## 2025-06-17 NOTE — TELEPHONE ENCOUNTER
Received Refill PA request via [facility]  for apixaban (ELIQUIS) 5mg Tab . (Quantity:180, Day Supply:90)     Insurance: [employer]  Member ID:  [id number]  BIN: [id number]  PCN: [id number]  Group: [id number]     Ran Test claim via [place] & medication Pays for a $406.52 copay. Will outreach to patient to offer specialty pharmacy services and or release to preferred pharmacy    Called pt and offered our services. Pt would like to onboard due to discount [phone]/90ds

## 2025-06-18 ENCOUNTER — PHARMACY VISIT (OUTPATIENT)
Dept: PHARMACY | Facility: MEDICAL CENTER | Age: 80
End: 2025-06-18
Payer: COMMERCIAL

## 2025-07-01 NOTE — FLOWSHEET NOTE
10/01/21 1032   Events/Summary/Plan   Events/Summary/Plan 02 spot check, IS   Vital Signs   Pulse 90   Respiration 18   Pulse Oximetry 96 %   $ Pulse Oximetry (Spot Check) Yes   Respiratory Assessment   Level of Consciousness Alert   Chest Exam   Work Of Breathing / Effort Shallow   Oxygen   O2 (LPM)   (2 lpm at night for sleep apnea)   O2 Delivery Device None - Room Air      Have you been to the ER, urgent care clinic since your last visit?  Hospitalized since your last visit?   NO    Have you seen or consulted any other health care providers outside our system since your last visit?   NO    Have you had a mammogram?”   NO    No breast cancer screening on file      “Have you had a pap smear?”    NO    No cervical cancer screening on file       “Have you had a colorectal cancer screening such as a colonoscopy/FIT/Cologuard?    NO    No colonoscopy on file  No cologuard on file  Date of last FIT: 7/11/2023   No flexible sigmoidoscopy on file

## 2025-07-05 ENCOUNTER — HOSPITAL ENCOUNTER (EMERGENCY)
Facility: MEDICAL CENTER | Age: 80
End: 2025-07-06
Attending: STUDENT IN AN ORGANIZED HEALTH CARE EDUCATION/TRAINING PROGRAM
Payer: MEDICARE

## 2025-07-05 DIAGNOSIS — E86.0 DEHYDRATION: ICD-10-CM

## 2025-07-05 DIAGNOSIS — R42 LIGHTHEADEDNESS: Primary | ICD-10-CM

## 2025-07-05 LAB — EKG IMPRESSION: NORMAL

## 2025-07-05 PROCEDURE — 93005 ELECTROCARDIOGRAM TRACING: CPT | Mod: TC | Performed by: STUDENT IN AN ORGANIZED HEALTH CARE EDUCATION/TRAINING PROGRAM

## 2025-07-05 PROCEDURE — 93005 ELECTROCARDIOGRAM TRACING: CPT | Mod: TC

## 2025-07-05 PROCEDURE — 99284 EMERGENCY DEPT VISIT MOD MDM: CPT

## 2025-07-05 ASSESSMENT — FIBROSIS 4 INDEX: FIB4 SCORE: 2.449489742783178099

## 2025-07-06 ENCOUNTER — APPOINTMENT (OUTPATIENT)
Dept: RADIOLOGY | Facility: MEDICAL CENTER | Age: 80
End: 2025-07-06
Attending: STUDENT IN AN ORGANIZED HEALTH CARE EDUCATION/TRAINING PROGRAM
Payer: MEDICARE

## 2025-07-06 VITALS
SYSTOLIC BLOOD PRESSURE: 143 MMHG | HEART RATE: 76 BPM | DIASTOLIC BLOOD PRESSURE: 71 MMHG | BODY MASS INDEX: 25.37 KG/M2 | TEMPERATURE: 97.4 F | OXYGEN SATURATION: 90 % | HEIGHT: 66 IN | WEIGHT: 157.85 LBS | RESPIRATION RATE: 18 BRPM

## 2025-07-06 LAB
ALBUMIN SERPL BCP-MCNC: 4.1 G/DL (ref 3.2–4.9)
ALBUMIN/GLOB SERPL: 1.3 G/DL
ALP SERPL-CCNC: 69 U/L (ref 30–99)
ALT SERPL-CCNC: 22 U/L (ref 2–50)
ANION GAP SERPL CALC-SCNC: 15 MMOL/L (ref 7–16)
APTT PPP: 31.3 SEC (ref 24.7–36)
AST SERPL-CCNC: 29 U/L (ref 12–45)
BASOPHILS # BLD AUTO: 0.2 % (ref 0–1.8)
BASOPHILS # BLD: 0.01 K/UL (ref 0–0.12)
BILIRUB SERPL-MCNC: 1.1 MG/DL (ref 0.1–1.5)
BUN SERPL-MCNC: 14 MG/DL (ref 8–22)
CALCIUM ALBUM COR SERPL-MCNC: 9.2 MG/DL (ref 8.5–10.5)
CALCIUM SERPL-MCNC: 9.3 MG/DL (ref 8.5–10.5)
CHLORIDE SERPL-SCNC: 102 MMOL/L (ref 96–112)
CO2 SERPL-SCNC: 20 MMOL/L (ref 20–33)
CREAT SERPL-MCNC: 0.85 MG/DL (ref 0.5–1.4)
EOSINOPHIL # BLD AUTO: 0.02 K/UL (ref 0–0.51)
EOSINOPHIL NFR BLD: 0.3 % (ref 0–6.9)
ERYTHROCYTE [DISTWIDTH] IN BLOOD BY AUTOMATED COUNT: 48.8 FL (ref 35.9–50)
GFR SERPLBLD CREATININE-BSD FMLA CKD-EPI: 68 ML/MIN/1.73 M 2
GLOBULIN SER CALC-MCNC: 3.1 G/DL (ref 1.9–3.5)
GLUCOSE SERPL-MCNC: 77 MG/DL (ref 65–99)
HCT VFR BLD AUTO: 45 % (ref 37–47)
HGB BLD-MCNC: 15 G/DL (ref 12–16)
IMM GRANULOCYTES # BLD AUTO: 0.01 K/UL (ref 0–0.11)
IMM GRANULOCYTES NFR BLD AUTO: 0.2 % (ref 0–0.9)
INR PPP: 1.19 (ref 0.87–1.13)
LYMPHOCYTES # BLD AUTO: 0.84 K/UL (ref 1–4.8)
LYMPHOCYTES NFR BLD: 12.9 % (ref 22–41)
MCH RBC QN AUTO: 32.9 PG (ref 27–33)
MCHC RBC AUTO-ENTMCNC: 33.3 G/DL (ref 32.2–35.5)
MCV RBC AUTO: 98.7 FL (ref 81.4–97.8)
MONOCYTES # BLD AUTO: 0.44 K/UL (ref 0–0.85)
MONOCYTES NFR BLD AUTO: 6.8 % (ref 0–13.4)
NEUTROPHILS # BLD AUTO: 5.17 K/UL (ref 1.82–7.42)
NEUTROPHILS NFR BLD: 79.6 % (ref 44–72)
NRBC # BLD AUTO: 0 K/UL
NRBC BLD-RTO: 0 /100 WBC (ref 0–0.2)
PLATELET # BLD AUTO: 227 K/UL (ref 164–446)
PMV BLD AUTO: 9.7 FL (ref 9–12.9)
POTASSIUM SERPL-SCNC: 4.2 MMOL/L (ref 3.6–5.5)
PROT SERPL-MCNC: 7.2 G/DL (ref 6–8.2)
PROTHROMBIN TIME: 15.1 SEC (ref 12–14.6)
RBC # BLD AUTO: 4.56 M/UL (ref 4.2–5.4)
SODIUM SERPL-SCNC: 137 MMOL/L (ref 135–145)
TROPONIN T SERPL-MCNC: 13 NG/L (ref 6–19)
WBC # BLD AUTO: 6.5 K/UL (ref 4.8–10.8)

## 2025-07-06 PROCEDURE — 36415 COLL VENOUS BLD VENIPUNCTURE: CPT

## 2025-07-06 PROCEDURE — 80053 COMPREHEN METABOLIC PANEL: CPT

## 2025-07-06 PROCEDURE — 96361 HYDRATE IV INFUSION ADD-ON: CPT

## 2025-07-06 PROCEDURE — 85610 PROTHROMBIN TIME: CPT

## 2025-07-06 PROCEDURE — 700105 HCHG RX REV CODE 258: Performed by: STUDENT IN AN ORGANIZED HEALTH CARE EDUCATION/TRAINING PROGRAM

## 2025-07-06 PROCEDURE — 71045 X-RAY EXAM CHEST 1 VIEW: CPT

## 2025-07-06 PROCEDURE — 85025 COMPLETE CBC W/AUTO DIFF WBC: CPT

## 2025-07-06 PROCEDURE — 84484 ASSAY OF TROPONIN QUANT: CPT

## 2025-07-06 PROCEDURE — 96360 HYDRATION IV INFUSION INIT: CPT

## 2025-07-06 PROCEDURE — 85730 THROMBOPLASTIN TIME PARTIAL: CPT

## 2025-07-06 RX ORDER — SODIUM CHLORIDE, SODIUM LACTATE, POTASSIUM CHLORIDE, CALCIUM CHLORIDE 600; 310; 30; 20 MG/100ML; MG/100ML; MG/100ML; MG/100ML
1000 INJECTION, SOLUTION INTRAVENOUS ONCE
Status: COMPLETED | OUTPATIENT
Start: 2025-07-06 | End: 2025-07-06

## 2025-07-06 RX ADMIN — SODIUM CHLORIDE, POTASSIUM CHLORIDE, SODIUM LACTATE AND CALCIUM CHLORIDE 1000 ML: 600; 310; 30; 20 INJECTION, SOLUTION INTRAVENOUS at 00:20

## 2025-07-06 NOTE — ED NOTES
Pt discharged, all appropriate hospital equipment removed (IV, monitor, pulse ox, etc.). Pt left unit via wheelchair with self to home for self-care. Personal belongings with pt when leaving unit. Pt given discharge instructions prior to leaving ER, including where to  prescriptions and when to follow-up if applicable; verbalizes understanding. Pt informed to return to ED if symptoms worsen/return or altered status develop. Copy of discharge instructions signed and turned into DC basket and copy sent with pt. Pt into uber to home.

## 2025-07-06 NOTE — ED TRIAGE NOTES
"Chief Complaint   Patient presents with    Weakness     Patient biba from home, pt endorses intermittent dizziness and weakness starting this morning.     Dizziness     Patient wheeled to triage for above complaint. Patient A&Ox4, GCS 15, patient speaking in full sentences. Equal and unlabored respirations. Patient educated on triage process and encouraged to notify staff if condition worsens. EKG ordered. Patient to ER lobby in stable condition.     Denies CP. HX:CVA 2017 (on eliquis), KEEGAN.    BP (!) 141/89   Pulse 90   Temp 36.3 °C (97.4 °F) (Temporal)   Resp 16   Ht 1.676 m (5' 6\")   Wt 71.6 kg (157 lb 13.6 oz)   SpO2 93%   BMI 25.48 kg/m²     "

## 2025-07-06 NOTE — ED NOTES
New PIV established. Infusion changed to new PIV site. Infusion flowing faster. Pt educated on POC. Pt verbalized understanding.

## 2025-07-06 NOTE — ED NOTES
Pt ambulated to bathroom. Pt reports feeling light headed. Pt denies pain. ERP spoke to pt at bedside about POC.

## 2025-07-06 NOTE — ED PROVIDER NOTES
ED Provider Note    CHIEF COMPLAINT  Chief Complaint   Patient presents with    Weakness     Patient biba from home, pt endorses intermittent dizziness and weakness starting this morning.     Dizziness       EXTERNAL RECORDS REVIEWED  Most recent admission note from 10/16/2024 where patient was seen for syncope, had a syncopal episode lasted around 30 seconds, has a prior CVA in 2017 with residual left-sided weakness, A-fib on Eliquis.  Patient was hypotensive at that time but was fluid responsive, also was found to have a UTI, treated with antibiotics, echocardiogram showed ejection fraction 60% with a known mitral valve replacement that was functioning normal at that time.  Patient eventually improved with IV fluid hydration and was discharged    HPI/ROS  LIMITATION TO HISTORY   Select: : None  OUTSIDE HISTORIAN(S):  None     Shaista Bocanegra is a 80 y.o. female who presents to the ED for dizziness onset sixteen hours ago. The patient reports that she was standing when her dizziness began and notes she is fine while laying flat. She reports that she feels near syncope. She denies any vertigo, headaches, changes in her vision, numbness or tingling, recent illness, diarrhea, vomiting, dysuria. She notes this has happened in the past but it has been some time. She denies any loss of consciousness or recent head strike. She notes she is not eating and drinking as normal but has been attempting to despite her symptoms. The patient adds that she takes all her medications as prescribed. The patient notes that she lives alone and has a helper come to her home three days a week.    PAST MEDICAL HISTORY   has a past medical history of Acquired circulating anticoagulants (HCC), Anemia (09/28/2021), Arthritis, Atrial fibrillation (HCC), Benign essential HTN (03/19/2012), Breast cancer (HCC), Cancer (HCC) (1998), Cardiac arrhythmia, Chest tightness or pressure (03/19/2012), Chickenpox, Coronary heart disease, DCM (dilated  cardiomyopathy) ([facility]) (08/24/2022), [place] measles, Gynecological disorder, High cholesterol, High risk medication use (03/19/2012), Hypercholesterolemia (03/19/2012), Mumps, MVP (mitral valve prolapse) (03/19/2012), Osteoporosis, Preventative health care (05/21/2020), Recurrent UTI (09/19/2023), Seasonal allergic rhinitis due to pollen (04/26/2023), Seizure disorder ([facility]) (09/28/2021), Sleep apnea, Snoring, Stroke ([facility]) (2017), Substance abuse ([facility]), Syncopal episodes (10/16/2024), Tonsillitis, Urinary bladder disorder, Urinary frequency (04/26/2023), Urinary incontinence, Urinary retention (09/28/2021), Valvular heart disease, Venereal disease, and Volume depletion and dehydration (04/04/2023).    SURGICAL HISTORY   has a past surgical history that includes cataract phaco with iol (1/26/2009); cataract phaco with iol (3/16/2009); breast biopsy; radiation therapy plan simple; chemotherapy, unspecified procedure; lumpectomy; remv 2nd cataract,corn-scler sectn; hysterectomy laparoscopy; sinuscope; tonsillectomy; and primary c section.    FAMILY HISTORY  Family History   Problem Relation Age of Onset    Cancer Mother         breast    No Known Problems Brother     Heart Failure Neg Hx     Heart Disease Neg Hx        SOCIAL HISTORY  Social History     Tobacco Use    Smoking status: Never     Passive exposure: Never    Smokeless tobacco: Never   Vaping Use    Vaping status: Never Used   Substance and Sexual Activity    Alcohol use: Yes     Alcohol/week: 1.2 oz     Types: 2 Glasses of wine per week     Comment: couple times a week    Drug use: No    Sexual activity: Yes     Partners: Male     Birth control/protection: Female Sterilization       CURRENT MEDICATIONS  Home Medications       Reviewed by David Mock R.N. (Registered Nurse) on 07/05/25 at 2240  Med List Status: Partial     Medication Last Dose Status   apixaban (ELIQUIS) 5mg Tab  Active   atorvastatin (LIPITOR) 80 MG tablet  Active   Cranberry-Vit  "C-Lactobacillus ([facility]/CRANBERRY) 450-30 MG Tab  Active   D-Mannose 350 MG Cap  Active   fexofenadine (ALLEGRA) 60 MG Tab  Active   mirabegron ER (MYRBETRIQ) 25 MG TABLET SR 24 HR  Active   Multiple Vitamins-Minerals (MULTIVITAMIN WOMEN 50+ PO)  Active   spironolactone (ALDACTONE) 25 MG Tab  Active                  Audit from Redirected Encounters    **Home medications have not yet been reviewed for this encounter**         ALLERGIES  Allergies[1]    PHYSICAL EXAM  VITAL SIGNS: BP (!) 141/89   Pulse 90   Temp 36.3 °C (97.4 °F) (Temporal)   Resp 16   Ht 1.676 m (5' 6\")   Wt 71.6 kg (157 lb 13.6 oz)   SpO2 93%   BMI 25.48 kg/m²    Constitutional: Awake and alert  HENT: Normal inspection  Eyes: Normal inspection  Neck: Grossly normal range of motion.  Cardiovascular: Normal heart rate, Normal rhythm.  Symmetric peripheral pulses.   Thorax & Lungs: No respiratory distress, No wheezing, No rales, No rhonchi, No chest tenderness.   Abdomen: Bowel sounds normal, soft, non-distended, nontender, no mass  Skin: No obvious rash.  Back: No tenderness, No CVA tenderness.   Extremities: No clubbing, cyanosis, edema, no Homans or cords.  Neurologic: Grossly normal   Psychiatric: Normal for situation    EKG/LABS  Results for orders placed or performed during the hospital encounter of 25   EKG    Collection Time: 25 10:21 PM   Result Value Ref Range    Report       [facility]    Test Date:  2025  Pt Name:    HORACE GUZMAN                  Department: ER  MRN:        [id number]                      Room:  Gender:     Female                       Technician: [id number]  :        [id number]                   Requested By:ER TRIAGE PROTOCOL  Order #:    [id number]                    Reading MD:    Measurements  Intervals                                Axis  Rate:       88                           P:          23  NV:         225                          QRS:        " 72  QRSD:       117                          T:          57  QT:         419  QTc:        507    Interpretive Statements  Sinus rhythm  Prolonged [place] interval  Probable left atrial enlargement  Nonspecific intraventricular conduction delay  Low voltage, extremity and precordial leads  Compared to ECG 01/31/2025 10:09:59  Intraventricular conduction delay now present  Low QRS voltage now present     CBC WITH DIFFERENTIAL    Collection Time: 07/06/25 12:17 AM   Result Value Ref Range    WBC 6.5 4.8 - 10.8 K/uL    RBC 4.56 4.20 - 5.40 M/uL    Hemoglobin 15.0 12.0 - 16.0 g/dL    Hematocrit 45.0 37.0 - 47.0 %    MCV 98.7 (H) 81.4 - 97.8 fL    MCH 32.9 27.0 - 33.0 pg    MCHC 33.3 32.2 - 35.5 g/dL    RDW 48.8 35.9 - 50.0 fL    Platelet Count 227 164 - 446 K/uL    MPV 9.7 9.0 - 12.9 fL    Neutrophils-Polys 79.60 (H) 44.00 - 72.00 %    Lymphocytes 12.90 (L) 22.00 - 41.00 %    Monocytes 6.80 0.00 - 13.40 %    Eosinophils 0.30 0.00 - 6.90 %    Basophils 0.20 0.00 - 1.80 %    Immature Granulocytes 0.20 0.00 - 0.90 %    Nucleated RBC 0.00 0.00 - 0.20 /100 WBC    Neutrophils (Absolute) 5.17 1.82 - 7.42 K/uL    Lymphs (Absolute) 0.84 (L) 1.00 - 4.80 K/uL    Monos (Absolute) 0.44 0.00 - 0.85 K/uL    Eos (Absolute) 0.02 0.00 - 0.51 K/uL    Baso (Absolute) 0.01 0.00 - 0.12 K/uL    Immature Granulocytes (abs) 0.01 0.00 - 0.11 K/uL    NRBC (Absolute) 0.00 K/uL   COMP METABOLIC PANEL    Collection Time: 07/06/25 12:17 AM   Result Value Ref Range    Sodium 137 135 - 145 mmol/L    Potassium 4.2 3.6 - 5.5 mmol/L    Chloride 102 96 - 112 mmol/L    Co2 20 20 - 33 mmol/L    Anion Gap 15.0 7.0 - 16.0    Glucose 77 65 - 99 mg/dL    Bun 14 8 - 22 mg/dL    Creatinine 0.85 0.50 - 1.40 mg/dL    Calcium 9.3 8.5 - 10.5 mg/dL    Correct Calcium 9.2 8.5 - 10.5 mg/dL    AST(SGOT) 29 12 - 45 U/L    ALT(SGPT) 22 2 - 50 U/L    Alkaline Phosphatase 69 30 - 99 U/L    Total Bilirubin 1.1 0.1 - 1.5 mg/dL    Albumin 4.1 3.2 - 4.9 g/dL    Total Protein 7.2 6.0 -  8.2 g/dL    Globulin 3.1 1.9 - 3.5 g/dL    A-G Ratio 1.3 g/dL   APTT    Collection Time: 07/06/25 12:17 AM   Result Value Ref Range    APTT 31.3 24.7 - 36.0 sec   PROTHROMBIN TIME (INR)    Collection Time: 07/06/25 12:17 AM   Result Value Ref Range    PT 15.1 (H) 12.0 - 14.6 sec    INR 1.19 (H) 0.87 - 1.13   TROPONIN    Collection Time: 07/06/25 12:17 AM   Result Value Ref Range    Troponin T 13 6 - 19 ng/L   ESTIMATED GFR    Collection Time: 07/06/25 12:17 AM   Result Value Ref Range    GFR (CKD-EPI) 68 >60 mL/min/1.73 m 2       I have independently interpreted this EKG    RADIOLOGY/PROCEDURES   I have independently interpreted the diagnostic imaging associated with this visit and am waiting the final reading from the radiologist.   My preliminary interpretation is as follows: No focal consolidation, fluid overload, pneumothorax    Radiologist interpretation:  DX-CHEST-PORTABLE (1 VIEW)   Final Result         No acute cardiac or pulmonary abnormality is identified.          COURSE & MEDICAL DECISION MAKING    ASSESSMENT, COURSE AND PLAN    Care Narrative:     Hydration: Based on the patient's presentation of Dehydration the patient was given IV fluids. IV Hydration was used because oral hydration was not adequate alone. Upon recheck following hydration, the patient was improved.    12:01 AM - Patient seen and examined at bedside.  Patient presents for mild lightheadedness when standing, denies any associated chest pain, palpitations, head strike, loss of consciousness.  She denies any focal neurologic deficits.  Review of systems otherwise largely negative.  Patient states that she maybe had some decreased oral intake over the past several days.  On exam patient not significantly tachycardic, no hypotension.  No respiratory distress, no signs of fluid overload or DVT.  Discussed plan of care, including labs and imaging. Patient agrees to the plan of care. The patient will be resuscitated with 1L Bolus LR IV.  Ordered for EKG, DX Chest portable with 1 vies, CBC with diff, CMP, APTT, INR and Troponin to evaluate her symptoms.     Patient's labs reviewed, no significant leukocytosis or left shift, largely normal metabolic panel including kidney function.    1:37 AM - Patient was reevaluated at bedside. Discussed lab  and radiology  results with the patient and informed them of normal results.     Differential diagnosis considered.  Patient presentation consistent with mild dehydration, doubt acute emergency.    1:54 AM - I reevaluated the patient at bedside. The patient informs me they feel improved following LR infusion administration.I discussed plan for discharge and follow up as outlined below. The patient is stable for discharge at this time and will return for any new or worsening symptoms. Patient verbalizes understanding and support with my plan for discharge.     ADDITIONAL PROBLEMS MANAGED  Dehydration    DISPOSITION AND DISCUSSIONS    I have discussed management of the patient with the following physicians and JOANNA's:  None     Discussion of management with other QHP or appropriate source(s): None     Escalation of care considered, and ultimately not performed:diagnostic imaging and acute inpatient care management, however at this time, the patient is most appropriate for outpatient management    Barriers to care at this time, including but not limited to: None noted.       The patient will return for new or worsening symptoms and is stable at the time of discharge.      DISPOSITION:  Patient will be discharged home in stable condition.    FOLLOW UP:  Cullen Varghese A.P.R.N.  [address]  [place] [address]  [phone]    Schedule an appointment as soon as possible for a visit       FINAL DIAGNOSIS  1. Lightheadedness Acute   2. Dehydration Acute       I, Lakshmi Crain (Jocyibjarod), am scribing for, and in the presence of, Adam Marai M.D..    Electronically signed by: Lakshmi Crain  (Scribe), 7/6/2025    IAdam M.D. personally performed the services described in this documentation, as scribed by Lakshmi Crain in my presence, and it is both accurate and complete.    Electronically signed by: Adam Maria M.D., 7/5/2025 11:51 PM           [1] No Known Allergies

## 2025-07-06 NOTE — DISCHARGE INSTRUCTIONS
You are seen and evaluated the emergency department for your symptoms.  Thankfully your symptoms improved with fluids here.  Your labs here are largely normal.  Your exam was also pretty reassuring.  I do recommend increasing your oral intake of food and liquids at home.  Please follow-up with your primary care doctor, return to the emerged part for any further concerns

## 2025-07-06 NOTE — ED NOTES
PIV established. Blood drawn and sent to lab. Pt provided with water crackers and peanut butter and jelly sandwiches. Ok per ERP. All other needs met at this time.

## 2025-07-06 NOTE — ED NOTES
PIV flowing slowly. Pt educated on the need for fluids. Pt verbalized wanting a new PIV in 15 min if fluids are not flowing faster.

## 2025-08-04 ENCOUNTER — HOSPITAL ENCOUNTER (OUTPATIENT)
Dept: RADIOLOGY | Facility: MEDICAL CENTER | Age: 80
End: 2025-08-04
Payer: MEDICARE

## 2025-08-04 DIAGNOSIS — M53.3 PAIN IN THE COCCYX: ICD-10-CM

## 2025-08-04 PROCEDURE — 72220 X-RAY EXAM SACRUM TAILBONE: CPT

## 2025-08-15 DIAGNOSIS — Z98.890 S/P MITRAL VALVE REPAIR: ICD-10-CM

## 2025-08-18 ENCOUNTER — HOSPITAL ENCOUNTER (OUTPATIENT)
Dept: RADIOLOGY | Facility: MEDICAL CENTER | Age: 80
End: 2025-08-18
Payer: MEDICARE

## 2025-08-18 DIAGNOSIS — Z13.820 ENCOUNTER FOR OSTEOPOROSIS SCREENING IN ASYMPTOMATIC POSTMENOPAUSAL PATIENT: ICD-10-CM

## 2025-08-18 DIAGNOSIS — Z78.0 ENCOUNTER FOR OSTEOPOROSIS SCREENING IN ASYMPTOMATIC POSTMENOPAUSAL PATIENT: ICD-10-CM

## 2025-08-18 PROCEDURE — 77080 DXA BONE DENSITY AXIAL: CPT

## 2025-08-19 RX ORDER — SPIRONOLACTONE 25 MG/1
25 TABLET ORAL DAILY
Qty: 90 TABLET | Refills: 1 | Status: SHIPPED | OUTPATIENT
Start: 2025-08-19

## 2025-08-25 ENCOUNTER — HOSPITAL ENCOUNTER (OUTPATIENT)
Dept: CARDIOLOGY | Facility: MEDICAL CENTER | Age: 80
End: 2025-08-25
Attending: INTERNAL MEDICINE
Payer: MEDICARE

## 2025-08-25 DIAGNOSIS — Z98.890 S/P MITRAL VALVE REPAIR: ICD-10-CM

## 2025-08-25 LAB
LV EJECT FRACT  99904: 55
LV EJECT FRACT MOD 2C 99903: 56.7
LV EJECT FRACT MOD 4C 99902: 56.65
LV EJECT FRACT MOD BP 99901: 55.6

## 2025-08-25 PROCEDURE — 93306 TTE W/DOPPLER COMPLETE: CPT

## 2025-08-25 PROCEDURE — 93306 TTE W/DOPPLER COMPLETE: CPT | Mod: 26 | Performed by: INTERNAL MEDICINE
